# Patient Record
Sex: FEMALE | Race: WHITE | NOT HISPANIC OR LATINO | Employment: OTHER | URBAN - METROPOLITAN AREA
[De-identification: names, ages, dates, MRNs, and addresses within clinical notes are randomized per-mention and may not be internally consistent; named-entity substitution may affect disease eponyms.]

---

## 2017-01-03 ENCOUNTER — TELEPHONE (OUTPATIENT)
Dept: FAMILY MEDICINE | Facility: CLINIC | Age: 67
End: 2017-01-03

## 2017-01-04 NOTE — TELEPHONE ENCOUNTER
Spoke with patient's brother (George Tapia) informed him of lab results. Mr Vazquez verbalized understanding.

## 2017-01-04 NOTE — TELEPHONE ENCOUNTER
----- Message from Yanna Abbasi MD sent at 1/1/2017  9:35 PM CST -----  Notify patient: Blood sugar is well controlled and hep c screening is negative

## 2017-01-23 ENCOUNTER — TELEPHONE (OUTPATIENT)
Dept: NEUROLOGY | Facility: CLINIC | Age: 67
End: 2017-01-23

## 2017-02-24 RX ORDER — LOSARTAN POTASSIUM 50 MG/1
50 TABLET ORAL DAILY
Qty: 90 TABLET | Refills: 1 | Status: SHIPPED | OUTPATIENT
Start: 2017-02-24 | End: 2018-06-28 | Stop reason: SDUPTHER

## 2017-03-06 ENCOUNTER — TELEPHONE (OUTPATIENT)
Dept: FAMILY MEDICINE | Facility: CLINIC | Age: 67
End: 2017-03-06

## 2017-03-06 NOTE — TELEPHONE ENCOUNTER
----- Message from Arturo Green sent at 3/6/2017  9:37 AM CST -----  Contact: Brother/George Vazquez called in regarding the attached patient (sister-Maggy).  Patient needs refill on her Rx for her Lasartin Potassium 50 mgs      [facility]/pharmacy #[id number] - [place] - [address]  [address] [address]  Phone: [phone] Fax: [phone]    George's call back number is [phone]

## 2017-03-07 ENCOUNTER — OFFICE VISIT (OUTPATIENT)
Dept: NEUROLOGY | Facility: CLINIC | Age: 67
End: 2017-03-07
Payer: MEDICARE

## 2017-03-07 VITALS
HEIGHT: 61 IN | RESPIRATION RATE: 17 BRPM | DIASTOLIC BLOOD PRESSURE: 63 MMHG | WEIGHT: 201.25 LBS | HEART RATE: 59 BPM | BODY MASS INDEX: 38 KG/M2 | SYSTOLIC BLOOD PRESSURE: 126 MMHG

## 2017-03-07 DIAGNOSIS — Z86.73 HISTORY OF ISCHEMIC LEFT MCA STROKE: Primary | ICD-10-CM

## 2017-03-07 DIAGNOSIS — F44.5 PSEUDOSEIZURES: ICD-10-CM

## 2017-03-07 DIAGNOSIS — S06.5XAA SUBDURAL HEMATOMA: ICD-10-CM

## 2017-03-07 DIAGNOSIS — R29.6 FREQUENT FALLS: ICD-10-CM

## 2017-03-07 DIAGNOSIS — G40.909 SEIZURE DISORDER: ICD-10-CM

## 2017-03-07 PROCEDURE — 3074F SYST BP LT 130 MM HG: CPT | Mod: S$GLB,,, | Performed by: PSYCHIATRY & NEUROLOGY

## 2017-03-07 PROCEDURE — 1157F ADVNC CARE PLAN IN RCRD: CPT | Mod: S$GLB,,, | Performed by: PSYCHIATRY & NEUROLOGY

## 2017-03-07 PROCEDURE — 1126F AMNT PAIN NOTED NONE PRSNT: CPT | Mod: S$GLB,,, | Performed by: PSYCHIATRY & NEUROLOGY

## 2017-03-07 PROCEDURE — 1159F MED LIST DOCD IN RCRD: CPT | Mod: S$GLB,,, | Performed by: PSYCHIATRY & NEUROLOGY

## 2017-03-07 PROCEDURE — 99999 PR PBB SHADOW E&M-EST. PATIENT-LVL III: CPT | Mod: PBBFAC,,, | Performed by: PSYCHIATRY & NEUROLOGY

## 2017-03-07 PROCEDURE — 3078F DIAST BP <80 MM HG: CPT | Mod: S$GLB,,, | Performed by: PSYCHIATRY & NEUROLOGY

## 2017-03-07 PROCEDURE — 99499 UNLISTED E&M SERVICE: CPT | Mod: S$GLB,,, | Performed by: PSYCHIATRY & NEUROLOGY

## 2017-03-07 PROCEDURE — 1160F RVW MEDS BY RX/DR IN RCRD: CPT | Mod: S$GLB,,, | Performed by: PSYCHIATRY & NEUROLOGY

## 2017-03-07 PROCEDURE — 99214 OFFICE O/P EST MOD 30 MIN: CPT | Mod: S$GLB,,, | Performed by: PSYCHIATRY & NEUROLOGY

## 2017-03-07 NOTE — MR AVS SNAPSHOT
[facility]  [address] [facility] [address] [place] [address]  Phone: [phone]  Fax: [phone]                  Maggy Tapia   3/7/2017 1:00 PM   Office Visit    Description:  Female : 1950   Provider:  Harley Roberts DO   Department:  [facility] - Neurology           Reason for Visit     Stroke     Seizures           Diagnoses this Visit        Comments    History of ischemic left MCA stroke    -  Primary     Subdural hematoma         Pseudoseizures     comorbid history of Bipolar Disorder and Schizophrenia    Seizure disorder         Frequent falls                To Do List           Future Appointments        Provider Department Dept Phone    3/13/2017 5:30 PM LAB, [facility] [phone]    3/14/2017 10:00 AM [facility] EEG [facility] [phone]    3/23/2017 9:40 AM Yanna Abbasi MD [facility] [phone]    2017 1:40 PM Harley Roberts DO [facility] [phone]      Goals (5 Years of Data)     None      Follow-Up and Disposition     Return in about 6 weeks (around 2017).      [facility]     [facility] Nurse Care [facility] -  Assistance  Registered nurses in the [facility] provide clinical advisement, health education, appointment booking, and other advisory services.  Call for this free service at [phone].             Medications                Verify that the below list of medications is an accurate representation of the medications you are currently taking.  If none reported, the list may be blank. If incorrect, please contact your healthcare provider. Carry this list with you in case of emergency.           Current Medications     clonazePAM (KLONOPIN) 0.5 MG tablet Take 0.5 mg by mouth nightly.    divalproex (DEPAKOTE) 500 MG Tb24 Take 2 tablets (1,000 mg total) by mouth every evening.    fluticasone (FLONASE) 50 mcg/actuation nasal spray 2 sprays by Each  "Nare route once daily.    gemfibrozil (LOPID) 600 MG tablet Take 1 tablet (600 mg total) by mouth 2 (two) times daily.    levothyroxine (SYNTHROID) 50 MCG tablet Take 1 tablet (50 mcg total) by mouth once daily.    losartan (COZAAR) 50 MG tablet Take 1 tablet (50 mg total) by mouth once daily.    simvastatin (ZOCOR) 40 MG tablet Take 1 tablet (40 mg total) by mouth once daily.    topiramate (TOPAMAX) 100 MG tablet Take 100 mg by mouth 2 (two) times daily.    TRINTELLIX 10 mg Tab Take 1 tablet by mouth once daily.           Clinical Reference Information           Your Vitals Were     BP Pulse Resp Height Weight BMI    126/63 (BP Location: Left arm, Patient Position: Sitting, BP Method: Automatic) 59 17 5' 1" (1.549 m) 91.3 kg (201 lb 4.5 oz) 38.03 kg/m2      Blood Pressure          Most Recent Value    BP  126/63      Allergies as of 3/7/2017     Penicillins    Sulfa (Sulfonamide Antibiotics)      Immunizations Administered on Date of Encounter - 3/7/2017     None      Orders Placed During Today's Visit     Future Labs/Procedures Expected by Expires    Valproic Acid  3/7/2017 5/6/2018    Ambulatory EEG  As directed 3/7/2018      Instructions    We will make arrangements for the 48 hour EEG to be done again;  Once we know when it will be scheduled I would like you to CUT BACK on the Topamax from one pill twice a day to ONE HALF a pill twice a day 2 DAYS before the test is scheduled.     On the day you get the EEG hooked up, STOP taking the Topamax completely.  We can resume it afterwards if necessary, or can recommend a different alternative based on the results.        Language Assistance Services     ATTENTION: Language assistance services are available, free of charge. Please call [phone].      ATENCIÓN: Si aniyahla dwayne, tiene a see disposición servicios gratuitos de asistencia lingüística. Llame al [phone].     [facility] Ý: N?u b?n nói Ti?ng Vi?t, có các d?ch v? h? tr? ngôn ng? mi?n phí dành cho b?n. G?i " s? [phone].         [facility] complies with applicable Federal civil rights laws and does not discriminate on the basis of race, color, national origin, age, disability, or sex.

## 2017-03-07 NOTE — LETTER
March 8, 2017      Verito Ortiz, NP  [address] [place] [address] [address] [address]           [facility]  [address] [facility] [place] [address]  Phone: [phone]  Fax: [phone]          Patient: Maggy Tapia   MR Number: [id number]   YOB: 1950   Date of Visit: 3/7/2017       Dear Verito Ortiz:    Thank you for referring Maggy Tapia to me for evaluation. Attached you will find relevant portions of my assessment and plan of care.    If you have questions, please do not hesitate to call me. I look forward to following Maggy Tapia along with you.    Sincerely,    Harley Roberts, DO    Enclosure  CC:  No Recipients    If you would like to receive this communication electronically, please contact [email or web address] or [phone] to request more information on [employer] access.    For providers and/or their staff who would like to refer a patient to [facility], please contact us through our one-stop-shop provider referral line, [facility], at [phone].    If you feel you have received this communication in error or would no longer like to receive these types of communications, please e-mail [email or web address]

## 2017-03-07 NOTE — PROGRESS NOTES
"Subjective:         Patient ID: Maggy Tapia is a 67 y.o.  female who presents for follow up of subdural hematoma    History of Present Illness    Ms. 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     She had a left rigoberto hole evacuation August 3, 2016.  During her hospitalization, developed ataxia and decline in mental status with reaccumulation of fluid within the left subdural space; underwent left frontoparietal craniotomy with stripping of subdural membranes and evacuation of chronic subdural hematoma fluid on August 8, 2016.    She was discharged to inpatient rehabilitation on the 19th of August, discharged from rehabilitation September 8, 2016.    She returned to the neurology clinic for follow-up with Dr. Caraballo October 25, 2016.  With her history of schizophrenia and was suspicious for pseudoseizure activity resulting in behavioral arrests, and recommended stopping Topamax, continuing with Depakote 500 mg twice a day, and ambulatory  EEG and CTA head and neck.    2 days later, he presented to emergency room with questionable seizure activity "stared off into space".  According to family medicine notes, the patient continued to have multiple spells of unresponsiveness, as well as shaking spells therefore family started her back on Topamax 100 mg twice a day on October 30.  Does not appear she has been back to see neurology since then.  Had an appointment January 24, 2017 with Dr. Caraballo which was canceled.    She last saw Dr. Luong in neurosurgery in January 2017; had imaging of the cervical spine which per outside records was significant for cervical stenosis.    Here today with her brother.  Apparently was some confusion regarding " "the ambulatory EEG, something about order being lost.      She was not aware of having any recent spells.  Brother says she was on the commode; he heard her call out for her mother, found her bent over seated, when he lifted her up eyes were rolled back in her head, unresponsive for 2 minutes. Seemed to be confused at times, says "I'm OK" after events and c/o headache.  Similar presentation to past events. 2 this past week; last prior was 3-4 weeks.  Maybe 1-2 a month.      Intermittently feels dizzy, like head spinning. Daily, does not last long.     Review of patient's allergies indicates:   Allergen Reactions    Penicillins Anaphylaxis    Sulfa (sulfonamide antibiotics) Anaphylaxis     Current Outpatient Prescriptions   Medication Sig Dispense Refill    clonazePAM (KLONOPIN) 0.5 MG tablet Take 0.5 mg by mouth nightly.  3    divalproex (DEPAKOTE) 500 MG Tb24 Take 2 tablets (1,000 mg total) by mouth every evening. 180 tablet 3    fluticasone (FLONASE) 50 mcg/actuation nasal spray 2 sprays by Each Nare route once daily. 3 Bottle prn    gemfibrozil (LOPID) 600 MG tablet Take 1 tablet (600 mg total) by mouth 2 (two) times daily. 180 tablet 3    levothyroxine (SYNTHROID) 50 MCG tablet Take 1 tablet (50 mcg total) by mouth once daily. 90 tablet 3    losartan (COZAAR) 50 MG tablet Take 1 tablet (50 mg total) by mouth once daily. 90 tablet 1    simvastatin (ZOCOR) 40 MG tablet Take 1 tablet (40 mg total) by mouth once daily. 90 tablet 3    topiramate (TOPAMAX) 100 MG tablet Take 100 mg by mouth 2 (two) times daily.      TRINTELLIX 10 mg Tab Take 1 tablet by mouth once daily.  3     No current facility-administered medications for this visit.          Review of Systems  Review of Systems   Constitutional: Positive for fatigue. Negative for chills and fever.   HENT: Negative for congestion.    Eyes: Negative for visual disturbance.   Respiratory: Negative for cough, shortness of breath and wheezing.  "   Cardiovascular: Negative for chest pain and palpitations.   Gastrointestinal: Negative for abdominal pain, constipation, diarrhea, nausea and vomiting.   Endocrine: Negative for cold intolerance and heat intolerance.   Genitourinary: Negative for difficulty urinating, dysuria and hematuria.        Urinary incontinence     Musculoskeletal: Positive for arthralgias. Negative for myalgias.   Skin: Negative for rash and wound.   Allergic/Immunologic: Negative for immunocompromised state.   Neurological: Positive for weakness and numbness. Negative for dizziness, tremors, seizures and headaches.   Hematological: Does not bruise/bleed easily.   Psychiatric/Behavioral: Positive for decreased concentration, dysphoric mood (depression) and sleep disturbance. The patient is not nervous/anxious.        Objective:        Vitals:    03/07/17 1303   BP: 126/63   Pulse: (!) 59   Resp: 17     Body mass index is 38.03 kg/(m^2).  Neurologic Exam     Mental Status   Oriented to person, place, and time.   Level of consciousness: alert  Able to name object. Able to repeat. Normal comprehension.        Psychomotor slowing     Cranial Nerves     CN II   Visual fields full to confrontation.     CN III, IV, VI   Pupils are equal, round, and reactive to light.  Extraocular motions are normal.   CN III: no CN III palsy  CN VI: no CN VI palsy  Nystagmus: none     CN V   Facial sensation intact.     CN VII   Facial expression full, symmetric.     CN VIII   Hearing: intact       On confrontation visual fields, she does not report seeing fingers are being able to count digits in all 4 quadrants.  She is however able to track, and on finger to nose testing able to see and reach the target in all 4 quadrants.  Positive blink to visual threat in all 4 quadrants.     Gait, Coordination, and Reflexes     Gait  Gait: (wheelchair)    Coordination   Finger to nose coordination: normal    Tremor   Resting tremor: absent  Action tremor:  "absent      Physical Exam   Constitutional: She is oriented to person, place, and time.   Eyes: EOM are normal. Pupils are equal, round, and reactive to light.   Neurological: She is oriented to person, place, and time. She has a normal Finger-Nose-Finger Test.   Psychiatric: Her affect is blunt. She is slowed.         Data Review:    CTA head and neck 11/5/16:  Impression       1. Postoperative change of left frontoparietal craniotomy with stable thin residual mixed density extra-axial collection overlying the left cerebral convexity without significant midline shift or hydrocephalus.    2. Unremarkable CT exam without evidence of high-grade stenosis or aneurysm.    3. Paranasal sinus disease as discussed above.      Electronically signed by: KENZIE MAHONEY  Date: 11/05/16  Time: 05:18     Ambulatory EEG still marked as "future" in Epic       Results for JIM RODRIGES (MRN [id number]) as of 3/7/2017 13:00   Ref. Range 12/27/2016 16:16   Valproic Acid Lvl Latest Ref Range: 50.0 - 100.0 ug/mL 58.4   Results for JIM RODRIGES (MRN [id number]) as of 3/7/2017 13:00   Ref. Range 11/28/2016 10:15   WBC Latest Ref Range: 3.90 - 12.70 K/uL 10.64   RBC Latest Ref Range: 4.00 - 5.40 M/uL 4.93   Hemoglobin Latest Ref Range: 12.0 - 16.0 g/dL 15.6   Hematocrit Latest Ref Range: 37.0 - 48.5 % 48.1   MCV Latest Ref Range: 82 - 98 fL 98   MCH Latest Ref Range: 27.0 - 31.0 pg 31.6 (H)   MCHC Latest Ref Range: 32.0 - 36.0 % 32.4   RDW Latest Ref Range: 11.5 - 14.5 % 14.5   Platelets Latest Ref Range: 150 - 350 K/uL 143 (L)   MPV Latest Ref Range: 9.2 - 12.9 fL 12.2   Gran% Latest Ref Range: 38.0 - 73.0 % 63.5   Gran # Latest Ref Range: 1.8 - 7.7 K/uL 6.8   Lymph% Latest Ref Range: 18.0 - 48.0 % 26.4   Lymph # Latest Ref Range: 1.0 - 4.8 K/uL 2.8   Mono% Latest Ref Range: 4.0 - 15.0 % 8.6   Mono # Latest Ref Range: 0.3 - 1.0 K/uL 0.9   Eosinophil% Latest Ref Range: 0.0 - 8.0 % 0.9   Eos # Latest Ref Range: 0.0 - 0.5 K/uL 0.1 "   Basophil% Latest Ref Range: 0.0 - 1.9 % 0.4   Baso # Latest Ref Range: 0.00 - 0.20 K/uL 0.04   Sodium Latest Ref Range: 136 - 145 mmol/L 143   Potassium Latest Ref Range: 3.5 - 5.1 mmol/L 4.3   Chloride Latest Ref Range: 95 - 110 mmol/L 109   CO2 Latest Ref Range: 23 - 29 mmol/L 21 (L)   Anion Gap Latest Ref Range: 8 - 16 mmol/L 13   BUN, Bld Latest Ref Range: 8 - 23 mg/dL 27 (H)   Creatinine Latest Ref Range: 0.5 - 1.4 mg/dL 1.3   eGFR if non African American Latest Ref Range: >60 mL/min/1.73 m^2 42.3 (A)   eGFR if African American Latest Ref Range: >60 mL/min/1.73 m^2 48.7 (A)   Glucose Latest Ref Range: 70 - 110 mg/dL 118 (H)   Calcium Latest Ref Range: 8.7 - 10.5 mg/dL 10.1   Alkaline Phosphatase Latest Ref Range: 55 - 135 U/L 93   Total Protein Latest Ref Range: 6.0 - 8.4 g/dL 7.6   Albumin Latest Ref Range: 3.5 - 5.2 g/dL 3.7   Total Bilirubin Latest Ref Range: 0.1 - 1.0 mg/dL 0.6   AST Latest Ref Range: 10 - 40 U/L 16   ALT Latest Ref Range: 10 - 44 U/L 8 (L)   Triglycerides Latest Ref Range: 30 - 150 mg/dL 173 (H)   Cholesterol Latest Ref Range: 120 - 199 mg/dL 145   HDL Latest Ref Range: 40 - 75 mg/dL 49   LDL Cholesterol Latest Ref Range: 63.0 - 159.0 mg/dL 61.4 (L)   Total Cholesterol/HDL Ratio Latest Ref Range: 2.0 - 5.0  3.0   Hemoglobin A1C Latest Ref Range: 4.5 - 6.2 % 6.0   Estimated Avg Glucose Latest Ref Range: 68 - 131 mg/dL 126   TSH Latest Ref Range: 0.400 - 4.000 uIU/mL 9.123 (H)   Free T4 Latest Ref Range: 0.71 - 1.51 ng/dL 0.97     CT BRAIN 12/6/16:  Impression       No significant interval change compared to the prior exam of 11/4/2016 and in particular the small linear extra axial collection left frontoparietal at the craniotomy site is not significantly changed with no new acute findings      Electronically signed by: STEFAN BERKOWITZ MD  Date: 12/06/16  Time: 08:29          Assessment:        1. History of ischemic left MCA stroke    2. Subdural hematoma    3. Pseudoseizures    4. Seizure  disorder    5. Frequent falls (possibly related to polypharmacy)           Plan:         Problem List Items Addressed This Visit        Neuro    History of ischemic left MCA stroke - Primary    Seizure disorder    Pseudoseizures (Chronic)    Subdural hematoma    Overview     S/p burrhole 8/3 followed by craniotomy 8/8            Other    Frequent falls (possibly related to polypharmacy)        I would encourage she follow through with ambulatory EEG study, and would again recommend taper off Topamax prior to the study with falls and seizure precautions in place.  Alternative would include elective admission to epilepsy monitoring unit    We will make arrangements for the 48 hour EEG to be done again;  Once we know when it will be scheduled I would like you to CUT BACK on the Topamax from one pill twice a day to ONE HALF a pill twice a day 2 DAYS before the test is scheduled.     On the day you get the EEG hooked up, STOP taking the Topamax completely.  We can resume it afterwards if necessary, or can recommend a different alternative based on the results.     Return in about 6 weeks (around 4/18/2017).       Thank you very much for the opportunity to assist in this patient's care.  If you have any questions or concerns, please do not hesitate to contact me at any time.     Sincerely,  Harley Roberts, DO

## 2017-03-07 NOTE — PATIENT INSTRUCTIONS
We will make arrangements for the 48 hour EEG to be done again;  Once we know when it will be scheduled I would like you to CUT BACK on the Topamax from one pill twice a day to ONE HALF a pill twice a day 2 DAYS before the test is scheduled.     On the day you get the EEG hooked up, STOP taking the Topamax completely.  We can resume it afterwards if necessary, or can recommend a different alternative based on the results.

## 2017-03-13 ENCOUNTER — LAB VISIT (OUTPATIENT)
Dept: LAB | Facility: HOSPITAL | Age: 67
End: 2017-03-13
Attending: PSYCHIATRY & NEUROLOGY
Payer: MEDICARE

## 2017-03-13 DIAGNOSIS — R29.6 FREQUENT FALLS: ICD-10-CM

## 2017-03-13 LAB — VALPROATE SERPL-MCNC: 64.8 UG/ML

## 2017-03-13 PROCEDURE — 36415 COLL VENOUS BLD VENIPUNCTURE: CPT

## 2017-03-13 PROCEDURE — 80164 ASSAY DIPROPYLACETIC ACD TOT: CPT

## 2017-03-14 ENCOUNTER — HOSPITAL ENCOUNTER (OUTPATIENT)
Dept: NEUROLOGY | Facility: HOSPITAL | Age: 67
Discharge: HOME | End: 2017-03-14
Attending: PSYCHIATRY & NEUROLOGY
Payer: MEDICARE

## 2017-03-14 DIAGNOSIS — S06.5XAA SDH (SUBDURAL HEMATOMA): ICD-10-CM

## 2017-03-14 DIAGNOSIS — I61.0 NONTRAUMATIC SUBCORTICAL HEMORRHAGE OF LEFT CEREBRAL HEMISPHERE: ICD-10-CM

## 2017-03-14 DIAGNOSIS — F44.5 PSEUDOSEIZURES: ICD-10-CM

## 2017-03-14 DIAGNOSIS — G40.909 SEIZURE DISORDER: ICD-10-CM

## 2017-03-14 PROCEDURE — 95951 PR EEG MONITORING/VIDEORECORD: CPT | Mod: 26,,, | Performed by: PSYCHIATRY & NEUROLOGY

## 2017-03-14 PROCEDURE — 95819 EEG AWAKE AND ASLEEP: CPT

## 2017-03-15 PROCEDURE — 95951 PR EEG MONITORING/VIDEORECORD: CPT | Mod: 26,,, | Performed by: PSYCHIATRY & NEUROLOGY

## 2017-03-17 NOTE — PROCEDURES
DATE OF STUDY:  03/14/2017.    REQUESTING PHYSICIAN:  Dr. Harley Roberts.    EEG#:  [id number].    RECORDING TIMES:  Start on 03/14/2017 at 10:39.  End on 03/16/2017 at 3:23.  A total of 40 hours and 26 minutes of EEG monitoring was obtained.    ELECTROENCEPHALOGRAM REPORT    METHODOLOGY:  Electroencephalographic (EEG) recording is recorded with   electrodes placed according to the International 10-20 placement system.  Thirty   two (32) channels of digital signal (sampling rate of 512/sec), including T1   and T2, were simultaneously recorded from the scalp and may include EKG, EMG,   and/or eye monitors.  Recording band pass was 0.1 to 512 Hz.  Digital video   recording of the patient is simultaneously recorded with the EEG.  The patient   is instructed to report clinical symptoms which may occur during the recording   session.  EEG and video recording are stored and archived in digital format.    Activation procedures, which include photic stimulation, hyperventilation and   instructing patients to perform simple tasks, are done in selected patients.    The EEG is displayed on a monitor screen and can be reviewed using different   montages.  Computer assisted-analysis is employed to detect spike and   electrographic seizure activity.  The entire record is submitted for computer   analysis.  The entire recording is visually reviewed, and the times identified   by computer analysis as being spikes or seizures are reviewed again.    Compressed spectral analysis (CSA) is also performed on the activity recorded   from each individual channel.  This is displayed as a power display of   frequencies from 0 to 30 Hz over time.  The CSA is reviewed looking for   asymmetries in power between homologous areas of the scalp, then compared with   the original EEG recording.    [employer] software was also utilized in the review of this study.  This software   suite analyzes the EEG recording in multiple domains.  Coherence and  rhythmicity   are computed to identify EEG sections which may contain organized seizures.    Each channel undergoes analysis to detect the presence of spike and sharp waves   which have special and morphological characteristics of epileptic activity.  The   routine EEG recording is converted from special into frequency domain.  This is   then displayed comparing homologous areas to identify areas of significant   asymmetry.  Algorithm to identify non-cortically generated artifact is used to   separate artifact from the EEG.    EEG FINDINGS:  In the waking state, the background was fairly low amplitude and   was a mixture of irregular beta frequencies seen diffusely.  Other times in   which a posterior dominant rhythm of an irregular 9 Hz in frequency was noted in   the occipital region.  In the waking state, the beta activity was higher   amplitude in the left central parietal posterior temporal region.  There also   appeared to be some intermixed theta activity in this region.  With sleep,   background consisted predominantly of a generalized mixture of theta, beta and   some alpha frequencies seen diffusely.  There was more prominent asymmetry with   higher amplitude theta frequencies noted in left side in the same distribution   is seen in the waking state.  The theta frequency had a MU-shaped configuration,   which is somewhat sharp appearance, but there is a normal pattern.  There is no   evidence for spike or sharp wave activity.  No rhythmic buildups to suggest   subclinical or non-convulsive seizures.  The voltage asymmetry was noted another   frequency particularly the sleep spindles being higher in the same area.    IMPRESSION:  Abnormal EEG with higher amplitude activity noted in the central   parietal, posterior temporal region, congestive of underlying tissue or a skull   defect.  No epileptiform activity was seen.      RR/SN  dd: 03/17/2017 09:40:35 (CDT)  td: 03/17/2017 10:05:23 (CDT)  Doc ID    #[id number]  Job ID #[id number]    CC:

## 2017-03-22 ENCOUNTER — DOCUMENTATION ONLY (OUTPATIENT)
Dept: FAMILY MEDICINE | Facility: CLINIC | Age: 67
End: 2017-03-22

## 2017-03-22 NOTE — PROGRESS NOTES
Pre-Visit Chart Review  For Appointment Scheduled on 3-23-17    Health Maintenance Due   Topic Date Due    TETANUS VACCINE  01/01/1966    Colonoscopy  01/01/1998    Zoster Vaccine  01/01/2008

## 2017-03-23 ENCOUNTER — TELEPHONE (OUTPATIENT)
Dept: NEUROLOGY | Facility: CLINIC | Age: 67
End: 2017-03-23

## 2017-03-23 NOTE — TELEPHONE ENCOUNTER
Spoke with patient, reported the results of recent EEG and informed her she is to continue her Topamax and Depakote as ordered. Patient verbalized understanding.

## 2017-03-23 NOTE — TELEPHONE ENCOUNTER
----- Message from Harley Roberts DO sent at 3/21/2017 11:08 AM CDT -----  Her ambulatory EEG showed changes consistent with her prior surgery (craniotomy for subdural evacuation) and some slowing but did not show epileptic activity.  At this point I would recommend she continue off the Topamax as she was instructed prior to the EEG, continue on Depakote which she is taking for psychological treatment.

## 2017-03-27 ENCOUNTER — DOCUMENTATION ONLY (OUTPATIENT)
Dept: FAMILY MEDICINE | Facility: CLINIC | Age: 67
End: 2017-03-27

## 2017-03-27 NOTE — PROGRESS NOTES
Pre-Visit Chart Review  For Appointment Scheduled on 3-28-17    Health Maintenance Due   Topic Date Due    TETANUS VACCINE  01/01/1966    Colonoscopy  01/01/1998    Zoster Vaccine  01/01/2008

## 2017-03-28 ENCOUNTER — LAB VISIT (OUTPATIENT)
Dept: LAB | Facility: HOSPITAL | Age: 67
End: 2017-03-28
Attending: FAMILY MEDICINE
Payer: MEDICARE

## 2017-03-28 ENCOUNTER — OFFICE VISIT (OUTPATIENT)
Dept: FAMILY MEDICINE | Facility: CLINIC | Age: 67
End: 2017-03-28
Payer: MEDICARE

## 2017-03-28 VITALS
HEIGHT: 61 IN | SYSTOLIC BLOOD PRESSURE: 100 MMHG | TEMPERATURE: 98 F | BODY MASS INDEX: 34.83 KG/M2 | HEART RATE: 70 BPM | DIASTOLIC BLOOD PRESSURE: 60 MMHG | WEIGHT: 184.5 LBS

## 2017-03-28 DIAGNOSIS — I10 ESSENTIAL HYPERTENSION: ICD-10-CM

## 2017-03-28 DIAGNOSIS — E66.9 OBESITY (BMI 30-39.9): ICD-10-CM

## 2017-03-28 DIAGNOSIS — Z79.4 TYPE 2 DIABETES MELLITUS WITHOUT COMPLICATION, WITH LONG-TERM CURRENT USE OF INSULIN: ICD-10-CM

## 2017-03-28 DIAGNOSIS — G40.909 SEIZURE DISORDER: ICD-10-CM

## 2017-03-28 DIAGNOSIS — I61.0 NONTRAUMATIC SUBCORTICAL HEMORRHAGE OF LEFT CEREBRAL HEMISPHERE: ICD-10-CM

## 2017-03-28 DIAGNOSIS — R29.6 FREQUENT FALLS: ICD-10-CM

## 2017-03-28 DIAGNOSIS — F44.5 PSEUDOSEIZURES: Chronic | ICD-10-CM

## 2017-03-28 DIAGNOSIS — E03.9 ACQUIRED HYPOTHYROIDISM: ICD-10-CM

## 2017-03-28 DIAGNOSIS — E11.9 TYPE 2 DIABETES MELLITUS WITHOUT COMPLICATION, WITH LONG-TERM CURRENT USE OF INSULIN: ICD-10-CM

## 2017-03-28 DIAGNOSIS — E03.9 ACQUIRED HYPOTHYROIDISM: Primary | ICD-10-CM

## 2017-03-28 DIAGNOSIS — E78.5 HYPERLIPIDEMIA, UNSPECIFIED HYPERLIPIDEMIA TYPE: ICD-10-CM

## 2017-03-28 DIAGNOSIS — Z86.73 HISTORY OF ISCHEMIC LEFT MCA STROKE: ICD-10-CM

## 2017-03-28 LAB
ALBUMIN SERPL BCP-MCNC: 3.1 G/DL
ALP SERPL-CCNC: 63 U/L
ALT SERPL W/O P-5'-P-CCNC: 5 U/L
ANION GAP SERPL CALC-SCNC: 8 MMOL/L
AST SERPL-CCNC: 12 U/L
BASOPHILS # BLD AUTO: 0.02 K/UL
BASOPHILS NFR BLD: 0.3 %
BILIRUB SERPL-MCNC: 0.4 MG/DL
BUN SERPL-MCNC: 28 MG/DL
CALCIUM SERPL-MCNC: 9.5 MG/DL
CHLORIDE SERPL-SCNC: 108 MMOL/L
CO2 SERPL-SCNC: 26 MMOL/L
CREAT SERPL-MCNC: 1.3 MG/DL
DIFFERENTIAL METHOD: ABNORMAL
EOSINOPHIL # BLD AUTO: 0.1 K/UL
EOSINOPHIL NFR BLD: 1.2 %
ERYTHROCYTE [DISTWIDTH] IN BLOOD BY AUTOMATED COUNT: 13.6 %
EST. GFR  (AFRICAN AMERICAN): 48.4 ML/MIN/1.73 M^2
EST. GFR  (NON AFRICAN AMERICAN): 42 ML/MIN/1.73 M^2
GLUCOSE SERPL-MCNC: 155 MG/DL
HCT VFR BLD AUTO: 44.4 %
HGB BLD-MCNC: 14.5 G/DL
LYMPHOCYTES # BLD AUTO: 2.7 K/UL
LYMPHOCYTES NFR BLD: 39.6 %
MCH RBC QN AUTO: 32.4 PG
MCHC RBC AUTO-ENTMCNC: 32.7 %
MCV RBC AUTO: 99 FL
MONOCYTES # BLD AUTO: 0.4 K/UL
MONOCYTES NFR BLD: 6.4 %
NEUTROPHILS # BLD AUTO: 3.6 K/UL
NEUTROPHILS NFR BLD: 52.2 %
PLATELET # BLD AUTO: 116 K/UL
PMV BLD AUTO: 11.6 FL
POTASSIUM SERPL-SCNC: 4.3 MMOL/L
PROT SERPL-MCNC: 6.4 G/DL
RBC # BLD AUTO: 4.47 M/UL
SODIUM SERPL-SCNC: 142 MMOL/L
T3FREE SERPL-MCNC: 1.5 PG/ML
T4 FREE SERPL-MCNC: 0.97 NG/DL
TSH SERPL DL<=0.005 MIU/L-ACNC: 2.9 UIU/ML
WBC # BLD AUTO: 6.9 K/UL

## 2017-03-28 PROCEDURE — 3044F HG A1C LEVEL LT 7.0%: CPT | Mod: S$GLB,,, | Performed by: FAMILY MEDICINE

## 2017-03-28 PROCEDURE — 84481 FREE ASSAY (FT-3): CPT

## 2017-03-28 PROCEDURE — 80053 COMPREHEN METABOLIC PANEL: CPT

## 2017-03-28 PROCEDURE — 84439 ASSAY OF FREE THYROXINE: CPT

## 2017-03-28 PROCEDURE — 1126F AMNT PAIN NOTED NONE PRSNT: CPT | Mod: S$GLB,,, | Performed by: FAMILY MEDICINE

## 2017-03-28 PROCEDURE — 84443 ASSAY THYROID STIM HORMONE: CPT

## 2017-03-28 PROCEDURE — 1159F MED LIST DOCD IN RCRD: CPT | Mod: S$GLB,,, | Performed by: FAMILY MEDICINE

## 2017-03-28 PROCEDURE — 85025 COMPLETE CBC W/AUTO DIFF WBC: CPT

## 2017-03-28 PROCEDURE — 99999 PR PBB SHADOW E&M-EST. PATIENT-LVL III: CPT | Mod: PBBFAC,,, | Performed by: FAMILY MEDICINE

## 2017-03-28 PROCEDURE — 99499 UNLISTED E&M SERVICE: CPT | Mod: S$GLB,,, | Performed by: FAMILY MEDICINE

## 2017-03-28 PROCEDURE — 2022F DILAT RTA XM EVC RTNOPTHY: CPT | Mod: S$GLB,,, | Performed by: FAMILY MEDICINE

## 2017-03-28 PROCEDURE — 1160F RVW MEDS BY RX/DR IN RCRD: CPT | Mod: S$GLB,,, | Performed by: FAMILY MEDICINE

## 2017-03-28 PROCEDURE — 99214 OFFICE O/P EST MOD 30 MIN: CPT | Mod: S$GLB,,, | Performed by: FAMILY MEDICINE

## 2017-03-28 PROCEDURE — 3078F DIAST BP <80 MM HG: CPT | Mod: S$GLB,,, | Performed by: FAMILY MEDICINE

## 2017-03-28 PROCEDURE — 83036 HEMOGLOBIN GLYCOSYLATED A1C: CPT

## 2017-03-28 PROCEDURE — 1157F ADVNC CARE PLAN IN RCRD: CPT | Mod: S$GLB,,, | Performed by: FAMILY MEDICINE

## 2017-03-28 PROCEDURE — 3074F SYST BP LT 130 MM HG: CPT | Mod: S$GLB,,, | Performed by: FAMILY MEDICINE

## 2017-03-28 PROCEDURE — 4010F ACE/ARB THERAPY RXD/TAKEN: CPT | Mod: S$GLB,,, | Performed by: FAMILY MEDICINE

## 2017-03-28 PROCEDURE — 36415 COLL VENOUS BLD VENIPUNCTURE: CPT | Mod: PO

## 2017-03-28 NOTE — MR AVS SNAPSHOT
[facility]  [address]  [place] [address]  Phone: [phone]  Fax: [phone]                  Maggy Tapia   3/28/2017 3:20 PM   Office Visit    Description:  Female : 1950   Provider:  Yanna Abbasi MD   Department:  [facility]           Reason for Visit     Follow-up           Diagnoses this Visit        Comments    Acquired hypothyroidism    -  Primary     Type 2 diabetes mellitus without complication, with long-term current use of insulin         Obesity (BMI 30-39.9)         Essential hypertension         Seizure disorder         History of ischemic left MCA stroke         Nontraumatic subcortical hemorrhage of left cerebral hemisphere         Pseudoseizures         Frequent falls         Hyperlipidemia, unspecified hyperlipidemia type                To Do List           Future Appointments        Provider Department Dept Phone    3/28/2017 4:30 PM [facility] [phone]    2017 1:40 PM Harley Roberts DO [facility] Neurology [phone]    2017 4:00 PM Yanna Abbasi MD [facility] [phone]      Goals (5 Years of Data)     None      Follow-Up and Disposition     Return in about 3 months (around 2017).      [facility] On Call     [facility] On Call Nurse Care Line - 24/7 Assistance  Registered nurses in the [facility] On Call Center provide clinical advisement, health education, appointment booking, and other advisory services.  Call for this free service at [phone].             Medications           STOP taking these medications     fluticasone (FLONASE) 50 mcg/actuation nasal spray 2 sprays by Each Nare route once daily.           Verify that the below list of medications is an accurate representation of the medications you are currently taking.  If none reported, the list may be blank. If incorrect, please contact your healthcare provider. Carry this list with you in case of  "emergency.           Current Medications     clonazePAM (KLONOPIN) 0.5 MG tablet Take 0.5 mg by mouth nightly.    divalproex (DEPAKOTE) 500 MG Tb24 Take 2 tablets (1,000 mg total) by mouth every evening.    gemfibrozil (LOPID) 600 MG tablet Take 1 tablet (600 mg total) by mouth 2 (two) times daily.    levothyroxine (SYNTHROID) 50 MCG tablet Take 1 tablet (50 mcg total) by mouth once daily.    losartan (COZAAR) 50 MG tablet Take 1 tablet (50 mg total) by mouth once daily.    simvastatin (ZOCOR) 40 MG tablet Take 1 tablet (40 mg total) by mouth once daily.    TRINTELLIX 10 mg Tab Take 1 tablet by mouth once daily.           Clinical Reference Information           Your Vitals Were     BP Pulse Temp Height Weight BMI    100/60 (BP Location: Right arm, Patient Position: Sitting, BP Method: Automatic) 70 97.9 °F (36.6 °C) (Oral) 5' 1" (1.549 m) 83.7 kg (184 lb 8.4 oz) 34.87 kg/m2      Blood Pressure          Most Recent Value    BP  100/60      Allergies as of 3/28/2017     Penicillins    Sulfa (Sulfonamide Antibiotics)      Immunizations Administered on Date of Encounter - 3/28/2017     None      Orders Placed During Today's Visit      Normal Orders This Visit    Ambulatory referral to Home Health     Future Labs/Procedures Expected by Expires    CBC auto differential  3/28/2017 5/27/2018    Comprehensive metabolic panel  3/28/2017 5/27/2018    Hemoglobin A1c  3/28/2017 5/27/2018    T3, free  3/28/2017 5/27/2018    T4, free  3/28/2017 5/27/2018    TSH  3/28/2017 5/27/2018      Instructions      Walking for Fitness  Fitness walking has something for everyone, even people who are already fit. Walking is one of the safest ways to condition your body aerobically. It can boost energy, help you lose weight, and reduce stress.    Physical benefits  · Walking strengthens your heart and lungs, and tones your muscles.  · When walking, your feet land with less impact than in other sports. This reduces chances of muscle, bone, and " joint injury.  · Regular walking improves your cholesterol levels and lowers your risk of heart disease. And it helps you control your blood sugar if you have diabetes.  · Walking is a weight-bearing activity, which helps maintain bone density. This can help prevent osteoporosis.  Personal rewards  · Taking walks can help you relax and manage stress. And fitness walking may make you feel better about yourself.  · Walking can help you sleep better at night and make you less likely to be depressed.  · Regular walking may help maintain your memory as you get older.  · Walking is a great way to spend extra time with friends and family members. Be sure to invite your dog along!  Q&A about fitness walking  Q: Will walking keep me fit?  A: Yes. Regular walking at the right pace gives you all the benefits of other aerobic activities, such as jogging and swimming.  Q: Will walking help me lose weight and keep it off?  A: Yes. Per mile, walking can burn as many calories as jogging. Your health care provider can help work walking into your weight-loss plan.  Q: Is walking safe for my health?  A: Yes. Walking is safe if you have high blood pressure, diabetes, heart disease, or other conditions. Talk to your health care provider before you start.  Date Last Reviewed: 5/9/2015  © 8642-0563 [employer]. [address] [place] [address], [place] [address]. All rights reserved. This information is not intended as a substitute for professional medical care. Always follow your healthcare professional's instructions.        Weight Management: Getting Started  Healthy bodies come in all shapes and sizes. Not all bodies are made to be thin. For some people, a healthy weight is higher than the average weight listed on weight charts. Your healthcare provider can help you decide on a healthy weight for you.    Reasons to lose weight  Losing weight can help with some health problems, such as high blood pressure, heart disease, diabetes,  sleep apnea, and arthritis. You may also feel more energy.  Set your long-term goal  Your goal doesn't even have to be a specific weight. You may decide on a fitness goal (such as being able to walk 10 miles a week), or a health goal (such as lowering your blood pressure). Choose a goal that is measurable and reasonable, so you know when you've reached it. A goal of reaching a BMI of less than 25 is not always reasonable (or possible).   Make an action plan  Habits dont change overnight. Setting your goals too high can leave you feeling discouraged if you cant reach them. Be realistic. Choose one or two small changes you can make now. Set an action plan for how you are going to make these changes. When you can stick to this plan, keep making a few more small changes. Taking small steps will help you stay on the path to success.  Track your progress  Write down your goals. Then, keep a daily record of your progress. Write down what you eat and how active you are. This record lets you look back on how much youve done. It may also help when youre feeling frustrated. Reward yourself for success. Even if you dont reach every goal, give yourself credit for what you do get done.  Get support  Encouragement from others can help make losing weight easier. Ask your family members and friends for support. They may even want to join you. Also look to your healthcare provider, registered dietitian, and  for help. Your local hospital can give you more information about nutrition, exercise, and weight loss.  Date Last Reviewed: 1/31/2016 © 2000-2016 [employer]. [address] [place] [address], [place] [address]. All rights reserved. This information is not intended as a substitute for professional medical care. Always follow your healthcare professional's instructions.             Language Assistance Services     ATTENTION: Language assistance services are available, free of charge. Please call  [phone].      ATENCIÓN: Si habla español, tiene a see disposición servicios gratuitos de asistencia lingüística. Llame al [phone].     CHÚ Ý: N?u b?n nói Ti?ng Vi?t, có các d?ch v? h? tr? ngôn ng? mi?n phí dành cho b?n. G?i s? [phone].         [facility] complies with applicable Federal civil rights laws and does not discriminate on the basis of race, color, national origin, age, disability, or sex.

## 2017-03-28 NOTE — PATIENT INSTRUCTIONS

## 2017-03-29 LAB
ESTIMATED AVG GLUCOSE: 117 MG/DL
HBA1C MFR BLD HPLC: 5.7 %

## 2017-03-29 NOTE — PROGRESS NOTES
Subjective:       Patient ID: Maggy Tapia is a 67 y.o. female.    Chief Complaint: Follow-up    Patient Active Problem List   Diagnosis    Syncope and collapse    Frequent falls (possibly related to polypharmacy)    Type 2 diabetes mellitus    History of left breast cancer    History of ischemic left MCA stroke    Seizure disorder    Essential hypertension    Hyperlipidemia    Thrombocytopenia    Depression    JUNIOR (acute kidney injury)    Hypothyroidism    Subdural hematoma, chronic, small, bilateral    SDH (subdural hematoma)    Valproic acid toxicity    Pseudoseizures    Obesity (BMI 30-39.9)    Osteopenia    Nontraumatic subcortical hemorrhage of left cerebral hemisphere   Patient has had 48 hour amb eeg monitoring showing no seizure activity although she was witnessed by her brother who recorded several blanking spells while on the monitor.  This is reassuring that these are pseudoseizures,  There is damage from the prior craniotomy and some generalized slowing.  I reviewed results with patient and her brother and discussed recommendations of Dr. Roberts.  Patient is still taking topomax 50mg.  I advised to continue wean off to 1/2 po qam x 2 weeks then stop.  Cont psych care with Dr. Whyte and deptaishate    HPI  Review of Systems   Constitutional: Negative for fatigue and unexpected weight change.   Respiratory: Negative for chest tightness and shortness of breath.    Cardiovascular: Negative for chest pain, palpitations and leg swelling.   Gastrointestinal: Negative for abdominal pain.   Musculoskeletal: Negative for arthralgias.   Neurological: Negative for dizziness, syncope, light-headedness and headaches.       Objective:      Physical Exam   Constitutional: She is oriented to person, place, and time. She appears well-developed and well-nourished.   Cardiovascular: Normal rate, regular rhythm and normal heart sounds.    Pulmonary/Chest: Effort normal and breath sounds normal.  "  Musculoskeletal: She exhibits no edema.   Walks slowly and somewhat unsteadily with walker   Neurological: She is alert and oriented to person, place, and time.   Witnessed blanking spell here in clinic- 2 spells at check out desk each lasting < 1 min.  Patient was seated with head tilted back, eyes open and staring up.  Normal breathing.  Mouth open.  No jerking or movement.  Patient blinked her eyes, brought her neck back to neutral position and stated immediately "I'm OK"   Skin: Skin is warm and dry.   Psychiatric: She has a normal mood and affect.   Nursing note and vitals reviewed.      Assessment:       1. Acquired hypothyroidism    2. Type 2 diabetes mellitus without complication, with long-term current use of insulin    3. Obesity (BMI 30-39.9)    4. Essential hypertension    5. Seizure disorder    6. History of ischemic left MCA stroke    7. Nontraumatic subcortical hemorrhage of left cerebral hemisphere    8. Pseudoseizures    9. Frequent falls (possibly related to polypharmacy)    10. Hyperlipidemia, unspecified hyperlipidemia type        Plan:       1. Acquired hypothyroidism  Stable condition.  Continue current medications.  Will adjust based on lab findings or if condition changes.    - TSH; Future  - T4, free; Future  - T3, free; Future  - CBC auto differential; Future    2. Type 2 diabetes mellitus without complication, with long-term current use of insulin  Stable condition.  Continue current medications.  Will adjust based on lab findings or if condition changes.    - Comprehensive metabolic panel; Future  - Hemoglobin A1c; Future    3. Obesity (BMI 30-39.9)  Counseled patient on his ideal body weight, health consequences of being obese and current recommendations including weekly exercise and a heart healthy diet.  Current BMI is:Estimated body mass index is 34.87 kg/(m^2) as calculated from the following:    Height as of this encounter: 5' 1" (1.549 m).    Weight as of this encounter: 83.7 kg " (184 lb 8.4 oz)..  Patient is aware that ideal BMI < 25 or Weight in (lb) to have BMI = 25: 132.      - Ambulatory referral to Home Health    4. Essential hypertension  Controlled on current medications.  Continue current medications.    - Ambulatory referral to Home Health    5. Seizure disorder  Wean off topomax.  Cont care under neuro.  Will order for help with ADLs  - Ambulatory referral to Home Health    6. History of ischemic left MCA stroke  Refer for assistance with ADLs and med mgmt  - Ambulatory referral to Home Health    7. Nontraumatic subcortical hemorrhage of left cerebral hemisphere  Generalized slowing on EEG indicating cognitive impairment residual  - Ambulatory referral to Home Health    8. Pseudoseizures  Cont neuro and psych care  - Ambulatory referral to Home Health    9. Frequent falls (possibly related to polypharmacy)  Fall precautions. Cont assistive devices and refer for HH PT  - Ambulatory referral to Home Health    10. Hyperlipidemia, unspecified hyperlipidemia type  Stable condition.  Continue current medications.  Will adjust based on lab findings or if condition changes.    [facility] goal documentation:  Patient readiness: acceptance and barriers:readiness  During the course of the visit the patient was educated and counseled about the following: Diabetes:  Discussed general issues about diabetes pathophysiology and management.  Hypertension:   Dietary sodium restriction.  Regular aerobic exercise.  Obesity:   General weight loss/lifestyle modification strategies discussed (elicit support from others; identify saboteurs; non-food rewards, etc).  Goals: Diabetes: Maintain Hemoglobin A1C below 7, Hypertension: Reduce Blood Pressure and Obesity: Reduce calorie intake and BMI  Goal/Outcomes met:Hypertension and type 2 DM  The following self management tools provided:none  Patient Instructions (the written plan) was given to the patient/family: Yes  Time spent with patient: 20 minutes    Patient  with be reevaluated in 3 months or sooner prn    Greater than 50% of this visit was spent counseling as described in above documentation:Yes

## 2017-04-18 ENCOUNTER — OFFICE VISIT (OUTPATIENT)
Dept: NEUROLOGY | Facility: CLINIC | Age: 67
End: 2017-04-18
Payer: MEDICARE

## 2017-04-18 VITALS
HEART RATE: 67 BPM | SYSTOLIC BLOOD PRESSURE: 113 MMHG | BODY MASS INDEX: 35.16 KG/M2 | DIASTOLIC BLOOD PRESSURE: 65 MMHG | WEIGHT: 186.06 LBS

## 2017-04-18 DIAGNOSIS — Z86.73 HISTORY OF ISCHEMIC LEFT MCA STROKE: ICD-10-CM

## 2017-04-18 DIAGNOSIS — S06.5XAA SDH (SUBDURAL HEMATOMA): ICD-10-CM

## 2017-04-18 DIAGNOSIS — I61.0 NONTRAUMATIC SUBCORTICAL HEMORRHAGE OF LEFT CEREBRAL HEMISPHERE: Primary | ICD-10-CM

## 2017-04-18 DIAGNOSIS — G47.33 OSA (OBSTRUCTIVE SLEEP APNEA): ICD-10-CM

## 2017-04-18 DIAGNOSIS — R29.6 FREQUENT FALLS: ICD-10-CM

## 2017-04-18 DIAGNOSIS — F44.5 PSEUDOSEIZURES: ICD-10-CM

## 2017-04-18 PROCEDURE — 1160F RVW MEDS BY RX/DR IN RCRD: CPT | Mod: S$GLB,,, | Performed by: PSYCHIATRY & NEUROLOGY

## 2017-04-18 PROCEDURE — 99499 UNLISTED E&M SERVICE: CPT | Mod: S$GLB,,, | Performed by: PSYCHIATRY & NEUROLOGY

## 2017-04-18 PROCEDURE — 99999 PR PBB SHADOW E&M-EST. PATIENT-LVL III: CPT | Mod: PBBFAC,,, | Performed by: PSYCHIATRY & NEUROLOGY

## 2017-04-18 PROCEDURE — 99214 OFFICE O/P EST MOD 30 MIN: CPT | Mod: S$GLB,,, | Performed by: PSYCHIATRY & NEUROLOGY

## 2017-04-18 PROCEDURE — 3078F DIAST BP <80 MM HG: CPT | Mod: S$GLB,,, | Performed by: PSYCHIATRY & NEUROLOGY

## 2017-04-18 PROCEDURE — 1157F ADVNC CARE PLAN IN RCRD: CPT | Mod: S$GLB,,, | Performed by: PSYCHIATRY & NEUROLOGY

## 2017-04-18 PROCEDURE — 1126F AMNT PAIN NOTED NONE PRSNT: CPT | Mod: S$GLB,,, | Performed by: PSYCHIATRY & NEUROLOGY

## 2017-04-18 PROCEDURE — 3074F SYST BP LT 130 MM HG: CPT | Mod: S$GLB,,, | Performed by: PSYCHIATRY & NEUROLOGY

## 2017-04-18 PROCEDURE — 1159F MED LIST DOCD IN RCRD: CPT | Mod: S$GLB,,, | Performed by: PSYCHIATRY & NEUROLOGY

## 2017-04-18 NOTE — PROGRESS NOTES
"Subjective:         Patient ID: Maggy Tapia is a 67 y.o.  female who presents for follow up of subdural hematoma status post craniotomy, ataxia, pseudoseizures    History of Present Illness    At our last visit in March 2017, recommend that she cut back on Topamax prior to the study.  Her EEG showed some changes consistent with her craniotomy, no epileptiform activity.  Recommended she continue off Topamax indefinitely.  She had several episodes during her 48 hour EEG, no changes were noted.     Has had a handful of episodes since then, will stare off, then becomes unresponsive, eyes open, does not recall episodes. May be several days or a week between episodes, then may have one or multiple in the same day.  Feels tired atferwards or may say "I'm all right". May have a headache following.      No recent falls.     She does sleep excessively, between 12-15 hours a day.  Gets up to use toilet 3-4 times a night, sleeping often during the day.  Hard to awaken in the morning, hard to awaken during the day, has to be shaken.  +snores, feels tired. Has JUAN, but has not work her CPAP in close to 2 years.  Doesn't like it, refuses because it feels too tight.  Discussed the significance of this, I think it will help her tremendously.     Review of patient's allergies indicates:   Allergen Reactions    Penicillins Anaphylaxis    Sulfa (sulfonamide antibiotics) Anaphylaxis     Current Outpatient Prescriptions   Medication Sig Dispense Refill    clonazePAM (KLONOPIN) 0.5 MG tablet Take 0.5 mg by mouth nightly.  3    divalproex (DEPAKOTE) 500 MG Tb24 Take 2 tablets (1,000 mg total) by mouth every evening. 180 tablet 3    gemfibrozil (LOPID) 600 MG tablet Take 1 tablet (600 mg total) by mouth 2 (two) times daily. 180 tablet 3    levothyroxine (SYNTHROID) 50 MCG tablet Take 1 tablet (50 mcg total) by mouth once daily. 90 tablet 3    losartan (COZAAR) 50 MG tablet Take 1 tablet (50 mg total) by mouth once daily. 90 " tablet 1    simvastatin (ZOCOR) 40 MG tablet Take 1 tablet (40 mg total) by mouth once daily. 90 tablet 3    TRINTELLIX 10 mg Tab Take 1 tablet by mouth once daily.  3     No current facility-administered medications for this visit.          Review of Systems  Review of Systems    Objective:        Vitals:    04/18/17 1326   BP: 113/65   Pulse: 67     Body mass index is 35.16 kg/(m^2).  Neurologic Exam     Mental Status   Oriented to person, place, and time.   Level of consciousness: alert  Able to name object. Able to repeat. Normal comprehension.        Psychomotor slowing  Delayed responses     Cranial Nerves     CN II   Visual fields full to confrontation.     CN III, IV, VI   Pupils are equal, round, and reactive to light.  Extraocular motions are normal.   CN III: no CN III palsy  CN VI: no CN VI palsy  Nystagmus: none     CN V   Facial sensation intact.     CN VII   Facial expression full, symmetric.     CN VIII   Hearing: intact    CN IX, X   Palate: symmetric       On confrontation visual fields, she does not report seeing fingers or being able to count digits to the left.  She is however able to track, and on finger to nose testing able to see and reach the target in all 4 quadrants.  Positive blink to visual threat in all 4 quadrants. Normal pursuit and saccades     Motor Exam   Right arm pronator drift: absent  Left arm pronator drift: absent    Gait, Coordination, and Reflexes     Gait  Gait: (ambulates with walker)    Coordination   Finger to nose coordination: normal    Tremor   Resting tremor: absent  Action tremor: absent      Physical Exam   Constitutional: She is oriented to person, place, and time.   Eyes: EOM are normal. Pupils are equal, round, and reactive to light.   Neurological: She is oriented to person, place, and time. She has a normal Finger-Nose-Finger Test.         Data Review:  Results for JIM RODRIGES (MRN [id number]) as of 4/18/2017 13:33   Ref. Range 3/28/2017 16:31   WBC  Latest Ref Range: 3.90 - 12.70 K/uL 6.90   RBC Latest Ref Range: 4.00 - 5.40 M/uL 4.47   Hemoglobin Latest Ref Range: 12.0 - 16.0 g/dL 14.5   Hematocrit Latest Ref Range: 37.0 - 48.5 % 44.4   MCV Latest Ref Range: 82 - 98 fL 99 (H)   MCH Latest Ref Range: 27.0 - 31.0 pg 32.4 (H)   MCHC Latest Ref Range: 32.0 - 36.0 % 32.7   RDW Latest Ref Range: 11.5 - 14.5 % 13.6   Platelets Latest Ref Range: 150 - 350 K/uL 116 (L)   MPV Latest Ref Range: 9.2 - 12.9 fL 11.6   Gran% Latest Ref Range: 38.0 - 73.0 % 52.2   Gran # Latest Ref Range: 1.8 - 7.7 K/uL 3.6   Lymph% Latest Ref Range: 18.0 - 48.0 % 39.6   Lymph # Latest Ref Range: 1.0 - 4.8 K/uL 2.7   Mono% Latest Ref Range: 4.0 - 15.0 % 6.4   Mono # Latest Ref Range: 0.3 - 1.0 K/uL 0.4   Eosinophil% Latest Ref Range: 0.0 - 8.0 % 1.2   Eos # Latest Ref Range: 0.0 - 0.5 K/uL 0.1   Basophil% Latest Ref Range: 0.0 - 1.9 % 0.3   Baso # Latest Ref Range: 0.00 - 0.20 K/uL 0.02   Sodium Latest Ref Range: 136 - 145 mmol/L 142   Potassium Latest Ref Range: 3.5 - 5.1 mmol/L 4.3   Chloride Latest Ref Range: 95 - 110 mmol/L 108   CO2 Latest Ref Range: 23 - 29 mmol/L 26   Anion Gap Latest Ref Range: 8 - 16 mmol/L 8   BUN, Bld Latest Ref Range: 8 - 23 mg/dL 28 (H)   Creatinine Latest Ref Range: 0.5 - 1.4 mg/dL 1.3   eGFR if non African American Latest Ref Range: >60 mL/min/1.73 m^2 42.0 (A)   eGFR if African American Latest Ref Range: >60 mL/min/1.73 m^2 48.4 (A)   Glucose Latest Ref Range: 70 - 110 mg/dL 155 (H)   Calcium Latest Ref Range: 8.7 - 10.5 mg/dL 9.5   Alkaline Phosphatase Latest Ref Range: 55 - 135 U/L 63   Total Protein Latest Ref Range: 6.0 - 8.4 g/dL 6.4   Albumin Latest Ref Range: 3.5 - 5.2 g/dL 3.1 (L)   Total Bilirubin Latest Ref Range: 0.1 - 1.0 mg/dL 0.4   AST Latest Ref Range: 10 - 40 U/L 12   ALT Latest Ref Range: 10 - 44 U/L 5 (L)   Hemoglobin A1C Latest Ref Range: 4.5 - 6.2 % 5.7   Estimated Avg Glucose Latest Ref Range: 68 - 131 mg/dL 117   TSH Latest Ref Range:  0.400 - 4.000 uIU/mL 2.896   T3, Free Latest Ref Range: 2.3 - 4.2 pg/mL 1.5 (L)   Free T4 Latest Ref Range: 0.71 - 1.51 ng/dL 0.97   Results for JIM RODRIGES (MRN [id number]) as of 4/18/2017 13:33   Ref. Range 12/27/2016 16:16 3/13/2017 17:51   Valproic Acid Lvl Latest Ref Range: 50.0 - 100.0 ug/mL 58.4 64.8     Assessment:        1. Nontraumatic subcortical hemorrhage of left cerebral hemisphere    2. SDH (subdural hematoma)    3. Pseudoseizures    4. Frequent falls (possibly related to polypharmacy)    5. History of ischemic left MCA stroke    6. JUAN (obstructive sleep apnea)           Plan:         Problem List Items Addressed This Visit        Neuro    History of ischemic left MCA stroke    Pseudoseizures (Chronic)    SDH (subdural hematoma)    Overview     S/p burrhole 8/3 followed by craniotomy 8/8         Nontraumatic subcortical hemorrhage of left cerebral hemisphere - Primary    JUAN (obstructive sleep apnea)    Current Assessment & Plan     Strongly encouraged to wear CPAP, counseled on importance of good quality sleep and adequate brain oxygenation and ventilation.      She last had PSG approximately 4 years ago, prior to her stroke and SDH.  Will refer to sleep medicine for repeat PSG and adjustment of equipment and pressures.          Relevant Orders    Ambulatory consult to Sleep Disorders    Polysomnogram (CPAP will be added if patient meets diagnostic criteria.)       Other    Frequent falls (possibly related to polypharmacy)          Return in about 3 months (around 7/18/2017).       Thank you very much for the opportunity to assist in this patient's care.  If you have any questions or concerns, please do not hesitate to contact me at any time.     Sincerely,  Harley Roberts, DO

## 2017-04-18 NOTE — MR AVS SNAPSHOT
[facility]  [address]  [facility] [address]  Phone: [phone]  Fax: [phone]                  Maggy Tapia   2017 1:40 PM   Office Visit    Description:  Female : 1950   Provider:  Harley Roberts DO   Department:  [facility]           Reason for Visit     Stroke           Diagnoses this Visit        Comments    Nontraumatic subcortical hemorrhage of left cerebral hemisphere    -  Primary     SDH (subdural hematoma)         Pseudoseizures         Frequent falls         History of ischemic left MCA stroke         JUAN (obstructive sleep apnea)                To Do List           Future Appointments        Provider Department Dept Phone    2017 4:00 PM Ynana Abbasi MD [facility] [phone]    2017 1:40 PM Harley Roberts DO [facility] [phone]      Goals (5 Years of Data)     None      Follow-Up and Disposition     Return in about 3 months (around 2017).    Follow-up and Disposition History      [facility]     [facility] Nurse Care Line -  Assistance  Unless otherwise directed by your provider, please contact [facility], our nurse care line that is available for  assistance.     Registered nurses in the [facility] provide: appointment scheduling, clinical advisement, health education, and other advisory services.  Call: [phone] (toll free)               Medications                Verify that the below list of medications is an accurate representation of the medications you are currently taking.  If none reported, the list may be blank. If incorrect, please contact your healthcare provider. Carry this list with you in case of emergency.           Current Medications     clonazePAM (KLONOPIN) 0.5 MG tablet Take 0.5 mg by mouth nightly.    divalproex (DEPAKOTE) 500 MG Tb24 Take 2 tablets (1,000 mg total) by mouth every evening.    gemfibrozil (LOPID) 600 MG tablet  Take 1 tablet (600 mg total) by mouth 2 (two) times daily.    levothyroxine (SYNTHROID) 50 MCG tablet Take 1 tablet (50 mcg total) by mouth once daily.    losartan (COZAAR) 50 MG tablet Take 1 tablet (50 mg total) by mouth once daily.    simvastatin (ZOCOR) 40 MG tablet Take 1 tablet (40 mg total) by mouth once daily.    TRINTELLIX 10 mg Tab Take 1 tablet by mouth once daily.           Clinical Reference Information           Your Vitals Were     BP Pulse Weight BMI       113/65 (BP Location: Right arm, Patient Position: Sitting, BP Method: Automatic) 67 84.4 kg (186 lb 1.1 oz) 35.16 kg/m2       Blood Pressure          Most Recent Value    BP  113/65      Allergies as of 4/18/2017     Penicillins    Sulfa (Sulfonamide Antibiotics)      Immunizations Administered on Date of Encounter - 4/18/2017     None      Orders Placed During Today's Visit      Normal Orders This Visit    Ambulatory consult to Sleep Disorders     Future Labs/Procedures Expected by Expires    Polysomnogram (CPAP will be added if patient meets diagnostic criteria.)  As directed 4/18/2018      [facility] Sign-Up     Activating your [facility] account is as easy as 1-2-3!     1) Visit [email or web address], select Sign Up Now, enter this activation code and your date of birth, then select Next.  Activation code not generated  Current Patient Portal Status: Account disabled      2) Create a username and password to use when you visit [facility] in the future and select a security question in case you lose your password and select Next.    3) Enter your e-mail address and click Sign Up!    Additional Information  If you have questions, please e-mail [email or web address] or call [phone] to talk to our [facility] staff. Remember, [facility] is NOT to be used for urgent needs. For medical emergencies, dial 911.         Language Assistance Services     ATTENTION: Language assistance services are available, free of charge. Please call [phone].       ATENCIÓN: Si habla español, tiene a see disposición servicios gratuitos de asistencia lingüística. Len al [phone].     CHÚ Ý: N?u b?n nói Ti?ng Vi?t, có các d?ch v? h? tr? ngôn ng? mi?n phí dành cho b?n. G?i s? [phone].         [facility] complies with applicable Federal civil rights laws and does not discriminate on the basis of race, color, national origin, age, disability, or sex.

## 2017-04-18 NOTE — ASSESSMENT & PLAN NOTE
Strongly encouraged to wear CPAP, counseled on importance of good quality sleep and adequate brain oxygenation and ventilation.      She last had PSG approximately 4 years ago, prior to her stroke and SDH.  Will refer to sleep medicine for repeat PSG and adjustment of equipment and pressures.

## 2017-04-19 ENCOUNTER — TELEPHONE (OUTPATIENT)
Dept: NEUROLOGY | Facility: CLINIC | Age: 67
End: 2017-04-19

## 2017-04-19 NOTE — TELEPHONE ENCOUNTER
Patient referred for sleep study and appt with Dr. Natalie Cochran. Please contact the patient to schedule.

## 2017-06-02 ENCOUNTER — TELEPHONE (OUTPATIENT)
Dept: FAMILY MEDICINE | Facility: CLINIC | Age: 67
End: 2017-06-02

## 2017-06-02 DIAGNOSIS — E11.9 TYPE 2 DIABETES MELLITUS WITHOUT COMPLICATION, WITH LONG-TERM CURRENT USE OF INSULIN: ICD-10-CM

## 2017-06-02 DIAGNOSIS — E66.9 OBESITY (BMI 30-39.9): ICD-10-CM

## 2017-06-02 DIAGNOSIS — S06.5XAA SDH (SUBDURAL HEMATOMA): ICD-10-CM

## 2017-06-02 DIAGNOSIS — Z79.4 TYPE 2 DIABETES MELLITUS WITHOUT COMPLICATION, WITH LONG-TERM CURRENT USE OF INSULIN: ICD-10-CM

## 2017-06-02 DIAGNOSIS — F32.A DEPRESSION, UNSPECIFIED DEPRESSION TYPE: ICD-10-CM

## 2017-06-02 DIAGNOSIS — Z86.73 HISTORY OF ISCHEMIC LEFT MCA STROKE: ICD-10-CM

## 2017-06-02 DIAGNOSIS — F44.5 PSEUDOSEIZURES: Primary | Chronic | ICD-10-CM

## 2017-06-02 DIAGNOSIS — I10 ESSENTIAL HYPERTENSION: ICD-10-CM

## 2017-06-02 DIAGNOSIS — R29.6 FREQUENT FALLS: ICD-10-CM

## 2017-06-02 NOTE — TELEPHONE ENCOUNTER
----- Message from Ynana Abbasi MD sent at 6/2/2017 10:01 AM CDT -----  Dr. Yanna Abbasi,     Thank you for ordering   REF34 - AMB REFERRAL TO HOME HEALTH for patient Maggy Tapia, MRN [id number]. Unfortunately, the patient's insurance, [facility], has denied the service due to Does Not Meet Internal Guidelines, N/A. This is an automated In Basket notification that does not require a reply. For a more detailed explanation, or for questions regarding this insurance denial, call the [facility] department at [phone] and reference referral ID [id number].The Pre-Service department hours are M-F 8 a.m. to 5 p.m.       Thank you,     [facility]      Do we need to change HH referral?

## 2017-06-12 ENCOUNTER — TELEPHONE (OUTPATIENT)
Dept: FAMILY MEDICINE | Facility: CLINIC | Age: 67
End: 2017-06-12

## 2017-06-12 NOTE — TELEPHONE ENCOUNTER
Spoke with Carmen Coombs with [facility] about orders for patient's home health. The problem is that orders have been coming over with visit amounts and they need each discipline to say visits as needed.

## 2017-06-12 NOTE — TELEPHONE ENCOUNTER
----- Message from Tita [place], MA sent at 6/8/2017  1:20 PM CDT -----  Contact: [facility] at [phone]      ----- Message -----  From: Catherine Bacon  Sent: 6/7/2017  11:06 AM  To: [facility] Staff    Carmen with [employer] [facility] at [phone], Calling to speak with the nurse for clarification on home health orders. Please advise. Thanks.

## 2017-06-29 ENCOUNTER — DOCUMENTATION ONLY (OUTPATIENT)
Dept: FAMILY MEDICINE | Facility: CLINIC | Age: 67
End: 2017-06-29

## 2017-06-29 NOTE — PATIENT INSTRUCTIONS
Controlling High Blood Pressure  High blood pressure (hypertension) is often called the silent killer. This is because many people who have it dont know it. High blood pressure is defined as 140/90 mm Hg or higher. Know your blood pressure and remember to check it regularly. Doing so can save your life. Here are some things you can do to help control your blood pressure.    Choose heart-healthy foods  · Select low-salt, low-fat foods. Limit sodium intake to 2,400 mg per day or the amount suggested by your healthcare provider.  · Limit canned, dried, cured, packaged, and fast foods. These can contain a lot of salt.  · Eat 8 to 10 servings of fruits and vegetables every day.  · Choose lean meats, fish, or chicken.  · Eat whole-grain pasta, brown rice, and beans.  · Eat 2 to 3 servings of low-fat or fat-free dairy products.  · Ask your doctor about the DASH eating plan. This plan helps reduce blood pressure.  · When you go to a restaurant, ask that your meal be prepared with no added salt.  Maintain a healthy weight  · Ask your healthcare provider how many calories to eat a day. Then stick to that number.  · Ask your healthcare provider what weight range is healthiest for you. If you are overweight, a weight loss of only 3% to 5% of your body weight can help lower blood pressure. Generally, a good weight loss goal is to lose 10% of your body weight in a year.  · Limit snacks and sweets.  · Get regular exercise.  Get up and get active  · Choose activities you enjoy. Find ones you can do with friends or family. This includes bicycling, dancing, walking, and jogging.  · Park farther away from building entrances.  · Use stairs instead of the elevator.  · When you can, walk or bike instead of driving.  · Ladora leaves, garden, or do household repairs.  · Be active at a moderate to vigorous level of physical activity for at least 40 minutes for a minimum of 3 to 4 days a week.   Manage stress  · Make time to relax and enjoy  life. Find time to laugh.  · Communicate your concerns with your loved ones and your healthcare provider.  · Visit with family and friends, and keep up with hobbies.  Limit alcohol and quit smoking  · Men should have no more than 2 drinks per day.  · Women should have no more than 1 drink per day.  · Talk with your healthcare provider about quitting smoking. Smoking significantly increases your risk for heart disease and stroke. Ask your healthcare provider about community smoking cessation programs and other options.  Medicines  If lifestyle changes arent enough, your healthcare provider may prescribe high blood pressure medicine. Take all medicines as prescribed. If you have any questions about your medicines, ask your healthcare provider before stopping or changing them.   Date Last Reviewed: 4/27/2016 © 2000-2016 [employer]. [address] [place] [address], [place] [address]. All rights reserved. This information is not intended as a substitute for professional medical care. Always follow your healthcare professional's instructions.        Diabetes (General Information)  Diabetes is a long-term health problem. It means your body does not make enough insulin. Or it may mean that your body cannot use the insulin it makes. Insulin is a hormone in your body. It lets blood sugar (glucose) reach the cells in your body. All of your cells need glucose for fuel.  When you have diabetes, the glucose in your blood builds up because it cannot get into the cells. This buildup is called high blood sugar (hyperglycemia).  Your blood sugar level depends on several things. It depends on what kind of food you eat and how much of it you eat. It also depends on how much exercise you get, and how much insulin you have in your body. Eating too much of the wrong kinds of food or not taking diabetes medicine on time can cause high blood sugar. Infections can cause high blood sugar even if you are taking medicines  correctly.  These things can also cause low blood sugar:  · Missing meals  · Not eating enough food  · Taking too much diabetes medicine  Diabetes can cause serious problems over time if you do not get treated. These problems include heart disease, stroke, kidney failure, and blindness. They also include nerve pain or loss of feeling in your legs and feet, and gangrene of the feet. By keeping your blood sugar under control you can prevent or delay these problems.  Normal blood sugar levels are 80 to 100 before a meal and less than 180 in the 1 to 2 hours after a meal.  Home care  Follow these guidelines when caring for yourself at home:  · Follow the diet your healthcare provider gives you. Take insulin or other diabetes medicine exactly as told to.  · Watch your blood sugar as you are told to. Keep a log of your results. This will help your provider change your medicines to keep your blood sugar under control.  · Try to reach your ideal weight. You may be able to cut back on or not have to take diabetes medicine if you eat the right foods and get exercise.  · Do not smoke. Smoking worsens the effects of diabetes on your circulation. You are much more likely to have a heart attack if you have diabetes and you smoke.  · Take good care of your feet. If you have lost feeling in your feet, you may not see an injury or infection. Check your feet and between your toes at least once a week.  · Wear a medical alert bracelet or necklace, or carry a card in your wallet that says you have diabetes. This will help healthcare providers give you the right care if you get very ill and cannot tell them that you have diabetes.  Sick day plan  If you get a cold, the flu, or a bacterial or viral infection, take these steps:  · Look at your diabetes sick plan and call your healthcare provider as you were told to. You may need to call your provider right away if:  ¨ Your blood sugar is above 240 while taking your diabetes  medicine  ¨ Your urine ketone levels are above normal or high  ¨ You have been vomiting more than 6 hours  ¨ You have trouble breathing or your breath ha s a fruity smell  ¨ You have a high fever  ¨ You have a fever for several days and you are not getting better  ¨ You get light-headed and are sleepier than usual  · Keep taking your diabetes pills (oral medicine) even if you have been vomiting and are feeling sick. Call your provider right away because you may need insulin to lower your blood sugar until you recover from your illness.  · Keep taking your insulin even if you have been vomiting and are feeling sick. Call your provider right away to ask if you need to change your insulin dose. This will depend on your blood sugar results.  · Check your blood sugar every 2 to 4 hours, or at least 4 times a day.  · Check your ketones often. If you are vomiting and having diarrhea, watch them more often.  · Do not skip meals. Try to eat small meals on a regular schedule. Do this even if you do not feel like eating.  · Drink water or other liquids that do not have caffeine or calories. This will keep you from getting dehydrated. If you are nauseated or vomiting, takes small sips every 5 minutes. To prevent dehydration try to drink a cup (8 ounces) of fluids every hour while you are awake.  General care  Always bring a source of fast-acting sugar with you in case you have symptoms of low blood sugar (below 70). At the first sign of low blood sugar, eat or drink 15 to 20 grams of fast-acting sugar to raise your blood sugar. Examples are:  · 3 to 4 glucose tablets. You can buy these at most drugstores.  · 4 ounces (1/2 cup) of regular (not diet) soft drinks  · 4 ounces (1/2 cup) of any fruit juice  · 8 ounces (1 cup) of milk  · 5 to 6 pieces of hard candy  · 1 tablespoon of honey  Check your blood sugar 15 minutes after treating yourself. If it is still below 70, take 15 to 20 more grams of fast-acting sugar. Test again in  15 minutes. If it returns to normal (70 or above), eat a snack or meal to keep your blood sugar in a safe range. If it stays low, call your doctor or go to an emergency room.  Follow-up care  Follow-up with your healthcare provider, or as advised. For more information about diabetes, visit the American Diabetes Association website at www.diabetes.org or call 602-292-4923.  When to seek medical advice  Call your healthcare provider right away if you have any of these symptoms of high blood sugar:  · Frequent urination  · Dizziness  · Drowsiness  · Thirst  · Headache  · Nausea or vomiting  · Abdominal pain  · Eyesight changes  · Fast breathing  · Confusion or loss of consciousness  Also call your provider right away if you have any of these signs of low blood sugar:  · Fatigue  · Headache  · Shakes  · Excess sweating  · Hunger  · Feeling anxious or restless  · Eyesight changes  · Drowsiness  · Weakness  · Confusion or loss of consciousness  Call [phone]  Call for emergency help right away if any of these occur:  · Chest pain or shortness of breath  · Dizziness or fainting  · Weakness of an arm or leg or one side of the face  · Trouble speaking or seeing   Date Last Reviewed: 6/1/2016  © 1501-0402 [employer]. [address] [place] [address], [place] [address]. All rights reserved. This information is not intended as a substitute for professional medical care. Always follow your healthcare professional's instructions.        Weight Management: Getting Started  Healthy bodies come in all shapes and sizes. Not all bodies are made to be thin. For some people, a healthy weight is higher than the average weight listed on weight charts. Your healthcare provider can help you decide on a healthy weight for you.    Reasons to lose weight  Losing weight can help with some health problems, such as high blood pressure, heart disease, diabetes, sleep apnea, and arthritis. You may also feel more energy.  Set your long-term  goal  Your goal doesn't even have to be a specific weight. You may decide on a fitness goal (such as being able to walk 10 miles a week), or a health goal (such as lowering your blood pressure). Choose a goal that is measurable and reasonable, so you know when you've reached it. A goal of reaching a BMI of less than 25 is not always reasonable (or possible).   Make an action plan  Habits dont change overnight. Setting your goals too high can leave you feeling discouraged if you cant reach them. Be realistic. Choose one or two small changes you can make now. Set an action plan for how you are going to make these changes. When you can stick to this plan, keep making a few more small changes. Taking small steps will help you stay on the path to success.  Track your progress  Write down your goals. Then, keep a daily record of your progress. Write down what you eat and how active you are. This record lets you look back on how much youve done. It may also help when youre feeling frustrated. Reward yourself for success. Even if you dont reach every goal, give yourself credit for what you do get done.  Get support  Encouragement from others can help make losing weight easier. Ask your family members and friends for support. They may even want to join you. Also look to your healthcare provider, registered dietitian, and  for help. Your local hospital can give you more information about nutrition, exercise, and weight loss.  Date Last Reviewed: 1/31/2016  © 5130-4183 [employer]. [address] [place] [address], [place] [address]. All rights reserved. This information is not intended as a substitute for professional medical care. Always follow your healthcare professional's instructions.        Walking for Fitness  Fitness walking has something for everyone, even people who are already fit. Walking is one of the safest ways to condition your body aerobically. It can boost energy, help you lose  weight, and reduce stress.    Physical benefits  · Walking strengthens your heart and lungs, and tones your muscles.  · When walking, your feet land with less impact than in other sports. This reduces chances of muscle, bone, and joint injury.  · Regular walking improves your cholesterol levels and lowers your risk of heart disease. And it helps you control your blood sugar if you have diabetes.  · Walking is a weight-bearing activity, which helps maintain bone density. This can help prevent osteoporosis.  Personal rewards  · Taking walks can help you relax and manage stress. And fitness walking may make you feel better about yourself.  · Walking can help you sleep better at night and make you less likely to be depressed.  · Regular walking may help maintain your memory as you get older.  · Walking is a great way to spend extra time with friends and family members. Be sure to invite your dog along!  Q&A about fitness walking  Q: Will walking keep me fit?  A: Yes. Regular walking at the right pace gives you all the benefits of other aerobic activities, such as jogging and swimming.  Q: Will walking help me lose weight and keep it off?  A: Yes. Per mile, walking can burn as many calories as jogging. Your health care provider can help work walking into your weight-loss plan.  Q: Is walking safe for my health?  A: Yes. Walking is safe if you have high blood pressure, diabetes, heart disease, or other conditions. Talk to your health care provider before you start.  Date Last Reviewed: 5/9/2015  © 8470-0042 [employer]. [address] [place] [address], [place] [address]. All rights reserved. This information is not intended as a substitute for professional medical care. Always follow your healthcare professional's instructions.

## 2017-07-12 ENCOUNTER — OFFICE VISIT (OUTPATIENT)
Dept: NEUROLOGY | Facility: CLINIC | Age: 67
End: 2017-07-12
Payer: MEDICARE

## 2017-07-12 VITALS
HEIGHT: 61 IN | RESPIRATION RATE: 20 BRPM | DIASTOLIC BLOOD PRESSURE: 75 MMHG | WEIGHT: 183.63 LBS | HEART RATE: 64 BPM | BODY MASS INDEX: 34.67 KG/M2 | SYSTOLIC BLOOD PRESSURE: 149 MMHG

## 2017-07-12 DIAGNOSIS — R29.6 FREQUENT FALLS: ICD-10-CM

## 2017-07-12 DIAGNOSIS — G40.909 SEIZURE DISORDER: ICD-10-CM

## 2017-07-12 DIAGNOSIS — I61.0 NONTRAUMATIC SUBCORTICAL HEMORRHAGE OF LEFT CEREBRAL HEMISPHERE: Primary | ICD-10-CM

## 2017-07-12 DIAGNOSIS — G47.33 OSA (OBSTRUCTIVE SLEEP APNEA): ICD-10-CM

## 2017-07-12 PROCEDURE — 1126F AMNT PAIN NOTED NONE PRSNT: CPT | Mod: S$GLB,,, | Performed by: PSYCHIATRY & NEUROLOGY

## 2017-07-12 PROCEDURE — 99213 OFFICE O/P EST LOW 20 MIN: CPT | Mod: S$GLB,,, | Performed by: PSYCHIATRY & NEUROLOGY

## 2017-07-12 PROCEDURE — 1159F MED LIST DOCD IN RCRD: CPT | Mod: S$GLB,,, | Performed by: PSYCHIATRY & NEUROLOGY

## 2017-07-12 PROCEDURE — 99999 PR PBB SHADOW E&M-EST. PATIENT-LVL III: CPT | Mod: PBBFAC,,, | Performed by: PSYCHIATRY & NEUROLOGY

## 2017-07-12 PROCEDURE — 99499 UNLISTED E&M SERVICE: CPT | Mod: S$GLB,,, | Performed by: PSYCHIATRY & NEUROLOGY

## 2017-07-12 PROCEDURE — 1157F ADVNC CARE PLAN IN RCRD: CPT | Mod: S$GLB,,, | Performed by: PSYCHIATRY & NEUROLOGY

## 2017-07-12 NOTE — PATIENT INSTRUCTIONS
I would like to see if her Depakote dose can be lowered.  The next time you see Dr. Whyte ask if he thinks the dose can be lowered somewhat; while it is not all of the problem, I do think the Depakote is contributing to your slowness and falls.     Try to get back in to see the sleep medicine doctor and get set up for the sleep study

## 2017-07-12 NOTE — PROGRESS NOTES
Subjective:         Patient ID: Maggy Tapia is a 67 y.o.  female who presents for follow up of subdural hematoma status post craniotomy, ataxia, pseudoseizures    Interval history since last visit:    States has not been able to get sleep study; she and  state they tried to call but could not get through.  Has had two more spells of seizure like activity, have occurred when on the toilet.  Had a fall out of bed a few weeks ago; had to call 911 to get her up.     Walks with shuffle.  Not sleeping as long as before.      Review of patient's allergies indicates:   Allergen Reactions    Penicillins Anaphylaxis    Sulfa (sulfonamide antibiotics) Anaphylaxis     Current Outpatient Prescriptions   Medication Sig Dispense Refill    clonazePAM (KLONOPIN) 0.5 MG tablet Take 0.5 mg by mouth nightly.  3    divalproex (DEPAKOTE) 500 MG Tb24 Take 2 tablets (1,000 mg total) by mouth every evening. 180 tablet 3    gemfibrozil (LOPID) 600 MG tablet Take 1 tablet (600 mg total) by mouth 2 (two) times daily. 180 tablet 3    levothyroxine (SYNTHROID) 50 MCG tablet Take 1 tablet (50 mcg total) by mouth once daily. 90 tablet 3    losartan (COZAAR) 50 MG tablet Take 1 tablet (50 mg total) by mouth once daily. 90 tablet 1    simvastatin (ZOCOR) 40 MG tablet Take 1 tablet (40 mg total) by mouth once daily. 90 tablet 3    TRINTELLIX 10 mg Tab Take 1 tablet by mouth once daily.  3     No current facility-administered medications for this visit.          Review of Systems  Review of Systems    Objective:        Vitals:    07/12/17 1323   BP: (!) 149/75   Pulse: 64   Resp:      Body mass index is 34.7 kg/m².  Neurologic Exam     Cranial Nerves     CN II   Visual fields full to confrontation.     CN III, IV, VI   Pupils are equal, round, and reactive to light.  Extraocular motions are normal.   Right pupil: Consensual response: intact.   Left pupil: Consensual response: intact.   CN III: no CN III palsy  CN VI: no CN VI  palsy  Nystagmus: none   Diplopia: none  Ophthalmoparesis: none    CN V   Facial sensation intact.     CN VII   Facial expression full, symmetric.     Gait, Coordination, and Reflexes     Gait  Gait: shuffling    Coordination   Romberg: positive    Tremor   Resting tremor: absent  Action tremor: absentShuffling gait, unable to tandem walk       Physical Exam   Eyes: EOM are normal. Pupils are equal, round, and reactive to light.   Neurological: She has an abnormal Romberg Test.         Data Review:    Assessment:        1. Nontraumatic subcortical hemorrhage of left cerebral hemisphere    2. Frequent falls (possibly related to polypharmacy)    3. Seizure disorder    4. JUAN (obstructive sleep apnea)            Plan:         Problem List Items Addressed This Visit        Neuro    Seizure disorder    Nontraumatic subcortical hemorrhage of left cerebral hemisphere - Primary    JUAN (obstructive sleep apnea)       Other    Frequent falls (possibly related to polypharmacy)      Other Visit Diagnoses    None.       I would like to see if her Depakote dose can be lowered.  The next time you see Dr. Whyte ask if he thinks the dose can be lowered somewhat; while it is not all of the problem, I do think the Depakote is contributing to your slowness and falls.     Try to get back in to see the sleep medicine doctor and get set up for the sleep study    Return in about 6 months (around 1/12/2018).       Thank you very much for the opportunity to assist in this patient's care.  If you have any questions or concerns, please do not hesitate to contact me at any time.     Sincerely,  Harley Roberts, DO

## 2017-08-03 ENCOUNTER — HOSPITAL ENCOUNTER (OUTPATIENT)
Facility: HOSPITAL | Age: 67
LOS: 1 days | Discharge: HOME HEALTH | End: 2017-08-04
Attending: EMERGENCY MEDICINE | Admitting: FAMILY MEDICINE
Payer: MEDICARE

## 2017-08-03 DIAGNOSIS — N17.9 AKI (ACUTE KIDNEY INJURY): Primary | ICD-10-CM

## 2017-08-03 DIAGNOSIS — N39.0 URINARY TRACT INFECTION WITHOUT HEMATURIA, SITE UNSPECIFIED: ICD-10-CM

## 2017-08-03 DIAGNOSIS — R53.1 WEAKNESS: ICD-10-CM

## 2017-08-03 LAB
ALBUMIN SERPL BCP-MCNC: 3.6 G/DL
ALP SERPL-CCNC: 85 U/L
ALT SERPL W/O P-5'-P-CCNC: 16 U/L
ANION GAP SERPL CALC-SCNC: 15 MMOL/L
AST SERPL-CCNC: 20 U/L
BACTERIA #/AREA URNS HPF: ABNORMAL /HPF
BASOPHILS # BLD AUTO: ABNORMAL K/UL
BASOPHILS NFR BLD: 0 %
BILIRUB SERPL-MCNC: 0.5 MG/DL
BILIRUB UR QL STRIP: NEGATIVE
BNP SERPL-MCNC: 63 PG/ML
BUN SERPL-MCNC: 32 MG/DL
CALCIUM SERPL-MCNC: 9.7 MG/DL
CHLORIDE SERPL-SCNC: 104 MMOL/L
CLARITY UR: ABNORMAL
CO2 SERPL-SCNC: 21 MMOL/L
COLOR UR: YELLOW
CREAT SERPL-MCNC: 1.7 MG/DL
DIFFERENTIAL METHOD: ABNORMAL
EOSINOPHIL # BLD AUTO: ABNORMAL K/UL
EOSINOPHIL NFR BLD: 0 %
ERYTHROCYTE [DISTWIDTH] IN BLOOD BY AUTOMATED COUNT: 13.6 %
EST. GFR  (AFRICAN AMERICAN): 35 ML/MIN/1.73 M^2
EST. GFR  (NON AFRICAN AMERICAN): 30 ML/MIN/1.73 M^2
GLUCOSE SERPL-MCNC: 158 MG/DL
GLUCOSE UR QL STRIP: ABNORMAL
HCT VFR BLD AUTO: 43.3 %
HGB BLD-MCNC: 14.8 G/DL
HGB UR QL STRIP: ABNORMAL
KETONES UR QL STRIP: ABNORMAL
LEUKOCYTE ESTERASE UR QL STRIP: ABNORMAL
LYMPHOCYTES # BLD AUTO: ABNORMAL K/UL
LYMPHOCYTES NFR BLD: 7 %
MAGNESIUM SERPL-MCNC: 1.9 MG/DL
MCH RBC QN AUTO: 31 PG
MCHC RBC AUTO-ENTMCNC: 34 G/DL
MCV RBC AUTO: 91 FL
MICROSCOPIC COMMENT: ABNORMAL
MONOCYTES # BLD AUTO: ABNORMAL K/UL
MONOCYTES NFR BLD: 3 %
NEUTROPHILS NFR BLD: 86 %
NEUTS BAND NFR BLD MANUAL: 4 %
NITRITE UR QL STRIP: NEGATIVE
PH UR STRIP: 6 [PH] (ref 5–8)
PLATELET # BLD AUTO: 183 K/UL
PLATELET BLD QL SMEAR: ABNORMAL
PMV BLD AUTO: 8.8 FL
POTASSIUM SERPL-SCNC: 3.2 MMOL/L
PROT SERPL-MCNC: 7.3 G/DL
PROT UR QL STRIP: ABNORMAL
RBC # BLD AUTO: 4.77 M/UL
RBC #/AREA URNS HPF: >100 /HPF (ref 0–4)
SODIUM SERPL-SCNC: 140 MMOL/L
SP GR UR STRIP: 1.02 (ref 1–1.03)
SQUAMOUS #/AREA URNS HPF: 17 /HPF
TROPONIN I SERPL DL<=0.01 NG/ML-MCNC: 0.01 NG/ML
URN SPEC COLLECT METH UR: ABNORMAL
UROBILINOGEN UR STRIP-ACNC: NEGATIVE EU/DL
WBC # BLD AUTO: 18.6 K/UL
WBC #/AREA URNS HPF: 92 /HPF (ref 0–5)

## 2017-08-03 PROCEDURE — 87186 SC STD MICRODIL/AGAR DIL: CPT

## 2017-08-03 PROCEDURE — 85007 BL SMEAR W/DIFF WBC COUNT: CPT

## 2017-08-03 PROCEDURE — 93010 ELECTROCARDIOGRAM REPORT: CPT | Mod: ,,, | Performed by: INTERNAL MEDICINE

## 2017-08-03 PROCEDURE — 93005 ELECTROCARDIOGRAM TRACING: CPT

## 2017-08-03 PROCEDURE — 87077 CULTURE AEROBIC IDENTIFY: CPT

## 2017-08-03 PROCEDURE — G0378 HOSPITAL OBSERVATION PER HR: HCPCS

## 2017-08-03 PROCEDURE — 12000002 HC ACUTE/MED SURGE SEMI-PRIVATE ROOM

## 2017-08-03 PROCEDURE — 80053 COMPREHEN METABOLIC PANEL: CPT

## 2017-08-03 PROCEDURE — 25000003 PHARM REV CODE 250: Performed by: EMERGENCY MEDICINE

## 2017-08-03 PROCEDURE — 84484 ASSAY OF TROPONIN QUANT: CPT

## 2017-08-03 PROCEDURE — 85027 COMPLETE CBC AUTOMATED: CPT

## 2017-08-03 PROCEDURE — 36415 COLL VENOUS BLD VENIPUNCTURE: CPT

## 2017-08-03 PROCEDURE — 81000 URINALYSIS NONAUTO W/SCOPE: CPT

## 2017-08-03 PROCEDURE — 87088 URINE BACTERIA CULTURE: CPT

## 2017-08-03 PROCEDURE — 83735 ASSAY OF MAGNESIUM: CPT

## 2017-08-03 PROCEDURE — 96361 HYDRATE IV INFUSION ADD-ON: CPT

## 2017-08-03 PROCEDURE — 87086 URINE CULTURE/COLONY COUNT: CPT

## 2017-08-03 PROCEDURE — 83880 ASSAY OF NATRIURETIC PEPTIDE: CPT

## 2017-08-03 PROCEDURE — 63600175 PHARM REV CODE 636 W HCPCS: Performed by: EMERGENCY MEDICINE

## 2017-08-03 PROCEDURE — 96365 THER/PROPH/DIAG IV INF INIT: CPT

## 2017-08-03 PROCEDURE — 99285 EMERGENCY DEPT VISIT HI MDM: CPT | Mod: 25

## 2017-08-03 RX ORDER — CIPROFLOXACIN 2 MG/ML
400 INJECTION, SOLUTION INTRAVENOUS
Status: COMPLETED | OUTPATIENT
Start: 2017-08-03 | End: 2017-08-04

## 2017-08-03 RX ADMIN — SODIUM CHLORIDE 1000 ML: 0.9 INJECTION, SOLUTION INTRAVENOUS at 10:08

## 2017-08-03 RX ADMIN — CIPROFLOXACIN 400 MG: 2 INJECTION, SOLUTION INTRAVENOUS at 11:08

## 2017-08-04 VITALS
TEMPERATURE: 98 F | RESPIRATION RATE: 18 BRPM | BODY MASS INDEX: 34.93 KG/M2 | DIASTOLIC BLOOD PRESSURE: 70 MMHG | HEIGHT: 61 IN | OXYGEN SATURATION: 96 % | SYSTOLIC BLOOD PRESSURE: 141 MMHG | HEART RATE: 83 BPM | WEIGHT: 185 LBS

## 2017-08-04 PROBLEM — R55 NEAR SYNCOPE: Status: ACTIVE | Noted: 2017-08-04

## 2017-08-04 PROBLEM — E87.6 HYPOKALEMIA: Status: ACTIVE | Noted: 2017-08-04

## 2017-08-04 LAB
ANION GAP SERPL CALC-SCNC: 13 MMOL/L
BASOPHILS # BLD AUTO: 0.1 K/UL
BASOPHILS NFR BLD: 1 %
BUN SERPL-MCNC: 24 MG/DL
CALCIUM SERPL-MCNC: 9.3 MG/DL
CHLORIDE SERPL-SCNC: 105 MMOL/L
CO2 SERPL-SCNC: 23 MMOL/L
CREAT SERPL-MCNC: 1 MG/DL
DIFFERENTIAL METHOD: ABNORMAL
EOSINOPHIL # BLD AUTO: 0 K/UL
EOSINOPHIL NFR BLD: 0.1 %
ERYTHROCYTE [DISTWIDTH] IN BLOOD BY AUTOMATED COUNT: 13.5 %
EST. GFR  (AFRICAN AMERICAN): >60 ML/MIN/1.73 M^2
EST. GFR  (NON AFRICAN AMERICAN): 58 ML/MIN/1.73 M^2
GLUCOSE SERPL-MCNC: 149 MG/DL
HCT VFR BLD AUTO: 41.2 %
HGB BLD-MCNC: 14.1 G/DL
LYMPHOCYTES # BLD AUTO: 1.4 K/UL
LYMPHOCYTES NFR BLD: 12 %
MAGNESIUM SERPL-MCNC: 1.8 MG/DL
MCH RBC QN AUTO: 31.2 PG
MCHC RBC AUTO-ENTMCNC: 34.3 G/DL
MCV RBC AUTO: 91 FL
MONOCYTES # BLD AUTO: 0.5 K/UL
MONOCYTES NFR BLD: 4.7 %
NEUTROPHILS # BLD AUTO: 9.5 K/UL
NEUTROPHILS NFR BLD: 82.2 %
PHOSPHATE SERPL-MCNC: 3.4 MG/DL
PLATELET # BLD AUTO: 151 K/UL
PMV BLD AUTO: 8.8 FL
POCT GLUCOSE: 147 MG/DL (ref 70–110)
POCT GLUCOSE: 161 MG/DL (ref 70–110)
POTASSIUM SERPL-SCNC: 2.9 MMOL/L
RBC # BLD AUTO: 4.53 M/UL
SODIUM SERPL-SCNC: 141 MMOL/L
TSH SERPL DL<=0.005 MIU/L-ACNC: 2.21 UIU/ML
WBC # BLD AUTO: 11.6 K/UL

## 2017-08-04 PROCEDURE — G0378 HOSPITAL OBSERVATION PER HR: HCPCS

## 2017-08-04 PROCEDURE — 84443 ASSAY THYROID STIM HORMONE: CPT

## 2017-08-04 PROCEDURE — 97161 PT EVAL LOW COMPLEX 20 MIN: CPT

## 2017-08-04 PROCEDURE — 94761 N-INVAS EAR/PLS OXIMETRY MLT: CPT

## 2017-08-04 PROCEDURE — 80048 BASIC METABOLIC PNL TOTAL CA: CPT

## 2017-08-04 PROCEDURE — 63600175 PHARM REV CODE 636 W HCPCS: Performed by: NURSE PRACTITIONER

## 2017-08-04 PROCEDURE — 84100 ASSAY OF PHOSPHORUS: CPT

## 2017-08-04 PROCEDURE — 25000003 PHARM REV CODE 250: Performed by: FAMILY MEDICINE

## 2017-08-04 PROCEDURE — 83735 ASSAY OF MAGNESIUM: CPT

## 2017-08-04 PROCEDURE — 25000003 PHARM REV CODE 250: Performed by: NURSE PRACTITIONER

## 2017-08-04 PROCEDURE — 85025 COMPLETE CBC W/AUTO DIFF WBC: CPT

## 2017-08-04 RX ORDER — LOSARTAN POTASSIUM 25 MG/1
50 TABLET ORAL DAILY
Status: DISCONTINUED | OUTPATIENT
Start: 2017-08-04 | End: 2017-08-04

## 2017-08-04 RX ORDER — CIPROFLOXACIN 2 MG/ML
400 INJECTION, SOLUTION INTRAVENOUS
Status: DISCONTINUED | OUTPATIENT
Start: 2017-08-04 | End: 2017-08-04 | Stop reason: HOSPADM

## 2017-08-04 RX ORDER — BISACODYL 10 MG
10 SUPPOSITORY, RECTAL RECTAL DAILY PRN
Status: DISCONTINUED | OUTPATIENT
Start: 2017-08-04 | End: 2017-08-04 | Stop reason: HOSPADM

## 2017-08-04 RX ORDER — POTASSIUM CHLORIDE 20 MEQ/15ML
40 SOLUTION ORAL
Status: DISCONTINUED | OUTPATIENT
Start: 2017-08-04 | End: 2017-08-04 | Stop reason: HOSPADM

## 2017-08-04 RX ORDER — ONDANSETRON 2 MG/ML
4 INJECTION INTRAMUSCULAR; INTRAVENOUS EVERY 6 HOURS PRN
Status: DISCONTINUED | OUTPATIENT
Start: 2017-08-04 | End: 2017-08-04 | Stop reason: HOSPADM

## 2017-08-04 RX ORDER — ACETAMINOPHEN 325 MG/1
650 TABLET ORAL EVERY 6 HOURS PRN
Status: DISCONTINUED | OUTPATIENT
Start: 2017-08-04 | End: 2017-08-04 | Stop reason: HOSPADM

## 2017-08-04 RX ORDER — DIVALPROEX SODIUM 250 MG/1
1000 TABLET, FILM COATED, EXTENDED RELEASE ORAL NIGHTLY
Status: DISCONTINUED | OUTPATIENT
Start: 2017-08-04 | End: 2017-08-04 | Stop reason: HOSPADM

## 2017-08-04 RX ORDER — MUPIROCIN 20 MG/G
OINTMENT TOPICAL DAILY
Status: DISCONTINUED | OUTPATIENT
Start: 2017-08-04 | End: 2017-08-04 | Stop reason: HOSPADM

## 2017-08-04 RX ORDER — POTASSIUM CHLORIDE 20 MEQ/15ML
60 SOLUTION ORAL
Status: DISCONTINUED | OUTPATIENT
Start: 2017-08-04 | End: 2017-08-04 | Stop reason: HOSPADM

## 2017-08-04 RX ORDER — POTASSIUM CHLORIDE 20 MEQ/1
40 TABLET, EXTENDED RELEASE ORAL ONCE
Status: COMPLETED | OUTPATIENT
Start: 2017-08-04 | End: 2017-08-04

## 2017-08-04 RX ORDER — IBUPROFEN 200 MG
16 TABLET ORAL
Status: DISCONTINUED | OUTPATIENT
Start: 2017-08-04 | End: 2017-08-04 | Stop reason: HOSPADM

## 2017-08-04 RX ORDER — IBUPROFEN 200 MG
24 TABLET ORAL
Status: DISCONTINUED | OUTPATIENT
Start: 2017-08-04 | End: 2017-08-04 | Stop reason: HOSPADM

## 2017-08-04 RX ORDER — POTASSIUM CHLORIDE 20 MEQ/1
20 TABLET, EXTENDED RELEASE ORAL DAILY
Qty: 30 TABLET | Refills: 0 | Status: SHIPPED | OUTPATIENT
Start: 2017-08-04 | End: 2017-09-03

## 2017-08-04 RX ORDER — LEVOTHYROXINE SODIUM 50 UG/1
50 TABLET ORAL
Status: DISCONTINUED | OUTPATIENT
Start: 2017-08-04 | End: 2017-08-04 | Stop reason: HOSPADM

## 2017-08-04 RX ORDER — RAMELTEON 8 MG/1
8 TABLET ORAL NIGHTLY PRN
Status: DISCONTINUED | OUTPATIENT
Start: 2017-08-04 | End: 2017-08-04 | Stop reason: HOSPADM

## 2017-08-04 RX ORDER — GLUCAGON 1 MG
1 KIT INJECTION
Status: DISCONTINUED | OUTPATIENT
Start: 2017-08-04 | End: 2017-08-04 | Stop reason: HOSPADM

## 2017-08-04 RX ORDER — HYDROCODONE BITARTRATE AND ACETAMINOPHEN 5; 325 MG/1; MG/1
1 TABLET ORAL EVERY 4 HOURS PRN
Status: DISCONTINUED | OUTPATIENT
Start: 2017-08-04 | End: 2017-08-04 | Stop reason: HOSPADM

## 2017-08-04 RX ORDER — LOSARTAN POTASSIUM 25 MG/1
100 TABLET ORAL ONCE
Status: COMPLETED | OUTPATIENT
Start: 2017-08-04 | End: 2017-08-04

## 2017-08-04 RX ORDER — ENOXAPARIN SODIUM 100 MG/ML
30 INJECTION SUBCUTANEOUS EVERY 24 HOURS
Status: DISCONTINUED | OUTPATIENT
Start: 2017-08-04 | End: 2017-08-04

## 2017-08-04 RX ORDER — GEMFIBROZIL 600 MG/1
600 TABLET, FILM COATED ORAL 2 TIMES DAILY
Status: DISCONTINUED | OUTPATIENT
Start: 2017-08-04 | End: 2017-08-04 | Stop reason: HOSPADM

## 2017-08-04 RX ORDER — INSULIN ASPART 100 [IU]/ML
0-5 INJECTION, SOLUTION INTRAVENOUS; SUBCUTANEOUS
Status: DISCONTINUED | OUTPATIENT
Start: 2017-08-04 | End: 2017-08-04 | Stop reason: HOSPADM

## 2017-08-04 RX ORDER — CLONAZEPAM 0.5 MG/1
0.5 TABLET ORAL NIGHTLY
Status: DISCONTINUED | OUTPATIENT
Start: 2017-08-04 | End: 2017-08-04 | Stop reason: HOSPADM

## 2017-08-04 RX ORDER — CIPROFLOXACIN 500 MG/1
500 TABLET ORAL EVERY 12 HOURS
Status: DISCONTINUED | OUTPATIENT
Start: 2017-08-05 | End: 2017-08-04 | Stop reason: HOSPADM

## 2017-08-04 RX ORDER — SIMVASTATIN 40 MG/1
40 TABLET, FILM COATED ORAL DAILY
Status: DISCONTINUED | OUTPATIENT
Start: 2017-08-04 | End: 2017-08-04 | Stop reason: HOSPADM

## 2017-08-04 RX ADMIN — POTASSIUM CHLORIDE 40 MEQ: 1500 TABLET, EXTENDED RELEASE ORAL at 03:08

## 2017-08-04 RX ADMIN — LOSARTAN POTASSIUM 100 MG: 25 TABLET, FILM COATED ORAL at 03:08

## 2017-08-04 RX ADMIN — GEMFIBROZIL 600 MG: 600 TABLET ORAL at 08:08

## 2017-08-04 RX ADMIN — MUPIROCIN: 20 OINTMENT TOPICAL at 08:08

## 2017-08-04 RX ADMIN — POTASSIUM CHLORIDE 60 MEQ: 20 SOLUTION ORAL at 09:08

## 2017-08-04 RX ADMIN — CIPROFLOXACIN 400 MG: 2 INJECTION, SOLUTION INTRAVENOUS at 11:08

## 2017-08-04 RX ADMIN — SIMVASTATIN 40 MG: 40 TABLET, FILM COATED ORAL at 08:08

## 2017-08-04 RX ADMIN — LEVOTHYROXINE SODIUM 50 MCG: 50 TABLET ORAL at 05:08

## 2017-08-04 RX ADMIN — POTASSIUM CHLORIDE 40 MEQ: 1500 TABLET, EXTENDED RELEASE ORAL at 02:08

## 2017-08-04 RX ADMIN — POTASSIUM CHLORIDE 60 MEQ: 20 SOLUTION ORAL at 11:08

## 2017-08-04 NOTE — HPI
Ms. Tapia is a 68yo F with a PMH of DM, HTN, Hypothyroid, Stroke, and Pseudoseizures. She presents to the hospital with complaints of near syncope. She had 2 episodes where she became generally weak and fell to the ground. First episode occurred while she was washing her face.  She became weak and could not support her weight and fell down. She did not hit her head. The second one was while she was getting into her car. She denies any  loss of consciousness,  head trauma, or seizure with either episode. She denies any chest pain but states she did have some shortness of breath. She did have one episode of diarrhea tonight.  Her brother stated that she has not been eating or drinking very well the last 2 days because she has been in the hospital with her mother. While in the ED, she was found to have a UTI and given IV Cipro. She currently denies pain or discomforts. She is a poor historian on her medications.

## 2017-08-04 NOTE — SUBJECTIVE & OBJECTIVE
Past Medical History:   Diagnosis Date    Anxiety     Arthritis     Asthma     Cancer     Left Breast    Cataract     Depression     Diabetes mellitus, type 2     GERD (gastroesophageal reflux disease)     Hyperlipidemia     Hypertension     Osteoporosis     Seizures     Pseudo-seizures    Stroke     Thyroid disease        Past Surgical History:   Procedure Laterality Date    APPENDECTOMY      BREAST SURGERY       SECTION      CHOLECYSTECTOMY      DILATION AND CURETTAGE OF UTERUS      EYE SURGERY         Review of patient's allergies indicates:   Allergen Reactions    Penicillins Anaphylaxis    Sulfa (sulfonamide antibiotics) Anaphylaxis       No current facility-administered medications on file prior to encounter.      Current Outpatient Prescriptions on File Prior to Encounter   Medication Sig    clonazePAM (KLONOPIN) 0.5 MG tablet Take 0.5 mg by mouth nightly.    divalproex (DEPAKOTE) 500 MG Tb24 Take 2 tablets (1,000 mg total) by mouth every evening.    gemfibrozil (LOPID) 600 MG tablet Take 1 tablet (600 mg total) by mouth 2 (two) times daily.    levothyroxine (SYNTHROID) 50 MCG tablet Take 1 tablet (50 mcg total) by mouth once daily.    losartan (COZAAR) 50 MG tablet Take 1 tablet (50 mg total) by mouth once daily.    simvastatin (ZOCOR) 40 MG tablet Take 1 tablet (40 mg total) by mouth once daily.    TRINTELLIX 10 mg Tab Take 1 tablet by mouth once daily.     Family History     Problem Relation (Age of Onset)    Diabetes Mother    Hypertension Mother        Social History Main Topics    Smoking status: Never Smoker    Smokeless tobacco: Never Used    Alcohol use Yes      Comment: socially    Drug use: No    Sexual activity: Not on file     Review of Systems   Constitutional: Positive for appetite change. Negative for chills, diaphoresis and fever.   HENT: Negative for congestion.    Eyes: Negative for visual disturbance.   Respiratory: Positive for shortness of  breath.    Cardiovascular: Negative for chest pain and palpitations.   Gastrointestinal: Positive for diarrhea. Negative for abdominal pain, nausea and vomiting.   Genitourinary: Negative for dysuria.   Musculoskeletal: Negative for arthralgias.   Skin: Negative for wound.   Neurological: Positive for weakness. Negative for dizziness and seizures.        Generalized weakness   Hematological: Does not bruise/bleed easily.   Psychiatric/Behavioral: Negative for confusion and hallucinations.     Objective:     Vital Signs (Most Recent):  Temp: 98.1 °F (36.7 °C) (08/04/17 0054)  Pulse: 106 (08/04/17 0108)  Resp: 18 (08/04/17 0054)  BP: (!) 172/99 (08/04/17 0108)  SpO2: 95 % (08/04/17 0108) Vital Signs (24h Range):  Temp:  [98.1 °F (36.7 °C)] 98.1 °F (36.7 °C)  Pulse:  [] 106  Resp:  [18] 18  SpO2:  [95 %-97 %] 95 %  BP: (143-178)/(75-99) 172/99     Weight: 83.9 kg (185 lb)  Body mass index is 34.96 kg/m².    Physical Exam   Constitutional: She is oriented to person, place, and time. No distress.   HENT:   Head: Normocephalic.   Eyes: Pupils are equal, round, and reactive to light.   Neck: Normal range of motion. Neck supple. No JVD present. No tracheal deviation present.   Cardiovascular: Normal rate, regular rhythm, normal heart sounds and intact distal pulses.    No murmur heard.  Pulmonary/Chest: Effort normal and breath sounds normal. No respiratory distress.   Abdominal: Soft. Bowel sounds are normal. She exhibits no distension. There is no tenderness.   Musculoskeletal: Normal range of motion. She exhibits no edema.   Generalized weakness   Neurological: She is alert and oriented to person, place, and time. No cranial nerve deficit.   Skin: Skin is warm and dry. Capillary refill takes less than 2 seconds.   Nail missing to left great toe   Psychiatric: Her behavior is normal. Judgment and thought content normal. Her mood appears anxious.        Significant Labs:   CBC:   Recent Labs  Lab 08/03/17  2221   WBC  18.60*   HGB 14.8   HCT 43.3        CMP:   Recent Labs  Lab 08/03/17  2221      K 3.2*      CO2 21*   *   BUN 32*   CREATININE 1.7*   CALCIUM 9.7   PROT 7.3   ALBUMIN 3.6   BILITOT 0.5   ALKPHOS 85   AST 20   ALT 16   ANIONGAP 15   EGFRNONAA 30*     Cardiac Markers:   Recent Labs  Lab 08/03/17  2221   BNP 63     Troponin:   Recent Labs  Lab 08/03/17  2221   TROPONINI 0.009     Urine Studies:   Recent Labs  Lab 08/03/17 2230   COLORU Yellow   APPEARANCEUA Cloudy*   PHUR 6.0   SPECGRAV 1.020   PROTEINUA Trace*   GLUCUA 1+*   KETONESU Trace*   BILIRUBINUA Negative   OCCULTUA 2+*   NITRITE Negative   UROBILINOGEN Negative   LEUKOCYTESUR 1+*   RBCUA >100*   WBCUA 92*   BACTERIA Many*   SQUAMEPITHEL 17     Microbiology Results (last 7 days)     Procedure Component Value Units Date/Time    Urine culture [[id number]] Collected:  08/03/17 2230    Order Status:  No result Specimen:  Urine from Clean Catch Updated:  08/04/17 0128    Urine culture **CANNOT BE ORDERED STAT** [[id number]]     Order Status:  Completed Specimen:  Urine             Significant Imaging: CXR: Reviewed film, looks ok.

## 2017-08-04 NOTE — PROGRESS NOTES
Pt. D/C'd to home via wheelchair with brother at side. D/C instructions given to patient. IV removed.

## 2017-08-04 NOTE — PT/OT/SLP EVAL
Physical Therapy  Evaluation    Maggy Tapia   MRN: [id number]   Admitting Diagnosis: Near syncope    PT Received On: 17  PT Start Time: 1050     PT Stop Time: 1110    PT Total Time (min): 20 min       Billable Minutes:  Evaluation 20    Diagnosis: Near syncope    -Multiple recent falls    Past Medical History:   Diagnosis Date    Anxiety     Arthritis     Asthma     Cancer 2000    Left Breast    Cataract     Depression     Diabetes mellitus, type 2     GERD (gastroesophageal reflux disease)     Hyperlipidemia     Hypertension     Osteoporosis     Seizures     Pseudo-seizures    Stroke     Thyroid disease       Past Surgical History:   Procedure Laterality Date    APPENDECTOMY      BREAST SURGERY       SECTION      CHOLECYSTECTOMY      DILATION AND CURETTAGE OF UTERUS      EYE SURGERY         Referring physician: [place]  Date referred to PT: 2017    General Precautions: Standard, fall       Patient History:  Living Arrangements: house  Home Layout: Able to live on 1st floor  Equipment Currently Used at Home: walker, rolling    Previous Level of Function:  Ambulation Skills: needs device (per patient:  has used walker x 2 years for household ambulation)  Transfer Skills: needs device    Subjective:  Communicated with RN (Galilea) prior to session.     Chief Complaint: recent falls due to leg weakness, dizziness    Pain/Comfort  Pain Rating 1: 5/10  Location - Side 1: Bilateral  Location 1: hip  Pain Addressed 1: Reposition, Distraction, Cessation of Activity  Pain Rating Post-Intervention 1: 5/10      Objective:   Patient found with: SCD (on legs but not connected)     Lower Extremity Range of Motion:  Right Lower Extremity: ankle DF to neutral only  Left Lower Extremity: ankle DF to neutral only    Lower Extremity Strength:  Right Lower Extremity: ~ 3+/5  Left Lower Extremity: ~ 2+/5       Functional Mobility:  Bed Mobility:  Rolling/Turning Right: Moderate assistance, With  side rail  Supine to Sit: Moderate Assistance  Sit to Supine: Moderate Assistance    Transfers: N/T due to pt became dizzy while seated at eob x 1 minute      Gait: N/T      Balance:   Static Sit: POOR+: Needs MINIMAL assist to maintain  Dynamic Sit: POOR: N/A      AM-PAC 6 CLICK MOBILITY  How much help from another person does this patient currently need?   1 = Unable, Total/Dependent Assistance  2 = A lot, Maximum/Moderate Assistance  3 = A little, Minimum/Contact Guard/Supervision  4 = None, Modified [place]/Independent    Turning over in bed (including adjusting bedclothes, sheets and blankets)?: 2  Sitting down on and standing up from a chair with arms (e.g., wheelchair, bedside commode, etc.): 1  Moving from lying on back to sitting on the side of the bed?: 1  Moving to and from a bed to a chair (including a wheelchair)?: 1  Need to walk in hospital room?: 1  Climbing 3-5 steps with a railing?: 1  Total Score: 7     AM-PAC Raw Score CMS G-Code Modifier Level of Impairment Assistance   6 % Total / Unable   7 - 9 CM 80 - 100% Maximal Assist   10 - 14 CL 60 - 80% Moderate Assist   15 - 19 CK 40 - 60% Moderate Assist   20 - 22 CJ 20 - 40% Minimal Assist   23 CI 1-20% SBA / CGA   24 CH 0% Independent/ Mod I     Patient left supine with call button in reach.    Assessment:   Maggy Tapia is a 67 y.o. female with a medical diagnosis of Near syncope and presents with very limited functional mobility, LE weakness L>R.  Eval limited due to dizziness..    Rehab identified problem list/impairments: Rehab identified problem list/impairments: weakness, impaired functional mobilty, impaired balance, decreased lower extremity function, pain, decreased ROM    Rehab potential is TBD.    Activity tolerance: Fair    Discharge recommendations: Discharge Facility/Level Of Care Needs: other (see comments) (TBD)     Equipment recommendations: Equipment Needed After Discharge: other (see comments) (TBD)     GOALS:     Physical Therapy Goals        Problem: Physical Therapy Goal    Goal Priority Disciplines Outcome Goal Variances Interventions   Physical Therapy Goal     PT/OT, PT      Description:  Goals to be met by: 2017     Patient will increase functional independence with mobility by performin). Supine to sit with Stand-by Assistance  2). Sit to supine with Stand-by Assistance  3). Sit to stand transfer with Stand-by Assistance  4). Bed to chair transfer with Stand-by Assistance using Rolling Walker  5). Stand for > 3 minutes with Contact Guard Assistance using Rolling Walker                      PLAN:    Patient to be seen 6 x/week to address the above listed problems via gait training, therapeutic activities, therapeutic exercises  Plan of Care expires: 17  Plan of Care reviewed with: patient          Zach KYLE Pérez, PT  2017

## 2017-08-04 NOTE — HPI
Patient seen & examined with Dr. Edison Lebron  Patient  is a 67 y.o. female presenting with generalized weakness and falling yesterday x 2 episodes.  First episode while she was washing her face.  She became weak and could not support her weight.  She did not hit her head.  The second episode was while she was getting into her car and she also could not support her weight.  No loss of consciousness or head trauma.  She denies any chest pain but states she does have some shortness of breath.  No fevers or chills.  No dysuria or frequency.

## 2017-08-04 NOTE — PLAN OF CARE
MARIA VICTORIA sent home health referral to N via [facility] care. Awaiting acceptance. MARIA VICTORIA Kelsey       Per Rosina whittington/ ELENI, patient will be admitted to [facility] health MARIA VICTORIA Kelsey

## 2017-08-04 NOTE — PLAN OF CARE
08/04/17 1636   Final Note   Assessment Type Final Discharge Note   Discharge Disposition Home-Health  ([facility] )   Discharge planning education complete? Yes

## 2017-08-04 NOTE — PLAN OF CARE
Problem: Patient Care Overview  Goal: Plan of Care Review  Outcome: Ongoing (interventions implemented as appropriate)  Pt VSS and afebrile, complete bath given on arrival to floor, SCDs  In placed ,no DARION hose placed on but at bedside because pt had exteremly large toenails and missing toenails. Ambulates to RR with x2 assist, repositioned every 2hr with x2 assist, rested well the rest of shift after complete bath. Denies pain. Pt in low locked bed, call light in reach, safety precautions maintained.

## 2017-08-04 NOTE — HOSPITAL COURSE
Patient observed overnight. IV antibiotics given for sepsis secondary to UTI. PT evaluated patient. She felt significantly better. Patient examined at bedside on day of discharge. Exam findings within normal limits. She was deemed safe for discharge.

## 2017-08-04 NOTE — ASSESSMENT & PLAN NOTE
Due to dehydration  IVF Hydration  Monitor labs  Renal dose medications  Avoid NSAIDS/nephrotoxic agents  Hold chronic ARB

## 2017-08-04 NOTE — ASSESSMENT & PLAN NOTE
Possibly due to dehydration???  Orthostatics (-)  Not hypoglycemic  Check TSH  PT consult  Monitor on telemetry  Will consult neurology for evaluation--She sees Dr. Roberts for frequent falls. She is also s/p a subcortical hemorrhage with craniotomy

## 2017-08-04 NOTE — ASSESSMENT & PLAN NOTE
Chronic/Controlled.   Hold ARB due to JUNIOR. Not on any other chronic BP medications.  Monitor VS

## 2017-08-04 NOTE — H&P
[facility] Hospital Medicine  History & Physical    Patient Name: Maggy Tapia  MRN: [id number]  Admission Date: 8/3/2017  Attending Physician: Michelle Calabrese MD   Primary Care Provider: Yanna Abbasi MD         Patient information was obtained from patient and ER records.     Subjective:     Principal Problem:Near syncope    Chief Complaint:   Chief Complaint   Patient presents with    Fatigue     generalized weakness and became dizzy and fell this evening; decreased oral intake        HPI: Ms. Tapia is a 66yo F with a PMH of DM, HTN, Hypothyroid, Stroke, and Pseudoseizures. She presents to the hospital with complaints of near syncope. She had 2 episodes where she became generally weak and fell to the ground. First episode occurred while she was washing her face.  She became weak and could not support her weight and fell down. She did not hit her head. The second one was while she was getting into her car. She denies any  loss of consciousness,  head trauma, or seizure with either episode. She denies any chest pain but states she did have some shortness of breath. She did have one episode of diarrhea tonight.  Her  brother stated that she has not been eating or drinking very well the last 2 days because she has been in the hospital with her mother. While in the ED, she was found to have a UTI and given IV Cipro. She currently denies pain or discomforts. She is a poor historian on her medications.    Past Medical History:   Diagnosis Date    Anxiety     Arthritis     Asthma     Cancer     Left Breast    Cataract     Depression     Diabetes mellitus, type 2     GERD (gastroesophageal reflux disease)     Hyperlipidemia     Hypertension     Osteoporosis     Seizures     Pseudo-seizures    Stroke     Thyroid disease        Past Surgical History:   Procedure Laterality Date    APPENDECTOMY      BREAST SURGERY       SECTION      CHOLECYSTECTOMY      DILATION AND  CURETTAGE OF UTERUS      EYE SURGERY         Review of patient's allergies indicates:   Allergen Reactions    Penicillins Anaphylaxis    Sulfa (sulfonamide antibiotics) Anaphylaxis       No current facility-administered medications on file prior to encounter.      Current Outpatient Prescriptions on File Prior to Encounter   Medication Sig    clonazePAM (KLONOPIN) 0.5 MG tablet Take 0.5 mg by mouth nightly.    divalproex (DEPAKOTE) 500 MG Tb24 Take 2 tablets (1,000 mg total) by mouth every evening.    gemfibrozil (LOPID) 600 MG tablet Take 1 tablet (600 mg total) by mouth 2 (two) times daily.    levothyroxine (SYNTHROID) 50 MCG tablet Take 1 tablet (50 mcg total) by mouth once daily.    losartan (COZAAR) 50 MG tablet Take 1 tablet (50 mg total) by mouth once daily.    simvastatin (ZOCOR) 40 MG tablet Take 1 tablet (40 mg total) by mouth once daily.    TRINTELLIX 10 mg Tab Take 1 tablet by mouth once daily.     Family History     Problem Relation (Age of Onset)    Diabetes Mother    Hypertension Mother        Social History Main Topics    Smoking status: Never Smoker    Smokeless tobacco: Never Used    Alcohol use Yes      Comment: socially    Drug use: No    Sexual activity: Not on file     Review of Systems   Constitutional: Positive for appetite change. Negative for chills, diaphoresis and fever.   HENT: Negative for congestion.    Eyes: Negative for visual disturbance.   Respiratory: Positive for shortness of breath.    Cardiovascular: Negative for chest pain and palpitations.   Gastrointestinal: Positive for diarrhea. Negative for abdominal pain, nausea and vomiting.   Genitourinary: Negative for dysuria.   Musculoskeletal: Negative for arthralgias.   Skin: Negative for wound.   Neurological: Positive for weakness. Negative for dizziness and seizures.        Generalized weakness   Hematological: Does not bruise/bleed easily.   Psychiatric/Behavioral: Negative for confusion and hallucinations.      Objective:     Vital Signs (Most Recent):  Temp: 98.1 °F (36.7 °C) (08/04/17 0054)  Pulse: 106 (08/04/17 0108)  Resp: 18 (08/04/17 0054)  BP: (!) 172/99 (08/04/17 0108)  SpO2: 95 % (08/04/17 0108) Vital Signs (24h Range):  Temp:  [98.1 °F (36.7 °C)] 98.1 °F (36.7 °C)  Pulse:  [] 106  Resp:  [18] 18  SpO2:  [95 %-97 %] 95 %  BP: (143-178)/(75-99) 172/99     Weight: 83.9 kg (185 lb)  Body mass index is 34.96 kg/m².    Physical Exam   Constitutional: She is oriented to person, place, and time. No distress.   HENT:   Head: Normocephalic.   Eyes: Pupils are equal, round, and reactive to light.   Neck: Normal range of motion. Neck supple. No JVD present. No tracheal deviation present.   Cardiovascular: Normal rate, regular rhythm, normal heart sounds and intact distal pulses.    No murmur heard.  Pulmonary/Chest: Effort normal and breath sounds normal. No respiratory distress.   Abdominal: Soft. Bowel sounds are normal. She exhibits no distension. There is no tenderness.   Musculoskeletal: Normal range of motion. She exhibits no edema.   Generalized weakness   Neurological: She is alert and oriented to person, place, and time. No cranial nerve deficit.   Skin: Skin is warm and dry. Capillary refill takes less than 2 seconds.   Nail missing to left great toe   Psychiatric: Her behavior is normal. Judgment and thought content normal. Her mood appears anxious.        Significant Labs:   CBC:   Recent Labs  Lab 08/03/17 2221   WBC 18.60*   HGB 14.8   HCT 43.3        CMP:   Recent Labs  Lab 08/03/17 2221      K 3.2*      CO2 21*   *   BUN 32*   CREATININE 1.7*   CALCIUM 9.7   PROT 7.3   ALBUMIN 3.6   BILITOT 0.5   ALKPHOS 85   AST 20   ALT 16   ANIONGAP 15   EGFRNONAA 30*     Cardiac Markers:   Recent Labs  Lab 08/03/17  2221   BNP 63     Troponin:   Recent Labs  Lab 08/03/17  2221   TROPONINI 0.009     Urine Studies:   Recent Labs  Lab 08/03/17  2230   COLORU Yellow   APPEARANCEUA Cloudy*    PHUR 6.0   SPECGRAV 1.020   PROTEINUA Trace*   GLUCUA 1+*   KETONESU Trace*   BILIRUBINUA Negative   OCCULTUA 2+*   NITRITE Negative   UROBILINOGEN Negative   LEUKOCYTESUR 1+*   RBCUA >100*   WBCUA 92*   BACTERIA Many*   SQUAMEPITHEL 17     Microbiology Results (last 7 days)     Procedure Component Value Units Date/Time    Urine culture [[id number]] Collected:  08/03/17 2230    Order Status:  No result Specimen:  Urine from Clean Catch Updated:  08/04/17 0128    Urine culture **CANNOT BE ORDERED STAT** [[id number]]     Order Status:  Completed Specimen:  Urine             Significant Imaging: CXR: Reviewed film, looks ok.    Assessment/Plan:     * Near syncope    Possibly due to dehydration???  Orthostatics (-)  Not hypoglycemic  Check TSH  PT consult  Monitor on telemetry  Will consult neurology for evaluation--She sees Dr. Roberts for frequent falls. She is also s/p a subcortical hemorrhage with craniotomy            JUNIOR (acute kidney injury)    Due to dehydration  IVF Hydration  Monitor labs  Renal dose medications  Avoid NSAIDS/nephrotoxic agents  Hold chronic ARB          Urinary tract infection without hematuria    IV cipro initiated in ED  Urine culture pending          Hypokalemia    Replace K  Monitor labs and replace K prn          Essential hypertension    Chronic/Controlled.   Hold ARB due to JUNIOR. Not on any other chronic BP medications.  Monitor VS          Type 2 diabetes mellitus    Controlled. Not on any chronic medications  Monitor blood sugars QAC&HS  SSI prn          Pseudoseizures    Continue chronic Depakote           Hypothyroidism    Check TSH  Continue home levothyroxine          Depression    Continue chronic medications          History of ischemic left MCA stroke    Continue chronic statin  Not on chronic ASA therapy  VTE prophylaxis with Lovenox            VTE Risk Mitigation         Ordered     Place sequential compression device  Until discontinued      08/04/17 0221     High Risk of  VTE  Once      08/04/17 0041             Sirena Olguin NP  Department of Hospital Medicine   [facility]

## 2017-08-04 NOTE — PLAN OF CARE
Cm completed the assessment with patient.  Cm also called brotherAbena Vazquez to confirm and gather more information, because pt did not remember everything.  However, she majority of the questions correctly.  Pt came from home, she lives with her mother and brother.  Her brother apparently is caring for pt and their mother.  George informed me that pt sleeps on a sofa-recliner next to her mother's bed. He also informed me that one of the pt's psyc meds alters her gait. Pt sees Dr. Mora (Neurologist) and Dr. Whyte (Psych).  George could not tell me which drug it is. Pt is a diabetic, she denies dialysis and coumadin.  Pt monitors her blood sugar with the glucometer. No hh.  PCP is Dr. Abbasi.  Disposition:  Pt will discharge to home with family.  Pt can possibly benefit with HH.  Cm will follow-up with pt and family.       08/04/17 1005   Discharge Assessment   Assessment Type Discharge Planning Assessment   Confirmed/corrected address and phone number on facesheet? Yes   Assessment information obtained from? Patient  (tona Tapia -[phone])   Prior to hospitilization cognitive status: Alert/Oriented   Prior to hospitalization functional status: Assistive Equipment;Independent   Current cognitive status: Alert/Oriented   Current Functional Status: Independent;Assistive Equipment;Needs Assistance   Arrived From home or self-care;admitted as an inpatient   Lives With parent(s);sibling(s)   Able to Return to Prior Arrangements yes   Is patient able to care for self after discharge? Yes   How many people do you have in your home that can help with your care after discharge? 1   Who are your caregiver(s) and their phone number(s)? brother Abena Kraft [phone]   Patient's perception of discharge disposition home or selfcare   Readmission Within The Last 30 Days no previous admission in last 30 days   Patient currently being followed by outpatient case management? Yes   If yes, name of outpatient  case management following: insurance company assigned oupatient case management   Patient currently receives home health services? No   Does the patient currently use HME? Yes   Patient currently receives private duty nursing? N/A   Patient currently receives any other outside agency services? No   Equipment Currently Used at Home bath bench;bedside commode;glucometer;grab bar;walker, rolling;cane, straight;CPAP   Do you have any problems affording any of your prescribed medications? No   Is the patient taking medications as prescribed? yes  ([facility])   Do you have any financial concerns preventing you from receiving the healthcare you need? No  (Insurance verified as PHN and Medicaid)   Does the patient have transportation to healthcare appointments? Yes   Transportation Available family or friend will provide   On Dialysis? No   Does the patient receive services at the [facility]? No   Are there any open cases? No   Discharge Plan A Home with family;Home Health   Discharge Plan B Home with family   Patient/Family In Agreement With Plan yes

## 2017-08-04 NOTE — ED PROVIDER NOTES
Chief complaint:  Fatigue (generalized weakness and became dizzy and fell this evening; decreased oral intake)      HPI:  Maggy Tapia is a 67 y.o. female presenting with acute onset of having had 2 episodes of generalized weakness and falling today.  First episode while she was washing her face.  She became weak and could not support her weight.  She did not hit her head.  The second one was while she was getting into her car and she also could not support her weight.  No loss of consciousness.  No head trauma.  She denies any chest pain but states she does have some shortness of breath.  No fevers or chills.  No dysuria or frequency.  She did have one episode of diarrhea tonight.  Her  states that she has not been eating or drinking very well the last 2 days because she has been in the hospital with her mother-in-law.    ROS: As per HPI and below:  Constitutional:  No fevers, no chills, generalized weakness  Eyes: no visual changes  Cardiac: no chest pain  Respiratory: Mild shortness of breath  Abdominal: no abdominal pain, no nausea, no vomiting, diarrhea  Genitourinary: No dysuria, no frequency  Skin: no rash  Heme: no bleeding  Musculoskeletal: no joint pain  Neuro: no focal numbness, no focal weakness  Pyschological: no depression      Review of patient's allergies indicates:   Allergen Reactions    Penicillins Anaphylaxis    Sulfa (sulfonamide antibiotics) Anaphylaxis       No current facility-administered medications on file prior to encounter.      Current Outpatient Prescriptions on File Prior to Encounter   Medication Sig Dispense Refill    clonazePAM (KLONOPIN) 0.5 MG tablet Take 0.5 mg by mouth nightly.  3    divalproex (DEPAKOTE) 500 MG Tb24 Take 2 tablets (1,000 mg total) by mouth every evening. 180 tablet 3    gemfibrozil (LOPID) 600 MG tablet Take 1 tablet (600 mg total) by mouth 2 (two) times daily. 180 tablet 3    levothyroxine (SYNTHROID) 50 MCG tablet Take 1 tablet (50 mcg  total) by mouth once daily. 90 tablet 3    losartan (COZAAR) 50 MG tablet Take 1 tablet (50 mg total) by mouth once daily. 90 tablet 1    simvastatin (ZOCOR) 40 MG tablet Take 1 tablet (40 mg total) by mouth once daily. 90 tablet 3    TRINTELLIX 10 mg Tab Take 1 tablet by mouth once daily.  3       PMH:  As per HPI and below:  Past Medical History:   Diagnosis Date    Anxiety     Arthritis     Asthma     Cancer 2000    Left Breast    Cataract     Depression     Diabetes mellitus, type 2     GERD (gastroesophageal reflux disease)     Hyperlipidemia     Hypertension     Osteoporosis     Seizures     Pseudo-seizures    Stroke     Thyroid disease      Past Surgical History:   Procedure Laterality Date    APPENDECTOMY      BREAST SURGERY       SECTION      CHOLECYSTECTOMY      DILATION AND CURETTAGE OF UTERUS      EYE SURGERY         Social History     Social History    Marital status: Single     Spouse name: N/A    Number of children: N/A    Years of education: N/A     Social History Main Topics    Smoking status: Never Smoker    Smokeless tobacco: Never Used    Alcohol use Yes      Comment: socially    Drug use: No    Sexual activity: Not Asked     Other Topics Concern    None     Social History Narrative    None       Family History   Problem Relation Age of Onset    Diabetes Mother     Hypertension Mother        Physical Exam:    Vitals:    17 2253   BP: (!) 177/88   Pulse: 98   Resp:    Temp:      Constitutional: Well-nourished, well-developed, in no acute distress, not cachectic  Eyes: PERRLA, EOMI, normal conjunctiva, normal sclera  ENT:  dry mucous membranes  Respiratory: Clear to auscultation bilaterally, no wheezes, no crackles, no rhonchi  Cardiovascular: Regular rate and rhythm, no murmurs, no rubs, no gallops  Abdominal: Soft, nontender, nondistended, no guarding, no rebound  Musculoskeletal: Normal range of motion, no obvious deformity, normal capillary  refill, head atraumatic, neck supple, no meningismus  Skin: no rash, no ecchymosis, no errythema, no discharge  Neurologic: Cranial nerves II through XII intact, no motor deficits, no sensory deficits, no cerebellar deficits  Psychological: Alert, oriented x3, normal affect, normal mood    Orders Placed This Encounter   Procedures    Urine culture **CANNOT BE ORDERED STAT**    X-Ray Chest 1 View    Complete Blood Count (CBC)    Comprehensive Metabolic Panel (CMP)    Magnesium    Troponin I    Urinalysis    Urinalysis Microscopic    Brain natriuretic peptide    Brain natriuretic peptide    EKG 12-lead    Insert Saline lock IV       Medications   ciprofloxacin (CIPRO)400mg/200ml D5W IVPB 400 mg (not administered)   sodium chloride 0.9% bolus 1,000 mL (0 mLs Intravenous Stopped 8/3/17 5422)         Labs Reviewed   CBC W/ AUTO DIFFERENTIAL - Abnormal; Notable for the following:        Result Value    WBC 18.60 (*)     MPV 8.8 (*)     All other components within normal limits   COMPREHENSIVE METABOLIC PANEL - Abnormal; Notable for the following:     Potassium 3.2 (*)     CO2 21 (*)     Glucose 158 (*)     BUN, Bld 32 (*)     Creatinine 1.7 (*)     eGFR if  35 (*)     eGFR if non  30 (*)     All other components within normal limits   URINALYSIS - Abnormal; Notable for the following:     Appearance, UA Cloudy (*)     Protein, UA Trace (*)     Glucose, UA 1+ (*)     Ketones, UA Trace (*)     Occult Blood UA 2+ (*)     Leukocytes, UA 1+ (*)     All other components within normal limits   URINALYSIS MICROSCOPIC - Abnormal; Notable for the following:     RBC, UA >100 (*)     WBC, UA 92 (*)     Bacteria, UA Many (*)     All other components within normal limits   CULTURE, URINE   MAGNESIUM   TROPONIN I   B-TYPE NATRIURETIC PEPTIDE   B-TYPE NATRIURETIC PEPTIDE                   ASSESSMENT  1. Urinary tract infection without hematuria, site unspecified    2. Weakness           Disposition:  Admit      New Prescriptions    No medications on file     Modified Medications    No medications on file     Discontinued Medications    No medications on file     MDM  Number of Diagnoses or Management Options  Urinary tract infection without hematuria, site unspecified:   Weakness:   Diagnosis management comments: Differential diagnosis includes acute renal failure, dehydration, electrolyte abnormality, UTI, pneumonia    Patient presents with generalized weakness of unclear etiology.  Likely secondary to dehydration since she has not been eating very well the last 2 days.  We'll check cardiac and infectious etiology and hydrate aggressively.  We'll reevaluate after laboratories have returned and off for observation but she still feels weak.    Patient still feels very weak after a liter of fluids.  She was found to have a urinary tract infection although the sample is slightly contaminated.  She also has a white count of 18.  She is also slightly dehydrated with a slight elevation in her creatinine.  I will admit for IV hydration and IV antibiotics and further monitoring for her generalized weakness.       Amount and/or Complexity of Data Reviewed  Clinical lab tests: ordered and reviewed  Tests in the radiology section of CPT®: ordered  Tests in the medicine section of CPT®: ordered  Decide to obtain previous medical records or to obtain history from someone other than the patient: yes  Discuss the patient with other providers: yes (Internal medicine)  Independent visualization of images, tracings, or specimens: yes (CXR: mild interstitial prominence    EKG: nsr at 97, lad, nl intervals, no st elevation or depression)           Gaetano Dawson III, MD  08/03/17 9258

## 2017-08-05 NOTE — DISCHARGE SUMMARY
[facility] Hospital Medicine  Discharge Summary      Patient Name: Maggy Tapia  MRN: [id number]  Admission Date: 8/3/2017  Hospital Length of Stay: 1 days  Discharge Date and Time: 8/4/2017  6:19 PM  Attending Physician: No att. providers found   Discharging Provider: Michelle Calabrese MD  Primary Care Provider: Yanna Abbasi MD      HPI:   Ms. Tapia is a 68yo F with a PMH of DM, HTN, Hypothyroid, Stroke, and Pseudoseizures. She presents to the hospital with complaints of near syncope. She had 2 episodes where she became generally weak and fell to the ground. First episode occurred while she was washing her face.  She became weak and could not support her weight and fell down. She did not hit her head. The second one was while she was getting into her car. She denies any  loss of consciousness,  head trauma, or seizure with either episode. She denies any chest pain but states she did have some shortness of breath. She did have one episode of diarrhea tonight.  Her  brother stated that she has not been eating or drinking very well the last 2 days because she has been in the hospital with her mother. While in the ED, she was found to have a UTI and given IV Cipro. She currently denies pain or discomforts. She is a poor historian on her medications.        Indwelling Lines/Drains at time of discharge:   Lines/Drains/Airways          No matching active lines, drains, or airways        Hospital Course:   Patient observed overnight. IV antibiotics given for sepsis secondary to UTI. PT evaluated patient. She felt significantly better. Patient examined at bedside on day of discharge. Exam findings within normal limits. She was deemed safe for discharge.       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           Near syncope    2/2 to dehydration. Follow up with neuro if no improvement. Home PT/ OT.             Urinary tract infection without hematuria    PO cipro.   Urine culture pending- will make sure to f/u sensitivities.          Pseudoseizures    Continue chronic Depakote           Hypothyroidism    Continue home levothyroxine          Depression    Continue chronic medications          Essential hypertension    Chronic/Controlled.           History of ischemic left MCA stroke    Continue chronic statin          Type 2 diabetes mellitus    Controlled.         * JUNIOR (acute kidney injury)-resolved as of 8/4/2017    Due to dehydration  IVF Hydration  Monitor labs  Renal dose medications  Avoid NSAIDS/nephrotoxic agents  Hold chronic ARB          Discharged Condition: stable    Disposition: Home-Health Care [facility]    Follow Up:  Follow-up Information     Yanna Abbasi MD In 1 week.    Specialty:  Family Medicine  Contact information:  [address] [facility] [address]  [phone]             Harley Roberts DO On 8/16/2017.    Specialty:  Neurology  Why:  Hospital f/u: Smiley sent a message to the nurse to put pt on waiting list for an earlier appoiintment.  Contact information:  [address]  [facility] [address]  [phone]             [facility]    Specialty:  Home Health Services  Why:  Home Health  Contact information:  [place] [address]  [phone]               Patient Instructions:     Referral to Home health   Referral Priority: Routine Referral Type: Home Health   Referral Reason: Specialty Services Required    Requested Specialty: Home Health Services    Number of Visits Requested: 1      Diet Cardiac     Activity as tolerated       Medications:  Reconciled Home Medications:   Discharge Medication List as of 8/4/2017  5:24 PM      START taking these medications    Details   potassium chloride SA (K-DUR,KLOR-CON) 20 MEQ tablet Take 1 tablet (20 mEq total) by mouth once daily., Starting Fri 8/4/2017, Until Sun 9/3/2017, Normal         CONTINUE these medications which have NOT CHANGED    Details   clonazePAM (KLONOPIN) 0.5 MG tablet Take 0.5 mg by mouth nightly., Starting 11/9/2016, Until Discontinued, Historical Med      divalproex (DEPAKOTE) 500 MG Tb24 Take 2 tablets (1,000 mg total) by mouth every evening., Starting 11/28/2016, Until Discontinued, Historical Med      gemfibrozil (LOPID) 600 MG tablet Take 1 tablet (600 mg total) by mouth 2 (two) times daily., Starting 10/6/2016, Until Discontinued, Normal      levothyroxine (SYNTHROID) 50 MCG tablet Take 1 tablet (50 mcg total) by mouth once daily., Starting 10/6/2016, Until Discontinued, Normal      losartan (COZAAR) 50 MG tablet Take 1 tablet (50 mg total) by mouth once daily., Starting 2/24/2017, Until Discontinued, Normal      simvastatin (ZOCOR) 40 MG tablet Take 1 tablet (40 mg total) by mouth once daily., Starting 12/27/2016, Until Discontinued, Normal      TRINTELLIX 10 mg Tab Take 1 tablet by mouth once daily., Starting 11/9/2016, Until Discontinued, Historical Med           Time spent on the discharge of patient: 39 minutes    HOS POC IP DISCHARGE SUMMARY    Michelle Calabrese  MD  Department of Hospital Medicine  [facility]

## 2017-08-07 LAB — BACTERIA UR CULT: NORMAL

## 2017-08-08 ENCOUNTER — TELEPHONE (OUTPATIENT)
Dept: FAMILY MEDICINE | Facility: CLINIC | Age: 67
End: 2017-08-08

## 2017-08-08 ENCOUNTER — TELEPHONE (OUTPATIENT)
Dept: MEDSURG UNIT | Facility: HOSPITAL | Age: 67
End: 2017-08-08

## 2017-08-08 DIAGNOSIS — Z59.19 UNSATISFACTORY LIVING CONDITIONS: Primary | ICD-10-CM

## 2017-08-08 SDOH — SOCIAL DETERMINANTS OF HEALTH (SDOH): OTHER INADEQUATE HOUSING: Z59.19

## 2017-08-08 NOTE — TELEPHONE ENCOUNTER
----- Message from Gaby Hoskins sent at 8/8/2017  9:06 AM CDT -----  Contact: rep with [facility] # [phone]  Requesting to speak with nurse, regarding Seizure Episode while visiting pt in home  Call back on # [phone]  thanks

## 2017-08-08 NOTE — TELEPHONE ENCOUNTER
Patient has been diagnosed with pseudoseizures.  True seizure activity such as grand mal or petit mal types is characterized by the patient being unaware of the event and unable to communicate during the episode.  There are no purposeful muscle movements under voluntary control by the patient.  Following grand mal (convulsive type) activity there is usually a period of sleepiness and confusion for a few minutes.  So I suspect that the HH nurse witnessed a pseudoseizure.  No intervention is needed unless she is hurt during episode or new unusual symptoms develop    I agree with plan to involve adult protection.  I will also refer patient to case management

## 2017-08-08 NOTE — TELEPHONE ENCOUNTER
Spoke to Mattie, therapist with [facility].     1)She reports that the patient had a seizure episode while she was present. Patient blacked out and waived her hands around. Patient insisted that this was normal for her and refused to go to the ER. When seizure activity was over she was alert and oriented as though nothing had happened.   2) [facility] will not be returning to this patients home due to safety issues for both patient and staff. States that the condition of the home is deplorable. There is urine and fecal matter on the floor throughout the house, including the kitchen area. [facility]  and Adult Protective Services have been contacted regarding this matter.   Please advise of any further instructions.

## 2017-08-11 NOTE — NURSING
"8/10/17 1300:  Contacted by MARNI Cervantes, regarding this patient being in the room with a family member, (whom is not currently a patient here, however, with a recent admission).  Ms. Tapia resides with 2 family members, whom are both currently admitted in this facility, and whom both provide care for her.   Everardo stated concern for Ms. Tapia, as she  is staying in the room with her family member, does not have any of her home medications, does not have belongings for self care, and her recent admission to hospital.      Went to family member's room to talk with Ms. Tapia, and she stated she is not able to stay for lengthy periods at home alone, secondary to her inability to give herself her medications, able to prepare only simple meals, and "light"  care for herself.    Noted that Ms. Tapia was set up with [facility] upon discharge from hospital.  Contacted [facility]. [facility] stated they did not admit patient secondary to patient "not meeting criteria for home health".  Questions asked regarding reasoning for non admission, as ordered by physician, and concerns that patient can not administer her own medications, patient is not safe as she is on an anti seizure medication, as well as B P med, and thyroid meds.  [facility] stated she was evaluated, and they rec "permanent placement", for patient, deemed her home living environment was deplorable, and their home health nurses were "not going into THAT house".  Questioned on actions taken by home health.  [facility] stated that they called adult protective services, and [employer] (patient's insurance) to tell them they were not accepting the patient.  Questioned if home health called the PCP, and they stated they did not notify patient's PCP of non admission for services.        Called [employer], spoke with Rosemary, notified her of the concerns. Rosemary returned call, set patient up with , and referral made to [facility] for [facility].    Teaching for  regarding importance of taking medications.  Ms. Tapia is making arrangements to go home,  her medications, and bring them to her family member here, for instructions on how and when to administer.  Ms. Tapia verbalized importance of medication compliance, and risk for readmission.  RONALD Maria RN DCM.

## 2017-08-16 ENCOUNTER — LAB VISIT (OUTPATIENT)
Dept: LAB | Facility: HOSPITAL | Age: 67
End: 2017-08-16
Attending: PSYCHIATRY & NEUROLOGY
Payer: MEDICARE

## 2017-08-16 ENCOUNTER — OFFICE VISIT (OUTPATIENT)
Dept: NEUROLOGY | Facility: CLINIC | Age: 67
End: 2017-08-16
Payer: MEDICARE

## 2017-08-16 ENCOUNTER — TELEPHONE (OUTPATIENT)
Dept: NEUROLOGY | Facility: CLINIC | Age: 67
End: 2017-08-16

## 2017-08-16 VITALS
DIASTOLIC BLOOD PRESSURE: 87 MMHG | BODY MASS INDEX: 32.45 KG/M2 | WEIGHT: 171.75 LBS | HEART RATE: 88 BPM | SYSTOLIC BLOOD PRESSURE: 182 MMHG

## 2017-08-16 DIAGNOSIS — F44.5 PSEUDOSEIZURES: ICD-10-CM

## 2017-08-16 DIAGNOSIS — R29.6 FREQUENT FALLS: ICD-10-CM

## 2017-08-16 DIAGNOSIS — I61.0 NONTRAUMATIC SUBCORTICAL HEMORRHAGE OF LEFT CEREBRAL HEMISPHERE: ICD-10-CM

## 2017-08-16 DIAGNOSIS — S06.5XAA SUBDURAL HEMATOMA: ICD-10-CM

## 2017-08-16 DIAGNOSIS — Z86.73 HISTORY OF ISCHEMIC LEFT MCA STROKE: Primary | ICD-10-CM

## 2017-08-16 DIAGNOSIS — Z51.81 THERAPEUTIC DRUG MONITORING: ICD-10-CM

## 2017-08-16 LAB — VALPROATE SERPL-MCNC: 111.7 UG/ML

## 2017-08-16 PROCEDURE — 36415 COLL VENOUS BLD VENIPUNCTURE: CPT | Mod: PO

## 2017-08-16 PROCEDURE — 3008F BODY MASS INDEX DOCD: CPT | Mod: S$GLB,,, | Performed by: PSYCHIATRY & NEUROLOGY

## 2017-08-16 PROCEDURE — 1159F MED LIST DOCD IN RCRD: CPT | Mod: S$GLB,,, | Performed by: PSYCHIATRY & NEUROLOGY

## 2017-08-16 PROCEDURE — 80164 ASSAY DIPROPYLACETIC ACD TOT: CPT

## 2017-08-16 PROCEDURE — 3079F DIAST BP 80-89 MM HG: CPT | Mod: S$GLB,,, | Performed by: PSYCHIATRY & NEUROLOGY

## 2017-08-16 PROCEDURE — 99213 OFFICE O/P EST LOW 20 MIN: CPT | Mod: S$GLB,,, | Performed by: PSYCHIATRY & NEUROLOGY

## 2017-08-16 PROCEDURE — 1157F ADVNC CARE PLAN IN RCRD: CPT | Mod: S$GLB,,, | Performed by: PSYCHIATRY & NEUROLOGY

## 2017-08-16 PROCEDURE — 3077F SYST BP >= 140 MM HG: CPT | Mod: S$GLB,,, | Performed by: PSYCHIATRY & NEUROLOGY

## 2017-08-16 PROCEDURE — 99999 PR PBB SHADOW E&M-EST. PATIENT-LVL III: CPT | Mod: PBBFAC,,, | Performed by: PSYCHIATRY & NEUROLOGY

## 2017-08-16 PROCEDURE — 1125F AMNT PAIN NOTED PAIN PRSNT: CPT | Mod: S$GLB,,, | Performed by: PSYCHIATRY & NEUROLOGY

## 2017-08-16 PROCEDURE — 99499 UNLISTED E&M SERVICE: CPT | Mod: S$GLB,,, | Performed by: PSYCHIATRY & NEUROLOGY

## 2017-08-16 RX ORDER — VORTIOXETINE 20 MG/1
20 TABLET, FILM COATED ORAL DAILY
COMMUNITY
Start: 2017-08-08 | End: 2021-05-13

## 2017-08-16 NOTE — PROGRESS NOTES
Subjective:         Patient ID: Maggy Tapia is a 67 y.o.  female who presents for follow up of subdural hematoma status post craniotomy, ataxia, pseudoseizures    Interval history since last visit:    I last saw her at the end of July.  At that time, was concerned her Depakote could be contributing to generalized slowing and risk of falls.  She was brought to the hospital a couple weeks later, on August 4 after feeling generally weak and falling to the ground.  No loss of consciousness or seizure activity.  She had not been eating or drinking well and had had 1 episode of diarrhea.  To have urinary tract infection.  She felt significantly better the following day after being given Cipro IV.    Also found to have some acute kidney injury felt to be secondary to dehydration.    She was set up for an earlier appointment to follow-up with me; in July we were planning on a follow-up visit in 6 months.    On August 10, there is a note in her record by care management stating concern about the fact that Mrs. Tapia was staying at the hospital with family members who themselves were admitted following her discharge.  Concerns were raised about her living condition and inability to care for herself.    According to this note, [facility] health had evaluated her and recommended permanent placement, deemed her home living environment deplorable and had called Adult Protective Services.  Please see the note by Brinda Nava on August 11, 2017 for details.    She is here with her brother.  They talked about the fact that due to medical conditions unable to clean the house, dogs have been soiling all over the house and no body to help clean up.  They do report having enough food in the house.     She is still taking VPA 1000 mg at bedtime, clonazepam 0.5 mg at bedtime.     She has not fallen again since hospital discharge.    Review of patient's allergies indicates:   Allergen Reactions    Penicillins Anaphylaxis     Sulfa (sulfonamide antibiotics) Anaphylaxis     Current Outpatient Prescriptions   Medication Sig Dispense Refill    clonazePAM (KLONOPIN) 0.5 MG tablet Take 0.5 mg by mouth nightly.  3    divalproex (DEPAKOTE) 500 MG Tb24 Take 2 tablets (1,000 mg total) by mouth every evening. 180 tablet 3    gemfibrozil (LOPID) 600 MG tablet Take 1 tablet (600 mg total) by mouth 2 (two) times daily. 180 tablet 3    levothyroxine (SYNTHROID) 50 MCG tablet Take 1 tablet (50 mcg total) by mouth once daily. 90 tablet 3    losartan (COZAAR) 50 MG tablet Take 1 tablet (50 mg total) by mouth once daily. 90 tablet 1    potassium chloride SA (K-DUR,KLOR-CON) 20 MEQ tablet Take 1 tablet (20 mEq total) by mouth once daily. 30 tablet 0    simvastatin (ZOCOR) 40 MG tablet Take 1 tablet (40 mg total) by mouth once daily. 90 tablet 3    TRINTELLIX 20 mg Tab        No current facility-administered medications for this visit.          Review of Systems  Review of Systems    Objective:        Vitals:    08/16/17 1111   BP: (!) 182/87   Pulse: 88     Body mass index is 32.45 kg/m².  Neurologic Exam     Mental Status   Significant psychomotor slowing, delays     Cranial Nerves     CN III, IV, VI   Nystagmus: none     Gait, Coordination, and Reflexes     Gait  Gait: shuffling (slow, unsteady gait)    Coordination   Romberg: positive  Finger-nose-finger test: very slow, but no ataxia.    Tremor   Resting tremor: absent      Physical Exam   Neurological: She has an abnormal Romberg Test. Finger-nose-finger test: very slow, but no ataxia.         Data Review:  Results for JIM RODRIGES (MRN [id number]) as of 8/16/2017 11:11   Ref. Range 8/3/2017 22:21 8/3/2017 22:30 8/4/2017 05:13 8/4/2017 11:45 8/4/2017 16:51   WBC Latest Ref Range: 3.90 - 12.70 K/uL 18.60 (H)  11.60     RBC Latest Ref Range: 4.00 - 5.40 M/uL 4.77  4.53     Hemoglobin Latest Ref Range: 12.0 - 16.0 g/dL 14.8  14.1     Hematocrit Latest Ref Range: 37.0 - 48.5 % 43.3  41.2     MCV  Latest Ref Range: 82 - 98 fL 91  91     MCH Latest Ref Range: 27.0 - 31.0 pg 31.0  31.2 (H)     MCHC Latest Ref Range: 32.0 - 36.0 g/dL 34.0  34.3     RDW Latest Ref Range: 11.5 - 14.5 % 13.6  13.5     Platelets Latest Ref Range: 150 - 350 K/uL 183  151     MPV Latest Ref Range: 9.2 - 12.9 fL 8.8 (L)  8.8 (L)     Gran% Latest Ref Range: 38.0 - 73.0 % 86.0 (H)  82.2 (H)     Gran # Latest Ref Range: 1.8 - 7.7 K/uL   9.5 (H)     Lymph% Latest Ref Range: 18.0 - 48.0 % 7.0 (L)  12.0 (L)     Lymph # Latest Ref Range: 1.0 - 4.8 K/uL CANCELED  1.4     Mono% Latest Ref Range: 4.0 - 15.0 % 3.0 (L)  4.7     Mono # Latest Ref Range: 0.3 - 1.0 K/uL CANCELED  0.5     Eosinophil% Latest Ref Range: 0.0 - 8.0 % 0.0  0.1     Eos # Latest Ref Range: 0.0 - 0.5 K/uL CANCELED  0.0     Basophil% Latest Ref Range: 0.0 - 1.9 % 0.0  1.0     Baso # Latest Ref Range: 0.00 - 0.20 K/uL CANCELED  0.10     BANDS Latest Units: % 4.0       Platelet Estimate Unknown Appears normal       Sodium Latest Ref Range: 136 - 145 mmol/L 140  141     Potassium Latest Ref Range: 3.5 - 5.1 mmol/L 3.2 (L)  2.9 (L)     Chloride Latest Ref Range: 95 - 110 mmol/L 104  105     CO2 Latest Ref Range: 23 - 29 mmol/L 21 (L)  23     Anion Gap Latest Ref Range: 8 - 16 mmol/L 15  13     BUN, Bld Latest Ref Range: 8 - 23 mg/dL 32 (H)  24 (H)     Creatinine Latest Ref Range: 0.5 - 1.4 mg/dL 1.7 (H)  1.0     eGFR if non African American Latest Ref Range: >60 mL/min/1.73 m^2 30 (A)  58 (A)     eGFR if African American Latest Ref Range: >60 mL/min/1.73 m^2 35 (A)  >60     Glucose Latest Ref Range: 70 - 110 mg/dL 158 (H)  149 (H)     Calcium Latest Ref Range: 8.7 - 10.5 mg/dL 9.7  9.3     Phosphorus Latest Ref Range: 2.7 - 4.5 mg/dL   3.4     Magnesium Latest Ref Range: 1.6 - 2.6 mg/dL 1.9  1.8     Alkaline Phosphatase Latest Ref Range: 55 - 135 U/L 85       Total Protein Latest Ref Range: 6.0 - 8.4 g/dL 7.3       Albumin Latest Ref Range: 3.5 - 5.2 g/dL 3.6       Total Bilirubin  Latest Ref Range: 0.1 - 1.0 mg/dL 0.5       AST Latest Ref Range: 10 - 40 U/L 20       ALT Latest Ref Range: 10 - 44 U/L 16       Troponin I Latest Ref Range: 0.000 - 0.026 ng/mL 0.009       BNP Latest Ref Range: 0 - 99 pg/mL 63       TSH Latest Ref Range: 0.400 - 4.000 uIU/mL   2.214     Specimen UA Unknown  Urine, Clean Catch      Color, UA Latest Ref Range: Yellow, Straw, Ilana   Yellow      pH, UA Latest Ref Range: 5.0 - 8.0   6.0      Specific Gravity, UA Latest Ref Range: 1.005 - 1.030   1.020      Appearance, UA Latest Ref Range: Clear   Cloudy (A)      Protein, UA Latest Ref Range: Negative   Trace (A)      Glucose, UA Latest Ref Range: Negative   1+ (A)      Ketones, UA Latest Ref Range: Negative   Trace (A)      Occult Blood UA Latest Ref Range: Negative   2+ (A)      Nitrite, UA Latest Ref Range: Negative   Negative      Urobilinogen, UA Latest Ref Range: <2.0 EU/dL  Negative      Bilirubin (UA) Latest Ref Range: Negative   Negative      Leukocytes, UA Latest Ref Range: Negative   1+ (A)      RBC, UA Latest Ref Range: 0 - 4 /hpf  >100 (H)      WBC, UA Latest Ref Range: 0 - 5 /hpf  92 (H)      Bacteria, UA Latest Ref Range: None-Occ /hpf  Many (A)      Squam Epithel, UA Latest Units: /hpf  17      Microscopic Comment Unknown  SEE COMMENT      CULTURE, URINE Unknown  Rpt      Urine Culture, Routine Unknown  ESCHERICHIA COLI...      POCT Glucose Latest Ref Range: 70 - 110 mg/dL    161 (H) 147 (H)     Assessment:        1. History of ischemic left MCA stroke    2. Nontraumatic subcortical hemorrhage of left cerebral hemisphere    3. Subdural hematoma    4. Pseudoseizures    5. Frequent falls (possibly related to polypharmacy)    6. Therapeutic drug monitoring            Plan:   Go ahead and cut back the Depakote to just one at night.  Make sure to let Dr. Whyte know we did this because I am concerned it is contributing to your falls.       Problem List Items Addressed This Visit        Neuro    History of  ischemic left MCA stroke - Primary    Nontraumatic subcortical hemorrhage of left cerebral hemisphere       Psychiatric    Pseudoseizures (Chronic)       Other    Frequent falls (possibly related to polypharmacy)      Other Visit Diagnoses     Subdural hematoma        Therapeutic drug monitoring        Relevant Orders    Valproic Acid (Completed)          Return in about 6 months (around 2/16/2018).       Thank you very much for the opportunity to assist in this patient's care.  If you have any questions or concerns, please do not hesitate to contact me at any time.     Sincerely,  Harley Roberts, DO

## 2017-08-16 NOTE — TELEPHONE ENCOUNTER
----- Message from Sherice Robles sent at 8/16/2017  1:15 PM CDT -----  Brother is calling to have patient's lab results faxed to her psychiatrist at fax [phone]. Please call if there are any questions at [phone].

## 2017-08-16 NOTE — PATIENT INSTRUCTIONS
Go ahead and cut back the Depakote to just one at night.  Make sure to let Dr. Whyte know we did this because I am concerned it is contributing to your falls.

## 2017-08-17 ENCOUNTER — TELEPHONE (OUTPATIENT)
Dept: NEUROLOGY | Facility: CLINIC | Age: 67
End: 2017-08-17

## 2017-08-17 ENCOUNTER — OUTPATIENT CASE MANAGEMENT (OUTPATIENT)
Dept: ADMINISTRATIVE | Facility: OTHER | Age: 67
End: 2017-08-17

## 2017-08-17 NOTE — TELEPHONE ENCOUNTER
Called to inform pt of medication change, left message then called the pt's brother listed as emergency contact, left message with him as well.

## 2017-08-17 NOTE — TELEPHONE ENCOUNTER
----- Message from Harley Roberts DO sent at 8/17/2017  8:16 AM CDT -----  Depakote level was above toxic range > 12 hours after dose taken -  it would have been much higher shortly after dose.  Stop taking Depakote for 2 days, then resume just one pill at night (500 mg).

## 2017-08-17 NOTE — PROGRESS NOTES
Please note the following patient's information has been forwarded to [facility] for case mgmt or  by Outpatient Case Management.    Please see the media section of patient's chart for additional details.    Please contact Ext. [phone] with any questions.    Thank you,    Raven Velazquez, SSC

## 2017-08-17 NOTE — TELEPHONE ENCOUNTER
----- Message from Toya Byrd sent at 8/17/2017 10:03 AM CDT -----  Contact: pt  Pt returning your phone call made on yesterday...[phone] (home)

## 2017-08-17 NOTE — TELEPHONE ENCOUNTER
Tried to call the patient. No answer. Unable to leave message due to busy tone. All phone numbers on profile are the same. Will try to call again at a later time.

## 2017-08-18 NOTE — TELEPHONE ENCOUNTER
Patient notified of results and has a hard time of remembering.  Instructed her to have her brother look on line and to call on Monday.

## 2017-08-21 ENCOUNTER — DOCUMENTATION ONLY (OUTPATIENT)
Dept: FAMILY MEDICINE | Facility: CLINIC | Age: 67
End: 2017-08-21

## 2017-08-21 NOTE — PROGRESS NOTES
Pre-Visit Chart Review  For Appointment Scheduled on 8-22-17    Health Maintenance Due   Topic Date Due    TETANUS VACCINE  01/01/1966    Colonoscopy  01/01/1998    Zoster Vaccine  01/01/2008    Eye Exam  06/30/2017    Influenza Vaccine  08/01/2017

## 2017-08-22 ENCOUNTER — OFFICE VISIT (OUTPATIENT)
Dept: FAMILY MEDICINE | Facility: CLINIC | Age: 67
End: 2017-08-22
Payer: MEDICARE

## 2017-08-22 VITALS
DIASTOLIC BLOOD PRESSURE: 87 MMHG | WEIGHT: 166.44 LBS | SYSTOLIC BLOOD PRESSURE: 128 MMHG | TEMPERATURE: 98 F | BODY MASS INDEX: 31.43 KG/M2 | HEIGHT: 61 IN | HEART RATE: 85 BPM

## 2017-08-22 DIAGNOSIS — E11.9 DIABETIC EYE EXAM: ICD-10-CM

## 2017-08-22 DIAGNOSIS — G40.909 SEIZURE DISORDER: ICD-10-CM

## 2017-08-22 DIAGNOSIS — D69.6 THROMBOCYTOPENIA: ICD-10-CM

## 2017-08-22 DIAGNOSIS — Z01.00 DIABETIC EYE EXAM: ICD-10-CM

## 2017-08-22 DIAGNOSIS — E11.9 TYPE 2 DIABETES MELLITUS WITHOUT COMPLICATION, WITH LONG-TERM CURRENT USE OF INSULIN: ICD-10-CM

## 2017-08-22 DIAGNOSIS — N39.0 URINARY TRACT INFECTION WITHOUT HEMATURIA, SITE UNSPECIFIED: Primary | ICD-10-CM

## 2017-08-22 DIAGNOSIS — E78.5 HYPERLIPIDEMIA, UNSPECIFIED HYPERLIPIDEMIA TYPE: ICD-10-CM

## 2017-08-22 DIAGNOSIS — Z79.4 TYPE 2 DIABETES MELLITUS WITHOUT COMPLICATION, WITH LONG-TERM CURRENT USE OF INSULIN: ICD-10-CM

## 2017-08-22 DIAGNOSIS — I10 ESSENTIAL HYPERTENSION: ICD-10-CM

## 2017-08-22 DIAGNOSIS — E87.6 HYPOKALEMIA: ICD-10-CM

## 2017-08-22 PROCEDURE — 1157F ADVNC CARE PLAN IN RCRD: CPT | Mod: S$GLB,,, | Performed by: FAMILY MEDICINE

## 2017-08-22 PROCEDURE — 1126F AMNT PAIN NOTED NONE PRSNT: CPT | Mod: S$GLB,,, | Performed by: FAMILY MEDICINE

## 2017-08-22 PROCEDURE — 4010F ACE/ARB THERAPY RXD/TAKEN: CPT | Mod: S$GLB,,, | Performed by: FAMILY MEDICINE

## 2017-08-22 PROCEDURE — 99999 PR PBB SHADOW E&M-EST. PATIENT-LVL IV: CPT | Mod: PBBFAC,,, | Performed by: FAMILY MEDICINE

## 2017-08-22 PROCEDURE — 3008F BODY MASS INDEX DOCD: CPT | Mod: S$GLB,,, | Performed by: FAMILY MEDICINE

## 2017-08-22 PROCEDURE — 99499 UNLISTED E&M SERVICE: CPT | Mod: S$GLB,,, | Performed by: FAMILY MEDICINE

## 2017-08-22 PROCEDURE — 3074F SYST BP LT 130 MM HG: CPT | Mod: S$GLB,,, | Performed by: FAMILY MEDICINE

## 2017-08-22 PROCEDURE — 3079F DIAST BP 80-89 MM HG: CPT | Mod: S$GLB,,, | Performed by: FAMILY MEDICINE

## 2017-08-22 PROCEDURE — 3044F HG A1C LEVEL LT 7.0%: CPT | Mod: S$GLB,,, | Performed by: FAMILY MEDICINE

## 2017-08-22 PROCEDURE — 99214 OFFICE O/P EST MOD 30 MIN: CPT | Mod: S$GLB,,, | Performed by: FAMILY MEDICINE

## 2017-08-22 PROCEDURE — 1159F MED LIST DOCD IN RCRD: CPT | Mod: S$GLB,,, | Performed by: FAMILY MEDICINE

## 2017-08-22 RX ORDER — CALCIUM CARBONATE 200(500)MG
1 TABLET,CHEWABLE ORAL DAILY
COMMUNITY
End: 2018-06-07

## 2017-08-22 NOTE — PROGRESS NOTES
Subjective:       Patient ID: Maggy Tapia is a 67 y.o. female.    Chief Complaint: Fall    Patient Active Problem List   Diagnosis    Syncope and collapse    Frequent falls (possibly related to polypharmacy)    Type 2 diabetes mellitus    History of left breast cancer    History of ischemic left MCA stroke    Seizure disorder    Essential hypertension    Hyperlipidemia    Thrombocytopenia    Depression    Hypothyroidism    Subdural hematoma, chronic, small, bilateral    SDH (subdural hematoma)    Valproic acid toxicity    Pseudoseizures    Obesity (BMI 30-39.9)    Osteopenia    Nontraumatic subcortical hemorrhage of left cerebral hemisphere    JUAN (obstructive sleep apnea)    Urinary tract infection without hematuria    Near syncope    Hypokalemia   C/o 2 falls at home.  First was just became weak in knees while washing up in the bathroom.  Had to call 911.  Sent to ED and had uti. Was treated for uti with abx,  Second time she was walking at home and nearly fell from general weakness. No loss of consciousness or injuries from either incident.  Has been staying with her elderly mother who has been in the hospital.  Now being transferred to hospice.  Brother going to continue to care for her and Ivana at home.  He has been in the hospital himself for DVTs and PEs now on blood thinner and urinary obstruction.  Was gone for days and no one was home with their dogs.  So the dogs messed all over the house.  He had not had time to clean it up or the energy to do so when home health came by for Ivana.  Called [facility].  Now the HH agency has come back today after patient states home has been cleaned.  He wants to continue [facility] for Ivana.    HPI  Review of Systems   Constitutional: Negative for fatigue and unexpected weight change.   Respiratory: Negative for chest tightness and shortness of breath.    Cardiovascular: Negative for chest pain, palpitations and leg swelling.    Gastrointestinal: Negative for abdominal pain.   Musculoskeletal: Negative for arthralgias.   Neurological: Negative for dizziness, syncope, light-headedness and headaches.       Objective:      Physical Exam   Constitutional: She is oriented to person, place, and time. She appears well-developed and well-nourished.   Cardiovascular: Normal rate, regular rhythm and normal heart sounds.    Pulmonary/Chest: Effort normal and breath sounds normal.   Musculoskeletal: She exhibits no edema.   Neurological: She is alert and oriented to person, place, and time.   Skin: Skin is warm and dry.   Psychiatric: She has a normal mood and affect.   Nursing note and vitals reviewed.      Assessment:       1. Urinary tract infection without hematuria, site unspecified    2. Seizure disorder    3. Essential hypertension    4. Hyperlipidemia, unspecified hyperlipidemia type    5. Hypokalemia    6. Thrombocytopenia    7. Type 2 diabetes mellitus without complication, with long-term current use of insulin    8. Diabetic eye exam        Plan:       1. Urinary tract infection without hematuria, site unspecified  HH will go out to reestablish and get sample  -  urinalysis, dipstick or tablet reag    2. Seizure disorder  Pseudoseizures.  Cont depakote and will draw trough level  - SUBSEQUENT HOME HEALTH ORDERS    3. Essential hypertension  Controlled on current medications.  Continue current medications.      4. Hyperlipidemia, unspecified hyperlipidemia type  Stable condition.  Continue current medications.  Will adjust based on lab findings or if condition changes.    - SUBSEQUENT HOME HEALTH ORDERS    5. Hypokalemia  Stable condition.  Continue current medications.  Will adjust based on lab findings or if condition changes.    - SUBSEQUENT HOME HEALTH ORDERS    6. Thrombocytopenia  Stable condition.  Continue current medications.  Will adjust based on lab findings or if condition changes.,ed  - SUBSEQUENT HOME HEALTH ORDERS    7. Type  2 diabetes mellitus without complication, with long-term current use of insulin  Stable condition.  Continue current medications.  Will adjust based on lab findings or if condition changes.      8. Diabetic eye exam  Refer for eval and treatment   - Ambulatory referral to Optometry    [facility] goal documentation:  Patient readiness:  and barriers:readinessacceptance  During the course of the visit the patient was educated and counseled about the following: Diabetes:  Discussed general issues about diabetes pathophysiology and management.  Hypertension:   Dietary sodium restriction.  Regular aerobic exercise.  Goals: Diabetes: Maintain Hemoglobin A1C below 7, Hypertension: Reduce Blood Pressure and Obesity: Reduce calorie intake and BMI  Goal/Outcomes met:Hypertension and type 2 DM  The following self management tools provided:none  Patient Instructions (the written plan) was given to the patient/family: Yes  Time spent with patient: 20 minutes    Patient with be reevaluated in 3 months or sooner prn    Greater than 50% of this visit was spent counseling as described in above documentation:Yes

## 2017-08-22 NOTE — PATIENT INSTRUCTIONS
Controlling High Blood Pressure  High blood pressure (hypertension) is often called the silent killer. This is because many people who have it dont know it. High blood pressure is defined as 140/90 mm Hg or higher. Know your blood pressure and remember to check it regularly. Doing so can save your life. Here are some things you can do to help control your blood pressure.    Choose heart-healthy foods  · Select low-salt, low-fat foods. Limit sodium intake to 2,400 mg per day or the amount suggested by your healthcare provider.  · Limit canned, dried, cured, packaged, and fast foods. These can contain a lot of salt.  · Eat 8 to 10 servings of fruits and vegetables every day.  · Choose lean meats, fish, or chicken.  · Eat whole-grain pasta, brown rice, and beans.  · Eat 2 to 3 servings of low-fat or fat-free dairy products.  · Ask your doctor about the DASH eating plan. This plan helps reduce blood pressure.  · When you go to a restaurant, ask that your meal be prepared with no added salt.  Maintain a healthy weight  · Ask your healthcare provider how many calories to eat a day. Then stick to that number.  · Ask your healthcare provider what weight range is healthiest for you. If you are overweight, a weight loss of only 3% to 5% of your body weight can help lower blood pressure. Generally, a good weight loss goal is to lose 10% of your body weight in a year.  · Limit snacks and sweets.  · Get regular exercise.  Get up and get active  · Choose activities you enjoy. Find ones you can do with friends or family. This includes bicycling, dancing, walking, and jogging.  · Park farther away from building entrances.  · Use stairs instead of the elevator.  · When you can, walk or bike instead of driving.  · [place] leaves, garden, or do household repairs.  · Be active at a moderate to vigorous level of physical activity for at least 40 minutes for a minimum of 3 to 4 days a week.   Manage stress  · Make time to relax and enjoy  life. Find time to laugh.  · Communicate your concerns with your loved ones and your healthcare provider.  · Visit with family and friends, and keep up with hobbies.  Limit alcohol and quit smoking  · Men should have no more than 2 drinks per day.  · Women should have no more than 1 drink per day.  · Talk with your healthcare provider about quitting smoking. Smoking significantly increases your risk for heart disease and stroke. Ask your healthcare provider about community smoking cessation programs and other options.  Medicines  If lifestyle changes arent enough, your healthcare provider may prescribe high blood pressure medicine. Take all medicines as prescribed. If you have any questions about your medicines, ask your healthcare provider before stopping or changing them.   Date Last Reviewed: 4/27/2016 © 2000-2016 [employer]. [address] [place] [address], [place] [address]. All rights reserved. This information is not intended as a substitute for professional medical care. Always follow your healthcare professional's instructions.        Walking for Fitness  Fitness walking has something for everyone, even people who are already fit. Walking is one of the safest ways to condition your body aerobically. It can boost energy, help you lose weight, and reduce stress.    Physical benefits  · Walking strengthens your heart and lungs, and tones your muscles.  · When walking, your feet land with less impact than in other sports. This reduces chances of muscle, bone, and joint injury.  · Regular walking improves your cholesterol levels and lowers your risk of heart disease. And it helps you control your blood sugar if you have diabetes.  · Walking is a weight-bearing activity, which helps maintain bone density. This can help prevent osteoporosis.  Personal rewards  · Taking walks can help you relax and manage stress. And fitness walking may make you feel better about yourself.  · Walking can help you sleep  better at night and make you less likely to be depressed.  · Regular walking may help maintain your memory as you get older.  · Walking is a great way to spend extra time with friends and family members. Be sure to invite your dog along!  Q&A about fitness walking  Q: Will walking keep me fit?  A: Yes. Regular walking at the right pace gives you all the benefits of other aerobic activities, such as jogging and swimming.  Q: Will walking help me lose weight and keep it off?  A: Yes. Per mile, walking can burn as many calories as jogging. Your health care provider can help work walking into your weight-loss plan.  Q: Is walking safe for my health?  A: Yes. Walking is safe if you have high blood pressure, diabetes, heart disease, or other conditions. Talk to your health care provider before you start.  Date Last Reviewed: 5/9/2015  © 6440-3183 [employer]. [address] [place] [address]. All rights reserved. This information is not intended as a substitute for professional medical care. Always follow your healthcare professional's instructions.        Weight Management: Getting Started  Healthy bodies come in all shapes and sizes. Not all bodies are made to be thin. For some people, a healthy weight is higher than the average weight listed on weight charts. Your healthcare provider can help you decide on a healthy weight for you.    Reasons to lose weight  Losing weight can help with some health problems, such as high blood pressure, heart disease, diabetes, sleep apnea, and arthritis. You may also feel more energy.  Set your long-term goal  Your goal doesn't even have to be a specific weight. You may decide on a fitness goal (such as being able to walk 10 miles a week), or a health goal (such as lowering your blood pressure). Choose a goal that is measurable and reasonable, so you know when you've reached it. A goal of reaching a BMI of less than 25 is not always reasonable (or  possible).   Make an action plan  Habits dont change overnight. Setting your goals too high can leave you feeling discouraged if you cant reach them. Be realistic. Choose one or two small changes you can make now. Set an action plan for how you are going to make these changes. When you can stick to this plan, keep making a few more small changes. Taking small steps will help you stay on the path to success.  Track your progress  Write down your goals. Then, keep a daily record of your progress. Write down what you eat and how active you are. This record lets you look back on how much youve done. It may also help when youre feeling frustrated. Reward yourself for success. Even if you dont reach every goal, give yourself credit for what you do get done.  Get support  Encouragement from others can help make losing weight easier. Ask your family members and friends for support. They may even want to join you. Also look to your healthcare provider, registered dietitian, and  for help. Your local hospital can give you more information about nutrition, exercise, and weight loss.  Date Last Reviewed: 1/31/2016  © 5839-9720 [employer]. [address] [place] [address], [place] [address]. All rights reserved. This information is not intended as a substitute for professional medical care. Always follow your healthcare professional's instructions.

## 2017-08-22 NOTE — Clinical Note
Verbal order-[facility]-draw fasting am labs (before morning depakote)-a1c, lipid, cmp, cbc, tsh, free t4, t3, depakote, u/a

## 2017-08-23 ENCOUNTER — TELEPHONE (OUTPATIENT)
Dept: FAMILY MEDICINE | Facility: CLINIC | Age: 67
End: 2017-08-23

## 2017-08-23 NOTE — TELEPHONE ENCOUNTER
----- Message from Amanda Loving sent at 8/23/2017  1:30 PM CDT -----  Kofi [phone] / [facility] ... Went out to see her / had 3 seizures .. She is scheduled for labs tomorrow .. Any other orders ?

## 2017-08-24 DIAGNOSIS — Z12.11 COLON CANCER SCREENING: ICD-10-CM

## 2017-08-25 ENCOUNTER — TELEPHONE (OUTPATIENT)
Dept: FAMILY MEDICINE | Facility: CLINIC | Age: 67
End: 2017-08-25

## 2017-08-25 NOTE — TELEPHONE ENCOUNTER
Spoke with Kofi the home health aide, the seizures have been determined to be pseudoseizures (psychological); if no new symptoms ,no new orders.

## 2017-08-25 NOTE — TELEPHONE ENCOUNTER
Reassure Kofi that seizures have been determined to be pseudoseizures (psychological).  As long as there are no new sx then no intervention needed.  No other orders

## 2017-08-28 ENCOUNTER — TELEPHONE (OUTPATIENT)
Dept: FAMILY MEDICINE | Facility: CLINIC | Age: 67
End: 2017-08-28

## 2017-08-28 NOTE — TELEPHONE ENCOUNTER
Spoke with patient about lab results and if any questions or concerns to give us a call back; patient denies fever, denies abdominal cramping .

## 2017-08-28 NOTE — TELEPHONE ENCOUNTER
Notify patient: I reviewed the labs from 8/24/17. Your blood sugar control is excellent and at the goal HbA1c, 3 month blood sugar average, we desire.  Keep up the good work!    Your labs are all essentially in the normal range including: blood sugar, kidney function, liver function, blood cell counts, thyroid level and cholesterol levels.  The depakote level is in the therapeutic range. The urine culture grew bacteria normally found in the vaginal area so it looks like contamination. Monitor for worsening symptoms and return to clinic or go to the ER if these occur: fever > 100.4, severe abdominal pain, intractable vomiting, bleeding from the rectum or black tarry stools, dehydration, lethargy or other worsening symptoms.

## 2017-09-07 ENCOUNTER — OFFICE VISIT (OUTPATIENT)
Dept: OPTOMETRY | Facility: CLINIC | Age: 67
End: 2017-09-07
Payer: MEDICARE

## 2017-09-07 DIAGNOSIS — H52.7 REFRACTIVE ERROR: ICD-10-CM

## 2017-09-07 DIAGNOSIS — Z96.1 PSEUDOPHAKIA: ICD-10-CM

## 2017-09-07 DIAGNOSIS — E11.9 DIABETES MELLITUS WITHOUT OPHTHALMIC MANIFESTATIONS: Primary | ICD-10-CM

## 2017-09-07 PROCEDURE — 92004 COMPRE OPH EXAM NEW PT 1/>: CPT | Mod: S$GLB,,, | Performed by: OPTOMETRIST

## 2017-09-07 PROCEDURE — 99999 PR PBB SHADOW E&M-EST. PATIENT-LVL I: CPT | Mod: PBBFAC,,, | Performed by: OPTOMETRIST

## 2017-09-07 PROCEDURE — 99499 UNLISTED E&M SERVICE: CPT | Mod: S$GLB,,, | Performed by: OPTOMETRIST

## 2017-09-07 NOTE — LETTER
September 7, 2017      Yanna Abbasi MD  [address]  [place] [address]           [facility] [place] [facility] - [facility]  [address] [place] [address] Drive [address]  [place] [address]  Phone: [phone]          Patient: Maggy Tapia   MR Number: [id number]   YOB: 1950   Date of Visit: 9/7/2017       Dear Dr. Yanna Abbasi:    Thank you for referring Maggy Tapia to me for evaluation. Attached you will find relevant portions of my assessment and plan of care.    If you have questions, please do not hesitate to call me. I look forward to following Maggy Tapia along with you.    Sincerely,    Damion Dugan, OD    Enclosure  CC:  No Recipients    If you would like to receive this communication electronically, please contact [email or web address] or [phone] to request more information on [email or web address] Link access.    For providers and/or their staff who would like to refer a patient to [facility], please contact us through our one-stop-shop provider referral line, Sindy Mott, at [phone].    If you feel you have received this communication in error or would no longer like to receive these types of communications, please e-mail [email or web address]

## 2017-09-07 NOTE — PROGRESS NOTES
HPI     Presenting Complaint: Pt here today for yearly diabetic eye exam.Pt   states blood sugar levels have been controlled.      Hemoglobin A1C       Date                     Value               Ref Range             Status                03/28/2017               5.7                 4.5 - 6.2 %           Final                 11/28/2016               6.0                 4.5 - 6.2 %           Final                  08/02/2016               6.0 (H)             0.0 - 5.6 %           Final              Pt states vision has been stable with current glasses.     Ophthalmic medication / drops:None  (+) Cataract sx both eyes    (-) headaches  (-) diplopia   (-) flashes / (-) floaters      Last edited by Damion Dugan, OD on 9/7/2017  8:34 AM. (History)            Assessment /Plan     For exam results, see Encounter Report.    Diabetes mellitus without ophthalmic manifestations    Pseudophakia    Refractive error      Borderline DM, controlled with diet & exercise, no ocular retinopathy OU. Discussed possible ocular affects of uncontrolled blood sugar with patient. Recommended continued strong blood sugar control and continued care with PCP. Monitor yearly.     S/p cataract extraction with good results. Pt doing well with current specs, denies refraction. Return prn for spec Rx.      RTC in 1 year for comprehensive eye exam, or sooner prn.

## 2017-10-04 ENCOUNTER — TELEPHONE (OUTPATIENT)
Dept: NEUROLOGY | Facility: CLINIC | Age: 67
End: 2017-10-04

## 2017-10-04 NOTE — TELEPHONE ENCOUNTER
----- Message from Samantha Velez sent at 10/4/2017  9:26 AM CDT -----  Contact: patient  Patient calling in regards to rescheduling her appt time on 10/10/17. She wants to come around 2 pm on 10/10. No avail appt. Please advise.  Call back   Thanks!

## 2017-10-04 NOTE — TELEPHONE ENCOUNTER
Left message regarding her message; no available time open on the 10th other than her scheduled appointment.

## 2017-10-04 NOTE — TELEPHONE ENCOUNTER
----- Message from Emilia Casas sent at 10/4/2017 11:31 AM CDT -----  Contact: self  Patient called regarding rescheduling appt for afternoon appt. Please contact [phone] (bsof)

## 2017-10-05 DIAGNOSIS — E11.9 TYPE 2 DIABETES MELLITUS WITHOUT COMPLICATION: ICD-10-CM

## 2017-10-26 DIAGNOSIS — E03.9 ACQUIRED HYPOTHYROIDISM: ICD-10-CM

## 2017-10-26 RX ORDER — LEVOTHYROXINE SODIUM 50 UG/1
50 TABLET ORAL DAILY
Qty: 90 TABLET | Refills: 1 | Status: SHIPPED | OUTPATIENT
Start: 2017-10-26 | End: 2018-04-29 | Stop reason: SDUPTHER

## 2017-11-09 DIAGNOSIS — E78.5 HYPERLIPIDEMIA ASSOCIATED WITH TYPE 2 DIABETES MELLITUS: ICD-10-CM

## 2017-11-09 DIAGNOSIS — E11.69 HYPERLIPIDEMIA ASSOCIATED WITH TYPE 2 DIABETES MELLITUS: ICD-10-CM

## 2017-11-09 RX ORDER — GEMFIBROZIL 600 MG/1
TABLET, FILM COATED ORAL
Qty: 180 TABLET | Refills: 1 | Status: SHIPPED | OUTPATIENT
Start: 2017-11-09 | End: 2018-05-21 | Stop reason: SDUPTHER

## 2017-11-21 ENCOUNTER — TELEPHONE (OUTPATIENT)
Dept: FAMILY MEDICINE | Facility: CLINIC | Age: 67
End: 2017-11-21

## 2017-11-21 NOTE — TELEPHONE ENCOUNTER
Spoke to patient, has been vomiting for 2 days. Patient has no other complaints. Advised Urgent Care.

## 2017-11-21 NOTE — TELEPHONE ENCOUNTER
----- Message from Lorena Del Angel sent at 11/21/2017  8:17 AM CST -----  Patient stated she has been vomiting for last two days/cannot keep anything down/needs to be seen today/no appointment showing available/please call back at [phone] to schedule or advise.

## 2017-12-05 DIAGNOSIS — E11.9 TYPE 2 DIABETES MELLITUS WITHOUT COMPLICATION, WITHOUT LONG-TERM CURRENT USE OF INSULIN: Primary | ICD-10-CM

## 2017-12-07 ENCOUNTER — LAB VISIT (OUTPATIENT)
Dept: LAB | Facility: HOSPITAL | Age: 67
End: 2017-12-07
Attending: FAMILY MEDICINE
Payer: MEDICARE

## 2017-12-07 DIAGNOSIS — E11.9 TYPE 2 DIABETES MELLITUS WITHOUT COMPLICATION: ICD-10-CM

## 2017-12-07 DIAGNOSIS — E11.9 TYPE 2 DIABETES MELLITUS WITHOUT COMPLICATION, WITHOUT LONG-TERM CURRENT USE OF INSULIN: ICD-10-CM

## 2017-12-07 LAB
CHOLEST SERPL-MCNC: 161 MG/DL
CHOLEST/HDLC SERPL: 3.4 {RATIO}
ESTIMATED AVG GLUCOSE: 105 MG/DL
HBA1C MFR BLD HPLC: 5.3 %
HDLC SERPL-MCNC: 47 MG/DL
HDLC SERPL: 29.2 %
LDLC SERPL CALC-MCNC: 102.8 MG/DL
NONHDLC SERPL-MCNC: 114 MG/DL
TRIGL SERPL-MCNC: 56 MG/DL

## 2017-12-07 PROCEDURE — 36415 COLL VENOUS BLD VENIPUNCTURE: CPT

## 2017-12-07 PROCEDURE — 83036 HEMOGLOBIN GLYCOSYLATED A1C: CPT

## 2017-12-07 PROCEDURE — 80061 LIPID PANEL: CPT

## 2017-12-11 ENCOUNTER — TELEPHONE (OUTPATIENT)
Dept: FAMILY MEDICINE | Facility: CLINIC | Age: 67
End: 2017-12-11

## 2017-12-11 NOTE — TELEPHONE ENCOUNTER
----- Message from Yanna Abbasi MD sent at 12/8/2017  3:34 PM CST -----  Notify patient:  Your blood sugar control is excellent and at the goal HbA1c, 3 month blood sugar average, we desire.  Keep up the good work!  You cholesterol is at goal

## 2017-12-11 NOTE — TELEPHONE ENCOUNTER
Notified patient of her lab results. Advised she keep up the good work on her HgbA1c and Cholesterol.

## 2018-01-19 DIAGNOSIS — E11.69 HYPERLIPIDEMIA ASSOCIATED WITH TYPE 2 DIABETES MELLITUS: ICD-10-CM

## 2018-01-19 DIAGNOSIS — E78.5 HYPERLIPIDEMIA ASSOCIATED WITH TYPE 2 DIABETES MELLITUS: ICD-10-CM

## 2018-01-19 RX ORDER — SIMVASTATIN 40 MG/1
40 TABLET, FILM COATED ORAL DAILY
Qty: 90 TABLET | Refills: 3 | Status: SHIPPED | OUTPATIENT
Start: 2018-01-19 | End: 2018-06-28 | Stop reason: SDUPTHER

## 2018-04-29 DIAGNOSIS — E03.9 ACQUIRED HYPOTHYROIDISM: ICD-10-CM

## 2018-04-30 RX ORDER — LEVOTHYROXINE SODIUM 50 UG/1
TABLET ORAL
Qty: 90 TABLET | Refills: 0 | Status: SHIPPED | OUTPATIENT
Start: 2018-04-30 | End: 2018-06-28 | Stop reason: SDUPTHER

## 2018-05-21 DIAGNOSIS — E11.69 HYPERLIPIDEMIA ASSOCIATED WITH TYPE 2 DIABETES MELLITUS: ICD-10-CM

## 2018-05-21 DIAGNOSIS — E78.5 HYPERLIPIDEMIA ASSOCIATED WITH TYPE 2 DIABETES MELLITUS: ICD-10-CM

## 2018-05-21 RX ORDER — GEMFIBROZIL 600 MG/1
TABLET, FILM COATED ORAL
Qty: 180 TABLET | Refills: 0 | Status: SHIPPED | OUTPATIENT
Start: 2018-05-21 | End: 2019-04-30 | Stop reason: SDUPTHER

## 2018-06-07 ENCOUNTER — TELEPHONE (OUTPATIENT)
Dept: FAMILY MEDICINE | Facility: CLINIC | Age: 68
End: 2018-06-07

## 2018-06-07 ENCOUNTER — HOSPITAL ENCOUNTER (EMERGENCY)
Facility: HOSPITAL | Age: 68
Discharge: HOME | End: 2018-06-07
Attending: EMERGENCY MEDICINE
Payer: MEDICARE

## 2018-06-07 VITALS
TEMPERATURE: 98 F | OXYGEN SATURATION: 98 % | WEIGHT: 166 LBS | DIASTOLIC BLOOD PRESSURE: 58 MMHG | SYSTOLIC BLOOD PRESSURE: 124 MMHG | BODY MASS INDEX: 31.34 KG/M2 | HEART RATE: 63 BPM | RESPIRATION RATE: 20 BRPM | HEIGHT: 61 IN

## 2018-06-07 DIAGNOSIS — H53.2 DIPLOPIA: ICD-10-CM

## 2018-06-07 DIAGNOSIS — I63.9 STROKE: ICD-10-CM

## 2018-06-07 LAB
ALBUMIN SERPL BCP-MCNC: 3.3 G/DL
ALP SERPL-CCNC: 70 U/L
ALT SERPL W/O P-5'-P-CCNC: 10 U/L
ANION GAP SERPL CALC-SCNC: 9 MMOL/L
AST SERPL-CCNC: 11 U/L
BASOPHILS # BLD AUTO: 0 K/UL
BASOPHILS NFR BLD: 0.5 %
BILIRUB SERPL-MCNC: 0.4 MG/DL
BILIRUB UR QL STRIP: NEGATIVE
BUN SERPL-MCNC: 30 MG/DL
CALCIUM SERPL-MCNC: 9.5 MG/DL
CHLORIDE SERPL-SCNC: 109 MMOL/L
CHOLEST SERPL-MCNC: 174 MG/DL
CHOLEST/HDLC SERPL: 4.4 {RATIO}
CLARITY UR: CLEAR
CO2 SERPL-SCNC: 23 MMOL/L
COLOR UR: YELLOW
CREAT SERPL-MCNC: 0.8 MG/DL
DIFFERENTIAL METHOD: NORMAL
EOSINOPHIL # BLD AUTO: 0.4 K/UL
EOSINOPHIL NFR BLD: 5.1 %
ERYTHROCYTE [DISTWIDTH] IN BLOOD BY AUTOMATED COUNT: 13.5 %
EST. GFR  (AFRICAN AMERICAN): >60 ML/MIN/1.73 M^2
EST. GFR  (NON AFRICAN AMERICAN): >60 ML/MIN/1.73 M^2
GLUCOSE SERPL-MCNC: 99 MG/DL
GLUCOSE UR QL STRIP: NEGATIVE
HCT VFR BLD AUTO: 42.4 %
HDLC SERPL-MCNC: 40 MG/DL
HDLC SERPL: 23 %
HGB BLD-MCNC: 14.3 G/DL
HGB UR QL STRIP: ABNORMAL
INR PPP: 1
KETONES UR QL STRIP: NEGATIVE
LDLC SERPL CALC-MCNC: 104.2 MG/DL
LEUKOCYTE ESTERASE UR QL STRIP: NEGATIVE
LYMPHOCYTES # BLD AUTO: 2.1 K/UL
LYMPHOCYTES NFR BLD: 26 %
MCH RBC QN AUTO: 30.5 PG
MCHC RBC AUTO-ENTMCNC: 33.7 G/DL
MCV RBC AUTO: 91 FL
MONOCYTES # BLD AUTO: 0.3 K/UL
MONOCYTES NFR BLD: 4.2 %
NEUTROPHILS # BLD AUTO: 5.1 K/UL
NEUTROPHILS NFR BLD: 64.2 %
NITRITE UR QL STRIP: NEGATIVE
NONHDLC SERPL-MCNC: 134 MG/DL
PH UR STRIP: 5 [PH] (ref 5–8)
PLATELET # BLD AUTO: 152 K/UL
PMV BLD AUTO: 9.2 FL
POCT GLUCOSE: 89 MG/DL (ref 70–110)
POTASSIUM SERPL-SCNC: 4 MMOL/L
PROT SERPL-MCNC: 7 G/DL
PROT UR QL STRIP: NEGATIVE
PROTHROMBIN TIME: 10 SEC
RBC # BLD AUTO: 4.68 M/UL
SODIUM SERPL-SCNC: 141 MMOL/L
SP GR UR STRIP: 1.02 (ref 1–1.03)
T4 FREE SERPL-MCNC: 0.9 NG/DL
TRIGL SERPL-MCNC: 149 MG/DL
TSH SERPL DL<=0.005 MIU/L-ACNC: 4.52 UIU/ML
URN SPEC COLLECT METH UR: ABNORMAL
UROBILINOGEN UR STRIP-ACNC: NEGATIVE EU/DL
WBC # BLD AUTO: 7.9 K/UL

## 2018-06-07 PROCEDURE — 93005 ELECTROCARDIOGRAM TRACING: CPT

## 2018-06-07 PROCEDURE — 80061 LIPID PANEL: CPT

## 2018-06-07 PROCEDURE — 93010 ELECTROCARDIOGRAM REPORT: CPT | Mod: ,,, | Performed by: INTERNAL MEDICINE

## 2018-06-07 PROCEDURE — 99285 EMERGENCY DEPT VISIT HI MDM: CPT | Mod: 25

## 2018-06-07 PROCEDURE — 36415 COLL VENOUS BLD VENIPUNCTURE: CPT

## 2018-06-07 PROCEDURE — 85025 COMPLETE CBC W/AUTO DIFF WBC: CPT

## 2018-06-07 PROCEDURE — 25000003 PHARM REV CODE 250: Performed by: EMERGENCY MEDICINE

## 2018-06-07 PROCEDURE — 80053 COMPREHEN METABOLIC PANEL: CPT

## 2018-06-07 PROCEDURE — P9612 CATHETERIZE FOR URINE SPEC: HCPCS

## 2018-06-07 PROCEDURE — 82962 GLUCOSE BLOOD TEST: CPT

## 2018-06-07 PROCEDURE — 84443 ASSAY THYROID STIM HORMONE: CPT

## 2018-06-07 PROCEDURE — 81003 URINALYSIS AUTO W/O SCOPE: CPT

## 2018-06-07 PROCEDURE — 85610 PROTHROMBIN TIME: CPT

## 2018-06-07 PROCEDURE — G0426 INPT/ED TELECONSULT50: HCPCS | Mod: GT,,, | Performed by: PSYCHIATRY & NEUROLOGY

## 2018-06-07 PROCEDURE — 84439 ASSAY OF FREE THYROXINE: CPT

## 2018-06-07 RX ORDER — BUTALBITAL, ACETAMINOPHEN AND CAFFEINE 50; 325; 40 MG/1; MG/1; MG/1
1 TABLET ORAL
Status: COMPLETED | OUTPATIENT
Start: 2018-06-07 | End: 2018-06-07

## 2018-06-07 RX ORDER — POTASSIUM CHLORIDE 20 MEQ/1
20 TABLET, EXTENDED RELEASE ORAL DAILY
COMMUNITY

## 2018-06-07 RX ADMIN — BUTALBITAL, ACETAMINOPHEN, AND CAFFEINE 1 TABLET: 50; 325; 40 TABLET ORAL at 05:06

## 2018-06-07 NOTE — HPI
Onset of HA 6/4/2018. Today developed blurred vision with diplopia and nausea. C/o lightheadedness but denies vertigo or ataxia.

## 2018-06-07 NOTE — ASSESSMENT & PLAN NOTE
Exam is pretty normal without dysconjugate gaze or CN palsy. No nystagmus, dysmetria or aataxia noted on exam, Patient had pseudoseizure during the exam, I believe this is functional.  MRI brain for further confirmation.

## 2018-06-07 NOTE — SUBJECTIVE & OBJECTIVE
"  Woke up with symptoms?: no  Last known normal:    6/4/2018    Recent bleeding noted: no  Does the patient take any Blood Thinners? no  Medications: No relevant medications      Past Medical History: hypertension, hyperlipidemia and pseudoseizures, schizophrenia    Past Surgical History: no major surgeries within the last 2 weeks    Family History: no relevant history    Social History: no smoking, no drinking, no drugs    Allergies: Penicillins  Sulfa (Sulfonamide Antibiotics)     Review of Systems   Eyes: Positive for visual disturbance.   Neurological: Positive for light-headedness, numbness and headaches.   All other systems reviewed and are negative.    Objective:   Vitals: Blood pressure (!) 148/73, pulse 74, temperature 98.1 °F (36.7 °C), temperature source Oral, resp. rate 20, height 5' 1" (1.549 m), weight 75.3 kg (166 lb), SpO2 (!) 94 %.     CT READ: Yes  No hemmorhage. No mass effect. No early infarct signs.     Physical Exam   Constitutional: She is oriented to person, place, and time. She appears well-developed and well-nourished.   HENT:   Head: Normocephalic and atraumatic.   Eyes: EOM are normal. Pupils are equal, round, and reactive to light.   Cardiovascular: Normal rate and regular rhythm.    Pulmonary/Chest: Effort normal.   Neurological: She is alert and oriented to person, place, and time. A sensory deficit is present.         "

## 2018-06-07 NOTE — ED NOTES
Pt ambulated to room 6 for evaluation of worsening headache x 3 days and double/blurry vision today. Pt is awake and alert oriented to person and time. Resp even and unlabored. Pt denies chest or abd pain. Pt reports nausea. See MD assessment.

## 2018-06-07 NOTE — ED PROVIDER NOTES
"Encounter Date: 2018    SCRIBE #1 NOTE: I, Audra Back, am scribing for, and in the presence of,  .       History     Chief Complaint   Patient presents with    Dizziness     x 3 days     Headache     " seeing double "       2018 2:32 PM     Chief complaint: headache      Maggy Tapia is a 68 y.o. female with who presents to the ED with headache onset 3 days. Patient states she has had a posterior headache with associated blurry vision. She complains that even when laying flat the room is spinning. Her brother states she seems to be slurring her words in speaking.  Patient complains of vomiting yesterday and denies chest pain, diarrhea, difficulty swallowing, or fever.       The history is provided by the patient. No  was used.     Review of patient's allergies indicates:   Allergen Reactions    Penicillins Anaphylaxis    Sulfa (sulfonamide antibiotics) Anaphylaxis     Past Medical History:   Diagnosis Date    Anxiety     Arthritis     Asthma     Cancer 2000    Left Breast    Cataract     Depression     Diabetes mellitus, type 2     GERD (gastroesophageal reflux disease)     Hyperlipidemia     Hypertension     Osteoporosis     Seizures     Pseudo-seizures    Stroke     Thyroid disease      Past Surgical History:   Procedure Laterality Date    APPENDECTOMY      BREAST SURGERY       SECTION      CHOLECYSTECTOMY      DILATION AND CURETTAGE OF UTERUS      EYE SURGERY       Family History   Problem Relation Age of Onset    Diabetes Mother     Hypertension Mother     Glaucoma Neg Hx      Social History   Substance Use Topics    Smoking status: Never Smoker    Smokeless tobacco: Never Used    Alcohol use Yes      Comment: socially     Review of Systems   Constitutional: Negative for fever.   HENT: Negative for sore throat.    Eyes: Positive for visual disturbance.   Respiratory: Negative for shortness of breath.    Cardiovascular: " Negative for chest pain.   Gastrointestinal: Positive for vomiting. Negative for abdominal pain, diarrhea and nausea.   Genitourinary: Negative for dysuria.   Musculoskeletal: Negative for back pain.   Skin: Negative for rash.   Neurological: Positive for dizziness, speech difficulty and headaches. Negative for facial asymmetry, weakness and numbness.   Hematological: Does not bruise/bleed easily.   All other systems reviewed and are negative.      Physical Exam     Initial Vitals [06/07/18 1425]   BP Pulse Resp Temp SpO2   136/62 83 20 98.1 °F (36.7 °C) 95 %      MAP       86.67         Physical Exam    Nursing note and vitals reviewed.  Constitutional: She appears well-developed and well-nourished. She is not diaphoretic.  Non-toxic appearance. She does not have a sickly appearance. She does not appear ill. No distress.   HENT:   Head: Normocephalic and atraumatic.   Eyes: EOM are normal. Pupils are equal, round, and reactive to light. Right pupil is round and reactive. Left pupil is round and reactive. Pupils are equal.   No cranial nerve palsies   Neck: Normal range of motion. Neck supple.   Cardiovascular: Normal rate, regular rhythm and normal heart sounds. Exam reveals no gallop and no friction rub.    No murmur heard.  Pulmonary/Chest: Breath sounds normal. No respiratory distress. She has no wheezes. She has no rhonchi. She has no rales.   Abdominal: Soft. She exhibits no distension. There is no tenderness. There is no rebound.   Musculoskeletal: Normal range of motion.   Neurological: She is alert and oriented to person, place, and time. She has normal strength.   Right hand dysmetria no left hand dysmetria   Skin: Skin is warm and dry.   Psychiatric: She has a normal mood and affect. Her behavior is normal. Judgment and thought content normal.         ED Course   Procedures  Labs Reviewed - No data to display       No orders to display        Medical Decision Making:   History:   Old Medical Records: I  decided to obtain old medical records.  Clinical Tests:   Lab Tests: Ordered and Reviewed  Radiological Study: Ordered and Reviewed  Medical Tests: Ordered and Reviewed            Scribe Attestation:   Scribe #1: I performed the above scribed service and the documentation accurately describes the services I performed. I attest to the accuracy of the note.    I, Dr. Rojas, personally performed the services described in this documentation. All medical record entries made by the scribe were at my direction and in my presence.  I have reviewed the chart and agree that the record reflects my personal performance and is accurate and complete.6:20 PM 06/07/2018            ED Course as of Jun 07 1819   Thu Jun 07, 2018   1429 BP: 136/62 [EF]   1429 Temp: 98.1 °F (36.7 °C) [EF]   1429 Temp src: Oral [EF]   1429 Pulse: 83 [EF]   1429 Resp: 20 [EF]   1429 SpO2: 95 % [EF]   1516 CT Head Without Contrast [EF]   1516 X-Ray Chest AP Portable [EF]   1626 Tele stroke consult obtained, doubt CVA.  Dr. Shea recommends an MRI if this is unremarkable the patient can be discharged.  [EF]   1732 MRI Brain Without Contrast [EF]   1737 MRI reveals no stroke, per conversation with Neurology earlier plan was to discharge home pending MRI and since this is normal she will be discharged with Neurology follow-up.  [EF]      ED Course User Index  [EF] Lucius Rojas MD     Clinical Impression:   The encounter diagnosis was Stroke.              68-year-old female with a history of nonepileptic seizures presents with an occipital headache and double vision also complaining of nausea and vomiting.  Symptoms concerning for cerebellar stroke.  She did have some right hand dysmetria.  Telemetry stroke consult was obtained, they felt that the likelihood for CVA was very low but did recommend an MRI if we wanted to pursue further workup.  This was done and did not reveal a CVA.  Patient can be discharged home.  Follow up as an outpatient with  Neurology.  No indication at this time of any life-threatening illness.               Lucius Rojas MD  06/07/18 [id number]

## 2018-06-07 NOTE — ED NOTES
Pt assisted to car per ED tech for discharge home with brother. Pt remains awake, alert and oriented. NAD noted.

## 2018-06-07 NOTE — TELEPHONE ENCOUNTER
Patient's mother called reporting that patient is having a severe headache and fuzzy vision. Patient's mother did not have any other information r/t patient's sx.  Spoke to patient. She reports that she has had a headache since Monday located in the back of her head. Today the headache has become severe and she is having double vision. Advised patient to go now to the   ER. Patient verbalized understanding. Has a family member with her presently who has agreed to drive her to the ER.

## 2018-06-07 NOTE — CONSULTS
[facility]  Vascular Neurology  Comprehensive Stroke Center  Tele-Consultation Note      Inpatient consult to Telemedicine-Stroke  Consult performed by: STACIE KENDALL  Consult ordered by: SAEID JAY          Consulting Provider: Spoke Physician:: SAEID JAY  Current Providers  No providers found    Patient Location: [facility]  Spoke hospital nurse at bedside with patient assisting consultant.     Patient information was obtained from patient.       Assessment/Plan:     STROKE DOCUMENTATION     Acute Stroke Times:   Acute Stroke Times   Last Known Normal Date: 06/04/18  Symptom Onset Date: 06/04/18  Stroke Team Called Date: 06/07/18  Stroke Team Called Time: 1510  Stroke Team Arrival Date: 06/07/18  Stroke Team Arrival Time: 1512  CT Interpretation Time: 1535  Decision to Treat Time for Alteplase: 1525    NIH Scale:  1a. Level Of Consciousness: 0-->Alert: keenly responsive  1b. LOC Questions: 0-->Answers both questions correctly  1c. LOC Commands: 0-->Performs both tasks correctly  2. Best Gaze: 0-->Normal  3. Visual: 0-->No visual loss  4. Facial Palsy: 0-->Normal symmetrical movements  5a. Motor Arm, Left: 0-->No drift: limb holds 90 (or 45) degrees for full 10 secs  5b. Motor Arm, Right: 1-->Drift: limb holds 90 (or 45) degrees, but drifts down before full 10 secs: does not hit bed or other support  6a. Motor Leg, Left: 0-->No drift: leg holds 30 degree position for full 5 secs  6b. Motor Leg, Right: 0-->No drift: leg holds 30 degree position for full 5 secs  7. Limb Ataxia: 0-->Absent  8. Sensory: 2-->Severe to total sensory loss: patient is not aware of being touched in the face, arm, and leg  9. Best Language: 0-->No aphasia: normal  10. Dysarthria: 0-->Normal  11. Extinction and Inattention (formerly Neglect): 0-->No abnormality  Total (NIH Stroke Scale): 3     Modified [place] Score: 1  Td Coma Scale:15   ABCD2 Score:   "  [id number] Score:   HAS -BLED Score:   ICH Score:   Hunt & Brown Classification:       Diagnoses:   Diplopia    Exam is pretty normal without dysconjugate gaze or CN palsy. No nystagmus, dysmetria or aataxia noted on exam, Patient had pseudoseizure during the exam, I believe this is functional.  MRI brain for further confirmation.            Blood pressure (!) 148/73, pulse 74, temperature 98.1 °F (36.7 °C), temperature source Oral, resp. rate 20, height 5' 1" (1.549 m), weight 75.3 kg (166 lb), SpO2 (!) 94 %.  Alteplase Eligible?: No  Alteplase Recommendation: Alteplase not recommended due to Outside of treatment window   Possible Interventional Revascularization Candidate? No; No large vessel occlusion    Disposition Recommendation: pending further studies  discharge from ED  do not transfer      Subjective:     History of Present Illness:  Onset of HA 6/4/2018. Today developed blurred vision with diplopia and nausea. C/o lightheadedness but denies vertigo or ataxia.      Woke up with symptoms?: no  Last known normal:    6/4/2018    Recent bleeding noted: no  Does the patient take any Blood Thinners? no  Medications: No relevant medications      Past Medical History: hypertension, hyperlipidemia and pseudoseizures, schizophrenia    Past Surgical History: no major surgeries within the last 2 weeks    Family History: no relevant history    Social History: no smoking, no drinking, no drugs    Allergies: Penicillins  Sulfa (Sulfonamide Antibiotics)     Review of Systems   Eyes: Positive for visual disturbance.   Neurological: Positive for light-headedness, numbness and headaches.   All other systems reviewed and are negative.    Objective:   Vitals: Blood pressure (!) 148/73, pulse 74, temperature 98.1 °F (36.7 °C), temperature source Oral, resp. rate 20, height 5' 1" (1.549 m), weight 75.3 kg (166 lb), SpO2 (!) 94 %.     CT READ: Yes  No hemmorhage. No mass effect. No early infarct signs.     Physical Exam "   Constitutional: She is oriented to person, place, and time. She appears well-developed and well-nourished.   HENT:   Head: Normocephalic and atraumatic.   Eyes: EOM are normal. Pupils are equal, round, and reactive to light.   Cardiovascular: Normal rate and regular rhythm.    Pulmonary/Chest: Effort normal.   Neurological: She is alert and oriented to person, place, and time. A sensory deficit is present.             Recommended the emergency room physician to have a brief discussion with the patient and/or family if available regarding the risks and benefits of treatment, and to briefly document the occurrence of that discussion in his clinical encounter note.     The attending portion of this evaluation, treatment, and documentation was performed per Lyndsey Shea MD via audiovisual.    Billing code:  (non-intervention mild to moderate stroke, TIA, some mimics)    · This patient has a critical neurological condition/illness, with some potential for high morbidity and mortality.  · There is a moderate probability for acute neurological change leading to clinical and possibly life-threatening deterioration requiring highest level of physician preparedness for urgent intervention.  · Care was coordinated with other physicians involved in the patient's care.  · Radiologic studies and laboratory data were reviewed and interpreted, and plan of care was re-assessed based on the results.  · Diagnosis, treatment options and prognosis may have been discussed with the patient and/or family members or caregiver.      Consult End Time: 3:48 PM     Lyndsey Shea MD  [facility] St[facility]  Vascular Neurology   [facility]

## 2018-06-08 ENCOUNTER — TELEPHONE (OUTPATIENT)
Dept: FAMILY MEDICINE | Facility: CLINIC | Age: 68
End: 2018-06-08

## 2018-06-08 DIAGNOSIS — R27.8 DYSMETRIA: ICD-10-CM

## 2018-06-08 DIAGNOSIS — I63.9 CEREBROVASCULAR ACCIDENT (CVA), UNSPECIFIED MECHANISM: ICD-10-CM

## 2018-06-08 DIAGNOSIS — H53.9 VISUAL DISTURBANCE: Primary | ICD-10-CM

## 2018-06-08 NOTE — TELEPHONE ENCOUNTER
Went to the ED yesterday with symptoms of CVA. Work up done at ED denies CVA. Patient needs a referral to Neurology for work up. Please advise.

## 2018-06-08 NOTE — TELEPHONE ENCOUNTER
----- Message from Cheli Coon sent at 6/8/2018 10:41 AM CDT -----  Contact: Mom,Maggy Winter want to know if you can put a referral in the computer for neurology, any questions please call back at [phone] (home)

## 2018-06-09 NOTE — TELEPHONE ENCOUNTER
Called pt regarding below message per Dr. Abbasi. Informed George pts brother that Dr. Abbasi has placed a referral to Dr. Roberts. George would like to see someone in [place] rather than travel to [place]. Discussed with Dr. Abbasi who recommendations Dr. Diego.  Informed George I have had difficulty trying to scheduled with Dr. Diego and advised I have sent a message to his staff in regards to calling George for an appt. George verbalized understanding with no further questions.

## 2018-06-11 NOTE — TELEPHONE ENCOUNTER
Called pt and spoke with her brother, Jan, and scheduled pt an appt here in [place] with Dr. Roberts since there is no CVA neuro doctor in [place].  Jan verbalizes understanding.

## 2018-06-18 ENCOUNTER — TELEPHONE (OUTPATIENT)
Dept: FAMILY MEDICINE | Facility: CLINIC | Age: 68
End: 2018-06-18

## 2018-06-18 NOTE — TELEPHONE ENCOUNTER
----- Message from Cheli Coon sent at 6/18/2018  9:01 AM CDT -----  Contact: self   Patient want to speak with a nurse regarding black bowel movement and not able to use restroom please call back at [phone] (home)

## 2018-06-26 DIAGNOSIS — E11.9 TYPE 2 DIABETES MELLITUS WITHOUT COMPLICATION, WITHOUT LONG-TERM CURRENT USE OF INSULIN: Primary | ICD-10-CM

## 2018-06-26 NOTE — TELEPHONE ENCOUNTER
Spoke to patient who states that she was not having black stools. Not having constipation. Will keep her appointment with Dr. Abbasi on 6-28-18.

## 2018-06-27 ENCOUNTER — OFFICE VISIT (OUTPATIENT)
Dept: NEUROLOGY | Facility: CLINIC | Age: 68
End: 2018-06-27
Payer: MEDICARE

## 2018-06-27 ENCOUNTER — DOCUMENTATION ONLY (OUTPATIENT)
Dept: FAMILY MEDICINE | Facility: CLINIC | Age: 68
End: 2018-06-27

## 2018-06-27 VITALS
DIASTOLIC BLOOD PRESSURE: 70 MMHG | HEART RATE: 70 BPM | WEIGHT: 195.5 LBS | HEIGHT: 61 IN | BODY MASS INDEX: 36.91 KG/M2 | RESPIRATION RATE: 18 BRPM | SYSTOLIC BLOOD PRESSURE: 166 MMHG

## 2018-06-27 DIAGNOSIS — H53.47 HEMIANOPSIA: ICD-10-CM

## 2018-06-27 DIAGNOSIS — R27.0 ATAXIA: ICD-10-CM

## 2018-06-27 DIAGNOSIS — I61.0 NONTRAUMATIC SUBCORTICAL HEMORRHAGE OF LEFT CEREBRAL HEMISPHERE: ICD-10-CM

## 2018-06-27 DIAGNOSIS — S16.1XXA STRAIN OF NECK MUSCLE, INITIAL ENCOUNTER: ICD-10-CM

## 2018-06-27 DIAGNOSIS — Z86.73 HISTORY OF ISCHEMIC LEFT MCA STROKE: Primary | ICD-10-CM

## 2018-06-27 PROCEDURE — 3078F DIAST BP <80 MM HG: CPT | Mod: CPTII,S$GLB,, | Performed by: PSYCHIATRY & NEUROLOGY

## 2018-06-27 PROCEDURE — 99214 OFFICE O/P EST MOD 30 MIN: CPT | Mod: S$GLB,,, | Performed by: PSYCHIATRY & NEUROLOGY

## 2018-06-27 PROCEDURE — 99499 UNLISTED E&M SERVICE: CPT | Mod: S$GLB,,, | Performed by: PSYCHIATRY & NEUROLOGY

## 2018-06-27 PROCEDURE — 3077F SYST BP >= 140 MM HG: CPT | Mod: CPTII,S$GLB,, | Performed by: PSYCHIATRY & NEUROLOGY

## 2018-06-27 PROCEDURE — 99999 PR PBB SHADOW E&M-EST. PATIENT-LVL IV: CPT | Mod: PBBFAC,,, | Performed by: PSYCHIATRY & NEUROLOGY

## 2018-06-27 NOTE — PROGRESS NOTES
Pre-Visit Chart Review  For Appointment Scheduled on 6-28-18    Health Maintenance Due   Topic Date Due    TETANUS VACCINE  01/01/1966    Colonoscopy  01/01/1998    Zoster Vaccine  01/01/2008    Pneumococcal (65+) (2 of 2 - PCV13) 11/28/2017    Foot Exam  12/28/2017    Hemoglobin A1c  06/07/2018

## 2018-06-27 NOTE — PATIENT INSTRUCTIONS
Try using aleve - one or two pills every 12 hours for the headache, and use either ice or a heating pad to help with the muscles in the back of the neck

## 2018-06-27 NOTE — PROGRESS NOTES
Subjective:          Patient ID: Maggy Tapia is a 68 y.o.  female who presents for follow up of subdural hematoma status post craniotomy, ataxia, pseudoseizures    Initial / Last History of Present Illness 8/16/17:    I last saw her at the end of July.  At that time, was concerned her Depakote could be contributing to generalized slowing and risk of falls.  She was brought to the hospital a couple weeks later, on August 4 after feeling generally weak and falling to the ground.  No loss of consciousness or seizure activity.  She had not been eating or drinking well and had had 1 episode of diarrhea.  To have urinary tract infection.  She felt significantly better the following day after being given Cipro IV.     Also found to have some acute kidney injury felt to be secondary to dehydration.     She was set up for an earlier appointment to follow-up with me; in July we were planning on a follow-up visit in 6 months.     On August 10, there is a note in her record by care management stating concern about the fact that Mrs. Tapia was staying at the hospital with family members who themselves were admitted following her discharge.  Concerns were raised about her living condition and inability to care for herself.     According to this note, [facility] home health had evaluated her and recommended permanent placement, deemed her home living environment deplorable and had called Adult Protective Services.  Please see the note by Brinda Nava on August 11, 2017 for details.     She is here with her brother.  They talked about the fact that due to medical conditions unable to clean the house, dogs have been soiling all over the house and no body to help clean up.  They do report having enough food in the house.      She is still taking VPA 1000 mg at bedtime, clonazepam 0.5 mg at bedtime.      She has not fallen again since hospital discharge.     Interval history 7/9/2018:    Here today with her brother. Since our  last visit she presented to [facility] on June 7, 2018 with 3 days of dizziness, headache and double vision. ER notes indicate when she was laying flat the room was spinning.  He appeared to be slurring her words.  Had some episodes of vomiting the day prior.  Stroke alert was activated and telemedicine consult was obtained however clinical picture did not seem to point to any kind of an acute vascular event.    She reports posterior headache, has been present for several weeks, maybe 6 weeks.  Has had more lethargy, can't awaken her. She does have increased light sensitivity, and has intermittent diplopia - horizontal. Increased nervousness and anxiety, calling for her dog who passed away 20 years ago.       Review of patient's allergies indicates:   Allergen Reactions    Penicillins Anaphylaxis    Sulfa (sulfonamide antibiotics) Anaphylaxis     Current Outpatient Prescriptions   Medication Sig Dispense Refill    CALCIUM CARBONATE/VITAMIN D3 (CALCIUM 500 + D ORAL) Take 1 tablet by mouth once daily.       clonazePAM (KLONOPIN) 0.5 MG tablet Take 0.5 mg by mouth nightly.  3    divalproex (DEPAKOTE) 500 MG Tb24 Take 500 mg by mouth every evening.  180 tablet 3    gemfibrozil (LOPID) 600 MG tablet TAKE 1 TABLET BY MOUTH 2 TIMES DAILY. 180 tablet 0    potassium chloride SA (K-DUR,KLOR-CON) 20 MEQ tablet Take 20 mEq by mouth once daily.      TRINTELLIX 20 mg Tab Take 20 mg by mouth once daily.       levothyroxine (SYNTHROID) 50 MCG tablet Take 1 tablet (50 mcg total) by mouth once daily. 90 tablet 3    losartan (COZAAR) 50 MG tablet Take 1 tablet (50 mg total) by mouth once daily. 90 tablet 3    simvastatin (ZOCOR) 40 MG tablet Take 1 tablet (40 mg total) by mouth once daily. 90 tablet 3     No current facility-administered medications for this visit.          Review of Systems  Review of Systems    Objective:        Vitals:    06/27/18 0954   BP: (!) 166/70   Pulse: 70   Resp: 18     Body mass index is  36.94 kg/m².  Neurologic Exam     Mental Status   Significant psychomotor slowing, delays     Cranial Nerves     CN III, IV, VI   Nystagmus: none     Gait, Coordination, and Reflexes     Gait  Gait: shuffling (slow, unsteady gait)    Coordination   Romberg: positive  Finger-nose-finger test: very slow, but no ataxia.    Tremor   Resting tremor: absentNo significant tremor       Physical Exam   Neurological: She has an abnormal Romberg Test. Finger-nose-finger test: very slow, but no ataxia.         Data Review:    Assessment:        1. History of ischemic left MCA stroke    2. Nontraumatic subcortical hemorrhage of left cerebral hemisphere    3. Strain of neck muscle, initial encounter    4. Hemianopsia    5. Ataxia           Plan:         Problem List Items Addressed This Visit        Neuro    History of ischemic left MCA stroke - Primary    Relevant Orders    MRI Brain Without Contrast    VALPROIC ACID    Nontraumatic subcortical hemorrhage of left cerebral hemisphere       Orthopedic    Cervical strain      Other Visit Diagnoses     Hemianopsia        Relevant Orders    MRI Brain Without Contrast    Ataxia            Try using aleve - one or two pills every 12 hours for the headache, and use either ice or a heating pad to help with the muscles in the back of the neck    Follow-up in about 6 months (around 12/27/2018).       Thank you very much for the opportunity to assist in this patient's care.  If you have any questions or concerns, please do not hesitate to contact me at any time.     Sincerely,  Harley Roberts, DO

## 2018-06-28 ENCOUNTER — OFFICE VISIT (OUTPATIENT)
Dept: FAMILY MEDICINE | Facility: CLINIC | Age: 68
End: 2018-06-28
Payer: MEDICARE

## 2018-06-28 ENCOUNTER — DOCUMENTATION ONLY (OUTPATIENT)
Dept: FAMILY MEDICINE | Facility: CLINIC | Age: 68
End: 2018-06-28

## 2018-06-28 ENCOUNTER — LAB VISIT (OUTPATIENT)
Dept: LAB | Facility: HOSPITAL | Age: 68
End: 2018-06-28
Attending: FAMILY MEDICINE
Payer: MEDICARE

## 2018-06-28 VITALS
WEIGHT: 195.75 LBS | TEMPERATURE: 98 F | BODY MASS INDEX: 36.96 KG/M2 | HEIGHT: 61 IN | DIASTOLIC BLOOD PRESSURE: 70 MMHG | SYSTOLIC BLOOD PRESSURE: 148 MMHG | HEART RATE: 75 BPM

## 2018-06-28 DIAGNOSIS — Z79.4 TYPE 2 DIABETES MELLITUS WITHOUT COMPLICATION, WITH LONG-TERM CURRENT USE OF INSULIN: ICD-10-CM

## 2018-06-28 DIAGNOSIS — E78.5 HYPERLIPIDEMIA, UNSPECIFIED HYPERLIPIDEMIA TYPE: ICD-10-CM

## 2018-06-28 DIAGNOSIS — D69.6 THROMBOCYTOPENIA: ICD-10-CM

## 2018-06-28 DIAGNOSIS — E03.9 ACQUIRED HYPOTHYROIDISM: ICD-10-CM

## 2018-06-28 DIAGNOSIS — E11.9 TYPE 2 DIABETES MELLITUS WITHOUT COMPLICATION, WITH LONG-TERM CURRENT USE OF INSULIN: ICD-10-CM

## 2018-06-28 DIAGNOSIS — Z86.73 HISTORY OF ISCHEMIC LEFT MCA STROKE: ICD-10-CM

## 2018-06-28 DIAGNOSIS — I10 ESSENTIAL HYPERTENSION: ICD-10-CM

## 2018-06-28 DIAGNOSIS — E87.6 HYPOKALEMIA: ICD-10-CM

## 2018-06-28 DIAGNOSIS — E11.69 HYPERLIPIDEMIA ASSOCIATED WITH TYPE 2 DIABETES MELLITUS: ICD-10-CM

## 2018-06-28 DIAGNOSIS — E78.5 HYPERLIPIDEMIA ASSOCIATED WITH TYPE 2 DIABETES MELLITUS: ICD-10-CM

## 2018-06-28 DIAGNOSIS — H53.2 DIPLOPIA: ICD-10-CM

## 2018-06-28 DIAGNOSIS — E11.9 ENCOUNTER FOR DIABETIC FOOT EXAM: ICD-10-CM

## 2018-06-28 DIAGNOSIS — E66.9 OBESITY (BMI 30-39.9): ICD-10-CM

## 2018-06-28 DIAGNOSIS — G40.909 SEIZURE DISORDER: ICD-10-CM

## 2018-06-28 DIAGNOSIS — L60.2 OVERGROWN TOENAILS: ICD-10-CM

## 2018-06-28 DIAGNOSIS — G40.909 SEIZURE DISORDER: Primary | ICD-10-CM

## 2018-06-28 LAB
ALBUMIN SERPL BCP-MCNC: 3.3 G/DL
ALP SERPL-CCNC: 83 U/L
ALT SERPL W/O P-5'-P-CCNC: 11 U/L
ANION GAP SERPL CALC-SCNC: 9 MMOL/L
AST SERPL-CCNC: 14 U/L
BASOPHILS # BLD AUTO: 0.05 K/UL
BASOPHILS NFR BLD: 0.5 %
BILIRUB SERPL-MCNC: 0.3 MG/DL
BUN SERPL-MCNC: 18 MG/DL
CALCIUM SERPL-MCNC: 9.7 MG/DL
CHLORIDE SERPL-SCNC: 104 MMOL/L
CHOLEST SERPL-MCNC: 178 MG/DL
CHOLEST/HDLC SERPL: 4.1 {RATIO}
CO2 SERPL-SCNC: 27 MMOL/L
CREAT SERPL-MCNC: 1.1 MG/DL
DIFFERENTIAL METHOD: NORMAL
EOSINOPHIL # BLD AUTO: 0.3 K/UL
EOSINOPHIL NFR BLD: 2.9 %
ERYTHROCYTE [DISTWIDTH] IN BLOOD BY AUTOMATED COUNT: 13.3 %
EST. GFR  (AFRICAN AMERICAN): 58.8 ML/MIN/1.73 M^2
EST. GFR  (NON AFRICAN AMERICAN): 51 ML/MIN/1.73 M^2
ESTIMATED AVG GLUCOSE: 137 MG/DL
GLUCOSE SERPL-MCNC: 163 MG/DL
HBA1C MFR BLD HPLC: 6.4 %
HCT VFR BLD AUTO: 44.8 %
HDLC SERPL-MCNC: 43 MG/DL
HDLC SERPL: 24.2 %
HGB BLD-MCNC: 14.5 G/DL
IMM GRANULOCYTES # BLD AUTO: 0.04 K/UL
IMM GRANULOCYTES NFR BLD AUTO: 0.4 %
LDLC SERPL CALC-MCNC: 104.2 MG/DL
LYMPHOCYTES # BLD AUTO: 2.8 K/UL
LYMPHOCYTES NFR BLD: 26.1 %
MCH RBC QN AUTO: 30.7 PG
MCHC RBC AUTO-ENTMCNC: 32.4 G/DL
MCV RBC AUTO: 95 FL
MONOCYTES # BLD AUTO: 0.7 K/UL
MONOCYTES NFR BLD: 6.1 %
NEUTROPHILS # BLD AUTO: 6.9 K/UL
NEUTROPHILS NFR BLD: 64 %
NONHDLC SERPL-MCNC: 135 MG/DL
NRBC BLD-RTO: 0 /100 WBC
PLATELET # BLD AUTO: 183 K/UL
PMV BLD AUTO: 11.2 FL
POTASSIUM SERPL-SCNC: 4.5 MMOL/L
PROT SERPL-MCNC: 7.2 G/DL
RBC # BLD AUTO: 4.73 M/UL
SODIUM SERPL-SCNC: 140 MMOL/L
T4 FREE SERPL-MCNC: 0.99 NG/DL
TRIGL SERPL-MCNC: 154 MG/DL
TSH SERPL DL<=0.005 MIU/L-ACNC: 4.15 UIU/ML
VALPROATE SERPL-MCNC: <12.5 UG/ML
WBC # BLD AUTO: 10.7 K/UL

## 2018-06-28 PROCEDURE — 99999 PR PBB SHADOW E&M-EST. PATIENT-LVL III: CPT | Mod: PBBFAC,,, | Performed by: FAMILY MEDICINE

## 2018-06-28 PROCEDURE — 84439 ASSAY OF FREE THYROXINE: CPT

## 2018-06-28 PROCEDURE — 83036 HEMOGLOBIN GLYCOSYLATED A1C: CPT

## 2018-06-28 PROCEDURE — 36415 COLL VENOUS BLD VENIPUNCTURE: CPT | Mod: PO

## 2018-06-28 PROCEDURE — 84443 ASSAY THYROID STIM HORMONE: CPT

## 2018-06-28 PROCEDURE — 99499 UNLISTED E&M SERVICE: CPT | Mod: S$GLB,,, | Performed by: FAMILY MEDICINE

## 2018-06-28 PROCEDURE — 80053 COMPREHEN METABOLIC PANEL: CPT

## 2018-06-28 PROCEDURE — 3077F SYST BP >= 140 MM HG: CPT | Mod: CPTII,S$GLB,, | Performed by: FAMILY MEDICINE

## 2018-06-28 PROCEDURE — 80164 ASSAY DIPROPYLACETIC ACD TOT: CPT

## 2018-06-28 PROCEDURE — 3078F DIAST BP <80 MM HG: CPT | Mod: CPTII,S$GLB,, | Performed by: FAMILY MEDICINE

## 2018-06-28 PROCEDURE — 99214 OFFICE O/P EST MOD 30 MIN: CPT | Mod: S$GLB,,, | Performed by: FAMILY MEDICINE

## 2018-06-28 PROCEDURE — 85025 COMPLETE CBC W/AUTO DIFF WBC: CPT

## 2018-06-28 PROCEDURE — 3044F HG A1C LEVEL LT 7.0%: CPT | Mod: CPTII,S$GLB,, | Performed by: FAMILY MEDICINE

## 2018-06-28 PROCEDURE — 80061 LIPID PANEL: CPT

## 2018-06-28 RX ORDER — LOSARTAN POTASSIUM 50 MG/1
50 TABLET ORAL DAILY
Qty: 90 TABLET | Refills: 3 | Status: SHIPPED | OUTPATIENT
Start: 2018-06-28 | End: 2019-06-22 | Stop reason: SDUPTHER

## 2018-06-28 RX ORDER — LEVOTHYROXINE SODIUM 50 UG/1
50 TABLET ORAL DAILY
Qty: 90 TABLET | Refills: 3 | Status: SHIPPED | OUTPATIENT
Start: 2018-06-28 | End: 2018-09-27

## 2018-06-28 NOTE — PROGRESS NOTES
Subjective:       Patient ID: Maggy Tapia is a 68 y.o. female.    Chief Complaint: Annual Exam    HPI  Review of Systems   Neurological: Positive for headaches. Negative for facial asymmetry.       Patient Active Problem List   Diagnosis    Syncope and collapse    Frequent falls (possibly related to polypharmacy)    Type 2 diabetes mellitus    History of left breast cancer    History of ischemic left MCA stroke    Seizure disorder    Essential hypertension    Hyperlipidemia    Thrombocytopenia    Depression    Hypothyroidism    Subdural hematoma, chronic, small, bilateral    SDH (subdural hematoma)    Valproic acid toxicity    Pseudoseizures    Obesity (BMI 30-39.9)    Osteopenia    Nontraumatic subcortical hemorrhage of left cerebral hemisphere    JUAN (obstructive sleep apnea)    Urinary tract infection without hematuria    Near syncope    Hypokalemia    Diplopia    Cervical strain     Patient is here for a chronic conditions follow up.    Admission on 06/07/2018, Discharged on 06/07/2018   Component Date Value Ref Range Status    WBC 06/07/2018 7.90  3.90 - 12.70 K/uL Final    RBC 06/07/2018 4.68  4.00 - 5.40 M/uL Final    Hemoglobin 06/07/2018 14.3  12.0 - 16.0 g/dL Final    Hematocrit 06/07/2018 42.4  37.0 - 48.5 % Final    MCV 06/07/2018 91  82 - 98 fL Final    MCH 06/07/2018 30.5  27.0 - 31.0 pg Final    MCHC 06/07/2018 33.7  32.0 - 36.0 g/dL Final    RDW 06/07/2018 13.5  11.5 - 14.5 % Final    Platelets 06/07/2018 152  150 - 350 K/uL Final    MPV 06/07/2018 9.2  9.2 - 12.9 fL Final    Gran # (ANC) 06/07/2018 5.1  1.8 - 7.7 K/uL Final    Lymph # 06/07/2018 2.1  1.0 - 4.8 K/uL Final    Mono # 06/07/2018 0.3  0.3 - 1.0 K/uL Final    Eos # 06/07/2018 0.4  0.0 - 0.5 K/uL Final    Baso # 06/07/2018 0.00  0.00 - 0.20 K/uL Final    Gran% 06/07/2018 64.2  38.0 - 73.0 % Final    Lymph% 06/07/2018 26.0  18.0 - 48.0 % Final    Mono% 06/07/2018 4.2  4.0 - 15.0 % Final     Eosinophil% 06/07/2018 5.1  0.0 - 8.0 % Final    Basophil% 06/07/2018 0.5  0.0 - 1.9 % Final    Differential Method 06/07/2018 Automated   Final    Sodium 06/07/2018 141  136 - 145 mmol/L Final    Potassium 06/07/2018 4.0  3.5 - 5.1 mmol/L Final    Chloride 06/07/2018 109  95 - 110 mmol/L Final    CO2 06/07/2018 23  23 - 29 mmol/L Final    Glucose 06/07/2018 99  70 - 110 mg/dL Final    BUN, Bld 06/07/2018 30* 8 - 23 mg/dL Final    Creatinine 06/07/2018 0.8  0.5 - 1.4 mg/dL Final    Calcium 06/07/2018 9.5  8.7 - 10.5 mg/dL Final    Total Protein 06/07/2018 7.0  6.0 - 8.4 g/dL Final    Albumin 06/07/2018 3.3* 3.5 - 5.2 g/dL Final    Total Bilirubin 06/07/2018 0.4  0.1 - 1.0 mg/dL Final    Alkaline Phosphatase 06/07/2018 70  55 - 135 U/L Final    AST 06/07/2018 11  10 - 40 U/L Final    ALT 06/07/2018 10  10 - 44 U/L Final    Anion Gap 06/07/2018 9  8 - 16 mmol/L Final    eGFR if African American 06/07/2018 >60  >60 mL/min/1.73 m^2 Final    eGFR if non African American 06/07/2018 >60  >60 mL/min/1.73 m^2 Final    Prothrombin Time 06/07/2018 10.0  9.0 - 12.5 sec Final    INR 06/07/2018 1.0  0.8 - 1.2 Final    TSH 06/07/2018 4.517* 0.400 - 4.000 uIU/mL Final    Cholesterol 06/07/2018 174  120 - 199 mg/dL Final    Triglycerides 06/07/2018 149  30 - 150 mg/dL Final    HDL 06/07/2018 40  40 - 75 mg/dL Final    LDL Cholesterol 06/07/2018 104.2  63.0 - 159.0 mg/dL Final    HDL/Chol Ratio 06/07/2018 23.0  20.0 - 50.0 % Final    Total Cholesterol/HDL Ratio 06/07/2018 4.4  2.0 - 5.0 Final    Non-HDL Cholesterol 06/07/2018 134  mg/dL Final    POCT Glucose 06/07/2018 89  70 - 110 mg/dL Final    Specimen UA 06/07/2018 Urine, Catheterized   Final    Color, UA 06/07/2018 Yellow  Yellow, Straw, Ilana Final    Appearance, UA 06/07/2018 Clear  Clear Final    pH, UA 06/07/2018 5.0  5.0 - 8.0 Final    Specific Gravity, UA 06/07/2018 1.020  1.005 - 1.030 Final    Protein, UA 06/07/2018 Negative  Negative  Final    Glucose, UA 06/07/2018 Negative  Negative Final    Ketones, UA 06/07/2018 Negative  Negative Final    Bilirubin (UA) 06/07/2018 Negative  Negative Final    Occult Blood UA 06/07/2018 Trace* Negative Final    Nitrite, UA 06/07/2018 Negative  Negative Final    Urobilinogen, UA 06/07/2018 Negative  <2.0 EU/dL Final    Leukocytes, UA 06/07/2018 Negative  Negative Final    Free T4 06/07/2018 0.90  0.71 - 1.51 ng/dL Final       Had ED visit for double vision and HA posterior 6/7/18 possible stroke. Also had associated with room spinning, slurred speech.  CT brain showed old changes from previous CVA, trauma and 6 mm stable calcified meningioma temporal bone. Mri brain donw 6/7/18- no acute findings. CVA likelihood low. Neuro f/u Dr. Roberts. Sx come and go still but improved.      Brother here today who is her primary caregiver and in charge of her meds.  Ran out of K and losartan 2 days ago.  coultn't find bottle of depakote 2 days ago but think she resumed it yesterday.      Patient reports she thought she had a black stool but was just dark brown  Objective:      Physical Exam   Constitutional: She is oriented to person, place, and time. She appears well-developed and well-nourished.   Cardiovascular: Normal rate, regular rhythm and normal heart sounds.    Pulmonary/Chest: Effort normal and breath sounds normal.   Musculoskeletal: She exhibits no edema.   Neurological: She is alert and oriented to person, place, and time.   Delayed speech but clear and alert today.  No abnormal head or eye movements.  Walks with wide based gait but no assistance and with some speed   Skin: Skin is warm and dry.   Feet- khloe long curled toenails, dirty feet but no evidence of skin breakdown.     Psychiatric: She has a normal mood and affect.   Nursing note and vitals reviewed.      Assessment:       1. Seizure disorder    2. Acquired hypothyroidism    3. History of ischemic left MCA stroke    4. Diplopia    5. Essential  hypertension    6. Hyperlipidemia, unspecified hyperlipidemia type    7. Hypokalemia    8. Thrombocytopenia    9. Type 2 diabetes mellitus without complication, with long-term current use of insulin    10. Obesity (BMI 30-39.9)    11. Encounter for diabetic foot exam    12. Overgrown toenails        Plan:       1. Acquired hypothyroidism  Stable condition.  Continue current medications.  Will adjust based on lab findings or if condition changes.    - levothyroxine (SYNTHROID) 50 MCG tablet; Take 1 tablet (50 mcg total) by mouth once daily.  Dispense: 90 tablet; Refill: 3  - TSH; Future  - T4, free; Future    2. Seizure disorder  Cont neuro monitoring and current meds consistently. Screen and treat as indicated:    - VALPROIC ACID; Future    3. History of ischemic left MCA stroke  Chronic residual deficits    4. Diplopia  Intermittent , non CVA related. Possible due to inner ear disorder    5. Essential hypertension  Out of meds so uncontrolled.  continue  - losartan (COZAAR) 50 MG tablet; Take 1 tablet (50 mg total) by mouth once daily.  Dispense: 90 tablet; Refill: 3    6. Hyperlipidemia, unspecified hyperlipidemia type  Stable condition.  Continue current medications.  Will adjust based on lab findings or if condition changes.    - Comprehensive metabolic panel; Future  - Lipid panel; Future    7. Hypokalemia  Screen and treat as indicated:      8. Thrombocytopenia  Screen and treat as indicated:    - CBC auto differential; Future    9. Type 2 diabetes mellitus without complication, with long-term current use of insulin  Stable condition.  Continue current medications.  Will adjust based on lab findings or if condition changes.    - Hemoglobin A1c; Future    10. Obesity (BMI 30-39.9)  Counseled patient on his ideal body weight, health consequences of being obese and current recommendations including weekly exercise and a heart healthy diet.  Current BMI is:Estimated body mass index is 36.99 kg/m² as calculated  "from the following:    Height as of this encounter: 5' 1" (1.549 m).    Weight as of this encounter: 88.8 kg (195 lb 12.3 oz)..  Patient is aware that ideal BMI < 25 or Weight in (lb) to have BMI = 25: 132.        11. Encounter for diabetic foot exam  Refer for toenail care and exam  - Ambulatory referral to Podiatry    12. Overgrown toenails  Refer  - Ambulatory referral to Podiatry    FIT test today       [facility] goal documentation:  Patient readiness: acceptance and barriers:readiness  During the course of the visit the patient was educated and counseled about the following: Diabetes:  Discussed general issues about diabetes pathophysiology and management.  Hypertension:   Dietary sodium restriction.  Regular aerobic exercise.  Obesity:   General weight loss/lifestyle modification strategies discussed (elicit support from others; identify saboteurs; non-food rewards, etc).  Goals: Diabetes: Maintain Hemoglobin A1C below 7, Hypertension: Reduce Blood Pressure and Obesity: Reduce calorie intake and BMI  Goal/Outcomes met:type 2 DM  The following self management tools provided:none  Patient Instructions (the written plan) was given to the patient/family: Yes  Time spent with patient: 20 minutes    Patient with be reevaluated in 3 months with LEEANN nair and  6 months or sooner prn    Greater than 50% of this visit was spent counseling as described in above documentation:Yes  "

## 2018-06-29 ENCOUNTER — OFFICE VISIT (OUTPATIENT)
Dept: PODIATRY | Facility: CLINIC | Age: 68
End: 2018-06-29
Payer: MEDICARE

## 2018-06-29 VITALS
HEART RATE: 70 BPM | BODY MASS INDEX: 37.04 KG/M2 | WEIGHT: 196.19 LBS | SYSTOLIC BLOOD PRESSURE: 140 MMHG | DIASTOLIC BLOOD PRESSURE: 70 MMHG | HEIGHT: 61 IN

## 2018-06-29 DIAGNOSIS — L60.2 OVERGROWN TOENAILS: ICD-10-CM

## 2018-06-29 DIAGNOSIS — E11.42 DIABETIC POLYNEUROPATHY ASSOCIATED WITH TYPE 2 DIABETES MELLITUS: Primary | ICD-10-CM

## 2018-06-29 PROCEDURE — 99499 UNLISTED E&M SERVICE: CPT | Mod: S$GLB,,, | Performed by: PODIATRIST

## 2018-06-29 PROCEDURE — 99999 PR PBB SHADOW E&M-EST. PATIENT-LVL III: CPT | Mod: PBBFAC,,, | Performed by: PODIATRIST

## 2018-06-29 PROCEDURE — 17999 UNLISTD PX SKN MUC MEMB SUBQ: CPT | Mod: CSM,,, | Performed by: PODIATRIST

## 2018-06-29 PROCEDURE — 99499 UNLISTED E&M SERVICE: CPT | Mod: CSM,,, | Performed by: PODIATRIST

## 2018-06-29 RX ORDER — SIMVASTATIN 40 MG/1
40 TABLET, FILM COATED ORAL DAILY
Qty: 90 TABLET | Refills: 3 | Status: SHIPPED | OUTPATIENT
Start: 2018-06-29 | End: 2019-06-22 | Stop reason: SDUPTHER

## 2018-06-29 NOTE — PROGRESS NOTES
Subjective:      Patient ID: Mgagy Tapia is a 68 y.o. female.    Chief Complaint: No chief complaint on file.    Maggy is a 68 y.o. female who presents to the clinic for evaluation and treatment of high risk feet. Maggy has a past medical history of Anxiety; Arthritis; Asthma; Cancer (2000); Cataract; Depression; Diabetes mellitus, type 2; GERD (gastroesophageal reflux disease); Hyperlipidemia; Hypertension; Osteoporosis; Seizures; Stroke; and Thyroid disease. The patient's chief complaint is long, thick toenails.  Gradual onset, worsening over past several weeks, aggravated by increased weight bearing, shoe gear, pressure.  Periodic debridement helps symptoms.  This patient has documented high risk feet requiring routine maintenance secondary to diabetes mellitis and those secondary complications of diabetes, as mentioned..    PCP: Yanna Abbasi MD    Date Last Seen by PCP:   No chief complaint on file.        Current shoe gear:  Affected Foot: Casual shoes     Unaffected Foot: Casual shoes    Hemoglobin A1C   Date Value Ref Range Status   06/28/2018 6.4 (H) 4.0 - 5.6 % Final     Comment:     ADA Screening Guidelines:  5.7-6.4%  Consistent with prediabetes  >or=6.5%  Consistent with diabetes  High levels of fetal hemoglobin interfere with the HbA1C  assay. Heterozygous hemoglobin variants (HbS, HgC, etc)do  not significantly interfere with this assay.   However, presence of multiple variants may affect accuracy.     12/07/2017 5.3 4.0 - 5.6 % Final     Comment:     According to ADA guidelines, hemoglobin A1c <7.0% represents  optimal control in non-pregnant diabetic patients. Different  metrics may apply to specific patient populations.   Standards of Medical Care in Diabetes-2016.  For the purpose of screening for the presence of diabetes:  <5.7%     Consistent with the absence of diabetes  5.7-6.4%  Consistent with increasing risk for diabetes   (prediabetes)  >or=6.5%  Consistent with diabetes  Currently,  no consensus exists for use of hemoglobin A1c  for diagnosis of diabetes for children.  This Hemoglobin A1c assay has significant interference with fetal   hemoglobin   (HbF). The results are invalid for patients with abnormal amounts of   HbF,   including those with known Hereditary Persistence   of Fetal Hemoglobin. Heterozygous hemoglobin variants (HbAS, HbAC,   HbAD, HbAE, HbA2) do not significantly interfere with this assay;   however, presence of multiple variants in a sample may impact the %   interference.     03/28/2017 5.7 4.5 - 6.2 % Final     Comment:     According to ADA guidelines, hemoglobin A1C <7.0% represents  optimal control in non-pregnant diabetic patients.  Different  metrics may apply to specific populations.   Standards of Medical Care in Diabetes - 2016.  For the purpose of screening for the presence of diabetes:  <5.7%     Consistent with the absence of diabetes  5.7-6.4%  Consistent with increasing risk for diabetes   (prediabetes)  >or=6.5%  Consistent with diabetes  Currently no consensus exists for use of hemoglobin A1C  for diagnosis of diabetes for children.         Review of Systems   Constitution: Negative for chills, diaphoresis, fever, malaise/fatigue and night sweats.   Cardiovascular: Negative for claudication, cyanosis, leg swelling and syncope.   Skin: Positive for nail changes. Negative for color change, dry skin, rash, suspicious lesions and unusual hair distribution.   Musculoskeletal: Negative for falls, joint pain, joint swelling, muscle cramps, muscle weakness and stiffness.   Gastrointestinal: Negative for constipation, diarrhea, nausea and vomiting.   Neurological: Positive for sensory change. Negative for brief paralysis, disturbances in coordination, focal weakness, numbness, paresthesias and tremors.           Objective:      Physical Exam   Constitutional: She is oriented to person, place, and time. She appears well-developed and well-nourished. She is cooperative.    Oriented to time, place, and person.   Cardiovascular:   Pulses:       Dorsalis pedis pulses are 1+ on the right side, and 1+ on the left side.        Posterior tibial pulses are 1+ on the right side, and 1+ on the left side.   Capillary fill time 3-5 seconds.  All toes warm to touch.      Negative lower extremity edema bilateral.    Negative elevational pallor and dependent rubor bilateral.     Musculoskeletal:   Normal angle, base, station of gait. Decreased stride length, early heel off, moderately propulsive toe off bilateral.    All ten toes without clubbing, cyanosis, or signs of ischemia.      Patient has hammertoes of digits   3,4,5 bilateral                partially reducible without symptom today.      No pain to palpation bilateral lower extremities.      Range of motion, stability, muscle strength, and muscle tone are age and health appropriate normal bilateral feet and legs.       Lymphadenopathy:   Negative lymphadenopathy bilateral popliteal fossa and tarsal tunnel.     Neurological: She is alert and oriented to person, place, and time. She has normal strength. She is not disoriented. She displays no atrophy and no tremor. A sensory deficit is present. She exhibits normal muscle tone.   Reflex Scores:       Patellar reflexes are 2+ on the right side and 2+ on the left side.       Achilles reflexes are 2+ on the right side and 2+ on the left side.  Decreased/absent vibratory sensation bilateral feet to 128Hz tuning fork.  Diminished/loss of protective sensation all toes bilateral to 10 gram monofilament.     Skin: Skin is warm, dry and intact. No abrasion, no bruising, no burn, no ecchymosis, no laceration, no lesion, no petechiae and no rash noted. She is not diaphoretic. No cyanosis or erythema. No pallor. Nails show no clubbing.   Skin thin, atrophic, with decreased density and distribution of pedal hair bilateral, but without hyperpigmentation, perla discoloration,  ulcers, masses, nodules or  cords palpated bilateral feet and legs.      Toenails 1st, 2nd, 3rd, 4th, 5th  bilateral are  dystrophic, discolored tanish brown with tan, gray crumbly subungual debris.  Tender to distal nail plate pressure, without periungual skin abnormality of each.               Assessment:       Encounter Diagnoses   Name Primary?    Diabetic polyneuropathy associated with type 2 diabetes mellitus Yes    Overgrown toenails          Plan:       Diagnoses and all orders for this visit:    Diabetic polyneuropathy associated with type 2 diabetes mellitus    Overgrown toenails      I counseled the patient on her conditions, their implications and medical management.        - Shoe inspection. Diabetic Foot Education. Patient reminded of the importance of good nutrition and blood sugar control to help prevent podiatric complications of diabetes. Patient instructed on proper foot hygeine. We discussed wearing proper shoe gear, daily foot inspections, never walking without protective shoe gear, never putting sharp instruments to feet, routine podiatric visits at least annually.      Non covered foot care:    - With patient's permission, nails were aggressively reduced and debrided x 10 to their soft tissue attachment mechanically, removing all offending nail and debris. Patient relates relief following the procedure. She will continue to monitor the areas daily, inspect her feet, wear protective shoe gear when ambulatory, moisturizer to maintain skin integrity and follow in this office  p.r.n.    Continue Rx DM shoes, heat molded inserts, penlac.              Follow-up if symptoms worsen or fail to improve.

## 2018-06-29 NOTE — LETTER
June 29, 2018      Yanna Abbasi MD  [address] [place] [address]           [facility]  [address]  [place] [address]  Phone: [phone]          Patient: Maggy Tapia   MR Number: [id number]   YOB: 1950   Date of Visit: 6/29/2018       Dear Dr. Yanna Abbasi:    Thank you for referring Maggy Tapia to me for evaluation. Attached you will find relevant portions of my assessment and plan of care.    If you have questions, please do not hesitate to call me. I look forward to following Maggy Tapia along with you.    Sincerely,    Aguila Ferguson, DPKELIN    Enclosure  CC:  No Recipients    If you would like to receive this communication electronically, please contact [email or web address] or [phone] to request more information on [employer] access.    For providers and/or their staff who would like to refer a patient to [facility], please contact us through our one-stop-shop provider referral line, [facility] , at [phone].    If you feel you have received this communication in error or would no longer like to receive these types of communications, please e-mail [email or web address]

## 2018-06-29 NOTE — PROGRESS NOTES
Patient, Maggy Tapia (MRN #[id number]), presented with a recorded BMI of 36.99 kg/m^2 and a documented comorbidity(s):  - Diabetes Mellitus Type 2  - Hypertension  - Hyperlipidemia  to which the severe obesity is a contributing factor. This is consistent with the definition of severe obesity (BMI 35.0-35.9) with comorbidity (ICD-10 E66.01, Z68.35). The patient's severe obesity was monitored, evaluated, addressed and/or treated. This addendum to the medical record is made on 06/29/2018.

## 2018-07-02 ENCOUNTER — LAB VISIT (OUTPATIENT)
Dept: LAB | Facility: HOSPITAL | Age: 68
End: 2018-07-02
Attending: FAMILY MEDICINE
Payer: MEDICARE

## 2018-07-02 DIAGNOSIS — Z12.11 COLON CANCER SCREENING: ICD-10-CM

## 2018-07-02 PROCEDURE — 82274 ASSAY TEST FOR BLOOD FECAL: CPT

## 2018-07-03 LAB — HEMOCCULT STL QL IA: NEGATIVE

## 2018-07-11 ENCOUNTER — HOSPITAL ENCOUNTER (OUTPATIENT)
Dept: RADIOLOGY | Facility: HOSPITAL | Age: 68
Discharge: HOME | End: 2018-07-11
Attending: PSYCHIATRY & NEUROLOGY
Payer: MEDICARE

## 2018-07-11 DIAGNOSIS — Z86.73 HISTORY OF ISCHEMIC LEFT MCA STROKE: ICD-10-CM

## 2018-07-11 DIAGNOSIS — H53.47 HEMIANOPSIA: ICD-10-CM

## 2018-07-11 PROCEDURE — 70551 MRI BRAIN STEM W/O DYE: CPT | Mod: 26,,, | Performed by: RADIOLOGY

## 2018-07-11 PROCEDURE — 70551 MRI BRAIN STEM W/O DYE: CPT | Mod: TC

## 2018-09-10 ENCOUNTER — OFFICE VISIT (OUTPATIENT)
Dept: OPHTHALMOLOGY | Facility: CLINIC | Age: 68
End: 2018-09-10
Payer: MEDICARE

## 2018-09-10 DIAGNOSIS — E11.9 DIABETES MELLITUS TYPE 2 WITHOUT RETINOPATHY: ICD-10-CM

## 2018-09-10 DIAGNOSIS — Z96.1 PSEUDOPHAKIA, BOTH EYES: ICD-10-CM

## 2018-09-10 DIAGNOSIS — H53.2 DIPLOPIA: ICD-10-CM

## 2018-09-10 DIAGNOSIS — H26.492 POSTERIOR CAPSULAR OPACIFICATION, LEFT: ICD-10-CM

## 2018-09-10 DIAGNOSIS — H53.8 BLURRED VISION: Primary | ICD-10-CM

## 2018-09-10 DIAGNOSIS — H52.7 REFRACTIVE ERROR: ICD-10-CM

## 2018-09-10 PROCEDURE — 92014 COMPRE OPH EXAM EST PT 1/>: CPT | Mod: S$PBB,,, | Performed by: OPHTHALMOLOGY

## 2018-09-10 PROCEDURE — 99213 OFFICE O/P EST LOW 20 MIN: CPT | Mod: PBBFAC,PO | Performed by: OPHTHALMOLOGY

## 2018-09-10 PROCEDURE — 92015 DETERMINE REFRACTIVE STATE: CPT | Mod: ,,, | Performed by: OPHTHALMOLOGY

## 2018-09-10 PROCEDURE — 99999 PR PBB SHADOW E&M-EST. PATIENT-LVL III: CPT | Mod: PBBFAC,,, | Performed by: OPHTHALMOLOGY

## 2018-09-10 NOTE — PROGRESS NOTES
HPI     Presenting Complaint: Pt here today because she has been seeing solid   black sports in both eyes. Pt states spots are always in line of vision   over the last 3 months. Pt states vision is sometimes blurry near and far   both yes and gets double vision in both eyes. Pt states blurry vision and   double vision has been getting worse over the last month.   Diplopia questioner:  How long have you had the double vision? 3 months  Is it unilateral or bilateral? Unilateral, pt stets when she closed eyes   double vision goes away when she closes both eyes for a few minutes and   then opens them  Is the double vision intermittent or continuous? intermittent  Are the two images side by side or one above the other? Side by side    Pt states blood sugar levels have been controled.   Lab Results       Component                Value               Date                       HGBA1C                   6.4 (H)             06/28/2018                 HGBA1C                   5.3                 12/07/2017                 HGBA1C                   5.7                 03/28/2017                Ophthalmic medication / drops: None    (-) headaches  (-) diplopia   (-) flashes / (-) floaters      Last edited by Charles Granda on 9/10/2018  1:53 PM. (History)        ROS     Positive for: Neurological (neuropathy; seizures), Endocrine (DM   diagnosed around 2000; hypothyroid), Cardiovascular (HTN - conntrolled   with meds per pt), Eyes (pseudophakia OU - glasses with no bifocal OS but   no MFIOL noted?)    Negative for: Genitourinary (denies nephropathy)    Last edited by Arabella Galindo MD on 9/10/2018  2:46 PM.   (History)        Assessment /Plan     For exam results, see Encounter Report.    Blurred vision    Diplopia    Diabetes mellitus type 2 without retinopathy    Pseudophakia, both eyes    Posterior capsular opacification, left    Refractive error            Glasses are old, with coating coming off, no bifocal in OS  - states she was told she did not need? I recommend updating spectacles. If symptoms persist, schedule YAG capsulotomy OS.     Maintain glucose control    MRx given

## 2018-09-24 ENCOUNTER — DOCUMENTATION ONLY (OUTPATIENT)
Dept: FAMILY MEDICINE | Facility: CLINIC | Age: 68
End: 2018-09-24

## 2018-09-24 NOTE — PROGRESS NOTES
Pre-Visit Chart Review  For Appointment Scheduled on 9/27/18    Health Maintenance Due   Topic Date Due    TETANUS VACCINE  01/01/1966    Zoster Vaccine  01/01/2008    Pneumococcal (65+) (2 of 2 - PCV13) 11/28/2017    Foot Exam  12/28/2017    Influenza Vaccine  08/01/2018

## 2018-09-27 ENCOUNTER — TELEPHONE (OUTPATIENT)
Dept: NEUROLOGY | Facility: CLINIC | Age: 68
End: 2018-09-27

## 2018-09-27 ENCOUNTER — OFFICE VISIT (OUTPATIENT)
Dept: FAMILY MEDICINE | Facility: CLINIC | Age: 68
End: 2018-09-27
Payer: MEDICARE

## 2018-09-27 VITALS
HEART RATE: 74 BPM | SYSTOLIC BLOOD PRESSURE: 131 MMHG | WEIGHT: 201.5 LBS | BODY MASS INDEX: 38.04 KG/M2 | DIASTOLIC BLOOD PRESSURE: 76 MMHG | TEMPERATURE: 98 F | HEIGHT: 61 IN

## 2018-09-27 DIAGNOSIS — Z79.4 TYPE 2 DIABETES MELLITUS WITHOUT COMPLICATION, WITH LONG-TERM CURRENT USE OF INSULIN: ICD-10-CM

## 2018-09-27 DIAGNOSIS — E11.9 TYPE 2 DIABETES MELLITUS WITHOUT COMPLICATION, WITH LONG-TERM CURRENT USE OF INSULIN: ICD-10-CM

## 2018-09-27 DIAGNOSIS — Z86.73 HISTORY OF ISCHEMIC LEFT MCA STROKE: ICD-10-CM

## 2018-09-27 DIAGNOSIS — E03.9 HYPOTHYROIDISM, UNSPECIFIED TYPE: Primary | ICD-10-CM

## 2018-09-27 DIAGNOSIS — Z23 FLU VACCINE NEED: ICD-10-CM

## 2018-09-27 DIAGNOSIS — Z23 NEED FOR VACCINATION FOR STREP PNEUMONIAE: ICD-10-CM

## 2018-09-27 DIAGNOSIS — G40.909 SEIZURE DISORDER: ICD-10-CM

## 2018-09-27 DIAGNOSIS — I10 ESSENTIAL HYPERTENSION: ICD-10-CM

## 2018-09-27 DIAGNOSIS — E66.01 SEVERE OBESITY (BMI 35.0-35.9 WITH COMORBIDITY): ICD-10-CM

## 2018-09-27 PROBLEM — N39.0 URINARY TRACT INFECTION WITHOUT HEMATURIA: Chronic | Status: RESOLVED | Noted: 2017-08-03 | Resolved: 2018-09-27

## 2018-09-27 PROBLEM — E87.6 HYPOKALEMIA: Status: RESOLVED | Noted: 2017-08-04 | Resolved: 2018-09-27

## 2018-09-27 PROBLEM — N39.0 URINARY TRACT INFECTION WITHOUT HEMATURIA: Chronic | Status: ACTIVE | Noted: 2017-08-03

## 2018-09-27 PROCEDURE — 99999 PR PBB SHADOW E&M-EST. PATIENT-LVL IV: CPT | Mod: PBBFAC,,, | Performed by: PHYSICIAN ASSISTANT

## 2018-09-27 PROCEDURE — 3078F DIAST BP <80 MM HG: CPT | Mod: CPTII,,, | Performed by: PHYSICIAN ASSISTANT

## 2018-09-27 PROCEDURE — 99214 OFFICE O/P EST MOD 30 MIN: CPT | Mod: S$PBB,,, | Performed by: PHYSICIAN ASSISTANT

## 2018-09-27 PROCEDURE — 3044F HG A1C LEVEL LT 7.0%: CPT | Mod: CPTII,,, | Performed by: PHYSICIAN ASSISTANT

## 2018-09-27 PROCEDURE — G0009 ADMIN PNEUMOCOCCAL VACCINE: HCPCS | Mod: PBBFAC,PO

## 2018-09-27 PROCEDURE — 3075F SYST BP GE 130 - 139MM HG: CPT | Mod: CPTII,,, | Performed by: PHYSICIAN ASSISTANT

## 2018-09-27 PROCEDURE — 90662 IIV NO PRSV INCREASED AG IM: CPT | Mod: PBBFAC,PO

## 2018-09-27 PROCEDURE — 99214 OFFICE O/P EST MOD 30 MIN: CPT | Mod: PBBFAC,PO,25 | Performed by: PHYSICIAN ASSISTANT

## 2018-09-27 PROCEDURE — 1101F PT FALLS ASSESS-DOCD LE1/YR: CPT | Mod: CPTII,,, | Performed by: PHYSICIAN ASSISTANT

## 2018-09-27 PROCEDURE — 99499 UNLISTED E&M SERVICE: CPT | Mod: S$GLB,,, | Performed by: PHYSICIAN ASSISTANT

## 2018-09-27 RX ORDER — LEVOTHYROXINE SODIUM 75 UG/1
75 TABLET ORAL DAILY
Qty: 30 TABLET | Refills: 3 | Status: SHIPPED | OUTPATIENT
Start: 2018-09-27 | End: 2019-10-02

## 2018-09-27 NOTE — PATIENT INSTRUCTIONS
Diabetes (General Information)  Diabetes is a long-term health problem. It means your body does not make enough insulin. Or it may mean that your body cannot use the insulin it makes. Insulin is a hormone in your body. It lets blood sugar (glucose) reach the cells in your body. All of your cells need glucose for fuel.  When you have diabetes, the glucose in your blood builds up because it cannot get into the cells. This buildup is called high blood sugar (hyperglycemia).  Your blood sugar level depends on several things. It depends on what kind of food you eat and how much of it you eat. It also depends on how much exercise you get, and how much insulin you have in your body. Eating too much of the wrong kinds of food or not taking diabetes medicine on time can cause high blood sugar. Infections can cause high blood sugar even if you are taking medicines correctly.  These things can also cause low blood sugar:  · Missing meals  · Not eating enough food  · Taking too much diabetes medicine  Diabetes can cause serious problems over time if you do not get treated. These problems include heart disease, stroke, kidney failure, and blindness. They also include nerve pain or loss of feeling in your legs and feet, and gangrene of the feet. By keeping your blood sugar under control you can prevent or delay these problems.  Normal blood sugar levels are 80 to 100 before a meal and less than 180 in the 1 to 2 hours after a meal.  Home care  Follow these guidelines when caring for yourself at home:  · Follow the diet your healthcare provider gives you. Take insulin or other diabetes medicine exactly as told to.  · Watch your blood sugar as you are told to. Keep a log of your results. This will help your provider change your medicines to keep your blood sugar under control.  · Try to reach your ideal weight. You may be able to cut back on or not have to take diabetes medicine if you eat the right foods and get exercise.  · Do  not smoke. Smoking worsens the effects of diabetes on your circulation. You are much more likely to have a heart attack if you have diabetes and you smoke.  · Take good care of your feet. If you have lost feeling in your feet, you may not see an injury or infection. Check your feet and between your toes at least once a week.  · Wear a medical alert bracelet or necklace, or carry a card in your wallet that says you have diabetes. This will help healthcare providers give you the right care if you get very ill and cannot tell them that you have diabetes.  Sick day plan  If you get a cold, the flu, or a bacterial or viral infection, take these steps:  · Look at your diabetes sick plan and call your healthcare provider as you were told to. You may need to call your provider right away if:  ¨ Your blood sugar is above 240 while taking your diabetes medicine  ¨ Your urine ketone levels are above normal or high  ¨ You have been vomiting more than 6 hours  ¨ You have trouble breathing or your breath ha s a fruity smell  ¨ You have a high fever  ¨ You have a fever for several days and you are not getting better  ¨ You get light-headed and are sleepier than usual  · Keep taking your diabetes pills (oral medicine) even if you have been vomiting and are feeling sick. Call your provider right away because you may need insulin to lower your blood sugar until you recover from your illness.  · Keep taking your insulin even if you have been vomiting and are feeling sick. Call your provider right away to ask if you need to change your insulin dose. This will depend on your blood sugar results.  · Check your blood sugar every 2 to 4 hours, or at least 4 times a day.  · Check your ketones often. If you are vomiting and having diarrhea, watch them more often.  · Do not skip meals. Try to eat small meals on a regular schedule. Do this even if you do not feel like eating.  · Drink water or other liquids that do not have caffeine or  calories. This will keep you from getting dehydrated. If you are nauseated or vomiting, takes small sips every 5 minutes. To prevent dehydration try to drink a cup (8 ounces) of fluids every hour while you are awake.  General care  Always bring a source of fast-acting sugar with you in case you have symptoms of low blood sugar (below 70). At the first sign of low blood sugar, eat or drink 15 to 20 grams of fast-acting sugar to raise your blood sugar. Examples are:  · 3 to 4 glucose tablets. You can buy these at most [employer].  · 4 ounces (1/2 cup) of regular (not diet) soft drinks  · 4 ounces (1/2 cup) of any fruit juice  · 8 ounces (1 cup) of milk  · 5 to 6 pieces of hard candy  · 1 tablespoon of honey  Check your blood sugar 15 minutes after treating yourself. If it is still below 70, take 15 to 20 more grams of fast-acting sugar. Test again in 15 minutes. If it returns to normal (70 or above), eat a snack or meal to keep your blood sugar in a safe range. If it stays low, call your doctor or go to an emergency room.  Follow-up care  Follow-up with your healthcare provider, or as advised. For more information about diabetes, visit the American Diabetes Association website at www.diabetes.org or call 601-545-5333.  When to seek medical advice  Call your healthcare provider right away if you have any of these symptoms of high blood sugar:  · Frequent urination  · Dizziness  · Drowsiness  · Thirst  · Headache  · Nausea or vomiting  · Abdominal pain  · Eyesight changes  · Fast breathing  · Confusion or loss of consciousness  Also call your provider right away if you have any of these signs of low blood sugar:  · Fatigue  · Headache  · Shakes  · Excess sweating  · Hunger  · Feeling anxious or restless  · Eyesight changes  · Drowsiness  · Weakness  · Confusion or loss of consciousness  Call 911  Call for emergency help right away if any of these occur:  · Chest pain or shortness of breath  · Dizziness or  fainting  · Weakness of an arm or leg or one side of the face  · Trouble speaking or seeing   Date Last Reviewed: 6/1/2016 © 2000-2017 [employer]. [address] [place] [address], [place] [address]. All rights reserved. This information is not intended as a substitute for professional medical care. Always follow your healthcare professional's instructions.        Established High Blood Pressure    High blood pressure (hypertension) is a chronic disease. Often, healthcare providers dont know what causes it. But it can be caused by certain health conditions and medicines.  If you have high blood pressure, you may not have any symptoms. If you do have symptoms, they may include headache, dizziness, changes in your vision, chest pain, and shortness of breath. But even without symptoms, high blood pressure thats not treated raises your risk for heart attack and stroke. High blood pressure is a serious health risk and shouldnt be ignored.  A blood pressure reading is made up of two numbers: a higher number over a lower number. The top number is the systolic pressure. The bottom number is the diastolic pressure. A normal blood pressure is a systolic pressure of  less than 120 over a diastolic pressure of less than 80. You will see your blood pressure readings written together. For example, a person with a systolic pressure of 188 and a diastolic pressure of 78 will have 118/78 written in the medical record.  High blood pressure is when either the top number is 140 or higher, or the bottom number is 90 or higher. This must be the result when taking your blood pressure a number of times. The blood pressures between normal and high are called prehypertension.  Home care  If you have high blood pressure, you should do what is listed below to lower your blood pressure. If you are taking medicines for high blood pressure, these methods may reduce or end your need for medicines in the future.  · Begin a weight-loss  program if you are overweight.  · Cut back on how much salt you get in your diet. Heres how to do this:  ¨ Dont eat foods that have a lot of salt. These include olives, pickles, smoked meats, and salted potato chips.  ¨ Dont add salt to your food at the table.  ¨ Use only small amounts of salt when cooking.  · Start an exercise program. Talk with your healthcare provider about the type of exercise program that would be best for you. It doesn't have to be hard. Even brisk walking for 20 minutes 3 times a week is a good form of exercise.  · Dont take medicines that stimulate the heart. This includes many over-the-counter cold and sinus decongestant pills and sprays, as well as diet pills. Check the warnings about hypertension on the label. Before buying any over-the-counter medicines or supplements, always ask the pharmacist about the product's potential interaction with your high blood pressure and your high blood pressure medicines.  · Stimulants such as amphetamine or cocaine could be deadly for someone with high blood pressure. Never take these.  · Limit how much caffeine you get in your diet. Switch to caffeine-free products.  · Stop smoking. If you are a long-time smoker, this can be hard. Talk to your healthcare provider about medicines and nicotine replacement options to help you. Also, enroll in a stop-smoking program to make it more likely that you will quit for good.  · Learn how to handle stress. This is an important part of any program to lower blood pressure. Learn about relaxation methods like meditation, yoga, or biofeedback.  · If your provider prescribed medicines, take them exactly as directed. Missing doses may cause your blood pressure get out of control.  · If you miss a dose or doses, check with your healthcare provider or pharmacist about what to do.  · Consider buying an automatic blood pressure machine. Ask your provider for a recommendation. You can get one of these at most  pharmacies.     The American Heart Association recommends the following guidelines for home blood pressure monitoring:  · Don't smoke or drink coffee for 30 minutes before taking your blood pressure.  · Go to the bathroom before the test.  · Relax for 5 minutes before taking the measurement.  · Sit with your back supported (don't sit on a couch or soft chair); keep your feet on the floor uncrossed. Place your arm on a solid flat surface (like a table) with the upper part of the arm at heart level. Place the middle of the cuff directly above the eye of the elbow. Check the monitor's instruction manual for an illustration.  · Take multiple readings. When you measure, take 2 to 3 readings one minute apart and record all of the results.  · Take your blood pressure at the same time every day, or as your healthcare provider recommends.  · Record the date, time, and blood pressure reading.  · Take the record with you to your next medical appointment. If your blood pressure monitor has a built-in memory, simply take the monitor with you to your next appointment.  · Call your provider if you have several high readings. Don't be frightened by a single high blood pressure reading, but if you get several high readings, check in with your healthcare provider.  · Note: When blood pressure reaches a systolic (top number) of 180 or higher OR diastolic (bottom number) of 110 or higher, seek emergency medical treatment.  Follow-up care  You will need to see your healthcare provider regularly. This is to check your blood pressure and to make changes to your medicines. Make a follow-up appointment as directed. Bring the record of your home blood pressure readings to the appointment.  When to seek medical advice  Call your healthcare provider right away if any of these occur:  · Blood pressure reaches a systolic (upper number) of 180 or higher OR a diastolic (bottom number) of 110 or higher  · Chest pain or shortness of breath  · Severe  headache  · Throbbing or rushing sound in the ears  · Nosebleed  · Sudden severe pain in your belly (abdomen)  · Extreme drowsiness, confusion, or fainting  · Dizziness or spinning sensation (vertigo)  · Weakness of an arm or leg or one side of the face  · You have problems speaking or seeing   Date Last Reviewed: 12/1/2016  © 2215-3312 [employer]. [address] [place] [address], [place] [address]. All rights reserved. This information is not intended as a substitute for professional medical care. Always follow your healthcare professional's instructions.      y

## 2018-09-27 NOTE — PROGRESS NOTES
Subjective:       Patient ID: Maggy Tapia is a 68 y.o. female.    Chief Complaint: Follow-up (3 months)    Mrs. Tapia is a 68 year old female who presents to clinic for 3 month f/u on chronic conditions. She is a new patient to me. She is not accompanied by anyone at today's visit. She reports increasing anxiety with her mother being on hospice. We reviewed her last labs from June with Dr. Abbasi and recent labs from Dr. Whyte that showed TSH was persistently elevated 6.0. She has been compliant with synthroid. Blood pressure is well controlled. She is scheduled for Psychiatry f/u on Monday for her Bipolar Disorder. She is due for flu and Prevnar vaccines. She has no other concerns today.      Review of Systems   Constitutional: Negative for activity change, appetite change, chills, fatigue and fever.   Eyes: Negative for visual disturbance.   Respiratory: Negative for cough and shortness of breath.    Cardiovascular: Negative for chest pain, palpitations and leg swelling.   Gastrointestinal: Negative for abdominal pain, constipation, diarrhea, nausea and vomiting.   Musculoskeletal: Negative for arthralgias.   Neurological: Negative for dizziness, weakness, light-headedness and headaches.   Psychiatric/Behavioral: Negative for dysphoric mood. The patient is nervous/anxious.        Objective:      Vitals:    09/27/18 0855   BP: 131/76   Pulse: 74   Temp: 97.9 °F (36.6 °C)     Physical Exam   Constitutional: She is oriented to person, place, and time. She appears well-developed.   Obese body habitus.    HENT:   Head: Normocephalic and atraumatic.   Eyes: Conjunctivae and EOM are normal. Pupils are equal, round, and reactive to light.   Cardiovascular: Normal rate, regular rhythm, normal heart sounds and intact distal pulses.   Pulmonary/Chest: Effort normal and breath sounds normal.   Neurological: She is alert and oriented to person, place, and time.   Skin: Skin is warm and dry.   Psychiatric: She has a  normal mood and affect.       Assessment:       1. Hypothyroidism, unspecified type    2. Essential hypertension    3. Type 2 diabetes mellitus without complication, with long-term current use of insulin    4. Seizure disorder    5. History of ischemic left MCA stroke    6. Severe obesity (BMI 35.0-35.9 with comorbidity)    7. Flu vaccine need    8. Need for vaccination for Strep pneumoniae        Plan:       Hypothyroidism, unspecified type  -     Increase levothyroxine (SYNTHROID) 75 MCG tablet; Take 1 tablet (75 mcg total) by mouth once daily.  Dispense: 30 tablet; Refill: 3  Repeat labs in 12 weeks  -     TSH; Future; Expected date: 09/27/2018  -     T4, free; Future; Expected date: 09/27/2018    Essential hypertension         - Controlled on current medications    Type 2 diabetes mellitus without complication, with long-term current use of insulin  -     Hemoglobin A1c; Future; Expected date: 09/27/2018, last Hgba1c 6.4%, continue to monitor  - Foot exam 6/29/18 with Podiatry            Seizure disorder  -    Continue to follow with Neurology. Ambulatory referral to Neurology    History of ischemic left MCA stroke  -     Ambulatory referral to Neurology    Severe obesity (BMI 35.0-35.9 with comorbidity)          - See below    Flu vaccine need  -     Influenza - High Dose (65+) (PF) (IM)    Need for vaccination for Strep pneumoniae  -     Pneumococcal Conjugate Vaccine (13 Valent) (IM)    Patient readiness: acceptance and barriers:none    During the course of the visit the patient was educated and counseled about the following:     Diabetes:  Labs: hemoglobin A1C.  Hypertension:   Medication: no change.  Obesity:   Informal exercise measures discussed, e.g. taking stairs instead of elevator.    Goals: Diabetes: Maintain Hemoglobin A1C below 7, Hypertension: Reduce Blood Pressure and Obesity: Reduce calorie intake and BMI    Did patient meet goals/outcomes: No    The following self management tools provided:  declined    Patient Instructions (the written plan) was given to the patient/family.     Time spent with patient: 15 minutes    Follow up in 3-4 months with Dr. Abbasi

## 2018-09-27 NOTE — TELEPHONE ENCOUNTER
----- Message from Jennie Munoz sent at 9/27/2018  9:29 AM CDT -----  Pt is due for an appt in December for Seizure disorder  History of ischemic left MCA stroke  Please call her @ [phone]  Thanks

## 2018-12-24 ENCOUNTER — HOSPITAL ENCOUNTER (EMERGENCY)
Facility: HOSPITAL | Age: 68
Discharge: HOME | End: 2018-12-24
Attending: EMERGENCY MEDICINE
Payer: MEDICARE

## 2018-12-24 VITALS
TEMPERATURE: 98 F | HEART RATE: 65 BPM | OXYGEN SATURATION: 97 % | RESPIRATION RATE: 20 BRPM | WEIGHT: 225 LBS | BODY MASS INDEX: 42.51 KG/M2 | SYSTOLIC BLOOD PRESSURE: 162 MMHG | DIASTOLIC BLOOD PRESSURE: 73 MMHG

## 2018-12-24 DIAGNOSIS — B37.9 YEAST INFECTION: Primary | ICD-10-CM

## 2018-12-24 DIAGNOSIS — H81.10 BENIGN PAROXYSMAL POSITIONAL VERTIGO, UNSPECIFIED LATERALITY: ICD-10-CM

## 2018-12-24 DIAGNOSIS — R42 DIZZINESS: ICD-10-CM

## 2018-12-24 LAB
ALBUMIN SERPL BCP-MCNC: 3.2 G/DL
ALP SERPL-CCNC: 88 U/L
ALT SERPL W/O P-5'-P-CCNC: 10 U/L
ANION GAP SERPL CALC-SCNC: 9 MMOL/L
AST SERPL-CCNC: 9 U/L
BACTERIA #/AREA URNS HPF: ABNORMAL /HPF
BASOPHILS # BLD AUTO: 0.1 K/UL
BASOPHILS NFR BLD: 1.3 %
BILIRUB SERPL-MCNC: 0.4 MG/DL
BILIRUB UR QL STRIP: NEGATIVE
BUN SERPL-MCNC: 22 MG/DL
CALCIUM SERPL-MCNC: 9.7 MG/DL
CHLORIDE SERPL-SCNC: 103 MMOL/L
CLARITY UR: CLEAR
CO2 SERPL-SCNC: 30 MMOL/L
COLOR UR: YELLOW
CREAT SERPL-MCNC: 1.1 MG/DL
DIFFERENTIAL METHOD: ABNORMAL
EOSINOPHIL # BLD AUTO: 0.2 K/UL
EOSINOPHIL NFR BLD: 2.1 %
ERYTHROCYTE [DISTWIDTH] IN BLOOD BY AUTOMATED COUNT: 14.1 %
EST. GFR  (AFRICAN AMERICAN): 59 ML/MIN/1.73 M^2
EST. GFR  (NON AFRICAN AMERICAN): 51 ML/MIN/1.73 M^2
GLUCOSE SERPL-MCNC: 240 MG/DL
GLUCOSE UR QL STRIP: ABNORMAL
HCT VFR BLD AUTO: 43.8 %
HGB BLD-MCNC: 14.4 G/DL
HGB UR QL STRIP: NEGATIVE
KETONES UR QL STRIP: NEGATIVE
LEUKOCYTE ESTERASE UR QL STRIP: ABNORMAL
LYMPHOCYTES # BLD AUTO: 2 K/UL
LYMPHOCYTES NFR BLD: 20.2 %
MCH RBC QN AUTO: 30.2 PG
MCHC RBC AUTO-ENTMCNC: 32.9 G/DL
MCV RBC AUTO: 92 FL
MICROSCOPIC COMMENT: ABNORMAL
MONOCYTES # BLD AUTO: 0.3 K/UL
MONOCYTES NFR BLD: 3.2 %
NEUTROPHILS # BLD AUTO: 7.2 K/UL
NEUTROPHILS NFR BLD: 73.2 %
NITRITE UR QL STRIP: NEGATIVE
PH UR STRIP: 6 [PH] (ref 5–8)
PLATELET # BLD AUTO: 172 K/UL
PMV BLD AUTO: 8.8 FL
POTASSIUM SERPL-SCNC: 4.5 MMOL/L
PROT SERPL-MCNC: 7.2 G/DL
PROT UR QL STRIP: NEGATIVE
RBC # BLD AUTO: 4.77 M/UL
RBC #/AREA URNS HPF: 3 /HPF (ref 0–4)
SODIUM SERPL-SCNC: 142 MMOL/L
SP GR UR STRIP: 1.02 (ref 1–1.03)
SQUAMOUS #/AREA URNS HPF: 3 /HPF
URN SPEC COLLECT METH UR: ABNORMAL
UROBILINOGEN UR STRIP-ACNC: NEGATIVE EU/DL
WBC # BLD AUTO: 9.8 K/UL
WBC #/AREA URNS HPF: 6 /HPF (ref 0–5)
YEAST URNS QL MICRO: ABNORMAL

## 2018-12-24 PROCEDURE — 87086 URINE CULTURE/COLONY COUNT: CPT

## 2018-12-24 PROCEDURE — 96360 HYDRATION IV INFUSION INIT: CPT

## 2018-12-24 PROCEDURE — 36415 COLL VENOUS BLD VENIPUNCTURE: CPT

## 2018-12-24 PROCEDURE — 25000003 PHARM REV CODE 250: Performed by: NURSE PRACTITIONER

## 2018-12-24 PROCEDURE — 81000 URINALYSIS NONAUTO W/SCOPE: CPT

## 2018-12-24 PROCEDURE — 99284 EMERGENCY DEPT VISIT MOD MDM: CPT | Mod: 25

## 2018-12-24 PROCEDURE — 25000003 PHARM REV CODE 250: Performed by: EMERGENCY MEDICINE

## 2018-12-24 PROCEDURE — 85025 COMPLETE CBC W/AUTO DIFF WBC: CPT

## 2018-12-24 PROCEDURE — 80053 COMPREHEN METABOLIC PANEL: CPT

## 2018-12-24 RX ORDER — SODIUM CHLORIDE 9 MG/ML
1000 INJECTION, SOLUTION INTRAVENOUS
Status: COMPLETED | OUTPATIENT
Start: 2018-12-24 | End: 2018-12-24

## 2018-12-24 RX ORDER — FLUCONAZOLE 200 MG/1
200 TABLET ORAL DAILY
Qty: 7 TABLET | Refills: 0 | Status: SHIPPED | OUTPATIENT
Start: 2018-12-24 | End: 2018-12-31

## 2018-12-24 RX ADMIN — SODIUM CHLORIDE 500 ML: 0.9 INJECTION, SOLUTION INTRAVENOUS at 12:12

## 2018-12-24 RX ADMIN — SODIUM CHLORIDE 1000 ML: 0.9 INJECTION, SOLUTION INTRAVENOUS at 11:12

## 2018-12-24 NOTE — ED PROVIDER NOTES
Encounter Date: 2018    SCRIBE #1 NOTE: I, Lui Christiano, am scribing for, and in the presence of, Grace Mulligan NP.       History     Chief Complaint   Patient presents with    Fall     fell landing on knees PTA, when assisted up co dizziness and fatigue, denies head trauma or LOC, co knee pain     2018 10:43 AM    The pt is a 68 y.o. female arriving via EMS with a past medical history of cancer, DM, HTN, HLD, seizures, CVA, UTI and thyroid disease presenting to the ED with a sudden onset of an acute fall occurring 1 hr ago. The pt stated when she was attempting to answer the phone she rolled out of bed and fell on her knee. Associated symptoms of dizziness, bilateral knee pain, and fatigue. The pt denied head injury, syncope, fever, and visual disturbances. She noted current dizziness is similar to previous episodes of UTI. The pt described the dizziness as a feeling of the room spinning. She has no history of cigarette smoking. The pt has a past surgical history of breast surgery and eye surgery.       The history is provided by the patient.     Review of patient's allergies indicates:   Allergen Reactions    Penicillins Anaphylaxis    Sulfa (sulfonamide antibiotics) Anaphylaxis     Past Medical History:   Diagnosis Date    Anxiety     Arthritis     Asthma     Cancer 2000    Left Breast    Cataract     Depression     Diabetes mellitus, type 2     GERD (gastroesophageal reflux disease)     Hyperlipidemia     Hypertension     Osteoporosis     Seizures     Pseudo-seizures    Stroke     Thyroid disease     Urinary tract infection without hematuria 8/3/2017     Past Surgical History:   Procedure Laterality Date    APPENDECTOMY      BREAST SURGERY       SECTION      CHOLECYSTECTOMY      CRANIOTOMY Left 2016    [facility]    DILATION AND CURETTAGE OF UTERUS      [id number]-ZCSO HOLES Left 8/3/2016    [facility]ing [facility] OR    [id number]-YHWQAILG Left 8/8/2016    [facility] OR    EYE SURGERY       Family History   Problem Relation Age of Onset    Diabetes Mother     Hypertension Mother     Glaucoma Neg Hx      Social History     Tobacco Use    Smoking status: Never Smoker    Smokeless tobacco: Never Used   Substance Use Topics    Alcohol use: Yes     Comment: socially    Drug use: No     Review of Systems   Constitutional: Negative for chills, fatigue and fever.        + Fall   HENT: Negative for congestion.    Eyes: Positive for visual disturbance (unchanged chronic double vision).   Respiratory: Negative for cough and shortness of breath.    Cardiovascular: Negative for chest pain and palpitations.   Gastrointestinal: Negative for abdominal pain, diarrhea and nausea.   Genitourinary: Negative for dysuria, flank pain, frequency, hematuria and urgency.   Musculoskeletal: Negative for gait problem, myalgias and neck pain.        + bilateral knee pain    Skin: Negative for rash and wound.   Neurological: Positive for dizziness. Negative for light-headedness and headaches.       Physical Exam     Initial Vitals   BP Pulse Resp Temp SpO2   12/24/18 1022 12/24/18 1022 12/24/18 1022 12/24/18 1021 12/24/18 1022   (!) 172/80 70 20 98.3 °F (36.8 °C) 96 %      MAP       --                Physical Exam    Nursing note and vitals reviewed.  Constitutional: Vital signs are normal. She appears well-developed and well-nourished.   HENT:   Head: Normocephalic and atraumatic.   Eyes: Pupils are equal, round, and reactive to light.   Neck: Neck supple.   Cardiovascular: Normal rate, regular rhythm, normal heart sounds and intact distal pulses. Exam reveals no gallop and no friction rub.    No murmur heard.  Pulmonary/Chest: Breath sounds normal. She has no wheezes. She has no rhonchi. She has no rales.   Abdominal: Soft. Normal appearance and bowel sounds are normal. There is no tenderness.    Musculoskeletal:        Right knee: She exhibits normal range of motion and no swelling. No tenderness found.        Left knee: She exhibits normal range of motion and no swelling. No tenderness found.        Right ankle: Normal.        Left ankle: Normal.   Neurological: She is alert and oriented to person, place, and time. She has normal strength. No cranial nerve deficit or sensory deficit. Coordination and gait normal. GCS score is 15. GCS eye subscore is 4. GCS verbal subscore is 5. GCS motor subscore is 6.   5/5 strength and sensation bilaterally in upper and lower extremities; normal GABRIELE; no nystagmus.    Skin: Skin is warm, dry and intact.   Psychiatric: She has a normal mood and affect. Her speech is normal and behavior is normal.         ED Course   Procedures  Labs Reviewed   URINALYSIS, REFLEX TO URINE CULTURE - Abnormal; Notable for the following components:       Result Value    Glucose, UA 4+ (*)     Leukocytes, UA Trace (*)     All other components within normal limits    Narrative:     Preferred Collection Type->Urine, Catheterized   CBC W/ AUTO DIFFERENTIAL - Abnormal; Notable for the following components:    MPV 8.8 (*)     Gran% 73.2 (*)     Mono% 3.2 (*)     All other components within normal limits   COMPREHENSIVE METABOLIC PANEL - Abnormal; Notable for the following components:    CO2 30 (*)     Glucose 240 (*)     Albumin 3.2 (*)     AST 9 (*)     eGFR if  59 (*)     eGFR if non  51 (*)     All other components within normal limits   URINALYSIS MICROSCOPIC - Abnormal; Notable for the following components:    WBC, UA 6 (*)     Bacteria, UA Few (*)     All other components within normal limits    Narrative:     Preferred Collection Type->Urine, Catheterized   CULTURE, URINE          Imaging Results    None          Medical Decision Making:   History:   Old Medical Records: I decided to obtain old medical records.  Differential Diagnosis:    BPPV  CVA  ICH    Clinical Tests:   Lab Tests: Ordered and Reviewed  Radiological Study: Reviewed and Ordered  Medical Tests: Ordered and Reviewed       APC / Resident Notes:   Patient is a 68 y.o. female who presents to the ED 12/24/2018 who underwent emergent evaluation for fall that occurred today.  Patient states when she would stand up from the fall she had some dizziness which she still has.  Patient states she has had similar symptoms in the past that have been associated with urinary tract infection.  Patient denies hitting her head or loss of consciousness.  She denies any neck pain. Her head is atraumatic and normocephalic.  She has no focal neurological deficits on exam.  Her neck is supple with no midline C,T, or L-spine tenderness. Patient has 5/5 strength and normal sensation bi upper and lower extremities; normal GABRIELE. PERRLA with normal EOM; No nystagmus. Patient's vertigo appears to be positional.  Patient also examined by Dr Ortiz. I do not think acute intracranial process such as CVA. Dr. Ortiz at bedside agrees; likely benign and positional; U/A not consistent with UTI; Will send for culture; pt not having dysuria but she does report some itching and discomfort; she has a history of candidal infection; will treat with antifungals.  Based on my clinical evaluation, I do not appreciate any immediate, emergent, or life threatening condition or etiology that warrants additional workup today and feel that the patient can be discharged with close follow up care. Case discussed with Dr. Ortiz who is agreeable to plan of care. Follow up and return precautions discussed; patient verbalized understanding and is agreeable to plan of care. Patient discharged home in stable condition.               Scribe Attestation:   Scribe #1: I performed the above scribed service and the documentation accurately describes the services I performed. I attest to the accuracy of the note.    Attending Attestation:            Physician Attestation for Scribe:  Physician Attestation Statement for Scribe #1: I, Grace Mulligan, reviewed documentation, as scribed by in my presence, and it is both accurate and complete.     Comments: I, JANET Reina, personally performed the services described in this documentation. All medical record entries made by the scribe were at my direction and in my presence.  I have reviewed the chart and agree that the record reflects my personal performance and is accurate and complete. JANET Reina.  5:46 PM 12/24/2018                Clinical Impression:   The primary encounter diagnosis was Yeast infection. Diagnoses of Dizziness and Benign paroxysmal positional vertigo, unspecified laterality were also pertinent to this visit.      Disposition:   Disposition: Discharged  Condition: Stable                        Grace Mulligan NP  12/24/18 8742

## 2018-12-24 NOTE — ED NOTES
Pt states she was in bed and reaching for the phone when she fell. C/O pain to bilateral knees. C/O dizziness since fall. Denies striking head. Knees are red. +ROM. Pt has cat urine all over clothes. AAO x3.

## 2018-12-26 LAB
BACTERIA UR CULT: NORMAL
BACTERIA UR CULT: NORMAL

## 2018-12-27 ENCOUNTER — TELEPHONE (OUTPATIENT)
Dept: ADMINISTRATIVE | Facility: HOSPITAL | Age: 68
End: 2018-12-27

## 2018-12-27 ENCOUNTER — PATIENT OUTREACH (OUTPATIENT)
Dept: ADMINISTRATIVE | Facility: HOSPITAL | Age: 68
End: 2018-12-27

## 2018-12-27 NOTE — LETTER
December 27, 2018    Maggy Tapia  [address] [place] [address]             [facility]  [address]  [place] [address]  Phone: [phone] Dear [facility] is committed to your overall health and would like to ensure that you are up to date on your recommended test and/or procedures.   Yanna Abbasi MD  has found that your chart shows you may be due for the following:    BONE DENSITY SCREENING (DEXA SCAN)  MAMMOGRAM  HEMOGLOBIN A1C    If you have had any of the above done at another facility, please let us know so that we may obtain copies from that facility.  If you have a copy of these records, please provide a copy for us to scan into your chart.  You are welcome to request that the report be faxed to us at  ([phone]).     Otherwise, please schedule these appointments at your earliest convenience by calling [phone] or going to [email or web address].    If you have a upcoming scheduled appointment, please disregard this letter.        Cary VEGA Panel Care Coordinator  [facility]  [address] [place] [address] [place] [address]  Phone [phone]  Fax [phone]

## 2018-12-27 NOTE — LETTER
December 27, 2018    DR LORI DELONG}             [facility]  [address]  [facility] [address]  Phone: [phone] December 27, 2018     Patient: Maggy Tapia    YOB: 1950   Date of Visit: 12/27/2018       We are seeing Maggy Tapia in the clinic today at [facility].  Yanna Abbasi MD is their PCP.  She has an outstanding lab/procedure at this time when reviewing their chart.  To help with our Health Maintenance records will you please supply the following:      []  Mammogram                                                []  Colonoscopy   []  Pap Smear                                                    []  Outside Lab Results   []  Dexa scans                                                   [x]  Eye Exam 2018   []  Foot Exam                                                     [] Other___________   []  Outside Immunizations                                               Please Fax to [facility] at [phone]         Sincerely      Cary VEGA,Panel Care Coordinator  [facility]  [address] [place] [address]  Phone [phone]  Fax [phone]

## 2019-01-04 ENCOUNTER — LAB VISIT (OUTPATIENT)
Dept: LAB | Facility: HOSPITAL | Age: 69
End: 2019-01-04
Attending: PHYSICIAN ASSISTANT
Payer: MEDICARE

## 2019-01-04 DIAGNOSIS — E11.9 TYPE 2 DIABETES MELLITUS WITHOUT COMPLICATION, WITH LONG-TERM CURRENT USE OF INSULIN: ICD-10-CM

## 2019-01-04 DIAGNOSIS — E03.9 HYPOTHYROIDISM, UNSPECIFIED TYPE: ICD-10-CM

## 2019-01-04 DIAGNOSIS — Z79.4 TYPE 2 DIABETES MELLITUS WITHOUT COMPLICATION, WITH LONG-TERM CURRENT USE OF INSULIN: ICD-10-CM

## 2019-01-04 LAB
ESTIMATED AVG GLUCOSE: 229 MG/DL
HBA1C MFR BLD HPLC: 9.6 %
T4 FREE SERPL-MCNC: 0.98 NG/DL
TSH SERPL DL<=0.005 MIU/L-ACNC: 6.87 UIU/ML

## 2019-01-04 PROCEDURE — 84443 ASSAY THYROID STIM HORMONE: CPT

## 2019-01-04 PROCEDURE — 83036 HEMOGLOBIN GLYCOSYLATED A1C: CPT

## 2019-01-04 PROCEDURE — 84439 ASSAY OF FREE THYROXINE: CPT

## 2019-01-04 PROCEDURE — 36415 COLL VENOUS BLD VENIPUNCTURE: CPT | Mod: PO

## 2019-01-07 ENCOUNTER — TELEPHONE (OUTPATIENT)
Dept: NEUROLOGY | Facility: CLINIC | Age: 69
End: 2019-01-07

## 2019-01-07 ENCOUNTER — OFFICE VISIT (OUTPATIENT)
Dept: NEUROLOGY | Facility: CLINIC | Age: 69
End: 2019-01-07
Payer: MEDICARE

## 2019-01-07 VITALS
HEART RATE: 97 BPM | BODY MASS INDEX: 36.8 KG/M2 | HEIGHT: 61 IN | RESPIRATION RATE: 20 BRPM | WEIGHT: 194.88 LBS | SYSTOLIC BLOOD PRESSURE: 145 MMHG | DIASTOLIC BLOOD PRESSURE: 75 MMHG

## 2019-01-07 DIAGNOSIS — R29.6 FREQUENT FALLS: ICD-10-CM

## 2019-01-07 DIAGNOSIS — I62.03 SUBDURAL HEMATOMA, CHRONIC: Primary | ICD-10-CM

## 2019-01-07 DIAGNOSIS — G24.01 TARDIVE DYSKINESIA: ICD-10-CM

## 2019-01-07 DIAGNOSIS — F44.5 PSEUDOSEIZURES: ICD-10-CM

## 2019-01-07 DIAGNOSIS — G47.33 OSA (OBSTRUCTIVE SLEEP APNEA): ICD-10-CM

## 2019-01-07 DIAGNOSIS — Z86.73 HISTORY OF ISCHEMIC LEFT MCA STROKE: ICD-10-CM

## 2019-01-07 DIAGNOSIS — H53.462 LEFT HOMONYMOUS HEMIANOPSIA: ICD-10-CM

## 2019-01-07 PROCEDURE — 1101F PT FALLS ASSESS-DOCD LE1/YR: CPT | Mod: CPTII,S$GLB,, | Performed by: PSYCHIATRY & NEUROLOGY

## 2019-01-07 PROCEDURE — 99999 PR PBB SHADOW E&M-EST. PATIENT-LVL III: ICD-10-PCS | Mod: PBBFAC,,, | Performed by: PSYCHIATRY & NEUROLOGY

## 2019-01-07 PROCEDURE — 3077F SYST BP >= 140 MM HG: CPT | Mod: CPTII,S$GLB,, | Performed by: PSYCHIATRY & NEUROLOGY

## 2019-01-07 PROCEDURE — 3078F PR MOST RECENT DIASTOLIC BLOOD PRESSURE < 80 MM HG: ICD-10-PCS | Mod: CPTII,S$GLB,, | Performed by: PSYCHIATRY & NEUROLOGY

## 2019-01-07 PROCEDURE — 3078F DIAST BP <80 MM HG: CPT | Mod: CPTII,S$GLB,, | Performed by: PSYCHIATRY & NEUROLOGY

## 2019-01-07 PROCEDURE — 3077F PR MOST RECENT SYSTOLIC BLOOD PRESSURE >= 140 MM HG: ICD-10-PCS | Mod: CPTII,S$GLB,, | Performed by: PSYCHIATRY & NEUROLOGY

## 2019-01-07 PROCEDURE — 99999 PR PBB SHADOW E&M-EST. PATIENT-LVL III: CPT | Mod: PBBFAC,,, | Performed by: PSYCHIATRY & NEUROLOGY

## 2019-01-07 PROCEDURE — 99214 OFFICE O/P EST MOD 30 MIN: CPT | Mod: S$GLB,,, | Performed by: PSYCHIATRY & NEUROLOGY

## 2019-01-07 PROCEDURE — 1101F PR PT FALLS ASSESS DOC 0-1 FALLS W/OUT INJ PAST YR: ICD-10-PCS | Mod: CPTII,S$GLB,, | Performed by: PSYCHIATRY & NEUROLOGY

## 2019-01-07 PROCEDURE — 99214 PR OFFICE/OUTPT VISIT, EST, LEVL IV, 30-39 MIN: ICD-10-PCS | Mod: S$GLB,,, | Performed by: PSYCHIATRY & NEUROLOGY

## 2019-01-07 NOTE — PROGRESS NOTES
"Subjective:          Patient ID: Maggy Tapia is a 69 y.o.  female who presents for follow up of subdural hematoma status post craniotomy, ataxia, pseudoseizures    Initial HPI 8/2/16:    Patient is a right handed 66 y.o. female presents with falls and mental status change.  Hx from brother, pt with limited insight.       Hx stroke, mild residual R sided deficits 2000.  Episodes of "pseudoseizures", consist of shaking lasting 5 minutes or loss of awareness; last "seizure" one week ago.  Sitting in chair, unresponsive with eyes open, staring up, falls forward. Unresponsive several minutes, then vocalizing "Dk, dk, dk" before speaking normally, several minutes.  Pt with no recollection of event.  Per brother taking Topamax 100 mg daily, Depakote 1000 mg BID and clonazepam 0.5 mg, prescribed by psychiatrist Dr. Whyte.  Also sees neurologist on [place], [facility] Dr. Mac.      Several falls prior to admission at [facility] beginning of July 2016.  Suspected syncope, MRi shows old stroke L parietal and small chronic SDH and hygromas.  Went to inpatient rehab 2 weeks, was walking well, think,ing clearly, speech normal speed.  Over past 2 weeks, less awake, slower speech and movement, worse balance. Fell prior to arrival in ER.  Presented to [facility] early this AM, transferred for neurosurgical evaluation after discovery of large left SDH. Plan for rigoberto hole evacuation tomorrow AM.     Last History of Present Illness 8/16/17:     I last saw her at the end of July.  At that time, was concerned her Depakote could be contributing to generalized slowing and risk of falls.  She was brought to the hospital a couple weeks later, on August 4 after feeling generally weak and falling to the ground.  No loss of consciousness or seizure activity.  She had not been eating or drinking well and had had 1 episode of diarrhea.  To have urinary tract infection.  She felt significantly better the following day after being given Cipro " IV.     Also found to have some acute kidney injury felt to be secondary to dehydration.     She was set up for an earlier appointment to follow-up with me; in July we were planning on a follow-up visit in 6 months.     On August 10, there is a note in her record by care management stating concern about the fact that Mrs. Tapia was staying at the hospital with family members who themselves were admitted following her discharge.  Concerns were raised about her living condition and inability to care for herself.     According to this note, [facility] had evaluated her and recommended permanent placement, deemed her home living environment deplorable and had called Adult Protective Services.  Please see the note by Brinda Nava on August 11, 2017 for details.     She is here with her brother.  They talked about the fact that due to medical conditions unable to clean the house, dogs have been soiling all over the house and no body to help clean up.  They do report having enough food in the house.      She is still taking VPA 1000 mg at bedtime, clonazepam 0.5 mg at bedtime.      She has not fallen again since hospital discharge.     Interval history 7/9/2018:     Here today with her brother. Since our last visit she presented to [facility] on June 7, 2018 with 3 days of dizziness, headache and double vision. ER notes indicate when she was laying flat the room was spinning.  He appeared to be slurring her words.  Had some episodes of vomiting the day prior.  Stroke alert was activated and telemedicine consult was obtained however clinical picture did not seem to point to any kind of an acute vascular event.     She reports posterior headache, has been present for several weeks, maybe 6 weeks.  Has had more lethargy, can't awaken her. She does have increased light sensitivity, and has intermittent diplopia - horizontal. Increased nervousness and anxiety, calling for her dog who passed away 20 years ago.      Interval history 1/6/2019:    Fell out of bed last week, rolling over to get the phone. No falls from up on her feet. Depressed, will be seeing her psychiatrist tomorrow.      Had an episode of staring; slumping, shaking; variable each episode.  May last 5 minutes, up to 10-15 minutes.  Headache following. Last was 2 days ago, frequency is actually decreased recently.     Sleep pattern is irregular.     Increased shuffling with gait    She is not using her CPAP - had used it on the dog      Review of patient's allergies indicates:   Allergen Reactions    Penicillins Anaphylaxis    Sulfa (sulfonamide antibiotics) Anaphylaxis     Current Outpatient Medications   Medication Sig Dispense Refill    CALCIUM CARBONATE/VITAMIN D3 (CALCIUM 500 + D ORAL) Take 1 tablet by mouth once daily.       clonazePAM (KLONOPIN) 0.5 MG tablet Take 0.5 mg by mouth nightly.  3    divalproex (DEPAKOTE) 500 MG Tb24 Take 500 mg by mouth every evening.  180 tablet 3    gemfibrozil (LOPID) 600 MG tablet TAKE 1 TABLET BY MOUTH 2 TIMES DAILY. 180 tablet 0    levothyroxine (SYNTHROID) 75 MCG tablet Take 1 tablet (75 mcg total) by mouth once daily. 30 tablet 3    losartan (COZAAR) 50 MG tablet Take 1 tablet (50 mg total) by mouth once daily. 90 tablet 3    potassium chloride SA (K-DUR,KLOR-CON) 20 MEQ tablet Take 20 mEq by mouth once daily.      simvastatin (ZOCOR) 40 MG tablet Take 1 tablet (40 mg total) by mouth once daily. 90 tablet 3    TRINTELLIX 20 mg Tab Take 20 mg by mouth once daily.       aspirin (ECOTRIN) 81 MG EC tablet Take 81 mg by mouth once daily.      metFORMIN (GLUCOPHAGE-XR) 500 MG 24 hr tablet Take 2 tablets (1,000 mg total) by mouth 2 (two) times daily with meals. 360 tablet 3    thiamine (VITAMIN B-1) 100 MG tablet Take 100 mg by mouth once daily.       No current facility-administered medications for this visit.          Review of Systems  Review of Systems    Objective:        Vitals:    01/07/19 0915   BP:  (!) 145/75   Pulse: 97   Resp: 20     Body mass index is 36.83 kg/m².  Neurologic Exam     Mental Status   Level of consciousness: alert    Cranial Nerves     CN II   Left visual field deficit: left hemianopsia.    CN III, IV, VI   Pupils are equal, round, and reactive to light.  Extraocular motions are normal.     CN V   Left facial sensation deficit: complete (diminished pin)    CN VII   Facial expression full, symmetric.   Tardive dyskinesia     Motor Exam   Muscle bulk: normal  Right arm pronator drift: absent  Left arm pronator drift: absent    Strength   Strength 5/5 throughout.     Sensory Exam   Light touch normal.   Pinprick normal.     Gait, Coordination, and Reflexes     Gait  Gait: shuffling (stooped posture)    Reflexes   Right brachioradialis: 2+  Left brachioradialis: 2+  Right biceps: 3+  Left biceps: 3+  Right triceps: 3+  Left triceps: 3+  Right patellar: 3+  Left patellar: 3+  Right achilles: 2+  Left achilles: 2+      Physical Exam   Eyes: EOM are normal. Pupils are equal, round, and reactive to light.   Neurological: She has normal strength.   Reflex Scores:       Tricep reflexes are 3+ on the right side and 3+ on the left side.       Bicep reflexes are 3+ on the right side and 3+ on the left side.       Brachioradialis reflexes are 2+ on the right side and 2+ on the left side.       Patellar reflexes are 3+ on the right side and 3+ on the left side.       Achilles reflexes are 2+ on the right side and 2+ on the left side.        Data Review:  Results for JIM RODRGIES (MRN [id number]) as of 1/6/2019 22:36   Ref. Range 12/24/2018 11:14 12/24/2018 11:26 12/24/2018 12:33 12/24/2018 12:33 1/4/2019 08:51   WBC Latest Ref Range: 3.90 - 12.70 K/uL 9.80       RBC Latest Ref Range: 4.00 - 5.40 M/uL 4.77       Hemoglobin Latest Ref Range: 12.0 - 16.0 g/dL 14.4       Hematocrit Latest Ref Range: 37.0 - 48.5 % 43.8       MCV Latest Ref Range: 82 - 98 fL 92       MCH Latest Ref Range: 27.0 - 31.0 pg 30.2        MCHC Latest Ref Range: 32.0 - 36.0 g/dL 32.9       RDW Latest Ref Range: 11.5 - 14.5 % 14.1       Platelets Latest Ref Range: 150 - 350 K/uL 172       MPV Latest Ref Range: 9.2 - 12.9 fL 8.8 (L)       Gran% Latest Ref Range: 38.0 - 73.0 % 73.2 (H)       Gran # (ANC) Latest Ref Range: 1.8 - 7.7 K/uL 7.2       Lymph% Latest Ref Range: 18.0 - 48.0 % 20.2       Lymph # Latest Ref Range: 1.0 - 4.8 K/uL 2.0       Mono% Latest Ref Range: 4.0 - 15.0 % 3.2 (L)       Mono # Latest Ref Range: 0.3 - 1.0 K/uL 0.3       Eosinophil% Latest Ref Range: 0.0 - 8.0 % 2.1       Eos # Latest Ref Range: 0.0 - 0.5 K/uL 0.2       Basophil% Latest Ref Range: 0.0 - 1.9 % 1.3       Baso # Latest Ref Range: 0.00 - 0.20 K/uL 0.10       Differential Method Unknown Automated       Sodium Latest Ref Range: 136 - 145 mmol/L 142       Potassium Latest Ref Range: 3.5 - 5.1 mmol/L 4.5       Chloride Latest Ref Range: 95 - 110 mmol/L 103       CO2 Latest Ref Range: 23 - 29 mmol/L 30 (H)       Anion Gap Latest Ref Range: 8 - 16 mmol/L 9       BUN, Bld Latest Ref Range: 8 - 23 mg/dL 22       Creatinine Latest Ref Range: 0.5 - 1.4 mg/dL 1.1       eGFR if non African American Latest Ref Range: >60 mL/min/1.73 m^2 51 (A)       eGFR if African American Latest Ref Range: >60 mL/min/1.73 m^2 59 (A)       Glucose Latest Ref Range: 70 - 110 mg/dL 240 (H)       Calcium Latest Ref Range: 8.7 - 10.5 mg/dL 9.7       Alkaline Phosphatase Latest Ref Range: 55 - 135 U/L 88       Total Protein Latest Ref Range: 6.0 - 8.4 g/dL 7.2       Albumin Latest Ref Range: 3.5 - 5.2 g/dL 3.2 (L)       Total Bilirubin Latest Ref Range: 0.1 - 1.0 mg/dL 0.4       AST Latest Ref Range: 10 - 40 U/L 9 (L)       ALT Latest Ref Range: 10 - 44 U/L 10       Hemoglobin A1C Latest Ref Range: 4.0 - 5.6 %     9.6 (H)   Estimated Avg Glucose Latest Ref Range: 68 - 131 mg/dL     229 (H)   TSH Latest Ref Range: 0.400 - 4.000 uIU/mL     6.873 (H)   Free T4 Latest Ref Range: 0.71 - 1.51 ng/dL      0.98     Results for JIM RODRIGES (MRN [id number]) as of 1/6/2019 22:36   Ref. Range 7/11/2018 07:30   Valproic Acid Lvl Latest Ref Range: 50.0 - 100.0 ug/mL 30.8 (L)     Assessment:        1. Subdural hematoma, chronic, small, bilateral    2. History of ischemic left MCA stroke    3. Frequent falls (possibly related to polypharmacy)    4. Pseudoseizures    5. Left homonymous hemianopsia    6. JUAN (obstructive sleep apnea)    7. Tardive dyskinesia           Plan:         Problem List Items Addressed This Visit        Neuro    Subdural hematoma, chronic, small, bilateral - Primary    Relevant Orders    EEG >1 hr    History of ischemic left MCA stroke    Relevant Orders    EEG >1 hr    Tardive dyskinesia       Psychiatric    Pseudoseizures (Chronic)    Relevant Orders    EEG >1 hr       Ophtho    Left homonymous hemianopsia    Current Assessment & Plan     She has had variable visual complaints and psychogenic issues in the past, however concern she may also have had a recent stroke in the right occipital region    Resume aspirin, reviewed stroke risk factor modification, needs to make sure they get CPAP cleaned or replaced and use appropriately          Relevant Orders    MRI Brain Without Contrast       Other    Frequent falls (possibly related to polypharmacy)    JUAN (obstructive sleep apnea)        On our exam today, it seems like there may be a new issue with your vision.  I would like to get you in for an MRI to make sure there has not been a new stroke, and we should get another EEG to make sure these spells are not epileptic in nature.  I would like you to start taking an aspirin 81 mg daily    Follow-up in about 3 months (around 4/7/2019).       Thank you very much for the opportunity to assist in this patient's care.  If you have any questions or concerns, please do not hesitate to contact me at any time.     Sincerely,  Harley Roberts, DO

## 2019-01-07 NOTE — TELEPHONE ENCOUNTER
----- Message from Jane Aleman sent at 1/7/2019  3:25 PM CST -----  Contact: [facility]  Type: Needs Medical Advice    Who Called:  [employer]  Best Call Back Number: [phone]  Additional Information: requesting orders & clinical notes for CPAP supplies--it can be faxed it [phone]--please advise--thank you

## 2019-01-07 NOTE — LETTER
January 10, 2019      Brinda Kay PA-C  [address] [facility] [address] [place] [address]           [facility]  [address] [facility] [address] [place] [address]  Phone: [phone]  Fax: [phone]          Patient: Maggy Tapia   MR Number: [id number]   YOB: 1950   Date of Visit: 1/7/2019       Dear Brinda Kay:    Thank you for referring Maggy Tapia to me for evaluation. Attached you will find relevant portions of my assessment and plan of care.    If you have questions, please do not hesitate to call me. I look forward to following Maggy Tapia along with you.    Sincerely,    Harley Roberts, DO    Enclosure  CC:  No Recipients    If you would like to receive this communication electronically, please contact [email or web address] or [phone] to request more information on [employer] Link access.    For providers and/or their staff who would like to refer a patient to [facility], please contact us through our one-stop-shop provider referral line, [facility], at [phone].    If you feel you have received this communication in error or would no longer like to receive these types of communications, please e-mail [email or web address]

## 2019-01-07 NOTE — ASSESSMENT & PLAN NOTE
She has had variable visual complaints and psychogenic issues in the past, however concern she may also have had a recent stroke in the right occipital region    Resume aspirin, reviewed stroke risk factor modification, needs to make sure they get CPAP cleaned or replaced and use appropriately

## 2019-01-07 NOTE — PATIENT INSTRUCTIONS
On our exam today, it seems like there may be a new issue with your vision.  I would like to get you in for an MRI to make sure there has not been a new stroke, and we should get another EEG to make sure these spells are not epileptic in nature.  I would like you to start taking an aspirin 81 mg daily

## 2019-01-09 ENCOUNTER — TELEPHONE (OUTPATIENT)
Dept: NEUROLOGY | Facility: CLINIC | Age: 69
End: 2019-01-09

## 2019-01-09 ENCOUNTER — OFFICE VISIT (OUTPATIENT)
Dept: FAMILY MEDICINE | Facility: CLINIC | Age: 69
End: 2019-01-09
Payer: MEDICARE

## 2019-01-09 VITALS
OXYGEN SATURATION: 96 % | BODY MASS INDEX: 37.55 KG/M2 | DIASTOLIC BLOOD PRESSURE: 74 MMHG | WEIGHT: 198.88 LBS | SYSTOLIC BLOOD PRESSURE: 125 MMHG | HEART RATE: 87 BPM | HEIGHT: 61 IN | RESPIRATION RATE: 14 BRPM | TEMPERATURE: 98 F

## 2019-01-09 DIAGNOSIS — I10 ESSENTIAL HYPERTENSION: ICD-10-CM

## 2019-01-09 DIAGNOSIS — E78.5 HYPERLIPIDEMIA, UNSPECIFIED HYPERLIPIDEMIA TYPE: ICD-10-CM

## 2019-01-09 DIAGNOSIS — E11.65 UNCONTROLLED TYPE 2 DIABETES MELLITUS WITH HYPERGLYCEMIA: Primary | ICD-10-CM

## 2019-01-09 DIAGNOSIS — N95.9 MENOPAUSAL AND PERIMENOPAUSAL DISORDER: ICD-10-CM

## 2019-01-09 DIAGNOSIS — E03.9 ACQUIRED HYPOTHYROIDISM: Chronic | ICD-10-CM

## 2019-01-09 PROCEDURE — 99214 OFFICE O/P EST MOD 30 MIN: CPT | Mod: S$GLB,,, | Performed by: FAMILY MEDICINE

## 2019-01-09 PROCEDURE — 99499 RISK ADDL DX/OHS AUDIT: ICD-10-PCS | Mod: S$GLB,,, | Performed by: FAMILY MEDICINE

## 2019-01-09 PROCEDURE — 3074F PR MOST RECENT SYSTOLIC BLOOD PRESSURE < 130 MM HG: ICD-10-PCS | Mod: CPTII,S$GLB,, | Performed by: FAMILY MEDICINE

## 2019-01-09 PROCEDURE — 3074F SYST BP LT 130 MM HG: CPT | Mod: CPTII,S$GLB,, | Performed by: FAMILY MEDICINE

## 2019-01-09 PROCEDURE — 99499 UNLISTED E&M SERVICE: CPT | Mod: S$GLB,,, | Performed by: FAMILY MEDICINE

## 2019-01-09 PROCEDURE — 99214 PR OFFICE/OUTPT VISIT, EST, LEVL IV, 30-39 MIN: ICD-10-PCS | Mod: S$GLB,,, | Performed by: FAMILY MEDICINE

## 2019-01-09 PROCEDURE — 99999 PR PBB SHADOW E&M-EST. PATIENT-LVL V: ICD-10-PCS | Mod: PBBFAC,,, | Performed by: FAMILY MEDICINE

## 2019-01-09 PROCEDURE — 3046F PR MOST RECENT HEMOGLOBIN A1C LEVEL > 9.0%: ICD-10-PCS | Mod: CPTII,S$GLB,, | Performed by: FAMILY MEDICINE

## 2019-01-09 PROCEDURE — 3078F DIAST BP <80 MM HG: CPT | Mod: CPTII,S$GLB,, | Performed by: FAMILY MEDICINE

## 2019-01-09 PROCEDURE — 99999 PR PBB SHADOW E&M-EST. PATIENT-LVL V: CPT | Mod: PBBFAC,,, | Performed by: FAMILY MEDICINE

## 2019-01-09 PROCEDURE — 3046F HEMOGLOBIN A1C LEVEL >9.0%: CPT | Mod: CPTII,S$GLB,, | Performed by: FAMILY MEDICINE

## 2019-01-09 PROCEDURE — 1101F PR PT FALLS ASSESS DOC 0-1 FALLS W/OUT INJ PAST YR: ICD-10-PCS | Mod: CPTII,S$GLB,, | Performed by: FAMILY MEDICINE

## 2019-01-09 PROCEDURE — 3078F PR MOST RECENT DIASTOLIC BLOOD PRESSURE < 80 MM HG: ICD-10-PCS | Mod: CPTII,S$GLB,, | Performed by: FAMILY MEDICINE

## 2019-01-09 PROCEDURE — 1101F PT FALLS ASSESS-DOCD LE1/YR: CPT | Mod: CPTII,S$GLB,, | Performed by: FAMILY MEDICINE

## 2019-01-09 RX ORDER — LANOLIN ALCOHOL/MO/W.PET/CERES
100 CREAM (GRAM) TOPICAL DAILY
COMMUNITY
End: 2022-02-22

## 2019-01-09 RX ORDER — ASPIRIN 81 MG/1
81 TABLET ORAL DAILY
COMMUNITY

## 2019-01-09 RX ORDER — METFORMIN HYDROCHLORIDE 500 MG/1
1000 TABLET, EXTENDED RELEASE ORAL 2 TIMES DAILY WITH MEALS
Qty: 360 TABLET | Refills: 3 | Status: SHIPPED | OUTPATIENT
Start: 2019-01-09 | End: 2019-10-02 | Stop reason: SDUPTHER

## 2019-01-09 NOTE — TELEPHONE ENCOUNTER
Spoke with the pt's brother, informed him the pt would need to speak to her PCP to request a new prescription for CPAP supplies. Reports she has an appt today with PCP and will address at that visit.

## 2019-01-09 NOTE — PROGRESS NOTES
Subjective:       Patient ID: Maggy Tapia is a 69 y.o. female.    Chief Complaint: Follow-up (3mth f/u diabetes / hypertension)    HPI  Review of Systems   Constitutional: Negative for fatigue and unexpected weight change.   Respiratory: Negative for chest tightness and shortness of breath.    Cardiovascular: Negative for chest pain, palpitations and leg swelling.   Gastrointestinal: Negative for abdominal pain.   Musculoskeletal: Negative for arthralgias.   Neurological: Negative for dizziness, syncope, light-headedness and headaches.       Patient Active Problem List   Diagnosis    Syncope and collapse    Frequent falls (possibly related to polypharmacy)    Type 2 diabetes mellitus    History of left breast cancer    History of ischemic left MCA stroke    Seizure disorder    Essential hypertension    Hyperlipidemia    Thrombocytopenia    Depression    Hypothyroidism    Subdural hematoma, chronic, small, bilateral    SDH (subdural hematoma)    Valproic acid toxicity    Pseudoseizures    Severe obesity (BMI 35.0-35.9 with comorbidity)    Osteopenia    Nontraumatic subcortical hemorrhage of left cerebral hemisphere    JUAN (obstructive sleep apnea)    Near syncope    Diplopia    Cervical strain    Left homonymous hemianopsia    Tardive dyskinesia     Patient is here for a chronic conditions follow up.    Patient here with her caregiver brother who she lives with.  She has h/o frrequent falls, dizziness, instablity.  She has h/o pseudoseizures, psychiatric illness (under care of psych Dr. Whyte), h/o CVA 2000 with residual right sided defects, subdural hematoma after fall with head trauma s/p craniotomy 7/16.Needs assistance with all ADLs at home and walks with walker.  Now under care of Neuro Dr. Roberts. Was concerned about a new complaint of vision problems 1/19. Mri brain ordered to r/o new cva and eeg to r/o sz activity.  81mg asa addded     Had episode where APS was called to  evaluate home due to complaints of neglect, unhealthy living conditions by  agency 8/17.  He had been in the hospital himself for DVTs and PEs now on blood thinner and urinary obstruction.  Was gone for days and no one was home with their dogs.  So the dogs messed all over the house.  He had not had time to clean it up or the energy to do so when home health came by for Ivana.     Reviewed labs 1/19-a1c now 9.6 up from 6.4 6 months ago. tsh shows persistent mild elevation. Has CKD stage 3.  On no meds for DM type 2.      Not taking synthroid - not in planner bc has to be taken apart from other meds. Brother loads planner  Objective:      Physical Exam   Constitutional: She is oriented to person, place, and time. She appears well-developed and well-nourished.   Cardiovascular: Normal rate, regular rhythm and normal heart sounds.   Pulmonary/Chest: Effort normal and breath sounds normal.   Musculoskeletal: She exhibits no edema.   Neurological: She is alert and oriented to person, place, and time.   Skin: Skin is warm and dry.   Psychiatric: She has a normal mood and affect.   Nursing note and vitals reviewed.      Assessment:       1. Uncontrolled type 2 diabetes mellitus with hyperglycemia    2. Acquired hypothyroidism    3. Essential hypertension    4. Hyperlipidemia, unspecified hyperlipidemia type    5. Menopausal and perimenopausal disorder        Plan:         1. Uncontrolled type 2 diabetes mellitus with hyperglycemia  Increase  - metFORMIN (GLUCOPHAGE-XR) 500 MG 24 hr tablet; Take 2 tablets (1,000 mg total) by mouth 2 (two) times daily with meals.  Dispense: 360 tablet; Refill: 3  - Ambulatory Referral to Diabetes Education  - Ambulatory referral to Endocrinology  - CBC auto differential; Future  - Comprehensive metabolic panel; Future  - Hemoglobin A1c; Future    2. Acquired hypothyroidism  Stable condition.  Continue current medications.  Will adjust based on lab findings or if condition changes.    -  TSH; Future  - T4, free; Future    3. Essential hypertension  Controlled on current medications.  Continue current medications.      4. Hyperlipidemia, unspecified hyperlipidemia type  Stable condition.  Continue current medications.  Will adjust based on lab findings or if condition changes.    - Lipid panel; Future    5. Menopausal and perimenopausal disorder  Screen and treat as indicated:    - DXA Bone Density Spine And Hip; Future        Time spent with patient: 20 minutes    Patient with be reevaluated in 3 and 6 months or sooner prn    Greater than 50% of this visit was spent counseling as described in above documentation:Yes

## 2019-01-09 NOTE — TELEPHONE ENCOUNTER
----- Message from Kylah Carpenter RT sent at 1/8/2019  2:44 PM CST -----  Contact: George,Sister   George,Sister , checking status on the pt C-pap machine supplies, thanks.

## 2019-01-10 ENCOUNTER — HOSPITAL ENCOUNTER (OUTPATIENT)
Dept: NEUROLOGY | Facility: HOSPITAL | Age: 69
Discharge: HOME | End: 2019-01-10
Attending: PSYCHIATRY & NEUROLOGY
Payer: MEDICARE

## 2019-01-10 DIAGNOSIS — Z86.73 HISTORY OF ISCHEMIC LEFT MCA STROKE: ICD-10-CM

## 2019-01-10 DIAGNOSIS — I62.03 SUBDURAL HEMATOMA, CHRONIC: ICD-10-CM

## 2019-01-10 DIAGNOSIS — F44.5 PSEUDOSEIZURES: ICD-10-CM

## 2019-01-10 PROCEDURE — 95813 PR EEG, EXTENDED, 61-119 MINS: ICD-10-PCS | Mod: 26,,, | Performed by: PSYCHIATRY & NEUROLOGY

## 2019-01-10 PROCEDURE — 95813 EEG EXTND MNTR 61-119 MIN: CPT | Mod: 26,,, | Performed by: PSYCHIATRY & NEUROLOGY

## 2019-01-10 PROCEDURE — 95812 EEG 41-60 MINUTES: CPT

## 2019-01-11 ENCOUNTER — TELEPHONE (OUTPATIENT)
Dept: FAMILY MEDICINE | Facility: CLINIC | Age: 69
End: 2019-01-11

## 2019-01-11 DIAGNOSIS — G47.33 OSA ON CPAP: Primary | ICD-10-CM

## 2019-01-11 DIAGNOSIS — G40.909 SEIZURE DISORDER: ICD-10-CM

## 2019-01-11 NOTE — TELEPHONE ENCOUNTER
Spoke with patients brother George. George states Dr. Abbasi wanted medication changes from Dr Roberts. States Klonopin has been changed to bid, and Depakote has been changed to 1000mg at bed time. States patient needs a new Rx for CPAP supplies. States last sleep study was more than 5 years ago, advised a new sleep study will more than likely be needed before a new Rx could be written. States that would be fine. States it is probably a good idea that patient has another sleep study done since she has had neurological issues since her last one. Patients brother does not remember the name of the DME company the CPAP machine was received from. Advised to call back with CPAP DME company info if they find any paperwork or remember. George verbalized understanding.

## 2019-01-11 NOTE — TELEPHONE ENCOUNTER
----- Message from Ilana Rodriguez LPN sent at 1/10/2019  4:58 PM CST -----  Contact: Madeleine Vazquez       ----- Message -----  From: Machelle Roach  Sent: 1/10/2019  11:52 AM  To: [facility] PAYTON Staff     George called, he need to speak with a nurse regarding a change in patient medications and also getting a rx for a cpap machine. Please call back at [phone] ([phone])

## 2019-01-14 ENCOUNTER — HOSPITAL ENCOUNTER (OUTPATIENT)
Dept: RADIOLOGY | Facility: HOSPITAL | Age: 69
Discharge: HOME | End: 2019-01-14
Attending: PSYCHIATRY & NEUROLOGY
Payer: MEDICARE

## 2019-01-14 DIAGNOSIS — H53.462 LEFT HOMONYMOUS HEMIANOPSIA: ICD-10-CM

## 2019-01-14 PROCEDURE — 70551 MRI BRAIN WITHOUT CONTRAST: ICD-10-PCS | Mod: 26,,, | Performed by: RADIOLOGY

## 2019-01-14 PROCEDURE — 70551 MRI BRAIN STEM W/O DYE: CPT | Mod: TC

## 2019-01-14 PROCEDURE — 70551 MRI BRAIN STEM W/O DYE: CPT | Mod: 26,,, | Performed by: RADIOLOGY

## 2019-01-17 RX ORDER — CLONAZEPAM 0.5 MG/1
0.5 TABLET ORAL 2 TIMES DAILY
Refills: 3 | COMMUNITY
Start: 2019-01-17 | End: 2021-05-13

## 2019-01-17 RX ORDER — DIVALPROEX SODIUM 500 MG/1
500 TABLET, FILM COATED, EXTENDED RELEASE ORAL NIGHTLY
Qty: 180 TABLET | Refills: 3 | COMMUNITY
Start: 2019-01-17 | End: 2021-04-13 | Stop reason: ALTCHOICE

## 2019-01-17 NOTE — TELEPHONE ENCOUNTER
Called pt regarding below message. Gave pt the phone number to call and schedule her Sleep Study [phone].  Pt verbalized understanding with no further questions.

## 2019-01-21 NOTE — PROGRESS NOTES
Patient, Maggy Tapia (MRN #[id number]), presented with a recorded BMI of 37.57 kg/m^2 and a documented comorbidity(s):  - Diabetes Mellitus Type 2  - Hypertension  - Hyperlipidemia  to which the severe obesity is a contributing factor. This is consistent with the definition of severe obesity (BMI 35.0-39.9) with comorbidity (ICD-10 E66.01, Z68.35). The patient's severe obesity was monitored, evaluated, addressed and/or treated. This addendum to the medical record is made on 01/21/2019.

## 2019-01-22 ENCOUNTER — CLINICAL SUPPORT (OUTPATIENT)
Dept: DIABETES | Facility: CLINIC | Age: 69
End: 2019-01-22
Payer: MEDICARE

## 2019-01-22 VITALS — WEIGHT: 198.44 LBS | BODY MASS INDEX: 37.47 KG/M2 | HEIGHT: 61 IN

## 2019-01-22 DIAGNOSIS — E11.65 TYPE 2 DIABETES MELLITUS WITH HYPERGLYCEMIA, WITHOUT LONG-TERM CURRENT USE OF INSULIN: Primary | ICD-10-CM

## 2019-01-22 DIAGNOSIS — E11.65 UNCONTROLLED TYPE 2 DIABETES MELLITUS WITH HYPERGLYCEMIA: ICD-10-CM

## 2019-01-22 PROCEDURE — G0108 DIAB MANAGE TRN  PER INDIV: HCPCS | Mod: S$GLB,,, | Performed by: DIETITIAN, REGISTERED

## 2019-01-22 PROCEDURE — 99999 PR PBB SHADOW E&M-EST. PATIENT-LVL II: CPT | Mod: PBBFAC,,,

## 2019-01-22 PROCEDURE — G0108 PR DIAB MANAGE TRN  PER INDIV: ICD-10-PCS | Mod: S$GLB,,, | Performed by: DIETITIAN, REGISTERED

## 2019-01-22 PROCEDURE — 99999 PR PBB SHADOW E&M-EST. PATIENT-LVL II: ICD-10-PCS | Mod: PBBFAC,,,

## 2019-01-22 NOTE — PROGRESS NOTES
Diabetes Education  Author: Maggie Daily RD, CDE  Date: 1/22/2019    Diabetes Care Management Summary  Diabetes Education Record Assessment/Progress: Initial  Current Diabetes Risk Level: Moderate     Last A1c:   Lab Results   Component Value Date    HGBA1C 9.6 (H) 01/04/2019     Last visit with Diabetes Educator: : 01/22/2019      Diabetes Type  Diabetes Type : Type II    Diabetes History  Diabetes Diagnosis: >10 years  Current Treatment: Oral Medication, Diet  Reviewed Problem List with Patient: Yes    Health Maintenance was reviewed today with patient. Discussed with patient importance of routine eye exams, foot exams/foot care, blood work (i.e.: A1c, microalbumin, and lipid), dental visits, yearly flu vaccine, and pneumonia vaccine as indicated by PCP. Patient verbalized understanding.     Health Maintenance Topics with due status: Not Due       Topic Last Completion Date    Lipid Panel 06/28/2018    Foot Exam 06/29/2018    Fecal Occult Blood Test (FOBT)/FitKit 07/02/2018    Eye Exam 09/10/2018    Hemoglobin A1c 01/04/2019    High Dose Statin 01/20/2019     Health Maintenance Due   Topic Date Due    TETANUS VACCINE  01/01/1966    Zoster Vaccine  01/01/2008    DEXA SCAN  11/30/2018       Nutrition  Meal Planning: skipping meals, water, diet drinks, eats out often, snacks between meal  What type of sweetener do you use?: none  What type of beverages do you drink?: juice, water, diet soda/tea  Meal Plan 24 Hour Recall - Breakfast: (Cheese sandwich with regular cranberry juice )  Meal Plan 24 Hour Recall - Lunch: (Frozen meal with diet pepsi)  Meal Plan 24 Hour Recall - Dinner: (Last night had lasagna with diet pepsi)  Meal Plan 24 Hour Recall - Snack: (gummy bears, pie)    Monitoring   Monitoring: Other  Self Monitoring : (Not monitoring at preset, states cannot find meter)  Blood Glucose Logs: No  Do you use a personal continuous glucose monitor?: No  In the last month, how often have you had a low blood  sugar reaction?: never  Can you tell when your blood sugar is too high?: no    Exercise   Exercise Type: none    Current Diabetes Treatment   Current Treatment: Oral Medication, Diet    Social History  Preferred Learning Method: Face to Face  Primary Support: Self, Family  Educational Level: High School  Smoking Status: Never a Smoker  Alcohol Use: Never    Barriers to Change  Barriers to Change: None  Learning Challenges : None    Readiness to Learn   Readiness to Learn : Acceptance    Cultural Influences  Cultural Influences: None    Diabetes Education Assessment/Progress  Diabetes Disease Process (diabetes disease process and treatment options): Discussion  Nutrition (Incorporating nutritional management into one's lifestyle): Discussion, Instructed, Demonstrates Understanding/Competency (verbalizes/demonstrates), Comprehends Key Points, Written Materials Provided  Physical Activity (incorporating physical activity into one's lifestyle): Discussion  Medications (states correct name, dose, onset, peak, duration, side effects & timing of meds): Discussion, Instructed(Instructed patient and her brother on importance of medications)  Monitoring (monitoring blood glucose/other parameters & using results): Discussion, Written Materials Provided, Instructed, Demonstrates Understanding/Competency (verbalizes/demonstrates), Comprehends Key Points(Provided log sheet for patient to test twice per day and return log to visit with Susie BRADSHAW )  Acute Complications (preventing, detecting, and treating acute complications): Discussion  Chronic Complications (preventing, detecting, and treating chronic complications): Discussion  Clinical (diabetes, other pertinent medical history, and relevant comorbidities reviewed during visit): Discussion  Cognitive (knowledge of self-management skills, functional health literacy): Discussion  Psychosocial (emotional response to diabetes): Discussion  Diabetes Distress and Support Systems:  Discussion  Behavioral (readiness for change, lifestyle practices, self-care behaviors): Discussion    Goals  Patient has selected/evaluated goals during today's session: Yes, selected  Healthy Eating: Set  Start Date: 01/22/19  Target Date: 04/22/19  Physical Activity: In Progress  Monitoring: Set  Start Date: 01/22/19  Target Date: 04/22/19  Medications: In Progress  Problem Solving: In Progress  Healthy Coping: In Progress  Reducing Risks: In Progress    Diabetes Care Plan/Intervention  Education Plan/Intervention: Individual Follow-Up DSMT    Diabetes Meal Plan  Restrictions: Restricted Carbohydrate, Low Fat, Low Sodium  Calories: 1200  Carbohydrate Per Meal: 20-30g  Fat: (Reduce intake of saturated fats)  Protein: (Lean protein with meals and snacks)    Today's Self-Management Care Plan was developed with the patient's input and is based on barriers identified during today's assessment.    The long and short-term goals in the care plan were written with the patient/caregiver's input. The patient has agreed to work toward these goals to improve her overall diabetes control.      The patient received a copy of today's self-management plan and verbalized understanding of the care plan, goals, and all of today's instructions.      The patient was encouraged to communicate with her physician and care team regarding her condition(s) and treatment.  I provided the patient with my contact information today and encouraged her to contact me via phone or patient portal as needed.     Education Units of Time   Time Spent: 60 min

## 2019-01-23 RX ORDER — LANCETS
EACH MISCELLANEOUS
Qty: 200 EACH | Refills: 4 | Status: SHIPPED | OUTPATIENT
Start: 2019-01-23 | End: 2019-05-24 | Stop reason: CLARIF

## 2019-01-23 RX ORDER — INSULIN PUMP SYRINGE, 3 ML
EACH MISCELLANEOUS
Qty: 1 EACH | Refills: 1 | Status: SHIPPED | OUTPATIENT
Start: 2019-01-23 | End: 2019-05-24 | Stop reason: CLARIF

## 2019-02-08 ENCOUNTER — OFFICE VISIT (OUTPATIENT)
Dept: ENDOCRINOLOGY | Facility: CLINIC | Age: 69
End: 2019-02-08
Payer: MEDICARE

## 2019-02-08 VITALS
BODY MASS INDEX: 38.19 KG/M2 | SYSTOLIC BLOOD PRESSURE: 114 MMHG | HEART RATE: 77 BPM | DIASTOLIC BLOOD PRESSURE: 68 MMHG | HEIGHT: 61 IN | TEMPERATURE: 98 F | RESPIRATION RATE: 16 BRPM | WEIGHT: 202.25 LBS

## 2019-02-08 DIAGNOSIS — Z79.4 TYPE 2 DIABETES MELLITUS WITHOUT COMPLICATION, WITH LONG-TERM CURRENT USE OF INSULIN: Primary | ICD-10-CM

## 2019-02-08 DIAGNOSIS — G47.33 OSA (OBSTRUCTIVE SLEEP APNEA): ICD-10-CM

## 2019-02-08 DIAGNOSIS — E11.9 TYPE 2 DIABETES MELLITUS WITHOUT COMPLICATION, WITH LONG-TERM CURRENT USE OF INSULIN: Primary | ICD-10-CM

## 2019-02-08 DIAGNOSIS — M85.80 OSTEOPENIA, UNSPECIFIED LOCATION: ICD-10-CM

## 2019-02-08 DIAGNOSIS — E03.9 ACQUIRED HYPOTHYROIDISM: ICD-10-CM

## 2019-02-08 DIAGNOSIS — N32.81 OVERACTIVE BLADDER: ICD-10-CM

## 2019-02-08 PROCEDURE — 3074F PR MOST RECENT SYSTOLIC BLOOD PRESSURE < 130 MM HG: ICD-10-PCS | Mod: CPTII,S$GLB,, | Performed by: PHYSICIAN ASSISTANT

## 2019-02-08 PROCEDURE — 3078F DIAST BP <80 MM HG: CPT | Mod: CPTII,S$GLB,, | Performed by: PHYSICIAN ASSISTANT

## 2019-02-08 PROCEDURE — 1101F PT FALLS ASSESS-DOCD LE1/YR: CPT | Mod: CPTII,S$GLB,, | Performed by: PHYSICIAN ASSISTANT

## 2019-02-08 PROCEDURE — 1101F PR PT FALLS ASSESS DOC 0-1 FALLS W/OUT INJ PAST YR: ICD-10-PCS | Mod: CPTII,S$GLB,, | Performed by: PHYSICIAN ASSISTANT

## 2019-02-08 PROCEDURE — 99999 PR PBB SHADOW E&M-EST. PATIENT-LVL IV: ICD-10-PCS | Mod: PBBFAC,,, | Performed by: PHYSICIAN ASSISTANT

## 2019-02-08 PROCEDURE — 3078F PR MOST RECENT DIASTOLIC BLOOD PRESSURE < 80 MM HG: ICD-10-PCS | Mod: CPTII,S$GLB,, | Performed by: PHYSICIAN ASSISTANT

## 2019-02-08 PROCEDURE — 99999 PR PBB SHADOW E&M-EST. PATIENT-LVL IV: CPT | Mod: PBBFAC,,, | Performed by: PHYSICIAN ASSISTANT

## 2019-02-08 PROCEDURE — 99204 OFFICE O/P NEW MOD 45 MIN: CPT | Mod: S$GLB,,, | Performed by: PHYSICIAN ASSISTANT

## 2019-02-08 PROCEDURE — 3074F SYST BP LT 130 MM HG: CPT | Mod: CPTII,S$GLB,, | Performed by: PHYSICIAN ASSISTANT

## 2019-02-08 PROCEDURE — 3046F HEMOGLOBIN A1C LEVEL >9.0%: CPT | Mod: CPTII,S$GLB,, | Performed by: PHYSICIAN ASSISTANT

## 2019-02-08 PROCEDURE — 99204 PR OFFICE/OUTPT VISIT, NEW, LEVL IV, 45-59 MIN: ICD-10-PCS | Mod: S$GLB,,, | Performed by: PHYSICIAN ASSISTANT

## 2019-02-08 PROCEDURE — 3046F PR MOST RECENT HEMOGLOBIN A1C LEVEL > 9.0%: ICD-10-PCS | Mod: CPTII,S$GLB,, | Performed by: PHYSICIAN ASSISTANT

## 2019-02-08 NOTE — LETTER
February 9, 2019      Yanna Abbasi MD  [address]  [place] [address]           [place] [facility]  [address]  [place] [address]  Phone: [phone]          Patient: Maggy Tapia   MR Number: [id number]   YOB: 1950   Date of Visit: 2/8/2019       Dear Dr. Yanna Abbasi:    Thank you for referring Maggy Tapia to me for evaluation. Attached you will find relevant portions of my assessment and plan of care.    If you have questions, please do not hesitate to call me. I look forward to following Maggy Tapia along with you.    Sincerely,    MIRACLE Richards PA-C    Enclosure  CC:  No Recipients    If you would like to receive this communication electronically, please contact [email or web address] or [phone] to request more information on [email or web address] Link access.    For providers and/or their staff who would like to refer a patient to [facility], please contact us through our one-stop-shop provider referral line, [facility] , at [phone].    If you feel you have received this communication in error or would no longer like to receive these types of communications, please e-mail [email or web address]

## 2019-02-08 NOTE — PROGRESS NOTES
"CC: DM/Hypothyroidism    HPI: Maggy Tapia was diagnosed with Type 2 DM about 6 years ago. No hospitalizations for DM. Her mother had DM and thyroid disease.  Her mom recently  and her diet changed over the last 2 months. She has been diet controlled until one month ago.    New to endocrine.    CURRENT DIABETIC MEDS: metformin 1000 mg BID (just started one month ago)    BG readings are checked 2x daily.  Fastin  Bedtime: 175    Hypoglycemia: No  Type of Glucose Meter: True metrix    Physical Activity: none    Dietary Habits:  BF-cherrios  LH-cheese and hot mustard sandwich  DN-whatever her brother cooks  Doesn't snack. Recently stopped eating gummy bears. Drinks boost    Last Eye Exam:   Last Podiatry Exam:    Last DM Education Attended:     No ETOH/tobacco use.    Hypothyroidism  Takes at 3 pm  75 mcg  Dx in   No palpitations, hair loss or changes in nails, heat/cold intolerance, wt changes.  Some tremors, fatigue    DEXA  Osteopenia in the left hip. Taking ca +vd.    REVIEW OF SYSTEMS  General: no weakness or fatigue, wt gain.   Eyes: no intermittent blurry vision or visual disturbances.   Cardiac: no chest pain or palpitations.   Respiratory: no cough or dyspnea.   GI: no abdominal pain or nausea.   Skin: no rashes or itching.   Musc: + back pain  Neuro: no numbness or tingling.   Endocrine: no polyuria, polydipsia, polyphagia.   Remainder ROS negative    Vital Signs  /68 (BP Location: Right arm, Patient Position: Sitting, BP Method: Medium (Automatic))   Pulse 77   Temp 97.5 °F (36.4 °C) (Oral)   Resp 16   Ht 5' 1" (1.549 m)   Wt 91.7 kg (202 lb 4.4 oz)   BMI 38.22 kg/m²     Personally reviewed labs below:  Hemoglobin A1C   Date Value Ref Range Status   2019 9.6 (H) 4.0 - 5.6 % Final     Comment:     ADA Screening Guidelines:  5.7-6.4%  Consistent with prediabetes  >or=6.5%  Consistent with diabetes  High levels of fetal hemoglobin interfere with the " HbA1C  assay. Heterozygous hemoglobin variants (HbS, HgC, etc)do  not significantly interfere with this assay.   However, presence of multiple variants may affect accuracy.     06/28/2018 6.4 (H) 4.0 - 5.6 % Final     Comment:     ADA Screening Guidelines:  5.7-6.4%  Consistent with prediabetes  >or=6.5%  Consistent with diabetes  High levels of fetal hemoglobin interfere with the HbA1C  assay. Heterozygous hemoglobin variants (HbS, HgC, etc)do  not significantly interfere with this assay.   However, presence of multiple variants may affect accuracy.     12/07/2017 5.3 4.0 - 5.6 % Final     Comment:     According to ADA guidelines, hemoglobin A1c <7.0% represents  optimal control in non-pregnant diabetic patients. Different  metrics may apply to specific patient populations.   Standards of Medical Care in Diabetes-2016.  For the purpose of screening for the presence of diabetes:  <5.7%     Consistent with the absence of diabetes  5.7-6.4%  Consistent with increasing risk for diabetes   (prediabetes)  >or=6.5%  Consistent with diabetes  Currently, no consensus exists for use of hemoglobin A1c  for diagnosis of diabetes for children.  This Hemoglobin A1c assay has significant interference with fetal   hemoglobin   (HbF). The results are invalid for patients with abnormal amounts of   HbF,   including those with known Hereditary Persistence   of Fetal Hemoglobin. Heterozygous hemoglobin variants (HbAS, HbAC,   HbAD, HbAE, HbA2) do not significantly interfere with this assay;   however, presence of multiple variants in a sample may impact the %   interference.         Chemistry        Component Value Date/Time     12/24/2018 1114    K 4.5 12/24/2018 1114     12/24/2018 1114    CO2 30 (H) 12/24/2018 1114    BUN 22 12/24/2018 1114    CREATININE 1.1 12/24/2018 1114     (H) 12/24/2018 1114        Component Value Date/Time    CALCIUM 9.7 12/24/2018 1114    ALKPHOS 88 12/24/2018 1114    AST 9 (L) 12/24/2018  1114    ALT 10 12/24/2018 1114    BILITOT 0.4 12/24/2018 1114    ESTGFRAFRICA 59 (A) 12/24/2018 1114    EGFRNONAA 51 (A) 12/24/2018 1114        Lab Results   Component Value Date    CHOL 178 06/28/2018    CHOL 174 06/07/2018    CHOL 161 12/07/2017     Lab Results   Component Value Date    HDL 43 06/28/2018    HDL 40 06/07/2018    HDL 47 12/07/2017     Lab Results   Component Value Date    LDLCALC 104.2 06/28/2018    LDLCALC 104.2 06/07/2018    LDLCALC 102.8 12/07/2017     Lab Results   Component Value Date    TRIG 154 (H) 06/28/2018    TRIG 149 06/07/2018    TRIG 56 12/07/2017     Lab Results   Component Value Date    CHOLHDL 24.2 06/28/2018    CHOLHDL 23.0 06/07/2018    CHOLHDL 29.2 12/07/2017     Lab Results   Component Value Date    TSH 6.873 (H) 01/04/2019       No results found for: MICALBCREAT    No results found for: DDLXMAGV58PV    PHYSICAL EXAMINATION  Constitutional: elderly female, appears well, no distress  Neck: Supple, trachea midline.   Respiratory: even and unlabored, CTA without wheezes.  Cardiovascular: RRR; no carotid bruits or murmurs.   Lymph: DP pulses  2+ bilaterally; no edema.   Skin: warm and dry; no acanthosis nigracans observed.  Neuro: patient alert and cooperative; CN 2-12 grossly intact  Feet: appropriate footwear.    Assessment/Plan    1. Type 2 diabetes mellitus without complication, with long-term current use of insulin  SITagliptin (JANUVIA) 100 MG Tab   2. Acquired hypothyroidism     3. Osteopenia, unspecified location     4. Overactive bladder  Ambulatory Referral to Urology   5. JUAN (obstructive sleep apnea)       T2DM- A1c is elevated. Continue metformin. Start Januvia 100 mg daily. BG checks 1x daily. Hypotglycemia treatment discussed. Advised to decrease intake of boost.  Hypothyroidism-TSH is elevated. Advised to change time of medication to 3-5 am to prevent absorption issues. Recheck TFTs in 6 weeks.  Osteopenia-repeat DEXA. Start weight bearing and balance exercises.    OAB-referral to urology  FYT-bfeuhh-fiyfviqw cpap    F/u in 1 mth

## 2019-02-18 ENCOUNTER — APPOINTMENT (OUTPATIENT)
Dept: LAB | Facility: HOSPITAL | Age: 69
End: 2019-02-18
Attending: NURSE PRACTITIONER
Payer: MEDICARE

## 2019-02-18 ENCOUNTER — OFFICE VISIT (OUTPATIENT)
Dept: UROLOGY | Facility: CLINIC | Age: 69
End: 2019-02-18
Payer: MEDICARE

## 2019-02-18 VITALS
TEMPERATURE: 97 F | WEIGHT: 198.19 LBS | BODY MASS INDEX: 37.42 KG/M2 | DIASTOLIC BLOOD PRESSURE: 61 MMHG | SYSTOLIC BLOOD PRESSURE: 90 MMHG | HEIGHT: 61 IN | HEART RATE: 83 BPM | RESPIRATION RATE: 18 BRPM

## 2019-02-18 DIAGNOSIS — N32.81 OVERACTIVE BLADDER: Primary | ICD-10-CM

## 2019-02-18 DIAGNOSIS — R10.9 FLANK PAIN: ICD-10-CM

## 2019-02-18 DIAGNOSIS — N39.45 CONTINUOUS LEAKAGE OF URINE: ICD-10-CM

## 2019-02-18 DIAGNOSIS — M54.89 MIDLINE BACK PAIN, UNSPECIFIED BACK LOCATION, UNSPECIFIED CHRONICITY: ICD-10-CM

## 2019-02-18 LAB
BACTERIA #/AREA URNS HPF: ABNORMAL /HPF
BILIRUB SERPL-MCNC: ABNORMAL MG/DL
BILIRUB UR QL STRIP: NEGATIVE
BLOOD URINE, POC: ABNORMAL
CLARITY UR: CLEAR
COLOR UR: YELLOW
COLOR, POC UA: YELLOW
GLUCOSE UR QL STRIP: ABNORMAL
GLUCOSE UR QL STRIP: NEGATIVE
HGB UR QL STRIP: ABNORMAL
KETONES UR QL STRIP: ABNORMAL
KETONES UR QL STRIP: ABNORMAL
LEUKOCYTE ESTERASE UR QL STRIP: ABNORMAL
LEUKOCYTE ESTERASE URINE, POC: ABNORMAL
MICROSCOPIC COMMENT: ABNORMAL
NITRITE UR QL STRIP: NEGATIVE
NITRITE, POC UA: ABNORMAL
PH UR STRIP: 6 [PH] (ref 5–8)
PH, POC UA: 5.5
PROT UR QL STRIP: NEGATIVE
PROTEIN, POC: ABNORMAL
RBC #/AREA URNS HPF: 2 /HPF (ref 0–4)
SP GR UR STRIP: 1.01 (ref 1–1.03)
SPECIFIC GRAVITY, POC UA: 1.02
SQUAMOUS #/AREA URNS HPF: 3 /HPF
URN SPEC COLLECT METH UR: ABNORMAL
UROBILINOGEN UR STRIP-ACNC: NEGATIVE EU/DL
UROBILINOGEN, POC UA: 0.2
WBC #/AREA URNS HPF: 8 /HPF (ref 0–5)

## 2019-02-18 PROCEDURE — 1101F PR PT FALLS ASSESS DOC 0-1 FALLS W/OUT INJ PAST YR: ICD-10-PCS | Mod: CPTII,S$GLB,, | Performed by: NURSE PRACTITIONER

## 2019-02-18 PROCEDURE — 99999 PR PBB SHADOW E&M-EST. PATIENT-LVL V: CPT | Mod: PBBFAC,,, | Performed by: NURSE PRACTITIONER

## 2019-02-18 PROCEDURE — 99999 PR PBB SHADOW E&M-EST. PATIENT-LVL V: ICD-10-PCS | Mod: PBBFAC,,, | Performed by: NURSE PRACTITIONER

## 2019-02-18 PROCEDURE — 81000 URINALYSIS NONAUTO W/SCOPE: CPT

## 2019-02-18 PROCEDURE — 81002 POCT URINE DIPSTICK WITHOUT MICROSCOPE: ICD-10-PCS | Mod: S$GLB,,, | Performed by: NURSE PRACTITIONER

## 2019-02-18 PROCEDURE — 3074F PR MOST RECENT SYSTOLIC BLOOD PRESSURE < 130 MM HG: ICD-10-PCS | Mod: CPTII,S$GLB,, | Performed by: NURSE PRACTITIONER

## 2019-02-18 PROCEDURE — 99204 PR OFFICE/OUTPT VISIT, NEW, LEVL IV, 45-59 MIN: ICD-10-PCS | Mod: 25,S$GLB,, | Performed by: NURSE PRACTITIONER

## 2019-02-18 PROCEDURE — 99204 OFFICE O/P NEW MOD 45 MIN: CPT | Mod: 25,S$GLB,, | Performed by: NURSE PRACTITIONER

## 2019-02-18 PROCEDURE — 3078F DIAST BP <80 MM HG: CPT | Mod: CPTII,S$GLB,, | Performed by: NURSE PRACTITIONER

## 2019-02-18 PROCEDURE — 1101F PT FALLS ASSESS-DOCD LE1/YR: CPT | Mod: CPTII,S$GLB,, | Performed by: NURSE PRACTITIONER

## 2019-02-18 PROCEDURE — 81002 URINALYSIS NONAUTO W/O SCOPE: CPT | Mod: S$GLB,,, | Performed by: NURSE PRACTITIONER

## 2019-02-18 PROCEDURE — 3074F SYST BP LT 130 MM HG: CPT | Mod: CPTII,S$GLB,, | Performed by: NURSE PRACTITIONER

## 2019-02-18 PROCEDURE — 3078F PR MOST RECENT DIASTOLIC BLOOD PRESSURE < 80 MM HG: ICD-10-PCS | Mod: CPTII,S$GLB,, | Performed by: NURSE PRACTITIONER

## 2019-02-18 PROCEDURE — 87086 URINE CULTURE/COLONY COUNT: CPT

## 2019-02-18 RX ORDER — OXYBUTYNIN CHLORIDE 10 MG/1
10 TABLET, EXTENDED RELEASE ORAL DAILY
Qty: 30 TABLET | Refills: 12 | Status: SHIPPED | OUTPATIENT
Start: 2019-02-18 | End: 2019-10-03 | Stop reason: SDUPTHER

## 2019-02-18 NOTE — LETTER
February 18, 2019      MIRACLE Richards PA-C  [address]  [place] [address]           [facility]  [address] [place] [address] [address]  [place] [address]  Phone: [phone]  Fax: [phone]          Patient: Maggy Tapia   MR Number: [id number]   YOB: 1950   Date of Visit: 2/18/2019       Dear MIRACLE Richards:    Thank you for referring Maggy Tapia to me for evaluation. Attached you will find relevant portions of my assessment and plan of care.    If you have questions, please do not hesitate to call me. I look forward to following Maggy Tapia along with you.    Sincerely,    Galilea Godinez, [facility]    Enclosure  CC:  No Recipients    If you would like to receive this communication electronically, please contact [email or web address] or [phone] to request more information on [employer] Link access.    For providers and/or their staff who would like to refer a patient to [facility], please contact us through our one-stop-shop provider referral line, [facility], at [phone].    If you feel you have received this communication in error or would no longer like to receive these types of communications, please e-mail [email or web address]

## 2019-02-18 NOTE — PROGRESS NOTES
"[facility] Note  Staff: DAVEY ValdesP-C    PCP: Dr. Yanna Abbasi    Chief Complaint: Urinary incontinence    Subjective:        HPI: Maggy Tapia is a 69 y.o. female NEW PATIENT presents with   C/o urinary incontinence x one year.  She is here today with her significant other.    Questions asked pt during office visit today:  DTF: Yes, NTF: 4x night  Urgency:Yes, incontinence with urgency? Yes;   Incontinence with laughing or straining: Yes - sometimes;   If yes, how many pads/day? Changes out 5 pads per day.  G0, P 0, vaginal or   Gross Hematuria:  No  History of UTI: Yes     Pt's Medical Hx includes:  Pt has had "2" strokes, last being in 2017.  Pt gets around with rollator.  Pt has diabetes mellitus, currently being managed by endocrinology.  Last Hgb A1C done 2019 was 9.6!  Glucose was 229!    REVIEW OF SYSTEMS:  A comprehensive 10 system review was performed and is negative except as noted above in HPI    PMHx:  Past Medical History:   Diagnosis Date    Anxiety     Arthritis     Asthma     Cancer 2000    Left Breast    Cataract     Depression     Diabetes mellitus, type 2     GERD (gastroesophageal reflux disease)     Hyperlipidemia     Hypertension     Overactive bladder     Seizures     Pseudo-seizures    Stroke     Thyroid disease     Urinary tract infection without hematuria 8/3/2017     Kidney stones: Yes - last episode of stones was many years ago.  She has always passed stones on her own.    PSHx:  Past Surgical History:   Procedure Laterality Date    APPENDECTOMY      BREAST SURGERY       SECTION      CHOLECYSTECTOMY      CRANIOTOMY Left 2016    [facility]    DILATION AND CURETTAGE OF UTERUS      BZOFJQPQAI-LSUIFSEK-CJRR HOLES Left 8/3/2016    [facility]    QOMVZKSXDP-[id number]-CBCZELDL Left 2016    [facility]    EYE " SURGERY       Stents/Valves/Foreign Bodies: No  Cardiac Evaluation: No    Screening Studies  Colonoscopy: Yes, normal findings    Fam Hx:   malignancies: No     Allergies:  Penicillins and Sulfa (sulfonamide antibiotics)    Medications: reviewed   Anticoagulation: Yes - Ecotrin 81 mg one tablet daily    Objective:     Vitals:    02/18/19 1308   BP: 90/61   Pulse: 83   Resp: 18   Temp: 97.4 °F (36.3 °C)     Physical Exam   Vitals reviewed.  Constitutional: She is oriented to person, place, and time. She appears well-developed and well-nourished.   HENT:   Head: Normocephalic and atraumatic.   Eyes: Conjunctivae and EOM are normal. Pupils are equal, round, and reactive to light.   Neck: Normal range of motion. Neck supple.   Cardiovascular: Normal rate, regular rhythm, normal heart sounds and intact distal pulses.    Pulmonary/Chest: Effort normal and breath sounds normal.   Abdominal: Soft. Bowel sounds are normal.   Musculoskeletal: Normal range of motion.   Neurological: She is alert and oriented to person, place, and time. She has normal reflexes.   Skin: Skin is warm and dry.     Psychiatric: She has a normal mood and affect. Her behavior is normal. Judgment and thought content normal.     LABS REVIEW:  UA today:   Color:Clear, Yellow  Spec. Grav.  1.020  PH  5.5  Large amt of leukocytes  Trace of Ketones  Trace of Blood    Cr:   Lab Results   Component Value Date    CREATININE 1.1 12/24/2018     Assessment:       1. Overactive bladder    2. Flank pain    3. Midline back pain, unspecified back location, unspecified chronicity    4. Continuous leakage of urine          Plan:   Urinary incontinence:    1.  UA, UA Micro, Urine culture to be performed.  2.  CT Renal Stone Study to be done.  3.  Oxybutynin XL 10 mg one tablet daily started today for her LUTS.  BUT also pt and family were advised that with uncontrollable diabetes can definitely have an affect on her lower urinary tract symptoms as well as her hx of  stroke episodes.  Pt and family verbalized understanding.  Next labs are due in April.    F/u We will contact the pt after we review her urine and CT scan results to schedule her f/up visit.    [facility]: Pt Declined    PREETI Powell

## 2019-02-18 NOTE — PATIENT INSTRUCTIONS
Overactive Bladder Syndrome (OAB)     Normally, urine stays in the bladder until a person decides to release it. With OAB, the bladder muscles contract involuntarily, causing a sudden urge to urinate and even urine leakage.   When the bladder muscle contract or squeeze involuntarily, it is called overactive bladder syndrome. This causes an intense urge to urinate, known as urgency. Urgency can occur many times during the day and night. If urine leaks with the urgency, it is called urge incontinence.  A disease that affects the bladder nerves, such as multiple sclerosis, can cause overactive bladder syndrome. Other conditions, such as urinary tract infection (UTI) or prostate problems in men, can also lead to OAB. But the exact cause is often not known.  How is overactive bladder syndrome diagnosed?  Your healthcare provider will examine you and ask about your symptoms and health history. You may also have one or more of the following:  · Urine test to take samples of urine and have them checked for problems.  · Urinary diary to record how much fluid you take in and urinate out in a 3 day period.  · Bladder ultrasound to study the bladder as it empties. Ultrasound uses sound waves to create detailed images of the inside of the body.  · Cystoscopy to allow the healthcare provider to look for problems in the urinary tract. The test uses a thin, flexible scope called a cystoscope with a light and camera on the end. The scope is inserted into the urethra (the tube that carries urine out of the body).  · Urodynamic studies, a battery of tests designed to measure and record many aspects of urinary bladder function, including pressures, volume, and urine flow.  How is overactive bladder syndrome treated?  Treatment depends on the cause and severity of your OAB. Treatments may include the following:  · Changing urination habits may be suggested. For instance, your healthcare provider may suggest that you urinate as soon as  you feel the urge. You may also need to limit how much fluid you have during the day.  · Exercising your pelvic muscles can help strengthen muscles used during urination. These exercises are called Kegels. They involve jaime as if you were stopping your urine stream and tightening your rectum as if trying not to pass gas. Your healthcare provider can help you learn how to do Kegels.  · Biofeedback to help you learn to control the movement of your bladder muscles. Sensors are placed on your abdomen. They turn signals given off by your muscles into lines on a computer screen.  · Medicine may be given to relax the bladder muscle. Medicine can also help ease bladder contractions, which reduces the urge to urinate.  · Neuromodulation may be done if medicine and behavioral changes dont work. Electrical pulses are sent to the sacral nerves (nerves that affect the pelvic area). These pulses help relieve OAB and urge incontinence.  · Surgery to make the bladder larger may be done in severe cases.  With treatment, OAB can be managed. A condition, such as UTI, that has caused you to have OAB will be treated. Treatment may involve taking medicine for months or years. You may also need to make changes in your daily routine. This may include going to the bathroom more often than you think you need to. Or, you may need to cut back on caffeine and alcohol because these can make symptoms worse. Your healthcare provider can tell you more.     When to call your healthcare provider  Call the healthcare provider right away if you have any of the following:  · Fever of 100.4°F (38.0 °C) or higher   · No improvement with treatment  · Trouble urinating because of pain  · Back or abdominal pain   Date Last Reviewed: 1/1/2017 © 2000-2017 [employer] [address] [place] [address], [place] [address]. All rights reserved. This information is not intended as a substitute for professional medical care. Always follow your  healthcare professional's instructions.        Treating Incontinence in Women: Nonsurgical Methods    The best treatment for you will depend on the type of incontinence you have. Your symptoms, age, and any underlying problems that are found also affect your treatment. While some types of incontinence may eventually require surgery, nonsurgical treatments may be effective in many cases. Nonsurgical treatments include lifestyle changes, muscle-strengthening exercises, and medicines.  Nonsurgical Treatments  Treatment for stress urinary incontinence includes:  · Bladder training  · Lifestyle changes such as weight loss and increased activity if incontinence is due to being overweight  · Medicines, if bladder training has not helped  · Pelvic floor muscle exercises  Lifestyle changes  · Losing weight. Excess weight puts extra pressure on the pelvic floor muscles. Exercising and eating right can help you lose weight. This helps other treatments work better.  · Making certain diet changes. Some foods may make you need to urinate more, so it may be good to avoid them. These include caffeinated drinks and alcohol. Ask your healthcare provider whether these or other diet changes might be helpful.  · Quitting smoking. Smoking can lead to a chronic cough that strains pelvic floor muscles. Smoking may also damage the bladder and urethra.  Pelvic floor musle exercises  There are exercises you can do to help strengthen your pelvic floor muscles. The pelvic floor muscles act as a sling to help hold the bladder and urethra in place. These muscles also help keep the urethra closed. Weak pelvic floor muscles may allow urine to leak. To strengthen the pelvic floor muscles, do the exercises daily. In a few months, the muscles will be stronger and tighter. This can help prevent urine leakage.  Date Last Reviewed: 1/1/2017  © 2673-5701 [employer]. [address] [place] [address], [place] [address]. All rights reserved. This  information is not intended as a substitute for professional medical care. Always follow your healthcare professional's instructions.        Treating Incontinence in Women with Medicine    Urinary incontinence is the leaking of urine from the bladder. In some cases, medicine can reduce or stop the leaking. It is mainly given for urge incontinence. Your healthcare provider will talk with you about your options. Make sure to ask what side effects to expect.  Below are some types of medicines that may help with urge incontinence.  Types of medicine  · Anticholinergics. These may increase how much urine the bladder can hold. They may also help relax bladder muscles.  · Estrogen. This may help improve muscle tone in the urethra and bladder.  · Antibiotics. These are used to treat urinary tract infections.  · Botulinum toxin. Injection of botulinum toxin into the bladder muscle is an option when other medicines are not effective.  Tips for taking medicine  · Take your medicine on time and as your healthcare provider tell you to.  · Tell your healthcare provider if you have any side effects, your dosage may be adjusted if necessary.  · Be patient. It may take time to find the right dose for you.  · Keep a list of the medicines you take. Show it to your healthcare provider and pharmacist before you buy over-the-counter medicines.   Date Last Reviewed: 1/1/2017  © 9012-3528 [employer]. [address] [place] [address], [place] [address]. All rights reserved. This information is not intended as a substitute for professional medical care. Always follow your healthcare professional's instructions.

## 2019-02-19 ENCOUNTER — HOSPITAL ENCOUNTER (OUTPATIENT)
Dept: RADIOLOGY | Facility: HOSPITAL | Age: 69
Discharge: HOME | End: 2019-02-19
Attending: NURSE PRACTITIONER
Payer: MEDICARE

## 2019-02-19 ENCOUNTER — TELEPHONE (OUTPATIENT)
Dept: UROLOGY | Facility: CLINIC | Age: 69
End: 2019-02-19

## 2019-02-19 DIAGNOSIS — R10.9 FLANK PAIN: ICD-10-CM

## 2019-02-19 PROCEDURE — 74176 CT ABD & PELVIS W/O CONTRAST: CPT | Mod: 26,,, | Performed by: RADIOLOGY

## 2019-02-19 PROCEDURE — 74176 CT RENAL STONE STUDY ABD PELVIS WO: ICD-10-PCS | Mod: 26,,, | Performed by: RADIOLOGY

## 2019-02-19 PROCEDURE — 74176 CT ABD & PELVIS W/O CONTRAST: CPT | Mod: TC

## 2019-02-19 NOTE — TELEPHONE ENCOUNTER
Called patient to give imaging results and inform her that she would be contacted with urine results when they are in. Patient voiced understanding.

## 2019-02-20 LAB
BACTERIA UR CULT: NORMAL
BACTERIA UR CULT: NORMAL

## 2019-02-26 NOTE — PROCEDURES
DATE OF PROCEDURE:  01/10/2019.    DURATION:  62 minutes.    ELECTROENCEPHALOGRAM REPORT    METHODOLOGY:  Electroencephalographic (EEG) recording is recorded with   electrodes placed according to the International 10-20 placement system.  Thirty   two (32) channels of digital signal (sampling rate of 512/sec), including T1   and T2, were simultaneously recorded from the scalp and may include EKG, EMG,   and/or eye monitors.  Recording band pass was 0.1 to 512 Hz.  Digital video   recording of the patient is simultaneously recorded with the EEG.  The patient   is instructed to report clinical symptoms which may occur during the recording   session.  EEG and video recording are stored and archived in digital format.    Activation procedures, which include photic stimulation, hyperventilation and   instructing patients to perform simple tasks, are done in selected patients.  The EEG is displayed on a monitor screen and can be reviewed using different   montages.  Computer assisted-analysis is employed to detect spike and   electrographic seizure activity.   The entire record is submitted for computer   analysis.  The entire recording is visually reviewed, and the times identified   by computer analysis as being spikes or seizures are reviewed again.  Compressed spectral analysis (CSA) is also performed on the activity recorded   from each individual channel.  This is displayed as a power display of   frequencies from 0 to 30 Hz over time.   The CSA is reviewed looking for   asymmetries in power between homologous areas of the scalp, then compared with   the original EEG recording.  [employer] software was also utilized in the review of this study.  This software   suite analyzes the EEG recording in multiple domains.  Coherence and rhythmicity   are computed to identify EEG sections which may contain organized seizures.    Each channel undergoes analysis to detect the presence of spike and sharp waves   which have special  and morphological characteristics of epileptic activity.  The   routine EEG recording is converted from special into frequency domain.  This is   then displayed comparing homologous areas to identify areas of significant   asymmetry.  Algorithm to identify non-cortically generated artifact is used to   separate artifact from the EEG.    EEG FINDINGS:  This study is asymmetrical at baseline and reveals higher   amplitudes with sharper morphology seen in the left hemisphere compared to the   right, particularly posteriorly, suggesting a breach artifact.  This is   persistent throughout the entire study.    Otherwise, the study consists of a mix of alpha and beta activity, although the   patient appears to be drowsy much of the study with some low amplitude delta   dominating at times with superimposed beta also seen.  K complexes and sleep   spindles are also seen.  These are asymmetrical with higher amplitudes in the   left hemisphere, most likely attributable to the breach artifact.  Higher   amplitude sharp semi-rhythmic activity is noted in the left posterior quadrant   at times, not felt to be epileptiform in nature and exaggerated by the breach.    The patient was most alert at the very beginning of this study with   predominantly alpha and beta activity seen during photic stimulation when was   drowsy throughout the remainder of the study.  No clear epileptiform discharges   or seizures were seen at any point.    INTERPRETATION:  Abnormal EEG due to left hemispheric and breach artifact with   some left hemispheric slowing as well, but no epileptiform activity was   captured.      NBB/IN  dd: 02/26/2019 08:13:03 (CST)  td: 02/26/2019 11:34:44 (CST)  Doc ID   #[id number]  Job ID #[id number]    CC:

## 2019-03-07 ENCOUNTER — OFFICE VISIT (OUTPATIENT)
Dept: ENDOCRINOLOGY | Facility: CLINIC | Age: 69
End: 2019-03-07
Payer: MEDICARE

## 2019-03-07 VITALS
WEIGHT: 197.31 LBS | DIASTOLIC BLOOD PRESSURE: 89 MMHG | RESPIRATION RATE: 18 BRPM | HEIGHT: 61 IN | HEART RATE: 107 BPM | SYSTOLIC BLOOD PRESSURE: 134 MMHG | TEMPERATURE: 98 F | BODY MASS INDEX: 37.25 KG/M2

## 2019-03-07 DIAGNOSIS — M85.80 OSTEOPENIA, UNSPECIFIED LOCATION: ICD-10-CM

## 2019-03-07 DIAGNOSIS — E11.9 TYPE 2 DIABETES MELLITUS WITHOUT COMPLICATION, WITH LONG-TERM CURRENT USE OF INSULIN: Primary | ICD-10-CM

## 2019-03-07 DIAGNOSIS — G47.33 OSA (OBSTRUCTIVE SLEEP APNEA): ICD-10-CM

## 2019-03-07 DIAGNOSIS — Z79.4 TYPE 2 DIABETES MELLITUS WITHOUT COMPLICATION, WITH LONG-TERM CURRENT USE OF INSULIN: Primary | ICD-10-CM

## 2019-03-07 DIAGNOSIS — E03.9 ACQUIRED HYPOTHYROIDISM: ICD-10-CM

## 2019-03-07 DIAGNOSIS — E55.9 HYPOVITAMINOSIS D: ICD-10-CM

## 2019-03-07 PROCEDURE — 3079F PR MOST RECENT DIASTOLIC BLOOD PRESSURE 80-89 MM HG: ICD-10-PCS | Mod: CPTII,S$GLB,, | Performed by: PHYSICIAN ASSISTANT

## 2019-03-07 PROCEDURE — 1101F PR PT FALLS ASSESS DOC 0-1 FALLS W/OUT INJ PAST YR: ICD-10-PCS | Mod: CPTII,S$GLB,, | Performed by: PHYSICIAN ASSISTANT

## 2019-03-07 PROCEDURE — 99499 RISK ADDL DX/OHS AUDIT: ICD-10-PCS | Mod: S$GLB,,, | Performed by: PHYSICIAN ASSISTANT

## 2019-03-07 PROCEDURE — 3046F HEMOGLOBIN A1C LEVEL >9.0%: CPT | Mod: CPTII,S$GLB,, | Performed by: PHYSICIAN ASSISTANT

## 2019-03-07 PROCEDURE — 3046F PR MOST RECENT HEMOGLOBIN A1C LEVEL > 9.0%: ICD-10-PCS | Mod: CPTII,S$GLB,, | Performed by: PHYSICIAN ASSISTANT

## 2019-03-07 PROCEDURE — 3075F PR MOST RECENT SYSTOLIC BLOOD PRESS GE 130-139MM HG: ICD-10-PCS | Mod: CPTII,S$GLB,, | Performed by: PHYSICIAN ASSISTANT

## 2019-03-07 PROCEDURE — 3079F DIAST BP 80-89 MM HG: CPT | Mod: CPTII,S$GLB,, | Performed by: PHYSICIAN ASSISTANT

## 2019-03-07 PROCEDURE — 99213 OFFICE O/P EST LOW 20 MIN: CPT | Mod: S$GLB,,, | Performed by: PHYSICIAN ASSISTANT

## 2019-03-07 PROCEDURE — 3075F SYST BP GE 130 - 139MM HG: CPT | Mod: CPTII,S$GLB,, | Performed by: PHYSICIAN ASSISTANT

## 2019-03-07 PROCEDURE — 99999 PR PBB SHADOW E&M-EST. PATIENT-LVL III: ICD-10-PCS | Mod: PBBFAC,,, | Performed by: PHYSICIAN ASSISTANT

## 2019-03-07 PROCEDURE — 99213 PR OFFICE/OUTPT VISIT, EST, LEVL III, 20-29 MIN: ICD-10-PCS | Mod: S$GLB,,, | Performed by: PHYSICIAN ASSISTANT

## 2019-03-07 PROCEDURE — 1101F PT FALLS ASSESS-DOCD LE1/YR: CPT | Mod: CPTII,S$GLB,, | Performed by: PHYSICIAN ASSISTANT

## 2019-03-07 PROCEDURE — 99999 PR PBB SHADOW E&M-EST. PATIENT-LVL III: CPT | Mod: PBBFAC,,, | Performed by: PHYSICIAN ASSISTANT

## 2019-03-07 PROCEDURE — 99499 UNLISTED E&M SERVICE: CPT | Mod: S$GLB,,, | Performed by: PHYSICIAN ASSISTANT

## 2019-03-07 NOTE — PROGRESS NOTES
"CC: DM/Hypothyroidism    HPI: Maggy Tapia was diagnosed with Type 2 DM about 6 years ago. No hospitalizations for DM. Her mother had DM and thyroid disease.  Her mom recently  and her diet changed over the last 2 months. She has been diet controlled until one month ago.     At last visit, she had been taking metformin 2 g daily for one month. At her visit with me she started Januvia 100 mg daily.  She is taking januvia at 3 am and levothyroxine at lunch. BP is elevated today, she did not take her medication and has a toothache. Arrives with her brother.      CURRENT DIABETIC MEDS: metformin 1000 mg BID (just started one month ago)    BG readings are checked 2x daily.          Hypoglycemia: Yes, ~60 before breakfast and supper. She will eat a meal to relive the symptoms.  Type of Glucose Meter: True metrix    Physical Activity: none    Last Eye Exam:   Last Podiatry Exam:    Last DM Education Attended:     No ETOH/tobacco use.    Hypothyroidism  Takes at 3 pm  75 mcg  Dx in   No palpitations, hair loss or changes in nails, heat/cold intolerance, wt changes.  Some tremors, fatigue    DEXA  Osteopenia in the left hip. Taking ca +vd. She will have a DEXA soon on .     REVIEW OF SYSTEMS  General: no weakness or fatigue, wt gain.   Eyes: no intermittent blurry vision or visual disturbances.   Cardiac: no chest pain or palpitations.   Respiratory: no cough or dyspnea.   GI: no abdominal pain or nausea.   Skin: no rashes or itching.   Musc: + back pain  Neuro: no numbness or tingling.   Endocrine: no polyuria, polydipsia, polyphagia.   Remainder ROS negative    Vital Signs  /89 (BP Location: Left arm, Patient Position: Sitting, BP Method: Large (Automatic))   Pulse 107   Temp 97.8 °F (36.6 °C) (Oral)   Resp 18   Ht 5' 1" (1.549 m)   Wt 89.5 kg (197 lb 5 oz)   BMI 37.28 kg/m²     Personally reviewed labs below:  Hemoglobin A1C   Date Value Ref Range Status   2019 9.6 (H) 4.0 " - 5.6 % Final     Comment:     ADA Screening Guidelines:  5.7-6.4%  Consistent with prediabetes  >or=6.5%  Consistent with diabetes  High levels of fetal hemoglobin interfere with the HbA1C  assay. Heterozygous hemoglobin variants (HbS, HgC, etc)do  not significantly interfere with this assay.   However, presence of multiple variants may affect accuracy.     06/28/2018 6.4 (H) 4.0 - 5.6 % Final     Comment:     ADA Screening Guidelines:  5.7-6.4%  Consistent with prediabetes  >or=6.5%  Consistent with diabetes  High levels of fetal hemoglobin interfere with the HbA1C  assay. Heterozygous hemoglobin variants (HbS, HgC, etc)do  not significantly interfere with this assay.   However, presence of multiple variants may affect accuracy.     12/07/2017 5.3 4.0 - 5.6 % Final     Comment:     According to ADA guidelines, hemoglobin A1c <7.0% represents  optimal control in non-pregnant diabetic patients. Different  metrics may apply to specific patient populations.   Standards of Medical Care in Diabetes-2016.  For the purpose of screening for the presence of diabetes:  <5.7%     Consistent with the absence of diabetes  5.7-6.4%  Consistent with increasing risk for diabetes   (prediabetes)  >or=6.5%  Consistent with diabetes  Currently, no consensus exists for use of hemoglobin A1c  for diagnosis of diabetes for children.  This Hemoglobin A1c assay has significant interference with fetal   hemoglobin   (HbF). The results are invalid for patients with abnormal amounts of   HbF,   including those with known Hereditary Persistence   of Fetal Hemoglobin. Heterozygous hemoglobin variants (HbAS, HbAC,   HbAD, HbAE, HbA2) do not significantly interfere with this assay;   however, presence of multiple variants in a sample may impact the %   interference.         Chemistry        Component Value Date/Time     12/24/2018 1114    K 4.5 12/24/2018 1114     12/24/2018 1114    CO2 30 (H) 12/24/2018 1114    BUN 22 12/24/2018  1114    CREATININE 1.1 12/24/2018 1114     (H) 12/24/2018 1114        Component Value Date/Time    CALCIUM 9.7 12/24/2018 1114    ALKPHOS 88 12/24/2018 1114    AST 9 (L) 12/24/2018 1114    ALT 10 12/24/2018 1114    BILITOT 0.4 12/24/2018 1114    ESTGFRAFRICA 59 (A) 12/24/2018 1114    EGFRNONAA 51 (A) 12/24/2018 1114        Lab Results   Component Value Date    CHOL 178 06/28/2018    CHOL 174 06/07/2018    CHOL 161 12/07/2017     Lab Results   Component Value Date    HDL 43 06/28/2018    HDL 40 06/07/2018    HDL 47 12/07/2017     Lab Results   Component Value Date    LDLCALC 104.2 06/28/2018    LDLCALC 104.2 06/07/2018    LDLCALC 102.8 12/07/2017     Lab Results   Component Value Date    TRIG 154 (H) 06/28/2018    TRIG 149 06/07/2018    TRIG 56 12/07/2017     Lab Results   Component Value Date    CHOLHDL 24.2 06/28/2018    CHOLHDL 23.0 06/07/2018    CHOLHDL 29.2 12/07/2017     Lab Results   Component Value Date    TSH 6.873 (H) 01/04/2019     No results found for: MICALBCREAT    No results found for: [id number]    Previous exam 2/18  PHYSICAL EXAMINATION  Constitutional: elderly female, appears well, no distress  Neck: Supple, trachea midline.   Respiratory: even and unlabored, CTA without wheezes.  Cardiovascular: RRR; no carotid bruits or murmurs.   Lymph: DP pulses  2+ bilaterally; no edema.   Skin: warm and dry; no acanthosis nigracans observed.  Neuro: patient alert and cooperative; CN 2-12 grossly intact  Feet: appropriate footwear.    Assessment/Plan    1. Type 2 diabetes mellitus without complication, with long-term current use of insulin  SITagliptin (JANUVIA) 50 MG Tab   2. Acquired hypothyroidism     3. Osteopenia, unspecified location     4. JUAN (obstructive sleep apnea)       T2DM- A1c is elevated. Continue metformin. Decrease Januvia to 50 mg daily. Take Januvia in the morning. BG checks 1x daily.   Hypothyroidism-TSH is elevated. Advised to change time of medication to 3-5 am to prevent  absorption issues. She is having her TFTs rechecked on 4/1.   Osteopenia-repeat DEXA. Start weight bearing and balance exercises.   [id number] cpap    F/u in 3 mths

## 2019-03-12 ENCOUNTER — OFFICE VISIT (OUTPATIENT)
Dept: PODIATRY | Facility: CLINIC | Age: 69
End: 2019-03-12
Payer: MEDICARE

## 2019-03-12 VITALS — HEIGHT: 61 IN | BODY MASS INDEX: 37.25 KG/M2 | WEIGHT: 197.31 LBS

## 2019-03-12 DIAGNOSIS — M20.42 HAMMER TOES OF BOTH FEET: ICD-10-CM

## 2019-03-12 DIAGNOSIS — E11.42 DIABETIC POLYNEUROPATHY ASSOCIATED WITH TYPE 2 DIABETES MELLITUS: Primary | ICD-10-CM

## 2019-03-12 DIAGNOSIS — M20.41 HAMMER TOES OF BOTH FEET: ICD-10-CM

## 2019-03-12 DIAGNOSIS — B35.1 ONYCHOMYCOSIS DUE TO DERMATOPHYTE: ICD-10-CM

## 2019-03-12 DIAGNOSIS — M24.573 EQUINUS CONTRACTURE OF ANKLE: ICD-10-CM

## 2019-03-12 PROCEDURE — 3046F HEMOGLOBIN A1C LEVEL >9.0%: CPT | Mod: CPTII,S$GLB,, | Performed by: PODIATRIST

## 2019-03-12 PROCEDURE — 99213 OFFICE O/P EST LOW 20 MIN: CPT | Mod: 25,S$GLB,, | Performed by: PODIATRIST

## 2019-03-12 PROCEDURE — 99499 UNLISTED E&M SERVICE: CPT | Mod: S$GLB,,, | Performed by: PODIATRIST

## 2019-03-12 PROCEDURE — 99213 PR OFFICE/OUTPT VISIT, EST, LEVL III, 20-29 MIN: ICD-10-PCS | Mod: 25,S$GLB,, | Performed by: PODIATRIST

## 2019-03-12 PROCEDURE — 1101F PT FALLS ASSESS-DOCD LE1/YR: CPT | Mod: CPTII,S$GLB,, | Performed by: PODIATRIST

## 2019-03-12 PROCEDURE — 3046F PR MOST RECENT HEMOGLOBIN A1C LEVEL > 9.0%: ICD-10-PCS | Mod: CPTII,S$GLB,, | Performed by: PODIATRIST

## 2019-03-12 PROCEDURE — 99999 PR PBB SHADOW E&M-EST. PATIENT-LVL III: ICD-10-PCS | Mod: PBBFAC,,, | Performed by: PODIATRIST

## 2019-03-12 PROCEDURE — 99999 PR PBB SHADOW E&M-EST. PATIENT-LVL III: CPT | Mod: PBBFAC,,, | Performed by: PODIATRIST

## 2019-03-12 PROCEDURE — 1101F PR PT FALLS ASSESS DOC 0-1 FALLS W/OUT INJ PAST YR: ICD-10-PCS | Mod: CPTII,S$GLB,, | Performed by: PODIATRIST

## 2019-03-12 PROCEDURE — 99499 RISK ADDL DX/OHS AUDIT: ICD-10-PCS | Mod: S$GLB,,, | Performed by: PODIATRIST

## 2019-03-12 RX ORDER — CICLOPIROX 80 MG/ML
SOLUTION TOPICAL NIGHTLY
Qty: 6.6 ML | Refills: 11 | Status: SHIPPED | OUTPATIENT
Start: 2019-03-12 | End: 2019-05-24 | Stop reason: CLARIF

## 2019-03-12 NOTE — PROGRESS NOTES
Subjective:      Patient ID: Maggy Tapia is a 69 y.o. female.    Chief Complaint: Diabetic Foot Exam (routinw) and Diabetes Mellitus ([place]- 3/7/19)    Maggy is a 69 y.o. female who presents to the clinic for evaluation and treatment of high risk feet. Maggy has a past medical history of Anxiety, Arthritis, Asthma, Cancer (2000), Cataract, Depression, Diabetes mellitus, type 2, GERD (gastroesophageal reflux disease), Hyperlipidemia, Hypertension, Overactive bladder, Seizures, Stroke, Thyroid disease, and Urinary tract infection without hematuria (8/3/2017). The patient's chief complaint is long, thick toenails. Gradual onset, worsening over past several weeks, aggravated by increased weight bearing, shoe gear, pressure.  periodic debridement helps symptoms. Denies trauma, surgery..    PCP: Yanna Abbasi MD    Date Last Seen by PCP:   Chief Complaint   Patient presents with    Diabetic Foot Exam     routinw    Diabetes Mellitus     [place]- 3/7/19        Current shoe gear:  Affected Foot: Rx diabetic extra depth shoes and custom accommodative insoles     Unaffected Foot: Rx diabetic extra depth shoes and custom accommodative insoles    Hemoglobin A1C   Date Value Ref Range Status   01/04/2019 9.6 (H) 4.0 - 5.6 % Final     Comment:     ADA Screening Guidelines:  5.7-6.4%  Consistent with prediabetes  >or=6.5%  Consistent with diabetes  High levels of fetal hemoglobin interfere with the HbA1C  assay. Heterozygous hemoglobin variants (HbS, HgC, etc)do  not significantly interfere with this assay.   However, presence of multiple variants may affect accuracy.     06/28/2018 6.4 (H) 4.0 - 5.6 % Final     Comment:     ADA Screening Guidelines:  5.7-6.4%  Consistent with prediabetes  >or=6.5%  Consistent with diabetes  High levels of fetal hemoglobin interfere with the HbA1C  assay. Heterozygous hemoglobin variants (HbS, HgC, etc)do  not significantly interfere with this assay.   However, presence of multiple  variants may affect accuracy.     12/07/2017 5.3 4.0 - 5.6 % Final     Comment:     According to ADA guidelines, hemoglobin A1c <7.0% represents  optimal control in non-pregnant diabetic patients. Different  metrics may apply to specific patient populations.   Standards of Medical Care in Diabetes-2016.  For the purpose of screening for the presence of diabetes:  <5.7%     Consistent with the absence of diabetes  5.7-6.4%  Consistent with increasing risk for diabetes   (prediabetes)  >or=6.5%  Consistent with diabetes  Currently, no consensus exists for use of hemoglobin A1c  for diagnosis of diabetes for children.  This Hemoglobin A1c assay has significant interference with fetal   hemoglobin   (HbF). The results are invalid for patients with abnormal amounts of   HbF,   including those with known Hereditary Persistence   of Fetal Hemoglobin. Heterozygous hemoglobin variants (HbAS, HbAC,   HbAD, HbAE, HbA2) do not significantly interfere with this assay;   however, presence of multiple variants in a sample may impact the %   interference.         Review of Systems   Constitution: Negative for chills, diaphoresis, fever, malaise/fatigue and night sweats.   Cardiovascular: Negative for claudication, cyanosis, leg swelling and syncope.   Skin: Positive for nail changes. Negative for color change, dry skin, rash, suspicious lesions and unusual hair distribution.   Musculoskeletal: Negative for falls, joint pain, joint swelling, muscle cramps, muscle weakness and stiffness.   Gastrointestinal: Negative for constipation, diarrhea, nausea and vomiting.   Neurological: Positive for numbness, paresthesias and sensory change. Negative for brief paralysis, disturbances in coordination, focal weakness and tremors.           Objective:      Physical Exam   Constitutional: She is oriented to person, place, and time. She appears well-developed and well-nourished. She is cooperative. No distress.   Cardiovascular:   Pulses:        Popliteal pulses are 2+ on the right side, and 2+ on the left side.        Dorsalis pedis pulses are 1+ on the right side, and 1+ on the left side.        Posterior tibial pulses are 1+ on the right side, and 1+ on the left side.   Capillary refill 3 seconds all toes/distal feet, all toes/both feet warm to touch.      Negative lymphadenopathy bilateral popliteal fossa and tarsal tunnel.      Negavie lower extremity edema bilateral.     Musculoskeletal:        Right ankle: She exhibits normal range of motion, no swelling, no ecchymosis, no deformity, no laceration and normal pulse. Achilles tendon normal. Achilles tendon exhibits no pain, no defect and normal Segovia's test results.   Ankle dorsiflexion decreased at <10 degrees bilateral with moderate increase with knee flexion bilateral.    Patient has hammertoes of digits  3-5 bilateral                 partially reducible without symptom today.    Otherwise, Normal angle, base, station of gait.  Ambulates minimally with frequent falls and need for gait assist all times.     All ten toes without clubbing, cyanosis, or signs of ischemia.  No pain to palpation bilateral lower extremities.  Range of motion, stability, muscle strength, and muscle tone normal bilateral feet and legs.    Lymphadenopathy: No inguinal adenopathy noted on the right or left side.   Negative lymphadenopathy bilateral popliteal fossa and tarsal tunnel.    Negative lymphangitic streaking bilateral feet/ankles/legs.   Neurological: She is alert and oriented to person, place, and time. She has normal strength. She displays no atrophy and no tremor. A sensory deficit is present. She exhibits normal muscle tone. Gait normal.   Reflex Scores:       Patellar reflexes are 2+ on the right side and 2+ on the left side.       Achilles reflexes are 2+ on the right side and 2+ on the left side.  Diminished/loss of protective sensation all toes bilateral to 10 gram monofilament.    Paresthesias, and burning  bilateral feet with no clearly identified trigger or source.     Skin: Skin is warm, dry and intact. Capillary refill takes 2 to 3 seconds. No abrasion, no bruising, no burn, no ecchymosis, no laceration, no lesion and no rash noted. She is not diaphoretic. No cyanosis or erythema. No pallor. Nails show no clubbing.     Skin thin, shiny, atrophic, with decreased density and distribution of pedal hair bilateral, but without hyperpigmentation, perla discoloration,  ulcers, masses, nodules or cords palpated bilateral feet and legs.    Toenails 1st, 2nd, 3rd, 4th, 5th  bilateral are hypertrophic thickened 2-3 mm, dystrophic, discolored tanish brown with tan, gray crumbly subungual debris.  Tender to distal nail plate pressure, without periungual skin abnormality of each.     Psychiatric: She has a normal mood and affect.             Assessment:       Encounter Diagnoses   Name Primary?    Diabetic polyneuropathy associated with type 2 diabetes mellitus Yes    Onychomycosis due to dermatophyte     Hammer toes of both feet     Equinus contracture of ankle          Plan:       Maggy was seen today for diabetic foot exam and diabetes mellitus.    Diagnoses and all orders for this visit:    Diabetic polyneuropathy associated with type 2 diabetes mellitus  -     DIABETIC SHOES FOR HOME USE    Onychomycosis due to dermatophyte  -     DIABETIC SHOES FOR HOME USE    Hammer toes of both feet  -     DIABETIC SHOES FOR HOME USE    Equinus contracture of ankle  -     DIABETIC SHOES FOR HOME USE    Other orders  -     ciclopirox (PENLAC) 8 % Soln; Apply topically nightly.      I counseled the patient on her conditions, their implications and medical management.        - Shoe inspection. Diabetic Foot Education. Patient reminded of the importance of good nutrition and blood sugar control to help prevent podiatric complications of diabetes. Patient instructed on proper foot hygeine. We discussed wearing proper shoe gear, daily foot  inspections, never walking without protective shoe gear, never putting sharp instruments to feet, routine podiatric visits at least annually.    Discussed conservative treatment with shoes of adequate dimensions, material, and style to alleviate symptoms and delay or prevent surgical intervention.    Rx DM shoes, inserts, penlac.      - With patient's permission, nails were aggressively reduced and debrided x 10 to their soft tissue attachment mechanically and with electric , removing all offending nail and debris. Patient relates relief following the procedure. She will continue to monitor the areas daily, inspect her feet, wear protective shoe gear when ambulatory, moisturizer to maintain skin integrity and follow in this office p.r.n.          Follow-up in about 1 year (around 3/12/2020).

## 2019-03-13 ENCOUNTER — TELEPHONE (OUTPATIENT)
Dept: PODIATRY | Facility: CLINIC | Age: 69
End: 2019-03-13

## 2019-03-13 NOTE — TELEPHONE ENCOUNTER
----- Message from Cheli Coon sent at 3/13/2019  1:55 PM CDT -----  Contact: self   Patient lost shoe rx want to know if doctor can write her another one will  today, any questions please call back at [phone] (home)

## 2019-03-25 ENCOUNTER — PATIENT OUTREACH (OUTPATIENT)
Dept: ADMINISTRATIVE | Facility: HOSPITAL | Age: 69
End: 2019-03-25

## 2019-04-01 ENCOUNTER — HOSPITAL ENCOUNTER (OUTPATIENT)
Dept: RADIOLOGY | Facility: CLINIC | Age: 69
Discharge: HOME | End: 2019-04-01
Attending: FAMILY MEDICINE
Payer: MEDICARE

## 2019-04-01 DIAGNOSIS — N95.9 MENOPAUSAL AND PERIMENOPAUSAL DISORDER: ICD-10-CM

## 2019-04-01 PROCEDURE — 77080 DXA BONE DENSITY AXIAL: CPT | Mod: TC,PO

## 2019-04-01 PROCEDURE — 77080 DXA BONE DENSITY AXIAL: CPT | Mod: 26,,, | Performed by: RADIOLOGY

## 2019-04-01 PROCEDURE — 77080 DEXA BONE DENSITY SPINE HIP: ICD-10-PCS | Mod: 26,,, | Performed by: RADIOLOGY

## 2019-04-03 ENCOUNTER — NURSE TRIAGE (OUTPATIENT)
Dept: ADMINISTRATIVE | Facility: CLINIC | Age: 69
End: 2019-04-03

## 2019-04-03 ENCOUNTER — OFFICE VISIT (OUTPATIENT)
Dept: NEUROLOGY | Facility: CLINIC | Age: 69
End: 2019-04-03
Payer: MEDICARE

## 2019-04-03 ENCOUNTER — HOSPITAL ENCOUNTER (EMERGENCY)
Facility: HOSPITAL | Age: 69
Discharge: HOME | End: 2019-04-03
Attending: EMERGENCY MEDICINE
Payer: MEDICARE

## 2019-04-03 VITALS
HEIGHT: 61 IN | TEMPERATURE: 98 F | DIASTOLIC BLOOD PRESSURE: 65 MMHG | HEART RATE: 70 BPM | OXYGEN SATURATION: 96 % | RESPIRATION RATE: 24 BRPM | WEIGHT: 193 LBS | BODY MASS INDEX: 36.44 KG/M2 | SYSTOLIC BLOOD PRESSURE: 137 MMHG

## 2019-04-03 VITALS
BODY MASS INDEX: 36.51 KG/M2 | DIASTOLIC BLOOD PRESSURE: 65 MMHG | WEIGHT: 193.38 LBS | SYSTOLIC BLOOD PRESSURE: 137 MMHG | HEART RATE: 78 BPM | HEIGHT: 61 IN | RESPIRATION RATE: 20 BRPM

## 2019-04-03 DIAGNOSIS — G24.01 TARDIVE DYSKINESIA: ICD-10-CM

## 2019-04-03 DIAGNOSIS — R06.09 DYSPNEA ON EXERTION: Primary | ICD-10-CM

## 2019-04-03 DIAGNOSIS — I62.03 SUBDURAL HEMATOMA, CHRONIC: Primary | ICD-10-CM

## 2019-04-03 DIAGNOSIS — G47.33 OSA (OBSTRUCTIVE SLEEP APNEA): ICD-10-CM

## 2019-04-03 DIAGNOSIS — Z86.73 HISTORY OF ISCHEMIC LEFT MCA STROKE: ICD-10-CM

## 2019-04-03 DIAGNOSIS — R29.6 FREQUENT FALLS: ICD-10-CM

## 2019-04-03 DIAGNOSIS — R06.09 DYSPNEA ON EXERTION: ICD-10-CM

## 2019-04-03 LAB
ALBUMIN SERPL BCP-MCNC: 3.1 G/DL (ref 3.5–5.2)
ALP SERPL-CCNC: 48 U/L (ref 55–135)
ALT SERPL W/O P-5'-P-CCNC: 12 U/L (ref 10–44)
ANION GAP SERPL CALC-SCNC: 7 MMOL/L (ref 8–16)
AST SERPL-CCNC: 16 U/L (ref 10–40)
BASOPHILS # BLD AUTO: 0 K/UL (ref 0–0.2)
BASOPHILS NFR BLD: 0.5 % (ref 0–1.9)
BILIRUB SERPL-MCNC: 0.4 MG/DL (ref 0.1–1)
BUN SERPL-MCNC: 16 MG/DL (ref 8–23)
CALCIUM SERPL-MCNC: 9.7 MG/DL (ref 8.7–10.5)
CHLORIDE SERPL-SCNC: 106 MMOL/L (ref 95–110)
CO2 SERPL-SCNC: 29 MMOL/L (ref 23–29)
CREAT SERPL-MCNC: 1.1 MG/DL (ref 0.5–1.4)
DIFFERENTIAL METHOD: ABNORMAL
EOSINOPHIL # BLD AUTO: 0.2 K/UL (ref 0–0.5)
EOSINOPHIL NFR BLD: 2.2 % (ref 0–8)
ERYTHROCYTE [DISTWIDTH] IN BLOOD BY AUTOMATED COUNT: 15.4 % (ref 11.5–14.5)
EST. GFR  (AFRICAN AMERICAN): 58 ML/MIN/1.73 M^2
EST. GFR  (NON AFRICAN AMERICAN): 51 ML/MIN/1.73 M^2
GLUCOSE SERPL-MCNC: 129 MG/DL (ref 70–110)
HCT VFR BLD AUTO: 38.4 % (ref 37–48.5)
HGB BLD-MCNC: 12.6 G/DL (ref 12–16)
LYMPHOCYTES # BLD AUTO: 1.5 K/UL (ref 1–4.8)
LYMPHOCYTES NFR BLD: 21.6 % (ref 18–48)
MCH RBC QN AUTO: 31.7 PG (ref 27–31)
MCHC RBC AUTO-ENTMCNC: 32.8 G/DL (ref 32–36)
MCV RBC AUTO: 97 FL (ref 82–98)
MONOCYTES # BLD AUTO: 0.3 K/UL (ref 0.3–1)
MONOCYTES NFR BLD: 3.9 % (ref 4–15)
NEUTROPHILS # BLD AUTO: 5.1 K/UL (ref 1.8–7.7)
NEUTROPHILS NFR BLD: 71.8 % (ref 38–73)
PLATELET # BLD AUTO: 151 K/UL (ref 150–350)
PMV BLD AUTO: 8.4 FL (ref 9.2–12.9)
POTASSIUM SERPL-SCNC: 4.7 MMOL/L (ref 3.5–5.1)
PROT SERPL-MCNC: 6.3 G/DL (ref 6–8.4)
RBC # BLD AUTO: 3.97 M/UL (ref 4–5.4)
SODIUM SERPL-SCNC: 142 MMOL/L (ref 136–145)
WBC # BLD AUTO: 7 K/UL (ref 3.9–12.7)

## 2019-04-03 PROCEDURE — 3075F SYST BP GE 130 - 139MM HG: CPT | Mod: CPTII,S$GLB,, | Performed by: PSYCHIATRY & NEUROLOGY

## 2019-04-03 PROCEDURE — 3078F DIAST BP <80 MM HG: CPT | Mod: CPTII,S$GLB,, | Performed by: PSYCHIATRY & NEUROLOGY

## 2019-04-03 PROCEDURE — 80053 COMPREHEN METABOLIC PANEL: CPT

## 2019-04-03 PROCEDURE — 99999 PR PBB SHADOW E&M-EST. PATIENT-LVL III: ICD-10-PCS | Mod: PBBFAC,,, | Performed by: PSYCHIATRY & NEUROLOGY

## 2019-04-03 PROCEDURE — 1100F PR PT FALLS ASSESS DOC 2+ FALLS/FALL W/INJURY/YR: ICD-10-PCS | Mod: CPTII,S$GLB,, | Performed by: PSYCHIATRY & NEUROLOGY

## 2019-04-03 PROCEDURE — 99499 RISK ADDL DX/OHS AUDIT: ICD-10-PCS | Mod: S$GLB,,, | Performed by: PSYCHIATRY & NEUROLOGY

## 2019-04-03 PROCEDURE — 99499 UNLISTED E&M SERVICE: CPT | Mod: S$GLB,,, | Performed by: PSYCHIATRY & NEUROLOGY

## 2019-04-03 PROCEDURE — 99213 PR OFFICE/OUTPT VISIT, EST, LEVL III, 20-29 MIN: ICD-10-PCS | Mod: S$GLB,,, | Performed by: PSYCHIATRY & NEUROLOGY

## 2019-04-03 PROCEDURE — 99213 OFFICE O/P EST LOW 20 MIN: CPT | Mod: S$GLB,,, | Performed by: PSYCHIATRY & NEUROLOGY

## 2019-04-03 PROCEDURE — 93005 ELECTROCARDIOGRAM TRACING: CPT

## 2019-04-03 PROCEDURE — 99285 EMERGENCY DEPT VISIT HI MDM: CPT | Mod: 25

## 2019-04-03 PROCEDURE — 3078F PR MOST RECENT DIASTOLIC BLOOD PRESSURE < 80 MM HG: ICD-10-PCS | Mod: CPTII,S$GLB,, | Performed by: PSYCHIATRY & NEUROLOGY

## 2019-04-03 PROCEDURE — 1100F PTFALLS ASSESS-DOCD GE2>/YR: CPT | Mod: CPTII,S$GLB,, | Performed by: PSYCHIATRY & NEUROLOGY

## 2019-04-03 PROCEDURE — 99999 PR PBB SHADOW E&M-EST. PATIENT-LVL III: CPT | Mod: PBBFAC,,, | Performed by: PSYCHIATRY & NEUROLOGY

## 2019-04-03 PROCEDURE — 36415 COLL VENOUS BLD VENIPUNCTURE: CPT

## 2019-04-03 PROCEDURE — 85025 COMPLETE CBC W/AUTO DIFF WBC: CPT

## 2019-04-03 PROCEDURE — 3288F PR FALLS RISK ASSESSMENT DOCUMENTED: ICD-10-PCS | Mod: CPTII,S$GLB,, | Performed by: PSYCHIATRY & NEUROLOGY

## 2019-04-03 PROCEDURE — 3288F FALL RISK ASSESSMENT DOCD: CPT | Mod: CPTII,S$GLB,, | Performed by: PSYCHIATRY & NEUROLOGY

## 2019-04-03 PROCEDURE — 3075F PR MOST RECENT SYSTOLIC BLOOD PRESS GE 130-139MM HG: ICD-10-PCS | Mod: CPTII,S$GLB,, | Performed by: PSYCHIATRY & NEUROLOGY

## 2019-04-03 NOTE — ED PROVIDER NOTES
"Encounter Date: 4/3/2019    SCRIBE #1 NOTE: I, Miriam Nassar, am scribing for, and in the presence of, Dr. Joselito Love.       History     Chief Complaint   Patient presents with    Shortness of Breath     x 1 month       Time seen by provider: 4:29 PM on 2019    Maggy Tapia is a 69 y.o. female with a PMHx of DM type II, HTN, HLD, stroke, thyroid disease, asthma, and breast cancer who presents to the ED with a gradual onset of worsening shortness of breath that began a "while" ago, but worsening over the past 2-3 months. Pt becomes short of breath while walking around. She has to sleep propped up. Pt endorses dark stools, which have been present for one week. The patient denies fever, difficulty urinating, leg pain, or any other symptoms at this time. Pt's PSHx includes breast surgery. No pertinent SHx noted. Drug allergies include Penicillins and Sulfa.    The history is provided by the patient and the spouse.     Review of patient's allergies indicates:   Allergen Reactions    Penicillins Anaphylaxis    Sulfa (sulfonamide antibiotics) Anaphylaxis     Past Medical History:   Diagnosis Date    Anxiety     Arthritis     Asthma     Cancer 2000    Left Breast    Cataract     Depression     Diabetes mellitus, type 2     GERD (gastroesophageal reflux disease)     Hyperlipidemia     Hypertension     Overactive bladder     Seizures     Pseudo-seizures    Stroke     Thyroid disease     Urinary tract infection without hematuria 8/3/2017     Past Surgical History:   Procedure Laterality Date    APPENDECTOMY      BREAST SURGERY       SECTION      CHOLECYSTECTOMY      CRANIOTOMY Left 2016    [facility]    DILATION AND CURETTAGE OF UTERUS      [id number]-[id number] HOLES Left 8/3/2016    [facility]    [id number]-[id number]-BIDKVHIG Left 2016    [facility]    EYE " SURGERY       Family History   Problem Relation Age of Onset    Diabetes Mother     Hypertension Mother     Glaucoma Neg Hx      Social History     Tobacco Use    Smoking status: Never Smoker    Smokeless tobacco: Never Used   Substance Use Topics    Alcohol use: Yes     Comment: seldom    Drug use: No     Review of Systems   Constitutional: Negative for fever.   HENT: Negative for sore throat.    Respiratory: Positive for shortness of breath.    Cardiovascular: Negative for chest pain.   Gastrointestinal: Negative for nausea.        Positive dark stools.   Genitourinary: Negative for difficulty urinating and dysuria.   Musculoskeletal: Negative for back pain and myalgias.        Negative for leg pain.   Skin: Negative for rash.   Neurological: Negative for weakness.   Hematological: Does not bruise/bleed easily.       Physical Exam     Initial Vitals [04/03/19 1544]   BP Pulse Resp Temp SpO2   (!) 149/65 72 (!) 24 97.6 °F (36.4 °C) 96 %      MAP       --         Physical Exam    Nursing note and vitals reviewed.  Constitutional: She appears well-developed and well-nourished. She is not diaphoretic. She is Obese . No distress.   HENT:   Head: Normocephalic and atraumatic.   Mouth/Throat: Oropharynx is clear and moist.   Eyes: Conjunctivae are normal.   Neck: Neck supple. No JVD present.   Cardiovascular: Normal rate, regular rhythm, normal heart sounds and intact distal pulses. Exam reveals no gallop and no friction rub.    No murmur heard.  Pulmonary/Chest: Breath sounds normal. She has no wheezes. She has no rhonchi. She has no rales.   Abdominal: Soft. She exhibits no distension. There is no tenderness.   Musculoskeletal: Normal range of motion.        Right foot: There is swelling.        Left foot: There is swelling.   Trace pitting pedal edema. No leg asymmetry.   Neurological: She is alert and oriented to person, place, and time.   Skin: No rash noted. No erythema.   Psychiatric: Her speech is normal.          ED Course   Procedures  Labs Reviewed   CBC W/ AUTO DIFFERENTIAL - Abnormal; Notable for the following components:       Result Value    RBC 3.97 (*)     MCH 31.7 (*)     RDW 15.4 (*)     MPV 8.4 (*)     Mono% 3.9 (*)     All other components within normal limits   COMPREHENSIVE METABOLIC PANEL - Abnormal; Notable for the following components:    Glucose 129 (*)     Albumin 3.1 (*)     Alkaline Phosphatase 48 (*)     Anion Gap 7 (*)     eGFR if  58 (*)     eGFR if non  51 (*)     All other components within normal limits          Imaging Results          X-Ray Chest PA And Lateral (In process)                  Medical Decision Making:   History:   Old Medical Records: I decided to obtain old medical records.  Clinical Tests:   Lab Tests: Ordered and Reviewed  Radiological Study: Ordered and Reviewed  Medical Tests: Ordered and Reviewed            Scribe Attestation:   Scribe #1: I performed the above scribed service and the documentation accurately describes the services I performed. I attest to the accuracy of the note.    I, Dr. Joselito Love, personally performed the services described in this documentation. All medical record entries made by the scribe were at my direction and in my presence.  I have reviewed the chart and agree that the record reflects my personal performance and is accurate and complete. Joselito Loev MD.  6:03 PM 04/03/2019    Maggy Tapia is a 69 y.o. female presenting with several months of exertional dyspnea primarily.  No other specific complaints. No dyspnea at rest at present.  Unclear etiology with low suspicion for emergent, life-threatening cause such as ACS or PE.  I do not think further cardiac biomarker is indicated.  There is no sign of volume overload or heart failure at this point.  There is severe anemia or renal failure.  I did recommend follow-up with PCP initially with further specialist referral for further workup as  indicated.  Incidental findings of LFT abnormalities and hypoalbuminemia discussed for further workup and monitoring as well. Return precautions reviewed.          ED Course as of Apr 03 1745 Wed Apr 03, 2019   1707 EKG:  NSR, rate of 71, normal intervals, L axis.  Old T-wave inversion in III compared to last prior. There are no acute ST or T wave changes suggestive of acute ischemia or infarction.  Low voltage without electrical alternans.    [MR]   1735 CXR:  Poor inspiration, NAD. (my read)    [MR]      ED Course User Index  [MR] Joselito Love MD     Clinical Impression:       ICD-10-CM ICD-9-CM   1. Dyspnea on exertion R06.09 786.09         Disposition:   Disposition: Discharged  Condition: Stable                        Joselito Love MD  04/03/19 2055

## 2019-04-03 NOTE — DISCHARGE INSTRUCTIONS
Minor LFT (liver function test) abnormalities should also be discussed with PCP for workup and monitoring.

## 2019-04-03 NOTE — TELEPHONE ENCOUNTER
Reason for Disposition   SEVERE difficulty breathing (e.g., struggling for each breath, speaks in single words, pulse > 120)    Protocols used: BREATHING DIFFICULTY-A-OH    Patient call severely SOB, having trouble speaking,  patient advised to call 911. A Family member on the line states that patient will be taken to the nearest ED.

## 2019-04-03 NOTE — PATIENT INSTRUCTIONS
Make sure to call Dr. Abbasi right away to look at your breathing and how easily you get short of breath

## 2019-04-03 NOTE — ED NOTES
Patient presents to the ED with complaints of SOB exacerbation x 1 month. Denies cough, congestion, rhinorrhea.

## 2019-04-03 NOTE — PROGRESS NOTES
"Subjective:          Patient ID: Maggy Taipa is a 69 y.o.  female who presents for follow up of subdural hematoma status post craniotomy, ataxia, pseudoseizures     Initial HPI 8/2/16:     Patient is a right handed 66 y.o. female presents with falls and mental status change.  Hx from brother, pt with limited insight.       Hx stroke, mild residual R sided deficits 2000.  Episodes of "pseudoseizures", consist of shaking lasting 5 minutes or loss of awareness; last "seizure" one week ago.  Sitting in chair, unresponsive with eyes open, staring up, falls forward. Unresponsive several minutes, then vocalizing "Dk, dk, dk" before speaking normally, several minutes.  Pt with no recollection of event.  Per brother taking Topamax 100 mg daily, Depakote 1000 mg BID and clonazepam 0.5 mg, prescribed by psychiatrist Dr. Whyte.  Also sees neurologist on [place] Dr. Mac.      Several falls prior to admission at [facility] beginning of July 2016.  Suspected syncope, MRi shows old stroke L parietal and small chronic SDH and hygromas.  Went to inpatient rehab 2 weeks, was walking well, think,ing clearly, speech normal speed.  Over past 2 weeks, less awake, slower speech and movement, worse balance. Fell prior to arrival in ER.  Presented to [facility] early this AM, transferred for neurosurgical evaluation after discovery of large left SDH. Plan for rigoberto hole evacuation tomorrow AM.      Last History of Present Illness 8/16/17:     I last saw her at the end of July.  At that time, was concerned her Depakote could be contributing to generalized slowing and risk of falls.  She was brought to the hospital a couple weeks later, on August 4 after feeling generally weak and falling to the ground.  No loss of consciousness or seizure activity.  She had not been eating or drinking well and had had 1 episode of diarrhea.  To have urinary tract infection.  She felt significantly better the following day after being given Cipro " IV.     Also found to have some acute kidney injury felt to be secondary to dehydration.     She was set up for an earlier appointment to follow-up with me; in July we were planning on a follow-up visit in 6 months.     On August 10, there is a note in her record by care management stating concern about the fact that Mrs. Tapia was staying at the hospital with family members who themselves were admitted following her discharge.  Concerns were raised about her living condition and inability to care for herself.     According to this note, [facility] had evaluated her and recommended permanent placement, deemed her home living environment deplorable and had called Adult Protective Services.  Please see the note by Brinda Nava on August 11, 2017 for details.     She is here with her brother.  They talked about the fact that due to medical conditions unable to clean the house, dogs have been soiling all over the house and no body to help clean up.  They do report having enough food in the house.      She is still taking VPA 1000 mg at bedtime, clonazepam 0.5 mg at bedtime.      She has not fallen again since hospital discharge.     Interval history 7/9/2018:     Here today with her brother. Since our last visit she presented to [facility] on [facility] 7, 2018 with 3 days of dizziness, headache and double vision. ER notes indicate when she was laying flat the room was spinning.  He appeared to be slurring her words.  Had some episodes of vomiting the day prior.  Stroke alert was activated and telemedicine consult was obtained however clinical picture did not seem to point to any kind of an acute vascular event.     She reports posterior headache, has been present for several weeks, maybe 6 weeks.  Has had more lethargy, can't awaken her. She does have increased light sensitivity, and has intermittent diplopia - horizontal. Increased nervousness and anxiety, calling for her dog who passed away 20 years ago.       Interval history 1/6/2019:     Fell out of bed last week, rolling over to get the phone. No falls from up on her feet. Depressed, will be seeing her psychiatrist tomorrow.       Had an episode of staring; slumping, shaking; variable each episode.  May last 5 minutes, up to 10-15 minutes.  Headache following. Last was 2 days ago, frequency is actually decreased recently.      Sleep pattern is irregular.      Increased shuffling with gait     She is not using her CPAP - had used it on the dog    Interval history 4/3/2019:    After last visit, obtained MRI of the brain which was stable, no new changes noted. No new changes on EEG; left hemispheric slowing and breech artifact as expected.    No seizures since our last visit.  She has had one fall, is now afraid of falling.  3 weeks ago, no injury.     Review of patient's allergies indicates:   Allergen Reactions    Penicillins Anaphylaxis    Sulfa (sulfonamide antibiotics) Anaphylaxis     Current Outpatient Medications   Medication Sig Dispense Refill    aspirin (ECOTRIN) 81 MG EC tablet Take 81 mg by mouth once daily.      blood sugar diagnostic Strp To check BG 2 times daily, to use with insurance preferred meter 200 strip 4    blood-glucose meter kit To check BG 2 times daily, to use with insurance preferred meter 1 each 1    CALCIUM CARBONATE/VITAMIN D3 (CALCIUM 500 + D ORAL) Take 1 tablet by mouth once daily.       ciclopirox (PENLAC) 8 % Soln Apply topically nightly. 6.6 mL 11    clonazePAM (KLONOPIN) 0.5 MG tablet Take 1 tablet (0.5 mg total) by mouth 2 (two) times daily.  3    divalproex ER (DEPAKOTE) 500 MG Tb24 Take 2 tablets (1,000 mg total) by mouth every evening. 180 tablet 3    gemfibrozil (LOPID) 600 MG tablet TAKE 1 TABLET BY MOUTH 2 TIMES DAILY. 180 tablet 0    lancets Misc To check BG 2 times daily, to use with insurance preferred meter 200 each 4    levothyroxine (SYNTHROID) 75 MCG tablet Take 1 tablet (75 mcg total) by mouth once  daily. 30 tablet 3    losartan (COZAAR) 50 MG tablet Take 1 tablet (50 mg total) by mouth once daily. 90 tablet 3    metFORMIN (GLUCOPHAGE-XR) 500 MG 24 hr tablet Take 2 tablets (1,000 mg total) by mouth 2 (two) times daily with meals. 360 tablet 3    oxybutynin (DITROPAN-XL) 10 MG 24 hr tablet Take 1 tablet (10 mg total) by mouth once daily. 30 tablet 12    potassium chloride SA (K-DUR,KLOR-CON) 20 MEQ tablet Take 20 mEq by mouth once daily.      simvastatin (ZOCOR) 40 MG tablet Take 1 tablet (40 mg total) by mouth once daily. 90 tablet 3    SITagliptin (JANUVIA) 50 MG Tab Take 1 tablet (50 mg total) by mouth once daily. 90 tablet 3    thiamine (VITAMIN B-1) 100 MG tablet Take 100 mg by mouth once daily.      TRINTELLIX 20 mg Tab Take 20 mg by mouth once daily.        No current facility-administered medications for this visit.          Review of Systems  Review of Systems    Objective:        Vitals:    04/03/19 1043   BP: 137/65   Pulse: 78   Resp: 20     Body mass index is 36.54 kg/m².  Neurologic Exam     Mental Status   Drift of UE with eyes closed  Winded easily with walking short distance  Diminished pin R LE, mild dec AD 4/5 R    TD movements mouth  Some conversational dyspnea     Gait, Coordination, and Reflexes Slow, stooped shuffling steps       Physical Exam   Pulmonary/Chest:   Diminished sounds at bases         Data Review:    Assessment:        1. Subdural hematoma, chronic, small, bilateral    2. History of ischemic left MCA stroke    3. Frequent falls (possibly related to polypharmacy)    4. JUAN (obstructive sleep apnea)    5. Tardive dyskinesia    6. Dyspnea on exertion           Plan:         Problem List Items Addressed This Visit        Neuro    Subdural hematoma, chronic, small, bilateral - Primary    Relevant Orders    Ambulatory Referral to Physical/Occupational Therapy    History of ischemic left MCA stroke    Relevant Orders    Ambulatory Referral to Physical/Occupational Therapy     Tardive dyskinesia       Other    Frequent falls (possibly related to polypharmacy)    Relevant Orders    Ambulatory Referral to Physical/Occupational Therapy    JUAN (obstructive sleep apnea)      Other Visit Diagnoses     Dyspnea on exertion              Follow up in about 6 months (around 10/3/2019).       Thank you very much for the opportunity to assist in this patient's care.  If you have any questions or concerns, please do not hesitate to contact me at any time.     Sincerely,  Harley Roberts, DO

## 2019-04-03 NOTE — LETTER
April 10, 2019      Brinda Kay PA-C  [address] [facility] [address] [place] [address]           [facility]  [address] [facility] [address] [place] [address]  Phone: [phone]  Fax: [phone]          Patient: Maggy Tapia   MR Number: [id number]   YOB: 1950   Date of Visit: 4/3/2019       Dear Brinda Kay:    Thank you for referring Maggy Tapia to me for evaluation. Attached you will find relevant portions of my assessment and plan of care.    If you have questions, please do not hesitate to call me. I look forward to following Maggy Tapia along with you.    Sincerely,    Harley Roberts, DO    Enclosure  CC:  No Recipients    If you would like to receive this communication electronically, please contact [email or web address] or [phone] to request more information on [employer] Link access.    For providers and/or their staff who would like to refer a patient to [facility], please contact us through our one-stop-shop provider referral line, [facility], at [phone].    If you feel you have received this communication in error or would no longer like to receive these types of communications, please e-mail [email or web address]

## 2019-04-10 ENCOUNTER — TELEPHONE (OUTPATIENT)
Dept: PODIATRY | Facility: CLINIC | Age: 69
End: 2019-04-10

## 2019-04-10 ENCOUNTER — OFFICE VISIT (OUTPATIENT)
Dept: FAMILY MEDICINE | Facility: CLINIC | Age: 69
End: 2019-04-10
Payer: MEDICARE

## 2019-04-10 VITALS
HEIGHT: 61 IN | DIASTOLIC BLOOD PRESSURE: 66 MMHG | TEMPERATURE: 98 F | OXYGEN SATURATION: 95 % | SYSTOLIC BLOOD PRESSURE: 138 MMHG | BODY MASS INDEX: 36.5 KG/M2 | WEIGHT: 193.31 LBS | HEART RATE: 74 BPM

## 2019-04-10 DIAGNOSIS — E11.59 HYPERTENSION ASSOCIATED WITH DIABETES: ICD-10-CM

## 2019-04-10 DIAGNOSIS — I15.2 HYPERTENSION ASSOCIATED WITH DIABETES: ICD-10-CM

## 2019-04-10 DIAGNOSIS — G40.909 SEIZURE DISORDER: ICD-10-CM

## 2019-04-10 DIAGNOSIS — E11.9 DIABETES MELLITUS WITHOUT COMPLICATION: Primary | ICD-10-CM

## 2019-04-10 DIAGNOSIS — E03.9 ACQUIRED HYPOTHYROIDISM: ICD-10-CM

## 2019-04-10 DIAGNOSIS — G47.33 OSA (OBSTRUCTIVE SLEEP APNEA): ICD-10-CM

## 2019-04-10 DIAGNOSIS — R06.02 SOB (SHORTNESS OF BREATH) ON EXERTION: ICD-10-CM

## 2019-04-10 DIAGNOSIS — R53.81 DEBILITY: ICD-10-CM

## 2019-04-10 DIAGNOSIS — E11.69 HYPERLIPIDEMIA ASSOCIATED WITH TYPE 2 DIABETES MELLITUS: ICD-10-CM

## 2019-04-10 DIAGNOSIS — R29.6 FREQUENT FALLS: ICD-10-CM

## 2019-04-10 DIAGNOSIS — E66.01 SEVERE OBESITY (BMI 35.0-35.9 WITH COMORBIDITY): ICD-10-CM

## 2019-04-10 DIAGNOSIS — F32.A DEPRESSION, UNSPECIFIED DEPRESSION TYPE: ICD-10-CM

## 2019-04-10 DIAGNOSIS — E78.5 HYPERLIPIDEMIA ASSOCIATED WITH TYPE 2 DIABETES MELLITUS: ICD-10-CM

## 2019-04-10 DIAGNOSIS — N18.30 CKD (CHRONIC KIDNEY DISEASE) STAGE 3, GFR 30-59 ML/MIN: ICD-10-CM

## 2019-04-10 PROCEDURE — 3078F DIAST BP <80 MM HG: CPT | Mod: CPTII,S$GLB,, | Performed by: NURSE PRACTITIONER

## 2019-04-10 PROCEDURE — 99214 OFFICE O/P EST MOD 30 MIN: CPT | Mod: S$GLB,,, | Performed by: NURSE PRACTITIONER

## 2019-04-10 PROCEDURE — 3044F HG A1C LEVEL LT 7.0%: CPT | Mod: CPTII,S$GLB,, | Performed by: NURSE PRACTITIONER

## 2019-04-10 PROCEDURE — 3075F SYST BP GE 130 - 139MM HG: CPT | Mod: CPTII,S$GLB,, | Performed by: NURSE PRACTITIONER

## 2019-04-10 PROCEDURE — 3075F PR MOST RECENT SYSTOLIC BLOOD PRESS GE 130-139MM HG: ICD-10-PCS | Mod: CPTII,S$GLB,, | Performed by: NURSE PRACTITIONER

## 2019-04-10 PROCEDURE — 3288F PR FALLS RISK ASSESSMENT DOCUMENTED: ICD-10-PCS | Mod: CPTII,S$GLB,, | Performed by: NURSE PRACTITIONER

## 2019-04-10 PROCEDURE — 99499 UNLISTED E&M SERVICE: CPT | Mod: S$GLB,,, | Performed by: NURSE PRACTITIONER

## 2019-04-10 PROCEDURE — 99999 PR PBB SHADOW E&M-EST. PATIENT-LVL IV: CPT | Mod: PBBFAC,,, | Performed by: NURSE PRACTITIONER

## 2019-04-10 PROCEDURE — 3078F PR MOST RECENT DIASTOLIC BLOOD PRESSURE < 80 MM HG: ICD-10-PCS | Mod: CPTII,S$GLB,, | Performed by: NURSE PRACTITIONER

## 2019-04-10 PROCEDURE — 3288F FALL RISK ASSESSMENT DOCD: CPT | Mod: CPTII,S$GLB,, | Performed by: NURSE PRACTITIONER

## 2019-04-10 PROCEDURE — 99999 PR PBB SHADOW E&M-EST. PATIENT-LVL IV: ICD-10-PCS | Mod: PBBFAC,,, | Performed by: NURSE PRACTITIONER

## 2019-04-10 PROCEDURE — 99214 PR OFFICE/OUTPT VISIT, EST, LEVL IV, 30-39 MIN: ICD-10-PCS | Mod: S$GLB,,, | Performed by: NURSE PRACTITIONER

## 2019-04-10 PROCEDURE — 3044F PR MOST RECENT HEMOGLOBIN A1C LEVEL <7.0%: ICD-10-PCS | Mod: CPTII,S$GLB,, | Performed by: NURSE PRACTITIONER

## 2019-04-10 PROCEDURE — 1100F PTFALLS ASSESS-DOCD GE2>/YR: CPT | Mod: CPTII,S$GLB,, | Performed by: NURSE PRACTITIONER

## 2019-04-10 PROCEDURE — 99499 RISK ADDL DX/OHS AUDIT: ICD-10-PCS | Mod: S$GLB,,, | Performed by: NURSE PRACTITIONER

## 2019-04-10 PROCEDURE — 1100F PR PT FALLS ASSESS DOC 2+ FALLS/FALL W/INJURY/YR: ICD-10-PCS | Mod: CPTII,S$GLB,, | Performed by: NURSE PRACTITIONER

## 2019-04-10 NOTE — PATIENT INSTRUCTIONS
take synthroid first thing in the morning with water. Do not take with food or other medications. It should be taken everyday.  Wear CPAP every night.

## 2019-04-10 NOTE — TELEPHONE ENCOUNTER
----- Message from Yanick Castillo sent at 4/10/2019  2:00 PM CDT -----  Type:  Patient Call Back    Who Called:  pt  Does the patient know what this is regarding?: needs prescription for diabetic shoes   Best Call Back Number:  [phone]  Additional Information:  Please call pt and leave a detailed message if there is no answer.

## 2019-04-30 DIAGNOSIS — E11.69 HYPERLIPIDEMIA ASSOCIATED WITH TYPE 2 DIABETES MELLITUS: ICD-10-CM

## 2019-04-30 DIAGNOSIS — E78.5 HYPERLIPIDEMIA ASSOCIATED WITH TYPE 2 DIABETES MELLITUS: ICD-10-CM

## 2019-05-01 RX ORDER — GEMFIBROZIL 600 MG/1
TABLET, FILM COATED ORAL
Qty: 180 TABLET | Refills: 3 | Status: ON HOLD | OUTPATIENT
Start: 2019-05-01 | End: 2020-02-13 | Stop reason: HOSPADM

## 2019-05-03 ENCOUNTER — CLINICAL SUPPORT (OUTPATIENT)
Dept: REHABILITATION | Facility: HOSPITAL | Age: 69
End: 2019-05-03
Attending: NURSE PRACTITIONER
Payer: MEDICARE

## 2019-05-03 DIAGNOSIS — R26.81 GAIT INSTABILITY: ICD-10-CM

## 2019-05-03 PROCEDURE — G8978 MOBILITY CURRENT STATUS: HCPCS | Mod: CM,PN

## 2019-05-03 PROCEDURE — G8979 MOBILITY GOAL STATUS: HCPCS | Mod: CK,PN

## 2019-05-03 PROCEDURE — 97161 PT EVAL LOW COMPLEX 20 MIN: CPT | Mod: PN

## 2019-05-03 NOTE — PLAN OF CARE
TIME RECORD    2019    Start Time:  1407  Stop Time:  1450    PROCEDURES:    TIMED  Procedure Time Min.    Start:  Stop:     Start:  Stop:     Start:  Stop:     Start:  Stop:          UNTIMED  Procedure Time Min.   Evaluation Start:  Stop:     Start:  Stop:      Total Timed Minutes:  0  Total Timed Units:  0  Total Untimed Units:  1  Charges Billed/# of units:  1    OUTPATIENT PHYSICAL THERAPY   PATIENT EVALUATION  Onset Date: chronic  Problem List Items Addressed This Visit     Gait instability          Medical Diagnosis:   R53.81 (ICD-10-CM) - Debility   R29.6 (ICD-10-CM) - Frequent falls     Treatment Diagnosis: gait instability    Past Medical History:   Diagnosis Date    Anxiety     Arthritis     Asthma     Cancer 2000    Left Breast    Cataract     Depression     Diabetes mellitus, type 2     GERD (gastroesophageal reflux disease)     Hyperlipidemia     Hypertension     Overactive bladder     Seizures     Pseudo-seizures    Stroke     Thyroid disease     Urinary tract infection without hematuria 8/3/2017       Past Surgical History:   Procedure Laterality Date    APPENDECTOMY      BREAST SURGERY       SECTION      CHOLECYSTECTOMY      CRANIOTOMY Left 2016    [facility]    DILATION AND CURETTAGE OF UTERUS      [id number] HOLES Left 8/3/2016    [facility]    [id number] Left 2016    [facility]    EYE SURGERY         has a current medication list which includes the following prescription(s): aspirin, blood sugar diagnostic, blood-glucose meter, calcium carbonate/vitamin d3, ciclopirox, clonazepam, divalproex er, gemfibrozil, lancets, levothyroxine, losartan, metformin, oxybutynin, potassium chloride sa, simvastatin, sitagliptin, thiamine, and trintellix.    Precautions: Falls, DM, HTN, h/o CA, seizures, asthma  Prior Therapy: yes, HH and  "acute PT  History of Present Illness: Pt presents to PT with gradual worsening of mobility in the past few years. H/o frequent falls, the most recent being in Feb this year. Unable to explain why she has falls other than she loses balance.   Prior Level of Function: Assistive Device  Social History: lives with brother in [facility] [place] with 1 [place], enters through the back door which has no step. Enjoys playing [employer] at the local [employer]. Also enjoys crocheting but reports pain in her fingers prevents her from participating in this activity lately.       Current level of function: Assistive Device--rollator. Only able to ambulate with no AD from her bedroom to the bathroom (next room over) but holds onto walls/furniture. Also reports she uses SC, BSC.   Functional Deficits Leading to Referral/Nature of Injury: weakness, impaired endurance, impaired functional mobility, gait instability, impaired balance and decreased lower extremity function  Patient Therapy Goals: "To be able to walk better and be able to use my hands better."      Subjective     Maggy Tapia states she has trouble breathing and has to use CPAP at night.  States functional difficulties with walking, getting into/out of shower, and getting up from the sofa (occasionally needs assist from brother).      Objective     Posture/Appearance: Fwd head position, rounded shoulders, fwd trunk flexion in standing with rollator  Sensation: Denies deficits  DTRs:    Range of Motion/Strength:    Lower Extremity Range of Motion:  Right Lower Extremity: WFL  Left Lower Extremity: WFL    Lower Extremity Strength:  Right Lower Extremity: hip flex 4/5, knee ext 4+/5, flex 4/5, ankle DF 3/5  Left Lower Extremity: hip flex 4/5, knee ext 4+/5, flex 4+/5, ankle DF 4-/5      Flexibility: ROM as per above  Gait: 80 ft x 2 using rollator, SBA. Slow yasmin with fwd trunk flexed/fwd head position, decreased step length and clearance bilaterally.  Transfers: Sit<>stand " SBA  Special Tests:   TUG 29 sec using rollator  Functional Outcome Measure: Lower Extremity Functional Scale (LEFS): 10/80  G code tool used: LEFS  Current modifier: CM  Goal modifier: CK  Treatment: Educated on role/goal PT, POC. Instructed in HEP including seated HR/TR, LAQ, hip marches. Written/pictorial handout issued to pt. Pt verbalizing understanding and agreement.     Assessment       Initial Assessment (Pertinent finding, problem list and factors affecting outcome): Patient presents to PT with c/o debility and frequent falls. Notable impairments include weakness, impaired endurance, impaired functional mobility, gait instability, impaired balance and decreased lower extremity function, and impaired functional mobility. Patient would benefit from skilled PT to address notable impairments and improve functional mobility to PLOF.      Rehab Potiential: good      Short Term Goals (3 Weeks): 1) Pt will initiate HEP 2) Patient will improve strength 1/2 muscle grade   Long Term Goals (6 Weeks): 1) Pt will be I with HEP 2) Pt will return to community ambulation with strength 4+/5 3) Pt will improve LEFS to <60% impairment 4) Pt will improve TUG to < 24 sec    Plan     Certification Period: 05/03/2019 to 06/30/2019  Recommended Treatment Plan: 2 times per week for 6 weeks: Gait Training, Manual Therapy, Moist Heat/ Ice, Patient Education, Therapeutic Activites and Therapeutic Exercise      Thank you for referral.    Therapist: Lisy Weir, PT    I CERTIFY THE NEED FOR THESE SERVICES FURNISHED UNDER THIS PLAN OF TREATMENT AND WHILE UNDER MY CARE    Physician's comments: ________________________________________________________________________________________________________________________________________________      Physician's Name: ___________________________________

## 2019-05-06 ENCOUNTER — TELEPHONE (OUTPATIENT)
Dept: FAMILY MEDICINE | Facility: CLINIC | Age: 69
End: 2019-05-06

## 2019-05-06 DIAGNOSIS — R29.898 DECREASED GRIP STRENGTH: Primary | ICD-10-CM

## 2019-05-06 NOTE — TELEPHONE ENCOUNTER
----- Message from Noris Dunlap sent at 5/6/2019  8:10 AM CDT -----  Contact: Maggy  Type: Needs Medical Advice    Who Called:  patient  Symptoms (please be specific):  Can not get a  or hold  on objects  How long has patient had these symptoms:  Two weeks  Pharmacy name and phone #:  na  Best Call Back Number: [phone]  Additional Information:  States physical therapist suggested patient see a therapist for her hands due to lack of . Thanks!

## 2019-05-07 ENCOUNTER — CLINICAL SUPPORT (OUTPATIENT)
Dept: REHABILITATION | Facility: HOSPITAL | Age: 69
End: 2019-05-07
Attending: NURSE PRACTITIONER
Payer: MEDICARE

## 2019-05-07 DIAGNOSIS — R26.81 GAIT INSTABILITY: ICD-10-CM

## 2019-05-07 PROCEDURE — 97110 THERAPEUTIC EXERCISES: CPT | Mod: PN

## 2019-05-07 NOTE — PROGRESS NOTES
"Name: Maggy Tapia  [facility] Number: [id number]  Date of Treatment: 05/07/2019   Diagnosis:   Encounter Diagnosis   Name Primary?    Gait instability        Time in: 1300  Time Out: 1358  Total Treatment Time: 58  Group Time: no      Subjective:    Maggy reports to soon to see any change. Concerned about getting shoes due to wait time for diabetic shoes to get ordered and arrive.  Patient reports their pain to be 0/10 on a 0-10 scale with 0 being no pain and 10 being the worst pain imaginable.    Objective    Patient received individual therapy to increase strength, endurance and core stabilization for 58 minutes including the following exercises:    Nu-step L2 UE/LE x 10 min  Gastroc stretch over 1/2 roll 3/30"  HR/TR 3/30"  Mini squats in // bars 3x10  Seated EX:  Marching 3x10  LAQ 3x10 B  Ball sq. 3x10  Clamshells 3x10   In arm lucinda partial sit to stand as mobility exercise 2 x 10  Shuttle 50# x 30      Written Home Exercises Provided: continue with current  Pt demo fair understanding of the education provided. Maggy demonstrated good return demonstration of activities.     Assessment:       Pt will continue to benefit from skilled PT intervention. Medical Necessity is demonstrated by:  Fall Risk, Unable to participate fully in daily activities, Requires skilled supervision to complete and progress HEP and Weakness.    Patient is making fair progress towards established goals.    New/Revised goals: no      Plan:  Continue with established Plan of Care towards PT goals.   "

## 2019-05-09 NOTE — PROGRESS NOTES
Spoke with patient and her  regarding her results (dexa) , they expressed understanding and result letter sent in the mail.

## 2019-05-10 ENCOUNTER — CLINICAL SUPPORT (OUTPATIENT)
Dept: REHABILITATION | Facility: HOSPITAL | Age: 69
End: 2019-05-10
Attending: NURSE PRACTITIONER
Payer: MEDICARE

## 2019-05-10 DIAGNOSIS — R26.81 GAIT INSTABILITY: ICD-10-CM

## 2019-05-10 PROCEDURE — 97110 THERAPEUTIC EXERCISES: CPT | Mod: PN

## 2019-05-10 NOTE — PROGRESS NOTES
Name: Maggy Tapia  Date:   05/10/2019  Primary Diagnosis: .  Problem List Items Addressed This Visit     Gait instability          Time in: 1306  Time Out: 1349  Total Treatment Time: 43  Group Time: 0      Subjective:    Patient reports no soreness after her last treatment session. Still having trouble ordering new diabetic shoes, was told by [employer] it would be a long wait. Also having trouble ordering [employer] shoes.  Patient reports their pain to be 0/10 on a 0-10 scale with 0 being no pain and 10 being the worst pain imaginable.    Objective    Patient received individual therapy to address strength, endurance, ROM and flexibility with 0 other patients with activities as follows:     Patient received therapeutic exercises to develop strength, endurance, ROM and flexibility for 43 minutes including:     Nustep L2 x 10 min UE's/LE's    Seated therex: x 30 reps   LAQ    Hip marches   Hip abduction clamshells BTB   Hip adduction ball squeezes     Standing therex:   HR/TR x 30   minisquats x 30 c/ verbal cues for technique   Hip ABD x 20 L/R   Gastroc stretch using 1/2 foam roll 3/30 sec L/R     Shuttle leg press 50# 3 x 15 reps B    Gait: 100 ft x 2 c/ rollator, cues for upright posture and AD management        Assessment:     Improved posture with gait after cueing. Good tolerance for therex.    Pt will continue to benefit from skilled PT intervention. Medical Necessity is demonstrated by:  Fall Risk, Unable to participate fully in daily activities, Requires skilled supervision to complete and progress HEP and Weakness.    Patient is making good progress towards established goals.    Plan:  Continue with established Plan of Care towards PT goals.

## 2019-05-13 ENCOUNTER — CLINICAL SUPPORT (OUTPATIENT)
Dept: REHABILITATION | Facility: HOSPITAL | Age: 69
End: 2019-05-13
Attending: NURSE PRACTITIONER
Payer: MEDICARE

## 2019-05-13 DIAGNOSIS — R26.81 GAIT INSTABILITY: ICD-10-CM

## 2019-05-13 PROCEDURE — 97110 THERAPEUTIC EXERCISES: CPT | Mod: PN

## 2019-05-13 NOTE — PROGRESS NOTES
Name: Maggy Tapia  Clinic Number: [id number]  Date of Treatment: 05/13/2019   Diagnosis:   Encounter Diagnosis   Name Primary?    Gait instability        Time in: 1312  Time Out: 1406  Total Treatment Time: 54        Subjective:    Maggy reports no pain. Reports she was sitting at EOB last night putting her shoes on and she slid off her bed, patient reports she crawled into the living room where they called the fire .  Patient reports their pain to be 0/10 on a 0-10 scale with 0 being no pain and 10 being the worst pain imaginable.    Objective  Maggy received therapeutic exercises to develop strength, endurance, ROM and flexibility for 54 minutes including:   Nustep L3 x 10 min UE's/LE's   Seated therex: x 30 reps              LAQ               Hip marches              Hip abduction clamshells BTB              Hip adduction ball squeezes   Standing therex:              HR/TR x 30              minisquats x 30 c/ verbal cues for technique              Hip ABD x 30 L/R  Shuttle leg press 50# 3 x 15 reps B     Gait: 100 ft x 2 c/ rollator, cues for upright posture and AD management  Sit to stand from arms on chair x 30    Written Home Exercises Provided: Cont with HEP  Pt demo good understanding of the education provided. Maggy demonstrated good return demonstration of activities.     Assessment:   Good tolerance of therex, no increase in pain or s/s, VC's to stand tall and closer to RW.  Advised patient to sit in chair vc. Bed to place shoes on her feet so she wouldn't slide off bed.    Pt will continue to benefit from skilled PT intervention. Medical Necessity is demonstrated by:  Fall Risk, Requires skilled supervision to complete and progress HEP and Weakness.    Patient is making good progress towards established goals.    Plan:  Continue with established Plan of Care towards PT goals.

## 2019-05-15 ENCOUNTER — CLINICAL SUPPORT (OUTPATIENT)
Dept: REHABILITATION | Facility: HOSPITAL | Age: 69
End: 2019-05-15
Attending: NURSE PRACTITIONER
Payer: MEDICARE

## 2019-05-15 DIAGNOSIS — R26.81 GAIT INSTABILITY: ICD-10-CM

## 2019-05-15 PROCEDURE — 97110 THERAPEUTIC EXERCISES: CPT | Mod: PN

## 2019-05-15 NOTE — PROGRESS NOTES
Name: Maggy Tapia  [facility] Number: [id number]  Date of Treatment: 05/15/2019   Diagnosis:   Encounter Diagnosis   Name Primary?    Gait instability        Time in: 1400  Time Out: 1450  Total Treatment Time: 50        Subjective:    Maggy reports no pain today.  Patient reports their pain to be 0/10 on a 0-10 scale with 0 being no pain and 10 being the worst pain imaginable.    Objective    Maggy received therapeutic exercises to develop strength, endurance, ROM, flexibility, posture and core stabilization for 50 minutes including:       Nustep L3 x 10 min UE's/LE's    Seated therex: x 30 reps  LAQ 2# B LE x 30 reps  Standing therex:  GSS 3 x 30 sec  HR/TR x 30  Minisquats x 30 c/ verbal cues for technique  Hip ABD x 30 L/R  Supine there ex:  Bridging x 30 reps  Ball squeezes x 30 reps  Hip abd GTB x 30 reps  SLR 2# B LE 3 x 10 reps    Shuttle leg press 50# 3 x 15 reps B      Pt demo good understanding of the education provided. Maggy demonstrated good return demonstration of activities.     Assessment:       Pt will continue to benefit from skilled PT intervention. Medical Necessity is demonstrated by:  Fall Risk, Unable to participate fully in daily activities, Requires skilled supervision to complete and progress HEP and Weakness.    Patient is making good progress towards established goals.          Plan:  Continue with established Plan of Care towards PT goals.

## 2019-05-21 ENCOUNTER — CLINICAL SUPPORT (OUTPATIENT)
Dept: REHABILITATION | Facility: HOSPITAL | Age: 69
End: 2019-05-21
Attending: NURSE PRACTITIONER
Payer: MEDICARE

## 2019-05-21 DIAGNOSIS — R26.81 GAIT INSTABILITY: ICD-10-CM

## 2019-05-21 PROCEDURE — 97110 THERAPEUTIC EXERCISES: CPT | Mod: PN

## 2019-05-21 NOTE — PROGRESS NOTES
Name: Maggy Tapia  Clinic Number: [id number]  Date of Treatment: 05/21/2019   Diagnosis:   Encounter Diagnosis   Name Primary?    Gait instability        Time in: 1159  Time Out: 1300  Total Treatment Time: 61      Subjective:    Maggy reports no pain and feels like she is improving in strength.  Patient reports their pain to be 0/10 on a 0-10 scale with 0 being no pain and 10 being the worst pain imaginable.    Objective  Maggy received therapeutic exercises to develop strength, endurance, ROM, flexibility, posture and core stabilization for 61 minutes including:    Nustep L5  x 10 min UE's/LE's  Seated therex: x 30 reps   LAQ 2# B LE x 30 reps   Marching 2# B LE x 30 reps   Hip abd/add 2# B LE x 30 reps   Ball squeezes x 30 reps   Hip abd GTB x 30 reps  Standing therex:   GSS 3 x 30 sec   HR/TR x 30   Minisquats x 30 c/ verbal cues for technique   Hip ABD x 30 L/R   Sit to stand from 4w RW 2x10     Written Home Exercises Provided: Cont with HEP  Pt demo good understanding of the education provided. Maggy demonstrated good return demonstration of activities.     Assessment:   Good tolerance of therex, frequent rests that patient is very good at understanding when she is fatigued and needs to rest    Pt will continue to benefit from skilled PT intervention. Medical Necessity is demonstrated by:  Fall Risk, Continued inability to participate in vocational pursuits, Unable to participate fully in daily activities, Requires skilled supervision to complete and progress HEP and Weakness.    Patient is making good progress towards established goals.    Plan:  Continue with established Plan of Care towards PT goals.

## 2019-05-24 ENCOUNTER — CLINICAL SUPPORT (OUTPATIENT)
Dept: REHABILITATION | Facility: HOSPITAL | Age: 69
End: 2019-05-24
Attending: NURSE PRACTITIONER
Payer: MEDICARE

## 2019-05-24 DIAGNOSIS — R26.81 GAIT INSTABILITY: ICD-10-CM

## 2019-05-24 PROCEDURE — 97110 THERAPEUTIC EXERCISES: CPT | Mod: PN

## 2019-05-24 NOTE — PROGRESS NOTES
Name: Maggy Tapia  Clinic Number: [id number]  Date of Treatment: 05/24/2019   Diagnosis:   Encounter Diagnosis   Name Primary?    Gait instability        Time in: 1302  Time Out: 1340   Total Treatment Time: 38  Group Time: no      Subjective:    Maggy reports she is dizzy and has had low blood sugar 50 - 60 over the past few days but it was 90 this AM. Her brother reports that she has passed out 2-3 times this week and was given orange juice to come around.  Patient reports their pain to be 0/10 on a 0-10 scale with 0 being no pain and 10 being the worst pain imaginable.    Objective    Maggy received therapeutic exercises to develop strength, endurance, flexibility and core stabilization for 38 minutes including:     Nu-Step L- 5 x 10 min  Gait training with RW for safety - pt walks too far away from walker and outside to right of RW. She can correct with VC's.  Seated exercises:   Ball squeeze x 20   Marching x 20 with 2#   LAQ x 20 with 2#         Written Home Exercises Provided: continue with current after seeing MD for clearance  Pt demo good understanding of the education provided. Maggy demonstrated fair return demonstration of activities.     Assessment:   Treatment had to be stopped due to pt became unresponsive and collapsed onto PTA. She was assisted from sitting to supine on mat by two PTA's. BP was taken and in normal range 34/70, HR 84. Patient stayed confused for several minutes. Family member was contacted in waiting room and pt's MD was called, Dr. Landry who advised to send pt to ER. Ambulance was contacted for transport.    Pt will continue to benefit from skilled PT intervention. Medical Necessity is demonstrated by:  Fall Risk, Pain limits function of effected part for some activities, Unable to participate fully in daily activities, Requires skilled supervision to complete and progress HEP and Weakness.    Patient is making fair progress towards established goals.    New/Revised goals:  no      Plan:  Continue with established Plan of Care towards PT goals.

## 2019-05-28 ENCOUNTER — TELEPHONE (OUTPATIENT)
Dept: NEUROLOGY | Facility: CLINIC | Age: 69
End: 2019-05-28

## 2019-05-28 ENCOUNTER — TELEPHONE (OUTPATIENT)
Dept: REHABILITATION | Facility: HOSPITAL | Age: 69
End: 2019-05-28

## 2019-05-28 NOTE — TELEPHONE ENCOUNTER
----- Message from Ochoa Tapia sent at 5/28/2019  9:44 AM CDT -----  Contact: Patient  Type: Needs Medical Advice    Who Called:  Patient   Best Call Back Number: [phone]  Additional Information: Patient is requesting new orders for physical therapy. Please advise patient

## 2019-05-29 ENCOUNTER — CLINICAL SUPPORT (OUTPATIENT)
Dept: REHABILITATION | Facility: HOSPITAL | Age: 69
End: 2019-05-29
Attending: NURSE PRACTITIONER
Payer: MEDICARE

## 2019-05-29 DIAGNOSIS — R26.81 GAIT INSTABILITY: Primary | ICD-10-CM

## 2019-05-29 PROCEDURE — 97110 THERAPEUTIC EXERCISES: CPT | Mod: PN

## 2019-05-29 NOTE — PROGRESS NOTES
TIME RECORD    Date:  05/29/2019    Start Time:  1500  Stop Time:  1546    PROCEDURES:    TIMED  Procedure Time Min.   TE Start:1500  Stop:1546 46    Start:  Stop:     Start:  Stop:     Start:  Stop:          UNTIMED  Procedure Time Min.    Start:  Stop:     Start:  Stop:      Total Timed Minutes:  46  Total Timed Units:  3  Total Untimed Units:    Charges Billed/# of units:  3      Progress/Current Status    Subjective:     Patient ID: Maggy Tapia is a 69 y.o. female.  Diagnosis:   1. Gait instability       Pain: 0 /10      Objective:     TE for ROM,strength,endurance,gait training.    Assessment:     Performed well.    Patient Education/Response:     Gait pattern ed.    Plans and Goals:     Cont per POC.

## 2019-05-29 NOTE — PROGRESS NOTES
05/29/19 1500   Ankle Exercises   Double Leg Calf Raises Reps/Sets/Weight 30   Arom Plantarflexion/Dorsiflexion Reps/Sets 30   Hip Exercises   Clamshells Reps/Sets/Resistance 30 GTB,BALL SQUEEZE   Knee Exercises   Frontal Squats Reps/Sets/Weight 30   Tg/Shuttle/Reformer Squats Reps/Sets/Weight/Level 37# 5'   Additional Exercises   Additional Exercise NUSTEP 10' L5   Additional Exercise GAIT TRAINING

## 2019-05-31 ENCOUNTER — CLINICAL SUPPORT (OUTPATIENT)
Dept: REHABILITATION | Facility: HOSPITAL | Age: 69
End: 2019-05-31
Attending: NURSE PRACTITIONER
Payer: MEDICARE

## 2019-05-31 DIAGNOSIS — R26.81 GAIT INSTABILITY: ICD-10-CM

## 2019-05-31 PROCEDURE — 97110 THERAPEUTIC EXERCISES: CPT | Mod: PN

## 2019-05-31 NOTE — PROGRESS NOTES
"Name: Maggy Tapia  [facility] Number: [id number]  Date of Treatment: 05/31/2019   Diagnosis:   Encounter Diagnosis   Name Primary?    Gait instability        Time in: 1202  Time Out: 1301  Total Treatment Time: 59      Subjective:    Maggy reports "My blood sugar was 135 this morning, I feel good".  Patient reports their pain to be 0/10 on a 0-10 scale with 0 being no pain and 10 being the worst pain imaginable.    Objective  Maggy received therapeutic exercises to develop strength, endurance, ROM, flexibility, posture and core stabilization for 59 minutes including:     Nustep L5  x 10 min UE's/LE's   Seated therex: x 30 reps  LAQ 2# B LE x 30 reps  Marching 2# B LE x 30 reps  Hip abd/add 2# B LE x 30 reps  Ball squeezes x 30 reps  Hip abd GTB x 30 reps  Glute sets x 30 reps   Standing therex:  GSS 3 x 30 sec  HR/TR x 30  Hip ABD x 30 L/R    Written Home Exercises Provided: Cont with HEP  Pt demo fair understanding of the education provided. Maggy demonstrated fair return demonstration of activities.     Assessment:   Patient required max cues for posture with sitting, standing, and gait training.  Flexed trunk and leaning on 4ww.    Pt will continue to benefit from skilled PT intervention. Medical Necessity is demonstrated by:  Fall Risk, Continued inability to participate in vocational pursuits, Unable to participate fully in daily activities, Requires skilled supervision to complete and progress HEP and Weakness.    Patient is making fair progress towards established goals.    Plan:  Continue with established Plan of Care towards PT goals.   "
0.75

## 2019-06-03 ENCOUNTER — TELEPHONE (OUTPATIENT)
Dept: PODIATRY | Facility: CLINIC | Age: 69
End: 2019-06-03

## 2019-06-03 ENCOUNTER — CLINICAL SUPPORT (OUTPATIENT)
Dept: REHABILITATION | Facility: HOSPITAL | Age: 69
End: 2019-06-03
Attending: NURSE PRACTITIONER
Payer: MEDICARE

## 2019-06-03 DIAGNOSIS — R26.81 GAIT INSTABILITY: ICD-10-CM

## 2019-06-03 PROCEDURE — 97110 THERAPEUTIC EXERCISES: CPT | Mod: PN

## 2019-06-03 NOTE — PROGRESS NOTES
Name: Maggy Tapia  Date:   2019  Primary Diagnosis: .  Problem List Items Addressed This Visit     Gait instability          Time in: 1108  Time Out: 1200  Total Treatment Time: 40 (NO CHARGE 12 min)  Group Time: 0      Subjective:    Patient reports she had low blood sugar this morning (25) and then checked again an hour later after eating and it was 135. However, she threw up her food.  Patient reports their pain to be 0/10 on a 0-10 scale with 0 being no pain and 10 being the worst pain imaginable.    Objective    Patient received individual therapy to address strength, endurance, ROM and flexibility with 0 other patients with activities as follows:     Patient received therapeutic exercises to develop strength, endurance, ROM and flexibility for 40 minutes including:     Nustep L5 x 10 min UE's/LE's    Seated therex: x 30   Hip adduction ball squeezes   Hip abduction clamshells GTB    LAQ 2#   Hip marches 2#   Hip ABD 2#   Glute sets    Standing therex: x 30   HR/TR   Hip ABD    Gait trainin ft c/ rollator at SBA with cues for increased step length and appropriate distance to AD    Assessment:     Pt checked her blood sugar upon start of session and reported 95, which is normal for her. Tolerated therex without complaints or dizziness.    Pt will continue to benefit from skilled PT intervention. Medical Necessity is demonstrated by:  Fall Risk, Unable to participate fully in daily activities and Weakness.    Patient is making good progress towards established goals.    Plan:  Continue with established Plan of Care towards PT goals.

## 2019-06-03 NOTE — TELEPHONE ENCOUNTER
----- Message from Nellie Jimenez sent at 5/31/2019  3:20 PM CDT -----  Contact: arabella  Type: Needs Medical Advice    Who Called:  Arabella with [facility]  Symptoms (please be specific):    How long has patient had these symptoms:    Pharmacy name and phone #:    Best Call Back Number: [phone] option 2  Additional Information: requesting medical necessity and clinical be faxed to [phone] for patient diabetic shoes/inserts

## 2019-06-05 ENCOUNTER — CLINICAL SUPPORT (OUTPATIENT)
Dept: REHABILITATION | Facility: HOSPITAL | Age: 69
End: 2019-06-05
Attending: NURSE PRACTITIONER
Payer: MEDICARE

## 2019-06-05 DIAGNOSIS — R26.81 GAIT INSTABILITY: ICD-10-CM

## 2019-06-05 PROCEDURE — 97110 THERAPEUTIC EXERCISES: CPT | Mod: PN

## 2019-06-05 NOTE — PROGRESS NOTES
Name: Maggy Tapia  [facility] Number: [id number]  Date of Treatment: 2019   Diagnosis:   Encounter Diagnosis   Name Primary?    Gait instability        Time in: 1400  Time Out: 1500  Total Treatment Time: 60        Subjective:    Maggy reports no complaints, reports compliance with her HEP.  Patient reports their pain to be 0/10 on a 0-10 scale with 0 being no pain and 10 being the worst pain imaginable.    Objective  Maggy received therapeutic exercises to develop strength, endurance, ROM, flexibility, posture and core stabilization for 60 minutes including:  Nustep L5 x 10 min UE's/LE's to increase UE/LE strength and endurance   Seated therex: x 30              Hip adduction ball squeezes              Hip abduction clamshells GTB               LAQ 2#              Hip marches 2#              Hip ABD 2#              Glute sets  Standing therex: x 30              HR/TR              Hip ABD     Gait trainin ft c/ RW adjusted to patients correct height with SBA and max cues for increased step length and appropriate distance to AD, and correct posture.    Written Home Exercises Provided: Cont with HEP  Pt demo good understanding of the education provided. Maggy demonstrated good return demonstration of activities.     Assessment:   Patient gait trained with increased upright posture with RW adjusted to her height.  Short frequent rests. Patient consistently arriving to therapy with signs of bladder incontinence. Hasn't requested restroom break when in therapy sessions with me.    Pt will continue to benefit from skilled PT intervention. Medical Necessity is demonstrated by:  Fall Risk, Continued inability to participate in vocational pursuits, Unable to participate fully in daily activities, Requires skilled supervision to complete and progress HEP and Weakness.    Patient is making fair progress towards established goals.    Plan:  Continue with established Plan of Care towards PT goals.

## 2019-06-10 ENCOUNTER — CLINICAL SUPPORT (OUTPATIENT)
Dept: REHABILITATION | Facility: HOSPITAL | Age: 69
End: 2019-06-10
Attending: NURSE PRACTITIONER
Payer: MEDICARE

## 2019-06-10 DIAGNOSIS — R26.81 GAIT INSTABILITY: ICD-10-CM

## 2019-06-10 PROCEDURE — 97110 THERAPEUTIC EXERCISES: CPT | Mod: PN
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
## 2019-06-13 ENCOUNTER — CLINICAL SUPPORT (OUTPATIENT)
Dept: REHABILITATION | Facility: HOSPITAL | Age: 69
End: 2019-06-13
Attending: NURSE PRACTITIONER
Payer: MEDICARE

## 2019-06-13 DIAGNOSIS — R26.81 GAIT INSTABILITY: ICD-10-CM

## 2019-06-13 PROCEDURE — 97110 THERAPEUTIC EXERCISES: CPT | Mod: PN

## 2019-06-13 PROCEDURE — G8979 MOBILITY GOAL STATUS: HCPCS | Mod: CH,PN

## 2019-06-13 PROCEDURE — G8978 MOBILITY CURRENT STATUS: HCPCS | Mod: CI,PN
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
## 2019-06-22 DIAGNOSIS — I10 ESSENTIAL HYPERTENSION: ICD-10-CM

## 2019-06-22 DIAGNOSIS — E11.69 HYPERLIPIDEMIA ASSOCIATED WITH TYPE 2 DIABETES MELLITUS: ICD-10-CM

## 2019-06-22 DIAGNOSIS — E78.5 HYPERLIPIDEMIA ASSOCIATED WITH TYPE 2 DIABETES MELLITUS: ICD-10-CM

## 2019-06-23 RX ORDER — LOSARTAN POTASSIUM 50 MG/1
TABLET ORAL
Qty: 90 TABLET | Refills: 3 | Status: SHIPPED | OUTPATIENT
Start: 2019-06-23 | End: 2021-10-14 | Stop reason: SDUPTHER

## 2019-06-23 RX ORDER — SIMVASTATIN 40 MG/1
TABLET, FILM COATED ORAL
Qty: 90 TABLET | Refills: 3 | Status: SHIPPED | OUTPATIENT
Start: 2019-06-23 | End: 2022-03-09 | Stop reason: SDUPTHER

## 2019-06-24 ENCOUNTER — TELEPHONE (OUTPATIENT)
Dept: REHABILITATION | Facility: HOSPITAL | Age: 69
End: 2019-06-24

## 2019-07-01 ENCOUNTER — CLINICAL SUPPORT (OUTPATIENT)
Dept: REHABILITATION | Facility: HOSPITAL | Age: 69
End: 2019-07-01
Attending: NURSE PRACTITIONER
Payer: MEDICARE

## 2019-07-01 DIAGNOSIS — R26.81 GAIT INSTABILITY: ICD-10-CM

## 2019-07-01 PROCEDURE — 97110 THERAPEUTIC EXERCISES: CPT | Mod: PN

## 2019-07-02 NOTE — PROGRESS NOTES
Name: Maggy Tapia  Date:   07/01/2019  Primary Diagnosis: .  Problem List Items Addressed This Visit     Gait instability          Time in: 1608  Time Out: 1659  Total Treatment Time: 51   Group Time: 0      Subjective:    Patient reports improvement of symptoms.  Patient reports their pain to be 0/10 on a 0-10 scale with 0 being no pain and 10 being the worst pain imaginable.    Objective    Patient received individual therapy to address strength, endurance and ROM with 0 other patients with activities as follows:     Patient received therapeutic exercises to develop strength, endurance, ROM and flexibility for 51 minutes including:      Nustep L5 x 10 min UE's/LE's     Seated therex; x 30              HR/TR               Hip flex 2# L/R              LAQ 2# L/R              Hip adduction ball squeezes              Hip abduction clamshells GTB     Standing therex: x 30               HR/TR               Hip ABD              Minisquats      Assessment:     Pt will continue to benefit from skilled PT intervention. Medical Necessity is demonstrated by:  Fall Risk, Unable to participate fully in daily activities, Requires skilled supervision to complete and progress HEP and Weakness.    Patient is making good progress towards established goals.    Plan:  Continue with established Plan of Care towards PT goals.

## 2019-07-08 ENCOUNTER — CLINICAL SUPPORT (OUTPATIENT)
Dept: REHABILITATION | Facility: HOSPITAL | Age: 69
End: 2019-07-08
Attending: NURSE PRACTITIONER
Payer: MEDICARE

## 2019-07-08 ENCOUNTER — PATIENT OUTREACH (OUTPATIENT)
Dept: ADMINISTRATIVE | Facility: HOSPITAL | Age: 69
End: 2019-07-08

## 2019-07-08 DIAGNOSIS — R26.81 GAIT INSTABILITY: ICD-10-CM

## 2019-07-08 PROCEDURE — 97110 THERAPEUTIC EXERCISES: CPT | Mod: PN

## 2019-07-08 NOTE — PROGRESS NOTES
Name: Maggy Tapia  [facility] Number: [id number]  Date of Treatment: 07/08/2019   Diagnosis:   Encounter Diagnosis   Name Primary?    Gait instability        Time in: 1605  Time Out: 1700  Total Treatment Time: 55        Subjective:    Maggy reports she is having no pain or problems today.  Patient reports their pain to be 0/10 on a 0-10 scale with 0 being no pain and 10 being the worst pain imaginable.    Objective    Maggy received therapeutic exercises to develop strength, endurance, ROM, flexibility, posture and core stabilization for 55 minutes including:       LAQ 3# b LE 3 x 10 reps  Hip flex 3# B LE x 30 reps  Ball squeezes x 30 reps  Hip abd GTB x 30 reps  Ham curls GTB x 30 reps  nustep x 10 min L-5  QS x 30 reps  SLR x 30 reps B LE  bridging x 30 reps  HR/TR x 30 reps  Mini squats x 0 reps  Hip flex x 30 reps  abd x 30 reps  Ham curls x 30 reps  Shuttle 31# B LE x 30 reps        Pt demo good understanding of the education provided. Maggy demonstrated good return demonstration of activities.     Assessment:   Pt requiring no rest breaks during exercises program.  Endurance improving.    Pt will continue to benefit from skilled PT intervention. Medical Necessity is demonstrated by:  Fall Risk, Requires skilled supervision to complete and progress HEP and Weakness.    Patient is making good progress towards established goals.      Plan:  Continue with established Plan of Care towards PT goals.

## 2019-07-08 NOTE — LETTER
July 8, 2019    [place] [address]             [facility]  [address]  [facility] [address]  Phone: [phone] Maggy Tapia  [address] [place] [address]        Dear, Maggy Tapia      [facility] is committed to your overall health.  To help you get the most out of each of your visits, we will review your information to make sure you are up to date on all of your recommended tests and/or procedures.      Dr. Yanna Abbasi MD  has found that your chart shows you may be due for     COLON CANCER SCREENING   HEMOGLOBIN A1C    If you have had any of the above done at another facility, please bring the records or information with you so that your record at [facility] will be complete.  If you would like to schedule any of these, please contact the clinic at [phone].    If you are currently taking medication, please bring it with you to your appointment for review.    Also, if you have any type of Advanced Directives, please bring them with you to your office visit so we may scan them into your chart.    Thank You,    Your [facility] Team,  MD Cary Mejia,  Panel Care Coordinator  [facility] [address]  [place] [address]  Phone [phone]  Fax [phone]

## 2019-07-10 ENCOUNTER — LAB VISIT (OUTPATIENT)
Dept: LAB | Facility: HOSPITAL | Age: 69
End: 2019-07-10
Attending: PHYSICIAN ASSISTANT
Payer: MEDICARE

## 2019-07-10 DIAGNOSIS — E11.9 TYPE 2 DIABETES MELLITUS WITHOUT COMPLICATION, WITH LONG-TERM CURRENT USE OF INSULIN: ICD-10-CM

## 2019-07-10 DIAGNOSIS — Z79.4 TYPE 2 DIABETES MELLITUS WITHOUT COMPLICATION, WITH LONG-TERM CURRENT USE OF INSULIN: ICD-10-CM

## 2019-07-10 DIAGNOSIS — M85.80 OSTEOPENIA, UNSPECIFIED LOCATION: ICD-10-CM

## 2019-07-10 DIAGNOSIS — E03.9 ACQUIRED HYPOTHYROIDISM: ICD-10-CM

## 2019-07-10 DIAGNOSIS — E55.9 HYPOVITAMINOSIS D: ICD-10-CM

## 2019-07-10 LAB
25(OH)D3+25(OH)D2 SERPL-MCNC: 48 NG/ML (ref 30–96)
ALBUMIN SERPL BCP-MCNC: 3.3 G/DL (ref 3.5–5.2)
ANION GAP SERPL CALC-SCNC: 12 MMOL/L (ref 8–16)
BUN SERPL-MCNC: 16 MG/DL (ref 8–23)
CA-I BLDV-SCNC: 1.27 MMOL/L (ref 1.06–1.42)
CALCIUM SERPL-MCNC: 10.2 MG/DL (ref 8.7–10.5)
CHLORIDE SERPL-SCNC: 104 MMOL/L (ref 95–110)
CO2 SERPL-SCNC: 26 MMOL/L (ref 23–29)
CREAT SERPL-MCNC: 1.1 MG/DL (ref 0.5–1.4)
EST. GFR  (AFRICAN AMERICAN): 58.4 ML/MIN/1.73 M^2
EST. GFR  (NON AFRICAN AMERICAN): 50.6 ML/MIN/1.73 M^2
ESTIMATED AVG GLUCOSE: 100 MG/DL (ref 68–131)
GLUCOSE SERPL-MCNC: 61 MG/DL (ref 70–110)
HBA1C MFR BLD HPLC: 5.1 % (ref 4–5.6)
PHOSPHATE SERPL-MCNC: 3.1 MG/DL (ref 2.7–4.5)
POTASSIUM SERPL-SCNC: 4.7 MMOL/L (ref 3.5–5.1)
PTH-INTACT SERPL-MCNC: 54 PG/ML (ref 9–77)
SODIUM SERPL-SCNC: 142 MMOL/L (ref 136–145)
T3 SERPL-MCNC: 54 NG/DL (ref 60–180)
T4 FREE SERPL-MCNC: 0.99 NG/DL (ref 0.71–1.51)
TSH SERPL DL<=0.005 MIU/L-ACNC: 1.59 UIU/ML (ref 0.4–4)

## 2019-07-10 PROCEDURE — 82306 VITAMIN D 25 HYDROXY: CPT

## 2019-07-10 PROCEDURE — 84480 ASSAY TRIIODOTHYRONINE (T3): CPT

## 2019-07-10 PROCEDURE — 82330 ASSAY OF CALCIUM: CPT

## 2019-07-10 PROCEDURE — 82985 ASSAY OF GLYCATED PROTEIN: CPT

## 2019-07-10 PROCEDURE — 84443 ASSAY THYROID STIM HORMONE: CPT

## 2019-07-10 PROCEDURE — 83970 ASSAY OF PARATHORMONE: CPT

## 2019-07-10 PROCEDURE — 83036 HEMOGLOBIN GLYCOSYLATED A1C: CPT | Mod: 59

## 2019-07-10 PROCEDURE — 84378 SUGARS SINGLE QUANT: CPT

## 2019-07-10 PROCEDURE — 80069 RENAL FUNCTION PANEL: CPT

## 2019-07-10 PROCEDURE — 84439 ASSAY OF FREE THYROXINE: CPT

## 2019-07-11 ENCOUNTER — OFFICE VISIT (OUTPATIENT)
Dept: ENDOCRINOLOGY | Facility: CLINIC | Age: 69
End: 2019-07-11
Payer: MEDICARE

## 2019-07-11 ENCOUNTER — TELEPHONE (OUTPATIENT)
Dept: REHABILITATION | Facility: HOSPITAL | Age: 69
End: 2019-07-11

## 2019-07-11 VITALS
BODY MASS INDEX: 34.48 KG/M2 | DIASTOLIC BLOOD PRESSURE: 70 MMHG | SYSTOLIC BLOOD PRESSURE: 120 MMHG | TEMPERATURE: 98 F | HEIGHT: 61 IN | HEART RATE: 80 BPM | WEIGHT: 182.63 LBS

## 2019-07-11 DIAGNOSIS — G47.33 OSA (OBSTRUCTIVE SLEEP APNEA): ICD-10-CM

## 2019-07-11 DIAGNOSIS — E03.9 ACQUIRED HYPOTHYROIDISM: ICD-10-CM

## 2019-07-11 DIAGNOSIS — M85.80 OSTEOPENIA, UNSPECIFIED LOCATION: ICD-10-CM

## 2019-07-11 DIAGNOSIS — Z79.4 TYPE 2 DIABETES MELLITUS WITHOUT COMPLICATION, WITH LONG-TERM CURRENT USE OF INSULIN: Primary | ICD-10-CM

## 2019-07-11 DIAGNOSIS — E11.9 TYPE 2 DIABETES MELLITUS WITHOUT COMPLICATION, WITH LONG-TERM CURRENT USE OF INSULIN: Primary | ICD-10-CM

## 2019-07-11 PROCEDURE — 3078F PR MOST RECENT DIASTOLIC BLOOD PRESSURE < 80 MM HG: ICD-10-PCS | Mod: CPTII,S$GLB,, | Performed by: PHYSICIAN ASSISTANT

## 2019-07-11 PROCEDURE — 99999 PR PBB SHADOW E&M-EST. PATIENT-LVL III: CPT | Mod: PBBFAC,,, | Performed by: PHYSICIAN ASSISTANT

## 2019-07-11 PROCEDURE — 3074F PR MOST RECENT SYSTOLIC BLOOD PRESSURE < 130 MM HG: ICD-10-PCS | Mod: CPTII,S$GLB,, | Performed by: PHYSICIAN ASSISTANT

## 2019-07-11 PROCEDURE — 99214 OFFICE O/P EST MOD 30 MIN: CPT | Mod: S$GLB,,, | Performed by: PHYSICIAN ASSISTANT

## 2019-07-11 PROCEDURE — 3074F SYST BP LT 130 MM HG: CPT | Mod: CPTII,S$GLB,, | Performed by: PHYSICIAN ASSISTANT

## 2019-07-11 PROCEDURE — 3044F HG A1C LEVEL LT 7.0%: CPT | Mod: CPTII,S$GLB,, | Performed by: PHYSICIAN ASSISTANT

## 2019-07-11 PROCEDURE — 1101F PT FALLS ASSESS-DOCD LE1/YR: CPT | Mod: CPTII,S$GLB,, | Performed by: PHYSICIAN ASSISTANT

## 2019-07-11 PROCEDURE — 99214 PR OFFICE/OUTPT VISIT, EST, LEVL IV, 30-39 MIN: ICD-10-PCS | Mod: S$GLB,,, | Performed by: PHYSICIAN ASSISTANT

## 2019-07-11 PROCEDURE — 3044F PR MOST RECENT HEMOGLOBIN A1C LEVEL <7.0%: ICD-10-PCS | Mod: CPTII,S$GLB,, | Performed by: PHYSICIAN ASSISTANT

## 2019-07-11 PROCEDURE — 1101F PR PT FALLS ASSESS DOC 0-1 FALLS W/OUT INJ PAST YR: ICD-10-PCS | Mod: CPTII,S$GLB,, | Performed by: PHYSICIAN ASSISTANT

## 2019-07-11 PROCEDURE — 3078F DIAST BP <80 MM HG: CPT | Mod: CPTII,S$GLB,, | Performed by: PHYSICIAN ASSISTANT

## 2019-07-11 PROCEDURE — 99999 PR PBB SHADOW E&M-EST. PATIENT-LVL III: ICD-10-PCS | Mod: PBBFAC,,, | Performed by: PHYSICIAN ASSISTANT

## 2019-07-11 NOTE — PROGRESS NOTES
"CC: DM/Hypothyroidism    HPI: Maggy Tapia was diagnosed with Type 2 DM about 6 years ago. No hospitalizations for DM. Her mother had DM and thyroid disease.  Her mom recently  and her diet changed over the last 2 months. She has been diet controlled until one month ago.      Arrives with her brother.      CURRENT DIABETIC MEDS: metformin 1000 mg BID, Januvia 50 mg daily    BG readings are checked 2x daily.  Fastin  DN:125    Hypoglycemia: Yes, ~60 before breakfast and supper. She will drink cranberry juice.   Type of Glucose Meter: True metrix    Physical Activity: She does PT exercises 2x week.     Last Eye Exam:   Last Podiatry Exam: 3/19    Last DM Education Attended:     No ETOH/tobacco use.    Hypothyroidism  Taking 75 mcgn daily.  Dx in   No palpitations, hair loss or changes in nails, heat/cold intolerance, wt changes.  She has tremors.    DEXA  Osteopenia in the left hip. Taking ca +vd. She had a fall last month without injury.    REVIEW OF SYSTEMS  General: no weakness or fatigue, wt gain.   Eyes: no intermittent blurry vision or visual disturbances.   Cardiac: no chest pain or palpitations.   Respiratory: no cough or dyspnea.   GI: no abdominal pain or nausea.   Skin: no rashes or itching.   Musc: + back pain  Neuro: no numbness or tingling.   Endocrine: no polyuria, polydipsia, polyphagia.   Remainder ROS negative    Vital Signs  /70 (BP Location: Left arm, Patient Position: Sitting, BP Method: Medium (Manual))   Pulse 80   Temp 97.7 °F (36.5 °C) (Oral)   Ht 5' 1" (1.549 m)   Wt 82.9 kg (182 lb 10.4 oz)   BMI 34.51 kg/m²     Personally reviewed labs below:  Hemoglobin A1C   Date Value Ref Range Status   07/10/2019 5.1 4.0 - 5.6 % Final     Comment:     ADA Screening Guidelines:  5.7-6.4%  Consistent with prediabetes  >or=6.5%  Consistent with diabetes  High levels of fetal hemoglobin interfere with the HbA1C  assay. Heterozygous hemoglobin variants (HbS, HgC, " etc)do  not significantly interfere with this assay.   However, presence of multiple variants may affect accuracy.     04/01/2019 6.0 (H) 4.0 - 5.6 % Final     Comment:     ADA Screening Guidelines:  5.7-6.4%  Consistent with prediabetes  >or=6.5%  Consistent with diabetes  High levels of fetal hemoglobin interfere with the HbA1C  assay. Heterozygous hemoglobin variants (HbS, HgC, etc)do  not significantly interfere with this assay.   However, presence of multiple variants may affect accuracy.     01/04/2019 9.6 (H) 4.0 - 5.6 % Final     Comment:     ADA Screening Guidelines:  5.7-6.4%  Consistent with prediabetes  >or=6.5%  Consistent with diabetes  High levels of fetal hemoglobin interfere with the HbA1C  assay. Heterozygous hemoglobin variants (HbS, HgC, etc)do  not significantly interfere with this assay.   However, presence of multiple variants may affect accuracy.         Chemistry        Component Value Date/Time     07/10/2019 1010     07/10/2019 1010    K 4.7 07/10/2019 1010    K 4.6 07/10/2019 1010     07/10/2019 1010     07/10/2019 1010    CO2 26 07/10/2019 1010    CO2 26 07/10/2019 1010    BUN 16 07/10/2019 1010    BUN 16 07/10/2019 1010    CREATININE 1.1 07/10/2019 1010    CREATININE 1.1 07/10/2019 1010    GLU 61 (L) 07/10/2019 1010    GLU 62 (L) 07/10/2019 1010        Component Value Date/Time    CALCIUM 10.2 07/10/2019 1010    CALCIUM 10.3 07/10/2019 1010    ALKPHOS 51 (L) 07/10/2019 1010    AST 14 07/10/2019 1010    ALT 6 (L) 07/10/2019 1010    BILITOT 0.5 07/10/2019 1010    ESTGFRAFRICA 58.4 (A) 07/10/2019 1010    ESTGFRAFRICA 58.4 (A) 07/10/2019 1010    EGFRNONAA 50.6 (A) 07/10/2019 1010    EGFRNONAA 50.6 (A) 07/10/2019 1010        Lab Results   Component Value Date    CHOL 149 07/10/2019    CHOL 127 04/01/2019    CHOL 178 06/28/2018     Lab Results   Component Value Date    HDL 50 07/10/2019    HDL 45 04/01/2019    HDL 43 06/28/2018     Lab Results   Component Value Date     LDLCALC 77.2 07/10/2019    LDLCALC 58.2 (L) 04/01/2019    LDLCALC 104.2 06/28/2018     Lab Results   Component Value Date    TRIG 109 07/10/2019    TRIG 119 04/01/2019    TRIG 154 (H) 06/28/2018     Lab Results   Component Value Date    CHOLHDL 33.6 07/10/2019    CHOLHDL 35.4 04/01/2019    CHOLHDL 24.2 06/28/2018     Lab Results   Component Value Date    TSH 1.586 07/10/2019     No results found for: MICALBCREAT    Vit D, 25-Hydroxy   Date Value Ref Range Status   07/10/2019 48 30 - 96 ng/mL Final     Comment:     Vitamin D deficiency.........<10 ng/mL                              Vitamin D insufficiency......10-29 ng/mL       Vitamin D sufficiency........> or equal to 30 ng/mL  Vitamin D toxicity............>100 ng/mL         PHYSICAL EXAMINATION  Constitutional: elderly female, appears well, no distress  Neck: Supple, trachea midline.   Respiratory: even and unlabored, CTA without wheezes.  Cardiovascular: RRR; no carotid bruits or murmurs.   Lymph: DP pulses  2+ bilaterally; no edema.   Skin: warm and dry; no acanthosis nigracans observed.  Neuro: patient alert and cooperative; CN 2-12 grossly intact  Feet: appropriate footwear.    Assessment/Plan    1. Type 2 diabetes mellitus without complication, with long-term current use of insulin  Hemoglobin A1c    GlycoMark (TM)    Fructosamine    Renal function panel    T4, free    T3    TSH   2. Acquired hypothyroidism     3. Osteopenia, unspecified location     4. JUAN (obstructive sleep apnea)       T2DM- A1c is below goal. Continue metformin. Stop Januvia in the morning. BG checks 1x daily.   Hypothyroidism-TFTs wnl. Continue LT4 dose.  Osteopenia-repeat DEXA 4/21. Continue exercises in PT.    [id number] cpap    F/u in 3 mths

## 2019-07-12 ENCOUNTER — CLINICAL SUPPORT (OUTPATIENT)
Dept: REHABILITATION | Facility: HOSPITAL | Age: 69
End: 2019-07-12
Attending: NURSE PRACTITIONER
Payer: MEDICARE

## 2019-07-12 ENCOUNTER — TELEPHONE (OUTPATIENT)
Dept: REHABILITATION | Facility: HOSPITAL | Age: 69
End: 2019-07-12

## 2019-07-12 DIAGNOSIS — R26.81 GAIT INSTABILITY: ICD-10-CM

## 2019-07-12 PROCEDURE — 97110 THERAPEUTIC EXERCISES: CPT | Mod: PN

## 2019-07-12 NOTE — PROGRESS NOTES
"Name: Maggy Mondragon [facility] Number: [id number]  Date of Treatment: 07/12/2019   Diagnosis:   Encounter Diagnosis   Name Primary?    Gait instability        Time in: 1625  Time Out: 1730  Total Treatment Time: 65      Subjective:    Maggy reports improvement of symptoms and "I feel like I am getting stronger."  Reports compliance with HEP.  Patient reports their pain to be n/a/10 on a 0-10 scale with 0 being no pain and 10 being the worst pain imaginable.    Objective  Maggy received therapeutic exercises to develop strength, endurance, ROM, flexibility and posture for 65 minutes including:    Nustep x 10 min L-5 with UE/LE to increase LE mobility, strength, and endurance     Sitting TE:   LAQ 3# R/L LE 3 x 10 reps   Hip flex 3# R/L LE x 30 reps   Ball squeezes x 30 reps   Hip abd GTB x 30 reps   Ham curls GTB x 30 reps    Supine TE:   QS/GS x 30 reps   SLR x 30 reps R/L LE   Bridging x 30 reps    Standing in // bars:   HR x 30 reps   Mini squats x 30 reps   Hip flex x 30 reps   Hip Abd x 30 reps   Ham curls x 30 reps     Shuttle 31# B LE 2  x 30 reps    Written Home Exercises Provided: Cont with HEP  Pt demo good understanding of the education provided. Maggy demonstrated good return demonstration of activities.     Assessment:   Cues for posture with therex and gait, tolerated well.  No rests requested during therex session.    Pt will continue to benefit from skilled PT intervention. Medical Necessity is demonstrated by:  Fall Risk, Continued inability to participate in vocational pursuits, Unable to participate fully in daily activities, Requires skilled supervision to complete and progress HEP and Weakness.    Patient is making good progress towards established goals.    Plan:  Continue with established Plan of Care towards PT goals.   "

## 2019-07-13 LAB — FRUCTOSAMINE SERPL-SCNC: 149 UMOL /L

## 2019-07-14 LAB — GLYCOMARK (TM): 17.8 UG/ML

## 2019-07-16 ENCOUNTER — CLINICAL SUPPORT (OUTPATIENT)
Dept: REHABILITATION | Facility: HOSPITAL | Age: 69
End: 2019-07-16
Attending: NURSE PRACTITIONER
Payer: MEDICARE

## 2019-07-16 DIAGNOSIS — R26.81 GAIT INSTABILITY: ICD-10-CM

## 2019-07-16 PROCEDURE — 97110 THERAPEUTIC EXERCISES: CPT | Mod: PN

## 2019-07-16 NOTE — PROGRESS NOTES
"Name: Maggy Mondragon [facility] Number: [id number]  Date of Treatment: 07/16/2019   Diagnosis:   Encounter Diagnosis   Name Primary?    Gait instability        Time in: 1700  Time Out: 1750  Total Treatment Time: 50          Subjective:    Maggy reports "I got my new shoes.  They feel good so far".  Patient reports their pain to be 0/10 on a 0-10 scale with 0 being no pain and 10 being the worst pain imaginable.    Objective      Maggy received therapeutic exercises to develop strength, endurance, ROM, flexibility, posture and core stabilization for 50 minutes including:       nustep x 10 min L-5  HR/TR x 30 reps  Mini squats x 30 reps  Standing abd x 30 reps  standign ham curls x 30 reps  standign hip flex x 30 reps    tandem gait 10' x 2 fwd/back   Side steeping 10' x 2  Ball squeezes x 30 reps  Hip abd GTB x 30 reps  LAQ 3# B LE x 30 reps  Hip flex 3# B LE x 30 reps  Shuttle 31# B LE x 30 reps      Pt demo good understanding of the education provided. Maggy demonstrated good return demonstration of activities.     Assessment:       Pt will continue to benefit from skilled PT intervention. Medical Necessity is demonstrated by:  Requires skilled supervision to complete and progress HEP and Weakness.    Patient is making good progress towards established goals.            Plan:  Continue with established Plan of Care towards PT goals.   "

## 2019-07-19 ENCOUNTER — CLINICAL SUPPORT (OUTPATIENT)
Dept: REHABILITATION | Facility: HOSPITAL | Age: 69
End: 2019-07-19
Attending: NURSE PRACTITIONER
Payer: MEDICARE

## 2019-07-19 DIAGNOSIS — R26.81 GAIT INSTABILITY: ICD-10-CM

## 2019-07-19 PROCEDURE — 97110 THERAPEUTIC EXERCISES: CPT | Mod: PN

## 2019-07-19 NOTE — PROGRESS NOTES
Name: Maggy Tapia  Date:   07/19/2019  Primary Diagnosis: .  Problem List Items Addressed This Visit     Gait instability          Time in: 1310  Time Out: 1400  Total Treatment Time: 50  Group Time: 0      Subjective:    Patient reports improvement of symptoms.  Patient reports their pain to be 0/10 on a 0-10 scale with 0 being no pain and 10 being the worst pain imaginable.    Objective    Patient received individual therapy to address strength, endurance, ROM and flexibility with 0 other patients with activities as follows:     Patient received therapeutic exercises to develop strength, endurance, ROM and flexibility for 50 minutes including:     Nustep L5 x 10 min UE's/LE's    Seated therex: x 30   Hip adduction ball squeezes   Hip abduction GTB    LAQ 3#   Hip marches 3#   HR/TR    Standing therex: x 30   Hip ABD L/R   Hip marches L/R   HR/TR B   Minisquats    Hamstring curls      Standing balance:   Side stepping 5 ft x 6   Tandem gait 5 ft x 6    Gait: c/ RW at S/SBA, cues for upright posture    Assessment:     Pt needing cues for posture frequently during standing therex and gait, however improving endurance for therex, requiring no rest breaks this date.    Pt will continue to benefit from skilled PT intervention. Medical Necessity is demonstrated by:  Fall Risk, Unable to participate fully in daily activities and Weakness.    Patient is making good progress towards established goals.    Plan:  Continue with established Plan of Care towards PT goals.

## 2019-07-22 ENCOUNTER — DOCUMENTATION ONLY (OUTPATIENT)
Dept: FAMILY MEDICINE | Facility: CLINIC | Age: 69
End: 2019-07-22

## 2019-07-22 ENCOUNTER — CLINICAL SUPPORT (OUTPATIENT)
Dept: REHABILITATION | Facility: HOSPITAL | Age: 69
End: 2019-07-22
Attending: NURSE PRACTITIONER
Payer: MEDICARE

## 2019-07-22 ENCOUNTER — OFFICE VISIT (OUTPATIENT)
Dept: FAMILY MEDICINE | Facility: CLINIC | Age: 69
End: 2019-07-22
Payer: MEDICARE

## 2019-07-22 VITALS
OXYGEN SATURATION: 97 % | TEMPERATURE: 98 F | DIASTOLIC BLOOD PRESSURE: 58 MMHG | HEIGHT: 61 IN | WEIGHT: 188.5 LBS | RESPIRATION RATE: 12 BRPM | HEART RATE: 87 BPM | SYSTOLIC BLOOD PRESSURE: 100 MMHG | BODY MASS INDEX: 35.59 KG/M2

## 2019-07-22 DIAGNOSIS — Z79.4 TYPE 2 DIABETES MELLITUS WITHOUT COMPLICATION, WITH LONG-TERM CURRENT USE OF INSULIN: ICD-10-CM

## 2019-07-22 DIAGNOSIS — D69.6 THROMBOCYTOPENIA: ICD-10-CM

## 2019-07-22 DIAGNOSIS — R26.81 GAIT INSTABILITY: ICD-10-CM

## 2019-07-22 DIAGNOSIS — R29.6 FREQUENT FALLS: ICD-10-CM

## 2019-07-22 DIAGNOSIS — I10 ESSENTIAL HYPERTENSION: Primary | ICD-10-CM

## 2019-07-22 DIAGNOSIS — Z12.11 COLON CANCER SCREENING: ICD-10-CM

## 2019-07-22 DIAGNOSIS — E78.5 HYPERLIPIDEMIA, UNSPECIFIED HYPERLIPIDEMIA TYPE: ICD-10-CM

## 2019-07-22 DIAGNOSIS — E03.9 ACQUIRED HYPOTHYROIDISM: Chronic | ICD-10-CM

## 2019-07-22 DIAGNOSIS — Z85.3 HISTORY OF BREAST CANCER: ICD-10-CM

## 2019-07-22 DIAGNOSIS — E66.01 SEVERE OBESITY (BMI 35.0-35.9 WITH COMORBIDITY): ICD-10-CM

## 2019-07-22 DIAGNOSIS — E11.9 TYPE 2 DIABETES MELLITUS WITHOUT COMPLICATION, WITH LONG-TERM CURRENT USE OF INSULIN: ICD-10-CM

## 2019-07-22 DIAGNOSIS — G40.909 SEIZURE DISORDER: ICD-10-CM

## 2019-07-22 DIAGNOSIS — R92.8 FOLLOW-UP EXAMINATION OF ABNORMAL MAMMOGRAM: ICD-10-CM

## 2019-07-22 PROCEDURE — 99999 PR PBB SHADOW E&M-EST. PATIENT-LVL IV: CPT | Mod: PBBFAC,,, | Performed by: FAMILY MEDICINE

## 2019-07-22 PROCEDURE — 3078F PR MOST RECENT DIASTOLIC BLOOD PRESSURE < 80 MM HG: ICD-10-PCS | Mod: CPTII,S$GLB,, | Performed by: FAMILY MEDICINE

## 2019-07-22 PROCEDURE — 99499 RISK ADDL DX/OHS AUDIT: ICD-10-PCS | Mod: S$GLB,,, | Performed by: FAMILY MEDICINE

## 2019-07-22 PROCEDURE — 3078F DIAST BP <80 MM HG: CPT | Mod: CPTII,S$GLB,, | Performed by: FAMILY MEDICINE

## 2019-07-22 PROCEDURE — 99214 PR OFFICE/OUTPT VISIT, EST, LEVL IV, 30-39 MIN: ICD-10-PCS | Mod: S$GLB,,, | Performed by: FAMILY MEDICINE

## 2019-07-22 PROCEDURE — G8979 MOBILITY GOAL STATUS: HCPCS | Mod: CH,PN

## 2019-07-22 PROCEDURE — 99999 PR PBB SHADOW E&M-EST. PATIENT-LVL IV: ICD-10-PCS | Mod: PBBFAC,,, | Performed by: FAMILY MEDICINE

## 2019-07-22 PROCEDURE — 1101F PT FALLS ASSESS-DOCD LE1/YR: CPT | Mod: CPTII,S$GLB,, | Performed by: FAMILY MEDICINE

## 2019-07-22 PROCEDURE — 99214 OFFICE O/P EST MOD 30 MIN: CPT | Mod: S$GLB,,, | Performed by: FAMILY MEDICINE

## 2019-07-22 PROCEDURE — G8980 MOBILITY D/C STATUS: HCPCS | Mod: CJ,PN

## 2019-07-22 PROCEDURE — 99499 UNLISTED E&M SERVICE: CPT | Mod: S$GLB,,, | Performed by: FAMILY MEDICINE

## 2019-07-22 PROCEDURE — 3074F SYST BP LT 130 MM HG: CPT | Mod: CPTII,S$GLB,, | Performed by: FAMILY MEDICINE

## 2019-07-22 PROCEDURE — 3044F PR MOST RECENT HEMOGLOBIN A1C LEVEL <7.0%: ICD-10-PCS | Mod: CPTII,S$GLB,, | Performed by: FAMILY MEDICINE

## 2019-07-22 PROCEDURE — 1101F PR PT FALLS ASSESS DOC 0-1 FALLS W/OUT INJ PAST YR: ICD-10-PCS | Mod: CPTII,S$GLB,, | Performed by: FAMILY MEDICINE

## 2019-07-22 PROCEDURE — 3044F HG A1C LEVEL LT 7.0%: CPT | Mod: CPTII,S$GLB,, | Performed by: FAMILY MEDICINE

## 2019-07-22 PROCEDURE — 3074F PR MOST RECENT SYSTOLIC BLOOD PRESSURE < 130 MM HG: ICD-10-PCS | Mod: CPTII,S$GLB,, | Performed by: FAMILY MEDICINE

## 2019-07-22 PROCEDURE — 97110 THERAPEUTIC EXERCISES: CPT | Mod: PN

## 2019-07-22 NOTE — PLAN OF CARE
REHAB SERVICES OUTPATIENT DISCHARGE SUMMARY  Physical Therapy    Maggy aTpia  Date:  2019  Date of Evaluation:  2019  Physician:  Dr. Abbasi  Total # Of Visits:  19  Cancelled:  See EMR  No Shows:  See EMR  Problem List Items Addressed This Visit     Gait instability          S: Pt reports improvement in mobility as well as continuing to perform home exercise program as prescribed.     O: Reassessment performed for POC update purposes:    Lower Extremity Range of Motion:  Right Lower Extremity: WFL  Left Lower Extremity: WFL    Lower Extremity Strength:  Right Lower Extremity: hip flex 4+/5, knee ext 5/5, knee flex 4+/5, ankle DF 4-/5  Left Lower Extremity: hip flex 4+/5, knee ext 5/5, knee flex 4+/5, ankle DF 4/5    Gait: 80 ft x 2 c/ RW at S/SBA; cues for upright posture and step length    TU sec c/ RW    Lower Extremity Functional Scale (LEFS): 57/80  G code tool used: LEFS  Discharge modifier: CJ  Goal modifier:     Patient participating in therapeutic exercise as follows to address strengthening/endurance/balance for 38 minutes:    Nustep L5 x 10 min UE's/LE's    Seated therex: x 30              Hip adduction ball squeezes              Hip abduction GTB               LAQ 3#              Hip marches 3#              HR/TR    Hamstring curls GTB     Standing therex: x 30              Hip ABD L/R              Hip marches L/R              HR/TR B              Minisquats               Hamstring curls      A: Patient with significant improvements in strength and endurance compared to last POC update. Did not require rest breaks during exercises today. Improved gait pattern with new shoes. I recommend d/c to HEP at this time, since patient is compliant and much improved with her mobility. Reviewed discharge plans and HEP with pt and her brother, who verbalized understanding and agreement. All questions answered.    P: D/C to HEP    The patient is to be discharged from our Therapy service for  the following reason(s):  Patient has completed the physician's prescription and Patient has reached the maximum rehab potential for the present time    Degree of Goal Achievement:  Patient has partially met goals    Patient Education:  HEP    Discharge Plan:  Home Program:

## 2019-07-22 NOTE — PROGRESS NOTES
Subjective:       Patient ID: Maggy Tapia is a 69 y.o. female.    Chief Complaint: Follow-up (3mth f/u diabetes)    HPI  Review of Systems   Constitutional: Negative for fatigue and unexpected weight change.   Respiratory: Negative for chest tightness and shortness of breath.    Cardiovascular: Negative for chest pain, palpitations and leg swelling.   Gastrointestinal: Negative for abdominal pain.   Musculoskeletal: Negative for arthralgias.   Neurological: Negative for dizziness, syncope, light-headedness and headaches.       Patient Active Problem List   Diagnosis    Syncope and collapse    Frequent falls (possibly related to polypharmacy)    Type 2 diabetes mellitus    History of left breast cancer    History of ischemic left MCA stroke    Seizure disorder    Essential hypertension    Hyperlipidemia    Thrombocytopenia    Depression    Hypothyroidism    Subdural hematoma, chronic, small, bilateral    SDH (subdural hematoma)    Valproic acid toxicity    Pseudoseizures    Severe obesity (BMI 35.0-35.9 with comorbidity)    Osteopenia    Nontraumatic subcortical hemorrhage of left cerebral hemisphere    JUAN (obstructive sleep apnea)    Near syncope    Diplopia    Cervical strain    Left homonymous hemianopsia    Tardive dyskinesia    Gait instability     Patient is here for a chronic conditions follow up.    [facility] twice weekly for gait training and using walker . Here with brother.  No recent falls    Reviewed labs 7/19    Endocrine Dr. Luz - thyroid, dm type 2 -on arb, statin, asa 81mg    H/o left breast cancer. Last imaging 12/16    Objective:      Physical Exam   Constitutional: She is oriented to person, place, and time. She appears well-developed and well-nourished.   Cardiovascular: Normal rate, regular rhythm and normal heart sounds.   Pulmonary/Chest: Effort normal and breath sounds normal.   Musculoskeletal: She exhibits no edema.   Neurological: She is alert and  "oriented to person, place, and time.   Skin: Skin is warm and dry.   Psychiatric: She has a normal mood and affect.   Nursing note and vitals reviewed.      Assessment:       1. Essential hypertension    2. Hyperlipidemia, unspecified hyperlipidemia type    3. Thrombocytopenia    4. History of left breast cancer    5. Acquired hypothyroidism    6. Severe obesity (BMI 35.0-35.9 with comorbidity)    7. Type 2 diabetes mellitus without complication, with long-term current use of insulin    8. Seizure disorder    9. Frequent falls (possibly related to polypharmacy)    10. Follow-up examination of abnormal mammogram    11. Colon cancer screening        Plan:       T  1. Essential hypertension  Controlled on current medications.  Continue current medications.      2. Hyperlipidemia, unspecified hyperlipidemia type  Controlled on current medications.  Continue current medications.      3. Thrombocytopenia  Normal range now.  Cont to monitor    4. History of left breast cancer  Repeat surveillance  - US Breast Left Complete; Future  - Mammo Digital Diagnostic Bilat w/ Alcon; Future    5. Acquired hypothyroidism  Stable condition.  Continue current medications.  Will adjust based on lab findings or if condition changes.      6. Severe obesity (BMI 35.0-35.9 with comorbidity)  Counseled patient on his ideal body weight, health consequences of being obese and current recommendations including weekly exercise and a heart healthy diet.  Current BMI is:Estimated body mass index is 35.62 kg/m² as calculated from the following:    Height as of this encounter: 5' 1" (1.549 m).    Weight as of this encounter: 85.5 kg (188 lb 7.9 oz)..  Patient is aware that ideal BMI < 25 or Weight in (lb) to have BMI = 25: 132.        7. Type 2 diabetes mellitus without complication, with long-term current use of insulin  Controlled on current medications.  Continue current medications.      8. Seizure disorder  Controlled on current medications.  " Continue current medications.      9. Frequent falls (possibly related to polypharmacy)  Improving with pt. Cont fall precautions    10. Follow-up examination of abnormal mammogram  Follow up  - US Breast Left Complete; Future  - Mammo Digital Diagnostic Bilat w/ Alcon; Future    11. Colon cancer screening  Patient declines a colonoscopy after discussing benefits and risks. Patient is willing to do FOBT x3  or FIT test at home understanding it is 4 times less effective at detecting cancers/masses/polyps than a screening colonoscopy    - Fecal Immunochemical Test (iFOBT); Future    Time spent with patient: 20 minutes    Patient with be reevaluated in 6 months or sooner prn    Greater than 50% of this visit was spent counseling as described in above documentation:Yes

## 2019-07-25 ENCOUNTER — HOSPITAL ENCOUNTER (OUTPATIENT)
Dept: RADIOLOGY | Facility: HOSPITAL | Age: 69
Discharge: HOME | End: 2019-07-25
Attending: FAMILY MEDICINE
Payer: MEDICARE

## 2019-07-25 DIAGNOSIS — Z85.3 HISTORY OF BREAST CANCER: ICD-10-CM

## 2019-07-25 DIAGNOSIS — R92.8 FOLLOW-UP EXAMINATION OF ABNORMAL MAMMOGRAM: ICD-10-CM

## 2019-07-25 PROCEDURE — 77066 MAMMO DIGITAL DIAGNOSTIC BILAT WITH TOMOSYNTHESIS_CAD: ICD-10-PCS | Mod: 26,,, | Performed by: RADIOLOGY

## 2019-07-25 PROCEDURE — 77066 DX MAMMO INCL CAD BI: CPT | Mod: TC

## 2019-07-25 PROCEDURE — 77066 DX MAMMO INCL CAD BI: CPT | Mod: 26,,, | Performed by: RADIOLOGY

## 2019-07-25 PROCEDURE — 77062 BREAST TOMOSYNTHESIS BI: CPT | Mod: 26,,, | Performed by: RADIOLOGY

## 2019-07-25 PROCEDURE — 77062 MAMMO DIGITAL DIAGNOSTIC BILAT WITH TOMOSYNTHESIS_CAD: ICD-10-PCS | Mod: 26,,, | Performed by: RADIOLOGY

## 2019-09-03 ENCOUNTER — LAB VISIT (OUTPATIENT)
Dept: LAB | Facility: HOSPITAL | Age: 69
End: 2019-09-03
Attending: PSYCHIATRY & NEUROLOGY
Payer: MEDICARE

## 2019-09-03 ENCOUNTER — OFFICE VISIT (OUTPATIENT)
Dept: NEUROLOGY | Facility: CLINIC | Age: 69
End: 2019-09-03
Payer: MEDICARE

## 2019-09-03 VITALS
BODY MASS INDEX: 35.05 KG/M2 | SYSTOLIC BLOOD PRESSURE: 139 MMHG | DIASTOLIC BLOOD PRESSURE: 66 MMHG | HEART RATE: 84 BPM | WEIGHT: 185.63 LBS | HEIGHT: 61 IN | RESPIRATION RATE: 20 BRPM

## 2019-09-03 DIAGNOSIS — Z86.73 HISTORY OF ISCHEMIC LEFT MCA STROKE: ICD-10-CM

## 2019-09-03 DIAGNOSIS — G47.33 OSA (OBSTRUCTIVE SLEEP APNEA): ICD-10-CM

## 2019-09-03 DIAGNOSIS — R29.6 FREQUENT FALLS: ICD-10-CM

## 2019-09-03 DIAGNOSIS — E16.2 HYPOGLYCEMIA: ICD-10-CM

## 2019-09-03 DIAGNOSIS — G24.01 TARDIVE DYSKINESIA: ICD-10-CM

## 2019-09-03 DIAGNOSIS — I62.03 SUBDURAL HEMATOMA, CHRONIC: Primary | ICD-10-CM

## 2019-09-03 DIAGNOSIS — F44.5 PSEUDOSEIZURES: ICD-10-CM

## 2019-09-03 LAB — VALPROATE SERPL-MCNC: <12.5 UG/ML (ref 50–100)

## 2019-09-03 PROCEDURE — 99499 RISK ADDL DX/OHS AUDIT: ICD-10-PCS | Mod: S$GLB,,, | Performed by: PSYCHIATRY & NEUROLOGY

## 2019-09-03 PROCEDURE — 99213 PR OFFICE/OUTPT VISIT, EST, LEVL III, 20-29 MIN: ICD-10-PCS | Mod: S$GLB,,, | Performed by: PSYCHIATRY & NEUROLOGY

## 2019-09-03 PROCEDURE — 36415 COLL VENOUS BLD VENIPUNCTURE: CPT | Mod: PO

## 2019-09-03 PROCEDURE — 80164 ASSAY DIPROPYLACETIC ACD TOT: CPT

## 2019-09-03 PROCEDURE — 1101F PR PT FALLS ASSESS DOC 0-1 FALLS W/OUT INJ PAST YR: ICD-10-PCS | Mod: CPTII,S$GLB,, | Performed by: PSYCHIATRY & NEUROLOGY

## 2019-09-03 PROCEDURE — 99499 UNLISTED E&M SERVICE: CPT | Mod: S$GLB,,, | Performed by: PSYCHIATRY & NEUROLOGY

## 2019-09-03 PROCEDURE — 3078F PR MOST RECENT DIASTOLIC BLOOD PRESSURE < 80 MM HG: ICD-10-PCS | Mod: CPTII,S$GLB,, | Performed by: PSYCHIATRY & NEUROLOGY

## 2019-09-03 PROCEDURE — 3075F SYST BP GE 130 - 139MM HG: CPT | Mod: CPTII,S$GLB,, | Performed by: PSYCHIATRY & NEUROLOGY

## 2019-09-03 PROCEDURE — 3075F PR MOST RECENT SYSTOLIC BLOOD PRESS GE 130-139MM HG: ICD-10-PCS | Mod: CPTII,S$GLB,, | Performed by: PSYCHIATRY & NEUROLOGY

## 2019-09-03 PROCEDURE — 1101F PT FALLS ASSESS-DOCD LE1/YR: CPT | Mod: CPTII,S$GLB,, | Performed by: PSYCHIATRY & NEUROLOGY

## 2019-09-03 PROCEDURE — 99213 OFFICE O/P EST LOW 20 MIN: CPT | Mod: S$GLB,,, | Performed by: PSYCHIATRY & NEUROLOGY

## 2019-09-03 PROCEDURE — 99999 PR PBB SHADOW E&M-EST. PATIENT-LVL IV: ICD-10-PCS | Mod: PBBFAC,,, | Performed by: PSYCHIATRY & NEUROLOGY

## 2019-09-03 PROCEDURE — 99999 PR PBB SHADOW E&M-EST. PATIENT-LVL IV: CPT | Mod: PBBFAC,,, | Performed by: PSYCHIATRY & NEUROLOGY

## 2019-09-03 PROCEDURE — 3078F DIAST BP <80 MM HG: CPT | Mod: CPTII,S$GLB,, | Performed by: PSYCHIATRY & NEUROLOGY

## 2019-09-03 NOTE — PROGRESS NOTES
"Subjective:          Patient ID: Maggy Tapia is a 69 y.o.  female who presents for follow up of subdural hematoma status post craniotomy, ataxia, pseudoseizures     Initial HPI 8/2/16:     Patient is a right handed 66 y.o. female presents with falls and mental status change.  Hx from brother, pt with limited insight.       Hx stroke, mild residual R sided deficits 2000.  Episodes of "pseudoseizures", consist of shaking lasting 5 minutes or loss of awareness; last "seizure" one week ago.  Sitting in chair, unresponsive with eyes open, staring up, falls forward. Unresponsive several minutes, then vocalizing "Dk, dk, dk" before speaking normally, several minutes.  Pt with no recollection of event.  Per brother taking Topamax 100 mg daily, Depakote 1000 mg BID and clonazepam 0.5 mg, prescribed by psychiatrist Dr. Whyte.  Also sees neurologist on [place], [facility] Dr. Mac.      Several falls prior to admission at [facility] beginning of July 2016.  Suspected syncope, MRi shows old stroke L parietal and small chronic SDH and hygromas.  Went to inpatient rehab 2 weeks, was walking well, think,ing clearly, speech normal speed.  Over past 2 weeks, less awake, slower speech and movement, worse balance. Fell prior to arrival in ER.  Presented to [facility] early this AM, transferred for neurosurgical evaluation after discovery of large left SDH. Plan for rigoberto hole evacuation tomorrow AM.      Last History of Present Illness 8/16/17:     I last saw her at the end of July.  At that time, was concerned her Depakote could be contributing to generalized slowing and risk of falls.  She was brought to the hospital a couple weeks later, on August 4 after feeling generally weak and falling to the ground.  No loss of consciousness or seizure activity.  She had not been eating or drinking well and had had 1 episode of diarrhea.  To have urinary tract infection.  She felt significantly better the following day after being given Cipro " IV.     Also found to have some acute kidney injury felt to be secondary to dehydration.     She was set up for an earlier appointment to follow-up with me; in July we were planning on a follow-up visit in 6 months.     On August 10, there is a note in her record by care management stating concern about the fact that Mrs. Tapia was staying at the hospital with family members who themselves were admitted following her discharge.  Concerns were raised about her living condition and inability to care for herself.     According to this note, [facility] had evaluated her and recommended permanent placement, deemed her home living environment deplorable and had called Adult Protective Services.  Please see the note by Brinda Nava on August 11, 2017 for details.     She is here with her brother.  They talked about the fact that due to medical conditions unable to clean the house, dogs have been soiling all over the house and no body to help clean up.  They do report having enough food in the house.      She is still taking VPA 1000 mg at bedtime, clonazepam 0.5 mg at bedtime.      She has not fallen again since hospital discharge.     Interval history 7/9/2018:     Here today with her brother. Since our last visit she presented to [facility] on June 7, 2018 with 3 days of dizziness, headache and double vision. ER notes indicate when she was laying flat the room was spinning.  He appeared to be slurring her words.  Had some episodes of vomiting the day prior.  Stroke alert was activated and telemedicine consult was obtained however clinical picture did not seem to point to any kind of an acute vascular event.     She reports posterior headache, has been present for several weeks, maybe 6 weeks.  Has had more lethargy, can't awaken her. She does have increased light sensitivity, and has intermittent diplopia - horizontal. Increased nervousness and anxiety, calling for her dog who passed away 20 years  ago.      Interval history 1/6/2019:     Fell out of bed last week, rolling over to get the phone. No falls from up on her feet. Depressed, will be seeing her psychiatrist tomorrow.       Had an episode of staring; slumping, shaking; variable each episode.  May last 5 minutes, up to 10-15 minutes.  Headache following. Last was 2 days ago, frequency is actually decreased recently.      Sleep pattern is irregular.      Increased shuffling with gait     She is not using her CPAP - had used it on the dog     Interval history 4/3/2019:     After last visit, obtained MRI of the brain which was stable, no new changes noted. No new changes on EEG; left hemispheric slowing and breech artifact as expected.     No seizures since our last visit.  She has had one fall, is now afraid of falling.  3 weeks ago, no injury.      Interval history 9/3/2019:    Had two more falls; was alone, fell in the dark, pulled phone and called 911.  Now has better lighting.  She has had 3 more seizure like spells since last visit - 15-20 seconds, sitting up, eyes roll back and loss of consciousness; no shaking or convulsions, does not recall events, headache following.  Checks blood SUGAR - 20's to 40's at times.  Her DM doctor is aware; last was 3 weeks ago. Says her glucose gets below 60 3-4 times a week.  Last visit sopped Januvia.       Review of patient's allergies indicates:   Allergen Reactions    Penicillins Anaphylaxis    Sulfa (sulfonamide antibiotics) Anaphylaxis     Current Outpatient Medications   Medication Sig Dispense Refill    aspirin (ECOTRIN) 81 MG EC tablet Take 81 mg by mouth once daily.      CALCIUM CARBONATE/VITAMIN D3 (CALCIUM 500 + D ORAL) Take 1 tablet by mouth once daily.       clonazePAM (KLONOPIN) 0.5 MG tablet Take 1 tablet (0.5 mg total) by mouth 2 (two) times daily.  3    divalproex ER (DEPAKOTE) 500 MG Tb24 Take 500 mg by mouth every evening.  180 tablet 3    gemfibrozil (LOPID) 600 MG tablet TAKE 1 TABLET  BY MOUTH 2 TIMES DAILY. 180 tablet 3    levothyroxine (SYNTHROID) 75 MCG tablet Take 1 tablet (75 mcg total) by mouth once daily. 30 tablet 3    losartan (COZAAR) 50 MG tablet TAKE 1 TABLET BY MOUTH EVERY DAY 90 tablet 3    metFORMIN (GLUCOPHAGE-XR) 500 MG 24 hr tablet Take 2 tablets (1,000 mg total) by mouth 2 (two) times daily with meals. (Patient taking differently: Take 1,000 mg by mouth once daily. ) 360 tablet 3    oxybutynin (DITROPAN-XL) 10 MG 24 hr tablet Take 1 tablet (10 mg total) by mouth once daily. 30 tablet 12    potassium chloride SA (K-DUR,KLOR-CON) 20 MEQ tablet Take 20 mEq by mouth once daily.      prazosin (MINIPRESS) 2 MG Cap Take 4 mg by mouth every evening. (2) 2mg capsules HS      simvastatin (ZOCOR) 40 MG tablet TAKE 1 TABLET BY MOUTH EVERY DAY 90 tablet 3    thiamine (VITAMIN B-1) 100 MG tablet Take 100 mg by mouth once daily.      TRINTELLIX 20 mg Tab Take 20 mg by mouth once daily.        No current facility-administered medications for this visit.          Review of Systems  Review of Systems    Objective:        Vitals:    09/03/19 0953   BP: 139/66   Pulse: 84   Resp:      Body mass index is 35.07 kg/m².  Neurologic Exam     Mental Status   Level of consciousness: alert  Awake, conversant  Wed, Oct, 2006.  Did not recognize year when told  President [employer]  In [place], in my office       Cranial Nerves   translational jaw movement, no darting tongue movement     Motor Exam No clear weakness; drift of the right arm but seems effort dependent.  Misses nose with R hand eyes closed; can find it again      Gait, Coordination, and Reflexes Wide based, short steps, shuffling gait.  No retropulsion on pull test       Physical Exam      Data Review:    Results for SALMA RODRIGES (MRN [id number]) as of 9/3/2019 09:31   Ref. Range 5/24/2019 15:07   Valproic Acid Lvl Latest Ref Range: 50.0 - 100.0 ug/mL 68.8     Assessment:        1. Subdural hematoma, chronic, small, bilateral    2.  History of ischemic left MCA stroke    3. Frequent falls (possibly related to polypharmacy)    4. JUAN (obstructive sleep apnea)    5. Pseudoseizures    6. Hypoglycemia    7. Tardive dyskinesia           Plan:         Problem List Items Addressed This Visit        Neuro    Subdural hematoma, chronic, small, bilateral - Primary    History of ischemic left MCA stroke    Tardive dyskinesia       Psychiatric    Pseudoseizures (Chronic)    Relevant Orders    Valproic Acid (Completed)       Endocrine    Hypoglycemia    Current Assessment & Plan     Contacted her endocrinologist, advised they do the same, may need further medication adjustment            Other    Frequent falls (possibly related to polypharmacy)    JUAN (obstructive sleep apnea)        To help reduce the risk of falls or confusion in the future, we should consider cutting back or stopping the clonazepam and/or the ditropan    Follow up in about 6 months (around 3/3/2020).       Thank you very much for the opportunity to assist in this patient's care.  If you have any questions or concerns, please do not hesitate to contact me at any time.     Sincerely,  Harley Roberts, DO

## 2019-09-03 NOTE — PATIENT INSTRUCTIONS
To help reduce the risk of falls or confusion in the future, we should consider cutting back or stopping the clonazepam and/or the ditropan

## 2019-09-04 ENCOUNTER — NURSE TRIAGE (OUTPATIENT)
Dept: ADMINISTRATIVE | Facility: CLINIC | Age: 69
End: 2019-09-04

## 2019-09-04 ENCOUNTER — TELEPHONE (OUTPATIENT)
Dept: ENDOCRINOLOGY | Facility: CLINIC | Age: 69
End: 2019-09-04

## 2019-09-04 NOTE — TELEPHONE ENCOUNTER
----- Message from Becca Balbuena sent at 9/4/2019  8:04 AM CDT -----  Contact: patient  Type: Needs Medical Advice    Who Called:  patient  Symptoms (please be specific):  Blood sugar  How long has patient had these symptoms:  1 day  Pharmacy name and phone #:  na  Best Call Back Number: [phone] (home)   Additional Information: Patient called in and reported her blood sugar was 69 last night and this morning the reading was 125. Runs low in the morning and goes up later in the day. Patient states she is scared. Sent patient to the oncall nurse. Upon connecting to the oncall nurse.  Please call to advise. Thanks!     LM

## 2019-09-04 NOTE — TELEPHONE ENCOUNTER
CBG at 4 am was 60 mg/dl. Has not checked it since that time. Drank a protein drink and a diet coke. Pt instructed to check CBG at this time. States has been running low for 4 times a week.  at this time. Pt states she is not on medication for her diabetes at this time.     Reason for Disposition   [1] Morning (before breakfast) blood glucose < 80 mg/dl (4.5 mmol/L) AND [2] more than once in past week    Additional Information   Negative: Unconscious or difficult to awaken   Negative: Seizure occurs   Negative: Acting confused (e.g., disoriented, slurred speech)   Negative: Very weak (e.g., can't stand)   Negative: Sounds like a life-threatening emergency to the triager   Negative: [1] Vomiting AND [2] signs of dehydration (e.g., no urine > 12 hours, very dry mouth, dark urine, etc.)   Negative: [1] Low blood sugar symptoms persist > 30 minutes AND [2] using low blood sugar Care Advice   Negative: [1] Low blood glucose (persists > 30 minutes AND [2] using low blood sugar Care Advice   Negative: Patient sounds very sick or weak to the triager   Negative: [1] Low blood sugar symptoms with no other adult present AND [2] hasn't tried Care Advice   Negative: [1] Low blood glucose (< 70 mg/dl  or 3.9 mmol/l) with no other adult present AND [2] hasn't tried Care Advice   Negative: Diabetes drug error or overdose (e.g., insulin error or extra dose)   Negative: Caller has URGENT medication or insulin pump question and triager unable to answer question   Negative: [1] Blood glucose < 70  mg/dl (3.9 mmol/l) or symptomatic, now improved with Care Advice AND [2] cause unknown    Protocols used: DIABETES - LOW BLOOD SUGAR-A-

## 2019-09-04 NOTE — TELEPHONE ENCOUNTER
Pt states sob began about 2 weeks ago, states its a little worse today, has already seen her pcp about it, tests were run and negative.  Blood glucose this am at 0400 was 69, at 0700 139.  States she feels fine.  States her blood glucose has been low in the am x 4 days this week.  She's been taken off of insulin.      Reason for Disposition   MODERATE difficulty breathing (e.g., speaks in phrases, SOB even at rest, pulse 100-120) of new onset or worse than normal    Additional Information   Negative: Breathing stopped and hasn't returned   Negative: Choking on something   Negative: SEVERE difficulty breathing (e.g., struggling for each breath, speaks in single words, pulse > 120)   Negative: Bluish lips, tongue, or face now   Negative: Difficult to awaken or acting confused (e.g., disoriented, slurred speech)   Negative: Passed out (i.e., fainted, collapsed and was not responding)   Negative: Wheezing started suddenly after medicine, an allergic food, or bee sting   Negative: Stridor   Negative: Slow, shallow and weak breathing   Negative: Sounds like a life-threatening emergency to the triager   Negative: Chest pain   Negative: Wheezing (high pitched whistling sound) and previous asthma attacks or use of asthma medicines   Negative: Difficulty breathing and only present when coughing   Negative: Difficulty breathing and only from stuffy or runny nose    Protocols used: ST BREATHING DIFFICULTY-A-OH

## 2019-09-04 NOTE — TELEPHONE ENCOUNTER
Decrease Metformin to 1000 mg in the morning and 500 mg at night. Continue Januvia. Pt will call back in a few days to report blood sugars. Her  states she saw her physician yesterday for sob.

## 2019-09-09 ENCOUNTER — OFFICE VISIT (OUTPATIENT)
Dept: FAMILY MEDICINE | Facility: CLINIC | Age: 69
End: 2019-09-09
Payer: MEDICARE

## 2019-09-09 VITALS
OXYGEN SATURATION: 97 % | BODY MASS INDEX: 35.54 KG/M2 | SYSTOLIC BLOOD PRESSURE: 120 MMHG | HEART RATE: 75 BPM | WEIGHT: 188.25 LBS | DIASTOLIC BLOOD PRESSURE: 70 MMHG | TEMPERATURE: 98 F | HEIGHT: 61 IN | RESPIRATION RATE: 14 BRPM

## 2019-09-09 DIAGNOSIS — I10 ESSENTIAL HYPERTENSION: ICD-10-CM

## 2019-09-09 DIAGNOSIS — E16.2 HYPOGLYCEMIA: ICD-10-CM

## 2019-09-09 DIAGNOSIS — G24.01 TARDIVE DYSKINESIA: ICD-10-CM

## 2019-09-09 DIAGNOSIS — F44.5 PSEUDOSEIZURES: Chronic | ICD-10-CM

## 2019-09-09 DIAGNOSIS — E11.00 TYPE 2 DIABETES MELLITUS WITH HYPEROSMOLARITY WITHOUT COMA, WITHOUT LONG-TERM CURRENT USE OF INSULIN: Primary | ICD-10-CM

## 2019-09-09 DIAGNOSIS — Z86.73 HISTORY OF ISCHEMIC LEFT MCA STROKE: ICD-10-CM

## 2019-09-09 PROCEDURE — 3078F PR MOST RECENT DIASTOLIC BLOOD PRESSURE < 80 MM HG: ICD-10-PCS | Mod: CPTII,S$GLB,, | Performed by: PHYSICIAN ASSISTANT

## 2019-09-09 PROCEDURE — 3288F PR FALLS RISK ASSESSMENT DOCUMENTED: ICD-10-PCS | Mod: CPTII,S$GLB,, | Performed by: PHYSICIAN ASSISTANT

## 2019-09-09 PROCEDURE — 1100F PR PT FALLS ASSESS DOC 2+ FALLS/FALL W/INJURY/YR: ICD-10-PCS | Mod: CPTII,S$GLB,, | Performed by: PHYSICIAN ASSISTANT

## 2019-09-09 PROCEDURE — 3288F FALL RISK ASSESSMENT DOCD: CPT | Mod: CPTII,S$GLB,, | Performed by: PHYSICIAN ASSISTANT

## 2019-09-09 PROCEDURE — 99999 PR PBB SHADOW E&M-EST. PATIENT-LVL V: CPT | Mod: PBBFAC,,, | Performed by: PHYSICIAN ASSISTANT

## 2019-09-09 PROCEDURE — 1100F PTFALLS ASSESS-DOCD GE2>/YR: CPT | Mod: CPTII,S$GLB,, | Performed by: PHYSICIAN ASSISTANT

## 2019-09-09 PROCEDURE — 3074F SYST BP LT 130 MM HG: CPT | Mod: CPTII,S$GLB,, | Performed by: PHYSICIAN ASSISTANT

## 2019-09-09 PROCEDURE — 3044F HG A1C LEVEL LT 7.0%: CPT | Mod: CPTII,S$GLB,, | Performed by: PHYSICIAN ASSISTANT

## 2019-09-09 PROCEDURE — 3044F PR MOST RECENT HEMOGLOBIN A1C LEVEL <7.0%: ICD-10-PCS | Mod: CPTII,S$GLB,, | Performed by: PHYSICIAN ASSISTANT

## 2019-09-09 PROCEDURE — 99499 UNLISTED E&M SERVICE: CPT | Mod: S$GLB,,, | Performed by: PHYSICIAN ASSISTANT

## 2019-09-09 PROCEDURE — 3074F PR MOST RECENT SYSTOLIC BLOOD PRESSURE < 130 MM HG: ICD-10-PCS | Mod: CPTII,S$GLB,, | Performed by: PHYSICIAN ASSISTANT

## 2019-09-09 PROCEDURE — 99214 OFFICE O/P EST MOD 30 MIN: CPT | Mod: S$GLB,,, | Performed by: PHYSICIAN ASSISTANT

## 2019-09-09 PROCEDURE — 99999 PR PBB SHADOW E&M-EST. PATIENT-LVL V: ICD-10-PCS | Mod: PBBFAC,,, | Performed by: PHYSICIAN ASSISTANT

## 2019-09-09 PROCEDURE — 99499 RISK ADDL DX/OHS AUDIT: ICD-10-PCS | Mod: S$GLB,,, | Performed by: PHYSICIAN ASSISTANT

## 2019-09-09 PROCEDURE — 99214 PR OFFICE/OUTPT VISIT, EST, LEVL IV, 30-39 MIN: ICD-10-PCS | Mod: S$GLB,,, | Performed by: PHYSICIAN ASSISTANT

## 2019-09-09 PROCEDURE — 3078F DIAST BP <80 MM HG: CPT | Mod: CPTII,S$GLB,, | Performed by: PHYSICIAN ASSISTANT

## 2019-09-09 NOTE — PROGRESS NOTES
Subjective:       Patient ID: Maggy Tapia is a 69 y.o. female.    Chief Complaint: Memory Loss and Shortness of Breath    HPI   Pt in for several complaints  Pt has memory loss issues in recent mos sx come and go  SOB at times  Fatigue  Sx worse over past 4 days since reduction in Glucophage and change in diet  AM glucose this AM was 140   Review of Systems   Constitutional: Positive for activity change. Negative for appetite change, chills, diaphoresis, fatigue, fever and unexpected weight change.   HENT: Negative.    Eyes: Negative.    Respiratory: Positive for shortness of breath.    Cardiovascular: Negative.  Negative for chest pain and leg swelling.   Gastrointestinal: Negative.    Endocrine: Negative.    Genitourinary: Negative.    Musculoskeletal: Negative.    Skin: Negative.  Negative for rash.   Psychiatric/Behavioral: Positive for confusion and decreased concentration.       Objective:      Physical Exam   Constitutional: She appears well-developed and well-nourished. No distress.   HENT:   Head: Normocephalic and atraumatic.   Mouth/Throat: Oropharynx is clear and moist.   Eyes: Conjunctivae are normal. No scleral icterus.   Neck: Normal range of motion. Neck supple. No tracheal deviation present. No thyromegaly present.   Cardiovascular: Normal rate, regular rhythm, normal heart sounds and intact distal pulses. Exam reveals no gallop and no friction rub.   No murmur heard.  Pulmonary/Chest: Effort normal and breath sounds normal. No stridor. No respiratory distress. She has no wheezes. She has no rales.   Musculoskeletal: She exhibits no edema.   Lymphadenopathy:     She has no cervical adenopathy.   Skin: Skin is warm and dry. No rash noted.   Vitals reviewed.      Assessment:       1. Type 2 diabetes mellitus with hyperosmolarity without coma, without long-term current use of insulin    2. Hypoglycemia    3. History of ischemic left MCA stroke    4. Tardive dyskinesia    5. Pseudoseizures    6.  Essential hypertension        Plan:       Maggy was seen today for memory loss and shortness of breath.    Diagnoses and all orders for this visit:    Type 2 diabetes mellitus with hyperosmolarity without coma, without long-term current use of insulin    Hypoglycemia    History of ischemic left MCA stroke    Tardive dyskinesia    Pseudoseizures    Essential hypertension    discussed diet  Adjustment to new diet and med adjustment  Recheck 1 wk if sx continue

## 2019-09-16 ENCOUNTER — TELEPHONE (OUTPATIENT)
Dept: NEUROLOGY | Facility: CLINIC | Age: 69
End: 2019-09-16

## 2019-09-16 NOTE — TELEPHONE ENCOUNTER
----- Message from Aguila Villanueva sent at 9/16/2019  8:27 AM CDT -----  Type:  Patient Returning Call    Who Called: George Willie (Brother)  Who Left Message for Patient:  NA  Does the patient know what this is regarding?:  Patient's medication  Best Call Back Number:  [phone]  Additional Information:

## 2019-09-25 ENCOUNTER — LAB VISIT (OUTPATIENT)
Dept: LAB | Facility: HOSPITAL | Age: 69
End: 2019-09-25
Attending: PHYSICIAN ASSISTANT
Payer: MEDICARE

## 2019-09-25 DIAGNOSIS — Z79.4 TYPE 2 DIABETES MELLITUS WITHOUT COMPLICATION, WITH LONG-TERM CURRENT USE OF INSULIN: ICD-10-CM

## 2019-09-25 DIAGNOSIS — E11.9 TYPE 2 DIABETES MELLITUS WITHOUT COMPLICATION, WITH LONG-TERM CURRENT USE OF INSULIN: ICD-10-CM

## 2019-09-25 LAB
ALBUMIN SERPL BCP-MCNC: 3.4 G/DL (ref 3.5–5.2)
ANION GAP SERPL CALC-SCNC: 10 MMOL/L (ref 8–16)
BUN SERPL-MCNC: 34 MG/DL (ref 8–23)
CALCIUM SERPL-MCNC: 10.2 MG/DL (ref 8.7–10.5)
CHLORIDE SERPL-SCNC: 106 MMOL/L (ref 95–110)
CO2 SERPL-SCNC: 26 MMOL/L (ref 23–29)
CREAT SERPL-MCNC: 1.1 MG/DL (ref 0.5–1.4)
EST. GFR  (AFRICAN AMERICAN): 58.4 ML/MIN/1.73 M^2
EST. GFR  (NON AFRICAN AMERICAN): 50.6 ML/MIN/1.73 M^2
ESTIMATED AVG GLUCOSE: 120 MG/DL (ref 68–131)
GLUCOSE SERPL-MCNC: 146 MG/DL (ref 70–110)
HBA1C MFR BLD HPLC: 5.8 % (ref 4–5.6)
PHOSPHATE SERPL-MCNC: 3.6 MG/DL (ref 2.7–4.5)
POTASSIUM SERPL-SCNC: 4.9 MMOL/L (ref 3.5–5.1)
SODIUM SERPL-SCNC: 142 MMOL/L (ref 136–145)
T3 SERPL-MCNC: 72 NG/DL (ref 60–180)
T4 FREE SERPL-MCNC: 0.87 NG/DL (ref 0.71–1.51)
TSH SERPL DL<=0.005 MIU/L-ACNC: 7.57 UIU/ML (ref 0.4–4)

## 2019-09-25 PROCEDURE — 82985 ASSAY OF GLYCATED PROTEIN: CPT

## 2019-09-25 PROCEDURE — 83036 HEMOGLOBIN GLYCOSYLATED A1C: CPT | Mod: 59

## 2019-09-25 PROCEDURE — 84443 ASSAY THYROID STIM HORMONE: CPT

## 2019-09-25 PROCEDURE — 84378 SUGARS SINGLE QUANT: CPT

## 2019-09-25 PROCEDURE — 84480 ASSAY TRIIODOTHYRONINE (T3): CPT

## 2019-09-25 PROCEDURE — 80069 RENAL FUNCTION PANEL: CPT

## 2019-09-25 PROCEDURE — 84439 ASSAY OF FREE THYROXINE: CPT

## 2019-09-28 LAB — FRUCTOSAMINE SERPL-SCNC: 218 UMOL /L

## 2019-09-30 LAB — GLYCOMARK (TM): 20.2 UG/ML

## 2019-10-02 ENCOUNTER — OFFICE VISIT (OUTPATIENT)
Dept: ENDOCRINOLOGY | Facility: CLINIC | Age: 69
End: 2019-10-02
Payer: MEDICARE

## 2019-10-02 VITALS
TEMPERATURE: 98 F | WEIGHT: 188.19 LBS | HEIGHT: 61 IN | SYSTOLIC BLOOD PRESSURE: 132 MMHG | BODY MASS INDEX: 35.53 KG/M2 | HEART RATE: 73 BPM | DIASTOLIC BLOOD PRESSURE: 86 MMHG

## 2019-10-02 DIAGNOSIS — Z79.4 TYPE 2 DIABETES MELLITUS WITHOUT COMPLICATION, WITH LONG-TERM CURRENT USE OF INSULIN: Primary | ICD-10-CM

## 2019-10-02 DIAGNOSIS — E11.9 TYPE 2 DIABETES MELLITUS WITHOUT COMPLICATION, WITH LONG-TERM CURRENT USE OF INSULIN: Primary | ICD-10-CM

## 2019-10-02 DIAGNOSIS — M85.80 OSTEOPENIA, UNSPECIFIED LOCATION: ICD-10-CM

## 2019-10-02 DIAGNOSIS — E03.9 ACQUIRED HYPOTHYROIDISM: ICD-10-CM

## 2019-10-02 DIAGNOSIS — G47.33 OSA (OBSTRUCTIVE SLEEP APNEA): ICD-10-CM

## 2019-10-02 PROCEDURE — 3044F PR MOST RECENT HEMOGLOBIN A1C LEVEL <7.0%: ICD-10-PCS | Mod: CPTII,S$GLB,, | Performed by: PHYSICIAN ASSISTANT

## 2019-10-02 PROCEDURE — 99999 PR PBB SHADOW E&M-EST. PATIENT-LVL IV: ICD-10-PCS | Mod: PBBFAC,,, | Performed by: PHYSICIAN ASSISTANT

## 2019-10-02 PROCEDURE — 3079F PR MOST RECENT DIASTOLIC BLOOD PRESSURE 80-89 MM HG: ICD-10-PCS | Mod: CPTII,S$GLB,, | Performed by: PHYSICIAN ASSISTANT

## 2019-10-02 PROCEDURE — 3044F HG A1C LEVEL LT 7.0%: CPT | Mod: CPTII,S$GLB,, | Performed by: PHYSICIAN ASSISTANT

## 2019-10-02 PROCEDURE — 3075F SYST BP GE 130 - 139MM HG: CPT | Mod: CPTII,S$GLB,, | Performed by: PHYSICIAN ASSISTANT

## 2019-10-02 PROCEDURE — 99999 PR PBB SHADOW E&M-EST. PATIENT-LVL IV: CPT | Mod: PBBFAC,,, | Performed by: PHYSICIAN ASSISTANT

## 2019-10-02 PROCEDURE — 99214 PR OFFICE/OUTPT VISIT, EST, LEVL IV, 30-39 MIN: ICD-10-PCS | Mod: S$GLB,,, | Performed by: PHYSICIAN ASSISTANT

## 2019-10-02 PROCEDURE — 99214 OFFICE O/P EST MOD 30 MIN: CPT | Mod: S$GLB,,, | Performed by: PHYSICIAN ASSISTANT

## 2019-10-02 PROCEDURE — 3079F DIAST BP 80-89 MM HG: CPT | Mod: CPTII,S$GLB,, | Performed by: PHYSICIAN ASSISTANT

## 2019-10-02 PROCEDURE — 1101F PR PT FALLS ASSESS DOC 0-1 FALLS W/OUT INJ PAST YR: ICD-10-PCS | Mod: CPTII,S$GLB,, | Performed by: PHYSICIAN ASSISTANT

## 2019-10-02 PROCEDURE — 3075F PR MOST RECENT SYSTOLIC BLOOD PRESS GE 130-139MM HG: ICD-10-PCS | Mod: CPTII,S$GLB,, | Performed by: PHYSICIAN ASSISTANT

## 2019-10-02 PROCEDURE — 1101F PT FALLS ASSESS-DOCD LE1/YR: CPT | Mod: CPTII,S$GLB,, | Performed by: PHYSICIAN ASSISTANT

## 2019-10-02 RX ORDER — METFORMIN HYDROCHLORIDE 500 MG/1
1000 TABLET, EXTENDED RELEASE ORAL DAILY
Qty: 180 TABLET | Refills: 3
Start: 2019-10-02 | End: 2020-06-14

## 2019-10-02 RX ORDER — LEVOTHYROXINE SODIUM 75 UG/1
75 TABLET ORAL DAILY
Qty: 90 TABLET | Refills: 3 | Status: SHIPPED | OUTPATIENT
Start: 2019-10-02 | End: 2020-02-04

## 2019-10-02 RX ORDER — LEVOTHYROXINE SODIUM 88 UG/1
88 TABLET ORAL DAILY
Qty: 90 TABLET | Refills: 3 | Status: SHIPPED | OUTPATIENT
Start: 2019-10-02 | End: 2019-10-02

## 2019-10-02 NOTE — PROGRESS NOTES
"CC: DM/Hypothyroidism    HPI: Maggy Tapia was diagnosed with Type 2 DM about 6 years ago. No hospitalizations for DM. Her mother had DM and thyroid disease. She has been diet controlled until one month ago.      Arrives with her brother.      CURRENT DIABETIC MEDS: metformin 1000 mg BID    BG readings are checked 2x daily.  Fastin-120s    Hypoglycemia: Yes, ~40 before breakfast. She will drink cranberry juice.   Type of Glucose Meter: True metrix    Physical Activity: She does exercises every day at home that she learned in PT.    Last Eye Exam: -vision 2020  Last Podiatry Exam: 3/19    Last DM Education Attended:     No ETOH/tobacco use.    Hypothyroidism  Taking 50 mcg daily.  Dx in   No palpitations, hair loss or changes in nails, heat/cold intolerance, wt changes.  She has tremors.    DEXA  Osteopenia in the left hip. Taking ca +vd. She had a fall last month without injury.    JUAN  Wears CPAP    REVIEW OF SYSTEMS  General: no weakness or fatigue, wt gain.   Eyes: no intermittent blurry vision or visual disturbances.   Cardiac: no chest pain or palpitations.   Respiratory: no cough or dyspnea.   GI: no abdominal pain or nausea.   Skin: no rashes or itching.   Musc: + back pain, neck pain  Neuro: no numbness or tingling.   Endocrine: + polyuria, no polydipsia, polyphagia.   Remainder ROS negative    Vital Signs  /86 (BP Location: Left arm, Patient Position: Sitting, BP Method: Large (Manual))   Pulse 73   Temp 97.9 °F (36.6 °C) (Oral)   Ht 5' 1" (1.549 m)   Wt 85.3 kg (188 lb 2.6 oz)   BMI 35.55 kg/m²     Personally reviewed labs below:  Hemoglobin A1C   Date Value Ref Range Status   2019 5.8 (H) 4.0 - 5.6 % Final     Comment:     ADA Screening Guidelines:  5.7-6.4%  Consistent with prediabetes  >or=6.5%  Consistent with diabetes  High levels of fetal hemoglobin interfere with the HbA1C  assay. Heterozygous hemoglobin variants (HbS, HgC, etc)do  not significantly " interfere with this assay.   However, presence of multiple variants may affect accuracy.     07/10/2019 5.1 4.0 - 5.6 % Final     Comment:     ADA Screening Guidelines:  5.7-6.4%  Consistent with prediabetes  >or=6.5%  Consistent with diabetes  High levels of fetal hemoglobin interfere with the HbA1C  assay. Heterozygous hemoglobin variants (HbS, HgC, etc)do  not significantly interfere with this assay.   However, presence of multiple variants may affect accuracy.     04/01/2019 6.0 (H) 4.0 - 5.6 % Final     Comment:     ADA Screening Guidelines:  5.7-6.4%  Consistent with prediabetes  >or=6.5%  Consistent with diabetes  High levels of fetal hemoglobin interfere with the HbA1C  assay. Heterozygous hemoglobin variants (HbS, HgC, etc)do  not significantly interfere with this assay.   However, presence of multiple variants may affect accuracy.         Chemistry        Component Value Date/Time     09/25/2019 1008    K 4.9 09/25/2019 1008     09/25/2019 1008    CO2 26 09/25/2019 1008    BUN 34 (H) 09/25/2019 1008    CREATININE 1.1 09/25/2019 1008     (H) 09/25/2019 1008        Component Value Date/Time    CALCIUM 10.2 09/25/2019 1008    ALKPHOS 51 (L) 07/10/2019 1010    AST 14 07/10/2019 1010    ALT 6 (L) 07/10/2019 1010    BILITOT 0.5 07/10/2019 1010    ESTGFRAFRICA 58.4 (A) 09/25/2019 1008    EGFRNONAA 50.6 (A) 09/25/2019 1008        Lab Results   Component Value Date    CHOL 149 07/10/2019    CHOL 127 04/01/2019    CHOL 178 06/28/2018     Lab Results   Component Value Date    HDL 50 07/10/2019    HDL 45 04/01/2019    HDL 43 06/28/2018     Lab Results   Component Value Date    LDLCALC 77.2 07/10/2019    LDLCALC 58.2 (L) 04/01/2019    LDLCALC 104.2 06/28/2018     Lab Results   Component Value Date    TRIG 109 07/10/2019    TRIG 119 04/01/2019    TRIG 154 (H) 06/28/2018     Lab Results   Component Value Date    CHOLHDL 33.6 07/10/2019    CHOLHDL 35.4 04/01/2019    CHOLHDL 24.2 06/28/2018     Lab  Results   Component Value Date    TSH 7.574 (H) 09/25/2019     No results found for: MICALBCREAT    Vit D, 25-Hydroxy   Date Value Ref Range Status   07/10/2019 48 30 - 96 ng/mL Final     Comment:     Vitamin D deficiency.........<10 ng/mL                              Vitamin D insufficiency......10-29 ng/mL       Vitamin D sufficiency........> or equal to 30 ng/mL  Vitamin D toxicity............>100 ng/mL       PHYSICAL EXAMINATION  Constitutional: elderly female, appears well, no distress  Neck: Supple, trachea midline.   Respiratory: even and unlabored, CTA without wheezes.  Cardiovascular: RRR; no carotid bruits or murmurs.   Lymph: DP pulses  2+ bilaterally; no edema.   Skin: warm and dry; no acanthosis nigracans observed.  Musc: ambulates with a walker  Neuro: patient alert and cooperative; CN 2-12 grossly intact  Feet: appropriate footwear.    Assessment/Plan    1. Type 2 diabetes mellitus without complication, with long-term current use of insulin  Renal function panel    Hemoglobin A1c    T4, free    T3    TSH    TSH    T3    T4, free    metFORMIN (GLUCOPHAGE-XR) 500 MG 24 hr tablet   2. Acquired hypothyroidism     3. Osteopenia, unspecified location     4. JUAN (obstructive sleep apnea)       T2DM- A1c is below goal but uncontrolled due to hypoglycemia. Decrease Metformin to 500 mg with breakfast and 500 mg with dinner. BG checks 1x daily.   Hypothyroidism-TFTs elevated. Increase LT4 dose to 75 mcg daily. Repeat TFTs in 6 wks.  Osteopenia-repeat DEXA 4/21. Continue exercises in PT.    [id number] cpap    F/u in 3 mths

## 2019-10-02 NOTE — PATIENT INSTRUCTIONS
Decrease Metformin to 500 mg with breakfast and 500 mg with dinner. Stop bedtime snacking. Increase levothyroxine to 75 mcg daily.

## 2019-10-03 RX ORDER — OXYBUTYNIN CHLORIDE 10 MG/1
10 TABLET, EXTENDED RELEASE ORAL DAILY
Qty: 30 TABLET | Refills: 12 | Status: SHIPPED | OUTPATIENT
Start: 2019-10-03 | End: 2020-05-28

## 2019-10-03 NOTE — TELEPHONE ENCOUNTER
----- Message from Becca Balbuena sent at 10/3/2019  8:04 AM CDT -----  Contact: patient  Type:  RX Refill Request    Who Called:  patient  Refill or New Rx:  Refill  RX Name and Strength:  oxybutynin (DITROPAN-XL) 10 MG 24 hr tablet  How is the patient currently taking it? (ex. 1XDay):  1 x day  Is this a 30 day or 90 day RX:  30  Preferred Pharmacy with phone number:    [facility]/pharmacy #[id number] - [place] - [address]  [address] [address]  Phone: [phone] Fax: [phone]  Local or Mail Order:  local  Ordering Provider: behzad Mon Call Back Number:  [phone]  Additional Information:  ayaka

## 2019-10-04 ENCOUNTER — LAB VISIT (OUTPATIENT)
Dept: LAB | Facility: HOSPITAL | Age: 69
End: 2019-10-04
Attending: PSYCHIATRY & NEUROLOGY
Payer: MEDICARE

## 2019-10-04 DIAGNOSIS — Z79.899 NEED FOR PROPHYLACTIC CHEMOTHERAPY: ICD-10-CM

## 2019-10-04 DIAGNOSIS — E55.9 AVITAMINOSIS D: ICD-10-CM

## 2019-10-04 DIAGNOSIS — E78.49 FAMILIAL COMBINED HYPERLIPIDEMIA: Primary | ICD-10-CM

## 2019-10-04 LAB
ALBUMIN SERPL BCP-MCNC: 3.2 G/DL (ref 3.5–5.2)
ALP SERPL-CCNC: 81 U/L (ref 55–135)
ALT SERPL W/O P-5'-P-CCNC: 10 U/L (ref 10–44)
ANION GAP SERPL CALC-SCNC: 10 MMOL/L (ref 8–16)
AST SERPL-CCNC: 12 U/L (ref 10–40)
BASOPHILS # BLD AUTO: 0.03 K/UL (ref 0–0.2)
BASOPHILS NFR BLD: 0.4 % (ref 0–1.9)
BILIRUB SERPL-MCNC: 0.3 MG/DL (ref 0.1–1)
BUN SERPL-MCNC: 30 MG/DL (ref 8–23)
CALCIUM SERPL-MCNC: 9.5 MG/DL (ref 8.7–10.5)
CHLORIDE SERPL-SCNC: 106 MMOL/L (ref 95–110)
CO2 SERPL-SCNC: 27 MMOL/L (ref 23–29)
CREAT SERPL-MCNC: 1 MG/DL (ref 0.5–1.4)
DIFFERENTIAL METHOD: ABNORMAL
EOSINOPHIL # BLD AUTO: 0.3 K/UL (ref 0–0.5)
EOSINOPHIL NFR BLD: 3.6 % (ref 0–8)
ERYTHROCYTE [DISTWIDTH] IN BLOOD BY AUTOMATED COUNT: 13.2 % (ref 11.5–14.5)
EST. GFR  (AFRICAN AMERICAN): >60 ML/MIN/1.73 M^2
EST. GFR  (NON AFRICAN AMERICAN): 57 ML/MIN/1.73 M^2
GLUCOSE SERPL-MCNC: 135 MG/DL (ref 70–110)
HCT VFR BLD AUTO: 43.8 % (ref 37–48.5)
HGB BLD-MCNC: 13.7 G/DL (ref 12–16)
IMM GRANULOCYTES # BLD AUTO: 0.04 K/UL (ref 0–0.04)
LYMPHOCYTES # BLD AUTO: 2.3 K/UL (ref 1–4.8)
LYMPHOCYTES NFR BLD: 28.4 % (ref 18–48)
MCH RBC QN AUTO: 30.6 PG (ref 27–31)
MCHC RBC AUTO-ENTMCNC: 31.3 G/DL (ref 32–36)
MCV RBC AUTO: 98 FL (ref 82–98)
MONOCYTES # BLD AUTO: 0.5 K/UL (ref 0.3–1)
MONOCYTES NFR BLD: 5.9 % (ref 4–15)
NEUTROPHILS # BLD AUTO: 5 K/UL (ref 1.8–7.7)
NEUTROPHILS NFR BLD: 61.2 % (ref 38–73)
NRBC BLD-RTO: 0 /100 WBC
PLATELET # BLD AUTO: 141 K/UL (ref 150–350)
PMV BLD AUTO: 10.8 FL (ref 9.2–12.9)
POTASSIUM SERPL-SCNC: 4.9 MMOL/L (ref 3.5–5.1)
PROT SERPL-MCNC: 6.7 G/DL (ref 6–8.4)
RBC # BLD AUTO: 4.48 M/UL (ref 4–5.4)
SODIUM SERPL-SCNC: 143 MMOL/L (ref 136–145)
VALPROATE SERPL-MCNC: 46.3 UG/ML (ref 50–100)
WBC # BLD AUTO: 8.14 K/UL (ref 3.9–12.7)

## 2019-10-04 PROCEDURE — 85025 COMPLETE CBC W/AUTO DIFF WBC: CPT

## 2019-10-04 PROCEDURE — 80053 COMPREHEN METABOLIC PANEL: CPT

## 2019-10-04 PROCEDURE — 36415 COLL VENOUS BLD VENIPUNCTURE: CPT

## 2019-10-04 PROCEDURE — 80164 ASSAY DIPROPYLACETIC ACD TOT: CPT

## 2019-12-12 ENCOUNTER — OFFICE VISIT (OUTPATIENT)
Dept: OPTOMETRY | Facility: CLINIC | Age: 69
End: 2019-12-12
Payer: MEDICARE

## 2019-12-12 DIAGNOSIS — H53.462 LEFT HOMONYMOUS HEMIANOPSIA: ICD-10-CM

## 2019-12-12 DIAGNOSIS — H52.7 REFRACTIVE ERROR: ICD-10-CM

## 2019-12-12 DIAGNOSIS — Z96.1 PSEUDOPHAKIA OF BOTH EYES: ICD-10-CM

## 2019-12-12 DIAGNOSIS — E11.9 TYPE 2 DIABETES MELLITUS WITHOUT RETINOPATHY: Primary | ICD-10-CM

## 2019-12-12 PROCEDURE — 99499 RISK ADDL DX/OHS AUDIT: ICD-10-PCS | Mod: S$GLB,,, | Performed by: OPTOMETRIST

## 2019-12-12 PROCEDURE — 92014 PR EYE EXAM, EST PATIENT,COMPREHESV: ICD-10-PCS | Mod: S$GLB,,, | Performed by: OPTOMETRIST

## 2019-12-12 PROCEDURE — 99999 PR PBB SHADOW E&M-EST. PATIENT-LVL II: ICD-10-PCS | Mod: PBBFAC,,, | Performed by: OPTOMETRIST

## 2019-12-12 PROCEDURE — 99999 PR PBB SHADOW E&M-EST. PATIENT-LVL II: CPT | Mod: PBBFAC,,, | Performed by: OPTOMETRIST

## 2019-12-12 PROCEDURE — 99499 UNLISTED E&M SERVICE: CPT | Mod: S$GLB,,, | Performed by: OPTOMETRIST

## 2019-12-12 PROCEDURE — 92014 COMPRE OPH EXAM EST PT 1/>: CPT | Mod: S$GLB,,, | Performed by: OPTOMETRIST

## 2019-12-12 NOTE — PROGRESS NOTES
HPI     Patient is here for routine eye exam dls 09/10/2018  Patient states vision is decreasing  Patient states she is still seeing black spots in OU, patient is also   seeing FOL in OS last seen a fol 2 weeks ago  Patient states diabetes and HTN are stable on meds  Patient denies using any gtts    Hemoglobin A1C       Date                     Value               Ref Range             Status                09/25/2019               5.8 (H)             4.0 - 5.6 %           Final              Comment:    ADA Screening Guidelines:  5.7-6.4%  Consistent with   prediabetes  >or=6.5%  Consistent with diabetes  High levels of fetal   hemoglobin interfere with the HbA1C  assay. Heterozygous hemoglobin   variants (HbS, HgC, etc)do  not significantly interfere with this assay.     However, presence of multiple variants may affect accuracy.         07/10/2019               5.1                 4.0 - 5.6 %           Final              Comment:    ADA Screening Guidelines:  5.7-6.4%  Consistent with   prediabetes  >or=6.5%  Consistent with diabetes  High levels of fetal   hemoglobin interfere with the HbA1C  assay. Heterozygous hemoglobin   variants (HbS, HgC, etc)do  not significantly interfere with this assay.     However, presence of multiple variants may affect accuracy.         04/01/2019               6.0 (H)             4.0 - 5.6 %           Final              Comment:    ADA Screening Guidelines:  5.7-6.4%  Consistent with   prediabetes  >or=6.5%  Consistent with diabetes  High levels of fetal   hemoglobin interfere with the HbA1C  assay. Heterozygous hemoglobin   variants (HbS, HgC, etc)do  not significantly interfere with this assay.     However, presence of multiple variants may affect accuracy.    ----------    Last edited by Ila Joel MA on 12/12/2019  8:40 AM. (History)            Assessment /Plan     For exam results, see Encounter Report.    Type 2 diabetes mellitus without retinopathy    Left  homonymous hemianopsia  -     Trevizo Visual Field - Intermediate - OU - Both Eyes; Future    Pseudophakia of both eyes    Refractive error      1. No diabetic retinopathy, no csme. Return in 1 year for dilated eye exam.  2. Prev history of stroke. Schedule [facility](24-2)teresa fast. Call with results.   3. Monitor condition. Patient to report any changes. RTC 1 year recheck.  4. Unable to improve vision with refraction.          Addend 12/20/19 Vf constrict OU, borderline fixation. RTC yearly.

## 2019-12-20 ENCOUNTER — TELEPHONE (OUTPATIENT)
Dept: OPTOMETRY | Facility: CLINIC | Age: 69
End: 2019-12-20

## 2019-12-20 ENCOUNTER — CLINICAL SUPPORT (OUTPATIENT)
Dept: OPHTHALMOLOGY | Facility: CLINIC | Age: 69
End: 2019-12-20
Payer: MEDICARE

## 2019-12-20 DIAGNOSIS — H53.462 LEFT HOMONYMOUS HEMIANOPSIA: ICD-10-CM

## 2019-12-20 PROCEDURE — 92082 INTERMEDIATE VISUAL FIELD XM: CPT | Mod: S$GLB,,, | Performed by: OPTOMETRIST

## 2019-12-20 PROCEDURE — 92082 HUMPHREY VISUAL FIELD-OU-INTERMEDIATE-BOTH EYES: ICD-10-PCS | Mod: S$GLB,,, | Performed by: OPTOMETRIST

## 2019-12-20 NOTE — TELEPHONE ENCOUNTER
----- Message from Grayson Leos, OD sent at 12/20/2019  1:14 PM CST -----  Call pt with VF results. Pt has some blind spots from prev stroke, test done as baseline. Will use for comparsion next year.

## 2020-01-03 ENCOUNTER — TELEPHONE (OUTPATIENT)
Dept: NEUROLOGY | Facility: CLINIC | Age: 70
End: 2020-01-03

## 2020-01-03 NOTE — TELEPHONE ENCOUNTER
Spoke with patient's . Explained that appt was scheduled incorrectly as a follow up instead of a consult with Dr. Loving. Rescheduled appt.

## 2020-01-06 ENCOUNTER — LAB VISIT (OUTPATIENT)
Dept: LAB | Facility: HOSPITAL | Age: 70
End: 2020-01-06
Attending: PHYSICIAN ASSISTANT
Payer: MEDICARE

## 2020-01-06 DIAGNOSIS — E11.9 TYPE 2 DIABETES MELLITUS WITHOUT COMPLICATION, WITH LONG-TERM CURRENT USE OF INSULIN: ICD-10-CM

## 2020-01-06 DIAGNOSIS — Z79.4 TYPE 2 DIABETES MELLITUS WITHOUT COMPLICATION, WITH LONG-TERM CURRENT USE OF INSULIN: ICD-10-CM

## 2020-01-06 LAB
ESTIMATED AVG GLUCOSE: 128 MG/DL (ref 68–131)
HBA1C MFR BLD HPLC: 6.1 % (ref 4–5.6)

## 2020-01-06 PROCEDURE — 80069 RENAL FUNCTION PANEL: CPT

## 2020-01-06 PROCEDURE — 83036 HEMOGLOBIN GLYCOSYLATED A1C: CPT

## 2020-01-06 PROCEDURE — 84439 ASSAY OF FREE THYROXINE: CPT

## 2020-01-06 PROCEDURE — 36415 COLL VENOUS BLD VENIPUNCTURE: CPT | Mod: PO

## 2020-01-06 PROCEDURE — 84480 ASSAY TRIIODOTHYRONINE (T3): CPT

## 2020-01-06 PROCEDURE — 84443 ASSAY THYROID STIM HORMONE: CPT

## 2020-01-07 LAB
ALBUMIN SERPL BCP-MCNC: 3.2 G/DL (ref 3.5–5.2)
ANION GAP SERPL CALC-SCNC: 10 MMOL/L (ref 8–16)
BUN SERPL-MCNC: 20 MG/DL (ref 8–23)
CALCIUM SERPL-MCNC: 9.9 MG/DL (ref 8.7–10.5)
CHLORIDE SERPL-SCNC: 105 MMOL/L (ref 95–110)
CO2 SERPL-SCNC: 24 MMOL/L (ref 23–29)
CREAT SERPL-MCNC: 1.1 MG/DL (ref 0.5–1.4)
EST. GFR  (AFRICAN AMERICAN): 58 ML/MIN/1.73 M^2
EST. GFR  (NON AFRICAN AMERICAN): 50.3 ML/MIN/1.73 M^2
GLUCOSE SERPL-MCNC: 143 MG/DL (ref 70–110)
PHOSPHATE SERPL-MCNC: 3.5 MG/DL (ref 2.7–4.5)
POTASSIUM SERPL-SCNC: 5.2 MMOL/L (ref 3.5–5.1)
SODIUM SERPL-SCNC: 139 MMOL/L (ref 136–145)
T3 SERPL-MCNC: 67 NG/DL (ref 60–180)
T4 FREE SERPL-MCNC: 0.89 NG/DL (ref 0.71–1.51)
TSH SERPL DL<=0.005 MIU/L-ACNC: 7.59 UIU/ML (ref 0.4–4)

## 2020-01-13 ENCOUNTER — OFFICE VISIT (OUTPATIENT)
Dept: ENDOCRINOLOGY | Facility: CLINIC | Age: 70
End: 2020-01-13
Payer: MEDICARE

## 2020-01-13 VITALS
BODY MASS INDEX: 35.7 KG/M2 | SYSTOLIC BLOOD PRESSURE: 130 MMHG | HEIGHT: 61 IN | HEART RATE: 80 BPM | DIASTOLIC BLOOD PRESSURE: 80 MMHG | WEIGHT: 189.06 LBS | TEMPERATURE: 98 F

## 2020-01-13 DIAGNOSIS — Z79.4 TYPE 2 DIABETES MELLITUS WITHOUT COMPLICATION, WITH LONG-TERM CURRENT USE OF INSULIN: Primary | ICD-10-CM

## 2020-01-13 DIAGNOSIS — E03.9 ACQUIRED HYPOTHYROIDISM: ICD-10-CM

## 2020-01-13 DIAGNOSIS — M85.80 OSTEOPENIA, UNSPECIFIED LOCATION: ICD-10-CM

## 2020-01-13 DIAGNOSIS — G47.33 OSA (OBSTRUCTIVE SLEEP APNEA): ICD-10-CM

## 2020-01-13 DIAGNOSIS — E11.9 TYPE 2 DIABETES MELLITUS WITHOUT COMPLICATION, WITH LONG-TERM CURRENT USE OF INSULIN: Primary | ICD-10-CM

## 2020-01-13 PROCEDURE — 99499 RISK ADDL DX/OHS AUDIT: ICD-10-PCS | Mod: S$GLB,,, | Performed by: PHYSICIAN ASSISTANT

## 2020-01-13 PROCEDURE — 99214 PR OFFICE/OUTPT VISIT, EST, LEVL IV, 30-39 MIN: ICD-10-PCS | Mod: S$GLB,,, | Performed by: PHYSICIAN ASSISTANT

## 2020-01-13 PROCEDURE — 3044F HG A1C LEVEL LT 7.0%: CPT | Mod: CPTII,S$GLB,, | Performed by: PHYSICIAN ASSISTANT

## 2020-01-13 PROCEDURE — 1126F AMNT PAIN NOTED NONE PRSNT: CPT | Mod: S$GLB,,, | Performed by: PHYSICIAN ASSISTANT

## 2020-01-13 PROCEDURE — 99499 UNLISTED E&M SERVICE: CPT | Mod: S$GLB,,, | Performed by: PHYSICIAN ASSISTANT

## 2020-01-13 PROCEDURE — 1126F PR PAIN SEVERITY QUANTIFIED, NO PAIN PRESENT: ICD-10-PCS | Mod: S$GLB,,, | Performed by: PHYSICIAN ASSISTANT

## 2020-01-13 PROCEDURE — 3044F PR MOST RECENT HEMOGLOBIN A1C LEVEL <7.0%: ICD-10-PCS | Mod: CPTII,S$GLB,, | Performed by: PHYSICIAN ASSISTANT

## 2020-01-13 PROCEDURE — 3079F DIAST BP 80-89 MM HG: CPT | Mod: CPTII,S$GLB,, | Performed by: PHYSICIAN ASSISTANT

## 2020-01-13 PROCEDURE — 99999 PR PBB SHADOW E&M-EST. PATIENT-LVL III: CPT | Mod: PBBFAC,,, | Performed by: PHYSICIAN ASSISTANT

## 2020-01-13 PROCEDURE — 1159F MED LIST DOCD IN RCRD: CPT | Mod: S$GLB,,, | Performed by: PHYSICIAN ASSISTANT

## 2020-01-13 PROCEDURE — 3079F PR MOST RECENT DIASTOLIC BLOOD PRESSURE 80-89 MM HG: ICD-10-PCS | Mod: CPTII,S$GLB,, | Performed by: PHYSICIAN ASSISTANT

## 2020-01-13 PROCEDURE — 1159F PR MEDICATION LIST DOCUMENTED IN MEDICAL RECORD: ICD-10-PCS | Mod: S$GLB,,, | Performed by: PHYSICIAN ASSISTANT

## 2020-01-13 PROCEDURE — 1101F PR PT FALLS ASSESS DOC 0-1 FALLS W/OUT INJ PAST YR: ICD-10-PCS | Mod: CPTII,S$GLB,, | Performed by: PHYSICIAN ASSISTANT

## 2020-01-13 PROCEDURE — 99999 PR PBB SHADOW E&M-EST. PATIENT-LVL III: ICD-10-PCS | Mod: PBBFAC,,, | Performed by: PHYSICIAN ASSISTANT

## 2020-01-13 PROCEDURE — 3075F PR MOST RECENT SYSTOLIC BLOOD PRESS GE 130-139MM HG: ICD-10-PCS | Mod: CPTII,S$GLB,, | Performed by: PHYSICIAN ASSISTANT

## 2020-01-13 PROCEDURE — 3075F SYST BP GE 130 - 139MM HG: CPT | Mod: CPTII,S$GLB,, | Performed by: PHYSICIAN ASSISTANT

## 2020-01-13 PROCEDURE — 1101F PT FALLS ASSESS-DOCD LE1/YR: CPT | Mod: CPTII,S$GLB,, | Performed by: PHYSICIAN ASSISTANT

## 2020-01-13 PROCEDURE — 99214 OFFICE O/P EST MOD 30 MIN: CPT | Mod: S$GLB,,, | Performed by: PHYSICIAN ASSISTANT

## 2020-01-13 RX ORDER — TRIFLUOPERAZINE HYDROCHLORIDE 10 MG/1
TABLET, FILM COATED ORAL
COMMUNITY
Start: 2019-12-18 | End: 2021-05-13 | Stop reason: SDUPTHER

## 2020-01-13 NOTE — PROGRESS NOTES
"CC: DM/Hypothyroidism    HPI: Maggy Tapia was diagnosed with Type 2 DM about 6 years ago. No hospitalizations for DM. Her mother had DM and thyroid disease. She has been diet controlled until one month ago.      Arrives with her brother. Her dose of levothyroxine was not increased last visi.     CURRENT DIABETIC MEDS: metformin 1000 mg in the morning and 500 mg with dinner    BG readings are checked 2x daily.      Hypoglycemia: She will sometimes have hypoglycemia at night.   Type of Glucose Meter: True metrix    Physical Activity: She does exercises every day at home that she learned in PT.    Diet:  BF-rasian brain  LH-sandwich  DN-noodles and meat    Last Eye Exam: 9/19-vision 20/20  Last Podiatry Exam: 3/19    Last DM Education Attended: 1/19    No ETOH/tobacco use.    Hypothyroidism  Taking 50 mcg daily.  Dx in 2016  No palpitations, hair loss or changes in nails, heat/cold intolerance, wt changes.  She has tremors.    DEXA 11/16 Osteopenia in the left hip. Taking ca +vd. She had a fall last month without injury.    JUAN  Wears CPAP    REVIEW OF SYSTEMS  General: no weakness or fatigue, wt loss.   Eyes: no intermittent blurry vision or visual disturbances.   Cardiac: no chest pain or palpitations.   Respiratory: no cough or dyspnea.   GI: no abdominal pain or nausea.   Skin: no rashes or itching.   Musc: + back pain, neck pain  Neuro: no numbness or tingling.   Endocrine: + polyuria, no polydipsia, polyphagia.   Remainder ROS negative    Vital Signs  /80 (BP Location: Left arm, Patient Position: Sitting, BP Method: Medium (Manual))   Pulse 80   Temp 97.7 °F (36.5 °C) (Oral)   Ht 5' 1" (1.549 m)   Wt 85.7 kg (189 lb 0.7 oz)   BMI 35.72 kg/m²     Personally reviewed labs below:  Hemoglobin A1C   Date Value Ref Range Status   01/06/2020 6.1 (H) 4.0 - 5.6 % Final     Comment:     ADA Screening Guidelines:  5.7-6.4%  Consistent with prediabetes  >or=6.5%  Consistent with diabetes  High levels of fetal " hemoglobin interfere with the HbA1C  assay. Heterozygous hemoglobin variants (HbS, HgC, etc)do  not significantly interfere with this assay.   However, presence of multiple variants may affect accuracy.     09/25/2019 5.8 (H) 4.0 - 5.6 % Final     Comment:     ADA Screening Guidelines:  5.7-6.4%  Consistent with prediabetes  >or=6.5%  Consistent with diabetes  High levels of fetal hemoglobin interfere with the HbA1C  assay. Heterozygous hemoglobin variants (HbS, HgC, etc)do  not significantly interfere with this assay.   However, presence of multiple variants may affect accuracy.     07/10/2019 5.1 4.0 - 5.6 % Final     Comment:     ADA Screening Guidelines:  5.7-6.4%  Consistent with prediabetes  >or=6.5%  Consistent with diabetes  High levels of fetal hemoglobin interfere with the HbA1C  assay. Heterozygous hemoglobin variants (HbS, HgC, etc)do  not significantly interfere with this assay.   However, presence of multiple variants may affect accuracy.         Chemistry        Component Value Date/Time     01/06/2020 1045    K 5.2 (H) 01/06/2020 1045     01/06/2020 1045    CO2 24 01/06/2020 1045    BUN 20 01/06/2020 1045    CREATININE 1.1 01/06/2020 1045     (H) 01/06/2020 1045        Component Value Date/Time    CALCIUM 9.9 01/06/2020 1045    ALKPHOS 81 10/04/2019 1137    AST 12 10/04/2019 1137    ALT 10 10/04/2019 1137    BILITOT 0.3 10/04/2019 1137    ESTGFRAFRICA 58.0 (A) 01/06/2020 1045    EGFRNONAA 50.3 (A) 01/06/2020 1045        Lab Results   Component Value Date    CHOL 149 07/10/2019    CHOL 127 04/01/2019    CHOL 178 06/28/2018     Lab Results   Component Value Date    HDL 50 07/10/2019    HDL 45 04/01/2019    HDL 43 06/28/2018     Lab Results   Component Value Date    LDLCALC 77.2 07/10/2019    LDLCALC 58.2 (L) 04/01/2019    LDLCALC 104.2 06/28/2018     Lab Results   Component Value Date    TRIG 109 07/10/2019    TRIG 119 04/01/2019    TRIG 154 (H) 06/28/2018     Lab Results   Component  Value Date    CHOLHDL 33.6 07/10/2019    CHOLHDL 35.4 04/01/2019    CHOLHDL 24.2 06/28/2018     Lab Results   Component Value Date    TSH 7.593 (H) 01/06/2020     No results found for: MICALBCREAT    Vit D, 25-Hydroxy   Date Value Ref Range Status   07/10/2019 48 30 - 96 ng/mL Final     Comment:     Vitamin D deficiency.........<10 ng/mL                              Vitamin D insufficiency......10-29 ng/mL       Vitamin D sufficiency........> or equal to 30 ng/mL  Vitamin D toxicity............>100 ng/mL       PHYSICAL EXAMINATION  Constitutional: elderly female, appears well, no distress  Neck: Supple, trachea midline.   Respiratory: even and unlabored, CTA without wheezes.  Cardiovascular: RRR; no carotid bruits or murmurs.   Lymph: DP pulses  2+ bilaterally; 1+ edema bl.   Skin: warm and dry; no acanthosis nigracans observed.  Abdomen: soft, non-distended. Obese abdomen.  Musc: ambulates with a walker  Neuro: patient alert and cooperative; CN 2-12 grossly intact  Feet: appropriate footwear.    Assessment/Plan    1. Type 2 diabetes mellitus without complication, with long-term current use of insulin  TSH    T4, free    T3    Hemoglobin A1c    Microalbumin/creatinine urine ratio    Renal function panel    T4, free    TSH   2. Acquired hypothyroidism     3. Osteopenia, unspecified location     4. JUAN (obstructive sleep apnea)       T2DM- A1c is below goal but uncontrolled due to hypoglycemia. Decrease Metformin to 1000 mg with breakfast. BG checks 1x daily.   Hypothyroidism-TFTs elevated. Increase LT4 dose to 75 mcg daily. Repeat TFTs in 6 wks.  Osteopenia-repeat DEXA 4/21. Continue exercises in PT.    LKM-iwyihm-cepxjvhi cpap    F/u in 3 mths

## 2020-01-28 ENCOUNTER — PATIENT OUTREACH (OUTPATIENT)
Dept: ADMINISTRATIVE | Facility: OTHER | Age: 70
End: 2020-01-28

## 2020-01-30 ENCOUNTER — TELEPHONE (OUTPATIENT)
Dept: NEUROLOGY | Facility: CLINIC | Age: 70
End: 2020-01-30

## 2020-01-30 ENCOUNTER — OFFICE VISIT (OUTPATIENT)
Dept: NEUROLOGY | Facility: CLINIC | Age: 70
End: 2020-01-30
Payer: MEDICARE

## 2020-01-30 ENCOUNTER — IMMUNIZATION (OUTPATIENT)
Dept: PHARMACY | Facility: CLINIC | Age: 70
End: 2020-01-30
Payer: MEDICARE

## 2020-01-30 VITALS
BODY MASS INDEX: 36.41 KG/M2 | RESPIRATION RATE: 20 BRPM | WEIGHT: 192.69 LBS | HEART RATE: 113 BPM | SYSTOLIC BLOOD PRESSURE: 114 MMHG | DIASTOLIC BLOOD PRESSURE: 75 MMHG

## 2020-01-30 DIAGNOSIS — Z86.79 HISTORY OF SUBDURAL HEMATOMA: ICD-10-CM

## 2020-01-30 DIAGNOSIS — F32.A DEPRESSION, UNSPECIFIED DEPRESSION TYPE: ICD-10-CM

## 2020-01-30 DIAGNOSIS — F44.5 PSEUDOSEIZURES: Primary | ICD-10-CM

## 2020-01-30 DIAGNOSIS — Z86.73 HISTORY OF ISCHEMIC LEFT MCA STROKE: ICD-10-CM

## 2020-01-30 PROCEDURE — 99215 PR OFFICE/OUTPT VISIT, EST, LEVL V, 40-54 MIN: ICD-10-PCS | Mod: S$GLB,,, | Performed by: PSYCHIATRY & NEUROLOGY

## 2020-01-30 PROCEDURE — 99215 OFFICE O/P EST HI 40 MIN: CPT | Mod: S$GLB,,, | Performed by: PSYCHIATRY & NEUROLOGY

## 2020-01-30 PROCEDURE — 99999 PR PBB SHADOW E&M-EST. PATIENT-LVL III: CPT | Mod: PBBFAC,,, | Performed by: PSYCHIATRY & NEUROLOGY

## 2020-01-30 PROCEDURE — 1126F AMNT PAIN NOTED NONE PRSNT: CPT | Mod: S$GLB,,, | Performed by: PSYCHIATRY & NEUROLOGY

## 2020-01-30 PROCEDURE — 1101F PT FALLS ASSESS-DOCD LE1/YR: CPT | Mod: CPTII,S$GLB,, | Performed by: PSYCHIATRY & NEUROLOGY

## 2020-01-30 PROCEDURE — 3078F PR MOST RECENT DIASTOLIC BLOOD PRESSURE < 80 MM HG: ICD-10-PCS | Mod: CPTII,S$GLB,, | Performed by: PSYCHIATRY & NEUROLOGY

## 2020-01-30 PROCEDURE — 3074F SYST BP LT 130 MM HG: CPT | Mod: CPTII,S$GLB,, | Performed by: PSYCHIATRY & NEUROLOGY

## 2020-01-30 PROCEDURE — 99999 PR PBB SHADOW E&M-EST. PATIENT-LVL III: ICD-10-PCS | Mod: PBBFAC,,, | Performed by: PSYCHIATRY & NEUROLOGY

## 2020-01-30 PROCEDURE — 1159F PR MEDICATION LIST DOCUMENTED IN MEDICAL RECORD: ICD-10-PCS | Mod: S$GLB,,, | Performed by: PSYCHIATRY & NEUROLOGY

## 2020-01-30 PROCEDURE — 1101F PR PT FALLS ASSESS DOC 0-1 FALLS W/OUT INJ PAST YR: ICD-10-PCS | Mod: CPTII,S$GLB,, | Performed by: PSYCHIATRY & NEUROLOGY

## 2020-01-30 PROCEDURE — 1126F PR PAIN SEVERITY QUANTIFIED, NO PAIN PRESENT: ICD-10-PCS | Mod: S$GLB,,, | Performed by: PSYCHIATRY & NEUROLOGY

## 2020-01-30 PROCEDURE — 99499 RISK ADDL DX/OHS AUDIT: ICD-10-PCS | Mod: S$GLB,,, | Performed by: PSYCHIATRY & NEUROLOGY

## 2020-01-30 PROCEDURE — 99499 UNLISTED E&M SERVICE: CPT | Mod: S$GLB,,, | Performed by: PSYCHIATRY & NEUROLOGY

## 2020-01-30 PROCEDURE — 3074F PR MOST RECENT SYSTOLIC BLOOD PRESSURE < 130 MM HG: ICD-10-PCS | Mod: CPTII,S$GLB,, | Performed by: PSYCHIATRY & NEUROLOGY

## 2020-01-30 PROCEDURE — 3078F DIAST BP <80 MM HG: CPT | Mod: CPTII,S$GLB,, | Performed by: PSYCHIATRY & NEUROLOGY

## 2020-01-30 PROCEDURE — 1159F MED LIST DOCD IN RCRD: CPT | Mod: S$GLB,,, | Performed by: PSYCHIATRY & NEUROLOGY

## 2020-01-30 NOTE — LETTER
January 30, 2020      Harley Roberts DO  [address] [facility] [address] [place] [address]           [facility]  [address] [place] [address]  Phone: [phone]  Fax: [phone]          Patient: Maggy Tapia   MR Number: [id number]   YOB: 1950   Date of Visit: 1/30/2020       Dear Dr. Harley Roberts:    Thank you for referring Maggy Tapia to me for evaluation. Attached you will find relevant portions of my assessment and plan of care.    If you have questions, please do not hesitate to call me. I look forward to following Maggy Tapia along with you.    Sincerely,    Yanna Loving MD    Enclosure  CC:  No Recipients    If you would like to receive this communication electronically, please contact [email or web address] or [phone] to request more information on [facility] access.    For providers and/or their staff who would like to refer a patient to [facility], please contact us through our one-stop-shop provider referral line, [facility], at [phone].    If you feel you have received this communication in error or would no longer like to receive these types of communications, please e-mail [email or web address]

## 2020-01-30 NOTE — PROGRESS NOTES
"    Date: 1/30/2020    Patient ID: Maggy Tapia is a 70 y.o. female.    Referring Provider:  Harley Roberts DO    Chief Complaint: Seizures      History of Present Illness:  Ms. Tapia is a 70 y.o. female who presents referred by Harley Roberts DO today for evaluation of h/o subdural hematoma, ataxia, and nonepileptic events. The patient was accompanied by her brother who also contributed to the following history.     These seizures started at age 6 and have continued her whole life. At first she will sit there and stare, she will then lean forward and lose muscle tone. Sometimes she has arm and head shaking with this.  If he sits her back up, she will stare at the ceiling with eyes wide open. They last a few seconds to 5 minutes. When she comes back to, she is confused and sometimes talking in a baby voice per her brother. She has no feeling before the seizure. Some loses awareness. She has bad headache after the seizures. Sometimes the events cluster back to back.     She was told a long time ago that these were "pseudoseizures". At the end of high school, she didn't have any and then they returned after her stroke. She has a history of a stroke in 2000 with mild residual right sided weakness. She had a left subdural hematoma s/p evacuation in 2016.     She has previously tried topiramate. She is currently on depakote  mg daily. She is also on klonopin 0.5 mg BID.     Review of Systems:   14 systems reviewed - All other systems were reviewed and negative except as mentioned in the HPI    Allergies:  Review of patient's allergies indicates:   Allergen Reactions    Penicillins Anaphylaxis    Sulfa (sulfonamide antibiotics) Anaphylaxis       Current Medications:  Current Outpatient Medications   Medication Sig Dispense Refill    aspirin (ECOTRIN) 81 MG EC tablet Take 81 mg by mouth once daily.      CALCIUM CARBONATE/VITAMIN D3 (CALCIUM 500 + D ORAL) Take 1 tablet by mouth once daily. 10 mg " daily      clonazePAM (KLONOPIN) 0.5 MG tablet Take 1 tablet (0.5 mg total) by mouth 2 (two) times daily.  3    divalproex ER (DEPAKOTE) 500 MG Tb24 Take 500 mg by mouth every evening.  180 tablet 3    gemfibrozil (LOPID) 600 MG tablet TAKE 1 TABLET BY MOUTH 2 TIMES DAILY. 180 tablet 3    levothyroxine (SYNTHROID) 75 MCG tablet Take 1 tablet (75 mcg total) by mouth once daily. 90 tablet 3    losartan (COZAAR) 50 MG tablet TAKE 1 TABLET BY MOUTH EVERY DAY 90 tablet 3    metFORMIN (GLUCOPHAGE-XR) 500 MG 24 hr tablet Take 2 tablets (1,000 mg total) by mouth once daily. 180 tablet 3    potassium chloride SA (K-DUR,KLOR-CON) 20 MEQ tablet Take 20 mEq by mouth once daily.      prazosin (MINIPRESS) 2 MG Cap Take 4 mg by mouth every evening. (2) 2mg capsules HS      simvastatin (ZOCOR) 40 MG tablet TAKE 1 TABLET BY MOUTH EVERY DAY 90 tablet 3    thiamine (VITAMIN B-1) 100 MG tablet Take 100 mg by mouth once daily.      trifluoperazine (STELAZINE) 10 MG tablet       TRINTELLIX 20 mg Tab Take 20 mg by mouth once daily.       diphth,pertus,acell,,tetanus (BOOSTRIX TDAP) 2.5-8-5 Lf-mcg-Lf/0.5mL Syrg injection Inject 0.5 mLs into the muscle once. For one dose. for 1 dose 0.5 mL 0    flu vacc [id number],65yr up,PF (FLUZONE HIGH-DOSE 2019-20, PF,) 180 mcg/0.5 mL Syrg Inject 0.5 mLs into the muscle once. for 1 dose 0.5 mL 0    oxybutynin (DITROPAN-XL) 10 MG 24 hr tablet Take 1 tablet (10 mg total) by mouth once daily. 30 tablet 12    varicella-zoster [id number], PF, (SHINGRIX, PF,) 50 mcg/0.5 mL injection Inject 0.5 mLs into the muscle once. For one dose. for 1 dose 0.5 mL 1     No current facility-administered medications for this visit.        Past Medical History:  Past Medical History:   Diagnosis Date    Anxiety     Arthritis     Asthma     Cancer 2000    Left Breast    Cataract     Depression     Diabetes mellitus, type 2     GERD (gastroesophageal reflux disease)     Hyperlipidemia     Hypertension   "   Overactive bladder     Seizures     Pseudo-seizures    Stroke     Thyroid disease     Urinary tract infection without hematuria 8/3/2017       Past Surgical History:  Past Surgical History:   Procedure Laterality Date    APPENDECTOMY      BREAST BIOPSY      BREAST LUMPECTOMY Left     2016    BREAST SURGERY      CATARACT EXTRACTION       SECTION      CHOLECYSTECTOMY      DILATION AND CURETTAGE OF UTERUS      EYE SURGERY         Family History:  family history includes Breast cancer in her mother; Diabetes in her mother; Hypertension in her mother.    Social History:   reports that she has never smoked. She has never used smokeless tobacco. She reports that she drinks alcohol. She reports that she does not use drugs.    Physical Exam:  Vitals:    20 0806   BP: 114/75   Pulse: (!) 113   Resp: 20   Weight: 87.4 kg (192 lb 10.9 oz)   PainSc: 0-No pain     Body mass index is 36.41 kg/m².  General: Well developed, well nourished.  No acute distress.  Musculoskeletal: No obvious joint deformities, moves all extremities well.  Peripheral vascular: No edema noted    Neurological Exam:  Mental status: Awake and alert  Speech language: No dysarthria or aphasia on conversation  Cranial nerves: Face symmetric  Motor: Moves all extremities well  Coordination: No ataxia. No tremor.     Patient had two events while I was in the room where she suddenly had head drop and truncal flexion. She had side to side "no-no" head shaking and flapping arm movements. Her eyes were open. Her movements were suppressible with me moving her arm. These lasted about 30 seconds and she did not have a post-ictal phase.     Data:  I have personally reviewed the referring provider's notes, labs, & imaging made available to me today.       Labs:  CBC:   Lab Results   Component Value Date    WBC 8.14 10/04/2019    HGB 13.7 10/04/2019    HCT 43.8 10/04/2019     (L) 10/04/2019    MCV 98 10/04/2019    RDW 13.2 10/04/2019 "     BMP:   Lab Results   Component Value Date     01/06/2020    K 5.2 (H) 01/06/2020     01/06/2020    CO2 24 01/06/2020    BUN 20 01/06/2020    CREATININE 1.1 01/06/2020     (H) 01/06/2020    CALCIUM 9.9 01/06/2020    MG 1.8 08/04/2017    PHOS 3.5 01/06/2020     LFTS;   Lab Results   Component Value Date    PROT 6.7 10/04/2019    ALBUMIN 3.2 (L) 01/06/2020    BILITOT 0.3 10/04/2019    AST 12 10/04/2019    ALKPHOS 81 10/04/2019    ALT 10 10/04/2019     COAGS:   Lab Results   Component Value Date    INR 1.0 06/07/2018     FLP:   Lab Results   Component Value Date    CHOL 149 07/10/2019    HDL 50 07/10/2019    LDLCALC 77.2 07/10/2019    TRIG 109 07/10/2019    CHOLHDL 33.6 07/10/2019         Imaging:  I have personally reviewed the imaging, MRI brain shows left parietal encephalomalacia.     Assessment and Plan:  Ms. Tapia is a 70 y.o. female referred to me by Harley Roberts DO for evaluation of spells concerning for non-epileptic events. I discussed that this is indeed the most likely diagnosis. However, she has not even had these events captured on EEG monitoring. We will obtain ambulatory EEG and then if none are captured, admit to EMU. I would like her to see neuropsych as well to discuss this diagnosis in more detail. She is on depakote for mood control. She has history of ischemic stroke and subdural hematoma. She is on asa 81 mg.    Pseudoseizures  -     Cancel: Ambulatory EEG; Future; Expected date: 01/31/2020  -     Ambulatory consult to Neuropsychology  -     EMU Monitoring; Future  -     Ambulatory EEG; Future; Expected date: 01/31/2020    Depression, unspecified depression type    History of ischemic left MCA stroke    History of subdural hematoma

## 2020-02-03 ENCOUNTER — LAB VISIT (OUTPATIENT)
Dept: LAB | Facility: HOSPITAL | Age: 70
End: 2020-02-03
Attending: PHYSICIAN ASSISTANT
Payer: MEDICARE

## 2020-02-03 DIAGNOSIS — Z79.4 TYPE 2 DIABETES MELLITUS WITHOUT COMPLICATION, WITH LONG-TERM CURRENT USE OF INSULIN: ICD-10-CM

## 2020-02-03 DIAGNOSIS — E11.9 TYPE 2 DIABETES MELLITUS WITHOUT COMPLICATION, WITH LONG-TERM CURRENT USE OF INSULIN: ICD-10-CM

## 2020-02-03 PROCEDURE — 36415 COLL VENOUS BLD VENIPUNCTURE: CPT | Mod: PO

## 2020-02-03 PROCEDURE — 84439 ASSAY OF FREE THYROXINE: CPT

## 2020-02-03 PROCEDURE — 84443 ASSAY THYROID STIM HORMONE: CPT

## 2020-02-04 DIAGNOSIS — E03.9 ACQUIRED HYPOTHYROIDISM: Primary | Chronic | ICD-10-CM

## 2020-02-04 LAB
T4 FREE SERPL-MCNC: 1.03 NG/DL (ref 0.71–1.51)
TSH SERPL DL<=0.005 MIU/L-ACNC: 4.54 UIU/ML (ref 0.4–4)

## 2020-02-04 RX ORDER — LEVOTHYROXINE SODIUM 88 UG/1
88 TABLET ORAL
Qty: 30 TABLET | Refills: 11 | Status: SHIPPED | OUTPATIENT
Start: 2020-02-04 | End: 2020-05-11 | Stop reason: SDUPTHER

## 2020-02-07 ENCOUNTER — TELEPHONE (OUTPATIENT)
Dept: FAMILY MEDICINE | Facility: CLINIC | Age: 70
End: 2020-02-07

## 2020-02-07 NOTE — TELEPHONE ENCOUNTER
Called and spoke to patient regarding appointment on 2/18/2020 with Dr. Abbasi, patient states that she will call back to rescheduled.

## 2020-02-10 ENCOUNTER — TELEPHONE (OUTPATIENT)
Dept: NEUROLOGY | Facility: CLINIC | Age: 70
End: 2020-02-10

## 2020-02-11 ENCOUNTER — TELEPHONE (OUTPATIENT)
Dept: NEUROLOGY | Facility: CLINIC | Age: 70
End: 2020-02-11

## 2020-02-11 ENCOUNTER — HOSPITAL ENCOUNTER (INPATIENT)
Facility: HOSPITAL | Age: 70
LOS: 2 days | Discharge: HOME | DRG: 880 | End: 2020-02-13
Attending: PSYCHIATRY & NEUROLOGY | Admitting: PSYCHIATRY & NEUROLOGY
Payer: MEDICARE

## 2020-02-11 DIAGNOSIS — R56.9 SEIZURES: Primary | ICD-10-CM

## 2020-02-11 DIAGNOSIS — F33.41 RECURRENT MAJOR DEPRESSIVE DISORDER, IN PARTIAL REMISSION: ICD-10-CM

## 2020-02-11 DIAGNOSIS — G40.219 COMPLEX PARTIAL EPILEPSY WITH GENERALIZATION AND WITH INTRACTABLE EPILEPSY: ICD-10-CM

## 2020-02-11 LAB
ALBUMIN SERPL BCP-MCNC: 3.5 G/DL (ref 3.5–5.2)
ALP SERPL-CCNC: 94 U/L (ref 55–135)
ALT SERPL W/O P-5'-P-CCNC: 9 U/L (ref 10–44)
ANION GAP SERPL CALC-SCNC: 10 MMOL/L (ref 8–16)
AST SERPL-CCNC: 14 U/L (ref 10–40)
BASOPHILS # BLD AUTO: 0.05 K/UL (ref 0–0.2)
BASOPHILS NFR BLD: 0.5 % (ref 0–1.9)
BILIRUB SERPL-MCNC: 0.3 MG/DL (ref 0.1–1)
BUN SERPL-MCNC: 22 MG/DL (ref 8–23)
CALCIUM SERPL-MCNC: 9.8 MG/DL (ref 8.7–10.5)
CHLORIDE SERPL-SCNC: 106 MMOL/L (ref 95–110)
CO2 SERPL-SCNC: 26 MMOL/L (ref 23–29)
CREAT SERPL-MCNC: 1 MG/DL (ref 0.5–1.4)
DIFFERENTIAL METHOD: ABNORMAL
EOSINOPHIL # BLD AUTO: 0.2 K/UL (ref 0–0.5)
EOSINOPHIL NFR BLD: 2.5 % (ref 0–8)
ERYTHROCYTE [DISTWIDTH] IN BLOOD BY AUTOMATED COUNT: 13.7 % (ref 11.5–14.5)
EST. GFR  (AFRICAN AMERICAN): >60 ML/MIN/1.73 M^2
EST. GFR  (NON AFRICAN AMERICAN): 56.4 ML/MIN/1.73 M^2
GLUCOSE SERPL-MCNC: 89 MG/DL (ref 70–110)
HCT VFR BLD AUTO: 44.2 % (ref 37–48.5)
HGB BLD-MCNC: 13.7 G/DL (ref 12–16)
IMM GRANULOCYTES # BLD AUTO: 0.05 K/UL (ref 0–0.04)
IMM GRANULOCYTES NFR BLD AUTO: 0.5 % (ref 0–0.5)
LYMPHOCYTES # BLD AUTO: 2.8 K/UL (ref 1–4.8)
LYMPHOCYTES NFR BLD: 29.9 % (ref 18–48)
MCH RBC QN AUTO: 30.4 PG (ref 27–31)
MCHC RBC AUTO-ENTMCNC: 31 G/DL (ref 32–36)
MCV RBC AUTO: 98 FL (ref 82–98)
MONOCYTES # BLD AUTO: 0.6 K/UL (ref 0.3–1)
MONOCYTES NFR BLD: 6.8 % (ref 4–15)
NEUTROPHILS # BLD AUTO: 5.5 K/UL (ref 1.8–7.7)
NEUTROPHILS NFR BLD: 59.8 % (ref 38–73)
NRBC BLD-RTO: 0 /100 WBC
PLATELET # BLD AUTO: 183 K/UL (ref 150–350)
PMV BLD AUTO: 11 FL (ref 9.2–12.9)
POCT GLUCOSE: 113 MG/DL (ref 70–110)
POCT GLUCOSE: 125 MG/DL (ref 70–110)
POTASSIUM SERPL-SCNC: 4.3 MMOL/L (ref 3.5–5.1)
PROT SERPL-MCNC: 7.4 G/DL (ref 6–8.4)
RBC # BLD AUTO: 4.5 M/UL (ref 4–5.4)
SODIUM SERPL-SCNC: 142 MMOL/L (ref 136–145)
WBC # BLD AUTO: 9.26 K/UL (ref 3.9–12.7)

## 2020-02-11 PROCEDURE — 99223 1ST HOSP IP/OBS HIGH 75: CPT | Mod: ,,, | Performed by: PSYCHIATRY & NEUROLOGY

## 2020-02-11 PROCEDURE — 36415 COLL VENOUS BLD VENIPUNCTURE: CPT

## 2020-02-11 PROCEDURE — 80053 COMPREHEN METABOLIC PANEL: CPT

## 2020-02-11 PROCEDURE — 95700 EEG CONT REC W/VID EEG TECH: CPT

## 2020-02-11 PROCEDURE — 95720 EEG PHY/QHP EA INCR W/VEEG: CPT | Mod: ,,, | Performed by: PSYCHIATRY & NEUROLOGY

## 2020-02-11 PROCEDURE — 99223 PR INITIAL HOSPITAL CARE,LEVL III: ICD-10-PCS | Mod: ,,, | Performed by: PSYCHIATRY & NEUROLOGY

## 2020-02-11 PROCEDURE — 11000001 HC ACUTE MED/SURG PRIVATE ROOM

## 2020-02-11 PROCEDURE — 94761 N-INVAS EAR/PLS OXIMETRY MLT: CPT

## 2020-02-11 PROCEDURE — 95720 PR EEG, W/VIDEO, CONT RECORD, I&R, >12<26 HRS: ICD-10-PCS | Mod: ,,, | Performed by: PSYCHIATRY & NEUROLOGY

## 2020-02-11 PROCEDURE — 95714 VEEG EA 12-26 HR UNMNTR: CPT

## 2020-02-11 PROCEDURE — 25000003 PHARM REV CODE 250: Performed by: STUDENT IN AN ORGANIZED HEALTH CARE EDUCATION/TRAINING PROGRAM

## 2020-02-11 PROCEDURE — 85025 COMPLETE CBC W/AUTO DIFF WBC: CPT

## 2020-02-11 RX ORDER — CALCIUM CARBONATE/VITAMIN D3 250-3.125
1 TABLET ORAL DAILY
Status: DISCONTINUED | OUTPATIENT
Start: 2020-02-12 | End: 2020-02-13 | Stop reason: HOSPADM

## 2020-02-11 RX ORDER — SIMVASTATIN 10 MG/1
40 TABLET, FILM COATED ORAL NIGHTLY
Status: DISCONTINUED | OUTPATIENT
Start: 2020-02-11 | End: 2020-02-13 | Stop reason: HOSPADM

## 2020-02-11 RX ORDER — IBUPROFEN 200 MG
16 TABLET ORAL
Status: DISCONTINUED | OUTPATIENT
Start: 2020-02-11 | End: 2020-02-13 | Stop reason: HOSPADM

## 2020-02-11 RX ORDER — ONDANSETRON 4 MG/1
8 TABLET, FILM COATED ORAL EVERY 6 HOURS PRN
Status: DISCONTINUED | OUTPATIENT
Start: 2020-02-11 | End: 2020-02-13 | Stop reason: HOSPADM

## 2020-02-11 RX ORDER — THIAMINE HCL 100 MG
100 TABLET ORAL DAILY
Status: DISCONTINUED | OUTPATIENT
Start: 2020-02-11 | End: 2020-02-13 | Stop reason: HOSPADM

## 2020-02-11 RX ORDER — INSULIN ASPART 100 [IU]/ML
0-5 INJECTION, SOLUTION INTRAVENOUS; SUBCUTANEOUS
Status: DISCONTINUED | OUTPATIENT
Start: 2020-02-11 | End: 2020-02-13 | Stop reason: HOSPADM

## 2020-02-11 RX ORDER — LEVOTHYROXINE SODIUM 88 UG/1
88 TABLET ORAL
Status: DISCONTINUED | OUTPATIENT
Start: 2020-02-12 | End: 2020-02-13 | Stop reason: HOSPADM

## 2020-02-11 RX ORDER — GEMFIBROZIL 600 MG/1
600 TABLET, FILM COATED ORAL
Status: DISCONTINUED | OUTPATIENT
Start: 2020-02-11 | End: 2020-02-11

## 2020-02-11 RX ORDER — POTASSIUM CHLORIDE 20 MEQ/1
20 TABLET, EXTENDED RELEASE ORAL DAILY
Status: DISCONTINUED | OUTPATIENT
Start: 2020-02-11 | End: 2020-02-13 | Stop reason: HOSPADM

## 2020-02-11 RX ORDER — OXYBUTYNIN CHLORIDE 10 MG/1
10 TABLET, EXTENDED RELEASE ORAL DAILY
Status: DISCONTINUED | OUTPATIENT
Start: 2020-02-11 | End: 2020-02-13 | Stop reason: HOSPADM

## 2020-02-11 RX ORDER — SODIUM CHLORIDE 0.9 % (FLUSH) 0.9 %
10 SYRINGE (ML) INJECTION
Status: DISCONTINUED | OUTPATIENT
Start: 2020-02-11 | End: 2020-02-13 | Stop reason: HOSPADM

## 2020-02-11 RX ORDER — IBUPROFEN 200 MG
24 TABLET ORAL
Status: DISCONTINUED | OUTPATIENT
Start: 2020-02-11 | End: 2020-02-13 | Stop reason: HOSPADM

## 2020-02-11 RX ORDER — GLUCAGON 1 MG
1 KIT INJECTION
Status: DISCONTINUED | OUTPATIENT
Start: 2020-02-11 | End: 2020-02-13 | Stop reason: HOSPADM

## 2020-02-11 RX ORDER — DOCUSATE SODIUM 100 MG/1
100 CAPSULE, LIQUID FILLED ORAL 2 TIMES DAILY
Status: DISCONTINUED | OUTPATIENT
Start: 2020-02-11 | End: 2020-02-11

## 2020-02-11 RX ORDER — ACETAMINOPHEN 325 MG/1
650 TABLET ORAL EVERY 6 HOURS PRN
Status: DISCONTINUED | OUTPATIENT
Start: 2020-02-11 | End: 2020-02-13 | Stop reason: HOSPADM

## 2020-02-11 RX ORDER — DOCUSATE SODIUM 100 MG/1
100 CAPSULE, LIQUID FILLED ORAL 2 TIMES DAILY PRN
Status: DISCONTINUED | OUTPATIENT
Start: 2020-02-11 | End: 2020-02-13 | Stop reason: HOSPADM

## 2020-02-11 RX ORDER — LOSARTAN POTASSIUM 50 MG/1
50 TABLET ORAL DAILY
Status: DISCONTINUED | OUTPATIENT
Start: 2020-02-11 | End: 2020-02-13 | Stop reason: HOSPADM

## 2020-02-11 RX ORDER — TRIFLUOPERAZINE HYDROCHLORIDE 5 MG/1
10 TABLET, FILM COATED ORAL 2 TIMES DAILY
Status: DISCONTINUED | OUTPATIENT
Start: 2020-02-11 | End: 2020-02-13 | Stop reason: HOSPADM

## 2020-02-11 RX ORDER — CLONAZEPAM 0.5 MG/1
0.5 TABLET ORAL 2 TIMES DAILY
Status: DISCONTINUED | OUTPATIENT
Start: 2020-02-11 | End: 2020-02-13 | Stop reason: HOSPADM

## 2020-02-11 RX ORDER — ASPIRIN 81 MG/1
81 TABLET ORAL DAILY
Status: DISCONTINUED | OUTPATIENT
Start: 2020-02-11 | End: 2020-02-13 | Stop reason: HOSPADM

## 2020-02-11 RX ADMIN — LOSARTAN POTASSIUM 50 MG: 50 TABLET ORAL at 05:02

## 2020-02-11 RX ADMIN — TRIFLUOPERAZINE HYDROCHLORIDE 10 MG: 5 TABLET, FILM COATED ORAL at 11:02

## 2020-02-11 RX ADMIN — OXYBUTYNIN 10 MG: 10 TABLET, FILM COATED, EXTENDED RELEASE ORAL at 05:02

## 2020-02-11 RX ADMIN — Medication 100 MG: at 05:02

## 2020-02-11 RX ADMIN — SIMVASTATIN 40 MG: 10 TABLET, FILM COATED ORAL at 10:02

## 2020-02-11 RX ADMIN — ASPIRIN 81 MG: 81 TABLET, COATED ORAL at 05:02

## 2020-02-11 RX ADMIN — DOCUSATE SODIUM 100 MG: 100 CAPSULE, LIQUID FILLED ORAL at 12:02

## 2020-02-11 RX ADMIN — CLONAZEPAM 0.5 MG: 0.5 TABLET ORAL at 10:02

## 2020-02-11 RX ADMIN — POTASSIUM CHLORIDE 20 MEQ: 1500 TABLET, EXTENDED RELEASE ORAL at 05:02

## 2020-02-11 NOTE — NURSING
Pt has a episode after strobe light test. It lasted less than a minute. Witnessed by RN and technician.  Pt exhibit dropped her head forward and couldn't remember the date and year, however she was alert to self, place, birthday. ALEJANDRO. YUN

## 2020-02-11 NOTE — ASSESSMENT & PLAN NOTE
-Continue vEEG monitoring  -Continue seizure precautions  -Will continue valproate 500 mg qHS for now  -Takes clonazepam 0.5 mg bid at home, will hold  -IV lorazepam 2 mg prn generalized tonic-clonic seizure > 5 minutes or multiple seizures without return to cognitive baseline within 30 minutes  -Further plan pending EEG results

## 2020-02-11 NOTE — ASSESSMENT & PLAN NOTE
-Neuro PharmD alerted team to gemfibrozil/simvastatin risk of rhabdomyolysis  -Will discontinue gemfibrozil given higher benefit for stroke prevention/cardiovascular event prevention conferred by simvastatin.  Last lipid panel with TGs wnl, LDL still slightly above goal of 77.2.  Will message PCP Dr. Abbasi--appears that simvastatin was the more recent medication, may have been miscommunication and pt still taking both rather than switching.
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
## 2020-02-11 NOTE — ASSESSMENT & PLAN NOTE
-Continue calcium-vitamin D supplementation  -May do well off of valproate which is a/w small risk of osteopenia--decision pending EEG, will plan for referral to Psychiatry outpatient to manage medications

## 2020-02-11 NOTE — ASSESSMENT & PLAN NOTE
-Continue vortioxetine 20 mg qd  -Pt on trifluoperazine, rx typically for schizophrenia, being prescribed by Endocrine [place] and no recent psychiatry follow up.  Will plan for psychiatry referral on discharge.

## 2020-02-11 NOTE — ASSESSMENT & PLAN NOTE
-Previous diagnosis, pt reports she was evaluated by Neurology at [facility] ([place], [place])  -Seen by Neuropsychology  -Ambulatory Referral to Psychiatry on discharge due to medications currently being prescribed via Endocrine [place], would like to ensure patient's medications are monitored and adjusted as needed by psychiatry

## 2020-02-12 LAB
POCT GLUCOSE: 116 MG/DL (ref 70–110)
POCT GLUCOSE: 117 MG/DL (ref 70–110)
POCT GLUCOSE: 118 MG/DL (ref 70–110)
POCT GLUCOSE: 141 MG/DL (ref 70–110)
POCT GLUCOSE: 98 MG/DL (ref 70–110)

## 2020-02-12 PROCEDURE — 25000003 PHARM REV CODE 250: Performed by: STUDENT IN AN ORGANIZED HEALTH CARE EDUCATION/TRAINING PROGRAM

## 2020-02-12 PROCEDURE — 99233 SBSQ HOSP IP/OBS HIGH 50: CPT | Mod: ,,, | Performed by: PSYCHIATRY & NEUROLOGY

## 2020-02-12 PROCEDURE — 95714 VEEG EA 12-26 HR UNMNTR: CPT

## 2020-02-12 PROCEDURE — 95720 EEG PHY/QHP EA INCR W/VEEG: CPT | Mod: ,,, | Performed by: PSYCHIATRY & NEUROLOGY

## 2020-02-12 PROCEDURE — 95720 PR EEG, W/VIDEO, CONT RECORD, I&R, >12<26 HRS: ICD-10-PCS | Mod: ,,, | Performed by: PSYCHIATRY & NEUROLOGY

## 2020-02-12 PROCEDURE — 11000001 HC ACUTE MED/SURG PRIVATE ROOM

## 2020-02-12 PROCEDURE — 99233 PR SUBSEQUENT HOSPITAL CARE,LEVL III: ICD-10-PCS | Mod: ,,, | Performed by: PSYCHIATRY & NEUROLOGY

## 2020-02-12 RX ADMIN — CLONAZEPAM 0.5 MG: 0.5 TABLET ORAL at 09:02

## 2020-02-12 RX ADMIN — ASPIRIN 81 MG: 81 TABLET, COATED ORAL at 09:02

## 2020-02-12 RX ADMIN — TRIFLUOPERAZINE HYDROCHLORIDE 10 MG: 5 TABLET, FILM COATED ORAL at 08:02

## 2020-02-12 RX ADMIN — LOSARTAN POTASSIUM 50 MG: 50 TABLET ORAL at 09:02

## 2020-02-12 RX ADMIN — OXYBUTYNIN 10 MG: 10 TABLET, FILM COATED, EXTENDED RELEASE ORAL at 09:02

## 2020-02-12 RX ADMIN — CLONAZEPAM 0.5 MG: 0.5 TABLET ORAL at 08:02

## 2020-02-12 RX ADMIN — Medication 100 MG: at 09:02

## 2020-02-12 RX ADMIN — CALCIUM CARBONATE-CHOLECALCIFEROL TAB 250 MG-125 UNIT 1 TABLET: 250-125 TAB at 06:02

## 2020-02-12 RX ADMIN — LEVOTHYROXINE SODIUM 88 MCG: 0.09 TABLET ORAL at 05:02

## 2020-02-12 RX ADMIN — SIMVASTATIN 40 MG: 10 TABLET, FILM COATED ORAL at 08:02

## 2020-02-12 RX ADMIN — POTASSIUM CHLORIDE 20 MEQ: 1500 TABLET, EXTENDED RELEASE ORAL at 09:02

## 2020-02-12 NOTE — PLAN OF CARE
02/12/20 1136   Discharge Assessment   Assessment Type Discharge Planning Assessment   Confirmed/corrected address and phone number on facesheet? Yes   Assessment information obtained from? Patient   Expected Length of Stay (days) 5   Communicated expected length of stay with patient/caregiver yes   Prior to hospitilization cognitive status: Alert/Oriented   Prior to hospitalization functional status: Independent   Current cognitive status: Alert/Oriented   Current Functional Status: Independent   Lives With sibling(s)   Able to Return to Prior Arrangements yes   Is patient able to care for self after discharge? Yes   Who are your caregiver(s) and their phone number(s)? George Tapia (brother) [phone]   Patient's perception of discharge disposition home or selfcare   Readmission Within the Last 30 Days no previous admission in last 30 days   Patient currently being followed by outpatient case management? No   Patient currently receives any other outside agency services? No   Equipment Currently Used at Home walker, rolling;wheelchair;glucometer;CPAP;other (see comments)  (cpap cleaning device)   Part D Coverage peoples   Do you have any problems affording any of your prescribed medications? No   Is the patient taking medications as prescribed? yes   Does the patient have transportation home? Yes   Transportation Anticipated family or friend will provide  (brother)   Dialysis Name and Scheduled days na   Does the patient receive services at the Coumadin Clinic? No   Discharge Plan A Home   Discharge Plan B Home   DME Needed Upon Discharge  other (see comments)  (tbd)   Patient/Family in Agreement with Plan yes     CM met with patient in room for Discharge Planning Assessment.  Per patient,  patient lives with brother in a [facility] with 0 steps to enter.   Per patient, patient was independent with ADLS (except driving and cooking) and used rolling walker for ambulation.  Per patient, the patient will have assistance  from brother upon discharge.   Discharge Planning Booklet given to patient/family and discussed.  All questions addressed.       PCP:  Yanna Abbasi MD      Pharmacy:    [facility] [place] - [address]  [address] [place] [address]  Phone: [phone] Fax: [phone]    [id number] [place] - [address]  [address] [place] [address]  Phone: [phone] Fax: [phone]        Emergency Contacts:  Extended Emergency Contact Information  Primary Emergency Contact: George Tapia  Address: [address] [place] [address] [address] [place] [address] [facility]  Home Phone: [phone]  Relation: Brother  Preferred language: English   needed? No      Insurance:    Payor: [employer] / Plan: [employer] [facility] HEALTH / Product Type: Medicare Advantage /       Sharon Casas RN  Case Management  Ext: [phone]  02/12/2020  11:48 AM

## 2020-02-12 NOTE — PLAN OF CARE
Problem: Adult Inpatient Plan of Care  Goal: Plan of Care Review  Outcome: Ongoing, Progressing     Problem: Fall Injury Risk  Goal: Absence of Fall and Fall-Related Injury  Outcome: Ongoing, Progressing     Patient is AAO x4. POC reviewed with patient. Patient verbalized understanding. Patient's breathing is unlabored with equal chest expansion. EEG monitoring in place. Patient denies any numbness and tingling. Patient ambulates to the restroom with assistance. Bed in lowest position,bed alarm on, side rails up and padded x4, no complaints or signs of distress. WCTM.
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
## 2020-02-12 NOTE — HOSPITAL COURSE
2/11/20-2/12/20:  No evidence of epilepsy on EEG.  Had an event during photic stimulation which was nonepileptic in nature.  02/11/20-02/12/20:  Multiple episodes starting 19:52:22 where patient has head and hand shaking then stares off.  Each episode lasts a few seconds.  One episode ~ 10:27 with head/hand shaking and followed by leaning forward.  No epileptic correlate with these.  Some P3 spikes identified which seem consistent with asymmetric V wave rather than epileptiform discharge.  02/13/20:  Seen by Neuropsychology, okay for discharge today.

## 2020-02-12 NOTE — PLAN OF CARE
Problem: Adult Inpatient Plan of Care  Goal: Plan of Care Review  Outcome: Ongoing, Not Progressing   POC reviewed with pt and family. Pt verbalized understanding. VSS. PT had a seizure event on admission and another that lasted under a minute. Fall/Safety and Seizure measures in placed. All questions and concerns addressed. WCTM.

## 2020-02-12 NOTE — NURSING
Pt had a 1 minute episode, consists of jerking her hands and dropping her head. She was responsive to voice. VSS. Pt alert to self. WCTM

## 2020-02-12 NOTE — PLAN OF CARE
02/12/20 1135   Post-Acute Status   Post-Acute Authorization Other   Other Status No Post-Acute Service Needs   Discharge Delays (!) Procedure Scheduling (IR, OR, Labs, Echo, Cath, Echo, EEG)  (continuous EEG monitoring)   Discharge Plan   Discharge Plan A Home   Discharge Plan B Home     Sharon Casas RN  Case Management  Ext: [phone]  02/12/2020  11:36 AM

## 2020-02-12 NOTE — PROCEDURES
VIDEO ELECTROENCEPHALOGRAM  REPORT    DATE OF SERVICE: 2/11/2020-2/13/2020  EEG NUMBER: EMU -1, 2, 3  REQUESTED BY:  Yanna Loving MD  LOCATION OF SERVICE:  [facility]   Electroencephalographic (EEG) recording is with electrodes placed according to the International 10-20 placement system.  Thirty two (32) channels of digital signal (sampling rate of 512/sec) including T1 and T2 was simultaneously recorded from the scalp and may include  EKG, EMG, and/or eye monitors.  Recording band pass was 0.1 to 512 hz.  Digital video recording of the patient is simultaneously recorded with the EEG.  The patient is instructed report clinical symptoms which may occur during the recording session.  EEG and video recording is stored and archived in digital format.  Activation procedures which include photic stimulation, hyperventilation and instructing patients to perform simple task are done in selected patients.   The EEG is displayed on a monitor screen and can be reviewed using different montages.  Computer assisted analysis is employed to detect spike and electrographic seizure activity.   The entire record is submitted for computer analysis.  The entire recording is visually reviewed and the times identified by computer analysis as being spikes or seizures are reviewed again.  Compresses spectral analysis (CSA) is also performed on the activity recorded from each individual channel.  This is displayed as a power display of frequencies from 0 to 30 Hz over time.   The CSA is reviewed looking for asymmetries in power between homologous areas of the scalp and then compared with the original EEG recording.     [employer] software was also utilized in the review of this study.  This software suite analyzes the EEG recording in multiple domains.  Coherence and rhythmicity is computed to identify EEG sections which may contain organized seizures.  Each channel undergoes analysis to detect presence of spike  and sharp waves which have special and morphological characteristic of epileptic activity.  The routine EEG recording is converted from spacial into frequency domain.  This is then displayed comparing homologous areas to identify areas of significant asymmetry.  Algorithm to identify non-cortically generated artifact is used to separate eye movement, EMG and other artifact from the EEG.      ELECTROENCEPHALOGRAM:    Day 1  Provider - Richie Diego Jr. MD  RECORDING TIMES  Start on 2/11/20 at 14:33:53   Stop on 2/12/20 at 07:00:18  Total EEG recording time - 16 hrs     EEG FINDINGS  Waking State:  The patient's background consists of a posteriorly dominant 10 Hz alpha rhythm, which is well formed, well modulated, and abolishes with eye opening.  Anteriorly, the patient's background consists of predominantly beta range frequencies.  There is no focal slowing.  There are no focal, lateralized, or epileptiform transients.  Note is made of sharply contoured waves best appreciated independently at P3 and P4; however, the morphology of the waveform suggests that these are not pathologic in nature and are suspected to represent asymmetric V waves.  No electrographic seizures are seen.      Sleep state:  Normal sleep architecture is seen with features of stage N2 sleep appreciated.     Activation procedures:   Hyperventilation was not performed.  Photic stimulation did not induce pathologic changes in the EEG.    Seizure/Events(s) recorded -   Event #1  Push button activations on 2/11/20 at 14:51:46 and 14:52:26  This episode occurred during photic stimulation.  The patient was not appropriately responsive to external stimuli.  She also had a single polyphasic generalized jerk which lasted for approximately 2 seconds.  There was no EEG change associated with this event.    Cardiac Monitor:   Sinus rhythm     IMPRESSION  This is a normal LTM-EEG study demonstrating the awake, drowsy, and asleep states.  The event captured  was nonepileptic in nature and had features consistent with episodes with a psychogenic etiology.  This recording provides no evidence of epilepsy.    Day 2  Provider - Richie Diego Jr. MD  RECORDING TIMES  Start on 2/12/20 at 07:01:49   Stop on 2/13/20 at 06:59:58  Total EEG recording time - 24 hrs     EEG FINDINGS  Waking State:  The patient's background consists of a posteriorly dominant 10 Hz alpha rhythm, which is reasonably well formed, reasonably well modulated, and abolishes with eye opening.  Anteriorly, the patient's background consists of predominantly beta range frequencies.  There is no focal slowing.  There are no focal, lateralized, or epileptiform transients.  No electrographic seizures are seen.      Sleep state:  Normal sleep architecture is seen with features of stage N2 sleep appreciated.    Activation procedures:   Hyperventilation and photic stimulation were not performed.     Seizure/Events(s) recorded -   Event #2  Push button activation on 2/12/20 at 09:50:23  The patient was not appropriately responsive and exhibited motor activity including asymmetric shaking of the bilateral upper extremities that was variable in amplitude, frequency, and axis.  The clinical event started and stopped repeatedly.  There were no EEG changes associated with the event.    Event #3  Push button activations on 2/12/20 at 10:27:12 and 10:27:25  This event was substantially similar to Event #2 both in terms of semiology and electrographic findings    Cardiac Monitor:   Sinus rhythm     IMPRESSION  This is a normal LTM-EEG study demonstrating the awake, drowsy, and asleep states.  This recording provides no evidence of epilepsy.  The two clinical events captured were consistent with psychogenic non-epileptic events.    Day 3  Provider - Richie Diego Jr. MD  RECORDING TIMES  Start on 2/13/20 at 07:01:33   Stop on 2/13/20 at 13:19:36  Total EEG recording time - 6 hrs     EEG FINDINGS  Waking State:  The  patient's background consists of a posteriorly dominant 10 Hz alpha rhythm, which is reasonably well formed, reasonably well modulated, and abolishes with eye opening.  Anteriorly, the patient's background consists of predominantly beta range frequencies.  There is no focal slowing.  There are no focal, lateralized, or epileptiform transients.  No electrographic seizures are seen.      Sleep state:  Normal sleep architecture was not appreciated    Activation procedures:   Hyperventilation and photic stimulation were not performed.     Seizure/Events(s) recorded -   None    Cardiac Monitor:   Sinus rhythm     IMPRESSION  This is a normal LTM-EEG study demonstrating the awake, drowsy, and asleep states.  This recording provides no evidence of epilepsy.      COMPREHENSIVE SUMMARY  This is a normal LTM-EEG study demonstrating the awake, drowsy, and asleep states.  This recording provides no evidence of epilepsy.  A total of 3 clinical events were captured.  These were endorsed as being typical of the patient's events of interest.  They were non-epileptic in nature, and the associated semiology of each event was consistent with events of a psychogenic etiology.

## 2020-02-13 ENCOUNTER — TELEPHONE (OUTPATIENT)
Dept: FAMILY MEDICINE | Facility: CLINIC | Age: 70
End: 2020-02-13

## 2020-02-13 VITALS
HEIGHT: 61 IN | BODY MASS INDEX: 36.41 KG/M2 | RESPIRATION RATE: 18 BRPM | DIASTOLIC BLOOD PRESSURE: 57 MMHG | SYSTOLIC BLOOD PRESSURE: 123 MMHG | HEART RATE: 76 BPM | OXYGEN SATURATION: 94 % | TEMPERATURE: 99 F

## 2020-02-13 PROBLEM — G40.219 COMPLEX PARTIAL EPILEPSY WITH GENERALIZATION AND WITH INTRACTABLE EPILEPSY: Status: RESOLVED | Noted: 2020-02-11 | Resolved: 2020-02-13

## 2020-02-13 LAB
POCT GLUCOSE: 120 MG/DL (ref 70–110)
POCT GLUCOSE: 122 MG/DL (ref 70–110)

## 2020-02-13 PROCEDURE — 25000003 PHARM REV CODE 250: Performed by: STUDENT IN AN ORGANIZED HEALTH CARE EDUCATION/TRAINING PROGRAM

## 2020-02-13 PROCEDURE — 90791 PR PSYCHIATRIC DIAGNOSTIC EVALUATION: ICD-10-PCS | Mod: ,,, | Performed by: PSYCHIATRY & NEUROLOGY

## 2020-02-13 PROCEDURE — 90791 PSYCH DIAGNOSTIC EVALUATION: CPT | Mod: ,,, | Performed by: PSYCHIATRY & NEUROLOGY

## 2020-02-13 PROCEDURE — 95718 PR EEG, W/VIDEO, CONT RECORD, I&R, 2-12 HRS: ICD-10-PCS | Mod: ,,, | Performed by: PSYCHIATRY & NEUROLOGY

## 2020-02-13 PROCEDURE — 95718 EEG PHYS/QHP 2-12 HR W/VEEG: CPT | Mod: ,,, | Performed by: PSYCHIATRY & NEUROLOGY

## 2020-02-13 PROCEDURE — 99239 PR HOSPITAL DISCHARGE DAY,>30 MIN: ICD-10-PCS | Mod: ,,, | Performed by: PSYCHIATRY & NEUROLOGY

## 2020-02-13 PROCEDURE — 99239 HOSP IP/OBS DSCHRG MGMT >30: CPT | Mod: ,,, | Performed by: PSYCHIATRY & NEUROLOGY

## 2020-02-13 PROCEDURE — 94761 N-INVAS EAR/PLS OXIMETRY MLT: CPT

## 2020-02-13 RX ADMIN — TRIFLUOPERAZINE HYDROCHLORIDE 10 MG: 5 TABLET, FILM COATED ORAL at 09:02

## 2020-02-13 RX ADMIN — CLONAZEPAM 0.5 MG: 0.5 TABLET ORAL at 09:02

## 2020-02-13 RX ADMIN — LOSARTAN POTASSIUM 50 MG: 50 TABLET ORAL at 09:02

## 2020-02-13 RX ADMIN — OXYBUTYNIN 10 MG: 10 TABLET, FILM COATED, EXTENDED RELEASE ORAL at 09:02

## 2020-02-13 RX ADMIN — LEVOTHYROXINE SODIUM 88 MCG: 0.09 TABLET ORAL at 04:02

## 2020-02-13 RX ADMIN — POTASSIUM CHLORIDE 20 MEQ: 1500 TABLET, EXTENDED RELEASE ORAL at 09:02

## 2020-02-13 RX ADMIN — Medication 100 MG: at 09:02

## 2020-02-13 RX ADMIN — ASPIRIN 81 MG: 81 TABLET, COATED ORAL at 09:02

## 2020-02-13 NOTE — PROGRESS NOTES
[facility]  Epilepsy Monitoring Unit  Daily Progress Note    Date: 2020  Patient Name: Maggy Tapia   MRN: [id number]   PCP: Yanna Abbasi    Subjective:     Interval History:  Talked to patient who endorses few typical events yesterday from what she had been told, but denies recollection of anything during the events.  She understands that Dr. Rivas with Neuropsychology will be coming to see her and discuss events and states that her brother will be by this afternoon to pick her up.  No other questions or concerns currently, eager to get home and see her dog.    HOSPITAL COURSE:  20-20:  No evidence of epilepsy on EEG.  Had an event during photic stimulation which was nonepileptic in nature.  20-20:  Multiple episodes starting 19:52:22 where patient has head and hand shaking then stares off.  Each episode lasts a few seconds.  One episode ~ 10:27 with head/hand shaking and followed by leaning forward.  No epileptic correlate with these.  Some P3 spikes identified which seem consistent with asymmetric V wave rather than epileptiform discharge.  20:  Neuropsychology consult, plan for discharge.    PAST MEDICAL HISTORY:  Past Medical History:   Diagnosis Date    Anxiety     Arthritis     Asthma     Cancer 2000    Left Breast    Cataract     Depression     Diabetes mellitus, type 2     GERD (gastroesophageal reflux disease)     Hyperlipidemia     Hypertension     Overactive bladder     Seizures     Pseudo-seizures    Stroke     Thyroid disease     Urinary tract infection without hematuria 8/3/2017     PAST SURGICAL HISTORY:  Past Surgical History:   Procedure Laterality Date    APPENDECTOMY      BREAST BIOPSY      BREAST LUMPECTOMY Left     2016    BREAST SURGERY      CATARACT EXTRACTION       SECTION      CHOLECYSTECTOMY      DILATION AND CURETTAGE OF UTERUS      EYE SURGERY       CURRENT MEDS:  Current Facility-Administered Medications    Medication Dose Route Frequency Provider Last Rate Last Dose    acetaminophen tablet 650 mg  650 mg Oral Q6H PRN Brinda Powell MD        aspirin EC tablet 81 mg  81 mg Oral Daily Bridna Powell MD   81 mg at 02/13/20 0927    calcium carbonate-vitamin D3 250-125 mg per tablet 1 tablet  1 tablet Oral Daily Brinda Powell MD   1 tablet at 02/12/20 1819    clonazePAM tablet 0.5 mg  0.5 mg Oral BID Brinda Powell MD   0.5 mg at 02/13/20 0927    dextrose 10% (D10W) Bolus  12.5 g Intravenous PRN Brinda Powell MD        dextrose 10% (D10W) Bolus  25 g Intravenous PRN Brinda Powell MD        docusate sodium capsule 100 mg  100 mg Oral BID PRN Brinda Powell MD        glucagon (human recombinant) injection 1 mg  1 mg Intramuscular PRN Brinda Powell MD        glucose chewable tablet 16 g  16 g Oral PRN Brinda Powell MD        glucose chewable tablet 24 g  24 g Oral PRN Brinda Powell MD        insulin aspart U-100 pen 0-5 Units  0-5 Units Subcutaneous QID (AC + HS) PRN Brinda Powell MD        levothyroxine tablet 88 mcg  88 mcg Oral Before breakfast Brinda Powell MD   88 mcg at 02/13/20 0454    losartan tablet 50 mg  50 mg Oral Daily Brinda Powell MD   50 mg at 02/13/20 0927    ondansetron tablet 8 mg  8 mg Oral Q6H PRN Brinda Powell MD        oxybutynin 24 hr tablet 10 mg  10 mg Oral Daily Brinda Powell MD   10 mg at 02/13/20 0927    potassium chloride SA CR tablet 20 mEq  20 mEq Oral Daily Brinda Powell MD   20 mEq at 02/13/20 0927    simvastatin tablet 40 mg  40 mg Oral QHS Brinda Powell MD   40 mg at 02/12/20 2052    sodium chloride 0.9% flush 10 mL  10 mL Intravenous PRN Brinda Powell MD        thiamine tablet 100 mg  100 mg Oral Daily Brinda Powell MD   100 mg at  02/13/20 0927    trifluoperazine tablet 10 mg  10 mg Oral BID Brinda Powell MD   10 mg at 02/13/20 0927    vortioxetine Tab 20 mg  20 mg Oral Daily Brinda Powell MD         ALLERGIES:  Review of patient's allergies indicates:   Allergen Reactions    Penicillins Anaphylaxis    Sulfa (sulfonamide antibiotics) Anaphylaxis     FAMILY HISTORY:  Family History   Problem Relation Age of Onset    Diabetes Mother     Hypertension Mother     Breast cancer Mother     Glaucoma Neg Hx      SOCIAL HISTORY:  Social History     Tobacco Use    Smoking status: Never Smoker    Smokeless tobacco: Never Used   Substance Use Topics    Alcohol use: Yes     Comment: seldom    Drug use: No     Review of Systems:  12 system review of systems is negative except as noted in HPI.      Objective:     Vitals:    02/13/20 0300 02/13/20 0410 02/13/20 0655 02/13/20 0722   BP:  137/67  (!) 143/70   BP Location:    Left arm   Patient Position:    Lying   Pulse: 68 69 69 61   Resp:  18  (!) 21   Temp:  97.6 °F (36.4 °C)  97.5 °F (36.4 °C)   TempSrc:    Oral   SpO2:  95%  98%   Height:         General: NAD, well nourished   Eyes: No tearing, no discharge, no erythema   ENT: Moist mucous membranes w/o exudate or erythema  Neck: Supple, Normal ROM without nuchal rigidity  Cardiovascular: Warm/well-perfused, no LE edema  Lungs: Normal wob, normal chest wall excursions  Skin: No rash, lesions, or breakdown on exposed skin  Psychiatry: Mood and affect are appropriate   Abdomen: Soft, non-distended  Extremities: No cyanosis, clubbing, or edema.    Neurological   MENTAL STATUS: Alert and oriented to person, place, and time. Attention and concentration within normal limits. Speech without dysarthria, able to name and repeat without difficulty.   CRANIAL NERVES: Visual fields intact. PERRL. EOMI. Facial movement intact, symmetric. Hearing grossly intact. Full shoulder shrug bilaterally. Tongue protrudes midline, palate  raises symmetrically.  SENSORY: Sensation is intact to light touch throughout.   MOTOR: Normal bulk and tone. No pronator drift. Moves all extremities against gravity.  CEREBELLAR/COORDINATION/GAIT: Gait deferred 2/2 seizure precautions.  No resting tremor or myoclonus.      Labs: Reviewed. Noted below.   20 CBC relatively unremarkable  20 CMP relatively unremarkable  BG range over current admission  mg/dL    Imagin19 MRI Brain w/o contrast reviewed, notable for chronic microvascular ischemic change as well as an area of encephalomalacia in the posterior frontal region, MCA territory.    Diagnostic EEG Results:  20-20 Date: EEG Background: Continuous, symmetric with no apparent epileptiform discharges.       Activation Procedures: photic stimulation    Seizures/Events: 20 14:51:26, 14:51:46 during PS  Vitals:  Stable  Clinical Symptoms:  Jerking movement, unresponsiveness  Subjective Symptoms:  No recollection    20-20 Date: EEG Background:      Activation Procedures: None    Seizures/Events: Multiple typical events over 09:52-10:27.  Start Time: 09:52:22--on and off through 10:27  Vitals:  Stable  Clinical Symptoms:  Head/hand-shaking followed by staring off then 1 episode with leaning forward.  Subjective Symptoms:  Pt denies recollection of event     Assessment:      This is Maggy Tapia, 70 y.o. female presenting for characterization of events concerning for PNEE, per Dr. Loving.    Plan:      Problem List as of 2020 Reviewed: 2020  8:43 AM by Yanna Loving MD       1 - High    * Psychogenic Non-Epileptic Events    Current Assessment & Plan 2020 Hospital Encounter Edited 2020  4:22 PM by Richie Diego Jr., MD     -Continue vEEG monitoring  -Continue seizure precautions  -Continue valproate 500 mg qHS, will refer to Psychiatry  -Takes clonazepam 0.5 mg bid at home, continued on this admission  -IV lorazepam 2 mg prn generalized  tonic-clonic seizure > 5 minutes or multiple seizures without return to cognitive baseline within 30 minutes  -Seen by Neuropsychology for PNEE            2     Pseudoseizures    Current Assessment & Plan 1/30/2020 Hospital Encounter Written 2/11/2020  3:12 PM by Brinda Powell MD     -Previous diagnosis, pt reports she was evaluated by Neurology at [facility] ([place], [place])         History of ischemic left MCA stroke    Current Assessment & Plan 1/30/2020 Hospital Encounter Written 2/11/2020  3:14 PM by Brinda Powell MD     -Risk factor for seizure, will continue vEEG monitoring           Depression    Current Assessment & Plan 1/30/2020 Hospital Encounter Written 2/11/2020  3:15 PM by Brinda Powell MD     -Continue vortioxetine 20 mg qd  -Pt on trifluoperazine, rx typically for schizophrenia, being prescribed by Endocrine [place] and no recent psychiatry follow up.  Will plan for psychiatry referral on discharge.            3     Hypothyroidism    Current Assessment & Plan 1/30/2020 Hospital Encounter Written 2/11/2020  3:12 PM by Brinda Powell MD     -Continue levothyroxine 88 mcg qdwm         Type 2 diabetes mellitus without complication, with long-term current use of insulin    Current Assessment & Plan 1/30/2020 Hospital Encounter Written 2/11/2020  3:16 PM by Brinda Powell MD     -Stroke risk factor, Hgb A1c 6.1% on 1/6/20  -Diabetic diet, LDSSI, q6h POC BG checks         Essential hypertension    Current Assessment & Plan 1/30/2020 Hospital Encounter Written 2/11/2020  3:16 PM by Brinda Powell MD     -Continue losartan 50 mg qd         Hyperlipidemia    Current Assessment & Plan 1/30/2020 Hospital Encounter Written 2/11/2020  3:18 PM by Brinda Powell MD     -Neuro PharmD alerted team to gemfibrozil/simvastatin risk of rhabdomyolysis  -Will discontinue gemfibrozil given higher benefit for stroke prevention/cardiovascular event  prevention conferred by simvastatin.  Last lipid panel with TGs wnl, LDL still slightly above goal of 77.2.  Will message PCP Dr. Abbasi--appears that simvastatin was the more recent medication, may have been miscommunication and pt still taking both rather than switching.            4     JUAN (obstructive sleep apnea)    Current Assessment & Plan 1/30/2020 Hospital Encounter Written 2/11/2020  3:18 PM by Brinda Powell MD     -No current CPAP use, will plan for outpatient referral to Sleep Medicine Clinic            5     Osteopenia    Current Assessment & Plan 1/30/2020 Hospital Encounter Written 2/11/2020  3:20 PM by Brinda Powell MD     -Continue calcium-vitamin D supplementation  -May do well off of valproate which is a/w small risk of osteopenia--decision pending EEG, will plan for referral to Psychiatry outpatient to manage medications            Unprioritized    Syncope and collapse    Frequent falls (possibly related to polypharmacy)    History of left breast cancer    Seizures    Thrombocytopenia    Valproic acid toxicity    Severe obesity (BMI 35.0-35.9 with comorbidity)    Near syncope    Diplopia    Cervical strain    Left homonymous hemianopsia    Tardive dyskinesia    Gait instability    Hypoglycemia    History of subdural hematoma

## 2020-02-13 NOTE — DISCHARGE INSTRUCTIONS
You were admitted to the Epilepsy Monitoring Unit to clarify the events you were having at home.  We have captured several of these events and they were determined to be non-epileptic.  This is good news because we have evidence now that your brain is functioning appropriately.  You do not need to be on any anti-epileptic medications.    The treatment for these non-epileptic events is typically talk therapy or cognitive behavioral therapy.  Patients tend to get better slowly over time and most have complete resolution of the episodes.    Due to some of the medications you are taking, it would be good to get into Psychiatry clinic as well to help with managing those medications and getting you feeling your best.  The number to schedule with the clinic is [phone], but call the  at [phone] if you have any issues with scheduling.
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
## 2020-02-13 NOTE — TELEPHONE ENCOUNTER
Attempt x 1 to contact patient in regards to scheduling appointment due to hospital f/u for seizure. Unable to leave message. No voicemail setup.

## 2020-02-13 NOTE — PLAN OF CARE
02/13/20 1232   Final Note   Assessment Type Final Discharge Note   Anticipated Discharge Disposition Home   Right Care Referral Info   Post Acute Recommendation No Care   Post-Acute Status   Post-Acute Authorization Other   Other Status No Post-Acute Service Needs   Discharge Delays None known at this time     Patient medically ready for discharge to home with brother.    Future Appointments   Date Time Provider Department Center   3/5/2020  9:00 AM LEYDA CHEN [facility] [place]   5/4/2020  9:45 AM SPECIMEN, LEYDA [facility] [place]   5/4/2020 10:00 AM LABLEYDA [facility] [place]   5/11/2020  4:00 PM MIRACLE Richards PA-C [facility] ENDOCR[facility] Leyda Casas RN  Case Management  Ext: [phone]  02/13/2020  12:33 PM
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
## 2020-02-13 NOTE — TELEPHONE ENCOUNTER
----- Message from Kendrick Koroma sent at 2/13/2020 12:31 PM CST -----  Contact: [phone]/ self   Patient requesting to speak with you regarding getting an appointment scheduled sooner than the next available which isn't until 03/17/2020 for a hospital follow up. Please call.

## 2020-02-13 NOTE — PLAN OF CARE
Problem: Adult Inpatient Plan of Care  Goal: Plan of Care Review  2/12/2020 1833 by Godwin Scott RN  Outcome: Ongoing, Not Progressing    Problem: Diabetes Comorbidity  Goal: Blood Glucose Level Within Desired Range  2/12/2020 1833 by oGdwin Scott RN  Outcome: Ongoing, Not Progressing   POC reviewed with pt. Pt verbalized understanding. VSS. PT had a seizure event that lasted less than a minute. Pt was alert to self. VSS. PT had a partial bath and sat up in bedside chair most of the shift.  Fall/Safety and Seizure measures in placed. All questions and concerns addressed. WCTM.

## 2020-02-17 ENCOUNTER — OFFICE VISIT (OUTPATIENT)
Dept: FAMILY MEDICINE | Facility: CLINIC | Age: 70
End: 2020-02-17
Payer: MEDICARE

## 2020-02-17 ENCOUNTER — TELEPHONE (OUTPATIENT)
Dept: FAMILY MEDICINE | Facility: CLINIC | Age: 70
End: 2020-02-17

## 2020-02-17 VITALS
DIASTOLIC BLOOD PRESSURE: 82 MMHG | WEIGHT: 192.69 LBS | HEIGHT: 61 IN | BODY MASS INDEX: 36.38 KG/M2 | SYSTOLIC BLOOD PRESSURE: 136 MMHG | TEMPERATURE: 98 F | HEART RATE: 94 BPM

## 2020-02-17 DIAGNOSIS — F32.A DEPRESSION, UNSPECIFIED DEPRESSION TYPE: ICD-10-CM

## 2020-02-17 DIAGNOSIS — N39.0 URINARY TRACT INFECTION WITHOUT HEMATURIA, SITE UNSPECIFIED: ICD-10-CM

## 2020-02-17 DIAGNOSIS — R56.9 SEIZURES: Primary | ICD-10-CM

## 2020-02-17 PROCEDURE — 1101F PR PT FALLS ASSESS DOC 0-1 FALLS W/OUT INJ PAST YR: ICD-10-PCS | Mod: CPTII,S$GLB,, | Performed by: NURSE PRACTITIONER

## 2020-02-17 PROCEDURE — 99999 PR PBB SHADOW E&M-EST. PATIENT-LVL IV: ICD-10-PCS | Mod: PBBFAC,,, | Performed by: NURSE PRACTITIONER

## 2020-02-17 PROCEDURE — 99214 OFFICE O/P EST MOD 30 MIN: CPT | Mod: S$GLB,,, | Performed by: NURSE PRACTITIONER

## 2020-02-17 PROCEDURE — 99499 UNLISTED E&M SERVICE: CPT | Mod: S$GLB,,, | Performed by: NURSE PRACTITIONER

## 2020-02-17 PROCEDURE — 1126F AMNT PAIN NOTED NONE PRSNT: CPT | Mod: S$GLB,,, | Performed by: NURSE PRACTITIONER

## 2020-02-17 PROCEDURE — 1126F PR PAIN SEVERITY QUANTIFIED, NO PAIN PRESENT: ICD-10-PCS | Mod: S$GLB,,, | Performed by: NURSE PRACTITIONER

## 2020-02-17 PROCEDURE — 99999 PR PBB SHADOW E&M-EST. PATIENT-LVL IV: CPT | Mod: PBBFAC,,, | Performed by: NURSE PRACTITIONER

## 2020-02-17 PROCEDURE — 1101F PT FALLS ASSESS-DOCD LE1/YR: CPT | Mod: CPTII,S$GLB,, | Performed by: NURSE PRACTITIONER

## 2020-02-17 PROCEDURE — 99499 RISK ADDL DX/OHS AUDIT: ICD-10-PCS | Mod: S$GLB,,, | Performed by: NURSE PRACTITIONER

## 2020-02-17 PROCEDURE — 99214 PR OFFICE/OUTPT VISIT, EST, LEVL IV, 30-39 MIN: ICD-10-PCS | Mod: S$GLB,,, | Performed by: NURSE PRACTITIONER

## 2020-02-17 PROCEDURE — 3079F PR MOST RECENT DIASTOLIC BLOOD PRESSURE 80-89 MM HG: ICD-10-PCS | Mod: CPTII,S$GLB,, | Performed by: NURSE PRACTITIONER

## 2020-02-17 PROCEDURE — 3075F SYST BP GE 130 - 139MM HG: CPT | Mod: CPTII,S$GLB,, | Performed by: NURSE PRACTITIONER

## 2020-02-17 PROCEDURE — 3075F PR MOST RECENT SYSTOLIC BLOOD PRESS GE 130-139MM HG: ICD-10-PCS | Mod: CPTII,S$GLB,, | Performed by: NURSE PRACTITIONER

## 2020-02-17 PROCEDURE — 1159F MED LIST DOCD IN RCRD: CPT | Mod: S$GLB,,, | Performed by: NURSE PRACTITIONER

## 2020-02-17 PROCEDURE — 1159F PR MEDICATION LIST DOCUMENTED IN MEDICAL RECORD: ICD-10-PCS | Mod: S$GLB,,, | Performed by: NURSE PRACTITIONER

## 2020-02-17 PROCEDURE — 3079F DIAST BP 80-89 MM HG: CPT | Mod: CPTII,S$GLB,, | Performed by: NURSE PRACTITIONER

## 2020-02-17 NOTE — TELEPHONE ENCOUNTER
----- Message from Nga Jones LPN sent at 2/13/2020  2:59 PM CST -----      ----- Message -----  From: Brinda Powell MD  Sent: 2/13/2020   2:52 PM CST  To: Elroy PAYTON Staff    Hi Dr. 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  During her admission, the pharmacist here noted that she had been on gemfibrozil and simvastatin and recommended stopping one medication due to risk of rhabdomyolysis.  She had both medications with her so maybe one had been discontinued and she didn't stop it, but wanted to give you a heads up.  We stopped the gemfibrozil just based off of her last lipid panel.    Thanks!  Brinda

## 2020-02-17 NOTE — PROGRESS NOTES
This dictation has been generated using Modal Fluency Dictation some phonetic errors may occur. Please contact author for clarification if needed.     Problem List Items Addressed This Visit     Seizures - Primary    Relevant Orders    Ambulatory referral/consult to Neuropsychology    Depression    Relevant Orders    Ambulatory referral/consult to Neuropsychology      Other Visit Diagnoses     Urinary tract infection without hematuria, site unspecified        Relevant Orders    Urinalysis          Orders Placed This Encounter    Urinalysis    Ambulatory referral/consult to Neuropsychology     Hospital follow-up  Seizures and depression hospital requested neuropsych eval.  Contact information given.  Abnormal urinalysis in the hospital check urinalysis.  Has labs scheduled for thyroid check and March.  Recent TSH noted.  Follow-up with PCP    Follow up in about 23 days (around 3/11/2020) for apt pcp. review thyroid results. cholesterol meds.    ________________________________________________________________  ________________________________________________________________      Chief Complaint   Patient presents with    Hospital Follow Up     History of present illness  This 70 y.o. presents today for complaint of following up from hospital.  Patient has anxiety depression seizures and history of stroke.  Also thyroid disease.  Recent admit for absent like seizures.  EEG unremarkable.    Prior Studies:  EEG : 01/10/19 Routine EEG read as abnormal with L hemispheric/breach artifact with L hemispheric slowing, no epileptiform activity  vEEG/ EMU evaluation: Pending  MRI of brain: Reviewed 01/14/19 MRI Brain w/o contrast personally--notable for encephalomalacia in   AED levels:  10/04/19 Valproate 46.3  CT/CTA Scan:  06/07/18 CT head w/o contrast notable for posterior L parietal lobe encephalomalacia, similar to MRI previously reviewed.    PET Scan: None  Neuropsychological Evaluation: None  DEXA Scan: 04/01/19 DEXA  scan with osteopenia (11% risk of a major osteoporotic fracture, 1.3% risk of hip fracture in next 10 years)  Other studies: None     * No surgery found *      Indwelling Lines/Drains at time of discharge:       Lines/Drains/Airways      None                   Hospital Course:   20-20:  No evidence of epilepsy on EEG.  Had an event during photic stimulation which was nonepileptic in nature.  20-20:  Multiple episodes starting 19:52:22 where patient has head and hand shaking then stares off.  Each episode lasts a few seconds.  One episode ~ 10:27 with head/hand shaking and followed by leaning forward.  No epileptic correlate with these.  Some P3 spikes identified which seem consistent with asymmetric V wave rather than epileptiform discharge.  20:  Seen by Neuropsychologylyssa for discharge today.    Complete review of systems deferred due to short-term memory loss.  Past medical social surgical history reviewed with family.      Past Medical History:   Diagnosis Date    Anxiety     Arthritis     Asthma     Cancer     Left Breast    Cataract     Depression     Diabetes mellitus, type 2     GERD (gastroesophageal reflux disease)     Hyperlipidemia     Hypertension     Overactive bladder     Seizures     Pseudo-seizures    Stroke     Thyroid disease     Urinary tract infection without hematuria 8/3/2017       Past Surgical History:   Procedure Laterality Date    APPENDECTOMY      BREAST BIOPSY      BREAST LUMPECTOMY Left     2016    BREAST SURGERY      CATARACT EXTRACTION       SECTION      CHOLECYSTECTOMY      DILATION AND CURETTAGE OF UTERUS      EYE SURGERY         Family History   Problem Relation Age of Onset    Diabetes Mother     Hypertension Mother     Breast cancer Mother     Glaucoma Neg Hx        Social History     Socioeconomic History    Marital status: Single     Spouse name: Not on file    Number of children: Not on file    Years of  education: Not on file    Highest education level: Not on file   Occupational History    Not on file   Social Needs    Financial resource strain: Not on file    Food insecurity:     Worry: Not on file     Inability: Not on file    Transportation needs:     Medical: Not on file     Non-medical: Not on file   Tobacco Use    Smoking status: Never Smoker    Smokeless tobacco: Never Used   Substance and Sexual Activity    Alcohol use: Yes     Comment: seldom    Drug use: No    Sexual activity: Not on file   Lifestyle    Physical activity:     Days per week: Not on file     Minutes per session: Not on file    Stress: Not on file   Relationships    Social connections:     Talks on phone: Not on file     Gets together: Not on file     Attends Restorationist service: Not on file     Active member of club or organization: Not on file     Attends meetings of clubs or organizations: Not on file     Relationship status: Not on file   Other Topics Concern    Not on file   Social History Narrative    Not on file       Current Outpatient Medications   Medication Sig Dispense Refill    aspirin (ECOTRIN) 81 MG EC tablet Take 81 mg by mouth once daily.      CALCIUM CARBONATE/VITAMIN D3 (CALCIUM 500 + D ORAL) Take 1 tablet by mouth once daily. 10 mg daily      clonazePAM (KLONOPIN) 0.5 MG tablet Take 1 tablet (0.5 mg total) by mouth 2 (two) times daily.  3    divalproex ER (DEPAKOTE) 500 MG Tb24 Take 500 mg by mouth every evening.  180 tablet 3    levothyroxine (SYNTHROID) 88 MCG tablet Take 1 tablet (88 mcg total) by mouth before breakfast. 30 tablet 11    losartan (COZAAR) 50 MG tablet TAKE 1 TABLET BY MOUTH EVERY DAY 90 tablet 3    metFORMIN (GLUCOPHAGE-XR) 500 MG 24 hr tablet Take 2 tablets (1,000 mg total) by mouth once daily. 180 tablet 3    potassium chloride SA (K-DUR,KLOR-CON) 20 MEQ tablet Take 20 mEq by mouth once daily.      prazosin (MINIPRESS) 2 MG Cap Take 4 mg by mouth every evening. (2) 2mg capsules  HS      simvastatin (ZOCOR) 40 MG tablet TAKE 1 TABLET BY MOUTH EVERY DAY 90 tablet 3    thiamine (VITAMIN B-1) 100 MG tablet Take 100 mg by mouth once daily.      trifluoperazine (STELAZINE) 10 MG tablet       TRINTELLIX 20 mg Tab Take 20 mg by mouth once daily.       oxybutynin (DITROPAN-XL) 10 MG 24 hr tablet Take 1 tablet (10 mg total) by mouth once daily. 30 tablet 12     No current facility-administered medications for this visit.        Review of patient's allergies indicates:   Allergen Reactions    Penicillins Anaphylaxis    Sulfa (sulfonamide antibiotics) Anaphylaxis       Physical examination  Vitals Reviewed  Gen. Well-dressed well-nourished   Skin warm dry and intact.  No rashes noted.  Neck is supple without adenopathy  Chest.  Respirations are even unlabored.  Lungs are clear to auscultation.  Cardiac regular rate and rhythm.  No chest wall adenopathy noted.  Neuro. Awake alert oriented x4.  Normal judgment and cognition noted.  Extremities no clubbing cyanosis or edema noted.     Call or return to clinic prn if these symptoms worsen or fail to improve as anticipated.

## 2020-02-18 ENCOUNTER — TELEPHONE (OUTPATIENT)
Dept: PSYCHIATRY | Facility: CLINIC | Age: 70
End: 2020-02-18

## 2020-02-18 NOTE — TELEPHONE ENCOUNTER
Patient  called and needs to get patient scheduled with a provider for medication management.  I have scheduled patient with Mary Ann for 04/06/2020.  Please call when available to schedule with dr Pardo.  Referral in the chart

## 2020-03-05 ENCOUNTER — LAB VISIT (OUTPATIENT)
Dept: LAB | Facility: HOSPITAL | Age: 70
End: 2020-03-05
Attending: PHYSICIAN ASSISTANT
Payer: MEDICARE

## 2020-03-05 DIAGNOSIS — E03.9 ACQUIRED HYPOTHYROIDISM: Chronic | ICD-10-CM

## 2020-03-05 LAB
T4 FREE SERPL-MCNC: 0.83 NG/DL (ref 0.71–1.51)
TSH SERPL DL<=0.005 MIU/L-ACNC: 5.67 UIU/ML (ref 0.4–4)

## 2020-03-05 PROCEDURE — 84439 ASSAY OF FREE THYROXINE: CPT

## 2020-03-05 PROCEDURE — 84443 ASSAY THYROID STIM HORMONE: CPT

## 2020-03-05 PROCEDURE — 36415 COLL VENOUS BLD VENIPUNCTURE: CPT | Mod: PO

## 2020-05-04 ENCOUNTER — LAB VISIT (OUTPATIENT)
Dept: LAB | Facility: HOSPITAL | Age: 70
End: 2020-05-04
Attending: PHYSICIAN ASSISTANT
Payer: MEDICARE

## 2020-05-04 DIAGNOSIS — E11.9 TYPE 2 DIABETES MELLITUS WITHOUT COMPLICATION, WITH LONG-TERM CURRENT USE OF INSULIN: ICD-10-CM

## 2020-05-04 DIAGNOSIS — Z79.4 TYPE 2 DIABETES MELLITUS WITHOUT COMPLICATION, WITH LONG-TERM CURRENT USE OF INSULIN: ICD-10-CM

## 2020-05-04 LAB
ALBUMIN SERPL BCP-MCNC: 3.3 G/DL (ref 3.5–5.2)
ANION GAP SERPL CALC-SCNC: 10 MMOL/L (ref 8–16)
BUN SERPL-MCNC: 20 MG/DL (ref 8–23)
CALCIUM SERPL-MCNC: 9.8 MG/DL (ref 8.7–10.5)
CHLORIDE SERPL-SCNC: 104 MMOL/L (ref 95–110)
CO2 SERPL-SCNC: 26 MMOL/L (ref 23–29)
CREAT SERPL-MCNC: 1.1 MG/DL (ref 0.5–1.4)
EST. GFR  (AFRICAN AMERICAN): 58 ML/MIN/1.73 M^2
EST. GFR  (NON AFRICAN AMERICAN): 50.3 ML/MIN/1.73 M^2
ESTIMATED AVG GLUCOSE: 137 MG/DL (ref 68–131)
GLUCOSE SERPL-MCNC: 176 MG/DL (ref 70–110)
HBA1C MFR BLD HPLC: 6.4 % (ref 4–5.6)
PHOSPHATE SERPL-MCNC: 3.2 MG/DL (ref 2.7–4.5)
POTASSIUM SERPL-SCNC: 4.7 MMOL/L (ref 3.5–5.1)
SODIUM SERPL-SCNC: 140 MMOL/L (ref 136–145)
T3 SERPL-MCNC: 68 NG/DL (ref 60–180)
T4 FREE SERPL-MCNC: 1.28 NG/DL (ref 0.71–1.51)
TSH SERPL DL<=0.005 MIU/L-ACNC: 0.12 UIU/ML (ref 0.4–4)

## 2020-05-04 PROCEDURE — 80069 RENAL FUNCTION PANEL: CPT

## 2020-05-04 PROCEDURE — 84443 ASSAY THYROID STIM HORMONE: CPT

## 2020-05-04 PROCEDURE — 36415 COLL VENOUS BLD VENIPUNCTURE: CPT | Mod: PO

## 2020-05-04 PROCEDURE — 84480 ASSAY TRIIODOTHYRONINE (T3): CPT

## 2020-05-04 PROCEDURE — 84439 ASSAY OF FREE THYROXINE: CPT

## 2020-05-04 PROCEDURE — 83036 HEMOGLOBIN GLYCOSYLATED A1C: CPT

## 2020-05-05 ENCOUNTER — TELEPHONE (OUTPATIENT)
Dept: NEUROLOGY | Facility: CLINIC | Age: 70
End: 2020-05-05

## 2020-05-06 ENCOUNTER — PATIENT OUTREACH (OUTPATIENT)
Dept: ADMINISTRATIVE | Facility: OTHER | Age: 70
End: 2020-05-06

## 2020-05-11 ENCOUNTER — OFFICE VISIT (OUTPATIENT)
Dept: ENDOCRINOLOGY | Facility: CLINIC | Age: 70
End: 2020-05-11
Payer: MEDICARE

## 2020-05-11 DIAGNOSIS — E55.9 HYPOVITAMINOSIS D: ICD-10-CM

## 2020-05-11 DIAGNOSIS — Z79.4 TYPE 2 DIABETES MELLITUS WITHOUT COMPLICATION, WITH LONG-TERM CURRENT USE OF INSULIN: Primary | ICD-10-CM

## 2020-05-11 DIAGNOSIS — M85.80 OSTEOPENIA, UNSPECIFIED LOCATION: ICD-10-CM

## 2020-05-11 DIAGNOSIS — E11.9 TYPE 2 DIABETES MELLITUS WITHOUT COMPLICATION, WITH LONG-TERM CURRENT USE OF INSULIN: Primary | ICD-10-CM

## 2020-05-11 DIAGNOSIS — E03.9 ACQUIRED HYPOTHYROIDISM: ICD-10-CM

## 2020-05-11 PROCEDURE — 1159F MED LIST DOCD IN RCRD: CPT | Mod: 95,,, | Performed by: PHYSICIAN ASSISTANT

## 2020-05-11 PROCEDURE — 1101F PT FALLS ASSESS-DOCD LE1/YR: CPT | Mod: CPTII,95,, | Performed by: PHYSICIAN ASSISTANT

## 2020-05-11 PROCEDURE — 3044F HG A1C LEVEL LT 7.0%: CPT | Mod: CPTII,95,, | Performed by: PHYSICIAN ASSISTANT

## 2020-05-11 PROCEDURE — 99442 PR PHYSICIAN TELEPHONE EVALUATION 11-20 MIN: CPT | Mod: 95,,, | Performed by: PHYSICIAN ASSISTANT

## 2020-05-11 PROCEDURE — 99442 PR PHYSICIAN TELEPHONE EVALUATION 11-20 MIN: ICD-10-PCS | Mod: 95,,, | Performed by: PHYSICIAN ASSISTANT

## 2020-05-11 PROCEDURE — 3044F PR MOST RECENT HEMOGLOBIN A1C LEVEL <7.0%: ICD-10-PCS | Mod: CPTII,95,, | Performed by: PHYSICIAN ASSISTANT

## 2020-05-11 PROCEDURE — 1101F PR PT FALLS ASSESS DOC 0-1 FALLS W/OUT INJ PAST YR: ICD-10-PCS | Mod: CPTII,95,, | Performed by: PHYSICIAN ASSISTANT

## 2020-05-11 PROCEDURE — 1159F PR MEDICATION LIST DOCUMENTED IN MEDICAL RECORD: ICD-10-PCS | Mod: 95,,, | Performed by: PHYSICIAN ASSISTANT

## 2020-05-11 RX ORDER — LEVOTHYROXINE SODIUM 88 UG/1
TABLET ORAL
Qty: 30 TABLET | Refills: 11 | Status: SHIPPED | OUTPATIENT
Start: 2020-05-11 | End: 2021-06-15

## 2020-05-11 NOTE — PROGRESS NOTES
CC: DM/Hypothyroidism    Audio Only Telehealth Visit     The patient location is:Home-with brother  The chief complaint leading to consultation is: DM  Visit type: Virtual visit with audio only (telephone)  Total time spent with patient: 20 min     The reason for the audio only service rather than synchronous audio and video virtual visit was related to technical difficulties or patient preference/necessity.     Each patient to whom I provide medical services by telemedicine is:  (1) informed of the relationship between the physician and patient and the respective role of any other health care provider with respect to management of the patient; and (2) notified that they may decline to receive medical services by telemedicine and may withdraw from such care at any time. Patient verbally consented to receive this service via voice-only telephone call.     This service was not originating from a related E/M service provided within the previous 7 days nor will  to an E/M service or procedure within the next 24 hours or my soonest available appointment.  Prevailing standard of care was able to be met in this audio-only visit.       HPI: Maggy Tapia was diagnosed with Type 2 DM about 6 years ago. No hospitalizations for DM. Her mother had DM and thyroid disease. .     CURRENT DIABETIC MEDS: metformin 500 mg in the morning and 500 mg with dinner    BG readings are checked 2x daily.  Fastin-200    Hypoglycemia: She had hypoglycemia twice since last visit. This is rare.  Type of Glucose Meter: True metrix    Physical Activity: She does exercises every day at home that she learned in PT.    Diet:  BF-rasian brain  LH-sandwich  DN-noodles and meat    Last Eye Exam: -vision 20/20  Last Podiatry Exam: 3/19    Last DM Education Attended:     No ETOH/tobacco use.    Hypothyroidism  Taking 88 mcg daily.  Dx in 2016  + diarrhea, wt gain    No palpitations, hair loss or changes in nails, heat/cold  intolerance.  She has tremors.    DEXA 11/16 Osteopenia in the left hip. Taking ca +vd. She had a fall last month without injury.    JUAN  Wears CPAP    REVIEW OF SYSTEMS  General: no weakness or fatigue, wt loss.   Eyes: no intermittent blurry vision or visual disturbances.   Cardiac: no chest pain or palpitations.   Respiratory: no cough or dyspnea.   GI: no abdominal pain or nausea.   Skin: no rashes or itching.   Musc: + back pain, neck pain  Neuro: no numbness or tingling.   Endocrine: + polyuria, no polydipsia, polyphagia.   Remainder ROS negative    Personally reviewed labs below:  Hemoglobin A1C   Date Value Ref Range Status   05/04/2020 6.4 (H) 4.0 - 5.6 % Final     Comment:     ADA Screening Guidelines:  5.7-6.4%  Consistent with prediabetes  >or=6.5%  Consistent with diabetes  High levels of fetal hemoglobin interfere with the HbA1C  assay. Heterozygous hemoglobin variants (HbS, HgC, etc)do  not significantly interfere with this assay.   However, presence of multiple variants may affect accuracy.     01/06/2020 6.1 (H) 4.0 - 5.6 % Final     Comment:     ADA Screening Guidelines:  5.7-6.4%  Consistent with prediabetes  >or=6.5%  Consistent with diabetes  High levels of fetal hemoglobin interfere with the HbA1C  assay. Heterozygous hemoglobin variants (HbS, HgC, etc)do  not significantly interfere with this assay.   However, presence of multiple variants may affect accuracy.     09/25/2019 5.8 (H) 4.0 - 5.6 % Final     Comment:     ADA Screening Guidelines:  5.7-6.4%  Consistent with prediabetes  >or=6.5%  Consistent with diabetes  High levels of fetal hemoglobin interfere with the HbA1C  assay. Heterozygous hemoglobin variants (HbS, HgC, etc)do  not significantly interfere with this assay.   However, presence of multiple variants may affect accuracy.         Chemistry        Component Value Date/Time     05/04/2020 0920    K 4.7 05/04/2020 0920     05/04/2020 0920    CO2 26 05/04/2020 0920     BUN 20 05/04/2020 0920    CREATININE 1.1 05/04/2020 0920     (H) 05/04/2020 0920        Component Value Date/Time    CALCIUM 9.8 05/04/2020 0920    ALKPHOS 94 02/11/2020 1141    AST 14 02/11/2020 1141    ALT 9 (L) 02/11/2020 1141    BILITOT 0.3 02/11/2020 1141    ESTGFRAFRICA 58.0 (A) 05/04/2020 0920    EGFRNONAA 50.3 (A) 05/04/2020 0920        Lab Results   Component Value Date    CHOL 149 07/10/2019    CHOL 127 04/01/2019    CHOL 178 06/28/2018     Lab Results   Component Value Date    HDL 50 07/10/2019    HDL 45 04/01/2019    HDL 43 06/28/2018     Lab Results   Component Value Date    LDLCALC 77.2 07/10/2019    LDLCALC 58.2 (L) 04/01/2019    LDLCALC 104.2 06/28/2018     Lab Results   Component Value Date    TRIG 109 07/10/2019    TRIG 119 04/01/2019    TRIG 154 (H) 06/28/2018     Lab Results   Component Value Date    CHOLHDL 33.6 07/10/2019    CHOLHDL 35.4 04/01/2019    CHOLHDL 24.2 06/28/2018     Lab Results   Component Value Date    TSH 0.118 (L) 05/04/2020     Lab Results   Component Value Date    MICALBCREAT 6.7 05/04/2020       Vit D, 25-Hydroxy   Date Value Ref Range Status   07/10/2019 48 30 - 96 ng/mL Final     Comment:     Vitamin D deficiency.........<10 ng/mL                              Vitamin D insufficiency......10-29 ng/mL       Vitamin D sufficiency........> or equal to 30 ng/mL  Vitamin D toxicity............>100 ng/mL       Previous exam 1/20  PHYSICAL EXAMINATION  Constitutional: elderly female, appears well, no distress  Neck: Supple, trachea midline.   Respiratory: even and unlabored, CTA without wheezes.  Cardiovascular: RRR; no carotid bruits or murmurs.   Lymph: DP pulses  2+ bilaterally; 1+ edema bl.   Skin: warm and dry; no acanthosis nigracans observed.  Abdomen: soft, non-distended. Obese abdomen.  Musc: ambulates with a walker  Neuro: patient alert and cooperative; CN 2-12 grossly intact  Feet: appropriate footwear.    Assessment/Plan    1. Type 2 diabetes mellitus without  complication, with long-term current use of insulin     2. Acquired hypothyroidism     3. Osteopenia, unspecified location       T2DM- A1c is below goal. Continue current regimen. BG checks 1x daily.   Hypothyroidism-TFTs suppressed. Decrease dose to 88 mcg for six days of the week. Repeat TFTs in 6 wks.  Osteopenia-repeat DEXA 4/21. Continue exercises in PT.    DTS-iocsul-[id number] cpap    F/u in 3 mths

## 2020-05-14 ENCOUNTER — PATIENT OUTREACH (OUTPATIENT)
Dept: ADMINISTRATIVE | Facility: HOSPITAL | Age: 70
End: 2020-05-14

## 2020-05-14 NOTE — LETTER
"May 14, 2020    Maggy Tapia  [address] [place] [address]             [facility]  [address]  [facility] [address]  Phone: [phone] [facility] is committed to your overall health.  To help you get the most out of each of your visits, we will review your information to make sure you are up to date on all of your recommended tests and/or procedures.      Dr. Yanna Abbasi MD has found that your chart shows you may be due for a:    COLORECTAL CANCER SCREENING    Our records show you are due for colon cancer screening.  If you have already had your screening, or you have made an appointment for your screening, congratulations!  You're on the road to good health. If you haven't signed up for a colorectal screening please accept this invitation to be screened.      According to the American Cancer Society, colon cancer is the third most common cancer for people in the [place].  A simple screening test "Fit Kit" - done in your own home - can help find colon cancer at an early stage when it can be treated, even before any signs or symptoms develop. THIS IS A YEARLY TEST.    Testing for blood in your stool (feces or bowel movement) is the first step. If you have an upcoming visit with your Primary Care Physician you can  a Fit Kit during your visit or you can  a Fit Kit at your Primary Care Clinic prior to your visit.    The Fit Kit includes:    · Instructions on how to perform the test  · (1) Sheet of tissue paper  · (1) Small Absorption pad  · (1) Bottle to hold the sample and a small probe to help you take the sample  · (1) Small plastic bio-hazard bag  · (1) Postage-paid return envelope    Please do your test (the instructions will tell you how) and then return your sample in the postage-paid return envelope within 24 hours of collection.     If your test results are negative, you won't need testing again for another year.  If results show you need more testing, we " "will call you with next steps.    Regular colorectal cancer screening is one of the most powerful weapons for preventing colon cancer.  The website https://www.cancer.org/cancer/colon-rectal-cancer.html can answer many of your questions about this cancer and its prevention.  Just search for "colorectal cancer".         If you have had any of the above done at another facility, please bring the records or information with you so that your record at [facility] will be complete.  If you would like to schedule any of these, please contact the clinic at [phone].    If you are currently taking medication, please bring it with you to your appointment for review.    Also, if you have any type of Advanced Directives, please bring them with you to your office visit so we may scan them into your chart.    Thank You,    Your [facility] Team,  MD Galilea Mejia LPN Clinical Care Coordinator  [facility] [address] [place] [address]  Phone [phone]  Fax [phone]       "

## 2020-05-14 NOTE — PROGRESS NOTES
Chart review completed 05/14/2020.  Care Everywhere updates requested and reviewed.  Immunizations reconciled. Media reviewed.     DIS, Lab jorge a, and quest reviewed    LETTER MAILED

## 2020-05-15 ENCOUNTER — TELEPHONE (OUTPATIENT)
Dept: NEUROLOGY | Facility: CLINIC | Age: 70
End: 2020-05-15

## 2020-05-15 ENCOUNTER — OFFICE VISIT (OUTPATIENT)
Dept: NEUROLOGY | Facility: CLINIC | Age: 70
End: 2020-05-15
Payer: COMMERCIAL

## 2020-05-15 DIAGNOSIS — F32.A DEPRESSION, UNSPECIFIED DEPRESSION TYPE: ICD-10-CM

## 2020-05-15 DIAGNOSIS — R56.9 SEIZURES: ICD-10-CM

## 2020-05-15 PROCEDURE — 99499 NO LOS: ICD-10-PCS | Mod: 95,,, | Performed by: PSYCHIATRY & NEUROLOGY

## 2020-05-15 PROCEDURE — 99499 UNLISTED E&M SERVICE: CPT | Mod: 95,,, | Performed by: PSYCHIATRY & NEUROLOGY

## 2020-05-18 ENCOUNTER — PATIENT OUTREACH (OUTPATIENT)
Dept: ADMINISTRATIVE | Facility: HOSPITAL | Age: 70
End: 2020-05-18

## 2020-05-28 ENCOUNTER — OFFICE VISIT (OUTPATIENT)
Dept: FAMILY MEDICINE | Facility: CLINIC | Age: 70
End: 2020-05-28
Payer: MEDICARE

## 2020-05-28 ENCOUNTER — TELEPHONE (OUTPATIENT)
Dept: PODIATRY | Facility: CLINIC | Age: 70
End: 2020-05-28

## 2020-05-28 VITALS
HEIGHT: 61 IN | HEART RATE: 90 BPM | DIASTOLIC BLOOD PRESSURE: 88 MMHG | TEMPERATURE: 97 F | RESPIRATION RATE: 16 BRPM | OXYGEN SATURATION: 95 % | BODY MASS INDEX: 37.55 KG/M2 | WEIGHT: 198.88 LBS | SYSTOLIC BLOOD PRESSURE: 137 MMHG

## 2020-05-28 DIAGNOSIS — Z12.11 COLON CANCER SCREENING: ICD-10-CM

## 2020-05-28 DIAGNOSIS — Z12.11 ENCOUNTER FOR FIT (FECAL IMMUNOCHEMICAL TEST) SCREENING: ICD-10-CM

## 2020-05-28 DIAGNOSIS — I10 ESSENTIAL HYPERTENSION: ICD-10-CM

## 2020-05-28 DIAGNOSIS — Z12.31 ENCOUNTER FOR SCREENING MAMMOGRAM FOR MALIGNANT NEOPLASM OF BREAST: ICD-10-CM

## 2020-05-28 DIAGNOSIS — Z79.4 TYPE 2 DIABETES MELLITUS WITHOUT COMPLICATION, WITH LONG-TERM CURRENT USE OF INSULIN: ICD-10-CM

## 2020-05-28 DIAGNOSIS — E11.9 TYPE 2 DIABETES MELLITUS WITHOUT COMPLICATION, WITH LONG-TERM CURRENT USE OF INSULIN: ICD-10-CM

## 2020-05-28 DIAGNOSIS — E78.5 HYPERLIPIDEMIA, UNSPECIFIED HYPERLIPIDEMIA TYPE: ICD-10-CM

## 2020-05-28 DIAGNOSIS — E03.9 ACQUIRED HYPOTHYROIDISM: Primary | Chronic | ICD-10-CM

## 2020-05-28 DIAGNOSIS — F44.5 PSEUDOSEIZURES: Chronic | ICD-10-CM

## 2020-05-28 DIAGNOSIS — E66.01 SEVERE OBESITY (BMI 35.0-35.9 WITH COMORBIDITY): ICD-10-CM

## 2020-05-28 DIAGNOSIS — R29.6 FREQUENT FALLS: ICD-10-CM

## 2020-05-28 DIAGNOSIS — Z86.73 HISTORY OF ISCHEMIC LEFT MCA STROKE: ICD-10-CM

## 2020-05-28 DIAGNOSIS — Z85.3 HISTORY OF BREAST CANCER: ICD-10-CM

## 2020-05-28 DIAGNOSIS — E11.9 ENCOUNTER FOR DIABETIC FOOT EXAM: ICD-10-CM

## 2020-05-28 DIAGNOSIS — F32.A DEPRESSION, UNSPECIFIED DEPRESSION TYPE: ICD-10-CM

## 2020-05-28 PROCEDURE — 99999 PR PBB SHADOW E&M-EST. PATIENT-LVL V: ICD-10-PCS | Mod: PBBFAC,,, | Performed by: FAMILY MEDICINE

## 2020-05-28 PROCEDURE — 1101F PT FALLS ASSESS-DOCD LE1/YR: CPT | Mod: CPTII,S$GLB,, | Performed by: FAMILY MEDICINE

## 2020-05-28 PROCEDURE — 1126F PR PAIN SEVERITY QUANTIFIED, NO PAIN PRESENT: ICD-10-PCS | Mod: S$GLB,,, | Performed by: FAMILY MEDICINE

## 2020-05-28 PROCEDURE — 99999 PR PBB SHADOW E&M-EST. PATIENT-LVL V: CPT | Mod: PBBFAC,,, | Performed by: FAMILY MEDICINE

## 2020-05-28 PROCEDURE — 1159F MED LIST DOCD IN RCRD: CPT | Mod: S$GLB,,, | Performed by: FAMILY MEDICINE

## 2020-05-28 PROCEDURE — 1126F AMNT PAIN NOTED NONE PRSNT: CPT | Mod: S$GLB,,, | Performed by: FAMILY MEDICINE

## 2020-05-28 PROCEDURE — 99214 OFFICE O/P EST MOD 30 MIN: CPT | Mod: S$GLB,,, | Performed by: FAMILY MEDICINE

## 2020-05-28 PROCEDURE — 3079F DIAST BP 80-89 MM HG: CPT | Mod: CPTII,S$GLB,, | Performed by: FAMILY MEDICINE

## 2020-05-28 PROCEDURE — 3044F HG A1C LEVEL LT 7.0%: CPT | Mod: CPTII,S$GLB,, | Performed by: FAMILY MEDICINE

## 2020-05-28 PROCEDURE — 3079F PR MOST RECENT DIASTOLIC BLOOD PRESSURE 80-89 MM HG: ICD-10-PCS | Mod: CPTII,S$GLB,, | Performed by: FAMILY MEDICINE

## 2020-05-28 PROCEDURE — 1101F PR PT FALLS ASSESS DOC 0-1 FALLS W/OUT INJ PAST YR: ICD-10-PCS | Mod: CPTII,S$GLB,, | Performed by: FAMILY MEDICINE

## 2020-05-28 PROCEDURE — 3075F PR MOST RECENT SYSTOLIC BLOOD PRESS GE 130-139MM HG: ICD-10-PCS | Mod: CPTII,S$GLB,, | Performed by: FAMILY MEDICINE

## 2020-05-28 PROCEDURE — 3075F SYST BP GE 130 - 139MM HG: CPT | Mod: CPTII,S$GLB,, | Performed by: FAMILY MEDICINE

## 2020-05-28 PROCEDURE — 99214 PR OFFICE/OUTPT VISIT, EST, LEVL IV, 30-39 MIN: ICD-10-PCS | Mod: S$GLB,,, | Performed by: FAMILY MEDICINE

## 2020-05-28 PROCEDURE — 1159F PR MEDICATION LIST DOCUMENTED IN MEDICAL RECORD: ICD-10-PCS | Mod: S$GLB,,, | Performed by: FAMILY MEDICINE

## 2020-05-28 PROCEDURE — 3044F PR MOST RECENT HEMOGLOBIN A1C LEVEL <7.0%: ICD-10-PCS | Mod: CPTII,S$GLB,, | Performed by: FAMILY MEDICINE

## 2020-05-28 RX ORDER — OXYBUTYNIN CHLORIDE 10 MG/1
10 TABLET, EXTENDED RELEASE ORAL DAILY
COMMUNITY
End: 2020-11-27 | Stop reason: SDUPTHER
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
## 2020-05-28 NOTE — TELEPHONE ENCOUNTER
----- Message from Germania Jones LPN sent at 5/28/2020  3:28 PM CDT -----  Please contact patient to schedule. Referral placed by Dr. Abbasi.

## 2020-05-29 ENCOUNTER — HOSPITAL ENCOUNTER (OUTPATIENT)
Dept: RADIOLOGY | Facility: CLINIC | Age: 70
Discharge: HOME | End: 2020-05-29
Attending: FAMILY MEDICINE
Payer: MEDICARE

## 2020-05-29 DIAGNOSIS — Z12.31 ENCOUNTER FOR SCREENING MAMMOGRAM FOR MALIGNANT NEOPLASM OF BREAST: ICD-10-CM

## 2020-05-29 PROCEDURE — 77067 MAMMO DIGITAL SCREENING BILAT WITH TOMOSYNTHESIS_CAD: ICD-10-PCS | Mod: 26,,, | Performed by: RADIOLOGY

## 2020-05-29 PROCEDURE — 77067 SCR MAMMO BI INCL CAD: CPT | Mod: TC,PO

## 2020-05-29 PROCEDURE — 77063 MAMMO DIGITAL SCREENING BILAT WITH TOMOSYNTHESIS_CAD: ICD-10-PCS | Mod: 26,,, | Performed by: RADIOLOGY

## 2020-05-29 PROCEDURE — 77063 BREAST TOMOSYNTHESIS BI: CPT | Mod: 26,,, | Performed by: RADIOLOGY

## 2020-05-29 PROCEDURE — 77067 SCR MAMMO BI INCL CAD: CPT | Mod: 26,,, | Performed by: RADIOLOGY

## 2020-06-11 ENCOUNTER — TELEPHONE (OUTPATIENT)
Dept: GASTROENTEROLOGY | Facility: CLINIC | Age: 70
End: 2020-06-11

## 2020-06-11 NOTE — TELEPHONE ENCOUNTER
----- Message from Cheli Coon sent at 6/11/2020 10:59 AM CDT -----  Contact: self   Patient want to speak with a nurse regarding scheduling colonoscopy please call back at [phone] (home)     Case number [id number]

## 2020-06-12 ENCOUNTER — TELEPHONE (OUTPATIENT)
Dept: GASTROENTEROLOGY | Facility: CLINIC | Age: 70
End: 2020-06-12

## 2020-06-17 ENCOUNTER — PATIENT OUTREACH (OUTPATIENT)
Dept: ADMINISTRATIVE | Facility: OTHER | Age: 70
End: 2020-06-17

## 2020-06-18 ENCOUNTER — OFFICE VISIT (OUTPATIENT)
Dept: GASTROENTEROLOGY | Facility: CLINIC | Age: 70
End: 2020-06-18
Payer: MEDICARE

## 2020-06-18 VITALS — HEIGHT: 61 IN | WEIGHT: 196.19 LBS | BODY MASS INDEX: 37.04 KG/M2

## 2020-06-18 DIAGNOSIS — G47.33 OSA (OBSTRUCTIVE SLEEP APNEA): Primary | ICD-10-CM

## 2020-06-18 DIAGNOSIS — K62.5 RECTAL BLEEDING: ICD-10-CM

## 2020-06-18 DIAGNOSIS — Z86.010 HISTORY OF COLON POLYPS: ICD-10-CM

## 2020-06-18 DIAGNOSIS — Z80.0 FAMILY HISTORY OF COLON CANCER: ICD-10-CM

## 2020-06-18 DIAGNOSIS — E66.01 SEVERE OBESITY (BMI 35.0-35.9 WITH COMORBIDITY): ICD-10-CM

## 2020-06-18 DIAGNOSIS — Z79.4 TYPE 2 DIABETES MELLITUS WITHOUT COMPLICATION, WITH LONG-TERM CURRENT USE OF INSULIN: ICD-10-CM

## 2020-06-18 DIAGNOSIS — E11.9 TYPE 2 DIABETES MELLITUS WITHOUT COMPLICATION, WITH LONG-TERM CURRENT USE OF INSULIN: ICD-10-CM

## 2020-06-18 DIAGNOSIS — K59.00 CONSTIPATION, UNSPECIFIED CONSTIPATION TYPE: ICD-10-CM

## 2020-06-18 PROCEDURE — 1159F MED LIST DOCD IN RCRD: CPT | Mod: S$GLB,,, | Performed by: INTERNAL MEDICINE

## 2020-06-18 PROCEDURE — 99999 PR PBB SHADOW E&M-EST. PATIENT-LVL II: ICD-10-PCS | Mod: PBBFAC,,, | Performed by: INTERNAL MEDICINE

## 2020-06-18 PROCEDURE — 3044F HG A1C LEVEL LT 7.0%: CPT | Mod: CPTII,S$GLB,, | Performed by: INTERNAL MEDICINE

## 2020-06-18 PROCEDURE — 1126F PR PAIN SEVERITY QUANTIFIED, NO PAIN PRESENT: ICD-10-PCS | Mod: S$GLB,,, | Performed by: INTERNAL MEDICINE

## 2020-06-18 PROCEDURE — 99499 UNLISTED E&M SERVICE: CPT | Mod: S$GLB,,, | Performed by: INTERNAL MEDICINE

## 2020-06-18 PROCEDURE — 1126F AMNT PAIN NOTED NONE PRSNT: CPT | Mod: S$GLB,,, | Performed by: INTERNAL MEDICINE

## 2020-06-18 PROCEDURE — 99204 OFFICE O/P NEW MOD 45 MIN: CPT | Mod: S$GLB,,, | Performed by: INTERNAL MEDICINE

## 2020-06-18 PROCEDURE — 99999 PR PBB SHADOW E&M-EST. PATIENT-LVL II: CPT | Mod: PBBFAC,,, | Performed by: INTERNAL MEDICINE

## 2020-06-18 PROCEDURE — 3044F PR MOST RECENT HEMOGLOBIN A1C LEVEL <7.0%: ICD-10-PCS | Mod: CPTII,S$GLB,, | Performed by: INTERNAL MEDICINE

## 2020-06-18 PROCEDURE — 99499 RISK ADDL DX/OHS AUDIT: ICD-10-PCS | Mod: S$GLB,,, | Performed by: INTERNAL MEDICINE

## 2020-06-18 PROCEDURE — 1159F PR MEDICATION LIST DOCUMENTED IN MEDICAL RECORD: ICD-10-PCS | Mod: S$GLB,,, | Performed by: INTERNAL MEDICINE

## 2020-06-18 PROCEDURE — 1101F PT FALLS ASSESS-DOCD LE1/YR: CPT | Mod: CPTII,S$GLB,, | Performed by: INTERNAL MEDICINE

## 2020-06-18 PROCEDURE — 99204 PR OFFICE/OUTPT VISIT, NEW, LEVL IV, 45-59 MIN: ICD-10-PCS | Mod: S$GLB,,, | Performed by: INTERNAL MEDICINE

## 2020-06-18 PROCEDURE — 1101F PR PT FALLS ASSESS DOC 0-1 FALLS W/OUT INJ PAST YR: ICD-10-PCS | Mod: CPTII,S$GLB,, | Performed by: INTERNAL MEDICINE

## 2020-06-18 NOTE — LETTER
June 18, 2020      Yanna Abbasi MD  [address]  [place] [address]           [facility]  [address], [address]  [place] [address]  Phone: [phone]          Patient: Maggy Tapia   MR Number: [id number]   YOB: 1950   Date of Visit: 6/18/2020       Dear Dr. Yanna Abbasi:    Thank you for referring Maggy Tapia to me for evaluation. Attached you will find relevant portions of my assessment and plan of care.    If you have questions, please do not hesitate to call me. I look forward to following Maggy Tapia along with you.    Sincerely,    Mj Fernandez MD    Enclosure  CC:  No Recipients    If you would like to receive this communication electronically, please contact [email or web address] or [phone] to request more information on [employer] Link access.    For providers and/or their staff who would like to refer a patient to [facility], please contact us through our one-stop-shop provider referral line, [facility], at [phone].    If you feel you have received this communication in error or would no longer like to receive these types of communications, please e-mail [email or web address]

## 2020-06-18 NOTE — H&P (VIEW-ONLY)
"Subjective:       Patient ID: Maggy Tapia is a 70 y.o. female.    This patient is new to me.  Referring provider:  Dr. Abbasi for rectal bleeding.      Chief Complaint: Rectal Bleeding    Patient seen for rectal bleeding, onset recent, small amount, bright red in color, with associated signs/symptoms including constipation with straining as well as intermittent loose stools on intervening days which suggests baseline constipation with overflow diarrhea, and alleviating/exacerbating factors including none.  Her brother states that their father had colon cancer.  Patient states that her last colonoscopy was greater than 10 years ago and she had polyps in the past.  She denies weight loss or UGI symptoms.  She is not on a high fiber diet.  Per Dr. Abbasi she is also being followed for DM2.      Review of Systems   Constitutional: Negative for chills, fatigue and fever.   HENT: Negative for sore throat and trouble swallowing.    Respiratory: Negative for cough, shortness of breath and wheezing.    Cardiovascular: Negative for chest pain and palpitations.   Gastrointestinal: Positive for blood in stool. Negative for abdominal pain, nausea and vomiting.   Genitourinary: Negative for dysuria and hematuria.   Musculoskeletal: Negative for arthralgias and myalgias.   Integumentary:  Negative for color change and rash.   Neurological: Negative for dizziness and headaches.   Hematological: Negative for adenopathy.   Psychiatric/Behavioral: Negative for confusion. The patient is not nervous/anxious.    All other systems reviewed and are negative.        Objective:       Vitals:    06/18/20 1419   Weight: 89 kg (196 lb 3.4 oz)   Height: 5' 1" (1.549 m)       Physical Exam  Vitals signs reviewed.   Constitutional:       Appearance: She is well-developed. She is obese.      Comments: Ambulates with walker     HENT:      Head: Normocephalic and atraumatic.   Eyes:      General: No scleral icterus.     Pupils: Pupils are " equal, round, and reactive to light.   Neck:      Musculoskeletal: Normal range of motion.   Cardiovascular:      Rate and Rhythm: Normal rate and regular rhythm.      Heart sounds: No murmur.   Pulmonary:      Effort: Pulmonary effort is normal.      Breath sounds: Normal breath sounds. No wheezing.   Abdominal:      General: Bowel sounds are normal. There is no distension.      Palpations: Abdomen is soft.      Tenderness: There is no abdominal tenderness.   Musculoskeletal:         General: No tenderness.   Lymphadenopathy:      Cervical: No cervical adenopathy.   Skin:     General: Skin is warm and dry.      Findings: No rash.   Neurological:      Mental Status: She is alert.           Lab Results   Component Value Date    WBC 9.26 02/11/2020    HGB 13.7 02/11/2020    HCT 44.2 02/11/2020    MCV 98 02/11/2020     02/11/2020         CMP  Sodium   Date Value Ref Range Status   05/04/2020 140 136 - 145 mmol/L Final     Potassium   Date Value Ref Range Status   05/04/2020 4.7 3.5 - 5.1 mmol/L Final     Chloride   Date Value Ref Range Status   05/04/2020 104 95 - 110 mmol/L Final     CO2   Date Value Ref Range Status   05/04/2020 26 23 - 29 mmol/L Final     Glucose   Date Value Ref Range Status   05/04/2020 176 (H) 70 - 110 mg/dL Final     BUN, Bld   Date Value Ref Range Status   05/04/2020 20 8 - 23 mg/dL Final     Creatinine   Date Value Ref Range Status   05/04/2020 1.1 0.5 - 1.4 mg/dL Final     Calcium   Date Value Ref Range Status   05/04/2020 9.8 8.7 - 10.5 mg/dL Final     Total Protein   Date Value Ref Range Status   02/11/2020 7.4 6.0 - 8.4 g/dL Final     Albumin   Date Value Ref Range Status   05/04/2020 3.3 (L) 3.5 - 5.2 g/dL Final     Total Bilirubin   Date Value Ref Range Status   02/11/2020 0.3 0.1 - 1.0 mg/dL Final     Comment:     For infants and newborns, interpretation of results should be based  on gestational age, weight and in agreement with clinical  observations.  Premature Infant  recommended reference ranges:  Up to 24 hours.............<8.0 mg/dL  Up to 48 hours............<12.0 mg/dL  3-5 days..................<15.0 mg/dL  6-29 days.................<15.0 mg/dL       Alkaline Phosphatase   Date Value Ref Range Status   02/11/2020 94 55 - 135 U/L Final     AST   Date Value Ref Range Status   02/11/2020 14 10 - 40 U/L Final     ALT   Date Value Ref Range Status   02/11/2020 9 (L) 10 - 44 U/L Final     Anion Gap   Date Value Ref Range Status   05/04/2020 10 8 - 16 mmol/L Final     eGFR if    Date Value Ref Range Status   05/04/2020 58.0 (A) >60 mL/min/1.73 m^2 Final     eGFR if non    Date Value Ref Range Status   05/04/2020 50.3 (A) >60 mL/min/1.73 m^2 Final     Comment:     Calculation used to obtain the estimated glomerular filtration  rate (eGFR) is the CKD-EPI equation.          CT scan from 02/2019 was independently visualized and reviewed by me and showed no acute process.    Further history was obtained from the patient's brother who was present throughout the interview and states that she has had chronic problems with straining to have a BM.  History is otherwise as above in the HPI.     Old records from Dr. Abbasi reviewed and are as summarized above in the HPI.    Assessment:       1. JUAN (obstructive sleep apnea)    2. Type 2 diabetes mellitus without complication, with long-term current use of insulin    3. Severe obesity (BMI 35.0-35.9 with comorbidity)    4. Rectal bleeding    5. Family history of colon cancer    6. Constipation, unspecified constipation type    7. History of colon polyps        Plan:       1.  Recommend daily exercise, adequate water intake, and high fiber diet.  Recommend daily miralax (17g PO once or twice daily) with intermittently dosed dulcolax (every 2-3 days)  to facilitate bowel movements.  If no relief with this, consider adding emollient laxative (castor oil or mineral oil) +/- enema.  2.  Schedule colonoscopy to  further evaluate.  3.  Further recommendations to follow after above.  4.  Communication will be sent to the referring provider, Dr. Abbasi regarding my assessment and plan on this patient via EPIC.

## 2020-06-21 ENCOUNTER — LAB VISIT (OUTPATIENT)
Dept: PRIMARY CARE CLINIC | Facility: CLINIC | Age: 70
End: 2020-06-21
Payer: MEDICARE

## 2020-06-21 PROCEDURE — U0003 INFECTIOUS AGENT DETECTION BY NUCLEIC ACID (DNA OR RNA); SEVERE ACUTE RESPIRATORY SYNDROME CORONAVIRUS 2 (SARS-COV-2) (CORONAVIRUS DISEASE [COVID-19]), AMPLIFIED PROBE TECHNIQUE, MAKING USE OF HIGH THROUGHPUT TECHNOLOGIES AS DESCRIBED BY CMS-2020-01-R: HCPCS

## 2020-06-23 LAB — SARS-COV-2 RNA RESP QL NAA+PROBE: NOT DETECTED

## 2020-06-24 ENCOUNTER — ANESTHESIA (OUTPATIENT)
Dept: ENDOSCOPY | Facility: HOSPITAL | Age: 70
End: 2020-06-24
Payer: MEDICARE

## 2020-06-24 ENCOUNTER — ANESTHESIA EVENT (OUTPATIENT)
Dept: ENDOSCOPY | Facility: HOSPITAL | Age: 70
End: 2020-06-24
Payer: MEDICARE

## 2020-06-24 ENCOUNTER — HOSPITAL ENCOUNTER (OUTPATIENT)
Facility: HOSPITAL | Age: 70
Discharge: HOME | End: 2020-06-24
Attending: INTERNAL MEDICINE | Admitting: INTERNAL MEDICINE
Payer: MEDICARE

## 2020-06-24 DIAGNOSIS — K62.5 RECTAL BLEEDING: ICD-10-CM

## 2020-06-24 DIAGNOSIS — K63.5 POLYP OF COLON, UNSPECIFIED PART OF COLON, UNSPECIFIED TYPE: Primary | ICD-10-CM

## 2020-06-24 DIAGNOSIS — K64.8 INTERNAL HEMORRHOIDS: ICD-10-CM

## 2020-06-24 PROCEDURE — 45385 COLONOSCOPY W/LESION REMOVAL: CPT | Mod: ,,, | Performed by: INTERNAL MEDICINE

## 2020-06-24 PROCEDURE — 45381 PR COLONOSCPY,FLEX,W/DIR SUBMUC INJECT: ICD-10-PCS | Mod: 51,,, | Performed by: INTERNAL MEDICINE

## 2020-06-24 PROCEDURE — D9220A PRA ANESTHESIA: ICD-10-PCS | Mod: CRNA,,, | Performed by: NURSE ANESTHETIST, CERTIFIED REGISTERED

## 2020-06-24 PROCEDURE — 25000003 PHARM REV CODE 250: Performed by: INTERNAL MEDICINE

## 2020-06-24 PROCEDURE — 45385 PR COLONOSCOPY,REMV LESN,SNARE: ICD-10-PCS | Mod: ,,, | Performed by: INTERNAL MEDICINE

## 2020-06-24 PROCEDURE — 37000008 HC ANESTHESIA 1ST 15 MINUTES: Performed by: INTERNAL MEDICINE

## 2020-06-24 PROCEDURE — 88305 TISSUE EXAM BY PATHOLOGIST: CPT | Mod: 26,,, | Performed by: PATHOLOGY

## 2020-06-24 PROCEDURE — D9220A PRA ANESTHESIA: ICD-10-PCS | Mod: ANES,,, | Performed by: ANESTHESIOLOGY

## 2020-06-24 PROCEDURE — 37000009 HC ANESTHESIA EA ADD 15 MINS: Performed by: INTERNAL MEDICINE

## 2020-06-24 PROCEDURE — 45385 COLONOSCOPY W/LESION REMOVAL: CPT | Performed by: INTERNAL MEDICINE

## 2020-06-24 PROCEDURE — 88305 TISSUE EXAM BY PATHOLOGIST: ICD-10-PCS | Mod: 26,,, | Performed by: PATHOLOGY

## 2020-06-24 PROCEDURE — 88305 TISSUE EXAM BY PATHOLOGIST: CPT | Mod: 59 | Performed by: PATHOLOGY

## 2020-06-24 PROCEDURE — D9220A PRA ANESTHESIA: Mod: CRNA,,, | Performed by: NURSE ANESTHETIST, CERTIFIED REGISTERED

## 2020-06-24 PROCEDURE — D9220A PRA ANESTHESIA: Mod: ANES,,, | Performed by: ANESTHESIOLOGY

## 2020-06-24 PROCEDURE — 45381 COLONOSCOPY SUBMUCOUS NJX: CPT | Mod: 51,,, | Performed by: INTERNAL MEDICINE

## 2020-06-24 PROCEDURE — 27200997: Performed by: INTERNAL MEDICINE

## 2020-06-24 PROCEDURE — 63600175 PHARM REV CODE 636 W HCPCS: Performed by: NURSE ANESTHETIST, CERTIFIED REGISTERED

## 2020-06-24 PROCEDURE — 25000003 PHARM REV CODE 250: Performed by: NURSE ANESTHETIST, CERTIFIED REGISTERED

## 2020-06-24 PROCEDURE — 45381 COLONOSCOPY SUBMUCOUS NJX: CPT | Performed by: INTERNAL MEDICINE

## 2020-06-24 PROCEDURE — 27201028 HC NEEDLE, SCLERO: Performed by: INTERNAL MEDICINE

## 2020-06-24 PROCEDURE — 27201089 HC SNARE, DISP (ANY): Performed by: INTERNAL MEDICINE

## 2020-06-24 RX ORDER — PROPOFOL 10 MG/ML
VIAL (ML) INTRAVENOUS
Status: DISCONTINUED | OUTPATIENT
Start: 2020-06-24 | End: 2020-06-24

## 2020-06-24 RX ORDER — SODIUM CHLORIDE 9 MG/ML
INJECTION, SOLUTION INTRAVENOUS CONTINUOUS
Status: DISCONTINUED | OUTPATIENT
Start: 2020-06-24 | End: 2020-06-25 | Stop reason: HOSPADM

## 2020-06-24 RX ORDER — ACETAMINOPHEN 500 MG
500 TABLET ORAL ONCE
Status: COMPLETED | OUTPATIENT
Start: 2020-06-24 | End: 2020-06-24

## 2020-06-24 RX ORDER — LIDOCAINE HCL/PF 100 MG/5ML
SYRINGE (ML) INTRAVENOUS
Status: DISCONTINUED | OUTPATIENT
Start: 2020-06-24 | End: 2020-06-24

## 2020-06-24 RX ADMIN — PROPOFOL 20 MG: 10 INJECTION, EMULSION INTRAVENOUS at 09:06

## 2020-06-24 RX ADMIN — PROPOFOL 20 MG: 10 INJECTION, EMULSION INTRAVENOUS at 10:06

## 2020-06-24 RX ADMIN — LIDOCAINE HYDROCHLORIDE 50 MG: 20 INJECTION INTRAVENOUS at 09:06

## 2020-06-24 RX ADMIN — PROPOFOL 80 MG: 10 INJECTION, EMULSION INTRAVENOUS at 09:06

## 2020-06-24 RX ADMIN — ACETAMINOPHEN 500 MG: 500 TABLET ORAL at 10:06

## 2020-06-24 RX ADMIN — SODIUM CHLORIDE: 0.9 INJECTION, SOLUTION INTRAVENOUS at 09:06

## 2020-06-24 NOTE — [employer] PATIENT INSTRUCTIONS
Discharge Summary/Instructions after an Endoscopic Procedure  Patient Name: Maggy Tapia  Patient MRN: [id number]  Patient YOB: 1950 Wednesday, June 24, 2020  Mj Hernandez MD  RESTRICTIONS:  During your procedure today, you received medications for sedation.  These   medications may affect your judgment, balance and coordination.  Therefore,   for 24 hours, you have the following restrictions:   - DO NOT drive a car, operate machinery, make legal/financial decisions,   sign important papers or drink alcohol.    ACTIVITY:  Today: no heavy lifting, straining or running due to procedural   sedation/anesthesia.  The following day: return to full activity including work.  DIET:  Eat and drink normally unless instructed otherwise.     TREATMENT FOR COMMON SIDE EFFECTS:  - Mild abdominal pain, nausea, belching, bloating or excessive gas:  rest,   eat lightly and use a heating pad.  - Sore Throat: treat with throat lozenges and/or gargle with warm salt   water.  - Because air was used during the procedure, expelling large amounts of air   from your rectum or belching is normal.  - If a bowel prep was taken, you may not have a bowel movement for 1-3 days.    This is normal.  SYMPTOMS TO WATCH FOR AND REPORT TO YOUR PHYSICIAN:  1. Abdominal pain or bloating, other than gas cramps.  2. Chest pain.  3. Back pain.  4. Signs of infection such as: chills or fever occurring within 24 hours   after the procedure.  5. Rectal bleeding, which would show as bright red, maroon, or black stools.   (A tablespoon of blood from the rectum is not serious, especially if   hemorrhoids are present.)  6. Vomiting.  7. Weakness or dizziness.  GO DIRECTLY TO THE NEAREST EMERGENCY ROOM IF YOU HAVE ANY OF THE FOLLOWING:      Difficulty breathing              Chills and/or fever over 101 F   Persistent vomiting and/or vomiting blood   Severe abdominal pain   Severe chest pain   Black, tarry stools   Bleeding- more than one  tablespoon   Any other symptom or condition that you feel may need urgent attention  Your doctor recommends these additional instructions:  If any biopsies were taken, your doctors clinic will contact you in 1 to 2   weeks with any results.  - Patient has a contact number available for emergencies.  The signs and   symptoms of potential delayed complications were discussed with the   patient.  Return to normal activities tomorrow.  Written discharge   instructions were provided to the patient.   - High fiber diet.   - Continue present medications.   - No aspirin, ibuprofen, naproxen, or other non-steroidal anti-inflammatory   drugs for 2 weeks after polyp removal.   - Await pathology results.   - Repeat colonoscopy in 3 years for surveillance.   - Discharge patient to home (ambulatory).   - Return to my office PRN.  For questions, problems or results please call your physician - Mj Hernandez MD at Work:  [phone].  [facility], EMERGENCY ROOM PHONE NUMBER: [phone]  IF A COMPLICATION OR EMERGENCY SITUATION ARISES AND YOU ARE UNABLE TO REACH   YOUR PHYSICIAN - GO DIRECTLY TO THE EMERGENCY ROOM.  Mj Hernandez MD  6/24/2020 10:08:44 AM  This report has been verified and signed electronically.  [employer]

## 2020-06-24 NOTE — PLAN OF CARE
Pt awake, alert and oriented. Brother present for discharge instructions. No asa/nsaids for 7 to 10 days per dr rodgers. Nurse assisted patient with dressing. Pt to wheelchair. Brother to carry walker downstairs for patient.

## 2020-06-24 NOTE — DISCHARGE INSTRUCTIONS

## 2020-06-24 NOTE — PLAN OF CARE
VSS in NAD, headache pain addressed. Pt oriented to perioperative routine, verbalized understanding. Pt resting comfortably awaiting procedure

## 2020-06-24 NOTE — ANESTHESIA POSTPROCEDURE EVALUATION
Anesthesia Post Evaluation    Patient: Maggy Tapia    Procedure(s) Performed: Procedure(s) (LRB):  COLONOSCOPY (N/A)    Final Anesthesia Type: general    Patient location during evaluation: PACU  Patient participation: Yes- Able to Participate  Level of consciousness: awake and alert and oriented  Post-procedure vital signs: reviewed and stable  Pain management: adequate  Airway patency: patent    PONV status at discharge: No PONV  Anesthetic complications: no      Cardiovascular status: blood pressure returned to baseline  Respiratory status: unassisted, spontaneous ventilation and room air  Hydration status: euvolemic  Follow-up not needed.          Vitals Value Taken Time   /66 06/24/20 1008   Temp 36.5 °C (97.7 °F) 06/24/20 1038   Pulse 99 06/24/20 1008   Resp 17 06/24/20 1008   SpO2 97 % 06/24/20 1008         No case tracking events are documented in the log.      Pain/Geraldo Score: Pain Rating Prior to Med Admin: 7 (6/24/2020 10:38 AM)

## 2020-06-24 NOTE — ANESTHESIA PREPROCEDURE EVALUATION
06/24/2020  Maggy Tapia is a 70 y.o., female.    Anesthesia Evaluation    I have reviewed the Patient Summary Reports.    I have reviewed the Nursing Notes. I have reviewed the NPO Status.      Review of Systems  Anesthesia Hx:  No problems with previous Anesthesia Denies Hx of Anesthetic complications    Social:  Non-Smoker    Cardiovascular:   Hypertension Denies MI.  Denies CAD.    Denies CABG/stent.   Denies Angina.    Pulmonary:   Denies COPD. Asthma asymptomatic  Denies Recent URI. Sleep Apnea    Renal/:   Denies Chronic Renal Disease.     Hepatic/GI:   GERD Denies Liver Disease.    Neurological:   Denies TIA. CVA Neuromuscular Disease, Seizures, well controlled    Endocrine:   Diabetes, poorly controlled, type 2 Hypothyroidism    Psych:   Psychiatric History          Physical Exam  General:  Well nourished    Airway/Jaw/Neck:  Airway Findings: Mouth Opening: Normal Tongue: Normal  General Airway Assessment: Adult, Good  Mallampati: II  Improves to II with phonation.  TM Distance: 4-6 cm      Dental:  Dental Findings: In tact   Chest/Lungs:  Chest/Lungs Findings: Clear to auscultation, Normal Respiratory Rate     Heart/Vascular:  Heart Findings: Rate: Normal  Rhythm: Regular Rhythm  Sounds: Normal  Heart murmur: negative       Mental Status:  Mental Status Findings:  Cooperative, Alert and Oriented         Anesthesia Plan  Type of Anesthesia, risks & benefits discussed:  Anesthesia Type:  general  Patient's Preference:   Intra-op Monitoring Plan:   Intra-op Monitoring Plan Comments:   Post Op Pain Control Plan:   Post Op Pain Control Plan Comments:   Induction:   IV  Beta Blocker:  Patient is on a Beta-Blocker and has received one dose within the past 24 hours (No further documentation required).       Informed Consent: Patient understands risks and agrees with Anesthesia plan.  Questions  answered. Anesthesia consent signed with patient.  ASA Score: 4     Day of Surgery Review of History & Physical: I have interviewed and examined the patient. I have reviewed the patient's H&P dated:  There are no significant changes.  H&P update referred to the surgeon.  H&P completed by Anesthesiologist.       Ready For Surgery From Anesthesia Perspective.

## 2020-06-25 VITALS
DIASTOLIC BLOOD PRESSURE: 78 MMHG | OXYGEN SATURATION: 99 % | BODY MASS INDEX: 37 KG/M2 | HEIGHT: 61 IN | HEART RATE: 82 BPM | WEIGHT: 196 LBS | TEMPERATURE: 98 F | RESPIRATION RATE: 16 BRPM | SYSTOLIC BLOOD PRESSURE: 174 MMHG

## 2020-06-26 ENCOUNTER — LAB VISIT (OUTPATIENT)
Dept: LAB | Facility: HOSPITAL | Age: 70
End: 2020-06-26
Attending: PHYSICIAN ASSISTANT
Payer: MEDICARE

## 2020-06-26 DIAGNOSIS — E11.9 TYPE 2 DIABETES MELLITUS WITHOUT COMPLICATION, WITH LONG-TERM CURRENT USE OF INSULIN: ICD-10-CM

## 2020-06-26 DIAGNOSIS — Z79.4 TYPE 2 DIABETES MELLITUS WITHOUT COMPLICATION, WITH LONG-TERM CURRENT USE OF INSULIN: ICD-10-CM

## 2020-06-26 LAB
FINAL PATHOLOGIC DIAGNOSIS: NORMAL
GROSS: NORMAL
T4 FREE SERPL-MCNC: 1.11 NG/DL (ref 0.71–1.51)
TSH SERPL DL<=0.005 MIU/L-ACNC: 0.68 UIU/ML (ref 0.4–4)

## 2020-06-26 PROCEDURE — 84443 ASSAY THYROID STIM HORMONE: CPT

## 2020-06-26 PROCEDURE — 36415 COLL VENOUS BLD VENIPUNCTURE: CPT | Mod: PO

## 2020-06-26 PROCEDURE — 84439 ASSAY OF FREE THYROXINE: CPT

## 2020-06-30 ENCOUNTER — TELEPHONE (OUTPATIENT)
Dept: GASTROENTEROLOGY | Facility: CLINIC | Age: 70
End: 2020-06-30

## 2020-06-30 NOTE — TELEPHONE ENCOUNTER
Patient reached. Patient verbalized understanding of results. Notified of repeat colon screening in 3 years.    Gomez Vyas LPN    ----- Message from Mj Fernandez MD sent at 6/29/2020  3:55 PM CDT -----  Please advise patient that polyp pathology was reviewed and is benign and is the adenomatous/sessile serrated type which is precancerous/risk factor for cancer.  Repeat colonoscopy recommended in 3 years.  Place reminder in system for repeat colonoscopy.

## 2020-07-07 ENCOUNTER — NURSE TRIAGE (OUTPATIENT)
Dept: ADMINISTRATIVE | Facility: CLINIC | Age: 70
End: 2020-07-07

## 2020-08-06 ENCOUNTER — OFFICE VISIT (OUTPATIENT)
Dept: PODIATRY | Facility: CLINIC | Age: 70
End: 2020-08-06
Payer: MEDICARE

## 2020-08-06 VITALS — WEIGHT: 196 LBS | BODY MASS INDEX: 37 KG/M2 | HEIGHT: 61 IN

## 2020-08-06 DIAGNOSIS — M20.41 HAMMER TOES OF BOTH FEET: ICD-10-CM

## 2020-08-06 DIAGNOSIS — E11.42 DIABETIC POLYNEUROPATHY ASSOCIATED WITH TYPE 2 DIABETES MELLITUS: Primary | ICD-10-CM

## 2020-08-06 DIAGNOSIS — M20.42 HAMMER TOES OF BOTH FEET: ICD-10-CM

## 2020-08-06 DIAGNOSIS — B35.1 ONYCHOMYCOSIS DUE TO DERMATOPHYTE: ICD-10-CM

## 2020-08-06 PROCEDURE — 3008F BODY MASS INDEX DOCD: CPT | Mod: CPTII,S$GLB,, | Performed by: PODIATRIST

## 2020-08-06 PROCEDURE — 1159F MED LIST DOCD IN RCRD: CPT | Mod: S$GLB,,, | Performed by: PODIATRIST

## 2020-08-06 PROCEDURE — 3044F HG A1C LEVEL LT 7.0%: CPT | Mod: CPTII,S$GLB,, | Performed by: PODIATRIST

## 2020-08-06 PROCEDURE — 1159F PR MEDICATION LIST DOCUMENTED IN MEDICAL RECORD: ICD-10-PCS | Mod: S$GLB,,, | Performed by: PODIATRIST

## 2020-08-06 PROCEDURE — 3044F PR MOST RECENT HEMOGLOBIN A1C LEVEL <7.0%: ICD-10-PCS | Mod: CPTII,S$GLB,, | Performed by: PODIATRIST

## 2020-08-06 PROCEDURE — 3008F PR BODY MASS INDEX (BMI) DOCUMENTED: ICD-10-PCS | Mod: CPTII,S$GLB,, | Performed by: PODIATRIST

## 2020-08-06 PROCEDURE — 11721 DEBRIDE NAIL 6 OR MORE: CPT | Mod: Q9,S$GLB,, | Performed by: PODIATRIST

## 2020-08-06 PROCEDURE — 99213 OFFICE O/P EST LOW 20 MIN: CPT | Mod: 25,S$GLB,, | Performed by: PODIATRIST

## 2020-08-06 PROCEDURE — 99213 PR OFFICE/OUTPT VISIT, EST, LEVL III, 20-29 MIN: ICD-10-PCS | Mod: 25,S$GLB,, | Performed by: PODIATRIST

## 2020-08-06 PROCEDURE — 1101F PT FALLS ASSESS-DOCD LE1/YR: CPT | Mod: CPTII,S$GLB,, | Performed by: PODIATRIST

## 2020-08-06 PROCEDURE — 99999 PR PBB SHADOW E&M-EST. PATIENT-LVL II: ICD-10-PCS | Mod: PBBFAC,,, | Performed by: PODIATRIST

## 2020-08-06 PROCEDURE — 1101F PR PT FALLS ASSESS DOC 0-1 FALLS W/OUT INJ PAST YR: ICD-10-PCS | Mod: CPTII,S$GLB,, | Performed by: PODIATRIST

## 2020-08-06 PROCEDURE — 1126F AMNT PAIN NOTED NONE PRSNT: CPT | Mod: S$GLB,,, | Performed by: PODIATRIST

## 2020-08-06 PROCEDURE — 11721 PR DEBRIDEMENT OF NAILS, 6 OR MORE: ICD-10-PCS | Mod: Q9,S$GLB,, | Performed by: PODIATRIST

## 2020-08-06 PROCEDURE — 99999 PR PBB SHADOW E&M-EST. PATIENT-LVL II: CPT | Mod: PBBFAC,,, | Performed by: PODIATRIST

## 2020-08-06 PROCEDURE — 1126F PR PAIN SEVERITY QUANTIFIED, NO PAIN PRESENT: ICD-10-PCS | Mod: S$GLB,,, | Performed by: PODIATRIST

## 2020-08-06 NOTE — PROGRESS NOTES
Subjective:      Patient ID: Maggy Tapia is a 70 y.o. female.    Chief Complaint: Diabetes Mellitus ([place]) and Diabetic Foot Exam    Maggy is a 70 y.o. female who presents to the clinic for evaluation and treatment of diabetic feet. Maggy has a past medical history of Anxiety, Arthritis, Asthma, Cancer (2000), Cataract, Depression, Diabetes mellitus, type 2, GERD (gastroesophageal reflux disease), Hyperlipidemia, Hypertension, Overactive bladder, Seizures, Stroke, Thyroid disease, and Urinary tract infection without hematuria (8/3/2017). Patient relates no major problem with feet. Only complaints today consist of toenails that are in need of trimming.  Denies being painful with wearing shoe gear.  Has not attempted to self treat.  Also, relates having occasional neuropathy pain.  However, states symptoms occur sporadically and resolve just as quickly.  Lastly notes keeping her blood glucose well controlled.  Denies any additional pedal complaints.    PCP: Yanna Abbasi MD    Date Last Seen by PCP: 5/20    Hemoglobin A1C   Date Value Ref Range Status   05/04/2020 6.4 (H) 4.0 - 5.6 % Final     Comment:     ADA Screening Guidelines:  5.7-6.4%  Consistent with prediabetes  >or=6.5%  Consistent with diabetes  High levels of fetal hemoglobin interfere with the HbA1C  assay. Heterozygous hemoglobin variants (HbS, HgC, etc)do  not significantly interfere with this assay.   However, presence of multiple variants may affect accuracy.     01/06/2020 6.1 (H) 4.0 - 5.6 % Final     Comment:     ADA Screening Guidelines:  5.7-6.4%  Consistent with prediabetes  >or=6.5%  Consistent with diabetes  High levels of fetal hemoglobin interfere with the HbA1C  assay. Heterozygous hemoglobin variants (HbS, HgC, etc)do  not significantly interfere with this assay.   However, presence of multiple variants may affect accuracy.     09/25/2019 5.8 (H) 4.0 - 5.6 % Final     Comment:     ADA Screening Guidelines:  5.7-6.4%  Consistent  with prediabetes  >or=6.5%  Consistent with diabetes  High levels of fetal hemoglobin interfere with the HbA1C  assay. Heterozygous hemoglobin variants (HbS, HgC, etc)do  not significantly interfere with this assay.   However, presence of multiple variants may affect accuracy.             Past Medical History:   Diagnosis Date    Anxiety     Arthritis     Asthma     Cancer 2000    Left Breast    Cataract     Depression     Diabetes mellitus, type 2     GERD (gastroesophageal reflux disease)     Hyperlipidemia     Hypertension     Overactive bladder     Seizures     Pseudo-seizures    Stroke     Thyroid disease     Urinary tract infection without hematuria 8/3/2017       Past Surgical History:   Procedure Laterality Date    APPENDECTOMY      BREAST BIOPSY Left     BREAST LUMPECTOMY Left     2016    BREAST SURGERY      CATARACT EXTRACTION       SECTION      CHOLECYSTECTOMY      COLONOSCOPY N/A 2020    Procedure: COLONOSCOPY;  Surgeon: Mj Fernandez MD;  Location: [facility];  Service: Endoscopy;  Laterality: N/A;    DILATION AND CURETTAGE OF UTERUS      EYE SURGERY         Family History   Problem Relation Age of Onset    Diabetes Mother     Hypertension Mother     Breast cancer Mother     Glaucoma Neg Hx        Social History     Socioeconomic History    Marital status: Single     Spouse name: Not on file    Number of children: Not on file    Years of education: Not on file    Highest education level: Not on file   Occupational History    Not on file   Social Needs    Financial resource strain: Not on file    Food insecurity     Worry: Not on file     Inability: Not on file    Transportation needs     Medical: Not on file     Non-medical: Not on file   Tobacco Use    Smoking status: Never Smoker    Smokeless tobacco: Never Used   Substance and Sexual Activity    Alcohol use: Yes     Comment: seldom    Drug use: No    Sexual activity: Not on file   Lifestyle     Physical activity     Days per week: Not on file     Minutes per session: Not on file    Stress: Not on file   Relationships    Social connections     Talks on phone: Not on file     Gets together: Not on file     Attends Judaism service: Not on file     Active member of club or organization: Not on file     Attends meetings of clubs or organizations: Not on file     Relationship status: Not on file   Other Topics Concern    Not on file   Social History Narrative    Not on file       Current Outpatient Medications   Medication Sig Dispense Refill    aspirin (ECOTRIN) 81 MG EC tablet Take 81 mg by mouth once daily.      CALCIUM CARBONATE/VITAMIN D3 (CALCIUM 500 + D ORAL) Take 1 tablet by mouth once daily. 10 mg daily      clonazePAM (KLONOPIN) 0.5 MG tablet Take 1 tablet (0.5 mg total) by mouth 2 (two) times daily.  3    divalproex ER (DEPAKOTE) 500 MG Tb24 Take 500 mg by mouth every evening.  180 tablet 3    levothyroxine (SYNTHROID) 88 MCG tablet Take one tablet for 6 days of the week. 30 tablet 11    losartan (COZAAR) 50 MG tablet TAKE 1 TABLET BY MOUTH EVERY DAY 90 tablet 3    metFORMIN (GLUCOPHAGE-XR) 500 MG XR 24hr tablet TAKE 2 TABLETS BY MOUTH TWICE A DAY WITH MEALS 360 tablet 3    oxybutynin (DITROPAN-XL) 10 MG 24 hr tablet Take 10 mg by mouth once daily.      potassium chloride SA (K-DUR,KLOR-CON) 20 MEQ tablet Take 20 mEq by mouth once daily.      prazosin (MINIPRESS) 2 MG Cap Take 4 mg by mouth every evening. (2) 2mg capsules HS      simvastatin (ZOCOR) 40 MG tablet TAKE 1 TABLET BY MOUTH EVERY DAY 90 tablet 3    thiamine (VITAMIN B-1) 100 MG tablet Take 100 mg by mouth once daily.      trifluoperazine (STELAZINE) 10 MG tablet       TRINTELLIX 20 mg Tab Take 20 mg by mouth once daily.        No current facility-administered medications for this visit.        Review of patient's allergies indicates:   Allergen Reactions    Penicillins Anaphylaxis    Sulfa (sulfonamide antibiotics)  Anaphylaxis         Review of Systems   Constitution: Negative for chills and fever.   Cardiovascular: Positive for leg swelling. Negative for claudication.   Skin: Positive for color change and nail changes.   Musculoskeletal: Positive for arthritis and joint swelling. Negative for muscle cramps and muscle weakness.   Gastrointestinal: Negative for nausea and vomiting.   Neurological: Positive for paresthesias. Negative for numbness.   Psychiatric/Behavioral: Negative for altered mental status.           Objective:      Physical Exam  Constitutional:       Appearance: Normal appearance.   Cardiovascular:      Pulses:           Dorsalis pedis pulses are 2+ on the right side and 2+ on the left side.        Posterior tibial pulses are 2+ on the right side and 2+ on the left side.      Comments: CFT is < 3 seconds bilateral.  Pedal hair growth is decreased bilateral.  Varicosities noted bilateral.  Moderate non pitting edema noted to bilateral lower extremity.  Toes are cool to touch bilateral.    Musculoskeletal:         General: No tenderness or deformity.      Right lower leg: Edema present.      Left lower leg: Edema present.      Comments: Muscle strength 5/5 in all muscle groups bilateral.  No tenderness nor crepitation with ROM of foot/ankle joints bilateral.  No tenderness with palpation of bilateral foot and ankle.  Bilateral pes planus foot type.  Bilateral adductovarus rotation of the 4th and 5th toes.     Skin:     General: Skin is warm and dry.      Capillary Refill: Capillary refill takes 2 to 3 seconds.      Findings: No abrasion, abscess, acne, bruising, burn, ecchymosis, erythema, signs of injury, laceration, lesion, petechiae, rash or wound.      Comments: Pedal skin has appears thin bilateral.  Toenails x 10 appear thickened by 2 mm, elongated by 6 mm, and discolored with subungual debris.   Examination of the skin reveals no evidence of significant maceration, rashes, open lesions, suspicious  appearing nevi or other concerning lesions.    Neurological:      Mental Status: She is alert.      Sensory: Sensory deficit present.      Motor: No atrophy.      Comments: Protective sensation per [place] monofilament is absent bilateral.  Vibratory sensation is decreased bilateral.  Light touch is intact bilateral.               Assessment:       Encounter Diagnoses   Name Primary?    Diabetic polyneuropathy associated with type 2 diabetes mellitus Yes    Hammer toes of both feet     Onychomycosis due to dermatophyte          Plan:       Maggy was seen today for diabetes mellitus and diabetic foot exam.    Diagnoses and all orders for this visit:    Diabetic polyneuropathy associated with type 2 diabetes mellitus  -     DIABETIC SHOES FOR HOME USE    Hammer toes of both feet  -     DIABETIC SHOES FOR HOME USE    Onychomycosis due to dermatophyte      I counseled the patient on her conditions, their implications and medical management.    Rx written for diabetic shoes to accommodate for digital deformities.    Shoe inspection. Diabetic Foot Education. Patient reminded of the importance of good nutrition and blood sugar control to help prevent podiatric complications of diabetes. Patient instructed on proper foot hygeine. We discussed wearing proper shoe gear, daily foot inspections, never walking without protective shoe gear, never putting sharp instruments to feet    With patient's permission, nails were aggressively reduced and debrided x 10 to their soft tissue attachment mechanically and with electric , removing all offending nail and debris. Patient relates relief following the procedure. She will continue to monitor the areas daily, inspect her feet, wear protective shoe gear when ambulatory, moisturizer to maintain skin integrity and follow in this office in approximately 4 months, sooner p.r.n.    Follow up in about 4 months (around 12/6/2020).    Stan Maldonado DPM

## 2020-08-10 ENCOUNTER — OFFICE VISIT (OUTPATIENT)
Dept: ENDOCRINOLOGY | Facility: CLINIC | Age: 70
End: 2020-08-10
Payer: MEDICARE

## 2020-08-10 ENCOUNTER — LAB VISIT (OUTPATIENT)
Dept: LAB | Facility: HOSPITAL | Age: 70
End: 2020-08-10
Attending: PHYSICIAN ASSISTANT
Payer: MEDICARE

## 2020-08-10 VITALS
SYSTOLIC BLOOD PRESSURE: 136 MMHG | BODY MASS INDEX: 37.14 KG/M2 | DIASTOLIC BLOOD PRESSURE: 80 MMHG | HEIGHT: 61 IN | HEART RATE: 108 BPM | WEIGHT: 196.75 LBS | TEMPERATURE: 97 F

## 2020-08-10 DIAGNOSIS — E11.9 TYPE 2 DIABETES MELLITUS WITHOUT COMPLICATION, WITH LONG-TERM CURRENT USE OF INSULIN: Primary | ICD-10-CM

## 2020-08-10 DIAGNOSIS — M85.80 OSTEOPENIA, UNSPECIFIED LOCATION: ICD-10-CM

## 2020-08-10 DIAGNOSIS — L98.9 SKIN LESION: ICD-10-CM

## 2020-08-10 DIAGNOSIS — Z79.4 TYPE 2 DIABETES MELLITUS WITHOUT COMPLICATION, WITH LONG-TERM CURRENT USE OF INSULIN: Primary | ICD-10-CM

## 2020-08-10 DIAGNOSIS — F32.A DEPRESSION, UNSPECIFIED DEPRESSION TYPE: ICD-10-CM

## 2020-08-10 DIAGNOSIS — E55.9 HYPOVITAMINOSIS D: ICD-10-CM

## 2020-08-10 DIAGNOSIS — G47.33 OSA (OBSTRUCTIVE SLEEP APNEA): ICD-10-CM

## 2020-08-10 DIAGNOSIS — E11.9 TYPE 2 DIABETES MELLITUS WITHOUT COMPLICATION, WITH LONG-TERM CURRENT USE OF INSULIN: ICD-10-CM

## 2020-08-10 DIAGNOSIS — E03.9 ACQUIRED HYPOTHYROIDISM: ICD-10-CM

## 2020-08-10 DIAGNOSIS — Z79.4 TYPE 2 DIABETES MELLITUS WITHOUT COMPLICATION, WITH LONG-TERM CURRENT USE OF INSULIN: ICD-10-CM

## 2020-08-10 PROCEDURE — 3075F SYST BP GE 130 - 139MM HG: CPT | Mod: CPTII,S$GLB,, | Performed by: PHYSICIAN ASSISTANT

## 2020-08-10 PROCEDURE — 82306 VITAMIN D 25 HYDROXY: CPT

## 2020-08-10 PROCEDURE — 99214 PR OFFICE/OUTPT VISIT, EST, LEVL IV, 30-39 MIN: ICD-10-PCS | Mod: S$GLB,,, | Performed by: PHYSICIAN ASSISTANT

## 2020-08-10 PROCEDURE — 1159F PR MEDICATION LIST DOCUMENTED IN MEDICAL RECORD: ICD-10-PCS | Mod: S$GLB,,, | Performed by: PHYSICIAN ASSISTANT

## 2020-08-10 PROCEDURE — 99999 PR PBB SHADOW E&M-EST. PATIENT-LVL V: CPT | Mod: PBBFAC,,, | Performed by: PHYSICIAN ASSISTANT

## 2020-08-10 PROCEDURE — 1101F PT FALLS ASSESS-DOCD LE1/YR: CPT | Mod: CPTII,S$GLB,, | Performed by: PHYSICIAN ASSISTANT

## 2020-08-10 PROCEDURE — 3079F PR MOST RECENT DIASTOLIC BLOOD PRESSURE 80-89 MM HG: ICD-10-PCS | Mod: CPTII,S$GLB,, | Performed by: PHYSICIAN ASSISTANT

## 2020-08-10 PROCEDURE — 1125F AMNT PAIN NOTED PAIN PRSNT: CPT | Mod: S$GLB,,, | Performed by: PHYSICIAN ASSISTANT

## 2020-08-10 PROCEDURE — 3044F HG A1C LEVEL LT 7.0%: CPT | Mod: CPTII,S$GLB,, | Performed by: PHYSICIAN ASSISTANT

## 2020-08-10 PROCEDURE — 36415 COLL VENOUS BLD VENIPUNCTURE: CPT | Mod: PO

## 2020-08-10 PROCEDURE — 3044F PR MOST RECENT HEMOGLOBIN A1C LEVEL <7.0%: ICD-10-PCS | Mod: CPTII,S$GLB,, | Performed by: PHYSICIAN ASSISTANT

## 2020-08-10 PROCEDURE — 1125F PR PAIN SEVERITY QUANTIFIED, PAIN PRESENT: ICD-10-PCS | Mod: S$GLB,,, | Performed by: PHYSICIAN ASSISTANT

## 2020-08-10 PROCEDURE — 99214 OFFICE O/P EST MOD 30 MIN: CPT | Mod: S$GLB,,, | Performed by: PHYSICIAN ASSISTANT

## 2020-08-10 PROCEDURE — 3079F DIAST BP 80-89 MM HG: CPT | Mod: CPTII,S$GLB,, | Performed by: PHYSICIAN ASSISTANT

## 2020-08-10 PROCEDURE — 99999 PR PBB SHADOW E&M-EST. PATIENT-LVL V: ICD-10-PCS | Mod: PBBFAC,,, | Performed by: PHYSICIAN ASSISTANT

## 2020-08-10 PROCEDURE — 80061 LIPID PANEL: CPT

## 2020-08-10 PROCEDURE — 3008F PR BODY MASS INDEX (BMI) DOCUMENTED: ICD-10-PCS | Mod: CPTII,S$GLB,, | Performed by: PHYSICIAN ASSISTANT

## 2020-08-10 PROCEDURE — 3008F BODY MASS INDEX DOCD: CPT | Mod: CPTII,S$GLB,, | Performed by: PHYSICIAN ASSISTANT

## 2020-08-10 PROCEDURE — 83036 HEMOGLOBIN GLYCOSYLATED A1C: CPT

## 2020-08-10 PROCEDURE — 3075F PR MOST RECENT SYSTOLIC BLOOD PRESS GE 130-139MM HG: ICD-10-PCS | Mod: CPTII,S$GLB,, | Performed by: PHYSICIAN ASSISTANT

## 2020-08-10 PROCEDURE — 80053 COMPREHEN METABOLIC PANEL: CPT

## 2020-08-10 PROCEDURE — 1101F PR PT FALLS ASSESS DOC 0-1 FALLS W/OUT INJ PAST YR: ICD-10-PCS | Mod: CPTII,S$GLB,, | Performed by: PHYSICIAN ASSISTANT

## 2020-08-10 PROCEDURE — 1159F MED LIST DOCD IN RCRD: CPT | Mod: S$GLB,,, | Performed by: PHYSICIAN ASSISTANT

## 2020-08-10 RX ORDER — INSULIN PUMP SYRINGE, 3 ML
EACH MISCELLANEOUS
Qty: 1 EACH | Refills: 0 | Status: SHIPPED | OUTPATIENT
Start: 2020-08-10

## 2020-08-10 NOTE — PROGRESS NOTES
CC: DM/Hypothyroidism    HPI: Maggy Tapia was diagnosed with Type 2 DM about 6 years ago. No hospitalizations for DM. Her mother had DM and thyroid disease. Pt states her dog  last week. She is very upset about this and is trying to see someone in psychiatry.    CURRENT DIABETIC MEDS: metformin 500 mg in the morning    BG readings are checked 2x daily.      Hypoglycemia: Rare  Type of Glucose Meter: True metrix    Physical Activity: She does exercises every day at home that she learned in PT.    Diet:  BF-rasian brain  LH-sandwich  DN-noodles and meat    Last Eye Exam: -vision   Last Podiatry Exam:     Last DM Education Attended:     No ETOH/tobacco use.    Hypothyroidism  Taking 88 mcg for six days of the week.  Dx in 2016  + diarrhea,     No palpitations, hair loss or changes in nails, heat/cold intolerance.  She has tremors.    DEXA  Osteopenia in the left hip. Taking ca +vd. No recent falls, fractures or steriod injections.    JUAN  Wears CPAP    REVIEW OF SYSTEMS  General: no weakness or fatigue, wt loss.   Eyes: no intermittent blurry vision or visual disturbances.   Cardiac: no chest pain or palpitations.   Respiratory: no cough or dyspnea.   GI: no abdominal pain or nausea.   Skin: no rashes or itching.   Musc: + back pain, neck pain  Neuro: no numbness or tingling.   Endocrine: + polyuria, no polydipsia, polyphagia.   Remainder ROS negative    Personally reviewed labs below:  Hemoglobin A1C   Date Value Ref Range Status   2020 6.4 (H) 4.0 - 5.6 % Final     Comment:     ADA Screening Guidelines:  5.7-6.4%  Consistent with prediabetes  >or=6.5%  Consistent with diabetes  High levels of fetal hemoglobin interfere with the HbA1C  assay. Heterozygous hemoglobin variants (HbS, HgC, etc)do  not significantly interfere with this assay.   However, presence of multiple variants may affect accuracy.     2020 6.1 (H) 4.0 - 5.6 % Final     Comment:     ADA Screening  Guidelines:  5.7-6.4%  Consistent with prediabetes  >or=6.5%  Consistent with diabetes  High levels of fetal hemoglobin interfere with the HbA1C  assay. Heterozygous hemoglobin variants (HbS, HgC, etc)do  not significantly interfere with this assay.   However, presence of multiple variants may affect accuracy.     09/25/2019 5.8 (H) 4.0 - 5.6 % Final     Comment:     ADA Screening Guidelines:  5.7-6.4%  Consistent with prediabetes  >or=6.5%  Consistent with diabetes  High levels of fetal hemoglobin interfere with the HbA1C  assay. Heterozygous hemoglobin variants (HbS, HgC, etc)do  not significantly interfere with this assay.   However, presence of multiple variants may affect accuracy.         Chemistry        Component Value Date/Time     05/04/2020 0920    K 4.7 05/04/2020 0920     05/04/2020 0920    CO2 26 05/04/2020 0920    BUN 20 05/04/2020 0920    CREATININE 1.1 05/04/2020 0920     (H) 05/04/2020 0920        Component Value Date/Time    CALCIUM 9.8 05/04/2020 0920    ALKPHOS 94 02/11/2020 1141    AST 14 02/11/2020 1141    ALT 9 (L) 02/11/2020 1141    BILITOT 0.3 02/11/2020 1141    ESTGFRAFRICA 58.0 (A) 05/04/2020 0920    EGFRNONAA 50.3 (A) 05/04/2020 0920        Lab Results   Component Value Date    CHOL 149 07/10/2019    CHOL 127 04/01/2019    CHOL 178 06/28/2018     Lab Results   Component Value Date    HDL 50 07/10/2019    HDL 45 04/01/2019    HDL 43 06/28/2018     Lab Results   Component Value Date    LDLCALC 77.2 07/10/2019    LDLCALC 58.2 (L) 04/01/2019    LDLCALC 104.2 06/28/2018     Lab Results   Component Value Date    TRIG 109 07/10/2019    TRIG 119 04/01/2019    TRIG 154 (H) 06/28/2018     Lab Results   Component Value Date    CHOLHDL 33.6 07/10/2019    CHOLHDL 35.4 04/01/2019    CHOLHDL 24.2 06/28/2018     Lab Results   Component Value Date    TSH 0.681 06/26/2020     Lab Results   Component Value Date    MICALBCREAT 6.7 05/04/2020       Vit D, 25-Hydroxy   Date Value Ref Range  Status   07/10/2019 48 30 - 96 ng/mL Final     Comment:     Vitamin D deficiency.........<10 ng/mL                              Vitamin D insufficiency......10-29 ng/mL       Vitamin D sufficiency........> or equal to 30 ng/mL  Vitamin D toxicity............>100 ng/mL       PHYSICAL EXAMINATION  Constitutional: elderly female, appears well, no distress  Neck: Supple, trachea midline.   Respiratory: even and unlabored, CTA without wheezes.  Cardiovascular: RRR; no carotid bruits or murmurs.   Lymph: DP pulses  2+ bilaterally; 1+ edema bl.   Skin: +mole on face, warm and dry; no acanthosis nigracans observed.  Abdomen: soft, non-distended. Obese abdomen.  Musc: ambulates with a walker  Neuro: patient alert and cooperative; CN 2-12 grossly intact  Feet: appropriate footwear.    Assessment/Plan    1. Type 2 diabetes mellitus without complication, with long-term current use of insulin  Renal function panel    Hemoglobin A1C    blood-glucose meter kit   2. Acquired hypothyroidism     3. Osteopenia, unspecified location     4. JUAN (obstructive sleep apnea)     5. Skin lesion  Ambulatory referral/consult to Dermatology   6. Depression, unspecified depression type  Ambulatory referral/consult to Psychiatry     T2DM- A1c today. Readings are elevated. Increase Metformin to 1000 mg bid. BG checks 2x daily.   Hypothyroidism-TFTs suppressed. Continue LT4 dose.  Osteopenia-repeat DEXA 4/21. Continue exercises in PT.    UND-[id number]-klecmqys cpap  Skin lesion-referral to dermatology  Depression-referral to psychiatry    F/u in 3 mths

## 2020-08-11 LAB
25(OH)D3+25(OH)D2 SERPL-MCNC: 20 NG/ML (ref 30–96)
ALBUMIN SERPL BCP-MCNC: 3.4 G/DL (ref 3.5–5.2)
ALP SERPL-CCNC: 105 U/L (ref 55–135)
ALT SERPL W/O P-5'-P-CCNC: 12 U/L (ref 10–44)
ANION GAP SERPL CALC-SCNC: 14 MMOL/L (ref 8–16)
AST SERPL-CCNC: 12 U/L (ref 10–40)
BILIRUB SERPL-MCNC: 0.3 MG/DL (ref 0.1–1)
BUN SERPL-MCNC: 26 MG/DL (ref 8–23)
CALCIUM SERPL-MCNC: 9.4 MG/DL (ref 8.7–10.5)
CHLORIDE SERPL-SCNC: 102 MMOL/L (ref 95–110)
CHOLEST SERPL-MCNC: 197 MG/DL (ref 120–199)
CHOLEST/HDLC SERPL: 5.3 {RATIO} (ref 2–5)
CO2 SERPL-SCNC: 25 MMOL/L (ref 23–29)
CREAT SERPL-MCNC: 1 MG/DL (ref 0.5–1.4)
EST. GFR  (AFRICAN AMERICAN): >60 ML/MIN/1.73 M^2
EST. GFR  (NON AFRICAN AMERICAN): 56.4 ML/MIN/1.73 M^2
ESTIMATED AVG GLUCOSE: 189 MG/DL (ref 68–131)
GLUCOSE SERPL-MCNC: 292 MG/DL (ref 70–110)
HBA1C MFR BLD HPLC: 8.2 % (ref 4–5.6)
HDLC SERPL-MCNC: 37 MG/DL (ref 40–75)
HDLC SERPL: 18.8 % (ref 20–50)
LDLC SERPL CALC-MCNC: ABNORMAL MG/DL (ref 63–159)
NONHDLC SERPL-MCNC: 160 MG/DL
POTASSIUM SERPL-SCNC: 4 MMOL/L (ref 3.5–5.1)
PROT SERPL-MCNC: 7.4 G/DL (ref 6–8.4)
SODIUM SERPL-SCNC: 141 MMOL/L (ref 136–145)
TRIGL SERPL-MCNC: 455 MG/DL (ref 30–150)

## 2020-09-02 ENCOUNTER — OFFICE VISIT (OUTPATIENT)
Dept: DERMATOLOGY | Facility: CLINIC | Age: 70
End: 2020-09-02
Payer: MEDICARE

## 2020-09-02 ENCOUNTER — PATIENT OUTREACH (OUTPATIENT)
Dept: ADMINISTRATIVE | Facility: OTHER | Age: 70
End: 2020-09-02

## 2020-09-02 VITALS — WEIGHT: 196.88 LBS | BODY MASS INDEX: 37.2 KG/M2

## 2020-09-02 DIAGNOSIS — D48.5 NEOPLASM OF UNCERTAIN BEHAVIOR OF SKIN: Primary | ICD-10-CM

## 2020-09-02 PROCEDURE — 11102 PR TANGENTIAL BIOPSY, SKIN, SINGLE LESION: ICD-10-PCS | Mod: S$GLB,,, | Performed by: DERMATOLOGY

## 2020-09-02 PROCEDURE — 99499 UNLISTED E&M SERVICE: CPT | Mod: S$GLB,,, | Performed by: DERMATOLOGY

## 2020-09-02 PROCEDURE — 99999 PR PBB SHADOW E&M-EST. PATIENT-LVL III: CPT | Mod: PBBFAC,,, | Performed by: DERMATOLOGY

## 2020-09-02 PROCEDURE — 88305 TISSUE EXAM BY PATHOLOGIST: CPT | Performed by: PATHOLOGY

## 2020-09-02 PROCEDURE — 88305 TISSUE EXAM BY PATHOLOGIST: CPT | Mod: 26,,, | Performed by: PATHOLOGY

## 2020-09-02 PROCEDURE — 99999 PR PBB SHADOW E&M-EST. PATIENT-LVL III: ICD-10-PCS | Mod: PBBFAC,,, | Performed by: DERMATOLOGY

## 2020-09-02 PROCEDURE — 88305 TISSUE EXAM BY PATHOLOGIST: ICD-10-PCS | Mod: 26,,, | Performed by: PATHOLOGY

## 2020-09-02 PROCEDURE — 99499 NO LOS: ICD-10-PCS | Mod: S$GLB,,, | Performed by: DERMATOLOGY

## 2020-09-02 PROCEDURE — 11102 TANGNTL BX SKIN SINGLE LES: CPT | Mod: S$GLB,,, | Performed by: DERMATOLOGY

## 2020-09-02 NOTE — PROGRESS NOTES
Chart was reviewed for overdue [facility]active [facility] Encounters (PAT[facility])  topics  Updates were requested from care everywhere  Health Maintenance has been updated  LINKS immunization registry triggered  Immunizations were reconciled

## 2020-09-02 NOTE — PROGRESS NOTES
Subjective:       Patient ID:  Maggy Tapia is a 70 y.o. female who presents for   Chief Complaint   Patient presents with    Mass     spot on left nose     New patient to dermatology.     Patient presents today for growth to left upper nose- started 4 weeks.    Family Hx of skin cancers- Mother-BCC, father-melanoma, and brother-BCC.      Review of Systems   Constitutional: Negative for fever, chills, weight loss and fatigue.   HENT: Negative for congestion and sore throat.    Respiratory: Negative for cough and shortness of breath.    Gastrointestinal: Negative for nausea, vomiting, diarrhea and constipation.   Skin: Positive for activity-related sunscreen use. Negative for daily sunscreen use.   Hematologic/Lymphatic: Bruises/bleeds easily.        Objective:    Physical Exam   Constitutional: She appears well-developed and well-nourished.   Neurological: She is alert and oriented to person, place, and time.   Psychiatric: She has a normal mood and affect.          Diagram Legend     Erythematous scaling macule/papule c/w actinic keratosis       Vascular papule c/w angioma      Pigmented verrucoid papule/plaque c/w seborrheic keratosis      Yellow umbilicated papule c/w sebaceous hyperplasia      Irregularly shaped tan macule c/w lentigo     1-2 mm smooth white papules consistent with Milia      Movable subcutaneous cyst with punctum c/w epidermal inclusion cyst      Subcutaneous movable cyst c/w pilar cyst      Firm pink to brown papule c/w dermatofibroma      Pedunculated fleshy papule(s) c/w skin tag(s)      Evenly pigmented macule c/w junctional nevus     Mildly variegated pigmented, slightly irregular-bordered macule c/w mildly atypical nevus      Flesh colored to evenly pigmented papule c/w intradermal nevus       Pink pearly papule/plaque c/w basal cell carcinoma      Erythematous hyperkeratotic cursted plaque c/w SCC      Surgical scar with no sign of skin cancer recurrence      Open and closed  comedones      Inflammatory papules and pustules      Verrucoid papule consistent consistent with wart     Erythematous eczematous patches and plaques     Dystrophic onycholytic nail with subungual debris c/w onychomycosis     Umbilicated papule    Erythematous-base heme-crusted tan verrucoid plaque consistent with inflamed seborrheic keratosis     Erythematous Silvery Scaling Plaque c/w Psoriasis     See annotation          Assessment / Plan:      Pathology Orders:     Normal Orders This Visit    Specimen to Pathology, Dermatology     Questions:    Procedure Type: Dermatology and skin neoplasms    Number of Specimens: 1    ------------------------: -------------------------    Spec 1 Procedure: Biopsy    Spec 1 Clinical Impression: filiform wart r/o scc    Spec 1 Source: left medial cheek    Clinical Information: verrucous filiform papule        Neoplasm of uncertain behavior of skin  -     Specimen to Pathology, Dermatology    Shave biopsy procedure note:    Shave biopsy performed after verbal consent including risk of infection, scar, recurrence, need for additional treatment of site. Area prepped with alcohol, anesthetized with approximately 1.0cc of 1% lidocaine with epinephrine. Lesional tissue shaved with razor blade. Hemostasis achieved with application of aluminum chloride followed by hyfrecation. No complications. Dressing applied. Wound care explained.             Follow up if symptoms worsen or fail to improve.

## 2020-09-04 LAB
FINAL PATHOLOGIC DIAGNOSIS: NORMAL
GROSS: NORMAL
MICROSCOPIC EXAM: NORMAL

## 2020-09-10 ENCOUNTER — TELEPHONE (OUTPATIENT)
Dept: PODIATRY | Facility: CLINIC | Age: 70
End: 2020-09-10

## 2020-09-10 NOTE — TELEPHONE ENCOUNTER
----- Message from Payton Flood MA sent at 9/10/2020 10:10 AM CDT -----  Type: Needs Medical Advice  Who Called:  Maggy  Elieser Call Back Number: [phone]  Additional Information: patient is requesting that the order for her shoes [facility]   PH:

## 2020-09-15 ENCOUNTER — TELEPHONE (OUTPATIENT)
Dept: PODIATRY | Facility: CLINIC | Age: 70
End: 2020-09-15

## 2020-09-15 NOTE — TELEPHONE ENCOUNTER
----- Message from Renee Winn sent at 9/15/2020 12:11 PM CDT -----  Type: Needs Medical Advice  Who Called:  pt  Symptoms (please be specific):    How long has patient had these symptoms:    Pharmacy name and phone #:    Best Call Back Number:   Additional Information: asking for a callback in regards of where do she get her diabetic shoes. Please contact pt

## 2020-10-28 ENCOUNTER — OFFICE VISIT (OUTPATIENT)
Dept: PSYCHIATRY | Facility: CLINIC | Age: 70
End: 2020-10-28
Payer: COMMERCIAL

## 2020-10-28 VITALS
WEIGHT: 199.63 LBS | HEIGHT: 61 IN | SYSTOLIC BLOOD PRESSURE: 143 MMHG | HEART RATE: 85 BPM | TEMPERATURE: 98 F | DIASTOLIC BLOOD PRESSURE: 90 MMHG | BODY MASS INDEX: 37.69 KG/M2

## 2020-10-28 DIAGNOSIS — F41.9 ANXIETY DISORDER, UNSPECIFIED TYPE: Primary | ICD-10-CM

## 2020-10-28 DIAGNOSIS — F39 MOOD DISORDER: ICD-10-CM

## 2020-10-28 PROCEDURE — 99999 PR PBB SHADOW E&M-EST. PATIENT-LVL V: ICD-10-PCS | Mod: PBBFAC,,, | Performed by: PHYSICIAN ASSISTANT

## 2020-10-28 PROCEDURE — 99499 UNLISTED E&M SERVICE: CPT | Mod: S$GLB,,, | Performed by: PHYSICIAN ASSISTANT

## 2020-10-28 PROCEDURE — 90792 PR PSYCHIATRIC DIAGNOSTIC EVALUATION W/MEDICAL SERVICES: ICD-10-PCS | Mod: S$GLB,,, | Performed by: PHYSICIAN ASSISTANT

## 2020-10-28 PROCEDURE — 90792 PSYCH DIAG EVAL W/MED SRVCS: CPT | Mod: S$GLB,,, | Performed by: PHYSICIAN ASSISTANT

## 2020-10-28 PROCEDURE — 99999 PR PBB SHADOW E&M-EST. PATIENT-LVL V: CPT | Mod: PBBFAC,,, | Performed by: PHYSICIAN ASSISTANT

## 2020-10-28 PROCEDURE — 99499 RISK ADDL DX/OHS AUDIT: ICD-10-PCS | Mod: S$GLB,,, | Performed by: PHYSICIAN ASSISTANT

## 2020-10-28 RX ORDER — PRAZOSIN HYDROCHLORIDE 5 MG/1
CAPSULE ORAL
COMMUNITY
Start: 2020-08-30 | End: 2021-05-13 | Stop reason: SDUPTHER
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
## 2020-11-03 ENCOUNTER — LAB VISIT (OUTPATIENT)
Dept: LAB | Facility: HOSPITAL | Age: 70
End: 2020-11-03
Attending: PHYSICIAN ASSISTANT
Payer: MEDICARE

## 2020-11-03 DIAGNOSIS — Z79.4 TYPE 2 DIABETES MELLITUS WITHOUT COMPLICATION, WITH LONG-TERM CURRENT USE OF INSULIN: ICD-10-CM

## 2020-11-03 DIAGNOSIS — E11.9 TYPE 2 DIABETES MELLITUS WITHOUT COMPLICATION, WITH LONG-TERM CURRENT USE OF INSULIN: ICD-10-CM

## 2020-11-03 LAB
ALBUMIN SERPL BCP-MCNC: 3.1 G/DL (ref 3.5–5.2)
ANION GAP SERPL CALC-SCNC: 10 MMOL/L (ref 8–16)
BUN SERPL-MCNC: 19 MG/DL (ref 8–23)
CALCIUM SERPL-MCNC: 9.3 MG/DL (ref 8.7–10.5)
CHLORIDE SERPL-SCNC: 104 MMOL/L (ref 95–110)
CO2 SERPL-SCNC: 26 MMOL/L (ref 23–29)
CREAT SERPL-MCNC: 0.9 MG/DL (ref 0.5–1.4)
EST. GFR  (AFRICAN AMERICAN): >60 ML/MIN/1.73 M^2
EST. GFR  (NON AFRICAN AMERICAN): >60 ML/MIN/1.73 M^2
ESTIMATED AVG GLUCOSE: 220 MG/DL (ref 68–131)
GLUCOSE SERPL-MCNC: 214 MG/DL (ref 70–110)
HBA1C MFR BLD HPLC: 9.3 % (ref 4–5.6)
PHOSPHATE SERPL-MCNC: 3.2 MG/DL (ref 2.7–4.5)
POTASSIUM SERPL-SCNC: 4.1 MMOL/L (ref 3.5–5.1)
SODIUM SERPL-SCNC: 140 MMOL/L (ref 136–145)

## 2020-11-03 PROCEDURE — 84443 ASSAY THYROID STIM HORMONE: CPT

## 2020-11-03 PROCEDURE — 84439 ASSAY OF FREE THYROXINE: CPT

## 2020-11-03 PROCEDURE — 36415 COLL VENOUS BLD VENIPUNCTURE: CPT | Mod: PO

## 2020-11-03 PROCEDURE — 83036 HEMOGLOBIN GLYCOSYLATED A1C: CPT

## 2020-11-03 PROCEDURE — 80069 RENAL FUNCTION PANEL: CPT

## 2020-11-04 LAB
T4 FREE SERPL-MCNC: 1.18 NG/DL (ref 0.71–1.51)
TSH SERPL DL<=0.005 MIU/L-ACNC: 1.9 UIU/ML (ref 0.4–4)

## 2020-11-09 ENCOUNTER — PATIENT OUTREACH (OUTPATIENT)
Dept: ADMINISTRATIVE | Facility: OTHER | Age: 70
End: 2020-11-09

## 2020-11-09 NOTE — PROGRESS NOTES
Chart was reviewed for overdue [facility]active [facility] Encounters (PAT[facility])  topics  Updates were requested from care everywhere  Health Maintenance has been updated  LINKS immunization registry triggered

## 2020-11-10 ENCOUNTER — OFFICE VISIT (OUTPATIENT)
Dept: ENDOCRINOLOGY | Facility: CLINIC | Age: 70
End: 2020-11-10
Payer: MEDICARE

## 2020-11-10 VITALS
SYSTOLIC BLOOD PRESSURE: 130 MMHG | DIASTOLIC BLOOD PRESSURE: 86 MMHG | BODY MASS INDEX: 37.04 KG/M2 | WEIGHT: 196.19 LBS | TEMPERATURE: 97 F | HEIGHT: 61 IN | HEART RATE: 106 BPM

## 2020-11-10 DIAGNOSIS — G47.33 OSA (OBSTRUCTIVE SLEEP APNEA): ICD-10-CM

## 2020-11-10 DIAGNOSIS — E55.9 HYPOVITAMINOSIS D: ICD-10-CM

## 2020-11-10 DIAGNOSIS — R06.02 SHORTNESS OF BREATH: ICD-10-CM

## 2020-11-10 DIAGNOSIS — Z79.4 TYPE 2 DIABETES MELLITUS WITHOUT COMPLICATION, WITH LONG-TERM CURRENT USE OF INSULIN: Primary | ICD-10-CM

## 2020-11-10 DIAGNOSIS — F32.A DEPRESSION, UNSPECIFIED DEPRESSION TYPE: ICD-10-CM

## 2020-11-10 DIAGNOSIS — E11.9 TYPE 2 DIABETES MELLITUS WITHOUT COMPLICATION, WITH LONG-TERM CURRENT USE OF INSULIN: Primary | ICD-10-CM

## 2020-11-10 DIAGNOSIS — M85.80 OSTEOPENIA, UNSPECIFIED LOCATION: ICD-10-CM

## 2020-11-10 DIAGNOSIS — E03.9 ACQUIRED HYPOTHYROIDISM: ICD-10-CM

## 2020-11-10 DIAGNOSIS — L98.9 SKIN LESION: ICD-10-CM

## 2020-11-10 PROCEDURE — 3075F SYST BP GE 130 - 139MM HG: CPT | Mod: CPTII,S$GLB,, | Performed by: PHYSICIAN ASSISTANT

## 2020-11-10 PROCEDURE — 3079F PR MOST RECENT DIASTOLIC BLOOD PRESSURE 80-89 MM HG: ICD-10-PCS | Mod: CPTII,S$GLB,, | Performed by: PHYSICIAN ASSISTANT

## 2020-11-10 PROCEDURE — 1101F PT FALLS ASSESS-DOCD LE1/YR: CPT | Mod: CPTII,S$GLB,, | Performed by: PHYSICIAN ASSISTANT

## 2020-11-10 PROCEDURE — 1125F AMNT PAIN NOTED PAIN PRSNT: CPT | Mod: S$GLB,,, | Performed by: PHYSICIAN ASSISTANT

## 2020-11-10 PROCEDURE — 3075F PR MOST RECENT SYSTOLIC BLOOD PRESS GE 130-139MM HG: ICD-10-PCS | Mod: CPTII,S$GLB,, | Performed by: PHYSICIAN ASSISTANT

## 2020-11-10 PROCEDURE — 3046F PR MOST RECENT HEMOGLOBIN A1C LEVEL > 9.0%: ICD-10-PCS | Mod: CPTII,S$GLB,, | Performed by: PHYSICIAN ASSISTANT

## 2020-11-10 PROCEDURE — 3008F PR BODY MASS INDEX (BMI) DOCUMENTED: ICD-10-PCS | Mod: CPTII,S$GLB,, | Performed by: PHYSICIAN ASSISTANT

## 2020-11-10 PROCEDURE — 1159F PR MEDICATION LIST DOCUMENTED IN MEDICAL RECORD: ICD-10-PCS | Mod: S$GLB,,, | Performed by: PHYSICIAN ASSISTANT

## 2020-11-10 PROCEDURE — 3046F HEMOGLOBIN A1C LEVEL >9.0%: CPT | Mod: CPTII,S$GLB,, | Performed by: PHYSICIAN ASSISTANT

## 2020-11-10 PROCEDURE — 3008F BODY MASS INDEX DOCD: CPT | Mod: CPTII,S$GLB,, | Performed by: PHYSICIAN ASSISTANT

## 2020-11-10 PROCEDURE — 1101F PR PT FALLS ASSESS DOC 0-1 FALLS W/OUT INJ PAST YR: ICD-10-PCS | Mod: CPTII,S$GLB,, | Performed by: PHYSICIAN ASSISTANT

## 2020-11-10 PROCEDURE — 99214 OFFICE O/P EST MOD 30 MIN: CPT | Mod: S$GLB,,, | Performed by: PHYSICIAN ASSISTANT

## 2020-11-10 PROCEDURE — 3079F DIAST BP 80-89 MM HG: CPT | Mod: CPTII,S$GLB,, | Performed by: PHYSICIAN ASSISTANT

## 2020-11-10 PROCEDURE — 1159F MED LIST DOCD IN RCRD: CPT | Mod: S$GLB,,, | Performed by: PHYSICIAN ASSISTANT

## 2020-11-10 PROCEDURE — 99214 PR OFFICE/OUTPT VISIT, EST, LEVL IV, 30-39 MIN: ICD-10-PCS | Mod: S$GLB,,, | Performed by: PHYSICIAN ASSISTANT

## 2020-11-10 PROCEDURE — 99999 PR PBB SHADOW E&M-EST. PATIENT-LVL V: ICD-10-PCS | Mod: PBBFAC,,, | Performed by: PHYSICIAN ASSISTANT

## 2020-11-10 PROCEDURE — 99999 PR PBB SHADOW E&M-EST. PATIENT-LVL V: CPT | Mod: PBBFAC,,, | Performed by: PHYSICIAN ASSISTANT

## 2020-11-10 PROCEDURE — 1125F PR PAIN SEVERITY QUANTIFIED, PAIN PRESENT: ICD-10-PCS | Mod: S$GLB,,, | Performed by: PHYSICIAN ASSISTANT

## 2020-11-10 RX ORDER — DULAGLUTIDE 0.75 MG/.5ML
0.75 INJECTION, SOLUTION SUBCUTANEOUS
Qty: 4 PEN | Refills: 0 | Status: SHIPPED | OUTPATIENT
Start: 2020-11-10 | End: 2020-11-25

## 2020-11-10 NOTE — PATIENT INSTRUCTIONS
Start trulicity 0.75 mg weekly for one month. Then increase the dose to Trulicity 1.5 mg weekly indefinitely. Discussed MOA, side effects, including n/v/pancreatitis/medullary thyroid cancer. Instructed patient to notify me of any n/v/abdominal pain. Continue Metformin.

## 2020-11-10 NOTE — PROGRESS NOTES
CC: DM/Hypothyroidism    HPI: Maggy Tapia was diagnosed with Type 2 DM about 6 years ago. No hospitalizations for DM. Her mother had DM and thyroid disease. Pt states her dog  last week. She is very upset about this and is trying to see someone in psychiatry.    CURRENT DIABETIC MEDS: metformin 1000 mg in the bid    BG readings are checked 2x daily.          Hypoglycemia: Rare  Type of Glucose Meter: True metrix    Physical Activity: None. She was doing exercises every day at home that she learned in PT.    Diet:  BT-cjfzhnjv-be syrup  LH-cheese sandwich  DN-pork roast, fish,   No snacks. Drinks diet pepsi.    Last Eye Exam: -vision 20/20--  Last Podiatry Exam: --Going     Last DM Education Attended:     No ETOH/tobacco use.    Hypothyroidism  Taking 88 mcg for six days of the week.  Dx in 2016  + diarrhea,     No palpitations, hair loss or changes in nails, heat/cold intolerance.  She has tremors.    DEXA  Osteopenia in the left hip. Taking ca +vd. No recent falls, fractures or steriod injections.    JUAN  Wears CPAP    REVIEW OF SYSTEMS  General: no weakness or fatigue, wt loss.   Eyes: no intermittent blurry vision or visual disturbances.   Cardiac: no chest pain or palpitations.   Respiratory: + sob, no cough    GI: no abdominal pain or nausea.   Skin: no rashes or itching.   Musc: + back pain, neck pain  Neuro: no numbness or tingling.   Endocrine: + polyuria, no polydipsia, polyphagia.   Remainder ROS negative    Personally reviewed labs below:  Hemoglobin A1C   Date Value Ref Range Status   2020 9.3 (H) 4.0 - 5.6 % Final     Comment:     ADA Screening Guidelines:  5.7-6.4%  Consistent with prediabetes  >or=6.5%  Consistent with diabetes  High levels of fetal hemoglobin interfere with the HbA1C  assay. Heterozygous hemoglobin variants (HbS, HgC, etc)do  not significantly interfere with this assay.   However, presence of multiple variants may affect accuracy.     08/10/2020  8.2 (H) 4.0 - 5.6 % Final     Comment:     ADA Screening Guidelines:  5.7-6.4%  Consistent with prediabetes  >or=6.5%  Consistent with diabetes  High levels of fetal hemoglobin interfere with the HbA1C  assay. Heterozygous hemoglobin variants (HbS, HgC, etc)do  not significantly interfere with this assay.   However, presence of multiple variants may affect accuracy.     05/04/2020 6.4 (H) 4.0 - 5.6 % Final     Comment:     ADA Screening Guidelines:  5.7-6.4%  Consistent with prediabetes  >or=6.5%  Consistent with diabetes  High levels of fetal hemoglobin interfere with the HbA1C  assay. Heterozygous hemoglobin variants (HbS, HgC, etc)do  not significantly interfere with this assay.   However, presence of multiple variants may affect accuracy.         Chemistry        Component Value Date/Time     11/03/2020 1256    K 4.1 11/03/2020 1256     11/03/2020 1256    CO2 26 11/03/2020 1256    BUN 19 11/03/2020 1256    CREATININE 0.9 11/03/2020 1256     (H) 11/03/2020 1256        Component Value Date/Time    CALCIUM 9.3 11/03/2020 1256    ALKPHOS 105 08/10/2020 1532    AST 12 08/10/2020 1532    ALT 12 08/10/2020 1532    BILITOT 0.3 08/10/2020 1532    ESTGFRAFRICA >60.0 11/03/2020 1256    EGFRNONAA >60.0 11/03/2020 1256        Lab Results   Component Value Date    CHOL 197 08/10/2020    CHOL 149 07/10/2019    CHOL 127 04/01/2019     Lab Results   Component Value Date    HDL 37 (L) 08/10/2020    HDL 50 07/10/2019    HDL 45 04/01/2019     Lab Results   Component Value Date    LDLCALC Invalid, Trig>400.0 08/10/2020    LDLCALC 77.2 07/10/2019    LDLCALC 58.2 (L) 04/01/2019     Lab Results   Component Value Date    TRIG 455 (H) 08/10/2020    TRIG 109 07/10/2019    TRIG 119 04/01/2019     Lab Results   Component Value Date    CHOLHDL 18.8 (L) 08/10/2020    CHOLHDL 33.6 07/10/2019    CHOLHDL 35.4 04/01/2019     Lab Results   Component Value Date    TSH 1.897 11/03/2020     Lab Results   Component Value Date     MICALBCREAT 6.7 05/04/2020       Vit D, 25-Hydroxy   Date Value Ref Range Status   08/10/2020 20 (L) 30 - 96 ng/mL Final     Comment:     Vitamin D deficiency.........<10 ng/mL                              Vitamin D insufficiency......10-29 ng/mL       Vitamin D sufficiency........> or equal to 30 ng/mL  Vitamin D toxicity............>100 ng/mL       PHYSICAL EXAMINATION  Constitutional: elderly female, appears well, no distress  Neck: Supple, trachea midline.   Respiratory: even, CTA without wheezes or rales.  Cardiovascular: RRR; no carotid bruits or murmurs.   Lymph: DP pulses  2+ bilaterally; 1+ edema bl.   Skin: +mole on face, warm and dry; no acanthosis nigracans observed.  Abdomen: soft, non-distended. Obese abdomen.  Musc: ambulates with a walker  Neuro: patient alert and cooperative; CN 2-12 grossly intact  Feet: appropriate footwear.    Assessment/Plan    1. Type 2 diabetes mellitus without complication, with long-term current use of insulin  Renal Function Panel    T4, Free    TSH   2. Acquired hypothyroidism     3. Osteopenia, unspecified location     4. JUAN (obstructive sleep apnea)     5. Skin lesion     6. Depression, unspecified depression type     7. Hypovitaminosis D  Vitamin D   8. Shortness of breath  Ambulatory referral/consult to Cardiology     T2DM- A1c is elevated. Start trulicity 0.75 mg weekly for one month. Then increase the dose to Trulicity 1.5 mg weekly indefinitely. Discussed MOA, side effects, including n/v/pancreatitis/medullary thyroid cancer. Instructed patient to notify me of any n/v/abdominal pain. Discussed proper dosing and administration.    . Readings are elevated.  BG checks 2x daily.   Hypothyroidism-TFTs are stable. Continue LT4 dose.  Osteopenia-repeat DEXA 4/21. Continue exercises in PT.    GMW-ishvoy-cmrzmooj cpap  SOB-referral to cardiology      F/u in 3 mths w/ labs prior

## 2020-11-25 RX ORDER — DULAGLUTIDE 0.75 MG/.5ML
0.75 INJECTION, SOLUTION SUBCUTANEOUS
Qty: 4 PEN | Refills: 0 | Status: CANCELLED | OUTPATIENT
Start: 2020-11-25

## 2020-11-27 ENCOUNTER — OFFICE VISIT (OUTPATIENT)
Dept: FAMILY MEDICINE | Facility: CLINIC | Age: 70
End: 2020-11-27
Payer: MEDICARE

## 2020-11-27 VITALS
BODY MASS INDEX: 36.42 KG/M2 | TEMPERATURE: 98 F | HEART RATE: 102 BPM | OXYGEN SATURATION: 95 % | DIASTOLIC BLOOD PRESSURE: 82 MMHG | HEIGHT: 61 IN | WEIGHT: 192.88 LBS | SYSTOLIC BLOOD PRESSURE: 140 MMHG

## 2020-11-27 DIAGNOSIS — Z79.4 TYPE 2 DIABETES MELLITUS WITHOUT COMPLICATION, WITH LONG-TERM CURRENT USE OF INSULIN: Primary | ICD-10-CM

## 2020-11-27 DIAGNOSIS — I10 ESSENTIAL HYPERTENSION: ICD-10-CM

## 2020-11-27 DIAGNOSIS — E78.5 HYPERLIPIDEMIA, UNSPECIFIED HYPERLIPIDEMIA TYPE: ICD-10-CM

## 2020-11-27 DIAGNOSIS — E55.9 VITAMIN D DEFICIENCY: ICD-10-CM

## 2020-11-27 DIAGNOSIS — E03.9 ACQUIRED HYPOTHYROIDISM: Chronic | ICD-10-CM

## 2020-11-27 DIAGNOSIS — E11.9 TYPE 2 DIABETES MELLITUS WITHOUT COMPLICATION, WITH LONG-TERM CURRENT USE OF INSULIN: Primary | ICD-10-CM

## 2020-11-27 DIAGNOSIS — R06.09 DOE (DYSPNEA ON EXERTION): ICD-10-CM

## 2020-11-27 DIAGNOSIS — Z23 FLU VACCINE NEED: ICD-10-CM

## 2020-11-27 DIAGNOSIS — N32.81 OAB (OVERACTIVE BLADDER): ICD-10-CM

## 2020-11-27 DIAGNOSIS — F44.5 PSEUDOSEIZURES: Chronic | ICD-10-CM

## 2020-11-27 DIAGNOSIS — E66.01 SEVERE OBESITY (BMI 35.0-35.9 WITH COMORBIDITY): ICD-10-CM

## 2020-11-27 PROCEDURE — 1101F PR PT FALLS ASSESS DOC 0-1 FALLS W/OUT INJ PAST YR: ICD-10-PCS | Mod: CPTII,S$GLB,, | Performed by: FAMILY MEDICINE

## 2020-11-27 PROCEDURE — 99999 PR PBB SHADOW E&M-EST. PATIENT-LVL IV: CPT | Mod: PBBFAC,,, | Performed by: FAMILY MEDICINE

## 2020-11-27 PROCEDURE — G0008 FLU VACCINE - HIGH DOSE (65+) PRESERVATIVE FREE IM: ICD-10-PCS | Mod: S$GLB,,, | Performed by: FAMILY MEDICINE

## 2020-11-27 PROCEDURE — 99214 PR OFFICE/OUTPT VISIT, EST, LEVL IV, 30-39 MIN: ICD-10-PCS | Mod: 25,S$GLB,, | Performed by: FAMILY MEDICINE

## 2020-11-27 PROCEDURE — 90662 IIV NO PRSV INCREASED AG IM: CPT | Mod: S$GLB,,, | Performed by: FAMILY MEDICINE

## 2020-11-27 PROCEDURE — 90662 FLU VACCINE - HIGH DOSE (65+) PRESERVATIVE FREE IM: ICD-10-PCS | Mod: S$GLB,,, | Performed by: FAMILY MEDICINE

## 2020-11-27 PROCEDURE — 3079F PR MOST RECENT DIASTOLIC BLOOD PRESSURE 80-89 MM HG: ICD-10-PCS | Mod: CPTII,S$GLB,, | Performed by: FAMILY MEDICINE

## 2020-11-27 PROCEDURE — 99214 OFFICE O/P EST MOD 30 MIN: CPT | Mod: 25,S$GLB,, | Performed by: FAMILY MEDICINE

## 2020-11-27 PROCEDURE — 3072F LOW RISK FOR RETINOPATHY: CPT | Mod: S$GLB,,, | Performed by: FAMILY MEDICINE

## 2020-11-27 PROCEDURE — G0008 ADMIN INFLUENZA VIRUS VAC: HCPCS | Mod: S$GLB,,, | Performed by: FAMILY MEDICINE

## 2020-11-27 PROCEDURE — 1101F PT FALLS ASSESS-DOCD LE1/YR: CPT | Mod: CPTII,S$GLB,, | Performed by: FAMILY MEDICINE

## 2020-11-27 PROCEDURE — 3079F DIAST BP 80-89 MM HG: CPT | Mod: CPTII,S$GLB,, | Performed by: FAMILY MEDICINE

## 2020-11-27 PROCEDURE — 3046F HEMOGLOBIN A1C LEVEL >9.0%: CPT | Mod: CPTII,S$GLB,, | Performed by: FAMILY MEDICINE

## 2020-11-27 PROCEDURE — 1157F ADVNC CARE PLAN IN RCRD: CPT | Mod: S$GLB,,, | Performed by: FAMILY MEDICINE

## 2020-11-27 PROCEDURE — 1159F PR MEDICATION LIST DOCUMENTED IN MEDICAL RECORD: ICD-10-PCS | Mod: S$GLB,,, | Performed by: FAMILY MEDICINE

## 2020-11-27 PROCEDURE — 3077F PR MOST RECENT SYSTOLIC BLOOD PRESSURE >= 140 MM HG: ICD-10-PCS | Mod: CPTII,S$GLB,, | Performed by: FAMILY MEDICINE

## 2020-11-27 PROCEDURE — 3288F FALL RISK ASSESSMENT DOCD: CPT | Mod: CPTII,S$GLB,, | Performed by: FAMILY MEDICINE

## 2020-11-27 PROCEDURE — 1126F PR PAIN SEVERITY QUANTIFIED, NO PAIN PRESENT: ICD-10-PCS | Mod: S$GLB,,, | Performed by: FAMILY MEDICINE

## 2020-11-27 PROCEDURE — 3072F PR LOW RISK FOR RETINOPATHY: ICD-10-PCS | Mod: S$GLB,,, | Performed by: FAMILY MEDICINE

## 2020-11-27 PROCEDURE — 3046F PR MOST RECENT HEMOGLOBIN A1C LEVEL > 9.0%: ICD-10-PCS | Mod: CPTII,S$GLB,, | Performed by: FAMILY MEDICINE

## 2020-11-27 PROCEDURE — 1159F MED LIST DOCD IN RCRD: CPT | Mod: S$GLB,,, | Performed by: FAMILY MEDICINE

## 2020-11-27 PROCEDURE — 3008F BODY MASS INDEX DOCD: CPT | Mod: CPTII,S$GLB,, | Performed by: FAMILY MEDICINE

## 2020-11-27 PROCEDURE — 1126F AMNT PAIN NOTED NONE PRSNT: CPT | Mod: S$GLB,,, | Performed by: FAMILY MEDICINE

## 2020-11-27 PROCEDURE — 3077F SYST BP >= 140 MM HG: CPT | Mod: CPTII,S$GLB,, | Performed by: FAMILY MEDICINE

## 2020-11-27 PROCEDURE — 1157F PR ADVANCE CARE PLAN OR EQUIV PRESENT IN MEDICAL RECORD: ICD-10-PCS | Mod: S$GLB,,, | Performed by: FAMILY MEDICINE

## 2020-11-27 PROCEDURE — 3288F PR FALLS RISK ASSESSMENT DOCUMENTED: ICD-10-PCS | Mod: CPTII,S$GLB,, | Performed by: FAMILY MEDICINE

## 2020-11-27 PROCEDURE — 3008F PR BODY MASS INDEX (BMI) DOCUMENTED: ICD-10-PCS | Mod: CPTII,S$GLB,, | Performed by: FAMILY MEDICINE

## 2020-11-27 PROCEDURE — 99999 PR PBB SHADOW E&M-EST. PATIENT-LVL IV: ICD-10-PCS | Mod: PBBFAC,,, | Performed by: FAMILY MEDICINE

## 2020-11-27 RX ORDER — OXYBUTYNIN CHLORIDE 15 MG/1
15 TABLET, EXTENDED RELEASE ORAL DAILY
Qty: 90 TABLET | Refills: 3 | Status: SHIPPED | OUTPATIENT
Start: 2020-11-27 | End: 2021-07-23 | Stop reason: ALTCHOICE

## 2020-11-27 RX ORDER — ERGOCALCIFEROL 1.25 MG/1
50000 CAPSULE ORAL
Qty: 12 CAPSULE | Refills: 1 | Status: ON HOLD | OUTPATIENT
Start: 2020-11-27 | End: 2022-02-24 | Stop reason: HOSPADM
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
## 2020-12-05 ENCOUNTER — PATIENT OUTREACH (OUTPATIENT)
Dept: ADMINISTRATIVE | Facility: HOSPITAL | Age: 70
End: 2020-12-05

## 2020-12-06 NOTE — PROGRESS NOTES
Immunizations reviewed. [facility] reviewed. Care Everywhere reviewed.  [facility], [facility], and [facility] Provider Portal reviewed.   Chart review completed.               JV

## 2020-12-16 ENCOUNTER — TELEPHONE (OUTPATIENT)
Dept: CARDIOLOGY | Facility: HOSPITAL | Age: 70
End: 2020-12-16

## 2020-12-17 ENCOUNTER — HOSPITAL ENCOUNTER (OUTPATIENT)
Dept: PULMONOLOGY | Facility: HOSPITAL | Age: 70
Discharge: HOME | End: 2020-12-17
Attending: FAMILY MEDICINE
Payer: MEDICARE

## 2020-12-17 ENCOUNTER — CLINICAL SUPPORT (OUTPATIENT)
Dept: CARDIOLOGY | Facility: HOSPITAL | Age: 70
End: 2020-12-17
Attending: FAMILY MEDICINE
Payer: MEDICARE

## 2020-12-17 VITALS — WEIGHT: 192 LBS | BODY MASS INDEX: 36.25 KG/M2 | HEIGHT: 61 IN

## 2020-12-17 DIAGNOSIS — R06.09 DOE (DYSPNEA ON EXERTION): ICD-10-CM

## 2020-12-17 PROCEDURE — 93306 ECHO (CUPID ONLY): ICD-10-PCS | Mod: 26,,, | Performed by: INTERNAL MEDICINE

## 2020-12-17 PROCEDURE — 94727 GAS DIL/WSHOT DETER LNG VOL: CPT

## 2020-12-17 PROCEDURE — 93306 TTE W/DOPPLER COMPLETE: CPT | Mod: 26,,, | Performed by: INTERNAL MEDICINE

## 2020-12-17 PROCEDURE — 94729 DIFFUSING CAPACITY: CPT

## 2020-12-17 PROCEDURE — 94010 BREATHING CAPACITY TEST: CPT | Mod: XB

## 2020-12-17 PROCEDURE — 94060 EVALUATION OF WHEEZING: CPT

## 2020-12-17 PROCEDURE — 93306 TTE W/DOPPLER COMPLETE: CPT

## 2020-12-23 ENCOUNTER — TELEPHONE (OUTPATIENT)
Dept: FAMILY MEDICINE | Facility: CLINIC | Age: 70
End: 2020-12-23

## 2020-12-23 NOTE — TELEPHONE ENCOUNTER
Echocardiogram and PFT performed on 12-. Patient is requesting results. Please advise. Thank you.             ----- Message from Aguila Villanueva sent at 12/23/2020  8:12 AM CST -----  Type: Needs Medical Advice  Who Called:  Patient    Best Call Back Number: [phone]  Additional Information: Patient states that she would like a callback regarding her test results.

## 2020-12-24 ENCOUNTER — TELEPHONE (OUTPATIENT)
Dept: FAMILY MEDICINE | Facility: CLINIC | Age: 70
End: 2020-12-24

## 2020-12-24 LAB
AORTIC ROOT ANNULUS: 2.98 CM
AORTIC VALVE CUSP SEPERATION: 1.89 CM
AV INDEX (PROSTH): 0.82
AV MEAN GRADIENT: 4 MMHG
AV PEAK GRADIENT: 6 MMHG
AV VALVE AREA: 2.64 CM2
AV VELOCITY RATIO: 78.79
BSA FOR ECHO PROCEDURE: 1.94 M2
CV ECHO LV RWT: 0.68 CM
DOP CALC AO PEAK VEL: 1.21 M/S
DOP CALC AO VTI: 21.59 CM
DOP CALC LVOT AREA: 3.2 CM2
DOP CALC LVOT DIAMETER: 2.02 CM
DOP CALC LVOT PEAK VEL: 95.33 M/S
DOP CALC LVOT STROKE VOLUME: 57.02 CM3
DOP CALCLVOT PEAK VEL VTI: 17.8 CM
E WAVE DECELERATION TIME: 246.87 MSEC
E/A RATIO: 0.48
E/E' RATIO: 9.64 M/S
ECHO LV POSTERIOR WALL: 1.08 CM (ref 0.6–1.1)
FRACTIONAL SHORTENING: 23 % (ref 28–44)
INTERVENTRICULAR SEPTUM: 1.27 CM (ref 0.6–1.1)
LEFT ATRIUM SIZE: 3.55 CM
LEFT INTERNAL DIMENSION IN SYSTOLE: 2.44 CM (ref 2.1–4)
LEFT VENTRICLE MASS INDEX: 62 G/M2
LEFT VENTRICULAR INTERNAL DIMENSION IN DIASTOLE: 3.18 CM (ref 3.5–6)
LEFT VENTRICULAR MASS: 114.55 G
LV LATERAL E/E' RATIO: 8.83 M/S
LV SEPTAL E/E' RATIO: 10.6 M/S
MV PEAK A VEL: 1.1 M/S
MV PEAK E VEL: 0.53 M/S
PV PEAK VELOCITY: 85.56 CM/S
RA PRESSURE: 3 MMHG
TDI LATERAL: 0.06 M/S
TDI SEPTAL: 0.05 M/S
TDI: 0.06 M/S

## 2020-12-24 NOTE — TELEPHONE ENCOUNTER
Returned call to pt in regards to Echo results. Informed pt we still do not have those results, they are in process. Informed her once they are finalized by the doctor we will call her. Pt verbalized understanding.

## 2020-12-24 NOTE — TELEPHONE ENCOUNTER
----- Message from Cheli Coon sent at 12/24/2020  8:45 AM CST -----  Regarding: returning call  Contact: patient  Type:  Patient Returning Call    Who Called:  Patient   Who Left Message for Patient:  Notsure  Does the patient know what this is regarding?:  No  Best Call Back Number:  [phone] (home)

## 2020-12-30 ENCOUNTER — OFFICE VISIT (OUTPATIENT)
Dept: OPTOMETRY | Facility: CLINIC | Age: 70
End: 2020-12-30
Payer: MEDICARE

## 2020-12-30 DIAGNOSIS — H53.462 LEFT HOMONYMOUS HEMIANOPSIA: ICD-10-CM

## 2020-12-30 DIAGNOSIS — E11.9 TYPE 2 DIABETES MELLITUS WITHOUT RETINOPATHY: Primary | ICD-10-CM

## 2020-12-30 DIAGNOSIS — Z96.1 PSEUDOPHAKIA OF BOTH EYES: ICD-10-CM

## 2020-12-30 LAB
LEFT EYE DM RETINOPATHY: NEGATIVE
RIGHT EYE DM RETINOPATHY: NEGATIVE

## 2020-12-30 PROCEDURE — 1126F PR PAIN SEVERITY QUANTIFIED, NO PAIN PRESENT: ICD-10-PCS | Mod: S$GLB,,, | Performed by: OPTOMETRIST

## 2020-12-30 PROCEDURE — 1126F AMNT PAIN NOTED NONE PRSNT: CPT | Mod: S$GLB,,, | Performed by: OPTOMETRIST

## 2020-12-30 PROCEDURE — 92014 COMPRE OPH EXAM EST PT 1/>: CPT | Mod: S$GLB,,, | Performed by: OPTOMETRIST

## 2020-12-30 PROCEDURE — 99999 PR PBB SHADOW E&M-EST. PATIENT-LVL III: CPT | Mod: PBBFAC,,, | Performed by: OPTOMETRIST

## 2020-12-30 PROCEDURE — 2023F PR DILATED RETINAL EXAM W/O EVID OF RETINOPATHY: ICD-10-PCS | Mod: S$GLB,,, | Performed by: OPTOMETRIST

## 2020-12-30 PROCEDURE — 99999 PR PBB SHADOW E&M-EST. PATIENT-LVL III: ICD-10-PCS | Mod: PBBFAC,,, | Performed by: OPTOMETRIST

## 2020-12-30 PROCEDURE — 2023F DILAT RTA XM W/O RTNOPTHY: CPT | Mod: S$GLB,,, | Performed by: OPTOMETRIST

## 2020-12-30 PROCEDURE — 1157F ADVNC CARE PLAN IN RCRD: CPT | Mod: S$GLB,,, | Performed by: OPTOMETRIST

## 2020-12-30 PROCEDURE — 3288F FALL RISK ASSESSMENT DOCD: CPT | Mod: CPTII,S$GLB,, | Performed by: OPTOMETRIST

## 2020-12-30 PROCEDURE — 1101F PT FALLS ASSESS-DOCD LE1/YR: CPT | Mod: CPTII,S$GLB,, | Performed by: OPTOMETRIST

## 2020-12-30 PROCEDURE — 3288F PR FALLS RISK ASSESSMENT DOCUMENTED: ICD-10-PCS | Mod: CPTII,S$GLB,, | Performed by: OPTOMETRIST

## 2020-12-30 PROCEDURE — 92014 PR EYE EXAM, EST PATIENT,COMPREHESV: ICD-10-PCS | Mod: S$GLB,,, | Performed by: OPTOMETRIST

## 2020-12-30 PROCEDURE — 1157F PR ADVANCE CARE PLAN OR EQUIV PRESENT IN MEDICAL RECORD: ICD-10-PCS | Mod: S$GLB,,, | Performed by: OPTOMETRIST

## 2020-12-30 PROCEDURE — 1101F PR PT FALLS ASSESS DOC 0-1 FALLS W/OUT INJ PAST YR: ICD-10-PCS | Mod: CPTII,S$GLB,, | Performed by: OPTOMETRIST

## 2020-12-30 NOTE — PROGRESS NOTES
HPI     Diabetic Eye Exam      Additional comments: DM exam, last A1C was 9.3 on 11*/3/2020//              Comments     DM exam, last A1C was Hemoglobin A1C       Date                     Value               Ref Range             Status                11/03/2020               9.3 (H)             4.0 - 5.6 %           Final                     08/10/2020               8.2 (H)             4.0 - 5.6 %           Final                     05/04/2020               6.4 (H)             4.0 - 5.6 %           Final                --Diabetic eye exam, happy with glasses--------    No blurred VA  No pain  No flashes   Pos for floaters          Last edited by Grayson Leos, OD on 12/30/2020  9:33 AM. (History)              Assessment /Plan     For exam results, see Encounter Report.    Type 2 diabetes mellitus without retinopathy    Left homonymous hemianopsia    Pseudophakia of both eyes      1. No diabetic retinopathy, no csme. Return in 1 year for dilated eye exam.  2. No findings with CVF today.  3. Monitor condition. Patient to report any changes. RTC 1 year recheck.

## 2021-01-07 ENCOUNTER — TELEPHONE (OUTPATIENT)
Dept: FAMILY MEDICINE | Facility: CLINIC | Age: 71
End: 2021-01-07

## 2021-02-03 ENCOUNTER — LAB VISIT (OUTPATIENT)
Dept: LAB | Facility: HOSPITAL | Age: 71
End: 2021-02-03
Attending: PHYSICIAN ASSISTANT
Payer: MEDICARE

## 2021-02-03 DIAGNOSIS — E55.9 HYPOVITAMINOSIS D: ICD-10-CM

## 2021-02-03 DIAGNOSIS — Z79.4 TYPE 2 DIABETES MELLITUS WITHOUT COMPLICATION, WITH LONG-TERM CURRENT USE OF INSULIN: ICD-10-CM

## 2021-02-03 DIAGNOSIS — E11.9 TYPE 2 DIABETES MELLITUS WITHOUT COMPLICATION, WITH LONG-TERM CURRENT USE OF INSULIN: ICD-10-CM

## 2021-02-03 PROCEDURE — 36415 COLL VENOUS BLD VENIPUNCTURE: CPT | Mod: PO

## 2021-02-03 PROCEDURE — 84439 ASSAY OF FREE THYROXINE: CPT

## 2021-02-03 PROCEDURE — 82306 VITAMIN D 25 HYDROXY: CPT

## 2021-02-03 PROCEDURE — 80069 RENAL FUNCTION PANEL: CPT

## 2021-02-03 PROCEDURE — 84443 ASSAY THYROID STIM HORMONE: CPT

## 2021-02-04 LAB
25(OH)D3+25(OH)D2 SERPL-MCNC: 26 NG/ML (ref 30–96)
ALBUMIN SERPL BCP-MCNC: 3.5 G/DL (ref 3.5–5.2)
ANION GAP SERPL CALC-SCNC: 15 MMOL/L (ref 8–16)
BUN SERPL-MCNC: 20 MG/DL (ref 8–23)
CALCIUM SERPL-MCNC: 9.7 MG/DL (ref 8.7–10.5)
CHLORIDE SERPL-SCNC: 107 MMOL/L (ref 95–110)
CO2 SERPL-SCNC: 20 MMOL/L (ref 23–29)
CREAT SERPL-MCNC: 1 MG/DL (ref 0.5–1.4)
EST. GFR  (AFRICAN AMERICAN): >60 ML/MIN/1.73 M^2
EST. GFR  (NON AFRICAN AMERICAN): 56 ML/MIN/1.73 M^2
GLUCOSE SERPL-MCNC: 124 MG/DL (ref 70–110)
PHOSPHATE SERPL-MCNC: 3.2 MG/DL (ref 2.7–4.5)
POTASSIUM SERPL-SCNC: 4.4 MMOL/L (ref 3.5–5.1)
SODIUM SERPL-SCNC: 142 MMOL/L (ref 136–145)
T4 FREE SERPL-MCNC: 1.31 NG/DL (ref 0.71–1.51)
TSH SERPL DL<=0.005 MIU/L-ACNC: 1.2 UIU/ML (ref 0.4–4)

## 2021-02-10 ENCOUNTER — PATIENT OUTREACH (OUTPATIENT)
Dept: ADMINISTRATIVE | Facility: OTHER | Age: 71
End: 2021-02-10

## 2021-02-10 ENCOUNTER — LAB VISIT (OUTPATIENT)
Dept: LAB | Facility: HOSPITAL | Age: 71
End: 2021-02-10
Attending: PHYSICIAN ASSISTANT
Payer: MEDICARE

## 2021-02-10 ENCOUNTER — OFFICE VISIT (OUTPATIENT)
Dept: ENDOCRINOLOGY | Facility: CLINIC | Age: 71
End: 2021-02-10
Payer: MEDICARE

## 2021-02-10 VITALS
HEART RATE: 96 BPM | HEIGHT: 61 IN | TEMPERATURE: 97 F | DIASTOLIC BLOOD PRESSURE: 86 MMHG | WEIGHT: 185.44 LBS | OXYGEN SATURATION: 95 % | SYSTOLIC BLOOD PRESSURE: 120 MMHG | BODY MASS INDEX: 35.01 KG/M2

## 2021-02-10 DIAGNOSIS — E11.9 TYPE 2 DIABETES MELLITUS WITHOUT COMPLICATION, WITH LONG-TERM CURRENT USE OF INSULIN: Primary | ICD-10-CM

## 2021-02-10 DIAGNOSIS — Z79.4 TYPE 2 DIABETES MELLITUS WITHOUT COMPLICATION, WITH LONG-TERM CURRENT USE OF INSULIN: Primary | ICD-10-CM

## 2021-02-10 DIAGNOSIS — E03.9 ACQUIRED HYPOTHYROIDISM: ICD-10-CM

## 2021-02-10 DIAGNOSIS — Z79.4 TYPE 2 DIABETES MELLITUS WITHOUT COMPLICATION, WITH LONG-TERM CURRENT USE OF INSULIN: ICD-10-CM

## 2021-02-10 DIAGNOSIS — G47.33 OSA (OBSTRUCTIVE SLEEP APNEA): ICD-10-CM

## 2021-02-10 DIAGNOSIS — E11.9 TYPE 2 DIABETES MELLITUS WITHOUT COMPLICATION, WITH LONG-TERM CURRENT USE OF INSULIN: ICD-10-CM

## 2021-02-10 DIAGNOSIS — R06.02 SHORTNESS OF BREATH: ICD-10-CM

## 2021-02-10 DIAGNOSIS — Z78.0 POSTMENOPAUSAL: ICD-10-CM

## 2021-02-10 DIAGNOSIS — M85.80 OSTEOPENIA, UNSPECIFIED LOCATION: ICD-10-CM

## 2021-02-10 LAB
ESTIMATED AVG GLUCOSE: 143 MG/DL (ref 68–131)
HBA1C MFR BLD: 6.6 % (ref 4–5.6)

## 2021-02-10 PROCEDURE — 3074F SYST BP LT 130 MM HG: CPT | Mod: CPTII,S$GLB,, | Performed by: PHYSICIAN ASSISTANT

## 2021-02-10 PROCEDURE — 99999 PR PBB SHADOW E&M-EST. PATIENT-LVL V: ICD-10-PCS | Mod: PBBFAC,,, | Performed by: PHYSICIAN ASSISTANT

## 2021-02-10 PROCEDURE — 3044F PR MOST RECENT HEMOGLOBIN A1C LEVEL <7.0%: ICD-10-PCS | Mod: CPTII,S$GLB,, | Performed by: PHYSICIAN ASSISTANT

## 2021-02-10 PROCEDURE — 1159F PR MEDICATION LIST DOCUMENTED IN MEDICAL RECORD: ICD-10-PCS | Mod: S$GLB,,, | Performed by: PHYSICIAN ASSISTANT

## 2021-02-10 PROCEDURE — 1101F PT FALLS ASSESS-DOCD LE1/YR: CPT | Mod: CPTII,S$GLB,, | Performed by: PHYSICIAN ASSISTANT

## 2021-02-10 PROCEDURE — 3072F PR LOW RISK FOR RETINOPATHY: ICD-10-PCS | Mod: S$GLB,,, | Performed by: PHYSICIAN ASSISTANT

## 2021-02-10 PROCEDURE — 1159F MED LIST DOCD IN RCRD: CPT | Mod: S$GLB,,, | Performed by: PHYSICIAN ASSISTANT

## 2021-02-10 PROCEDURE — 1126F PR PAIN SEVERITY QUANTIFIED, NO PAIN PRESENT: ICD-10-PCS | Mod: S$GLB,,, | Performed by: PHYSICIAN ASSISTANT

## 2021-02-10 PROCEDURE — 1101F PR PT FALLS ASSESS DOC 0-1 FALLS W/OUT INJ PAST YR: ICD-10-PCS | Mod: CPTII,S$GLB,, | Performed by: PHYSICIAN ASSISTANT

## 2021-02-10 PROCEDURE — 3044F HG A1C LEVEL LT 7.0%: CPT | Mod: CPTII,S$GLB,, | Performed by: PHYSICIAN ASSISTANT

## 2021-02-10 PROCEDURE — 99214 PR OFFICE/OUTPT VISIT, EST, LEVL IV, 30-39 MIN: ICD-10-PCS | Mod: S$GLB,,, | Performed by: PHYSICIAN ASSISTANT

## 2021-02-10 PROCEDURE — 1157F PR ADVANCE CARE PLAN OR EQUIV PRESENT IN MEDICAL RECORD: ICD-10-PCS | Mod: S$GLB,,, | Performed by: PHYSICIAN ASSISTANT

## 2021-02-10 PROCEDURE — 36415 COLL VENOUS BLD VENIPUNCTURE: CPT | Mod: PO

## 2021-02-10 PROCEDURE — 3079F PR MOST RECENT DIASTOLIC BLOOD PRESSURE 80-89 MM HG: ICD-10-PCS | Mod: CPTII,S$GLB,, | Performed by: PHYSICIAN ASSISTANT

## 2021-02-10 PROCEDURE — 3008F BODY MASS INDEX DOCD: CPT | Mod: CPTII,S$GLB,, | Performed by: PHYSICIAN ASSISTANT

## 2021-02-10 PROCEDURE — 99499 UNLISTED E&M SERVICE: CPT | Mod: S$GLB,,, | Performed by: PHYSICIAN ASSISTANT

## 2021-02-10 PROCEDURE — 3008F PR BODY MASS INDEX (BMI) DOCUMENTED: ICD-10-PCS | Mod: CPTII,S$GLB,, | Performed by: PHYSICIAN ASSISTANT

## 2021-02-10 PROCEDURE — 3288F PR FALLS RISK ASSESSMENT DOCUMENTED: ICD-10-PCS | Mod: CPTII,S$GLB,, | Performed by: PHYSICIAN ASSISTANT

## 2021-02-10 PROCEDURE — 99214 OFFICE O/P EST MOD 30 MIN: CPT | Mod: S$GLB,,, | Performed by: PHYSICIAN ASSISTANT

## 2021-02-10 PROCEDURE — 99999 PR PBB SHADOW E&M-EST. PATIENT-LVL V: CPT | Mod: PBBFAC,,, | Performed by: PHYSICIAN ASSISTANT

## 2021-02-10 PROCEDURE — 1157F ADVNC CARE PLAN IN RCRD: CPT | Mod: S$GLB,,, | Performed by: PHYSICIAN ASSISTANT

## 2021-02-10 PROCEDURE — 99499 RISK ADDL DX/OHS AUDIT: ICD-10-PCS | Mod: S$GLB,,, | Performed by: PHYSICIAN ASSISTANT

## 2021-02-10 PROCEDURE — 1126F AMNT PAIN NOTED NONE PRSNT: CPT | Mod: S$GLB,,, | Performed by: PHYSICIAN ASSISTANT

## 2021-02-10 PROCEDURE — 3074F PR MOST RECENT SYSTOLIC BLOOD PRESSURE < 130 MM HG: ICD-10-PCS | Mod: CPTII,S$GLB,, | Performed by: PHYSICIAN ASSISTANT

## 2021-02-10 PROCEDURE — 3072F LOW RISK FOR RETINOPATHY: CPT | Mod: S$GLB,,, | Performed by: PHYSICIAN ASSISTANT

## 2021-02-10 PROCEDURE — 3288F FALL RISK ASSESSMENT DOCD: CPT | Mod: CPTII,S$GLB,, | Performed by: PHYSICIAN ASSISTANT

## 2021-02-10 PROCEDURE — 3079F DIAST BP 80-89 MM HG: CPT | Mod: CPTII,S$GLB,, | Performed by: PHYSICIAN ASSISTANT

## 2021-02-10 PROCEDURE — 83036 HEMOGLOBIN GLYCOSYLATED A1C: CPT

## 2021-02-10 RX ORDER — EMPAGLIFLOZIN 25 MG/1
25 TABLET, FILM COATED ORAL EVERY MORNING
Qty: 90 TABLET | Refills: 3 | Status: SHIPPED | OUTPATIENT
Start: 2021-02-10 | End: 2021-06-10

## 2021-02-15 ENCOUNTER — OFFICE VISIT (OUTPATIENT)
Dept: CARDIOLOGY | Facility: CLINIC | Age: 71
End: 2021-02-15
Payer: MEDICARE

## 2021-02-15 VITALS
HEART RATE: 87 BPM | HEIGHT: 61 IN | DIASTOLIC BLOOD PRESSURE: 70 MMHG | WEIGHT: 185 LBS | BODY MASS INDEX: 34.93 KG/M2 | SYSTOLIC BLOOD PRESSURE: 132 MMHG | OXYGEN SATURATION: 98 % | RESPIRATION RATE: 18 BRPM

## 2021-02-15 DIAGNOSIS — R06.02 SHORTNESS OF BREATH: ICD-10-CM

## 2021-02-15 DIAGNOSIS — E78.2 MIXED HYPERLIPIDEMIA: ICD-10-CM

## 2021-02-15 DIAGNOSIS — I10 ESSENTIAL HYPERTENSION: ICD-10-CM

## 2021-02-15 DIAGNOSIS — Z86.73 HISTORY OF ISCHEMIC LEFT MCA STROKE: ICD-10-CM

## 2021-02-15 DIAGNOSIS — D69.6 THROMBOCYTOPENIA: ICD-10-CM

## 2021-02-15 DIAGNOSIS — E66.01 SEVERE OBESITY (BMI 35.0-35.9 WITH COMORBIDITY): ICD-10-CM

## 2021-02-15 PROCEDURE — 3075F SYST BP GE 130 - 139MM HG: CPT | Mod: CPTII,S$GLB,, | Performed by: INTERNAL MEDICINE

## 2021-02-15 PROCEDURE — 3078F PR MOST RECENT DIASTOLIC BLOOD PRESSURE < 80 MM HG: ICD-10-PCS | Mod: CPTII,S$GLB,, | Performed by: INTERNAL MEDICINE

## 2021-02-15 PROCEDURE — 3288F FALL RISK ASSESSMENT DOCD: CPT | Mod: CPTII,S$GLB,, | Performed by: INTERNAL MEDICINE

## 2021-02-15 PROCEDURE — 1159F MED LIST DOCD IN RCRD: CPT | Mod: S$GLB,,, | Performed by: INTERNAL MEDICINE

## 2021-02-15 PROCEDURE — 3075F PR MOST RECENT SYSTOLIC BLOOD PRESS GE 130-139MM HG: ICD-10-PCS | Mod: CPTII,S$GLB,, | Performed by: INTERNAL MEDICINE

## 2021-02-15 PROCEDURE — 99499 RISK ADDL DX/OHS AUDIT: ICD-10-PCS | Mod: S$GLB,,, | Performed by: INTERNAL MEDICINE

## 2021-02-15 PROCEDURE — 93000 ELECTROCARDIOGRAM COMPLETE: CPT | Mod: S$GLB,,, | Performed by: INTERNAL MEDICINE

## 2021-02-15 PROCEDURE — 3008F BODY MASS INDEX DOCD: CPT | Mod: CPTII,S$GLB,, | Performed by: INTERNAL MEDICINE

## 2021-02-15 PROCEDURE — 3072F LOW RISK FOR RETINOPATHY: CPT | Mod: S$GLB,,, | Performed by: INTERNAL MEDICINE

## 2021-02-15 PROCEDURE — 1101F PT FALLS ASSESS-DOCD LE1/YR: CPT | Mod: CPTII,S$GLB,, | Performed by: INTERNAL MEDICINE

## 2021-02-15 PROCEDURE — 1126F AMNT PAIN NOTED NONE PRSNT: CPT | Mod: S$GLB,,, | Performed by: INTERNAL MEDICINE

## 2021-02-15 PROCEDURE — 99204 PR OFFICE/OUTPT VISIT, NEW, LEVL IV, 45-59 MIN: ICD-10-PCS | Mod: S$GLB,,, | Performed by: INTERNAL MEDICINE

## 2021-02-15 PROCEDURE — 1159F PR MEDICATION LIST DOCUMENTED IN MEDICAL RECORD: ICD-10-PCS | Mod: S$GLB,,, | Performed by: INTERNAL MEDICINE

## 2021-02-15 PROCEDURE — 3288F PR FALLS RISK ASSESSMENT DOCUMENTED: ICD-10-PCS | Mod: CPTII,S$GLB,, | Performed by: INTERNAL MEDICINE

## 2021-02-15 PROCEDURE — 99499 UNLISTED E&M SERVICE: CPT | Mod: S$GLB,,, | Performed by: INTERNAL MEDICINE

## 2021-02-15 PROCEDURE — 1101F PR PT FALLS ASSESS DOC 0-1 FALLS W/OUT INJ PAST YR: ICD-10-PCS | Mod: CPTII,S$GLB,, | Performed by: INTERNAL MEDICINE

## 2021-02-15 PROCEDURE — 1157F ADVNC CARE PLAN IN RCRD: CPT | Mod: S$GLB,,, | Performed by: INTERNAL MEDICINE

## 2021-02-15 PROCEDURE — 99204 OFFICE O/P NEW MOD 45 MIN: CPT | Mod: S$GLB,,, | Performed by: INTERNAL MEDICINE

## 2021-02-15 PROCEDURE — 1126F PR PAIN SEVERITY QUANTIFIED, NO PAIN PRESENT: ICD-10-PCS | Mod: S$GLB,,, | Performed by: INTERNAL MEDICINE

## 2021-02-15 PROCEDURE — 93000 EKG 12-LEAD: ICD-10-PCS | Mod: S$GLB,,, | Performed by: INTERNAL MEDICINE

## 2021-02-15 PROCEDURE — 3008F PR BODY MASS INDEX (BMI) DOCUMENTED: ICD-10-PCS | Mod: CPTII,S$GLB,, | Performed by: INTERNAL MEDICINE

## 2021-02-15 PROCEDURE — 3072F PR LOW RISK FOR RETINOPATHY: ICD-10-PCS | Mod: S$GLB,,, | Performed by: INTERNAL MEDICINE

## 2021-02-15 PROCEDURE — 3078F DIAST BP <80 MM HG: CPT | Mod: CPTII,S$GLB,, | Performed by: INTERNAL MEDICINE

## 2021-02-15 PROCEDURE — 1157F PR ADVANCE CARE PLAN OR EQUIV PRESENT IN MEDICAL RECORD: ICD-10-PCS | Mod: S$GLB,,, | Performed by: INTERNAL MEDICINE

## 2021-02-23 ENCOUNTER — TELEPHONE (OUTPATIENT)
Dept: PSYCHIATRY | Facility: CLINIC | Age: 71
End: 2021-02-23

## 2021-02-23 ENCOUNTER — OFFICE VISIT (OUTPATIENT)
Dept: FAMILY MEDICINE | Facility: CLINIC | Age: 71
End: 2021-02-23
Payer: MEDICARE

## 2021-02-23 ENCOUNTER — HOSPITAL ENCOUNTER (OUTPATIENT)
Dept: RADIOLOGY | Facility: HOSPITAL | Age: 71
Discharge: HOME | End: 2021-02-23
Attending: INTERNAL MEDICINE
Payer: MEDICARE

## 2021-02-23 VITALS
BODY MASS INDEX: 34.34 KG/M2 | DIASTOLIC BLOOD PRESSURE: 60 MMHG | RESPIRATION RATE: 18 BRPM | HEIGHT: 61 IN | HEART RATE: 100 BPM | OXYGEN SATURATION: 95 % | TEMPERATURE: 98 F | SYSTOLIC BLOOD PRESSURE: 120 MMHG | WEIGHT: 181.88 LBS

## 2021-02-23 DIAGNOSIS — E78.2 MIXED HYPERLIPIDEMIA: ICD-10-CM

## 2021-02-23 DIAGNOSIS — E66.01 SEVERE OBESITY (BMI 35.0-35.9 WITH COMORBIDITY): ICD-10-CM

## 2021-02-23 DIAGNOSIS — I10 ESSENTIAL HYPERTENSION: ICD-10-CM

## 2021-02-23 DIAGNOSIS — R06.02 SHORTNESS OF BREATH: ICD-10-CM

## 2021-02-23 DIAGNOSIS — D69.6 THROMBOCYTOPENIA: ICD-10-CM

## 2021-02-23 DIAGNOSIS — F32.A DEPRESSION, UNSPECIFIED DEPRESSION TYPE: ICD-10-CM

## 2021-02-23 DIAGNOSIS — E03.9 ACQUIRED HYPOTHYROIDISM: Chronic | ICD-10-CM

## 2021-02-23 DIAGNOSIS — Z79.4 TYPE 2 DIABETES MELLITUS WITHOUT COMPLICATION, WITH LONG-TERM CURRENT USE OF INSULIN: ICD-10-CM

## 2021-02-23 DIAGNOSIS — E11.9 TYPE 2 DIABETES MELLITUS WITHOUT COMPLICATION, WITH LONG-TERM CURRENT USE OF INSULIN: ICD-10-CM

## 2021-02-23 DIAGNOSIS — L72.3 SEBACEOUS CYST: Primary | ICD-10-CM

## 2021-02-23 DIAGNOSIS — Z86.73 HISTORY OF ISCHEMIC LEFT MCA STROKE: ICD-10-CM

## 2021-02-23 PROCEDURE — 1157F PR ADVANCE CARE PLAN OR EQUIV PRESENT IN MEDICAL RECORD: ICD-10-PCS | Mod: S$GLB,,, | Performed by: FAMILY MEDICINE

## 2021-02-23 PROCEDURE — 71046 X-RAY EXAM CHEST 2 VIEWS: CPT | Mod: TC

## 2021-02-23 PROCEDURE — 1126F PR PAIN SEVERITY QUANTIFIED, NO PAIN PRESENT: ICD-10-PCS | Mod: S$GLB,,, | Performed by: FAMILY MEDICINE

## 2021-02-23 PROCEDURE — 3044F HG A1C LEVEL LT 7.0%: CPT | Mod: CPTII,S$GLB,, | Performed by: FAMILY MEDICINE

## 2021-02-23 PROCEDURE — 3074F SYST BP LT 130 MM HG: CPT | Mod: CPTII,S$GLB,, | Performed by: FAMILY MEDICINE

## 2021-02-23 PROCEDURE — 99999 PR PBB SHADOW E&M-EST. PATIENT-LVL V: ICD-10-PCS | Mod: PBBFAC,,, | Performed by: FAMILY MEDICINE

## 2021-02-23 PROCEDURE — 3078F PR MOST RECENT DIASTOLIC BLOOD PRESSURE < 80 MM HG: ICD-10-PCS | Mod: CPTII,S$GLB,, | Performed by: FAMILY MEDICINE

## 2021-02-23 PROCEDURE — 3008F BODY MASS INDEX DOCD: CPT | Mod: CPTII,S$GLB,, | Performed by: FAMILY MEDICINE

## 2021-02-23 PROCEDURE — 1159F MED LIST DOCD IN RCRD: CPT | Mod: S$GLB,,, | Performed by: FAMILY MEDICINE

## 2021-02-23 PROCEDURE — 3044F PR MOST RECENT HEMOGLOBIN A1C LEVEL <7.0%: ICD-10-PCS | Mod: CPTII,S$GLB,, | Performed by: FAMILY MEDICINE

## 2021-02-23 PROCEDURE — 99214 OFFICE O/P EST MOD 30 MIN: CPT | Mod: S$GLB,,, | Performed by: FAMILY MEDICINE

## 2021-02-23 PROCEDURE — 3078F DIAST BP <80 MM HG: CPT | Mod: CPTII,S$GLB,, | Performed by: FAMILY MEDICINE

## 2021-02-23 PROCEDURE — 99499 RISK ADDL DX/OHS AUDIT: ICD-10-PCS | Mod: S$GLB,,, | Performed by: FAMILY MEDICINE

## 2021-02-23 PROCEDURE — 1159F PR MEDICATION LIST DOCUMENTED IN MEDICAL RECORD: ICD-10-PCS | Mod: S$GLB,,, | Performed by: FAMILY MEDICINE

## 2021-02-23 PROCEDURE — 3074F PR MOST RECENT SYSTOLIC BLOOD PRESSURE < 130 MM HG: ICD-10-PCS | Mod: CPTII,S$GLB,, | Performed by: FAMILY MEDICINE

## 2021-02-23 PROCEDURE — 1126F AMNT PAIN NOTED NONE PRSNT: CPT | Mod: S$GLB,,, | Performed by: FAMILY MEDICINE

## 2021-02-23 PROCEDURE — 3072F LOW RISK FOR RETINOPATHY: CPT | Mod: S$GLB,,, | Performed by: FAMILY MEDICINE

## 2021-02-23 PROCEDURE — 1101F PR PT FALLS ASSESS DOC 0-1 FALLS W/OUT INJ PAST YR: ICD-10-PCS | Mod: CPTII,S$GLB,, | Performed by: FAMILY MEDICINE

## 2021-02-23 PROCEDURE — 3288F FALL RISK ASSESSMENT DOCD: CPT | Mod: CPTII,S$GLB,, | Performed by: FAMILY MEDICINE

## 2021-02-23 PROCEDURE — 3072F PR LOW RISK FOR RETINOPATHY: ICD-10-PCS | Mod: S$GLB,,, | Performed by: FAMILY MEDICINE

## 2021-02-23 PROCEDURE — 1101F PT FALLS ASSESS-DOCD LE1/YR: CPT | Mod: CPTII,S$GLB,, | Performed by: FAMILY MEDICINE

## 2021-02-23 PROCEDURE — 99214 PR OFFICE/OUTPT VISIT, EST, LEVL IV, 30-39 MIN: ICD-10-PCS | Mod: S$GLB,,, | Performed by: FAMILY MEDICINE

## 2021-02-23 PROCEDURE — 3008F PR BODY MASS INDEX (BMI) DOCUMENTED: ICD-10-PCS | Mod: CPTII,S$GLB,, | Performed by: FAMILY MEDICINE

## 2021-02-23 PROCEDURE — 99999 PR PBB SHADOW E&M-EST. PATIENT-LVL V: CPT | Mod: PBBFAC,,, | Performed by: FAMILY MEDICINE

## 2021-02-23 PROCEDURE — 99499 UNLISTED E&M SERVICE: CPT | Mod: S$GLB,,, | Performed by: FAMILY MEDICINE

## 2021-02-23 PROCEDURE — 1157F ADVNC CARE PLAN IN RCRD: CPT | Mod: S$GLB,,, | Performed by: FAMILY MEDICINE

## 2021-02-23 PROCEDURE — 3288F PR FALLS RISK ASSESSMENT DOCUMENTED: ICD-10-PCS | Mod: CPTII,S$GLB,, | Performed by: FAMILY MEDICINE

## 2021-02-24 ENCOUNTER — IMMUNIZATION (OUTPATIENT)
Dept: PRIMARY CARE CLINIC | Facility: CLINIC | Age: 71
End: 2021-02-24
Payer: MEDICARE

## 2021-02-24 DIAGNOSIS — Z23 NEED FOR VACCINATION: Primary | ICD-10-CM

## 2021-02-24 PROCEDURE — 91300 COVID-19, MRNA, LNP-S, PF, 30 MCG/0.3 ML DOSE VACCINE: ICD-10-PCS | Mod: S$GLB,,, | Performed by: FAMILY MEDICINE

## 2021-02-24 PROCEDURE — 91300 COVID-19, MRNA, LNP-S, PF, 30 MCG/0.3 ML DOSE VACCINE: CPT | Mod: S$GLB,,, | Performed by: FAMILY MEDICINE

## 2021-02-24 PROCEDURE — 0001A COVID-19, MRNA, LNP-S, PF, 30 MCG/0.3 ML DOSE VACCINE: CPT | Mod: CV19,S$GLB,, | Performed by: FAMILY MEDICINE

## 2021-02-24 PROCEDURE — 0001A COVID-19, MRNA, LNP-S, PF, 30 MCG/0.3 ML DOSE VACCINE: ICD-10-PCS | Mod: CV19,S$GLB,, | Performed by: FAMILY MEDICINE

## 2021-03-01 ENCOUNTER — OFFICE VISIT (OUTPATIENT)
Dept: PSYCHIATRY | Facility: CLINIC | Age: 71
End: 2021-03-01
Payer: COMMERCIAL

## 2021-03-01 ENCOUNTER — TELEPHONE (OUTPATIENT)
Dept: FAMILY MEDICINE | Facility: CLINIC | Age: 71
End: 2021-03-01

## 2021-03-01 VITALS
SYSTOLIC BLOOD PRESSURE: 140 MMHG | HEIGHT: 61 IN | TEMPERATURE: 98 F | DIASTOLIC BLOOD PRESSURE: 79 MMHG | BODY MASS INDEX: 34.34 KG/M2 | HEART RATE: 72 BPM | WEIGHT: 181.88 LBS

## 2021-03-01 DIAGNOSIS — F43.10 PTSD (POST-TRAUMATIC STRESS DISORDER): ICD-10-CM

## 2021-03-01 DIAGNOSIS — F39 MOOD DISORDER: Primary | ICD-10-CM

## 2021-03-01 PROCEDURE — 3072F PR LOW RISK FOR RETINOPATHY: ICD-10-PCS | Mod: S$GLB,,, | Performed by: PHYSICIAN ASSISTANT

## 2021-03-01 PROCEDURE — 99499 UNLISTED E&M SERVICE: CPT | Mod: S$GLB,,, | Performed by: PHYSICIAN ASSISTANT

## 2021-03-01 PROCEDURE — 3008F BODY MASS INDEX DOCD: CPT | Mod: CPTII,S$GLB,, | Performed by: PHYSICIAN ASSISTANT

## 2021-03-01 PROCEDURE — 3288F FALL RISK ASSESSMENT DOCD: CPT | Mod: CPTII,S$GLB,, | Performed by: PHYSICIAN ASSISTANT

## 2021-03-01 PROCEDURE — 99214 PR OFFICE/OUTPT VISIT, EST, LEVL IV, 30-39 MIN: ICD-10-PCS | Mod: S$GLB,,, | Performed by: PHYSICIAN ASSISTANT

## 2021-03-01 PROCEDURE — 99499 RISK ADDL DX/OHS AUDIT: ICD-10-PCS | Mod: S$GLB,,, | Performed by: PHYSICIAN ASSISTANT

## 2021-03-01 PROCEDURE — 1101F PR PT FALLS ASSESS DOC 0-1 FALLS W/OUT INJ PAST YR: ICD-10-PCS | Mod: CPTII,S$GLB,, | Performed by: PHYSICIAN ASSISTANT

## 2021-03-01 PROCEDURE — 3008F PR BODY MASS INDEX (BMI) DOCUMENTED: ICD-10-PCS | Mod: CPTII,S$GLB,, | Performed by: PHYSICIAN ASSISTANT

## 2021-03-01 PROCEDURE — 1101F PT FALLS ASSESS-DOCD LE1/YR: CPT | Mod: CPTII,S$GLB,, | Performed by: PHYSICIAN ASSISTANT

## 2021-03-01 PROCEDURE — 3077F PR MOST RECENT SYSTOLIC BLOOD PRESSURE >= 140 MM HG: ICD-10-PCS | Mod: CPTII,S$GLB,, | Performed by: PHYSICIAN ASSISTANT

## 2021-03-01 PROCEDURE — 3077F SYST BP >= 140 MM HG: CPT | Mod: CPTII,S$GLB,, | Performed by: PHYSICIAN ASSISTANT

## 2021-03-01 PROCEDURE — 3288F PR FALLS RISK ASSESSMENT DOCUMENTED: ICD-10-PCS | Mod: CPTII,S$GLB,, | Performed by: PHYSICIAN ASSISTANT

## 2021-03-01 PROCEDURE — 99999 PR PBB SHADOW E&M-EST. PATIENT-LVL V: ICD-10-PCS | Mod: PBBFAC,,, | Performed by: PHYSICIAN ASSISTANT

## 2021-03-01 PROCEDURE — 1126F PR PAIN SEVERITY QUANTIFIED, NO PAIN PRESENT: ICD-10-PCS | Mod: S$GLB,,, | Performed by: PHYSICIAN ASSISTANT

## 2021-03-01 PROCEDURE — 3078F PR MOST RECENT DIASTOLIC BLOOD PRESSURE < 80 MM HG: ICD-10-PCS | Mod: CPTII,S$GLB,, | Performed by: PHYSICIAN ASSISTANT

## 2021-03-01 PROCEDURE — 1157F ADVNC CARE PLAN IN RCRD: CPT | Mod: S$GLB,,, | Performed by: PHYSICIAN ASSISTANT

## 2021-03-01 PROCEDURE — 3078F DIAST BP <80 MM HG: CPT | Mod: CPTII,S$GLB,, | Performed by: PHYSICIAN ASSISTANT

## 2021-03-01 PROCEDURE — 1159F PR MEDICATION LIST DOCUMENTED IN MEDICAL RECORD: ICD-10-PCS | Mod: S$GLB,,, | Performed by: PHYSICIAN ASSISTANT

## 2021-03-01 PROCEDURE — 1157F PR ADVANCE CARE PLAN OR EQUIV PRESENT IN MEDICAL RECORD: ICD-10-PCS | Mod: S$GLB,,, | Performed by: PHYSICIAN ASSISTANT

## 2021-03-01 PROCEDURE — 1126F AMNT PAIN NOTED NONE PRSNT: CPT | Mod: S$GLB,,, | Performed by: PHYSICIAN ASSISTANT

## 2021-03-01 PROCEDURE — 99214 OFFICE O/P EST MOD 30 MIN: CPT | Mod: S$GLB,,, | Performed by: PHYSICIAN ASSISTANT

## 2021-03-01 PROCEDURE — 99999 PR PBB SHADOW E&M-EST. PATIENT-LVL V: CPT | Mod: PBBFAC,,, | Performed by: PHYSICIAN ASSISTANT

## 2021-03-01 PROCEDURE — 3072F LOW RISK FOR RETINOPATHY: CPT | Mod: S$GLB,,, | Performed by: PHYSICIAN ASSISTANT

## 2021-03-01 PROCEDURE — 1159F MED LIST DOCD IN RCRD: CPT | Mod: S$GLB,,, | Performed by: PHYSICIAN ASSISTANT

## 2021-03-01 RX ORDER — PRAZOSIN HYDROCHLORIDE 1 MG/1
1 CAPSULE ORAL NIGHTLY
Qty: 60 CAPSULE | Refills: 1 | Status: SHIPPED | OUTPATIENT
Start: 2021-03-01 | End: 2021-05-13 | Stop reason: SDUPTHER

## 2021-03-05 ENCOUNTER — LAB VISIT (OUTPATIENT)
Dept: LAB | Facility: HOSPITAL | Age: 71
End: 2021-03-05
Attending: PHYSICIAN ASSISTANT
Payer: MEDICARE

## 2021-03-05 DIAGNOSIS — F39 MOOD DISORDER: ICD-10-CM

## 2021-03-05 LAB
ALBUMIN SERPL BCP-MCNC: 3 G/DL (ref 3.5–5.2)
ALP SERPL-CCNC: 72 U/L (ref 55–135)
ALT SERPL W/O P-5'-P-CCNC: 9 U/L (ref 10–44)
ANION GAP SERPL CALC-SCNC: 8 MMOL/L (ref 8–16)
AST SERPL-CCNC: 10 U/L (ref 10–40)
BASOPHILS # BLD AUTO: 0.03 K/UL (ref 0–0.2)
BASOPHILS NFR BLD: 0.3 % (ref 0–1.9)
BILIRUB SERPL-MCNC: 0.3 MG/DL (ref 0.1–1)
BUN SERPL-MCNC: 12 MG/DL (ref 8–23)
CALCIUM SERPL-MCNC: 9.2 MG/DL (ref 8.7–10.5)
CHLORIDE SERPL-SCNC: 103 MMOL/L (ref 95–110)
CO2 SERPL-SCNC: 31 MMOL/L (ref 23–29)
CREAT SERPL-MCNC: 1 MG/DL (ref 0.5–1.4)
DIFFERENTIAL METHOD: NORMAL
EOSINOPHIL # BLD AUTO: 0.2 K/UL (ref 0–0.5)
EOSINOPHIL NFR BLD: 2.3 % (ref 0–8)
ERYTHROCYTE [DISTWIDTH] IN BLOOD BY AUTOMATED COUNT: 13.7 % (ref 11.5–14.5)
EST. GFR  (AFRICAN AMERICAN): >60 ML/MIN/1.73 M^2
EST. GFR  (NON AFRICAN AMERICAN): 56 ML/MIN/1.73 M^2
GLUCOSE SERPL-MCNC: 138 MG/DL (ref 70–110)
HCT VFR BLD AUTO: 46.6 % (ref 37–48.5)
HGB BLD-MCNC: 14.9 G/DL (ref 12–16)
IMM GRANULOCYTES # BLD AUTO: 0.03 K/UL (ref 0–0.04)
IMM GRANULOCYTES NFR BLD AUTO: 0.3 % (ref 0–0.5)
LYMPHOCYTES # BLD AUTO: 2.1 K/UL (ref 1–4.8)
LYMPHOCYTES NFR BLD: 21.9 % (ref 18–48)
MCH RBC QN AUTO: 29.6 PG (ref 27–31)
MCHC RBC AUTO-ENTMCNC: 32 G/DL (ref 32–36)
MCV RBC AUTO: 93 FL (ref 82–98)
MONOCYTES # BLD AUTO: 0.5 K/UL (ref 0.3–1)
MONOCYTES NFR BLD: 5.3 % (ref 4–15)
NEUTROPHILS # BLD AUTO: 6.6 K/UL (ref 1.8–7.7)
NEUTROPHILS NFR BLD: 69.9 % (ref 38–73)
NRBC BLD-RTO: 0 /100 WBC
PLATELET # BLD AUTO: 188 K/UL (ref 150–350)
PMV BLD AUTO: 11 FL (ref 9.2–12.9)
POTASSIUM SERPL-SCNC: 4 MMOL/L (ref 3.5–5.1)
PROT SERPL-MCNC: 6.7 G/DL (ref 6–8.4)
RBC # BLD AUTO: 5.04 M/UL (ref 4–5.4)
SODIUM SERPL-SCNC: 142 MMOL/L (ref 136–145)
VALPROATE SERPL-MCNC: 61.7 UG/ML (ref 50–100)
WBC # BLD AUTO: 9.42 K/UL (ref 3.9–12.7)

## 2021-03-05 PROCEDURE — 85025 COMPLETE CBC W/AUTO DIFF WBC: CPT | Performed by: PHYSICIAN ASSISTANT

## 2021-03-05 PROCEDURE — 80164 ASSAY DIPROPYLACETIC ACD TOT: CPT | Performed by: PHYSICIAN ASSISTANT

## 2021-03-05 PROCEDURE — 36415 COLL VENOUS BLD VENIPUNCTURE: CPT | Mod: PO | Performed by: PHYSICIAN ASSISTANT

## 2021-03-05 PROCEDURE — 80053 COMPREHEN METABOLIC PANEL: CPT | Performed by: PHYSICIAN ASSISTANT

## 2021-03-08 ENCOUNTER — TELEPHONE (OUTPATIENT)
Dept: NEUROLOGY | Facility: CLINIC | Age: 71
End: 2021-03-08

## 2021-03-10 ENCOUNTER — HOSPITAL ENCOUNTER (OUTPATIENT)
Dept: CARDIOLOGY | Facility: CLINIC | Age: 71
Discharge: HOME | End: 2021-03-10
Attending: INTERNAL MEDICINE
Payer: MEDICARE

## 2021-03-10 ENCOUNTER — HOSPITAL ENCOUNTER (OUTPATIENT)
Dept: RADIOLOGY | Facility: CLINIC | Age: 71
Discharge: HOME | End: 2021-03-10
Attending: INTERNAL MEDICINE
Payer: MEDICARE

## 2021-03-10 VITALS — HEIGHT: 61 IN | WEIGHT: 181 LBS | BODY MASS INDEX: 34.17 KG/M2

## 2021-03-10 DIAGNOSIS — Z86.73 HISTORY OF ISCHEMIC LEFT MCA STROKE: ICD-10-CM

## 2021-03-10 DIAGNOSIS — R06.02 SHORTNESS OF BREATH: ICD-10-CM

## 2021-03-10 DIAGNOSIS — E66.01 SEVERE OBESITY (BMI 35.0-35.9 WITH COMORBIDITY): ICD-10-CM

## 2021-03-10 DIAGNOSIS — E78.2 MIXED HYPERLIPIDEMIA: ICD-10-CM

## 2021-03-10 PROCEDURE — 78452 HT MUSCLE IMAGE SPECT MULT: CPT | Mod: S$GLB,,, | Performed by: INTERNAL MEDICINE

## 2021-03-10 PROCEDURE — A9502 TC99M TETROFOSMIN: HCPCS | Mod: S$GLB,,, | Performed by: INTERNAL MEDICINE

## 2021-03-10 PROCEDURE — 78452 STRESS TEST WITH MYOCARDIAL PERFUSION (CUPID ONLY): ICD-10-PCS | Mod: S$GLB,,, | Performed by: INTERNAL MEDICINE

## 2021-03-10 PROCEDURE — A9502 STRESS TEST WITH MYOCARDIAL PERFUSION (CUPID ONLY): ICD-10-PCS | Mod: S$GLB,,, | Performed by: INTERNAL MEDICINE

## 2021-03-10 PROCEDURE — 93015 STRESS TEST WITH MYOCARDIAL PERFUSION (CUPID ONLY): ICD-10-PCS | Mod: S$GLB,,, | Performed by: INTERNAL MEDICINE

## 2021-03-10 PROCEDURE — 93306 TTE W/DOPPLER COMPLETE: CPT | Mod: S$GLB,,, | Performed by: INTERNAL MEDICINE

## 2021-03-10 PROCEDURE — 93306 ECHO (CUPID ONLY): ICD-10-PCS | Mod: S$GLB,,, | Performed by: INTERNAL MEDICINE

## 2021-03-10 PROCEDURE — 93015 CV STRESS TEST SUPVJ I&R: CPT | Mod: S$GLB,,, | Performed by: INTERNAL MEDICINE

## 2021-03-10 RX ORDER — REGADENOSON 0.08 MG/ML
0.4 INJECTION, SOLUTION INTRAVENOUS ONCE
Status: COMPLETED | OUTPATIENT
Start: 2021-03-10 | End: 2021-03-10

## 2021-03-10 RX ADMIN — REGADENOSON 0.4 MG: 0.08 INJECTION, SOLUTION INTRAVENOUS at 08:03

## 2021-03-14 LAB
CV PHARM DOSE: 0.4 MG
CV STRESS BASE HR: 81 BPM
DIASTOLIC BLOOD PRESSURE: 86 MMHG
EJECTION FRACTION- HIGH: 65 %
END DIASTOLIC INDEX-HIGH: 158 ML/M2
END DIASTOLIC INDEX-LOW: 94 ML/M2
END SYSTOLIC INDEX-HIGH: 71 ML/M2
END SYSTOLIC INDEX-LOW: 33 ML/M2
NUC STRESS DIASTOLIC VOLUME INDEX: 30
NUC STRESS EJECTION FRACTION: 88 %
NUC STRESS SYSTOLIC VOLUME INDEX: 4
OHS CV CPX 1 MINUTE RECOVERY HEART RATE: 108 BPM
OHS CV CPX 85 PERCENT MAX PREDICTED HEART RATE MALE: 120
OHS CV CPX MAX PREDICTED HEART RATE: 142
OHS CV CPX PATIENT IS FEMALE: 1
OHS CV CPX PATIENT IS MALE: 0
OHS CV CPX PEAK DIASTOLIC BLOOD PRESSURE: 84 MMHG
OHS CV CPX PEAK HEAR RATE: 112 BPM
OHS CV CPX PEAK RATE PRESSURE PRODUCT: NORMAL
OHS CV CPX PEAK SYSTOLIC BLOOD PRESSURE: 150 MMHG
OHS CV CPX PERCENT MAX PREDICTED HEART RATE ACHIEVED: 79
OHS CV CPX RATE PRESSURE PRODUCT PRESENTING: NORMAL
RETIRED EF AND QEF - SEE NOTES: 53 %
SYSTOLIC BLOOD PRESSURE: 150 MMHG

## 2021-03-17 ENCOUNTER — IMMUNIZATION (OUTPATIENT)
Dept: PRIMARY CARE CLINIC | Facility: CLINIC | Age: 71
End: 2021-03-17
Payer: MEDICARE

## 2021-03-17 DIAGNOSIS — Z23 NEED FOR VACCINATION: Primary | ICD-10-CM

## 2021-03-17 PROCEDURE — 91300 COVID-19, MRNA, LNP-S, PF, 30 MCG/0.3 ML DOSE VACCINE: CPT | Mod: S$GLB,,, | Performed by: FAMILY MEDICINE

## 2021-03-17 PROCEDURE — 0002A COVID-19, MRNA, LNP-S, PF, 30 MCG/0.3 ML DOSE VACCINE: ICD-10-PCS | Mod: CV19,S$GLB,, | Performed by: FAMILY MEDICINE

## 2021-03-17 PROCEDURE — 0002A COVID-19, MRNA, LNP-S, PF, 30 MCG/0.3 ML DOSE VACCINE: CPT | Mod: CV19,S$GLB,, | Performed by: FAMILY MEDICINE

## 2021-03-17 PROCEDURE — 91300 COVID-19, MRNA, LNP-S, PF, 30 MCG/0.3 ML DOSE VACCINE: ICD-10-PCS | Mod: S$GLB,,, | Performed by: FAMILY MEDICINE

## 2021-03-21 ENCOUNTER — HOSPITAL ENCOUNTER (OUTPATIENT)
Facility: HOSPITAL | Age: 71
Discharge: HOME | End: 2021-03-23
Attending: EMERGENCY MEDICINE | Admitting: HOSPITALIST
Payer: MEDICARE

## 2021-03-21 DIAGNOSIS — R07.9 CHEST PAIN: ICD-10-CM

## 2021-03-21 DIAGNOSIS — N30.01 ACUTE CYSTITIS WITH HEMATURIA: ICD-10-CM

## 2021-03-21 DIAGNOSIS — R26.81 GAIT INSTABILITY: ICD-10-CM

## 2021-03-21 DIAGNOSIS — R11.10 VOMITING: ICD-10-CM

## 2021-03-21 DIAGNOSIS — N30.00 ACUTE CYSTITIS WITHOUT HEMATURIA: Primary | ICD-10-CM

## 2021-03-21 LAB
ALBUMIN SERPL BCP-MCNC: 3.1 G/DL (ref 3.5–5.2)
ALP SERPL-CCNC: 79 U/L (ref 55–135)
ALT SERPL W/O P-5'-P-CCNC: 15 U/L (ref 10–44)
ANION GAP SERPL CALC-SCNC: 14 MMOL/L (ref 8–16)
AST SERPL-CCNC: 18 U/L (ref 10–40)
BACTERIA #/AREA URNS HPF: ABNORMAL /HPF
BASOPHILS # BLD AUTO: 0.04 K/UL (ref 0–0.2)
BASOPHILS NFR BLD: 0.3 % (ref 0–1.9)
BILIRUB SERPL-MCNC: 0.7 MG/DL (ref 0.1–1)
BILIRUB UR QL STRIP: NEGATIVE
BUN SERPL-MCNC: 13 MG/DL (ref 8–23)
CALCIUM SERPL-MCNC: 9.1 MG/DL (ref 8.7–10.5)
CHLORIDE SERPL-SCNC: 98 MMOL/L (ref 95–110)
CLARITY UR: ABNORMAL
CO2 SERPL-SCNC: 24 MMOL/L (ref 23–29)
COLOR UR: YELLOW
CREAT SERPL-MCNC: 1.1 MG/DL (ref 0.5–1.4)
DIFFERENTIAL METHOD: ABNORMAL
EOSINOPHIL # BLD AUTO: 0 K/UL (ref 0–0.5)
EOSINOPHIL NFR BLD: 0.1 % (ref 0–8)
ERYTHROCYTE [DISTWIDTH] IN BLOOD BY AUTOMATED COUNT: 13.2 % (ref 11.5–14.5)
EST. GFR  (AFRICAN AMERICAN): 58 ML/MIN/1.73 M^2
EST. GFR  (NON AFRICAN AMERICAN): 50 ML/MIN/1.73 M^2
GLUCOSE SERPL-MCNC: 207 MG/DL (ref 70–110)
GLUCOSE UR QL STRIP: ABNORMAL
HCT VFR BLD AUTO: 46.1 % (ref 37–48.5)
HGB BLD-MCNC: 15.2 G/DL (ref 12–16)
HGB UR QL STRIP: ABNORMAL
HYALINE CASTS #/AREA URNS LPF: 0 /LPF
IMM GRANULOCYTES # BLD AUTO: 0.12 K/UL (ref 0–0.04)
IMM GRANULOCYTES NFR BLD AUTO: 0.9 % (ref 0–0.5)
KETONES UR QL STRIP: NEGATIVE
LACTATE SERPL-SCNC: 1.4 MMOL/L (ref 0.5–2.2)
LEUKOCYTE ESTERASE UR QL STRIP: ABNORMAL
LIPASE SERPL-CCNC: 13 U/L (ref 4–60)
LYMPHOCYTES # BLD AUTO: 1.1 K/UL (ref 1–4.8)
LYMPHOCYTES NFR BLD: 8 % (ref 18–48)
MCH RBC QN AUTO: 29.9 PG (ref 27–31)
MCHC RBC AUTO-ENTMCNC: 33 G/DL (ref 32–36)
MCV RBC AUTO: 91 FL (ref 82–98)
MICROSCOPIC COMMENT: ABNORMAL
MONOCYTES # BLD AUTO: 0.9 K/UL (ref 0.3–1)
MONOCYTES NFR BLD: 6.4 % (ref 4–15)
NEUTROPHILS # BLD AUTO: 11.3 K/UL (ref 1.8–7.7)
NEUTROPHILS NFR BLD: 84.3 % (ref 38–73)
NITRITE UR QL STRIP: POSITIVE
NRBC BLD-RTO: 0 /100 WBC
PH UR STRIP: 6 [PH] (ref 5–8)
PLATELET # BLD AUTO: 145 K/UL (ref 150–350)
PMV BLD AUTO: 9.8 FL (ref 9.2–12.9)
POTASSIUM SERPL-SCNC: 4.1 MMOL/L (ref 3.5–5.1)
PROT SERPL-MCNC: 7.6 G/DL (ref 6–8.4)
PROT UR QL STRIP: ABNORMAL
RBC # BLD AUTO: 5.08 M/UL (ref 4–5.4)
RBC #/AREA URNS HPF: 17 /HPF (ref 0–4)
SARS-COV-2 RDRP RESP QL NAA+PROBE: NEGATIVE
SODIUM SERPL-SCNC: 136 MMOL/L (ref 136–145)
SP GR UR STRIP: 1.02 (ref 1–1.03)
SQUAMOUS #/AREA URNS HPF: 2 /HPF
TROPONIN I SERPL DL<=0.01 NG/ML-MCNC: <0.006 NG/ML (ref 0–0.03)
URN SPEC COLLECT METH UR: ABNORMAL
UROBILINOGEN UR STRIP-ACNC: NEGATIVE EU/DL
WBC # BLD AUTO: 13.42 K/UL (ref 3.9–12.7)
WBC #/AREA URNS HPF: >100 /HPF (ref 0–5)

## 2021-03-21 PROCEDURE — 83880 ASSAY OF NATRIURETIC PEPTIDE: CPT | Performed by: NURSE PRACTITIONER

## 2021-03-21 PROCEDURE — 87186 SC STD MICRODIL/AGAR DIL: CPT | Performed by: EMERGENCY MEDICINE

## 2021-03-21 PROCEDURE — 36415 COLL VENOUS BLD VENIPUNCTURE: CPT | Performed by: EMERGENCY MEDICINE

## 2021-03-21 PROCEDURE — 83690 ASSAY OF LIPASE: CPT | Performed by: EMERGENCY MEDICINE

## 2021-03-21 PROCEDURE — 96375 TX/PRO/DX INJ NEW DRUG ADDON: CPT

## 2021-03-21 PROCEDURE — 93010 ELECTROCARDIOGRAM REPORT: CPT | Mod: ,,, | Performed by: SPECIALIST

## 2021-03-21 PROCEDURE — 83605 ASSAY OF LACTIC ACID: CPT | Performed by: EMERGENCY MEDICINE

## 2021-03-21 PROCEDURE — 96365 THER/PROPH/DIAG IV INF INIT: CPT

## 2021-03-21 PROCEDURE — 25000003 PHARM REV CODE 250: Performed by: EMERGENCY MEDICINE

## 2021-03-21 PROCEDURE — 87088 URINE BACTERIA CULTURE: CPT | Performed by: EMERGENCY MEDICINE

## 2021-03-21 PROCEDURE — U0002 COVID-19 LAB TEST NON-CDC: HCPCS | Performed by: EMERGENCY MEDICINE

## 2021-03-21 PROCEDURE — 84484 ASSAY OF TROPONIN QUANT: CPT | Performed by: EMERGENCY MEDICINE

## 2021-03-21 PROCEDURE — 80053 COMPREHEN METABOLIC PANEL: CPT | Performed by: EMERGENCY MEDICINE

## 2021-03-21 PROCEDURE — 81000 URINALYSIS NONAUTO W/SCOPE: CPT | Performed by: EMERGENCY MEDICINE

## 2021-03-21 PROCEDURE — 96361 HYDRATE IV INFUSION ADD-ON: CPT

## 2021-03-21 PROCEDURE — P9612 CATHETERIZE FOR URINE SPEC: HCPCS

## 2021-03-21 PROCEDURE — G0378 HOSPITAL OBSERVATION PER HR: HCPCS

## 2021-03-21 PROCEDURE — 87077 CULTURE AEROBIC IDENTIFY: CPT | Performed by: EMERGENCY MEDICINE

## 2021-03-21 PROCEDURE — 87086 URINE CULTURE/COLONY COUNT: CPT | Performed by: EMERGENCY MEDICINE

## 2021-03-21 PROCEDURE — 93005 ELECTROCARDIOGRAM TRACING: CPT

## 2021-03-21 PROCEDURE — 85025 COMPLETE CBC W/AUTO DIFF WBC: CPT | Performed by: EMERGENCY MEDICINE

## 2021-03-21 PROCEDURE — 99285 EMERGENCY DEPT VISIT HI MDM: CPT | Mod: 25

## 2021-03-21 PROCEDURE — 93010 EKG 12-LEAD: ICD-10-PCS | Mod: ,,, | Performed by: SPECIALIST

## 2021-03-21 PROCEDURE — 63600175 PHARM REV CODE 636 W HCPCS: Performed by: EMERGENCY MEDICINE

## 2021-03-21 RX ORDER — ONDANSETRON 2 MG/ML
4 INJECTION INTRAMUSCULAR; INTRAVENOUS
Status: COMPLETED | OUTPATIENT
Start: 2021-03-21 | End: 2021-03-21

## 2021-03-21 RX ORDER — CIPROFLOXACIN 2 MG/ML
400 INJECTION, SOLUTION INTRAVENOUS
Status: COMPLETED | OUTPATIENT
Start: 2021-03-21 | End: 2021-03-22

## 2021-03-21 RX ADMIN — CIPROFLOXACIN 400 MG: 2 INJECTION, SOLUTION INTRAVENOUS at 11:03

## 2021-03-21 RX ADMIN — SODIUM CHLORIDE 1000 ML: 0.9 INJECTION, SOLUTION INTRAVENOUS at 09:03

## 2021-03-21 RX ADMIN — ONDANSETRON 4 MG: 2 INJECTION INTRAMUSCULAR; INTRAVENOUS at 09:03

## 2021-03-22 PROBLEM — N30.01 ACUTE CYSTITIS WITH HEMATURIA: Status: ACTIVE | Noted: 2021-03-22

## 2021-03-22 PROBLEM — A41.9 SEPSIS: Status: ACTIVE | Noted: 2021-03-22

## 2021-03-22 PROBLEM — A41.9 SEPSIS: Status: RESOLVED | Noted: 2021-03-22 | Resolved: 2021-03-22

## 2021-03-22 LAB
ALBUMIN SERPL BCP-MCNC: 2.9 G/DL (ref 3.5–5.2)
ALP SERPL-CCNC: 72 U/L (ref 55–135)
ALT SERPL W/O P-5'-P-CCNC: 15 U/L (ref 10–44)
ANION GAP SERPL CALC-SCNC: 13 MMOL/L (ref 8–16)
AST SERPL-CCNC: 21 U/L (ref 10–40)
BASOPHILS # BLD AUTO: 0.07 K/UL (ref 0–0.2)
BASOPHILS NFR BLD: 0.5 % (ref 0–1.9)
BILIRUB SERPL-MCNC: 0.6 MG/DL (ref 0.1–1)
BNP SERPL-MCNC: 62 PG/ML (ref 0–99)
BUN SERPL-MCNC: 12 MG/DL (ref 8–23)
CALCIUM SERPL-MCNC: 9.1 MG/DL (ref 8.7–10.5)
CHLORIDE SERPL-SCNC: 101 MMOL/L (ref 95–110)
CO2 SERPL-SCNC: 24 MMOL/L (ref 23–29)
CREAT SERPL-MCNC: 0.9 MG/DL (ref 0.5–1.4)
DIFFERENTIAL METHOD: ABNORMAL
EOSINOPHIL # BLD AUTO: 0 K/UL (ref 0–0.5)
EOSINOPHIL NFR BLD: 0.2 % (ref 0–8)
ERYTHROCYTE [DISTWIDTH] IN BLOOD BY AUTOMATED COUNT: 13.3 % (ref 11.5–14.5)
EST. GFR  (AFRICAN AMERICAN): >60 ML/MIN/1.73 M^2
EST. GFR  (NON AFRICAN AMERICAN): >60 ML/MIN/1.73 M^2
GLUCOSE SERPL-MCNC: 151 MG/DL (ref 70–110)
HCT VFR BLD AUTO: 45.1 % (ref 37–48.5)
HGB BLD-MCNC: 14.6 G/DL (ref 12–16)
IMM GRANULOCYTES # BLD AUTO: 0.2 K/UL (ref 0–0.04)
IMM GRANULOCYTES NFR BLD AUTO: 1.5 % (ref 0–0.5)
LYMPHOCYTES # BLD AUTO: 1.2 K/UL (ref 1–4.8)
LYMPHOCYTES NFR BLD: 8.6 % (ref 18–48)
MAGNESIUM SERPL-MCNC: 1.7 MG/DL (ref 1.6–2.6)
MCH RBC QN AUTO: 29.4 PG (ref 27–31)
MCHC RBC AUTO-ENTMCNC: 32.4 G/DL (ref 32–36)
MCV RBC AUTO: 91 FL (ref 82–98)
MONOCYTES # BLD AUTO: 0.8 K/UL (ref 0.3–1)
MONOCYTES NFR BLD: 5.9 % (ref 4–15)
NEUTROPHILS # BLD AUTO: 11.5 K/UL (ref 1.8–7.7)
NEUTROPHILS NFR BLD: 83.3 % (ref 38–73)
NRBC BLD-RTO: 0 /100 WBC
PLATELET # BLD AUTO: 187 K/UL (ref 150–350)
PMV BLD AUTO: 11.4 FL (ref 9.2–12.9)
POCT GLUCOSE: 174 MG/DL (ref 70–110)
POCT GLUCOSE: 194 MG/DL (ref 70–110)
POCT GLUCOSE: 282 MG/DL (ref 70–110)
POCT GLUCOSE: 296 MG/DL (ref 70–110)
POTASSIUM SERPL-SCNC: 4.5 MMOL/L (ref 3.5–5.1)
PROT SERPL-MCNC: 7.4 G/DL (ref 6–8.4)
RBC # BLD AUTO: 4.96 M/UL (ref 4–5.4)
SODIUM SERPL-SCNC: 138 MMOL/L (ref 136–145)
WBC # BLD AUTO: 13.79 K/UL (ref 3.9–12.7)

## 2021-03-22 PROCEDURE — 80053 COMPREHEN METABOLIC PANEL: CPT | Performed by: NURSE PRACTITIONER

## 2021-03-22 PROCEDURE — 36415 COLL VENOUS BLD VENIPUNCTURE: CPT | Performed by: NURSE PRACTITIONER

## 2021-03-22 PROCEDURE — 25000003 PHARM REV CODE 250: Performed by: NURSE PRACTITIONER

## 2021-03-22 PROCEDURE — 63600175 PHARM REV CODE 636 W HCPCS: Performed by: NURSE PRACTITIONER

## 2021-03-22 PROCEDURE — 96376 TX/PRO/DX INJ SAME DRUG ADON: CPT

## 2021-03-22 PROCEDURE — 85025 COMPLETE CBC W/AUTO DIFF WBC: CPT | Performed by: NURSE PRACTITIONER

## 2021-03-22 PROCEDURE — 96372 THER/PROPH/DIAG INJ SC/IM: CPT

## 2021-03-22 PROCEDURE — 83735 ASSAY OF MAGNESIUM: CPT | Performed by: NURSE PRACTITIONER

## 2021-03-22 PROCEDURE — G0378 HOSPITAL OBSERVATION PER HR: HCPCS

## 2021-03-22 PROCEDURE — 27000221 HC OXYGEN, UP TO 24 HOURS

## 2021-03-22 PROCEDURE — 94761 N-INVAS EAR/PLS OXIMETRY MLT: CPT

## 2021-03-22 PROCEDURE — 99900035 HC TECH TIME PER 15 MIN (STAT)

## 2021-03-22 RX ORDER — ONDANSETRON 2 MG/ML
4 INJECTION INTRAMUSCULAR; INTRAVENOUS EVERY 6 HOURS PRN
Status: DISCONTINUED | OUTPATIENT
Start: 2021-03-22 | End: 2021-03-23 | Stop reason: HOSPADM

## 2021-03-22 RX ORDER — PRAZOSIN HYDROCHLORIDE 1 MG/1
5 CAPSULE ORAL DAILY
Status: DISCONTINUED | OUTPATIENT
Start: 2021-03-22 | End: 2021-03-23 | Stop reason: HOSPADM

## 2021-03-22 RX ORDER — SIMVASTATIN 10 MG/1
40 TABLET, FILM COATED ORAL DAILY
Status: DISCONTINUED | OUTPATIENT
Start: 2021-03-22 | End: 2021-03-23 | Stop reason: HOSPADM

## 2021-03-22 RX ORDER — CIPROFLOXACIN 2 MG/ML
400 INJECTION, SOLUTION INTRAVENOUS
Status: DISCONTINUED | OUTPATIENT
Start: 2021-03-22 | End: 2021-03-23 | Stop reason: HOSPADM

## 2021-03-22 RX ORDER — FERROUS SULFATE, DRIED 160(50) MG
1 TABLET, EXTENDED RELEASE ORAL DAILY
Status: DISCONTINUED | OUTPATIENT
Start: 2021-03-22 | End: 2021-03-23 | Stop reason: HOSPADM

## 2021-03-22 RX ORDER — IBUPROFEN 200 MG
24 TABLET ORAL
Status: DISCONTINUED | OUTPATIENT
Start: 2021-03-22 | End: 2021-03-23 | Stop reason: HOSPADM

## 2021-03-22 RX ORDER — IBUPROFEN 200 MG
16 TABLET ORAL
Status: DISCONTINUED | OUTPATIENT
Start: 2021-03-22 | End: 2021-03-23 | Stop reason: HOSPADM

## 2021-03-22 RX ORDER — THIAMINE HCL 100 MG
100 TABLET ORAL DAILY
Status: DISCONTINUED | OUTPATIENT
Start: 2021-03-22 | End: 2021-03-23 | Stop reason: HOSPADM

## 2021-03-22 RX ORDER — GLUCAGON 1 MG
1 KIT INJECTION
Status: DISCONTINUED | OUTPATIENT
Start: 2021-03-22 | End: 2021-03-23 | Stop reason: HOSPADM

## 2021-03-22 RX ORDER — CLONAZEPAM 0.5 MG/1
0.5 TABLET ORAL 2 TIMES DAILY
Status: DISCONTINUED | OUTPATIENT
Start: 2021-03-22 | End: 2021-03-23 | Stop reason: HOSPADM

## 2021-03-22 RX ORDER — ASPIRIN 81 MG/1
81 TABLET ORAL DAILY
Status: DISCONTINUED | OUTPATIENT
Start: 2021-03-22 | End: 2021-03-23 | Stop reason: HOSPADM

## 2021-03-22 RX ORDER — TRIFLUOPERAZINE HYDROCHLORIDE 5 MG/1
10 TABLET, FILM COATED ORAL DAILY
Status: DISCONTINUED | OUTPATIENT
Start: 2021-03-22 | End: 2021-03-23 | Stop reason: HOSPADM

## 2021-03-22 RX ORDER — LOSARTAN POTASSIUM 25 MG/1
50 TABLET ORAL DAILY
Status: DISCONTINUED | OUTPATIENT
Start: 2021-03-22 | End: 2021-03-23 | Stop reason: HOSPADM

## 2021-03-22 RX ORDER — OXYBUTYNIN CHLORIDE 5 MG/1
15 TABLET, EXTENDED RELEASE ORAL DAILY
Status: DISCONTINUED | OUTPATIENT
Start: 2021-03-22 | End: 2021-03-23 | Stop reason: HOSPADM

## 2021-03-22 RX ORDER — INSULIN ASPART 100 [IU]/ML
1-10 INJECTION, SOLUTION INTRAVENOUS; SUBCUTANEOUS
Status: DISCONTINUED | OUTPATIENT
Start: 2021-03-22 | End: 2021-03-23 | Stop reason: HOSPADM

## 2021-03-22 RX ORDER — DIVALPROEX SODIUM 250 MG/1
500 TABLET, FILM COATED, EXTENDED RELEASE ORAL NIGHTLY
Status: DISCONTINUED | OUTPATIENT
Start: 2021-03-22 | End: 2021-03-23 | Stop reason: HOSPADM

## 2021-03-22 RX ORDER — LANOLIN ALCOHOL/MO/W.PET/CERES
800 CREAM (GRAM) TOPICAL
Status: DISCONTINUED | OUTPATIENT
Start: 2021-03-22 | End: 2021-03-23 | Stop reason: HOSPADM

## 2021-03-22 RX ORDER — ERGOCALCIFEROL 1.25 MG/1
50000 CAPSULE ORAL
Status: DISCONTINUED | OUTPATIENT
Start: 2021-03-22 | End: 2021-03-23 | Stop reason: HOSPADM

## 2021-03-22 RX ORDER — SODIUM CHLORIDE 0.9 % (FLUSH) 0.9 %
10 SYRINGE (ML) INJECTION
Status: DISCONTINUED | OUTPATIENT
Start: 2021-03-22 | End: 2021-03-23 | Stop reason: HOSPADM

## 2021-03-22 RX ORDER — LEVOTHYROXINE SODIUM 88 UG/1
88 TABLET ORAL
Status: DISCONTINUED | OUTPATIENT
Start: 2021-03-22 | End: 2021-03-23 | Stop reason: HOSPADM

## 2021-03-22 RX ORDER — SODIUM CHLORIDE 9 MG/ML
INJECTION, SOLUTION INTRAVENOUS CONTINUOUS
Status: ACTIVE | OUTPATIENT
Start: 2021-03-22 | End: 2021-03-22

## 2021-03-22 RX ORDER — ACETAMINOPHEN 325 MG/1
650 TABLET ORAL EVERY 4 HOURS PRN
Status: DISCONTINUED | OUTPATIENT
Start: 2021-03-22 | End: 2021-03-23 | Stop reason: HOSPADM

## 2021-03-22 RX ADMIN — INSULIN ASPART 2 UNITS: 100 INJECTION, SOLUTION INTRAVENOUS; SUBCUTANEOUS at 06:03

## 2021-03-22 RX ADMIN — Medication 1 TABLET: at 08:03

## 2021-03-22 RX ADMIN — INSULIN ASPART 6 UNITS: 100 INJECTION, SOLUTION INTRAVENOUS; SUBCUTANEOUS at 11:03

## 2021-03-22 RX ADMIN — SIMVASTATIN 40 MG: 10 TABLET, FILM COATED ORAL at 08:03

## 2021-03-22 RX ADMIN — INSULIN ASPART 1 UNITS: 100 INJECTION, SOLUTION INTRAVENOUS; SUBCUTANEOUS at 08:03

## 2021-03-22 RX ADMIN — OXYBUTYNIN CHLORIDE 15 MG: 5 TABLET, EXTENDED RELEASE ORAL at 08:03

## 2021-03-22 RX ADMIN — ERGOCALCIFEROL 50000 UNITS: 1.25 CAPSULE ORAL at 08:03

## 2021-03-22 RX ADMIN — CIPROFLOXACIN 400 MG: 2 INJECTION, SOLUTION INTRAVENOUS at 10:03

## 2021-03-22 RX ADMIN — CLONAZEPAM 0.5 MG: 0.5 TABLET ORAL at 08:03

## 2021-03-22 RX ADMIN — DIVALPROEX SODIUM 500 MG: 250 TABLET, EXTENDED RELEASE ORAL at 08:03

## 2021-03-22 RX ADMIN — ASPIRIN 81 MG: 81 TABLET, COATED ORAL at 08:03

## 2021-03-22 RX ADMIN — LEVOTHYROXINE SODIUM 88 MCG: 0.09 TABLET ORAL at 05:03

## 2021-03-22 RX ADMIN — LOSARTAN POTASSIUM 50 MG: 25 TABLET, FILM COATED ORAL at 08:03

## 2021-03-22 RX ADMIN — DIVALPROEX SODIUM 500 MG: 250 TABLET, EXTENDED RELEASE ORAL at 01:03

## 2021-03-22 RX ADMIN — PRAZOSIN HYDROCHLORIDE 5 MG: 1 CAPSULE ORAL at 08:03

## 2021-03-22 RX ADMIN — ACETAMINOPHEN 650 MG: 325 TABLET ORAL at 05:03

## 2021-03-22 RX ADMIN — THIAMINE HCL TAB 100 MG 100 MG: 100 TAB at 08:03

## 2021-03-22 RX ADMIN — SODIUM CHLORIDE: 0.9 INJECTION, SOLUTION INTRAVENOUS at 05:03

## 2021-03-22 RX ADMIN — CIPROFLOXACIN 400 MG: 2 INJECTION, SOLUTION INTRAVENOUS at 11:03

## 2021-03-22 RX ADMIN — INSULIN ASPART 6 UNITS: 100 INJECTION, SOLUTION INTRAVENOUS; SUBCUTANEOUS at 04:03

## 2021-03-22 RX ADMIN — TRIFLUOPERAZINE HYDROCHLORIDE 10 MG: 5 TABLET, FILM COATED ORAL at 08:03

## 2021-03-23 VITALS
TEMPERATURE: 99 F | OXYGEN SATURATION: 94 % | RESPIRATION RATE: 18 BRPM | BODY MASS INDEX: 35.36 KG/M2 | DIASTOLIC BLOOD PRESSURE: 50 MMHG | HEART RATE: 99 BPM | SYSTOLIC BLOOD PRESSURE: 98 MMHG | WEIGHT: 187.31 LBS | HEIGHT: 61 IN

## 2021-03-23 LAB
ALBUMIN SERPL BCP-MCNC: 2.2 G/DL (ref 3.5–5.2)
ALP SERPL-CCNC: 76 U/L (ref 55–135)
ALT SERPL W/O P-5'-P-CCNC: 23 U/L (ref 10–44)
ANION GAP SERPL CALC-SCNC: 12 MMOL/L (ref 8–16)
AST SERPL-CCNC: 29 U/L (ref 10–40)
BASOPHILS # BLD AUTO: 0.03 K/UL (ref 0–0.2)
BASOPHILS NFR BLD: 0.3 % (ref 0–1.9)
BILIRUB SERPL-MCNC: 0.2 MG/DL (ref 0.1–1)
BUN SERPL-MCNC: 15 MG/DL (ref 8–23)
CALCIUM SERPL-MCNC: 8.6 MG/DL (ref 8.7–10.5)
CHLORIDE SERPL-SCNC: 102 MMOL/L (ref 95–110)
CO2 SERPL-SCNC: 25 MMOL/L (ref 23–29)
CREAT SERPL-MCNC: 0.8 MG/DL (ref 0.5–1.4)
DIFFERENTIAL METHOD: ABNORMAL
EOSINOPHIL # BLD AUTO: 0.2 K/UL (ref 0–0.5)
EOSINOPHIL NFR BLD: 2.2 % (ref 0–8)
ERYTHROCYTE [DISTWIDTH] IN BLOOD BY AUTOMATED COUNT: 13.2 % (ref 11.5–14.5)
EST. GFR  (AFRICAN AMERICAN): >60 ML/MIN/1.73 M^2
EST. GFR  (NON AFRICAN AMERICAN): >60 ML/MIN/1.73 M^2
GLUCOSE SERPL-MCNC: 154 MG/DL (ref 70–110)
HCT VFR BLD AUTO: 37.6 % (ref 37–48.5)
HGB BLD-MCNC: 12.3 G/DL (ref 12–16)
IMM GRANULOCYTES # BLD AUTO: 0.04 K/UL (ref 0–0.04)
IMM GRANULOCYTES NFR BLD AUTO: 0.4 % (ref 0–0.5)
LYMPHOCYTES # BLD AUTO: 1 K/UL (ref 1–4.8)
LYMPHOCYTES NFR BLD: 10.6 % (ref 18–48)
MAGNESIUM SERPL-MCNC: 1.7 MG/DL (ref 1.6–2.6)
MCH RBC QN AUTO: 29.8 PG (ref 27–31)
MCHC RBC AUTO-ENTMCNC: 32.7 G/DL (ref 32–36)
MCV RBC AUTO: 91 FL (ref 82–98)
MONOCYTES # BLD AUTO: 0.6 K/UL (ref 0.3–1)
MONOCYTES NFR BLD: 6.1 % (ref 4–15)
NEUTROPHILS # BLD AUTO: 7.5 K/UL (ref 1.8–7.7)
NEUTROPHILS NFR BLD: 80.4 % (ref 38–73)
NRBC BLD-RTO: 0 /100 WBC
PLATELET # BLD AUTO: 125 K/UL (ref 150–350)
PMV BLD AUTO: 10.6 FL (ref 9.2–12.9)
POCT GLUCOSE: 150 MG/DL (ref 70–110)
POCT GLUCOSE: 188 MG/DL (ref 70–110)
POTASSIUM SERPL-SCNC: 4 MMOL/L (ref 3.5–5.1)
PROT SERPL-MCNC: 5.8 G/DL (ref 6–8.4)
RBC # BLD AUTO: 4.13 M/UL (ref 4–5.4)
SODIUM SERPL-SCNC: 139 MMOL/L (ref 136–145)
WBC # BLD AUTO: 9.38 K/UL (ref 3.9–12.7)

## 2021-03-23 PROCEDURE — 27000221 HC OXYGEN, UP TO 24 HOURS

## 2021-03-23 PROCEDURE — 85025 COMPLETE CBC W/AUTO DIFF WBC: CPT | Performed by: NURSE PRACTITIONER

## 2021-03-23 PROCEDURE — 83735 ASSAY OF MAGNESIUM: CPT | Performed by: NURSE PRACTITIONER

## 2021-03-23 PROCEDURE — 94761 N-INVAS EAR/PLS OXIMETRY MLT: CPT

## 2021-03-23 PROCEDURE — 25000003 PHARM REV CODE 250: Performed by: NURSE PRACTITIONER

## 2021-03-23 PROCEDURE — 36415 COLL VENOUS BLD VENIPUNCTURE: CPT | Performed by: NURSE PRACTITIONER

## 2021-03-23 PROCEDURE — 63600175 PHARM REV CODE 636 W HCPCS: Performed by: NURSE PRACTITIONER

## 2021-03-23 PROCEDURE — 80053 COMPREHEN METABOLIC PANEL: CPT | Performed by: NURSE PRACTITIONER

## 2021-03-23 PROCEDURE — 96376 TX/PRO/DX INJ SAME DRUG ADON: CPT

## 2021-03-23 PROCEDURE — 96372 THER/PROPH/DIAG INJ SC/IM: CPT

## 2021-03-23 PROCEDURE — G0378 HOSPITAL OBSERVATION PER HR: HCPCS

## 2021-03-23 RX ORDER — CIPROFLOXACIN 500 MG/1
500 TABLET ORAL EVERY 12 HOURS
Qty: 14 TABLET | Refills: 0 | Status: SHIPPED | OUTPATIENT
Start: 2021-03-23 | End: 2021-03-30

## 2021-03-23 RX ADMIN — THIAMINE HCL TAB 100 MG 100 MG: 100 TAB at 08:03

## 2021-03-23 RX ADMIN — INSULIN ASPART 2 UNITS: 100 INJECTION, SOLUTION INTRAVENOUS; SUBCUTANEOUS at 11:03

## 2021-03-23 RX ADMIN — CIPROFLOXACIN 400 MG: 2 INJECTION, SOLUTION INTRAVENOUS at 11:03

## 2021-03-23 RX ADMIN — LOSARTAN POTASSIUM 50 MG: 25 TABLET, FILM COATED ORAL at 08:03

## 2021-03-23 RX ADMIN — LEVOTHYROXINE SODIUM 88 MCG: 0.09 TABLET ORAL at 05:03

## 2021-03-23 RX ADMIN — ASPIRIN 81 MG: 81 TABLET, COATED ORAL at 08:03

## 2021-03-23 RX ADMIN — CLONAZEPAM 0.5 MG: 0.5 TABLET ORAL at 08:03

## 2021-03-23 RX ADMIN — TRIFLUOPERAZINE HYDROCHLORIDE 10 MG: 5 TABLET, FILM COATED ORAL at 08:03

## 2021-03-23 RX ADMIN — SIMVASTATIN 40 MG: 10 TABLET, FILM COATED ORAL at 08:03

## 2021-03-23 RX ADMIN — PRAZOSIN HYDROCHLORIDE 5 MG: 1 CAPSULE ORAL at 08:03

## 2021-03-23 RX ADMIN — Medication 1 TABLET: at 08:03

## 2021-03-23 RX ADMIN — OXYBUTYNIN CHLORIDE 15 MG: 5 TABLET, EXTENDED RELEASE ORAL at 08:03

## 2021-03-24 ENCOUNTER — TELEPHONE (OUTPATIENT)
Dept: MEDSURG UNIT | Facility: HOSPITAL | Age: 71
End: 2021-03-24

## 2021-03-25 LAB — BACTERIA UR CULT: ABNORMAL

## 2021-03-26 ENCOUNTER — HOSPITAL ENCOUNTER (OUTPATIENT)
Dept: RADIOLOGY | Facility: HOSPITAL | Age: 71
Discharge: HOME | End: 2021-03-26
Attending: NURSE PRACTITIONER
Payer: MEDICARE

## 2021-03-26 ENCOUNTER — OFFICE VISIT (OUTPATIENT)
Dept: FAMILY MEDICINE | Facility: CLINIC | Age: 71
End: 2021-03-26
Payer: MEDICARE

## 2021-03-26 VITALS
SYSTOLIC BLOOD PRESSURE: 134 MMHG | HEIGHT: 61 IN | OXYGEN SATURATION: 94 % | BODY MASS INDEX: 35.5 KG/M2 | HEART RATE: 85 BPM | TEMPERATURE: 98 F | WEIGHT: 188.06 LBS | DIASTOLIC BLOOD PRESSURE: 70 MMHG

## 2021-03-26 DIAGNOSIS — S32.009A TRAUMATIC FRACTURE OF LUMBAR VERTEBRA: ICD-10-CM

## 2021-03-26 DIAGNOSIS — N39.0 URINARY TRACT INFECTION WITHOUT HEMATURIA, SITE UNSPECIFIED: Primary | ICD-10-CM
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
## 2021-03-26 DIAGNOSIS — M54.50 ACUTE LOW BACK PAIN WITHOUT SCIATICA, UNSPECIFIED BACK PAIN LATERALITY: ICD-10-CM

## 2021-03-26 DIAGNOSIS — R19.7 DIARRHEA, UNSPECIFIED TYPE: ICD-10-CM

## 2021-03-26 PROCEDURE — 1159F PR MEDICATION LIST DOCUMENTED IN MEDICAL RECORD: ICD-10-PCS | Mod: S$GLB,,, | Performed by: NURSE PRACTITIONER

## 2021-03-26 PROCEDURE — 1159F MED LIST DOCD IN RCRD: CPT | Mod: S$GLB,,, | Performed by: NURSE PRACTITIONER

## 2021-03-26 PROCEDURE — 1157F ADVNC CARE PLAN IN RCRD: CPT | Mod: S$GLB,,, | Performed by: NURSE PRACTITIONER

## 2021-03-26 PROCEDURE — 3072F LOW RISK FOR RETINOPATHY: CPT | Mod: S$GLB,,, | Performed by: NURSE PRACTITIONER

## 2021-03-26 PROCEDURE — 99999 PR PBB SHADOW E&M-EST. PATIENT-LVL V: ICD-10-PCS | Mod: PBBFAC,,, | Performed by: NURSE PRACTITIONER

## 2021-03-26 PROCEDURE — 1126F PR PAIN SEVERITY QUANTIFIED, NO PAIN PRESENT: ICD-10-PCS | Mod: S$GLB,,, | Performed by: NURSE PRACTITIONER

## 2021-03-26 PROCEDURE — 72110 X-RAY EXAM L-2 SPINE 4/>VWS: CPT | Mod: TC

## 2021-03-26 PROCEDURE — 3008F PR BODY MASS INDEX (BMI) DOCUMENTED: ICD-10-PCS | Mod: CPTII,S$GLB,, | Performed by: NURSE PRACTITIONER

## 2021-03-26 PROCEDURE — 99215 OFFICE O/P EST HI 40 MIN: CPT | Mod: S$GLB,,, | Performed by: NURSE PRACTITIONER

## 2021-03-26 PROCEDURE — 3072F PR LOW RISK FOR RETINOPATHY: ICD-10-PCS | Mod: S$GLB,,, | Performed by: NURSE PRACTITIONER

## 2021-03-26 PROCEDURE — 1101F PT FALLS ASSESS-DOCD LE1/YR: CPT | Mod: CPTII,S$GLB,, | Performed by: NURSE PRACTITIONER

## 2021-03-26 PROCEDURE — 99215 PR OFFICE/OUTPT VISIT, EST, LEVL V, 40-54 MIN: ICD-10-PCS | Mod: S$GLB,,, | Performed by: NURSE PRACTITIONER

## 2021-03-26 PROCEDURE — 3075F SYST BP GE 130 - 139MM HG: CPT | Mod: CPTII,S$GLB,, | Performed by: NURSE PRACTITIONER

## 2021-03-26 PROCEDURE — 1157F PR ADVANCE CARE PLAN OR EQUIV PRESENT IN MEDICAL RECORD: ICD-10-PCS | Mod: S$GLB,,, | Performed by: NURSE PRACTITIONER

## 2021-03-26 PROCEDURE — 3288F PR FALLS RISK ASSESSMENT DOCUMENTED: ICD-10-PCS | Mod: CPTII,S$GLB,, | Performed by: NURSE PRACTITIONER

## 2021-03-26 PROCEDURE — 3008F BODY MASS INDEX DOCD: CPT | Mod: CPTII,S$GLB,, | Performed by: NURSE PRACTITIONER

## 2021-03-26 PROCEDURE — 1101F PR PT FALLS ASSESS DOC 0-1 FALLS W/OUT INJ PAST YR: ICD-10-PCS | Mod: CPTII,S$GLB,, | Performed by: NURSE PRACTITIONER

## 2021-03-26 PROCEDURE — 3288F FALL RISK ASSESSMENT DOCD: CPT | Mod: CPTII,S$GLB,, | Performed by: NURSE PRACTITIONER

## 2021-03-26 PROCEDURE — 99999 PR PBB SHADOW E&M-EST. PATIENT-LVL V: CPT | Mod: PBBFAC,,, | Performed by: NURSE PRACTITIONER

## 2021-03-26 PROCEDURE — 1126F AMNT PAIN NOTED NONE PRSNT: CPT | Mod: S$GLB,,, | Performed by: NURSE PRACTITIONER

## 2021-03-26 PROCEDURE — 3078F DIAST BP <80 MM HG: CPT | Mod: CPTII,S$GLB,, | Performed by: NURSE PRACTITIONER

## 2021-03-26 PROCEDURE — 3075F PR MOST RECENT SYSTOLIC BLOOD PRESS GE 130-139MM HG: ICD-10-PCS | Mod: CPTII,S$GLB,, | Performed by: NURSE PRACTITIONER

## 2021-03-26 PROCEDURE — 3078F PR MOST RECENT DIASTOLIC BLOOD PRESSURE < 80 MM HG: ICD-10-PCS | Mod: CPTII,S$GLB,, | Performed by: NURSE PRACTITIONER

## 2021-03-27 ENCOUNTER — LAB VISIT (OUTPATIENT)
Dept: LAB | Facility: HOSPITAL | Age: 71
End: 2021-03-27
Attending: NURSE PRACTITIONER
Payer: MEDICARE

## 2021-03-27 DIAGNOSIS — R19.7 DIARRHEA, UNSPECIFIED TYPE: ICD-10-CM

## 2021-03-27 LAB — WBC #/AREA STL HPF: NORMAL /[HPF]

## 2021-03-27 PROCEDURE — 89055 LEUKOCYTE ASSESSMENT FECAL: CPT | Performed by: NURSE PRACTITIONER

## 2021-03-27 PROCEDURE — 87324 CLOSTRIDIUM AG IA: CPT | Performed by: NURSE PRACTITIONER

## 2021-03-27 PROCEDURE — 87449 NOS EACH ORGANISM AG IA: CPT | Performed by: NURSE PRACTITIONER

## 2021-03-28 LAB
C DIFF GDH STL QL: NEGATIVE
C DIFF TOX A+B STL QL IA: NEGATIVE

## 2021-04-01 ENCOUNTER — OFFICE VISIT (OUTPATIENT)
Dept: DERMATOLOGY | Facility: CLINIC | Age: 71
End: 2021-04-01
Payer: MEDICARE

## 2021-04-01 VITALS — HEIGHT: 61 IN | WEIGHT: 188 LBS | BODY MASS INDEX: 35.5 KG/M2

## 2021-04-01 DIAGNOSIS — L72.3 SEBACEOUS CYST: ICD-10-CM

## 2021-04-01 PROCEDURE — 99499 UNLISTED E&M SERVICE: CPT | Mod: S$GLB,,, | Performed by: DERMATOLOGY

## 2021-04-01 PROCEDURE — 11403 EXC TR-EXT B9+MARG 2.1-3CM: CPT | Mod: S$GLB,,, | Performed by: DERMATOLOGY

## 2021-04-01 PROCEDURE — 88304 TISSUE EXAM BY PATHOLOGIST: CPT | Performed by: PATHOLOGY

## 2021-04-01 PROCEDURE — 1157F PR ADVANCE CARE PLAN OR EQUIV PRESENT IN MEDICAL RECORD: ICD-10-PCS | Mod: S$GLB,,, | Performed by: DERMATOLOGY

## 2021-04-01 PROCEDURE — 1126F PR PAIN SEVERITY QUANTIFIED, NO PAIN PRESENT: ICD-10-PCS | Mod: S$GLB,,, | Performed by: DERMATOLOGY

## 2021-04-01 PROCEDURE — 3072F LOW RISK FOR RETINOPATHY: CPT | Mod: S$GLB,,, | Performed by: DERMATOLOGY

## 2021-04-01 PROCEDURE — 3072F PR LOW RISK FOR RETINOPATHY: ICD-10-PCS | Mod: S$GLB,,, | Performed by: DERMATOLOGY

## 2021-04-01 PROCEDURE — 3008F BODY MASS INDEX DOCD: CPT | Mod: CPTII,S$GLB,, | Performed by: DERMATOLOGY

## 2021-04-01 PROCEDURE — 88304 PR  SURG PATH,LEVEL III: ICD-10-PCS | Mod: 26,,, | Performed by: PATHOLOGY

## 2021-04-01 PROCEDURE — 1157F ADVNC CARE PLAN IN RCRD: CPT | Mod: S$GLB,,, | Performed by: DERMATOLOGY

## 2021-04-01 PROCEDURE — 88304 TISSUE EXAM BY PATHOLOGIST: CPT | Mod: 26,,, | Performed by: PATHOLOGY

## 2021-04-01 PROCEDURE — 3008F PR BODY MASS INDEX (BMI) DOCUMENTED: ICD-10-PCS | Mod: CPTII,S$GLB,, | Performed by: DERMATOLOGY

## 2021-04-01 PROCEDURE — 12031 INTMD RPR S/A/T/EXT 2.5 CM/<: CPT | Mod: 51,S$GLB,, | Performed by: DERMATOLOGY

## 2021-04-01 PROCEDURE — 1126F AMNT PAIN NOTED NONE PRSNT: CPT | Mod: S$GLB,,, | Performed by: DERMATOLOGY

## 2021-04-01 PROCEDURE — 12031 PR LAYR CLOS WND TRUNK,ARM,LEG <2.5 CM: ICD-10-PCS | Mod: 51,S$GLB,, | Performed by: DERMATOLOGY

## 2021-04-01 PROCEDURE — 99999 PR PBB SHADOW E&M-EST. PATIENT-LVL IV: ICD-10-PCS | Mod: PBBFAC,,, | Performed by: DERMATOLOGY

## 2021-04-01 PROCEDURE — 11403 PR EXC SKIN BENIG 2.1-3 CM TRUNK,ARM,LEG: ICD-10-PCS | Mod: S$GLB,,, | Performed by: DERMATOLOGY

## 2021-04-01 PROCEDURE — 99999 PR PBB SHADOW E&M-EST. PATIENT-LVL IV: CPT | Mod: PBBFAC,,, | Performed by: DERMATOLOGY

## 2021-04-01 PROCEDURE — 99499 NO LOS: ICD-10-PCS | Mod: S$GLB,,, | Performed by: DERMATOLOGY

## 2021-04-05 ENCOUNTER — OFFICE VISIT (OUTPATIENT)
Dept: PSYCHIATRY | Facility: CLINIC | Age: 71
End: 2021-04-05
Payer: COMMERCIAL

## 2021-04-05 DIAGNOSIS — F41.9 ANXIETY DISORDER, UNSPECIFIED TYPE: ICD-10-CM

## 2021-04-05 DIAGNOSIS — F34.1 PERSISTENT DEPRESSIVE DISORDER: ICD-10-CM

## 2021-04-05 DIAGNOSIS — F39 MOOD DISORDER: ICD-10-CM

## 2021-04-05 DIAGNOSIS — F43.10 PTSD (POST-TRAUMATIC STRESS DISORDER): Primary | ICD-10-CM

## 2021-04-05 PROCEDURE — 1157F PR ADVANCE CARE PLAN OR EQUIV PRESENT IN MEDICAL RECORD: ICD-10-PCS | Mod: S$GLB,,, | Performed by: PSYCHOLOGIST

## 2021-04-05 PROCEDURE — 3072F PR LOW RISK FOR RETINOPATHY: ICD-10-PCS | Mod: S$GLB,,, | Performed by: PSYCHOLOGIST

## 2021-04-05 PROCEDURE — 99999 PR PBB SHADOW E&M-EST. PATIENT-LVL I: CPT | Mod: PBBFAC,,, | Performed by: PSYCHOLOGIST

## 2021-04-05 PROCEDURE — 1157F ADVNC CARE PLAN IN RCRD: CPT | Mod: S$GLB,,, | Performed by: PSYCHOLOGIST

## 2021-04-05 PROCEDURE — 90791 PR PSYCHIATRIC DIAGNOSTIC EVALUATION: ICD-10-PCS | Mod: S$GLB,,, | Performed by: PSYCHOLOGIST

## 2021-04-05 PROCEDURE — 3072F LOW RISK FOR RETINOPATHY: CPT | Mod: S$GLB,,, | Performed by: PSYCHOLOGIST

## 2021-04-05 PROCEDURE — 99999 PR PBB SHADOW E&M-EST. PATIENT-LVL I: ICD-10-PCS | Mod: PBBFAC,,, | Performed by: PSYCHOLOGIST

## 2021-04-05 PROCEDURE — 90791 PSYCH DIAGNOSTIC EVALUATION: CPT | Mod: S$GLB,,, | Performed by: PSYCHOLOGIST

## 2021-04-06 ENCOUNTER — OFFICE VISIT (OUTPATIENT)
Dept: FAMILY MEDICINE | Facility: CLINIC | Age: 71
End: 2021-04-06
Payer: MEDICARE

## 2021-04-06 VITALS
DIASTOLIC BLOOD PRESSURE: 82 MMHG | BODY MASS INDEX: 34.13 KG/M2 | OXYGEN SATURATION: 95 % | HEIGHT: 61 IN | WEIGHT: 180.75 LBS | SYSTOLIC BLOOD PRESSURE: 132 MMHG | HEART RATE: 93 BPM | TEMPERATURE: 98 F

## 2021-04-06 DIAGNOSIS — M54.50 ACUTE LOW BACK PAIN WITHOUT SCIATICA, UNSPECIFIED BACK PAIN LATERALITY: Primary | ICD-10-CM

## 2021-04-06 DIAGNOSIS — E66.9 OBESITY (BMI 30.0-34.9): ICD-10-CM
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
## 2021-04-06 DIAGNOSIS — G47.33 OSA (OBSTRUCTIVE SLEEP APNEA): ICD-10-CM

## 2021-04-06 PROCEDURE — 1157F PR ADVANCE CARE PLAN OR EQUIV PRESENT IN MEDICAL RECORD: ICD-10-PCS | Mod: S$GLB,,, | Performed by: NURSE PRACTITIONER

## 2021-04-06 PROCEDURE — 1101F PT FALLS ASSESS-DOCD LE1/YR: CPT | Mod: CPTII,S$GLB,, | Performed by: NURSE PRACTITIONER

## 2021-04-06 PROCEDURE — 3072F LOW RISK FOR RETINOPATHY: CPT | Mod: S$GLB,,, | Performed by: NURSE PRACTITIONER

## 2021-04-06 PROCEDURE — 3079F PR MOST RECENT DIASTOLIC BLOOD PRESSURE 80-89 MM HG: ICD-10-PCS | Mod: CPTII,S$GLB,, | Performed by: NURSE PRACTITIONER

## 2021-04-06 PROCEDURE — 3075F PR MOST RECENT SYSTOLIC BLOOD PRESS GE 130-139MM HG: ICD-10-PCS | Mod: CPTII,S$GLB,, | Performed by: NURSE PRACTITIONER

## 2021-04-06 PROCEDURE — 99999 PR PBB SHADOW E&M-EST. PATIENT-LVL V: ICD-10-PCS | Mod: PBBFAC,,, | Performed by: NURSE PRACTITIONER

## 2021-04-06 PROCEDURE — 1159F MED LIST DOCD IN RCRD: CPT | Mod: S$GLB,,, | Performed by: NURSE PRACTITIONER

## 2021-04-06 PROCEDURE — 3075F SYST BP GE 130 - 139MM HG: CPT | Mod: CPTII,S$GLB,, | Performed by: NURSE PRACTITIONER

## 2021-04-06 PROCEDURE — 3079F DIAST BP 80-89 MM HG: CPT | Mod: CPTII,S$GLB,, | Performed by: NURSE PRACTITIONER

## 2021-04-06 PROCEDURE — 3008F BODY MASS INDEX DOCD: CPT | Mod: CPTII,S$GLB,, | Performed by: NURSE PRACTITIONER

## 2021-04-06 PROCEDURE — 99214 PR OFFICE/OUTPT VISIT, EST, LEVL IV, 30-39 MIN: ICD-10-PCS | Mod: S$GLB,,, | Performed by: NURSE PRACTITIONER

## 2021-04-06 PROCEDURE — 3008F PR BODY MASS INDEX (BMI) DOCUMENTED: ICD-10-PCS | Mod: CPTII,S$GLB,, | Performed by: NURSE PRACTITIONER

## 2021-04-06 PROCEDURE — 3288F PR FALLS RISK ASSESSMENT DOCUMENTED: ICD-10-PCS | Mod: CPTII,S$GLB,, | Performed by: NURSE PRACTITIONER

## 2021-04-06 PROCEDURE — 1126F AMNT PAIN NOTED NONE PRSNT: CPT | Mod: S$GLB,,, | Performed by: NURSE PRACTITIONER

## 2021-04-06 PROCEDURE — 99999 PR PBB SHADOW E&M-EST. PATIENT-LVL V: CPT | Mod: PBBFAC,,, | Performed by: NURSE PRACTITIONER

## 2021-04-06 PROCEDURE — 1101F PR PT FALLS ASSESS DOC 0-1 FALLS W/OUT INJ PAST YR: ICD-10-PCS | Mod: CPTII,S$GLB,, | Performed by: NURSE PRACTITIONER

## 2021-04-06 PROCEDURE — 99214 OFFICE O/P EST MOD 30 MIN: CPT | Mod: S$GLB,,, | Performed by: NURSE PRACTITIONER

## 2021-04-06 PROCEDURE — 1126F PR PAIN SEVERITY QUANTIFIED, NO PAIN PRESENT: ICD-10-PCS | Mod: S$GLB,,, | Performed by: NURSE PRACTITIONER

## 2021-04-06 PROCEDURE — 1159F PR MEDICATION LIST DOCUMENTED IN MEDICAL RECORD: ICD-10-PCS | Mod: S$GLB,,, | Performed by: NURSE PRACTITIONER

## 2021-04-06 PROCEDURE — 1157F ADVNC CARE PLAN IN RCRD: CPT | Mod: S$GLB,,, | Performed by: NURSE PRACTITIONER

## 2021-04-06 PROCEDURE — 3072F PR LOW RISK FOR RETINOPATHY: ICD-10-PCS | Mod: S$GLB,,, | Performed by: NURSE PRACTITIONER

## 2021-04-06 PROCEDURE — 3288F FALL RISK ASSESSMENT DOCD: CPT | Mod: CPTII,S$GLB,, | Performed by: NURSE PRACTITIONER

## 2021-04-07 LAB
FINAL PATHOLOGIC DIAGNOSIS: NORMAL
GROSS: NORMAL
MICROSCOPIC EXAM: NORMAL

## 2021-04-08 ENCOUNTER — OFFICE VISIT (OUTPATIENT)
Dept: NEUROLOGY | Facility: CLINIC | Age: 71
End: 2021-04-08
Payer: MEDICARE

## 2021-04-08 VITALS
WEIGHT: 179.56 LBS | HEART RATE: 92 BPM | BODY MASS INDEX: 33.9 KG/M2 | RESPIRATION RATE: 18 BRPM | HEIGHT: 61 IN | SYSTOLIC BLOOD PRESSURE: 131 MMHG | DIASTOLIC BLOOD PRESSURE: 87 MMHG

## 2021-04-08 DIAGNOSIS — Z79.4 TYPE 2 DIABETES MELLITUS WITHOUT COMPLICATION, WITH LONG-TERM CURRENT USE OF INSULIN: ICD-10-CM

## 2021-04-08 DIAGNOSIS — E78.2 MIXED HYPERLIPIDEMIA: ICD-10-CM

## 2021-04-08 DIAGNOSIS — I10 ESSENTIAL HYPERTENSION: ICD-10-CM

## 2021-04-08 DIAGNOSIS — Z86.79 HISTORY OF SUBDURAL HEMATOMA: ICD-10-CM

## 2021-04-08 DIAGNOSIS — F44.5 PSYCHOGENIC NONEPILEPTIC SEIZURE: Primary | ICD-10-CM

## 2021-04-08 DIAGNOSIS — Z86.73 HISTORY OF ISCHEMIC LEFT MCA STROKE: ICD-10-CM

## 2021-04-08 DIAGNOSIS — E11.9 TYPE 2 DIABETES MELLITUS WITHOUT COMPLICATION, WITH LONG-TERM CURRENT USE OF INSULIN: ICD-10-CM

## 2021-04-08 PROCEDURE — 3008F PR BODY MASS INDEX (BMI) DOCUMENTED: ICD-10-PCS | Mod: CPTII,S$GLB,, | Performed by: PSYCHIATRY & NEUROLOGY

## 2021-04-08 PROCEDURE — 3288F FALL RISK ASSESSMENT DOCD: CPT | Mod: CPTII,S$GLB,, | Performed by: PSYCHIATRY & NEUROLOGY

## 2021-04-08 PROCEDURE — 3288F PR FALLS RISK ASSESSMENT DOCUMENTED: ICD-10-PCS | Mod: CPTII,S$GLB,, | Performed by: PSYCHIATRY & NEUROLOGY

## 2021-04-08 PROCEDURE — 99999 PR PBB SHADOW E&M-EST. PATIENT-LVL IV: CPT | Mod: PBBFAC,,, | Performed by: PSYCHIATRY & NEUROLOGY

## 2021-04-08 PROCEDURE — 1159F PR MEDICATION LIST DOCUMENTED IN MEDICAL RECORD: ICD-10-PCS | Mod: S$GLB,,, | Performed by: PSYCHIATRY & NEUROLOGY

## 2021-04-08 PROCEDURE — 3044F HG A1C LEVEL LT 7.0%: CPT | Mod: CPTII,S$GLB,, | Performed by: PSYCHIATRY & NEUROLOGY

## 2021-04-08 PROCEDURE — 1126F PR PAIN SEVERITY QUANTIFIED, NO PAIN PRESENT: ICD-10-PCS | Mod: S$GLB,,, | Performed by: PSYCHIATRY & NEUROLOGY

## 2021-04-08 PROCEDURE — 1157F PR ADVANCE CARE PLAN OR EQUIV PRESENT IN MEDICAL RECORD: ICD-10-PCS | Mod: S$GLB,,, | Performed by: PSYCHIATRY & NEUROLOGY

## 2021-04-08 PROCEDURE — 99214 OFFICE O/P EST MOD 30 MIN: CPT | Mod: S$GLB,,, | Performed by: PSYCHIATRY & NEUROLOGY

## 2021-04-08 PROCEDURE — 1100F PR PT FALLS ASSESS DOC 2+ FALLS/FALL W/INJURY/YR: ICD-10-PCS | Mod: CPTII,S$GLB,, | Performed by: PSYCHIATRY & NEUROLOGY

## 2021-04-08 PROCEDURE — 3072F PR LOW RISK FOR RETINOPATHY: ICD-10-PCS | Mod: S$GLB,,, | Performed by: PSYCHIATRY & NEUROLOGY

## 2021-04-08 PROCEDURE — 99499 UNLISTED E&M SERVICE: CPT | Mod: S$GLB,,, | Performed by: PSYCHIATRY & NEUROLOGY

## 2021-04-08 PROCEDURE — 1157F ADVNC CARE PLAN IN RCRD: CPT | Mod: S$GLB,,, | Performed by: PSYCHIATRY & NEUROLOGY

## 2021-04-08 PROCEDURE — 1100F PTFALLS ASSESS-DOCD GE2>/YR: CPT | Mod: CPTII,S$GLB,, | Performed by: PSYCHIATRY & NEUROLOGY

## 2021-04-08 PROCEDURE — 99214 PR OFFICE/OUTPT VISIT, EST, LEVL IV, 30-39 MIN: ICD-10-PCS | Mod: S$GLB,,, | Performed by: PSYCHIATRY & NEUROLOGY

## 2021-04-08 PROCEDURE — 3044F PR MOST RECENT HEMOGLOBIN A1C LEVEL <7.0%: ICD-10-PCS | Mod: CPTII,S$GLB,, | Performed by: PSYCHIATRY & NEUROLOGY

## 2021-04-08 PROCEDURE — 99499 RISK ADDL DX/OHS AUDIT: ICD-10-PCS | Mod: S$GLB,,, | Performed by: PSYCHIATRY & NEUROLOGY

## 2021-04-08 PROCEDURE — 1159F MED LIST DOCD IN RCRD: CPT | Mod: S$GLB,,, | Performed by: PSYCHIATRY & NEUROLOGY

## 2021-04-08 PROCEDURE — 1126F AMNT PAIN NOTED NONE PRSNT: CPT | Mod: S$GLB,,, | Performed by: PSYCHIATRY & NEUROLOGY

## 2021-04-08 PROCEDURE — 3008F BODY MASS INDEX DOCD: CPT | Mod: CPTII,S$GLB,, | Performed by: PSYCHIATRY & NEUROLOGY

## 2021-04-08 PROCEDURE — 3072F LOW RISK FOR RETINOPATHY: CPT | Mod: S$GLB,,, | Performed by: PSYCHIATRY & NEUROLOGY

## 2021-04-08 PROCEDURE — 99999 PR PBB SHADOW E&M-EST. PATIENT-LVL IV: ICD-10-PCS | Mod: PBBFAC,,, | Performed by: PSYCHIATRY & NEUROLOGY

## 2021-04-13 ENCOUNTER — OFFICE VISIT (OUTPATIENT)
Dept: PSYCHIATRY | Facility: CLINIC | Age: 71
End: 2021-04-13
Payer: COMMERCIAL

## 2021-04-13 ENCOUNTER — TELEPHONE (OUTPATIENT)
Dept: PSYCHIATRY | Facility: CLINIC | Age: 71
End: 2021-04-13

## 2021-04-13 VITALS
WEIGHT: 180.69 LBS | SYSTOLIC BLOOD PRESSURE: 162 MMHG | DIASTOLIC BLOOD PRESSURE: 87 MMHG | BODY MASS INDEX: 34.11 KG/M2 | HEART RATE: 81 BPM | HEIGHT: 61 IN

## 2021-04-13 DIAGNOSIS — F39 MOOD DISORDER: Primary | ICD-10-CM

## 2021-04-13 DIAGNOSIS — F43.10 PTSD (POST-TRAUMATIC STRESS DISORDER): ICD-10-CM

## 2021-04-13 DIAGNOSIS — F41.9 ANXIETY DISORDER, UNSPECIFIED TYPE: ICD-10-CM

## 2021-04-13 PROCEDURE — 3072F PR LOW RISK FOR RETINOPATHY: ICD-10-PCS | Mod: S$GLB,,, | Performed by: PHYSICIAN ASSISTANT

## 2021-04-13 PROCEDURE — 99999 PR PBB SHADOW E&M-EST. PATIENT-LVL IV: ICD-10-PCS | Mod: PBBFAC,,, | Performed by: PHYSICIAN ASSISTANT

## 2021-04-13 PROCEDURE — 1101F PT FALLS ASSESS-DOCD LE1/YR: CPT | Mod: CPTII,S$GLB,, | Performed by: PHYSICIAN ASSISTANT

## 2021-04-13 PROCEDURE — 99214 PR OFFICE/OUTPT VISIT, EST, LEVL IV, 30-39 MIN: ICD-10-PCS | Mod: S$GLB,,, | Performed by: PHYSICIAN ASSISTANT

## 2021-04-13 PROCEDURE — 3008F PR BODY MASS INDEX (BMI) DOCUMENTED: ICD-10-PCS | Mod: CPTII,S$GLB,, | Performed by: PHYSICIAN ASSISTANT

## 2021-04-13 PROCEDURE — 1159F PR MEDICATION LIST DOCUMENTED IN MEDICAL RECORD: ICD-10-PCS | Mod: S$GLB,,, | Performed by: PHYSICIAN ASSISTANT

## 2021-04-13 PROCEDURE — 99214 OFFICE O/P EST MOD 30 MIN: CPT | Mod: S$GLB,,, | Performed by: PHYSICIAN ASSISTANT

## 2021-04-13 PROCEDURE — 1126F PR PAIN SEVERITY QUANTIFIED, NO PAIN PRESENT: ICD-10-PCS | Mod: S$GLB,,, | Performed by: PHYSICIAN ASSISTANT

## 2021-04-13 PROCEDURE — 1157F ADVNC CARE PLAN IN RCRD: CPT | Mod: S$GLB,,, | Performed by: PHYSICIAN ASSISTANT

## 2021-04-13 PROCEDURE — 90833 PSYTX W PT W E/M 30 MIN: CPT | Mod: S$GLB,,, | Performed by: PHYSICIAN ASSISTANT

## 2021-04-13 PROCEDURE — 3288F FALL RISK ASSESSMENT DOCD: CPT | Mod: CPTII,S$GLB,, | Performed by: PHYSICIAN ASSISTANT

## 2021-04-13 PROCEDURE — 90833 PR PSYCHOTHERAPY W/PATIENT W/E&M, 30 MIN (ADD ON): ICD-10-PCS | Mod: S$GLB,,, | Performed by: PHYSICIAN ASSISTANT

## 2021-04-13 PROCEDURE — 1101F PR PT FALLS ASSESS DOC 0-1 FALLS W/OUT INJ PAST YR: ICD-10-PCS | Mod: CPTII,S$GLB,, | Performed by: PHYSICIAN ASSISTANT

## 2021-04-13 PROCEDURE — 1157F PR ADVANCE CARE PLAN OR EQUIV PRESENT IN MEDICAL RECORD: ICD-10-PCS | Mod: S$GLB,,, | Performed by: PHYSICIAN ASSISTANT

## 2021-04-13 PROCEDURE — 1159F MED LIST DOCD IN RCRD: CPT | Mod: S$GLB,,, | Performed by: PHYSICIAN ASSISTANT

## 2021-04-13 PROCEDURE — 3008F BODY MASS INDEX DOCD: CPT | Mod: CPTII,S$GLB,, | Performed by: PHYSICIAN ASSISTANT

## 2021-04-13 PROCEDURE — 3072F LOW RISK FOR RETINOPATHY: CPT | Mod: S$GLB,,, | Performed by: PHYSICIAN ASSISTANT

## 2021-04-13 PROCEDURE — 99999 PR PBB SHADOW E&M-EST. PATIENT-LVL IV: CPT | Mod: PBBFAC,,, | Performed by: PHYSICIAN ASSISTANT

## 2021-04-13 PROCEDURE — 3288F PR FALLS RISK ASSESSMENT DOCUMENTED: ICD-10-PCS | Mod: CPTII,S$GLB,, | Performed by: PHYSICIAN ASSISTANT

## 2021-04-13 PROCEDURE — 1126F AMNT PAIN NOTED NONE PRSNT: CPT | Mod: S$GLB,,, | Performed by: PHYSICIAN ASSISTANT

## 2021-04-13 RX ORDER — DIVALPROEX SODIUM 250 MG/1
250 TABLET, FILM COATED, EXTENDED RELEASE ORAL DAILY
Qty: 30 TABLET | Refills: 0 | Status: SHIPPED | OUTPATIENT
Start: 2021-04-13 | End: 2021-05-13

## 2021-04-15 ENCOUNTER — CLINICAL SUPPORT (OUTPATIENT)
Dept: DERMATOLOGY | Facility: CLINIC | Age: 71
End: 2021-04-15
Payer: MEDICARE

## 2021-04-15 DIAGNOSIS — Z48.02 VISIT FOR SUTURE REMOVAL: Primary | ICD-10-CM

## 2021-04-15 PROCEDURE — 99024 PR POST-OP FOLLOW-UP VISIT: ICD-10-PCS | Mod: S$GLB,,, | Performed by: DERMATOLOGY

## 2021-04-15 PROCEDURE — 99024 POSTOP FOLLOW-UP VISIT: CPT | Mod: S$GLB,,, | Performed by: DERMATOLOGY

## 2021-04-19 LAB
AORTIC ROOT ANNULUS: 2.9 CM
AORTIC VALVE CUSP SEPERATION: 1.8 CM
AV INDEX (PROSTH): 0.85
AV MEAN GRADIENT: 4 MMHG
AV PEAK GRADIENT: 7 MMHG
AV REGURGITATION PRESSURE HALF TIME: 486 MS
AV VALVE AREA: 2.68 CM2
AV VELOCITY RATIO: 0.8
BSA FOR ECHO PROCEDURE: 1.88 M2
CV ECHO LV RWT: 0.64 CM
DOP CALC AO PEAK VEL: 1.34 M/S
DOP CALC AO VTI: 27.4 CM
DOP CALC LVOT AREA: 3.1 CM2
DOP CALC LVOT DIAMETER: 2 CM
DOP CALC LVOT PEAK VEL: 1.07 M/S
DOP CALC LVOT STROKE VOLUME: 73.48 CM3
DOP CALCLVOT PEAK VEL VTI: 23.4 CM
E WAVE DECELERATION TIME: 211 MS
E/A RATIO: 0.54
E/E' RATIO: 8.93 M/S
ECHO LV POSTERIOR WALL: 1.02 CM (ref 0.6–1.1)
FRACTIONAL SHORTENING: 33 % (ref 28–44)
INTERVENTRICULAR SEPTUM: 1.33 CM (ref 0.6–1.1)
IVRT: 79 MS
LA MAJOR: 3.7 CM
LEFT ATRIUM SIZE: 3.8 CM
LEFT INTERNAL DIMENSION IN SYSTOLE: 2.15 CM (ref 2.1–4)
LEFT VENTRICLE MASS INDEX: 64 G/M2
LEFT VENTRICULAR INTERNAL DIMENSION IN DIASTOLE: 3.21 CM (ref 3.5–6)
LEFT VENTRICULAR MASS: 116.08 G
LV LATERAL E/E' RATIO: 8.38 M/S
LV SEPTAL E/E' RATIO: 9.57 M/S
MV PEAK A VEL: 1.25 M/S
MV PEAK E VEL: 0.67 M/S
PISA TR MAX VEL: 2.16 M/S
RA PRESSURE: 3 MMHG
RIGHT VENTRICULAR END-DIASTOLIC DIMENSION: 2.06 CM
TDI LATERAL: 0.08 M/S
TDI SEPTAL: 0.07 M/S
TDI: 0.08 M/S
TR MAX PG: 19 MMHG
TV REST PULMONARY ARTERY PRESSURE: 22 MMHG

## 2021-04-20 ENCOUNTER — OFFICE VISIT (OUTPATIENT)
Dept: PSYCHIATRY | Facility: CLINIC | Age: 71
End: 2021-04-20
Payer: COMMERCIAL

## 2021-04-20 DIAGNOSIS — F43.10 PTSD (POST-TRAUMATIC STRESS DISORDER): Primary | ICD-10-CM

## 2021-04-20 DIAGNOSIS — F41.9 ANXIETY DISORDER, UNSPECIFIED TYPE: ICD-10-CM

## 2021-04-20 PROCEDURE — 1157F PR ADVANCE CARE PLAN OR EQUIV PRESENT IN MEDICAL RECORD: ICD-10-PCS | Mod: S$GLB,,, | Performed by: PSYCHOLOGIST

## 2021-04-20 PROCEDURE — 3072F PR LOW RISK FOR RETINOPATHY: ICD-10-PCS | Mod: S$GLB,,, | Performed by: PSYCHOLOGIST

## 2021-04-20 PROCEDURE — 90834 PR PSYCHOTHERAPY W/PATIENT, 45 MIN: ICD-10-PCS | Mod: S$GLB,,, | Performed by: PSYCHOLOGIST

## 2021-04-20 PROCEDURE — 90834 PSYTX W PT 45 MINUTES: CPT | Mod: S$GLB,,, | Performed by: PSYCHOLOGIST

## 2021-04-20 PROCEDURE — 3072F LOW RISK FOR RETINOPATHY: CPT | Mod: S$GLB,,, | Performed by: PSYCHOLOGIST

## 2021-04-20 PROCEDURE — 1157F ADVNC CARE PLAN IN RCRD: CPT | Mod: S$GLB,,, | Performed by: PSYCHOLOGIST

## 2021-05-02 RX ORDER — CLONAZEPAM 0.5 MG/1
0.5 TABLET ORAL 2 TIMES DAILY
OUTPATIENT
Start: 2021-05-02

## 2021-05-12 ENCOUNTER — OFFICE VISIT (OUTPATIENT)
Dept: PSYCHIATRY | Facility: CLINIC | Age: 71
End: 2021-05-12
Payer: COMMERCIAL

## 2021-05-12 DIAGNOSIS — F34.1 PERSISTENT DEPRESSIVE DISORDER: ICD-10-CM

## 2021-05-12 DIAGNOSIS — F41.9 ANXIETY DISORDER, UNSPECIFIED TYPE: ICD-10-CM

## 2021-05-12 DIAGNOSIS — F43.10 PTSD (POST-TRAUMATIC STRESS DISORDER): Primary | ICD-10-CM

## 2021-05-12 PROCEDURE — 1157F PR ADVANCE CARE PLAN OR EQUIV PRESENT IN MEDICAL RECORD: ICD-10-PCS | Mod: S$GLB,,, | Performed by: PSYCHOLOGIST

## 2021-05-12 PROCEDURE — 3072F PR LOW RISK FOR RETINOPATHY: ICD-10-PCS | Mod: S$GLB,,, | Performed by: PSYCHOLOGIST

## 2021-05-12 PROCEDURE — 90834 PR PSYCHOTHERAPY W/PATIENT, 45 MIN: ICD-10-PCS | Mod: S$GLB,,, | Performed by: PSYCHOLOGIST

## 2021-05-12 PROCEDURE — 1157F ADVNC CARE PLAN IN RCRD: CPT | Mod: S$GLB,,, | Performed by: PSYCHOLOGIST

## 2021-05-12 PROCEDURE — 3072F LOW RISK FOR RETINOPATHY: CPT | Mod: S$GLB,,, | Performed by: PSYCHOLOGIST

## 2021-05-12 PROCEDURE — 90834 PSYTX W PT 45 MINUTES: CPT | Mod: S$GLB,,, | Performed by: PSYCHOLOGIST

## 2021-05-13 ENCOUNTER — TELEPHONE (OUTPATIENT)
Dept: PSYCHIATRY | Facility: CLINIC | Age: 71
End: 2021-05-13

## 2021-05-13 ENCOUNTER — OFFICE VISIT (OUTPATIENT)
Dept: PSYCHIATRY | Facility: CLINIC | Age: 71
End: 2021-05-13
Payer: COMMERCIAL

## 2021-05-13 ENCOUNTER — TELEPHONE (OUTPATIENT)
Dept: FAMILY MEDICINE | Facility: CLINIC | Age: 71
End: 2021-05-13

## 2021-05-13 VITALS
HEART RATE: 87 BPM | SYSTOLIC BLOOD PRESSURE: 144 MMHG | BODY MASS INDEX: 34.03 KG/M2 | DIASTOLIC BLOOD PRESSURE: 68 MMHG | WEIGHT: 180.25 LBS | HEIGHT: 61 IN

## 2021-05-13 DIAGNOSIS — F34.1 PERSISTENT DEPRESSIVE DISORDER: Primary | ICD-10-CM

## 2021-05-13 DIAGNOSIS — F43.10 PTSD (POST-TRAUMATIC STRESS DISORDER): ICD-10-CM

## 2021-05-13 DIAGNOSIS — F41.9 ANXIETY DISORDER, UNSPECIFIED TYPE: ICD-10-CM

## 2021-05-13 PROCEDURE — 1101F PR PT FALLS ASSESS DOC 0-1 FALLS W/OUT INJ PAST YR: ICD-10-PCS | Mod: CPTII,S$GLB,, | Performed by: PHYSICIAN ASSISTANT

## 2021-05-13 PROCEDURE — 3288F FALL RISK ASSESSMENT DOCD: CPT | Mod: CPTII,S$GLB,, | Performed by: PHYSICIAN ASSISTANT

## 2021-05-13 PROCEDURE — 1126F PR PAIN SEVERITY QUANTIFIED, NO PAIN PRESENT: ICD-10-PCS | Mod: S$GLB,,, | Performed by: PHYSICIAN ASSISTANT

## 2021-05-13 PROCEDURE — 1159F MED LIST DOCD IN RCRD: CPT | Mod: S$GLB,,, | Performed by: PHYSICIAN ASSISTANT

## 2021-05-13 PROCEDURE — 1157F ADVNC CARE PLAN IN RCRD: CPT | Mod: S$GLB,,, | Performed by: PHYSICIAN ASSISTANT

## 2021-05-13 PROCEDURE — 99499 UNLISTED E&M SERVICE: CPT | Mod: S$GLB,,, | Performed by: PHYSICIAN ASSISTANT

## 2021-05-13 PROCEDURE — 90833 PR PSYCHOTHERAPY W/PATIENT W/E&M, 30 MIN (ADD ON): ICD-10-PCS | Mod: S$GLB,,, | Performed by: PHYSICIAN ASSISTANT

## 2021-05-13 PROCEDURE — 3008F PR BODY MASS INDEX (BMI) DOCUMENTED: ICD-10-PCS | Mod: CPTII,S$GLB,, | Performed by: PHYSICIAN ASSISTANT

## 2021-05-13 PROCEDURE — 3008F BODY MASS INDEX DOCD: CPT | Mod: CPTII,S$GLB,, | Performed by: PHYSICIAN ASSISTANT

## 2021-05-13 PROCEDURE — 3072F PR LOW RISK FOR RETINOPATHY: ICD-10-PCS | Mod: S$GLB,,, | Performed by: PHYSICIAN ASSISTANT

## 2021-05-13 PROCEDURE — 99999 PR PBB SHADOW E&M-EST. PATIENT-LVL IV: CPT | Mod: PBBFAC,,, | Performed by: PHYSICIAN ASSISTANT

## 2021-05-13 PROCEDURE — 99499 RISK ADDL DX/OHS AUDIT: ICD-10-PCS | Mod: S$GLB,,, | Performed by: PHYSICIAN ASSISTANT

## 2021-05-13 PROCEDURE — 1157F PR ADVANCE CARE PLAN OR EQUIV PRESENT IN MEDICAL RECORD: ICD-10-PCS | Mod: S$GLB,,, | Performed by: PHYSICIAN ASSISTANT

## 2021-05-13 PROCEDURE — 3072F LOW RISK FOR RETINOPATHY: CPT | Mod: S$GLB,,, | Performed by: PHYSICIAN ASSISTANT

## 2021-05-13 PROCEDURE — 1159F PR MEDICATION LIST DOCUMENTED IN MEDICAL RECORD: ICD-10-PCS | Mod: S$GLB,,, | Performed by: PHYSICIAN ASSISTANT

## 2021-05-13 PROCEDURE — 99214 PR OFFICE/OUTPT VISIT, EST, LEVL IV, 30-39 MIN: ICD-10-PCS | Mod: S$GLB,,, | Performed by: PHYSICIAN ASSISTANT

## 2021-05-13 PROCEDURE — 1101F PT FALLS ASSESS-DOCD LE1/YR: CPT | Mod: CPTII,S$GLB,, | Performed by: PHYSICIAN ASSISTANT

## 2021-05-13 PROCEDURE — 90833 PSYTX W PT W E/M 30 MIN: CPT | Mod: S$GLB,,, | Performed by: PHYSICIAN ASSISTANT

## 2021-05-13 PROCEDURE — 1126F AMNT PAIN NOTED NONE PRSNT: CPT | Mod: S$GLB,,, | Performed by: PHYSICIAN ASSISTANT

## 2021-05-13 PROCEDURE — 99999 PR PBB SHADOW E&M-EST. PATIENT-LVL IV: ICD-10-PCS | Mod: PBBFAC,,, | Performed by: PHYSICIAN ASSISTANT

## 2021-05-13 PROCEDURE — 99214 OFFICE O/P EST MOD 30 MIN: CPT | Mod: S$GLB,,, | Performed by: PHYSICIAN ASSISTANT

## 2021-05-13 PROCEDURE — 3288F PR FALLS RISK ASSESSMENT DOCUMENTED: ICD-10-PCS | Mod: CPTII,S$GLB,, | Performed by: PHYSICIAN ASSISTANT

## 2021-05-13 RX ORDER — PRAZOSIN HYDROCHLORIDE 5 MG/1
5 CAPSULE ORAL NIGHTLY
Qty: 30 CAPSULE | Refills: 1 | Status: SHIPPED | OUTPATIENT
Start: 2021-05-13 | End: 2021-05-25

## 2021-05-13 RX ORDER — TRAZODONE HYDROCHLORIDE 50 MG/1
TABLET ORAL
Qty: 30 TABLET | Refills: 1 | Status: SHIPPED | OUTPATIENT
Start: 2021-05-13 | End: 2021-05-25

## 2021-05-13 RX ORDER — PRAZOSIN HYDROCHLORIDE 1 MG/1
1 CAPSULE ORAL NIGHTLY
Qty: 30 CAPSULE | Refills: 1 | Status: SHIPPED | OUTPATIENT
Start: 2021-05-13 | End: 2021-07-12

## 2021-05-13 RX ORDER — TRIFLUOPERAZINE HYDROCHLORIDE 5 MG/1
5 TABLET, FILM COATED ORAL DAILY
Qty: 30 TABLET | Refills: 1 | Status: SHIPPED | OUTPATIENT
Start: 2021-05-13 | End: 2021-07-26

## 2021-05-25 ENCOUNTER — OFFICE VISIT (OUTPATIENT)
Dept: FAMILY MEDICINE | Facility: CLINIC | Age: 71
End: 2021-05-25
Payer: MEDICARE

## 2021-05-25 VITALS
BODY MASS INDEX: 33.09 KG/M2 | DIASTOLIC BLOOD PRESSURE: 80 MMHG | TEMPERATURE: 98 F | RESPIRATION RATE: 16 BRPM | HEIGHT: 61 IN | SYSTOLIC BLOOD PRESSURE: 138 MMHG | WEIGHT: 175.25 LBS | OXYGEN SATURATION: 95 % | HEART RATE: 79 BPM

## 2021-05-25 DIAGNOSIS — Z79.4 TYPE 2 DIABETES MELLITUS WITHOUT COMPLICATION, WITH LONG-TERM CURRENT USE OF INSULIN: ICD-10-CM

## 2021-05-25 DIAGNOSIS — R06.00 DYSPNEA, UNSPECIFIED TYPE: ICD-10-CM

## 2021-05-25 DIAGNOSIS — E11.9 TYPE 2 DIABETES MELLITUS WITHOUT COMPLICATION, WITH LONG-TERM CURRENT USE OF INSULIN: ICD-10-CM

## 2021-05-25 DIAGNOSIS — E66.01 SEVERE OBESITY (BMI 35.0-35.9 WITH COMORBIDITY): ICD-10-CM

## 2021-05-25 DIAGNOSIS — E78.2 MIXED HYPERLIPIDEMIA: ICD-10-CM

## 2021-05-25 DIAGNOSIS — I10 ESSENTIAL HYPERTENSION: Primary | ICD-10-CM

## 2021-05-25 DIAGNOSIS — E03.9 ACQUIRED HYPOTHYROIDISM: Chronic | ICD-10-CM

## 2021-05-25 DIAGNOSIS — R56.9 SEIZURES: ICD-10-CM

## 2021-05-25 PROCEDURE — 1126F AMNT PAIN NOTED NONE PRSNT: CPT | Mod: S$GLB,,, | Performed by: FAMILY MEDICINE

## 2021-05-25 PROCEDURE — 1126F PR PAIN SEVERITY QUANTIFIED, NO PAIN PRESENT: ICD-10-PCS | Mod: S$GLB,,, | Performed by: FAMILY MEDICINE

## 2021-05-25 PROCEDURE — 99214 PR OFFICE/OUTPT VISIT, EST, LEVL IV, 30-39 MIN: ICD-10-PCS | Mod: S$GLB,,, | Performed by: FAMILY MEDICINE

## 2021-05-25 PROCEDURE — 99499 RISK ADDL DX/OHS AUDIT: ICD-10-PCS | Mod: S$GLB,,, | Performed by: FAMILY MEDICINE

## 2021-05-25 PROCEDURE — 3008F BODY MASS INDEX DOCD: CPT | Mod: CPTII,S$GLB,, | Performed by: FAMILY MEDICINE

## 2021-05-25 PROCEDURE — 1159F MED LIST DOCD IN RCRD: CPT | Mod: S$GLB,,, | Performed by: FAMILY MEDICINE

## 2021-05-25 PROCEDURE — 3072F PR LOW RISK FOR RETINOPATHY: ICD-10-PCS | Mod: S$GLB,,, | Performed by: FAMILY MEDICINE

## 2021-05-25 PROCEDURE — 3008F PR BODY MASS INDEX (BMI) DOCUMENTED: ICD-10-PCS | Mod: CPTII,S$GLB,, | Performed by: FAMILY MEDICINE

## 2021-05-25 PROCEDURE — 1159F PR MEDICATION LIST DOCUMENTED IN MEDICAL RECORD: ICD-10-PCS | Mod: S$GLB,,, | Performed by: FAMILY MEDICINE

## 2021-05-25 PROCEDURE — 3072F LOW RISK FOR RETINOPATHY: CPT | Mod: S$GLB,,, | Performed by: FAMILY MEDICINE

## 2021-05-25 PROCEDURE — 1157F ADVNC CARE PLAN IN RCRD: CPT | Mod: S$GLB,,, | Performed by: FAMILY MEDICINE

## 2021-05-25 PROCEDURE — 99999 PR PBB SHADOW E&M-EST. PATIENT-LVL IV: CPT | Mod: PBBFAC,,, | Performed by: FAMILY MEDICINE

## 2021-05-25 PROCEDURE — 99499 UNLISTED E&M SERVICE: CPT | Mod: S$GLB,,, | Performed by: FAMILY MEDICINE

## 2021-05-25 PROCEDURE — 99214 OFFICE O/P EST MOD 30 MIN: CPT | Mod: S$GLB,,, | Performed by: FAMILY MEDICINE

## 2021-05-25 PROCEDURE — 99999 PR PBB SHADOW E&M-EST. PATIENT-LVL IV: ICD-10-PCS | Mod: PBBFAC,,, | Performed by: FAMILY MEDICINE

## 2021-05-25 PROCEDURE — 1157F PR ADVANCE CARE PLAN OR EQUIV PRESENT IN MEDICAL RECORD: ICD-10-PCS | Mod: S$GLB,,, | Performed by: FAMILY MEDICINE

## 2021-05-25 RX ORDER — FLUTICASONE PROPIONATE 110 UG/1
1 AEROSOL, METERED RESPIRATORY (INHALATION) 2 TIMES DAILY
Qty: 12 G | Refills: 11 | Status: SHIPPED | OUTPATIENT
Start: 2021-05-25 | End: 2021-06-02

## 2021-05-25 RX ORDER — ALBUTEROL SULFATE 90 UG/1
2 AEROSOL, METERED RESPIRATORY (INHALATION) EVERY 6 HOURS PRN
Qty: 18 G | Status: SHIPPED | OUTPATIENT
Start: 2021-05-25 | End: 2021-06-02

## 2021-05-27 ENCOUNTER — TELEPHONE (OUTPATIENT)
Dept: FAMILY MEDICINE | Facility: CLINIC | Age: 71
End: 2021-05-27

## 2021-05-27 DIAGNOSIS — J45.40 MODERATE PERSISTENT ASTHMA, UNSPECIFIED WHETHER COMPLICATED: Primary | ICD-10-CM

## 2021-06-02 ENCOUNTER — OFFICE VISIT (OUTPATIENT)
Dept: PSYCHIATRY | Facility: CLINIC | Age: 71
End: 2021-06-02
Payer: COMMERCIAL

## 2021-06-02 DIAGNOSIS — F43.10 PTSD (POST-TRAUMATIC STRESS DISORDER): Primary | ICD-10-CM

## 2021-06-02 DIAGNOSIS — F41.9 ANXIETY DISORDER, UNSPECIFIED TYPE: ICD-10-CM

## 2021-06-02 DIAGNOSIS — F34.1 PERSISTENT DEPRESSIVE DISORDER: ICD-10-CM

## 2021-06-02 PROCEDURE — 3072F PR LOW RISK FOR RETINOPATHY: ICD-10-PCS | Mod: S$GLB,,, | Performed by: PSYCHOLOGIST

## 2021-06-02 PROCEDURE — 3072F LOW RISK FOR RETINOPATHY: CPT | Mod: S$GLB,,, | Performed by: PSYCHOLOGIST

## 2021-06-02 PROCEDURE — 1157F PR ADVANCE CARE PLAN OR EQUIV PRESENT IN MEDICAL RECORD: ICD-10-PCS | Mod: S$GLB,,, | Performed by: PSYCHOLOGIST

## 2021-06-02 PROCEDURE — 90834 PR PSYCHOTHERAPY W/PATIENT, 45 MIN: ICD-10-PCS | Mod: S$GLB,,, | Performed by: PSYCHOLOGIST

## 2021-06-02 PROCEDURE — 1157F ADVNC CARE PLAN IN RCRD: CPT | Mod: S$GLB,,, | Performed by: PSYCHOLOGIST

## 2021-06-02 PROCEDURE — 90834 PSYTX W PT 45 MINUTES: CPT | Mod: S$GLB,,, | Performed by: PSYCHOLOGIST

## 2021-06-02 RX ORDER — ALBUTEROL SULFATE 1.25 MG/3ML
1.25 SOLUTION RESPIRATORY (INHALATION) EVERY 6 HOURS PRN
Qty: 360 BOX | Refills: 3 | Status: SHIPPED | OUTPATIENT
Start: 2021-06-02 | End: 2022-07-06

## 2021-06-02 RX ORDER — BUDESONIDE 0.5 MG/2ML
0.5 INHALANT ORAL DAILY
Qty: 180 ML | Refills: 3 | Status: SHIPPED | OUTPATIENT
Start: 2021-06-02 | End: 2021-07-07

## 2021-06-03 ENCOUNTER — LAB VISIT (OUTPATIENT)
Dept: LAB | Facility: HOSPITAL | Age: 71
End: 2021-06-03
Attending: PHYSICIAN ASSISTANT
Payer: MEDICARE

## 2021-06-03 DIAGNOSIS — E11.9 TYPE 2 DIABETES MELLITUS WITHOUT COMPLICATION, WITH LONG-TERM CURRENT USE OF INSULIN: ICD-10-CM

## 2021-06-03 DIAGNOSIS — Z79.4 TYPE 2 DIABETES MELLITUS WITHOUT COMPLICATION, WITH LONG-TERM CURRENT USE OF INSULIN: ICD-10-CM

## 2021-06-03 PROCEDURE — 80069 RENAL FUNCTION PANEL: CPT | Performed by: PHYSICIAN ASSISTANT

## 2021-06-03 PROCEDURE — 36415 COLL VENOUS BLD VENIPUNCTURE: CPT | Mod: PO | Performed by: PHYSICIAN ASSISTANT

## 2021-06-03 PROCEDURE — 83036 HEMOGLOBIN GLYCOSYLATED A1C: CPT | Performed by: PHYSICIAN ASSISTANT

## 2021-06-04 ENCOUNTER — HOSPITAL ENCOUNTER (OUTPATIENT)
Dept: RADIOLOGY | Facility: HOSPITAL | Age: 71
Discharge: HOME | End: 2021-06-04
Attending: FAMILY MEDICINE
Payer: MEDICARE

## 2021-06-04 DIAGNOSIS — R06.00 DYSPNEA, UNSPECIFIED TYPE: ICD-10-CM

## 2021-06-04 LAB
ALBUMIN SERPL BCP-MCNC: 3.1 G/DL (ref 3.5–5.2)
ANION GAP SERPL CALC-SCNC: 16 MMOL/L (ref 8–16)
BUN SERPL-MCNC: 12 MG/DL (ref 8–23)
CALCIUM SERPL-MCNC: 9.3 MG/DL (ref 8.7–10.5)
CHLORIDE SERPL-SCNC: 108 MMOL/L (ref 95–110)
CO2 SERPL-SCNC: 23 MMOL/L (ref 23–29)
CREAT SERPL-MCNC: 0.9 MG/DL (ref 0.5–1.4)
EST. GFR  (AFRICAN AMERICAN): >60 ML/MIN/1.73 M^2
EST. GFR  (NON AFRICAN AMERICAN): >60 ML/MIN/1.73 M^2
ESTIMATED AVG GLUCOSE: 117 MG/DL (ref 68–131)
GLUCOSE SERPL-MCNC: 133 MG/DL (ref 70–110)
HBA1C MFR BLD: 5.7 % (ref 4–5.6)
PHOSPHATE SERPL-MCNC: 3.4 MG/DL (ref 2.7–4.5)
POTASSIUM SERPL-SCNC: 3.7 MMOL/L (ref 3.5–5.1)
SODIUM SERPL-SCNC: 147 MMOL/L (ref 136–145)

## 2021-06-04 PROCEDURE — 25500020 PHARM REV CODE 255

## 2021-06-04 PROCEDURE — 71260 CT CHEST WITH CONTRAST: ICD-10-PCS | Mod: 26,,, | Performed by: RADIOLOGY

## 2021-06-04 PROCEDURE — 71260 CT THORAX DX C+: CPT | Mod: 26,,, | Performed by: RADIOLOGY

## 2021-06-04 PROCEDURE — 71260 CT THORAX DX C+: CPT | Mod: TC

## 2021-06-04 RX ADMIN — IOHEXOL 75 ML: 350 INJECTION, SOLUTION INTRAVENOUS at 10:06

## 2021-06-10 ENCOUNTER — OFFICE VISIT (OUTPATIENT)
Dept: ENDOCRINOLOGY | Facility: CLINIC | Age: 71
End: 2021-06-10
Payer: MEDICARE

## 2021-06-10 VITALS
TEMPERATURE: 98 F | HEART RATE: 81 BPM | WEIGHT: 175.69 LBS | HEIGHT: 61 IN | DIASTOLIC BLOOD PRESSURE: 78 MMHG | OXYGEN SATURATION: 97 % | SYSTOLIC BLOOD PRESSURE: 130 MMHG | BODY MASS INDEX: 33.17 KG/M2

## 2021-06-10 DIAGNOSIS — E03.9 ACQUIRED HYPOTHYROIDISM: ICD-10-CM

## 2021-06-10 DIAGNOSIS — G47.33 OSA (OBSTRUCTIVE SLEEP APNEA): ICD-10-CM

## 2021-06-10 DIAGNOSIS — M85.80 OSTEOPENIA, UNSPECIFIED LOCATION: ICD-10-CM

## 2021-06-10 DIAGNOSIS — Z79.4 TYPE 2 DIABETES MELLITUS WITHOUT COMPLICATION, WITH LONG-TERM CURRENT USE OF INSULIN: Primary | ICD-10-CM

## 2021-06-10 DIAGNOSIS — E11.9 TYPE 2 DIABETES MELLITUS WITHOUT COMPLICATION, WITH LONG-TERM CURRENT USE OF INSULIN: Primary | ICD-10-CM

## 2021-06-10 DIAGNOSIS — R06.02 SHORTNESS OF BREATH: ICD-10-CM

## 2021-06-10 PROCEDURE — 3072F PR LOW RISK FOR RETINOPATHY: ICD-10-PCS | Mod: S$GLB,,, | Performed by: PHYSICIAN ASSISTANT

## 2021-06-10 PROCEDURE — 3072F LOW RISK FOR RETINOPATHY: CPT | Mod: S$GLB,,, | Performed by: PHYSICIAN ASSISTANT

## 2021-06-10 PROCEDURE — 99215 OFFICE O/P EST HI 40 MIN: CPT | Mod: PBBFAC,PO | Performed by: PHYSICIAN ASSISTANT

## 2021-06-10 PROCEDURE — 99999 PR PBB SHADOW E&M-EST. PATIENT-LVL V: CPT | Mod: PBBFAC,,, | Performed by: PHYSICIAN ASSISTANT

## 2021-06-10 PROCEDURE — 99214 PR OFFICE/OUTPT VISIT, EST, LEVL IV, 30-39 MIN: ICD-10-PCS | Mod: S$GLB,,, | Performed by: PHYSICIAN ASSISTANT

## 2021-06-10 PROCEDURE — 99999 PR PBB SHADOW E&M-EST. PATIENT-LVL V: ICD-10-PCS | Mod: PBBFAC,,, | Performed by: PHYSICIAN ASSISTANT

## 2021-06-10 PROCEDURE — 1157F PR ADVANCE CARE PLAN OR EQUIV PRESENT IN MEDICAL RECORD: ICD-10-PCS | Mod: S$GLB,,, | Performed by: PHYSICIAN ASSISTANT

## 2021-06-10 PROCEDURE — 99214 OFFICE O/P EST MOD 30 MIN: CPT | Mod: S$GLB,,, | Performed by: PHYSICIAN ASSISTANT

## 2021-06-10 PROCEDURE — 1157F ADVNC CARE PLAN IN RCRD: CPT | Mod: S$GLB,,, | Performed by: PHYSICIAN ASSISTANT

## 2021-06-10 RX ORDER — EMPAGLIFLOZIN 10 MG/1
10 TABLET, FILM COATED ORAL DAILY
Qty: 90 TABLET | Refills: 2 | Status: SHIPPED | OUTPATIENT
Start: 2021-06-10 | End: 2022-08-22 | Stop reason: SDUPTHER

## 2021-06-14 ENCOUNTER — OFFICE VISIT (OUTPATIENT)
Dept: PSYCHIATRY | Facility: CLINIC | Age: 71
End: 2021-06-14
Payer: COMMERCIAL

## 2021-06-14 DIAGNOSIS — F43.10 PTSD (POST-TRAUMATIC STRESS DISORDER): Primary | ICD-10-CM

## 2021-06-14 DIAGNOSIS — F39 MOOD DISORDER: ICD-10-CM

## 2021-06-14 DIAGNOSIS — F44.5 PSEUDOSEIZURES: ICD-10-CM

## 2021-06-14 DIAGNOSIS — F41.9 ANXIETY DISORDER, UNSPECIFIED TYPE: ICD-10-CM

## 2021-06-14 PROCEDURE — 1157F PR ADVANCE CARE PLAN OR EQUIV PRESENT IN MEDICAL RECORD: ICD-10-PCS | Mod: S$GLB,,, | Performed by: PHYSICIAN ASSISTANT

## 2021-06-14 PROCEDURE — 1157F ADVNC CARE PLAN IN RCRD: CPT | Mod: S$GLB,,, | Performed by: PHYSICIAN ASSISTANT

## 2021-06-14 PROCEDURE — 1159F MED LIST DOCD IN RCRD: CPT | Mod: S$GLB,,, | Performed by: PHYSICIAN ASSISTANT

## 2021-06-14 PROCEDURE — 90833 PSYTX W PT W E/M 30 MIN: CPT | Mod: S$GLB,,, | Performed by: PHYSICIAN ASSISTANT

## 2021-06-14 PROCEDURE — 90833 PR PSYCHOTHERAPY W/PATIENT W/E&M, 30 MIN (ADD ON): ICD-10-PCS | Mod: S$GLB,,, | Performed by: PHYSICIAN ASSISTANT

## 2021-06-14 PROCEDURE — 99999 PR PBB SHADOW E&M-EST. PATIENT-LVL III: ICD-10-PCS | Mod: PBBFAC,,, | Performed by: PHYSICIAN ASSISTANT

## 2021-06-14 PROCEDURE — 99999 PR PBB SHADOW E&M-EST. PATIENT-LVL III: CPT | Mod: PBBFAC,,, | Performed by: PHYSICIAN ASSISTANT

## 2021-06-14 PROCEDURE — 1159F PR MEDICATION LIST DOCUMENTED IN MEDICAL RECORD: ICD-10-PCS | Mod: S$GLB,,, | Performed by: PHYSICIAN ASSISTANT

## 2021-06-14 PROCEDURE — 3072F PR LOW RISK FOR RETINOPATHY: ICD-10-PCS | Mod: S$GLB,,, | Performed by: PHYSICIAN ASSISTANT

## 2021-06-14 PROCEDURE — 99214 PR OFFICE/OUTPT VISIT, EST, LEVL IV, 30-39 MIN: ICD-10-PCS | Mod: S$GLB,,, | Performed by: PHYSICIAN ASSISTANT

## 2021-06-14 PROCEDURE — 99214 OFFICE O/P EST MOD 30 MIN: CPT | Mod: S$GLB,,, | Performed by: PHYSICIAN ASSISTANT

## 2021-06-14 PROCEDURE — 3072F LOW RISK FOR RETINOPATHY: CPT | Mod: S$GLB,,, | Performed by: PHYSICIAN ASSISTANT

## 2021-06-14 RX ORDER — TRAZODONE HYDROCHLORIDE 50 MG/1
50 TABLET ORAL NIGHTLY
Start: 2021-06-14 | End: 2021-07-26

## 2021-06-14 RX ORDER — PRAZOSIN HYDROCHLORIDE 5 MG/1
5 CAPSULE ORAL NIGHTLY
Start: 2021-06-14 | End: 2021-09-30 | Stop reason: SDUPTHER

## 2021-06-15 VITALS
SYSTOLIC BLOOD PRESSURE: 131 MMHG | DIASTOLIC BLOOD PRESSURE: 73 MMHG | HEIGHT: 61 IN | BODY MASS INDEX: 31.8 KG/M2 | HEART RATE: 76 BPM | WEIGHT: 168.44 LBS

## 2021-06-15 RX ORDER — METFORMIN HYDROCHLORIDE 500 MG/1
1000 TABLET, EXTENDED RELEASE ORAL 2 TIMES DAILY WITH MEALS
Qty: 360 TABLET | Refills: 3 | Status: SHIPPED | OUTPATIENT
Start: 2021-06-15 | End: 2022-06-27

## 2021-06-28 ENCOUNTER — OFFICE VISIT (OUTPATIENT)
Dept: PSYCHIATRY | Facility: CLINIC | Age: 71
End: 2021-06-28
Payer: COMMERCIAL

## 2021-06-28 DIAGNOSIS — F43.10 PTSD (POST-TRAUMATIC STRESS DISORDER): Primary | ICD-10-CM

## 2021-06-28 PROCEDURE — 3072F PR LOW RISK FOR RETINOPATHY: ICD-10-PCS | Mod: S$GLB,,, | Performed by: PSYCHOLOGIST

## 2021-06-28 PROCEDURE — 90832 PR PSYCHOTHERAPY W/PATIENT, 30 MIN: ICD-10-PCS | Mod: S$GLB,,, | Performed by: PSYCHOLOGIST

## 2021-06-28 PROCEDURE — 1157F ADVNC CARE PLAN IN RCRD: CPT | Mod: S$GLB,,, | Performed by: PSYCHOLOGIST

## 2021-06-28 PROCEDURE — 90832 PSYTX W PT 30 MINUTES: CPT | Mod: S$GLB,,, | Performed by: PSYCHOLOGIST

## 2021-06-28 PROCEDURE — 3072F LOW RISK FOR RETINOPATHY: CPT | Mod: S$GLB,,, | Performed by: PSYCHOLOGIST

## 2021-06-28 PROCEDURE — 1157F PR ADVANCE CARE PLAN OR EQUIV PRESENT IN MEDICAL RECORD: ICD-10-PCS | Mod: S$GLB,,, | Performed by: PSYCHOLOGIST

## 2021-07-06 ENCOUNTER — PATIENT OUTREACH (OUTPATIENT)
Dept: ADMINISTRATIVE | Facility: OTHER | Age: 71
End: 2021-07-06

## 2021-07-07 ENCOUNTER — OFFICE VISIT (OUTPATIENT)
Dept: PULMONOLOGY | Facility: CLINIC | Age: 71
End: 2021-07-07
Payer: MEDICARE

## 2021-07-07 VITALS
BODY MASS INDEX: 33.17 KG/M2 | WEIGHT: 175.69 LBS | OXYGEN SATURATION: 97 % | DIASTOLIC BLOOD PRESSURE: 83 MMHG | SYSTOLIC BLOOD PRESSURE: 131 MMHG | HEIGHT: 61 IN | HEART RATE: 78 BPM

## 2021-07-07 DIAGNOSIS — R06.00 DYSPNEA, UNSPECIFIED TYPE: ICD-10-CM

## 2021-07-07 DIAGNOSIS — R94.2 RESTRICTIVE VENTILATORY DEFECT: Primary | ICD-10-CM

## 2021-07-07 DIAGNOSIS — G47.33 OSA (OBSTRUCTIVE SLEEP APNEA): ICD-10-CM

## 2021-07-07 DIAGNOSIS — M41.9 SCOLIOSIS, UNSPECIFIED SCOLIOSIS TYPE, UNSPECIFIED SPINAL REGION: ICD-10-CM

## 2021-07-07 DIAGNOSIS — R91.8 LUNG NODULES: ICD-10-CM

## 2021-07-07 PROCEDURE — 1100F PTFALLS ASSESS-DOCD GE2>/YR: CPT | Mod: CPTII,S$GLB,, | Performed by: INTERNAL MEDICINE

## 2021-07-07 PROCEDURE — 99999 PR PBB SHADOW E&M-EST. PATIENT-LVL V: ICD-10-PCS | Mod: PBBFAC,,, | Performed by: INTERNAL MEDICINE

## 2021-07-07 PROCEDURE — 3288F PR FALLS RISK ASSESSMENT DOCUMENTED: ICD-10-PCS | Mod: CPTII,S$GLB,, | Performed by: INTERNAL MEDICINE

## 2021-07-07 PROCEDURE — 99204 PR OFFICE/OUTPT VISIT, NEW, LEVL IV, 45-59 MIN: ICD-10-PCS | Mod: S$GLB,,, | Performed by: INTERNAL MEDICINE

## 2021-07-07 PROCEDURE — 1159F MED LIST DOCD IN RCRD: CPT | Mod: S$GLB,,, | Performed by: INTERNAL MEDICINE

## 2021-07-07 PROCEDURE — 3008F PR BODY MASS INDEX (BMI) DOCUMENTED: ICD-10-PCS | Mod: CPTII,S$GLB,, | Performed by: INTERNAL MEDICINE

## 2021-07-07 PROCEDURE — 3008F BODY MASS INDEX DOCD: CPT | Mod: CPTII,S$GLB,, | Performed by: INTERNAL MEDICINE

## 2021-07-07 PROCEDURE — 99499 RISK ADDL DX/OHS AUDIT: ICD-10-PCS | Mod: S$GLB,,, | Performed by: INTERNAL MEDICINE

## 2021-07-07 PROCEDURE — 1126F AMNT PAIN NOTED NONE PRSNT: CPT | Mod: S$GLB,,, | Performed by: INTERNAL MEDICINE

## 2021-07-07 PROCEDURE — 99499 UNLISTED E&M SERVICE: CPT | Mod: S$GLB,,, | Performed by: INTERNAL MEDICINE

## 2021-07-07 PROCEDURE — 1126F PR PAIN SEVERITY QUANTIFIED, NO PAIN PRESENT: ICD-10-PCS | Mod: S$GLB,,, | Performed by: INTERNAL MEDICINE

## 2021-07-07 PROCEDURE — 1159F PR MEDICATION LIST DOCUMENTED IN MEDICAL RECORD: ICD-10-PCS | Mod: S$GLB,,, | Performed by: INTERNAL MEDICINE

## 2021-07-07 PROCEDURE — 1157F PR ADVANCE CARE PLAN OR EQUIV PRESENT IN MEDICAL RECORD: ICD-10-PCS | Mod: S$GLB,,, | Performed by: INTERNAL MEDICINE

## 2021-07-07 PROCEDURE — 3072F LOW RISK FOR RETINOPATHY: CPT | Mod: S$GLB,,, | Performed by: INTERNAL MEDICINE

## 2021-07-07 PROCEDURE — 99999 PR PBB SHADOW E&M-EST. PATIENT-LVL V: CPT | Mod: PBBFAC,,, | Performed by: INTERNAL MEDICINE

## 2021-07-07 PROCEDURE — 99204 OFFICE O/P NEW MOD 45 MIN: CPT | Mod: S$GLB,,, | Performed by: INTERNAL MEDICINE

## 2021-07-07 PROCEDURE — 3288F FALL RISK ASSESSMENT DOCD: CPT | Mod: CPTII,S$GLB,, | Performed by: INTERNAL MEDICINE

## 2021-07-07 PROCEDURE — 3072F PR LOW RISK FOR RETINOPATHY: ICD-10-PCS | Mod: S$GLB,,, | Performed by: INTERNAL MEDICINE

## 2021-07-07 PROCEDURE — 1157F ADVNC CARE PLAN IN RCRD: CPT | Mod: S$GLB,,, | Performed by: INTERNAL MEDICINE

## 2021-07-07 PROCEDURE — 1100F PR PT FALLS ASSESS DOC 2+ FALLS/FALL W/INJURY/YR: ICD-10-PCS | Mod: CPTII,S$GLB,, | Performed by: INTERNAL MEDICINE

## 2021-07-07 RX ORDER — FLUTICASONE FUROATE AND VILANTEROL TRIFENATATE 100; 25 UG/1; UG/1
1 POWDER RESPIRATORY (INHALATION) DAILY
Qty: 60 EACH | Refills: 11 | Status: SHIPPED | OUTPATIENT
Start: 2021-07-07 | End: 2022-06-29 | Stop reason: SDUPTHER

## 2021-07-16 ENCOUNTER — TELEPHONE (OUTPATIENT)
Dept: FAMILY MEDICINE | Facility: CLINIC | Age: 71
End: 2021-07-16

## 2021-07-16 DIAGNOSIS — R32 URINARY INCONTINENCE, UNSPECIFIED TYPE: Primary | ICD-10-CM

## 2021-07-19 ENCOUNTER — TELEPHONE (OUTPATIENT)
Dept: FAMILY MEDICINE | Facility: CLINIC | Age: 71
End: 2021-07-19

## 2021-07-21 ENCOUNTER — TELEPHONE (OUTPATIENT)
Dept: ENDOCRINOLOGY | Facility: CLINIC | Age: 71
End: 2021-07-21

## 2021-07-21 ENCOUNTER — OFFICE VISIT (OUTPATIENT)
Dept: PODIATRY | Facility: CLINIC | Age: 71
End: 2021-07-21
Payer: MEDICARE

## 2021-07-21 VITALS
DIASTOLIC BLOOD PRESSURE: 87 MMHG | HEART RATE: 89 BPM | WEIGHT: 173 LBS | BODY MASS INDEX: 32.66 KG/M2 | SYSTOLIC BLOOD PRESSURE: 115 MMHG | HEIGHT: 61 IN

## 2021-07-21 DIAGNOSIS — M20.41 HAMMER TOES OF BOTH FEET: ICD-10-CM

## 2021-07-21 DIAGNOSIS — R26.2 DIFFICULTY WALKING: ICD-10-CM

## 2021-07-21 DIAGNOSIS — L60.1 ONYCHOLYSIS OF TOENAIL: ICD-10-CM

## 2021-07-21 DIAGNOSIS — B35.1 ONYCHOMYCOSIS DUE TO DERMATOPHYTE: ICD-10-CM

## 2021-07-21 DIAGNOSIS — E11.42 DIABETIC POLYNEUROPATHY ASSOCIATED WITH TYPE 2 DIABETES MELLITUS: Primary | ICD-10-CM

## 2021-07-21 DIAGNOSIS — M20.42 HAMMER TOES OF BOTH FEET: ICD-10-CM

## 2021-07-21 DIAGNOSIS — L60.2 ONYCHOGRYPOSIS: ICD-10-CM

## 2021-07-21 DIAGNOSIS — I87.2 VENOUS INSUFFICIENCY OF BOTH LOWER EXTREMITIES: ICD-10-CM

## 2021-07-21 DIAGNOSIS — E11.9 ENCOUNTER FOR DIABETIC FOOT EXAM: ICD-10-CM

## 2021-07-21 PROCEDURE — 3074F PR MOST RECENT SYSTOLIC BLOOD PRESSURE < 130 MM HG: ICD-10-PCS | Mod: CPTII,S$GLB,, | Performed by: PODIATRIST

## 2021-07-21 PROCEDURE — 3074F SYST BP LT 130 MM HG: CPT | Mod: CPTII,S$GLB,, | Performed by: PODIATRIST

## 2021-07-21 PROCEDURE — 1157F ADVNC CARE PLAN IN RCRD: CPT | Mod: CPTII,S$GLB,, | Performed by: PODIATRIST

## 2021-07-21 PROCEDURE — 3288F FALL RISK ASSESSMENT DOCD: CPT | Mod: CPTII,S$GLB,, | Performed by: PODIATRIST

## 2021-07-21 PROCEDURE — 3072F PR LOW RISK FOR RETINOPATHY: ICD-10-PCS | Mod: CPTII,S$GLB,, | Performed by: PODIATRIST

## 2021-07-21 PROCEDURE — 11721 ROUTINE FOOT CARE: ICD-10-PCS | Mod: Q9,S$GLB,, | Performed by: PODIATRIST

## 2021-07-21 PROCEDURE — 1100F PR PT FALLS ASSESS DOC 2+ FALLS/FALL W/INJURY/YR: ICD-10-PCS | Mod: CPTII,S$GLB,, | Performed by: PODIATRIST

## 2021-07-21 PROCEDURE — 3044F PR MOST RECENT HEMOGLOBIN A1C LEVEL <7.0%: ICD-10-PCS | Mod: CPTII,S$GLB,, | Performed by: PODIATRIST

## 2021-07-21 PROCEDURE — 1126F AMNT PAIN NOTED NONE PRSNT: CPT | Mod: CPTII,S$GLB,, | Performed by: PODIATRIST

## 2021-07-21 PROCEDURE — 1126F PR PAIN SEVERITY QUANTIFIED, NO PAIN PRESENT: ICD-10-PCS | Mod: CPTII,S$GLB,, | Performed by: PODIATRIST

## 2021-07-21 PROCEDURE — 1100F PTFALLS ASSESS-DOCD GE2>/YR: CPT | Mod: CPTII,S$GLB,, | Performed by: PODIATRIST

## 2021-07-21 PROCEDURE — 3288F PR FALLS RISK ASSESSMENT DOCUMENTED: ICD-10-PCS | Mod: CPTII,S$GLB,, | Performed by: PODIATRIST

## 2021-07-21 PROCEDURE — 1159F PR MEDICATION LIST DOCUMENTED IN MEDICAL RECORD: ICD-10-PCS | Mod: CPTII,S$GLB,, | Performed by: PODIATRIST

## 2021-07-21 PROCEDURE — 99204 OFFICE O/P NEW MOD 45 MIN: CPT | Mod: 25,S$GLB,, | Performed by: PODIATRIST

## 2021-07-21 PROCEDURE — 3008F PR BODY MASS INDEX (BMI) DOCUMENTED: ICD-10-PCS | Mod: CPTII,S$GLB,, | Performed by: PODIATRIST

## 2021-07-21 PROCEDURE — 11721 DEBRIDE NAIL 6 OR MORE: CPT | Mod: Q9,S$GLB,, | Performed by: PODIATRIST

## 2021-07-21 PROCEDURE — 3079F PR MOST RECENT DIASTOLIC BLOOD PRESSURE 80-89 MM HG: ICD-10-PCS | Mod: CPTII,S$GLB,, | Performed by: PODIATRIST

## 2021-07-21 PROCEDURE — 3079F DIAST BP 80-89 MM HG: CPT | Mod: CPTII,S$GLB,, | Performed by: PODIATRIST

## 2021-07-21 PROCEDURE — 3044F HG A1C LEVEL LT 7.0%: CPT | Mod: CPTII,S$GLB,, | Performed by: PODIATRIST

## 2021-07-21 PROCEDURE — 1157F PR ADVANCE CARE PLAN OR EQUIV PRESENT IN MEDICAL RECORD: ICD-10-PCS | Mod: CPTII,S$GLB,, | Performed by: PODIATRIST

## 2021-07-21 PROCEDURE — 3072F LOW RISK FOR RETINOPATHY: CPT | Mod: CPTII,S$GLB,, | Performed by: PODIATRIST

## 2021-07-21 PROCEDURE — 1159F MED LIST DOCD IN RCRD: CPT | Mod: CPTII,S$GLB,, | Performed by: PODIATRIST

## 2021-07-21 PROCEDURE — 99204 PR OFFICE/OUTPT VISIT, NEW, LEVL IV, 45-59 MIN: ICD-10-PCS | Mod: 25,S$GLB,, | Performed by: PODIATRIST

## 2021-07-21 PROCEDURE — 3008F BODY MASS INDEX DOCD: CPT | Mod: CPTII,S$GLB,, | Performed by: PODIATRIST

## 2021-07-23 ENCOUNTER — OFFICE VISIT (OUTPATIENT)
Dept: UROLOGY | Facility: CLINIC | Age: 71
End: 2021-07-23
Payer: MEDICARE

## 2021-07-23 ENCOUNTER — HOSPITAL ENCOUNTER (OUTPATIENT)
Dept: PULMONOLOGY | Facility: HOSPITAL | Age: 71
Discharge: HOME | End: 2021-07-23
Attending: INTERNAL MEDICINE
Payer: MEDICARE

## 2021-07-23 VITALS
DIASTOLIC BLOOD PRESSURE: 93 MMHG | SYSTOLIC BLOOD PRESSURE: 141 MMHG | BODY MASS INDEX: 32.89 KG/M2 | HEIGHT: 61 IN | HEART RATE: 60 BPM | WEIGHT: 174.19 LBS

## 2021-07-23 DIAGNOSIS — N39.0 URINARY TRACT INFECTION WITH HEMATURIA, SITE UNSPECIFIED: Primary | ICD-10-CM

## 2021-07-23 DIAGNOSIS — R94.2 RESTRICTIVE VENTILATORY DEFECT: ICD-10-CM

## 2021-07-23 DIAGNOSIS — R31.9 URINARY TRACT INFECTION WITH HEMATURIA, SITE UNSPECIFIED: Primary | ICD-10-CM

## 2021-07-23 DIAGNOSIS — R32 URINARY INCONTINENCE, UNSPECIFIED TYPE: ICD-10-CM

## 2021-07-23 DIAGNOSIS — R06.00 DYSPNEA, UNSPECIFIED TYPE: ICD-10-CM

## 2021-07-23 LAB
BACTERIA #/AREA URNS HPF: ABNORMAL /HPF
MICROSCOPIC COMMENT: ABNORMAL
WBC #/AREA URNS HPF: 12 /HPF (ref 0–5)
WBC CLUMPS URNS QL MICRO: ABNORMAL

## 2021-07-23 PROCEDURE — 1159F PR MEDICATION LIST DOCUMENTED IN MEDICAL RECORD: ICD-10-PCS | Mod: CPTII,S$GLB,, | Performed by: NURSE PRACTITIONER

## 2021-07-23 PROCEDURE — 3044F HG A1C LEVEL LT 7.0%: CPT | Mod: CPTII,S$GLB,, | Performed by: NURSE PRACTITIONER

## 2021-07-23 PROCEDURE — 81000 URINALYSIS NONAUTO W/SCOPE: CPT | Performed by: NURSE PRACTITIONER

## 2021-07-23 PROCEDURE — 3008F BODY MASS INDEX DOCD: CPT | Mod: CPTII,S$GLB,, | Performed by: NURSE PRACTITIONER

## 2021-07-23 PROCEDURE — 3072F PR LOW RISK FOR RETINOPATHY: ICD-10-PCS | Mod: CPTII,S$GLB,, | Performed by: NURSE PRACTITIONER

## 2021-07-23 PROCEDURE — 1159F MED LIST DOCD IN RCRD: CPT | Mod: CPTII,S$GLB,, | Performed by: NURSE PRACTITIONER

## 2021-07-23 PROCEDURE — 99214 PR OFFICE/OUTPT VISIT, EST, LEVL IV, 30-39 MIN: ICD-10-PCS | Mod: S$GLB,,, | Performed by: NURSE PRACTITIONER

## 2021-07-23 PROCEDURE — 3044F PR MOST RECENT HEMOGLOBIN A1C LEVEL <7.0%: ICD-10-PCS | Mod: CPTII,S$GLB,, | Performed by: NURSE PRACTITIONER

## 2021-07-23 PROCEDURE — 99999 PR PBB SHADOW E&M-EST. PATIENT-LVL V: CPT | Mod: PBBFAC,,, | Performed by: NURSE PRACTITIONER

## 2021-07-23 PROCEDURE — 87086 URINE CULTURE/COLONY COUNT: CPT | Performed by: NURSE PRACTITIONER

## 2021-07-23 PROCEDURE — 3077F SYST BP >= 140 MM HG: CPT | Mod: CPTII,S$GLB,, | Performed by: NURSE PRACTITIONER

## 2021-07-23 PROCEDURE — 3077F PR MOST RECENT SYSTOLIC BLOOD PRESSURE >= 140 MM HG: ICD-10-PCS | Mod: CPTII,S$GLB,, | Performed by: NURSE PRACTITIONER

## 2021-07-23 PROCEDURE — 99999 PR PBB SHADOW E&M-EST. PATIENT-LVL V: ICD-10-PCS | Mod: PBBFAC,,, | Performed by: NURSE PRACTITIONER

## 2021-07-23 PROCEDURE — 1157F ADVNC CARE PLAN IN RCRD: CPT | Mod: CPTII,S$GLB,, | Performed by: NURSE PRACTITIONER

## 2021-07-23 PROCEDURE — 99214 OFFICE O/P EST MOD 30 MIN: CPT | Mod: S$GLB,,, | Performed by: NURSE PRACTITIONER

## 2021-07-23 PROCEDURE — 94010 BREATHING CAPACITY TEST: CPT

## 2021-07-23 PROCEDURE — 3080F DIAST BP >= 90 MM HG: CPT | Mod: CPTII,S$GLB,, | Performed by: NURSE PRACTITIONER

## 2021-07-23 PROCEDURE — 94060 PR EVAL OF BRONCHOSPASM: ICD-10-PCS | Mod: 26,,, | Performed by: INTERNAL MEDICINE

## 2021-07-23 PROCEDURE — 94060 EVALUATION OF WHEEZING: CPT | Mod: 26,,, | Performed by: INTERNAL MEDICINE

## 2021-07-23 PROCEDURE — 3008F PR BODY MASS INDEX (BMI) DOCUMENTED: ICD-10-PCS | Mod: CPTII,S$GLB,, | Performed by: NURSE PRACTITIONER

## 2021-07-23 PROCEDURE — 3072F LOW RISK FOR RETINOPATHY: CPT | Mod: CPTII,S$GLB,, | Performed by: NURSE PRACTITIONER

## 2021-07-23 PROCEDURE — 3080F PR MOST RECENT DIASTOLIC BLOOD PRESSURE >= 90 MM HG: ICD-10-PCS | Mod: CPTII,S$GLB,, | Performed by: NURSE PRACTITIONER

## 2021-07-23 PROCEDURE — 1157F PR ADVANCE CARE PLAN OR EQUIV PRESENT IN MEDICAL RECORD: ICD-10-PCS | Mod: CPTII,S$GLB,, | Performed by: NURSE PRACTITIONER

## 2021-07-23 RX ORDER — SOLIFENACIN SUCCINATE 5 MG/1
5 TABLET, FILM COATED ORAL DAILY
Qty: 30 TABLET | Refills: 12 | Status: SHIPPED | OUTPATIENT
Start: 2021-07-23 | End: 2021-09-08

## 2021-07-25 LAB
BACTERIA UR CULT: NORMAL
BACTERIA UR CULT: NORMAL

## 2021-07-26 ENCOUNTER — CLINICAL SUPPORT (OUTPATIENT)
Dept: UROLOGY | Facility: CLINIC | Age: 71
End: 2021-07-26
Payer: MEDICARE

## 2021-07-26 ENCOUNTER — TELEPHONE (OUTPATIENT)
Dept: PODIATRY | Facility: CLINIC | Age: 71
End: 2021-07-26

## 2021-07-26 ENCOUNTER — OFFICE VISIT (OUTPATIENT)
Dept: PSYCHIATRY | Facility: CLINIC | Age: 71
End: 2021-07-26
Payer: COMMERCIAL

## 2021-07-26 ENCOUNTER — TELEPHONE (OUTPATIENT)
Dept: PSYCHIATRY | Facility: CLINIC | Age: 71
End: 2021-07-26

## 2021-07-26 VITALS
BODY MASS INDEX: 32.78 KG/M2 | DIASTOLIC BLOOD PRESSURE: 78 MMHG | HEART RATE: 73 BPM | WEIGHT: 173.63 LBS | SYSTOLIC BLOOD PRESSURE: 131 MMHG | HEIGHT: 61 IN

## 2021-07-26 DIAGNOSIS — F34.1 PERSISTENT DEPRESSIVE DISORDER: ICD-10-CM

## 2021-07-26 DIAGNOSIS — N39.0 URINARY TRACT INFECTION WITH HEMATURIA, SITE UNSPECIFIED: Primary | ICD-10-CM

## 2021-07-26 DIAGNOSIS — F41.9 ANXIETY DISORDER, UNSPECIFIED TYPE: ICD-10-CM

## 2021-07-26 DIAGNOSIS — F43.10 PTSD (POST-TRAUMATIC STRESS DISORDER): Primary | ICD-10-CM

## 2021-07-26 DIAGNOSIS — R31.9 URINARY TRACT INFECTION WITH HEMATURIA, SITE UNSPECIFIED: Primary | ICD-10-CM

## 2021-07-26 DIAGNOSIS — F43.10 PTSD (POST-TRAUMATIC STRESS DISORDER): ICD-10-CM

## 2021-07-26 PROCEDURE — 90834 PR PSYCHOTHERAPY W/PATIENT, 45 MIN: ICD-10-PCS | Mod: S$GLB,,, | Performed by: PSYCHOLOGIST

## 2021-07-26 PROCEDURE — 99214 PR OFFICE/OUTPT VISIT, EST, LEVL IV, 30-39 MIN: ICD-10-PCS | Mod: S$GLB,,, | Performed by: PHYSICIAN ASSISTANT

## 2021-07-26 PROCEDURE — 1157F PR ADVANCE CARE PLAN OR EQUIV PRESENT IN MEDICAL RECORD: ICD-10-PCS | Mod: CPTII,S$GLB,, | Performed by: PSYCHOLOGIST

## 2021-07-26 PROCEDURE — 3072F PR LOW RISK FOR RETINOPATHY: ICD-10-PCS | Mod: CPTII,S$GLB,, | Performed by: PHYSICIAN ASSISTANT

## 2021-07-26 PROCEDURE — 99499 UNLISTED E&M SERVICE: CPT | Mod: S$GLB,,, | Performed by: NURSE PRACTITIONER

## 2021-07-26 PROCEDURE — 99999 PR PBB SHADOW E&M-EST. PATIENT-LVL IV: ICD-10-PCS | Mod: PBBFAC,,, | Performed by: PHYSICIAN ASSISTANT

## 2021-07-26 PROCEDURE — 99499 RISK ADDL DX/OHS AUDIT: ICD-10-PCS | Mod: S$GLB,,, | Performed by: PHYSICIAN ASSISTANT

## 2021-07-26 PROCEDURE — 1157F ADVNC CARE PLAN IN RCRD: CPT | Mod: CPTII,S$GLB,, | Performed by: PSYCHOLOGIST

## 2021-07-26 PROCEDURE — 87077 CULTURE AEROBIC IDENTIFY: CPT | Performed by: NURSE PRACTITIONER

## 2021-07-26 PROCEDURE — 87088 URINE BACTERIA CULTURE: CPT | Performed by: NURSE PRACTITIONER

## 2021-07-26 PROCEDURE — 87186 SC STD MICRODIL/AGAR DIL: CPT | Performed by: NURSE PRACTITIONER

## 2021-07-26 PROCEDURE — 99999 PR PBB SHADOW E&M-EST. PATIENT-LVL IV: CPT | Mod: PBBFAC,,, | Performed by: PHYSICIAN ASSISTANT

## 2021-07-26 PROCEDURE — 99214 OFFICE O/P EST MOD 30 MIN: CPT | Mod: S$GLB,,, | Performed by: PHYSICIAN ASSISTANT

## 2021-07-26 PROCEDURE — 90834 PSYTX W PT 45 MINUTES: CPT | Mod: S$GLB,,, | Performed by: PSYCHOLOGIST

## 2021-07-26 PROCEDURE — 3072F LOW RISK FOR RETINOPATHY: CPT | Mod: CPTII,S$GLB,, | Performed by: PHYSICIAN ASSISTANT

## 2021-07-26 PROCEDURE — 99499 UNLISTED E&M SERVICE: CPT | Mod: S$GLB,,, | Performed by: PHYSICIAN ASSISTANT

## 2021-07-26 PROCEDURE — 99499 NO LOS: ICD-10-PCS | Mod: S$GLB,,, | Performed by: NURSE PRACTITIONER

## 2021-07-26 PROCEDURE — 1157F ADVNC CARE PLAN IN RCRD: CPT | Mod: CPTII,S$GLB,, | Performed by: PHYSICIAN ASSISTANT

## 2021-07-26 PROCEDURE — 1157F PR ADVANCE CARE PLAN OR EQUIV PRESENT IN MEDICAL RECORD: ICD-10-PCS | Mod: CPTII,S$GLB,, | Performed by: PHYSICIAN ASSISTANT

## 2021-07-26 PROCEDURE — 3072F PR LOW RISK FOR RETINOPATHY: ICD-10-PCS | Mod: CPTII,S$GLB,, | Performed by: PSYCHOLOGIST

## 2021-07-26 PROCEDURE — 87086 URINE CULTURE/COLONY COUNT: CPT | Performed by: NURSE PRACTITIONER

## 2021-07-26 PROCEDURE — 3072F LOW RISK FOR RETINOPATHY: CPT | Mod: CPTII,S$GLB,, | Performed by: PSYCHOLOGIST

## 2021-07-26 RX ORDER — PRAZOSIN HYDROCHLORIDE 1 MG/1
1 CAPSULE ORAL NIGHTLY
Qty: 90 CAPSULE | Refills: 1 | Status: SHIPPED | OUTPATIENT
Start: 2021-07-26 | End: 2021-09-24

## 2021-07-26 RX ORDER — PRAZOSIN HYDROCHLORIDE 1 MG/1
1 CAPSULE ORAL NIGHTLY
Qty: 30 CAPSULE | Refills: 1 | Status: SHIPPED | OUTPATIENT
Start: 2021-07-26 | End: 2021-07-26 | Stop reason: SDUPTHER

## 2021-07-26 RX ORDER — SERTRALINE HYDROCHLORIDE 25 MG/1
25 TABLET, FILM COATED ORAL DAILY
Qty: 30 TABLET | Refills: 1 | Status: SHIPPED | OUTPATIENT
Start: 2021-07-26 | End: 2021-09-30 | Stop reason: SDUPTHER

## 2021-07-27 ENCOUNTER — TELEPHONE (OUTPATIENT)
Dept: ENDOCRINOLOGY | Facility: CLINIC | Age: 71
End: 2021-07-27

## 2021-07-28 ENCOUNTER — TELEPHONE (OUTPATIENT)
Dept: FAMILY MEDICINE | Facility: CLINIC | Age: 71
End: 2021-07-28

## 2021-07-28 ENCOUNTER — TELEPHONE (OUTPATIENT)
Dept: PODIATRY | Facility: CLINIC | Age: 71
End: 2021-07-28

## 2021-07-29 LAB — BACTERIA UR CULT: ABNORMAL

## 2021-07-29 RX ORDER — FLUCONAZOLE 150 MG/1
150 TABLET ORAL DAILY
Qty: 3 TABLET | Refills: 0 | Status: SHIPPED | OUTPATIENT
Start: 2021-07-29 | End: 2021-08-01

## 2021-07-29 RX ORDER — CIPROFLOXACIN 500 MG/1
500 TABLET ORAL 2 TIMES DAILY
Qty: 20 TABLET | Refills: 0 | Status: SHIPPED | OUTPATIENT
Start: 2021-07-29 | End: 2021-08-08

## 2021-07-30 ENCOUNTER — HOSPITAL ENCOUNTER (OUTPATIENT)
Dept: RADIOLOGY | Facility: HOSPITAL | Age: 71
Discharge: HOME | End: 2021-07-30
Attending: PHYSICIAN ASSISTANT
Payer: MEDICARE

## 2021-07-30 DIAGNOSIS — M85.80 OSTEOPENIA, UNSPECIFIED LOCATION: ICD-10-CM

## 2021-07-30 DIAGNOSIS — Z78.0 POSTMENOPAUSAL: ICD-10-CM

## 2021-07-30 PROCEDURE — 77080 DXA BONE DENSITY AXIAL: CPT | Mod: TC,PO

## 2021-08-02 ENCOUNTER — HOSPITAL ENCOUNTER (OUTPATIENT)
Dept: RADIOLOGY | Facility: HOSPITAL | Age: 71
Discharge: HOME | End: 2021-08-02
Attending: INTERNAL MEDICINE
Payer: MEDICARE

## 2021-08-02 ENCOUNTER — TELEPHONE (OUTPATIENT)
Dept: PULMONOLOGY | Facility: CLINIC | Age: 71
End: 2021-08-02

## 2021-08-02 DIAGNOSIS — R06.00 DYSPNEA, UNSPECIFIED TYPE: ICD-10-CM

## 2021-08-02 DIAGNOSIS — R94.2 RESTRICTIVE VENTILATORY DEFECT: ICD-10-CM

## 2021-08-02 PROCEDURE — 76000 FLUOROSCOPY <1 HR PHYS/QHP: CPT | Mod: TC

## 2021-08-02 PROCEDURE — 76000 FLUOROSCOPY <1 HR PHYS/QHP: CPT | Mod: 26,,, | Performed by: RADIOLOGY

## 2021-08-02 PROCEDURE — 76000 FL FLUORO OF DIAPHRAGM: ICD-10-PCS | Mod: 26,,, | Performed by: RADIOLOGY

## 2021-08-25 ENCOUNTER — OFFICE VISIT (OUTPATIENT)
Dept: FAMILY MEDICINE | Facility: CLINIC | Age: 71
End: 2021-08-25
Payer: MEDICARE

## 2021-08-25 VITALS
DIASTOLIC BLOOD PRESSURE: 60 MMHG | SYSTOLIC BLOOD PRESSURE: 112 MMHG | OXYGEN SATURATION: 95 % | HEIGHT: 61 IN | BODY MASS INDEX: 33.96 KG/M2 | TEMPERATURE: 99 F | WEIGHT: 179.88 LBS | HEART RATE: 86 BPM | RESPIRATION RATE: 20 BRPM

## 2021-08-25 DIAGNOSIS — R29.6 FREQUENT FALLS: ICD-10-CM

## 2021-08-25 DIAGNOSIS — E03.9 ACQUIRED HYPOTHYROIDISM: Chronic | ICD-10-CM

## 2021-08-25 DIAGNOSIS — J98.4 CHRONIC RESTRICTIVE LUNG DISEASE: ICD-10-CM

## 2021-08-25 DIAGNOSIS — E78.2 MIXED HYPERLIPIDEMIA: ICD-10-CM

## 2021-08-25 DIAGNOSIS — E11.9 TYPE 2 DIABETES MELLITUS WITHOUT COMPLICATION, WITH LONG-TERM CURRENT USE OF INSULIN: ICD-10-CM

## 2021-08-25 DIAGNOSIS — E16.2 HYPOGLYCEMIA: ICD-10-CM

## 2021-08-25 DIAGNOSIS — Z79.4 TYPE 2 DIABETES MELLITUS WITHOUT COMPLICATION, WITH LONG-TERM CURRENT USE OF INSULIN: ICD-10-CM

## 2021-08-25 DIAGNOSIS — E66.01 SEVERE OBESITY (BMI 35.0-35.9 WITH COMORBIDITY): ICD-10-CM

## 2021-08-25 DIAGNOSIS — I10 ESSENTIAL HYPERTENSION: Primary | ICD-10-CM

## 2021-08-25 PROCEDURE — 3008F BODY MASS INDEX DOCD: CPT | Mod: CPTII,S$GLB,, | Performed by: FAMILY MEDICINE

## 2021-08-25 PROCEDURE — 3288F FALL RISK ASSESSMENT DOCD: CPT | Mod: CPTII,S$GLB,, | Performed by: FAMILY MEDICINE

## 2021-08-25 PROCEDURE — 3044F PR MOST RECENT HEMOGLOBIN A1C LEVEL <7.0%: ICD-10-PCS | Mod: CPTII,S$GLB,, | Performed by: FAMILY MEDICINE

## 2021-08-25 PROCEDURE — 3074F SYST BP LT 130 MM HG: CPT | Mod: CPTII,S$GLB,, | Performed by: FAMILY MEDICINE

## 2021-08-25 PROCEDURE — 1157F PR ADVANCE CARE PLAN OR EQUIV PRESENT IN MEDICAL RECORD: ICD-10-PCS | Mod: CPTII,S$GLB,, | Performed by: FAMILY MEDICINE

## 2021-08-25 PROCEDURE — 1157F ADVNC CARE PLAN IN RCRD: CPT | Mod: CPTII,S$GLB,, | Performed by: FAMILY MEDICINE

## 2021-08-25 PROCEDURE — 3078F DIAST BP <80 MM HG: CPT | Mod: CPTII,S$GLB,, | Performed by: FAMILY MEDICINE

## 2021-08-25 PROCEDURE — 1160F PR REVIEW ALL MEDS BY PRESCRIBER/CLIN PHARMACIST DOCUMENTED: ICD-10-PCS | Mod: CPTII,S$GLB,, | Performed by: FAMILY MEDICINE

## 2021-08-25 PROCEDURE — 99499 UNLISTED E&M SERVICE: CPT | Mod: S$GLB,,, | Performed by: FAMILY MEDICINE

## 2021-08-25 PROCEDURE — 3044F HG A1C LEVEL LT 7.0%: CPT | Mod: CPTII,S$GLB,, | Performed by: FAMILY MEDICINE

## 2021-08-25 PROCEDURE — 3072F LOW RISK FOR RETINOPATHY: CPT | Mod: CPTII,S$GLB,, | Performed by: FAMILY MEDICINE

## 2021-08-25 PROCEDURE — 1159F PR MEDICATION LIST DOCUMENTED IN MEDICAL RECORD: ICD-10-PCS | Mod: CPTII,S$GLB,, | Performed by: FAMILY MEDICINE

## 2021-08-25 PROCEDURE — 99214 OFFICE O/P EST MOD 30 MIN: CPT | Mod: S$GLB,,, | Performed by: FAMILY MEDICINE

## 2021-08-25 PROCEDURE — 99999 PR PBB SHADOW E&M-EST. PATIENT-LVL III: CPT | Mod: PBBFAC,,, | Performed by: FAMILY MEDICINE

## 2021-08-25 PROCEDURE — 1126F PR PAIN SEVERITY QUANTIFIED, NO PAIN PRESENT: ICD-10-PCS | Mod: CPTII,S$GLB,, | Performed by: FAMILY MEDICINE

## 2021-08-25 PROCEDURE — 3008F PR BODY MASS INDEX (BMI) DOCUMENTED: ICD-10-PCS | Mod: CPTII,S$GLB,, | Performed by: FAMILY MEDICINE

## 2021-08-25 PROCEDURE — 1159F MED LIST DOCD IN RCRD: CPT | Mod: CPTII,S$GLB,, | Performed by: FAMILY MEDICINE

## 2021-08-25 PROCEDURE — 1101F PT FALLS ASSESS-DOCD LE1/YR: CPT | Mod: CPTII,S$GLB,, | Performed by: FAMILY MEDICINE

## 2021-08-25 PROCEDURE — 1101F PR PT FALLS ASSESS DOC 0-1 FALLS W/OUT INJ PAST YR: ICD-10-PCS | Mod: CPTII,S$GLB,, | Performed by: FAMILY MEDICINE

## 2021-08-25 PROCEDURE — 3288F PR FALLS RISK ASSESSMENT DOCUMENTED: ICD-10-PCS | Mod: CPTII,S$GLB,, | Performed by: FAMILY MEDICINE

## 2021-08-25 PROCEDURE — 99214 PR OFFICE/OUTPT VISIT, EST, LEVL IV, 30-39 MIN: ICD-10-PCS | Mod: S$GLB,,, | Performed by: FAMILY MEDICINE

## 2021-08-25 PROCEDURE — 3072F PR LOW RISK FOR RETINOPATHY: ICD-10-PCS | Mod: CPTII,S$GLB,, | Performed by: FAMILY MEDICINE

## 2021-08-25 PROCEDURE — 99499 RISK ADDL DX/OHS AUDIT: ICD-10-PCS | Mod: S$GLB,,, | Performed by: FAMILY MEDICINE

## 2021-08-25 PROCEDURE — 3078F PR MOST RECENT DIASTOLIC BLOOD PRESSURE < 80 MM HG: ICD-10-PCS | Mod: CPTII,S$GLB,, | Performed by: FAMILY MEDICINE

## 2021-08-25 PROCEDURE — 3074F PR MOST RECENT SYSTOLIC BLOOD PRESSURE < 130 MM HG: ICD-10-PCS | Mod: CPTII,S$GLB,, | Performed by: FAMILY MEDICINE

## 2021-08-25 PROCEDURE — 1126F AMNT PAIN NOTED NONE PRSNT: CPT | Mod: CPTII,S$GLB,, | Performed by: FAMILY MEDICINE

## 2021-08-25 PROCEDURE — 99999 PR PBB SHADOW E&M-EST. PATIENT-LVL III: ICD-10-PCS | Mod: PBBFAC,,, | Performed by: FAMILY MEDICINE

## 2021-08-25 PROCEDURE — 1160F RVW MEDS BY RX/DR IN RCRD: CPT | Mod: CPTII,S$GLB,, | Performed by: FAMILY MEDICINE

## 2021-08-27 ENCOUNTER — TELEPHONE (OUTPATIENT)
Dept: PSYCHIATRY | Facility: CLINIC | Age: 71
End: 2021-08-27

## 2021-08-27 ENCOUNTER — TELEPHONE (OUTPATIENT)
Dept: UROLOGY | Facility: CLINIC | Age: 71
End: 2021-08-27

## 2021-09-07 ENCOUNTER — PATIENT OUTREACH (OUTPATIENT)
Dept: ADMINISTRATIVE | Facility: OTHER | Age: 71
End: 2021-09-07

## 2021-09-07 ENCOUNTER — TELEPHONE (OUTPATIENT)
Dept: PSYCHIATRY | Facility: CLINIC | Age: 71
End: 2021-09-07

## 2021-09-08 ENCOUNTER — TELEPHONE (OUTPATIENT)
Dept: UROLOGY | Facility: CLINIC | Age: 71
End: 2021-09-08

## 2021-09-08 ENCOUNTER — OFFICE VISIT (OUTPATIENT)
Dept: UROLOGY | Facility: CLINIC | Age: 71
End: 2021-09-08
Payer: MEDICARE

## 2021-09-08 VITALS
SYSTOLIC BLOOD PRESSURE: 147 MMHG | HEIGHT: 61 IN | BODY MASS INDEX: 33.96 KG/M2 | WEIGHT: 179.88 LBS | HEART RATE: 79 BPM | DIASTOLIC BLOOD PRESSURE: 84 MMHG

## 2021-09-08 DIAGNOSIS — R32 URINARY INCONTINENCE, UNSPECIFIED TYPE: Primary | ICD-10-CM

## 2021-09-08 LAB — POC RESIDUAL URINE VOLUME: 48 ML (ref 0–100)

## 2021-09-08 PROCEDURE — 3008F BODY MASS INDEX DOCD: CPT | Mod: CPTII,S$GLB,, | Performed by: NURSE PRACTITIONER

## 2021-09-08 PROCEDURE — 1157F ADVNC CARE PLAN IN RCRD: CPT | Mod: CPTII,S$GLB,, | Performed by: NURSE PRACTITIONER

## 2021-09-08 PROCEDURE — 1157F PR ADVANCE CARE PLAN OR EQUIV PRESENT IN MEDICAL RECORD: ICD-10-PCS | Mod: CPTII,S$GLB,, | Performed by: NURSE PRACTITIONER

## 2021-09-08 PROCEDURE — 51798 PR MEAS,POST-VOID RES,US,NON-IMAGING: ICD-10-PCS | Mod: S$GLB,,, | Performed by: NURSE PRACTITIONER

## 2021-09-08 PROCEDURE — 1160F RVW MEDS BY RX/DR IN RCRD: CPT | Mod: CPTII,S$GLB,, | Performed by: NURSE PRACTITIONER

## 2021-09-08 PROCEDURE — 3072F LOW RISK FOR RETINOPATHY: CPT | Mod: CPTII,S$GLB,, | Performed by: NURSE PRACTITIONER

## 2021-09-08 PROCEDURE — 3066F NEPHROPATHY DOC TX: CPT | Mod: CPTII,S$GLB,, | Performed by: NURSE PRACTITIONER

## 2021-09-08 PROCEDURE — 1159F PR MEDICATION LIST DOCUMENTED IN MEDICAL RECORD: ICD-10-PCS | Mod: CPTII,S$GLB,, | Performed by: NURSE PRACTITIONER

## 2021-09-08 PROCEDURE — 3077F PR MOST RECENT SYSTOLIC BLOOD PRESSURE >= 140 MM HG: ICD-10-PCS | Mod: CPTII,S$GLB,, | Performed by: NURSE PRACTITIONER

## 2021-09-08 PROCEDURE — 3044F HG A1C LEVEL LT 7.0%: CPT | Mod: CPTII,S$GLB,, | Performed by: NURSE PRACTITIONER

## 2021-09-08 PROCEDURE — 99214 OFFICE O/P EST MOD 30 MIN: CPT | Mod: S$GLB,,, | Performed by: NURSE PRACTITIONER

## 2021-09-08 PROCEDURE — 3066F PR DOCUMENTATION OF TREATMENT FOR NEPHROPATHY: ICD-10-PCS | Mod: CPTII,S$GLB,, | Performed by: NURSE PRACTITIONER

## 2021-09-08 PROCEDURE — 3061F PR NEG MICROALBUMINURIA RESULT DOCUMENTED/REVIEW: ICD-10-PCS | Mod: CPTII,S$GLB,, | Performed by: NURSE PRACTITIONER

## 2021-09-08 PROCEDURE — 1159F MED LIST DOCD IN RCRD: CPT | Mod: CPTII,S$GLB,, | Performed by: NURSE PRACTITIONER

## 2021-09-08 PROCEDURE — 3061F NEG MICROALBUMINURIA REV: CPT | Mod: CPTII,S$GLB,, | Performed by: NURSE PRACTITIONER

## 2021-09-08 PROCEDURE — 3044F PR MOST RECENT HEMOGLOBIN A1C LEVEL <7.0%: ICD-10-PCS | Mod: CPTII,S$GLB,, | Performed by: NURSE PRACTITIONER

## 2021-09-08 PROCEDURE — 1160F PR REVIEW ALL MEDS BY PRESCRIBER/CLIN PHARMACIST DOCUMENTED: ICD-10-PCS | Mod: CPTII,S$GLB,, | Performed by: NURSE PRACTITIONER

## 2021-09-08 PROCEDURE — 99999 PR PBB SHADOW E&M-EST. PATIENT-LVL IV: ICD-10-PCS | Mod: PBBFAC,,, | Performed by: NURSE PRACTITIONER

## 2021-09-08 PROCEDURE — 3072F PR LOW RISK FOR RETINOPATHY: ICD-10-PCS | Mod: CPTII,S$GLB,, | Performed by: NURSE PRACTITIONER

## 2021-09-08 PROCEDURE — 99999 PR PBB SHADOW E&M-EST. PATIENT-LVL IV: CPT | Mod: PBBFAC,,, | Performed by: NURSE PRACTITIONER

## 2021-09-08 PROCEDURE — 3079F PR MOST RECENT DIASTOLIC BLOOD PRESSURE 80-89 MM HG: ICD-10-PCS | Mod: CPTII,S$GLB,, | Performed by: NURSE PRACTITIONER

## 2021-09-08 PROCEDURE — 3008F PR BODY MASS INDEX (BMI) DOCUMENTED: ICD-10-PCS | Mod: CPTII,S$GLB,, | Performed by: NURSE PRACTITIONER

## 2021-09-08 PROCEDURE — 3077F SYST BP >= 140 MM HG: CPT | Mod: CPTII,S$GLB,, | Performed by: NURSE PRACTITIONER

## 2021-09-08 PROCEDURE — 99214 PR OFFICE/OUTPT VISIT, EST, LEVL IV, 30-39 MIN: ICD-10-PCS | Mod: S$GLB,,, | Performed by: NURSE PRACTITIONER

## 2021-09-08 PROCEDURE — 51798 US URINE CAPACITY MEASURE: CPT | Mod: S$GLB,,, | Performed by: NURSE PRACTITIONER

## 2021-09-08 PROCEDURE — 3079F DIAST BP 80-89 MM HG: CPT | Mod: CPTII,S$GLB,, | Performed by: NURSE PRACTITIONER

## 2021-09-08 RX ORDER — NYSTATIN 100000 [USP'U]/G
POWDER TOPICAL 2 TIMES DAILY
Qty: 1 BOTTLE | Refills: 1 | Status: SHIPPED | OUTPATIENT
Start: 2021-09-08 | End: 2022-02-24

## 2021-09-08 RX ORDER — SOLIFENACIN SUCCINATE 10 MG/1
10 TABLET, FILM COATED ORAL DAILY
Qty: 30 TABLET | Refills: 11 | Status: SHIPPED | OUTPATIENT
Start: 2021-09-08 | End: 2022-02-24

## 2021-09-08 RX ORDER — FLUCONAZOLE 150 MG/1
150 TABLET ORAL DAILY
Qty: 3 TABLET | Refills: 0 | Status: SHIPPED | OUTPATIENT
Start: 2021-09-08 | End: 2021-09-11

## 2021-09-12 ENCOUNTER — PATIENT OUTREACH (OUTPATIENT)
Dept: ADMINISTRATIVE | Facility: HOSPITAL | Age: 71
End: 2021-09-12

## 2021-09-14 ENCOUNTER — OFFICE VISIT (OUTPATIENT)
Dept: PSYCHIATRY | Facility: CLINIC | Age: 71
End: 2021-09-14
Payer: COMMERCIAL

## 2021-09-14 DIAGNOSIS — F43.10 PTSD (POST-TRAUMATIC STRESS DISORDER): Primary | ICD-10-CM

## 2021-09-14 PROCEDURE — 3072F LOW RISK FOR RETINOPATHY: CPT | Mod: CPTII,S$GLB,, | Performed by: PSYCHOLOGIST

## 2021-09-14 PROCEDURE — 3066F PR DOCUMENTATION OF TREATMENT FOR NEPHROPATHY: ICD-10-PCS | Mod: CPTII,S$GLB,, | Performed by: PSYCHOLOGIST

## 2021-09-14 PROCEDURE — 3061F NEG MICROALBUMINURIA REV: CPT | Mod: CPTII,S$GLB,, | Performed by: PSYCHOLOGIST

## 2021-09-14 PROCEDURE — 1157F PR ADVANCE CARE PLAN OR EQUIV PRESENT IN MEDICAL RECORD: ICD-10-PCS | Mod: CPTII,S$GLB,, | Performed by: PSYCHOLOGIST

## 2021-09-14 PROCEDURE — 3044F HG A1C LEVEL LT 7.0%: CPT | Mod: CPTII,S$GLB,, | Performed by: PSYCHOLOGIST

## 2021-09-14 PROCEDURE — 3044F PR MOST RECENT HEMOGLOBIN A1C LEVEL <7.0%: ICD-10-PCS | Mod: CPTII,S$GLB,, | Performed by: PSYCHOLOGIST

## 2021-09-14 PROCEDURE — 3072F PR LOW RISK FOR RETINOPATHY: ICD-10-PCS | Mod: CPTII,S$GLB,, | Performed by: PSYCHOLOGIST

## 2021-09-14 PROCEDURE — 3066F NEPHROPATHY DOC TX: CPT | Mod: CPTII,S$GLB,, | Performed by: PSYCHOLOGIST

## 2021-09-14 PROCEDURE — 3061F PR NEG MICROALBUMINURIA RESULT DOCUMENTED/REVIEW: ICD-10-PCS | Mod: CPTII,S$GLB,, | Performed by: PSYCHOLOGIST

## 2021-09-14 PROCEDURE — 90834 PSYTX W PT 45 MINUTES: CPT | Mod: S$GLB,,, | Performed by: PSYCHOLOGIST

## 2021-09-14 PROCEDURE — 1157F ADVNC CARE PLAN IN RCRD: CPT | Mod: CPTII,S$GLB,, | Performed by: PSYCHOLOGIST

## 2021-09-14 PROCEDURE — 90834 PR PSYCHOTHERAPY W/PATIENT, 45 MIN: ICD-10-PCS | Mod: S$GLB,,, | Performed by: PSYCHOLOGIST

## 2021-09-22 ENCOUNTER — TELEPHONE (OUTPATIENT)
Dept: UROLOGY | Facility: CLINIC | Age: 71
End: 2021-09-22

## 2021-09-28 ENCOUNTER — OFFICE VISIT (OUTPATIENT)
Dept: PODIATRY | Facility: CLINIC | Age: 71
End: 2021-09-28
Payer: MEDICARE

## 2021-09-28 ENCOUNTER — HOSPITAL ENCOUNTER (OUTPATIENT)
Dept: RADIOLOGY | Facility: CLINIC | Age: 71
Discharge: HOME | End: 2021-09-28
Attending: PODIATRIST
Payer: MEDICARE

## 2021-09-28 VITALS
BODY MASS INDEX: 33.79 KG/M2 | HEART RATE: 80 BPM | WEIGHT: 179 LBS | HEIGHT: 61 IN | DIASTOLIC BLOOD PRESSURE: 84 MMHG | RESPIRATION RATE: 16 BRPM | SYSTOLIC BLOOD PRESSURE: 139 MMHG

## 2021-09-28 DIAGNOSIS — M54.16 LUMBAR RADICULOPATHY: ICD-10-CM

## 2021-09-28 DIAGNOSIS — M79.672 BILATERAL FOOT PAIN: ICD-10-CM

## 2021-09-28 DIAGNOSIS — M20.41 HAMMER TOES OF BOTH FEET: ICD-10-CM

## 2021-09-28 DIAGNOSIS — E11.42 DIABETIC POLYNEUROPATHY ASSOCIATED WITH TYPE 2 DIABETES MELLITUS: Primary | ICD-10-CM

## 2021-09-28 DIAGNOSIS — M20.42 HAMMER TOES OF BOTH FEET: ICD-10-CM

## 2021-09-28 DIAGNOSIS — I87.2 VENOUS INSUFFICIENCY OF BOTH LOWER EXTREMITIES: ICD-10-CM

## 2021-09-28 DIAGNOSIS — M79.671 BILATERAL FOOT PAIN: ICD-10-CM

## 2021-09-28 PROCEDURE — 3075F PR MOST RECENT SYSTOLIC BLOOD PRESS GE 130-139MM HG: ICD-10-PCS | Mod: CPTII,S$GLB,, | Performed by: PODIATRIST

## 2021-09-28 PROCEDURE — 1160F RVW MEDS BY RX/DR IN RCRD: CPT | Mod: CPTII,S$GLB,, | Performed by: PODIATRIST

## 2021-09-28 PROCEDURE — 1160F PR REVIEW ALL MEDS BY PRESCRIBER/CLIN PHARMACIST DOCUMENTED: ICD-10-PCS | Mod: CPTII,S$GLB,, | Performed by: PODIATRIST

## 2021-09-28 PROCEDURE — 1125F AMNT PAIN NOTED PAIN PRSNT: CPT | Mod: CPTII,S$GLB,, | Performed by: PODIATRIST

## 2021-09-28 PROCEDURE — 3008F BODY MASS INDEX DOCD: CPT | Mod: CPTII,S$GLB,, | Performed by: PODIATRIST

## 2021-09-28 PROCEDURE — 1157F PR ADVANCE CARE PLAN OR EQUIV PRESENT IN MEDICAL RECORD: ICD-10-PCS | Mod: CPTII,S$GLB,, | Performed by: PODIATRIST

## 2021-09-28 PROCEDURE — 3288F PR FALLS RISK ASSESSMENT DOCUMENTED: ICD-10-PCS | Mod: CPTII,S$GLB,, | Performed by: PODIATRIST

## 2021-09-28 PROCEDURE — 99214 PR OFFICE/OUTPT VISIT, EST, LEVL IV, 30-39 MIN: ICD-10-PCS | Mod: S$GLB,,, | Performed by: PODIATRIST

## 2021-09-28 PROCEDURE — 73630 XR FOOT COMPLETE 3 VIEW BILATERAL: ICD-10-PCS | Mod: 50,S$GLB,, | Performed by: RADIOLOGY

## 2021-09-28 PROCEDURE — 3066F PR DOCUMENTATION OF TREATMENT FOR NEPHROPATHY: ICD-10-PCS | Mod: CPTII,S$GLB,, | Performed by: PODIATRIST

## 2021-09-28 PROCEDURE — 3072F PR LOW RISK FOR RETINOPATHY: ICD-10-PCS | Mod: CPTII,S$GLB,, | Performed by: PODIATRIST

## 2021-09-28 PROCEDURE — 1101F PR PT FALLS ASSESS DOC 0-1 FALLS W/OUT INJ PAST YR: ICD-10-PCS | Mod: CPTII,S$GLB,, | Performed by: PODIATRIST

## 2021-09-28 PROCEDURE — 1159F MED LIST DOCD IN RCRD: CPT | Mod: CPTII,S$GLB,, | Performed by: PODIATRIST

## 2021-09-28 PROCEDURE — 3075F SYST BP GE 130 - 139MM HG: CPT | Mod: CPTII,S$GLB,, | Performed by: PODIATRIST

## 2021-09-28 PROCEDURE — 3008F PR BODY MASS INDEX (BMI) DOCUMENTED: ICD-10-PCS | Mod: CPTII,S$GLB,, | Performed by: PODIATRIST

## 2021-09-28 PROCEDURE — 73630 X-RAY EXAM OF FOOT: CPT | Mod: 50,S$GLB,, | Performed by: RADIOLOGY

## 2021-09-28 PROCEDURE — 3066F NEPHROPATHY DOC TX: CPT | Mod: CPTII,S$GLB,, | Performed by: PODIATRIST

## 2021-09-28 PROCEDURE — 3044F HG A1C LEVEL LT 7.0%: CPT | Mod: CPTII,S$GLB,, | Performed by: PODIATRIST

## 2021-09-28 PROCEDURE — 3079F PR MOST RECENT DIASTOLIC BLOOD PRESSURE 80-89 MM HG: ICD-10-PCS | Mod: CPTII,S$GLB,, | Performed by: PODIATRIST

## 2021-09-28 PROCEDURE — 1157F ADVNC CARE PLAN IN RCRD: CPT | Mod: CPTII,S$GLB,, | Performed by: PODIATRIST

## 2021-09-28 PROCEDURE — 1125F PR PAIN SEVERITY QUANTIFIED, PAIN PRESENT: ICD-10-PCS | Mod: CPTII,S$GLB,, | Performed by: PODIATRIST

## 2021-09-28 PROCEDURE — 1159F PR MEDICATION LIST DOCUMENTED IN MEDICAL RECORD: ICD-10-PCS | Mod: CPTII,S$GLB,, | Performed by: PODIATRIST

## 2021-09-28 PROCEDURE — 3288F FALL RISK ASSESSMENT DOCD: CPT | Mod: CPTII,S$GLB,, | Performed by: PODIATRIST

## 2021-09-28 PROCEDURE — 3044F PR MOST RECENT HEMOGLOBIN A1C LEVEL <7.0%: ICD-10-PCS | Mod: CPTII,S$GLB,, | Performed by: PODIATRIST

## 2021-09-28 PROCEDURE — 3072F LOW RISK FOR RETINOPATHY: CPT | Mod: CPTII,S$GLB,, | Performed by: PODIATRIST

## 2021-09-28 PROCEDURE — 99214 OFFICE O/P EST MOD 30 MIN: CPT | Mod: S$GLB,,, | Performed by: PODIATRIST

## 2021-09-28 PROCEDURE — 3061F PR NEG MICROALBUMINURIA RESULT DOCUMENTED/REVIEW: ICD-10-PCS | Mod: CPTII,S$GLB,, | Performed by: PODIATRIST

## 2021-09-28 PROCEDURE — 3061F NEG MICROALBUMINURIA REV: CPT | Mod: CPTII,S$GLB,, | Performed by: PODIATRIST

## 2021-09-28 PROCEDURE — 1101F PT FALLS ASSESS-DOCD LE1/YR: CPT | Mod: CPTII,S$GLB,, | Performed by: PODIATRIST

## 2021-09-28 PROCEDURE — 3079F DIAST BP 80-89 MM HG: CPT | Mod: CPTII,S$GLB,, | Performed by: PODIATRIST

## 2021-09-29 ENCOUNTER — TELEPHONE (OUTPATIENT)
Dept: FAMILY MEDICINE | Facility: CLINIC | Age: 71
End: 2021-09-29

## 2021-09-29 DIAGNOSIS — E11.49 OTHER DIABETIC NEUROLOGICAL COMPLICATION ASSOCIATED WITH TYPE 2 DIABETES MELLITUS: Primary | ICD-10-CM

## 2021-09-29 RX ORDER — GABAPENTIN 100 MG/1
100 CAPSULE ORAL 3 TIMES DAILY
Qty: 90 CAPSULE | Refills: 5 | Status: ON HOLD | OUTPATIENT
Start: 2021-09-29 | End: 2022-02-24 | Stop reason: HOSPADM

## 2021-09-30 ENCOUNTER — OFFICE VISIT (OUTPATIENT)
Dept: PSYCHIATRY | Facility: CLINIC | Age: 71
End: 2021-09-30
Payer: MEDICARE

## 2021-09-30 VITALS
HEIGHT: 61 IN | SYSTOLIC BLOOD PRESSURE: 143 MMHG | HEART RATE: 78 BPM | WEIGHT: 177 LBS | BODY MASS INDEX: 33.42 KG/M2 | DIASTOLIC BLOOD PRESSURE: 74 MMHG

## 2021-09-30 DIAGNOSIS — F34.1 PERSISTENT DEPRESSIVE DISORDER: Primary | ICD-10-CM

## 2021-09-30 DIAGNOSIS — F43.10 PTSD (POST-TRAUMATIC STRESS DISORDER): ICD-10-CM

## 2021-09-30 DIAGNOSIS — F41.9 ANXIETY DISORDER, UNSPECIFIED TYPE: ICD-10-CM

## 2021-09-30 PROCEDURE — 99214 OFFICE O/P EST MOD 30 MIN: CPT | Mod: PBBFAC,PN | Performed by: PHYSICIAN ASSISTANT

## 2021-09-30 PROCEDURE — 99999 PR PBB SHADOW E&M-EST. PATIENT-LVL IV: ICD-10-PCS | Mod: PBBFAC,,, | Performed by: PHYSICIAN ASSISTANT

## 2021-09-30 PROCEDURE — 99214 PR OFFICE/OUTPT VISIT, EST, LEVL IV, 30-39 MIN: ICD-10-PCS | Mod: S$PBB,,, | Performed by: PHYSICIAN ASSISTANT

## 2021-09-30 PROCEDURE — 99499 UNLISTED E&M SERVICE: CPT | Mod: S$GLB,,, | Performed by: PHYSICIAN ASSISTANT

## 2021-09-30 PROCEDURE — 99999 PR PBB SHADOW E&M-EST. PATIENT-LVL IV: CPT | Mod: PBBFAC,,, | Performed by: PHYSICIAN ASSISTANT

## 2021-09-30 PROCEDURE — 99499 RISK ADDL DX/OHS AUDIT: ICD-10-PCS | Mod: S$GLB,,, | Performed by: PHYSICIAN ASSISTANT

## 2021-09-30 PROCEDURE — 99214 OFFICE O/P EST MOD 30 MIN: CPT | Mod: S$PBB,,, | Performed by: PHYSICIAN ASSISTANT

## 2021-09-30 RX ORDER — SERTRALINE HYDROCHLORIDE 25 MG/1
25 TABLET, FILM COATED ORAL DAILY
Qty: 30 TABLET | Refills: 2 | Status: SHIPPED | OUTPATIENT
Start: 2021-09-30 | End: 2022-02-14 | Stop reason: SDUPTHER

## 2021-09-30 RX ORDER — PRAZOSIN HYDROCHLORIDE 5 MG/1
5 CAPSULE ORAL NIGHTLY
Qty: 30 CAPSULE | Refills: 2 | Status: SHIPPED | OUTPATIENT
Start: 2021-09-30 | End: 2022-02-14 | Stop reason: SDUPTHER

## 2021-10-04 ENCOUNTER — OFFICE VISIT (OUTPATIENT)
Dept: ORTHOPEDICS | Facility: CLINIC | Age: 71
End: 2021-10-04
Payer: MEDICARE

## 2021-10-04 ENCOUNTER — LAB VISIT (OUTPATIENT)
Dept: LAB | Facility: HOSPITAL | Age: 71
End: 2021-10-04
Attending: PHYSICIAN ASSISTANT
Payer: MEDICARE

## 2021-10-04 VITALS — BODY MASS INDEX: 33.42 KG/M2 | WEIGHT: 177 LBS | HEIGHT: 61 IN

## 2021-10-04 DIAGNOSIS — Z79.4 TYPE 2 DIABETES MELLITUS WITHOUT COMPLICATION, WITH LONG-TERM CURRENT USE OF INSULIN: ICD-10-CM

## 2021-10-04 DIAGNOSIS — E11.42 DIABETIC PERIPHERAL NEUROPATHY ASSOCIATED WITH TYPE 2 DIABETES MELLITUS: Primary | ICD-10-CM

## 2021-10-04 DIAGNOSIS — M51.36 DEGENERATIVE LUMBAR DISC: ICD-10-CM

## 2021-10-04 DIAGNOSIS — E11.9 TYPE 2 DIABETES MELLITUS WITHOUT COMPLICATION, WITH LONG-TERM CURRENT USE OF INSULIN: ICD-10-CM

## 2021-10-04 LAB
ALBUMIN SERPL BCP-MCNC: 3.4 G/DL (ref 3.5–5.2)
ALP SERPL-CCNC: 74 U/L (ref 55–135)
ALT SERPL W/O P-5'-P-CCNC: 11 U/L (ref 10–44)
ANION GAP SERPL CALC-SCNC: 11 MMOL/L (ref 8–16)
AST SERPL-CCNC: 13 U/L (ref 10–40)
BILIRUB SERPL-MCNC: 0.3 MG/DL (ref 0.1–1)
BUN SERPL-MCNC: 21 MG/DL (ref 8–23)
CALCIUM SERPL-MCNC: 9.3 MG/DL (ref 8.7–10.5)
CHLORIDE SERPL-SCNC: 106 MMOL/L (ref 95–110)
CHOLEST SERPL-MCNC: 140 MG/DL (ref 120–199)
CHOLEST/HDLC SERPL: 3.6 {RATIO} (ref 2–5)
CO2 SERPL-SCNC: 23 MMOL/L (ref 23–29)
CREAT SERPL-MCNC: 1 MG/DL (ref 0.5–1.4)
EST. GFR  (AFRICAN AMERICAN): >60 ML/MIN/1.73 M^2
EST. GFR  (NON AFRICAN AMERICAN): 56 ML/MIN/1.73 M^2
ESTIMATED AVG GLUCOSE: 131 MG/DL (ref 68–131)
GLUCOSE SERPL-MCNC: 117 MG/DL (ref 70–110)
HBA1C MFR BLD: 6.2 % (ref 4–5.6)
HDLC SERPL-MCNC: 39 MG/DL (ref 40–75)
HDLC SERPL: 27.9 % (ref 20–50)
LDLC SERPL CALC-MCNC: 62.4 MG/DL (ref 63–159)
NONHDLC SERPL-MCNC: 101 MG/DL
POTASSIUM SERPL-SCNC: 4.5 MMOL/L (ref 3.5–5.1)
PROT SERPL-MCNC: 6.9 G/DL (ref 6–8.4)
SODIUM SERPL-SCNC: 140 MMOL/L (ref 136–145)
TRIGL SERPL-MCNC: 193 MG/DL (ref 30–150)

## 2021-10-04 PROCEDURE — 3066F PR DOCUMENTATION OF TREATMENT FOR NEPHROPATHY: ICD-10-PCS | Mod: S$GLB,,, | Performed by: ORTHOPAEDIC SURGERY

## 2021-10-04 PROCEDURE — 1157F PR ADVANCE CARE PLAN OR EQUIV PRESENT IN MEDICAL RECORD: ICD-10-PCS | Mod: S$GLB,,, | Performed by: ORTHOPAEDIC SURGERY

## 2021-10-04 PROCEDURE — 3066F NEPHROPATHY DOC TX: CPT | Mod: S$GLB,,, | Performed by: ORTHOPAEDIC SURGERY

## 2021-10-04 PROCEDURE — 3288F PR FALLS RISK ASSESSMENT DOCUMENTED: ICD-10-PCS | Mod: S$GLB,,, | Performed by: ORTHOPAEDIC SURGERY

## 2021-10-04 PROCEDURE — 3044F HG A1C LEVEL LT 7.0%: CPT | Mod: S$GLB,,, | Performed by: ORTHOPAEDIC SURGERY

## 2021-10-04 PROCEDURE — 1125F PR PAIN SEVERITY QUANTIFIED, PAIN PRESENT: ICD-10-PCS | Mod: S$GLB,,, | Performed by: ORTHOPAEDIC SURGERY

## 2021-10-04 PROCEDURE — 1159F MED LIST DOCD IN RCRD: CPT | Mod: S$GLB,,, | Performed by: ORTHOPAEDIC SURGERY

## 2021-10-04 PROCEDURE — 84443 ASSAY THYROID STIM HORMONE: CPT | Performed by: PHYSICIAN ASSISTANT

## 2021-10-04 PROCEDURE — 1125F AMNT PAIN NOTED PAIN PRSNT: CPT | Mod: S$GLB,,, | Performed by: ORTHOPAEDIC SURGERY

## 2021-10-04 PROCEDURE — 3072F PR LOW RISK FOR RETINOPATHY: ICD-10-PCS | Mod: S$GLB,,, | Performed by: ORTHOPAEDIC SURGERY

## 2021-10-04 PROCEDURE — 3008F PR BODY MASS INDEX (BMI) DOCUMENTED: ICD-10-PCS | Mod: S$GLB,,, | Performed by: ORTHOPAEDIC SURGERY

## 2021-10-04 PROCEDURE — 1157F ADVNC CARE PLAN IN RCRD: CPT | Mod: S$GLB,,, | Performed by: ORTHOPAEDIC SURGERY

## 2021-10-04 PROCEDURE — 3288F FALL RISK ASSESSMENT DOCD: CPT | Mod: S$GLB,,, | Performed by: ORTHOPAEDIC SURGERY

## 2021-10-04 PROCEDURE — 1159F PR MEDICATION LIST DOCUMENTED IN MEDICAL RECORD: ICD-10-PCS | Mod: S$GLB,,, | Performed by: ORTHOPAEDIC SURGERY

## 2021-10-04 PROCEDURE — 99203 PR OFFICE/OUTPT VISIT, NEW, LEVL III, 30-44 MIN: ICD-10-PCS | Mod: S$GLB,,, | Performed by: ORTHOPAEDIC SURGERY

## 2021-10-04 PROCEDURE — 84439 ASSAY OF FREE THYROXINE: CPT | Performed by: PHYSICIAN ASSISTANT

## 2021-10-04 PROCEDURE — 80053 COMPREHEN METABOLIC PANEL: CPT | Performed by: PHYSICIAN ASSISTANT

## 2021-10-04 PROCEDURE — 1160F RVW MEDS BY RX/DR IN RCRD: CPT | Mod: S$GLB,,, | Performed by: ORTHOPAEDIC SURGERY

## 2021-10-04 PROCEDURE — 3072F LOW RISK FOR RETINOPATHY: CPT | Mod: S$GLB,,, | Performed by: ORTHOPAEDIC SURGERY

## 2021-10-04 PROCEDURE — 3008F BODY MASS INDEX DOCD: CPT | Mod: S$GLB,,, | Performed by: ORTHOPAEDIC SURGERY

## 2021-10-04 PROCEDURE — 36415 COLL VENOUS BLD VENIPUNCTURE: CPT | Mod: PO | Performed by: PHYSICIAN ASSISTANT

## 2021-10-04 PROCEDURE — 80061 LIPID PANEL: CPT | Performed by: PHYSICIAN ASSISTANT

## 2021-10-04 PROCEDURE — 3061F PR NEG MICROALBUMINURIA RESULT DOCUMENTED/REVIEW: ICD-10-PCS | Mod: S$GLB,,, | Performed by: ORTHOPAEDIC SURGERY

## 2021-10-04 PROCEDURE — 1100F PR PT FALLS ASSESS DOC 2+ FALLS/FALL W/INJURY/YR: ICD-10-PCS | Mod: S$GLB,,, | Performed by: ORTHOPAEDIC SURGERY

## 2021-10-04 PROCEDURE — 1100F PTFALLS ASSESS-DOCD GE2>/YR: CPT | Mod: S$GLB,,, | Performed by: ORTHOPAEDIC SURGERY

## 2021-10-04 PROCEDURE — 1160F PR REVIEW ALL MEDS BY PRESCRIBER/CLIN PHARMACIST DOCUMENTED: ICD-10-PCS | Mod: S$GLB,,, | Performed by: ORTHOPAEDIC SURGERY

## 2021-10-04 PROCEDURE — 83036 HEMOGLOBIN GLYCOSYLATED A1C: CPT | Performed by: PHYSICIAN ASSISTANT

## 2021-10-04 PROCEDURE — 3044F PR MOST RECENT HEMOGLOBIN A1C LEVEL <7.0%: ICD-10-PCS | Mod: S$GLB,,, | Performed by: ORTHOPAEDIC SURGERY

## 2021-10-04 PROCEDURE — 99203 OFFICE O/P NEW LOW 30 MIN: CPT | Mod: S$GLB,,, | Performed by: ORTHOPAEDIC SURGERY

## 2021-10-04 PROCEDURE — 3061F NEG MICROALBUMINURIA REV: CPT | Mod: S$GLB,,, | Performed by: ORTHOPAEDIC SURGERY

## 2021-10-04 RX ORDER — GABAPENTIN 300 MG/1
CAPSULE ORAL
Qty: 30 CAPSULE | Refills: 2 | Status: SHIPPED | OUTPATIENT
Start: 2021-10-04 | End: 2022-06-29 | Stop reason: SDUPTHER

## 2021-10-05 LAB
T4 FREE SERPL-MCNC: 0.79 NG/DL (ref 0.71–1.51)
TSH SERPL DL<=0.005 MIU/L-ACNC: 4.63 UIU/ML (ref 0.4–4)

## 2021-10-07 ENCOUNTER — OFFICE VISIT (OUTPATIENT)
Dept: PULMONOLOGY | Facility: CLINIC | Age: 71
End: 2021-10-07
Payer: MEDICARE

## 2021-10-07 ENCOUNTER — TELEPHONE (OUTPATIENT)
Dept: PULMONOLOGY | Facility: CLINIC | Age: 71
End: 2021-10-07

## 2021-10-07 VITALS
HEART RATE: 69 BPM | BODY MASS INDEX: 34.2 KG/M2 | SYSTOLIC BLOOD PRESSURE: 119 MMHG | WEIGHT: 181.13 LBS | OXYGEN SATURATION: 97 % | HEIGHT: 61 IN | DIASTOLIC BLOOD PRESSURE: 79 MMHG

## 2021-10-07 DIAGNOSIS — J96.10 CHRONIC NEUROMUSCULAR RESPIRATORY FAILURE: ICD-10-CM

## 2021-10-07 DIAGNOSIS — J98.6 PARALYSIS OF DIAPHRAGM: ICD-10-CM

## 2021-10-07 DIAGNOSIS — J98.4 CHRONIC RESTRICTIVE LUNG DISEASE: Primary | ICD-10-CM

## 2021-10-07 DIAGNOSIS — G47.33 OSA (OBSTRUCTIVE SLEEP APNEA): ICD-10-CM

## 2021-10-07 PROCEDURE — 99999 PR PBB SHADOW E&M-EST. PATIENT-LVL V: ICD-10-PCS | Mod: PBBFAC,,, | Performed by: INTERNAL MEDICINE

## 2021-10-07 PROCEDURE — 99214 PR OFFICE/OUTPT VISIT, EST, LEVL IV, 30-39 MIN: ICD-10-PCS | Mod: S$GLB,,, | Performed by: INTERNAL MEDICINE

## 2021-10-07 PROCEDURE — 99214 OFFICE O/P EST MOD 30 MIN: CPT | Mod: S$GLB,,, | Performed by: INTERNAL MEDICINE

## 2021-10-07 PROCEDURE — 99499 RISK ADDL DX/OHS AUDIT: ICD-10-PCS | Mod: S$GLB,,, | Performed by: INTERNAL MEDICINE

## 2021-10-07 PROCEDURE — 99999 PR PBB SHADOW E&M-EST. PATIENT-LVL V: CPT | Mod: PBBFAC,,, | Performed by: INTERNAL MEDICINE

## 2021-10-07 PROCEDURE — 99499 UNLISTED E&M SERVICE: CPT | Mod: S$GLB,,, | Performed by: INTERNAL MEDICINE

## 2021-10-08 ENCOUNTER — PATIENT OUTREACH (OUTPATIENT)
Dept: ADMINISTRATIVE | Facility: OTHER | Age: 71
End: 2021-10-08

## 2021-10-11 ENCOUNTER — OFFICE VISIT (OUTPATIENT)
Dept: ENDOCRINOLOGY | Facility: CLINIC | Age: 71
End: 2021-10-11
Payer: MEDICARE

## 2021-10-11 VITALS
WEIGHT: 177.94 LBS | HEIGHT: 61 IN | OXYGEN SATURATION: 97 % | HEART RATE: 78 BPM | TEMPERATURE: 98 F | BODY MASS INDEX: 33.59 KG/M2 | SYSTOLIC BLOOD PRESSURE: 100 MMHG | DIASTOLIC BLOOD PRESSURE: 70 MMHG

## 2021-10-11 DIAGNOSIS — G47.33 OSA (OBSTRUCTIVE SLEEP APNEA): ICD-10-CM

## 2021-10-11 DIAGNOSIS — M85.80 OSTEOPENIA, UNSPECIFIED LOCATION: ICD-10-CM

## 2021-10-11 DIAGNOSIS — Z79.4 TYPE 2 DIABETES MELLITUS WITHOUT COMPLICATION, WITH LONG-TERM CURRENT USE OF INSULIN: Primary | ICD-10-CM

## 2021-10-11 DIAGNOSIS — E66.9 OBESITY (BMI 30-39.9): ICD-10-CM

## 2021-10-11 DIAGNOSIS — E03.9 ACQUIRED HYPOTHYROIDISM: ICD-10-CM

## 2021-10-11 DIAGNOSIS — E11.9 TYPE 2 DIABETES MELLITUS WITHOUT COMPLICATION, WITH LONG-TERM CURRENT USE OF INSULIN: Primary | ICD-10-CM

## 2021-10-11 PROCEDURE — 3008F BODY MASS INDEX DOCD: CPT | Mod: CPTII,S$GLB,, | Performed by: PHYSICIAN ASSISTANT

## 2021-10-11 PROCEDURE — 3078F DIAST BP <80 MM HG: CPT | Mod: CPTII,S$GLB,, | Performed by: PHYSICIAN ASSISTANT

## 2021-10-11 PROCEDURE — 1160F RVW MEDS BY RX/DR IN RCRD: CPT | Mod: CPTII,S$GLB,, | Performed by: PHYSICIAN ASSISTANT

## 2021-10-11 PROCEDURE — 3061F PR NEG MICROALBUMINURIA RESULT DOCUMENTED/REVIEW: ICD-10-PCS | Mod: CPTII,S$GLB,, | Performed by: PHYSICIAN ASSISTANT

## 2021-10-11 PROCEDURE — 1101F PT FALLS ASSESS-DOCD LE1/YR: CPT | Mod: CPTII,S$GLB,, | Performed by: PHYSICIAN ASSISTANT

## 2021-10-11 PROCEDURE — 1159F MED LIST DOCD IN RCRD: CPT | Mod: CPTII,S$GLB,, | Performed by: PHYSICIAN ASSISTANT

## 2021-10-11 PROCEDURE — 1125F PR PAIN SEVERITY QUANTIFIED, PAIN PRESENT: ICD-10-PCS | Mod: CPTII,S$GLB,, | Performed by: PHYSICIAN ASSISTANT

## 2021-10-11 PROCEDURE — 3074F SYST BP LT 130 MM HG: CPT | Mod: CPTII,S$GLB,, | Performed by: PHYSICIAN ASSISTANT

## 2021-10-11 PROCEDURE — 3288F PR FALLS RISK ASSESSMENT DOCUMENTED: ICD-10-PCS | Mod: CPTII,S$GLB,, | Performed by: PHYSICIAN ASSISTANT

## 2021-10-11 PROCEDURE — 3066F NEPHROPATHY DOC TX: CPT | Mod: CPTII,S$GLB,, | Performed by: PHYSICIAN ASSISTANT

## 2021-10-11 PROCEDURE — 1157F PR ADVANCE CARE PLAN OR EQUIV PRESENT IN MEDICAL RECORD: ICD-10-PCS | Mod: CPTII,S$GLB,, | Performed by: PHYSICIAN ASSISTANT

## 2021-10-11 PROCEDURE — 1160F PR REVIEW ALL MEDS BY PRESCRIBER/CLIN PHARMACIST DOCUMENTED: ICD-10-PCS | Mod: CPTII,S$GLB,, | Performed by: PHYSICIAN ASSISTANT

## 2021-10-11 PROCEDURE — 3074F PR MOST RECENT SYSTOLIC BLOOD PRESSURE < 130 MM HG: ICD-10-PCS | Mod: CPTII,S$GLB,, | Performed by: PHYSICIAN ASSISTANT

## 2021-10-11 PROCEDURE — 3044F PR MOST RECENT HEMOGLOBIN A1C LEVEL <7.0%: ICD-10-PCS | Mod: CPTII,S$GLB,, | Performed by: PHYSICIAN ASSISTANT

## 2021-10-11 PROCEDURE — 3288F FALL RISK ASSESSMENT DOCD: CPT | Mod: CPTII,S$GLB,, | Performed by: PHYSICIAN ASSISTANT

## 2021-10-11 PROCEDURE — 3078F PR MOST RECENT DIASTOLIC BLOOD PRESSURE < 80 MM HG: ICD-10-PCS | Mod: CPTII,S$GLB,, | Performed by: PHYSICIAN ASSISTANT

## 2021-10-11 PROCEDURE — 99999 PR PBB SHADOW E&M-EST. PATIENT-LVL V: ICD-10-PCS | Mod: PBBFAC,,, | Performed by: PHYSICIAN ASSISTANT

## 2021-10-11 PROCEDURE — 3072F PR LOW RISK FOR RETINOPATHY: ICD-10-PCS | Mod: CPTII,S$GLB,, | Performed by: PHYSICIAN ASSISTANT

## 2021-10-11 PROCEDURE — 3072F LOW RISK FOR RETINOPATHY: CPT | Mod: CPTII,S$GLB,, | Performed by: PHYSICIAN ASSISTANT

## 2021-10-11 PROCEDURE — 3008F PR BODY MASS INDEX (BMI) DOCUMENTED: ICD-10-PCS | Mod: CPTII,S$GLB,, | Performed by: PHYSICIAN ASSISTANT

## 2021-10-11 PROCEDURE — 3061F NEG MICROALBUMINURIA REV: CPT | Mod: CPTII,S$GLB,, | Performed by: PHYSICIAN ASSISTANT

## 2021-10-11 PROCEDURE — 3044F HG A1C LEVEL LT 7.0%: CPT | Mod: CPTII,S$GLB,, | Performed by: PHYSICIAN ASSISTANT

## 2021-10-11 PROCEDURE — 99214 OFFICE O/P EST MOD 30 MIN: CPT | Mod: S$GLB,,, | Performed by: PHYSICIAN ASSISTANT

## 2021-10-11 PROCEDURE — 99214 PR OFFICE/OUTPT VISIT, EST, LEVL IV, 30-39 MIN: ICD-10-PCS | Mod: S$GLB,,, | Performed by: PHYSICIAN ASSISTANT

## 2021-10-11 PROCEDURE — 1159F PR MEDICATION LIST DOCUMENTED IN MEDICAL RECORD: ICD-10-PCS | Mod: CPTII,S$GLB,, | Performed by: PHYSICIAN ASSISTANT

## 2021-10-11 PROCEDURE — 1101F PR PT FALLS ASSESS DOC 0-1 FALLS W/OUT INJ PAST YR: ICD-10-PCS | Mod: CPTII,S$GLB,, | Performed by: PHYSICIAN ASSISTANT

## 2021-10-11 PROCEDURE — 1157F ADVNC CARE PLAN IN RCRD: CPT | Mod: CPTII,S$GLB,, | Performed by: PHYSICIAN ASSISTANT

## 2021-10-11 PROCEDURE — 99999 PR PBB SHADOW E&M-EST. PATIENT-LVL V: CPT | Mod: PBBFAC,,, | Performed by: PHYSICIAN ASSISTANT

## 2021-10-11 PROCEDURE — 3066F PR DOCUMENTATION OF TREATMENT FOR NEPHROPATHY: ICD-10-PCS | Mod: CPTII,S$GLB,, | Performed by: PHYSICIAN ASSISTANT

## 2021-10-11 PROCEDURE — 1125F AMNT PAIN NOTED PAIN PRSNT: CPT | Mod: CPTII,S$GLB,, | Performed by: PHYSICIAN ASSISTANT

## 2021-10-13 ENCOUNTER — TELEPHONE (OUTPATIENT)
Dept: PULMONOLOGY | Facility: CLINIC | Age: 71
End: 2021-10-13

## 2021-10-14 DIAGNOSIS — I10 ESSENTIAL HYPERTENSION: ICD-10-CM

## 2021-10-14 RX ORDER — LOSARTAN POTASSIUM 50 MG/1
50 TABLET ORAL DAILY
Qty: 90 TABLET | Refills: 3 | Status: ON HOLD | OUTPATIENT
Start: 2021-10-14 | End: 2022-02-24 | Stop reason: SDUPTHER

## 2021-10-19 ENCOUNTER — CLINICAL SUPPORT (OUTPATIENT)
Dept: CARDIAC REHAB | Facility: HOSPITAL | Age: 71
End: 2021-10-19
Attending: INTERNAL MEDICINE
Payer: MEDICARE

## 2021-10-19 DIAGNOSIS — J98.6 DISORDERS OF DIAPHRAGM: ICD-10-CM

## 2021-10-19 PROCEDURE — G0239 OTH RESP PROC, GROUP: HCPCS

## 2021-10-26 ENCOUNTER — CLINICAL SUPPORT (OUTPATIENT)
Dept: CARDIAC REHAB | Facility: HOSPITAL | Age: 71
End: 2021-10-26
Attending: INTERNAL MEDICINE
Payer: MEDICARE

## 2021-10-26 DIAGNOSIS — J98.4 DISEASE OF LUNG: ICD-10-CM

## 2021-10-26 PROCEDURE — G0424 PULMONARY REHAB W EXER: HCPCS

## 2021-10-27 ENCOUNTER — HOSPITAL ENCOUNTER (EMERGENCY)
Facility: HOSPITAL | Age: 71
Discharge: HOME | End: 2021-10-27
Attending: EMERGENCY MEDICINE
Payer: MEDICARE

## 2021-10-27 VITALS
WEIGHT: 179 LBS | TEMPERATURE: 98 F | RESPIRATION RATE: 16 BRPM | HEART RATE: 71 BPM | DIASTOLIC BLOOD PRESSURE: 58 MMHG | HEIGHT: 61 IN | SYSTOLIC BLOOD PRESSURE: 128 MMHG | OXYGEN SATURATION: 96 % | BODY MASS INDEX: 33.79 KG/M2

## 2021-10-27 DIAGNOSIS — M79.606 LEG PAIN: ICD-10-CM

## 2021-10-27 DIAGNOSIS — M54.12 CERVICAL RADICULOPATHY: Primary | ICD-10-CM

## 2021-10-27 DIAGNOSIS — R06.02 SHORTNESS OF BREATH: ICD-10-CM

## 2021-10-27 LAB
ALBUMIN SERPL BCP-MCNC: 3.6 G/DL (ref 3.5–5.2)
ALP SERPL-CCNC: 72 U/L (ref 55–135)
ALT SERPL W/O P-5'-P-CCNC: 16 U/L (ref 10–44)
ANION GAP SERPL CALC-SCNC: 11 MMOL/L (ref 8–16)
APTT PPP: 26.2 SEC (ref 23.3–35.1)
AST SERPL-CCNC: 18 U/L (ref 10–40)
BACTERIA #/AREA URNS HPF: ABNORMAL /HPF
BASOPHILS # BLD AUTO: 0.04 K/UL (ref 0–0.2)
BASOPHILS NFR BLD: 0.4 % (ref 0–1.9)
BILIRUB SERPL-MCNC: 0.9 MG/DL (ref 0.1–1)
BILIRUB UR QL STRIP: NEGATIVE
BNP SERPL-MCNC: 80 PG/ML (ref 0–99)
BUN SERPL-MCNC: 35 MG/DL (ref 8–23)
CALCIUM SERPL-MCNC: 9.4 MG/DL (ref 8.7–10.5)
CHLORIDE SERPL-SCNC: 100 MMOL/L (ref 95–110)
CK MB SERPL-MCNC: 7.1 NG/ML (ref 0.1–6.5)
CK SERPL-CCNC: 121 U/L (ref 20–180)
CLARITY UR: ABNORMAL
CO2 SERPL-SCNC: 28 MMOL/L (ref 23–29)
COLOR UR: YELLOW
CREAT SERPL-MCNC: 1.2 MG/DL (ref 0.5–1.4)
D DIMER PPP IA.FEU-MCNC: 0.75 UG/ML FEU
DIFFERENTIAL METHOD: ABNORMAL
EOSINOPHIL # BLD AUTO: 0.1 K/UL (ref 0–0.5)
EOSINOPHIL NFR BLD: 1 % (ref 0–8)
ERYTHROCYTE [DISTWIDTH] IN BLOOD BY AUTOMATED COUNT: 14.2 % (ref 11.5–14.5)
EST. GFR  (AFRICAN AMERICAN): 51.8 ML/MIN/1.73 M^2
EST. GFR  (NON AFRICAN AMERICAN): 44.9 ML/MIN/1.73 M^2
GLUCOSE SERPL-MCNC: 132 MG/DL (ref 70–110)
GLUCOSE UR QL STRIP: ABNORMAL
HCT VFR BLD AUTO: 47.1 % (ref 37–48.5)
HGB BLD-MCNC: 15.3 G/DL (ref 12–16)
HGB UR QL STRIP: NEGATIVE
HYALINE CASTS #/AREA URNS LPF: 13 /LPF
IMM GRANULOCYTES # BLD AUTO: 0.05 K/UL (ref 0–0.04)
IMM GRANULOCYTES NFR BLD AUTO: 0.5 % (ref 0–0.5)
INR PPP: 1
KETONES UR QL STRIP: ABNORMAL
LEUKOCYTE ESTERASE UR QL STRIP: ABNORMAL
LYMPHOCYTES # BLD AUTO: 2.1 K/UL (ref 1–4.8)
LYMPHOCYTES NFR BLD: 20.3 % (ref 18–48)
MAGNESIUM SERPL-MCNC: 2.1 MG/DL (ref 1.6–2.6)
MCH RBC QN AUTO: 29.9 PG (ref 27–31)
MCHC RBC AUTO-ENTMCNC: 32.5 G/DL (ref 32–36)
MCV RBC AUTO: 92 FL (ref 82–98)
MICROSCOPIC COMMENT: ABNORMAL
MONOCYTES # BLD AUTO: 0.5 K/UL (ref 0.3–1)
MONOCYTES NFR BLD: 5.3 % (ref 4–15)
NEUTROPHILS # BLD AUTO: 7.4 K/UL (ref 1.8–7.7)
NEUTROPHILS NFR BLD: 72.5 % (ref 38–73)
NITRITE UR QL STRIP: NEGATIVE
NRBC BLD-RTO: 0 /100 WBC
PH UR STRIP: 6 [PH] (ref 5–8)
PLATELET # BLD AUTO: 141 K/UL (ref 150–450)
PMV BLD AUTO: 10.8 FL (ref 9.2–12.9)
POTASSIUM SERPL-SCNC: 4.4 MMOL/L (ref 3.5–5.1)
PROT SERPL-MCNC: 7.3 G/DL (ref 6–8.4)
PROT UR QL STRIP: ABNORMAL
PROTHROMBIN TIME: 12.5 SEC (ref 11.4–13.7)
RBC # BLD AUTO: 5.11 M/UL (ref 4–5.4)
RBC #/AREA URNS HPF: 3 /HPF (ref 0–4)
SARS-COV-2 RDRP RESP QL NAA+PROBE: NEGATIVE
SODIUM SERPL-SCNC: 139 MMOL/L (ref 136–145)
SP GR UR STRIP: 1.03 (ref 1–1.03)
SQUAMOUS #/AREA URNS HPF: 7 /HPF
TROPONIN I SERPL DL<=0.01 NG/ML-MCNC: <0.03 NG/ML
URN SPEC COLLECT METH UR: ABNORMAL
UROBILINOGEN UR STRIP-ACNC: NEGATIVE EU/DL
WBC # BLD AUTO: 10.16 K/UL (ref 3.9–12.7)
WBC #/AREA URNS HPF: 97 /HPF (ref 0–5)
YEAST URNS QL MICRO: ABNORMAL

## 2021-10-27 PROCEDURE — 83880 ASSAY OF NATRIURETIC PEPTIDE: CPT | Performed by: EMERGENCY MEDICINE

## 2021-10-27 PROCEDURE — 93010 ELECTROCARDIOGRAM REPORT: CPT | Mod: ,,, | Performed by: INTERNAL MEDICINE

## 2021-10-27 PROCEDURE — 82550 ASSAY OF CK (CPK): CPT | Performed by: EMERGENCY MEDICINE

## 2021-10-27 PROCEDURE — 83735 ASSAY OF MAGNESIUM: CPT | Performed by: EMERGENCY MEDICINE

## 2021-10-27 PROCEDURE — 93010 EKG 12-LEAD: ICD-10-PCS | Mod: ,,, | Performed by: INTERNAL MEDICINE

## 2021-10-27 PROCEDURE — 81001 URINALYSIS AUTO W/SCOPE: CPT | Performed by: EMERGENCY MEDICINE

## 2021-10-27 PROCEDURE — 93005 ELECTROCARDIOGRAM TRACING: CPT | Performed by: INTERNAL MEDICINE

## 2021-10-27 PROCEDURE — 84484 ASSAY OF TROPONIN QUANT: CPT | Performed by: EMERGENCY MEDICINE

## 2021-10-27 PROCEDURE — U0002 COVID-19 LAB TEST NON-CDC: HCPCS | Performed by: EMERGENCY MEDICINE

## 2021-10-27 PROCEDURE — 82553 CREATINE MB FRACTION: CPT | Performed by: EMERGENCY MEDICINE

## 2021-10-27 PROCEDURE — 85730 THROMBOPLASTIN TIME PARTIAL: CPT | Performed by: EMERGENCY MEDICINE

## 2021-10-27 PROCEDURE — 80053 COMPREHEN METABOLIC PANEL: CPT | Performed by: EMERGENCY MEDICINE

## 2021-10-27 PROCEDURE — 87086 URINE CULTURE/COLONY COUNT: CPT | Performed by: EMERGENCY MEDICINE

## 2021-10-27 PROCEDURE — 81003 URINALYSIS AUTO W/O SCOPE: CPT | Performed by: EMERGENCY MEDICINE

## 2021-10-27 PROCEDURE — 85025 COMPLETE CBC W/AUTO DIFF WBC: CPT | Performed by: EMERGENCY MEDICINE

## 2021-10-27 PROCEDURE — 85610 PROTHROMBIN TIME: CPT | Performed by: EMERGENCY MEDICINE

## 2021-10-27 PROCEDURE — 25000003 PHARM REV CODE 250: Performed by: EMERGENCY MEDICINE

## 2021-10-27 PROCEDURE — 25500020 PHARM REV CODE 255: Performed by: EMERGENCY MEDICINE

## 2021-10-27 PROCEDURE — 85379 FIBRIN DEGRADATION QUANT: CPT | Performed by: EMERGENCY MEDICINE

## 2021-10-27 PROCEDURE — 99285 EMERGENCY DEPT VISIT HI MDM: CPT | Mod: 25

## 2021-10-27 RX ORDER — METHOCARBAMOL 500 MG/1
500 TABLET, FILM COATED ORAL 3 TIMES DAILY PRN
Qty: 15 TABLET | Refills: 0 | Status: SHIPPED | OUTPATIENT
Start: 2021-10-27 | End: 2021-11-01

## 2021-10-27 RX ORDER — METHOCARBAMOL 500 MG/1
500 TABLET, FILM COATED ORAL
Status: COMPLETED | OUTPATIENT
Start: 2021-10-27 | End: 2021-10-27

## 2021-10-27 RX ADMIN — METHOCARBAMOL 500 MG: 500 TABLET ORAL at 06:10

## 2021-10-27 RX ADMIN — IOHEXOL 70 ML: 350 INJECTION, SOLUTION INTRAVENOUS at 04:10

## 2021-10-28 ENCOUNTER — CLINICAL SUPPORT (OUTPATIENT)
Dept: CARDIAC REHAB | Facility: HOSPITAL | Age: 71
End: 2021-10-28
Attending: INTERNAL MEDICINE
Payer: MEDICARE

## 2021-10-28 DIAGNOSIS — J98.4 DISEASE OF LUNG: ICD-10-CM

## 2021-10-28 LAB
BACTERIA UR CULT: NORMAL
BACTERIA UR CULT: NORMAL

## 2021-10-28 PROCEDURE — G0424 PULMONARY REHAB W EXER: HCPCS

## 2021-10-28 NOTE — PROGRESS NOTES
Contacted pharmacy to ensure they received the approval for the medication. Per pharmacy representative, the medication has been shipped to the patient on 10/26 and patient should receive it within 2-5 business days. Spoke to patient to let him know that it was shipped on 10/26 and he should receive it soon. Patient verbalized understanding and was appreciative of the call.    Pre-Visit Chart Review  For Appointment Scheduled on 07/05/2017    Health Maintenance Due   Topic Date Due    TETANUS VACCINE  01/01/1966    Colonoscopy  01/01/1998    Zoster Vaccine  01/01/2008    Eye Exam  06/30/2017

## 2021-10-29 ENCOUNTER — TELEPHONE (OUTPATIENT)
Dept: PSYCHIATRY | Facility: CLINIC | Age: 71
End: 2021-10-29
Payer: MEDICARE

## 2021-11-01 ENCOUNTER — HOSPITAL ENCOUNTER (EMERGENCY)
Facility: HOSPITAL | Age: 71
Discharge: HOME | End: 2021-11-02
Attending: EMERGENCY MEDICINE
Payer: MEDICARE

## 2021-11-01 VITALS
DIASTOLIC BLOOD PRESSURE: 70 MMHG | OXYGEN SATURATION: 100 % | HEART RATE: 70 BPM | BODY MASS INDEX: 33.42 KG/M2 | TEMPERATURE: 98 F | SYSTOLIC BLOOD PRESSURE: 136 MMHG | WEIGHT: 177 LBS | RESPIRATION RATE: 18 BRPM | HEIGHT: 61 IN

## 2021-11-01 DIAGNOSIS — S92.501A CLOSED FRACTURE OF PHALANX OF RIGHT SECOND TOE, INITIAL ENCOUNTER: ICD-10-CM

## 2021-11-01 DIAGNOSIS — R52 PAIN: Primary | ICD-10-CM
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
## 2021-11-01 PROCEDURE — 25000003 PHARM REV CODE 250: Performed by: EMERGENCY MEDICINE

## 2021-11-01 PROCEDURE — 99283 EMERGENCY DEPT VISIT LOW MDM: CPT

## 2021-11-01 RX ORDER — OXYCODONE HYDROCHLORIDE 10 MG/1
10 TABLET ORAL
Status: COMPLETED | OUTPATIENT
Start: 2021-11-01 | End: 2021-11-01

## 2021-11-01 RX ORDER — HYDROCODONE BITARTRATE AND ACETAMINOPHEN 5; 325 MG/1; MG/1
1 TABLET ORAL EVERY 6 HOURS PRN
Qty: 12 TABLET | Refills: 0 | Status: SHIPPED | OUTPATIENT
Start: 2021-11-01 | End: 2021-11-11

## 2021-11-01 RX ADMIN — OXYCODONE HYDROCHLORIDE 10 MG: 10 TABLET ORAL at 10:11

## 2021-11-10 ENCOUNTER — OFFICE VISIT (OUTPATIENT)
Dept: PODIATRY | Facility: CLINIC | Age: 71
End: 2021-11-10
Payer: MEDICARE

## 2021-11-10 VITALS — RESPIRATION RATE: 20 BRPM | BODY MASS INDEX: 33.42 KG/M2 | HEIGHT: 61 IN | WEIGHT: 177 LBS

## 2021-11-10 DIAGNOSIS — E11.42 DIABETIC POLYNEUROPATHY ASSOCIATED WITH TYPE 2 DIABETES MELLITUS: Primary | ICD-10-CM

## 2021-11-10 DIAGNOSIS — S92.514A NONDISPLACED FRACTURE OF PROXIMAL PHALANX OF RIGHT LESSER TOE(S), INITIAL ENCOUNTER FOR CLOSED FRACTURE: ICD-10-CM

## 2021-11-10 DIAGNOSIS — M79.671 BILATERAL FOOT PAIN: ICD-10-CM

## 2021-11-10 DIAGNOSIS — R26.2 DIFFICULTY WALKING: ICD-10-CM

## 2021-11-10 DIAGNOSIS — S92.415A CLOSED NONDISPLACED FRACTURE OF PROXIMAL PHALANX OF LEFT GREAT TOE, INITIAL ENCOUNTER: ICD-10-CM

## 2021-11-10 DIAGNOSIS — L60.2 ONYCHOGRYPOSIS: ICD-10-CM

## 2021-11-10 DIAGNOSIS — M20.41 HAMMER TOES OF BOTH FEET: ICD-10-CM

## 2021-11-10 DIAGNOSIS — I87.2 VENOUS INSUFFICIENCY OF BOTH LOWER EXTREMITIES: ICD-10-CM

## 2021-11-10 DIAGNOSIS — M79.672 BILATERAL FOOT PAIN: ICD-10-CM

## 2021-11-10 DIAGNOSIS — L60.1 ONYCHOLYSIS OF TOENAIL: ICD-10-CM

## 2021-11-10 DIAGNOSIS — M20.42 HAMMER TOES OF BOTH FEET: ICD-10-CM

## 2021-11-10 DIAGNOSIS — M54.16 LUMBAR RADICULOPATHY: ICD-10-CM

## 2021-11-10 PROCEDURE — 3044F PR MOST RECENT HEMOGLOBIN A1C LEVEL <7.0%: ICD-10-PCS | Mod: CPTII,S$GLB,, | Performed by: PODIATRIST

## 2021-11-10 PROCEDURE — 3061F NEG MICROALBUMINURIA REV: CPT | Mod: CPTII,S$GLB,, | Performed by: PODIATRIST

## 2021-11-10 PROCEDURE — 3066F NEPHROPATHY DOC TX: CPT | Mod: CPTII,S$GLB,, | Performed by: PODIATRIST

## 2021-11-10 PROCEDURE — 11721 ROUTINE FOOT CARE: ICD-10-PCS | Mod: Q9,S$GLB,, | Performed by: PODIATRIST

## 2021-11-10 PROCEDURE — 3061F PR NEG MICROALBUMINURIA RESULT DOCUMENTED/REVIEW: ICD-10-PCS | Mod: CPTII,S$GLB,, | Performed by: PODIATRIST

## 2021-11-10 PROCEDURE — 3072F LOW RISK FOR RETINOPATHY: CPT | Mod: CPTII,S$GLB,, | Performed by: PODIATRIST

## 2021-11-10 PROCEDURE — 99214 OFFICE O/P EST MOD 30 MIN: CPT | Mod: 25,S$GLB,, | Performed by: PODIATRIST

## 2021-11-10 PROCEDURE — 1157F PR ADVANCE CARE PLAN OR EQUIV PRESENT IN MEDICAL RECORD: ICD-10-PCS | Mod: CPTII,S$GLB,, | Performed by: PODIATRIST

## 2021-11-10 PROCEDURE — 1101F PR PT FALLS ASSESS DOC 0-1 FALLS W/OUT INJ PAST YR: ICD-10-PCS | Mod: CPTII,S$GLB,, | Performed by: PODIATRIST

## 2021-11-10 PROCEDURE — 3008F PR BODY MASS INDEX (BMI) DOCUMENTED: ICD-10-PCS | Mod: CPTII,S$GLB,, | Performed by: PODIATRIST

## 2021-11-10 PROCEDURE — 3066F PR DOCUMENTATION OF TREATMENT FOR NEPHROPATHY: ICD-10-PCS | Mod: CPTII,S$GLB,, | Performed by: PODIATRIST

## 2021-11-10 PROCEDURE — 3288F PR FALLS RISK ASSESSMENT DOCUMENTED: ICD-10-PCS | Mod: CPTII,S$GLB,, | Performed by: PODIATRIST

## 2021-11-10 PROCEDURE — 4010F PR ACE/ARB THEARPY RXD/TAKEN: ICD-10-PCS | Mod: CPTII,S$GLB,, | Performed by: PODIATRIST

## 2021-11-10 PROCEDURE — 1125F PR PAIN SEVERITY QUANTIFIED, PAIN PRESENT: ICD-10-PCS | Mod: CPTII,S$GLB,, | Performed by: PODIATRIST

## 2021-11-10 PROCEDURE — 1159F MED LIST DOCD IN RCRD: CPT | Mod: CPTII,S$GLB,, | Performed by: PODIATRIST

## 2021-11-10 PROCEDURE — 4010F ACE/ARB THERAPY RXD/TAKEN: CPT | Mod: CPTII,S$GLB,, | Performed by: PODIATRIST

## 2021-11-10 PROCEDURE — 3288F FALL RISK ASSESSMENT DOCD: CPT | Mod: CPTII,S$GLB,, | Performed by: PODIATRIST

## 2021-11-10 PROCEDURE — 3008F BODY MASS INDEX DOCD: CPT | Mod: CPTII,S$GLB,, | Performed by: PODIATRIST

## 2021-11-10 PROCEDURE — 1125F AMNT PAIN NOTED PAIN PRSNT: CPT | Mod: CPTII,S$GLB,, | Performed by: PODIATRIST

## 2021-11-10 PROCEDURE — 1101F PT FALLS ASSESS-DOCD LE1/YR: CPT | Mod: CPTII,S$GLB,, | Performed by: PODIATRIST

## 2021-11-10 PROCEDURE — 1159F PR MEDICATION LIST DOCUMENTED IN MEDICAL RECORD: ICD-10-PCS | Mod: CPTII,S$GLB,, | Performed by: PODIATRIST

## 2021-11-10 PROCEDURE — 99214 PR OFFICE/OUTPT VISIT, EST, LEVL IV, 30-39 MIN: ICD-10-PCS | Mod: 25,S$GLB,, | Performed by: PODIATRIST

## 2021-11-10 PROCEDURE — 11721 DEBRIDE NAIL 6 OR MORE: CPT | Mod: Q9,S$GLB,, | Performed by: PODIATRIST

## 2021-11-10 PROCEDURE — 3044F HG A1C LEVEL LT 7.0%: CPT | Mod: CPTII,S$GLB,, | Performed by: PODIATRIST

## 2021-11-10 PROCEDURE — 1157F ADVNC CARE PLAN IN RCRD: CPT | Mod: CPTII,S$GLB,, | Performed by: PODIATRIST

## 2021-11-10 PROCEDURE — 3072F PR LOW RISK FOR RETINOPATHY: ICD-10-PCS | Mod: CPTII,S$GLB,, | Performed by: PODIATRIST

## 2021-11-10 PROCEDURE — 1160F RVW MEDS BY RX/DR IN RCRD: CPT | Mod: CPTII,S$GLB,, | Performed by: PODIATRIST

## 2021-11-10 PROCEDURE — 1160F PR REVIEW ALL MEDS BY PRESCRIBER/CLIN PHARMACIST DOCUMENTED: ICD-10-PCS | Mod: CPTII,S$GLB,, | Performed by: PODIATRIST

## 2021-11-11 RX ORDER — HYDROCODONE BITARTRATE AND ACETAMINOPHEN 5; 325 MG/1; MG/1
1 TABLET ORAL EVERY 6 HOURS PRN
Qty: 28 TABLET | Refills: 0 | Status: ON HOLD | OUTPATIENT
Start: 2021-11-11 | End: 2022-02-24 | Stop reason: HOSPADM

## 2021-11-15 ENCOUNTER — OFFICE VISIT (OUTPATIENT)
Dept: PSYCHIATRY | Facility: CLINIC | Age: 71
End: 2021-11-15
Payer: MEDICARE

## 2021-11-15 DIAGNOSIS — F43.10 PTSD (POST-TRAUMATIC STRESS DISORDER): Primary | ICD-10-CM

## 2021-11-15 PROCEDURE — 1157F ADVNC CARE PLAN IN RCRD: CPT | Mod: CPTII,S$GLB,, | Performed by: PSYCHOLOGIST

## 2021-11-15 PROCEDURE — 3044F HG A1C LEVEL LT 7.0%: CPT | Mod: CPTII,S$GLB,, | Performed by: PSYCHOLOGIST

## 2021-11-15 PROCEDURE — 90832 PSYTX W PT 30 MINUTES: CPT | Mod: S$GLB,,, | Performed by: PSYCHOLOGIST

## 2021-11-15 PROCEDURE — 90832 PR PSYCHOTHERAPY W/PATIENT, 30 MIN: ICD-10-PCS | Mod: S$GLB,,, | Performed by: PSYCHOLOGIST

## 2021-11-15 PROCEDURE — 3061F PR NEG MICROALBUMINURIA RESULT DOCUMENTED/REVIEW: ICD-10-PCS | Mod: CPTII,S$GLB,, | Performed by: PSYCHOLOGIST

## 2021-11-15 PROCEDURE — 3066F NEPHROPATHY DOC TX: CPT | Mod: CPTII,S$GLB,, | Performed by: PSYCHOLOGIST

## 2021-11-15 PROCEDURE — 3072F PR LOW RISK FOR RETINOPATHY: ICD-10-PCS | Mod: CPTII,S$GLB,, | Performed by: PSYCHOLOGIST

## 2021-11-15 PROCEDURE — 1157F PR ADVANCE CARE PLAN OR EQUIV PRESENT IN MEDICAL RECORD: ICD-10-PCS | Mod: CPTII,S$GLB,, | Performed by: PSYCHOLOGIST

## 2021-11-15 PROCEDURE — 3044F PR MOST RECENT HEMOGLOBIN A1C LEVEL <7.0%: ICD-10-PCS | Mod: CPTII,S$GLB,, | Performed by: PSYCHOLOGIST

## 2021-11-15 PROCEDURE — 3061F NEG MICROALBUMINURIA REV: CPT | Mod: CPTII,S$GLB,, | Performed by: PSYCHOLOGIST

## 2021-11-15 PROCEDURE — 3072F LOW RISK FOR RETINOPATHY: CPT | Mod: CPTII,S$GLB,, | Performed by: PSYCHOLOGIST

## 2021-11-15 PROCEDURE — 4010F ACE/ARB THERAPY RXD/TAKEN: CPT | Mod: CPTII,S$GLB,, | Performed by: PSYCHOLOGIST

## 2021-11-15 PROCEDURE — 4010F PR ACE/ARB THEARPY RXD/TAKEN: ICD-10-PCS | Mod: CPTII,S$GLB,, | Performed by: PSYCHOLOGIST

## 2021-11-15 PROCEDURE — 3066F PR DOCUMENTATION OF TREATMENT FOR NEPHROPATHY: ICD-10-PCS | Mod: CPTII,S$GLB,, | Performed by: PSYCHOLOGIST

## 2021-11-16 ENCOUNTER — LAB VISIT (OUTPATIENT)
Dept: LAB | Facility: HOSPITAL | Age: 71
End: 2021-11-16
Attending: FAMILY MEDICINE
Payer: MEDICARE

## 2021-11-16 DIAGNOSIS — Z79.4 TYPE 2 DIABETES MELLITUS WITHOUT COMPLICATION, WITH LONG-TERM CURRENT USE OF INSULIN: ICD-10-CM

## 2021-11-16 DIAGNOSIS — E11.9 TYPE 2 DIABETES MELLITUS WITHOUT COMPLICATION, WITH LONG-TERM CURRENT USE OF INSULIN: ICD-10-CM

## 2021-11-16 LAB
T4 FREE SERPL-MCNC: 1.01 NG/DL (ref 0.71–1.51)
TSH SERPL DL<=0.005 MIU/L-ACNC: 3.91 UIU/ML (ref 0.4–4)

## 2021-11-16 PROCEDURE — 84439 ASSAY OF FREE THYROXINE: CPT | Performed by: PHYSICIAN ASSISTANT

## 2021-11-16 PROCEDURE — 36415 COLL VENOUS BLD VENIPUNCTURE: CPT | Mod: PO | Performed by: PHYSICIAN ASSISTANT

## 2021-11-16 PROCEDURE — 84443 ASSAY THYROID STIM HORMONE: CPT | Performed by: PHYSICIAN ASSISTANT

## 2021-11-19 ENCOUNTER — PES CALL (OUTPATIENT)
Dept: ADMINISTRATIVE | Facility: CLINIC | Age: 71
End: 2021-11-19
Payer: MEDICARE

## 2021-11-22 DIAGNOSIS — E11.9 TYPE 2 DIABETES MELLITUS WITHOUT COMPLICATION, WITH LONG-TERM CURRENT USE OF INSULIN: Primary | ICD-10-CM

## 2021-11-22 DIAGNOSIS — Z79.4 TYPE 2 DIABETES MELLITUS WITHOUT COMPLICATION, WITH LONG-TERM CURRENT USE OF INSULIN: Primary | ICD-10-CM

## 2021-11-22 DIAGNOSIS — E03.9 ACQUIRED HYPOTHYROIDISM: ICD-10-CM

## 2021-11-22 RX ORDER — LEVOTHYROXINE SODIUM 100 UG/1
100 TABLET ORAL
Qty: 30 TABLET | Refills: 11 | Status: SHIPPED | OUTPATIENT
Start: 2021-11-22 | End: 2022-08-22 | Stop reason: SDUPTHER

## 2021-12-08 ENCOUNTER — OFFICE VISIT (OUTPATIENT)
Dept: PODIATRY | Facility: CLINIC | Age: 71
End: 2021-12-08
Payer: MEDICARE

## 2021-12-08 ENCOUNTER — HOSPITAL ENCOUNTER (OUTPATIENT)
Dept: RADIOLOGY | Facility: CLINIC | Age: 71
Discharge: HOME | End: 2021-12-08
Attending: PODIATRIST
Payer: MEDICARE

## 2021-12-08 DIAGNOSIS — R26.2 DIFFICULTY WALKING: ICD-10-CM

## 2021-12-08 DIAGNOSIS — E11.42 DIABETIC POLYNEUROPATHY ASSOCIATED WITH TYPE 2 DIABETES MELLITUS: Primary | ICD-10-CM

## 2021-12-08 DIAGNOSIS — M20.42 HAMMER TOES OF BOTH FEET: ICD-10-CM

## 2021-12-08 DIAGNOSIS — M20.41 HAMMER TOES OF BOTH FEET: ICD-10-CM

## 2021-12-08 DIAGNOSIS — I87.2 VENOUS INSUFFICIENCY OF BOTH LOWER EXTREMITIES: ICD-10-CM

## 2021-12-08 DIAGNOSIS — S92.514D CLOSED NONDISPLACED FRACTURE OF PROXIMAL PHALANX OF LESSER TOE OF RIGHT FOOT WITH ROUTINE HEALING, SUBSEQUENT ENCOUNTER: ICD-10-CM

## 2021-12-08 DIAGNOSIS — S92.415D CLOSED NONDISPLACED FRACTURE OF PROXIMAL PHALANX OF LEFT GREAT TOE WITH ROUTINE HEALING, SUBSEQUENT ENCOUNTER: ICD-10-CM

## 2021-12-08 PROCEDURE — 3072F PR LOW RISK FOR RETINOPATHY: ICD-10-PCS | Mod: CPTII,S$GLB,, | Performed by: PODIATRIST

## 2021-12-08 PROCEDURE — 4010F PR ACE/ARB THEARPY RXD/TAKEN: ICD-10-PCS | Mod: CPTII,S$GLB,, | Performed by: PODIATRIST

## 2021-12-08 PROCEDURE — 3061F NEG MICROALBUMINURIA REV: CPT | Mod: CPTII,S$GLB,, | Performed by: PODIATRIST

## 2021-12-08 PROCEDURE — 3061F PR NEG MICROALBUMINURIA RESULT DOCUMENTED/REVIEW: ICD-10-PCS | Mod: CPTII,S$GLB,, | Performed by: PODIATRIST

## 2021-12-08 PROCEDURE — 73630 X-RAY EXAM OF FOOT: CPT | Mod: 50,S$GLB,, | Performed by: RADIOLOGY

## 2021-12-08 PROCEDURE — 4010F ACE/ARB THERAPY RXD/TAKEN: CPT | Mod: CPTII,S$GLB,, | Performed by: PODIATRIST

## 2021-12-08 PROCEDURE — 1157F ADVNC CARE PLAN IN RCRD: CPT | Mod: CPTII,S$GLB,, | Performed by: PODIATRIST

## 2021-12-08 PROCEDURE — 99214 PR OFFICE/OUTPT VISIT, EST, LEVL IV, 30-39 MIN: ICD-10-PCS | Mod: S$GLB,,, | Performed by: PODIATRIST

## 2021-12-08 PROCEDURE — 3066F PR DOCUMENTATION OF TREATMENT FOR NEPHROPATHY: ICD-10-PCS | Mod: CPTII,S$GLB,, | Performed by: PODIATRIST

## 2021-12-08 PROCEDURE — 99214 OFFICE O/P EST MOD 30 MIN: CPT | Mod: S$GLB,,, | Performed by: PODIATRIST

## 2021-12-08 PROCEDURE — 1157F PR ADVANCE CARE PLAN OR EQUIV PRESENT IN MEDICAL RECORD: ICD-10-PCS | Mod: CPTII,S$GLB,, | Performed by: PODIATRIST

## 2021-12-08 PROCEDURE — 3066F NEPHROPATHY DOC TX: CPT | Mod: CPTII,S$GLB,, | Performed by: PODIATRIST

## 2021-12-08 PROCEDURE — 3072F LOW RISK FOR RETINOPATHY: CPT | Mod: CPTII,S$GLB,, | Performed by: PODIATRIST

## 2021-12-08 PROCEDURE — 73630 XR FOOT COMPLETE 3 VIEW BILATERAL: ICD-10-PCS | Mod: 50,S$GLB,, | Performed by: RADIOLOGY

## 2021-12-20 ENCOUNTER — PES CALL (OUTPATIENT)
Dept: ADMINISTRATIVE | Facility: CLINIC | Age: 71
End: 2021-12-20
Payer: MEDICARE

## 2022-01-06 ENCOUNTER — OFFICE VISIT (OUTPATIENT)
Dept: PULMONOLOGY | Facility: CLINIC | Age: 72
End: 2022-01-06
Payer: MEDICARE

## 2022-01-06 VITALS
HEART RATE: 77 BPM | HEIGHT: 61 IN | BODY MASS INDEX: 34.5 KG/M2 | OXYGEN SATURATION: 95 % | RESPIRATION RATE: 18 BRPM | WEIGHT: 182.75 LBS | SYSTOLIC BLOOD PRESSURE: 125 MMHG | DIASTOLIC BLOOD PRESSURE: 80 MMHG

## 2022-01-06 DIAGNOSIS — G47.33 OSA (OBSTRUCTIVE SLEEP APNEA): ICD-10-CM

## 2022-01-06 DIAGNOSIS — J98.6 PARALYSIS OF DIAPHRAGM: ICD-10-CM

## 2022-01-06 DIAGNOSIS — J98.4 CHRONIC RESTRICTIVE LUNG DISEASE: Primary | ICD-10-CM

## 2022-01-06 DIAGNOSIS — J96.10 CHRONIC NEUROMUSCULAR RESPIRATORY FAILURE: ICD-10-CM

## 2022-01-06 PROCEDURE — 3008F PR BODY MASS INDEX (BMI) DOCUMENTED: ICD-10-PCS | Mod: CPTII,S$GLB,, | Performed by: INTERNAL MEDICINE

## 2022-01-06 PROCEDURE — 1101F PT FALLS ASSESS-DOCD LE1/YR: CPT | Mod: CPTII,S$GLB,, | Performed by: INTERNAL MEDICINE

## 2022-01-06 PROCEDURE — 3288F FALL RISK ASSESSMENT DOCD: CPT | Mod: CPTII,S$GLB,, | Performed by: INTERNAL MEDICINE

## 2022-01-06 PROCEDURE — 3008F BODY MASS INDEX DOCD: CPT | Mod: CPTII,S$GLB,, | Performed by: INTERNAL MEDICINE

## 2022-01-06 PROCEDURE — 1126F PR PAIN SEVERITY QUANTIFIED, NO PAIN PRESENT: ICD-10-PCS | Mod: CPTII,S$GLB,, | Performed by: INTERNAL MEDICINE

## 2022-01-06 PROCEDURE — 99214 PR OFFICE/OUTPT VISIT, EST, LEVL IV, 30-39 MIN: ICD-10-PCS | Mod: S$GLB,,, | Performed by: INTERNAL MEDICINE

## 2022-01-06 PROCEDURE — 1101F PR PT FALLS ASSESS DOC 0-1 FALLS W/OUT INJ PAST YR: ICD-10-PCS | Mod: CPTII,S$GLB,, | Performed by: INTERNAL MEDICINE

## 2022-01-06 PROCEDURE — 1157F PR ADVANCE CARE PLAN OR EQUIV PRESENT IN MEDICAL RECORD: ICD-10-PCS | Mod: CPTII,S$GLB,, | Performed by: INTERNAL MEDICINE

## 2022-01-06 PROCEDURE — 99999 PR PBB SHADOW E&M-EST. PATIENT-LVL III: CPT | Mod: PBBFAC,,, | Performed by: INTERNAL MEDICINE

## 2022-01-06 PROCEDURE — 99214 OFFICE O/P EST MOD 30 MIN: CPT | Mod: S$GLB,,, | Performed by: INTERNAL MEDICINE

## 2022-01-06 PROCEDURE — 3074F SYST BP LT 130 MM HG: CPT | Mod: CPTII,S$GLB,, | Performed by: INTERNAL MEDICINE

## 2022-01-06 PROCEDURE — 3288F PR FALLS RISK ASSESSMENT DOCUMENTED: ICD-10-PCS | Mod: CPTII,S$GLB,, | Performed by: INTERNAL MEDICINE

## 2022-01-06 PROCEDURE — 3074F PR MOST RECENT SYSTOLIC BLOOD PRESSURE < 130 MM HG: ICD-10-PCS | Mod: CPTII,S$GLB,, | Performed by: INTERNAL MEDICINE

## 2022-01-06 PROCEDURE — 3079F PR MOST RECENT DIASTOLIC BLOOD PRESSURE 80-89 MM HG: ICD-10-PCS | Mod: CPTII,S$GLB,, | Performed by: INTERNAL MEDICINE

## 2022-01-06 PROCEDURE — 99999 PR PBB SHADOW E&M-EST. PATIENT-LVL III: ICD-10-PCS | Mod: PBBFAC,,, | Performed by: INTERNAL MEDICINE

## 2022-01-06 PROCEDURE — 3079F DIAST BP 80-89 MM HG: CPT | Mod: CPTII,S$GLB,, | Performed by: INTERNAL MEDICINE

## 2022-01-06 PROCEDURE — 1126F AMNT PAIN NOTED NONE PRSNT: CPT | Mod: CPTII,S$GLB,, | Performed by: INTERNAL MEDICINE

## 2022-01-06 PROCEDURE — 1157F ADVNC CARE PLAN IN RCRD: CPT | Mod: CPTII,S$GLB,, | Performed by: INTERNAL MEDICINE
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
## 2022-01-06 NOTE — PATIENT INSTRUCTIONS
Continue ventilator use, would use more in the bedroom at night and as needed during the day  Goal is to stay mobile as much as you can, avoid decline in function over time  Continue inhalers and nebulizer treatments  Will try again to get pulmonary rehab approved with insurance

## 2022-01-13 ENCOUNTER — TELEPHONE (OUTPATIENT)
Dept: PULMONOLOGY | Facility: CLINIC | Age: 72
End: 2022-01-13
Payer: MEDICARE

## 2022-01-13 NOTE — TELEPHONE ENCOUNTER
Pulmo rehab denied due to not in benefits. Told patient I would send message to dr read to see next step.    ----- Message from Esha Wade sent at 1/13/2022 10:13 AM CST -----  Type: Needs Medical Advice  Who Called:  pt  Best Call Back Number: [phone] (home)     Additional Information:   pt is wanting to see if the submission for her rehab has been approved. Please advise

## 2022-01-14 ENCOUNTER — TELEPHONE (OUTPATIENT)
Dept: PSYCHIATRY | Facility: CLINIC | Age: 72
End: 2022-01-14
Payer: MEDICARE

## 2022-01-14 NOTE — TELEPHONE ENCOUNTER
Spoke to George. He states they do need to reschedule but he is unable to at this time. He requests a call back on Monday.

## 2022-01-18 ENCOUNTER — OFFICE VISIT (OUTPATIENT)
Dept: HOME HEALTH SERVICES | Facility: CLINIC | Age: 72
End: 2022-01-18
Payer: MEDICARE

## 2022-01-18 VITALS
WEIGHT: 182 LBS | HEIGHT: 61 IN | SYSTOLIC BLOOD PRESSURE: 119 MMHG | TEMPERATURE: 98 F | BODY MASS INDEX: 34.36 KG/M2 | HEART RATE: 84 BPM | OXYGEN SATURATION: 96 % | DIASTOLIC BLOOD PRESSURE: 84 MMHG

## 2022-01-18 DIAGNOSIS — J98.4 CHRONIC RESTRICTIVE LUNG DISEASE: ICD-10-CM

## 2022-01-18 DIAGNOSIS — J98.6 PARALYSIS OF DIAPHRAGM: ICD-10-CM

## 2022-01-18 DIAGNOSIS — N18.30 STAGE 3 CHRONIC KIDNEY DISEASE, UNSPECIFIED WHETHER STAGE 3A OR 3B CKD: ICD-10-CM

## 2022-01-18 DIAGNOSIS — Z79.4 TYPE 2 DIABETES MELLITUS WITHOUT COMPLICATION, WITH LONG-TERM CURRENT USE OF INSULIN: ICD-10-CM

## 2022-01-18 DIAGNOSIS — I10 ESSENTIAL HYPERTENSION: ICD-10-CM

## 2022-01-18 DIAGNOSIS — F33.1 MAJOR DEPRESSIVE DISORDER, RECURRENT EPISODE, MODERATE: ICD-10-CM

## 2022-01-18 DIAGNOSIS — R32 URINARY INCONTINENCE, UNSPECIFIED TYPE: ICD-10-CM

## 2022-01-18 DIAGNOSIS — D69.6 THROMBOCYTOPENIA: ICD-10-CM

## 2022-01-18 DIAGNOSIS — E78.2 MIXED HYPERLIPIDEMIA: ICD-10-CM

## 2022-01-18 DIAGNOSIS — Z00.00 ENCOUNTER FOR PREVENTIVE HEALTH EXAMINATION: Primary | ICD-10-CM

## 2022-01-18 DIAGNOSIS — E66.01 SEVERE OBESITY (BMI 35.0-35.9 WITH COMORBIDITY): ICD-10-CM

## 2022-01-18 DIAGNOSIS — Z99.89 DEPENDENCE ON OTHER ENABLING MACHINES AND DEVICES: ICD-10-CM

## 2022-01-18 DIAGNOSIS — E11.42 DIABETIC POLYNEUROPATHY ASSOCIATED WITH TYPE 2 DIABETES MELLITUS: ICD-10-CM

## 2022-01-18 DIAGNOSIS — E03.9 ACQUIRED HYPOTHYROIDISM: Chronic | ICD-10-CM

## 2022-01-18 DIAGNOSIS — G47.33 OSA (OBSTRUCTIVE SLEEP APNEA): ICD-10-CM

## 2022-01-18 DIAGNOSIS — E11.9 TYPE 2 DIABETES MELLITUS WITHOUT COMPLICATION, WITH LONG-TERM CURRENT USE OF INSULIN: ICD-10-CM

## 2022-01-18 DIAGNOSIS — H91.93 BILATERAL HEARING LOSS, UNSPECIFIED HEARING LOSS TYPE: ICD-10-CM

## 2022-01-18 DIAGNOSIS — R56.9 SEIZURES: ICD-10-CM

## 2022-01-18 DIAGNOSIS — Z74.09 OTHER REDUCED MOBILITY: ICD-10-CM

## 2022-01-18 DIAGNOSIS — R26.9 ABNORMALITY OF GAIT AND MOBILITY: ICD-10-CM

## 2022-01-18 DIAGNOSIS — M85.80 OSTEOPENIA, UNSPECIFIED LOCATION: ICD-10-CM

## 2022-01-18 DIAGNOSIS — J96.10 CHRONIC NEUROMUSCULAR RESPIRATORY FAILURE: ICD-10-CM

## 2022-01-18 PROCEDURE — 1157F PR ADVANCE CARE PLAN OR EQUIV PRESENT IN MEDICAL RECORD: ICD-10-PCS | Mod: CPTII,S$GLB,, | Performed by: NURSE PRACTITIONER

## 2022-01-18 PROCEDURE — 3008F BODY MASS INDEX DOCD: CPT | Mod: CPTII,S$GLB,, | Performed by: NURSE PRACTITIONER

## 2022-01-18 PROCEDURE — 1160F PR REVIEW ALL MEDS BY PRESCRIBER/CLIN PHARMACIST DOCUMENTED: ICD-10-PCS | Mod: CPTII,S$GLB,, | Performed by: NURSE PRACTITIONER

## 2022-01-18 PROCEDURE — 3079F PR MOST RECENT DIASTOLIC BLOOD PRESSURE 80-89 MM HG: ICD-10-PCS | Mod: CPTII,S$GLB,, | Performed by: NURSE PRACTITIONER

## 2022-01-18 PROCEDURE — 99499 RISK ADDL DX/OHS AUDIT: ICD-10-PCS | Mod: S$GLB,,, | Performed by: NURSE PRACTITIONER

## 2022-01-18 PROCEDURE — 99499 UNLISTED E&M SERVICE: CPT | Mod: S$GLB,,, | Performed by: NURSE PRACTITIONER

## 2022-01-18 PROCEDURE — 3288F PR FALLS RISK ASSESSMENT DOCUMENTED: ICD-10-PCS | Mod: CPTII,S$GLB,, | Performed by: NURSE PRACTITIONER

## 2022-01-18 PROCEDURE — 3079F DIAST BP 80-89 MM HG: CPT | Mod: CPTII,S$GLB,, | Performed by: NURSE PRACTITIONER

## 2022-01-18 PROCEDURE — G0439 PPPS, SUBSEQ VISIT: HCPCS | Mod: S$GLB,,, | Performed by: NURSE PRACTITIONER

## 2022-01-18 PROCEDURE — 1170F FXNL STATUS ASSESSED: CPT | Mod: CPTII,S$GLB,, | Performed by: NURSE PRACTITIONER

## 2022-01-18 PROCEDURE — 3074F SYST BP LT 130 MM HG: CPT | Mod: CPTII,S$GLB,, | Performed by: NURSE PRACTITIONER

## 2022-01-18 PROCEDURE — 1159F PR MEDICATION LIST DOCUMENTED IN MEDICAL RECORD: ICD-10-PCS | Mod: CPTII,S$GLB,, | Performed by: NURSE PRACTITIONER

## 2022-01-18 PROCEDURE — 1101F PR PT FALLS ASSESS DOC 0-1 FALLS W/OUT INJ PAST YR: ICD-10-PCS | Mod: CPTII,S$GLB,, | Performed by: NURSE PRACTITIONER

## 2022-01-18 PROCEDURE — 3288F FALL RISK ASSESSMENT DOCD: CPT | Mod: CPTII,S$GLB,, | Performed by: NURSE PRACTITIONER

## 2022-01-18 PROCEDURE — 1170F PR FUNCTIONAL STATUS ASSESSED: ICD-10-PCS | Mod: CPTII,S$GLB,, | Performed by: NURSE PRACTITIONER

## 2022-01-18 PROCEDURE — 3074F PR MOST RECENT SYSTOLIC BLOOD PRESSURE < 130 MM HG: ICD-10-PCS | Mod: CPTII,S$GLB,, | Performed by: NURSE PRACTITIONER

## 2022-01-18 PROCEDURE — 1159F MED LIST DOCD IN RCRD: CPT | Mod: CPTII,S$GLB,, | Performed by: NURSE PRACTITIONER

## 2022-01-18 PROCEDURE — 1101F PT FALLS ASSESS-DOCD LE1/YR: CPT | Mod: CPTII,S$GLB,, | Performed by: NURSE PRACTITIONER

## 2022-01-18 PROCEDURE — 1157F ADVNC CARE PLAN IN RCRD: CPT | Mod: CPTII,S$GLB,, | Performed by: NURSE PRACTITIONER

## 2022-01-18 PROCEDURE — 3008F PR BODY MASS INDEX (BMI) DOCUMENTED: ICD-10-PCS | Mod: CPTII,S$GLB,, | Performed by: NURSE PRACTITIONER

## 2022-01-18 PROCEDURE — 1160F RVW MEDS BY RX/DR IN RCRD: CPT | Mod: CPTII,S$GLB,, | Performed by: NURSE PRACTITIONER

## 2022-01-18 PROCEDURE — G0439 PR MEDICARE ANNUAL WELLNESS SUBSEQUENT VISIT: ICD-10-PCS | Mod: S$GLB,,, | Performed by: NURSE PRACTITIONER

## 2022-01-18 NOTE — PATIENT INSTRUCTIONS
Counseling and Referral of Other Preventative  (Italic type indicates deductible and co-insurance are waived)    Patient Name: Maggy Tapia  Today's Date: 1/18/2022    Health Maintenance       Date Due Completion Date    Shingles Vaccine (1 of 2) Never done ---    Influenza Vaccine (1) 09/01/2021 11/27/2020    COVID-19 Vaccine (3 - Booster for Pfizer series) 09/17/2021 3/17/2021    Eye Exam 12/30/2021 12/30/2020    Override on 6/30/2016: Done (Dr Santa Mosher  report sent to scanning  12/7/16  kb)    Hemoglobin A1c 04/04/2022 10/4/2021    Diabetes Urine Screening 06/03/2022 6/3/2021    Foot Exam 07/22/2022 7/22/2021    Override on 6/29/2018: Done (Dr. Ferguson)    Lipid Panel 10/04/2022 10/4/2021    High Dose Statin 01/18/2023 1/18/2022    Colorectal Cancer Screening 06/24/2023 6/24/2020    DEXA SCAN 07/30/2023 7/30/2021    TETANUS VACCINE 01/30/2030 1/30/2020        No orders of the defined types were placed in this encounter.    The following information is provided to all patients.  This information is to help you find resources for any of the problems found today that may be affecting your health:                Living healthy guide: www.[facility].[place].gov      Understanding Diabetes: www.diabetes.org      Eating healthy: www.cdc.gov/healthyweight      CDC home safety checklist: www.cdc.gov/steadi/patient.html      Agency on Aging: www.goea.[place].[facility]      Alcoholics anonymous (AA): www.aa.org      Physical Activity: www.vincent.nih.gov/kh2hwtu      Tobacco use: www.quitwithusla.org

## 2022-01-18 NOTE — PROGRESS NOTES
"  Maggy Tapia presented for a  Medicare AWV and comprehensive Health Risk Assessment today. The following components were reviewed and updated:    · Medical history  · Family History  · Social history  · Allergies and Current Medications  · Health Risk Assessment  · Health Maintenance  · Care Team         ** See Completed Assessments for Annual Wellness Visit within the encounter summary.**         The following assessments were completed:  · Living Situation  · CAGE  · Depression Screening  · Timed Get Up and Go  · Whisper Test  · Cognitive Function Screening  ·   · Nutrition Screening  · ADL Screening  · PAQ Screening        Vitals:    01/18/22 0857   BP: 119/84   Pulse: 84   Temp: 98 °F (36.7 °C)   SpO2: 96%   Weight: 82.6 kg (182 lb)   Height: 5' 1" (1.549 m)     Body mass index is 34.39 kg/m².  Physical Exam  Vitals reviewed.   HENT:      Head: Normocephalic.      Right Ear: Decreased hearing noted.      Left Ear: Decreased hearing noted.   Eyes:      Pupils: Pupils are equal, round, and reactive to light.   Cardiovascular:      Rate and Rhythm: Normal rate and regular rhythm.      Heart sounds: Normal heart sounds.   Pulmonary:      Effort: Pulmonary effort is normal.      Breath sounds: Normal breath sounds.   Abdominal:      General: Bowel sounds are normal.      Palpations: Abdomen is soft.   Musculoskeletal:         General: Normal range of motion.      Cervical back: Normal range of motion.      Right lower leg: No edema.      Left lower leg: No edema.   Skin:     General: Skin is warm and dry.   Neurological:      Mental Status: She is alert and oriented to person, place, and time.      Motor: Weakness present.      Gait: Gait abnormal (walker).   Psychiatric:         Behavior: Behavior normal.               Diagnoses and health risks identified today and associated recommendations/orders:    1. Encounter for preventive health examination  - Above assessments completed. Preventive measures and health " maintenance reviewed with patient and brother Apollo.  -Encouraged diabetic retinal exam, brother to make appt  -Encouraged shingles, flu and covid booster.    2. Chronic restrictive lung disease  3. Chronic neuromuscular respiratory failure  4. Paralysis of diaphragm  Chronic, followed by Pulmonology  -on nebs, inhaler and NIV (at night)    5. Type 2 diabetes mellitus without complication, with long-term current use of insulin  Stable, A1C 6.2, followed by Endo    6. Diabetic polyneuropathy associated with type 2 diabetes mellitus  Stable, on gabapentin, followed by Podiatry    7. Stage 3 chronic kidney disease, unspecified whether stage 3a or 3b CKD  Stable, followed by PCP and Endo  -Encouraged hydration and avoidance of NSAIDs    8. Seizures  Stable, followed by Neurology  -brother reports no seizures in awhile, not currently on any medications. Depakote d/c on 5/13/21    9. Thrombocytopenia  Stable, followed by PCP  -noted on labwork, no acute issues    10. Major depressive disorder, recurrent episode, moderate  Stable, on zoloft, followed by Psychiatry    11. Essential hypertension  Stable, followed by PCP    12. Mixed hyperlipidemia  Stable, followed by PCP  -on statin    13. Acquired hypothyroidism  Stable, followed by PCP and Endo  -on levothyroxine    14. Osteopenia, unspecified location  Stable, followed by PCP  -on Kyler/D supplement    15. Urinary incontinence, unspecified type  Stable, on vesicare, followed by Urology    16. JUAN (obstructive sleep apnea)  Chronic, followed by Pulmonology  -on NIV at night    17. Bilateral hearing loss, unspecified hearing loss type  -Encouraged audiology referral, declines at present due to transportation issues    18. Dependence on other enabling machines and devices  19. Abnormality of gait and mobility  20. Other reduced mobility  -no recent falls, uses walker at all times, discussed safety and fall precautions  -reports insurance denied PT request by PCP    21.  Severe obesity (BMI 35.0-35.9 with comorbidity)  -Discussed weight reduction strategies, including following ADA dietary guidelines, reducing portion sizes, caloric intake and snacking.  -Discussed importance of engaging in physical activity at least 5x/week for a minimum of 30 min/day.  -patient unable to do much activity due to SOB          Provided Maggy with a 5-10 year written screening schedule and personal prevention plan. Recommendations were developed using the USPSTF age appropriate recommendations. Education, counseling, and referrals were provided as needed. After Visit Summary printed and given to patient which includes a list of additional screenings\tests needed.    Follow up in about 1 year (around 1/18/2023) for your next annual wellness visit.    Lupe Gates NP    I offered to discuss advanced care planning, including how to pick a person who would make decisions for you if you were unable to make them for yourself, called a health care power of , and what kind of decisions you might make such as use of life sustaining treatments such as ventilators and tube feeding when faced with a life limiting illness recorded on a living will that they will need to know. (How you want to be cared for as you near the end of your natural life)     X  Patient has advanced directives on file, which we reviewed, and they do not wish to make changes.

## 2022-01-28 ENCOUNTER — TELEPHONE (OUTPATIENT)
Dept: FAMILY MEDICINE | Facility: CLINIC | Age: 72
End: 2022-01-28
Payer: MEDICARE

## 2022-02-14 ENCOUNTER — OFFICE VISIT (OUTPATIENT)
Dept: PSYCHIATRY | Facility: CLINIC | Age: 72
End: 2022-02-14
Payer: COMMERCIAL

## 2022-02-14 VITALS
WEIGHT: 180.75 LBS | SYSTOLIC BLOOD PRESSURE: 134 MMHG | DIASTOLIC BLOOD PRESSURE: 73 MMHG | HEIGHT: 61 IN | HEART RATE: 91 BPM | BODY MASS INDEX: 34.13 KG/M2

## 2022-02-14 DIAGNOSIS — F43.10 PTSD (POST-TRAUMATIC STRESS DISORDER): ICD-10-CM

## 2022-02-14 DIAGNOSIS — F41.9 ANXIETY DISORDER, UNSPECIFIED TYPE: ICD-10-CM

## 2022-02-14 DIAGNOSIS — F34.1 PERSISTENT DEPRESSIVE DISORDER: Primary | ICD-10-CM

## 2022-02-14 DIAGNOSIS — F44.5 PSEUDOSEIZURES: ICD-10-CM

## 2022-02-14 PROCEDURE — 1159F PR MEDICATION LIST DOCUMENTED IN MEDICAL RECORD: ICD-10-PCS | Mod: CPTII,S$GLB,, | Performed by: PHYSICIAN ASSISTANT

## 2022-02-14 PROCEDURE — 3075F PR MOST RECENT SYSTOLIC BLOOD PRESS GE 130-139MM HG: ICD-10-PCS | Mod: CPTII,S$GLB,, | Performed by: PHYSICIAN ASSISTANT

## 2022-02-14 PROCEDURE — 3078F PR MOST RECENT DIASTOLIC BLOOD PRESSURE < 80 MM HG: ICD-10-PCS | Mod: CPTII,S$GLB,, | Performed by: PHYSICIAN ASSISTANT

## 2022-02-14 PROCEDURE — 90833 PSYTX W PT W E/M 30 MIN: CPT | Mod: S$GLB,,, | Performed by: PHYSICIAN ASSISTANT

## 2022-02-14 PROCEDURE — 1160F RVW MEDS BY RX/DR IN RCRD: CPT | Mod: CPTII,S$GLB,, | Performed by: PHYSICIAN ASSISTANT

## 2022-02-14 PROCEDURE — 99214 OFFICE O/P EST MOD 30 MIN: CPT | Mod: S$GLB,,, | Performed by: PHYSICIAN ASSISTANT

## 2022-02-14 PROCEDURE — 90833 PR PSYCHOTHERAPY W/PATIENT W/E&M, 30 MIN (ADD ON): ICD-10-PCS | Mod: S$GLB,,, | Performed by: PHYSICIAN ASSISTANT

## 2022-02-14 PROCEDURE — 3078F DIAST BP <80 MM HG: CPT | Mod: CPTII,S$GLB,, | Performed by: PHYSICIAN ASSISTANT

## 2022-02-14 PROCEDURE — 1101F PT FALLS ASSESS-DOCD LE1/YR: CPT | Mod: CPTII,S$GLB,, | Performed by: PHYSICIAN ASSISTANT

## 2022-02-14 PROCEDURE — 99999 PR PBB SHADOW E&M-EST. PATIENT-LVL IV: ICD-10-PCS | Mod: PBBFAC,,, | Performed by: PHYSICIAN ASSISTANT

## 2022-02-14 PROCEDURE — 3075F SYST BP GE 130 - 139MM HG: CPT | Mod: CPTII,S$GLB,, | Performed by: PHYSICIAN ASSISTANT

## 2022-02-14 PROCEDURE — 1101F PR PT FALLS ASSESS DOC 0-1 FALLS W/OUT INJ PAST YR: ICD-10-PCS | Mod: CPTII,S$GLB,, | Performed by: PHYSICIAN ASSISTANT

## 2022-02-14 PROCEDURE — 3008F PR BODY MASS INDEX (BMI) DOCUMENTED: ICD-10-PCS | Mod: CPTII,S$GLB,, | Performed by: PHYSICIAN ASSISTANT

## 2022-02-14 PROCEDURE — 1159F MED LIST DOCD IN RCRD: CPT | Mod: CPTII,S$GLB,, | Performed by: PHYSICIAN ASSISTANT

## 2022-02-14 PROCEDURE — 1157F ADVNC CARE PLAN IN RCRD: CPT | Mod: CPTII,S$GLB,, | Performed by: PHYSICIAN ASSISTANT

## 2022-02-14 PROCEDURE — 1126F AMNT PAIN NOTED NONE PRSNT: CPT | Mod: CPTII,S$GLB,, | Performed by: PHYSICIAN ASSISTANT

## 2022-02-14 PROCEDURE — 99999 PR PBB SHADOW E&M-EST. PATIENT-LVL IV: CPT | Mod: PBBFAC,,, | Performed by: PHYSICIAN ASSISTANT

## 2022-02-14 PROCEDURE — 3288F FALL RISK ASSESSMENT DOCD: CPT | Mod: CPTII,S$GLB,, | Performed by: PHYSICIAN ASSISTANT

## 2022-02-14 PROCEDURE — 1160F PR REVIEW ALL MEDS BY PRESCRIBER/CLIN PHARMACIST DOCUMENTED: ICD-10-PCS | Mod: CPTII,S$GLB,, | Performed by: PHYSICIAN ASSISTANT

## 2022-02-14 PROCEDURE — 3288F PR FALLS RISK ASSESSMENT DOCUMENTED: ICD-10-PCS | Mod: CPTII,S$GLB,, | Performed by: PHYSICIAN ASSISTANT

## 2022-02-14 PROCEDURE — 3008F BODY MASS INDEX DOCD: CPT | Mod: CPTII,S$GLB,, | Performed by: PHYSICIAN ASSISTANT

## 2022-02-14 PROCEDURE — 1126F PR PAIN SEVERITY QUANTIFIED, NO PAIN PRESENT: ICD-10-PCS | Mod: CPTII,S$GLB,, | Performed by: PHYSICIAN ASSISTANT

## 2022-02-14 PROCEDURE — 99214 PR OFFICE/OUTPT VISIT, EST, LEVL IV, 30-39 MIN: ICD-10-PCS | Mod: S$GLB,,, | Performed by: PHYSICIAN ASSISTANT

## 2022-02-14 PROCEDURE — 1157F PR ADVANCE CARE PLAN OR EQUIV PRESENT IN MEDICAL RECORD: ICD-10-PCS | Mod: CPTII,S$GLB,, | Performed by: PHYSICIAN ASSISTANT

## 2022-02-14 RX ORDER — SERTRALINE HYDROCHLORIDE 25 MG/1
25 TABLET, FILM COATED ORAL DAILY
Qty: 30 TABLET | Refills: 2 | Status: ON HOLD | OUTPATIENT
Start: 2022-02-14 | End: 2022-02-24 | Stop reason: HOSPADM

## 2022-02-14 RX ORDER — PRAZOSIN HYDROCHLORIDE 5 MG/1
5 CAPSULE ORAL NIGHTLY
Qty: 30 CAPSULE | Refills: 2 | Status: SHIPPED | OUTPATIENT
Start: 2022-02-14 | End: 2022-05-24 | Stop reason: SDUPTHER
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
## 2022-02-15 ENCOUNTER — LAB VISIT (OUTPATIENT)
Dept: LAB | Facility: HOSPITAL | Age: 72
End: 2022-02-15
Attending: FAMILY MEDICINE
Payer: MEDICARE

## 2022-02-15 DIAGNOSIS — E11.9 TYPE 2 DIABETES MELLITUS WITHOUT COMPLICATION, WITH LONG-TERM CURRENT USE OF INSULIN: ICD-10-CM

## 2022-02-15 DIAGNOSIS — Z79.4 TYPE 2 DIABETES MELLITUS WITHOUT COMPLICATION, WITH LONG-TERM CURRENT USE OF INSULIN: ICD-10-CM

## 2022-02-15 LAB
ALBUMIN SERPL BCP-MCNC: 3.2 G/DL (ref 3.5–5.2)
ANION GAP SERPL CALC-SCNC: 12 MMOL/L (ref 8–16)
BUN SERPL-MCNC: 20 MG/DL (ref 8–23)
CALCIUM SERPL-MCNC: 9.8 MG/DL (ref 8.7–10.5)
CHLORIDE SERPL-SCNC: 105 MMOL/L (ref 95–110)
CO2 SERPL-SCNC: 27 MMOL/L (ref 23–29)
CREAT SERPL-MCNC: 1.2 MG/DL (ref 0.5–1.4)
EST. GFR  (AFRICAN AMERICAN): 51.4 ML/MIN/1.73 M^2
EST. GFR  (NON AFRICAN AMERICAN): 44.6 ML/MIN/1.73 M^2
ESTIMATED AVG GLUCOSE: 126 MG/DL (ref 68–131)
GLUCOSE SERPL-MCNC: 148 MG/DL (ref 70–110)
HBA1C MFR BLD: 6 % (ref 4–5.6)
PHOSPHATE SERPL-MCNC: 3.2 MG/DL (ref 2.7–4.5)
POTASSIUM SERPL-SCNC: 4.4 MMOL/L (ref 3.5–5.1)
SODIUM SERPL-SCNC: 144 MMOL/L (ref 136–145)
T4 FREE SERPL-MCNC: 1.05 NG/DL (ref 0.71–1.51)
TSH SERPL DL<=0.005 MIU/L-ACNC: 2.07 UIU/ML (ref 0.4–4)

## 2022-02-15 PROCEDURE — 84439 ASSAY OF FREE THYROXINE: CPT | Performed by: PHYSICIAN ASSISTANT

## 2022-02-15 PROCEDURE — 83036 HEMOGLOBIN GLYCOSYLATED A1C: CPT | Performed by: PHYSICIAN ASSISTANT

## 2022-02-15 PROCEDURE — 36415 COLL VENOUS BLD VENIPUNCTURE: CPT | Mod: PO | Performed by: PHYSICIAN ASSISTANT

## 2022-02-15 PROCEDURE — 84443 ASSAY THYROID STIM HORMONE: CPT | Performed by: PHYSICIAN ASSISTANT

## 2022-02-15 PROCEDURE — 80069 RENAL FUNCTION PANEL: CPT | Performed by: PHYSICIAN ASSISTANT

## 2022-02-22 ENCOUNTER — HOSPITAL ENCOUNTER (OUTPATIENT)
Facility: HOSPITAL | Age: 72
Discharge: HOME | End: 2022-02-24
Attending: EMERGENCY MEDICINE | Admitting: HOSPITALIST
Payer: COMMERCIAL

## 2022-02-22 ENCOUNTER — OFFICE VISIT (OUTPATIENT)
Dept: ENDOCRINOLOGY | Facility: CLINIC | Age: 72
End: 2022-02-22
Payer: MEDICARE

## 2022-02-22 VITALS
WEIGHT: 185.44 LBS | OXYGEN SATURATION: 97 % | HEART RATE: 88 BPM | TEMPERATURE: 98 F | BODY MASS INDEX: 35.01 KG/M2 | SYSTOLIC BLOOD PRESSURE: 128 MMHG | HEIGHT: 61 IN | DIASTOLIC BLOOD PRESSURE: 84 MMHG

## 2022-02-22 DIAGNOSIS — M85.80 OSTEOPENIA, UNSPECIFIED LOCATION: ICD-10-CM

## 2022-02-22 DIAGNOSIS — G47.33 OSA (OBSTRUCTIVE SLEEP APNEA): ICD-10-CM

## 2022-02-22 DIAGNOSIS — R41.82 ALTERED MENTAL STATUS, UNSPECIFIED ALTERED MENTAL STATUS TYPE: ICD-10-CM

## 2022-02-22 DIAGNOSIS — E03.9 ACQUIRED HYPOTHYROIDISM: ICD-10-CM

## 2022-02-22 DIAGNOSIS — E11.9 TYPE 2 DIABETES MELLITUS WITHOUT COMPLICATION, WITH LONG-TERM CURRENT USE OF INSULIN: Primary | ICD-10-CM

## 2022-02-22 DIAGNOSIS — E66.9 OBESITY (BMI 30-39.9): ICD-10-CM

## 2022-02-22 DIAGNOSIS — I10 ESSENTIAL HYPERTENSION: ICD-10-CM

## 2022-02-22 DIAGNOSIS — R06.02 SHORTNESS OF BREATH: ICD-10-CM

## 2022-02-22 DIAGNOSIS — I63.9 STROKE: ICD-10-CM

## 2022-02-22 DIAGNOSIS — Z78.0 POSTMENOPAUSAL: ICD-10-CM

## 2022-02-22 DIAGNOSIS — Z79.4 TYPE 2 DIABETES MELLITUS WITHOUT COMPLICATION, WITH LONG-TERM CURRENT USE OF INSULIN: Primary | ICD-10-CM

## 2022-02-22 DIAGNOSIS — R41.0 CONFUSION: Primary | ICD-10-CM

## 2022-02-22 LAB
ALBUMIN SERPL BCP-MCNC: 3.4 G/DL (ref 3.5–5.2)
ALP SERPL-CCNC: 79 U/L (ref 55–135)
ALT SERPL W/O P-5'-P-CCNC: 15 U/L (ref 10–44)
ANION GAP SERPL CALC-SCNC: 13 MMOL/L (ref 8–16)
AST SERPL-CCNC: 12 U/L (ref 10–40)
BACTERIA #/AREA URNS HPF: NORMAL /HPF
BASOPHILS # BLD AUTO: 0.05 K/UL (ref 0–0.2)
BASOPHILS NFR BLD: 0.5 % (ref 0–1.9)
BILIRUB SERPL-MCNC: 0.3 MG/DL (ref 0.1–1)
BILIRUB UR QL STRIP: NEGATIVE
BUN SERPL-MCNC: 21 MG/DL (ref 8–23)
CALCIUM SERPL-MCNC: 10.2 MG/DL (ref 8.7–10.5)
CHLORIDE SERPL-SCNC: 107 MMOL/L (ref 95–110)
CLARITY UR: CLEAR
CO2 SERPL-SCNC: 26 MMOL/L (ref 23–29)
COLOR UR: YELLOW
CREAT SERPL-MCNC: 1 MG/DL (ref 0.5–1.4)
DIFFERENTIAL METHOD: ABNORMAL
EOSINOPHIL # BLD AUTO: 0.1 K/UL (ref 0–0.5)
EOSINOPHIL NFR BLD: 1.2 % (ref 0–8)
ERYTHROCYTE [DISTWIDTH] IN BLOOD BY AUTOMATED COUNT: 14 % (ref 11.5–14.5)
EST. GFR  (AFRICAN AMERICAN): >60 ML/MIN/1.73 M^2
EST. GFR  (NON AFRICAN AMERICAN): 56 ML/MIN/1.73 M^2
GLUCOSE SERPL-MCNC: 153 MG/DL (ref 70–110)
GLUCOSE SERPL-MCNC: 94 MG/DL (ref 70–110)
GLUCOSE UR QL STRIP: ABNORMAL
HCT VFR BLD AUTO: 46.2 % (ref 37–48.5)
HGB BLD-MCNC: 14.8 G/DL (ref 12–16)
HGB UR QL STRIP: NEGATIVE
IMM GRANULOCYTES # BLD AUTO: 0.06 K/UL (ref 0–0.04)
IMM GRANULOCYTES NFR BLD AUTO: 0.6 % (ref 0–0.5)
INR PPP: 0.9 (ref 0.8–1.2)
KETONES UR QL STRIP: NEGATIVE
LEUKOCYTE ESTERASE UR QL STRIP: NEGATIVE
LYMPHOCYTES # BLD AUTO: 1.7 K/UL (ref 1–4.8)
LYMPHOCYTES NFR BLD: 16.6 % (ref 18–48)
MCH RBC QN AUTO: 31 PG (ref 27–31)
MCHC RBC AUTO-ENTMCNC: 32 G/DL (ref 32–36)
MCV RBC AUTO: 97 FL (ref 82–98)
MICROSCOPIC COMMENT: NORMAL
MONOCYTES # BLD AUTO: 0.5 K/UL (ref 0.3–1)
MONOCYTES NFR BLD: 5.2 % (ref 4–15)
NEUTROPHILS # BLD AUTO: 7.8 K/UL (ref 1.8–7.7)
NEUTROPHILS NFR BLD: 75.9 % (ref 38–73)
NITRITE UR QL STRIP: NEGATIVE
NRBC BLD-RTO: 0 /100 WBC
PH UR STRIP: 6 [PH] (ref 5–8)
PLATELET # BLD AUTO: 170 K/UL (ref 150–450)
PMV BLD AUTO: 10.2 FL (ref 9.2–12.9)
POCT GLUCOSE: 92 MG/DL (ref 70–110)
POTASSIUM SERPL-SCNC: 4.4 MMOL/L (ref 3.5–5.1)
PROT SERPL-MCNC: 7.2 G/DL (ref 6–8.4)
PROT UR QL STRIP: NEGATIVE
PROTHROMBIN TIME: 9.8 SEC (ref 9–12.5)
RBC # BLD AUTO: 4.77 M/UL (ref 4–5.4)
SARS-COV-2 RDRP RESP QL NAA+PROBE: NEGATIVE
SODIUM SERPL-SCNC: 146 MMOL/L (ref 136–145)
SP GR UR STRIP: 1.01 (ref 1–1.03)
TSH SERPL DL<=0.005 MIU/L-ACNC: 1.68 UIU/ML (ref 0.4–4)
URN SPEC COLLECT METH UR: ABNORMAL
UROBILINOGEN UR STRIP-ACNC: NEGATIVE EU/DL
WBC # BLD AUTO: 10.26 K/UL (ref 3.9–12.7)
YEAST URNS QL MICRO: NORMAL

## 2022-02-22 PROCEDURE — 1157F ADVNC CARE PLAN IN RCRD: CPT | Mod: CPTII,S$GLB,, | Performed by: PHYSICIAN ASSISTANT

## 2022-02-22 PROCEDURE — 3079F DIAST BP 80-89 MM HG: CPT | Mod: CPTII,S$GLB,, | Performed by: PHYSICIAN ASSISTANT

## 2022-02-22 PROCEDURE — 3074F SYST BP LT 130 MM HG: CPT | Mod: CPTII,S$GLB,, | Performed by: PHYSICIAN ASSISTANT

## 2022-02-22 PROCEDURE — 3044F PR MOST RECENT HEMOGLOBIN A1C LEVEL <7.0%: ICD-10-PCS | Mod: CPTII,S$GLB,, | Performed by: PHYSICIAN ASSISTANT

## 2022-02-22 PROCEDURE — 81000 URINALYSIS NONAUTO W/SCOPE: CPT | Performed by: EMERGENCY MEDICINE

## 2022-02-22 PROCEDURE — 36000 PLACE NEEDLE IN VEIN: CPT

## 2022-02-22 PROCEDURE — 93010 ELECTROCARDIOGRAM REPORT: CPT | Mod: ,,, | Performed by: GENERAL PRACTICE

## 2022-02-22 PROCEDURE — 99215 PR OFFICE/OUTPT VISIT, EST, LEVL V, 40-54 MIN: ICD-10-PCS | Mod: S$GLB,,, | Performed by: PHYSICIAN ASSISTANT

## 2022-02-22 PROCEDURE — 1126F PR PAIN SEVERITY QUANTIFIED, NO PAIN PRESENT: ICD-10-PCS | Mod: CPTII,S$GLB,, | Performed by: PHYSICIAN ASSISTANT

## 2022-02-22 PROCEDURE — 85025 COMPLETE CBC W/AUTO DIFF WBC: CPT | Performed by: EMERGENCY MEDICINE

## 2022-02-22 PROCEDURE — 1157F PR ADVANCE CARE PLAN OR EQUIV PRESENT IN MEDICAL RECORD: ICD-10-PCS | Mod: CPTII,S$GLB,, | Performed by: PHYSICIAN ASSISTANT

## 2022-02-22 PROCEDURE — 99999 PR PBB SHADOW E&M-EST. PATIENT-LVL IV: CPT | Mod: PBBFAC,,, | Performed by: PHYSICIAN ASSISTANT

## 2022-02-22 PROCEDURE — 1159F PR MEDICATION LIST DOCUMENTED IN MEDICAL RECORD: ICD-10-PCS | Mod: CPTII,S$GLB,, | Performed by: PHYSICIAN ASSISTANT

## 2022-02-22 PROCEDURE — 36415 COLL VENOUS BLD VENIPUNCTURE: CPT | Performed by: EMERGENCY MEDICINE

## 2022-02-22 PROCEDURE — 1101F PR PT FALLS ASSESS DOC 0-1 FALLS W/OUT INJ PAST YR: ICD-10-PCS | Mod: CPTII,S$GLB,, | Performed by: PHYSICIAN ASSISTANT

## 2022-02-22 PROCEDURE — 84443 ASSAY THYROID STIM HORMONE: CPT | Performed by: EMERGENCY MEDICINE

## 2022-02-22 PROCEDURE — 93010 EKG 12-LEAD: ICD-10-PCS | Mod: ,,, | Performed by: GENERAL PRACTICE

## 2022-02-22 PROCEDURE — 1159F MED LIST DOCD IN RCRD: CPT | Mod: CPTII,S$GLB,, | Performed by: PHYSICIAN ASSISTANT

## 2022-02-22 PROCEDURE — 99285 EMERGENCY DEPT VISIT HI MDM: CPT | Mod: 25

## 2022-02-22 PROCEDURE — 82962 POCT GLUCOSE, HAND-HELD DEVICE: ICD-10-PCS | Mod: S$GLB,,, | Performed by: PHYSICIAN ASSISTANT

## 2022-02-22 PROCEDURE — 93005 ELECTROCARDIOGRAM TRACING: CPT

## 2022-02-22 PROCEDURE — 3008F PR BODY MASS INDEX (BMI) DOCUMENTED: ICD-10-PCS | Mod: CPTII,S$GLB,, | Performed by: PHYSICIAN ASSISTANT

## 2022-02-22 PROCEDURE — 3288F FALL RISK ASSESSMENT DOCD: CPT | Mod: CPTII,S$GLB,, | Performed by: PHYSICIAN ASSISTANT

## 2022-02-22 PROCEDURE — 1160F PR REVIEW ALL MEDS BY PRESCRIBER/CLIN PHARMACIST DOCUMENTED: ICD-10-PCS | Mod: CPTII,S$GLB,, | Performed by: PHYSICIAN ASSISTANT

## 2022-02-22 PROCEDURE — 1126F AMNT PAIN NOTED NONE PRSNT: CPT | Mod: CPTII,S$GLB,, | Performed by: PHYSICIAN ASSISTANT

## 2022-02-22 PROCEDURE — 3288F PR FALLS RISK ASSESSMENT DOCUMENTED: ICD-10-PCS | Mod: CPTII,S$GLB,, | Performed by: PHYSICIAN ASSISTANT

## 2022-02-22 PROCEDURE — U0002 COVID-19 LAB TEST NON-CDC: HCPCS | Performed by: EMERGENCY MEDICINE

## 2022-02-22 PROCEDURE — 80053 COMPREHEN METABOLIC PANEL: CPT | Performed by: EMERGENCY MEDICINE

## 2022-02-22 PROCEDURE — 3079F PR MOST RECENT DIASTOLIC BLOOD PRESSURE 80-89 MM HG: ICD-10-PCS | Mod: CPTII,S$GLB,, | Performed by: PHYSICIAN ASSISTANT

## 2022-02-22 PROCEDURE — 1160F RVW MEDS BY RX/DR IN RCRD: CPT | Mod: CPTII,S$GLB,, | Performed by: PHYSICIAN ASSISTANT

## 2022-02-22 PROCEDURE — G0378 HOSPITAL OBSERVATION PER HR: HCPCS

## 2022-02-22 PROCEDURE — 63600175 PHARM REV CODE 636 W HCPCS: Performed by: HOSPITALIST

## 2022-02-22 PROCEDURE — 96372 THER/PROPH/DIAG INJ SC/IM: CPT | Performed by: HOSPITALIST

## 2022-02-22 PROCEDURE — 82962 GLUCOSE BLOOD TEST: CPT

## 2022-02-22 PROCEDURE — 85610 PROTHROMBIN TIME: CPT | Performed by: EMERGENCY MEDICINE

## 2022-02-22 PROCEDURE — 3008F BODY MASS INDEX DOCD: CPT | Mod: CPTII,S$GLB,, | Performed by: PHYSICIAN ASSISTANT

## 2022-02-22 PROCEDURE — 3074F PR MOST RECENT SYSTOLIC BLOOD PRESSURE < 130 MM HG: ICD-10-PCS | Mod: CPTII,S$GLB,, | Performed by: PHYSICIAN ASSISTANT

## 2022-02-22 PROCEDURE — 99999 PR PBB SHADOW E&M-EST. PATIENT-LVL IV: ICD-10-PCS | Mod: PBBFAC,,, | Performed by: PHYSICIAN ASSISTANT

## 2022-02-22 PROCEDURE — 82962 GLUCOSE BLOOD TEST: CPT | Mod: S$GLB,,, | Performed by: PHYSICIAN ASSISTANT

## 2022-02-22 PROCEDURE — 3044F HG A1C LEVEL LT 7.0%: CPT | Mod: CPTII,S$GLB,, | Performed by: PHYSICIAN ASSISTANT

## 2022-02-22 PROCEDURE — 1101F PT FALLS ASSESS-DOCD LE1/YR: CPT | Mod: CPTII,S$GLB,, | Performed by: PHYSICIAN ASSISTANT

## 2022-02-22 PROCEDURE — 99215 OFFICE O/P EST HI 40 MIN: CPT | Mod: S$GLB,,, | Performed by: PHYSICIAN ASSISTANT

## 2022-02-22 PROCEDURE — 25000003 PHARM REV CODE 250: Performed by: HOSPITALIST

## 2022-02-22 RX ORDER — FLUTICASONE FUROATE AND VILANTEROL 100; 25 UG/1; UG/1
1 POWDER RESPIRATORY (INHALATION) DAILY
Status: DISCONTINUED | OUTPATIENT
Start: 2022-02-23 | End: 2022-02-24 | Stop reason: HOSPADM

## 2022-02-22 RX ORDER — SERTRALINE HYDROCHLORIDE 25 MG/1
25 TABLET, FILM COATED ORAL DAILY
Status: DISCONTINUED | OUTPATIENT
Start: 2022-02-23 | End: 2022-02-23

## 2022-02-22 RX ORDER — INSULIN ASPART 100 [IU]/ML
1-10 INJECTION, SOLUTION INTRAVENOUS; SUBCUTANEOUS
Status: DISCONTINUED | OUTPATIENT
Start: 2022-02-22 | End: 2022-02-24 | Stop reason: HOSPADM

## 2022-02-22 RX ORDER — SODIUM CHLORIDE 0.9 % (FLUSH) 0.9 %
10 SYRINGE (ML) INJECTION
Status: DISCONTINUED | OUTPATIENT
Start: 2022-02-22 | End: 2022-02-24 | Stop reason: HOSPADM

## 2022-02-22 RX ORDER — LABETALOL HYDROCHLORIDE 5 MG/ML
10 INJECTION, SOLUTION INTRAVENOUS
Status: DISCONTINUED | OUTPATIENT
Start: 2022-02-22 | End: 2022-02-24 | Stop reason: HOSPADM

## 2022-02-22 RX ORDER — ATORVASTATIN CALCIUM 40 MG/1
40 TABLET, FILM COATED ORAL DAILY
Status: DISCONTINUED | OUTPATIENT
Start: 2022-02-23 | End: 2022-02-24 | Stop reason: HOSPADM

## 2022-02-22 RX ORDER — GLUCAGON 1 MG
1 KIT INJECTION
Status: DISCONTINUED | OUTPATIENT
Start: 2022-02-22 | End: 2022-02-24 | Stop reason: HOSPADM

## 2022-02-22 RX ORDER — LEVOTHYROXINE SODIUM 100 UG/1
100 TABLET ORAL
Status: DISCONTINUED | OUTPATIENT
Start: 2022-02-23 | End: 2022-02-24 | Stop reason: HOSPADM

## 2022-02-22 RX ORDER — IBUPROFEN 200 MG
24 TABLET ORAL
Status: DISCONTINUED | OUTPATIENT
Start: 2022-02-22 | End: 2022-02-24 | Stop reason: HOSPADM

## 2022-02-22 RX ORDER — ASPIRIN 81 MG/1
81 TABLET ORAL DAILY
Status: DISCONTINUED | OUTPATIENT
Start: 2022-02-23 | End: 2022-02-24 | Stop reason: HOSPADM

## 2022-02-22 RX ORDER — ONDANSETRON 4 MG/1
8 TABLET, ORALLY DISINTEGRATING ORAL EVERY 8 HOURS PRN
Status: DISCONTINUED | OUTPATIENT
Start: 2022-02-22 | End: 2022-02-24 | Stop reason: HOSPADM

## 2022-02-22 RX ORDER — ENOXAPARIN SODIUM 100 MG/ML
40 INJECTION SUBCUTANEOUS EVERY 24 HOURS
Status: DISCONTINUED | OUTPATIENT
Start: 2022-02-22 | End: 2022-02-24 | Stop reason: HOSPADM

## 2022-02-22 RX ORDER — GABAPENTIN 300 MG/1
300 CAPSULE ORAL 3 TIMES DAILY
Status: DISCONTINUED | OUTPATIENT
Start: 2022-02-22 | End: 2022-02-23

## 2022-02-22 RX ORDER — IBUPROFEN 200 MG
16 TABLET ORAL
Status: DISCONTINUED | OUTPATIENT
Start: 2022-02-22 | End: 2022-02-24 | Stop reason: HOSPADM

## 2022-02-22 RX ADMIN — GABAPENTIN 300 MG: 300 CAPSULE ORAL at 09:02

## 2022-02-22 RX ADMIN — ENOXAPARIN SODIUM 40 MG: 100 INJECTION SUBCUTANEOUS at 09:02

## 2022-02-22 NOTE — PHARMACY MED REC
"Admission Medication History     The home medication history was taken by Chrsitie Starr CPhT.    Medication history obtained from Brother     You may go to "Admission" then "Reconcile Home Medications" tabs to review and/or act upon these items.      The home medication list has been updated by the Pharmacy department.    Please read ALL comments highlighted in yellow.    Please address this information as you see fit.     Feel free to contact us if you have any questions or require assistance.      The medications listed below were removed from the home medication list.  Please reorder if appropriate:  Patient reports no longer taking the following medication(s):   Ergocalciferol 50,000units   Gabapentin 100mg   Vesicare 10mg   Thiamine 100mg    Christie Starr CPhT.  [phone]            .          "

## 2022-02-22 NOTE — ED PROVIDER NOTES
Encounter Date: 2022    SCRIBE #1 NOTE: I, Natty Llamas, am scribing for, and in the presence of, Shad Campbell MD.       History     Chief Complaint   Patient presents with    Altered Mental Status     Confusion since this AM; pt is oriented x 2 (place & person, not time); lives with brother who noticed confusion this AM; afebrile     Time seen by provider: 3:39 PM on 2022    Maggy Tapia is a 72 y.o. female with a PMHx of 2 prior strokes, HTN, DM2, and prior UTIs who presents to the ED with an onset of altered mentation since today. Brother reports the patient was normal last night when he went to bed around 10 pm. He states she did not get up when he went to wake her this morning but he sis not think anything of it. He states when he got back she was at the table eating cereal with her fingers and did not recognize him. He reports she was also asking about relatives who are , thinks the year is , and believed she still lives in [place]. Patient has some residual right sided weakness from prior strokes. Brother reports her prior stroke presented with similar symptoms. Brother denies recent falls for the patient. The patient denies HA, fever, chills, pain, difficulty urinating, burning with urination, nausea, vomiting, fever or any other symptoms at this time. No pertinent PSHx.    The history is provided by the patient and a relative.     Review of patient's allergies indicates:   Allergen Reactions    Penicillins Anaphylaxis    Sulfa (sulfonamide antibiotics) Anaphylaxis    Trintellix [vortioxetine] Nausea And Vomiting and Other (See Comments)     Patient has seizures and vomits     Past Medical History:   Diagnosis Date    Anxiety     Arthritis     Asthma     Cancer     Left Breast    Depression     Diabetes mellitus, type 2     GERD (gastroesophageal reflux disease)     Hyperlipidemia     Hypertension     Overactive bladder     Seizures     Pseudo-seizures     Sleep apnea     Stroke     Thyroid disease     Urinary tract infection without hematuria 8/3/2017     Past Surgical History:   Procedure Laterality Date    APPENDECTOMY      BREAST BIOPSY Left     BREAST LUMPECTOMY Left     2016    BREAST SURGERY      CATARACT EXTRACTION Bilateral     OU done//     SECTION      CHOLECYSTECTOMY      COLONOSCOPY N/A 2020    Procedure: COLONOSCOPY;  Surgeon: Mj Fernandez MD;  [facility];  Service: Endoscopy;  Laterality: N/A;    CYST REMOVAL Left 2021    DILATION AND CURETTAGE OF UTERUS      EYE SURGERY       Family History   Problem Relation Age of Onset    Diabetes Mother     Hypertension Mother     Breast cancer Mother     Cataracts Mother     Heart failure Father     Cataracts Brother     Glaucoma Neg Hx     Retinal detachment Neg Hx     Macular degeneration Neg Hx      Social History     Tobacco Use    Smoking status: Never Smoker    Smokeless tobacco: Never Used   Substance Use Topics    Alcohol use: Yes     Comment: seldom    Drug use: No     Review of Systems   Unable to perform ROS: Mental status change       Physical Exam     Initial Vitals [22 1450]   BP Pulse Resp Temp SpO2   110/70 80 20 97.6 °F (36.4 °C) 97 %      MAP       --         Physical Exam    Nursing note and vitals reviewed.  Constitutional: She appears well-developed and well-nourished. She is not diaphoretic. No distress.   HENT:   Head: Normocephalic and atraumatic.   Eyes: EOM are normal. Pupils are equal, round, and reactive to light.   Neck: Neck supple.   Normal range of motion.  Cardiovascular: Normal rate, regular rhythm, normal heart sounds and intact distal pulses. Exam reveals no gallop and no friction rub.    No murmur heard.  Pulmonary/Chest: Breath sounds normal. No respiratory distress. She has no wheezes. She has no rhonchi. She has no rales.   Abdominal: Abdomen is soft. Bowel sounds are normal. There is no abdominal  tenderness. There is no rebound and no guarding.   Musculoskeletal:         General: Normal range of motion.      Cervical back: Normal range of motion and neck supple.     Neurological: She is alert.   Mildly confused. Right pronator drift.   Skin: Skin is warm and dry.   Psychiatric: She has a normal mood and affect. Her behavior is normal. Judgment and thought content normal.         ED Course   Procedures  Labs Reviewed   CBC W/ AUTO DIFFERENTIAL - Abnormal; Notable for the following components:       Result Value    Immature Granulocytes 0.6 (*)     Gran # (ANC) 7.8 (*)     Immature Grans (Abs) 0.06 (*)     Gran % 75.9 (*)     Lymph % 16.6 (*)     All other components within normal limits   COMPREHENSIVE METABOLIC PANEL - Abnormal; Notable for the following components:    Sodium 146 (*)     Albumin 3.4 (*)     eGFR if non  56 (*)     All other components within normal limits   URINALYSIS, REFLEX TO URINE CULTURE - Abnormal; Notable for the following components:    Glucose, UA 4+ (*)     All other components within normal limits    Narrative:     Specimen Source->Urine   PROTIME-INR   TSH   URINALYSIS MICROSCOPIC    Narrative:     Specimen Source->Urine   POCT GLUCOSE, HAND-HELD DEVICE   POCT GLUCOSE     EKG Readings: (Independently Interpreted)   Normal sinus rhythm at rate of 76 bpm with single T-wave inversion in lead 3 only. Normal axis and normal intervals. No acute ST segment changes.          Imaging Results          CT Head Without Contrast (Final result)  Result time 02/22/22 16:11:55    Final result by Joselito Zimmer MD (02/22/22 16:11:55)                 Impression:      1. No intracranial hemorrhage.  No evidence for recent ischemia, noting however that MRI could add sensitivity in an acute setting.  2. Mild cerebral involutional change and white matter disease.      Electronically signed by: Joselito Zimmer  Date:    02/22/2022  Time:    16:11             Narrative:     EXAMINATION:  CT HEAD WITHOUT CONTRAST    CLINICAL HISTORY:  Neuro deficit, acute, stroke suspected;    TECHNIQUE:  Low dose axial images were obtained through the head.  Coronal and sagittal reformations were also performed. Contrast was not administered.    COMPARISON:  10/27/2021    FINDINGS:  There are involutional changes with normal size of the ventricular system. Periventricular white matter hypoattenuation is nonspecific but suggestive of chronic microvascular ischemic change. Encephalomalacia/gliosis high left parietal lobe with overlying craniotomy defect.  No mass, hemorrhage or acute major vascular distribution infarct. No mass effect or midline shift. Surgical changes of the globes. There is a fluid level in the right sphenoid sinus.  There is a 1 cm mucous retention cyst versus polyp posterior left ethmoid air cells.   No acute osseous abnormality.                                 Medications - No data to display  Medical Decision Making:   History:   Old Medical Records: I decided to obtain old medical records.  Initial Assessment:   72-year-old female presented for confusion.  Differential Diagnosis:   Initial differential diagnosis included but not limited to CVA, TIA, and UTI.  Independently Interpreted Test(s):   I have ordered and independently interpreted EKG Reading(s) - see prior notes  Clinical Tests:   Lab Tests: Ordered and Reviewed  Radiological Study: Ordered and Reviewed  Medical Tests: Ordered and Reviewed  ED Management:  The patient was emergently evaluated in the emergency department, her evaluation was significant for an elderly female with confusion.  The patient's CT scan of her head showed no acute abnormalities per my independent interpretation.  The patient's labs showed no acute processes.  The patient was evaluated by vascular Neurology, Dr. Shea who recommended admission and further workup of her encephalopathy, including Neurology evaluation and stroke workup.  The case  was discussed with the hospitalist on call, Dr. Rodrigez.  She has accepted the patient for admission.          Scribe Attestation:   Scribe #1: I performed the above scribed service and the documentation accurately describes the services I performed. I attest to the accuracy of the note.    Attending Attestation:             Attending ED Notes:   Last known normal: 22:00 2/21/22  Provider contact time: 15:39  Head CT Read by MD: 15:59 No acute hemorrhage.  Neurology consult ordered by ED MD: 15:50    VAN positive? No    Weakness: No    Visual changes: No  Aphasia: No  Neglect: No      TPa administered: No out of window            I, Dr. Shad Campbell, personally performed the services described in this documentation. All medical record entries made by the scribe were at my direction and in my presence.  I have reviewed the chart and agree that the record reflects my personal performance and is accurate and complete. Shad Campbell MD.  5:53 PM 02/22/2022      Clinical Impression:   Final diagnoses:  [I63.9] Stroke  [R41.0] Confusion (Primary)          ED Disposition Condition    Observation               Shad Campbell MD  02/22/22 8030

## 2022-02-22 NOTE — ED TRIAGE NOTES
Pt presents to ED today after her brother was unable to walk her up.  Pt was normal when she went to bed last night, but her brother was unable to wake her this morning.  When she was awake, her brother found her eating cereal with her fingers.  Pt also did not recognize her brother this morning.  At this time, pt states that she is confused.   But is following commands appropriately, and answering questions appropriately.  Pt is in no acute distress at this time.

## 2022-02-22 NOTE — PROGRESS NOTES
CC: DM/Hypothyroidism    HPI: Maggy Tapia was diagnosed with Type 2 DM about 6 years ago. No hospitalizations for DM. Her mother had DM and thyroid disease. Arrives with her brother. Pt stopped Trulicity due to diarrhea since last visit. Reports confusion today starting this morning. Pt also had two seizures. Arrives with her brother. Pt reports her two dead relatives had haircuts today.    CURRENT DIABETIC MEDS: metformin 1000 mg in the bid,  Jardiance 10 mg daily    Glucose in clniic was 153. BG readings are checked 2x daily. No meter or logs to clinic.  Fastins    Hypoglycemia: She sometimes has hypoglycemia at night.  Type of Glucose Meter: True metrix    Physical Activity: None.     Diet: Her diet has not changed.  3 meals daily.    Last Eye Exam: -vision --  Last Podiatry Exam:     Last DM Education Attended:     No ETOH/tobacco use.    Hypothyroidism  Taking 88 mcg daily.  Dx in 2016  + mental fogginess  No constipation, sweating, changes in nails or hair.     No palpitations,  changes in nails, heat/cold intolerance.     DEXA  Osteopenia in the left hip. Taking ca +vd. No recent  Fractures, falls or steriod injections. No exercise.     JUAN  Wears CPAP    Wt Readings from Last 10 Encounters:   22 84.8 kg (187 lb)   22 84.1 kg (185 lb 6.5 oz)   22 82.6 kg (182 lb)   22 82.9 kg (182 lb 12.2 oz)   11/10/21 80.3 kg (177 lb)   21 80.3 kg (177 lb)   10/27/21 81.2 kg (179 lb)   10/11/21 80.7 kg (177 lb 14.6 oz)   10/07/21 82.2 kg (181 lb 1.7 oz)   10/04/21 80.3 kg (177 lb)       REVIEW OF SYSTEMS  General: no weakness or fatigue, wt gain (10 lbs since ).   Eyes: no intermittent blurry vision or visual disturbances.   Cardiac: no chest pain or palpitations.   Respiratory: + sob, no cough    GI: no abdominal pain or nausea.   Skin: no rashes or itching.   Musc: + back pain, neck pain  Neuro: no numbness or tingling.   Endocrine: + polyuria, no  polydipsia, polyphagia.   Remainder ROS negative    Personally reviewed labs below:  Hemoglobin A1C   Date Value Ref Range Status   02/15/2022 6.0 (H) 4.0 - 5.6 % Final     Comment:     ADA Screening Guidelines:  5.7-6.4%  Consistent with prediabetes  >or=6.5%  Consistent with diabetes    High levels of fetal hemoglobin interfere with the HbA1C  assay. Heterozygous hemoglobin variants (HbS, HgC, etc)do  not significantly interfere with this assay.   However, presence of multiple variants may affect accuracy.     10/04/2021 6.2 (H) 4.0 - 5.6 % Final     Comment:     ADA Screening Guidelines:  5.7-6.4%  Consistent with prediabetes  >or=6.5%  Consistent with diabetes    High levels of fetal hemoglobin interfere with the HbA1C  assay. Heterozygous hemoglobin variants (HbS, HgC, etc)do  not significantly interfere with this assay.   However, presence of multiple variants may affect accuracy.     06/03/2021 5.7 (H) 4.0 - 5.6 % Final     Comment:     ADA Screening Guidelines:  5.7-6.4%  Consistent with prediabetes  >or=6.5%  Consistent with diabetes    High levels of fetal hemoglobin interfere with the HbA1C  assay. Heterozygous hemoglobin variants (HbS, HgC, etc)do  not significantly interfere with this assay.   However, presence of multiple variants may affect accuracy.         Chemistry        Component Value Date/Time     02/15/2022 1129    K 4.4 02/15/2022 1129     02/15/2022 1129    CO2 27 02/15/2022 1129    BUN 20 02/15/2022 1129    CREATININE 1.2 02/15/2022 1129     (H) 02/15/2022 1129        Component Value Date/Time    CALCIUM 9.8 02/15/2022 1129    ALKPHOS 72 10/27/2021 1446    AST 18 10/27/2021 1446    ALT 16 10/27/2021 1446    BILITOT 0.9 10/27/2021 1446    ESTGFRAFRICA 51.4 (A) 02/15/2022 1129    EGFRNONAA 44.6 (A) 02/15/2022 1129        Lab Results   Component Value Date    CHOL 140 10/04/2021    CHOL 197 08/10/2020    CHOL 149 07/10/2019     Lab Results   Component Value Date    HDL 39  (L) 10/04/2021    HDL 37 (L) 08/10/2020    HDL 50 07/10/2019     Lab Results   Component Value Date    LDLCALC 62.4 (L) 10/04/2021    LDLCALC Invalid, Trig>400.0 08/10/2020    LDLCALC 77.2 07/10/2019     Lab Results   Component Value Date    TRIG 193 (H) 10/04/2021    TRIG 455 (H) 08/10/2020    TRIG 109 07/10/2019     Lab Results   Component Value Date    CHOLHDL 27.9 10/04/2021    CHOLHDL 18.8 (L) 08/10/2020    CHOLHDL 33.6 07/10/2019     Lab Results   Component Value Date    TSH 2.070 02/15/2022     Lab Results   Component Value Date    MICALBCREAT 8.3 06/03/2021       Vit D, 25-Hydroxy   Date Value Ref Range Status   02/03/2021 26 (L) 30 - 96 ng/mL Final     Comment:     Vitamin D deficiency.........<10 ng/mL                              Vitamin D insufficiency......10-29 ng/mL       Vitamin D sufficiency........> or equal to 30 ng/mL  Vitamin D toxicity............>100 ng/mL       PHYSICAL EXAMINATION  Constitutional: elderly female, appears well, no distress  Neck: Supple, trachea midline.   Respiratory: even, CTA without wheezes or rales.  Cardiovascular: RRR; no carotid bruits or murmurs.   Lymph: DP pulses  2+ bilaterally; 1+ edema bl.   Skin: +mole on face, legs are very sensitive to touch, warm and dry; no acanthosis nigracans observed. No signs of infection.  Musc: ambulates with a walker, + FROM all ext  Neuro: + confusion, patient alert and cooperative;     Assessment/Plan    1. Type 2 diabetes mellitus without complication, with long-term current use of insulin  Hemoglobin A1C    Renal Function Panel    TSH    POCT Glucose, Hand-Held Device   2. Acquired hypothyroidism     3. Osteopenia, unspecified location     4. JUAN (obstructive sleep apnea)     5. Obesity (BMI 30-39.9)     6. Shortness of breath     7. Postmenopausal     8. Altered mental status, unspecified altered mental status type       T2DM- A1c is too low for age. Continue Metformin and Jardiance. BG checks 2x daily. Send log in two  weeks.  Neuropathy-referral to neurology. Pt is very sensitive to touch bl.    Hypothyroidism-TFTs are stable. Continue LT4 dose.   Osteopenia-repeat DEXA scan 7/23. Start exercise 30 min daily.    YFJ-vyqxlo-ipbvcaen cpap.  SOB-referral to cardiology.   Obesity-Body mass index is 35.03 kg/m². Increase exercise to 30 min daily.   AMS-sent to ER. Vitals stable.     F/u in 6 mths w/ labs prior

## 2022-02-23 PROBLEM — F20.9 SCHIZOPHRENIA: Status: ACTIVE | Noted: 2022-02-23

## 2022-02-23 PROBLEM — R41.0 CONFUSION AND DISORIENTATION: Status: ACTIVE | Noted: 2022-02-23

## 2022-02-23 LAB
ALBUMIN SERPL BCP-MCNC: 3.2 G/DL (ref 3.5–5.2)
ALP SERPL-CCNC: 73 U/L (ref 55–135)
ALT SERPL W/O P-5'-P-CCNC: 13 U/L (ref 10–44)
ANION GAP SERPL CALC-SCNC: 10 MMOL/L (ref 8–16)
AORTIC ROOT ANNULUS: 3 CM
AORTIC VALVE CUSP SEPERATION: 1.9 CM
APTT BLDCRRT: 28 SEC (ref 21–32)
AST SERPL-CCNC: 13 U/L (ref 10–40)
AV INDEX (PROSTH): 0.77
AV MEAN GRADIENT: 4 MMHG
AV PEAK GRADIENT: 8 MMHG
AV VALVE AREA: 2.31 CM2
AV VELOCITY RATIO: 0.65
BASOPHILS # BLD AUTO: 0.05 K/UL (ref 0–0.2)
BASOPHILS NFR BLD: 0.5 % (ref 0–1.9)
BILIRUB SERPL-MCNC: 0.4 MG/DL (ref 0.1–1)
BSA FOR ECHO PROCEDURE: 1.92 M2
BUN SERPL-MCNC: 20 MG/DL (ref 8–23)
CALCIUM SERPL-MCNC: 9.5 MG/DL (ref 8.7–10.5)
CHLORIDE SERPL-SCNC: 107 MMOL/L (ref 95–110)
CHOLEST SERPL-MCNC: 169 MG/DL (ref 120–199)
CHOLEST/HDLC SERPL: 4.8 {RATIO} (ref 2–5)
CK MB SERPL-MCNC: 4.7 NG/ML (ref 0.1–6.5)
CK MB SERPL-RTO: 4 % (ref 0–5)
CK SERPL-CCNC: 117 U/L (ref 20–180)
CO2 SERPL-SCNC: 28 MMOL/L (ref 23–29)
CREAT SERPL-MCNC: 1 MG/DL (ref 0.5–1.4)
CV ECHO LV RWT: 0.34 CM
DIFFERENTIAL METHOD: ABNORMAL
DOP CALC AO PEAK VEL: 1.44 M/S
DOP CALC AO VTI: 29.98 CM
DOP CALC LVOT AREA: 3 CM2
DOP CALC LVOT DIAMETER: 1.96 CM
DOP CALC LVOT PEAK VEL: 0.93 M/S
DOP CALC LVOT STROKE VOLUME: 69.3 CM3
DOP CALC MV VTI: 26.91 CM
DOP CALCLVOT PEAK VEL VTI: 22.98 CM
E WAVE DECELERATION TIME: 273.83 MSEC
E/A RATIO: 0.79
E/E' RATIO: 14.53 M/S
ECHO LV POSTERIOR WALL: 0.73 CM (ref 0.6–1.1)
EJECTION FRACTION: 74 %
EOSINOPHIL # BLD AUTO: 0.3 K/UL (ref 0–0.5)
EOSINOPHIL NFR BLD: 2.6 % (ref 0–8)
ERYTHROCYTE [DISTWIDTH] IN BLOOD BY AUTOMATED COUNT: 13.9 % (ref 11.5–14.5)
EST. GFR  (AFRICAN AMERICAN): >60 ML/MIN/1.73 M^2
EST. GFR  (NON AFRICAN AMERICAN): 56 ML/MIN/1.73 M^2
FOLATE SERPL-MCNC: 14.9 NG/ML (ref 4–24)
FRACTIONAL SHORTENING: 42 % (ref 28–44)
GLUCOSE SERPL-MCNC: 103 MG/DL (ref 70–110)
HCT VFR BLD AUTO: 46.2 % (ref 37–48.5)
HDLC SERPL-MCNC: 35 MG/DL (ref 40–75)
HDLC SERPL: 20.7 % (ref 20–50)
HGB BLD-MCNC: 14.7 G/DL (ref 12–16)
IMM GRANULOCYTES # BLD AUTO: 0.05 K/UL (ref 0–0.04)
IMM GRANULOCYTES NFR BLD AUTO: 0.5 % (ref 0–0.5)
INR PPP: 0.9 (ref 0.8–1.2)
INTERVENTRICULAR SEPTUM: 0.92 CM (ref 0.6–1.1)
LA MAJOR: 4.69 CM
LA MINOR: 5 CM
LA WIDTH: 2.94 CM
LDLC SERPL CALC-MCNC: 90.6 MG/DL (ref 63–159)
LEFT ATRIUM SIZE: 3.8 CM
LEFT ATRIUM VOLUME INDEX: 25 ML/M2
LEFT ATRIUM VOLUME: 45.96 CM3
LEFT INTERNAL DIMENSION IN SYSTOLE: 2.48 CM (ref 2.1–4)
LEFT VENTRICLE DIASTOLIC VOLUME INDEX: 45.11 ML/M2
LEFT VENTRICLE DIASTOLIC VOLUME: 83 ML
LEFT VENTRICLE MASS INDEX: 60 G/M2
LEFT VENTRICLE SYSTOLIC VOLUME INDEX: 11.9 ML/M2
LEFT VENTRICLE SYSTOLIC VOLUME: 21.81 ML
LEFT VENTRICULAR INTERNAL DIMENSION IN DIASTOLE: 4.3 CM (ref 3.5–6)
LEFT VENTRICULAR MASS: 109.71 G
LV LATERAL E/E' RATIO: 12.11 M/S
LV SEPTAL E/E' RATIO: 18.17 M/S
LYMPHOCYTES # BLD AUTO: 1.9 K/UL (ref 1–4.8)
LYMPHOCYTES NFR BLD: 20.3 % (ref 18–48)
MAGNESIUM SERPL-MCNC: 1.9 MG/DL (ref 1.6–2.6)
MCH RBC QN AUTO: 30.7 PG (ref 27–31)
MCHC RBC AUTO-ENTMCNC: 31.8 G/DL (ref 32–36)
MCV RBC AUTO: 97 FL (ref 82–98)
MONOCYTES # BLD AUTO: 0.6 K/UL (ref 0.3–1)
MONOCYTES NFR BLD: 6.8 % (ref 4–15)
MV MEAN GRADIENT: 1 MMHG
MV PEAK A VEL: 1.38 M/S
MV PEAK E VEL: 1.09 M/S
MV PEAK GRADIENT: 7 MMHG
MV STENOSIS PRESSURE HALF TIME: 72.05 MS
MV VALVE AREA BY CONTINUITY EQUATION: 2.58 CM2
MV VALVE AREA P 1/2 METHOD: 3.05 CM2
NEUTROPHILS # BLD AUTO: 6.6 K/UL (ref 1.8–7.7)
NEUTROPHILS NFR BLD: 69.3 % (ref 38–73)
NONHDLC SERPL-MCNC: 134 MG/DL
NRBC BLD-RTO: 0 /100 WBC
PHOSPHATE SERPL-MCNC: 3.9 MG/DL (ref 2.7–4.5)
PLATELET # BLD AUTO: 177 K/UL (ref 150–450)
PMV BLD AUTO: 10.1 FL (ref 9.2–12.9)
POCT GLUCOSE: 103 MG/DL (ref 70–110)
POCT GLUCOSE: 104 MG/DL (ref 70–110)
POCT GLUCOSE: 147 MG/DL (ref 70–110)
POCT GLUCOSE: 82 MG/DL (ref 70–110)
POTASSIUM SERPL-SCNC: 4.9 MMOL/L (ref 3.5–5.1)
PROT SERPL-MCNC: 6.6 G/DL (ref 6–8.4)
PROTHROMBIN TIME: 10 SEC (ref 9–12.5)
PV PEAK VELOCITY: 0.73 CM/S
RA MAJOR: 4.04 CM
RA PRESSURE: 3 MMHG
RA WIDTH: 3.05 CM
RBC # BLD AUTO: 4.79 M/UL (ref 4–5.4)
RIGHT VENTRICULAR END-DIASTOLIC DIMENSION: 2.82 CM
RV TISSUE DOPPLER FREE WALL SYSTOLIC VELOCITY 1 (APICAL 4 CHAMBER VIEW): 12.39 CM/S
SODIUM SERPL-SCNC: 145 MMOL/L (ref 136–145)
TDI LATERAL: 0.09 M/S
TDI SEPTAL: 0.06 M/S
TDI: 0.08 M/S
TRICUSPID ANNULAR PLANE SYSTOLIC EXCURSION: 1.59 CM
TRIGL SERPL-MCNC: 217 MG/DL (ref 30–150)
TROPONIN I SERPL DL<=0.01 NG/ML-MCNC: <0.006 NG/ML (ref 0–0.03)
VIT B12 SERPL-MCNC: 226 PG/ML (ref 210–950)
WBC # BLD AUTO: 9.47 K/UL (ref 3.9–12.7)

## 2022-02-23 PROCEDURE — 84207 ASSAY OF VITAMIN B-6: CPT | Performed by: INTERNAL MEDICINE

## 2022-02-23 PROCEDURE — 94640 AIRWAY INHALATION TREATMENT: CPT

## 2022-02-23 PROCEDURE — G0378 HOSPITAL OBSERVATION PER HR: HCPCS

## 2022-02-23 PROCEDURE — 82607 VITAMIN B-12: CPT | Performed by: INTERNAL MEDICINE

## 2022-02-23 PROCEDURE — 36415 COLL VENOUS BLD VENIPUNCTURE: CPT | Performed by: INTERNAL MEDICINE

## 2022-02-23 PROCEDURE — 84425 ASSAY OF VITAMIN B-1: CPT | Performed by: INTERNAL MEDICINE

## 2022-02-23 PROCEDURE — 84484 ASSAY OF TROPONIN QUANT: CPT | Performed by: HOSPITALIST

## 2022-02-23 PROCEDURE — 97165 OT EVAL LOW COMPLEX 30 MIN: CPT

## 2022-02-23 PROCEDURE — 30000890 MAYO MISCELLANEOUS TEST (REFLEX): Performed by: INTERNAL MEDICINE

## 2022-02-23 PROCEDURE — 85610 PROTHROMBIN TIME: CPT | Performed by: HOSPITALIST

## 2022-02-23 PROCEDURE — G0426 INPT/ED TELECONSULT50: HCPCS | Mod: 95,,, | Performed by: PSYCHIATRY & NEUROLOGY

## 2022-02-23 PROCEDURE — 85730 THROMBOPLASTIN TIME PARTIAL: CPT | Performed by: HOSPITALIST

## 2022-02-23 PROCEDURE — 63600175 PHARM REV CODE 636 W HCPCS: Performed by: HOSPITALIST

## 2022-02-23 PROCEDURE — 92610 EVALUATE SWALLOWING FUNCTION: CPT

## 2022-02-23 PROCEDURE — G0427 PR INPT TELEHEALTH CON 70/>M: ICD-10-PCS | Mod: 95,,, | Performed by: PSYCHIATRY & NEUROLOGY

## 2022-02-23 PROCEDURE — 83735 ASSAY OF MAGNESIUM: CPT | Performed by: HOSPITALIST

## 2022-02-23 PROCEDURE — 97535 SELF CARE MNGMENT TRAINING: CPT

## 2022-02-23 PROCEDURE — 25000242 PHARM REV CODE 250 ALT 637 W/ HCPCS: Performed by: HOSPITALIST

## 2022-02-23 PROCEDURE — 97161 PT EVAL LOW COMPLEX 20 MIN: CPT

## 2022-02-23 PROCEDURE — G0426 PR INPT TELEHEALTH CONSULT 50M: ICD-10-PCS | Mod: 95,,, | Performed by: PSYCHIATRY & NEUROLOGY

## 2022-02-23 PROCEDURE — 80061 LIPID PANEL: CPT | Performed by: HOSPITALIST

## 2022-02-23 PROCEDURE — 85025 COMPLETE CBC W/AUTO DIFF WBC: CPT | Performed by: HOSPITALIST

## 2022-02-23 PROCEDURE — 84100 ASSAY OF PHOSPHORUS: CPT | Performed by: HOSPITALIST

## 2022-02-23 PROCEDURE — 94761 N-INVAS EAR/PLS OXIMETRY MLT: CPT

## 2022-02-23 PROCEDURE — 82553 CREATINE MB FRACTION: CPT | Performed by: HOSPITALIST

## 2022-02-23 PROCEDURE — 96372 THER/PROPH/DIAG INJ SC/IM: CPT | Mod: 59 | Performed by: HOSPITALIST

## 2022-02-23 PROCEDURE — 25000003 PHARM REV CODE 250: Performed by: HOSPITALIST

## 2022-02-23 PROCEDURE — 25000003 PHARM REV CODE 250: Performed by: INTERNAL MEDICINE

## 2022-02-23 PROCEDURE — G0427 INPT/ED TELECONSULT70: HCPCS | Mod: 95,,, | Performed by: PSYCHIATRY & NEUROLOGY

## 2022-02-23 PROCEDURE — 82746 ASSAY OF FOLIC ACID SERUM: CPT | Performed by: INTERNAL MEDICINE

## 2022-02-23 PROCEDURE — 80053 COMPREHEN METABOLIC PANEL: CPT | Performed by: HOSPITALIST

## 2022-02-23 PROCEDURE — 36415 COLL VENOUS BLD VENIPUNCTURE: CPT | Performed by: HOSPITALIST

## 2022-02-23 PROCEDURE — 95816 PR EEG,W/AWAKE & DROWSY RECORD: ICD-10-PCS | Mod: 26,,, | Performed by: PSYCHIATRY & NEUROLOGY

## 2022-02-23 PROCEDURE — 97116 GAIT TRAINING THERAPY: CPT

## 2022-02-23 PROCEDURE — 95816 EEG AWAKE AND DROWSY: CPT | Mod: 26,,, | Performed by: PSYCHIATRY & NEUROLOGY

## 2022-02-23 RX ORDER — PRAZOSIN HYDROCHLORIDE 1 MG/1
2 CAPSULE ORAL NIGHTLY
Status: DISCONTINUED | OUTPATIENT
Start: 2022-02-23 | End: 2022-02-24 | Stop reason: HOSPADM

## 2022-02-23 RX ORDER — LANOLIN ALCOHOL/MO/W.PET/CERES
1000 CREAM (GRAM) TOPICAL DAILY
Status: DISCONTINUED | OUTPATIENT
Start: 2022-02-23 | End: 2022-02-24 | Stop reason: HOSPADM

## 2022-02-23 RX ORDER — SERTRALINE HYDROCHLORIDE 50 MG/1
50 TABLET, FILM COATED ORAL DAILY
Status: DISCONTINUED | OUTPATIENT
Start: 2022-02-24 | End: 2022-02-24 | Stop reason: HOSPADM

## 2022-02-23 RX ORDER — CLONAZEPAM 0.5 MG/1
0.5 TABLET ORAL 2 TIMES DAILY
Status: DISCONTINUED | OUTPATIENT
Start: 2022-02-23 | End: 2022-02-24 | Stop reason: HOSPADM

## 2022-02-23 RX ORDER — TRAZODONE HYDROCHLORIDE 50 MG/1
50 TABLET ORAL NIGHTLY PRN
Status: DISCONTINUED | OUTPATIENT
Start: 2022-02-23 | End: 2022-02-24 | Stop reason: HOSPADM

## 2022-02-23 RX ADMIN — LEVOTHYROXINE SODIUM 100 MCG: 100 TABLET ORAL at 05:02

## 2022-02-23 RX ADMIN — ENOXAPARIN SODIUM 40 MG: 100 INJECTION SUBCUTANEOUS at 05:02

## 2022-02-23 RX ADMIN — FLUTICASONE FUROATE AND VILANTEROL TRIFENATATE 1 PUFF: 100; 25 POWDER RESPIRATORY (INHALATION) at 08:02

## 2022-02-23 RX ADMIN — PRAZOSIN HYDROCHLORIDE 2 MG: 1 CAPSULE ORAL at 08:02

## 2022-02-23 RX ADMIN — CLONAZEPAM 0.5 MG: 0.5 TABLET ORAL at 08:02

## 2022-02-23 RX ADMIN — CLONAZEPAM 0.5 MG: 0.5 TABLET ORAL at 01:02

## 2022-02-23 NOTE — PT/OT/SLP EVAL
Speech Language Pathology Evaluation  Bedside Swallow    Patient Name:  Maggy Tapia   MRN:  [id number]  Admitting Diagnosis: Stroke    Recommendations:                 General Recommendations:  Cognitive-linguistic evaluation per MD orders  Diet recommendations:  Regular, Thin   Aspiration Precautions: Standard aspiration precautions   General Precautions: Standard, vision impaired, fall  Communication strategies:  none    History:     Past Medical History:   Diagnosis Date    Anxiety     Arthritis     Asthma     Cancer 2000    Left Breast    Depression     Diabetes mellitus, type 2     GERD (gastroesophageal reflux disease)     Hyperlipidemia     Hypertension     Overactive bladder     Seizures     Pseudo-seizures    Sleep apnea     Stroke     Thyroid disease     Urinary tract infection without hematuria 8/3/2017       Past Surgical History:   Procedure Laterality Date    APPENDECTOMY      BREAST BIOPSY Left     BREAST LUMPECTOMY Left     2016    BREAST SURGERY      CATARACT EXTRACTION Bilateral     OU done//     SECTION      CHOLECYSTECTOMY      COLONOSCOPY N/A 2020    Procedure: COLONOSCOPY;  Surgeon: Mj Fernandez MD;  Location: [facility];  Service: Endoscopy;  Laterality: N/A;    CYST REMOVAL Left 2021    DILATION AND CURETTAGE OF UTERUS      EYE SURGERY         Social History: Patient lives with brother.    Prior Intubation HX:  None this admit    Modified Barium Swallow: None in Epic    Imaging:  MRI BRAIN WITHOUT CONTRAST   Final Result      1. There is no acute abnormality.  Specifically, there is no acute hemorrhage.  There is no acute infarction.  There is left posterior parietal encephalomalacia and gliosis from remote insult.         Electronically signed by: Brennan Montes MD   Date:    2022   Time:    10:51      US Carotid Bilateral   Final Result      No evidence of a hemodynamically significant carotid bifurcation stenosis.      Flow in  "vertebral arteries appears antegrade bilaterally.         Electronically signed by: Ashley Lugo MD   Date:    02/23/2022   Time:    08:31      CT Head Without Contrast   Final Result      1. No intracranial hemorrhage.  No evidence for recent ischemia, noting however that MRI could add sensitivity in an acute setting.   2. Mild cerebral involutional change and white matter disease.         Electronically signed by: Joselito Zimemr   Date:    02/22/2022   Time:    16:11      MRA Brain    (Results Pending)        Prior diet: Regular/thin.    Occupation/hobbies/homemaking: Pt reports PLOF as independent with ADLs, ambulatory with walker. Does not drive.    Subjective     "I'm not confused anymore."    Respiratory Status: Room air    Objective:   Pt seen for clinical swallow evaluation. She is AAOx4, pleasant and cooperative. Answers simple questions appropriately. H/O cognitive linguistic and receptive/expressive language impairment following CVA 2016.    Oral Musculature Evaluation  · Oral Musculature: WFL  · Dentition: present and adequate  · Secretion Management: adequate  · Mucosal Quality: good  · Mandibular Strength and Mobility: WFL  · Oral Labial Strength and Mobility: WFL  · Lingual Strength and Mobility: WFL  · Velar Elevation: WFL  · Volitional Cough: adequate  · Volitional Swallow: able to palpate laryngeal rise  · Voice Prior to PO Intake: clear  · Oral Musculature Comments: involuntary movments of jaw and tongue note; lip smacking    Bedside Swallow Eval:   Consistencies Assessed:  · Thin liquids --via cup and straw  · Puree --applesauce  · Mixed consistencies --diced peaches  · Solids --jose cracker     Oral Phase:   · WFL    Pharyngeal Phase:   · no overt clinical signs/symptoms of aspiration    Compensatory Strategies  · None    Treatment: Pt/family educated re: results/recs of evaluation, SLP role and POC. Receptive to information provided.     Assessment:   Clinical swallowing evaluation " completed. All po trials tolerated with no overt s/s swallow dysfunction. REC Regular textures with thin liquids. Pt with h/o cognitive linguistic impairment following CVA 2016. Will follow up for completion of cognitive communication evaluation per MD orders. Pt reports mental status has returned to baseline.     Plan:     · Patient to be seen:      · Plan of Care expires:     · Plan of Care reviewed with:  patient   · SLP Follow-Up:  Yes       Discharge recommendations:  home   Barriers to Discharge:  None    Time Tracking:     SLP Treatment Date:   02/23/22  Speech Start Time:  1115  Speech Stop Time:  1128     Speech Total Time (min):  13 min    Billable Minutes: Eval Swallow and Oral Function 13 and Total Time 13    02/23/2022

## 2022-02-23 NOTE — ASSESSMENT & PLAN NOTE
History of pseudoseizures per brother, determined not to be true seizures  Two episodes today.  Not on antiepileptic  Check EEG

## 2022-02-23 NOTE — PLAN OF CARE
[facility]  Initial Discharge Assessment       Primary Care Provider: Yanna Abbasi MD    Admission Diagnosis: Confusion [R41.0]  Stroke [I63.9]    Admission Date: 2/22/2022  Expected Discharge Date:    Assessment completed with pt and Brother - George Tapia- [phone] at bedside. He confirmed their home address and that they live together. He denies HH and stated that the pt has a home walker . He verified Dr. Abbasi as pts PCP and insurance as PHN and Medicaid. He signed the MOON form. Pt uses [facility] on [address]. Pts brother can provide transport home if discharged home today but is unsure of how she will get home on other days. CM following for discharge needs.     Discharge Barriers Identified: None    Payor: [employer]ARE / Plan: [employer] / Product Type: Medicare Advantage /     Extended Emergency Contact Information  Primary Emergency Contact: George Tapia  Address: [address] [place] [address] [address] [place] [address] [facility]  Home Phone: [phone]  Relation: Brother  Preferred language: English   needed? No    Discharge Plan A: Home with family  Discharge Plan B: Home      [facility] [facility], [place] - [address] [place]  [address] [facility] [address]  Phone: [phone] Fax: [phone]    [id number] [address]  [address] [facility] [address] [facility]  [address] [facility] [address]  Phone: [phone] Fax: [phone]      Initial Assessment (most recent)     Adult Discharge Assessment - 02/23/22 1316        Discharge Assessment    Assessment Type Discharge Planning Assessment     Confirmed/corrected address, phone number and insurance Yes     Confirmed Demographics Correct on Facesheet     Source of Information patient;family     Does patient/caregiver understand observation status Yes     Communicated LI with patient/caregiver Yes      Reason For Admission Confusion     Lives With sibling(s)     Facility Arrived From: home     Do you expect to return to your current living situation? Yes     Do you have help at home or someone to help you manage your care at home? Yes     Who are your caregiver(s) and their phone number(s)? Brother Abena Villanueva [phone]     Prior to hospitilization cognitive status: Alert/Oriented     Current cognitive status: Alert/Oriented     Walking or Climbing Stairs Difficulty ambulation difficulty, requires equipment     Mobility Management walker     Dressing/Bathing Difficulty none     Home Layout Able to live on 1st floor     Equipment Currently Used at Home walker, standard     Readmission within 30 days? No     Patient currently being followed by outpatient case management? No     Do you currently have service(s) that help you manage your care at home? No     Do you take prescription medications? Yes     Do you have prescription coverage? Yes     Do you have any problems affording any of your prescribed medications? No     Is the patient taking medications as prescribed? yes     Who is going to help you get home at discharge? Brother Abena Villanueva [phone]     How do you get to doctors appointments? family or friend will provide     Are you on dialysis? No     Do you take coumadin? No     Discharge Plan A Home with family     Discharge Plan B Home     DME Needed Upon Discharge  none     Discharge Plan discussed with: Sibling;Patient     Name(s) and Number(s) Brother Abena Villanueva [phone]     Discharge Barriers Identified None        Relationship/Environment    Name(s) of Who Lives With Patient Brother Abena Villanueva [phone]

## 2022-02-23 NOTE — CARE UPDATE
02/23/22 0829   Patient Assessment/Suction   Level of Consciousness (AVPU) alert   Respiratory Effort Normal;Unlabored   Expansion/Accessory Muscles/Retractions expansion symmetric;no retractions;no use of accessory muscles   All Lung Fields Breath Sounds diminished;clear   Rhythm/Pattern, Respiratory unlabored   PRE-TX-O2   O2 Device (Oxygen Therapy) room air   SpO2 97 %   Pulse Oximetry Type Intermittent   $ Pulse Oximetry - Multiple Charge Pulse Oximetry - Multiple   Pulse 66   Resp 18   Inhaler   $ Inhaler Charges MDI (Metered Dose Inahler) Treatment   Daily Review of Necessity (Inhaler) completed   Respiratory Treatment Status (Inhaler) given   Treatment Route (Inhaler) breath hold   Patient Position (Inhaler) HOB elevated   Post Treatment Assessment (Inhaler) breath sounds unchanged   Signs of Intolerance (Inhaler) none   Breath Sounds Post-Respiratory Treatment   Throughout All Fields Post-Treatment All Fields   Throughout All Fields Post-Treatment no change   Post-treatment Heart Rate (beats/min) 66   Post-treatment Resp Rate (breaths/min) 18

## 2022-02-23 NOTE — CONSULTS
[facility]  Neurology  Consult Note        PATIENT NAME: Maggy Tapia  MRN: [id number]  CSN: [id number]      TODAY'S DATE: 02/23/2022  ADMIT DATE: 2/22/2022                            CONSULTING PROVIDER: Brenton Isaacs MD  CONSULT REQUESTED BY: Fei Ventura MD      Reason for consult: Seizure and Encephalopathy        History obtained from chart review and the patient.    HPI per EMR: Maggy Tapia is a 72 y.o. female with a history of strokes, type 2 diabetes, hypertension, hyperlipidemia, pseudoseizures who was seen today with altered mental status.  History is provided by patient and supplemented by her brother as patient remains confused.  Patient lives with brother.  She was her usual self yesterday evening when she went to sleep.  Patient reports feeling more fatigued this morning.  She is unable to recall the events of the day.  Her brother reports he came home today and saw her eating dry cereal 1 piece at the time at the table which is unusual for her.  He reports she also had 2 episodes of pseudoseizures (his words, states they are not real seizures and she has seen a neurologist for than before, states she is not on antiepileptics at this time) where she slumped over and was unresponsive for few seconds before becoming alert again.  She denies any complaints at this time.  Brother states she had a headache earlier today.  Workup in the emergency department has been mostly unremarkable including basic blood work and CT head.  Urinalysis negative for infection.  Patient will be monitored by the hospital medicine service and will out stroke with MRI.  Will consult with Neurology    Neurology consult:  Patient was seen examined by me this morning.  She is alert and oriented x3.  She states that yesterday she was confused, and apparently she was hallucinating seeing and thinking about people who passed away in the past.  She was not able to recognize her brother as well who lives  with her.  She was at endocrinologist office and apparently was recommended to go to the ER.  She did not have any vision problems, nausea vomiting, dizziness or headache, any weakness in extremities.    Patient states that she also has history of pseudoseizures and she is to follow up with Neurology in the past however her neurologist retired.  She is not any antiseizure medications.  As per chart review, patient had 2 episodes of pseudoseizures which were reported by her brother where she slumped over and was unresponsive for few seconds before she became alert again.  Patient does not remember these episodes.    PREVIOUS MEDICAL HISTORY:  Past Medical History:   Diagnosis Date    Anxiety     Arthritis     Asthma     Cancer     Left Breast    Depression     Diabetes mellitus, type 2     GERD (gastroesophageal reflux disease)     Hyperlipidemia     Hypertension     Overactive bladder     Seizures     Pseudo-seizures    Sleep apnea     Stroke     Thyroid disease     Urinary tract infection without hematuria 8/3/2017     PREVIOUS SURGICAL HISTORY:  Past Surgical History:   Procedure Laterality Date    APPENDECTOMY      BREAST BIOPSY Left     BREAST LUMPECTOMY Left     2016    BREAST SURGERY      CATARACT EXTRACTION Bilateral     OU done//     SECTION      CHOLECYSTECTOMY      COLONOSCOPY N/A 2020    Procedure: COLONOSCOPY;  Surgeon: Mj Fernandez MD;  Location: [facility];  Service: Endoscopy;  Laterality: N/A;    CYST REMOVAL Left 2021    DILATION AND CURETTAGE OF UTERUS      EYE SURGERY       FAMILY MEDICAL HISTORY:  Family History   Problem Relation Age of Onset    Diabetes Mother     Hypertension Mother     Breast cancer Mother     Cataracts Mother     Heart failure Father     Cataracts Brother     Glaucoma Neg Hx     Retinal detachment Neg Hx     Macular degeneration Neg Hx      SOCIAL HISTORY:  Social History     Tobacco Use    Smoking status: Never  Smoker    Smokeless tobacco: Never Used   Substance Use Topics    Alcohol use: Yes     Comment: seldom    Drug use: No     ALLERGIES:  Review of patient's allergies indicates:   Allergen Reactions    Penicillins Anaphylaxis    Sulfa (sulfonamide antibiotics) Anaphylaxis    Trintellix [vortioxetine] Nausea And Vomiting and Other (See Comments)     Patient has seizures and vomits     HOME MEDICATIONS:  Prior to Admission medications    Medication Sig Start Date End Date Taking? Authorizing Provider   albuterol (ACCUNEB) 1.25 mg/3 mL Nebu Take 3 mLs (1.25 mg total) by nebulization every 6 (six) hours as needed (sob). Rescue 6/2/21 6/2/22 Yes Yanna Abbasi MD   aspirin (ECOTRIN) 81 MG EC tablet Take 81 mg by mouth once daily.   Yes Historical Provider   blood-glucose meter kit Use as instructed. Insurance preferred. 8/10/20  Yes MIRACLE Richards PA-C   CALCIUM CARBONATE/VITAMIN D3 (CALCIUM 500 + D ORAL) Take 1 tablet by mouth once daily. 10 mg daily   Yes Historical Provider   empagliflozin (JARDIANCE) 10 mg tablet Take 1 tablet (10 mg total) by mouth once daily. 6/10/21  Yes MIRACLE Richards PA-C   fluticasone furoate-vilanteroL (BREO ELLIPTA) 100-25 mcg/dose diskus inhaler Inhale 1 puff into the lungs once daily. Controller 7/7/21  Yes Aracelis Urbano MD   gabapentin (NEURONTIN) 300 MG capsule Take 1 tablet every night x 7 days. Increase to twice a day x 7 days. Increase to three times a day. 10/4/21  Yes Gm Joiner MD   levothyroxine (SYNTHROID) 100 MCG tablet Take 1 tablet (100 mcg total) by mouth before breakfast. 11/22/21 11/22/22 Yes MIRACLE Richards PA-C   losartan (COZAAR) 50 MG tablet Take 1 tablet (50 mg total) by mouth once daily. 10/14/21  Yes Yanna Abbasi MD   metFORMIN (GLUCOPHAGE-XR) 500 MG ER 24hr tablet Take 2 tablets (1,000 mg total) by mouth 2 (two) times daily with meals. 6/15/21  Yes MIRACLE Richards PA-C   potassium chloride SA (K-DUR,KLOR-CON) 20 MEQ tablet Take 20 mEq by mouth  "once daily.   Yes Historical Provider   prazosin (MINIPRESS) 5 MG capsule Take 1 capsule (5 mg total) by mouth every evening. 2/14/22 5/15/22 Yes Yadi Drake PA-C   sertraline (ZOLOFT) 25 MG tablet Take 1 tablet (25 mg total) by mouth once daily. 2/14/22 4/15/22 Yes Yadi Drake PA-C   simvastatin (ZOCOR) 40 MG tablet TAKE 1 TABLET BY MOUTH EVERY DAY 6/23/19  Yes Yanna Abbasi MD   ergocalciferol (ERGOCALCIFEROL) 50,000 unit Cap Take 1 capsule (50,000 Units total) by mouth every 7 days. 11/27/20   Yanna Abbasi MD   gabapentin (NEURONTIN) 100 MG capsule Take 1 capsule (100 mg total) by mouth 3 (three) times daily. 9/29/21 9/29/22  Yanna Abbasi MD   HYDROcodone-acetaminophen (NORCO) 5-325 mg per tablet Take 1 tablet by mouth every 6 (six) hours as needed for Pain. 11/11/21   Felix Bean DPM   nystatin (MYCOSTATIN) powder Apply topically 2 (two) times daily. for 14 days 9/8/21 9/22/21  HUMAIRA Huertas   solifenacin (VESICARE) 10 MG tablet Take 1 tablet (10 mg total) by mouth once daily. 9/8/21 9/8/22  HUMAIRA Huertas     CURRENT SCHEDULED MEDICATIONS:   aspirin  81 mg Oral Daily    atorvastatin  40 mg Oral Daily    enoxaparin  40 mg Subcutaneous Daily    fluticasone furoate-vilanteroL  1 puff Inhalation Daily    levothyroxine  100 mcg Oral Before breakfast    sertraline  25 mg Oral Daily     CURRENT INFUSIONS:   sodium chloride 0.9%       CURRENT PRN MEDICATIONS:  dextrose 50%, dextrose 50%, glucagon (human recombinant), glucose, glucose, insulin aspart U-100, labetaloL, ondansetron, sodium chloride 0.9%, sodium chloride 0.9%    REVIEW OF SYSTEMS:  Please refer to the HPI for all pertinent positive and negative findings. A comprehensive review of all other systems was negative.       PHYSICAL EXAM:  Patient Vitals for the past 24 hrs:   BP Temp Temp src Pulse Resp SpO2 Height Weight   02/23/22 0829 -- -- -- 66 18 97 % 5' 1" (1.549 m) 85.3 kg (188 lb)   02/23/22 0730 135/60 96.6 °F " "(35.9 °C) Oral 65 14 96 % -- --   02/23/22 0537 (!) 154/68 -- Oral 63 -- -- -- --   02/23/22 0400 (!) 106/53 97.4 °F (36.3 °C) Oral 67 16 95 % -- --   02/23/22 0004 (!) 148/65 98.3 °F (36.8 °C) Oral 68 18 97 % -- --   02/22/22 2132 -- -- -- -- -- -- 5' 1" (1.549 m) 85.5 kg (188 lb 7.9 oz)   02/22/22 2121 (!) 147/65 97 °F (36.1 °C) Oral (!) 59 16 99 % -- --   02/22/22 2003 (!) 155/73 -- -- 68 18 98 % -- --   02/22/22 1934 (!) 157/70 -- -- 68 18 98 % -- --   02/22/22 1832 (!) 164/80 -- -- 63 18 99 % -- --   02/22/22 1703 (!) 147/69 -- -- 71 18 99 % -- --   02/22/22 1641 -- -- -- 69 -- 99 % -- --   02/22/22 1632 134/63 -- -- 67 20 100 % -- --   02/22/22 1616 (!) 144/68 -- -- 68 20 100 % -- --   02/22/22 1450 110/70 97.6 °F (36.4 °C) Oral 80 20 97 % -- 84.8 kg (187 lb)       GENERAL APPEARANCE: Alert, well-developed, well-nourished female in no acute distress.  HEENT: Normocephalic and atraumatic. PERRL. Oropharynx unremarkable.  PULM: Normal respiratory effort. No accessory muscle use.  CV: RRR.  ABDOMEN: Soft, nontender.  EXTREMITIES: No obvious signs of vascular compromise. Pulses present. No cyanosis, clubbing or edema.  SKIN: Clear; no rashes, lesions or skin breaks in exposed areas.    NEURO:  MENTAL STATUS: She is alert and oriented x3.  Able to provide history and follow commands appropriately..  Affect euthymic.    CRANIAL NERVES:  CN I: Not tested.  CN II: Fundoscopic exam deferred.  CN III, IV, VI: Pupils equal, round and reactive to light.  Extraocular movements full and intact.  CN V: Facial sensation normal.  CN VII: Facial asymmetry absent.  CN VIII: Hearing grossly normal and equal bilaterally.  No skew deviation or pathologic nystagmus.  CN IX, X: Palate elevates symmetrically. Speech/articulation is clear without dysarthria.  CN XI: Shoulder shrug and chin rotation equal with good strength.  CN XII: Tongue protrusion midline.    MOTOR:  Bulk normal. Tone normal and symmetric throughout.  Abnormal " movements noted: Face and mouth which are likely dyskinetic movements.  Tremor: none present.  Strength 5/5 throughout.    REFLEXES:  DTRs 3+ throughout.  Plantar response equivocal bilaterally.  SENSATION:grossly intact throughout.  COORDINATION: normal finger-to-nose.  STATION: not tested.  GAIT: not tested.      Labs:  Recent Labs   Lab 02/22/22  1624 02/23/22  0611   * 145   K 4.4 4.9    107   CO2 26 28   BUN 21 20   CREATININE 1.0 1.0   GLU 94 103   CALCIUM 10.2 9.5   PHOS  --  3.9   MG  --  1.9     Recent Labs   Lab 02/22/22  1624 02/23/22  0611   WBC 10.26 9.47   HGB 14.8 14.7   HCT 46.2 46.2    177     Recent Labs   Lab 02/22/22  1624 02/23/22  0611   ALBUMIN 3.4* 3.2*   PROT 7.2 6.6   BILITOT 0.3 0.4   ALKPHOS 79 73   ALT 15 13   AST 12 13     Lab Results   Component Value Date    INR 0.9 02/23/2022     Lab Results   Component Value Date    TRIG 217 (H) 02/23/2022    HDL 35 (L) 02/23/2022    CHOLHDL 20.7 02/23/2022     Lab Results   Component Value Date    HGBA1C 6.0 (H) 02/15/2022     No results found for: PROTEINCSF, GLUCCSF, WBCCSF    Imaging:  I have reviewed and interpreted the pertinent imaging and lab results.      US Carotid Bilateral  Narrative: EXAMINATION:  US CAROTID BILATERAL    CLINICAL HISTORY:  Velocities evaluation;    TECHNIQUE:  Grayscale and color Doppler ultrasound examination of the carotid and vertebral artery systems bilaterally.  Stenosis estimates are per the NASCET measurement criteria.    COMPARISON:  07/01/2016    FINDINGS:  Right:    Internal Carotid Artery (ICA) peak systolic velocity 58 cm/sec    ICA/CCA peak systolic velocity ratio: 0.6    Plaque formation: Very mild    Vertebral artery: Antegrade flow and normal waveform.    Left:    Internal Carotid Artery (ICA)  peak systolic velocity 94 cm/sec    ICA/CCA peak systolic velocity ratio: 1.2    Plaque formation: Very mild    Vertebral artery: Antegrade flow and normal waveform.  Impression: No evidence of  a hemodynamically significant carotid bifurcation stenosis.    Flow in vertebral arteries appears antegrade bilaterally.    Electronically signed by: Ashley Lugo MD  Date:    02/23/2022  Time:    08:31         ASSESSMENT & PLAN:    Seizure like activity   Encephalopathy  Orofacial dyskinesias/Tardive dyskinesias      Workup:   CT head:  No acute intracranial abnormality  MRI brain:   There is no acute abnormality.  Specifically, there is no acute hemorrhage.  There is no acute infarction.  There is left posterior parietal encephalomalacia and gliosis from remote insult.  MR angio head:  Pending   carotid ultrasound:  Pending  EEG:  Pending      Plan:  · Etiology of seizure likely to be nonepileptic episode.  Etiology of encephalopathy is unknown at this time. Workup in progress  · Patient had hallucinations- this was the 1st time she had hallucinations. Recommend psychiatry evaluation.  · EEG and MRI brain ordered, will follow   · Etiology of orofacial dyskinesias unknown. Patient is not on Neuroleptics or any Dopamine blockade medications. Try to avoid dopamine blockade meds as this can worsen dyskinesias  · Will start on Klonopin 0.5 mg b.i.d. for orofacial dyskinesias while inpatient. On discharge, if continues to have dyskinesias patient should be started on tetrabenazine. Start with 50 mg daily can go up to 50 mg b.i.d. in a week.  · Checking vitamin levels(vitamin B1, B6 and B12), replete if low  · Workup and management for metabolic and infectious abnormalities per primary team  · Q 4 neuro checks  · Seizure precautions  · Physical and occupational therapy  · Avoid medications that lower seizure threshold  · Will continue to follow      Seizure education provided.      No driving for now, no swimming alone, no climbing high areas, no operation of heavy machinery or working with high risk electricity equipment.      Follow up Neurology in 2 weeks. Call [phone] to make an appointment.        Thank you  kindly for including us in the care of this patient. Please do not hesitate to contact us with any questions.        47 minutes of care time has been spent evaluating with the patient. Time includes chart review not limited to diagnostic imaging, labs, and vitals, patient assessment, discussion with family and nursing, current order evaluations, and new order entries.       Brenton Isaacs MD  Neurology/vascular Neurology  Date of Service: 02/23/2022  9:10 AM        Please note: This note was transcribed using voice recognition software. Because of this technology there are often uinintended grammatical, spelling, and other transcription errors. Please disregard these errors.

## 2022-02-23 NOTE — ASSESSMENT & PLAN NOTE
Patient's FSGs are controlled on current medication regimen.  Last A1c reviewed-   Lab Results   Component Value Date    HGBA1C 6.0 (H) 02/15/2022     Most recent fingerstick glucose reviewed-   Recent Labs   Lab 02/22/22  1617 02/23/22  0731   POCTGLUCOSE 92 103     Current correctional scale  Medium  Maintain anti-hyperglycemic dose as follows-   Antihyperglycemics (From admission, onward)            Start     Stop Route Frequency Ordered    02/22/22 2130  insulin aspart U-100 pen 1-10 Units         -- SubQ Before meals & nightly PRN 02/22/22 2130        Hold Oral hypoglycemics while patient is in the hospital.

## 2022-02-23 NOTE — PLAN OF CARE
Problem: Physical Therapy Goal  Goal: Physical Therapy Goal  Description: Goals to be met by: 3/2/22    Patient will increase functional independence with mobility by performin. Supine to sit with [place]  2. Sit to supine with [place]  3. Gait  x 250 feet with [place] using Rolling Walker.     Outcome: Ongoing, Progressing

## 2022-02-23 NOTE — SUBJECTIVE & OBJECTIVE
Interval History:  Patient confirms having episode of confusion during which she was hallucinating.  She could see and hear her parents in the house who are no longer alive.  Also follows with psychiatrist on regular basis for schizophrenia.  No recent changes in medications reported.  Patient's brother is at bedside.  Patient is afebrile and denies any other subjective complaint.    Review of Systems   Unable to perform ROS: Mental status change   Objective:     Vital Signs (Most Recent):  Temp: 96.6 °F (35.9 °C) (02/23/22 0730)  Pulse: 66 (02/23/22 0829)  Resp: 18 (02/23/22 0829)  BP: 135/60 (02/23/22 0730)  SpO2: 97 % (02/23/22 0829) Vital Signs (24h Range):  Temp:  [96.6 °F (35.9 °C)-98.3 °F (36.8 °C)] 96.6 °F (35.9 °C)  Pulse:  [59-88] 66  Resp:  [14-20] 18  SpO2:  [95 %-100 %] 97 %  BP: (106-164)/(53-84) 135/60     Weight: 85.5 kg (188 lb 7.9 oz)  Body mass index is 35.62 kg/m².  No intake or output data in the 24 hours ending 02/23/22 0851   Physical Exam  Constitutional:       General: She is not in acute distress.     Appearance: She is well-developed.   HENT:      Head: Normocephalic and atraumatic.   Eyes:      Pupils: Pupils are equal, round, and reactive to light.   Cardiovascular:      Rate and Rhythm: Normal rate and regular rhythm.      Heart sounds: No murmur heard.  Pulmonary:      Effort: Pulmonary effort is normal. No respiratory distress.      Breath sounds: Normal breath sounds. No wheezing or rales.   Abdominal:      General: Bowel sounds are normal. There is no distension.      Palpations: Abdomen is soft.      Tenderness: There is no abdominal tenderness.   Musculoskeletal:         General: Normal range of motion.   Skin:     General: Skin is warm and dry.      Findings: No rash.   Neurological:      Mental Status: She is alert.      Cranial Nerves: No cranial nerve deficit.      Comments: Lip smacking (brother states she does not normally do this)  Right side weaker than left which is her  baseline post prior stroke.  Right pronator drift  Oriented to self and location but not year or situation   Psychiatric:         Behavior: Behavior normal.       Significant Labs: All pertinent labs within the past 24 hours have been reviewed.  CBC:   Recent Labs   Lab 02/22/22  1624 02/23/22  0611   WBC 10.26 9.47   HGB 14.8 14.7   HCT 46.2 46.2    177     CMP:   Recent Labs   Lab 02/22/22  1624 02/23/22  0611   * 145   K 4.4 4.9    107   CO2 26 28   GLU 94 103   BUN 21 20   CREATININE 1.0 1.0   CALCIUM 10.2 9.5   PROT 7.2 6.6   ALBUMIN 3.4* 3.2*   BILITOT 0.3 0.4   ALKPHOS 79 73   AST 12 13   ALT 15 13   ANIONGAP 13 10   EGFRNONAA 56* 56*     Coagulation:   Recent Labs   Lab 02/23/22  0611   INR 0.9   APTT 28.0     Lipid Panel:   Recent Labs   Lab 02/23/22  0611   CHOL 169   HDL 35*   LDLCALC 90.6   TRIG 217*   CHOLHDL 20.7     TSH:   Recent Labs   Lab 02/22/22  1624   TSH 1.679       Significant Imaging:   US carotids:  No evidence of a hemodynamically significant carotid bifurcation stenosis. Flow in vertebral arteries appears antegrade bilaterally.    CT head without contrast:  1. No intracranial hemorrhage.  No evidence for recent ischemia, noting however that MRI could add sensitivity in an acute setting.  2. Mild cerebral involutional change and white matter disease.

## 2022-02-23 NOTE — ASSESSMENT & PLAN NOTE
History of pseudoseizures per brother, determined not to be true seizures  Two episodes today.  Not on antiepileptic  Follow EEG and MRI brain results.

## 2022-02-23 NOTE — PT/OT/SLP EVAL
Occupational Therapy   Evaluation and Discharge Note    Name: Maggy Tapia  MRN: [id number]  Admitting Diagnosis:  Stroke   Recent Surgery: * No surgery found *      Recommendations:     Discharge Recommendations: home  Discharge Equipment Recommendations:  none  Barriers to discharge:       Assessment:     Maggy Tapia is a 72 y.o. female with a medical diagnosis of Stroke. At this time, patient demonstrates ability to perform basic ADL's with supervision/SBA as a precaution. Pt reports she is at baseline with ADL's. Pt was agreeable to OT Eval, but did not feel that further OT was indicated.    Plan:     During this hospitalization, patient does not require further acute OT services.  Please re-consult if situation changes.    · Plan of Care Reviewed with: patient    Subjective     Chief Complaint: No complaints  Patient/Family Comments/goals: To go home    Occupational Profile:  Living Environment: Pt lives with brother in 1 story home with ramp. Pt has walk in shower with grab bars and shower chair; Pt has BSC and raised toilet.  Previous level of function: Modified Independent  Equipment Used at home:  raised toilet, bedside commode, shower chair, walker, rolling  Assistance upon Discharge: Brother    Pain/Comfort:  · Pain Rating 1: 0/10  · Pain Rating Post-Intervention 1: 0/10    Patients cultural, spiritual, Mosque conflicts given the current situation:      Objective:     Communicated with: Nurse Rivas prior to session.  Patient found HOB elevated with bed alarm, telemetry upon OT entry to room.    General Precautions: Standard, fall   Orthopedic Precautions:N/A   Braces: N/A  Respiratory Status: Room air     Occupational Performance:    Bed Mobility:    · Patient completed Supine to Sit with supervision  · Patient completed Sit to Supine with supervision    Functional Mobility/Transfers:  · Patient completed Sit <> Stand Transfer with supervision  with  rolling walker     Activities of Daily  Living:  · Feeding:  independence    · Grooming: independence and stand by assistance set up in sitting  · Bathing: stand by assistance    · Upper Body Dressing: stand by assistance set up in sitting  · Lower Body Dressing: stand by assistance as a precaution  · Toileting: supervision as a precaution    Cognitive/Visual Perceptual:  Pt alert and oriented    Physical Exam:  Upper Extremity Strength:    -       Right Upper Extremity: WFL  -       Left Upper Extremity: WFL    AMPAC 6 Click ADL:  AMPAC Total Score: 21    Treatment & Education:  OT provided education in role of OT. Pt verbalized understanding and was agreeable to OT Eval.  OT provided instruction in home safety with ADL's/IADL's including review of home set up and DME/AE. Pt verbalized understanding.  Education:    Patient left HOB elevated with all lines intact, call button in reach and bed alarm on    GOALS:   Multidisciplinary Problems     Occupational Therapy Goals     Not on file                History:     Past Medical History:   Diagnosis Date    Anxiety     Arthritis     Asthma     Cancer     Left Breast    Depression     Diabetes mellitus, type 2     GERD (gastroesophageal reflux disease)     Hyperlipidemia     Hypertension     Overactive bladder     Seizures     Pseudo-seizures    Sleep apnea     Stroke     Thyroid disease     Urinary tract infection without hematuria 8/3/2017       Past Surgical History:   Procedure Laterality Date    APPENDECTOMY      BREAST BIOPSY Left     BREAST LUMPECTOMY Left     2016    BREAST SURGERY      CATARACT EXTRACTION Bilateral     OU done//     SECTION      CHOLECYSTECTOMY      COLONOSCOPY N/A 2020    Procedure: COLONOSCOPY;  Surgeon: Mj Fernandez MD;  Location: [facility];  Service: Endoscopy;  Laterality: N/A;    CYST REMOVAL Left 2021    DILATION AND CURETTAGE OF UTERUS      EYE SURGERY         Time Tracking:     OT Date of Treatment: 22  OT Start  Time: 1054  OT Stop Time: 1119  OT Total Time (min): 25 min    Billable Minutes:Evaluation 8  Self Care/Home Management 17    2/23/2022

## 2022-02-23 NOTE — CONSULTS
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 Value Ref Range    POCT Glucose 147 (H) 70 - 110 mg/dL   Vitamin B12    Collection Time: 02/23/22  1:13 PM   Result Value Ref Range    Vitamin B-12 226 210 - 950 pg/mL   Folate    Collection Time: 02/23/22  1:13 PM   Result Value Ref Range    Folate 14.9 4.0 - 24.0 ng/mL      Lab Results   Component Value Date    VALPROATE 61.7 03/05/2021         EXAMINATION    VITALS   Vitals:    02/23/22 0537 02/23/22 0730 02/23/22 0829 02/23/22 1128   BP: (!) 154/68 135/60  (!) 143/63   BP Location: Right arm Right arm  Right arm   Patient Position: Lying Lying  Lying   Pulse: 63 65 66    Resp:  14 18 16   Temp:  96.6 °F (35.9 °C)  97.4 °F (36.3 °C)   TempSrc: Oral Oral  Oral   SpO2:  96% 97% 98%   Weight:   85.3 kg (188 lb)    Height:   5' 1" (1.549 m)         CONSTITUTIONAL  General Appearance: NAD, unremarkable, age appropriate, lying in bed, overweight    MUSCULOSKELETAL  Muscle Strength and Tone: Attempted but unable to assess due to medical acuity   Abnormal Involuntary Movements: possible mild orofacial TD like movements noted (chronic per outpatient notes)  Gait and Station: Attempted but unable to assess due to medical acuity     PSYCHIATRIC   Behavior/Cooperation:  friendly and cooperative, eye " "contact normal, calm  Speech:  normal pitch, normal volume, slowed, soft  Language: grossly intact, able to name, able to repeat with spontaneous speech  Mood: "OK"  Affect:  Constricted   Associations: intact; no JOSE RAFAEL  Thought Process: Linear and Future-Oriented   Thought Content: denies SI, HI, AH, VH, TH, delusions, or paranoia (no RIS observed)  Sensorium:  Awake  Alert and Oriented: to person, place, situation, month of year; disoriented to year   Memory: 3/3 immediate, 1/3 at 5 minutes    Recent: Intact; able to report recent events   Remote: Limited ; Named 2/4 past presidents   Attention/concentration: Fair.  Appropriate for age/education.  Able to spell w-o-r-l-d but NOT d-l-r-o-w.   Similarities: Limited ; Intact (difference between apple and orange?)  Abstract reasoning: Impaired / Limited   Insight: Limited / Intact  Judgment: Limited / Intact    CAM ICU Delirium Assessment - NEGATIVE      Is the patient aware of the biomedical complications associated with substance abuse and mental illness? yes        MEDICAL DECISION MAKING    ASSESSMENT      MDD, recurrent, moderate  JESSICA  PTSD  Unspecified Insomnia   (rule out intellectual / learning disability vs neurocognitive disorder; patient endorses hx of special education with below-average IQ as well as chronic post-subdural hematoma neurocognitive deficits)  (patient endorses having auditory and visual hallucinations of her  grandfather and two  friends yesterday, but she denies having and AVH today.  She has good insight into the fact that these AVH were not real.  She denies any ego-dystonic or command nature to these hallucinations.  What she describes does not appear to be traditionally psychotic in origin.  She did not exhibit any overt signs of psychosis / josé today on exam.)   (concern for pseudoseizures / conversion d/o given patient's trauma history and negative work-up so far, but this is a diagnosis of exclusion, so please defer " to Neurology recommendations to rule out other etiologies)     RECOMMENDATIONS       - Patient does not currently meet PEC/CEC criteria due to not being an imminent threat to self/others and not being gravely disabled 2/2 mental illness.      - Can increase Zoloft to 50 mg PO daily for MDD/JESSICA/PTSD, restart Prazosin at reduced dose of 3 mg PO QHS for PTSD-related nightmares / sleep (if BP can tolerate), and restart Trazodone 25 mg to 50 mg PO QHS PRN insomnia (discussed risks/benefits/alt vs no treatment with patient).    - No indication for neuroleptic medication at this time.  Would defer to outpatient psychiatric and neurology providers at f/u regarding severity of possible TD and the potential need for VMAT2 Inhibitor therapy (discussed risks/benefits/alt vs no treatment with patient).     - Psychotherapy was performed with patient as noted above depression, anxiety, insomnia, and trauma.    - Patient's most recent labs, imaging, and EKG were reviewed today; CT head:  No acute intracranial abnormality; MRI Brain:  There is no acute abnormality.  Specifically, there is no acute hemorrhage.  There is no acute infarction.  There is left posterior parietal encephalomalacia and gliosis from remote insult.  MRA Brain: 1. 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  There is no critical stenosis, occlusion, thrombosis or dissection.  There is no large aneurysm.  There is nonocclusive stenosis of the P2 segments of the posterior cerebral arteries bilaterally.    - Neurology Team is following pt; please see their recs; please replete Vitamin B12 due to being borderline low at 226; B1 and B6 results are pending; Folate WNL      - Please have CM/SW assist patient with outpatient mental health & neurology f/u for s/p discharge from this facility.      - Patient was instructed to call 911 and return to the nearest ED if she begins feeling suicidal, homicidal, or gravely  disabled (for s/p this hospitalization).     - Thank you for this consult         Total time spent with patient and/or managing/coordinating patient's care today (excluding the time spent on psychotherapy): 71 minutes   Time spent on psychotherapy today (as noted above): 17 minutes   Total time for encounter today including psychotherapy: 88 minutes      More than 50% of the time was spent counseling/coordinating care.     Consulting clinician was informed of the encounter and consult note.     Consultation ended: 2/23/2022 at 4:56 PM        STAFF:  Maurilio Casas MD  [facility]   2/23/2022

## 2022-02-23 NOTE — PROGRESS NOTES
[facility] Hospital Medicine  Progress Note    Patient Name: Maggy Tapia  MRN: [id number]  Patient Class: OP- Observation   Admission Date: 2/22/2022  Length of Stay: 0 days  Attending Physician: Fei Ventura MD  Primary Care Provider: Yanna Abbasi MD        Subjective:     Principal Problem:Stroke        HPI:  Patient is a 72-year-old female with a history of 2 strokes, type 2 diabetes, hypertension, hyperlipidemia, pseudoseizures who was seen today with altered mental status.  History is provided by patient and supplemented by her brother as patient remains confused.  Patient lives with brother.  She was her usual self yesterday evening when she went to sleep.  Patient reports feeling more fatigued this morning.  She is unable to recall the events of the day.  Her brother reports he came home today and saw her eating dry cereal 1 piece at the time at the table which is unusual for her.  He reports she also had 2 episodes of pseudoseizures (his words, states they are not real seizures and she has seen a neurologist for than before, states she is not on antiepileptics at this time) where she slumped over and was unresponsive for few seconds before becoming alert again.  She denies any complaints at this time.  Brother states she had a headache earlier today.  Workup in the emergency department has been mostly unremarkable including basic blood work and CT head.  Urinalysis negative for infection.  Patient will be monitored by the hospital medicine service and will out stroke with MRI.  Will consult with Neurology.      Overview/Hospital Course:  No notes on file    Interval History:  Patient confirms having episode of confusion during which she was hallucinating.  She could see and hear her parents in the house who are no longer alive.  Also follows with psychiatrist on regular basis for schizophrenia.  No recent changes in medications reported.  Patient's brother is at bedside.  Patient is  afebrile and denies any other subjective complaint.    Review of Systems   Unable to perform ROS: Mental status change   Objective:     Vital Signs (Most Recent):  Temp: 96.6 °F (35.9 °C) (02/23/22 0730)  Pulse: 66 (02/23/22 0829)  Resp: 18 (02/23/22 0829)  BP: 135/60 (02/23/22 0730)  SpO2: 97 % (02/23/22 0829) Vital Signs (24h Range):  Temp:  [96.6 °F (35.9 °C)-98.3 °F (36.8 °C)] 96.6 °F (35.9 °C)  Pulse:  [59-88] 66  Resp:  [14-20] 18  SpO2:  [95 %-100 %] 97 %  BP: (106-164)/(53-84) 135/60     Weight: 85.5 kg (188 lb 7.9 oz)  Body mass index is 35.62 kg/m².  No intake or output data in the 24 hours ending 02/23/22 0851   Physical Exam  Constitutional:       General: She is not in acute distress.     Appearance: She is well-developed.   HENT:      Head: Normocephalic and atraumatic.   Eyes:      Pupils: Pupils are equal, round, and reactive to light.   Cardiovascular:      Rate and Rhythm: Normal rate and regular rhythm.      Heart sounds: No murmur heard.  Pulmonary:      Effort: Pulmonary effort is normal. No respiratory distress.      Breath sounds: Normal breath sounds. No wheezing or rales.   Abdominal:      General: Bowel sounds are normal. There is no distension.      Palpations: Abdomen is soft.      Tenderness: There is no abdominal tenderness.   Musculoskeletal:         General: Normal range of motion.   Skin:     General: Skin is warm and dry.      Findings: No rash.   Neurological:      Mental Status: She is alert.      Cranial Nerves: No cranial nerve deficit.      Comments: Lip smacking (brother states she does not normally do this)  Right side weaker than left which is her baseline post prior stroke.  Right pronator drift  Oriented to self and location but not year or situation   Psychiatric:         Behavior: Behavior normal.       Significant Labs: All pertinent labs within the past 24 hours have been reviewed.  CBC:   Recent Labs   Lab 02/22/22  1624 02/23/22  0611   WBC 10.26 9.47   HGB 14.8 14.7    HCT 46.2 46.2    177     CMP:   Recent Labs   Lab 02/22/22  1624 02/23/22  0611   * 145   K 4.4 4.9    107   CO2 26 28   GLU 94 103   BUN 21 20   CREATININE 1.0 1.0   CALCIUM 10.2 9.5   PROT 7.2 6.6   ALBUMIN 3.4* 3.2*   BILITOT 0.3 0.4   ALKPHOS 79 73   AST 12 13   ALT 15 13   ANIONGAP 13 10   EGFRNONAA 56* 56*     Coagulation:   Recent Labs   Lab 02/23/22  0611   INR 0.9   APTT 28.0     Lipid Panel:   Recent Labs   Lab 02/23/22  0611   CHOL 169   HDL 35*   LDLCALC 90.6   TRIG 217*   CHOLHDL 20.7     TSH:   Recent Labs   Lab 02/22/22  1624   TSH 1.679       Significant Imaging:   US carotids:  No evidence of a hemodynamically significant carotid bifurcation stenosis. Flow in vertebral arteries appears antegrade bilaterally.    CT head without contrast:  1. No intracranial hemorrhage.  No evidence for recent ischemia, noting however that MRI could add sensitivity in an acute setting.  2. Mild cerebral involutional change and white matter disease.      Assessment/Plan:      * Stroke  Place in observation for stroke workup/rule out stroke to do for  CT head reviewed  MRI/MRA brain ordered  Carotid ultrasound results reviewed.   TTE with bubble ordered  PT/OT/SLP evaluations  Neurology evaluation withPermissive hypertension, labetalol for SBP greater than 220/110 until MRI brain rules out stroke then can restart home blood pressure medications.  Telemetry monitoring  ASA and statin  Neuro checks        Schizophrenia   Consul psychiatrist for evaluation of any decompensated schizophrenia state.      Confusion and disorientation  Rule out CVA and seizure; possible pseudo seizure. Follow neurology recommendations.       Hypothyroidism  Patient has chronic hypothyroidism. TFTs reviewed-   Lab Results   Component Value Date    TSH 1.679 02/22/2022   . Will continue chronic levothyroxine and adjust for and clinical changes.        Hyperlipidemia   Patient is chronically on statin.will continue for now.  Monitor clinically. 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  Not on antiepileptic  Follow EEG and MRI brain results.       History of ischemic left MCA stroke  Noted      Type 2 diabetes mellitus without complication, with long-term current use of insulin  Patient's FSGs are controlled on current medication regimen.  Last A1c reviewed-   Lab Results   Component Value Date    HGBA1C 6.0 (H) 02/15/2022     Most recent fingerstick glucose reviewed-   Recent Labs   Lab 02/22/22  1617 02/23/22  0731   POCTGLUCOSE 92 103     Current correctional scale  Medium  Maintain anti-hyperglycemic dose as follows-   Antihyperglycemics (From admission, onward)            Start     Stop Route Frequency Ordered    02/22/22 2130  insulin aspart U-100 pen 1-10 Units         -- SubQ Before meals & nightly PRN 02/22/22 2130        Hold Oral hypoglycemics while patient is in the hospital.       Discussed with patient's brother.  VTE Risk Mitigation (From admission, onward)         Ordered     enoxaparin injection 40 mg  Daily         02/22/22 2131     IP VTE HIGH RISK PATIENT  Once         02/22/22 2131     Place sequential compression device  Until discontinued         02/22/22 2131                Discharge Planning   LI: 2/24/2022     Code Status: Full Code   Is the patient medically ready for discharge?:     Reason for patient still in hospital (select all that apply): Patient trending condition  Discharge Plan A: Home with family        Fei Ventura MD  Department of Hospital Medicine   [facility]

## 2022-02-23 NOTE — PLAN OF CARE
CM attempted discharge planning assessment however pt is out of the room . CM will return to follow up.    02/23/22 1036   Discharge Assessment   Assessment Type Discharge Planning Assessment

## 2022-02-23 NOTE — SUBJECTIVE & OBJECTIVE
"  Woke up with symptoms?: yes    Recent bleeding noted: no  Does the patient take any Blood Thinners? no  Medications: No relevant medications      Past Medical History: diabetes and Heart Problems    Past Surgical History: no major surgeries within the last 2 weeks    Family History: no relevant history    Social History: no smoking, no drinking, no drugs    Allergies: Penicillins  Sulfa (Sulfonamide Antibiotics)  Trintellix [Vortioxetine]     Review of Systems   Unable to perform ROS: Mental status change     Objective:   Vitals: Blood pressure (!) 106/53, pulse 67, temperature 97.4 °F (36.3 °C), temperature source Oral, resp. rate 16, height 5' 1" (1.549 m), weight 85.5 kg (188 lb 7.9 oz), SpO2 95 %.     CT READ: Yes  No hemmorhage. No mass effect. No early infarct signs.     Physical Exam  Vitals reviewed.   Constitutional:       Appearance: Normal appearance. She is well-developed.   HENT:      Head: Normocephalic and atraumatic.      Nose: Nose normal.   Eyes:      Pupils: Pupils are equal, round, and reactive to light.   Cardiovascular:      Rate and Rhythm: Normal rate and regular rhythm.   Pulmonary:      Effort: Pulmonary effort is normal.   Neurological:      Mental Status: She is alert. She is confused.      Cranial Nerves: Dysarthria present. No cranial nerve deficit.      Sensory: No sensory deficit.      Motor: No weakness.      Coordination: Coordination normal. Finger-Nose-Finger Test and Heel to Shin Test normal. Rapid alternating movements normal.   Psychiatric:         Mood and Affect: Mood normal.           "

## 2022-02-23 NOTE — CONSULTS
"      [facility]  Vascular Neurology  Comprehensive Stroke Center  [facility]ascular Neurology Acute Consultation Note      Consults    Consulting Provider: DANIEL SALAS  Current Providers  No providers found    Patient Location:  [facility] EMERGENCY DEPARTMENT Emergency Department  Spoke hospital nurse at bedside with patient assisting consultant.     Patient information was obtained from patient and relative(s).         Assessment/Plan:       Diagnoses:   Confusion and disorientation  Confusion and disorientation  This valeriy represent a postictal event on the be related to underlying metabolic toxic etiologies.  Patient needs to be admitted for full workup.  There is no evidence of stroke on CT or neurological exam.        STROKE DOCUMENTATION     Acute Stroke Times:   Acute Stroke Times   Last Known Normal Date: 02/21/22  Last Known Normal Time: 2200  Symptom Onset Date: 02/21/22  Symptom Onset Time: 2200  Stroke Team Called Date: 02/22/22  Stroke Team Called Time: 1555  Stroke Team Arrival Date: 02/22/22  Stroke Team Arrival Time: 1600  CT Interpretation Time: 1600  Alteplase Recommended: No  Thrombectomy Recommended: No    NIH Scale:        Modified [place]    Td Coma Scale:    ABCD2 Score:    [id number] Score:   HAS -BLED Score:   ICH Score:   Hunt & Brown Classification:       Blood pressure (!) 106/53, pulse 67, temperature 97.4 °F (36.3 °C), temperature source Oral, resp. rate 16, height 5' 1" (1.549 m), weight 85.5 kg (188 lb 7.9 oz), SpO2 95 %.  Alteplase Eligible?: No  Alteplase Recommendation: Alteplase not recommended due to Outside of treatment window  and Suspected stroke mimic   Possible Interventional Revascularization Candidate? No; no significant neurologic deficit (NIHSS <6) , No; at this time symptoms not suggestive of large vessel occlusion and No; significant pre-stroke disability     Disposition Recommendation: admit to inpatient  do not " "transfer    Subjective:     History of Present Illness:  This is a 72-year-old female who was last known normal at 2200 on 02/21/2022.  She was seen in 11:00 a.m. this morning apparently she was found with altered mental status and confusion.        Woke up with symptoms?: yes    Recent bleeding noted: no  Does the patient take any Blood Thinners? no  Medications: No relevant medications      Past Medical History: diabetes and Heart Problems    Past Surgical History: no major surgeries within the last 2 weeks    Family History: no relevant history    Social History: no smoking, no drinking, no drugs    Allergies: Penicillins  Sulfa (Sulfonamide Antibiotics)  Trintellix [Vortioxetine]     Review of Systems   Unable to perform ROS: Mental status change     Objective:   Vitals: Blood pressure (!) 106/53, pulse 67, temperature 97.4 °F (36.3 °C), temperature source Oral, resp. rate 16, height 5' 1" (1.549 m), weight 85.5 kg (188 lb 7.9 oz), SpO2 95 %.     CT READ: Yes  No hemmorhage. No mass effect. No early infarct signs.     Physical Exam  Vitals reviewed.   Constitutional:       Appearance: Normal appearance. She is well-developed.   HENT:      Head: Normocephalic and atraumatic.      Nose: Nose normal.   Eyes:      Pupils: Pupils are equal, round, and reactive to light.   Cardiovascular:      Rate and Rhythm: Normal rate and regular rhythm.   Pulmonary:      Effort: Pulmonary effort is normal.   Neurological:      Mental Status: She is alert. She is confused.      Cranial Nerves: Dysarthria present. No cranial nerve deficit.      Sensory: No sensory deficit.      Motor: No weakness.      Coordination: Coordination normal. Finger-Nose-Finger Test and Heel to Shin Test normal. Rapid alternating movements normal.   Psychiatric:         Mood and Affect: Mood normal.               Recommended the emergency room physician to have a brief discussion with the patient and/or family if available regarding the  risks and " benefits of treatment, and to briefly document the occurrence of that discussion in his clinical encounter note.     The attending portion of this evaluation, treatment, and documentation was performed per Lyndsey Shea MD via audiovisual.    Billing code:  (non-intervention mild to moderate stroke, TIA, some mimics)    · This patient has a critical neurological condition/illness, with some potential for high morbidity and mortality.  · There is a moderate probability for acute neurological change leading to clinical and possibly life-threatening deterioration requiring highest level of physician preparedness for urgent intervention.  · Care was coordinated with other physicians involved in the patient's care.  · Radiologic studies and laboratory data were reviewed and interpreted, and plan of care was re-assessed based on the results.  · Diagnosis, treatment options and prognosis may have been discussed with the patient and/or family members or caregiver.      In your opinion, this was a: Tier 2 Van Negative    Consult End Time: 1611     Lyndsey Shea MD  Comprehensive Stroke Center  Vascular Neurology   [facility]

## 2022-02-23 NOTE — PT/OT/SLP EVAL
Physical Therapy Evaluation    Patient Name:  Maggy Tapia   MRN:  [id number]    Recommendations:     Discharge Recommendations:  home   Discharge Equipment Recommendations: none   Barriers to discharge: None    Assessment:     Maggy Tapai is a 72 y.o. female admitted with a medical diagnosis of Stroke.  She presents with the following impairments/functional limitations:  weakness, impaired endurance, impaired functional mobilty, gait instability, impaired balance, decreased lower extremity function, decreased ROM .  Patient agreeable to PT evaluation this morning.  Patient presented supine in bed and was able to transfer to sitting and standing with SBA.  Patient then ambulated x 250 feet with RW with SBA.    Rehab Prognosis: Good; patient would benefit from acute skilled PT services to address these deficits and reach maximum level of function.    Recent Surgery: * No surgery found *      Plan:     During this hospitalization, patient to be seen 5 x/week to address the identified rehab impairments via gait training, therapeutic activities, therapeutic exercises and progress toward the following goals:    · Plan of Care Expires:  03/23/22    Subjective     Chief Complaint: fatigue  Patient/Family Comments/goals: go home  Pain/Comfort:  ·      Patients cultural, spiritual, [employer] conflicts given the current situation:      Living Environment:  Currently lives with brother in [address] [place] [address].  Prior to admission, patients level of function was modified independent.  Equipment used at home: walker, rolling, wheelchair.  DME owned (not currently used): none.  Upon discharge, patient will have assistance from family.    Objective:     Communicated with nurse prior to session.  Patient found supine with bed alarm, telemetry  upon PT entry to room.    General Precautions: Standard, fall   Orthopedic Precautions:N/A   Braces:    Respiratory Status: Room air    Exams:  · RLE ROM: WFL  · RLE Strength: Deficits: 4-/5  overall  · LLE ROM: WFL  · LLE Strength: Deficits: 4-/5 overall    Functional Mobility:  · Bed Mobility:     · Supine to Sit: stand by assistance  · Sit to Supine: stand by assistance  · Transfers:     · Sit to Stand:  stand by assistance with rolling walker  · Gait: x 250 feet with RW with SBA    Therapeutic Activities and Exercises:   gait training x  250 feet with RW with SBA and verbal cues to improve posture    AM-PAC 6 CLICK MOBILITY  Total Score:19     Patient left supine with call button in reach, bed alarm on and nurse notified.    GOALS:   Multidisciplinary Problems     Physical Therapy Goals        Problem: Physical Therapy Goal    Goal Priority Disciplines Outcome Goal Variances Interventions   Physical Therapy Goal     PT, PT/OT Ongoing, Progressing     Description: Goals to be met by: 3/2/22    Patient will increase functional independence with mobility by performin. Supine to sit with Zamora  2. Sit to supine with Zamora  3. Gait  x 250 feet with Zamora using Rolling Walker.                      History:     Past Medical History:   Diagnosis Date    Anxiety     Arthritis     Asthma     Cancer     Left Breast    Depression     Diabetes mellitus, type 2     GERD (gastroesophageal reflux disease)     Hyperlipidemia     Hypertension     Overactive bladder     Seizures     Pseudo-seizures    Sleep apnea     Stroke     Thyroid disease     Urinary tract infection without hematuria 8/3/2017       Past Surgical History:   Procedure Laterality Date    APPENDECTOMY      BREAST BIOPSY Left     BREAST LUMPECTOMY Left     2016    BREAST SURGERY      CATARACT EXTRACTION Bilateral     OU done//     SECTION      CHOLECYSTECTOMY      COLONOSCOPY N/A 2020    Procedure: COLONOSCOPY;  Surgeon: Mj Fernandez MD;  Location[facility];  Service: Endoscopy;  Laterality: N/A;    CYST REMOVAL Left 2021    DILATION AND CURETTAGE OF UTERUS      EYE  SURGERY         Time Tracking:     PT Received On: 02/23/22  PT Start Time: 0919     PT Stop Time: 0944  PT Total Time (min): 25 min     Billable Minutes: Evaluation 15 and Gait Training 10      02/23/2022

## 2022-02-23 NOTE — ED NOTES
Pt awake, alert and oriented x4, VSS.  Pt answering questions appropriately, discussing things that are happening in her life with accuracy, per brother.  Pt passed swallow screen, and is in no acute distress at this time.

## 2022-02-23 NOTE — PLAN OF CARE
02/23/22 1315   GALDAMEZ Message   Medicare Outpatient and Observation Notification regarding financial responsibility Given to patient/caregiver;Explained to patient/caregiver;Signed/date by patient/caregiver   Date GALDAMEZ was signed 02/23/22   Time GALDAMEZ was signed 1314

## 2022-02-23 NOTE — ASSESSMENT & PLAN NOTE
Patient is chronically on statin.will continue for now. Monitor clinically. Last LDL was   Lab Results   Component Value Date    LDLCALC 62.4 (L) 10/04/2021

## 2022-02-23 NOTE — PLAN OF CARE
Problem: Adjustment to Illness (Stroke, Ischemic/Transient Ischemic Attack)  Goal: Optimal Coping  Outcome: Ongoing, Progressing     Problem: Bowel Elimination Impaired (Stroke, Ischemic/Transient Ischemic Attack)  Goal: Effective Bowel Elimination  Outcome: Ongoing, Progressing     Problem: Cerebral Tissue Perfusion (Stroke, Ischemic/Transient Ischemic Attack)  Goal: Optimal Cerebral Tissue Perfusion  Outcome: Ongoing, Progressing     Problem: Cognitive Impairment (Stroke, Ischemic/Transient Ischemic Attack)  Goal: Optimal Cognitive Function  Outcome: Ongoing, Progressing     Problem: Communication Impairment (Stroke, Ischemic/Transient Ischemic Attack)  Goal: Improved Communication Skills  Outcome: Ongoing, Progressing     Problem: Functional Ability Impaired (Stroke, Ischemic/Transient Ischemic Attack)  Goal: Optimal Functional Ability  Outcome: Ongoing, Progressing     Problem: Respiratory Compromise (Stroke, Ischemic/Transient Ischemic Attack)  Goal: Effective Oxygenation and Ventilation  Outcome: Ongoing, Progressing     Problem: Sensorimotor Impairment (Stroke, Ischemic/Transient Ischemic Attack)  Goal: Improved Sensorimotor Function  Outcome: Ongoing, Progressing     Problem: Swallowing Impairment (Stroke, Ischemic/Transient Ischemic Attack)  Goal: Optimal Eating and Swallowing without Aspiration  Outcome: Ongoing, Progressing     Problem: Urinary Elimination Impaired (Stroke, Ischemic/Transient Ischemic Attack)  Goal: Effective Urinary Elimination  Outcome: Ongoing, Progressing     Problem: Fall Injury Risk  Goal: Absence of Fall and Fall-Related Injury  Outcome: Ongoing, Progressing     Problem: Adult Inpatient Plan of Care  Goal: Plan of Care Review  Outcome: Ongoing, Progressing  Goal: Patient-Specific Goal (Individualized)  Outcome: Ongoing, Progressing  Goal: Absence of Hospital-Acquired Illness or Injury  Outcome: Ongoing, Progressing  Goal: Optimal Comfort and Wellbeing  Outcome: Ongoing,  Progressing  Goal: Readiness for Transition of Care  Outcome: Ongoing, Progressing     Problem: Diabetes Comorbidity  Goal: Blood Glucose Level Within Targeted Range  Outcome: Ongoing, Progressing     Problem: Infection  Goal: Absence of Infection Signs and Symptoms  Outcome: Ongoing, Progressing

## 2022-02-23 NOTE — ASSESSMENT & PLAN NOTE
Patient has chronic hypothyroidism. TFTs reviewed-   Lab Results   Component Value Date    TSH 1.679 02/22/2022   . Will continue chronic levothyroxine and adjust for and clinical changes.

## 2022-02-23 NOTE — ASSESSMENT & PLAN NOTE
Place in observation for stroke workup/rule out stroke to do for  CT head reviewed  MRI/MRA brain ordered  Carotid ultrasound ordered  TTE with bubble ordered  PT/OT/SLP evaluations  Neurology evaluation withPermissive hypertension, labetalol for SBP greater than 220/110 until MRI brain rules out stroke then can restart home blood pressure medications.  Telemetry monitoring  ASA and statin  Neuro checks

## 2022-02-23 NOTE — ASSESSMENT & PLAN NOTE
Patient's FSGs are controlled on current medication regimen.  Last A1c reviewed-   Lab Results   Component Value Date    HGBA1C 6.0 (H) 02/15/2022     Most recent fingerstick glucose reviewed-   Recent Labs   Lab 02/22/22  1617   POCTGLUCOSE 92     Current correctional scale  Medium  Maintain anti-hyperglycemic dose as follows-   Antihyperglycemics (From admission, onward)            None        Hold Oral hypoglycemics while patient is in the hospital.

## 2022-02-23 NOTE — HPI
Patient is a 72-year-old female with a history of 2 strokes, type 2 diabetes, hypertension, hyperlipidemia, pseudoseizures who was seen today with altered mental status.  History is provided by patient and supplemented by her brother as patient remains confused.  Patient lives with brother.  She was her usual self yesterday evening when she went to sleep.  Patient reports feeling more fatigued this morning.  She is unable to recall the events of the day.  Her brother reports he came home today and saw her eating dry cereal 1 piece at the time at the table which is unusual for her.  He reports she also had 2 episodes of pseudoseizures (his words, states they are not real seizures and she has seen a neurologist for than before, states she is not on antiepileptics at this time) where she slumped over and was unresponsive for few seconds before becoming alert again.  She denies any complaints at this time.  Brother states she had a headache earlier today.  Workup in the emergency department has been mostly unremarkable including basic blood work and CT head.  Urinalysis negative for infection.  Patient will be monitored by the hospital medicine service and will out stroke with MRI.  Will consult with Neurology.

## 2022-02-23 NOTE — ASSESSMENT & PLAN NOTE
Place in observation for stroke workup/rule out stroke to do for  CT head reviewed  MRI/MRA brain ordered  Carotid ultrasound results reviewed.   TTE with bubble ordered  PT/OT/SLP evaluations  Neurology evaluation withPermissive hypertension, labetalol for SBP greater than 220/110 until MRI brain rules out stroke then can restart home blood pressure medications.  Telemetry monitoring  ASA and statin  Neuro checks

## 2022-02-23 NOTE — ASSESSMENT & PLAN NOTE
Patient is chronically on statin.will continue for now. Monitor clinically. Last LDL was   Lab Results   Component Value Date    LDLCALC 90.6 02/23/2022

## 2022-02-23 NOTE — HPI
This is a 72-year-old female who was last known normal at 2200 on 02/21/2022.  She was seen in 11:00 a.m. this morning apparently she was found with altered mental status and confusion.

## 2022-02-23 NOTE — ASSESSMENT & PLAN NOTE
Confusion and disorientation  This valeriy represent a postictal event on the be related to underlying metabolic toxic etiologies.  Patient needs to be admitted for full workup.  There is no evidence of stroke on CT or neurological exam.

## 2022-02-24 VITALS
RESPIRATION RATE: 18 BRPM | HEART RATE: 74 BPM | WEIGHT: 188 LBS | BODY MASS INDEX: 35.5 KG/M2 | OXYGEN SATURATION: 93 % | DIASTOLIC BLOOD PRESSURE: 61 MMHG | TEMPERATURE: 96 F | SYSTOLIC BLOOD PRESSURE: 113 MMHG | HEIGHT: 61 IN

## 2022-02-24 LAB
ALBUMIN SERPL BCP-MCNC: 3 G/DL (ref 3.5–5.2)
ALP SERPL-CCNC: 65 U/L (ref 55–135)
ALT SERPL W/O P-5'-P-CCNC: 12 U/L (ref 10–44)
ANION GAP SERPL CALC-SCNC: 10 MMOL/L (ref 8–16)
AST SERPL-CCNC: 14 U/L (ref 10–40)
BASOPHILS # BLD AUTO: 0.04 K/UL (ref 0–0.2)
BASOPHILS NFR BLD: 0.5 % (ref 0–1.9)
BILIRUB SERPL-MCNC: 0.4 MG/DL (ref 0.1–1)
BUN SERPL-MCNC: 17 MG/DL (ref 8–23)
CALCIUM SERPL-MCNC: 9.3 MG/DL (ref 8.7–10.5)
CHLORIDE SERPL-SCNC: 107 MMOL/L (ref 95–110)
CO2 SERPL-SCNC: 25 MMOL/L (ref 23–29)
CREAT SERPL-MCNC: 1 MG/DL (ref 0.5–1.4)
DIFFERENTIAL METHOD: NORMAL
EOSINOPHIL # BLD AUTO: 0.2 K/UL (ref 0–0.5)
EOSINOPHIL NFR BLD: 2.7 % (ref 0–8)
ERYTHROCYTE [DISTWIDTH] IN BLOOD BY AUTOMATED COUNT: 13.8 % (ref 11.5–14.5)
EST. GFR  (AFRICAN AMERICAN): >60 ML/MIN/1.73 M^2
EST. GFR  (NON AFRICAN AMERICAN): 56 ML/MIN/1.73 M^2
GLUCOSE SERPL-MCNC: 115 MG/DL (ref 70–110)
HCT VFR BLD AUTO: 45.8 % (ref 37–48.5)
HGB BLD-MCNC: 14.7 G/DL (ref 12–16)
IMM GRANULOCYTES # BLD AUTO: 0.03 K/UL (ref 0–0.04)
IMM GRANULOCYTES NFR BLD AUTO: 0.4 % (ref 0–0.5)
LYMPHOCYTES # BLD AUTO: 1.7 K/UL (ref 1–4.8)
LYMPHOCYTES NFR BLD: 21.7 % (ref 18–48)
MCH RBC QN AUTO: 30.5 PG (ref 27–31)
MCHC RBC AUTO-ENTMCNC: 32.1 G/DL (ref 32–36)
MCV RBC AUTO: 95 FL (ref 82–98)
MONOCYTES # BLD AUTO: 0.5 K/UL (ref 0.3–1)
MONOCYTES NFR BLD: 6.1 % (ref 4–15)
NEUTROPHILS # BLD AUTO: 5.3 K/UL (ref 1.8–7.7)
NEUTROPHILS NFR BLD: 68.6 % (ref 38–73)
NRBC BLD-RTO: 0 /100 WBC
PLATELET # BLD AUTO: 168 K/UL (ref 150–450)
PMV BLD AUTO: 11.2 FL (ref 9.2–12.9)
POCT GLUCOSE: 106 MG/DL (ref 70–110)
POCT GLUCOSE: 137 MG/DL (ref 70–110)
POTASSIUM SERPL-SCNC: 4.5 MMOL/L (ref 3.5–5.1)
PROT SERPL-MCNC: 6.4 G/DL (ref 6–8.4)
RBC # BLD AUTO: 4.82 M/UL (ref 4–5.4)
SODIUM SERPL-SCNC: 142 MMOL/L (ref 136–145)
WBC # BLD AUTO: 7.68 K/UL (ref 3.9–12.7)

## 2022-02-24 PROCEDURE — 95819 EEG AWAKE AND ASLEEP: CPT

## 2022-02-24 PROCEDURE — 96372 THER/PROPH/DIAG INJ SC/IM: CPT | Performed by: INTERNAL MEDICINE

## 2022-02-24 PROCEDURE — 25000003 PHARM REV CODE 250: Performed by: HOSPITALIST

## 2022-02-24 PROCEDURE — 94640 AIRWAY INHALATION TREATMENT: CPT

## 2022-02-24 PROCEDURE — 94761 N-INVAS EAR/PLS OXIMETRY MLT: CPT

## 2022-02-24 PROCEDURE — 63600175 PHARM REV CODE 636 W HCPCS: Performed by: INTERNAL MEDICINE

## 2022-02-24 PROCEDURE — 80053 COMPREHEN METABOLIC PANEL: CPT | Performed by: HOSPITALIST

## 2022-02-24 PROCEDURE — G0378 HOSPITAL OBSERVATION PER HR: HCPCS

## 2022-02-24 PROCEDURE — 25000003 PHARM REV CODE 250: Performed by: INTERNAL MEDICINE

## 2022-02-24 PROCEDURE — 85025 COMPLETE CBC W/AUTO DIFF WBC: CPT | Performed by: HOSPITALIST

## 2022-02-24 PROCEDURE — 36415 COLL VENOUS BLD VENIPUNCTURE: CPT | Performed by: HOSPITALIST

## 2022-02-24 PROCEDURE — 97116 GAIT TRAINING THERAPY: CPT

## 2022-02-24 RX ORDER — TETRABENAZINE 25 MG/1
TABLET, COATED ORAL
Qty: 47 TABLET | Refills: 0 | Status: SHIPPED | OUTPATIENT
Start: 2022-02-24

## 2022-02-24 RX ORDER — TETRABENAZINE 25 MG/1
TABLET, COATED ORAL
Qty: 47 TABLET | Refills: 0 | Status: SHIPPED | OUTPATIENT
Start: 2022-02-24 | End: 2022-02-24 | Stop reason: SDUPTHER

## 2022-02-24 RX ORDER — SERTRALINE HYDROCHLORIDE 50 MG/1
50 TABLET, FILM COATED ORAL DAILY
Qty: 30 TABLET | Refills: 0 | Status: SHIPPED | OUTPATIENT
Start: 2022-02-24 | End: 2022-05-24 | Stop reason: SDUPTHER

## 2022-02-24 RX ORDER — LOSARTAN POTASSIUM 50 MG/1
25 TABLET ORAL DAILY
Qty: 90 TABLET | Refills: 3
Start: 2022-02-24 | End: 2022-09-27 | Stop reason: SDUPTHER

## 2022-02-24 RX ORDER — LANOLIN ALCOHOL/MO/W.PET/CERES
1000 CREAM (GRAM) TOPICAL DAILY
Qty: 30 TABLET | Refills: 0 | Status: SHIPPED | OUTPATIENT
Start: 2022-02-24

## 2022-02-24 RX ORDER — TRAZODONE HYDROCHLORIDE 50 MG/1
50 TABLET ORAL NIGHTLY PRN
Qty: 30 TABLET | Refills: 0 | Status: SHIPPED | OUTPATIENT
Start: 2022-02-24 | End: 2022-05-24 | Stop reason: SDUPTHER

## 2022-02-24 RX ORDER — CYANOCOBALAMIN 1000 UG/ML
1000 INJECTION, SOLUTION INTRAMUSCULAR; SUBCUTANEOUS
Status: DISCONTINUED | OUTPATIENT
Start: 2022-02-24 | End: 2022-02-24 | Stop reason: HOSPADM

## 2022-02-24 RX ADMIN — CYANOCOBALAMIN TAB 1000 MCG 1000 MCG: 1000 TAB at 09:02

## 2022-02-24 RX ADMIN — LEVOTHYROXINE SODIUM 100 MCG: 100 TABLET ORAL at 06:02

## 2022-02-24 RX ADMIN — FLUTICASONE FUROATE AND VILANTEROL TRIFENATATE 1 PUFF: 100; 25 POWDER RESPIRATORY (INHALATION) at 07:02

## 2022-02-24 RX ADMIN — ASPIRIN 81 MG: 81 TABLET, COATED ORAL at 09:02

## 2022-02-24 RX ADMIN — CLONAZEPAM 0.5 MG: 0.5 TABLET ORAL at 09:02

## 2022-02-24 RX ADMIN — SERTRALINE HYDROCHLORIDE 50 MG: 50 TABLET ORAL at 09:02

## 2022-02-24 RX ADMIN — ATORVASTATIN CALCIUM 40 MG: 40 TABLET, FILM COATED ORAL at 09:02

## 2022-02-24 RX ADMIN — CYANOCOBALAMIN 1000 MCG: 1000 INJECTION, SOLUTION INTRAMUSCULAR; SUBCUTANEOUS at 12:02

## 2022-02-24 NOTE — CONSULTS
Food & Nutrition                                                           Education     Diet Education: Cardiac Diet   Time Spent: 15 minutes  Learners: Patient        Nutrition Education provided with handouts: yes        Comments: Patient admitted with possible TIA. PMH DM2, multiple strokes, breast CA, GERD, HTN, HLD. PTA pt tells me she follows a diabetic low sodium diet ( reads labels for sodium, does not use much salt to cook, and avoids sugar/too many carbs). Diabetes controlled, A1C 6.0 and sometimes she goes too low ( 59), we discussed treatment for hypoglycemia. I also educated pt on heart healthy low sodium diets including what types of fat to include more of in her diet and tips for eating out. Pt agreeable to plan, verbalizes understanding.      Assessment: NFPE not done, visually appears obese/well-nourished. Good appetite PTA, brother often brings over meals for her ( like gumbo) but cooks without salt for her.  All questions and concerns answered. Dietitian's contact information provided.         Follow-Up: yes     Please Re-consult as needed           Thanks!

## 2022-02-24 NOTE — DISCHARGE INSTRUCTIONS
Please keep daily blood pressure log.    No driving for now, no swimming alone, no climbing high areas, no operation of heavy machinery or working with high risk electricity equipment.    Follow up with your Psychiatrist in 1-2 weeks.

## 2022-02-24 NOTE — PLAN OF CARE
Problem: Adjustment to Illness (Stroke, Ischemic/Transient Ischemic Attack)  Goal: Optimal Coping  Outcome: Met     Problem: Bowel Elimination Impaired (Stroke, Ischemic/Transient Ischemic Attack)  Goal: Effective Bowel Elimination  Outcome: Met     Problem: Cerebral Tissue Perfusion (Stroke, Ischemic/Transient Ischemic Attack)  Goal: Optimal Cerebral Tissue Perfusion  Outcome: Met     Problem: Cognitive Impairment (Stroke, Ischemic/Transient Ischemic Attack)  Goal: Optimal Cognitive Function  Outcome: Met     Problem: Communication Impairment (Stroke, Ischemic/Transient Ischemic Attack)  Goal: Improved Communication Skills  Outcome: Met     Problem: Functional Ability Impaired (Stroke, Ischemic/Transient Ischemic Attack)  Goal: Optimal Functional Ability  Outcome: Met     Problem: Respiratory Compromise (Stroke, Ischemic/Transient Ischemic Attack)  Goal: Effective Oxygenation and Ventilation  Outcome: Met     Problem: Sensorimotor Impairment (Stroke, Ischemic/Transient Ischemic Attack)  Goal: Improved Sensorimotor Function  Outcome: Met     Problem: Swallowing Impairment (Stroke, Ischemic/Transient Ischemic Attack)  Goal: Optimal Eating and Swallowing without Aspiration  Outcome: Met     Problem: Urinary Elimination Impaired (Stroke, Ischemic/Transient Ischemic Attack)  Goal: Effective Urinary Elimination  Outcome: Met     Problem: Fall Injury Risk  Goal: Absence of Fall and Fall-Related Injury  Outcome: Met     Problem: Adult Inpatient Plan of Care  Goal: Plan of Care Review  Outcome: Met  Goal: Patient-Specific Goal (Individualized)  Outcome: Met  Goal: Absence of Hospital-Acquired Illness or Injury  Outcome: Met  Goal: Optimal Comfort and Wellbeing  Outcome: Met  Goal: Readiness for Transition of Care  Outcome: Met     Problem: Diabetes Comorbidity  Goal: Blood Glucose Level Within Targeted Range  Outcome: Met     Problem: Infection  Goal: Absence of Infection Signs and Symptoms  Outcome: Met

## 2022-02-24 NOTE — Clinical Note
Pt wake alert and oriented. With no complaints all night.  Vitals within normal limits. Call bell within reach at all times and bed alarm on .

## 2022-02-24 NOTE — PT/OT/SLP PROGRESS
Physical Therapy Treatment    Patient Name:  Maggy Tapia   MRN:  [id number]    Recommendations:     Discharge Recommendations:  home   Discharge Equipment Recommendations: none   Barriers to discharge: None    Assessment:     Maggy Tapia is a 72 y.o. female admitted with a medical diagnosis of Stroke.  She presents with the following impairments/functional limitations:  weakness, impaired endurance, gait instability .  Patient agreeable to PT treatment this morning.  Patient presented supine in bed and was able to transfer to sitting and standing with independence.  Patient then ambulated x 250 feet with RW with SBA and verbal cues for improved posture.      Rehab Prognosis: Good; patient would benefit from acute skilled PT services to address these deficits and reach maximum level of function.    Recent Surgery: * No surgery found *      Plan:     During this hospitalization, patient to be seen 5 x/week to address the identified rehab impairments via gait training, therapeutic activities, therapeutic exercises and progress toward the following goals:    · Plan of Care Expires:  03/23/22    Subjective     Chief Complaint: none given  Patient/Family Comments/goals: go home  Pain/Comfort:  ·        Objective:     Communicated with nurse prior to session.  Patient found supine with bed alarm upon PT entry to room.     General Precautions: Standard, fall   Orthopedic Precautions:N/A   Braces:    Respiratory Status: Room air     Functional Mobility:  · Bed Mobility:     · Supine to Sit: independence  · Sit to Supine: independence  · Transfers:     · Sit to Stand:  modified independence with rolling walker  · Gait: x 250 feet with RW with SBA      AM-PAC 6 CLICK MOBILITY  Turning over in bed (including adjusting bedclothes, sheets and blankets)?: 4  Sitting down on and standing up from a chair with arms (e.g., wheelchair, bedside commode, etc.): 4  Moving from lying on back to sitting on the side of the bed?:  4  Moving to and from a bed to a chair (including a wheelchair)?: 4  Need to walk in hospital room?: 3  Climbing 3-5 steps with a railing?: 3  Basic Mobility Total Score: 22       Therapeutic Activities and Exercises:   gait training x 250 feet with RW with SBA and verbal cues for improved posture.    Patient left supine with call button in reach, bed alarm on and nurse notified..    GOALS:   Multidisciplinary Problems     Physical Therapy Goals        Problem: Physical Therapy Goal    Goal Priority Disciplines Outcome Goal Variances Interventions   Physical Therapy Goal     PT, PT/OT Ongoing, Progressing     Description: Goals to be met by: 3/2/22    Patient will increase functional independence with mobility by performin. Supine to sit with McK[place]  2. Sit to supine with [place]  3. Gait  x 250 feet with [place] using Rolling Walker.                      Time Tracking:     PT Received On: 22  PT Start Time: 902     PT Stop Time: 911  PT Total Time (min): 9 min     Billable Minutes: Gait Training 9    Treatment Type: Evaluation  PT/PTA: PT     PTA Visit Number: 0     2022

## 2022-02-24 NOTE — PLAN OF CARE
[facility] faxed [facility] at [phone], Yanna Saldaña for 1 week hospital follow up with Dr. Isaacs.  Office to contact patient with day and time of appointment.

## 2022-02-24 NOTE — NURSING
Orders received to discharge patient home. Discharge education given to include: medications, medication side effects, follow up appointments. IV removed. Prescriptions sent to patient pharmacy. Patient verbalized understanding of all d/c care and education provided to her.

## 2022-02-24 NOTE — PROCEDURES
DATE: 2/23/22    EEG NUMBER:  ON     REFERRING PHYSICIAN:  Dr. Ventura      This EEG was performed to assess for evidence of underlying epilepsy.     ELECTROENCEPHALOGRAM REPORT     METHODOLOGY:  Electroencephalographic (EEG) recording is with electrodes placed according to the International 10-20 placement system.  Thirty two (32) channels of digital signal are simultaneously recorded from the scalp and may include EKG, EMG, and/or eye monitors.   Recording band pass was 0.1 to 512 hz.  Digital video recording of the patient is simultaneously recorded with the EEG.  The staff report clinical symptoms and may press an event button when the patient has symptoms of clinical interest to the treating physicians.  EEG and video recording is stored and archived in digital format.  The entire recording is visually reviewed, and the times identified by computer analysis as being spikes or seizures are reviewed again.  Activation procedures which include photic stimulation, hyperventilation and instructing patients to perform simple task are done in selected patients.   Compresses spectral analysis (CSA) is also performed on the activity recorded from each individual channel.  This is displayed as a power display of frequencies from 0 to 30 Hz over time.   The CSA analysis is done and displayed continuously.  This is reviewed for asymmetries in power between homologous areas of the scalp and for presence of changes in power which can be seen when seizures occur.  Sections of suspected abnormalities on the CSA is then compared with the original EEG recording.                [employer] software was also utilized in the review of this study.  This software suite analyzes the EEG recording in multiple domains.  Coherence and rhythmicity is computed to identify EEG sections which may contain organized seizures.  Each channel undergoes analysis to detect presence of spike and sharp waves which have special and morphological  characteristic of epileptic activity.  The routine EEG recording is converted from spacial into frequency domain.  This is then displayed comparing homologous areas to identify areas of significant asymmetry.  Algorithm to identify non-cortically generated artifact is used to separate eye movement, EMG and other artifact from the EEG.     EEG FINDINGS:  The recording was obtained with a number of standard bipolar and referential montages during wakefulness and drowsiness.  In the alert state, the posterior background rhythm was a symmetric, well-modulated 9 to 10 Hz alpha rhythm, which reacted symmetrically to eye opening.  Intermittent photic stimulation failed to evoke symmetric posterior driving responses.  No abnormalities were activated by photic stimulation .  During drowsiness, the background rhythm waxed and waned and there were periods of slowing. There were no focal abnormalities.  There were no interictal epileptiform abnormalities and no clinical or electrographic seizures were recorded. Excessive beta activity was noted throughout the record which was diffuse.     The EKG channel revealed a sinus rhythm.     IMPRESSION:  This is a normal EEG during wakefulness and drowsiness.      CLINICAL CORRELATION:  The patient is a 72 -year-old female who is being evaluated for episodes of loss of confusion.  The patient is currently maintained on any clonazepam.  This is a normal EEG during wakefulness and drowsiness.  There is no evidence for neither cortical dysfunction nor an epileptic process on this recording.  No seizures were recorded during this study.

## 2022-02-24 NOTE — PROGRESS NOTES
[facility]  Progress Note  Date: 2022 9:50 AM            Patient Name: Maggy Tapia   MRN: [id number]   : 1950    AGE: 72 y.o.    LOS: 0 days Hospital Day: 3  Admit date: 2022  3:12 PM         HPI per EMR: Maggy Tapia is a 72 y.o. female with a history of strokes, type 2 diabetes, hypertension, hyperlipidemia, pseudoseizures who was seen today with altered mental status.  History is provided by patient and supplemented by her brother as patient remains confused.  Patient lives with brother.  She was her usual self yesterday evening when she went to sleep.  Patient reports feeling more fatigued this morning.  She is unable to recall the events of the day.  Her brother reports he came home today and saw her eating dry cereal 1 piece at the time at the table which is unusual for her.  He reports she also had 2 episodes of pseudoseizures (his words, states they are not real seizures and she has seen a neurologist for than before, states she is not on antiepileptics at this time) where she slumped over and was unresponsive for few seconds before becoming alert again.  She denies any complaints at this time.  Brother states she had a headache earlier today.  Workup in the emergency department has been mostly unremarkable including basic blood work and CT head.  Urinalysis negative for infection.  Patient will be monitored by the hospital medicine service and will out stroke with MRI.  Will consult with Neurology     Neurology consult:  Patient was seen examined by me this morning.  She is alert and oriented x3.  She states that yesterday she was confused, and apparently she was hallucinating seeing and thinking about people who passed away in the past.  She was not able to recognize her brother as well who lives with her.  She was at endocrinologist office and apparently was recommended to go to the ER.  She did not have any vision problems, nausea vomiting, dizziness or  headache, any weakness in extremities.     Patient states that she also has history of pseudoseizures and she is to follow up with Neurology in the past however her neurologist retired.  She is not any antiseizure medications.  As per chart review, patient had 2 episodes of pseudoseizures which were reported by her brother where she slumped over and was unresponsive for few seconds before she became alert again.  Patient does not remember these episodes.    02/24/2022: No acute events overnight. Patient was seen and examined by me this morning. Neuro exam was similar to yesterday.  She continued to have orofacial dyskinesias.  She denies any new complaints.  Psychiatry has seen the patient.       Vitals:  Patient Vitals for the past 24 hrs:   BP Temp Temp src Pulse Resp SpO2   02/24/22 0738 (!) 109/54 97.2 °F (36.2 °C) Oral 82 17 95 %   02/24/22 0721 -- -- -- 70 18 97 %   02/24/22 0337 119/70 97.6 °F (36.4 °C) -- 72 18 95 %   02/24/22 0300 114/66 97.5 °F (36.4 °C) -- 70 16 95 %   02/23/22 2314 (!) 101/55 97.7 °F (36.5 °C) -- 76 18 (!) 93 %   02/23/22 2000 130/69 97.3 °F (36.3 °C) Oral 70 16 96 %   02/23/22 1957 -- -- -- 71 16 98 %   02/23/22 1525 136/72 97.1 °F (36.2 °C) Oral -- 16 99 %   02/23/22 1128 (!) 143/63 97.4 °F (36.3 °C) Oral -- 16 98 %     PHYSICAL EXAM:     GENERAL APPEARANCE: Alert, well-developed, well-nourished female in no acute distress.  HEENT: Normocephalic and atraumatic. PERRL. Oropharynx unremarkable.  PULM: Normal respiratory effort. No accessory muscle use.  CV: RRR.  ABDOMEN: Soft, nontender.  EXTREMITIES: No obvious signs of vascular compromise. Pulses present. No cyanosis, clubbing or edema.  SKIN: Clear; no rashes, lesions or skin breaks in exposed areas.     NEURO:  MENTAL STATUS: She is alert and oriented x3.  Able to provide history and follow commands appropriately..  Affect euthymic.     CRANIAL NERVES:  CN I: Not tested.  CN II: Fundoscopic exam deferred.  CN III, IV, VI: Pupils  equal, round and reactive to light.  Extraocular movements full and intact.  CN V: Facial sensation normal.  CN VII: Facial asymmetry absent.  CN VIII: Hearing grossly normal and equal bilaterally.  No skew deviation or pathologic nystagmus.  CN IX, X: Palate elevates symmetrically. Speech/articulation is clear without dysarthria.  CN XI: Shoulder shrug and chin rotation equal with good strength.  CN XII: Tongue protrusion midline.     MOTOR:  Bulk normal. Tone normal and symmetric throughout.  Abnormal movements noted: Face and mouth which are likely dyskinetic movements.  Tremor: none present.  Strength 5/5 throughout.     REFLEXES:  DTRs 3+ throughout.  Plantar response equivocal bilaterally.  SENSATION:grossly intact throughout.  COORDINATION: normal finger-to-nose.  STATION: not tested.  GAIT: not tested.       CURRENT SCHEDULED MEDICATIONS:   aspirin  81 mg Oral Daily    atorvastatin  40 mg Oral Daily    clonazePAM  0.5 mg Oral BID    cyanocobalamin  1,000 mcg Oral Daily    enoxaparin  40 mg Subcutaneous Daily    fluticasone furoate-vilanteroL  1 puff Inhalation Daily    levothyroxine  100 mcg Oral Before breakfast    prazosin  2 mg Oral QHS    sertraline  50 mg Oral Daily     CURRENT INFUSIONS:   sodium chloride 0.9%       DATA:  Recent Labs   Lab 02/22/22  1624 02/23/22  0611 02/24/22  0603   * 145 142   K 4.4 4.9 4.5    107 107   CO2 26 28 25   BUN 21 20 17   CREATININE 1.0 1.0 1.0   GLU 94 103 115*   CALCIUM 10.2 9.5 9.3   PHOS  --  3.9  --    MG  --  1.9  --    AST 12 13 14   ALT 15 13 12     Recent Labs   Lab 02/22/22  1624 02/23/22  0611 02/24/22  0603   WBC 10.26 9.47 7.68   HGB 14.8 14.7 14.7   HCT 46.2 46.2 45.8    177 168     No results found for: PROTEINCSF, GLUCCSF, WBCCSF, RBCCSF  Hemoglobin A1C   Date Value Ref Range Status   02/15/2022 6.0 (H) 4.0 - 5.6 % Final     Comment:     ADA Screening Guidelines:  5.7-6.4%  Consistent with prediabetes  >or=6.5%  Consistent  with diabetes    High levels of fetal hemoglobin interfere with the HbA1C  assay. Heterozygous hemoglobin variants (HbS, HgC, etc)do  not significantly interfere with this assay.   However, presence of multiple variants may affect accuracy.     10/04/2021 6.2 (H) 4.0 - 5.6 % Final     Comment:     ADA Screening Guidelines:  5.7-6.4%  Consistent with prediabetes  >or=6.5%  Consistent with diabetes    High levels of fetal hemoglobin interfere with the HbA1C  assay. Heterozygous hemoglobin variants (HbS, HgC, etc)do  not significantly interfere with this assay.   However, presence of multiple variants may affect accuracy.     06/03/2021 5.7 (H) 4.0 - 5.6 % Final     Comment:     ADA Screening Guidelines:  5.7-6.4%  Consistent with prediabetes  >or=6.5%  Consistent with diabetes    High levels of fetal hemoglobin interfere with the HbA1C  assay. Heterozygous hemoglobin variants (HbS, HgC, etc)do  not significantly interfere with this assay.   However, presence of multiple variants may affect accuracy.              I have personally reviewed and interpreted the pertinent imaging and lab results.  Imaging Results          CT Head Without Contrast (Final result)  Result time 02/22/22 16:11:55    Final result by Joselito Zimmer MD (02/22/22 16:11:55)                 Impression:      1. No intracranial hemorrhage.  No evidence for recent ischemia, noting however that MRI could add sensitivity in an acute setting.  2. Mild cerebral involutional change and white matter disease.      Electronically signed by: Joselito Zimmer  Date:    02/22/2022  Time:    16:11             Narrative:    EXAMINATION:  CT HEAD WITHOUT CONTRAST    CLINICAL HISTORY:  Neuro deficit, acute, stroke suspected;    TECHNIQUE:  Low dose axial images were obtained through the head.  Coronal and sagittal reformations were also performed. Contrast was not administered.    COMPARISON:  10/27/2021    FINDINGS:  There are involutional changes with normal  size of the ventricular system. Periventricular white matter hypoattenuation is nonspecific but suggestive of chronic microvascular ischemic change. Encephalomalacia/gliosis high left parietal lobe with overlying craniotomy defect.  No mass, hemorrhage or acute major vascular distribution infarct. No mass effect or midline shift. Surgical changes of the globes. There is a fluid level in the right sphenoid sinus.  There is a 1 cm mucous retention cyst versus polyp posterior left ethmoid air cells.   No acute osseous abnormality.                                        ASSESSMENT AND PLAN:    Seizure like activity   Encephalopathy  Orofacial dyskinesias/Tardive dyskinesias        Workup:   CT head:  No acute intracranial abnormality  MRI brain:   There is no acute abnormality.  Specifically, there is no acute hemorrhage.  There is no acute infarction.  There is left posterior parietal encephalomalacia and gliosis from remote insult.  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  There is no critical stenosis, occlusion, thrombosis or dissection.   carotid ultrasound:  No evidence of a hemodynamically significant carotid bifurcation stenosis.Flow in vertebral arteries appears antegrade bilaterally.  EEG: normal EEG during wakefulness and drowsiness. no evidence for neither cortical dysfunction nor an epileptic process on this recording          Plan:  · Etiology of seizure likely to be nonepileptic episode.  Etiology of encephalopathy is unknown at this time. Workup in progress  · Psychiatry has seen the patient and recommended to increase Zoloft to 50 mg daily, restart prazosin and restart trazodone.  · Will hold off on starting AEDs.  · Etiology of orofacial dyskinesias unknown. Patient is not on Neuroleptics or any Dopamine blockade medications. Try to avoid dopamine blockade meds as this can worsen dyskinesias  · Will start on Klonopin 0.5 mg  b.i.d. for orofacial dyskinesias while inpatient. On discharge, if continues to have dyskinesias patient should be started on tetrabenazine. Start with 50 mg daily can go up to 50 mg b.i.d. in a week.  · Checking vitamin levels(vitamin B1, B6 and B12), replete if low  · Workup and management for metabolic and infectious abnormalities per primary team  · Q 4 neuro checks  · Seizure precautions  · Physical and occupational therapy  · Avoid medications that lower seizure threshold         35 minutes of care time has been spent evaluating with the patient. Time includes chart review not limited to diagnostic imaging, labs, and vitals, patient assessment, discussion with family and nursing, current order evaluations, and new order entries.      Brenton Isaacs MD  Neurology/vascular Neurology  Date of Service: 02/24/2022  9:50 AM    Please note: This note was transcribed using voice recognition software. Because of this technology there are often uinintended grammatical, spelling, and other transcription errors. Please disregard these errors.

## 2022-02-24 NOTE — PLAN OF CARE
Problem: Adjustment to Illness (Stroke, Ischemic/Transient Ischemic Attack)  Goal: Optimal Coping  Outcome: Ongoing, Progressing     Problem: Bowel Elimination Impaired (Stroke, Ischemic/Transient Ischemic Attack)  Goal: Effective Bowel Elimination  Outcome: Ongoing, Progressing

## 2022-02-24 NOTE — CARE UPDATE
02/24/22 0721   Patient Assessment/Suction   Level of Consciousness (AVPU) alert   Respiratory Effort Normal;Unlabored   Expansion/Accessory Muscles/Retractions expansion symmetric;no retractions;no use of accessory muscles   All Lung Fields Breath Sounds coarse;diminished   Rhythm/Pattern, Respiratory unlabored   PRE-TX-O2   O2 Device (Oxygen Therapy) room air   SpO2 97 %   Pulse Oximetry Type Intermittent   $ Pulse Oximetry - Multiple Charge Pulse Oximetry - Multiple   Pulse 70   Resp 18   Inhaler   $ Inhaler Charges MDI (Metered Dose Inahler) Treatment   Daily Review of Necessity (Inhaler) completed   Respiratory Treatment Status (Inhaler) given   Treatment Route (Inhaler) mouthpiece   Patient Position (Inhaler) HOB elevated   Post Treatment Assessment (Inhaler) breath sounds unchanged   Signs of Intolerance (Inhaler) none   Breath Sounds Post-Respiratory Treatment   Throughout All Fields Post-Treatment All Fields   Throughout All Fields Post-Treatment no change   Post-treatment Heart Rate (beats/min) 75   Post-treatment Resp Rate (breaths/min) 18

## 2022-02-24 NOTE — PLAN OF CARE
Patient is clear for discharge from case management standpoint.             02/24/22 1209   Final Note   Assessment Type Final Discharge Note   Anticipated Discharge Disposition Home   What phone number can be called within the next 1-3 days to see how you are doing after discharge? [phone]   Hospital Resources/Appts/Education Provided Appointments scheduled and added to AVS   Post-Acute Status   Discharge Delays None known at this time

## 2022-02-24 NOTE — DISCHARGE SUMMARY
[facility] Medicine  Discharge Summary      Patient Name: Maggy Tapia  MRN: [id number]  Patient Class: OP- Observation  Admission Date: 2/22/2022  Hospital Length of Stay: 0 days  Discharge Date and Time:  02/24/2022 8:34 AM  Attending Physician: Fei Ventura MD   Discharging Provider: Fei Ventura MD  Primary Care Provider: Yanna Abbasi MD      HPI:   Patient is a 72-year-old female with a history of 2 strokes, type 2 diabetes, hypertension, hyperlipidemia, pseudoseizures who was seen today with altered mental status.  History is provided by patient and supplemented by her brother as patient remains confused.  Patient lives with brother.  She was her usual self yesterday evening when she went to sleep.  Patient reports feeling more fatigued this morning.  She is unable to recall the events of the day.  Her brother reports he came home today and saw her eating dry cereal 1 piece at the time at the table which is unusual for her.  He reports she also had 2 episodes of pseudoseizures (his words, states they are not real seizures and she has seen a neurologist for than before, states she is not on antiepileptics at this time) where she slumped over and was unresponsive for few seconds before becoming alert again.  She denies any complaints at this time.  Brother states she had a headache earlier today.  Workup in the emergency department has been mostly unremarkable including basic blood work and CT head.  Urinalysis negative for infection.  Patient will be monitored by the hospital medicine service and will out stroke with MRI.  Will consult with Neurology.      * No surgery found *      Hospital Course:   Patient placed on tele-monitoring and routine neuro-checks. Neuro-imaging reviewed, results below. Patient evaluated by Neurology and tele-Psyciatry teams. Patient worked with ST/PT/OT. Symptoms improved.  Medications adjustments recommended by psychiatrist and neurologist were  performed and discussed with the patient including side effect profile.  Medication compliance discussed with the patient.  Patient will continue close follow-up with neurologist and her psychiatrist upon discharge.  Patient to continue vitamin B12 supplementation.  Fall precautions discussed with the patient.    Goals of Care Treatment Preferences:  Code Status: Full Code    Living Will: Yes              Consults:   Consults (From admission, onward)        Status Ordering Provider     Inpatient consult to Telemedicine - Psyc  Once        Provider:  Maurilio Casas MD    Completed NATALY SPEARS     Inpatient consult to Registered Dietitian/Nutritionist  Once        Provider:  (Not yet assigned)    Acknowledged ROBERT GIBSON     IP consult to case management/social work  Once        Provider:  (Not yet assigned)    Completed ROBERT GIBSON     Inpatient consult to Neurology  Once        Provider:  Brenton Isaacs MD    Completed ORBERT GIBSON     Consult to Telemedicine - Acute Stroke  Once        Provider:  Lyndsey Shea MD    Acknowledged DANIEL SALAS        US carotids:  No evidence of a hemodynamically significant carotid bifurcation stenosis. Flow in vertebral arteries appears antegrade bilaterally.     CT head without contrast:  1. No intracranial hemorrhage.  No evidence for recent ischemia, noting however that MRI could add sensitivity in an acute setting.  2. Mild cerebral involutional change and white matter disease.    EEG: This is a normal EEG during wakefulness and drowsiness.     MRI brain: There is no acute abnormality.  Specifically, there is no acute hemorrhage.  There is no acute infarction.  There is left posterior parietal encephalomalacia and gliosis from remote insult.    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  There is no critical stenosis, occlusion, thrombosis or dissection.  There is no large aneurysm.   There is nonocclusive stenosis of the P2 segments of the posterior cerebral arteries bilaterally.      Final Active Diagnoses:    Diagnosis Date Noted POA    PRINCIPAL PROBLEM:  Stroke [I63.9] 02/22/2022 Yes    Confusion and disorientation [R41.0] 02/23/2022 Unknown    Schizophrenia [F20.9] 02/23/2022 Yes    Essential hypertension [I10] 07/01/2016 Yes    History of ischemic left MCA stroke [Z86.73] 07/01/2016 Not Applicable    Hyperlipidemia [E78.5] 07/01/2016 Yes    Hypothyroidism [E03.9] 07/01/2016 Yes     Chronic    Seizures [R56.9] 07/01/2016 Yes    Type 2 diabetes mellitus without complication, with long-term current use of insulin [E11.9, Z79.4] 07/01/2016 Not Applicable      Problems Resolved During this Admission:       Discharged Condition: good    Disposition: Home or Self Care    Follow Up:   Follow-up Information     Yanna Abbasi MD Follow up in 1 week(s).    Specialty: Family Medicine  Contact information:  [address]  [place] [id number] [phone]             Brenton Isaacs MD Follow up in 1 week(s).    Specialty: Neurology  Contact information:  [address]   [place] [id number] [phone]                       Patient Instructions:      Diet Cardiac     Diet diabetic     Notify your health care provider if you experience any of the following:  severe persistent headache     Notify your health care provider if you experience any of the following:  persistent dizziness, light-headedness, or visual disturbances     Notify your health care provider if you experience any of the following:  increased confusion or weakness     Activity as tolerated   Order Comments: Fall precautions       Significant Diagnostic Studies: Labs:   CMP   Recent Labs   Lab 02/22/22  1624 02/23/22  0611 02/24/22  0603   * 145 142   K 4.4 4.9 4.5    107 107   CO2 26 28 25   GLU 94 103 115*   BUN 21 20 17   CREATININE 1.0 1.0 1.0   CALCIUM 10.2 9.5 9.3   PROT 7.2 6.6 6.4   ALBUMIN 3.4* 3.2* 3.0*    BILITOT 0.3 0.4 0.4   ALKPHOS 79 73 65   AST 12 13 14   ALT 15 13 12   ANIONGAP 13 10 10   ESTGFRAFRICA >60 >60 >60   EGFRNONAA 56* 56* 56*    and CBC   Recent Labs   Lab 02/22/22  1624 02/23/22  0611 02/24/22  0603   WBC 10.26 9.47 7.68   HGB 14.8 14.7 14.7   HCT 46.2 46.2 45.8    177 168       Pending Diagnostic Studies:     Procedure Component Value Units Date/Time    Vitamin B1 [[id number]] Collected: 02/23/22 1313    Order Status: Sent Lab Status: In process Updated: 02/23/22 1318    Specimen: Blood     Vitamin B6 [[id number]] Collected: 02/23/22 1313    Order Status: Sent Lab Status: In process Updated: 02/23/22 1318    Specimen: Blood          Medications:  Reconciled Home Medications:      Medication List      START taking these medications    cyanocobalamin 1000 MCG tablet  Commonly known as: VITAMIN B-12  Take 1 tablet (1,000 mcg total) by mouth once daily.     tetrabenazine 25 mg tablet  Commonly known as: XENAZINE  Take one tablet daily for 7 days and then  Take 1 tablet twice daily afterwards     traZODone 50 MG tablet  Commonly known as: DESYREL  Take 1 tablet (50 mg total) by mouth nightly as needed for Insomnia.        CHANGE how you take these medications    gabapentin 300 MG capsule  Commonly known as: NEURONTIN  Take 1 tablet every night x 7 days. Increase to twice a day x 7 days. Increase to three times a day.  What changed: Another medication with the same name was removed. Continue taking this medication, and follow the directions you see here.     losartan 50 MG tablet  Commonly known as: COZAAR  Take 0.5 tablets (25 mg total) by mouth once daily.  What changed: how much to take     sertraline 50 MG tablet  Commonly known as: ZOLOFT  Take 1 tablet (50 mg total) by mouth once daily.  What changed:   · medication strength  · how much to take        CONTINUE taking these medications    albuterol 1.25 mg/3 mL Nebu  Commonly known as: ACCUNEB  Take 3 mLs (1.25 mg total) by nebulization every  6 (six) hours as needed (sob). Rescue     aspirin 81 MG EC tablet  Commonly known as: ECOTRIN  Take 81 mg by mouth once daily.     blood-glucose meter kit  Use as instructed. Insurance preferred.     BREO ELLIPTA 100-25 mcg/dose diskus inhaler  Generic drug: fluticasone furoate-vilanteroL  Inhale 1 puff into the lungs once daily. Controller     CALCIUM 500 + D ORAL  Take 1 tablet by mouth once daily. 10 mg daily     JARDIANCE 10 mg tablet  Generic drug: empagliflozin  Take 1 tablet (10 mg total) by mouth once daily.     levothyroxine 100 MCG tablet  Commonly known as: SYNTHROID  Take 1 tablet (100 mcg total) by mouth before breakfast.     metFORMIN 500 MG ER 24hr tablet  Commonly known as: GLUCOPHAGE-XR  Take 2 tablets (1,000 mg total) by mouth 2 (two) times daily with meals.     potassium chloride SA 20 MEQ tablet  Commonly known as: K-DUR,KLOR-CON  Take 20 mEq by mouth once daily.     prazosin 5 MG capsule  Commonly known as: MINIPRESS  Take 1 capsule (5 mg total) by mouth every evening.     simvastatin 40 MG tablet  Commonly known as: ZOCOR  TAKE 1 TABLET BY MOUTH EVERY DAY        STOP taking these medications    ergocalciferol 50,000 unit Cap  Commonly known as: ERGOCALCIFEROL     HYDROcodone-acetaminophen 5-325 mg per tablet  Commonly known as: NORCO     nystatin powder  Commonly known as: MYCOSTATIN     solifenacin 10 MG tablet  Commonly known as: VESICARE            Indwelling Lines/Drains at time of discharge:   Lines/Drains/Airways     None                 Time spent on the discharge of patient: 28 minutes         Fei Ventura MD  Department of Hospital Medicine  [facility]

## 2022-03-07 ENCOUNTER — TELEPHONE (OUTPATIENT)
Dept: ENDOCRINOLOGY | Facility: CLINIC | Age: 72
End: 2022-03-07
Payer: MEDICARE

## 2022-03-07 NOTE — TELEPHONE ENCOUNTER
Spoke with patient, gave her Blood sugar log review per Ms. Richards, readings are good, patient verbalized understanding.

## 2022-03-08 LAB
MAYO MISCELLANEOUS RESULT (REF): ABNORMAL
MAYO MISCELLANEOUS RESULT (REF): NORMAL

## 2022-03-09 ENCOUNTER — OFFICE VISIT (OUTPATIENT)
Dept: FAMILY MEDICINE | Facility: CLINIC | Age: 72
End: 2022-03-09
Payer: MEDICARE

## 2022-03-09 VITALS
BODY MASS INDEX: 34.34 KG/M2 | OXYGEN SATURATION: 95 % | DIASTOLIC BLOOD PRESSURE: 70 MMHG | HEART RATE: 64 BPM | WEIGHT: 181.88 LBS | SYSTOLIC BLOOD PRESSURE: 128 MMHG | TEMPERATURE: 98 F | HEIGHT: 61 IN

## 2022-03-09 DIAGNOSIS — F33.1 MAJOR DEPRESSIVE DISORDER, RECURRENT EPISODE, MODERATE: ICD-10-CM

## 2022-03-09 DIAGNOSIS — Z23 FLU VACCINE NEED: ICD-10-CM

## 2022-03-09 DIAGNOSIS — E11.9 TYPE 2 DIABETES MELLITUS WITHOUT COMPLICATION, UNSPECIFIED WHETHER LONG TERM INSULIN USE: ICD-10-CM

## 2022-03-09 DIAGNOSIS — F44.5 PSYCHOGENIC NONEPILEPTIC SEIZURE: Primary | ICD-10-CM

## 2022-03-09 DIAGNOSIS — E03.9 ACQUIRED HYPOTHYROIDISM: ICD-10-CM

## 2022-03-09 DIAGNOSIS — E66.01 SEVERE OBESITY (BMI 35.0-35.9 WITH COMORBIDITY): ICD-10-CM

## 2022-03-09 DIAGNOSIS — Z79.4 TYPE 2 DIABETES MELLITUS WITHOUT COMPLICATION, WITH LONG-TERM CURRENT USE OF INSULIN: ICD-10-CM

## 2022-03-09 DIAGNOSIS — J45.40 MODERATE PERSISTENT ASTHMA, UNSPECIFIED WHETHER COMPLICATED: ICD-10-CM

## 2022-03-09 DIAGNOSIS — E78.5 HYPERLIPIDEMIA ASSOCIATED WITH TYPE 2 DIABETES MELLITUS: ICD-10-CM

## 2022-03-09 DIAGNOSIS — E11.69 HYPERLIPIDEMIA ASSOCIATED WITH TYPE 2 DIABETES MELLITUS: ICD-10-CM

## 2022-03-09 DIAGNOSIS — E11.9 TYPE 2 DIABETES MELLITUS WITHOUT COMPLICATION, WITH LONG-TERM CURRENT USE OF INSULIN: ICD-10-CM

## 2022-03-09 DIAGNOSIS — I15.2 HYPERTENSION ASSOCIATED WITH TYPE 2 DIABETES MELLITUS: ICD-10-CM

## 2022-03-09 DIAGNOSIS — E11.59 HYPERTENSION ASSOCIATED WITH TYPE 2 DIABETES MELLITUS: ICD-10-CM

## 2022-03-09 PROCEDURE — 4010F PR ACE/ARB THEARPY RXD/TAKEN: ICD-10-PCS | Mod: CPTII,S$GLB,, | Performed by: NURSE PRACTITIONER

## 2022-03-09 PROCEDURE — 1101F PR PT FALLS ASSESS DOC 0-1 FALLS W/OUT INJ PAST YR: ICD-10-PCS | Mod: CPTII,S$GLB,, | Performed by: NURSE PRACTITIONER

## 2022-03-09 PROCEDURE — 99214 OFFICE O/P EST MOD 30 MIN: CPT | Mod: 25,S$GLB,, | Performed by: NURSE PRACTITIONER

## 2022-03-09 PROCEDURE — 90472 IMMUNIZATION ADMIN EACH ADD: CPT | Mod: S$GLB,,, | Performed by: NURSE PRACTITIONER

## 2022-03-09 PROCEDURE — 90472 ZOSTER RECOMBINANT VACCINE: ICD-10-PCS | Mod: S$GLB,,, | Performed by: NURSE PRACTITIONER

## 2022-03-09 PROCEDURE — 1157F ADVNC CARE PLAN IN RCRD: CPT | Mod: CPTII,S$GLB,, | Performed by: NURSE PRACTITIONER

## 2022-03-09 PROCEDURE — 1126F AMNT PAIN NOTED NONE PRSNT: CPT | Mod: CPTII,S$GLB,, | Performed by: NURSE PRACTITIONER

## 2022-03-09 PROCEDURE — 3044F HG A1C LEVEL LT 7.0%: CPT | Mod: CPTII,S$GLB,, | Performed by: NURSE PRACTITIONER

## 2022-03-09 PROCEDURE — 1160F RVW MEDS BY RX/DR IN RCRD: CPT | Mod: CPTII,S$GLB,, | Performed by: NURSE PRACTITIONER

## 2022-03-09 PROCEDURE — 90694 VACC AIIV4 NO PRSRV 0.5ML IM: CPT | Mod: S$GLB,,, | Performed by: NURSE PRACTITIONER

## 2022-03-09 PROCEDURE — 99999 PR PBB SHADOW E&M-EST. PATIENT-LVL IV: ICD-10-PCS | Mod: PBBFAC,,, | Performed by: NURSE PRACTITIONER

## 2022-03-09 PROCEDURE — 3044F PR MOST RECENT HEMOGLOBIN A1C LEVEL <7.0%: ICD-10-PCS | Mod: CPTII,S$GLB,, | Performed by: NURSE PRACTITIONER

## 2022-03-09 PROCEDURE — 90750 ZOSTER RECOMBINANT VACCINE: ICD-10-PCS | Mod: S$GLB,,, | Performed by: NURSE PRACTITIONER

## 2022-03-09 PROCEDURE — 3288F PR FALLS RISK ASSESSMENT DOCUMENTED: ICD-10-PCS | Mod: CPTII,S$GLB,, | Performed by: NURSE PRACTITIONER

## 2022-03-09 PROCEDURE — 1159F MED LIST DOCD IN RCRD: CPT | Mod: CPTII,S$GLB,, | Performed by: NURSE PRACTITIONER

## 2022-03-09 PROCEDURE — 1126F PR PAIN SEVERITY QUANTIFIED, NO PAIN PRESENT: ICD-10-PCS | Mod: CPTII,S$GLB,, | Performed by: NURSE PRACTITIONER

## 2022-03-09 PROCEDURE — 90694 FLU VACCINE - QUADRIVALENT - ADJUVANTED: ICD-10-PCS | Mod: S$GLB,,, | Performed by: NURSE PRACTITIONER

## 2022-03-09 PROCEDURE — 99999 PR PBB SHADOW E&M-EST. PATIENT-LVL IV: CPT | Mod: PBBFAC,,, | Performed by: NURSE PRACTITIONER

## 2022-03-09 PROCEDURE — 3008F BODY MASS INDEX DOCD: CPT | Mod: CPTII,S$GLB,, | Performed by: NURSE PRACTITIONER

## 2022-03-09 PROCEDURE — 1159F PR MEDICATION LIST DOCUMENTED IN MEDICAL RECORD: ICD-10-PCS | Mod: CPTII,S$GLB,, | Performed by: NURSE PRACTITIONER

## 2022-03-09 PROCEDURE — 3288F FALL RISK ASSESSMENT DOCD: CPT | Mod: CPTII,S$GLB,, | Performed by: NURSE PRACTITIONER

## 2022-03-09 PROCEDURE — 1160F PR REVIEW ALL MEDS BY PRESCRIBER/CLIN PHARMACIST DOCUMENTED: ICD-10-PCS | Mod: CPTII,S$GLB,, | Performed by: NURSE PRACTITIONER

## 2022-03-09 PROCEDURE — 99214 PR OFFICE/OUTPT VISIT, EST, LEVL IV, 30-39 MIN: ICD-10-PCS | Mod: 25,S$GLB,, | Performed by: NURSE PRACTITIONER

## 2022-03-09 PROCEDURE — 4010F ACE/ARB THERAPY RXD/TAKEN: CPT | Mod: CPTII,S$GLB,, | Performed by: NURSE PRACTITIONER

## 2022-03-09 PROCEDURE — 1101F PT FALLS ASSESS-DOCD LE1/YR: CPT | Mod: CPTII,S$GLB,, | Performed by: NURSE PRACTITIONER

## 2022-03-09 PROCEDURE — G0008 ADMIN INFLUENZA VIRUS VAC: HCPCS | Mod: S$GLB,,, | Performed by: NURSE PRACTITIONER

## 2022-03-09 PROCEDURE — 90750 HZV VACC RECOMBINANT IM: CPT | Mod: S$GLB,,, | Performed by: NURSE PRACTITIONER

## 2022-03-09 PROCEDURE — 1157F PR ADVANCE CARE PLAN OR EQUIV PRESENT IN MEDICAL RECORD: ICD-10-PCS | Mod: CPTII,S$GLB,, | Performed by: NURSE PRACTITIONER

## 2022-03-09 PROCEDURE — 3008F PR BODY MASS INDEX (BMI) DOCUMENTED: ICD-10-PCS | Mod: CPTII,S$GLB,, | Performed by: NURSE PRACTITIONER

## 2022-03-09 PROCEDURE — G0008 FLU VACCINE - QUADRIVALENT - ADJUVANTED: ICD-10-PCS | Mod: S$GLB,,, | Performed by: NURSE PRACTITIONER

## 2022-03-09 RX ORDER — SIMVASTATIN 40 MG/1
40 TABLET, FILM COATED ORAL DAILY
Qty: 90 TABLET | Refills: 3 | Status: SHIPPED | OUTPATIENT
Start: 2022-03-09 | End: 2022-06-29 | Stop reason: SDUPTHER

## 2022-03-09 NOTE — PATIENT INSTRUCTIONS
If you are due for any health screening(s) below please notify me so we can arrange them to be ordered and scheduled to maintain your health.     Health Maintenance   Topic Date Due    Eye Exam  12/30/2021    Foot Exam  07/22/2022    Hemoglobin A1c  08/15/2022    High Dose Statin  02/22/2023    Lipid Panel  02/23/2023    DEXA Scan  07/30/2023    TETANUS VACCINE  01/30/2030    Hepatitis C Screening  Completed    Mammogram  Discontinued                  Diabetic Retinal Eye Exam    Diabetes is the #1 cause of blindness in the [place] - early detection before signs or symptoms develop can prevent debilitating blindness.    Once-a-year screening is recommended for all diabetic patients. This exam can prevent and treat diabetes complications in the eye before developing symptoms. This can be done with a special camera is used to take photographs of the back of your eye without having to dilate them, or you can see an eye doctor for a full dilated exam.    Although you are still overdue for this important screening, due to the COVID-19 pandemic, we recommend scheduling it in the near future.

## 2022-03-09 NOTE — PROGRESS NOTES
Subjective:       Patient ID: Maggy Tapia is a 72 y.o. female.    Chief Complaint: Hospital Follow Up    HPI    Admission Date: 2/22/2022  Hospital Length of Stay: 0 days  Discharge Date and Time:  02/24/2022       Patient is a 72-year-old female with a history of 2 strokes, type 2 diabetes, hypertension, hyperlipidemia, pseudoseizures who presents today for hospital follow up. Patient was seen on 2/22/22 with altered mental status. Workup in the emergency department has been mostly unremarkable including basic blood work and CT head.  Urinalysis negative for infection.     patient's brought reports she also had 2 episodes of pseudoseizures where she slumped over and was unresponsive for few seconds before becoming alert again.  Hospital Course:   Patient placed on tele-monitoring and routine neuro-checks. Neuro-imaging reviewed, results below. Patient evaluated by Neurology and tele-Psyciatry teams. Patient worked with ST/PT/OT. Symptoms improved.  Medications adjustments recommended by psychiatrist and neurologist       Patient states that she also has history of pseudoseizures and she is to follow up with Neurology in the past however her neurologist retired.  She is not any antiseizure medications        Workup:   CT head:  No acute intracranial abnormality  MRI brain:   There is no acute abnormality.  Specifically, there is no acute hemorrhage.  There is no acute infarction.  There is left posterior parietal encephalomalacia and gliosis from remote insult.  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  There is no critical stenosis, occlusion, thrombosis or dissection.   carotid ultrasound:  No evidence of a hemodynamically significant carotid bifurcation stenosis.Flow in vertebral arteries appears antegrade bilaterally.  EEG: normal EEG during wakefulness and drowsiness. no evidence for neither cortical dysfunction nor an epileptic  process on this recording          Past Medical History:   Diagnosis Date    Anxiety     Arthritis     Asthma     Cancer 2000    Left Breast    Depression     Diabetes mellitus, type 2     GERD (gastroesophageal reflux disease)     Hyperlipidemia     Hypertension     Overactive bladder     Seizures     Pseudo-seizures    Sleep apnea     Stroke     Stroke 03/2022    pt was in the hospital for a stroke, claims she was seeing people when the stroke happened    Thyroid disease     Urinary tract infection without hematuria 08/03/2017       Review of patient's allergies indicates:   Allergen Reactions    Penicillins Anaphylaxis    Sulfa (sulfonamide antibiotics) Anaphylaxis    Trintellix [vortioxetine] Nausea And Vomiting and Other (See Comments)     Patient has seizures and vomits         Current Outpatient Medications:     albuterol (ACCUNEB) 1.25 mg/3 mL Nebu, Take 3 mLs (1.25 mg total) by nebulization every 6 (six) hours as needed (sob). Rescue, Disp: 360 Box, Rfl: 3    aspirin (ECOTRIN) 81 MG EC tablet, Take 81 mg by mouth once daily., Disp: , Rfl:     blood-glucose meter kit, Use as instructed. Insurance preferred., Disp: 1 each, Rfl: 0    CALCIUM CARBONATE/VITAMIN D3 (CALCIUM 500 + D ORAL), Take 1 tablet by mouth once daily. 10 mg daily, Disp: , Rfl:     cyanocobalamin (VITAMIN B-12) 1000 MCG tablet, Take 1 tablet (1,000 mcg total) by mouth once daily., Disp: 30 tablet, Rfl: 0    empagliflozin (JARDIANCE) 10 mg tablet, Take 1 tablet (10 mg total) by mouth once daily., Disp: 90 tablet, Rfl: 2    fluticasone furoate-vilanteroL (BREO ELLIPTA) 100-25 mcg/dose diskus inhaler, Inhale 1 puff into the lungs once daily. Controller, Disp: 60 each, Rfl: 11    gabapentin (NEURONTIN) 300 MG capsule, Take 1 tablet every night x 7 days. Increase to twice a day x 7 days. Increase to three times a day., Disp: 30 capsule, Rfl: 2    levothyroxine (SYNTHROID) 100 MCG tablet, Take 1 tablet (100 mcg total) by  "mouth before breakfast., Disp: 30 tablet, Rfl: 11    losartan (COZAAR) 50 MG tablet, Take 0.5 tablets (25 mg total) by mouth once daily., Disp: 90 tablet, Rfl: 3    metFORMIN (GLUCOPHAGE-XR) 500 MG ER 24hr tablet, Take 2 tablets (1,000 mg total) by mouth 2 (two) times daily with meals., Disp: 360 tablet, Rfl: 3    potassium chloride SA (K-DUR,KLOR-CON) 20 MEQ tablet, Take 20 mEq by mouth once daily., Disp: , Rfl:     prazosin (MINIPRESS) 5 MG capsule, Take 1 capsule (5 mg total) by mouth every evening., Disp: 30 capsule, Rfl: 2    sertraline (ZOLOFT) 50 MG tablet, Take 1 tablet (50 mg total) by mouth once daily., Disp: 30 tablet, Rfl: 0    simvastatin (ZOCOR) 40 MG tablet, Take 1 tablet (40 mg total) by mouth once daily., Disp: 90 tablet, Rfl: 3    tetrabenazine (XENAZINE) 25 mg tablet, Take one tablet daily for 7 days and then Take 1 tablet twice daily afterwards, Disp: 47 tablet, Rfl: 0    traZODone (DESYREL) 50 MG tablet, Take 1 tablet (50 mg total) by mouth nightly as needed for Insomnia., Disp: 30 tablet, Rfl: 0    Review of Systems   Constitutional: Negative for unexpected weight change.   HENT: Negative for trouble swallowing.    Eyes: Negative for visual disturbance.   Respiratory: Negative for shortness of breath.    Cardiovascular: Negative for chest pain, palpitations and leg swelling.   Gastrointestinal: Negative for blood in stool.   Genitourinary: Negative for hematuria.   Skin: Negative for rash.   Allergic/Immunologic: Negative for immunocompromised state.   Neurological: Negative for headaches.   Hematological: Does not bruise/bleed easily.   Psychiatric/Behavioral: Negative for agitation. The patient is not nervous/anxious.        Objective:      /70 (BP Location: Right arm, Patient Position: Sitting, BP Method: Medium (Manual))   Pulse 64   Temp 98.2 °F (36.8 °C) (Oral)   Ht 5' 1" (1.549 m)   Wt 82.5 kg (181 lb 14.1 oz)   SpO2 95%   BMI 34.37 kg/m²   Physical " Exam  Constitutional:       Appearance: She is well-developed. She is obese.   Eyes:      Pupils: Pupils are equal, round, and reactive to light.   Cardiovascular:      Rate and Rhythm: Normal rate and regular rhythm.      Heart sounds: Normal heart sounds.   Pulmonary:      Effort: Pulmonary effort is normal.      Breath sounds: Normal breath sounds.   Abdominal:      General: Bowel sounds are normal.      Palpations: Abdomen is soft.   Musculoskeletal:         General: Normal range of motion.      Cervical back: Normal range of motion.   Skin:     General: Skin is warm and dry.   Neurological:      Mental Status: She is alert and oriented to person, place, and time.   Psychiatric:         Behavior: Behavior normal.         Thought Content: Thought content normal.         Judgment: Judgment normal.         Assessment:       1. Psychogenic nonepileptic seizure    2. Hypertension associated with type 2 diabetes mellitus    3. Hyperlipidemia associated with type 2 diabetes mellitus    4. Type 2 diabetes mellitus without complication, with long-term current use of insulin    5. Flu vaccine need    6. Acquired hypothyroidism    7. Moderate persistent asthma, unspecified whether complicated    8. Major depressive disorder, recurrent episode, moderate    9. Severe obesity (BMI 35.0-35.9 with comorbidity)        Plan:       Psychogenic nonepileptic seizure  No episodes since discharge. LAbs reviewed; Dr.Varum Garner-neurology on 3/11/22  Hypertension associated with type 2 diabetes mellitus  Stable, continue medication  Low sodium diet  BP Readings from Last 3 Encounters:   03/10/22 111/71   03/09/22 128/70   02/24/22 113/61     Hyperlipidemia associated with type 2 diabetes mellitus  -     simvastatin (ZOCOR) 40 MG tablet; Take 1 tablet (40 mg total) by mouth once daily.  Dispense: 90 tablet; Refill: 3  Stable, continue magement  Type 2 diabetes mellitus without complication, with long-term current use of insulin  Stable,  "continue management  follow the ADA  Hemoglobin A1C   Date Value Ref Range Status   02/15/2022 6.0 (H) 4.0 - 5.6 % Final     Comment:     ADA Screening Guidelines:  5.7-6.4%  Consistent with prediabetes  >or=6.5%  Consistent with diabetes    High levels of fetal hemoglobin interfere with the HbA1C  assay. Heterozygous hemoglobin variants (HbS, HgC, etc)do  not significantly interfere with this assay.   However, presence of multiple variants may affect accuracy.     10/04/2021 6.2 (H) 4.0 - 5.6 % Final     Comment:     ADA Screening Guidelines:  5.7-6.4%  Consistent with prediabetes  >or=6.5%  Consistent with diabetes    High levels of fetal hemoglobin interfere with the HbA1C  assay. Heterozygous hemoglobin variants (HbS, HgC, etc)do  not significantly interfere with this assay.   However, presence of multiple variants may affect accuracy.     06/03/2021 5.7 (H) 4.0 - 5.6 % Final     Comment:     ADA Screening Guidelines:  5.7-6.4%  Consistent with prediabetes  >or=6.5%  Consistent with diabetes    High levels of fetal hemoglobin interfere with the HbA1C  assay. Heterozygous hemoglobin variants (HbS, HgC, etc)do  not significantly interfere with this assay.   However, presence of multiple variants may affect accuracy.       Flu vaccine need  -     Influenza - Quadrivalent - High Dose (65+) (PF) (IM)    Acquired hypothyroidism  Stable, continue management  Moderate persistent asthma, unspecified whether complicated    Major depressive disorder, recurrent episode, moderate  Stable, continue psych follow up  Severe obesity (BMI 35.0-35.9 with comorbidity)  Counseled patient on his ideal body weight, health consequences of being obese and current recommendations including weekly exercise and a heart healthy diet.  Current BMI is:Estimated body mass index is 34.37 kg/m² as calculated from the following:    Height as of this encounter: 5' 1" (1.549 m).    Weight as of this encounter: 82.5 kg (181 lb 14.1 oz)..  Patient is " aware that ideal BMI < 25 or Weight in (lb) to have BMI = 25: 132.            Patient readiness: acceptance and barriers:none    During the course of the visit the patient was educated and counseled about the following:     Diabetes:  Discussed general issues about diabetes pathophysiology and management.  Addressed ADA diet.  Encouraged aerobic exercise.  Hypertension:   Dietary sodium restriction.  Regular aerobic exercise.  Obesity:   General weight loss/lifestyle modification strategies discussed (elicit support from others; identify saboteurs; non-food rewards, etc).  Regular aerobic exercise program discussed.    Goals: Diabetes: Maintain Hemoglobin A1C below 7, Hypertension: Reduce Blood Pressure and Obesity: Reduce calorie intake and BMI    Did patient meet goals/outcomes: Yes    The following self management tools provided: declined    Patient Instructions (the written plan) was given to the patient/family.     Time spent with patient: 15 minutes    Barriers to medications present (no )    Adverse reactions to current medications (no)    Over the counter medications reviewed (Yes)

## 2022-03-10 ENCOUNTER — OFFICE VISIT (OUTPATIENT)
Dept: UROLOGY | Facility: CLINIC | Age: 72
End: 2022-03-10
Payer: MEDICARE

## 2022-03-10 VITALS
BODY MASS INDEX: 34.17 KG/M2 | WEIGHT: 181 LBS | HEART RATE: 68 BPM | DIASTOLIC BLOOD PRESSURE: 71 MMHG | SYSTOLIC BLOOD PRESSURE: 111 MMHG | HEIGHT: 61 IN | RESPIRATION RATE: 18 BRPM

## 2022-03-10 DIAGNOSIS — R32 URINARY INCONTINENCE, UNSPECIFIED TYPE: Primary | ICD-10-CM

## 2022-03-10 LAB
BILIRUB SERPL-MCNC: CLEAR MG/DL
BLOOD URINE, POC: CLEAR
CLARITY, POC UA: CLEAR
COLOR, POC UA: YELLOW
GLUCOSE UR QL STRIP: ABNORMAL
KETONES UR QL STRIP: CLEAR
LEUKOCYTE ESTERASE URINE, POC: ABNORMAL
NITRITE, POC UA: ABNORMAL
PH, POC UA: 5
PROTEIN, POC: ABNORMAL
SPECIFIC GRAVITY, POC UA: 1.02
UROBILINOGEN, POC UA: 0.2

## 2022-03-10 PROCEDURE — 99999 PR PBB SHADOW E&M-EST. PATIENT-LVL IV: ICD-10-PCS | Mod: PBBFAC,,, | Performed by: NURSE PRACTITIONER

## 2022-03-10 PROCEDURE — 3044F PR MOST RECENT HEMOGLOBIN A1C LEVEL <7.0%: ICD-10-PCS | Mod: CPTII,S$GLB,, | Performed by: NURSE PRACTITIONER

## 2022-03-10 PROCEDURE — 3074F PR MOST RECENT SYSTOLIC BLOOD PRESSURE < 130 MM HG: ICD-10-PCS | Mod: CPTII,S$GLB,, | Performed by: NURSE PRACTITIONER

## 2022-03-10 PROCEDURE — 1160F RVW MEDS BY RX/DR IN RCRD: CPT | Mod: CPTII,S$GLB,, | Performed by: NURSE PRACTITIONER

## 2022-03-10 PROCEDURE — 1159F PR MEDICATION LIST DOCUMENTED IN MEDICAL RECORD: ICD-10-PCS | Mod: CPTII,S$GLB,, | Performed by: NURSE PRACTITIONER

## 2022-03-10 PROCEDURE — 3074F SYST BP LT 130 MM HG: CPT | Mod: CPTII,S$GLB,, | Performed by: NURSE PRACTITIONER

## 2022-03-10 PROCEDURE — 1159F MED LIST DOCD IN RCRD: CPT | Mod: CPTII,S$GLB,, | Performed by: NURSE PRACTITIONER

## 2022-03-10 PROCEDURE — 1157F PR ADVANCE CARE PLAN OR EQUIV PRESENT IN MEDICAL RECORD: ICD-10-PCS | Mod: CPTII,S$GLB,, | Performed by: NURSE PRACTITIONER

## 2022-03-10 PROCEDURE — 3008F PR BODY MASS INDEX (BMI) DOCUMENTED: ICD-10-PCS | Mod: CPTII,S$GLB,, | Performed by: NURSE PRACTITIONER

## 2022-03-10 PROCEDURE — 81002 URINALYSIS NONAUTO W/O SCOPE: CPT | Mod: S$GLB,,, | Performed by: NURSE PRACTITIONER

## 2022-03-10 PROCEDURE — 3078F PR MOST RECENT DIASTOLIC BLOOD PRESSURE < 80 MM HG: ICD-10-PCS | Mod: CPTII,S$GLB,, | Performed by: NURSE PRACTITIONER

## 2022-03-10 PROCEDURE — 3008F BODY MASS INDEX DOCD: CPT | Mod: CPTII,S$GLB,, | Performed by: NURSE PRACTITIONER

## 2022-03-10 PROCEDURE — 3078F DIAST BP <80 MM HG: CPT | Mod: CPTII,S$GLB,, | Performed by: NURSE PRACTITIONER

## 2022-03-10 PROCEDURE — 99213 PR OFFICE/OUTPT VISIT, EST, LEVL III, 20-29 MIN: ICD-10-PCS | Mod: S$GLB,,, | Performed by: NURSE PRACTITIONER

## 2022-03-10 PROCEDURE — 1125F PR PAIN SEVERITY QUANTIFIED, PAIN PRESENT: ICD-10-PCS | Mod: CPTII,S$GLB,, | Performed by: NURSE PRACTITIONER

## 2022-03-10 PROCEDURE — 3288F PR FALLS RISK ASSESSMENT DOCUMENTED: ICD-10-PCS | Mod: CPTII,S$GLB,, | Performed by: NURSE PRACTITIONER

## 2022-03-10 PROCEDURE — 1101F PT FALLS ASSESS-DOCD LE1/YR: CPT | Mod: CPTII,S$GLB,, | Performed by: NURSE PRACTITIONER

## 2022-03-10 PROCEDURE — 1101F PR PT FALLS ASSESS DOC 0-1 FALLS W/OUT INJ PAST YR: ICD-10-PCS | Mod: CPTII,S$GLB,, | Performed by: NURSE PRACTITIONER

## 2022-03-10 PROCEDURE — 4010F ACE/ARB THERAPY RXD/TAKEN: CPT | Mod: CPTII,S$GLB,, | Performed by: NURSE PRACTITIONER

## 2022-03-10 PROCEDURE — 1125F AMNT PAIN NOTED PAIN PRSNT: CPT | Mod: CPTII,S$GLB,, | Performed by: NURSE PRACTITIONER

## 2022-03-10 PROCEDURE — 4010F PR ACE/ARB THEARPY RXD/TAKEN: ICD-10-PCS | Mod: CPTII,S$GLB,, | Performed by: NURSE PRACTITIONER

## 2022-03-10 PROCEDURE — 3044F HG A1C LEVEL LT 7.0%: CPT | Mod: CPTII,S$GLB,, | Performed by: NURSE PRACTITIONER

## 2022-03-10 PROCEDURE — 99999 PR PBB SHADOW E&M-EST. PATIENT-LVL IV: CPT | Mod: PBBFAC,,, | Performed by: NURSE PRACTITIONER

## 2022-03-10 PROCEDURE — 99213 OFFICE O/P EST LOW 20 MIN: CPT | Mod: S$GLB,,, | Performed by: NURSE PRACTITIONER

## 2022-03-10 PROCEDURE — 81002 POCT URINE DIPSTICK WITHOUT MICROSCOPE: ICD-10-PCS | Mod: S$GLB,,, | Performed by: NURSE PRACTITIONER

## 2022-03-10 PROCEDURE — 3288F FALL RISK ASSESSMENT DOCD: CPT | Mod: CPTII,S$GLB,, | Performed by: NURSE PRACTITIONER

## 2022-03-10 PROCEDURE — 1157F ADVNC CARE PLAN IN RCRD: CPT | Mod: CPTII,S$GLB,, | Performed by: NURSE PRACTITIONER

## 2022-03-10 PROCEDURE — 1160F PR REVIEW ALL MEDS BY PRESCRIBER/CLIN PHARMACIST DOCUMENTED: ICD-10-PCS | Mod: CPTII,S$GLB,, | Performed by: NURSE PRACTITIONER
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
## 2022-03-29 ENCOUNTER — HOSPITAL ENCOUNTER (EMERGENCY)
Facility: HOSPITAL | Age: 72
Discharge: HOME | End: 2022-03-29
Attending: EMERGENCY MEDICINE
Payer: MEDICARE

## 2022-03-29 ENCOUNTER — PATIENT OUTREACH (OUTPATIENT)
Dept: EMERGENCY MEDICINE | Facility: HOSPITAL | Age: 72
End: 2022-03-29
Payer: MEDICARE

## 2022-03-29 VITALS
BODY MASS INDEX: 33.42 KG/M2 | SYSTOLIC BLOOD PRESSURE: 134 MMHG | WEIGHT: 177 LBS | HEART RATE: 66 BPM | RESPIRATION RATE: 18 BRPM | OXYGEN SATURATION: 95 % | HEIGHT: 61 IN | TEMPERATURE: 98 F | DIASTOLIC BLOOD PRESSURE: 78 MMHG

## 2022-03-29 DIAGNOSIS — S90.112A CONTUSION OF LEFT GREAT TOE WITHOUT DAMAGE TO NAIL, INITIAL ENCOUNTER: Primary | ICD-10-CM

## 2022-03-29 LAB
ALBUMIN SERPL BCP-MCNC: 3.8 G/DL (ref 3.5–5.2)
ALP SERPL-CCNC: 74 U/L (ref 55–135)
ALT SERPL W/O P-5'-P-CCNC: 13 U/L (ref 10–44)
ANION GAP SERPL CALC-SCNC: 8 MMOL/L (ref 8–16)
AST SERPL-CCNC: 19 U/L (ref 10–40)
BASOPHILS # BLD AUTO: 0.04 K/UL (ref 0–0.2)
BASOPHILS NFR BLD: 0.4 % (ref 0–1.9)
BILIRUB SERPL-MCNC: 0.5 MG/DL (ref 0.1–1)
BUN SERPL-MCNC: 32 MG/DL (ref 8–23)
CALCIUM SERPL-MCNC: 10.1 MG/DL (ref 8.7–10.5)
CHLORIDE SERPL-SCNC: 101 MMOL/L (ref 95–110)
CO2 SERPL-SCNC: 31 MMOL/L (ref 23–29)
CREAT SERPL-MCNC: 0.9 MG/DL (ref 0.5–1.4)
CRP SERPL-MCNC: 0.29 MG/DL
DIFFERENTIAL METHOD: ABNORMAL
EOSINOPHIL # BLD AUTO: 0.1 K/UL (ref 0–0.5)
EOSINOPHIL NFR BLD: 1.1 % (ref 0–8)
ERYTHROCYTE [DISTWIDTH] IN BLOOD BY AUTOMATED COUNT: 13.4 % (ref 11.5–14.5)
ERYTHROCYTE [SEDIMENTATION RATE] IN BLOOD BY WESTERGREN METHOD: 3 MM/HR (ref 0–20)
EST. GFR  (AFRICAN AMERICAN): >60 ML/MIN/1.73 M^2
EST. GFR  (NON AFRICAN AMERICAN): >60 ML/MIN/1.73 M^2
GLUCOSE SERPL-MCNC: 86 MG/DL (ref 70–110)
GLUCOSE SERPL-MCNC: 96 MG/DL (ref 70–110)
HCT VFR BLD AUTO: 48.3 % (ref 37–48.5)
HGB BLD-MCNC: 15.7 G/DL (ref 12–16)
IMM GRANULOCYTES # BLD AUTO: 0.04 K/UL (ref 0–0.04)
IMM GRANULOCYTES NFR BLD AUTO: 0.4 % (ref 0–0.5)
LACTATE SERPL-SCNC: 0.8 MMOL/L (ref 0.5–1.9)
LACTATE SERPL-SCNC: 2.2 MMOL/L (ref 0.5–1.9)
LYMPHOCYTES # BLD AUTO: 1.8 K/UL (ref 1–4.8)
LYMPHOCYTES NFR BLD: 19.3 % (ref 18–48)
MCH RBC QN AUTO: 29.9 PG (ref 27–31)
MCHC RBC AUTO-ENTMCNC: 32.5 G/DL (ref 32–36)
MCV RBC AUTO: 92 FL (ref 82–98)
MONOCYTES # BLD AUTO: 0.5 K/UL (ref 0.3–1)
MONOCYTES NFR BLD: 5.7 % (ref 4–15)
NEUTROPHILS # BLD AUTO: 6.7 K/UL (ref 1.8–7.7)
NEUTROPHILS NFR BLD: 73.1 % (ref 38–73)
NRBC BLD-RTO: 0 /100 WBC
PLATELET # BLD AUTO: 153 K/UL (ref 150–450)
PMV BLD AUTO: 10.6 FL (ref 9.2–12.9)
POTASSIUM SERPL-SCNC: 4.3 MMOL/L (ref 3.5–5.1)
PROT SERPL-MCNC: 7.4 G/DL (ref 6–8.4)
RBC # BLD AUTO: 5.25 M/UL (ref 4–5.4)
SODIUM SERPL-SCNC: 140 MMOL/L (ref 136–145)
WBC # BLD AUTO: 9.17 K/UL (ref 3.9–12.7)

## 2022-03-29 PROCEDURE — 96360 HYDRATION IV INFUSION INIT: CPT | Mod: GZ

## 2022-03-29 PROCEDURE — 99284 EMERGENCY DEPT VISIT MOD MDM: CPT | Mod: 25

## 2022-03-29 PROCEDURE — 25000003 PHARM REV CODE 250: Performed by: EMERGENCY MEDICINE

## 2022-03-29 PROCEDURE — 83605 ASSAY OF LACTIC ACID: CPT | Performed by: EMERGENCY MEDICINE

## 2022-03-29 PROCEDURE — 85025 COMPLETE CBC W/AUTO DIFF WBC: CPT | Performed by: EMERGENCY MEDICINE

## 2022-03-29 PROCEDURE — 80053 COMPREHEN METABOLIC PANEL: CPT | Performed by: EMERGENCY MEDICINE

## 2022-03-29 PROCEDURE — 87040 BLOOD CULTURE FOR BACTERIA: CPT | Mod: 59 | Performed by: EMERGENCY MEDICINE

## 2022-03-29 PROCEDURE — 85651 RBC SED RATE NONAUTOMATED: CPT | Performed by: EMERGENCY MEDICINE

## 2022-03-29 PROCEDURE — 86140 C-REACTIVE PROTEIN: CPT | Performed by: EMERGENCY MEDICINE

## 2022-03-29 PROCEDURE — 82962 GLUCOSE BLOOD TEST: CPT

## 2022-03-29 RX ADMIN — SODIUM CHLORIDE 1000 ML: 0.9 INJECTION, SOLUTION INTRAVENOUS at 04:03

## 2022-03-29 NOTE — ED PROVIDER NOTES
Encounter Date: 3/29/2022       History     Chief Complaint   Patient presents with    Toe Injury     Black toenail with red streaking noticed X3 days ago, pt  told her to come to ER.      73 yo F reports black discoloration to the mid great toenail of the left foot.  Patient has no known trauma.  Patient reports no pain.  Patient has no other complaints.  Injury was noted 3 days ago and has not progressed.          Review of patient's allergies indicates:   Allergen Reactions    Penicillins Anaphylaxis    Sulfa (sulfonamide antibiotics) Anaphylaxis    Trintellix [vortioxetine] Nausea And Vomiting and Other (See Comments)     Patient has seizures and vomits     Past Medical History:   Diagnosis Date    Anxiety     Arthritis     Asthma     Cancer     Left Breast    Depression     Diabetes mellitus, type 2     GERD (gastroesophageal reflux disease)     Hyperlipidemia     Hypertension     Overactive bladder     Seizures     Pseudo-seizures    Sleep apnea     Stroke     Stroke 2022    pt was in the hospital for a stroke, claims she was seeing people when the stroke happened    Thyroid disease     Urinary tract infection without hematuria 2017     Past Surgical History:   Procedure Laterality Date    APPENDECTOMY      BREAST BIOPSY Left     BREAST LUMPECTOMY Left     2016    BREAST SURGERY      CATARACT EXTRACTION Bilateral     OU done//     SECTION      CHOLECYSTECTOMY      COLONOSCOPY N/A 2020    Procedure: COLONOSCOPY;  Surgeon: Mj Fernandez MD;  [facility];  Service: Endoscopy;  Laterality: N/A;    CYST REMOVAL Left 2021    DILATION AND CURETTAGE OF UTERUS      EYE SURGERY       Family History   Problem Relation Age of Onset    Diabetes Mother     Hypertension Mother     Breast cancer Mother     Cataracts Mother     Heart failure Father     Cataracts Brother     Glaucoma Neg Hx     Retinal detachment Neg Hx     Macular  degeneration Neg Hx      Social History     Tobacco Use    Smoking status: Never Smoker    Smokeless tobacco: Never Used   Substance Use Topics    Alcohol use: Yes     Comment: seldom    Drug use: No     Review of Systems   Constitutional: Negative for fever.   HENT: Negative for congestion, rhinorrhea, sore throat and trouble swallowing.    Eyes: Negative for visual disturbance.   Respiratory: Negative for cough, chest tightness, shortness of breath and wheezing.    Cardiovascular: Negative for chest pain, palpitations and leg swelling.   Gastrointestinal: Negative for abdominal distention, abdominal pain, constipation, diarrhea, nausea and vomiting.   Genitourinary: Negative for difficulty urinating, dysuria, flank pain and frequency.   Musculoskeletal: Negative for arthralgias, back pain, joint swelling and neck pain.   Skin: Positive for wound. Negative for color change and rash.   Neurological: Negative for dizziness, syncope, speech difficulty, weakness, numbness and headaches.   All other systems reviewed and are negative.      Physical Exam     Initial Vitals [03/29/22 1122]   BP Pulse Resp Temp SpO2   (!) 146/84 96 19 97.8 °F (36.6 °C) 95 %      MAP       --         Physical Exam    Nursing note and vitals reviewed.  Constitutional: She appears well-developed and well-nourished. She is not diaphoretic. No distress.   HENT:   Head: Normocephalic and atraumatic.   Right Ear: External ear normal.   Left Ear: External ear normal.   Nose: Nose normal.   Mouth/Throat: Oropharynx is clear and moist. No oropharyngeal exudate.   Eyes: Conjunctivae and EOM are normal. Pupils are equal, round, and reactive to light. Right eye exhibits no discharge. Left eye exhibits no discharge. No scleral icterus.   Neck: Neck supple. No thyromegaly present. No tracheal deviation present. No JVD present.   Normal range of motion.  Cardiovascular: Normal rate, regular rhythm, normal heart sounds and intact distal pulses. Exam  reveals no gallop and no friction rub.    No murmur heard.  Pulmonary/Chest: Breath sounds normal. No stridor. No respiratory distress. She has no wheezes. She has no rhonchi. She has no rales. She exhibits no tenderness.   Abdominal: Abdomen is soft. Bowel sounds are normal. She exhibits no distension and no mass. There is no abdominal tenderness. There is no rebound and no guarding.   Musculoskeletal:         General: No tenderness or edema. Normal range of motion.      Cervical back: Normal range of motion and neck supple.      Comments: Mild line of discoloration to the mid toenail, normal, healthy-looking nail bed noted with good pink coloration, normal capillary refill to toe, no duskiness or pallor, no rubor, 2+ DP's, FROM.       Lymphadenopathy:     She has no cervical adenopathy.   Neurological: She is alert and oriented to person, place, and time. She has normal strength. She displays normal reflexes. No cranial nerve deficit or sensory deficit.   Skin: Skin is warm and dry. No rash and no abscess noted. No erythema. No pallor.         ED Course   Procedures  Labs Reviewed   CBC W/ AUTO DIFFERENTIAL - Abnormal; Notable for the following components:       Result Value    Gran % 73.1 (*)     All other components within normal limits   COMPREHENSIVE METABOLIC PANEL - Abnormal; Notable for the following components:    CO2 31 (*)     BUN 32 (*)     All other components within normal limits   LACTIC ACID, PLASMA - Abnormal; Notable for the following components:    Lactate (Lactic Acid) 2.2 (*)     All other components within normal limits    Narrative:     LACTIC ACID critical result(s) repeated. Called and verbal readback   obtained from [facility]  by FF1 03/29/2022 15:17   CULTURE, BLOOD   CULTURE, BLOOD   SEDIMENTATION RATE   C-REACTIVE PROTEIN   LACTIC ACID, PLASMA   POCT GLUCOSE          Imaging Results          X-Ray Toe 2 or More Views Left (Final result)  Result time 03/29/22 13:21:59    Final  result by Dirk Arnold MD (03/29/22 13:21:59)                 Narrative:    HISTORY: Left great toe trauma.    FINDINGS: 3 views of the left great toe show no acute fracture, dislocation or destructive osseous lesion. There is degenerative joint space narrowing, with the bones diffusely osteopenic. No radiopaque foreign bodies.    IMPRESSION: Negative for acute fracture or dislocation.    Electronically signed by:  Dirk Arnold MD  3/29/2022 1:21 PM CDT Workstation: [id number]                               Medications   sodium chloride 0.9% bolus 1,000 mL (0 mLs Intravenous Stopped 3/29/22 9046)     Medical Decision Making:   History:   Old Medical Records: I decided to obtain old medical records.  Initial Assessment:   Urgent evaluation of a 73 yo F presenting with an injury to her toe, there is mild nail discoloration but no drainage, no sign of ischemia on physical exam.  Case discussed with Dr Bean and he is in agreement with checking x-ray and labs and following patient in clinic.              Attending Attestation:             Attending ED Notes:   Patient is to follow up with Podiatry in the next 2-3 days patient is cautioned to return immediately to the ER for any worsening or any further concerns.    I had a detailed discussion with the patient and/or guardian regarding: The historical points, exam findings, and diagnostic results supporting the discharge diagnosis, lab results, pertinent radiology results, and the need for outpatient follow-up, for definitive care with a family practitioner and to return to the emergency department if symptoms worsen or persist or if there are any questions or concerns that arise at home. All questions have been answered in detail. Strict return to Emergency Department precautions have been provided.     A dictation software program was used for this note.  Please expect some simple typographical  errors in this note.     This patient was seen during the context of  the Covid 19 global pandemic where local, state, hospital guidelines, were followed to the best of ability given the circumstances of the pandemic.                   Clinical Impression:   Final diagnoses:  [S90.112A] Contusion of left great toe without damage to nail, initial encounter (Primary)          ED Disposition Condition    Discharge Stable        ED Prescriptions     None        Follow-up Information     Follow up With Specialties Details Why Contact Info Additional Information    Felix Bean DPM Podiatry, Surgery, Wound Care Schedule an appointment as soon as possible for a visit in 2 days  [address]  [address]  [place] [address]  [phone]       [facility] - Emergency Dept Emergency Medicine  If symptoms worsen [address] [place] [address]  [phone] 1st floor           Tino Warner MD  03/29/22 7349

## 2022-04-01 ENCOUNTER — OFFICE VISIT (OUTPATIENT)
Dept: PODIATRY | Facility: CLINIC | Age: 72
End: 2022-04-01
Payer: MEDICARE

## 2022-04-01 VITALS
HEIGHT: 61 IN | BODY MASS INDEX: 33.42 KG/M2 | RESPIRATION RATE: 18 BRPM | HEART RATE: 68 BPM | OXYGEN SATURATION: 96 % | WEIGHT: 177 LBS

## 2022-04-01 DIAGNOSIS — S90.222A SUBUNGUAL HEMATOMA OF TOENAIL OF LEFT FOOT, INITIAL ENCOUNTER: ICD-10-CM

## 2022-04-01 DIAGNOSIS — L60.3 ONYCHODYSTROPHY: ICD-10-CM

## 2022-04-01 DIAGNOSIS — E11.42 DIABETIC POLYNEUROPATHY ASSOCIATED WITH TYPE 2 DIABETES MELLITUS: Primary | ICD-10-CM

## 2022-04-01 PROCEDURE — 1157F PR ADVANCE CARE PLAN OR EQUIV PRESENT IN MEDICAL RECORD: ICD-10-PCS | Mod: CPTII,S$GLB,, | Performed by: PODIATRIST

## 2022-04-01 PROCEDURE — 3008F PR BODY MASS INDEX (BMI) DOCUMENTED: ICD-10-PCS | Mod: CPTII,S$GLB,, | Performed by: PODIATRIST

## 2022-04-01 PROCEDURE — 1159F MED LIST DOCD IN RCRD: CPT | Mod: CPTII,S$GLB,, | Performed by: PODIATRIST

## 2022-04-01 PROCEDURE — 3288F PR FALLS RISK ASSESSMENT DOCUMENTED: ICD-10-PCS | Mod: CPTII,S$GLB,, | Performed by: PODIATRIST

## 2022-04-01 PROCEDURE — 1160F PR REVIEW ALL MEDS BY PRESCRIBER/CLIN PHARMACIST DOCUMENTED: ICD-10-PCS | Mod: CPTII,S$GLB,, | Performed by: PODIATRIST

## 2022-04-01 PROCEDURE — 1101F PR PT FALLS ASSESS DOC 0-1 FALLS W/OUT INJ PAST YR: ICD-10-PCS | Mod: CPTII,S$GLB,, | Performed by: PODIATRIST

## 2022-04-01 PROCEDURE — 3044F PR MOST RECENT HEMOGLOBIN A1C LEVEL <7.0%: ICD-10-PCS | Mod: CPTII,S$GLB,, | Performed by: PODIATRIST

## 2022-04-01 PROCEDURE — 1159F PR MEDICATION LIST DOCUMENTED IN MEDICAL RECORD: ICD-10-PCS | Mod: CPTII,S$GLB,, | Performed by: PODIATRIST

## 2022-04-01 PROCEDURE — 3288F FALL RISK ASSESSMENT DOCD: CPT | Mod: CPTII,S$GLB,, | Performed by: PODIATRIST

## 2022-04-01 PROCEDURE — 1126F AMNT PAIN NOTED NONE PRSNT: CPT | Mod: CPTII,S$GLB,, | Performed by: PODIATRIST

## 2022-04-01 PROCEDURE — 3044F HG A1C LEVEL LT 7.0%: CPT | Mod: CPTII,S$GLB,, | Performed by: PODIATRIST

## 2022-04-01 PROCEDURE — 1126F PR PAIN SEVERITY QUANTIFIED, NO PAIN PRESENT: ICD-10-PCS | Mod: CPTII,S$GLB,, | Performed by: PODIATRIST

## 2022-04-01 PROCEDURE — 99213 PR OFFICE/OUTPT VISIT, EST, LEVL III, 20-29 MIN: ICD-10-PCS | Mod: S$GLB,,, | Performed by: PODIATRIST

## 2022-04-01 PROCEDURE — 1157F ADVNC CARE PLAN IN RCRD: CPT | Mod: CPTII,S$GLB,, | Performed by: PODIATRIST

## 2022-04-01 PROCEDURE — 1160F RVW MEDS BY RX/DR IN RCRD: CPT | Mod: CPTII,S$GLB,, | Performed by: PODIATRIST

## 2022-04-01 PROCEDURE — 4010F ACE/ARB THERAPY RXD/TAKEN: CPT | Mod: CPTII,S$GLB,, | Performed by: PODIATRIST

## 2022-04-01 PROCEDURE — 4010F PR ACE/ARB THEARPY RXD/TAKEN: ICD-10-PCS | Mod: CPTII,S$GLB,, | Performed by: PODIATRIST

## 2022-04-01 PROCEDURE — 99213 OFFICE O/P EST LOW 20 MIN: CPT | Mod: S$GLB,,, | Performed by: PODIATRIST

## 2022-04-01 PROCEDURE — 1101F PT FALLS ASSESS-DOCD LE1/YR: CPT | Mod: CPTII,S$GLB,, | Performed by: PODIATRIST

## 2022-04-01 PROCEDURE — 3008F BODY MASS INDEX DOCD: CPT | Mod: CPTII,S$GLB,, | Performed by: PODIATRIST

## 2022-04-01 NOTE — PROGRESS NOTES
"  [address] [facility] [address]  [place] [address]  Phone: [phone]   [phone]    Patient's PCP:Yanna Abbasi MD  Referring Provider: No ref. provider found    Subjective:      Chief Complaint:: Nail Problem (Left great toe black discoloration with red streaking)    HPI  Maggy Tapia is a 72 y.o. female who presents today with a complaint of left great toe swelling and black discoloration of nail with red streaking lasting for five days. Onset of symptoms patient notice discoloration upon waking. Doesn't recall fall or injury.  Current symptoms include black discoloration of under nail with red streaking, mild swelling of left great toe.  Aggravating factors are none. Symptoms have remaioned. Treatment to date have included evaluation and x-ray in ER.    Systemic Doctor: MIRACLE Richards PA-C  Date Last Seen: 2022  Blood Sugar: 199  Hemoglobin A1c: 6.0 (2/15/22    Vitals:    22 0852   Pulse: 68   Resp: 18   SpO2: 96%   Weight: 80.3 kg (177 lb)   Height: 5' 1" (1.549 m)   PainSc: 0-No pain      Shoe Size:     Past Surgical History:   Procedure Laterality Date    APPENDECTOMY      BREAST BIOPSY Left     BREAST LUMPECTOMY Left     2016    BREAST SURGERY      CATARACT EXTRACTION Bilateral     OU done//     SECTION      CHOLECYSTECTOMY      COLONOSCOPY N/A 2020    Procedure: COLONOSCOPY;  Surgeon: Mj Fernandez MD;  Location: [facility];  Service: Endoscopy;  Laterality: N/A;    CYST REMOVAL Left 2021    DILATION AND CURETTAGE OF UTERUS      EYE SURGERY       Past Medical History:   Diagnosis Date    Anxiety     Arthritis     Asthma     Cancer     Left Breast    Depression     Diabetes mellitus, type 2     GERD (gastroesophageal reflux disease)     Hyperlipidemia     Hypertension     Overactive bladder     Seizures     Pseudo-seizures    Sleep apnea     Stroke     Stroke 2022    pt was in the hospital for a stroke, claims she was seeing " people when the stroke happened    Thyroid disease     Urinary tract infection without hematuria 08/03/2017     Family History   Problem Relation Age of Onset    Diabetes Mother     Hypertension Mother     Breast cancer Mother     Cataracts Mother     Heart failure Father     Cataracts Brother     Glaucoma Neg Hx     Retinal detachment Neg Hx     Macular degeneration Neg Hx         Social History:   Marital Status: Single  Alcohol History:  reports current alcohol use.  Tobacco History:  reports that she has never smoked. She has never used smokeless tobacco.  Drug History:  reports no history of drug use.    Review of patient's allergies indicates:   Allergen Reactions    Penicillins Anaphylaxis    Sulfa (sulfonamide antibiotics) Anaphylaxis    Trintellix [vortioxetine] Nausea And Vomiting and Other (See Comments)     Patient has seizures and vomits       Current Outpatient Medications   Medication Sig Dispense Refill    albuterol (ACCUNEB) 1.25 mg/3 mL Nebu Take 3 mLs (1.25 mg total) by nebulization every 6 (six) hours as needed (sob). Rescue 360 Box 3    aspirin (ECOTRIN) 81 MG EC tablet Take 81 mg by mouth once daily.      blood-glucose meter kit Use as instructed. Insurance preferred. 1 each 0    CALCIUM CARBONATE/VITAMIN D3 (CALCIUM 500 + D ORAL) Take 1 tablet by mouth once daily. 10 mg daily      cyanocobalamin (VITAMIN B-12) 1000 MCG tablet Take 1 tablet (1,000 mcg total) by mouth once daily. 30 tablet 0    empagliflozin (JARDIANCE) 10 mg tablet Take 1 tablet (10 mg total) by mouth once daily. 90 tablet 2    fluticasone furoate-vilanteroL (BREO ELLIPTA) 100-25 mcg/dose diskus inhaler Inhale 1 puff into the lungs once daily. Controller 60 each 11    gabapentin (NEURONTIN) 300 MG capsule Take 1 tablet every night x 7 days. Increase to twice a day x 7 days. Increase to three times a day. 30 capsule 2    levothyroxine (SYNTHROID) 100 MCG tablet Take 1 tablet (100 mcg total) by mouth before  breakfast. 30 tablet 11    losartan (COZAAR) 50 MG tablet Take 0.5 tablets (25 mg total) by mouth once daily. 90 tablet 3    metFORMIN (GLUCOPHAGE-XR) 500 MG ER 24hr tablet Take 2 tablets (1,000 mg total) by mouth 2 (two) times daily with meals. 360 tablet 3    potassium chloride SA (K-DUR,KLOR-CON) 20 MEQ tablet Take 20 mEq by mouth once daily.      prazosin (MINIPRESS) 5 MG capsule Take 1 capsule (5 mg total) by mouth every evening. 30 capsule 2    sertraline (ZOLOFT) 50 MG tablet Take 1 tablet (50 mg total) by mouth once daily. 30 tablet 0    simvastatin (ZOCOR) 40 MG tablet Take 1 tablet (40 mg total) by mouth once daily. 90 tablet 3    tetrabenazine (XENAZINE) 25 mg tablet Take one tablet daily for 7 days and then  Take 1 tablet twice daily afterwards 47 tablet 0    traZODone (DESYREL) 50 MG tablet Take 1 tablet (50 mg total) by mouth nightly as needed for Insomnia. 30 tablet 0     No current facility-administered medications for this visit.       Review of Systems   Constitutional: Negative for chills, fatigue, fever and unexpected weight change.   HENT: Negative for hearing loss and trouble swallowing.    Eyes: Negative for photophobia and visual disturbance.   Respiratory: Negative for cough, shortness of breath and wheezing.    Cardiovascular: Negative for chest pain, palpitations and leg swelling.   Gastrointestinal: Negative for abdominal pain and nausea.   Genitourinary: Negative for dysuria and frequency.   Musculoskeletal: Positive for arthralgias and gait problem. Negative for back pain, joint swelling and myalgias.   Skin: Positive for color change. Negative for rash and wound.   Neurological: Positive for weakness and numbness. Negative for tremors, seizures and headaches.   Hematological: Does not bruise/bleed easily.         Objective:        Physical Exam:   Foot Exam    General  Orientation: alert and oriented to person, place, and time   Affect: appropriate   Assistance: walker use        Left Foot/Ankle      Inspection and Palpation  Ecchymosis: none  Tenderness: none   Swelling: (Mild lower extremity edema)  Arch: normal  Skin Exam: skin intact; no drainage, no ulcer and no erythema   Neurovascular  Dorsalis pedis: 2+  Posterior tibial: 2+  Capillary refill: 2+  Saphenous nerve sensation: diminished  Tibial nerve sensation: diminished  Superficial peroneal nerve sensation: diminished  Deep peroneal nerve sensation: diminished  Sural nerve sensation: diminished    Comments  Subungual hematoma is present to the great toenail.  No active bleeding is noted.  No edema.  No erythema.  No drainage.  No tenderness to palpation.  The great toenail is also thickened discolored and dystrophic.    Physical Exam  Cardiovascular:      Pulses:           Dorsalis pedis pulses are 2+ on the left side.        Posterior tibial pulses are 2+ on the left side.   Feet:      Left foot:      Skin integrity: No ulcer or erythema.         Imaging: X-Ray Toe 2 or More Views Left  HISTORY: Left great toe trauma.    FINDINGS: 3 views of the left great toe show no acute fracture, dislocation or destructive osseous lesion. There is degenerative joint space narrowing, with the bones diffusely osteopenic. No radiopaque foreign bodies.    IMPRESSION: Negative for acute fracture or dislocation.    Electronically signed by:  Dirk Arnold MD  3/29/2022 1:21 PM CDT Workstation: [id number]               Assessment:       1. Diabetic polyneuropathy associated with type 2 diabetes mellitus    2. Subungual hematoma of toenail of left foot, initial encounter    3. Onychodystrophy      Plan:   Diabetic polyneuropathy associated with type 2 diabetes mellitus    Subungual hematoma of toenail of left foot, initial encounter    Onychodystrophy      Follow up if symptoms worsen or fail to improve.    Procedures        I discussed the patient that the discoloration nail is due to dried blood underneath nail.  This could be related from her  previous toe fracture or from other nail trauma.  The nail is well attached and I do not believe it needs to be removed.  Instructed her to look for any signs of increasing swelling redness or drainage which would necessitate removal of the nail.  Otherwise, the nail should grow out over the next several months.    Counseling:     I provided patient education verbally regarding:   Patient diagnosis, treatment options, as well as alternatives, risks, and benefits.     I discussed treatment of subungal hematoma.  I discussed conservative treatment of holding nail in place with bandaid and allowing nail to fall off over time when there is no pain or signs of infection.  If the toe hurts or there is signs of infection then I recommend avulsion of the nail under local anesthesia.      This note was created using Dragon voice recognition software that occasionally misinterpreted phrases or words.

## 2022-04-03 LAB
BACTERIA BLD CULT: NORMAL
BACTERIA BLD CULT: NORMAL

## 2022-04-05 ENCOUNTER — IMMUNIZATION (OUTPATIENT)
Dept: PRIMARY CARE CLINIC | Facility: CLINIC | Age: 72
End: 2022-04-05
Payer: MEDICARE

## 2022-04-05 ENCOUNTER — TELEPHONE (OUTPATIENT)
Dept: OPHTHALMOLOGY | Facility: CLINIC | Age: 72
End: 2022-04-05
Payer: MEDICARE

## 2022-04-05 DIAGNOSIS — Z23 NEED FOR VACCINATION: Primary | ICD-10-CM

## 2022-04-05 PROCEDURE — 91305 COVID-19, MRNA, LNP-S, PF, 30 MCG/0.3 ML DOSE VACCINE (PFIZER): CPT | Mod: S$GLB,,, | Performed by: FAMILY MEDICINE

## 2022-04-05 PROCEDURE — 91305 COVID-19, MRNA, LNP-S, PF, 30 MCG/0.3 ML DOSE VACCINE (PFIZER): ICD-10-PCS | Mod: S$GLB,,, | Performed by: FAMILY MEDICINE

## 2022-04-05 PROCEDURE — 0054A COVID-19, MRNA, LNP-S, PF, 30 MCG/0.3 ML DOSE VACCINE (PFIZER): CPT | Mod: S$GLB,,, | Performed by: FAMILY MEDICINE

## 2022-04-05 PROCEDURE — 0054A COVID-19, MRNA, LNP-S, PF, 30 MCG/0.3 ML DOSE VACCINE (PFIZER): ICD-10-PCS | Mod: S$GLB,,, | Performed by: FAMILY MEDICINE

## 2022-04-05 NOTE — TELEPHONE ENCOUNTER
----- Message from Damion Dugan OD sent at 4/5/2022  7:10 AM CDT -----  Contact: pt    ----- Message -----  From: Verito Hurst  Sent: 4/4/2022   9:03 AM CDT  To: Damion Dugan OD    Type:  Sooner Appointment Request    Caller is requesting a sooner appointment.  Caller declined first available appointment listed below.  Caller will not accept being placed on the waitlist and is requesting a message be sent to doctor.    Name of Caller:  pt  When is the first available appointment?  05/26/2022  Symptoms:  seeing double numbers  Best Call Back Number:  [phone]    Additional Information:  pt wants a sooner appt

## 2022-04-13 ENCOUNTER — OFFICE VISIT (OUTPATIENT)
Dept: OPTOMETRY | Facility: CLINIC | Age: 72
End: 2022-04-13
Payer: MEDICARE

## 2022-04-13 DIAGNOSIS — H53.462 LEFT HOMONYMOUS HEMIANOPSIA: ICD-10-CM

## 2022-04-13 DIAGNOSIS — H53.2 DIPLOPIA: ICD-10-CM

## 2022-04-13 DIAGNOSIS — Z96.1 PSEUDOPHAKIA: ICD-10-CM

## 2022-04-13 DIAGNOSIS — H52.7 REFRACTIVE ERROR: ICD-10-CM

## 2022-04-13 DIAGNOSIS — E11.9 DIABETES MELLITUS TYPE 2 WITHOUT RETINOPATHY: Primary | ICD-10-CM

## 2022-04-13 PROCEDURE — 99499 UNLISTED E&M SERVICE: CPT | Mod: S$GLB,,, | Performed by: OPTOMETRIST

## 2022-04-13 PROCEDURE — 92015 DETERMINE REFRACTIVE STATE: CPT | Mod: S$GLB,,, | Performed by: OPTOMETRIST

## 2022-04-13 PROCEDURE — 1126F PR PAIN SEVERITY QUANTIFIED, NO PAIN PRESENT: ICD-10-PCS | Mod: CPTII,S$GLB,, | Performed by: OPTOMETRIST

## 2022-04-13 PROCEDURE — 1159F PR MEDICATION LIST DOCUMENTED IN MEDICAL RECORD: ICD-10-PCS | Mod: CPTII,S$GLB,, | Performed by: OPTOMETRIST

## 2022-04-13 PROCEDURE — 99499 RISK ADDL DX/OHS AUDIT: ICD-10-PCS | Mod: S$GLB,,, | Performed by: OPTOMETRIST

## 2022-04-13 PROCEDURE — 2023F PR DILATED RETINAL EXAM W/O EVID OF RETINOPATHY: ICD-10-PCS | Mod: CPTII,S$GLB,, | Performed by: OPTOMETRIST

## 2022-04-13 PROCEDURE — 1159F MED LIST DOCD IN RCRD: CPT | Mod: CPTII,S$GLB,, | Performed by: OPTOMETRIST

## 2022-04-13 PROCEDURE — 3044F PR MOST RECENT HEMOGLOBIN A1C LEVEL <7.0%: ICD-10-PCS | Mod: CPTII,S$GLB,, | Performed by: OPTOMETRIST

## 2022-04-13 PROCEDURE — 4010F ACE/ARB THERAPY RXD/TAKEN: CPT | Mod: CPTII,S$GLB,, | Performed by: OPTOMETRIST

## 2022-04-13 PROCEDURE — 99999 PR PBB SHADOW E&M-EST. PATIENT-LVL III: CPT | Mod: PBBFAC,,, | Performed by: OPTOMETRIST

## 2022-04-13 PROCEDURE — 1160F PR REVIEW ALL MEDS BY PRESCRIBER/CLIN PHARMACIST DOCUMENTED: ICD-10-PCS | Mod: CPTII,S$GLB,, | Performed by: OPTOMETRIST

## 2022-04-13 PROCEDURE — 92014 PR EYE EXAM, EST PATIENT,COMPREHESV: ICD-10-PCS | Mod: S$GLB,,, | Performed by: OPTOMETRIST

## 2022-04-13 PROCEDURE — 2023F DILAT RTA XM W/O RTNOPTHY: CPT | Mod: CPTII,S$GLB,, | Performed by: OPTOMETRIST

## 2022-04-13 PROCEDURE — 1126F AMNT PAIN NOTED NONE PRSNT: CPT | Mod: CPTII,S$GLB,, | Performed by: OPTOMETRIST

## 2022-04-13 PROCEDURE — 3288F PR FALLS RISK ASSESSMENT DOCUMENTED: ICD-10-PCS | Mod: CPTII,S$GLB,, | Performed by: OPTOMETRIST

## 2022-04-13 PROCEDURE — 1160F RVW MEDS BY RX/DR IN RCRD: CPT | Mod: CPTII,S$GLB,, | Performed by: OPTOMETRIST

## 2022-04-13 PROCEDURE — 1157F PR ADVANCE CARE PLAN OR EQUIV PRESENT IN MEDICAL RECORD: ICD-10-PCS | Mod: CPTII,S$GLB,, | Performed by: OPTOMETRIST

## 2022-04-13 PROCEDURE — 1101F PR PT FALLS ASSESS DOC 0-1 FALLS W/OUT INJ PAST YR: ICD-10-PCS | Mod: CPTII,S$GLB,, | Performed by: OPTOMETRIST

## 2022-04-13 PROCEDURE — 3288F FALL RISK ASSESSMENT DOCD: CPT | Mod: CPTII,S$GLB,, | Performed by: OPTOMETRIST

## 2022-04-13 PROCEDURE — 1157F ADVNC CARE PLAN IN RCRD: CPT | Mod: CPTII,S$GLB,, | Performed by: OPTOMETRIST

## 2022-04-13 PROCEDURE — 3044F HG A1C LEVEL LT 7.0%: CPT | Mod: CPTII,S$GLB,, | Performed by: OPTOMETRIST

## 2022-04-13 PROCEDURE — 1101F PT FALLS ASSESS-DOCD LE1/YR: CPT | Mod: CPTII,S$GLB,, | Performed by: OPTOMETRIST

## 2022-04-13 PROCEDURE — 4010F PR ACE/ARB THEARPY RXD/TAKEN: ICD-10-PCS | Mod: CPTII,S$GLB,, | Performed by: OPTOMETRIST

## 2022-04-13 PROCEDURE — 92015 PR REFRACTION: ICD-10-PCS | Mod: S$GLB,,, | Performed by: OPTOMETRIST

## 2022-04-13 PROCEDURE — 99999 PR PBB SHADOW E&M-EST. PATIENT-LVL III: ICD-10-PCS | Mod: PBBFAC,,, | Performed by: OPTOMETRIST

## 2022-04-13 PROCEDURE — 92014 COMPRE OPH EXAM EST PT 1/>: CPT | Mod: S$GLB,,, | Performed by: OPTOMETRIST

## 2022-04-13 NOTE — PROGRESS NOTES
HPI     Concerns About Ocular Health      Additional comments: LDE: 12/2020              Comments     73 YO female presents today for an eye exam since her stroke in 02/2022.   Patient states that she is having trouble with reading her prayers, she   finds that she has to use a magnifying glass. Also notes that she has   trouble seeing writing on the TV no matter how close she gets. Notes lots   of floaters that seem to be getting darker since the stroke as well.   Occasional headaches when crocheting.     Hemoglobin A1C       Date                     Value               Ref Range             Status                02/15/2022               6.0 (H)             4.0 - 5.6 %           Final                  10/04/2021               6.2 (H)             4.0 - 5.6 %           Final                  06/03/2021               5.7 (H)             4.0 - 5.6 %           Final                      Last edited by Liya Dunlap, PCT on 4/13/2022  2:31 PM. (History)            Assessment /Plan     For exam results, see Encounter Report.    Diabetes mellitus type 2 without retinopathy    Pseudophakia    Refractive error    Diplopia    Left homonymous hemianopsia      1. Diabetes mellitus type 2 without retinopathy  No retinopathy, no CSME OU. Discussed possible ocular affects of uncontrolled blood sugar with patient. Recommended continued strong blood sugar control and continued care with PCP. Monitor yearly.     2. Pseudophakia  S/p cataract extraction, no significant PCO    3. Refractive error  Dispensed updated spectacle Rx. Discussed various spectacle lens options. Discussed adaptation period to new specs.    Demonstrated new spec Rx vs current specs in phoropter with patient satisfaction    4. Diplopia  5. Left homonymous hemianopsia  Secondary to stroke, resolved with 6 PD TONI  Refer to low vision/neuro ophth prn      RTC in 1 year for comprehensive eye exam, or sooner prn.

## 2022-05-18 ENCOUNTER — OFFICE VISIT (OUTPATIENT)
Dept: PSYCHIATRY | Facility: CLINIC | Age: 72
End: 2022-05-18
Payer: COMMERCIAL

## 2022-05-18 VITALS
SYSTOLIC BLOOD PRESSURE: 130 MMHG | BODY MASS INDEX: 31.43 KG/M2 | HEART RATE: 70 BPM | HEIGHT: 61 IN | DIASTOLIC BLOOD PRESSURE: 82 MMHG | WEIGHT: 166.44 LBS

## 2022-05-18 DIAGNOSIS — F34.1 PERSISTENT DEPRESSIVE DISORDER: Primary | ICD-10-CM

## 2022-05-18 DIAGNOSIS — F43.10 PTSD (POST-TRAUMATIC STRESS DISORDER): ICD-10-CM

## 2022-05-18 DIAGNOSIS — F44.5 PSEUDOSEIZURES: ICD-10-CM

## 2022-05-18 DIAGNOSIS — F39 MOOD DISORDER: ICD-10-CM

## 2022-05-18 PROCEDURE — 1101F PT FALLS ASSESS-DOCD LE1/YR: CPT | Mod: CPTII,S$GLB,, | Performed by: PHYSICIAN ASSISTANT

## 2022-05-18 PROCEDURE — 99999 PR PBB SHADOW E&M-EST. PATIENT-LVL IV: ICD-10-PCS | Mod: PBBFAC,,, | Performed by: PHYSICIAN ASSISTANT

## 2022-05-18 PROCEDURE — 1159F PR MEDICATION LIST DOCUMENTED IN MEDICAL RECORD: ICD-10-PCS | Mod: CPTII,S$GLB,, | Performed by: PHYSICIAN ASSISTANT

## 2022-05-18 PROCEDURE — 1160F PR REVIEW ALL MEDS BY PRESCRIBER/CLIN PHARMACIST DOCUMENTED: ICD-10-PCS | Mod: CPTII,S$GLB,, | Performed by: PHYSICIAN ASSISTANT

## 2022-05-18 PROCEDURE — 99499 RISK ADDL DX/OHS AUDIT: ICD-10-PCS | Mod: S$GLB,,, | Performed by: PHYSICIAN ASSISTANT

## 2022-05-18 PROCEDURE — 99999 PR PBB SHADOW E&M-EST. PATIENT-LVL IV: CPT | Mod: PBBFAC,,, | Performed by: PHYSICIAN ASSISTANT

## 2022-05-18 PROCEDURE — 3075F SYST BP GE 130 - 139MM HG: CPT | Mod: CPTII,S$GLB,, | Performed by: PHYSICIAN ASSISTANT

## 2022-05-18 PROCEDURE — 1126F PR PAIN SEVERITY QUANTIFIED, NO PAIN PRESENT: ICD-10-PCS | Mod: CPTII,S$GLB,, | Performed by: PHYSICIAN ASSISTANT

## 2022-05-18 PROCEDURE — 99214 PR OFFICE/OUTPT VISIT, EST, LEVL IV, 30-39 MIN: ICD-10-PCS | Mod: S$GLB,,, | Performed by: PHYSICIAN ASSISTANT

## 2022-05-18 PROCEDURE — 4010F PR ACE/ARB THEARPY RXD/TAKEN: ICD-10-PCS | Mod: CPTII,S$GLB,, | Performed by: PHYSICIAN ASSISTANT

## 2022-05-18 PROCEDURE — 4010F ACE/ARB THERAPY RXD/TAKEN: CPT | Mod: CPTII,S$GLB,, | Performed by: PHYSICIAN ASSISTANT

## 2022-05-18 PROCEDURE — 3079F PR MOST RECENT DIASTOLIC BLOOD PRESSURE 80-89 MM HG: ICD-10-PCS | Mod: CPTII,S$GLB,, | Performed by: PHYSICIAN ASSISTANT

## 2022-05-18 PROCEDURE — 3288F PR FALLS RISK ASSESSMENT DOCUMENTED: ICD-10-PCS | Mod: CPTII,S$GLB,, | Performed by: PHYSICIAN ASSISTANT

## 2022-05-18 PROCEDURE — 3288F FALL RISK ASSESSMENT DOCD: CPT | Mod: CPTII,S$GLB,, | Performed by: PHYSICIAN ASSISTANT

## 2022-05-18 PROCEDURE — 3008F BODY MASS INDEX DOCD: CPT | Mod: CPTII,S$GLB,, | Performed by: PHYSICIAN ASSISTANT

## 2022-05-18 PROCEDURE — 1157F PR ADVANCE CARE PLAN OR EQUIV PRESENT IN MEDICAL RECORD: ICD-10-PCS | Mod: CPTII,S$GLB,, | Performed by: PHYSICIAN ASSISTANT

## 2022-05-18 PROCEDURE — 3075F PR MOST RECENT SYSTOLIC BLOOD PRESS GE 130-139MM HG: ICD-10-PCS | Mod: CPTII,S$GLB,, | Performed by: PHYSICIAN ASSISTANT

## 2022-05-18 PROCEDURE — 99499 UNLISTED E&M SERVICE: CPT | Mod: S$GLB,,, | Performed by: PHYSICIAN ASSISTANT

## 2022-05-18 PROCEDURE — 1101F PR PT FALLS ASSESS DOC 0-1 FALLS W/OUT INJ PAST YR: ICD-10-PCS | Mod: CPTII,S$GLB,, | Performed by: PHYSICIAN ASSISTANT

## 2022-05-18 PROCEDURE — 1126F AMNT PAIN NOTED NONE PRSNT: CPT | Mod: CPTII,S$GLB,, | Performed by: PHYSICIAN ASSISTANT

## 2022-05-18 PROCEDURE — 1160F RVW MEDS BY RX/DR IN RCRD: CPT | Mod: CPTII,S$GLB,, | Performed by: PHYSICIAN ASSISTANT

## 2022-05-18 PROCEDURE — 3044F HG A1C LEVEL LT 7.0%: CPT | Mod: CPTII,S$GLB,, | Performed by: PHYSICIAN ASSISTANT

## 2022-05-18 PROCEDURE — 1157F ADVNC CARE PLAN IN RCRD: CPT | Mod: CPTII,S$GLB,, | Performed by: PHYSICIAN ASSISTANT

## 2022-05-18 PROCEDURE — 3079F DIAST BP 80-89 MM HG: CPT | Mod: CPTII,S$GLB,, | Performed by: PHYSICIAN ASSISTANT

## 2022-05-18 PROCEDURE — 3008F PR BODY MASS INDEX (BMI) DOCUMENTED: ICD-10-PCS | Mod: CPTII,S$GLB,, | Performed by: PHYSICIAN ASSISTANT

## 2022-05-18 PROCEDURE — 90833 PSYTX W PT W E/M 30 MIN: CPT | Mod: S$GLB,,, | Performed by: PHYSICIAN ASSISTANT

## 2022-05-18 PROCEDURE — 90833 PR PSYCHOTHERAPY W/PATIENT W/E&M, 30 MIN (ADD ON): ICD-10-PCS | Mod: S$GLB,,, | Performed by: PHYSICIAN ASSISTANT

## 2022-05-18 PROCEDURE — 99214 OFFICE O/P EST MOD 30 MIN: CPT | Mod: S$GLB,,, | Performed by: PHYSICIAN ASSISTANT

## 2022-05-18 PROCEDURE — 3044F PR MOST RECENT HEMOGLOBIN A1C LEVEL <7.0%: ICD-10-PCS | Mod: CPTII,S$GLB,, | Performed by: PHYSICIAN ASSISTANT

## 2022-05-18 PROCEDURE — 1159F MED LIST DOCD IN RCRD: CPT | Mod: CPTII,S$GLB,, | Performed by: PHYSICIAN ASSISTANT
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
## 2022-05-19 ENCOUNTER — TELEPHONE (OUTPATIENT)
Dept: PSYCHIATRY | Facility: CLINIC | Age: 72
End: 2022-05-19
Payer: MEDICARE

## 2022-05-19 DIAGNOSIS — F39 MOOD DISORDER: Primary | ICD-10-CM

## 2022-05-19 DIAGNOSIS — F44.5 PSEUDOSEIZURES: ICD-10-CM

## 2022-05-19 DIAGNOSIS — F43.10 PTSD (POST-TRAUMATIC STRESS DISORDER): ICD-10-CM

## 2022-05-19 NOTE — TELEPHONE ENCOUNTER
----- Message from Yadi Drake PA-C sent at 5/19/2022 11:41 AM CDT -----  Can we please order the home health for [facility] for Ivana for psych nurse med help?

## 2022-05-24 ENCOUNTER — TELEPHONE (OUTPATIENT)
Dept: PSYCHIATRY | Facility: CLINIC | Age: 72
End: 2022-05-24
Payer: MEDICARE

## 2022-05-24 DIAGNOSIS — F43.10 PTSD (POST-TRAUMATIC STRESS DISORDER): ICD-10-CM

## 2022-05-24 RX ORDER — PRAZOSIN HYDROCHLORIDE 5 MG/1
5 CAPSULE ORAL NIGHTLY
Qty: 30 CAPSULE | Refills: 1 | Status: SHIPPED | OUTPATIENT
Start: 2022-05-24 | End: 2022-05-24 | Stop reason: SDUPTHER

## 2022-05-24 RX ORDER — TRAZODONE HYDROCHLORIDE 50 MG/1
50 TABLET ORAL NIGHTLY PRN
Qty: 30 TABLET | Refills: 1 | Status: SHIPPED | OUTPATIENT
Start: 2022-05-24 | End: 2022-07-06 | Stop reason: SDUPTHER

## 2022-05-24 RX ORDER — PRAZOSIN HYDROCHLORIDE 5 MG/1
5 CAPSULE ORAL NIGHTLY
Qty: 90 CAPSULE | Refills: 0 | Status: SHIPPED | OUTPATIENT
Start: 2022-05-24 | End: 2022-10-05 | Stop reason: SDUPTHER

## 2022-05-24 RX ORDER — SERTRALINE HYDROCHLORIDE 50 MG/1
50 TABLET, FILM COATED ORAL DAILY
Qty: 30 TABLET | Refills: 1 | Status: SHIPPED | OUTPATIENT
Start: 2022-05-24 | End: 2022-07-06 | Stop reason: SDUPTHER

## 2022-06-08 ENCOUNTER — OFFICE VISIT (OUTPATIENT)
Dept: PODIATRY | Facility: CLINIC | Age: 72
End: 2022-06-08
Payer: MEDICARE

## 2022-06-08 VITALS — BODY MASS INDEX: 31.34 KG/M2 | WEIGHT: 166 LBS | RESPIRATION RATE: 16 BRPM | HEIGHT: 61 IN

## 2022-06-08 DIAGNOSIS — R26.2 DIFFICULTY IN WALKING, NOT ELSEWHERE CLASSIFIED: ICD-10-CM

## 2022-06-08 DIAGNOSIS — E11.9 ENCOUNTER FOR DIABETIC FOOT EXAM: ICD-10-CM

## 2022-06-08 DIAGNOSIS — L85.1 ACQUIRED KERATODERMA: ICD-10-CM

## 2022-06-08 DIAGNOSIS — E11.42 DIABETIC POLYNEUROPATHY ASSOCIATED WITH TYPE 2 DIABETES MELLITUS: Primary | ICD-10-CM

## 2022-06-08 DIAGNOSIS — L60.2 ONYCHOGRYPOSIS: ICD-10-CM

## 2022-06-08 DIAGNOSIS — M20.41 HAMMER TOES OF BOTH FEET: ICD-10-CM

## 2022-06-08 DIAGNOSIS — M20.42 HAMMER TOES OF BOTH FEET: ICD-10-CM

## 2022-06-08 DIAGNOSIS — L60.3 ONYCHODYSTROPHY: ICD-10-CM

## 2022-06-08 DIAGNOSIS — I87.2 VENOUS INSUFFICIENCY OF BOTH LOWER EXTREMITIES: ICD-10-CM

## 2022-06-08 PROCEDURE — 1100F PR PT FALLS ASSESS DOC 2+ FALLS/FALL W/INJURY/YR: ICD-10-PCS | Mod: CPTII,S$GLB,, | Performed by: PODIATRIST

## 2022-06-08 PROCEDURE — 1125F AMNT PAIN NOTED PAIN PRSNT: CPT | Mod: CPTII,S$GLB,, | Performed by: PODIATRIST

## 2022-06-08 PROCEDURE — 3288F FALL RISK ASSESSMENT DOCD: CPT | Mod: CPTII,S$GLB,, | Performed by: PODIATRIST

## 2022-06-08 PROCEDURE — 3044F PR MOST RECENT HEMOGLOBIN A1C LEVEL <7.0%: ICD-10-PCS | Mod: CPTII,S$GLB,, | Performed by: PODIATRIST

## 2022-06-08 PROCEDURE — 1157F PR ADVANCE CARE PLAN OR EQUIV PRESENT IN MEDICAL RECORD: ICD-10-PCS | Mod: CPTII,S$GLB,, | Performed by: PODIATRIST

## 2022-06-08 PROCEDURE — 1160F PR REVIEW ALL MEDS BY PRESCRIBER/CLIN PHARMACIST DOCUMENTED: ICD-10-PCS | Mod: CPTII,S$GLB,, | Performed by: PODIATRIST

## 2022-06-08 PROCEDURE — 99214 PR OFFICE/OUTPT VISIT, EST, LEVL IV, 30-39 MIN: ICD-10-PCS | Mod: 25,S$GLB,, | Performed by: PODIATRIST

## 2022-06-08 PROCEDURE — 3008F PR BODY MASS INDEX (BMI) DOCUMENTED: ICD-10-PCS | Mod: CPTII,S$GLB,, | Performed by: PODIATRIST

## 2022-06-08 PROCEDURE — 99214 OFFICE O/P EST MOD 30 MIN: CPT | Mod: 25,S$GLB,, | Performed by: PODIATRIST

## 2022-06-08 PROCEDURE — 1100F PTFALLS ASSESS-DOCD GE2>/YR: CPT | Mod: CPTII,S$GLB,, | Performed by: PODIATRIST

## 2022-06-08 PROCEDURE — 11055 PARING/CUTG B9 HYPRKER LES 1: CPT | Mod: Q9,S$GLB,, | Performed by: PODIATRIST

## 2022-06-08 PROCEDURE — 3288F PR FALLS RISK ASSESSMENT DOCUMENTED: ICD-10-PCS | Mod: CPTII,S$GLB,, | Performed by: PODIATRIST

## 2022-06-08 PROCEDURE — 1159F MED LIST DOCD IN RCRD: CPT | Mod: CPTII,S$GLB,, | Performed by: PODIATRIST

## 2022-06-08 PROCEDURE — 4010F PR ACE/ARB THEARPY RXD/TAKEN: ICD-10-PCS | Mod: CPTII,S$GLB,, | Performed by: PODIATRIST

## 2022-06-08 PROCEDURE — 1160F RVW MEDS BY RX/DR IN RCRD: CPT | Mod: CPTII,S$GLB,, | Performed by: PODIATRIST

## 2022-06-08 PROCEDURE — 11055 ROUTINE FOOT CARE: ICD-10-PCS | Mod: Q9,S$GLB,, | Performed by: PODIATRIST

## 2022-06-08 PROCEDURE — 1159F PR MEDICATION LIST DOCUMENTED IN MEDICAL RECORD: ICD-10-PCS | Mod: CPTII,S$GLB,, | Performed by: PODIATRIST

## 2022-06-08 PROCEDURE — 3044F HG A1C LEVEL LT 7.0%: CPT | Mod: CPTII,S$GLB,, | Performed by: PODIATRIST

## 2022-06-08 PROCEDURE — 1125F PR PAIN SEVERITY QUANTIFIED, PAIN PRESENT: ICD-10-PCS | Mod: CPTII,S$GLB,, | Performed by: PODIATRIST

## 2022-06-08 PROCEDURE — 11721 ROUTINE FOOT CARE: ICD-10-PCS | Mod: 59,Q9,S$GLB, | Performed by: PODIATRIST

## 2022-06-08 PROCEDURE — 11721 DEBRIDE NAIL 6 OR MORE: CPT | Mod: 59,Q9,S$GLB, | Performed by: PODIATRIST

## 2022-06-08 PROCEDURE — 4010F ACE/ARB THERAPY RXD/TAKEN: CPT | Mod: CPTII,S$GLB,, | Performed by: PODIATRIST

## 2022-06-08 PROCEDURE — 1157F ADVNC CARE PLAN IN RCRD: CPT | Mod: CPTII,S$GLB,, | Performed by: PODIATRIST

## 2022-06-08 PROCEDURE — 3008F BODY MASS INDEX DOCD: CPT | Mod: CPTII,S$GLB,, | Performed by: PODIATRIST

## 2022-06-08 NOTE — PATIENT INSTRUCTIONS
Your Diabetes Foot Care Program    Every day you depend on your feet to keep you moving. But when you have diabetes, your feet need special care. Even a small foot problem can become very serious. So dont take your feet for granted. By working with your diabetes healthcare team, you can learn how to protect your feet and keep them healthy.  Evaluating your feet  An evaluation helps your healthcare provider check the condition of your feet. The evaluation includes a review of your diabetes history and overall health. It may also include a foot exam, X-rays, or other tests. These can help show problems beneath the skin that you cant see or feel.  Medical history  You will be asked about your overall health and any history of foot problems. Youll also discuss your diabetes history, such as whether your blood sugar level has changed over time. It also includes questions about sensations of pain, tingling, pins and needles, or numbness. Your healthcare provider will also want to know if you have high blood pressure and heart disease, or if you smoke. Be sure to mention any medicines (including over-the-counter), supplements, or herbal remedies you take.  Foot exam  A foot exam checks the condition of different parts of your foot. First, your skin and nails are examined for any signs of infection. Blood flow is checked by feeling for the pulses in each foot. You may also have tests to study the nerves in the foot. These include using a small filament (wire) to see how sensitive your feet are. In certain cases, you will be asked to walk a short distance to check for bone, joint, and muscle problems.  Diagnostic tests  If needed, your healthcare provider will suggest certain tests to learn more about your feet. These include:  Doppler tests to measure blood flow in the feet and lower leg.  X-rays, which can show bone or joint problems.  Other imaging tests, such as an MRI (magnetic resonance imaging), bone scan, and CT  (computed tomography) scan. These can help show bone infections.  Other tests, such as vascular tests, which study the blood flow in your feet and legs. You may also have nerve studies to learn how sensitive your feet are.  Creating a foot care program  Based on the evaluation, your healthcare provider will create a foot care program for you. Your program may be as simple as starting a daily self-care routine and changing the types of shoes your wear. It may also involve treating minor foot problems, such as a corn or blister. In some cases, surgery will be needed to treat an infection or mechanical problems, such as hammer toes.  Preventing problems  When you have diabetes, its easier to prevent problems than to treat them later on. So see your healthcare team for regular checkups and foot care. Your healthcare team can also help you learn more about caring for your feet at home. For example, you may be told to avoid walking barefoot. Or you may be told that special footwear is needed to protect your feet.  Have regular checkups  Foot problems can develop quickly. So be sure to follow your healthcare teams schedule for regular checkups. During office visits, take off your shoes and socks as soon as you get in the exam room. Ask your healthcare provider to examine your feet for problems. This will make it easier to find and treat small skin irritations before they get worse. Regular checkups can also help keep track of the blood flow and feeling in your feet. If you have neuropathy (lack of feeling in your feet), you will need to have checkups more often.  Learn about self-care  The more you know about diabetes and your feet, the easier it will be to prevent problems. Members of your healthcare team can teach you how to inspect your feet and teach you to look for warning signs. They can also give you other foot care tips. During office visits, be sure to ask any questions you have.  Date Last Reviewed: 7/1/2016  ©  2280-2841 [employer]. [address] [place] [address], [place] [address]. All rights reserved. This information is not intended as a substitute for professional medical care. Always follow your healthcare professional's instructions.

## 2022-06-08 NOTE — PROGRESS NOTES
"  [address] [facility] [address]  [place] [address]  Phone: [phone]   [phone]    Patient's PCP:Yanna Abbasi MD  Referring Provider: No ref. provider found    Subjective:      Chief Complaint:: Routine Foot Care    HPI  Maggy Tapia is a 72 y.o. female who presents today for routine nail trim/care.  C/o pain in both feet, especially bilateral 1st toenails due to the thickness.      Also presents to the clinic for a diabetic foot exam.   Pt has seen  MIRACLE Richards PA-C on 2022 who treats them for their diabetes.  Pt has been a diabetic for about 16 years.  Taking Jardiance and Metformin to treat diabetes.      Blood sugar: 181 yesterday AM, 110 at supper  Hemoglobin A1C:  6.0 (2/15/22)          Vitals:    22 1350   Resp: 16   Weight: 75.3 kg (166 lb)   Height: 5' 1" (1.549 m)   PainSc:   5      Shoe Size:  9    Past Surgical History:   Procedure Laterality Date    APPENDECTOMY      BREAST BIOPSY Left     BREAST LUMPECTOMY Left     2016    BREAST SURGERY      CATARACT EXTRACTION Bilateral     OU done//     SECTION      CHOLECYSTECTOMY      COLONOSCOPY N/A 2020    Procedure: COLONOSCOPY;  Surgeon: Mj Fernandez MD;  Location: [facility];  Service: Endoscopy;  Laterality: N/A;    CYST REMOVAL Left 2021    DILATION AND CURETTAGE OF UTERUS      EYE SURGERY       Past Medical History:   Diagnosis Date    Anxiety     Arthritis     Asthma     Cancer     Left Breast    Depression     Diabetes mellitus, type 2     GERD (gastroesophageal reflux disease)     Hyperlipidemia     Hypertension     Overactive bladder     Seizures     Pseudo-seizures    Sleep apnea     Stroke     Stroke 2022    pt was in the hospital for a stroke, claims she was seeing people when the stroke happened    Thyroid disease     Urinary tract infection without hematuria 2017     Family History   Problem Relation Age of Onset    Diabetes Mother     Hypertension " Mother     Breast cancer Mother     Cataracts Mother     Heart failure Father     Cataracts Brother     Glaucoma Neg Hx     Retinal detachment Neg Hx     Macular degeneration Neg Hx         Social History:   Marital Status: Single  Alcohol History:  reports current alcohol use.  Tobacco History:  reports that she has never smoked. She has never used smokeless tobacco.  Drug History:  reports no history of drug use.    Review of patient's allergies indicates:   Allergen Reactions    Penicillins Anaphylaxis    Sulfa (sulfonamide antibiotics) Anaphylaxis    Trintellix [vortioxetine] Nausea And Vomiting and Other (See Comments)     Patient has seizures and vomits       Current Outpatient Medications   Medication Sig Dispense Refill    aspirin (ECOTRIN) 81 MG EC tablet Take 81 mg by mouth once daily.      blood-glucose meter kit Use as instructed. Insurance preferred. 1 each 0    CALCIUM CARBONATE/VITAMIN D3 (CALCIUM 500 + D ORAL) Take 1 tablet by mouth once daily. 10 mg daily      cyanocobalamin (VITAMIN B-12) 1000 MCG tablet Take 1 tablet (1,000 mcg total) by mouth once daily. 30 tablet 0    empagliflozin (JARDIANCE) 10 mg tablet Take 1 tablet (10 mg total) by mouth once daily. 90 tablet 2    fluticasone furoate-vilanteroL (BREO ELLIPTA) 100-25 mcg/dose diskus inhaler Inhale 1 puff into the lungs once daily. Controller 60 each 11    gabapentin (NEURONTIN) 300 MG capsule Take 1 tablet every night x 7 days. Increase to twice a day x 7 days. Increase to three times a day. 30 capsule 2    levothyroxine (SYNTHROID) 100 MCG tablet Take 1 tablet (100 mcg total) by mouth before breakfast. 30 tablet 11    losartan (COZAAR) 50 MG tablet Take 0.5 tablets (25 mg total) by mouth once daily. 90 tablet 3    metFORMIN (GLUCOPHAGE-XR) 500 MG ER 24hr tablet Take 2 tablets (1,000 mg total) by mouth 2 (two) times daily with meals. 360 tablet 3    potassium chloride SA (K-DUR,KLOR-CON) 20 MEQ tablet Take 20 mEq by  mouth once daily.      prazosin (MINIPRESS) 5 MG capsule Take 1 capsule (5 mg total) by mouth every evening. 90 capsule 0    sertraline (ZOLOFT) 50 MG tablet Take 1 tablet (50 mg total) by mouth once daily. 30 tablet 1    simvastatin (ZOCOR) 40 MG tablet Take 1 tablet (40 mg total) by mouth once daily. 90 tablet 3    tetrabenazine (XENAZINE) 25 mg tablet Take one tablet daily for 7 days and then  Take 1 tablet twice daily afterwards 47 tablet 0    traZODone (DESYREL) 50 MG tablet Take 1 tablet (50 mg total) by mouth nightly as needed for Insomnia. 30 tablet 1    albuterol (ACCUNEB) 1.25 mg/3 mL Nebu Take 3 mLs (1.25 mg total) by nebulization every 6 (six) hours as needed (sob). Rescue 360 Box 3     No current facility-administered medications for this visit.       Review of Systems   Constitutional: Negative for chills, fatigue, fever and unexpected weight change.   HENT: Negative for hearing loss and trouble swallowing.    Eyes: Negative for photophobia and visual disturbance.   Respiratory: Negative for cough, shortness of breath and wheezing.    Cardiovascular: Positive for leg swelling. Negative for chest pain and palpitations.   Gastrointestinal: Negative for abdominal pain and nausea.   Genitourinary: Negative for dysuria and frequency.   Musculoskeletal: Positive for arthralgias, gait problem, joint swelling and myalgias. Negative for back pain.   Skin: Negative for rash and wound.   Neurological: Positive for seizures, weakness and numbness. Negative for tremors and headaches.   Hematological: Does not bruise/bleed easily.         Objective:        Physical Exam:   Foot Exam    General  General Appearance: appears stated age and healthy   Orientation: alert and oriented to person, place, and time   Affect: appropriate   Gait: unimpaired   Assistance: walker use       Right Foot/Ankle     Inspection and Palpation  Ecchymosis: none  Tenderness: (Great toe)  Swelling: (Mild edema lower extremity)  Arch:  normal  Hammertoes: second toe, third toe, fourth toe and fifth toe  Skin Exam: abnormal color; no ulcer and no erythema   Fungus Toenails: present    Neurovascular  Dorsalis pedis: 1+  Posterior tibial: 1+  Capillary Refill: 2+  Varicose veins: present  Saphenous nerve sensation: diminished  Tibial nerve sensation: diminished  Superficial peroneal nerve sensation: diminished  Deep peroneal nerve sensation: diminished  Sural nerve sensation: diminished    Edema  Type of edema: non-pitting    Muscle Strength  Ankle dorsiflexion: 4+  Ankle plantar flexion: 4+  Ankle inversion: 4+  Ankle eversion: 4+  Great toe extension: 4+  Great toe flexion: 4+    Range of Motion    Normal right ankle ROM    Tests  PT Tinel's sign: negative    Paresthesia: positive  Comments  Nails 1 through 5 are thickened, discolored, dystrophic, and elongated with subungual debris    Skin thin shiny and atrophic with diminished pedal hair growth       Left Foot/Ankle      Inspection and Palpation  Ecchymosis: none  Tenderness: (Great toe)  Swelling: (Mild lower extremity edema)  Arch: normal  Hammertoes: second toe, third toe, fourth toe and fifth toe  Skin Exam: callus (Third toe); no drainage, no ulcer and no erythema   Fungus Toenails: present    Neurovascular  Dorsalis pedis: 2+  Posterior tibial: 2+  Capillary refill: 2+  Varicose veins: present  Saphenous nerve sensation: diminished  Tibial nerve sensation: diminished  Superficial peroneal nerve sensation: diminished  Deep peroneal nerve sensation: diminished  Sural nerve sensation: diminished    Edema  Type of edema: non-pitting    Muscle Strength  Ankle dorsiflexion: 4+  Ankle plantar flexion: 4+  Ankle inversion: 4+  Ankle eversion: 4+  Great toe extension: 4+  Great toe flexion: 4+    Range of Motion    Normal left ankle ROM    Tests  PT Tinel's sign: negative  Paresthesia: positive  Comments  Nails 1 through 5 are thickened, discolored, dystrophic, and elongated with subungual  debris    Skin thin shiny and atrophic with diminished pedal hair growth       Physical Exam  Cardiovascular:      Pulses:           Dorsalis pedis pulses are 1+ on the right side and 2+ on the left side.        Posterior tibial pulses are 1+ on the right side and 2+ on the left side.   Feet:      Right foot:      Skin integrity: No ulcer or erythema.      Toenail Condition: Fungal disease present.     Left foot:      Skin integrity: Callus (Third toe) present. No ulcer or erythema.      Toenail Condition: Fungal disease present.              Right Ankle/Foot Exam     Range of Motion   The patient has normal right ankle ROM.    Comments:  Nails 1 through 5 are thickened, discolored, dystrophic, and elongated with subungual debris    Skin thin shiny and atrophic with diminished pedal hair growth       Left Ankle/Foot Exam     Range of Motion   The patient has normal left ankle ROM.     Comments:  Nails 1 through 5 are thickened, discolored, dystrophic, and elongated with subungual debris    Skin thin shiny and atrophic with diminished pedal hair growth         Muscle Strength   Right Lower Extremity   Ankle Dorsiflexion:  4+   Plantar flexion:  4+/5  Left Lower Extremity   Ankle Dorsiflexion:  4+   Plantar flexion:  4+/5     Reflexes     Left Side  Paresthesia: present    Right Side   Paresthesia: present    Vascular Exam     Right Pulses  Dorsalis Pedis:      1+  Posterior Tibial:      1+        Left Pulses  Dorsalis Pedis:      2+  Posterior Tibial:      2+             Imaging:  None           Assessment:       1. Diabetic polyneuropathy associated with type 2 diabetes mellitus    2. Venous insufficiency of both lower extremities    3. Hammer toes of both feet    4. Onychogryposis    5. Onychodystrophy    6. Acquired keratoderma    7. Encounter for diabetic foot exam    8. Difficulty in walking, not elsewhere classified      Plan:   Diabetic polyneuropathy associated with type 2 diabetes mellitus  -     [facility] DIABETES  "FOOT EXAM    Venous insufficiency of both lower extremities    Hammer toes of both feet    Onychogryposis    Onychodystrophy    Acquired keratoderma    Encounter for diabetic foot exam  -      DIABETES FOOT EXAM    Difficulty in walking, not elsewhere classified      Follow up if symptoms worsen or fail to improve.    Routine Foot Care    Date/Time: 6/8/2022 1:50 PM  Performed by: Felix Bean DPM  Authorized by: Felix Bean DPM     Time out: Immediately prior to procedure a "time out" was called to verify the correct patient, procedure, equipment, support staff and site/side marked as required.    Consent Done?:  Not Needed  Hyperkeratotic Skin Lesions?: Yes    Number of trimmed lesions:  1  Location(s):  Left 3rd Toe    Nail Care Type:  Debride  Location(s): All  (Left 1st Toe, Left 3rd Toe, Left 2nd Toe, Left 4th Toe, Left 5th Toe, Right 1st Toe, Right 2nd Toe, Right 3rd Toe, Right 4th Toe and Right 5th Toe)  Patient tolerance:  Patient tolerated the procedure well with no immediate complications     Instruments Used: Nail Nipper   Manually reduced with electric .                 Counseling:     I provided patient education verbally regarding:   Patient diagnosis, treatment options, as well as alternatives, risks, and benefits.     Counseled patient on the aspects of diabetes and how it pertains to the feet.  I explained the importance of proper diabetic foot care and how it is essential for the health of their feet.    I discussed the importance of knowing their HGA1c and that the level needs to be as close to 6 as possible.  I discussed the increase complications of high blood sugar including stroke, blindness, heart attack, kidney failure and loss of limb secondary to neuropathy and PVD.    Patient  was made aware of inspecting their feet.  Patient was told to be aware of any breaks in the skin or redness.  With neuropathy, these areas are not recognized early due to the numbness.  I discussed the " lab test and NCV EMG test and also the role of the neurologist in evaluating the patient.  I discussed Diabetes, lower back issues, metabolic disorders, systemic causes, chemotherapy, viatmain defiencey, heavy metal exposure, as some of the causes.  I also explained that as much as 40% of the time we can not find a cause.  I discussed different treatments available to control the symptoms but whcih may not cure the problem.      Shoe inspection. Patient instructed on proper foot hygeine. We discussed wearing proper shoe gear, daily foot inspections, never walking without protective shoe gear, never putting sharp instruments to feet, routine podiatric nail visits every 2-3 months.        This note was created using Dragon voice recognition software that occasionally misinterpreted phrases or words.

## 2022-06-29 DIAGNOSIS — M51.36 DEGENERATIVE LUMBAR DISC: ICD-10-CM

## 2022-06-29 DIAGNOSIS — E78.5 HYPERLIPIDEMIA ASSOCIATED WITH TYPE 2 DIABETES MELLITUS: ICD-10-CM

## 2022-06-29 DIAGNOSIS — E11.69 HYPERLIPIDEMIA ASSOCIATED WITH TYPE 2 DIABETES MELLITUS: ICD-10-CM

## 2022-06-29 DIAGNOSIS — E11.42 DIABETIC PERIPHERAL NEUROPATHY ASSOCIATED WITH TYPE 2 DIABETES MELLITUS: ICD-10-CM

## 2022-06-29 DIAGNOSIS — R06.00 DYSPNEA, UNSPECIFIED TYPE: ICD-10-CM

## 2022-06-29 NOTE — TELEPHONE ENCOUNTER
No new care gaps identified.  [facility] Embedded Care Gaps. Reference number: [id number]. 6/29/2022   10:13:18 AM ALESSIAT

## 2022-06-30 RX ORDER — SIMVASTATIN 40 MG/1
40 TABLET, FILM COATED ORAL DAILY
Qty: 90 TABLET | Refills: 3 | Status: SHIPPED | OUTPATIENT
Start: 2022-06-30 | End: 2023-09-13

## 2022-06-30 RX ORDER — GABAPENTIN 300 MG/1
300 CAPSULE ORAL NIGHTLY
Qty: 90 CAPSULE | Refills: 0 | Status: SHIPPED | OUTPATIENT
Start: 2022-06-30 | End: 2024-07-24

## 2022-06-30 RX ORDER — FLUTICASONE FUROATE AND VILANTEROL 100; 25 UG/1; UG/1
1 POWDER RESPIRATORY (INHALATION) DAILY
Qty: 60 EACH | Refills: 11 | Status: SHIPPED | OUTPATIENT
Start: 2022-06-30 | End: 2023-09-11 | Stop reason: SDUPTHER

## 2022-07-06 ENCOUNTER — OFFICE VISIT (OUTPATIENT)
Dept: PULMONOLOGY | Facility: CLINIC | Age: 72
End: 2022-07-06
Payer: MEDICARE

## 2022-07-06 ENCOUNTER — OFFICE VISIT (OUTPATIENT)
Dept: PSYCHIATRY | Facility: CLINIC | Age: 72
End: 2022-07-06
Payer: COMMERCIAL

## 2022-07-06 VITALS
RESPIRATION RATE: 18 BRPM | SYSTOLIC BLOOD PRESSURE: 125 MMHG | OXYGEN SATURATION: 99 % | BODY MASS INDEX: 30.78 KG/M2 | DIASTOLIC BLOOD PRESSURE: 75 MMHG | HEIGHT: 61 IN | WEIGHT: 163 LBS | HEART RATE: 87 BPM

## 2022-07-06 VITALS
BODY MASS INDEX: 31.37 KG/M2 | WEIGHT: 166.13 LBS | HEART RATE: 81 BPM | HEIGHT: 61 IN | SYSTOLIC BLOOD PRESSURE: 108 MMHG | DIASTOLIC BLOOD PRESSURE: 70 MMHG

## 2022-07-06 DIAGNOSIS — F34.1 PERSISTENT DEPRESSIVE DISORDER: ICD-10-CM

## 2022-07-06 DIAGNOSIS — R06.02 SHORTNESS OF BREATH: Primary | ICD-10-CM

## 2022-07-06 DIAGNOSIS — F39 MOOD DISORDER: Primary | ICD-10-CM

## 2022-07-06 DIAGNOSIS — F44.5 PSEUDOSEIZURES: ICD-10-CM

## 2022-07-06 DIAGNOSIS — F43.10 PTSD (POST-TRAUMATIC STRESS DISORDER): ICD-10-CM

## 2022-07-06 PROCEDURE — 3044F HG A1C LEVEL LT 7.0%: CPT | Mod: CPTII,S$GLB,, | Performed by: PHYSICIAN ASSISTANT

## 2022-07-06 PROCEDURE — 3044F HG A1C LEVEL LT 7.0%: CPT | Mod: CPTII,S$GLB,, | Performed by: INTERNAL MEDICINE

## 2022-07-06 PROCEDURE — 3288F FALL RISK ASSESSMENT DOCD: CPT | Mod: CPTII,S$GLB,, | Performed by: INTERNAL MEDICINE

## 2022-07-06 PROCEDURE — 1101F PT FALLS ASSESS-DOCD LE1/YR: CPT | Mod: CPTII,S$GLB,, | Performed by: PHYSICIAN ASSISTANT

## 2022-07-06 PROCEDURE — 1126F AMNT PAIN NOTED NONE PRSNT: CPT | Mod: CPTII,S$GLB,, | Performed by: PHYSICIAN ASSISTANT

## 2022-07-06 PROCEDURE — 99214 OFFICE O/P EST MOD 30 MIN: CPT | Mod: S$GLB,,, | Performed by: INTERNAL MEDICINE

## 2022-07-06 PROCEDURE — 1159F MED LIST DOCD IN RCRD: CPT | Mod: CPTII,S$GLB,, | Performed by: PHYSICIAN ASSISTANT

## 2022-07-06 PROCEDURE — 3288F PR FALLS RISK ASSESSMENT DOCUMENTED: ICD-10-PCS | Mod: CPTII,S$GLB,, | Performed by: INTERNAL MEDICINE

## 2022-07-06 PROCEDURE — 3078F DIAST BP <80 MM HG: CPT | Mod: CPTII,S$GLB,, | Performed by: PHYSICIAN ASSISTANT

## 2022-07-06 PROCEDURE — 3078F PR MOST RECENT DIASTOLIC BLOOD PRESSURE < 80 MM HG: ICD-10-PCS | Mod: CPTII,S$GLB,, | Performed by: INTERNAL MEDICINE

## 2022-07-06 PROCEDURE — 3008F PR BODY MASS INDEX (BMI) DOCUMENTED: ICD-10-PCS | Mod: CPTII,S$GLB,, | Performed by: PHYSICIAN ASSISTANT

## 2022-07-06 PROCEDURE — 1101F PT FALLS ASSESS-DOCD LE1/YR: CPT | Mod: CPTII,S$GLB,, | Performed by: INTERNAL MEDICINE

## 2022-07-06 PROCEDURE — 3044F PR MOST RECENT HEMOGLOBIN A1C LEVEL <7.0%: ICD-10-PCS | Mod: CPTII,S$GLB,, | Performed by: INTERNAL MEDICINE

## 2022-07-06 PROCEDURE — 1126F PR PAIN SEVERITY QUANTIFIED, NO PAIN PRESENT: ICD-10-PCS | Mod: CPTII,S$GLB,, | Performed by: PHYSICIAN ASSISTANT

## 2022-07-06 PROCEDURE — 3078F PR MOST RECENT DIASTOLIC BLOOD PRESSURE < 80 MM HG: ICD-10-PCS | Mod: CPTII,S$GLB,, | Performed by: PHYSICIAN ASSISTANT

## 2022-07-06 PROCEDURE — 99999 PR PBB SHADOW E&M-EST. PATIENT-LVL IV: CPT | Mod: PBBFAC,,, | Performed by: INTERNAL MEDICINE

## 2022-07-06 PROCEDURE — 1101F PR PT FALLS ASSESS DOC 0-1 FALLS W/OUT INJ PAST YR: ICD-10-PCS | Mod: CPTII,S$GLB,, | Performed by: PHYSICIAN ASSISTANT

## 2022-07-06 PROCEDURE — 1160F PR REVIEW ALL MEDS BY PRESCRIBER/CLIN PHARMACIST DOCUMENTED: ICD-10-PCS | Mod: CPTII,S$GLB,, | Performed by: PHYSICIAN ASSISTANT

## 2022-07-06 PROCEDURE — 3008F BODY MASS INDEX DOCD: CPT | Mod: CPTII,S$GLB,, | Performed by: PHYSICIAN ASSISTANT

## 2022-07-06 PROCEDURE — 1157F ADVNC CARE PLAN IN RCRD: CPT | Mod: CPTII,S$GLB,, | Performed by: PHYSICIAN ASSISTANT

## 2022-07-06 PROCEDURE — 99214 OFFICE O/P EST MOD 30 MIN: CPT | Mod: S$GLB,,, | Performed by: PHYSICIAN ASSISTANT

## 2022-07-06 PROCEDURE — 1126F AMNT PAIN NOTED NONE PRSNT: CPT | Mod: CPTII,S$GLB,, | Performed by: INTERNAL MEDICINE

## 2022-07-06 PROCEDURE — 1159F MED LIST DOCD IN RCRD: CPT | Mod: CPTII,S$GLB,, | Performed by: INTERNAL MEDICINE

## 2022-07-06 PROCEDURE — 3078F DIAST BP <80 MM HG: CPT | Mod: CPTII,S$GLB,, | Performed by: INTERNAL MEDICINE

## 2022-07-06 PROCEDURE — 99999 PR PBB SHADOW E&M-EST. PATIENT-LVL IV: ICD-10-PCS | Mod: PBBFAC,,, | Performed by: INTERNAL MEDICINE

## 2022-07-06 PROCEDURE — 90833 PR PSYCHOTHERAPY W/PATIENT W/E&M, 30 MIN (ADD ON): ICD-10-PCS | Mod: S$GLB,,, | Performed by: PHYSICIAN ASSISTANT

## 2022-07-06 PROCEDURE — 4010F PR ACE/ARB THEARPY RXD/TAKEN: ICD-10-PCS | Mod: CPTII,S$GLB,, | Performed by: PHYSICIAN ASSISTANT

## 2022-07-06 PROCEDURE — 4010F ACE/ARB THERAPY RXD/TAKEN: CPT | Mod: CPTII,S$GLB,, | Performed by: PHYSICIAN ASSISTANT

## 2022-07-06 PROCEDURE — 1157F PR ADVANCE CARE PLAN OR EQUIV PRESENT IN MEDICAL RECORD: ICD-10-PCS | Mod: CPTII,S$GLB,, | Performed by: INTERNAL MEDICINE

## 2022-07-06 PROCEDURE — 4010F ACE/ARB THERAPY RXD/TAKEN: CPT | Mod: CPTII,S$GLB,, | Performed by: INTERNAL MEDICINE

## 2022-07-06 PROCEDURE — 3288F FALL RISK ASSESSMENT DOCD: CPT | Mod: CPTII,S$GLB,, | Performed by: PHYSICIAN ASSISTANT

## 2022-07-06 PROCEDURE — 1157F ADVNC CARE PLAN IN RCRD: CPT | Mod: CPTII,S$GLB,, | Performed by: INTERNAL MEDICINE

## 2022-07-06 PROCEDURE — 3074F SYST BP LT 130 MM HG: CPT | Mod: CPTII,S$GLB,, | Performed by: INTERNAL MEDICINE

## 2022-07-06 PROCEDURE — 3008F PR BODY MASS INDEX (BMI) DOCUMENTED: ICD-10-PCS | Mod: CPTII,S$GLB,, | Performed by: INTERNAL MEDICINE

## 2022-07-06 PROCEDURE — 3074F PR MOST RECENT SYSTOLIC BLOOD PRESSURE < 130 MM HG: ICD-10-PCS | Mod: CPTII,S$GLB,, | Performed by: INTERNAL MEDICINE

## 2022-07-06 PROCEDURE — 1126F PR PAIN SEVERITY QUANTIFIED, NO PAIN PRESENT: ICD-10-PCS | Mod: CPTII,S$GLB,, | Performed by: INTERNAL MEDICINE

## 2022-07-06 PROCEDURE — 3074F SYST BP LT 130 MM HG: CPT | Mod: CPTII,S$GLB,, | Performed by: PHYSICIAN ASSISTANT

## 2022-07-06 PROCEDURE — 3008F BODY MASS INDEX DOCD: CPT | Mod: CPTII,S$GLB,, | Performed by: INTERNAL MEDICINE

## 2022-07-06 PROCEDURE — 99999 PR PBB SHADOW E&M-EST. PATIENT-LVL IV: ICD-10-PCS | Mod: PBBFAC,,, | Performed by: PHYSICIAN ASSISTANT

## 2022-07-06 PROCEDURE — 1101F PR PT FALLS ASSESS DOC 0-1 FALLS W/OUT INJ PAST YR: ICD-10-PCS | Mod: CPTII,S$GLB,, | Performed by: INTERNAL MEDICINE

## 2022-07-06 PROCEDURE — 1157F PR ADVANCE CARE PLAN OR EQUIV PRESENT IN MEDICAL RECORD: ICD-10-PCS | Mod: CPTII,S$GLB,, | Performed by: PHYSICIAN ASSISTANT

## 2022-07-06 PROCEDURE — 1160F RVW MEDS BY RX/DR IN RCRD: CPT | Mod: CPTII,S$GLB,, | Performed by: PHYSICIAN ASSISTANT

## 2022-07-06 PROCEDURE — 99214 PR OFFICE/OUTPT VISIT, EST, LEVL IV, 30-39 MIN: ICD-10-PCS | Mod: S$GLB,,, | Performed by: PHYSICIAN ASSISTANT

## 2022-07-06 PROCEDURE — 3288F PR FALLS RISK ASSESSMENT DOCUMENTED: ICD-10-PCS | Mod: CPTII,S$GLB,, | Performed by: PHYSICIAN ASSISTANT

## 2022-07-06 PROCEDURE — 4010F PR ACE/ARB THEARPY RXD/TAKEN: ICD-10-PCS | Mod: CPTII,S$GLB,, | Performed by: INTERNAL MEDICINE

## 2022-07-06 PROCEDURE — 99499 RISK ADDL DX/OHS AUDIT: ICD-10-PCS | Mod: S$GLB,,, | Performed by: PHYSICIAN ASSISTANT

## 2022-07-06 PROCEDURE — 1159F PR MEDICATION LIST DOCUMENTED IN MEDICAL RECORD: ICD-10-PCS | Mod: CPTII,S$GLB,, | Performed by: INTERNAL MEDICINE

## 2022-07-06 PROCEDURE — 3074F PR MOST RECENT SYSTOLIC BLOOD PRESSURE < 130 MM HG: ICD-10-PCS | Mod: CPTII,S$GLB,, | Performed by: PHYSICIAN ASSISTANT

## 2022-07-06 PROCEDURE — 99999 PR PBB SHADOW E&M-EST. PATIENT-LVL IV: CPT | Mod: PBBFAC,,, | Performed by: PHYSICIAN ASSISTANT

## 2022-07-06 PROCEDURE — 3044F PR MOST RECENT HEMOGLOBIN A1C LEVEL <7.0%: ICD-10-PCS | Mod: CPTII,S$GLB,, | Performed by: PHYSICIAN ASSISTANT

## 2022-07-06 PROCEDURE — 1159F PR MEDICATION LIST DOCUMENTED IN MEDICAL RECORD: ICD-10-PCS | Mod: CPTII,S$GLB,, | Performed by: PHYSICIAN ASSISTANT

## 2022-07-06 PROCEDURE — 99214 PR OFFICE/OUTPT VISIT, EST, LEVL IV, 30-39 MIN: ICD-10-PCS | Mod: S$GLB,,, | Performed by: INTERNAL MEDICINE

## 2022-07-06 PROCEDURE — 99499 UNLISTED E&M SERVICE: CPT | Mod: S$GLB,,, | Performed by: PHYSICIAN ASSISTANT

## 2022-07-06 PROCEDURE — 90833 PSYTX W PT W E/M 30 MIN: CPT | Mod: S$GLB,,, | Performed by: PHYSICIAN ASSISTANT

## 2022-07-06 RX ORDER — SERTRALINE HYDROCHLORIDE 50 MG/1
50 TABLET, FILM COATED ORAL DAILY
Qty: 30 TABLET | Refills: 2 | Status: SHIPPED | OUTPATIENT
Start: 2022-07-06 | End: 2022-10-05 | Stop reason: SDUPTHER

## 2022-07-06 RX ORDER — ALBUTEROL SULFATE 90 UG/1
1-2 AEROSOL, METERED RESPIRATORY (INHALATION) EVERY 4 HOURS PRN
Qty: 20.1 G | Refills: 11 | Status: SHIPPED | OUTPATIENT
Start: 2022-07-06 | End: 2023-08-31

## 2022-07-06 RX ORDER — TRAZODONE HYDROCHLORIDE 50 MG/1
50 TABLET ORAL NIGHTLY PRN
Qty: 30 TABLET | Refills: 2 | Status: SHIPPED | OUTPATIENT
Start: 2022-07-06 | End: 2022-10-05 | Stop reason: SDUPTHER
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
## 2022-07-06 NOTE — PATIENT INSTRUCTIONS
Diet and weight loss would benefit your breathing.  Continue ventilator machine at night and as needed during the day  Continue breo inhaler- one puff daily- rinse mouth after use  Albuterol inhaler sent to pharmacy for as needed use- carry in purse
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
## 2022-07-07 NOTE — TELEPHONE ENCOUNTER
Kofi from Home Health calling. States that patient experienced 3 seizures while he was with her. Only lasting about 4 seconds each and then patient stated she was fine. Will go out in the am to draw blood and get Ua. Do you have any other orders at this time?   Subjective   Marco Antonio Kendrick is a 71 y.o. male. Patient is here today for follow-up on his hypertension, hyperlipidemia, hypothyroidism, diabetes mellitus type 2 and history of cardiac pacemaker.  He has seen cardiology recently and is doing well.  He generally feels well and has no acute complaints.    Chief Complaint   Patient presents with   • Diabetes   • Hyperlipidemia     LAB FU          Vitals:    07/07/22 1543   BP: 133/82   Pulse: 80   Resp: 18   Temp: 97.4 °F (36.3 °C)   SpO2: 96%     Body mass index is 34.81 kg/m².  The following portions of the patient's history were reviewed and updated as appropriate: allergies, current medications, past family history, past medical history, past social history, past surgical history and problem list.    Past Medical History:   Diagnosis Date   • Arthritis    • Bipolar disorder (HCC)    • Bladder wall thickening     MILD NON-SPECIFIC URINARY BLADDER....FOLLOWED BY DR COBURN   • Bone marrow suppression     SIGNIFICANT MYELOSUPRESSION FROM FOLFOX-4 CHEMOTHERAPY   • Cancer of sigmoid colon (HCC)     STAGE III (T3N1M0)   • Depression    • Disease of thyroid gland     HYPOTHYROIDISM   • Diverticula of colon    • Enlarged prostate    • H/O allergy to eggs    • Headache    • Hyperlipidemia    • Hypertension    • Hyperthyroidism    • Obesity    • Pneumonia 11/2015   • PONV (postoperative nausea and vomiting)    • Sick sinus syndrome (HCC)     S/P LEFT CHEST PACEMAKER IMPLANTATION   • Splenomegaly       Allergies   Allergen Reactions   • Eggs Or Egg-Derived Products Nausea And Vomiting      Social History     Socioeconomic History   • Marital status:      Spouse name: Carmelita   • Number of children: 2   Tobacco Use   • Smoking status: Never Smoker   • Smokeless tobacco: Never Used   • Tobacco comment: caffeine use   Vaping Use   • Vaping Use: Never used   Substance and Sexual Activity   • Alcohol use: Yes     Comment: occasional   • Drug use: No   • Sexual activity:  Defer        Current Outpatient Medications:   •  amLODIPine (NORVASC) 5 MG tablet, TAKE 1 TABLET EVERY DAY, Disp: 14 tablet, Rfl: 0  •  aspirin 81 MG chewable tablet, Chew 81 mg daily., Disp: , Rfl:   •  benazepril (LOTENSIN) 10 MG tablet, Take 1 tablet by mouth Daily., Disp: 90 tablet, Rfl: 3  •  Cholecalciferol (VITAMIN D PO), Take  by mouth., Disp: , Rfl:   •  ibuprofen (ADVIL,MOTRIN) 600 MG tablet, Take 1 tablet by mouth Every 6 (Six) Hours As Needed for Moderate Pain ., Disp: 30 tablet, Rfl: 0  •  levothyroxine (SYNTHROID, LEVOTHROID) 175 MCG tablet, TAKE 1 TABLET EVERY DAY, Disp: 14 tablet, Rfl: 0  •  metFORMIN (GLUCOPHAGE) 500 MG tablet, TAKE 2 TABLETS TWICE DAILY WITH MEALS, Disp: 56 tablet, Rfl: 0  •  rosuvastatin (CRESTOR) 10 MG tablet, Take 1 tablet by mouth Daily., Disp: 90 tablet, Rfl: 3  •  sertraline (ZOLOFT) 100 MG tablet, TAKE 1 AND 1/2 TABLETS EVERY DAY, Disp: 135 tablet, Rfl: 2  •  methylPREDNISolone (MEDROL) 4 MG dose pack, Take as directed on package instructions., Disp: 21 each, Rfl: 0  •  pioglitazone (Actos) 30 MG tablet, Take 1 tablet by mouth Daily., Disp: 90 tablet, Rfl: 3     Objective     History of Present Illness     Review of Systems   All other systems reviewed and are negative.      Physical Exam  Vitals and nursing note reviewed.   Constitutional:       General: He is not in acute distress.     Appearance: Normal appearance. He is not ill-appearing.   HENT:      Head: Normocephalic and atraumatic.   Cardiovascular:      Rate and Rhythm: Normal rate and regular rhythm.      Heart sounds: Normal heart sounds.   Pulmonary:      Effort: Pulmonary effort is normal. No respiratory distress.      Breath sounds: Normal breath sounds. No wheezing or rales.   Musculoskeletal:         General: Normal range of motion.   Skin:     General: Skin is warm and dry.   Neurological:      General: No focal deficit present.      Mental Status: He is alert and oriented to person, place, and time.    Psychiatric:         Mood and Affect: Mood normal.         Behavior: Behavior normal.         ASSESSMENT CBC was completely normal.  CMP had a sugar of 159 but was otherwise normal and hemoglobin A1c improved to 6.9.  Urine microalbumin remains low.  TSH and free T4 were both normal on supplement.  Lipid panel has total cholesterol 158, HDL 45, LDL 88  #1-hypertension controlled on medication  #2-hyperlipidemia controlled on medication  #3-diabetes mellitus type 2 with acceptable hemoglobin A1c  #4-hypothyroidism, euthyroid on replacement  #5-history of cardiac pacemaker, asymptomatic       Problems Addressed this Visit        Cardiac and Vasculature    BP (high blood pressure)    History of permanent cardiac pacemaker placement    Hyperlipidemia       Endocrine and Metabolic    Disease of thyroid gland    Type 2 diabetes mellitus without complication (HCC) - Primary    Relevant Medications    pioglitazone (Actos) 30 MG tablet      Diagnoses       Codes Comments    Type 2 diabetes mellitus without complication, without long-term current use of insulin (HCC)    -  Primary ICD-10-CM: E11.9  ICD-9-CM: 250.00     Primary hypertension     ICD-10-CM: I10  ICD-9-CM: 401.9     History of permanent cardiac pacemaker placement     ICD-10-CM: Z95.0  ICD-9-CM: V45.01     Hyperlipidemia, unspecified hyperlipidemia type     ICD-10-CM: E78.5  ICD-9-CM: 272.4     Disease of thyroid gland     ICD-10-CM: E07.9  ICD-9-CM: 246.9           PLAN I recommended the patient get a third COVID-19 vaccination and a flu shot in the fall.  He will continue current medicines as now and I am going to add in pioglitazone 30 mg daily for his diabetes.  I plan on rechecking him in 4 months with laboratory studies    There are no Patient Instructions on file for this visit.  Return in about 4 months (around 11/7/2022) for with labs.

## 2022-07-14 ENCOUNTER — OFFICE VISIT (OUTPATIENT)
Dept: FAMILY MEDICINE | Facility: CLINIC | Age: 72
End: 2022-07-14
Payer: MEDICARE

## 2022-07-14 VITALS
DIASTOLIC BLOOD PRESSURE: 62 MMHG | HEIGHT: 61 IN | TEMPERATURE: 98 F | SYSTOLIC BLOOD PRESSURE: 100 MMHG | BODY MASS INDEX: 31.26 KG/M2 | HEART RATE: 79 BPM | WEIGHT: 165.56 LBS | OXYGEN SATURATION: 97 %

## 2022-07-14 DIAGNOSIS — E11.9 TYPE 2 DIABETES MELLITUS WITHOUT COMPLICATION, WITH LONG-TERM CURRENT USE OF INSULIN: ICD-10-CM

## 2022-07-14 DIAGNOSIS — E11.51 TYPE 2 DIABETES MELLITUS WITH DIABETIC PERIPHERAL ANGIOPATHY WITHOUT GANGRENE, WITHOUT LONG-TERM CURRENT USE OF INSULIN: ICD-10-CM

## 2022-07-14 DIAGNOSIS — Z79.4 TYPE 2 DIABETES MELLITUS WITHOUT COMPLICATION, WITH LONG-TERM CURRENT USE OF INSULIN: ICD-10-CM

## 2022-07-14 DIAGNOSIS — H93.13 TINNITUS OF BOTH EARS: ICD-10-CM

## 2022-07-14 DIAGNOSIS — E11.59 HYPERTENSION ASSOCIATED WITH TYPE 2 DIABETES MELLITUS: ICD-10-CM

## 2022-07-14 DIAGNOSIS — J44.9 CHRONIC OBSTRUCTIVE PULMONARY DISEASE, UNSPECIFIED COPD TYPE: ICD-10-CM

## 2022-07-14 DIAGNOSIS — E11.69 HYPERLIPIDEMIA ASSOCIATED WITH TYPE 2 DIABETES MELLITUS: ICD-10-CM

## 2022-07-14 DIAGNOSIS — I15.2 HYPERTENSION ASSOCIATED WITH TYPE 2 DIABETES MELLITUS: ICD-10-CM

## 2022-07-14 DIAGNOSIS — E66.9 OBESITY (BMI 30.0-34.9): ICD-10-CM

## 2022-07-14 DIAGNOSIS — L72.9 CYST OF BUTTOCKS: Primary | ICD-10-CM

## 2022-07-14 DIAGNOSIS — E78.5 HYPERLIPIDEMIA ASSOCIATED WITH TYPE 2 DIABETES MELLITUS: ICD-10-CM

## 2022-07-14 PROCEDURE — 99999 PR PBB SHADOW E&M-EST. PATIENT-LVL V: ICD-10-PCS | Mod: PBBFAC,,, | Performed by: NURSE PRACTITIONER

## 2022-07-14 PROCEDURE — 3008F BODY MASS INDEX DOCD: CPT | Mod: CPTII,S$GLB,, | Performed by: NURSE PRACTITIONER

## 2022-07-14 PROCEDURE — 1159F MED LIST DOCD IN RCRD: CPT | Mod: CPTII,S$GLB,, | Performed by: NURSE PRACTITIONER

## 2022-07-14 PROCEDURE — 99999 PR PBB SHADOW E&M-EST. PATIENT-LVL V: CPT | Mod: PBBFAC,,, | Performed by: NURSE PRACTITIONER

## 2022-07-14 PROCEDURE — 99499 RISK ADDL DX/OHS AUDIT: ICD-10-PCS | Mod: S$GLB,,, | Performed by: NURSE PRACTITIONER

## 2022-07-14 PROCEDURE — 99499 UNLISTED E&M SERVICE: CPT | Mod: S$GLB,,, | Performed by: NURSE PRACTITIONER

## 2022-07-14 PROCEDURE — 1160F PR REVIEW ALL MEDS BY PRESCRIBER/CLIN PHARMACIST DOCUMENTED: ICD-10-PCS | Mod: CPTII,S$GLB,, | Performed by: NURSE PRACTITIONER

## 2022-07-14 PROCEDURE — 4010F PR ACE/ARB THEARPY RXD/TAKEN: ICD-10-PCS | Mod: CPTII,S$GLB,, | Performed by: NURSE PRACTITIONER

## 2022-07-14 PROCEDURE — 99214 OFFICE O/P EST MOD 30 MIN: CPT | Mod: S$GLB,,, | Performed by: NURSE PRACTITIONER

## 2022-07-14 PROCEDURE — 3044F PR MOST RECENT HEMOGLOBIN A1C LEVEL <7.0%: ICD-10-PCS | Mod: CPTII,S$GLB,, | Performed by: NURSE PRACTITIONER

## 2022-07-14 PROCEDURE — 1159F PR MEDICATION LIST DOCUMENTED IN MEDICAL RECORD: ICD-10-PCS | Mod: CPTII,S$GLB,, | Performed by: NURSE PRACTITIONER

## 2022-07-14 PROCEDURE — 1160F RVW MEDS BY RX/DR IN RCRD: CPT | Mod: CPTII,S$GLB,, | Performed by: NURSE PRACTITIONER

## 2022-07-14 PROCEDURE — 3008F PR BODY MASS INDEX (BMI) DOCUMENTED: ICD-10-PCS | Mod: CPTII,S$GLB,, | Performed by: NURSE PRACTITIONER

## 2022-07-14 PROCEDURE — 1125F AMNT PAIN NOTED PAIN PRSNT: CPT | Mod: CPTII,S$GLB,, | Performed by: NURSE PRACTITIONER

## 2022-07-14 PROCEDURE — 3044F HG A1C LEVEL LT 7.0%: CPT | Mod: CPTII,S$GLB,, | Performed by: NURSE PRACTITIONER

## 2022-07-14 PROCEDURE — 3288F FALL RISK ASSESSMENT DOCD: CPT | Mod: CPTII,S$GLB,, | Performed by: NURSE PRACTITIONER

## 2022-07-14 PROCEDURE — 1157F ADVNC CARE PLAN IN RCRD: CPT | Mod: CPTII,S$GLB,, | Performed by: NURSE PRACTITIONER

## 2022-07-14 PROCEDURE — 3074F SYST BP LT 130 MM HG: CPT | Mod: CPTII,S$GLB,, | Performed by: NURSE PRACTITIONER

## 2022-07-14 PROCEDURE — 3288F PR FALLS RISK ASSESSMENT DOCUMENTED: ICD-10-PCS | Mod: CPTII,S$GLB,, | Performed by: NURSE PRACTITIONER

## 2022-07-14 PROCEDURE — 3078F PR MOST RECENT DIASTOLIC BLOOD PRESSURE < 80 MM HG: ICD-10-PCS | Mod: CPTII,S$GLB,, | Performed by: NURSE PRACTITIONER

## 2022-07-14 PROCEDURE — 1157F PR ADVANCE CARE PLAN OR EQUIV PRESENT IN MEDICAL RECORD: ICD-10-PCS | Mod: CPTII,S$GLB,, | Performed by: NURSE PRACTITIONER

## 2022-07-14 PROCEDURE — 4010F ACE/ARB THERAPY RXD/TAKEN: CPT | Mod: CPTII,S$GLB,, | Performed by: NURSE PRACTITIONER

## 2022-07-14 PROCEDURE — 1125F PR PAIN SEVERITY QUANTIFIED, PAIN PRESENT: ICD-10-PCS | Mod: CPTII,S$GLB,, | Performed by: NURSE PRACTITIONER

## 2022-07-14 PROCEDURE — 99214 PR OFFICE/OUTPT VISIT, EST, LEVL IV, 30-39 MIN: ICD-10-PCS | Mod: S$GLB,,, | Performed by: NURSE PRACTITIONER

## 2022-07-14 PROCEDURE — 1101F PR PT FALLS ASSESS DOC 0-1 FALLS W/OUT INJ PAST YR: ICD-10-PCS | Mod: CPTII,S$GLB,, | Performed by: NURSE PRACTITIONER

## 2022-07-14 PROCEDURE — 1101F PT FALLS ASSESS-DOCD LE1/YR: CPT | Mod: CPTII,S$GLB,, | Performed by: NURSE PRACTITIONER

## 2022-07-14 PROCEDURE — 3078F DIAST BP <80 MM HG: CPT | Mod: CPTII,S$GLB,, | Performed by: NURSE PRACTITIONER

## 2022-07-14 PROCEDURE — 3074F PR MOST RECENT SYSTOLIC BLOOD PRESSURE < 130 MM HG: ICD-10-PCS | Mod: CPTII,S$GLB,, | Performed by: NURSE PRACTITIONER

## 2022-07-14 NOTE — PROGRESS NOTES
Subjective:       Patient ID: Maggy Tapia is a 72 y.o. female.    Chief Complaint: Tinnitus and Rectal Pain    Cyst  This is a new problem. The current episode started 1 to 4 weeks ago. The problem occurs constantly. The problem has been unchanged. Pertinent negatives include no change in bowel habit, chest pain, fever, headaches, myalgias, rash, sore throat or swollen glands. Exacerbated by: sitting.   Ringing in Ears:   Chronicity:  Chronic  Onset:  More than 1 month ago  Progression since onset:  Unchanged  Pain scale:  0/10  Duration:  Very brief  Ringing in ear characteristics:  Trouble hearing TV, difficult on telephone, drainage and moderate   Associated symptoms: dizziness and tinnitus.  No ear pain, no fever, no headaches, no aural fullness and no fluctuance.   PMH includes: dizziness.        Past Medical History:   Diagnosis Date    Anxiety     Arthritis     Asthma     Cancer 2000    Left Breast    Depression     Diabetes mellitus, type 2     GERD (gastroesophageal reflux disease)     Hyperlipidemia     Hypertension     Overactive bladder     Seizures     Pseudo-seizures    Sleep apnea     Stroke     Stroke 03/2022    pt was in the hospital for a stroke, claims she was seeing people when the stroke happened    Thyroid disease     Urinary tract infection without hematuria 08/03/2017       Review of patient's allergies indicates:   Allergen Reactions    Penicillins Anaphylaxis    Sulfa (sulfonamide antibiotics) Anaphylaxis    Trintellix [vortioxetine] Nausea And Vomiting and Other (See Comments)     Patient has seizures and vomits         Current Outpatient Medications:     albuterol (PROVENTIL/VENTOLIN HFA) 90 mcg/actuation inhaler, Inhale 1-2 puffs into the lungs every 4 (four) hours as needed for Shortness of Breath (coughing). Rescue, Disp: 20.1 g, Rfl: 11    aspirin (ECOTRIN) 81 MG EC tablet, Take 81 mg by mouth once daily., Disp: , Rfl:     blood-glucose meter kit, Use as  instructed. Insurance preferred., Disp: 1 each, Rfl: 0    CALCIUM CARBONATE/VITAMIN D3 (CALCIUM 500 + D ORAL), Take 1 tablet by mouth once daily. 10 mg daily, Disp: , Rfl:     cyanocobalamin (VITAMIN B-12) 1000 MCG tablet, Take 1 tablet (1,000 mcg total) by mouth once daily., Disp: 30 tablet, Rfl: 0    empagliflozin (JARDIANCE) 10 mg tablet, Take 1 tablet (10 mg total) by mouth once daily., Disp: 90 tablet, Rfl: 2    fluticasone furoate-vilanteroL (BREO ELLIPTA) 100-25 mcg/dose diskus inhaler, Inhale 1 puff into the lungs once daily. Controller, Disp: 60 each, Rfl: 11    gabapentin (NEURONTIN) 300 MG capsule, Take 1 capsule (300 mg total) by mouth every evening. Take 1 tablet every night x 7 days. Increase to twice a day x 7 days. Increase to three times a day., Disp: 90 capsule, Rfl: 0    levothyroxine (SYNTHROID) 100 MCG tablet, Take 1 tablet (100 mcg total) by mouth before breakfast., Disp: 30 tablet, Rfl: 11    losartan (COZAAR) 50 MG tablet, Take 0.5 tablets (25 mg total) by mouth once daily., Disp: 90 tablet, Rfl: 3    metFORMIN (GLUCOPHAGE-XR) 500 MG ER 24hr tablet, TAKE 2 TABLETS(1000 MG) BY MOUTH TWICE DAILY WITH MEALS, Disp: 360 tablet, Rfl: 3    potassium chloride SA (K-DUR,KLOR-CON) 20 MEQ tablet, Take 20 mEq by mouth once daily., Disp: , Rfl:     prazosin (MINIPRESS) 5 MG capsule, Take 1 capsule (5 mg total) by mouth every evening., Disp: 90 capsule, Rfl: 0    sertraline (ZOLOFT) 50 MG tablet, Take 1 tablet (50 mg total) by mouth once daily., Disp: 30 tablet, Rfl: 2    simvastatin (ZOCOR) 40 MG tablet, Take 1 tablet (40 mg total) by mouth once daily., Disp: 90 tablet, Rfl: 3    tetrabenazine (XENAZINE) 25 mg tablet, Take one tablet daily for 7 days and then Take 1 tablet twice daily afterwards, Disp: 47 tablet, Rfl: 0    traZODone (DESYREL) 50 MG tablet, Take 1 tablet (50 mg total) by mouth nightly as needed for Insomnia., Disp: 30 tablet, Rfl: 2    Review of Systems   Constitutional:  "Negative for fever and unexpected weight change.   HENT: Positive for tinnitus. Negative for ear pain, sore throat and trouble swallowing.    Eyes: Negative for visual disturbance.   Respiratory: Negative for shortness of breath.    Cardiovascular: Negative for chest pain, palpitations and leg swelling.   Gastrointestinal: Negative for blood in stool and change in bowel habit.   Genitourinary: Negative for hematuria.   Musculoskeletal: Negative for myalgias.   Skin: Negative for rash.   Allergic/Immunologic: Negative for immunocompromised state.   Neurological: Positive for dizziness. Negative for headaches.   Hematological: Does not bruise/bleed easily.   Psychiatric/Behavioral: Negative for agitation. The patient is not nervous/anxious.        Objective:      /62 (BP Location: Left arm, Patient Position: Sitting, BP Method: Small (Manual))   Pulse 79   Temp 97.8 °F (36.6 °C) (Oral)   Ht 5' 1" (1.549 m)   Wt 75.1 kg (165 lb 9.1 oz)   SpO2 97%   BMI 31.28 kg/m²   Physical Exam  Constitutional:       Appearance: She is well-developed.   Eyes:      Pupils: Pupils are equal, round, and reactive to light.   Cardiovascular:      Rate and Rhythm: Normal rate and regular rhythm.      Heart sounds: Normal heart sounds.   Pulmonary:      Effort: Pulmonary effort is normal.      Breath sounds: Normal breath sounds.   Abdominal:      General: Bowel sounds are normal.      Palpations: Abdomen is soft.   Musculoskeletal:         General: Normal range of motion.      Cervical back: Normal range of motion.   Skin:     General: Skin is warm and dry.             Comments: 2 cm lump   Neurological:      Mental Status: She is alert and oriented to person, place, and time.   Psychiatric:         Behavior: Behavior normal.         Thought Content: Thought content normal.         Judgment: Judgment normal.         Assessment:       1. Cyst of buttocks    2. Tinnitus of both ears    3. Hypertension associated with type 2 " diabetes mellitus    4. Hyperlipidemia associated with type 2 diabetes mellitus    5. Type 2 diabetes mellitus without complication, with long-term current use of insulin    6. Type 2 diabetes mellitus with diabetic peripheral angiopathy without gangrene, without long-term current use of insulin    7. Chronic obstructive pulmonary disease, unspecified COPD type    8. Obesity (BMI 30.0-34.9)        Plan:       Cyst of buttocks  -     Ambulatory referral/consult to Dermatology; Future; Expected date: 07/21/2022    Tinnitus of both ears  Hold Asprin for 3 days-follow up on Monday via mychart if tinnitus has improve or resolved-If not will refer to ENT for a audiogram. Patient wears hearing aids  Hypertension associated with type 2 diabetes mellitus  -     COMPREHENSIVE METABOLIC PANEL; Future; Expected date: 07/14/2022  -     CBC W/ AUTO DIFFERENTIAL; Future; Expected date: 07/14/2022  Stable, continue medication  Low sodium diet  BP Readings from Last 3 Encounters:   07/14/22 100/62   07/06/22 125/75   03/29/22 134/78     Hyperlipidemia associated with type 2 diabetes mellitus  -     Lipid Panel; Future; Expected date: 07/14/2022  Stable, on Zocor  Type 2 diabetes mellitus without complication, with long-term current use of insulin  -     Hemoglobin A1C; Future; Expected date: 07/14/2022  Stable, continue endocrine follow up  Hemoglobin A1C   Date Value Ref Range Status   02/15/2022 6.0 (H) 4.0 - 5.6 % Final     Comment:     ADA Screening Guidelines:  5.7-6.4%  Consistent with prediabetes  >or=6.5%  Consistent with diabetes    High levels of fetal hemoglobin interfere with the HbA1C  assay. Heterozygous hemoglobin variants (HbS, HgC, etc)do  not significantly interfere with this assay.   However, presence of multiple variants may affect accuracy.     10/04/2021 6.2 (H) 4.0 - 5.6 % Final     Comment:     ADA Screening Guidelines:  5.7-6.4%  Consistent with prediabetes  >or=6.5%  Consistent with diabetes    High levels  "of fetal hemoglobin interfere with the HbA1C  assay. Heterozygous hemoglobin variants (HbS, HgC, etc)do  not significantly interfere with this assay.   However, presence of multiple variants may affect accuracy.     06/03/2021 5.7 (H) 4.0 - 5.6 % Final     Comment:     ADA Screening Guidelines:  5.7-6.4%  Consistent with prediabetes  >or=6.5%  Consistent with diabetes    High levels of fetal hemoglobin interfere with the HbA1C  assay. Heterozygous hemoglobin variants (HbS, HgC, etc)do  not significantly interfere with this assay.   However, presence of multiple variants may affect accuracy.       Type 2 diabetes mellitus with diabetic peripheral angiopathy without gangrene, without long-term current use of insulin  Stable, continue management  Chronic obstructive pulmonary disease, unspecified COPD type  Stable, continue management    Obesity (BMI 30.0-34.9)  Counseled patient on his ideal body weight, health consequences of being obese and current recommendations including weekly exercise and a heart healthy diet.  Current BMI is:Estimated body mass index is 31.28 kg/m² as calculated from the following:    Height as of this encounter: 5' 1" (1.549 m).    Weight as of this encounter: 75.1 kg (165 lb 9.1 oz)..  Patient is aware that ideal BMI < 25 or Weight in (lb) to have BMI = 25: 132.          Patient readiness: acceptance and barriers:none    During the course of the visit the patient was educated and counseled about the following:     Diabetes:  Discussed general issues about diabetes pathophysiology and management.  Suggested low cholesterol diet.  Encouraged aerobic exercise.  Hypertension:   Dietary sodium restriction.  Regular aerobic exercise.  Obesity:   General weight loss/lifestyle modification strategies discussed (elicit support from others; identify saboteurs; non-food rewards, etc).  Regular aerobic exercise program discussed.    Goals: Diabetes: Maintain Hemoglobin A1C below 7, Hypertension: Reduce " Blood Pressure and Obesity: Reduce calorie intake and BMI    Did patient meet goals/outcomes: Yes    The following self management tools provided: declined    Patient Instructions (the written plan) was given to the patient/family.     Time spent with patient: 15 minutes    Barriers to medications present (no )    Adverse reactions to current medications (no)    Over the counter medications reviewed (Yes)

## 2022-07-14 NOTE — PATIENT INSTRUCTIONS
Jeremy Winter,     If you are due for any health screening(s) below please notify me so we can arrange them to be ordered and scheduled to maintain your health. Most healthy patients complete it. Don't lose out on improving your health.     Health Maintenance   Topic Date Due    Hemoglobin A1c  08/15/2022    Lipid Panel  02/23/2023    Eye Exam  04/13/2023    Foot Exam  06/08/2023    High Dose Statin  07/14/2023    DEXA Scan  07/30/2023    TETANUS VACCINE  01/30/2030    Hepatitis C Screening  Completed    Mammogram  Discontinued

## 2022-07-15 ENCOUNTER — OFFICE VISIT (OUTPATIENT)
Dept: DERMATOLOGY | Facility: CLINIC | Age: 72
End: 2022-07-15
Payer: MEDICARE

## 2022-07-15 VITALS — BODY MASS INDEX: 31.15 KG/M2 | HEIGHT: 61 IN | WEIGHT: 165 LBS

## 2022-07-15 DIAGNOSIS — L72.9 CYST OF BUTTOCKS: ICD-10-CM

## 2022-07-15 DIAGNOSIS — L89.311 PRESSURE INJURY OF RIGHT BUTTOCK, STAGE 1: ICD-10-CM

## 2022-07-15 DIAGNOSIS — L30.4 INTERTRIGO: Primary | ICD-10-CM

## 2022-07-15 PROCEDURE — 1159F PR MEDICATION LIST DOCUMENTED IN MEDICAL RECORD: ICD-10-PCS | Mod: CPTII,S$GLB,, | Performed by: DERMATOLOGY

## 2022-07-15 PROCEDURE — 4010F PR ACE/ARB THEARPY RXD/TAKEN: ICD-10-PCS | Mod: CPTII,S$GLB,, | Performed by: DERMATOLOGY

## 2022-07-15 PROCEDURE — 99213 PR OFFICE/OUTPT VISIT, EST, LEVL III, 20-29 MIN: ICD-10-PCS | Mod: S$GLB,,, | Performed by: DERMATOLOGY

## 2022-07-15 PROCEDURE — 1157F PR ADVANCE CARE PLAN OR EQUIV PRESENT IN MEDICAL RECORD: ICD-10-PCS | Mod: CPTII,S$GLB,, | Performed by: DERMATOLOGY

## 2022-07-15 PROCEDURE — 1126F AMNT PAIN NOTED NONE PRSNT: CPT | Mod: CPTII,S$GLB,, | Performed by: DERMATOLOGY

## 2022-07-15 PROCEDURE — 99999 PR PBB SHADOW E&M-EST. PATIENT-LVL V: ICD-10-PCS | Mod: PBBFAC,,, | Performed by: DERMATOLOGY

## 2022-07-15 PROCEDURE — 1101F PR PT FALLS ASSESS DOC 0-1 FALLS W/OUT INJ PAST YR: ICD-10-PCS | Mod: CPTII,S$GLB,, | Performed by: DERMATOLOGY

## 2022-07-15 PROCEDURE — 1160F PR REVIEW ALL MEDS BY PRESCRIBER/CLIN PHARMACIST DOCUMENTED: ICD-10-PCS | Mod: CPTII,S$GLB,, | Performed by: DERMATOLOGY

## 2022-07-15 PROCEDURE — 1101F PT FALLS ASSESS-DOCD LE1/YR: CPT | Mod: CPTII,S$GLB,, | Performed by: DERMATOLOGY

## 2022-07-15 PROCEDURE — 3288F PR FALLS RISK ASSESSMENT DOCUMENTED: ICD-10-PCS | Mod: CPTII,S$GLB,, | Performed by: DERMATOLOGY

## 2022-07-15 PROCEDURE — 99999 PR PBB SHADOW E&M-EST. PATIENT-LVL V: CPT | Mod: PBBFAC,,, | Performed by: DERMATOLOGY

## 2022-07-15 PROCEDURE — 3008F BODY MASS INDEX DOCD: CPT | Mod: CPTII,S$GLB,, | Performed by: DERMATOLOGY

## 2022-07-15 PROCEDURE — 1126F PR PAIN SEVERITY QUANTIFIED, NO PAIN PRESENT: ICD-10-PCS | Mod: CPTII,S$GLB,, | Performed by: DERMATOLOGY

## 2022-07-15 PROCEDURE — 1159F MED LIST DOCD IN RCRD: CPT | Mod: CPTII,S$GLB,, | Performed by: DERMATOLOGY

## 2022-07-15 PROCEDURE — 3044F PR MOST RECENT HEMOGLOBIN A1C LEVEL <7.0%: ICD-10-PCS | Mod: CPTII,S$GLB,, | Performed by: DERMATOLOGY

## 2022-07-15 PROCEDURE — 3288F FALL RISK ASSESSMENT DOCD: CPT | Mod: CPTII,S$GLB,, | Performed by: DERMATOLOGY

## 2022-07-15 PROCEDURE — 3044F HG A1C LEVEL LT 7.0%: CPT | Mod: CPTII,S$GLB,, | Performed by: DERMATOLOGY

## 2022-07-15 PROCEDURE — 1160F RVW MEDS BY RX/DR IN RCRD: CPT | Mod: CPTII,S$GLB,, | Performed by: DERMATOLOGY

## 2022-07-15 PROCEDURE — 99213 OFFICE O/P EST LOW 20 MIN: CPT | Mod: S$GLB,,, | Performed by: DERMATOLOGY

## 2022-07-15 PROCEDURE — 1157F ADVNC CARE PLAN IN RCRD: CPT | Mod: CPTII,S$GLB,, | Performed by: DERMATOLOGY

## 2022-07-15 PROCEDURE — 4010F ACE/ARB THERAPY RXD/TAKEN: CPT | Mod: CPTII,S$GLB,, | Performed by: DERMATOLOGY

## 2022-07-15 PROCEDURE — 3008F PR BODY MASS INDEX (BMI) DOCUMENTED: ICD-10-PCS | Mod: CPTII,S$GLB,, | Performed by: DERMATOLOGY

## 2022-07-15 RX ORDER — KETOCONAZOLE 20 MG/G
CREAM TOPICAL
Qty: 60 G | Refills: 3 | Status: SHIPPED | OUTPATIENT
Start: 2022-07-15

## 2022-07-15 NOTE — PROGRESS NOTES
"  Subjective:       Patient ID:  Maggy Tapia is a 72 y.o. female who presents for   Chief Complaint   Patient presents with    Lump     Right buttock     LOV--4/1/21- sebaceous cyst    Here today for a "lump" on her right buttock- x 3 weeks that is painful    Has no hx of NMSC  Has fhx of MM- mother, father, brother    Current Outpatient Medications:     albuterol (PROVENTIL/VENTOLIN HFA) 90 mcg/actuation inhaler, Inhale 1-2 puffs into the lungs every 4 (four) hours as needed for Shortness of Breath (coughing). Rescue, Disp: 20.1 g, Rfl: 11    aspirin (ECOTRIN) 81 MG EC tablet, Take 81 mg by mouth once daily., Disp: , Rfl:     blood-glucose meter kit, Use as instructed. Insurance preferred., Disp: 1 each, Rfl: 0    CALCIUM CARBONATE/VITAMIN D3 (CALCIUM 500 + D ORAL), Take 1 tablet by mouth once daily. 10 mg daily, Disp: , Rfl:     cyanocobalamin (VITAMIN B-12) 1000 MCG tablet, Take 1 tablet (1,000 mcg total) by mouth once daily., Disp: 30 tablet, Rfl: 0    empagliflozin (JARDIANCE) 10 mg tablet, Take 1 tablet (10 mg total) by mouth once daily., Disp: 90 tablet, Rfl: 2    fluticasone furoate-vilanteroL (BREO ELLIPTA) 100-25 mcg/dose diskus inhaler, Inhale 1 puff into the lungs once daily. Controller, Disp: 60 each, Rfl: 11    gabapentin (NEURONTIN) 300 MG capsule, Take 1 capsule (300 mg total) by mouth every evening. Take 1 tablet every night x 7 days. Increase to twice a day x 7 days. Increase to three times a day., Disp: 90 capsule, Rfl: 0    levothyroxine (SYNTHROID) 100 MCG tablet, Take 1 tablet (100 mcg total) by mouth before breakfast., Disp: 30 tablet, Rfl: 11    losartan (COZAAR) 50 MG tablet, Take 0.5 tablets (25 mg total) by mouth once daily., Disp: 90 tablet, Rfl: 3    metFORMIN (GLUCOPHAGE-XR) 500 MG ER 24hr tablet, TAKE 2 TABLETS(1000 MG) BY MOUTH TWICE DAILY WITH MEALS, Disp: 360 tablet, Rfl: 3    potassium chloride SA (K-DUR,KLOR-CON) 20 MEQ tablet, Take 20 mEq by mouth once daily., " Disp: , Rfl:     prazosin (MINIPRESS) 5 MG capsule, Take 1 capsule (5 mg total) by mouth every evening., Disp: 90 capsule, Rfl: 0    sertraline (ZOLOFT) 50 MG tablet, Take 1 tablet (50 mg total) by mouth once daily., Disp: 30 tablet, Rfl: 2    simvastatin (ZOCOR) 40 MG tablet, Take 1 tablet (40 mg total) by mouth once daily., Disp: 90 tablet, Rfl: 3    tetrabenazine (XENAZINE) 25 mg tablet, Take one tablet daily for 7 days and then Take 1 tablet twice daily afterwards, Disp: 47 tablet, Rfl: 0    traZODone (DESYREL) 50 MG tablet, Take 1 tablet (50 mg total) by mouth nightly as needed for Insomnia., Disp: 30 tablet, Rfl: 2        Review of Systems   Constitutional: Negative for fever, chills and fatigue.   Skin: Negative for daily sunscreen use, activity-related sunscreen use and wears hat.   Hematologic/Lymphatic: Does not bruise/bleed easily.        Objective:    Physical Exam   Constitutional: She appears well-developed and well-nourished. No distress.   Neurological: She is alert and oriented to person, place, and time. She is not disoriented.   Psychiatric: She has a normal mood and affect.   Skin:   Areas Examined (abnormalities noted in diagram):   Genitals / Buttocks / Groin Inspection Performed              Diagram Legend     Erythematous scaling macule/papule c/w actinic keratosis       Vascular papule c/w angioma      Pigmented verrucoid papule/plaque c/w seborrheic keratosis      Yellow umbilicated papule c/w sebaceous hyperplasia      Irregularly shaped tan macule c/w lentigo     1-2 mm smooth white papules consistent with Milia      Movable subcutaneous cyst with punctum c/w epidermal inclusion cyst      Subcutaneous movable cyst c/w pilar cyst      Firm pink to brown papule c/w dermatofibroma      Pedunculated fleshy papule(s) c/w skin tag(s)      Evenly pigmented macule c/w junctional nevus     Mildly variegated pigmented, slightly irregular-bordered macule c/w mildly atypical nevus      Flesh  colored to evenly pigmented papule c/w intradermal nevus       Pink pearly papule/plaque c/w basal cell carcinoma      Erythematous hyperkeratotic cursted plaque c/w SCC      Surgical scar with no sign of skin cancer recurrence      Open and closed comedones      Inflammatory papules and pustules      Verrucoid papule consistent consistent with wart     Erythematous eczematous patches and plaques     Dystrophic onycholytic nail with subungual debris c/w onychomycosis     Umbilicated papule    Erythematous-base heme-crusted tan verrucoid plaque consistent with inflamed seborrheic keratosis     Erythematous Silvery Scaling Plaque c/w Psoriasis     See annotation              Assessment / Plan:        Intertrigo  -     ketoconazole (NIZORAL) 2 % cream; AAA pannus fold at least daily after the shower  Dispense: 60 g; Refill: 3  Severe on abdominal pannus  Clean daily (currently QOD)  Hold powders  Keto cream at least daily    Cyst of buttocks  -     Ambulatory referral/consult to Dermatology  No cyst  Pressure related injury      Pressure injury of right buttock, stage 1  -     Ambulatory referral/consult to Wound Clinic; Future; Expected date: 07/22/2022  Zinc oxide paste and pressure relief  Wound care for proper dressing and home supplies education  Change position frequently           No follow-ups on file.

## 2022-07-18 ENCOUNTER — TELEPHONE (OUTPATIENT)
Dept: FAMILY MEDICINE | Facility: CLINIC | Age: 72
End: 2022-07-18
Payer: MEDICARE

## 2022-07-18 ENCOUNTER — TELEPHONE (OUTPATIENT)
Dept: WOUND CARE | Facility: CLINIC | Age: 72
End: 2022-07-18
Payer: MEDICARE

## 2022-07-18 DIAGNOSIS — R42 DIZZINESS: ICD-10-CM

## 2022-07-18 DIAGNOSIS — H93.13 TINNITUS OF BOTH EARS: Primary | ICD-10-CM

## 2022-07-18 NOTE — TELEPHONE ENCOUNTER
----- Message from Munira Oden sent at 7/18/2022  9:06 AM CDT -----  Contact: sefl  Patient was asked to call and let you know if her vertigo was any better and it is not, but her sore is not cleared up.  She does have a referral in for wound care since Friday and waiting to hear back to hear from them to [facility], call back at [phone] (home) and thanks

## 2022-07-18 NOTE — TELEPHONE ENCOUNTER
ENT referral placed for dizziness and ringing in ears.  The wound care referral was placed by [facility]--patient needs to call their office to speed up referral

## 2022-07-18 NOTE — TELEPHONE ENCOUNTER
----- Message from Munira Oden sent at 7/18/2022  9:12 AM CDT -----  Contact: self  Patient has a referral in to see the doctor so call back at [phone] (home) to [facility] and thanks

## 2022-07-19 ENCOUNTER — TELEPHONE (OUTPATIENT)
Dept: FAMILY MEDICINE | Facility: CLINIC | Age: 72
End: 2022-07-19
Payer: MEDICARE

## 2022-07-19 ENCOUNTER — TELEPHONE (OUTPATIENT)
Dept: OTOLARYNGOLOGY | Facility: CLINIC | Age: 72
End: 2022-07-19
Payer: MEDICARE

## 2022-07-19 NOTE — TELEPHONE ENCOUNTER
----- Message from Munira Justintristian sent at 7/19/2022  3:05 PM CDT -----  Contact: self  Patient has an appt Kevin on 7/27/22  and needs to shama but I couldn't for some reason reschedule it.  Of course it states that she needs to have a hearing test but she has been tested recently and wears hearing aids.  Call back at [phone] (home) and thanks

## 2022-07-20 NOTE — TELEPHONE ENCOUNTER
Contacted and spoke to pt's . Explained that we had received msg stating that hearing test was needing to be scheduled.  added that pt has already had a hearing test.  Requested a copy of hearing test to be faxed over to our office; fax number was provided.

## 2022-07-25 ENCOUNTER — TELEPHONE (OUTPATIENT)
Dept: PULMONOLOGY | Facility: CLINIC | Age: 72
End: 2022-07-25
Payer: MEDICARE

## 2022-07-27 ENCOUNTER — TELEPHONE (OUTPATIENT)
Dept: AUDIOLOGY | Facility: CLINIC | Age: 72
End: 2022-07-27
Payer: MEDICARE

## 2022-07-27 ENCOUNTER — OFFICE VISIT (OUTPATIENT)
Dept: OTOLARYNGOLOGY | Facility: CLINIC | Age: 72
End: 2022-07-27
Payer: MEDICARE

## 2022-07-27 ENCOUNTER — TELEPHONE (OUTPATIENT)
Dept: OTOLARYNGOLOGY | Facility: CLINIC | Age: 72
End: 2022-07-27
Payer: MEDICARE

## 2022-07-27 VITALS — TEMPERATURE: 98 F | BODY MASS INDEX: 30.85 KG/M2 | WEIGHT: 163.38 LBS | HEIGHT: 61 IN

## 2022-07-27 DIAGNOSIS — R42 DIZZINESS: ICD-10-CM

## 2022-07-27 DIAGNOSIS — H93.13 TINNITUS OF BOTH EARS: ICD-10-CM

## 2022-07-27 DIAGNOSIS — Z86.69 HISTORY OF HEARING LOSS: Primary | ICD-10-CM

## 2022-07-27 PROCEDURE — 99203 OFFICE O/P NEW LOW 30 MIN: CPT | Mod: S$GLB,,, | Performed by: PHYSICIAN ASSISTANT

## 2022-07-27 PROCEDURE — 3288F FALL RISK ASSESSMENT DOCD: CPT | Mod: CPTII,S$GLB,, | Performed by: PHYSICIAN ASSISTANT

## 2022-07-27 PROCEDURE — 3044F HG A1C LEVEL LT 7.0%: CPT | Mod: CPTII,S$GLB,, | Performed by: PHYSICIAN ASSISTANT

## 2022-07-27 PROCEDURE — 99999 PR PBB SHADOW E&M-EST. PATIENT-LVL IV: CPT | Mod: PBBFAC,,, | Performed by: PHYSICIAN ASSISTANT

## 2022-07-27 PROCEDURE — 99203 PR OFFICE/OUTPT VISIT, NEW, LEVL III, 30-44 MIN: ICD-10-PCS | Mod: S$GLB,,, | Performed by: PHYSICIAN ASSISTANT

## 2022-07-27 PROCEDURE — 3008F BODY MASS INDEX DOCD: CPT | Mod: CPTII,S$GLB,, | Performed by: PHYSICIAN ASSISTANT

## 2022-07-27 PROCEDURE — 1126F PR PAIN SEVERITY QUANTIFIED, NO PAIN PRESENT: ICD-10-PCS | Mod: CPTII,S$GLB,, | Performed by: PHYSICIAN ASSISTANT

## 2022-07-27 PROCEDURE — 1157F ADVNC CARE PLAN IN RCRD: CPT | Mod: CPTII,S$GLB,, | Performed by: PHYSICIAN ASSISTANT

## 2022-07-27 PROCEDURE — 3008F PR BODY MASS INDEX (BMI) DOCUMENTED: ICD-10-PCS | Mod: CPTII,S$GLB,, | Performed by: PHYSICIAN ASSISTANT

## 2022-07-27 PROCEDURE — 1126F AMNT PAIN NOTED NONE PRSNT: CPT | Mod: CPTII,S$GLB,, | Performed by: PHYSICIAN ASSISTANT

## 2022-07-27 PROCEDURE — 3288F PR FALLS RISK ASSESSMENT DOCUMENTED: ICD-10-PCS | Mod: CPTII,S$GLB,, | Performed by: PHYSICIAN ASSISTANT

## 2022-07-27 PROCEDURE — 4010F ACE/ARB THERAPY RXD/TAKEN: CPT | Mod: CPTII,S$GLB,, | Performed by: PHYSICIAN ASSISTANT

## 2022-07-27 PROCEDURE — 4010F PR ACE/ARB THEARPY RXD/TAKEN: ICD-10-PCS | Mod: CPTII,S$GLB,, | Performed by: PHYSICIAN ASSISTANT

## 2022-07-27 PROCEDURE — 3044F PR MOST RECENT HEMOGLOBIN A1C LEVEL <7.0%: ICD-10-PCS | Mod: CPTII,S$GLB,, | Performed by: PHYSICIAN ASSISTANT

## 2022-07-27 PROCEDURE — 1101F PT FALLS ASSESS-DOCD LE1/YR: CPT | Mod: CPTII,S$GLB,, | Performed by: PHYSICIAN ASSISTANT

## 2022-07-27 PROCEDURE — 99999 PR PBB SHADOW E&M-EST. PATIENT-LVL IV: ICD-10-PCS | Mod: PBBFAC,,, | Performed by: PHYSICIAN ASSISTANT

## 2022-07-27 PROCEDURE — 1157F PR ADVANCE CARE PLAN OR EQUIV PRESENT IN MEDICAL RECORD: ICD-10-PCS | Mod: CPTII,S$GLB,, | Performed by: PHYSICIAN ASSISTANT

## 2022-07-27 PROCEDURE — 1101F PR PT FALLS ASSESS DOC 0-1 FALLS W/OUT INJ PAST YR: ICD-10-PCS | Mod: CPTII,S$GLB,, | Performed by: PHYSICIAN ASSISTANT

## 2022-07-27 NOTE — PROGRESS NOTES
[facility]    Subjective:      Patient: Maggy Tapia Patient PCP: Yanna Abbasi MD         :  1950     Sex:  female      MRN:  [id number]          Date of Visit: 2022      Chief Complaint: Tinnitus/dizziness    Patient ID: Maggy Tapia is a 72 y.o. female who presents to clinic for tinnitus/dizziness. Pt had audiogram that shows bilateral SNHL with slight upsloping pattern from low to high frequency. Pt states that she has been losing her balance for 3 weeks without episodes of true dizziness or vertigo. Pt states she has had ringing in her ears for around 3 weeks. Pt states she has had bilateral high-pitched non-pulsatile tinnitus. Pt presents to clinic with walker, which she had prior to her episodes of dysequilibrium. Pt states that for 3 weeks she has had increase in dysequilibrium and veers both left and right when walking. Pt endorses bilateral non-pulsatile tinnitus.  Pt states that she had sudden hearing loss around 3 weeks ago in both ears. Pt denies migraine. Pt is on metformin for diabetes. Pt denies fever/chills, flucutations in hearing or aural pressure.     Past Medical History  She has a past medical history of Anxiety, Arthritis, Asthma, Cancer, Depression, Diabetes mellitus, type 2, GERD (gastroesophageal reflux disease), Hyperlipidemia, Hypertension, Overactive bladder, Seizures, Sleep apnea, Stroke, Stroke, Thyroid disease, and Urinary tract infection without hematuria.    Family History  Her family history includes Breast cancer in her mother; Cataracts in her brother and mother; Diabetes in her mother; Heart failure in her father; Hypertension in her mother; Melanoma in her brother, father, and mother.    Past Surgical History:   Procedure Laterality Date    APPENDECTOMY      BREAST BIOPSY Left     BREAST LUMPECTOMY Left     2016    BREAST SURGERY      CATARACT EXTRACTION Bilateral     OU done//     SECTION      CHOLECYSTECTOMY      COLONOSCOPY N/A 2020     Procedure: COLONOSCOPY;  Surgeon: Mj Fernandez MD;  Location: [facility];  Service: Endoscopy;  Laterality: N/A;    CYST REMOVAL Left 04/01/2021    DILATION AND CURETTAGE OF UTERUS      EYE SURGERY       Social History     Tobacco Use    Smoking status: Never Smoker    Smokeless tobacco: Never Used   Substance and Sexual Activity    Alcohol use: Yes     Comment: seldom    Drug use: No    Sexual activity: Not Currently     Medications  She has a current medication list which includes the following prescription(s): albuterol, aspirin, blood-glucose meter, calcium carbonate/vitamin d3, cyanocobalamin, jardiance, fluticasone furoate-vilanterol, gabapentin, ketoconazole, levothyroxine, losartan, metformin, potassium chloride sa, prazosin, sertraline, simvastatin, tetrabenazine, trazodone, [DISCONTINUED] nystatin, and [DISCONTINUED] solifenacin.    Review of patient's allergies indicates:   Allergen Reactions    Penicillins Anaphylaxis    Sulfa (sulfonamide antibiotics) Anaphylaxis    Trintellix [vortioxetine] Nausea And Vomiting and Other (See Comments)     Patient has seizures and vomits     All medications, allergies, and past history have been reviewed.    Objective:      Vitals:  Vitals - 1 value per visit 7/15/2022 7/27/2022 7/27/2022   SYSTOLIC - - -   DIASTOLIC - - -   Pulse - - -   Temp - - 97.7   Resp - - -   SPO2 - - -   Weight (lb) 165 - 163.36   Weight (kg) 74.844 - 74.1   Height 61 - 61   BMI (Calculated) 31.2 - 30.9   VISIT REPORT - - -   Pain Score  - 0 -   Some encounter information is confidential and restricted. Go to Review Flowsheets activity to see all data.   Some recent data might be hidden       Body surface area is 1.79 meters squared.    Physical Exam  Constitutional:       General: She is not in acute distress.     Appearance: Normal appearance. She is not ill-appearing.   HENT:      Head: Normocephalic and atraumatic.      Right Ear: Tympanic membrane, ear canal and external  ear normal.      Left Ear: Tympanic membrane, ear canal and external ear normal.      Nose: Nose normal.      Mouth/Throat:      Lips: Pink. No lesions.      Mouth: Mucous membranes are moist. No oral lesions.      Tongue: No lesions.      Palate: No lesions.      Pharynx: Oropharynx is clear. Uvula midline. No pharyngeal swelling, oropharyngeal exudate, posterior oropharyngeal erythema or uvula swelling.   Eyes:      General:         Right eye: No discharge.         Left eye: No discharge.      Extraocular Movements: Extraocular movements intact.      Conjunctiva/sclera: Conjunctivae normal.   Pulmonary:      Effort: Pulmonary effort is normal.   Neurological:      General: No focal deficit present.      Mental Status: She is alert and oriented to person, place, and time. Mental status is at baseline.   Psychiatric:         Mood and Affect: Mood normal.         Behavior: Behavior normal.         Thought Content: Thought content normal.         Judgment: Judgment normal.       Garcia Hallpike: Negative    Labs:  WBC   Date Value Ref Range Status   03/29/2022 9.17 3.90 - 12.70 K/uL Final     Platelets   Date Value Ref Range Status   03/29/2022 153 150 - 450 K/uL Final     Creatinine   Date Value Ref Range Status   03/29/2022 0.9 0.5 - 1.4 mg/dL Final     TSH   Date Value Ref Range Status   02/22/2022 1.679 0.400 - 4.000 uIU/mL Final     Glucose   Date Value Ref Range Status   03/29/2022 96 70 - 110 mg/dL Final     Hemoglobin A1C   Date Value Ref Range Status   02/15/2022 6.0 (H) 4.0 - 5.6 % Final     Comment:     ADA Screening Guidelines:  5.7-6.4%  Consistent with prediabetes  >or=6.5%  Consistent with diabetes    High levels of fetal hemoglobin interfere with the HbA1C  assay. Heterozygous hemoglobin variants (HbS, HgC, etc)do  not significantly interfere with this assay.   However, presence of multiple variants may affect accuracy.         Audiogram Summary:        All lab results and imaging results have been  reviewed.    Assessment:        ICD-10-CM ICD-9-CM   1. History of hearing loss  Z86.69 V12.49   2. Tinnitus of both ears  H93.13 388.30   3. Dizziness  R42 780.4            Plan:      History of hearing loss with bilateral tinnitus with dizziness  - Pt states she has had ringing in her ears for around 3 weeks. Pt states she has had bilateral high-pitched non-pulsatile tinnitus. Pt states that for 3 weeks she has had increase in dysequilibrium and veers both left and right when walking. Pt states that she had sudden hearing loss around 3 weeks ago in both ears. I will hold on high dose oral steroid taper for now due to pt's issues with DM. No significant hearing asymmetry seen on audiogram. MRI Brain WO 02/23/2022 shows signs of encephalomalacia and gliosis. 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 Pt is to proceed with vestibular physical therapy for issues with dysequilibrium/dizziness. Pt is to proceed with VNG to further assess dizziness. If significant vestibulopathy noted, then will proceed with MRI IAC W/WO to further assess. Masking techniques for tinnitus discussed with the pt. Possibility of Meniere's disease. Pt is to follow up after VNG for results and recommendations and may follow up sooner if she has worsening of issues or ENT issues/concerns.

## 2022-07-27 NOTE — TELEPHONE ENCOUNTER
FRANKI asking patient to call our department to schedule.    ----- Message from Massiel Redmond MA sent at 7/27/2022 11:43 AM CDT -----  Regarding: VNG Needed  Good morning y'all!     The following pt is needing to be scheduled for a VNG per Errol Ferrara PA-C.  Please contact pt for scheduling.  Thank you!    GULSHAN Gray   [facility]  O: [phone]  F: [phone]

## 2022-07-28 ENCOUNTER — OFFICE VISIT (OUTPATIENT)
Dept: WOUND CARE | Facility: HOSPITAL | Age: 72
End: 2022-07-28
Attending: FAMILY MEDICINE
Payer: MEDICARE

## 2022-07-28 VITALS
HEART RATE: 91 BPM | TEMPERATURE: 98 F | RESPIRATION RATE: 17 BRPM | SYSTOLIC BLOOD PRESSURE: 127 MMHG | DIASTOLIC BLOOD PRESSURE: 80 MMHG

## 2022-07-28 DIAGNOSIS — S31.819A BUTTOCK WOUND, RIGHT, INITIAL ENCOUNTER: Primary | ICD-10-CM

## 2022-07-28 PROCEDURE — 3079F PR MOST RECENT DIASTOLIC BLOOD PRESSURE 80-89 MM HG: ICD-10-PCS | Mod: CPTII,,, | Performed by: FAMILY MEDICINE

## 2022-07-28 PROCEDURE — 99202 PR OFFICE/OUTPT VISIT, NEW, LEVL II, 15-29 MIN: ICD-10-PCS | Mod: ,,, | Performed by: FAMILY MEDICINE

## 2022-07-28 PROCEDURE — 3044F PR MOST RECENT HEMOGLOBIN A1C LEVEL <7.0%: ICD-10-PCS | Mod: CPTII,,, | Performed by: FAMILY MEDICINE

## 2022-07-28 PROCEDURE — 4010F ACE/ARB THERAPY RXD/TAKEN: CPT | Mod: CPTII,,, | Performed by: FAMILY MEDICINE

## 2022-07-28 PROCEDURE — 3079F DIAST BP 80-89 MM HG: CPT | Mod: CPTII,,, | Performed by: FAMILY MEDICINE

## 2022-07-28 PROCEDURE — 3074F SYST BP LT 130 MM HG: CPT | Mod: CPTII,,, | Performed by: FAMILY MEDICINE

## 2022-07-28 PROCEDURE — 99213 OFFICE O/P EST LOW 20 MIN: CPT | Performed by: FAMILY MEDICINE

## 2022-07-28 PROCEDURE — 1160F RVW MEDS BY RX/DR IN RCRD: CPT | Mod: CPTII,,, | Performed by: FAMILY MEDICINE

## 2022-07-28 PROCEDURE — 3074F PR MOST RECENT SYSTOLIC BLOOD PRESSURE < 130 MM HG: ICD-10-PCS | Mod: CPTII,,, | Performed by: FAMILY MEDICINE

## 2022-07-28 PROCEDURE — 1125F PR PAIN SEVERITY QUANTIFIED, PAIN PRESENT: ICD-10-PCS | Mod: CPTII,,, | Performed by: FAMILY MEDICINE

## 2022-07-28 PROCEDURE — 1160F PR REVIEW ALL MEDS BY PRESCRIBER/CLIN PHARMACIST DOCUMENTED: ICD-10-PCS | Mod: CPTII,,, | Performed by: FAMILY MEDICINE

## 2022-07-28 PROCEDURE — 1159F PR MEDICATION LIST DOCUMENTED IN MEDICAL RECORD: ICD-10-PCS | Mod: CPTII,,, | Performed by: FAMILY MEDICINE

## 2022-07-28 PROCEDURE — 1159F MED LIST DOCD IN RCRD: CPT | Mod: CPTII,,, | Performed by: FAMILY MEDICINE

## 2022-07-28 PROCEDURE — 1157F PR ADVANCE CARE PLAN OR EQUIV PRESENT IN MEDICAL RECORD: ICD-10-PCS | Mod: CPTII,,, | Performed by: FAMILY MEDICINE

## 2022-07-28 PROCEDURE — 1125F AMNT PAIN NOTED PAIN PRSNT: CPT | Mod: CPTII,,, | Performed by: FAMILY MEDICINE

## 2022-07-28 PROCEDURE — 4010F PR ACE/ARB THEARPY RXD/TAKEN: ICD-10-PCS | Mod: CPTII,,, | Performed by: FAMILY MEDICINE

## 2022-07-28 PROCEDURE — 99202 OFFICE O/P NEW SF 15 MIN: CPT | Mod: ,,, | Performed by: FAMILY MEDICINE

## 2022-07-28 PROCEDURE — 3044F HG A1C LEVEL LT 7.0%: CPT | Mod: CPTII,,, | Performed by: FAMILY MEDICINE

## 2022-07-28 PROCEDURE — 1157F ADVNC CARE PLAN IN RCRD: CPT | Mod: CPTII,,, | Performed by: FAMILY MEDICINE

## 2022-07-28 NOTE — PROGRESS NOTES
Chief complaint:  Wound Care      HPI:  Maggy Tapia is a 72 y.o. female presenting with a wound of the right buttock. Pt had seen a dermatologist, and was subsequently sent to our clinic for a FU visit. Pts wound is now closed, and she has no new complaints at this visit.    PMH:  As per HPI and below:  Past Medical History:   Diagnosis Date    Anxiety     Arthritis     Asthma     Cancer 2000    Left Breast    Depression     Diabetes mellitus, type 2     GERD (gastroesophageal reflux disease)     Hyperlipidemia     Hypertension     Overactive bladder     Seizures     Pseudo-seizures    Sleep apnea     Stroke     Stroke 03/2022    pt was in the hospital for a stroke, claims she was seeing people when the stroke happened    Thyroid disease     Urinary tract infection without hematuria 08/03/2017       Social History     Socioeconomic History    Marital status: Single    Number of children: 0   Tobacco Use    Smoking status: Never Smoker    Smokeless tobacco: Never Used   Substance and Sexual Activity    Alcohol use: Yes     Comment: seldom    Drug use: No    Sexual activity: Not Currently   Social History Narrative    Single, no children, lives with brother Apollo who is her primary caregiver.     Social Determinants of Health     Financial Resource Strain: Low Risk     Difficulty of Paying Living Expenses: Not hard at all   Food Insecurity: No Food Insecurity    Worried About Running Out of Food in the Last Year: Never true    Ran Out of Food in the Last Year: Never true   Transportation Needs: No Transportation Needs    Lack of Transportation (Medical): No    Lack of Transportation (Non-Medical): No   Physical Activity: Inactive    Days of Exercise per Week: 0 days    Minutes of Exercise per Session: 0 min   Stress: No Stress Concern Present    Feeling of Stress : Not at all   Social Connections: Socially Isolated    Frequency of Communication with Friends and Family: More than  three times a week    Frequency of Social Gatherings with Friends and Family: More than three times a week    Attends [employer]ism Services: Never    Active Member of Clubs or Organizations: No    Attends Club or Organization Meetings: Never    Marital Status: Never    Housing Stability: Unknown    Unable to Pay for Housing in the Last Year: No    Unstable Housing in the Last Year: No       Past Surgical History:   Procedure Laterality Date    APPENDECTOMY      BREAST BIOPSY Left     BREAST LUMPECTOMY Left     2016    BREAST SURGERY      CATARACT EXTRACTION Bilateral     OU done//     SECTION      CHOLECYSTECTOMY      COLONOSCOPY N/A 2020    Procedure: COLONOSCOPY;  Surgeon: Mj Fernandez MD;  Location: [facility];  Service: Endoscopy;  Laterality: N/A;    CYST REMOVAL Left 2021    DILATION AND CURETTAGE OF UTERUS      EYE SURGERY         Family History   Problem Relation Age of Onset    Diabetes Mother     Hypertension Mother     Breast cancer Mother     Cataracts Mother     Melanoma Mother     Heart failure Father     Melanoma Father     Cataracts Brother     Melanoma Brother     Glaucoma Neg Hx     Retinal detachment Neg Hx     Macular degeneration Neg Hx        Review of patient's allergies indicates:   Allergen Reactions    Penicillins Anaphylaxis    Sulfa (sulfonamide antibiotics) Anaphylaxis    Trintellix [vortioxetine] Nausea And Vomiting and Other (See Comments)     Patient has seizures and vomits       Current Outpatient Medications on File Prior to Visit   Medication Sig Dispense Refill    albuterol (PROVENTIL/VENTOLIN HFA) 90 mcg/actuation inhaler Inhale 1-2 puffs into the lungs every 4 (four) hours as needed for Shortness of Breath (coughing). Rescue 20.1 g 11    aspirin (ECOTRIN) 81 MG EC tablet Take 81 mg by mouth once daily.      blood-glucose meter kit Use as instructed. Insurance preferred. 1 each 0    CALCIUM CARBONATE/VITAMIN D3  (CALCIUM 500 + D ORAL) Take 1 tablet by mouth once daily. 10 mg daily      cyanocobalamin (VITAMIN B-12) 1000 MCG tablet Take 1 tablet (1,000 mcg total) by mouth once daily. 30 tablet 0    empagliflozin (JARDIANCE) 10 mg tablet Take 1 tablet (10 mg total) by mouth once daily. 90 tablet 2    fluticasone furoate-vilanteroL (BREO ELLIPTA) 100-25 mcg/dose diskus inhaler Inhale 1 puff into the lungs once daily. Controller 60 each 11    gabapentin (NEURONTIN) 300 MG capsule Take 1 capsule (300 mg total) by mouth every evening. Take 1 tablet every night x 7 days. Increase to twice a day x 7 days. Increase to three times a day. 90 capsule 0    ketoconazole (NIZORAL) 2 % cream AAA pannus fold at least daily after the shower 60 g 3    levothyroxine (SYNTHROID) 100 MCG tablet Take 1 tablet (100 mcg total) by mouth before breakfast. 30 tablet 11    losartan (COZAAR) 50 MG tablet Take 0.5 tablets (25 mg total) by mouth once daily. 90 tablet 3    metFORMIN (GLUCOPHAGE-XR) 500 MG ER 24hr tablet TAKE 2 TABLETS(1000 MG) BY MOUTH TWICE DAILY WITH MEALS 360 tablet 3    potassium chloride SA (K-DUR,KLOR-CON) 20 MEQ tablet Take 20 mEq by mouth once daily.      prazosin (MINIPRESS) 5 MG capsule Take 1 capsule (5 mg total) by mouth every evening. 90 capsule 0    sertraline (ZOLOFT) 50 MG tablet Take 1 tablet (50 mg total) by mouth once daily. 30 tablet 2    simvastatin (ZOCOR) 40 MG tablet Take 1 tablet (40 mg total) by mouth once daily. 90 tablet 3    tetrabenazine (XENAZINE) 25 mg tablet Take one tablet daily for 7 days and then  Take 1 tablet twice daily afterwards 47 tablet 0    traZODone (DESYREL) 50 MG tablet Take 1 tablet (50 mg total) by mouth nightly as needed for Insomnia. 30 tablet 2    [DISCONTINUED] nystatin (MYCOSTATIN) powder Apply topically 2 (two) times daily. for 14 days 1 Bottle 1    [DISCONTINUED] solifenacin (VESICARE) 10 MG tablet Take 1 tablet (10 mg total) by mouth once daily. 30 tablet 11     No  current facility-administered medications on file prior to visit.       ROS: As per HPI and below:  10 point ROS all negative except that noted in the HPI      Physical Exam:     Vitals:    07/28/22 0832   BP: 127/80   Pulse: 91   Resp: 17   Temp: 98.2 °F (36.8 °C)           There is no height or weight on file to calculate BMI.          General:             Well developed, well nourished, no apparent distress  HEENT:              NCAT, no JVD, mucous membranes moist, EOM intact  Cardiovascular:  Regular rate and rhythm, normal S1, normal S2, No murmurs, rubs, or gallops  Respiratory:        Normal breath sounds, no wheezes, no rales, no rhonchi  Abdomen:           Bowel sounds present, non tender, non distended, no masses, no hepatojugular reflux  Extremities:        No clubbing, no cyanosis, no edema.  Neurological:      No focal deficits  Skin:                   No obvious rashes or erythema, closed wound of the buttock on right, see wound care assessment cumentation in chart review scanned under the media tab    Lab Results   Component Value Date    WBC 9.17 03/29/2022    HGB 15.7 03/29/2022    HCT 48.3 03/29/2022    MCV 92 03/29/2022     03/29/2022     Lab Results   Component Value Date    CHOL 169 02/23/2022    CHOL 140 10/04/2021    CHOL 197 08/10/2020     Lab Results   Component Value Date    HDL 35 (L) 02/23/2022    HDL 39 (L) 10/04/2021    HDL 37 (L) 08/10/2020     Lab Results   Component Value Date    LDLCALC 90.6 02/23/2022    LDLCALC 62.4 (L) 10/04/2021    LDLCALC Invalid, Trig>400.0 08/10/2020     Lab Results   Component Value Date    TRIG 217 (H) 02/23/2022    TRIG 193 (H) 10/04/2021    TRIG 455 (H) 08/10/2020     Lab Results   Component Value Date    CHOLHDL 20.7 02/23/2022    CHOLHDL 27.9 10/04/2021    CHOLHDL 18.8 (L) 08/10/2020     CMP     Lab Results   Component Value Date    TSH 1.679 02/22/2022             Assessment and Recommendations       Diagnoses:    1. Wound to the right buttock,  now healed    Plan:  1. Continue the moisturizing cream to the buttock  2. DC from clinic  3. FU as needed

## 2022-08-02 ENCOUNTER — CLINICAL SUPPORT (OUTPATIENT)
Dept: REHABILITATION | Facility: HOSPITAL | Age: 72
End: 2022-08-02
Payer: MEDICARE

## 2022-08-02 DIAGNOSIS — R42 DIZZINESS: ICD-10-CM

## 2022-08-02 DIAGNOSIS — R29.898 LEG WEAKNESS, BILATERAL: ICD-10-CM

## 2022-08-02 DIAGNOSIS — R26.89 IMBALANCE: ICD-10-CM

## 2022-08-02 PROCEDURE — 97162 PT EVAL MOD COMPLEX 30 MIN: CPT | Mod: PN

## 2022-08-02 PROCEDURE — 97112 NEUROMUSCULAR REEDUCATION: CPT | Mod: PN

## 2022-08-02 NOTE — PLAN OF CARE
[facility]  Physical Therapy Neurological Rehabilitation Initial Evaluation    Name: Maggy Tapia  Clinic Number: [id number]    Therapy Diagnosis:   Encounter Diagnoses   Name Primary?    Dizziness     Imbalance     Leg weakness, bilateral      Physician: Errol Ferrara,*    Physician Orders: PT Eval and Treat   Medical Diagnosis from Referral: dizziness  Evaluation Date: 2022  Authorization Period Expiration: 2022  Plan of Care Expiration: 22  Visit # / Visits authorized:     Time In: 1500  Time Out: 1545  Total Billable Time: 45 minutes    Precautions: Diabetes, cancer and multiple strokes       Subjective     Date of onset: 22  History of Current Symptoms, Maggy reports: persistent dizziness while sitting and during ambulation; symptoms are somewhat alleviated in supine; also reports difficulty with bending over to pick something up; patient endorses history of stroke x 4.     History of migraines: no     Medical History:   Past Medical History:   Diagnosis Date    Anxiety     Arthritis     Asthma     Cancer     Left Breast    Depression     Diabetes mellitus, type 2     GERD (gastroesophageal reflux disease)     Hyperlipidemia     Hypertension     Overactive bladder     Seizures     Pseudo-seizures    Sleep apnea     Stroke     Stroke 2022    pt was in the hospital for a stroke, claims she was seeing people when the stroke happened    Thyroid disease     Urinary tract infection without hematuria 2017     Surgical History:   Maggy Tapia  has a past surgical history that includes Appendectomy; Breast surgery;  section; Cholecystectomy; Dilation and curettage of uterus; Eye surgery; Breast biopsy (Left); Breast lumpectomy (Left); Colonoscopy (N/A, 2020); Cataract extraction (Bilateral); and Cyst Removal (Left, 2021).    Medications:   Maggy has a current medication list which includes the  following prescription(s): albuterol, aspirin, blood-glucose meter, calcium carbonate/vitamin d3, cyanocobalamin, jardiance, fluticasone furoate-vilanterol, gabapentin, ketoconazole, levothyroxine, losartan, metformin, potassium chloride sa, prazosin, sertraline, simvastatin, tetrabenazine, trazodone, [DISCONTINUED] nystatin, and [DISCONTINUED] solifenacin.    Allergies:   Review of patient's allergies indicates:   Allergen Reactions    Penicillins Anaphylaxis    Sulfa (sulfonamide antibiotics) Anaphylaxis    Trintellix [vortioxetine] Nausea And Vomiting and Other (See Comments)     Patient has seizures and vomits      Imaging (MRI of brain on 02/23/22): There is no acute abnormality.  Specifically, there is no acute hemorrhage.  There is no acute infarction.  There is left posterior parietal encephalomalacia and gliosis from remote insult.    Prior Therapy: for previous strokes  Social History: lives with her brother in 1-story home (no steps)  Falls: none   DME: Rollator    Occupation: disabled  Prior Level of Function: supervision needed  Current Level of Function: moderate difficulty with activities of daily living     Pain: no complaints of pain    Pts goals: decrease dizziness during general mobility      Objective     - Follows commands: 75% of time   - Speech: no deficits     Functional Mobility & ADLs   Sit to stand: stand by assist     Mental status: alert, oriented to person, place, and time, anxious  Appearance: Casually dressed  Behavior:  calm and cooperative  Attention Span and Concentration:  Normal    Posture Alignment in sitting:   Head: slouched posture     Sensation: Light Touch: Impaired: intermittent numbness bilateral hands and feet          Proprioception: Intact, Kinesthesia Intact         Visual/Auditory: denies changes   Tracking/Smooth Pursuits:Impaired: moderate difficulty holding target  Saccades: Impaired: moderate difficulty holding target  VOR: Impaired: moderate difficulty  holding target    Coordination:   - fine motor: within functional limits   - UE coordination: within functional limits    - LE coordination: within functional limits    ROM:   CERVICAL SPINE  Flexion 40 degrees (80-90 deg)  Extension 50 degrees (70-80 deg)  L side bend 35 degrees, R side bend 35 degrees (20-45 degrees)  L rotation 50 degrees, R rotation 50 degrees (70-90 degrees)  Are concurrent symptoms present with any of these movements = no    Modified VAS (Vertebral Artery Screen), in sitting (rotation, then extension):  R: negative  L: negative    UPPER EXTREMITY--AROM/PROM  (R) UE: WFLs  (L) UE: WFLs         RANGE OF MOTION--LOWER EXTREMITIES  (R) LE Hip: normal   Knee: normal   Ankle: normal    (L) LE: Hip: normal   Knee: normal   Ankle: normal    Strength: manual muscle test grades below     Lower Extremity Strength  Right LE  Left LE    Hip flexion:  3+/5 Hip flexion: 3+/5   Knee extension: 4-/5 Knee extension: 4-/5   Ankle dorsiflexion:  3+/5 Ankle dorsiflexion: 3+/5       Gait Assessment:(if indicated)  - AD used: rollator  - Assistance: stand by assist   - Distance: 50 feet x 2     GAIT DEVIATIONS:  Maggy displays the following deviations with ambulation: mild path deviation    Impairments contributing to deviations: lower extremity weakness; imbalance    Endurance Deficit: minimal fatigue       POSITIONAL CANAL TESTING  Looking for nystagmus (slow phase followed by quick phase to the affected side for BPPV)    Garcia Hallpike (posterior / CL anterior)   Right : NT   Left: NT  Horizontal Canals   Right: NT   Left: NT  Treatment Performed: N/A        CMS Impairment/Limitation/Restriction for FOTO Balance Survey    Therapist reviewed FOTO scores for Maggy Tapia on 8/2/2022.     FOTO documents entered into [employer] - see Media section.    Limitation Score: 41.5%    Category: Mobility         Other: Dizziness Handicap Inventory = 68/100 (severe handicap); Tinetti = 16/28 (high fall risk)      TREATMENT      Treatment Time In: 1530  Treatment Time Out: 1545  Total Treatment time separate from Evaluation: 15 minutes      Ivana participated in neuromuscular re-education activities to improve: Balance and Central Hypofunction for 15 minutes. The following activities were included:     X 5 each seated smooth pursuits = side to side, up and down   X 5 each seated saccades = side to side, up and down   X 5 each seated VOR1 = side to side, up and down   X 10 static standing holds      Home Exercises and Patient Education Provided    Education provided:   - proper therapeutic exercise technique  - treatment plan    Written Home Exercises Provided: to be provided at future appointment.      Assessment     Maggy is a 72 y.o. female referred to outpatient Physical Therapy with a medical diagnosis of dizziness. Pt presents to PT with the following impairments leading to her functional decline: dizziness, imbalance, and difficulty holding target. Patient's symptoms are likely central in origin.    Pt prognosis is Fair.   Pt will benefit from skilled outpatient Physical Therapy to address the deficits stated above and in the chart below, provide pt/family education, and to maximize pt's level of independence.     Plan of care discussed with patient: Yes  Pt's spiritual, cultural and educational needs considered and patient is agreeable to the plan of care and goals as stated below:     Anticipated Barriers for therapy: severity of weakness and imbalance    Medical Necessity is demonstrated by the following  History  Co-morbidities and personal factors that may impact the plan of care Co-morbidities:   anxiety, COPD/asthma, depression, diabetes, history of cancer, history of CVA, HTN and prior abdominal surgery    Personal Factors:   no deficits     high   Examination  Body Structures and Functions, activity limitations and participation restrictions that may impact the plan of care Body Regions:   head    Body Systems:     strength  balance  gait  transfers    Participation Restrictions:   none    Activity limitations:   Learning and applying knowledge  no deficits    General Tasks and Commands  no deficits    Communication  no deficits    Mobility  lifting and carrying objects  walking  driving (bike, car, motorcycle)    Self care  washing oneself (bathing, drying, washing hands)  toileting  dressing    Domestic Life  shopping  cooking  doing house work (cleaning house, washing dishes, laundry)    Interactions/Relationships  no deficits    Life Areas  no deficits    Community and Social Life  no deficits         high   Clinical Presentation evolving clinical presentation with changing clinical characteristics moderate   Decision Making/ Complexity Score: moderate     Goals:    Short Term Goals (3 Weeks):   1. Patient to tolerate x 30 seconds oculomotor exercises without an elevation in her symptoms.  2. Patient to tolerate use of 2# weights during lower extremity therapeutic exercise to improve overall strength.  3. Patient to tolerate x 10 repetitions sit to stand so that she may rise without using her arms to help.  4. Patient to tolerate x 30 seconds full Romberg stance (eyes closed) to improve upright tolerance.    Long Term Goals (6 Weeks):   1. Patient to demonstrate comp with home exercise program to maintain therapeutic gains.  2. Patient to improve bilateral hip MMT 1/2 grade to demo strength gains from therapeutic intervention.  3. Patient to ambulate 200 ft with rollator and stand by assist with improved yasmin/symmetry.  4. Patient to report that quick movements of her head sometimes increases her symptoms.      Plan     Plan of care Certification: 8/2/2022 to 09/17/22.    Outpatient Physical Therapy evaluation, plus 2 times weekly for 6 weeks to include the following interventions (starting week of 08/08/22): Gait Training, Manual Therapy, Moist Heat/ Ice, Neuromuscular Re-ed, Patient Education, Self Care, Therapeutic  Activities, Therapeutic Exercise and HEP.     Everardo Dickens, PT

## 2022-08-08 ENCOUNTER — CLINICAL SUPPORT (OUTPATIENT)
Dept: REHABILITATION | Facility: HOSPITAL | Age: 72
End: 2022-08-08
Payer: MEDICARE

## 2022-08-08 DIAGNOSIS — R42 DIZZINESS: Primary | ICD-10-CM

## 2022-08-08 PROCEDURE — 97112 NEUROMUSCULAR REEDUCATION: CPT | Mod: PN

## 2022-08-08 PROCEDURE — 97110 THERAPEUTIC EXERCISES: CPT | Mod: PN

## 2022-08-08 NOTE — PROGRESS NOTES
[facility]   Physical Therapy Treatment Note     Name: Maggy Tapia  Clinic Number: [id number]    Therapy Diagnosis:   Encounter Diagnosis   Name Primary?    Dizziness Yes     Physician: Errol Ferrara,*    Visit Date: 8/8/2022    Physician Orders: PT Eval and Treat   Medical Diagnosis from Referral: dizziness  Evaluation Date: 8/2/2022  Authorization Period Expiration: 08/30/2022  Plan of Care Expiration: 09/17/22  Visit # / Visits authorized: 1/ 11     Time In: 1415  Time Out: 1500  Total Billable Time: 45 minutes     Precautions: Diabetes, cancer and multiple strokes       SUBJECTIVE     Pt reports: increased dizziness during transitional movements and bending over.  She does not have a home exercise program.  Response to previous treatment: no changes  Functional change: none    Pain: no complaints of pain      OBJECTIVE     Objective Measures updated at progress report unless specified.     Treatment     Ivana received the treatments listed below:      therapeutic exercises to develop strength, endurance and flexibility for 15 minutes including:     X 15 each seated bilateral lower extremity therapeutic exercise (1#) = marching, long arc quad, hip abduction (red theraband), ball squeeze   X 10 minutes SciFit (level 1) as an adjunct to strength/balance/vestibular training       neuromuscular re-education activities to improve: Balance and Oculomotor Hypofunction for 30 minutes. The following activities were included:     X 5 sit to stand while holding target*   X 5 lean over touch stool while holding target**   X 30 seconds each seated saccades = side to side, up and down   X 30 seconds each seated VOR1 = side to side**, up and down   10 x run of 5 cones on floor = convergence/divergence   X 15 each balance therapeutic exercise = mini squats and heel raises/ toe raises    X 15 feet each balance gait = side stepping, backwards gait      * minimal increase in symptoms    **  moderate increase in symptoms      Patient Education and Home Exercises     Home Exercises Provided and Patient Education Provided     Education provided:   - proper therapeutic exercise technique    Written Home Exercises Provided: to be provided at future appointment.       ASSESSMENT     Patient reported moderate elevation in symptoms while bending over and during VOR1 side to side activities; upper extremity support needed during balance training.          Ivana Is progressing fairly well towards her goals.   Pt prognosis is Fair.     Pt will continue to benefit from skilled outpatient physical therapy to address the deficits listed in the problem list box on initial evaluation, provide pt/family education and to maximize pt's level of independence in the home and community environment.     Pt's spiritual, cultural and educational needs considered and pt agreeable to plan of care and goals.     Anticipated barriers to physical therapy: severity of symptoms and imbalance    Goals:     Short Term Goals (3 Weeks):   1. Patient to tolerate x 30 seconds oculomotor exercises without an elevation in her symptoms. (PART MET)  2. Patient to tolerate use of 2# weights during lower extremity therapeutic exercise to improve overall strength. (NOT MET)  3. Patient to tolerate x 10 repetitions sit to stand so that she may rise without using her arms to help. (NOT MET)  4. Patient to tolerate x 30 seconds full Romberg stance (eyes closed) to improve upright tolerance. (NOT MET)     Long Term Goals (6 Weeks):   1. Patient to demonstrate comp with home exercise program to maintain therapeutic gains. (NOT MET)  2. Patient to improve bilateral hip MMT 1/2 grade to demo strength gains from therapeutic intervention. (NOT MET)  3. Patient to ambulate 200 ft with rollator and stand by assist with improved yasmin/symmetry. (NOT MET)  4. Patient to report that quick movements of her head sometimes increases her symptoms. (NOT  MET)      PLAN     Continue to advance strength/vestibular/balance training to patient's tolerance.      Everardo Dickens, PT

## 2022-08-12 ENCOUNTER — CLINICAL SUPPORT (OUTPATIENT)
Dept: REHABILITATION | Facility: HOSPITAL | Age: 72
End: 2022-08-12
Payer: MEDICARE

## 2022-08-12 DIAGNOSIS — R42 DIZZINESS: Primary | ICD-10-CM

## 2022-08-12 DIAGNOSIS — R29.898 LEG WEAKNESS, BILATERAL: ICD-10-CM

## 2022-08-12 DIAGNOSIS — R26.89 IMBALANCE: ICD-10-CM

## 2022-08-12 PROCEDURE — 97110 THERAPEUTIC EXERCISES: CPT | Mod: PN,CQ

## 2022-08-12 PROCEDURE — 97112 NEUROMUSCULAR REEDUCATION: CPT | Mod: PN,CQ

## 2022-08-12 NOTE — PROGRESS NOTES
[facility]   Physical Therapy Treatment Note     Name: Maggy Tapia  Clinic Number: [id number]    Therapy Diagnosis:   Encounter Diagnoses   Name Primary?    Dizziness Yes    Imbalance     Leg weakness, bilateral      Physician: Errol Ferrara,Lili    Visit Date: 8/12/2022    Physician Orders: PT Eval and Treat   Medical Diagnosis from Referral: dizziness  Evaluation Date: 8/2/2022  Authorization Period Expiration: 08/30/2022  Plan of Care Expiration: 09/17/22  Visit # / Visits authorized: 2/ 11     Time In: 130  Time Out: 215  Total Billable Time: 30 minutes     Precautions: Diabetes, cancer and multiple strokes, seizures       SUBJECTIVE     Pt reports: no pain or symptoms upon arrival. Feels like her balance is more of an issue. No dizziness   She does not have a home exercise program.  Response to previous treatment: no issues reported   Functional change: none    Pain: no complaints of pain      OBJECTIVE     Objective Measures updated at progress report unless specified.     Treatment     Ivana received the treatments listed below:      therapeutic exercises to develop strength, endurance and flexibility for 15 minutes including:     X 30 each seated bilateral lower extremity therapeutic exercise (1#) = marching, long arc quad, hip abduction (red theraband), ball squeeze   X 10 minutes SciFit (level 1) as an adjunct to strength/balance/vestibular training       neuromuscular re-education activities to improve: Balance and Oculomotor Hypofunction for 15 minutes. The following activities were included:     X 5 sit to stand while holding target   X 10 lean over touch stool while holding target*   X 30 seconds each seated saccades = side to side, up and down   X 30 seconds each seated VOR1 = side to side**, up and down      * minimal increase in symptoms    ** moderate increase in symptoms      Patient Education and Home Exercises     Home Exercises Provided and Patient  Education Provided     Education provided:   - proper therapeutic exercise technique    Written Home Exercises Provided: to be provided at future appointment.       ASSESSMENT     Patient tolerated session well with added repetitions performed of seated exercises.  Noted instability with functional mobility with decreased overall balance and strength. Maggy had a seizure after performing seated VOR with increased dizziness. Patient was transferred to mat via 3 therapists to lay side lying while Maggy came out of the seizure. Able to ambulate to restroom after symptoms subsided and left building with her brother.     Ivana Is progressing fairly well towards her goals.   Pt prognosis is Fair.     Pt will continue to benefit from skilled outpatient physical therapy to address the deficits listed in the problem list box on initial evaluation, provide pt/family education and to maximize pt's level of independence in the home and community environment.     Pt's spiritual, cultural and educational needs considered and pt agreeable to plan of care and goals.     Anticipated barriers to physical therapy: severity of symptoms and imbalance    Goals:     Short Term Goals (3 Weeks):   1. Patient to tolerate x 30 seconds oculomotor exercises without an elevation in her symptoms. (PART MET)  2. Patient to tolerate use of 2# weights during lower extremity therapeutic exercise to improve overall strength. (NOT MET)  3. Patient to tolerate x 10 repetitions sit to stand so that she may rise without using her arms to help. (NOT MET)  4. Patient to tolerate x 30 seconds full Romberg stance (eyes closed) to improve upright tolerance. (NOT MET)     Long Term Goals (6 Weeks):   1. Patient to demonstrate comp with home exercise program to maintain therapeutic gains. (NOT MET)  2. Patient to improve bilateral hip MMT 1/2 grade to demo strength gains from therapeutic intervention. (NOT MET)  3. Patient to ambulate 200 ft with rollator and  stand by assist with improved yasmin/symmetry. (NOT MET)  4. Patient to report that quick movements of her head sometimes increases her symptoms. (NOT MET)      PLAN     Continue to advance strength/vestibular/balance training to patient's tolerance.      Leonie Ackerman, PTA

## 2022-08-15 ENCOUNTER — LAB VISIT (OUTPATIENT)
Dept: LAB | Facility: HOSPITAL | Age: 72
End: 2022-08-15
Attending: STUDENT IN AN ORGANIZED HEALTH CARE EDUCATION/TRAINING PROGRAM
Payer: MEDICARE

## 2022-08-15 ENCOUNTER — CLINICAL SUPPORT (OUTPATIENT)
Dept: REHABILITATION | Facility: HOSPITAL | Age: 72
End: 2022-08-15
Payer: MEDICARE

## 2022-08-15 DIAGNOSIS — Z79.4 TYPE 2 DIABETES MELLITUS WITHOUT COMPLICATION, WITH LONG-TERM CURRENT USE OF INSULIN: ICD-10-CM

## 2022-08-15 DIAGNOSIS — R42 DIZZINESS: Primary | ICD-10-CM

## 2022-08-15 DIAGNOSIS — E11.9 TYPE 2 DIABETES MELLITUS WITHOUT COMPLICATION, WITH LONG-TERM CURRENT USE OF INSULIN: ICD-10-CM

## 2022-08-15 DIAGNOSIS — E03.9 ACQUIRED HYPOTHYROIDISM: ICD-10-CM

## 2022-08-15 LAB
ALBUMIN SERPL BCP-MCNC: 3.8 G/DL (ref 3.5–5.2)
ANION GAP SERPL CALC-SCNC: 9 MMOL/L (ref 8–16)
BUN SERPL-MCNC: 22 MG/DL (ref 8–23)
CALCIUM SERPL-MCNC: 9.9 MG/DL (ref 8.7–10.5)
CHLORIDE SERPL-SCNC: 107 MMOL/L (ref 95–110)
CO2 SERPL-SCNC: 28 MMOL/L (ref 23–29)
CREAT SERPL-MCNC: 0.8 MG/DL (ref 0.5–1.4)
EST. GFR  (NO RACE VARIABLE): >60 ML/MIN/1.73 M^2
ESTIMATED AVG GLUCOSE: 100 MG/DL (ref 68–131)
GLUCOSE SERPL-MCNC: 104 MG/DL (ref 70–110)
HBA1C MFR BLD: 5.1 % (ref 4–5.6)
PHOSPHATE SERPL-MCNC: 3.3 MG/DL (ref 2.7–4.5)
POTASSIUM SERPL-SCNC: 5 MMOL/L (ref 3.5–5.1)
SODIUM SERPL-SCNC: 144 MMOL/L (ref 136–145)
T4 FREE SERPL-MCNC: 0.91 NG/DL (ref 0.71–1.51)
TSH SERPL DL<=0.005 MIU/L-ACNC: 3.38 UIU/ML (ref 0.4–4)
TSH SERPL DL<=0.005 MIU/L-ACNC: 3.38 UIU/ML (ref 0.4–4)

## 2022-08-15 PROCEDURE — 36415 COLL VENOUS BLD VENIPUNCTURE: CPT | Mod: PO | Performed by: PHYSICIAN ASSISTANT

## 2022-08-15 PROCEDURE — 97112 NEUROMUSCULAR REEDUCATION: CPT | Mod: PN

## 2022-08-15 PROCEDURE — 83036 HEMOGLOBIN GLYCOSYLATED A1C: CPT | Performed by: PHYSICIAN ASSISTANT

## 2022-08-15 PROCEDURE — 84443 ASSAY THYROID STIM HORMONE: CPT | Performed by: PHYSICIAN ASSISTANT

## 2022-08-15 PROCEDURE — 80069 RENAL FUNCTION PANEL: CPT | Performed by: PHYSICIAN ASSISTANT

## 2022-08-15 PROCEDURE — 84439 ASSAY OF FREE THYROXINE: CPT | Performed by: PHYSICIAN ASSISTANT

## 2022-08-15 PROCEDURE — 97110 THERAPEUTIC EXERCISES: CPT | Mod: PN

## 2022-08-15 NOTE — PROGRESS NOTES
[facility]   Physical Therapy Treatment Note     Name: Maggy Tapia  Clinic Number: [id number]    Therapy Diagnosis:   Encounter Diagnosis   Name Primary?    Dizziness Yes     Physician: Errol Ferrara,*    Visit Date: 8/15/2022    Physician Orders: PT Eval and Treat   Medical Diagnosis from Referral: dizziness  Evaluation Date: 8/2/2022  Authorization Period Expiration: 10/02/2022  Plan of Care Expiration: 09/17/22  Visit # / Visits authorized: 3/ 11     Time In: 1330  Time Out: 1215  Total Billable Time: 45 minutes     Precautions: Diabetes, cancer and multiple strokes       SUBJECTIVE     Pt reports: that she ate prior to today's session - feeling better.  She does not have a home exercise program.  Response to previous treatment: no changes  Functional change: none    Pain: no complaints of pain      OBJECTIVE     Objective Measures updated at progress report unless specified.     Treatment     Ivana received the treatments listed below:      therapeutic exercises to develop strength, endurance and flexibility for 25 minutes including:     X 20 each seated bilateral lower extremity therapeutic exercise (1#) = marching, long arc quad, hip abduction (red theraband), ball squeeze    X 15 each supine bilateral lower extremity therapeutic exercise = heel slides, hip abduction/ adduction, short arc quads on roll   X 15 supine bridges on roll   X 10 minutes SciFit (level 1.5) as an adjunct to strength/balance/vestibular training      neuromuscular re-education activities to improve: Balance and Oculomotor Hypofunction for 20 minutes. The following activities were included:     X 6 sit to stand while holding target   X 6 lean over touch stool while holding target   X 45 seconds each seated saccades = side to side*, up and down   X 45 seconds each seated VOR1 = side to side*, up and down   X 8 each standing bilateral move rings in rainbow pattern while holding target      * minimal  increase in symptoms    ** moderate increase in symptoms      Patient Education and Home Exercises     Home Exercises Provided and Patient Education Provided     Education provided:   - proper therapeutic exercise technique    Written Home Exercises Provided: to be provided at future appointment.       ASSESSMENT     Patient reported mild elevation in symptoms while performing side to side saccades and VOR1 activities; able to increase endurance times during oculomotor exercises; increased resistance tolerated on the SciFit.          Ivana Is progressing fairly well towards her goals.   Pt prognosis is Fair.     Pt will continue to benefit from skilled outpatient physical therapy to address the deficits listed in the problem list box on initial evaluation, provide pt/family education and to maximize pt's level of independence in the home and community environment.     Pt's spiritual, cultural and educational needs considered and pt agreeable to plan of care and goals.     Anticipated barriers to physical therapy: severity of symptoms and imbalance    Goals:     Short Term Goals (3 Weeks):   1. Patient to tolerate x 30 seconds oculomotor exercises without an elevation in her symptoms. (PART MET)  2. Patient to tolerate use of 2# weights during lower extremity therapeutic exercise to improve overall strength. (NOT MET)  3. Patient to tolerate x 10 repetitions sit to stand so that she may rise without using her arms to help. (PART MET)  4. Patient to tolerate x 30 seconds full Romberg stance (eyes closed) to improve upright tolerance. (NOT MET)     Long Term Goals (6 Weeks):   1. Patient to demonstrate comp with home exercise program to maintain therapeutic gains. (NOT MET)  2. Patient to improve bilateral hip MMT 1/2 grade to demo strength gains from therapeutic intervention. (NOT MET)  3. Patient to ambulate 200 ft with rollator and stand by assist with improved yasmin/symmetry. (NOT MET)  4. Patient to report that  quick movements of her head sometimes increases her symptoms. (NOT MET)      PLAN     Continue to advance strength/vestibular/balance training to patient's tolerance.      Everardo Dickens, PT

## 2022-08-17 ENCOUNTER — CLINICAL SUPPORT (OUTPATIENT)
Dept: REHABILITATION | Facility: HOSPITAL | Age: 72
End: 2022-08-17
Payer: MEDICARE

## 2022-08-17 DIAGNOSIS — R42 DIZZINESS: Primary | ICD-10-CM

## 2022-08-17 PROCEDURE — 97110 THERAPEUTIC EXERCISES: CPT | Mod: PN

## 2022-08-17 PROCEDURE — 97112 NEUROMUSCULAR REEDUCATION: CPT | Mod: PN

## 2022-08-17 NOTE — PROGRESS NOTES
[facility]   Physical Therapy Treatment Note     Name: Maggy Tapia  Clinic Number: [id number]    Therapy Diagnosis:   Encounter Diagnosis   Name Primary?    Dizziness Yes     Physician: Errol Ferrara,*    Visit Date: 8/17/2022    Physician Orders: PT Eval and Treat   Medical Diagnosis from Referral: dizziness  Evaluation Date: 8/2/2022  Authorization Period Expiration: 10/02/2022  Plan of Care Expiration: 09/17/22  Visit # / Visits authorized: 4/ 11     Time In: 1330  Time Out: 1415  Total Billable Time: 45 minutes     Precautions: Diabetes, cancer and multiple strokes       SUBJECTIVE     Pt reports: no real complaints of pain at rest.  She does not have a home exercise program.  Response to previous treatment: no changes  Functional change: none    Pain: no complaints of pain      OBJECTIVE     Objective Measures updated at progress report unless specified.     Treatment     Ivana received the treatments listed below:      therapeutic exercises to develop strength, endurance and flexibility for 15 minutes including:     X 10 minutes SciFit (level 2) to promote flexibility prior to strength/balance/vestibular training   X 20 each seated bilateral lower extremity therapeutic exercise (1#) = marching, long arc quad, hip abduction (red theraband), ball squeeze       neuromuscular re-education activities to improve: Balance and Oculomotor Hypofunction for 30 minutes. The following activities were included:     X 7 sit to stand while holding target   X 7 lean over touch stool while holding target   5 letters x 2 lines seated four-square saccades   X 25 each seated read letters while perform VOR1 = side to side, up and down   X 15 each standing mini squats and heel raises/ toe raises    X 25 feet each balance gait = side stepping and backwards gait   X 1 minute stand on AirEx       * minimal increase in symptoms    ** moderate increase in symptoms      Patient Education and Home  Exercises     Home Exercises Provided and Patient Education Provided     Education provided:   - proper therapeutic exercise technique    Written Home Exercises Provided: to be provided at future appointment.       ASSESSMENT     Patient was able to tolerate increased resistance on SciFit; able to demonstrate increased repetitions during habituation exercises and performed without elevation in her symptoms; no elevation in symptoms reported while performing saccades and VOR1 activities; single upper extremity support needed balance therapeutic exercise; double arm support needed during balance gait; no loss of balance while standing on AirEx.          Ivana Is progressing fairly well towards her goals.   Pt prognosis is Fair.     Pt will continue to benefit from skilled outpatient physical therapy to address the deficits listed in the problem list box on initial evaluation, provide pt/family education and to maximize pt's level of independence in the home and community environment.     Pt's spiritual, cultural and educational needs considered and pt agreeable to plan of care and goals.     Anticipated barriers to physical therapy: severity of symptoms and imbalance    Goals:     Short Term Goals (3 Weeks):   1. Patient to tolerate x 30 seconds oculomotor exercises without an elevation in her symptoms. (PART MET)  2. Patient to tolerate use of 2# weights during lower extremity therapeutic exercise to improve overall strength. (PART MET)  3. Patient to tolerate x 10 repetitions sit to stand so that she may rise without using her arms to help. (PART MET)  4. Patient to tolerate x 30 seconds full Romberg stance (eyes closed) to improve upright tolerance. (NOT MET)     Long Term Goals (6 Weeks):   1. Patient to demonstrate comp with home exercise program to maintain therapeutic gains. (NOT MET)  2. Patient to improve bilateral hip MMT 1/2 grade to demo strength gains from therapeutic intervention. (NOT MET)  3. Patient  to ambulate 200 ft with rollator and stand by assist with improved yasmin/symmetry. (NOT MET)  4. Patient to report that quick movements of her head sometimes increases her symptoms. (NOT MET)      PLAN     Continue to advance strength/vestibular/balance training to patient's tolerance.      Everardo Dickens, PT

## 2022-08-22 ENCOUNTER — OFFICE VISIT (OUTPATIENT)
Dept: ENDOCRINOLOGY | Facility: CLINIC | Age: 72
End: 2022-08-22
Payer: MEDICARE

## 2022-08-22 VITALS
DIASTOLIC BLOOD PRESSURE: 80 MMHG | OXYGEN SATURATION: 97 % | HEIGHT: 61 IN | BODY MASS INDEX: 30.96 KG/M2 | HEART RATE: 73 BPM | WEIGHT: 164 LBS | SYSTOLIC BLOOD PRESSURE: 130 MMHG | TEMPERATURE: 98 F

## 2022-08-22 DIAGNOSIS — E66.9 OBESITY (BMI 30-39.9): ICD-10-CM

## 2022-08-22 DIAGNOSIS — E03.9 ACQUIRED HYPOTHYROIDISM: ICD-10-CM

## 2022-08-22 DIAGNOSIS — G47.33 OSA (OBSTRUCTIVE SLEEP APNEA): ICD-10-CM

## 2022-08-22 DIAGNOSIS — G62.9 NEUROPATHY: ICD-10-CM

## 2022-08-22 DIAGNOSIS — M85.80 OSTEOPENIA, UNSPECIFIED LOCATION: ICD-10-CM

## 2022-08-22 DIAGNOSIS — Z79.4 TYPE 2 DIABETES MELLITUS WITHOUT COMPLICATION, WITH LONG-TERM CURRENT USE OF INSULIN: Primary | ICD-10-CM

## 2022-08-22 DIAGNOSIS — E11.9 TYPE 2 DIABETES MELLITUS WITHOUT COMPLICATION, WITH LONG-TERM CURRENT USE OF INSULIN: Primary | ICD-10-CM

## 2022-08-22 DIAGNOSIS — E55.9 HYPOVITAMINOSIS D: ICD-10-CM

## 2022-08-22 DIAGNOSIS — R41.82 ALTERED MENTAL STATUS, UNSPECIFIED ALTERED MENTAL STATUS TYPE: ICD-10-CM

## 2022-08-22 LAB — GLUCOSE SERPL-MCNC: 103 MG/DL (ref 70–110)

## 2022-08-22 PROCEDURE — 1157F PR ADVANCE CARE PLAN OR EQUIV PRESENT IN MEDICAL RECORD: ICD-10-PCS | Mod: CPTII,S$GLB,, | Performed by: PHYSICIAN ASSISTANT

## 2022-08-22 PROCEDURE — 82962 POCT GLUCOSE, HAND-HELD DEVICE: ICD-10-PCS | Mod: S$GLB,,, | Performed by: PHYSICIAN ASSISTANT

## 2022-08-22 PROCEDURE — 1157F ADVNC CARE PLAN IN RCRD: CPT | Mod: CPTII,S$GLB,, | Performed by: PHYSICIAN ASSISTANT

## 2022-08-22 PROCEDURE — 4010F PR ACE/ARB THEARPY RXD/TAKEN: ICD-10-PCS | Mod: CPTII,S$GLB,, | Performed by: PHYSICIAN ASSISTANT

## 2022-08-22 PROCEDURE — 3075F PR MOST RECENT SYSTOLIC BLOOD PRESS GE 130-139MM HG: ICD-10-PCS | Mod: CPTII,S$GLB,, | Performed by: PHYSICIAN ASSISTANT

## 2022-08-22 PROCEDURE — 99213 OFFICE O/P EST LOW 20 MIN: CPT | Mod: S$GLB,,, | Performed by: PHYSICIAN ASSISTANT

## 2022-08-22 PROCEDURE — 99999 PR PBB SHADOW E&M-EST. PATIENT-LVL V: CPT | Mod: PBBFAC,,, | Performed by: PHYSICIAN ASSISTANT

## 2022-08-22 PROCEDURE — 3079F DIAST BP 80-89 MM HG: CPT | Mod: CPTII,S$GLB,, | Performed by: PHYSICIAN ASSISTANT

## 2022-08-22 PROCEDURE — 1126F PR PAIN SEVERITY QUANTIFIED, NO PAIN PRESENT: ICD-10-PCS | Mod: CPTII,S$GLB,, | Performed by: PHYSICIAN ASSISTANT

## 2022-08-22 PROCEDURE — 1159F PR MEDICATION LIST DOCUMENTED IN MEDICAL RECORD: ICD-10-PCS | Mod: CPTII,S$GLB,, | Performed by: PHYSICIAN ASSISTANT

## 2022-08-22 PROCEDURE — 4010F ACE/ARB THERAPY RXD/TAKEN: CPT | Mod: CPTII,S$GLB,, | Performed by: PHYSICIAN ASSISTANT

## 2022-08-22 PROCEDURE — 3044F PR MOST RECENT HEMOGLOBIN A1C LEVEL <7.0%: ICD-10-PCS | Mod: CPTII,S$GLB,, | Performed by: PHYSICIAN ASSISTANT

## 2022-08-22 PROCEDURE — 3288F FALL RISK ASSESSMENT DOCD: CPT | Mod: CPTII,S$GLB,, | Performed by: PHYSICIAN ASSISTANT

## 2022-08-22 PROCEDURE — 1101F PR PT FALLS ASSESS DOC 0-1 FALLS W/OUT INJ PAST YR: ICD-10-PCS | Mod: CPTII,S$GLB,, | Performed by: PHYSICIAN ASSISTANT

## 2022-08-22 PROCEDURE — 1160F PR REVIEW ALL MEDS BY PRESCRIBER/CLIN PHARMACIST DOCUMENTED: ICD-10-PCS | Mod: CPTII,S$GLB,, | Performed by: PHYSICIAN ASSISTANT

## 2022-08-22 PROCEDURE — 3044F HG A1C LEVEL LT 7.0%: CPT | Mod: CPTII,S$GLB,, | Performed by: PHYSICIAN ASSISTANT

## 2022-08-22 PROCEDURE — 3008F BODY MASS INDEX DOCD: CPT | Mod: CPTII,S$GLB,, | Performed by: PHYSICIAN ASSISTANT

## 2022-08-22 PROCEDURE — 1160F RVW MEDS BY RX/DR IN RCRD: CPT | Mod: CPTII,S$GLB,, | Performed by: PHYSICIAN ASSISTANT

## 2022-08-22 PROCEDURE — 3288F PR FALLS RISK ASSESSMENT DOCUMENTED: ICD-10-PCS | Mod: CPTII,S$GLB,, | Performed by: PHYSICIAN ASSISTANT

## 2022-08-22 PROCEDURE — 3008F PR BODY MASS INDEX (BMI) DOCUMENTED: ICD-10-PCS | Mod: CPTII,S$GLB,, | Performed by: PHYSICIAN ASSISTANT

## 2022-08-22 PROCEDURE — 99213 PR OFFICE/OUTPT VISIT, EST, LEVL III, 20-29 MIN: ICD-10-PCS | Mod: S$GLB,,, | Performed by: PHYSICIAN ASSISTANT

## 2022-08-22 PROCEDURE — 1126F AMNT PAIN NOTED NONE PRSNT: CPT | Mod: CPTII,S$GLB,, | Performed by: PHYSICIAN ASSISTANT

## 2022-08-22 PROCEDURE — 99999 PR PBB SHADOW E&M-EST. PATIENT-LVL V: ICD-10-PCS | Mod: PBBFAC,,, | Performed by: PHYSICIAN ASSISTANT

## 2022-08-22 PROCEDURE — 3079F PR MOST RECENT DIASTOLIC BLOOD PRESSURE 80-89 MM HG: ICD-10-PCS | Mod: CPTII,S$GLB,, | Performed by: PHYSICIAN ASSISTANT

## 2022-08-22 PROCEDURE — 1159F MED LIST DOCD IN RCRD: CPT | Mod: CPTII,S$GLB,, | Performed by: PHYSICIAN ASSISTANT

## 2022-08-22 PROCEDURE — 3075F SYST BP GE 130 - 139MM HG: CPT | Mod: CPTII,S$GLB,, | Performed by: PHYSICIAN ASSISTANT

## 2022-08-22 PROCEDURE — 1101F PT FALLS ASSESS-DOCD LE1/YR: CPT | Mod: CPTII,S$GLB,, | Performed by: PHYSICIAN ASSISTANT

## 2022-08-22 PROCEDURE — 82962 GLUCOSE BLOOD TEST: CPT | Mod: S$GLB,,, | Performed by: PHYSICIAN ASSISTANT

## 2022-08-22 RX ORDER — LEVOTHYROXINE SODIUM 100 UG/1
100 TABLET ORAL
Qty: 90 TABLET | Refills: 3 | Status: SHIPPED | OUTPATIENT
Start: 2022-08-22 | End: 2023-10-04

## 2022-08-22 RX ORDER — EMPAGLIFLOZIN 10 MG/1
10 TABLET, FILM COATED ORAL DAILY
Qty: 90 TABLET | Refills: 2 | Status: SHIPPED | OUTPATIENT
Start: 2022-08-22 | End: 2023-07-10

## 2022-08-22 NOTE — PROGRESS NOTES
CC: DM/Hypothyroidism    HPI: Maggy Tapia was diagnosed with Type 2 DM about 6 years ago. No hospitalizations for DM. Her mother had DM and thyroid disease. Arrives with her brother. Pt stopped Trulicity due to diarrhea since last visit. Reports confusion today starting this morning. Pt also had two seizures. Arrives with her brother. Pt reports her two dead relatives had haircuts today. Pt had a stroke since last visit.    CURRENT DIABETIC MEDS: metformin 1000 mg in the bid,  Jardiance 10 mg daily    BG readings are checked 2x daily. No meter or logs to clinic.          Hypoglycemia: She sometimes has hypoglycemia at night.  Type of Glucose Meter: True metrix    Physical Activity: None.     Diet: Her diet has not changed.  3 meals daily.    Last Eye Exam: 4/22 Dr. Bone  Last Podiatry Exam: 9/21    Last DM Education Attended: 1/19    No ETOH/tobacco use.    Hypothyroidism  Taking 100 mcg daily.  Dx in 2016  + mental fogginess  No constipation, sweating, changes in nails or hair.     No palpitations,  changes in nails, heat/cold intolerance.     DEXA 7/21 Osteopenia in the left hip. Taking ca +vd. No recent  Fractures, falls or steriod injections. No exercise.     JUAN  Wears CPAP    Wt Readings from Last 10 Encounters:   08/22/22 74.4 kg (164 lb 0.4 oz)   07/27/22 74.1 kg (163 lb 5.8 oz)   07/15/22 74.8 kg (165 lb)   07/14/22 75.1 kg (165 lb 9.1 oz)   07/06/22 73.9 kg (163 lb 0.5 oz)   06/08/22 75.3 kg (166 lb)   04/01/22 80.3 kg (177 lb)   03/29/22 80.3 kg (177 lb)   03/10/22 82.1 kg (181 lb)   03/09/22 82.5 kg (181 lb 14.1 oz)       REVIEW OF SYSTEMS  General: no weakness or fatigue, wt loss (17 lbs since 3/22).   Eyes: no intermittent blurry vision or visual disturbances.   Cardiac: no chest pain or palpitations.   Respiratory: + sob, no cough    GI: no abdominal pain or nausea.   Skin: no rashes or itching.   Musc: + back pain, neck pain  Neuro: no numbness or tingling.   Endocrine: + polyuria, no  polydipsia, polyphagia.   Remainder ROS negative    Personally reviewed labs below:  Hemoglobin A1C   Date Value Ref Range Status   08/15/2022 5.1 4.0 - 5.6 % Final     Comment:     ADA Screening Guidelines:  5.7-6.4%  Consistent with prediabetes  >or=6.5%  Consistent with diabetes    High levels of fetal hemoglobin interfere with the HbA1C  assay. Heterozygous hemoglobin variants (HbS, HgC, etc)do  not significantly interfere with this assay.   However, presence of multiple variants may affect accuracy.     02/15/2022 6.0 (H) 4.0 - 5.6 % Final     Comment:     ADA Screening Guidelines:  5.7-6.4%  Consistent with prediabetes  >or=6.5%  Consistent with diabetes    High levels of fetal hemoglobin interfere with the HbA1C  assay. Heterozygous hemoglobin variants (HbS, HgC, etc)do  not significantly interfere with this assay.   However, presence of multiple variants may affect accuracy.     10/04/2021 6.2 (H) 4.0 - 5.6 % Final     Comment:     ADA Screening Guidelines:  5.7-6.4%  Consistent with prediabetes  >or=6.5%  Consistent with diabetes    High levels of fetal hemoglobin interfere with the HbA1C  assay. Heterozygous hemoglobin variants (HbS, HgC, etc)do  not significantly interfere with this assay.   However, presence of multiple variants may affect accuracy.         Chemistry        Component Value Date/Time     08/15/2022 0822    K 5.0 08/15/2022 0822     08/15/2022 0822    CO2 28 08/15/2022 0822    BUN 22 08/15/2022 0822    CREATININE 0.8 08/15/2022 0822     08/15/2022 0822        Component Value Date/Time    CALCIUM 9.9 08/15/2022 0822    ALKPHOS 74 03/29/2022 1342    AST 19 03/29/2022 1342    ALT 13 03/29/2022 1342    BILITOT 0.5 03/29/2022 1342    ESTGFRAFRICA >60.0 03/29/2022 1342    EGFRNONAA >60.0 03/29/2022 1342        Lab Results   Component Value Date    CHOL 169 02/23/2022    CHOL 140 10/04/2021    CHOL 197 08/10/2020     Lab Results   Component Value Date    HDL 35 (L) 02/23/2022     HDL 39 (L) 10/04/2021    HDL 37 (L) 08/10/2020     Lab Results   Component Value Date    LDLCALC 90.6 02/23/2022    LDLCALC 62.4 (L) 10/04/2021    LDLCALC Invalid, Trig>400.0 08/10/2020     Lab Results   Component Value Date    TRIG 217 (H) 02/23/2022    TRIG 193 (H) 10/04/2021    TRIG 455 (H) 08/10/2020     Lab Results   Component Value Date    CHOLHDL 20.7 02/23/2022    CHOLHDL 27.9 10/04/2021    CHOLHDL 18.8 (L) 08/10/2020     Lab Results   Component Value Date    TSH 3.379 08/15/2022    TSH 3.379 08/15/2022     Lab Results   Component Value Date    MICALBCREAT 8.3 06/03/2021       Vit D, 25-Hydroxy   Date Value Ref Range Status   02/03/2021 26 (L) 30 - 96 ng/mL Final     Comment:     Vitamin D deficiency.........<10 ng/mL                              Vitamin D insufficiency......10-29 ng/mL       Vitamin D sufficiency........> or equal to 30 ng/mL  Vitamin D toxicity............>100 ng/mL       PHYSICAL EXAMINATION  Constitutional: elderly female, appears well, no distress  Neck: Supple, trachea midline.   Respiratory: even, CTA without wheezes or rales.  Cardiovascular: RRR; no carotid bruits or murmurs.   Lymph: DP pulses  2+ bilaterally; 1+ edema bl.   Skin: +mole on face, legs are very sensitive to touch, warm and dry; no acanthosis nigracans observed. No signs of infection.  Musc: ambulates with a walker, + FROM all ext  Neuro: + confusion, patient alert and cooperative;     Assessment/Plan    1. Type 2 diabetes mellitus without complication, with long-term current use of insulin  T4, Free    TSH    Hemoglobin A1C    empagliflozin (JARDIANCE) 10 mg tablet    POCT Glucose, Hand-Held Device   2. Neuropathy     3. Osteopenia, unspecified location     4. JUAN (obstructive sleep apnea)     5. Obesity (BMI 30-39.9)     6. Altered mental status, unspecified altered mental status type     7. Hypovitaminosis D  Vitamin D   8. Acquired hypothyroidism  levothyroxine (SYNTHROID) 100 MCG tablet     T2DM- A1c is low  for age but readings are in the desired range. Continue Metformin and Jardiance. BG checks 2x daily. Send log in two weeks.  Neuropathy-referral to neurology. Pt is very sensitive to touch bl.    Hypothyroidism-TFTs are stable. Continue LT4 dose.   Osteopenia-repeat DEXA scan 7/23. Start exercise 30 min daily.    RKJ-vicxqo-nbtkqzdo cpap.  SOB-referral to cardiology.   Obesity-Body mass index is 30.99 kg/m². Increase exercise to 30 min daily.       F/u in 6 mths w/ labs prior

## 2022-08-23 ENCOUNTER — CLINICAL SUPPORT (OUTPATIENT)
Dept: REHABILITATION | Facility: HOSPITAL | Age: 72
End: 2022-08-23
Payer: MEDICARE

## 2022-08-23 DIAGNOSIS — R29.898 LEG WEAKNESS, BILATERAL: ICD-10-CM

## 2022-08-23 DIAGNOSIS — R42 DIZZINESS: Primary | ICD-10-CM

## 2022-08-23 DIAGNOSIS — R26.89 IMBALANCE: ICD-10-CM

## 2022-08-23 PROCEDURE — 97112 NEUROMUSCULAR REEDUCATION: CPT | Mod: PN,CQ

## 2022-08-23 PROCEDURE — 97110 THERAPEUTIC EXERCISES: CPT | Mod: PN,CQ

## 2022-08-23 NOTE — PROGRESS NOTES
[facility]   Physical Therapy Treatment Note     Name: Maggy Tapia  Clinic Number: [id number]    Therapy Diagnosis:   Encounter Diagnoses   Name Primary?    Dizziness Yes    Imbalance     Leg weakness, bilateral      Physician: Errol Ferrara,Lili    Visit Date: 8/23/2022    Physician Orders: PT Eval and Treat   Medical Diagnosis from Referral: dizziness  Evaluation Date: 8/2/2022  Authorization Period Expiration: 10/02/2022  Plan of Care Expiration: 09/17/22  Visit # / Visits authorized: 6/ 11     Time In: 1330  Time Out: 1415  Total Billable Time: 45 minutes     Precautions: Diabetes, cancer and multiple strokes       SUBJECTIVE     Pt reports: she is feeling ok today and is not having any pain, headache, or dizziness   She does not have a home exercise program.  Response to previous treatment: no changes  Functional change: none    Pain: no pain reported      OBJECTIVE     Objective Measures updated at progress report unless specified.     Treatment     Ivana received the treatments listed below:      therapeutic exercises to develop strength, endurance and flexibility for 15  minutes including:     X 10 minutes SciFit (level 2) to promote flexibility prior to strength/balance/vestibular training   X 30 each seated bilateral lower extremity therapeutic exercise (1#) = marching, long arc quad, hip abduction (red theraband), ball squeeze       neuromuscular re-education activities to improve: Balance and Oculomotor Hypofunction for 30 minutes. The following activities were included:     X 7 sit to stand while holding target   X 7 lean over touch stool while holding target   5 letters x 2 lines seated four-square saccades   X 25 each seated read letters while perform VOR1 = side to side, up and down    X 20 each standing mini squats, Abduction  and heel raises/ toe raises    X 25 feet each balance gait = side stepping and backwards gait        * minimal increase in  symptoms    ** moderate increase in symptoms      Patient Education and Home Exercises     Home Exercises Provided and Patient Education Provided     Education provided:   - proper therapeutic exercise technique    Written Home Exercises Provided: to be provided at future appointment.       ASSESSMENT     Ivana provided good effort and participation toward therapeutic interventions today with focus on lower extremity strengthening, balance and vestibular exercises.  Pt did not experience any dizziness while performing her exercises today.    Ivana Is progressing fairly well towards her goals.   Pt prognosis is Fair.     Pt will continue to benefit from skilled outpatient physical therapy to address the deficits listed in the problem list box on initial evaluation, provide pt/family education and to maximize pt's level of independence in the home and community environment.     Pt's spiritual, cultural and educational needs considered and pt agreeable to plan of care and goals.     Anticipated barriers to physical therapy: severity of symptoms and imbalance    Goals:     Short Term Goals (3 Weeks):   1. Patient to tolerate x 30 seconds oculomotor exercises without an elevation in her symptoms. (PART MET)  2. Patient to tolerate use of 2# weights during lower extremity therapeutic exercise to improve overall strength. (PART MET)  3. Patient to tolerate x 10 repetitions sit to stand so that she may rise without using her arms to help. (PART MET)  4. Patient to tolerate x 30 seconds full Romberg stance (eyes closed) to improve upright tolerance. (NOT MET)     Long Term Goals (6 Weeks):   1. Patient to demonstrate comp with home exercise program to maintain therapeutic gains. (NOT MET)  2. Patient to improve bilateral hip MMT 1/2 grade to demo strength gains from therapeutic intervention. (NOT MET)  3. Patient to ambulate 200 ft with rollator and stand by assist with improved yasmin/symmetry. (NOT MET)  4. Patient to  report that quick movements of her head sometimes increases her symptoms. (NOT MET)      PLAN   Plan of care Certification: 8/2/2022 to 09/17/22.     Outpatient Physical Therapy evaluation, plus 2 times weekly for 6 weeks to include the following interventions (starting week of 08/08/22): Gait Training, Manual Therapy, Moist Heat/ Ice, Neuromuscular Re-ed, Patient Education, Self Care, Therapeutic Activities, Therapeutic Exercise and HEP.     Continue to advance strength/vestibular/balance training to patient's tolerance.      Joelle Grover, PTA

## 2022-08-23 NOTE — PROGRESS NOTES
PT/PTA met face to face to discuss pt's treatment plan and progress towards established goals. Pt will be seen by a physical therapist minimally every 6th visit or every 30 days.      Joelle Grover PTA

## 2022-08-24 ENCOUNTER — DOCUMENTATION ONLY (OUTPATIENT)
Dept: REHABILITATION | Facility: HOSPITAL | Age: 72
End: 2022-08-24
Payer: MEDICARE

## 2022-08-25 ENCOUNTER — CLINICAL SUPPORT (OUTPATIENT)
Dept: REHABILITATION | Facility: HOSPITAL | Age: 72
End: 2022-08-25
Payer: MEDICARE

## 2022-08-25 DIAGNOSIS — R29.898 LEG WEAKNESS, BILATERAL: ICD-10-CM

## 2022-08-25 DIAGNOSIS — R42 DIZZINESS: Primary | ICD-10-CM

## 2022-08-25 DIAGNOSIS — R26.89 IMBALANCE: ICD-10-CM

## 2022-08-25 PROCEDURE — 97112 NEUROMUSCULAR REEDUCATION: CPT | Mod: PN,CQ

## 2022-08-25 PROCEDURE — 97110 THERAPEUTIC EXERCISES: CPT | Mod: PN,CQ

## 2022-08-25 NOTE — PROGRESS NOTES
[facility]   Physical Therapy Treatment Note     Name: Maggy Tapia  Clinic Number: [id number]    Therapy Diagnosis:   Encounter Diagnoses   Name Primary?    Dizziness Yes    Imbalance     Leg weakness, bilateral      Physician: Errol Ferrara,Lili    Visit Date: 8/25/2022    Physician Orders: PT Eval and Treat   Medical Diagnosis from Referral: dizziness  Evaluation Date: 8/2/2022  Authorization Period Expiration: 10/02/2022  Plan of Care Expiration: 09/17/22  Visit # / Visits authorized:7/ 11  PTA visit # 3/5     Time In: 1335  Time Out: 1415  Total Billable Time: 40 minutes     Precautions: Diabetes, cancer and multiple strokes       SUBJECTIVE     Pt reports: no complaints today  She does not have a home exercise program.  Response to previous treatment: no changes  Functional change: none    Pain: no pain reported      OBJECTIVE     Objective Measures updated at progress report unless specified.     Treatment     Ivana received the treatments listed below:      therapeutic exercises to develop strength, endurance and flexibility for 15  minutes including:     X 10 minutes SciFit (level 2) to promote flexibility prior to strength/balance/vestibular training   X 30 each seated bilateral lower extremity therapeutic exercise (2#) = marching, long arc quad, hip abduction (red theraband), ball squeeze       neuromuscular re-education activities to improve: Balance and Oculomotor Hypofunction for 25 minutes. The following activities were included:     X 10 reps sit to stand while holding target   X 10 reps  lean over touch stool while holding target   5 letters x 2 lines seated four-square saccades   X 25 each seated read letters while perform VOR1 = side to side, up and down (difficulty with keeping head in motion while reading letters)   X 20 each standing mini squats, Abduction and heel raises/ toe raises    X 25 feet each balance gait = side stepping        * minimal increase  in symptoms    ** moderate increase in symptoms      Patient Education and Home Exercises     Home Exercises Provided and Patient Education Provided     Education provided:   - proper therapeutic exercise technique  - when doing standing Home exercise program make sure she holds onto a sturdy object.    Written Home Exercises Provided: Pt given a copy of her exercises in sitting and standing, as well as green theraband.  Pt also given vestibular exercises for saccades, smooth pursuits and VOR1 seated.      ASSESSMENT     Ivana provided good effort and participation toward therapeutic interventions today with focus on lower extremity strengthening, balance and vestibular exercises.  Pt did not experience any dizziness while performing her exercises today.    Ivana Is progressing fairly well towards her goals.   Pt prognosis is Fair.     Pt will continue to benefit from skilled outpatient physical therapy to address the deficits listed in the problem list box on initial evaluation, provide pt/family education and to maximize pt's level of independence in the home and community environment.     Pt's spiritual, cultural and educational needs considered and pt agreeable to plan of care and goals.     Anticipated barriers to physical therapy: severity of symptoms and imbalance    Goals:     Short Term Goals (3 Weeks):   1. Patient to tolerate x 30 seconds oculomotor exercises without an elevation in her symptoms. (PART MET)  2. Patient to tolerate use of 2# weights during lower extremity therapeutic exercise to improve overall strength. (PART MET)  3. Patient to tolerate x 10 repetitions sit to stand so that she may rise without using her arms to help. (PART MET)  4. Patient to tolerate x 30 seconds full Romberg stance (eyes closed) to improve upright tolerance. (NOT MET)     Long Term Goals (6 Weeks):   1. Patient to demonstrate comp with home exercise program to maintain therapeutic gains. (NOT MET)  2. Patient to  improve bilateral hip MMT 1/2 grade to demo strength gains from therapeutic intervention. (NOT MET)  3. Patient to ambulate 200 ft with rollator and stand by assist with improved yasmin/symmetry. (NOT MET)  4. Patient to report that quick movements of her head sometimes increases her symptoms. (NOT MET)      PLAN   Plan of care Certification: 8/2/2022 to 09/17/22.     Outpatient Physical Therapy evaluation, plus 2 times weekly for 6 weeks to include the following interventions (starting week of 08/08/22): Gait Training, Manual Therapy, Moist Heat/ Ice, Neuromuscular Re-ed, Patient Education, Self Care, Therapeutic Activities, Therapeutic Exercise and HEP.     Continue to advance strength/vestibular/balance training to patient's tolerance.      Joelle Grover, PTA

## 2022-08-31 ENCOUNTER — CLINICAL SUPPORT (OUTPATIENT)
Dept: REHABILITATION | Facility: HOSPITAL | Age: 72
End: 2022-08-31
Payer: MEDICARE

## 2022-08-31 DIAGNOSIS — R42 DIZZINESS: Primary | ICD-10-CM

## 2022-08-31 DIAGNOSIS — R26.89 IMBALANCE: ICD-10-CM

## 2022-08-31 DIAGNOSIS — R29.898 LEG WEAKNESS, BILATERAL: ICD-10-CM

## 2022-08-31 PROCEDURE — 97112 NEUROMUSCULAR REEDUCATION: CPT | Mod: PN

## 2022-08-31 PROCEDURE — 97110 THERAPEUTIC EXERCISES: CPT | Mod: PN

## 2022-08-31 NOTE — PROGRESS NOTES
[facility]   Physical Therapy Progress Note     Name: Maggy Tapia  Clinic Number: [id number]    Therapy Diagnosis:   Encounter Diagnoses   Name Primary?    Dizziness Yes    Imbalance     Leg weakness, bilateral      Physician: Errol Ferrara,*    Visit Date: 8/31/2022    Physician Orders: PT Eval and Treat   Medical Diagnosis from Referral: dizziness  Evaluation Date: 8/2/2022  Authorization Period Expiration: 10/02/2022  Plan of Care Expiration: 09/17/22  Visit # / Visits authorized: 7/ 11     Time In: 1330  Time Out: 1415  Total Billable Time: 45 minutes     Precautions: Diabetes, cancer and multiple strokes       SUBJECTIVE     Pt reports: no real complaints of pain at rest.    She was compliant with home exercise program.  Response to previous treatment: no changes  Functional change: none    Pain: no complaints of pain      OBJECTIVE     Lower Extremity Strength  Right LE   Left LE     Hip flexion:  3+/5 Hip flexion: 3+/5   Knee extension: 4-/5 Knee extension: 4-/5   Ankle dorsiflexion:  3+/5 Ankle dorsiflexion: 3+/5         Gait Assessment:(if indicated)  - AD used: rollator  - Assistance: stand by assist   - Distance: 140 feet       Treatment     Ivana received the treatments listed below:      therapeutic exercises to develop strength, endurance and flexibility for 15 minutes including:     X 10 minutes SciFit (level 2) to promote flexibility prior to strength/balance/vestibular training   X 15 each seated bilateral lower extremity therapeutic exercise (3#) = marching, long arc quad, hip abduction (red theraband), ball squeeze   Functional ambulation 140 feet with rollator and stand by assist      neuromuscular re-education activities to improve: Balance and Oculomotor Hypofunction for 30 minutes. The following activities were included:     X 10 sit to stand while holding target   X 10 lean over touch stool while holding target   X 30 seconds each standing saccades  with rollator for support = side to side, up and down              X 30 seconds each standing VOR1 with rollator for support = side to side, up and down  X 8 each standing bilateral move rings in rainbow pattern while holding target    X 15 each standing mini squats and heel raises/ toe raises    X 25 feet each balance gait = side stepping and backwards gait       * minimal increase in symptoms    ** moderate increase in symptoms      Patient Education and Home Exercises     Home Exercises Provided and Patient Education Provided     Education provided:   - proper therapeutic exercise technique  - continue home exercise program     Written Home Exercises Provided: Patient instructed to cont prior HEP.       ASSESSMENT     Patient was able to perform habituation and oculomotor exercises without an elevation in her symptoms; oculomotor exercises performed in standing; single upper extremity support needed during balance therapeutic exercise and balance gait.          Ivana Is progressing fairly well towards her goals.   Pt prognosis is Fair.     Pt will continue to benefit from skilled outpatient physical therapy to address the deficits listed in the problem list box on initial evaluation, provide pt/family education and to maximize pt's level of independence in the home and community environment.     Pt's spiritual, cultural and educational needs considered and pt agreeable to plan of care and goals.     Anticipated barriers to physical therapy: severity of symptoms and imbalance    Goals:     Short Term Goals (3 Weeks):   Patient to tolerate x 30 seconds oculomotor exercises without an elevation in her symptoms. (PART MET)  Patient to tolerate use of 2# weights during lower extremity therapeutic exercise to improve overall strength. (PART MET)  Patient to tolerate x 10 repetitions sit to stand so that she may rise without using her arms to help. (PART MET)  Patient to tolerate x 30 seconds full Romberg stance (eyes  closed) to improve upright tolerance. (NOT MET)     Long Term Goals (6 Weeks):   Patient to demonstrate comp with home exercise program to maintain therapeutic gains. (NOT MET)  Patient to improve bilateral hip MMT 1/2 grade to demo strength gains from therapeutic intervention. (NOT MET)  Patient to ambulate 200 ft with rollator and stand by assist with improved yasmin/symmetry. (NOT MET)  Patient to report that quick movements of her head sometimes increases her symptoms. (NOT MET)      PLAN     Continue to advance strength/vestibular/balance training to patient's tolerance.      Everardo Dickens, PT

## 2022-09-06 ENCOUNTER — CLINICAL SUPPORT (OUTPATIENT)
Dept: REHABILITATION | Facility: HOSPITAL | Age: 72
End: 2022-09-06
Payer: MEDICARE

## 2022-09-06 DIAGNOSIS — R26.89 IMBALANCE: ICD-10-CM

## 2022-09-06 DIAGNOSIS — R29.898 LEG WEAKNESS, BILATERAL: ICD-10-CM

## 2022-09-06 DIAGNOSIS — R42 DIZZINESS: Primary | ICD-10-CM

## 2022-09-06 PROCEDURE — 97110 THERAPEUTIC EXERCISES: CPT | Mod: PN

## 2022-09-06 PROCEDURE — 97112 NEUROMUSCULAR REEDUCATION: CPT | Mod: PN

## 2022-09-06 NOTE — PROGRESS NOTES
[facility] OUTPATIENT THERAPY AND WELLNESS   Physical Therapy Treatment Note     Name: Maggy Tapia  Clinic Number: [id number]    Therapy Diagnosis:   Encounter Diagnoses   Name Primary?    Dizziness Yes    Imbalance     Leg weakness, bilateral      Physician: Errol Ferrara,*    Visit Date: 9/6/2022    Physician Orders: PT Eval and Treat   Medical Diagnosis from Referral: dizziness  Evaluation Date: 8/2/2022  Authorization Period Expiration: 10/02/2022  Plan of Care Expiration: 09/17/22  Visit # / Visits authorized: 8/ 11     Time In: 1418  Time Out: 1503  Total Billable Time: 45 minutes     Precautions: Diabetes, cancer and multiple strokes       SUBJECTIVE     Pt reports: no real complaints of pain at rest.    She was compliant with home exercise program.  Response to previous treatment: no changes  Functional change: none    Pain: no complaints of pain      OBJECTIVE      n/a    Treatment     Ivana received the treatments listed below:      therapeutic exercises to develop strength, endurance and flexibility for 15 minutes including:     X 10 minutes SciFit (level 2.5) to promote flexibility prior to strength/balance/vestibular training   X 20 each seated bilateral lower extremity therapeutic exercise (3#) = marching, long arc quad, hip abduction (red theraband), ball squeeze       neuromuscular re-education activities to improve: Balance and Oculomotor Hypofunction for 30 minutes. The following activities were included:     X 10 sit to stand while holding target   X 10 lean over touch stool while holding target   X 45 seconds each standing saccades with rolling-walker for support = side to side, up and down              X 45 seconds standing VOR1 with rolling-walker for support = side to side   X 25 standing read letters while perform VOR1 with rolling-walker for support = up and down  X 8 each standing bilateral move rings in rainbow pattern while holding target    X 15 each standing mini squats and  heel raises/ toe raises with rolling-walker for support   X 25 feet each balance gait = side stepping and backwards gait       * minimal increase in symptoms    ** moderate increase in symptoms      Patient Education and Home Exercises     Home Exercises Provided and Patient Education Provided     Education provided:   - proper therapeutic exercise technique  - continue home exercise program     Written Home Exercises Provided: Patient instructed to cont prior HEP.       ASSESSMENT     Patient was able to tolerate increased resistance on the SciFit; increased repetitions performed during seated lower extremity therapeutic exercise; unable to hold target during standing VOR1, up and down - improved when instructed to read letters while performing task; performed habituation and oculomotor exercises without an elevation in her symptoms; single upper extremity support needed during balance therapeutic exercise and balance gait.          Ivana Is progressing fairly well towards her goals.   Pt prognosis is Fair.     Pt will continue to benefit from skilled outpatient physical therapy to address the deficits listed in the problem list box on initial evaluation, provide pt/family education and to maximize pt's level of independence in the home and community environment.     Pt's spiritual, cultural and educational needs considered and pt agreeable to plan of care and goals.     Anticipated barriers to physical therapy: severity of symptoms and imbalance    Goals:     Short Term Goals (3 Weeks):   Patient to tolerate x 30 seconds oculomotor exercises without an elevation in her symptoms. (PART MET)  Patient to tolerate use of 2# weights during lower extremity therapeutic exercise to improve overall strength. (PART MET)  Patient to tolerate x 10 repetitions sit to stand so that she may rise without using her arms to help. (PART MET)  Patient to tolerate x 30 seconds full Romberg stance (eyes closed) to improve upright  tolerance. (NOT MET)     Long Term Goals (6 Weeks):   Patient to demonstrate comp with home exercise program to maintain therapeutic gains. (NOT MET)  Patient to improve bilateral hip MMT 1/2 grade to demo strength gains from therapeutic intervention. (NOT MET)  Patient to ambulate 200 ft with rollator and stand by assist with improved yasmin/symmetry. (NOT MET)  Patient to report that quick movements of her head sometimes increases her symptoms. (NOT MET)      PLAN     Continue to advance strength/vestibular/balance training to patient's tolerance.      Everardo Dickens, PT

## 2022-09-07 ENCOUNTER — OFFICE VISIT (OUTPATIENT)
Dept: PODIATRY | Facility: CLINIC | Age: 72
End: 2022-09-07
Payer: MEDICARE

## 2022-09-07 VITALS — HEIGHT: 61 IN | BODY MASS INDEX: 31.53 KG/M2 | HEART RATE: 78 BPM | OXYGEN SATURATION: 96 % | WEIGHT: 167 LBS

## 2022-09-07 DIAGNOSIS — L60.2 ONYCHOGRYPOSIS: ICD-10-CM

## 2022-09-07 DIAGNOSIS — M20.42 HAMMER TOES OF BOTH FEET: ICD-10-CM

## 2022-09-07 DIAGNOSIS — I87.2 VENOUS INSUFFICIENCY OF BOTH LOWER EXTREMITIES: ICD-10-CM

## 2022-09-07 DIAGNOSIS — M20.41 HAMMER TOES OF BOTH FEET: ICD-10-CM

## 2022-09-07 DIAGNOSIS — E11.42 DIABETIC POLYNEUROPATHY ASSOCIATED WITH TYPE 2 DIABETES MELLITUS: Primary | ICD-10-CM

## 2022-09-07 DIAGNOSIS — L85.1 ACQUIRED KERATODERMA: ICD-10-CM

## 2022-09-07 PROCEDURE — 1126F AMNT PAIN NOTED NONE PRSNT: CPT | Mod: CPTII,S$GLB,, | Performed by: PODIATRIST

## 2022-09-07 PROCEDURE — 99499 UNLISTED E&M SERVICE: CPT | Mod: S$GLB,,, | Performed by: PODIATRIST

## 2022-09-07 PROCEDURE — 1160F PR REVIEW ALL MEDS BY PRESCRIBER/CLIN PHARMACIST DOCUMENTED: ICD-10-PCS | Mod: CPTII,S$GLB,, | Performed by: PODIATRIST

## 2022-09-07 PROCEDURE — 11721 ROUTINE FOOT CARE: ICD-10-PCS | Mod: Q9,S$GLB,, | Performed by: PODIATRIST

## 2022-09-07 PROCEDURE — 3008F PR BODY MASS INDEX (BMI) DOCUMENTED: ICD-10-PCS | Mod: CPTII,S$GLB,, | Performed by: PODIATRIST

## 2022-09-07 PROCEDURE — 3288F FALL RISK ASSESSMENT DOCD: CPT | Mod: CPTII,S$GLB,, | Performed by: PODIATRIST

## 2022-09-07 PROCEDURE — 1159F MED LIST DOCD IN RCRD: CPT | Mod: CPTII,S$GLB,, | Performed by: PODIATRIST

## 2022-09-07 PROCEDURE — 99499 NO LOS: ICD-10-PCS | Mod: S$GLB,,, | Performed by: PODIATRIST

## 2022-09-07 PROCEDURE — 1101F PT FALLS ASSESS-DOCD LE1/YR: CPT | Mod: CPTII,S$GLB,, | Performed by: PODIATRIST

## 2022-09-07 PROCEDURE — 3044F HG A1C LEVEL LT 7.0%: CPT | Mod: CPTII,S$GLB,, | Performed by: PODIATRIST

## 2022-09-07 PROCEDURE — 11721 DEBRIDE NAIL 6 OR MORE: CPT | Mod: Q9,S$GLB,, | Performed by: PODIATRIST

## 2022-09-07 PROCEDURE — 1160F RVW MEDS BY RX/DR IN RCRD: CPT | Mod: CPTII,S$GLB,, | Performed by: PODIATRIST

## 2022-09-07 PROCEDURE — 1159F PR MEDICATION LIST DOCUMENTED IN MEDICAL RECORD: ICD-10-PCS | Mod: CPTII,S$GLB,, | Performed by: PODIATRIST

## 2022-09-07 PROCEDURE — 4010F ACE/ARB THERAPY RXD/TAKEN: CPT | Mod: CPTII,S$GLB,, | Performed by: PODIATRIST

## 2022-09-07 PROCEDURE — 1157F PR ADVANCE CARE PLAN OR EQUIV PRESENT IN MEDICAL RECORD: ICD-10-PCS | Mod: CPTII,S$GLB,, | Performed by: PODIATRIST

## 2022-09-07 PROCEDURE — 3288F PR FALLS RISK ASSESSMENT DOCUMENTED: ICD-10-PCS | Mod: CPTII,S$GLB,, | Performed by: PODIATRIST

## 2022-09-07 PROCEDURE — 4010F PR ACE/ARB THEARPY RXD/TAKEN: ICD-10-PCS | Mod: CPTII,S$GLB,, | Performed by: PODIATRIST

## 2022-09-07 PROCEDURE — 3008F BODY MASS INDEX DOCD: CPT | Mod: CPTII,S$GLB,, | Performed by: PODIATRIST

## 2022-09-07 PROCEDURE — 1101F PR PT FALLS ASSESS DOC 0-1 FALLS W/OUT INJ PAST YR: ICD-10-PCS | Mod: CPTII,S$GLB,, | Performed by: PODIATRIST

## 2022-09-07 PROCEDURE — 3044F PR MOST RECENT HEMOGLOBIN A1C LEVEL <7.0%: ICD-10-PCS | Mod: CPTII,S$GLB,, | Performed by: PODIATRIST

## 2022-09-07 PROCEDURE — 1126F PR PAIN SEVERITY QUANTIFIED, NO PAIN PRESENT: ICD-10-PCS | Mod: CPTII,S$GLB,, | Performed by: PODIATRIST

## 2022-09-07 PROCEDURE — 1157F ADVNC CARE PLAN IN RCRD: CPT | Mod: CPTII,S$GLB,, | Performed by: PODIATRIST

## 2022-09-07 NOTE — PROGRESS NOTES
"  [address]  [place] [address]  Phone: [phone]   [phone]    Patient's PCP:Yanna Abbasi MD  Referring Provider: No ref. provider found    Subjective:      Chief Complaint:: Nail Care ( Nail Care (Q9 diabatic nail trimming) )    HPI  Maggy Tapia is a 72 y.o. female who presents today with a complaint of Maggy Tapia is a 72 y.o. female who presents today for routine nail trim/care.  C/o pain in both feet, especially bilateral 1st toenails due to the thickness.       Systemic Doctor: MIRACLE Richards PA-C  Date Last Seen: 2022   Blood Sugar: 127   Hemoglobin A1c: 6.0 ( 08/15/2022 )     Vitals:    22 1408   Pulse: 78   SpO2: 96%   Weight: 75.8 kg (167 lb)   Height: 5' 1" (1.549 m)   PainSc: 0-No pain   PainLoc: Foot      Shoe Size: 9.5    Past Surgical History:   Procedure Laterality Date    APPENDECTOMY      BREAST BIOPSY Left     BREAST LUMPECTOMY Left     2016    BREAST SURGERY      CATARACT EXTRACTION Bilateral     OU done//     SECTION      CHOLECYSTECTOMY      COLONOSCOPY N/A 2020    Procedure: COLONOSCOPY;  Surgeon: Mj Fernandez MD;  Location: [facility];  Service: Endoscopy;  Laterality: N/A;    CYST REMOVAL Left 2021    DILATION AND CURETTAGE OF UTERUS      EYE SURGERY       Past Medical History:   Diagnosis Date    Anxiety     Arthritis     Asthma     Cancer     Left Breast    Depression     Diabetes mellitus, type 2     GERD (gastroesophageal reflux disease)     Hyperlipidemia     Hypertension     Overactive bladder     Seizures     Pseudo-seizures    Sleep apnea     Stroke     Stroke 2022    pt was in the hospital for a stroke, claims she was seeing people when the stroke happened    Thyroid disease     Urinary tract infection without hematuria 2017     Family History   Problem Relation Age of Onset    Diabetes Mother     Hypertension Mother     Breast cancer Mother     Cataracts Mother     Melanoma Mother     Heart " failure Father     Melanoma Father     Cataracts Brother     Melanoma Brother     Glaucoma Neg Hx     Retinal detachment Neg Hx     Macular degeneration Neg Hx         Social History:   Marital Status: Single  Alcohol History:  reports current alcohol use.  Tobacco History:  reports that she has never smoked. She has never used smokeless tobacco.  Drug History:  reports no history of drug use.    Review of patient's allergies indicates:   Allergen Reactions    Penicillins Anaphylaxis    Sulfa (sulfonamide antibiotics) Anaphylaxis    Trintellix [vortioxetine] Nausea And Vomiting and Other (See Comments)     Patient has seizures and vomits       Current Outpatient Medications   Medication Sig Dispense Refill    albuterol (PROVENTIL/VENTOLIN HFA) 90 mcg/actuation inhaler Inhale 1-2 puffs into the lungs every 4 (four) hours as needed for Shortness of Breath (coughing). Rescue 20.1 g 11    aspirin (ECOTRIN) 81 MG EC tablet Take 81 mg by mouth once daily.      blood-glucose meter kit Use as instructed. Insurance preferred. 1 each 0    CALCIUM CARBONATE/VITAMIN D3 (CALCIUM 500 + D ORAL) Take 1 tablet by mouth once daily. 10 mg daily      cyanocobalamin (VITAMIN B-12) 1000 MCG tablet Take 1 tablet (1,000 mcg total) by mouth once daily. 30 tablet 0    empagliflozin (JARDIANCE) 10 mg tablet Take 1 tablet (10 mg total) by mouth once daily. 90 tablet 2    fluticasone furoate-vilanteroL (BREO ELLIPTA) 100-25 mcg/dose diskus inhaler Inhale 1 puff into the lungs once daily. Controller 60 each 11    gabapentin (NEURONTIN) 300 MG capsule Take 1 capsule (300 mg total) by mouth every evening. Take 1 tablet every night x 7 days. Increase to twice a day x 7 days. Increase to three times a day. 90 capsule 0    ketoconazole (NIZORAL) 2 % cream AAA pannus fold at least daily after the shower 60 g 3    levothyroxine (SYNTHROID) 100 MCG tablet Take 1 tablet (100 mcg total) by mouth before breakfast. 90 tablet 3    losartan (COZAAR) 50 MG  tablet Take 0.5 tablets (25 mg total) by mouth once daily. 90 tablet 3    metFORMIN (GLUCOPHAGE-XR) 500 MG ER 24hr tablet TAKE 2 TABLETS(1000 MG) BY MOUTH TWICE DAILY WITH MEALS 360 tablet 3    potassium chloride SA (K-DUR,KLOR-CON) 20 MEQ tablet Take 20 mEq by mouth once daily.      sertraline (ZOLOFT) 50 MG tablet Take 1 tablet (50 mg total) by mouth once daily. 30 tablet 2    simvastatin (ZOCOR) 40 MG tablet Take 1 tablet (40 mg total) by mouth once daily. 90 tablet 3    tetrabenazine (XENAZINE) 25 mg tablet Take one tablet daily for 7 days and then  Take 1 tablet twice daily afterwards 47 tablet 0    traZODone (DESYREL) 50 MG tablet Take 1 tablet (50 mg total) by mouth nightly as needed for Insomnia. 30 tablet 2    prazosin (MINIPRESS) 5 MG capsule Take 1 capsule (5 mg total) by mouth every evening. 90 capsule 0     No current facility-administered medications for this visit.       Review of Systems   Constitutional:  Negative for chills, fatigue, fever and unexpected weight change.   HENT:  Negative for hearing loss and trouble swallowing.    Eyes:  Negative for photophobia and visual disturbance.   Respiratory:  Negative for cough, shortness of breath and wheezing.    Cardiovascular:  Positive for leg swelling. Negative for chest pain and palpitations.   Gastrointestinal:  Negative for abdominal pain and nausea.   Genitourinary:  Negative for dysuria and frequency.   Musculoskeletal:  Positive for gait problem. Negative for back pain and myalgias.   Skin:  Negative for rash and wound.   Neurological:  Positive for numbness. Negative for tremors, seizures, weakness and headaches.   Hematological:  Bruises/bleeds easily.       Objective:        Physical Exam:   Foot Exam    General  General Appearance: appears stated age and healthy   Orientation: alert and oriented to person, place, and time   Affect: appropriate   Gait: unimpaired   Assistance: walker use       Right Foot/Ankle     Inspection and  Palpation  Ecchymosis: none  Tenderness: (Great toe)  Swelling: (Mild edema lower extremity)  Arch: normal  Hammertoes: second toe, third toe, fourth toe and fifth toe  Skin Exam: abnormal color; no ulcer and no erythema   Fungus Toenails: present    Neurovascular  Dorsalis pedis: 1+  Posterior tibial: 1+  Capillary Refill: 2+  Varicose veins: present  Saphenous nerve sensation: diminished  Tibial nerve sensation: diminished  Superficial peroneal nerve sensation: diminished  Deep peroneal nerve sensation: diminished  Sural nerve sensation: diminished    Edema  Type of edema: non-pitting    Muscle Strength  Ankle dorsiflexion: 4+  Ankle plantar flexion: 4+  Ankle inversion: 4+  Ankle eversion: 4+  Great toe extension: 4+  Great toe flexion: 4+    Range of Motion    Normal right ankle ROM    Tests  PT Tinel's sign: negative    Paresthesia: positive  Comments  Nails 1 through 5 are thickened, discolored, dystrophic, and elongated with subungual debris    Skin thin shiny and atrophic with diminished pedal hair growth       Left Foot/Ankle      Inspection and Palpation  Ecchymosis: none  Tenderness: (Great toe)  Swelling: (Mild lower extremity edema)  Arch: normal  Hammertoes: second toe, third toe, fourth toe and fifth toe  Skin Exam: callus (Third toe); no drainage, no ulcer and no erythema   Fungus Toenails: present    Neurovascular  Dorsalis pedis: 2+  Posterior tibial: 2+  Capillary refill: 2+  Varicose veins: present  Saphenous nerve sensation: diminished  Tibial nerve sensation: diminished  Superficial peroneal nerve sensation: diminished  Deep peroneal nerve sensation: diminished  Sural nerve sensation: diminished    Edema  Type of edema: non-pitting    Muscle Strength  Ankle dorsiflexion: 4+  Ankle plantar flexion: 4+  Ankle inversion: 4+  Ankle eversion: 4+  Great toe extension: 4+  Great toe flexion: 4+    Range of Motion    Normal left ankle ROM    Tests  PT Tinel's sign: negative  Paresthesia:  positive  Comments  Nails 1 through 5 are thickened, discolored, dystrophic, and elongated with subungual debris    Skin thin shiny and atrophic with diminished pedal hair growth     Physical Exam  Cardiovascular:      Pulses:           Dorsalis pedis pulses are 1+ on the right side and 2+ on the left side.        Posterior tibial pulses are 1+ on the right side and 2+ on the left side.   Feet:      Right foot:      Skin integrity: No ulcer or erythema.      Toenail Condition: Fungal disease present.     Left foot:      Skin integrity: Callus (Third toe) present. No ulcer or erythema.      Toenail Condition: Fungal disease present.            Right Ankle/Foot Exam     Range of Motion   The patient has normal right ankle ROM.    Comments:  Nails 1 through 5 are thickened, discolored, dystrophic, and elongated with subungual debris    Skin thin shiny and atrophic with diminished pedal hair growth       Left Ankle/Foot Exam     Range of Motion   The patient has normal left ankle ROM.     Comments:  Nails 1 through 5 are thickened, discolored, dystrophic, and elongated with subungual debris    Skin thin shiny and atrophic with diminished pedal hair growth         Muscle Strength   Right Lower Extremity   Ankle Dorsiflexion:  4+   Plantar flexion:  4+/5  Left Lower Extremity   Ankle Dorsiflexion:  4+   Plantar flexion:  4+/5     Reflexes     Left Side  Paresthesia: present    Right Side   Paresthesia: present    Vascular Exam     Right Pulses  Dorsalis Pedis:      1+  Posterior Tibial:      1+        Left Pulses  Dorsalis Pedis:      2+  Posterior Tibial:      2+         Imaging:            Assessment:       1. Diabetic polyneuropathy associated with type 2 diabetes mellitus    2. Venous insufficiency of both lower extremities    3. Onychogryposis    4. Acquired keratoderma    5. Hammer toes of both feet      Plan:   Diabetic polyneuropathy associated with type 2 diabetes mellitus  -     DIABETIC SHOES FOR HOME USE  -     " Routine Foot Care    Venous insufficiency of both lower extremities  -     DIABETIC SHOES FOR HOME USE  -     Routine Foot Care    Onychogryposis  -     Routine Foot Care    Acquired keratoderma  -     DIABETIC SHOES FOR HOME USE    Hammer toes of both feet  -     DIABETIC SHOES FOR HOME USE    Follow up if symptoms worsen or fail to improve.    Routine Foot Care    Date/Time: 9/7/2022 2:40 PM  Performed by: Felix Bean DPM  Authorized by: Felix Bean DPM     Time out: Immediately prior to procedure a "time out" was called to verify the correct patient, procedure, equipment, support staff and site/side marked as required.    Consent Done?:  Not Needed  Hyperkeratotic Skin Lesions?: No      Nail Care Type:  Debride  Location(s): All  (Left 1st Toe, Left 3rd Toe, Left 2nd Toe, Left 4th Toe, Left 5th Toe, Right 1st Toe, Right 2nd Toe, Right 3rd Toe, Right 4th Toe and Right 5th Toe)  Patient tolerance:  Patient tolerated the procedure well with no immediate complications     Instruments Used: Nail Nipper   Manually reduced with electric .             Counseling:     I provided patient education verbally regarding:   Patient diagnosis, treatment options, as well as alternatives, risks, and benefits.     Counseled patient on the aspects of diabetes and how it pertains to the feet.  I explained the importance of proper diabetic foot care and how it is essential for the health of their feet.      Shoe inspection. Patient instructed on proper foot hygeine. We discussed wearing proper shoe gear, daily foot inspections, never walking without protective shoe gear, never putting sharp instruments to feet, routine podiatric nail visits every 2-3 months.        This note was created using Dragon voice recognition software that occasionally misinterpreted phrases or words.                 "

## 2022-09-08 NOTE — PATIENT INSTRUCTIONS
Your Diabetes Foot Care Program    Every day you depend on your feet to keep you moving. But when you have diabetes, your feet need special care. Even a small foot problem can become very serious. So dont take your feet for granted. By working with your diabetes healthcare team, you can learn how to protect your feet and keep them healthy.  Evaluating your feet  An evaluation helps your healthcare provider check the condition of your feet. The evaluation includes a review of your diabetes history and overall health. It may also include a foot exam, X-rays, or other tests. These can help show problems beneath the skin that you cant see or feel.  Medical history  You will be asked about your overall health and any history of foot problems. Youll also discuss your diabetes history, such as whether your blood sugar level has changed over time. It also includes questions about sensations of pain, tingling, pins and needles, or numbness. Your healthcare provider will also want to know if you have high blood pressure and heart disease, or if you smoke. Be sure to mention any medicines (including over-the-counter), supplements, or herbal remedies you take.  Foot exam  A foot exam checks the condition of different parts of your foot. First, your skin and nails are examined for any signs of infection. Blood flow is checked by feeling for the pulses in each foot. You may also have tests to study the nerves in the foot. These include using a small filament (wire) to see how sensitive your feet are. In certain cases, you will be asked to walk a short distance to check for bone, joint, and muscle problems.  Diagnostic tests  If needed, your healthcare provider will suggest certain tests to learn more about your feet. These include:  Doppler tests to measure blood flow in the feet and lower leg.  X-rays, which can show bone or joint problems.  Other imaging tests, such as an MRI (magnetic resonance imaging), bone scan, and CT  (computed tomography) scan. These can help show bone infections.  Other tests, such as vascular tests, which study the blood flow in your feet and legs. You may also have nerve studies to learn how sensitive your feet are.  Creating a foot care program  Based on the evaluation, your healthcare provider will create a foot care program for you. Your program may be as simple as starting a daily self-care routine and changing the types of shoes your wear. It may also involve treating minor foot problems, such as a corn or blister. In some cases, surgery will be needed to treat an infection or mechanical problems, such as hammer toes.  Preventing problems  When you have diabetes, its easier to prevent problems than to treat them later on. So see your healthcare team for regular checkups and foot care. Your healthcare team can also help you learn more about caring for your feet at home. For example, you may be told to avoid walking barefoot. Or you may be told that special footwear is needed to protect your feet.  Have regular checkups  Foot problems can develop quickly. So be sure to follow your healthcare teams schedule for regular checkups. During office visits, take off your shoes and socks as soon as you get in the exam room. Ask your healthcare provider to examine your feet for problems. This will make it easier to find and treat small skin irritations before they get worse. Regular checkups can also help keep track of the blood flow and feeling in your feet. If you have neuropathy (lack of feeling in your feet), you will need to have checkups more often.  Learn about self-care  The more you know about diabetes and your feet, the easier it will be to prevent problems. Members of your healthcare team can teach you how to inspect your feet and teach you to look for warning signs. They can also give you other foot care tips. During office visits, be sure to ask any questions you have.  Date Last Reviewed: 7/1/2016  ©  2900-7108 [employer]. [address] [place] [address], [place] [address]. All rights reserved. This information is not intended as a substitute for professional medical care. Always follow your healthcare professional's instructions.

## 2022-09-12 ENCOUNTER — OFFICE VISIT (OUTPATIENT)
Dept: UROLOGY | Facility: CLINIC | Age: 72
End: 2022-09-12
Payer: MEDICARE

## 2022-09-12 VITALS
DIASTOLIC BLOOD PRESSURE: 68 MMHG | BODY MASS INDEX: 31.55 KG/M2 | WEIGHT: 167.13 LBS | HEART RATE: 89 BPM | SYSTOLIC BLOOD PRESSURE: 117 MMHG | HEIGHT: 61 IN

## 2022-09-12 DIAGNOSIS — R32 URINARY INCONTINENCE, UNSPECIFIED TYPE: Primary | ICD-10-CM

## 2022-09-12 LAB — POC RESIDUAL URINE VOLUME: 0 ML (ref 0–100)

## 2022-09-12 PROCEDURE — 1159F MED LIST DOCD IN RCRD: CPT | Mod: CPTII,S$GLB,, | Performed by: NURSE PRACTITIONER

## 2022-09-12 PROCEDURE — 3074F SYST BP LT 130 MM HG: CPT | Mod: CPTII,S$GLB,, | Performed by: NURSE PRACTITIONER

## 2022-09-12 PROCEDURE — 51798 US URINE CAPACITY MEASURE: CPT | Mod: S$GLB,,, | Performed by: NURSE PRACTITIONER

## 2022-09-12 PROCEDURE — 99213 OFFICE O/P EST LOW 20 MIN: CPT | Mod: S$GLB,,, | Performed by: NURSE PRACTITIONER

## 2022-09-12 PROCEDURE — 3078F PR MOST RECENT DIASTOLIC BLOOD PRESSURE < 80 MM HG: ICD-10-PCS | Mod: CPTII,S$GLB,, | Performed by: NURSE PRACTITIONER

## 2022-09-12 PROCEDURE — 3288F FALL RISK ASSESSMENT DOCD: CPT | Mod: CPTII,S$GLB,, | Performed by: NURSE PRACTITIONER

## 2022-09-12 PROCEDURE — 1101F PT FALLS ASSESS-DOCD LE1/YR: CPT | Mod: CPTII,S$GLB,, | Performed by: NURSE PRACTITIONER

## 2022-09-12 PROCEDURE — 1101F PR PT FALLS ASSESS DOC 0-1 FALLS W/OUT INJ PAST YR: ICD-10-PCS | Mod: CPTII,S$GLB,, | Performed by: NURSE PRACTITIONER

## 2022-09-12 PROCEDURE — 99213 PR OFFICE/OUTPT VISIT, EST, LEVL III, 20-29 MIN: ICD-10-PCS | Mod: S$GLB,,, | Performed by: NURSE PRACTITIONER

## 2022-09-12 PROCEDURE — 1159F PR MEDICATION LIST DOCUMENTED IN MEDICAL RECORD: ICD-10-PCS | Mod: CPTII,S$GLB,, | Performed by: NURSE PRACTITIONER

## 2022-09-12 PROCEDURE — 3288F PR FALLS RISK ASSESSMENT DOCUMENTED: ICD-10-PCS | Mod: CPTII,S$GLB,, | Performed by: NURSE PRACTITIONER

## 2022-09-12 PROCEDURE — 1160F RVW MEDS BY RX/DR IN RCRD: CPT | Mod: CPTII,S$GLB,, | Performed by: NURSE PRACTITIONER

## 2022-09-12 PROCEDURE — 1157F PR ADVANCE CARE PLAN OR EQUIV PRESENT IN MEDICAL RECORD: ICD-10-PCS | Mod: CPTII,S$GLB,, | Performed by: NURSE PRACTITIONER

## 2022-09-12 PROCEDURE — 51798 POCT BLADDER SCAN: ICD-10-PCS | Mod: S$GLB,,, | Performed by: NURSE PRACTITIONER

## 2022-09-12 PROCEDURE — 3074F PR MOST RECENT SYSTOLIC BLOOD PRESSURE < 130 MM HG: ICD-10-PCS | Mod: CPTII,S$GLB,, | Performed by: NURSE PRACTITIONER

## 2022-09-12 PROCEDURE — 4010F ACE/ARB THERAPY RXD/TAKEN: CPT | Mod: CPTII,S$GLB,, | Performed by: NURSE PRACTITIONER

## 2022-09-12 PROCEDURE — 99999 PR PBB SHADOW E&M-EST. PATIENT-LVL IV: ICD-10-PCS | Mod: PBBFAC,,, | Performed by: NURSE PRACTITIONER

## 2022-09-12 PROCEDURE — 99999 PR PBB SHADOW E&M-EST. PATIENT-LVL IV: CPT | Mod: PBBFAC,,, | Performed by: NURSE PRACTITIONER

## 2022-09-12 PROCEDURE — 3008F PR BODY MASS INDEX (BMI) DOCUMENTED: ICD-10-PCS | Mod: CPTII,S$GLB,, | Performed by: NURSE PRACTITIONER

## 2022-09-12 PROCEDURE — 1157F ADVNC CARE PLAN IN RCRD: CPT | Mod: CPTII,S$GLB,, | Performed by: NURSE PRACTITIONER

## 2022-09-12 PROCEDURE — 4010F PR ACE/ARB THEARPY RXD/TAKEN: ICD-10-PCS | Mod: CPTII,S$GLB,, | Performed by: NURSE PRACTITIONER

## 2022-09-12 PROCEDURE — 3044F PR MOST RECENT HEMOGLOBIN A1C LEVEL <7.0%: ICD-10-PCS | Mod: CPTII,S$GLB,, | Performed by: NURSE PRACTITIONER

## 2022-09-12 PROCEDURE — 3008F BODY MASS INDEX DOCD: CPT | Mod: CPTII,S$GLB,, | Performed by: NURSE PRACTITIONER

## 2022-09-12 PROCEDURE — 3078F DIAST BP <80 MM HG: CPT | Mod: CPTII,S$GLB,, | Performed by: NURSE PRACTITIONER

## 2022-09-12 PROCEDURE — 1160F PR REVIEW ALL MEDS BY PRESCRIBER/CLIN PHARMACIST DOCUMENTED: ICD-10-PCS | Mod: CPTII,S$GLB,, | Performed by: NURSE PRACTITIONER

## 2022-09-12 PROCEDURE — 3044F HG A1C LEVEL LT 7.0%: CPT | Mod: CPTII,S$GLB,, | Performed by: NURSE PRACTITIONER
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
## 2022-09-13 ENCOUNTER — CLINICAL SUPPORT (OUTPATIENT)
Dept: REHABILITATION | Facility: HOSPITAL | Age: 72
End: 2022-09-13
Payer: MEDICARE

## 2022-09-13 ENCOUNTER — PATIENT OUTREACH (OUTPATIENT)
Dept: ADMINISTRATIVE | Facility: HOSPITAL | Age: 72
End: 2022-09-13
Payer: MEDICARE

## 2022-09-13 DIAGNOSIS — R29.898 LEG WEAKNESS, BILATERAL: ICD-10-CM

## 2022-09-13 DIAGNOSIS — R42 DIZZINESS: Primary | ICD-10-CM

## 2022-09-13 DIAGNOSIS — R26.89 IMBALANCE: ICD-10-CM

## 2022-09-13 PROCEDURE — 97112 NEUROMUSCULAR REEDUCATION: CPT | Mod: PN,CQ

## 2022-09-13 PROCEDURE — 97110 THERAPEUTIC EXERCISES: CPT | Mod: PN,CQ

## 2022-09-13 NOTE — PROGRESS NOTES
[facility]   Physical Therapy Treatment Note     Name: Maggy Tapia  Clinic Number: [id number]    Therapy Diagnosis:   Encounter Diagnoses   Name Primary?    Dizziness Yes    Imbalance     Leg weakness, bilateral      Physician: Errol Ferrara,Lili    Visit Date: 9/13/2022    Physician Orders: PT Eval and Treat   Medical Diagnosis from Referral: dizziness  Evaluation Date: 8/2/2022  Authorization Period Expiration: 10/02/2022  Plan of Care Expiration: 09/17/22  Visit # / Visits authorized: 10/ 11     Time In: 1330  Time Out: 1412  Total Billable Time: 42 minutes     Precautions: Diabetes, cancer and multiple strokes       SUBJECTIVE     Pt reports: no complaints reported today.  She was compliant with home exercise program.  Response to previous treatment: no changes  Functional change: none    Pain: no complaints of pain      OBJECTIVE      n/a    Treatment     Ivana received the treatments listed below:      therapeutic exercises to develop strength, endurance and flexibility for 15 minutes including:     X 10 minutes SciFit (level 3 ) to promote flexibility prior to strength/balance/vestibular training   X 30 each seated bilateral lower extremity therapeutic exercise (3#) = marching, long arc quad, hip abduction (red theraband), ball squeeze   Sit to stand x 10 reps     neuromuscular re-education activities to improve: Balance and Oculomotor Hypofunction for 27 minutes. The following activities were included:     X 10 sit to stand while holding target   X 10 lean over touch stool while holding target   X 45 seconds each standing saccades with rolling-walker for support = side to side, up and down              X 45 seconds standing VOR1 with rolling-walker for support = side to side   X 25 standing read letters while perform VOR1 with rolling-walker for support = up and down  X 8 each standing bilateral move rings in rainbow pattern while holding target    X 20 each standing  mini squats and heel raises/ toe raises with rolling-walker for support         * minimal increase in symptoms    ** moderate increase in symptoms      Patient Education and Home Exercises     Home Exercises Provided and Patient Education Provided     Education provided:   - proper therapeutic exercise technique  - continue home exercise program     Written Home Exercises Provided: Patient instructed to cont prior HEP.       ASSESSMENT     Ivana provided good effort and participation toward therapeutic interventions today with focus on  strengthening and occulomotor exercises.  Patient reported no increase in dizziness, lightheadedness with vestibular exercises today.             Ivana Is progressing fairly well towards her goals.   Pt prognosis is Fair.     Pt will continue to benefit from skilled outpatient physical therapy to address the deficits listed in the problem list box on initial evaluation, provide pt/family education and to maximize pt's level of independence in the home and community environment.     Pt's spiritual, cultural and educational needs considered and pt agreeable to plan of care and goals.     Anticipated barriers to physical therapy: severity of symptoms and imbalance    Goals:     Short Term Goals (3 Weeks):   Patient to tolerate x 30 seconds oculomotor exercises without an elevation in her symptoms. (PART MET)  Patient to tolerate use of 2# weights during lower extremity therapeutic exercise to improve overall strength. (PART MET)  Patient to tolerate x 10 repetitions sit to stand so that she may rise without using her arms to help. (PART MET)  Patient to tolerate x 30 seconds full Romberg stance (eyes closed) to improve upright tolerance. (NOT MET)     Long Term Goals (6 Weeks):   Patient to demonstrate comp with home exercise program to maintain therapeutic gains. (NOT MET)  Patient to improve bilateral hip MMT 1/2 grade to demo strength gains from therapeutic intervention. (NOT  MET)  Patient to ambulate 200 ft with rollator and stand by assist with improved yasmin/symmetry. (NOT MET)  Patient to report that quick movements of her head sometimes increases her symptoms. (NOT MET)      PLAN   Plan of care Certification: 8/2/2022 to 09/17/22.     Outpatient Physical Therapy evaluation, plus 2 times weekly for 6 weeks to include the following interventions (starting week of 08/08/22): Gait Training, Manual Therapy, Moist Heat/ Ice, Neuromuscular Re-ed, Patient Education, Self Care, Therapeutic Activities, Therapeutic Exercise and HEP.     Continue to advance strength/vestibular/balance training to patient's tolerance.      Joelle Grover, PTA

## 2022-09-13 NOTE — PROGRESS NOTES
Health Maintenance Due   Topic Date Due    Shingles Vaccine (2 of 2) 05/04/2022    Diabetes Urine Screening  06/03/2022    COVID-19 Vaccine (4 - Booster for Pfizer series) 08/05/2022

## 2022-09-15 ENCOUNTER — CLINICAL SUPPORT (OUTPATIENT)
Dept: REHABILITATION | Facility: HOSPITAL | Age: 72
End: 2022-09-15
Payer: MEDICARE

## 2022-09-15 DIAGNOSIS — R29.898 LEG WEAKNESS, BILATERAL: ICD-10-CM

## 2022-09-15 DIAGNOSIS — R26.89 IMBALANCE: ICD-10-CM

## 2022-09-15 DIAGNOSIS — R42 DIZZINESS: Primary | ICD-10-CM

## 2022-09-15 PROCEDURE — 97112 NEUROMUSCULAR REEDUCATION: CPT | Mod: PN

## 2022-09-15 PROCEDURE — 97110 THERAPEUTIC EXERCISES: CPT | Mod: PN

## 2022-09-15 NOTE — PROGRESS NOTES
[facility]   Physical Therapy Discharge Notes    Name: Maggy Tapia  Clinic Number: [id number]    Therapy Diagnosis:   Encounter Diagnoses   Name Primary?    Dizziness Yes    Imbalance     Leg weakness, bilateral      Physician: Errol Ferrara,*    Visit Date: 9/15/2022    Physician Orders: PT Eval and Treat   Medical Diagnosis from Referral: dizziness  Evaluation Date: 8/2/2022    Date of Last visit: 09/15/22  Total Visits Received: 11     Time In: 1415  Time Out: 1505  Total Billable Time: 50 minutes     Precautions: Diabetes, cancer and multiple strokes       SUBJECTIVE     Pt reports: no real complaints of pain at rest.    She was compliant with home exercise program.  Response to previous treatment: no changes  Functional change: none    Pain: no complaints of pain      OBJECTIVE     Lower Extremity Strength  Right LE   Left LE     Hip flexion:  4-/5 Hip flexion: 4-/5   Knee extension: 4/5 Knee extension: 4/5   Ankle dorsiflexion:  4-/5 Ankle dorsiflexion: 4-/5         Gait Assessment:(if indicated)  - AD used: rollator  - Assistance: stand by assist   - Distance: 150 feet x 2      Other: Dizziness Handicap Inventory = 14/100 (less than mild handicap); Tinetti = 17/28 (high fall risk)      Treatment     Ivana received the treatments listed below:      therapeutic exercises to develop strength, endurance and flexibility for 18 minutes including:     X 10 minutes NuStep (level 3) to promote flexibility prior to strength/balance/vestibular training   X 20 each seated bilateral lower extremity therapeutic exercise (3#) = marching, long arc quad, ball squeeze    X 10 sit to stand with right upper extremity support   Functional ambulation 2 x 150 feet with rollator and stand by assist       neuromuscular re-education activities to improve: Balance and Oculomotor Hypofunction for 27 minutes. The following activities were included:     X 45 seconds each standing saccades with  rollator for support = side to side, up and down              X 45 seconds standing VOR1 with rollator for support = side to side   X 8 each standing bilateral move rings in rainbow pattern while holding target    X 15 each standing mini squats and heel raises/ toe raises with rolling-walker for support   X 25 feet each balance gait = side stepping and backwards gait   X 30 seconds full Romberg stance (eyes closed)   Reviewed oculomotor home exercise program - instructed to continue once a day        Patient Education and Home Exercises     Home Exercises Provided and Patient Education Provided     Education provided:   - proper therapeutic exercise technique  - continue home exercise program     Written Home Exercises Provided: Patient instructed to cont prior HEP.       ASSESSMENT     Patient scored a 54% improvement in her Dizziness Handicap Inventory score vs the initial assessment; was able to tolerate treatment session without an increase in her symptoms; increased resistance tolerated on the NuStep.          Ivana has progressed fairly well towards her goals.     Pt's spiritual, cultural and educational needs considered and pt agreeable to plan of care and goals.    Goals:     Short Term Goals (3 Weeks):   Patient to tolerate x 30 seconds oculomotor exercises without an elevation in her symptoms. (MET)  Patient to tolerate use of 2# weights during lower extremity therapeutic exercise to improve overall strength. (MET)  Patient to tolerate x 10 repetitions sit to stand so that she may rise without using her arms to help. (PART MET)  Patient to tolerate x 30 seconds full Romberg stance (eyes closed) to improve upright tolerance. (MET)     Long Term Goals (6 Weeks):   Patient to demonstrate comp with home exercise program to maintain therapeutic gains. (MET)  Patient to improve bilateral hip MMT 1/2 grade to demo strength gains from therapeutic intervention. (MET)  Patient to ambulate 200 ft with rollator and  stand by assist with improved yasmin/symmetry. (NOT MET)  Patient to report that quick movements of her head sometimes increases her symptoms. (MET)    Discharge reason: Patient has reached the maximum rehab potential for the present time.    Discharge FOTO Score: 59/77      PLAN     This patient is discharged from Physical Therapy.      Everardo Dickens, PT

## 2022-09-27 DIAGNOSIS — I10 ESSENTIAL HYPERTENSION: ICD-10-CM

## 2022-09-27 NOTE — TELEPHONE ENCOUNTER
No new care gaps identified.  [facility] Embedded Care Gaps. Reference number: [id number]. 9/27/2022   3:13:28 PM CDT

## 2022-09-27 NOTE — TELEPHONE ENCOUNTER
----- Message from Aguila Villanueva sent at 9/27/2022  3:02 PM CDT -----  Type:  RX Refill Request    Who Called:  Patient  Refill or New Rx:  Refill  RX Name and Strength:  losartan (COZAAR) 50 MG tablet  How is the patient currently taking it? (ex. 1XDay):  1XDay  Is this a 30 day or 90 day RX:  90  Preferred Pharmacy with phone number:        [id number] [address] & [address] [address]  Phone: [phone] Fax: [phone]      Local or Mail Order:  Local  Ordering Provider:  Elroy Mon Call Back Number:  [phone]  Additional Information:

## 2022-09-28 ENCOUNTER — TELEPHONE (OUTPATIENT)
Dept: PULMONOLOGY | Facility: CLINIC | Age: 72
End: 2022-09-28
Payer: MEDICARE

## 2022-09-28 RX ORDER — LOSARTAN POTASSIUM 50 MG/1
25 TABLET ORAL DAILY
Qty: 90 TABLET | Refills: 0
Start: 2022-09-28

## 2022-09-29 ENCOUNTER — CLINICAL SUPPORT (OUTPATIENT)
Dept: AUDIOLOGY | Facility: CLINIC | Age: 72
End: 2022-09-29
Payer: MEDICARE

## 2022-09-29 DIAGNOSIS — H93.13 TINNITUS OF BOTH EARS: Primary | ICD-10-CM

## 2022-09-29 DIAGNOSIS — H81.4 VERTIGO, CENTRAL: ICD-10-CM

## 2022-09-29 DIAGNOSIS — R42 DIZZINESS: ICD-10-CM

## 2022-09-29 PROCEDURE — 92540 PR VESTIBULAR EVAL NYSTAG FOVL&PERPH STIM OSCIL TRACKING: ICD-10-PCS | Mod: S$GLB,,, | Performed by: AUDIOLOGIST

## 2022-09-29 PROCEDURE — 92537 PR CALORIC VSTBLR TEST W/REC BITHERMAL: ICD-10-PCS | Mod: S$GLB,,, | Performed by: AUDIOLOGIST

## 2022-09-29 PROCEDURE — 92537 CALORIC VSTBLR TEST W/REC: CPT | Mod: S$GLB,,, | Performed by: AUDIOLOGIST

## 2022-09-29 PROCEDURE — 92540 BASIC VESTIBULAR EVALUATION: CPT | Mod: S$GLB,,, | Performed by: AUDIOLOGIST

## 2022-09-29 NOTE — Clinical Note
Impressions:  Abnormal central VNG due to abnormal sinusoidal tracking oculomotor subtest, vertical up-beating nystagmus present during positional testing and abnormal caloric fixation suppression for all caloric irrigations.  Negative for BPPV bilaterally.   No significant caloric asymmetry or weakness.   Recommendations: 1.  ENT review of results 2.  Neurology consult

## 2022-09-29 NOTE — PROGRESS NOTES
Referring provider:  Errol Ferrara PA-C     Maggy Tapia was seen 22 for Videonystagmography (VNG) testing.      Pt reportedly abstained from vestibular suppressants for the past 24-48 hours prior to her VNG.       VAT was negative bilaterally.      VNG Results (abnormal results italicized):   Oculomotor function tests:  Sinusoidal tracking - abnormal  Saccades - WNL  OPKs - WNL   Spontaneous nystagmus was absent.   Gaze nystagmus was absent.   Garcia-Hallpike Left was negative for BPPV but yielded 10 d/s UB nystagmus with fixation.  Houston-Hallpike Right was negative for BPPV but yielded 12 d/s UB nystagmus with fixation.    Static Positionals:  Supine - WNL  Head Right - 4 d/s UB nystagmus with a 2 d/s RB slant (non-torsional) with fixation   Head Left -  6 d/s UB nystagmus with a 6 d/s RB slant (non-torsional) with fixation  Bi-thermal caloric irrigations revealed a 9% caloric weakness in the left ear, which is within normal limits, and 4% directional preponderance to the right, which is within normal limits.  Fixation suppression following caloric irrigations were normal.  RC: 25 d/s    RW: 45 d/s    d/s    LW: 37 d/s     Impressions:  Abnormal central VNG due to abnormal sinusoidal tracking oculomotor subtest, vertical up-beating nystagmus present during positional testing and abnormal caloric fixation suppression for all caloric irrigations.  Negative for BPPV bilaterally.   No significant caloric asymmetry or weakness.     Recommendations:  1.  ENT review of results  2.  Neurology consult    Tracings will be scanned into the patient's chart.

## 2022-09-30 ENCOUNTER — OFFICE VISIT (OUTPATIENT)
Dept: OTOLARYNGOLOGY | Facility: CLINIC | Age: 72
End: 2022-09-30
Payer: MEDICARE

## 2022-09-30 VITALS — BODY MASS INDEX: 31.17 KG/M2 | TEMPERATURE: 98 F | WEIGHT: 158.75 LBS | HEIGHT: 60 IN

## 2022-09-30 DIAGNOSIS — R42 DIZZINESS: ICD-10-CM

## 2022-09-30 DIAGNOSIS — H93.13 TINNITUS OF BOTH EARS: ICD-10-CM

## 2022-09-30 DIAGNOSIS — H90.3 SENSORINEURAL HEARING LOSS (SNHL) OF BOTH EARS: Primary | ICD-10-CM

## 2022-09-30 PROCEDURE — 3008F PR BODY MASS INDEX (BMI) DOCUMENTED: ICD-10-PCS | Mod: CPTII,S$GLB,, | Performed by: PHYSICIAN ASSISTANT

## 2022-09-30 PROCEDURE — 3288F FALL RISK ASSESSMENT DOCD: CPT | Mod: CPTII,S$GLB,, | Performed by: PHYSICIAN ASSISTANT

## 2022-09-30 PROCEDURE — 1159F MED LIST DOCD IN RCRD: CPT | Mod: CPTII,S$GLB,, | Performed by: PHYSICIAN ASSISTANT

## 2022-09-30 PROCEDURE — 3008F BODY MASS INDEX DOCD: CPT | Mod: CPTII,S$GLB,, | Performed by: PHYSICIAN ASSISTANT

## 2022-09-30 PROCEDURE — 99213 PR OFFICE/OUTPT VISIT, EST, LEVL III, 20-29 MIN: ICD-10-PCS | Mod: S$GLB,,, | Performed by: PHYSICIAN ASSISTANT

## 2022-09-30 PROCEDURE — 1160F PR REVIEW ALL MEDS BY PRESCRIBER/CLIN PHARMACIST DOCUMENTED: ICD-10-PCS | Mod: CPTII,S$GLB,, | Performed by: PHYSICIAN ASSISTANT

## 2022-09-30 PROCEDURE — 4010F ACE/ARB THERAPY RXD/TAKEN: CPT | Mod: CPTII,S$GLB,, | Performed by: PHYSICIAN ASSISTANT

## 2022-09-30 PROCEDURE — 3044F PR MOST RECENT HEMOGLOBIN A1C LEVEL <7.0%: ICD-10-PCS | Mod: CPTII,S$GLB,, | Performed by: PHYSICIAN ASSISTANT

## 2022-09-30 PROCEDURE — 1101F PT FALLS ASSESS-DOCD LE1/YR: CPT | Mod: CPTII,S$GLB,, | Performed by: PHYSICIAN ASSISTANT

## 2022-09-30 PROCEDURE — 1126F AMNT PAIN NOTED NONE PRSNT: CPT | Mod: CPTII,S$GLB,, | Performed by: PHYSICIAN ASSISTANT

## 2022-09-30 PROCEDURE — 4010F PR ACE/ARB THEARPY RXD/TAKEN: ICD-10-PCS | Mod: CPTII,S$GLB,, | Performed by: PHYSICIAN ASSISTANT

## 2022-09-30 PROCEDURE — 3044F HG A1C LEVEL LT 7.0%: CPT | Mod: CPTII,S$GLB,, | Performed by: PHYSICIAN ASSISTANT

## 2022-09-30 PROCEDURE — 1160F RVW MEDS BY RX/DR IN RCRD: CPT | Mod: CPTII,S$GLB,, | Performed by: PHYSICIAN ASSISTANT

## 2022-09-30 PROCEDURE — 1101F PR PT FALLS ASSESS DOC 0-1 FALLS W/OUT INJ PAST YR: ICD-10-PCS | Mod: CPTII,S$GLB,, | Performed by: PHYSICIAN ASSISTANT

## 2022-09-30 PROCEDURE — 99213 OFFICE O/P EST LOW 20 MIN: CPT | Mod: S$GLB,,, | Performed by: PHYSICIAN ASSISTANT

## 2022-09-30 PROCEDURE — 3288F PR FALLS RISK ASSESSMENT DOCUMENTED: ICD-10-PCS | Mod: CPTII,S$GLB,, | Performed by: PHYSICIAN ASSISTANT

## 2022-09-30 PROCEDURE — 1159F PR MEDICATION LIST DOCUMENTED IN MEDICAL RECORD: ICD-10-PCS | Mod: CPTII,S$GLB,, | Performed by: PHYSICIAN ASSISTANT

## 2022-09-30 PROCEDURE — 99999 PR PBB SHADOW E&M-EST. PATIENT-LVL III: CPT | Mod: PBBFAC,,, | Performed by: PHYSICIAN ASSISTANT

## 2022-09-30 PROCEDURE — 1126F PR PAIN SEVERITY QUANTIFIED, NO PAIN PRESENT: ICD-10-PCS | Mod: CPTII,S$GLB,, | Performed by: PHYSICIAN ASSISTANT

## 2022-09-30 PROCEDURE — 1157F ADVNC CARE PLAN IN RCRD: CPT | Mod: CPTII,S$GLB,, | Performed by: PHYSICIAN ASSISTANT

## 2022-09-30 PROCEDURE — 99999 PR PBB SHADOW E&M-EST. PATIENT-LVL III: ICD-10-PCS | Mod: PBBFAC,,, | Performed by: PHYSICIAN ASSISTANT

## 2022-09-30 PROCEDURE — 1157F PR ADVANCE CARE PLAN OR EQUIV PRESENT IN MEDICAL RECORD: ICD-10-PCS | Mod: CPTII,S$GLB,, | Performed by: PHYSICIAN ASSISTANT

## 2022-09-30 NOTE — PROGRESS NOTES
[facility]    Subjective:      Patient: Maggy Tapia Patient PCP: Yanna Abbasi MD         :  1950     Sex:  female      MRN:  [id number]          Date of Visit: 2022      Chief Complaint: Dizziness follow up    Interval History 2022: Pt completed VNG, which shows central findings. Pt states that VPT helped and that she is not as dizzy and she does not lose her balance. Pt states that her tinnitus and hearing loss are the same. Pt has hearing aids through [employer]. Pt follows with Neurologist NP Waleska Farmer.    Patient ID 2022: Maggy Tapia is a 72 y.o. female who presents to clinic for tinnitus/dizziness. Pt had audiogram that shows bilateral SNHL with slight upsloping pattern from low to high frequency. Pt states that she has been losing her balance for 3 weeks without episodes of true dizziness or vertigo. Pt states she has had ringing in her ears for around 3 weeks. Pt states she has had bilateral high-pitched non-pulsatile tinnitus. Pt presents to clinic with walker, which she had prior to her episodes of dysequilibrium. Pt states that for 3 weeks she has had increase in dysequilibrium and veers both left and right when walking. Pt endorses bilateral non-pulsatile tinnitus.  Pt states that she had sudden hearing loss around 3 weeks ago in both ears. Pt denies migraine. Pt is on metformin for diabetes. Pt denies fever/chills, flucutations in hearing or aural pressure.     Past Medical History  She has a past medical history of Anxiety, Arthritis, Asthma, Cancer, Depression, Diabetes mellitus, type 2, GERD (gastroesophageal reflux disease), Hyperlipidemia, Hypertension, Overactive bladder, Seizures, Sleep apnea, Stroke, Stroke, Thyroid disease, and Urinary tract infection without hematuria.    Family History  Her family history includes Breast cancer in her mother; Cataracts in her brother and mother; Diabetes in her mother; Heart failure in her father; Hypertension in her  mother; Melanoma in her brother, father, and mother.    Past Surgical History:   Procedure Laterality Date    APPENDECTOMY      BREAST BIOPSY Left     BREAST LUMPECTOMY Left     2016    BREAST SURGERY      CATARACT EXTRACTION Bilateral     OU done//     SECTION      CHOLECYSTECTOMY      COLONOSCOPY N/A 2020    Procedure: COLONOSCOPY;  Surgeon: Mj Fernandez MD;  Location: [facility];  Service: Endoscopy;  Laterality: N/A;    CYST REMOVAL Left 2021    DILATION AND CURETTAGE OF UTERUS      EYE SURGERY       Social History     Tobacco Use    Smoking status: Never    Smokeless tobacco: Never   Substance and Sexual Activity    Alcohol use: Yes     Comment: seldom    Drug use: No    Sexual activity: Not Currently     Medications  She has a current medication list which includes the following prescription(s): albuterol, aspirin, blood-glucose meter, calcium carbonate/vitamin d3, cyanocobalamin, jardiance, fluticasone furoate-vilanterol, gabapentin, ketoconazole, levothyroxine, losartan, metformin, potassium chloride sa, prazosin, sertraline, simvastatin, tetrabenazine, trazodone, [DISCONTINUED] nystatin, and [DISCONTINUED] solifenacin.    Review of patient's allergies indicates:   Allergen Reactions    Penicillins Anaphylaxis    Sulfa (sulfonamide antibiotics) Anaphylaxis    Trintellix [vortioxetine] Nausea And Vomiting and Other (See Comments)     Patient has seizures and vomits     All medications, allergies, and past history have been reviewed.    Objective:      Vitals:  Vitals - 1 value per visit 2022   SYSTOLIC 117 - -   DIASTOLIC 68 - -   Pulse 89 - -   Temp - - 97.6   Resp - - -   SPO2 - - -   Weight (lb) 167.11 - 158.73   Weight (kg) 75.8 - 72   Height 61 - 60   BMI (Calculated) 31.6 - 31   VISIT REPORT - - -   Pain Score  - 0 -   Some encounter information is confidential and restricted. Go to Review Flowsheets activity to see all data.   Some recent data might be  hidden       Body surface area is 1.75 meters squared.    Physical Exam  Constitutional:       General: She is not in acute distress.     Appearance: Normal appearance. She is not ill-appearing.   HENT:      Head: Normocephalic and atraumatic.      Right Ear: Tympanic membrane, ear canal and external ear normal.      Left Ear: Tympanic membrane, ear canal and external ear normal.      Nose: Septal deviation (right) present.      Mouth/Throat:      Lips: Pink. No lesions.      Mouth: Mucous membranes are moist. No oral lesions.      Tongue: No lesions.      Palate: No lesions.      Pharynx: Oropharynx is clear. Uvula midline. No pharyngeal swelling, oropharyngeal exudate, posterior oropharyngeal erythema or uvula swelling.   Eyes:      General:         Right eye: No discharge.         Left eye: No discharge.      Extraocular Movements: Extraocular movements intact.      Conjunctiva/sclera: Conjunctivae normal.   Pulmonary:      Effort: Pulmonary effort is normal.   Neurological:      General: No focal deficit present.      Mental Status: She is alert and oriented to person, place, and time. Mental status is at baseline.   Psychiatric:         Mood and Affect: Mood normal.         Behavior: Behavior normal.         Thought Content: Thought content normal.         Judgment: Judgment normal.     Labs:  WBC   Date Value Ref Range Status   03/29/2022 9.17 3.90 - 12.70 K/uL Final     Platelets   Date Value Ref Range Status   03/29/2022 153 150 - 450 K/uL Final     Creatinine   Date Value Ref Range Status   08/15/2022 0.8 0.5 - 1.4 mg/dL Final     TSH   Date Value Ref Range Status   08/15/2022 3.379 0.400 - 4.000 uIU/mL Final   08/15/2022 3.379 0.400 - 4.000 uIU/mL Final     Glucose   Date Value Ref Range Status   08/15/2022 104 70 - 110 mg/dL Final     Hemoglobin A1C   Date Value Ref Range Status   08/15/2022 5.1 4.0 - 5.6 % Final     Comment:     ADA Screening Guidelines:  5.7-6.4%  Consistent with prediabetes  >or=6.5%   Consistent with diabetes    High levels of fetal hemoglobin interfere with the HbA1C  assay. Heterozygous hemoglobin variants (HbS, HgC, etc)do  not significantly interfere with this assay.   However, presence of multiple variants may affect accuracy.         Audiogram Summary:          VNG:  Referring provider:  Errol Ferrara PA-C      Maggy Tapia was seen 22 for Videonystagmography (VNG) testing.       Pt reported;y abstained from vestibular suppressants for the past 24-48 hours prior to her VNG.        VAT was negative bilaterally.       VNG Results (abnormal results italicized):   Oculomotor function tests:  Sinusoidal tracking - abnormal  Saccades - WNL  OPKs - WNL   Spontaneous nystagmus was absent.   Gaze nystagmus was absent.   Garcia-Hallpike Left was negative for BPPV but yielded 10 d/s UB nystagmus with fixation.  [place]-Hallpike Right was negative for BPPV but yielded 12 d/s UB nystagmus with fixation.    Static Positionals:  Supine - WNL  Head Right - 4 d/s UB nystagmus with a 2 d/s RB slant (non-torsional) with fixation   Head Left -  6 d/s UB nystagmus with a 6 d/s RB slant (non-torsional) with fixation  Bi-thermal caloric irrigations revealed a 9% caloric weakness in the left ear, which is within normal limits, and 4% directional preponderance to the right, which is within normal limits.  Fixation suppression following caloric irrigations were normal.  RC:      25 d/s                          RW:     45 d/s    d/s                          LW:      37 d/s      Impressions:  Abnormal central VNG due to abnormal sinusoidal tracking oculomotor subtest, vertical up-beating nystagmus present during both Garcia-Hallpikes head right/left static positionals and abnormal caloric fixation suppression for all caloric irrigations.  Negative for BPPV bilaterally.       Recommendations:  1.  ENT review of results  2.  Neurology consult     Tracings will be scanned into the patient's chart.   All  lab results and imaging results have been reviewed.    Assessment:        ICD-10-CM ICD-9-CM   1. Sensorineural hearing loss (SNHL) of both ears  H90.3 389.18   2. Dizziness  R42 780.4   3. Tinnitus of both ears  H93.13 388.30           Plan:      History of hearing loss with bilateral tinnitus with dizziness  -Pt has been doing well with dizziness and dysequilibrium since completing VPT.  -Annual audiograms to monitor hearing loss.  -Continue wearing hearing aids through [employer].   -VNG showed central pathology and provided for pt for follow up with Neurologist TANIA Farmer so that she may review.  -Follow up as needed for ENT issues/concerns.

## 2022-10-05 ENCOUNTER — OFFICE VISIT (OUTPATIENT)
Dept: PSYCHIATRY | Facility: CLINIC | Age: 72
End: 2022-10-05
Payer: COMMERCIAL

## 2022-10-05 VITALS
WEIGHT: 156.94 LBS | SYSTOLIC BLOOD PRESSURE: 136 MMHG | HEART RATE: 82 BPM | HEIGHT: 60 IN | DIASTOLIC BLOOD PRESSURE: 91 MMHG | BODY MASS INDEX: 30.81 KG/M2

## 2022-10-05 DIAGNOSIS — F43.10 PTSD (POST-TRAUMATIC STRESS DISORDER): Primary | ICD-10-CM

## 2022-10-05 DIAGNOSIS — F39 MOOD DISORDER: ICD-10-CM

## 2022-10-05 DIAGNOSIS — F41.9 ANXIETY DISORDER, UNSPECIFIED TYPE: ICD-10-CM

## 2022-10-05 PROCEDURE — 3075F SYST BP GE 130 - 139MM HG: CPT | Mod: CPTII,S$GLB,, | Performed by: PHYSICIAN ASSISTANT

## 2022-10-05 PROCEDURE — 3080F DIAST BP >= 90 MM HG: CPT | Mod: CPTII,S$GLB,, | Performed by: PHYSICIAN ASSISTANT

## 2022-10-05 PROCEDURE — 3075F PR MOST RECENT SYSTOLIC BLOOD PRESS GE 130-139MM HG: ICD-10-PCS | Mod: CPTII,S$GLB,, | Performed by: PHYSICIAN ASSISTANT

## 2022-10-05 PROCEDURE — 1126F PR PAIN SEVERITY QUANTIFIED, NO PAIN PRESENT: ICD-10-PCS | Mod: CPTII,S$GLB,, | Performed by: PHYSICIAN ASSISTANT

## 2022-10-05 PROCEDURE — 3080F PR MOST RECENT DIASTOLIC BLOOD PRESSURE >= 90 MM HG: ICD-10-PCS | Mod: CPTII,S$GLB,, | Performed by: PHYSICIAN ASSISTANT

## 2022-10-05 PROCEDURE — 90833 PSYTX W PT W E/M 30 MIN: CPT | Mod: S$GLB,,, | Performed by: PHYSICIAN ASSISTANT

## 2022-10-05 PROCEDURE — 99499 RISK ADDL DX/OHS AUDIT: ICD-10-PCS | Mod: S$GLB,,, | Performed by: PHYSICIAN ASSISTANT

## 2022-10-05 PROCEDURE — 3008F PR BODY MASS INDEX (BMI) DOCUMENTED: ICD-10-PCS | Mod: CPTII,S$GLB,, | Performed by: PHYSICIAN ASSISTANT

## 2022-10-05 PROCEDURE — 99214 PR OFFICE/OUTPT VISIT, EST, LEVL IV, 30-39 MIN: ICD-10-PCS | Mod: S$GLB,,, | Performed by: PHYSICIAN ASSISTANT

## 2022-10-05 PROCEDURE — 1101F PT FALLS ASSESS-DOCD LE1/YR: CPT | Mod: CPTII,S$GLB,, | Performed by: PHYSICIAN ASSISTANT

## 2022-10-05 PROCEDURE — 99214 OFFICE O/P EST MOD 30 MIN: CPT | Mod: S$GLB,,, | Performed by: PHYSICIAN ASSISTANT

## 2022-10-05 PROCEDURE — 1159F MED LIST DOCD IN RCRD: CPT | Mod: CPTII,S$GLB,, | Performed by: PHYSICIAN ASSISTANT

## 2022-10-05 PROCEDURE — 4010F PR ACE/ARB THEARPY RXD/TAKEN: ICD-10-PCS | Mod: CPTII,S$GLB,, | Performed by: PHYSICIAN ASSISTANT

## 2022-10-05 PROCEDURE — 99999 PR PBB SHADOW E&M-EST. PATIENT-LVL IV: CPT | Mod: PBBFAC,,, | Performed by: PHYSICIAN ASSISTANT

## 2022-10-05 PROCEDURE — 3288F FALL RISK ASSESSMENT DOCD: CPT | Mod: CPTII,S$GLB,, | Performed by: PHYSICIAN ASSISTANT

## 2022-10-05 PROCEDURE — 1157F ADVNC CARE PLAN IN RCRD: CPT | Mod: CPTII,S$GLB,, | Performed by: PHYSICIAN ASSISTANT

## 2022-10-05 PROCEDURE — 3288F PR FALLS RISK ASSESSMENT DOCUMENTED: ICD-10-PCS | Mod: CPTII,S$GLB,, | Performed by: PHYSICIAN ASSISTANT

## 2022-10-05 PROCEDURE — 99499 UNLISTED E&M SERVICE: CPT | Mod: S$GLB,,, | Performed by: PHYSICIAN ASSISTANT

## 2022-10-05 PROCEDURE — 1101F PR PT FALLS ASSESS DOC 0-1 FALLS W/OUT INJ PAST YR: ICD-10-PCS | Mod: CPTII,S$GLB,, | Performed by: PHYSICIAN ASSISTANT

## 2022-10-05 PROCEDURE — 3044F HG A1C LEVEL LT 7.0%: CPT | Mod: CPTII,S$GLB,, | Performed by: PHYSICIAN ASSISTANT

## 2022-10-05 PROCEDURE — 1126F AMNT PAIN NOTED NONE PRSNT: CPT | Mod: CPTII,S$GLB,, | Performed by: PHYSICIAN ASSISTANT

## 2022-10-05 PROCEDURE — 99999 PR PBB SHADOW E&M-EST. PATIENT-LVL IV: ICD-10-PCS | Mod: PBBFAC,,, | Performed by: PHYSICIAN ASSISTANT

## 2022-10-05 PROCEDURE — 3044F PR MOST RECENT HEMOGLOBIN A1C LEVEL <7.0%: ICD-10-PCS | Mod: CPTII,S$GLB,, | Performed by: PHYSICIAN ASSISTANT

## 2022-10-05 PROCEDURE — 90833 PR PSYCHOTHERAPY W/PATIENT W/E&M, 30 MIN (ADD ON): ICD-10-PCS | Mod: S$GLB,,, | Performed by: PHYSICIAN ASSISTANT

## 2022-10-05 PROCEDURE — 1159F PR MEDICATION LIST DOCUMENTED IN MEDICAL RECORD: ICD-10-PCS | Mod: CPTII,S$GLB,, | Performed by: PHYSICIAN ASSISTANT

## 2022-10-05 PROCEDURE — 1160F PR REVIEW ALL MEDS BY PRESCRIBER/CLIN PHARMACIST DOCUMENTED: ICD-10-PCS | Mod: CPTII,S$GLB,, | Performed by: PHYSICIAN ASSISTANT

## 2022-10-05 PROCEDURE — 4010F ACE/ARB THERAPY RXD/TAKEN: CPT | Mod: CPTII,S$GLB,, | Performed by: PHYSICIAN ASSISTANT

## 2022-10-05 PROCEDURE — 1157F PR ADVANCE CARE PLAN OR EQUIV PRESENT IN MEDICAL RECORD: ICD-10-PCS | Mod: CPTII,S$GLB,, | Performed by: PHYSICIAN ASSISTANT

## 2022-10-05 PROCEDURE — 1160F RVW MEDS BY RX/DR IN RCRD: CPT | Mod: CPTII,S$GLB,, | Performed by: PHYSICIAN ASSISTANT

## 2022-10-05 PROCEDURE — 3008F BODY MASS INDEX DOCD: CPT | Mod: CPTII,S$GLB,, | Performed by: PHYSICIAN ASSISTANT

## 2022-10-05 RX ORDER — PRAZOSIN HYDROCHLORIDE 5 MG/1
5 CAPSULE ORAL NIGHTLY
Qty: 30 CAPSULE | Refills: 2 | Status: SHIPPED | OUTPATIENT
Start: 2022-10-05 | End: 2023-01-05

## 2022-10-05 RX ORDER — SERTRALINE HYDROCHLORIDE 50 MG/1
50 TABLET, FILM COATED ORAL DAILY
Qty: 30 TABLET | Refills: 2 | Status: SHIPPED | OUTPATIENT
Start: 2022-10-05 | End: 2023-04-14 | Stop reason: SDUPTHER

## 2022-10-05 RX ORDER — TRAZODONE HYDROCHLORIDE 50 MG/1
50 TABLET ORAL NIGHTLY PRN
Qty: 30 TABLET | Refills: 2 | Status: SHIPPED | OUTPATIENT
Start: 2022-10-05 | End: 2023-01-05

## 2022-10-05 NOTE — PATIENT INSTRUCTIONS
Sertraline (Zoloft) - 50mg once daily in the morning    Trazodone - 50mg in the evening AS NEEDED for sleep/insomnia    Prazosin (minipress) 5mg in the evening for NIGHTMARES
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
## 2022-10-29 ENCOUNTER — IMMUNIZATION (OUTPATIENT)
Dept: FAMILY MEDICINE | Facility: CLINIC | Age: 72
End: 2022-10-29
Payer: MEDICARE

## 2022-10-29 PROCEDURE — G0008 ADMIN INFLUENZA VIRUS VAC: HCPCS | Mod: S$GLB,,, | Performed by: FAMILY MEDICINE

## 2022-10-29 PROCEDURE — G0008 FLU VACCINE - QUADRIVALENT - ADJUVANTED: ICD-10-PCS | Mod: S$GLB,,, | Performed by: FAMILY MEDICINE

## 2022-10-29 PROCEDURE — 90694 VACC AIIV4 NO PRSRV 0.5ML IM: CPT | Mod: S$GLB,,, | Performed by: FAMILY MEDICINE

## 2022-10-29 PROCEDURE — 90694 FLU VACCINE - QUADRIVALENT - ADJUVANTED: ICD-10-PCS | Mod: S$GLB,,, | Performed by: FAMILY MEDICINE

## 2022-11-10 ENCOUNTER — HOSPITAL ENCOUNTER (INPATIENT)
Facility: HOSPITAL | Age: 72
LOS: 5 days | Discharge: NURSING, REHAB OR LONG-TERM CARE | DRG: 481 | End: 2022-11-16
Attending: EMERGENCY MEDICINE | Admitting: HOSPITALIST
Payer: MEDICARE

## 2022-11-10 DIAGNOSIS — S72.001A: ICD-10-CM
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
## 2022-11-10 DIAGNOSIS — Z01.818 PRE-OP EVALUATION: ICD-10-CM

## 2022-11-10 DIAGNOSIS — S72.011A: Primary | ICD-10-CM

## 2022-11-10 PROBLEM — E11.42 TYPE 2 DIABETES MELLITUS WITH DIABETIC POLYNEUROPATHY, WITHOUT LONG-TERM CURRENT USE OF INSULIN: Status: ACTIVE | Noted: 2022-07-14

## 2022-11-10 LAB
ANION GAP SERPL CALC-SCNC: 11 MMOL/L (ref 8–16)
BASOPHILS # BLD AUTO: 0.03 K/UL (ref 0–0.2)
BASOPHILS NFR BLD: 0.3 % (ref 0–1.9)
BUN SERPL-MCNC: 21 MG/DL (ref 8–23)
CALCIUM SERPL-MCNC: 9.7 MG/DL (ref 8.7–10.5)
CHLORIDE SERPL-SCNC: 101 MMOL/L (ref 95–110)
CO2 SERPL-SCNC: 27 MMOL/L (ref 23–29)
CREAT SERPL-MCNC: 0.8 MG/DL (ref 0.5–1.4)
DIFFERENTIAL METHOD: ABNORMAL
EOSINOPHIL # BLD AUTO: 0.1 K/UL (ref 0–0.5)
EOSINOPHIL NFR BLD: 0.4 % (ref 0–8)
ERYTHROCYTE [DISTWIDTH] IN BLOOD BY AUTOMATED COUNT: 13.8 % (ref 11.5–14.5)
EST. GFR  (NO RACE VARIABLE): >60 ML/MIN/1.73 M^2
GLUCOSE SERPL-MCNC: 104 MG/DL (ref 70–110)
HCT VFR BLD AUTO: 47.5 % (ref 37–48.5)
HGB BLD-MCNC: 15.5 G/DL (ref 12–16)
IMM GRANULOCYTES # BLD AUTO: 0.06 K/UL (ref 0–0.04)
IMM GRANULOCYTES NFR BLD AUTO: 0.5 % (ref 0–0.5)
LYMPHOCYTES # BLD AUTO: 1.1 K/UL (ref 1–4.8)
LYMPHOCYTES NFR BLD: 9.2 % (ref 18–48)
MCH RBC QN AUTO: 30.9 PG (ref 27–31)
MCHC RBC AUTO-ENTMCNC: 32.6 G/DL (ref 32–36)
MCV RBC AUTO: 95 FL (ref 82–98)
MONOCYTES # BLD AUTO: 0.6 K/UL (ref 0.3–1)
MONOCYTES NFR BLD: 5.1 % (ref 4–15)
NEUTROPHILS # BLD AUTO: 10.1 K/UL (ref 1.8–7.7)
NEUTROPHILS NFR BLD: 84.5 % (ref 38–73)
NRBC BLD-RTO: 0 /100 WBC
PLATELET # BLD AUTO: 121 K/UL (ref 150–450)
PLATELET BLD QL SMEAR: ABNORMAL
PMV BLD AUTO: 10.3 FL (ref 9.2–12.9)
POCT GLUCOSE: 106 MG/DL (ref 70–110)
POTASSIUM SERPL-SCNC: 4.3 MMOL/L (ref 3.5–5.1)
RBC # BLD AUTO: 5.01 M/UL (ref 4–5.4)
SODIUM SERPL-SCNC: 139 MMOL/L (ref 136–145)
WBC # BLD AUTO: 11.88 K/UL (ref 3.9–12.7)

## 2022-11-10 PROCEDURE — 80048 BASIC METABOLIC PNL TOTAL CA: CPT | Performed by: EMERGENCY MEDICINE

## 2022-11-10 PROCEDURE — 25000003 PHARM REV CODE 250: Performed by: HOSPITALIST

## 2022-11-10 PROCEDURE — G0378 HOSPITAL OBSERVATION PER HR: HCPCS

## 2022-11-10 PROCEDURE — 63600175 PHARM REV CODE 636 W HCPCS: Performed by: EMERGENCY MEDICINE

## 2022-11-10 PROCEDURE — 93005 ELECTROCARDIOGRAM TRACING: CPT

## 2022-11-10 PROCEDURE — 27000221 HC OXYGEN, UP TO 24 HOURS

## 2022-11-10 PROCEDURE — 93010 EKG 12-LEAD: ICD-10-PCS | Mod: ,,, | Performed by: INTERNAL MEDICINE

## 2022-11-10 PROCEDURE — 99900035 HC TECH TIME PER 15 MIN (STAT)

## 2022-11-10 PROCEDURE — 63600175 PHARM REV CODE 636 W HCPCS: Performed by: HOSPITALIST

## 2022-11-10 PROCEDURE — 94660 CPAP INITIATION&MGMT: CPT

## 2022-11-10 PROCEDURE — 94761 N-INVAS EAR/PLS OXIMETRY MLT: CPT

## 2022-11-10 PROCEDURE — 85025 COMPLETE CBC W/AUTO DIFF WBC: CPT | Performed by: EMERGENCY MEDICINE

## 2022-11-10 PROCEDURE — 96372 THER/PROPH/DIAG INJ SC/IM: CPT | Performed by: HOSPITALIST

## 2022-11-10 PROCEDURE — 93010 ELECTROCARDIOGRAM REPORT: CPT | Mod: ,,, | Performed by: INTERNAL MEDICINE

## 2022-11-10 PROCEDURE — 27000190 HC CPAP FULL FACE MASK W/VALVE

## 2022-11-10 PROCEDURE — 36415 COLL VENOUS BLD VENIPUNCTURE: CPT | Performed by: EMERGENCY MEDICINE

## 2022-11-10 PROCEDURE — 99285 EMERGENCY DEPT VISIT HI MDM: CPT | Mod: 25

## 2022-11-10 RX ORDER — LEVOTHYROXINE SODIUM 50 UG/1
100 TABLET ORAL
Status: DISCONTINUED | OUTPATIENT
Start: 2022-11-11 | End: 2022-11-16 | Stop reason: HOSPADM

## 2022-11-10 RX ORDER — IPRATROPIUM BROMIDE AND ALBUTEROL SULFATE 2.5; .5 MG/3ML; MG/3ML
3 SOLUTION RESPIRATORY (INHALATION) EVERY 4 HOURS PRN
Status: DISCONTINUED | OUTPATIENT
Start: 2022-11-10 | End: 2022-11-16 | Stop reason: HOSPADM

## 2022-11-10 RX ORDER — NALOXONE HCL 0.4 MG/ML
0.02 VIAL (ML) INJECTION
Status: DISCONTINUED | OUTPATIENT
Start: 2022-11-10 | End: 2022-11-16 | Stop reason: HOSPADM

## 2022-11-10 RX ORDER — INSULIN ASPART 100 [IU]/ML
0-5 INJECTION, SOLUTION INTRAVENOUS; SUBCUTANEOUS
Status: DISCONTINUED | OUTPATIENT
Start: 2022-11-10 | End: 2022-11-11

## 2022-11-10 RX ORDER — SERTRALINE HYDROCHLORIDE 50 MG/1
50 TABLET, FILM COATED ORAL DAILY
Status: DISCONTINUED | OUTPATIENT
Start: 2022-11-11 | End: 2022-11-16 | Stop reason: HOSPADM

## 2022-11-10 RX ORDER — PRAZOSIN HYDROCHLORIDE 1 MG/1
5 CAPSULE ORAL NIGHTLY
Status: DISCONTINUED | OUTPATIENT
Start: 2022-11-10 | End: 2022-11-16 | Stop reason: HOSPADM

## 2022-11-10 RX ORDER — ACETAMINOPHEN 500 MG
1000 TABLET ORAL EVERY 8 HOURS PRN
Status: DISCONTINUED | OUTPATIENT
Start: 2022-11-10 | End: 2022-11-16 | Stop reason: HOSPADM

## 2022-11-10 RX ORDER — GLUCAGON 1 MG
1 KIT INJECTION
Status: DISCONTINUED | OUTPATIENT
Start: 2022-11-10 | End: 2022-11-16 | Stop reason: HOSPADM

## 2022-11-10 RX ORDER — SODIUM CHLORIDE 0.9 % (FLUSH) 0.9 %
10 SYRINGE (ML) INJECTION EVERY 12 HOURS PRN
Status: DISCONTINUED | OUTPATIENT
Start: 2022-11-10 | End: 2022-11-16 | Stop reason: HOSPADM

## 2022-11-10 RX ORDER — FLUTICASONE FUROATE AND VILANTEROL 100; 25 UG/1; UG/1
1 POWDER RESPIRATORY (INHALATION) DAILY
Status: DISCONTINUED | OUTPATIENT
Start: 2022-11-11 | End: 2022-11-16 | Stop reason: HOSPADM

## 2022-11-10 RX ORDER — SODIUM CHLORIDE 0.9 % (FLUSH) 0.9 %
10 SYRINGE (ML) INJECTION
Status: DISCONTINUED | OUTPATIENT
Start: 2022-11-10 | End: 2022-11-16 | Stop reason: HOSPADM

## 2022-11-10 RX ORDER — ONDANSETRON 2 MG/ML
4 INJECTION INTRAMUSCULAR; INTRAVENOUS EVERY 6 HOURS PRN
Status: DISCONTINUED | OUTPATIENT
Start: 2022-11-10 | End: 2022-11-16 | Stop reason: HOSPADM

## 2022-11-10 RX ORDER — MORPHINE SULFATE 4 MG/ML
4 INJECTION, SOLUTION INTRAMUSCULAR; INTRAVENOUS EVERY 4 HOURS PRN
Status: DISCONTINUED | OUTPATIENT
Start: 2022-11-10 | End: 2022-11-12

## 2022-11-10 RX ORDER — MORPHINE SULFATE 2 MG/ML
7 INJECTION, SOLUTION INTRAMUSCULAR; INTRAVENOUS
Status: COMPLETED | OUTPATIENT
Start: 2022-11-10 | End: 2022-11-10

## 2022-11-10 RX ORDER — IBUPROFEN 200 MG
16 TABLET ORAL
Status: DISCONTINUED | OUTPATIENT
Start: 2022-11-10 | End: 2022-11-16 | Stop reason: HOSPADM

## 2022-11-10 RX ORDER — TRAZODONE HYDROCHLORIDE 50 MG/1
50 TABLET ORAL NIGHTLY PRN
Status: DISCONTINUED | OUTPATIENT
Start: 2022-11-10 | End: 2022-11-16 | Stop reason: HOSPADM

## 2022-11-10 RX ORDER — ENOXAPARIN SODIUM 100 MG/ML
40 INJECTION SUBCUTANEOUS EVERY 24 HOURS
Status: DISCONTINUED | OUTPATIENT
Start: 2022-11-10 | End: 2022-11-12

## 2022-11-10 RX ORDER — IBUPROFEN 200 MG
24 TABLET ORAL
Status: DISCONTINUED | OUTPATIENT
Start: 2022-11-10 | End: 2022-11-16 | Stop reason: HOSPADM

## 2022-11-10 RX ORDER — LOSARTAN POTASSIUM 25 MG/1
25 TABLET ORAL DAILY
Status: DISCONTINUED | OUTPATIENT
Start: 2022-11-11 | End: 2022-11-12

## 2022-11-10 RX ORDER — GABAPENTIN 300 MG/1
300 CAPSULE ORAL 3 TIMES DAILY
Status: DISCONTINUED | OUTPATIENT
Start: 2022-11-11 | End: 2022-11-16 | Stop reason: HOSPADM

## 2022-11-10 RX ADMIN — ENOXAPARIN SODIUM 40 MG: 100 INJECTION SUBCUTANEOUS at 11:11

## 2022-11-10 RX ADMIN — PRAZOSIN HYDROCHLORIDE 5 MG: 1 CAPSULE ORAL at 11:11

## 2022-11-10 RX ADMIN — TRAZODONE HYDROCHLORIDE 50 MG: 50 TABLET ORAL at 11:11

## 2022-11-10 RX ADMIN — MORPHINE SULFATE 4 MG: 4 INJECTION INTRAVENOUS at 11:11

## 2022-11-10 RX ADMIN — MORPHINE SULFATE 7 MG: 2 INJECTION, SOLUTION INTRAMUSCULAR; INTRAVENOUS at 05:11

## 2022-11-10 NOTE — ED PROVIDER NOTES
Encounter Date: 11/10/2022       History   No chief complaint on file.    Chief complaint:  Right hip pain    HPI:  72-year-old female presents via ambulance with right hip pain after she lost her balance and fell on her right hip.  She denies any chest pain, palpitations and has no loss of consciousness.  She has no headache or neck pain.  Past medical history is significant for asthma, left breast cancer, depression, diabetes, hypertension, hyperlipidemia and pseudoseizures.    Review of patient's allergies indicates:   Allergen Reactions    Penicillins Anaphylaxis    Sulfa (sulfonamide antibiotics) Anaphylaxis    Trintellix [vortioxetine] Nausea And Vomiting and Other (See Comments)     Patient has seizures and vomits     Past Medical History:   Diagnosis Date    Anxiety     Arthritis     Asthma     Cancer     Left Breast    Depression     Diabetes mellitus, type 2     GERD (gastroesophageal reflux disease)     Hyperlipidemia     Hypertension     Overactive bladder     Seizures     Pseudo-seizures    Sleep apnea     Stroke     Stroke 2022    pt was in the hospital for a stroke, claims she was seeing people when the stroke happened    Thyroid disease     Urinary tract infection without hematuria 2017     Past Surgical History:   Procedure Laterality Date    APPENDECTOMY      BREAST BIOPSY Left     BREAST LUMPECTOMY Left     2016    BREAST SURGERY      CATARACT EXTRACTION Bilateral     OU done//     SECTION      CHOLECYSTECTOMY      COLONOSCOPY N/A 2020    Procedure: COLONOSCOPY;  Surgeon: Mj Fernandez MD;  Location: [facility];  Service: Endoscopy;  Laterality: N/A;    CYST REMOVAL Left 2021    DILATION AND CURETTAGE OF UTERUS      EYE SURGERY       Family History   Problem Relation Age of Onset    Diabetes Mother     Hypertension Mother     Breast cancer Mother     Cataracts Mother     Melanoma Mother     Heart failure Father     Melanoma Father     Cataracts Brother      Melanoma Brother     Glaucoma Neg Hx     Retinal detachment Neg Hx     Macular degeneration Neg Hx      Social History     Tobacco Use    Smoking status: Never    Smokeless tobacco: Never   Substance Use Topics    Alcohol use: Yes     Comment: seldom    Drug use: No     Review of Systems   Constitutional:  Negative for activity change, appetite change, chills, fatigue and fever.   Eyes:  Negative for visual disturbance.   Respiratory:  Negative for apnea and shortness of breath.    Cardiovascular:  Negative for chest pain and palpitations.   Gastrointestinal:  Negative for abdominal distention and abdominal pain.   Genitourinary:  Negative for difficulty urinating.   Musculoskeletal:  Positive for arthralgias. Negative for neck pain.   Skin:  Negative for pallor and rash.   Neurological:  Negative for headaches.   Hematological:  Does not bruise/bleed easily.   Psychiatric/Behavioral:  Negative for agitation.      Physical Exam     Initial Vitals   BP Pulse Resp Temp SpO2   -- -- -- -- --      MAP       --         Physical Exam    Nursing note and vitals reviewed.  Constitutional: She appears well-developed and well-nourished.   HENT:   Head: Normocephalic and atraumatic.   Eyes: Conjunctivae are normal.   Neck: Neck supple.   Normal range of motion.  Cardiovascular:  Normal rate, regular rhythm and normal heart sounds.     Exam reveals no gallop and no friction rub.       No murmur heard.  Pulmonary/Chest: Effort normal and breath sounds normal. No respiratory distress. She has no wheezes. She has no rhonchi. She has no rales.   Abdominal: Abdomen is soft. She exhibits no distension. There is no abdominal tenderness.   Musculoskeletal:         General: Tenderness (right hip tenderness) present. Normal range of motion.      Cervical back: Normal range of motion and neck supple.     Neurological: She is alert and oriented to person, place, and time. She has normal strength. GCS score is 15. GCS eye subscore is 4. GCS  verbal subscore is 5. GCS motor subscore is 6.   Skin: Skin is warm and dry. No erythema.   Psychiatric: She has a normal mood and affect.       ED Course   Procedures  Labs Reviewed   BASIC METABOLIC PANEL   CBC W/ AUTO DIFFERENTIAL     EKG Readings: (Independently Interpreted)   Rhythm: Normal Sinus Rhythm. Heart Rate: 64. Ectopy: No Ectopy. Conduction: Normal. ST Segments: Normal ST Segments. T Waves: Normal. Axis: Normal. Clinical Impression: Normal Sinus Rhythm     Imaging Results    None          Medications   morphine injection 7 mg (has no administration in time range)     Medical Decision Making:   ED Management:  72-year-old female presents with right hip pain after mechanical fall.  X-rays independently interpreted by me demonstrate a transfers femoral neck fracture.  She will be admitted for surgical repair.  I discussed the case with Dr. Joiner who plans surgery tomorrow.                        Clinical Impression:   Final diagnoses:  [W19.XXXA] Fall  [Z01.818] Pre-op evaluation               Mich Urbano III, MD  11/10/22 1628       Mich Urbano III, MD  11/10/22 7104

## 2022-11-10 NOTE — CONSULTS
CC:  My right hip hurts    HPI:  72-year-old female was trying to put on her shoe earlier today when she fell over and landed on the right side.  She had immediate onset of pain and inability to bear weight.  She was brought by ambulance to [facility] where she was noted to have a nondisplaced valgus impacted fracture of the right femoral neck.  She has been admitted by the hospitalist service and Orthopedics has been consulted for fracture management.    Past Medical History:   Diagnosis Date    Anxiety     Arthritis     Asthma     Cancer     Left Breast    Depression     Diabetes mellitus, type 2     GERD (gastroesophageal reflux disease)     Hyperlipidemia     Hypertension     Overactive bladder     Seizures     Pseudo-seizures    Sleep apnea     Stroke     Stroke 2022    pt was in the hospital for a stroke, claims she was seeing people when the stroke happened    Thyroid disease     Urinary tract infection without hematuria 2017       Past Surgical History:   Procedure Laterality Date    APPENDECTOMY      BREAST BIOPSY Left     BREAST LUMPECTOMY Left     2016    BREAST SURGERY      CATARACT EXTRACTION Bilateral     OU done//     SECTION      CHOLECYSTECTOMY      COLONOSCOPY N/A 2020    Procedure: COLONOSCOPY;  Surgeon: Mj Fernandez MD;  Location: [facility];  Service: Endoscopy;  Laterality: N/A;    CYST REMOVAL Left 2021    DILATION AND CURETTAGE OF UTERUS      EYE SURGERY         No current facility-administered medications on file prior to encounter.     Current Outpatient Medications on File Prior to Encounter   Medication Sig Dispense Refill    albuterol (PROVENTIL/VENTOLIN HFA) 90 mcg/actuation inhaler Inhale 1-2 puffs into the lungs every 4 (four) hours as needed for Shortness of Breath (coughing). Rescue 20.1 g 11    aspirin (ECOTRIN) 81 MG EC tablet Take 81 mg by mouth once daily.      blood-glucose meter kit Use as instructed. Insurance preferred. 1 each 0     CALCIUM CARBONATE/VITAMIN D3 (CALCIUM 500 + D ORAL) Take 1 tablet by mouth once daily. 10 mg daily      cyanocobalamin (VITAMIN B-12) 1000 MCG tablet Take 1 tablet (1,000 mcg total) by mouth once daily. 30 tablet 0    empagliflozin (JARDIANCE) 10 mg tablet Take 1 tablet (10 mg total) by mouth once daily. 90 tablet 2    fluticasone furoate-vilanteroL (BREO ELLIPTA) 100-25 mcg/dose diskus inhaler Inhale 1 puff into the lungs once daily. Controller 60 each 11    gabapentin (NEURONTIN) 300 MG capsule Take 1 capsule (300 mg total) by mouth every evening. Take 1 tablet every night x 7 days. Increase to twice a day x 7 days. Increase to three times a day. 90 capsule 0    ketoconazole (NIZORAL) 2 % cream AAA pannus fold at least daily after the shower 60 g 3    levothyroxine (SYNTHROID) 100 MCG tablet Take 1 tablet (100 mcg total) by mouth before breakfast. 90 tablet 3    losartan (COZAAR) 50 MG tablet Take 0.5 tablets (25 mg total) by mouth once daily. 90 tablet 0    metFORMIN (GLUCOPHAGE-XR) 500 MG ER 24hr tablet TAKE 2 TABLETS(1000 MG) BY MOUTH TWICE DAILY WITH MEALS 360 tablet 3    potassium chloride SA (K-DUR,KLOR-CON) 20 MEQ tablet Take 20 mEq by mouth once daily.      prazosin (MINIPRESS) 5 MG capsule Take 1 capsule (5 mg total) by mouth every evening. For nightmares 30 capsule 2    sertraline (ZOLOFT) 50 MG tablet Take 1 tablet (50 mg total) by mouth once daily. For depression/anxiety 30 tablet 2    simvastatin (ZOCOR) 40 MG tablet Take 1 tablet (40 mg total) by mouth once daily. 90 tablet 3    tetrabenazine (XENAZINE) 25 mg tablet Take one tablet daily for 7 days and then  Take 1 tablet twice daily afterwards 47 tablet 0    traZODone (DESYREL) 50 MG tablet Take 1 tablet (50 mg total) by mouth nightly as needed for Insomnia. 30 tablet 2    [DISCONTINUED] nystatin (MYCOSTATIN) powder Apply topically 2 (two) times daily. for 14 days 1 Bottle 1    [DISCONTINUED] solifenacin (VESICARE) 10 MG tablet Take 1 tablet (10  mg total) by mouth once daily. 30 tablet 11     ROS:    Constitution: Denies chills, fever, and sweats.  HENT: Denies headaches or blurry vision.  Cardiovascular: Denies chest pain or irregular heart beat.  Respiratory: Denies cough or shortness of breath.  Gastrointestinal: Denies abdominal pain, nausea, or vomiting.  Genitourinary:  Denies urinary incontinence, bladder and kidney issues  Musculoskeletal:  Denies muscle cramps.  Positive for right hip pain  Neurological: Denies dizziness or focal weakness.  Psychiatric/Behavioral: Normal mental status.  Hematologic/Lymphatic: Denies bleeding problem or easy bruising/bleeding.  Skin: Denies rash or suspicious lesions.    Physical examination     Gen - No acute distress, well nourished, well groomed   Eyes - Extraoccular motions intact, pupils equally round and reactive to light and accommodation   ENT - normocephalic, atruamtic, oropharynx clear   Neck - Supple, no abnormal masses   Cardiovascular - regular rate and rhythm   Pulmonary - clear to auscultation bilaterally, no wheezes, ronchi, or rales   Abdomen - soft, non-tender, non-distended, positive bowel sounds   Psych - The patient is alert and oriented x3 with normal mood and affect    RLE:  Skin is intact throughout  Grossly intact motor sensory function distally  Plus 2 distal pulses  Pain with any attempted motion of the right hip that localizes to the right groin  Positive C sign    X-ray images were examined and personally interpreted by me.  Three views the right hip dated 11/10/2022 show a valgus impacted nondisplaced fracture of the right femoral neck    Dx:  Subcapital valgus impacted right femoral neck fracture    Plan:  We are awaiting clearance by the hospitalist service.  The plan is to take the patient to the OR tomorrow for percutaneous pinning of her right femoral neck fracture.

## 2022-11-11 ENCOUNTER — ANESTHESIA EVENT (OUTPATIENT)
Dept: SURGERY | Facility: HOSPITAL | Age: 72
DRG: 481 | End: 2022-11-11
Payer: MEDICARE

## 2022-11-11 ENCOUNTER — ANESTHESIA (OUTPATIENT)
Dept: SURGERY | Facility: HOSPITAL | Age: 72
DRG: 481 | End: 2022-11-11
Payer: MEDICARE

## 2022-11-11 LAB
BASOPHILS # BLD AUTO: 0.02 K/UL (ref 0–0.2)
BASOPHILS NFR BLD: 0.2 % (ref 0–1.9)
DIFFERENTIAL METHOD: ABNORMAL
EOSINOPHIL # BLD AUTO: 0 K/UL (ref 0–0.5)
EOSINOPHIL NFR BLD: 0.3 % (ref 0–8)
ERYTHROCYTE [DISTWIDTH] IN BLOOD BY AUTOMATED COUNT: 13.6 % (ref 11.5–14.5)
HCT VFR BLD AUTO: 45 % (ref 37–48.5)
HGB BLD-MCNC: 14.6 G/DL (ref 12–16)
IMM GRANULOCYTES # BLD AUTO: 0.05 K/UL (ref 0–0.04)
IMM GRANULOCYTES NFR BLD AUTO: 0.5 % (ref 0–0.5)
LYMPHOCYTES # BLD AUTO: 1.4 K/UL (ref 1–4.8)
LYMPHOCYTES NFR BLD: 12.3 % (ref 18–48)
MCH RBC QN AUTO: 30.5 PG (ref 27–31)
MCHC RBC AUTO-ENTMCNC: 32.4 G/DL (ref 32–36)
MCV RBC AUTO: 94 FL (ref 82–98)
MONOCYTES # BLD AUTO: 0.9 K/UL (ref 0.3–1)
MONOCYTES NFR BLD: 8.2 % (ref 4–15)
NEUTROPHILS # BLD AUTO: 8.7 K/UL (ref 1.8–7.7)
NEUTROPHILS NFR BLD: 78.5 % (ref 38–73)
NRBC BLD-RTO: 0 /100 WBC
PLATELET # BLD AUTO: 120 K/UL (ref 150–450)
PMV BLD AUTO: 10.6 FL (ref 9.2–12.9)
POCT GLUCOSE: 136 MG/DL (ref 70–110)
POCT GLUCOSE: 139 MG/DL (ref 70–110)
POCT GLUCOSE: 170 MG/DL (ref 70–110)
RBC # BLD AUTO: 4.79 M/UL (ref 4–5.4)
WBC # BLD AUTO: 11.07 K/UL (ref 3.9–12.7)

## 2022-11-11 PROCEDURE — 37000009 HC ANESTHESIA EA ADD 15 MINS: Performed by: ORTHOPAEDIC SURGERY

## 2022-11-11 PROCEDURE — 25000003 PHARM REV CODE 250: Performed by: ORTHOPAEDIC SURGERY

## 2022-11-11 PROCEDURE — 99900103 DSU ONLY-NO CHARGE-INITIAL HR (STAT): Performed by: ORTHOPAEDIC SURGERY

## 2022-11-11 PROCEDURE — 63600175 PHARM REV CODE 636 W HCPCS: Performed by: ORTHOPAEDIC SURGERY

## 2022-11-11 PROCEDURE — 25000003 PHARM REV CODE 250: Performed by: HOSPITALIST

## 2022-11-11 PROCEDURE — D9220A PRA ANESTHESIA: Mod: ANES,,, | Performed by: ANESTHESIOLOGY

## 2022-11-11 PROCEDURE — 12000002 HC ACUTE/MED SURGE SEMI-PRIVATE ROOM

## 2022-11-11 PROCEDURE — 36000711: Performed by: ORTHOPAEDIC SURGERY

## 2022-11-11 PROCEDURE — 25000003 PHARM REV CODE 250: Performed by: ANESTHESIOLOGY

## 2022-11-11 PROCEDURE — D9220A PRA ANESTHESIA: ICD-10-PCS | Mod: ANES,,, | Performed by: ANESTHESIOLOGY

## 2022-11-11 PROCEDURE — 27000221 HC OXYGEN, UP TO 24 HOURS

## 2022-11-11 PROCEDURE — 63600175 PHARM REV CODE 636 W HCPCS: Performed by: ANESTHESIOLOGY

## 2022-11-11 PROCEDURE — 99900104 DSU ONLY-NO CHARGE-EA ADD'L HR (STAT): Performed by: ORTHOPAEDIC SURGERY

## 2022-11-11 PROCEDURE — 94799 UNLISTED PULMONARY SVC/PX: CPT

## 2022-11-11 PROCEDURE — D9220A PRA ANESTHESIA: Mod: CRNA,,, | Performed by: NURSE ANESTHETIST, CERTIFIED REGISTERED

## 2022-11-11 PROCEDURE — 85025 COMPLETE CBC W/AUTO DIFF WBC: CPT | Performed by: HOSPITALIST

## 2022-11-11 PROCEDURE — 27201423 OPTIME MED/SURG SUP & DEVICES STERILE SUPPLY: Performed by: ORTHOPAEDIC SURGERY

## 2022-11-11 PROCEDURE — 37000008 HC ANESTHESIA 1ST 15 MINUTES: Performed by: ORTHOPAEDIC SURGERY

## 2022-11-11 PROCEDURE — D9220A PRA ANESTHESIA: ICD-10-PCS | Mod: CRNA,,, | Performed by: NURSE ANESTHETIST, CERTIFIED REGISTERED

## 2022-11-11 PROCEDURE — 71000039 HC RECOVERY, EACH ADD'L HOUR: Performed by: ORTHOPAEDIC SURGERY

## 2022-11-11 PROCEDURE — 99223 PR INITIAL HOSPITAL CARE,LEVL III: ICD-10-PCS | Mod: 57,,, | Performed by: ORTHOPAEDIC SURGERY

## 2022-11-11 PROCEDURE — 27235 PR PERCUT FIX PROX/NECK FEMUR FX: ICD-10-PCS | Mod: RT,,, | Performed by: ORTHOPAEDIC SURGERY

## 2022-11-11 PROCEDURE — 36415 COLL VENOUS BLD VENIPUNCTURE: CPT | Performed by: HOSPITALIST

## 2022-11-11 PROCEDURE — 63600175 PHARM REV CODE 636 W HCPCS: Performed by: NURSE ANESTHETIST, CERTIFIED REGISTERED

## 2022-11-11 PROCEDURE — 25000003 PHARM REV CODE 250: Performed by: NURSE ANESTHETIST, CERTIFIED REGISTERED

## 2022-11-11 PROCEDURE — 71000033 HC RECOVERY, INTIAL HOUR: Performed by: ORTHOPAEDIC SURGERY

## 2022-11-11 PROCEDURE — 27235 TREAT THIGH FRACTURE: CPT | Mod: RT,,, | Performed by: ORTHOPAEDIC SURGERY

## 2022-11-11 PROCEDURE — 63600175 PHARM REV CODE 636 W HCPCS: Performed by: HOSPITALIST

## 2022-11-11 PROCEDURE — 25000242 PHARM REV CODE 250 ALT 637 W/ HCPCS: Performed by: HOSPITALIST

## 2022-11-11 PROCEDURE — 99900035 HC TECH TIME PER 15 MIN (STAT)

## 2022-11-11 PROCEDURE — 99223 1ST HOSP IP/OBS HIGH 75: CPT | Mod: 57,,, | Performed by: ORTHOPAEDIC SURGERY

## 2022-11-11 PROCEDURE — C1713 ANCHOR/SCREW BN/BN,TIS/BN: HCPCS | Performed by: ORTHOPAEDIC SURGERY

## 2022-11-11 PROCEDURE — 94640 AIRWAY INHALATION TREATMENT: CPT

## 2022-11-11 PROCEDURE — 36000710: Performed by: ORTHOPAEDIC SURGERY

## 2022-11-11 PROCEDURE — 94761 N-INVAS EAR/PLS OXIMETRY MLT: CPT

## 2022-11-11 PROCEDURE — 94660 CPAP INITIATION&MGMT: CPT

## 2022-11-11 RX ORDER — ONDANSETRON 2 MG/ML
INJECTION INTRAMUSCULAR; INTRAVENOUS
Status: DISCONTINUED | OUTPATIENT
Start: 2022-11-11 | End: 2022-11-11

## 2022-11-11 RX ORDER — PROPOFOL 10 MG/ML
VIAL (ML) INTRAVENOUS
Status: DISCONTINUED | OUTPATIENT
Start: 2022-11-11 | End: 2022-11-11

## 2022-11-11 RX ORDER — ACETAMINOPHEN 10 MG/ML
INJECTION, SOLUTION INTRAVENOUS
Status: DISCONTINUED | OUTPATIENT
Start: 2022-11-11 | End: 2022-11-11

## 2022-11-11 RX ORDER — MIDAZOLAM HYDROCHLORIDE 1 MG/ML
INJECTION, SOLUTION INTRAMUSCULAR; INTRAVENOUS
Status: DISCONTINUED | OUTPATIENT
Start: 2022-11-11 | End: 2022-11-11

## 2022-11-11 RX ORDER — MUPIROCIN 20 MG/G
OINTMENT TOPICAL
Status: DISCONTINUED | OUTPATIENT
Start: 2022-11-11 | End: 2022-11-11 | Stop reason: HOSPADM

## 2022-11-11 RX ORDER — DEXAMETHASONE SODIUM PHOSPHATE 4 MG/ML
INJECTION, SOLUTION INTRA-ARTICULAR; INTRALESIONAL; INTRAMUSCULAR; INTRAVENOUS; SOFT TISSUE
Status: DISCONTINUED | OUTPATIENT
Start: 2022-11-11 | End: 2022-11-11

## 2022-11-11 RX ORDER — ROCURONIUM BROMIDE 10 MG/ML
INJECTION, SOLUTION INTRAVENOUS
Status: DISCONTINUED | OUTPATIENT
Start: 2022-11-11 | End: 2022-11-11

## 2022-11-11 RX ORDER — HYDROCODONE BITARTRATE AND ACETAMINOPHEN 7.5; 325 MG/1; MG/1
1 TABLET ORAL EVERY 6 HOURS PRN
Qty: 28 TABLET | Refills: 0 | OUTPATIENT
Start: 2022-11-11 | End: 2022-11-16

## 2022-11-11 RX ORDER — ONDANSETRON 2 MG/ML
4 INJECTION INTRAMUSCULAR; INTRAVENOUS ONCE AS NEEDED
Status: COMPLETED | OUTPATIENT
Start: 2022-11-11 | End: 2022-11-11

## 2022-11-11 RX ORDER — LIDOCAINE HYDROCHLORIDE 20 MG/ML
INJECTION INTRAVENOUS
Status: DISCONTINUED | OUTPATIENT
Start: 2022-11-11 | End: 2022-11-11

## 2022-11-11 RX ORDER — PHENYLEPHRINE HYDROCHLORIDE 10 MG/ML
INJECTION INTRAVENOUS
Status: DISCONTINUED | OUTPATIENT
Start: 2022-11-11 | End: 2022-11-11

## 2022-11-11 RX ORDER — FENTANYL CITRATE 50 UG/ML
25 INJECTION, SOLUTION INTRAMUSCULAR; INTRAVENOUS EVERY 5 MIN PRN
Status: DISCONTINUED | OUTPATIENT
Start: 2022-11-11 | End: 2022-11-11 | Stop reason: HOSPADM

## 2022-11-11 RX ORDER — FENTANYL CITRATE 50 UG/ML
INJECTION, SOLUTION INTRAMUSCULAR; INTRAVENOUS
Status: DISCONTINUED | OUTPATIENT
Start: 2022-11-11 | End: 2022-11-11

## 2022-11-11 RX ORDER — OXYCODONE HYDROCHLORIDE 5 MG/1
5 TABLET ORAL ONCE AS NEEDED
Status: COMPLETED | OUTPATIENT
Start: 2022-11-11 | End: 2022-11-11

## 2022-11-11 RX ORDER — SUCCINYLCHOLINE CHLORIDE 20 MG/ML
INJECTION INTRAMUSCULAR; INTRAVENOUS
Status: DISCONTINUED | OUTPATIENT
Start: 2022-11-11 | End: 2022-11-11

## 2022-11-11 RX ORDER — BUPIVACAINE HYDROCHLORIDE AND EPINEPHRINE 5; 5 MG/ML; UG/ML
INJECTION, SOLUTION EPIDURAL; INTRACAUDAL; PERINEURAL
Status: DISCONTINUED | OUTPATIENT
Start: 2022-11-11 | End: 2022-11-11 | Stop reason: HOSPADM

## 2022-11-11 RX ORDER — CLINDAMYCIN PHOSPHATE 900 MG/50ML
900 INJECTION, SOLUTION INTRAVENOUS
Status: COMPLETED | OUTPATIENT
Start: 2022-11-11 | End: 2022-11-11

## 2022-11-11 RX ORDER — EPHEDRINE SULFATE 50 MG/ML
INJECTION, SOLUTION INTRAVENOUS
Status: DISCONTINUED | OUTPATIENT
Start: 2022-11-11 | End: 2022-11-11

## 2022-11-11 RX ADMIN — ONDANSETRON 4 MG: 2 INJECTION INTRAMUSCULAR; INTRAVENOUS at 02:11

## 2022-11-11 RX ADMIN — PHENYLEPHRINE HYDROCHLORIDE 200 MCG: 10 INJECTION INTRAVENOUS at 01:11

## 2022-11-11 RX ADMIN — ACETAMINOPHEN 1000 MG: 500 TABLET ORAL at 08:11

## 2022-11-11 RX ADMIN — MUPIROCIN: 20 OINTMENT TOPICAL at 12:11

## 2022-11-11 RX ADMIN — CLINDAMYCIN PHOSPHATE 900 MG: 18 INJECTION, SOLUTION INTRAVENOUS at 01:11

## 2022-11-11 RX ADMIN — TRAZODONE HYDROCHLORIDE 50 MG: 50 TABLET ORAL at 10:11

## 2022-11-11 RX ADMIN — GABAPENTIN 300 MG: 300 CAPSULE ORAL at 09:11

## 2022-11-11 RX ADMIN — PRAZOSIN HYDROCHLORIDE 5 MG: 1 CAPSULE ORAL at 09:11

## 2022-11-11 RX ADMIN — SERTRALINE HYDROCHLORIDE 50 MG: 50 TABLET ORAL at 08:11

## 2022-11-11 RX ADMIN — EPHEDRINE SULFATE 25 MG: 50 INJECTION, SOLUTION INTRAMUSCULAR; INTRAVENOUS; SUBCUTANEOUS at 01:11

## 2022-11-11 RX ADMIN — OXYCODONE 5 MG: 5 TABLET ORAL at 02:11

## 2022-11-11 RX ADMIN — ACETAMINOPHEN 650 MG: 10 INJECTION, SOLUTION INTRAVENOUS at 01:11

## 2022-11-11 RX ADMIN — ROCURONIUM BROMIDE 5 MG: 10 INJECTION, SOLUTION INTRAVENOUS at 01:11

## 2022-11-11 RX ADMIN — PROPOFOL 100 MG: 10 INJECTION, EMULSION INTRAVENOUS at 01:11

## 2022-11-11 RX ADMIN — PHENYLEPHRINE HYDROCHLORIDE 100 MCG: 10 INJECTION INTRAVENOUS at 01:11

## 2022-11-11 RX ADMIN — FENTANYL CITRATE 25 MCG: 50 INJECTION INTRAMUSCULAR; INTRAVENOUS at 03:11

## 2022-11-11 RX ADMIN — FENTANYL CITRATE 50 MCG: 50 INJECTION, SOLUTION INTRAMUSCULAR; INTRAVENOUS at 01:11

## 2022-11-11 RX ADMIN — ONDANSETRON 8 MG: 2 INJECTION INTRAMUSCULAR; INTRAVENOUS at 01:11

## 2022-11-11 RX ADMIN — SUCCINYLCHOLINE CHLORIDE 120 MG: 20 INJECTION, SOLUTION INTRAMUSCULAR; INTRAVENOUS; PARENTERAL at 01:11

## 2022-11-11 RX ADMIN — LIDOCAINE HYDROCHLORIDE 50 MG: 20 INJECTION, SOLUTION INTRAVENOUS at 01:11

## 2022-11-11 RX ADMIN — MIDAZOLAM 1 MG: 1 INJECTION INTRAMUSCULAR; INTRAVENOUS at 01:11

## 2022-11-11 RX ADMIN — FLUTICASONE FUROATE AND VILANTEROL TRIFENATATE 1 PUFF: 100; 25 POWDER RESPIRATORY (INHALATION) at 08:11

## 2022-11-11 RX ADMIN — GABAPENTIN 300 MG: 300 CAPSULE ORAL at 08:11

## 2022-11-11 RX ADMIN — ENOXAPARIN SODIUM 40 MG: 100 INJECTION SUBCUTANEOUS at 10:11

## 2022-11-11 RX ADMIN — SODIUM CHLORIDE, SODIUM GLUCONATE, SODIUM ACETATE, POTASSIUM CHLORIDE, MAGNESIUM CHLORIDE, SODIUM PHOSPHATE, DIBASIC, AND POTASSIUM PHOSPHATE: .53; .5; .37; .037; .03; .012; .00082 INJECTION, SOLUTION INTRAVENOUS at 12:11

## 2022-11-11 RX ADMIN — MORPHINE SULFATE 4 MG: 4 INJECTION INTRAVENOUS at 04:11

## 2022-11-11 RX ADMIN — DEXAMETHASONE SODIUM PHOSPHATE 4 MG: 4 INJECTION, SOLUTION INTRA-ARTICULAR; INTRALESIONAL; INTRAMUSCULAR; INTRAVENOUS; SOFT TISSUE at 01:11

## 2022-11-11 NOTE — PLAN OF CARE
"Patient was brought to preop via own bed with oxygen therapy in use. Brother at bedside and assist to answer questions. William intact and patient stated "I dont hurt now"  Plan of care reviewed   "

## 2022-11-11 NOTE — ASSESSMENT & PLAN NOTE
Consult with orthopedic surgeon.  Log roll as needed.  Order analgesics.  Order VTE prophylaxis x 2.

## 2022-11-11 NOTE — ASSESSMENT & PLAN NOTE
Diagnosed by her pulmonologist.  Proven by pulmonary function testing.  Etiology unknown.  Calculated ARISCAT score is 11, placing pt in the low-risk category (1.6% risk of post-op pulmonary complication).  As far as deciding between general anesthesia and maybe MAC with nerve block,, I will defer to anesthesiologist.  Either method would be appropriate.

## 2022-11-11 NOTE — HPI
Patient lost her balance today at home and fell, causing instant pain in her right hip.  Did not hit her head.  No loss of consciousness.  Plain films of the hip show a closed fracture of the neck of the femur.  Medical history is outlined below.  She's been in her usual state of health as of late.  She has lung problems, including COPD and a restrictive lung disease, the latter of which is felt to be either from scoliosis and/or from neuromuscular weakness.  Patient uses a NIV vent at night in AVAPS mode.

## 2022-11-11 NOTE — ASSESSMENT & PLAN NOTE
[facility]  Will resume her home bp meds as listed below.    Hypertension Medications             losartan (COZAAR) 50 MG tablet Take 0.5 tablets (25 mg total) by mouth once daily.    prazosin (MINIPRESS) 5 MG capsule Take 1 capsule (5 mg total) by mouth every evening. For nightmares

## 2022-11-11 NOTE — NURSING
Nurses Note -- 4 Eyes      11/11/2022   3:56 AM      Skin assessed during: Admit      [] No Pressure Injuries Present    []Prevention Measures Documented      [x] Yes- Altered Skin Integrity Present or Discovered   [x] LDA Added if Not in Epic (Describe Wound)   [x] New Altered Skin Integrity was Present on Admit and Documented in LDA   [x] Wound Image Taken    Wound Care Consulted? Yes    Attending Nurse:  Mey Coon RN     Second RN/Staff Member:  Mira

## 2022-11-11 NOTE — H&P
[facility] Hospital Medicine  History & Physical    Patient Name: Maggy Tapia  MRN: [id number]  Patient Class: OP- Observation  Admission Date: 11/10/2022  Attending Physician: Lb Mayfield MD   Primary Care Provider: Yanna Abbasi MD         Patient information was obtained from patient, past medical records and ER records.     Subjective:     Principal Problem:Closed traumatic nondisplaced fracture of neck of right femur    Chief Complaint:   Chief Complaint   Patient presents with    Hip Pain     Right hip pain s/p fall this afternoon          HPI: Patient lost her balance today at home and fell, causing instant pain in her right hip.  Did not hit her head.  No loss of consciousness.  Plain films of the hip show a closed fracture of the neck of the femur.  Medical history is outlined below.  She's been in her usual state of health as of late.  She has lung problems, including COPD and a restrictive lung disease, the latter of which is felt to be either from scoliosis and/or from neuromuscular weakness.  Patient uses a NIV vent at night in AVAPS mode.      Past Medical History:   Diagnosis Date    Anxiety     Arthritis     Asthma     Cancer     Left Breast    Depression     Diabetes mellitus, type 2     GERD (gastroesophageal reflux disease)     Hyperlipidemia     Hypertension     Overactive bladder     Seizures     Pseudo-seizures    Sleep apnea     Stroke     Stroke 2022    pt was in the hospital for a stroke, claims she was seeing people when the stroke happened    Thyroid disease     Urinary tract infection without hematuria 2017       Past Surgical History:   Procedure Laterality Date    APPENDECTOMY      BREAST BIOPSY Left     BREAST LUMPECTOMY Left     2016    BREAST SURGERY      CATARACT EXTRACTION Bilateral     OU done//     SECTION      CHOLECYSTECTOMY      COLONOSCOPY N/A 2020    Procedure: COLONOSCOPY;  Surgeon: Mj HOUSE  MD Jim;  Location: [facility];  Service: Endoscopy;  Laterality: N/A;    CYST REMOVAL Left 04/01/2021    DILATION AND CURETTAGE OF UTERUS      EYE SURGERY         Review of patient's allergies indicates:   Allergen Reactions    Penicillins Anaphylaxis    Sulfa (sulfonamide antibiotics) Anaphylaxis    Trintellix [vortioxetine] Nausea And Vomiting and Other (See Comments)     Patient has seizures and vomits       No current facility-administered medications on file prior to encounter.     Current Outpatient Medications on File Prior to Encounter   Medication Sig    albuterol (PROVENTIL/VENTOLIN HFA) 90 mcg/actuation inhaler Inhale 1-2 puffs into the lungs every 4 (four) hours as needed for Shortness of Breath (coughing). Rescue    aspirin (ECOTRIN) 81 MG EC tablet Take 81 mg by mouth once daily.    blood-glucose meter kit Use as instructed. Insurance preferred.    CALCIUM CARBONATE/VITAMIN D3 (CALCIUM 500 + D ORAL) Take 1 tablet by mouth once daily. 10 mg daily    cyanocobalamin (VITAMIN B-12) 1000 MCG tablet Take 1 tablet (1,000 mcg total) by mouth once daily.    empagliflozin (JARDIANCE) 10 mg tablet Take 1 tablet (10 mg total) by mouth once daily.    fluticasone furoate-vilanteroL (BREO ELLIPTA) 100-25 mcg/dose diskus inhaler Inhale 1 puff into the lungs once daily. Controller    gabapentin (NEURONTIN) 300 MG capsule Take 1 capsule (300 mg total) by mouth every evening. Take 1 tablet every night x 7 days. Increase to twice a day x 7 days. Increase to three times a day.    ketoconazole (NIZORAL) 2 % cream AAA pannus fold at least daily after the shower    levothyroxine (SYNTHROID) 100 MCG tablet Take 1 tablet (100 mcg total) by mouth before breakfast.    losartan (COZAAR) 50 MG tablet Take 0.5 tablets (25 mg total) by mouth once daily.    metFORMIN (GLUCOPHAGE-XR) 500 MG ER 24hr tablet TAKE 2 TABLETS(1000 MG) BY MOUTH TWICE DAILY WITH MEALS    potassium chloride SA (K-DUR,KLOR-CON) 20 MEQ  tablet Take 20 mEq by mouth once daily.    prazosin (MINIPRESS) 5 MG capsule Take 1 capsule (5 mg total) by mouth every evening. For nightmares    sertraline (ZOLOFT) 50 MG tablet Take 1 tablet (50 mg total) by mouth once daily. For depression/anxiety    simvastatin (ZOCOR) 40 MG tablet Take 1 tablet (40 mg total) by mouth once daily.    tetrabenazine (XENAZINE) 25 mg tablet Take one tablet daily for 7 days and then  Take 1 tablet twice daily afterwards    traZODone (DESYREL) 50 MG tablet Take 1 tablet (50 mg total) by mouth nightly as needed for Insomnia.    [DISCONTINUED] nystatin (MYCOSTATIN) powder Apply topically 2 (two) times daily. for 14 days    [DISCONTINUED] solifenacin (VESICARE) 10 MG tablet Take 1 tablet (10 mg total) by mouth once daily.     Family History       Problem Relation (Age of Onset)    Breast cancer Mother    Cataracts Mother, Brother    Diabetes Mother    Heart failure Father    Hypertension Mother    Melanoma Mother, Father, Brother          Tobacco Use    Smoking status: Never    Smokeless tobacco: Never   Substance and Sexual Activity    Alcohol use: Yes     Comment: seldom    Drug use: No    Sexual activity: Not Currently     Review of Systems   Constitutional:  Negative for chills, fatigue and fever.   HENT:  Negative for congestion and sinus pressure.    Eyes:  Negative for pain and visual disturbance.   Respiratory:  Positive for shortness of breath (chronic). Negative for cough and wheezing.    Cardiovascular:  Negative for chest pain, palpitations and leg swelling.   Gastrointestinal:  Negative for abdominal pain, diarrhea, nausea and vomiting.   Genitourinary:  Negative for difficulty urinating, hematuria, urgency and vaginal discharge.   Musculoskeletal:  Positive for arthralgias (right hip pain). Negative for joint swelling, myalgias and neck pain.   Skin:  Negative for rash and wound.   Neurological:  Negative for dizziness, weakness, light-headedness and headaches.    Hematological:  Negative for adenopathy. Does not bruise/bleed easily.   Psychiatric/Behavioral:  Negative for confusion and dysphoric mood. The patient is not nervous/anxious.    Objective:     Vital Signs (Most Recent):  Temp: 99 °F (37.2 °C) (11/10/22 2059)  Pulse: 68 (11/10/22 2059)  Resp: 16 (11/10/22 2059)  BP: 129/62 (11/10/22 2059)  SpO2: (!) 94 % (11/10/22 2059)   Vital Signs (24h Range):  Temp:  [98.1 °F (36.7 °C)-99 °F (37.2 °C)] 99 °F (37.2 °C)  Pulse:  [56-79] 68  Resp:  [16-20] 16  SpO2:  [93 %-96 %] 94 %  BP: (102-129)/(55-66) 129/62     Weight: 70.8 kg (156 lb)  Body mass index is 30.47 kg/m².    Physical Exam  Constitutional:       General: She is not in acute distress.     Appearance: She is not diaphoretic.   HENT:      Head: Normocephalic and atraumatic.      Right Ear: External ear normal.      Left Ear: External ear normal.      Mouth/Throat:      Pharynx: No oropharyngeal exudate.   Eyes:      General: No scleral icterus.        Right eye: No discharge.         Left eye: No discharge.      Conjunctiva/sclera: Conjunctivae normal.   Neck:      Thyroid: No thyromegaly.      Vascular: No JVD.   Cardiovascular:      Rate and Rhythm: Normal rate and regular rhythm.      Heart sounds:     No gallop.   Pulmonary:      Effort: Pulmonary effort is normal.      Breath sounds: Normal breath sounds. No wheezing.      Comments: Has small tidal volumes  Abdominal:      General: Bowel sounds are normal. There is no distension.      Palpations: Abdomen is soft. There is no hepatomegaly or mass.      Tenderness: There is no abdominal tenderness.   Musculoskeletal:         General: No deformity.      Cervical back: Normal range of motion and neck supple.      Right hip: Tenderness present.   Lymphadenopathy:      Cervical: No cervical adenopathy.   Skin:     General: Skin is warm and dry.      Findings: No rash.   Neurological:      Mental Status: She is alert and oriented to person, place, and time.    Psychiatric:         Behavior: Behavior normal. Behavior is cooperative.           Significant Labs:   Lab Results   Component Value Date    WBC 11.88 11/10/2022    HGB 15.5 11/10/2022    HCT 47.5 11/10/2022    MCV 95 11/10/2022     (L) 11/10/2022           EKG reviewed:  no evidence of LVH.  No evidence of acute ischemia or old infarction.    Significant Imaging:   Results for orders placed during the hospital encounter of 11/10/22    X-Ray Hip 2 or 3 views Right (with Pelvis when performed)    Narrative  EXAMINATION:  XR HIP WITH PELVIS WHEN PERFORMED, 2 OR 3  VIEWS RIGHT    CLINICAL HISTORY:  Unspecified fall, initial encounter    TECHNIQUE:  AP view of the pelvis and frog leg lateral view of the right hip were performed.    COMPARISON:  None    FINDINGS:  Mildly impacted right femoral neck fracture.    No dislocation.    Impression  Right femoral neck fracture      Electronically signed by: Ashley Lugo MD  Date:    11/10/2022  Time:    16:57        I reviewed her CXR independently.  I noted no acute disease in her lungs.  Cardiac silhouette is normal.    Assessment/Plan:     * Closed traumatic nondisplaced fracture of neck of right femur  Consult with orthopedic surgeon.  Log roll as needed.  Order analgesics.  Order VTE prophylaxis x 2.      Chronic obstructive pulmonary disease, unspecified COPD type  No issues.  Order aerosol treatments.  Continue her Breo.      Type 2 diabetes mellitus with diabetic polyneuropathy, without long-term current use of insulin  Will monitor FSG's and use low-dose aspart sliding scale insulin for hyperglycemia.  Hold oral DM meds.    Major depressive disorder, recurrent episode, moderate  Noted.  Stable.  Monitor mood.      Chronic restrictive lung disease  Diagnosed by her pulmonologist.  Proven by pulmonary function testing.  Etiology unknown.  Calculated ARISCAT score is 11, placing pt in the low-risk category (1.6% risk of post-op pulmonary complication).  As far  as deciding between general anesthesia and maybe MAC with nerve block,, I will defer to anesthesiologist.  Either method would be appropriate.      Hypothyroidism  Stable.  Continue usual dose of Synthroid.      Hyperlipidemia  Will resume her statin when appropriate.      Essential hypertension  moniotor blood pressure.  Will resume her home bp meds as listed below.    Hypertension Medications             losartan (COZAAR) 50 MG tablet Take 0.5 tablets (25 mg total) by mouth once daily.    prazosin (MINIPRESS) 5 MG capsule Take 1 capsule (5 mg total) by mouth every evening. For nightmares              History of ischemic left MCA stroke  No acute issues.  Will hold the aspirin in anticipation of surgery tomorrow.  Will resume her statin when appropriate.        VTE Risk Mitigation (From admission, onward)         Ordered     Place DARION hose  Until discontinued         11/10/22 1621     Place sequential compression device  Until discontinued         11/10/22 1621     IP VTE HIGH RISK PATIENT  Once         11/10/22 1621                   Lb Mayfield MD  Department of Hospital Medicine   [facility]

## 2022-11-11 NOTE — ASSESSMENT & PLAN NOTE
No acute issues.  Will hold the aspirin in anticipation of surgery tomorrow.  Will resume her statin when appropriate.

## 2022-11-11 NOTE — PLAN OF CARE
POC discussed with pt, pt verbalized understanding. Oriented x4. PIV cdi, flushed. NSR on tele. NPO for surgery tomorrow. William intact and draining. Blood glucose monitored, no insulin needed. Weight shift assistance provided. Complaints of pain to right hip and leg, controlled with prn. Bipap on while asleep. Pulses 2+. Wiped with bath wipes and patted dry between all skin folds with altered skin integrity, picture uploaded, wound care consulted. Call light in reach, bed alarm set, safety maintained. No complaints or requests at this time, will continue to monitor.

## 2022-11-11 NOTE — CONSULTS
Patient admitted with moisture associated skin injury beneath bilateral breasts and to bilateral abd folds. Patient is also noted with a chronic wound to bilateral buttocks which are deep tissue and all present on admit to this hospital stay. Triad to be applied to all areas and leave open to air.

## 2022-11-11 NOTE — PLAN OF CARE
CM attempted to complete DC assessment- pt has already left for surgery. CM will follow up before the end of the day       11/11/22 7632   Discharge Assessment   Assessment Type Discharge Planning Assessment

## 2022-11-11 NOTE — TRANSFER OF CARE
Anesthesia Transfer of Care Note    Patient: Maggy Tapia    Procedure(s) Performed: Procedure(s) (LRB):  PINNING, HIP, PERCUTANEOUS (Right)    Patient location: PACU    Anesthesia Type: general    Transport from OR: Transported from OR on 6-10 L/min O2 by face mask with adequate spontaneous ventilation    Post pain: adequate analgesia    Post assessment: no apparent anesthetic complications    Post vital signs: stable    Level of consciousness: sedated    Nausea/Vomiting: no nausea/vomiting    Complications: none    Transfer of care protocol was followed      Last vitals:   Visit Vitals  BP (!) 89/47 (BP Location: Right arm, Patient Position: Lying)   Pulse 72   Temp 36.7 °C (98 °F) (Temporal)   Resp 17   Ht 5' (1.524 m)   Wt 71.8 kg (158 lb 4.6 oz)   SpO2 95%   Breastfeeding No   BMI 30.91 kg/m²

## 2022-11-11 NOTE — PLAN OF CARE
Problem: Adult Inpatient Plan of Care  Goal: Plan of Care Review  11/11/2022 1746 by Ilana Castro LPN  Outcome: Ongoing, Progressing     Problem: Adult Inpatient Plan of Care  Goal: Optimal Comfort and Wellbeing  11/11/2022 1746 by Ilana Castro LPN  Outcome: Ongoing, Progressing     Problem: Adult Inpatient Plan of Care  Goal: Patient-Specific Goal (Individualized)  11/11/2022 1746 by Ilana Castro LPN  Outcome: Ongoing, Progressing     Problem: Diabetes Comorbidity  Goal: Blood Glucose Level Within Targeted Range  11/11/2022 1746 by Ilana Castro LPN  Outcome: Ongoing, Progressing    Problem: Skin Injury Risk Increased  Goal: Skin Health and Integrity  11/11/2022 1746 by Ilana Castro LPN  Outcome: Ongoing, Progressing     Medications and plan of care reviewed, pt verbalized understanding. Vitals stable throughout shift. 2L O2 NC. Tele in place NSR. William in place, good urine output. IS at bedside. Return demonstration performed. Reposition Q2h. BG closely monitored, no coverage required. Wound care provided. Safety maintained. Bed in lowest position. Wheels locked. Side rails up x2. Call light within reach. No complaints voiced at this time.

## 2022-11-11 NOTE — PLAN OF CARE
Pt still in surgery. CM called pt's emergency contact to complete DC assessment, no answer, left voicemail for call back. CM following       11/11/22 5995   Discharge Assessment   Assessment Type Discharge Planning Assessment

## 2022-11-11 NOTE — PLAN OF CARE
Report to Sukhdev. Patient denies pain, no nausea noted, dressing to right hip dry intact no drainage, ice to wound, vs stable, resting comfortably, brother in attendance

## 2022-11-11 NOTE — SUBJECTIVE & OBJECTIVE
Past Medical History:   Diagnosis Date    Anxiety     Arthritis     Asthma     Cancer     Left Breast    Depression     Diabetes mellitus, type 2     GERD (gastroesophageal reflux disease)     Hyperlipidemia     Hypertension     Overactive bladder     Seizures     Pseudo-seizures    Sleep apnea     Stroke     Stroke 2022    pt was in the hospital for a stroke, claims she was seeing people when the stroke happened    Thyroid disease     Urinary tract infection without hematuria 2017       Past Surgical History:   Procedure Laterality Date    APPENDECTOMY      BREAST BIOPSY Left     BREAST LUMPECTOMY Left     2016    BREAST SURGERY      CATARACT EXTRACTION Bilateral     OU done//     SECTION      CHOLECYSTECTOMY      COLONOSCOPY N/A 2020    Procedure: COLONOSCOPY;  Surgeon: Mj Fernandez MD;  Location: [facility];  Service: Endoscopy;  Laterality: N/A;    CYST REMOVAL Left 2021    DILATION AND CURETTAGE OF UTERUS      EYE SURGERY         Review of patient's allergies indicates:   Allergen Reactions    Penicillins Anaphylaxis    Sulfa (sulfonamide antibiotics) Anaphylaxis    Trintellix [vortioxetine] Nausea And Vomiting and Other (See Comments)     Patient has seizures and vomits       No current facility-administered medications on file prior to encounter.     Current Outpatient Medications on File Prior to Encounter   Medication Sig    albuterol (PROVENTIL/VENTOLIN HFA) 90 mcg/actuation inhaler Inhale 1-2 puffs into the lungs every 4 (four) hours as needed for Shortness of Breath (coughing). Rescue    aspirin (ECOTRIN) 81 MG EC tablet Take 81 mg by mouth once daily.    blood-glucose meter kit Use as instructed. Insurance preferred.    CALCIUM CARBONATE/VITAMIN D3 (CALCIUM 500 + D ORAL) Take 1 tablet by mouth once daily. 10 mg daily    cyanocobalamin (VITAMIN B-12) 1000 MCG tablet Take 1 tablet (1,000 mcg total) by mouth once daily.    empagliflozin (JARDIANCE) 10 mg tablet Take 1  tablet (10 mg total) by mouth once daily.    fluticasone furoate-vilanteroL (BREO ELLIPTA) 100-25 mcg/dose diskus inhaler Inhale 1 puff into the lungs once daily. Controller    gabapentin (NEURONTIN) 300 MG capsule Take 1 capsule (300 mg total) by mouth every evening. Take 1 tablet every night x 7 days. Increase to twice a day x 7 days. Increase to three times a day.    ketoconazole (NIZORAL) 2 % cream AAA pannus fold at least daily after the shower    levothyroxine (SYNTHROID) 100 MCG tablet Take 1 tablet (100 mcg total) by mouth before breakfast.    losartan (COZAAR) 50 MG tablet Take 0.5 tablets (25 mg total) by mouth once daily.    metFORMIN (GLUCOPHAGE-XR) 500 MG ER 24hr tablet TAKE 2 TABLETS(1000 MG) BY MOUTH TWICE DAILY WITH MEALS    potassium chloride SA (K-DUR,KLOR-CON) 20 MEQ tablet Take 20 mEq by mouth once daily.    prazosin (MINIPRESS) 5 MG capsule Take 1 capsule (5 mg total) by mouth every evening. For nightmares    sertraline (ZOLOFT) 50 MG tablet Take 1 tablet (50 mg total) by mouth once daily. For depression/anxiety    simvastatin (ZOCOR) 40 MG tablet Take 1 tablet (40 mg total) by mouth once daily.    tetrabenazine (XENAZINE) 25 mg tablet Take one tablet daily for 7 days and then  Take 1 tablet twice daily afterwards    traZODone (DESYREL) 50 MG tablet Take 1 tablet (50 mg total) by mouth nightly as needed for Insomnia.    [DISCONTINUED] nystatin (MYCOSTATIN) powder Apply topically 2 (two) times daily. for 14 days    [DISCONTINUED] solifenacin (VESICARE) 10 MG tablet Take 1 tablet (10 mg total) by mouth once daily.     Family History       Problem Relation (Age of Onset)    Breast cancer Mother    Cataracts Mother, Brother    Diabetes Mother    Heart failure Father    Hypertension Mother    Melanoma Mother, Father, Brother          Tobacco Use    Smoking status: Never    Smokeless tobacco: Never   Substance and Sexual Activity    Alcohol use: Yes     Comment: seldom    Drug use: No    Sexual  activity: Not Currently     Review of Systems   Constitutional:  Negative for chills, fatigue and fever.   HENT:  Negative for congestion and sinus pressure.    Eyes:  Negative for pain and visual disturbance.   Respiratory:  Positive for shortness of breath (chronic). Negative for cough and wheezing.    Cardiovascular:  Negative for chest pain, palpitations and leg swelling.   Gastrointestinal:  Negative for abdominal pain, diarrhea, nausea and vomiting.   Genitourinary:  Negative for difficulty urinating, hematuria, urgency and vaginal discharge.   Musculoskeletal:  Positive for arthralgias (right hip pain). Negative for joint swelling, myalgias and neck pain.   Skin:  Negative for rash and wound.   Neurological:  Negative for dizziness, weakness, light-headedness and headaches.   Hematological:  Negative for adenopathy. Does not bruise/bleed easily.   Psychiatric/Behavioral:  Negative for confusion and dysphoric mood. The patient is not nervous/anxious.    Objective:     Vital Signs (Most Recent):  Temp: 99 °F (37.2 °C) (11/10/22 2059)  Pulse: 68 (11/10/22 2059)  Resp: 16 (11/10/22 2059)  BP: 129/62 (11/10/22 2059)  SpO2: (!) 94 % (11/10/22 2059)   Vital Signs (24h Range):  Temp:  [98.1 °F (36.7 °C)-99 °F (37.2 °C)] 99 °F (37.2 °C)  Pulse:  [56-79] 68  Resp:  [16-20] 16  SpO2:  [93 %-96 %] 94 %  BP: (102-129)/(55-66) 129/62     Weight: 70.8 kg (156 lb)  Body mass index is 30.47 kg/m².    Physical Exam  Constitutional:       General: She is not in acute distress.     Appearance: She is not diaphoretic.   HENT:      Head: Normocephalic and atraumatic.      Right Ear: External ear normal.      Left Ear: External ear normal.      Mouth/Throat:      Pharynx: No oropharyngeal exudate.   Eyes:      General: No scleral icterus.        Right eye: No discharge.         Left eye: No discharge.      Conjunctiva/sclera: Conjunctivae normal.   Neck:      Thyroid: No thyromegaly.      Vascular: No JVD.   Cardiovascular:       Rate and Rhythm: Normal rate and regular rhythm.      Heart sounds:     No gallop.   Pulmonary:      Effort: Pulmonary effort is normal.      Breath sounds: Normal breath sounds. No wheezing.      Comments: Has small tidal volumes  Abdominal:      General: Bowel sounds are normal. There is no distension.      Palpations: Abdomen is soft. There is no hepatomegaly or mass.      Tenderness: There is no abdominal tenderness.   Musculoskeletal:         General: No deformity.      Cervical back: Normal range of motion and neck supple.      Right hip: Tenderness present.   Lymphadenopathy:      Cervical: No cervical adenopathy.   Skin:     General: Skin is warm and dry.      Findings: No rash.   Neurological:      Mental Status: She is alert and oriented to person, place, and time.   Psychiatric:         Behavior: Behavior normal. Behavior is cooperative.           Significant Labs:   Lab Results   Component Value Date    WBC 11.88 11/10/2022    HGB 15.5 11/10/2022    HCT 47.5 11/10/2022    MCV 95 11/10/2022     (L) 11/10/2022           EKG reviewed:  no evidence of LVH.  No evidence of acute ischemia or old infarction.    Significant Imaging:   Results for orders placed during the hospital encounter of 11/10/22    X-Ray Hip 2 or 3 views Right (with Pelvis when performed)    Narrative  EXAMINATION:  XR HIP WITH PELVIS WHEN PERFORMED, 2 OR 3  VIEWS RIGHT    CLINICAL HISTORY:  Unspecified fall, initial encounter    TECHNIQUE:  AP view of the pelvis and frog leg lateral view of the right hip were performed.    COMPARISON:  None    FINDINGS:  Mildly impacted right femoral neck fracture.    No dislocation.    Impression  Right femoral neck fracture      Electronically signed by: Ashley Lugo MD  Date:    11/10/2022  Time:    16:57        I reviewed her CXR independently.  I noted no acute disease in her lungs.  Cardiac silhouette is normal.

## 2022-11-11 NOTE — OP NOTE
[facility]  Orthopedic Surgery Department  Operative Note    SUMMARY     Date of Procedure: 11/11/2022     Procedure: Procedure(s) (LRB):  PINNING, HIP, PERCUTANEOUS (Right)     Surgeon(s) and Role:     * Ministerio Joiner II, MD - Primary    Assisting Surgeon: None    First Assist:  NAVID Chakraborty    Pre-Operative Diagnosis: Subcapital fracture of neck of femur, right, closed, initial encounter [S72.011A]    Post-Operative Diagnosis: Post-Op Diagnosis Codes:     * Subcapital fracture of neck of femur, right, closed, initial encounter [S72.011A]    Anesthesia: General    Technical Procedures Used:  Percutaneous pinning of a right femoral neck fracture    Description of the Findings of the Procedure:  Dictated    Significant Surgical Tasks Conducted by the Assistant(s), if Applicable:  Positioning and prepping the patient, assistance with screw insertion, wound closure and bandage application.    Complications: No    Estimated Blood Loss (EBL): * No values recorded between 11/11/2022  1:43 PM and 11/11/2022  2:06 PM *           Implants: * No implants in log *    Specimens:   Specimen (24h ago, onward)      None                    Condition: Good    Disposition: PACU - hemodynamically stable.    Attestation: I was present for the entire procedure.    Procedure In Detail:  The patient is brought to the operating room and under went anesthesia with the anesthesia service.  The patient was then transferred onto the [place] table.  The left leg was flexed and abducted out of the way in a well leg delarosa.  The right leg was placed in a traction boot but no traction was applied.  Fluoroscopy was used to verify that the fracture remained nondisplaced and then the right lower extremity was prepped and draped in the normal sterile fashion.  Under fluoroscopic visualization 3 guide pins from the 6 5 cannulated Smith and Nephew screw set were fired in an inverted triangle formation.  The 1st pin was fired  in the center position up the lower into the femoral neck into the head.  We then placed 2 more pins superior to that spread slightly anterior and posterior to create the inverted triangle formation.  Proper placement of all these pins were verified with fluoroscopy in AP and lateral planes.  Measurements off of the pins then determined the appropriate length screws.  The most inferior screw was 90 mm.  The cortex was tapped and the screw was inserted and the guide pin was removed.  The 2 superior screws were both 85 mm and the cortex was tapped for each of those and they were inserted the guide pins removed.  After placement of the screws fluoroscopy was used to verify anatomic alignment and proper placement of all hardware.  Once that was confirmed the patient was transferred off the [place] table onto the transport gurney where she was awakened from anesthesia and taken recovery.  She was noted to be stable postoperatively.  Needle and lap counts were correct at the end of the case.

## 2022-11-11 NOTE — ASSESSMENT & PLAN NOTE
Will monitor FSG's and use low-dose aspart sliding scale insulin for hyperglycemia.  Hold oral DM meds.

## 2022-11-11 NOTE — ANESTHESIA PROCEDURE NOTES
Intubation    Date/Time: 11/11/2022 1:22 PM  Performed by: Mehnaz Garcia CRNA  Authorized by: Robert Quintana MD     Intubation:     Induction:  Intravenous    Intubated:  Postinduction    Mask Ventilation:  Easy mask    Attempts:  1    Attempted By:  CRNA    Method of Intubation:  Video laryngoscopy    Blade:  Colbert 3    Laryngeal View Grade: Grade I - full view of cords      Difficult Airway Encountered?: No      Complications:  None    Airway Device:  Oral endotracheal tube    Style/Cuff Inflation:  Cuffed (inflated to minimal occlusive pressure)    Inflation Amount (mL):  5    Tube secured:  22    Secured at:  The lips    Placement Verified By:  Capnometry and Revisualization with laryngoscopy    Complicating Factors:  None    Findings Post-Intubation:  BS equal bilateral and atraumatic/condition of teeth unchanged

## 2022-11-11 NOTE — CARE UPDATE
Pt placed on BIPAP V60 on AVAPS settings. Priority(red alarm) alarm tested and working. Pt is on continuous pulse ox and cardiac monitoring.     11/10/22 7375   Patient Assessment/Suction   Level of Consciousness (AVPU) alert   Respiratory Effort Normal;Unlabored   Expansion/Accessory Muscles/Retractions no use of accessory muscles   Rhythm/Pattern, Respiratory unlabored   Skin Integrity   $ Wound Care Tech Time 15 min   Area Observed Bridge of nose   Skin Appearance without discoloration   Barrier used? Liquid Filled Cushion   PRE-TX-O2   O2 Device (Oxygen Therapy) BiPAP   $ Is the patient on Low Flow Oxygen? Yes   Oxygen Analyzed Concentration (%) 36 %   SpO2 99 %   Pulse Oximetry Type Continuous   $ Pulse Oximetry - Multiple Charge Pulse Oximetry - Multiple   Pulse 68   Resp (!) 23   Preset CPAP/BiPAP Settings   Mode Of Delivery AVAPS   $ CPAP/BiPAP Daily Charge BiPAP/CPAP Daily   $ Initial CPAP/BiPAP Setup? Yes   $ Is patient using? Yes   Size of Mask Small   Sized Appropriately? Yes   Equipment Type V60   Airway Device Type small full face mask   Humidifier not applicable   EPAP (cm H2O) 5   AVAPS Min P (cm H2O) 10   AVAPS Max P (cm H2O) 20   Set Tidal Volume (mL) 350 mL   Set Rate (Breaths/Min) 18   ITime (sec) 1   Rise Time (sec) 3   Patient CPAP/BiPAP Settings   Timed Inspiration (Sec) 1   IPAP Rise Time (sec) 3   RR Total (Breaths/Min) 28   Tidal Volume (mL) 324   VE Minute Ventilation (L/min) 10.8 L/min   Peak Inspiratory Pressure (cm H2O) 13   TiTOT (%) 37   Total Leak (L/Min) 3   Patient Trigger - ST Mode Only (%) 97   CPAP/BiPAP Alarms   High Pressure (cm H2O) 25   Low Pressure (cm H2O) 10   Minute Ventilation (L/Min) 3   High RR (breaths/min) 45   Low RR (breaths/min) 8   Apnea (Sec) 20

## 2022-11-12 LAB
BASOPHILS # BLD AUTO: 0.02 K/UL (ref 0–0.2)
BASOPHILS NFR BLD: 0.2 % (ref 0–1.9)
DIFFERENTIAL METHOD: ABNORMAL
EOSINOPHIL # BLD AUTO: 0 K/UL (ref 0–0.5)
EOSINOPHIL NFR BLD: 0.1 % (ref 0–8)
ERYTHROCYTE [DISTWIDTH] IN BLOOD BY AUTOMATED COUNT: 13.8 % (ref 11.5–14.5)
HCT VFR BLD AUTO: 41.5 % (ref 37–48.5)
HGB BLD-MCNC: 13.3 G/DL (ref 12–16)
IMM GRANULOCYTES # BLD AUTO: 0.05 K/UL (ref 0–0.04)
IMM GRANULOCYTES NFR BLD AUTO: 0.4 % (ref 0–0.5)
LYMPHOCYTES # BLD AUTO: 0.7 K/UL (ref 1–4.8)
LYMPHOCYTES NFR BLD: 6.4 % (ref 18–48)
MCH RBC QN AUTO: 30.5 PG (ref 27–31)
MCHC RBC AUTO-ENTMCNC: 32 G/DL (ref 32–36)
MCV RBC AUTO: 95 FL (ref 82–98)
MONOCYTES # BLD AUTO: 0.7 K/UL (ref 0.3–1)
MONOCYTES NFR BLD: 6.6 % (ref 4–15)
NEUTROPHILS # BLD AUTO: 9.7 K/UL (ref 1.8–7.7)
NEUTROPHILS NFR BLD: 86.3 % (ref 38–73)
NRBC BLD-RTO: 0 /100 WBC
PLATELET # BLD AUTO: 102 K/UL (ref 150–450)
PMV BLD AUTO: 10.5 FL (ref 9.2–12.9)
POCT GLUCOSE: 157 MG/DL (ref 70–110)
POCT GLUCOSE: 188 MG/DL (ref 70–110)
RBC # BLD AUTO: 4.36 M/UL (ref 4–5.4)
WBC # BLD AUTO: 11.27 K/UL (ref 3.9–12.7)

## 2022-11-12 PROCEDURE — 94799 UNLISTED PULMONARY SVC/PX: CPT

## 2022-11-12 PROCEDURE — 97161 PT EVAL LOW COMPLEX 20 MIN: CPT

## 2022-11-12 PROCEDURE — 12000002 HC ACUTE/MED SURGE SEMI-PRIVATE ROOM

## 2022-11-12 PROCEDURE — 27000221 HC OXYGEN, UP TO 24 HOURS

## 2022-11-12 PROCEDURE — 63600175 PHARM REV CODE 636 W HCPCS: Performed by: ORTHOPAEDIC SURGERY

## 2022-11-12 PROCEDURE — 94660 CPAP INITIATION&MGMT: CPT

## 2022-11-12 PROCEDURE — 99900035 HC TECH TIME PER 15 MIN (STAT)

## 2022-11-12 PROCEDURE — 97110 THERAPEUTIC EXERCISES: CPT

## 2022-11-12 PROCEDURE — 94640 AIRWAY INHALATION TREATMENT: CPT

## 2022-11-12 PROCEDURE — 94761 N-INVAS EAR/PLS OXIMETRY MLT: CPT

## 2022-11-12 PROCEDURE — 25000003 PHARM REV CODE 250: Performed by: ORTHOPAEDIC SURGERY

## 2022-11-12 PROCEDURE — 25000242 PHARM REV CODE 250 ALT 637 W/ HCPCS: Performed by: ORTHOPAEDIC SURGERY

## 2022-11-12 PROCEDURE — 25000003 PHARM REV CODE 250: Performed by: NURSE PRACTITIONER

## 2022-11-12 PROCEDURE — 36415 COLL VENOUS BLD VENIPUNCTURE: CPT | Performed by: ORTHOPAEDIC SURGERY

## 2022-11-12 PROCEDURE — 85025 COMPLETE CBC W/AUTO DIFF WBC: CPT | Performed by: ORTHOPAEDIC SURGERY

## 2022-11-12 RX ORDER — HYDROCODONE BITARTRATE AND ACETAMINOPHEN 5; 325 MG/1; MG/1
1 TABLET ORAL EVERY 4 HOURS PRN
Status: DISCONTINUED | OUTPATIENT
Start: 2022-11-12 | End: 2022-11-13

## 2022-11-12 RX ORDER — NAPROXEN SODIUM 220 MG/1
81 TABLET, FILM COATED ORAL DAILY
Status: DISCONTINUED | OUTPATIENT
Start: 2022-11-12 | End: 2022-11-12

## 2022-11-12 RX ORDER — NAPROXEN SODIUM 220 MG/1
81 TABLET, FILM COATED ORAL 2 TIMES DAILY
Status: DISCONTINUED | OUTPATIENT
Start: 2022-11-12 | End: 2022-11-16 | Stop reason: HOSPADM

## 2022-11-12 RX ORDER — ATORVASTATIN CALCIUM 20 MG/1
20 TABLET, FILM COATED ORAL DAILY
Status: DISCONTINUED | OUTPATIENT
Start: 2022-11-12 | End: 2022-11-16 | Stop reason: HOSPADM

## 2022-11-12 RX ADMIN — LEVOTHYROXINE SODIUM 100 MCG: 50 TABLET ORAL at 05:11

## 2022-11-12 RX ADMIN — IPRATROPIUM BROMIDE AND ALBUTEROL SULFATE 3 ML: 2.5; .5 SOLUTION RESPIRATORY (INHALATION) at 12:11

## 2022-11-12 RX ADMIN — FLUTICASONE FUROATE AND VILANTEROL TRIFENATATE 1 PUFF: 100; 25 POWDER RESPIRATORY (INHALATION) at 06:11

## 2022-11-12 RX ADMIN — GABAPENTIN 300 MG: 300 CAPSULE ORAL at 03:11

## 2022-11-12 RX ADMIN — ATORVASTATIN CALCIUM 20 MG: 20 TABLET, FILM COATED ORAL at 12:11

## 2022-11-12 RX ADMIN — IPRATROPIUM BROMIDE AND ALBUTEROL SULFATE 3 ML: 2.5; .5 SOLUTION RESPIRATORY (INHALATION) at 06:11

## 2022-11-12 RX ADMIN — TRAZODONE HYDROCHLORIDE 50 MG: 50 TABLET ORAL at 08:11

## 2022-11-12 RX ADMIN — PRAZOSIN HYDROCHLORIDE 5 MG: 1 CAPSULE ORAL at 08:11

## 2022-11-12 RX ADMIN — HYDROCODONE BITARTRATE AND ACETAMINOPHEN 1 TABLET: 5; 325 TABLET ORAL at 12:11

## 2022-11-12 RX ADMIN — MORPHINE SULFATE 4 MG: 4 INJECTION INTRAVENOUS at 08:11

## 2022-11-12 RX ADMIN — SERTRALINE HYDROCHLORIDE 50 MG: 50 TABLET ORAL at 08:11

## 2022-11-12 RX ADMIN — GABAPENTIN 300 MG: 300 CAPSULE ORAL at 08:11

## 2022-11-12 RX ADMIN — HYDROCODONE BITARTRATE AND ACETAMINOPHEN 1 TABLET: 5; 325 TABLET ORAL at 04:11

## 2022-11-12 RX ADMIN — HYDROCODONE BITARTRATE AND ACETAMINOPHEN 1 TABLET: 5; 325 TABLET ORAL at 09:11

## 2022-11-12 RX ADMIN — ASPIRIN 81 MG CHEWABLE TABLET 81 MG: 81 TABLET CHEWABLE at 12:11

## 2022-11-12 RX ADMIN — ASPIRIN 81 MG CHEWABLE TABLET 81 MG: 81 TABLET CHEWABLE at 08:11

## 2022-11-12 NOTE — PLAN OF CARE
Problem: Physical Therapy  Goal: Physical Therapy Goal  Description: Goals to be met by: 22     Patient will increase functional independence with mobility by performin. Supine to sit with Stand-by Assistance  2. Sit to supine with Stand-by Assistance  3. Sit to stand transfer with Stand-by Assistance  4. Bed to chair transfer with Stand-by Assistance using Rolling Walker  5. Gait  x 25 feet with Stand-by Assistance using Rolling Walker and PWB right LE.     Outcome: Ongoing, Progressing

## 2022-11-12 NOTE — ASSESSMENT & PLAN NOTE
No acute issues.  Will hold the aspirin in anticipation of surgery today.  Will resume her statin when appropriate.

## 2022-11-12 NOTE — PLAN OF CARE
POC discussed with patient, verbalized understanding. Patient with uneventful night, slept off and on between care. VS stable. Denies pain. Surgical dressing R hip CDI. NV wnl. IVF infusing. Call light at bedside. IS encouraged use. SCD in place.

## 2022-11-12 NOTE — CARE UPDATE
11/11/22 1930   Patient Assessment/Suction   Level of Consciousness (AVPU) alert   Respiratory Effort Normal;Unlabored   Expansion/Accessory Muscles/Retractions no use of accessory muscles   All Lung Fields Breath Sounds Anterior:;clear;diminished   Rhythm/Pattern, Respiratory unlabored;pattern regular   Cough Frequency no cough   Skin Integrity   $ Wound Care Tech Time 15 min   Area Observed Bridge of nose   Skin Appearance without discoloration   Barrier used? Liquid Filled Cushion   PRE-TX-O2   O2 Device (Oxygen Therapy) nasal cannula   $ Is the patient on Low Flow Oxygen? Yes   Flow (L/min) 2.5   SpO2 96 %   Pulse Oximetry Type Continuous   $ Pulse Oximetry - Multiple Charge Pulse Oximetry - Multiple   Pulse 90   Resp 20   Aerosol Therapy   $ Aerosol Therapy Charges PRN treatment not required   Respiratory Treatment Status (SVN) PRN treatment not required   Incentive Spirometer   $ Incentive Spirometer Charges postop instruction   Administration (IS) instruction provided, follow-up   Number of Repetitions (IS) 8   Level Incentive Spirometer (mL) 500   Patient Tolerance (IS) fair;no adverse signs/symptoms present   Preset CPAP/BiPAP Settings   Mode Of Delivery Standby;AVAPS

## 2022-11-12 NOTE — ASSESSMENT & PLAN NOTE
Consulting with orthopedic surgeon.  Log roll as needed.  Order analgesics.  Continue VTE prophylaxis x 2.

## 2022-11-12 NOTE — CONSULTS
"  [facility] [place]  Adult Nutrition  Consult Note    SUMMARY     Recommendations    Recommendation/Intervention:   1. When medically appropriate, recommend diet advance to Diabetic/Cardiac diet   2. Recommend Wesley BID for wound healing   3. Recommend Boost glucose control TID to fill nutritional gaps    Goals:   1. Pt diet will advance within 24-48 hrs   2. Pt will consume >75% PO and supplement intake by RD follow up   3. Pt will consume >75% Wesley by RD follow up  Nutrition Goal Status: new  Communication of RD Recs: other (comment); (POC, sticky note)    Assessment and Plan    Nutrition Problem  Increased protein needs    Related to (etiology):   Increased demand for nutrition     Signs and Symptoms (as evidenced by):   Surgery: R hip; Wounds: RUQ, L anterior, Buttocks     Interventions(treatment strategy):  1. When medically appropriate, recommend diet advance to Diabetic/Cardiac diet   2. Recommend Wesley BID for wound healing   3. Recommend Boost glucose control TID to fill nutritional gaps  4.Collaboration of care with medical providers    Nutrition Diagnosis Status:   New      Malnutrition Assessment         Reason for Assessment    Reason For Assessment: consult  Diagnosis: other (see comments) (Closed traumatic nondisplaced fracture of neck of right femur)  Relevant Medical History: DMT2, COPD, Chronic restrictive lung disease, hypothyrodisim, Hyperlipidemia, HTN; Hx of: Schizophrenia, Ischemic L MCA stroke, L breast cancer  General Information Comments: 72 y.o. Female pt admitted for closed traumatic nondisplaced fracture of neck of right femur. (RD off-site) Reviewed chart: PO intake 75% of meals, Per NP note 11/12- not experiencing any N/V/D, no chewing/swallowing difficulties, no abdominal distention. Per wound care note 11/11 "Patient admitted with moisture associated skin injury beneath bilateral breasts and to bilateral abd folds. Patient is also noted with a chronic wound to bilateral " "buttocks which are deep tissue and all present on admit to this hospital stay". Skin: incsion R hip; LBM: 11/9 (x 3 days no BM); Cristi score: 15; Edema: None. Pt appears well-nourished. Labs, meds, weight reviewed. Labs 11/10 WNL, 11/12 Glu (H). Weight stable. RD will continue to monitor.  Nutrition Discharge Planning: Diabetic/Cardiac diet    Nutrition Risk Screen    Nutrition Risk Screen: large or nonhealing wound, burn or pressure injury    Nutrition/Diet History    Spiritual, Cultural Beliefs, Gnosticist Practices, Values that Affect Care: yes  Food Allergies: NKFA  Factors Affecting Nutritional Intake: impaired cognitive status/motor control, depression    Anthropometrics    Temp: 97.4 °F (36.3 °C)  Height Method: Stated  Height: 5' (152.4 cm)  Height (inches): 60 in  Weight Method: Bed Scale  Weight: 71.8 kg (158 lb 4.6 oz)  Weight (lb): 158.29 lb  Ideal Body Weight (IBW), Female: 100 lb  % Ideal Body Weight, Female (lb): 158.29 %  BMI (Calculated): 30.9  BMI Grade: 30 - 34.9- obesity - grade I     Wt Readings from Last 15 Encounters:   11/12/22 71.8 kg (158 lb 4.6 oz)   09/30/22 72 kg (158 lb 11.7 oz)   09/12/22 75.8 kg (167 lb 1.7 oz)   09/07/22 75.8 kg (167 lb)   08/22/22 74.4 kg (164 lb 0.4 oz)   07/27/22 74.1 kg (163 lb 5.8 oz)   07/15/22 74.8 kg (165 lb)   07/14/22 75.1 kg (165 lb 9.1 oz)   07/06/22 73.9 kg (163 lb 0.5 oz)   06/08/22 75.3 kg (166 lb)   04/01/22 80.3 kg (177 lb)   03/29/22 80.3 kg (177 lb)   03/10/22 82.1 kg (181 lb)   03/09/22 82.5 kg (181 lb 14.1 oz)   02/23/22 85.3 kg (188 lb)     Lab/Procedures/Meds    Pertinent Labs Reviewed: reviewed  Pertinent Labs Comments: Glu (H)  Pertinent Medications Reviewed: reviewed  BMP  Lab Results   Component Value Date     11/10/2022    K 4.3 11/10/2022     11/10/2022    CO2 27 11/10/2022    BUN 21 11/10/2022    CREATININE 0.8 11/10/2022    CALCIUM 9.7 11/10/2022    ANIONGAP 11 11/10/2022    EGFRNORACEVR >60 11/10/2022      Recent Labs   Lab " 11/12/22  1127   POCTGLUCOSE 188*    Scheduled Meds:   aspirin  81 mg Oral BID    atorvastatin  20 mg Oral Daily    fluticasone furoate-vilanteroL  1 puff Inhalation Daily    gabapentin  300 mg Oral TID    levothyroxine  100 mcg Oral Before breakfast    prazosin  5 mg Oral QHS    sertraline  50 mg Oral Daily     Continuous Infusions:  PRN Meds:.acetaminophen, albuterol-ipratropium, dextrose 10%, dextrose 10%, glucagon (human recombinant), glucose, glucose, HYDROcodone-acetaminophen, naloxone, ondansetron, potassium bicarbonate, potassium bicarbonate, potassium bicarbonate, sodium chloride 0.9%, sodium chloride 0.9%, traZODone   Physical Findings/Assessment         Estimated/Assessed Needs    Weight Used For Calorie Calculations: 71.8 kg (158 lb 4.6 oz)  Energy Calorie Requirements (kcal): 1366 (MSJ x 1.2 AF (DM))  Energy Need Method: [employer]  Protein Requirements:  (1.0-1.5 g/kg ABW (DM w/ wounds))  Weight Used For Protein Calculations: 71.8 kg (158 lb 4.6 oz)  Fluid Requirements (mL): 1366 (1 mL/kcal)  Estimated Fluid Requirement Method: RDA Method  RDA Method (mL): 1366  CHO Requirement: 170      Nutrition Prescription Ordered    Current Diet Order: Regular diet    Evaluation of Received Nutrient/Fluid Intake  I/O: (Net since admit)  11/12: -995 mL  Energy Calories Required: meeting needs  Protein Required: meeting needs  Fluid Required: meeting needs  % Intake of Estimated Energy Needs: 75 - 100 %  % Meal Intake: 75 - 100 %    Nutrition Risk    Level of Risk/Frequency of Follow-up: low (F/u 1 x weekly)       Monitor and Evaluation    Food and Nutrient Intake: food and beverage intake  Food and Nutrient Adminstration: diet order  Knowledge/Beliefs/Attitudes: food and nutrition knowledge/skill, beliefs and attitudes  Anthropometric Measurements: weight, weight change, body mass index  Biochemical Data, Medical Tests and Procedures: electrolyte and renal panel, gastrointestinal profile,  glucose/endocrine profile, inflammatory profile, lipid profile       Nutrition Follow-Up    RD Follow-up?: Yes  Cleopatra Gupta Dietitian (Off-site)

## 2022-11-12 NOTE — PT/OT/SLP EVAL
Physical Therapy Evaluation    Patient Name:  Maggy Tapia   MRN:  [id number]    Recommendations:     Discharge Recommendations:  home, home health PT, rehabilitation facility   Discharge Equipment Recommendations: none   Barriers to discharge:  patient may need admission to facility for rehab depending on functional progress in hospital    Assessment:     Maggy Tapia is a 72 y.o. female admitted with a medical diagnosis of Closed traumatic nondisplaced fracture of neck of right femur.  She presents with the following impairments/functional limitations:  weakness, impaired endurance, impaired functional mobility, gait instability, impaired balance, decreased lower extremity function, pain, decreased ROM, edema, orthopedic precautions .  Patient agreeable to PT evaluation this morning.  Patient presented supine in bed and required mod assist to transfer to sitting and then min assist to stand with a RW with PWB right LE.  Patient then able to ambulate x 5 feet RW min assist PWB right LE.      Rehab Prognosis: Good; patient would benefit from acute skilled PT services to address these deficits and reach maximum level of function.    Recent Surgery: Procedure(s) (LRB):  PINNING, HIP, PERCUTANEOUS (Right) 1 Day Post-Op    Plan:     During this hospitalization, patient to be seen BID to address the identified rehab impairments via gait training, therapeutic activities, therapeutic exercises and progress toward the following goals:    Plan of Care Expires:  12/09/22    Subjective     Chief Complaint: pain  Patient/Family Comments/goals: go home  Pain/Comfort:  Pain Rating 1: 8/10  Location - Side 1: Right  Location - Orientation 1: lower  Location 1: hip  Pain Addressed 1: Reposition, Cessation of Activity  Pain Rating Post-Intervention 1: 9/10    Patients cultural, spiritual, Shinto conflicts given the current situation:      Living Environment:  Currently lives with brother in [address] [place] home.  Prior to  admission, patients level of function was modified independent.  Equipment used at home: cane, straight, walker, rolling, wheelchair, bedside commode, oxygen.  DME owned (not currently used): none.  Upon discharge, patient will have assistance from family.    Objective:     Communicated with nurse prior to session.  Patient found supine with oxygen  upon PT entry to room.    General Precautions: Standard, fall   Orthopedic Precautions:RLE partial weight bearing   Braces:    Respiratory Status: Nasal cannula, flow 2 L/min    Exams:  RLE ROM: not tested  RLE Strength: Deficits: 2/5 overall  LLE ROM: WFL  LLE Strength: WFL    Functional Mobility:  Bed Mobility:     Supine to Sit: moderate assistance  Transfers:     Sit to Stand:  minimum assistance with rolling walker  Gait: x 5 feet rW min assist and PWB right  LE      AM-PAC 6 CLICK MOBILITY  Total Score:16       Treatment & Education:  Exercise to include ankle pumps and sets.  All done bilateral LE x 10 reps with 3 second hold.  Patient instructed to perform them frequently during the day.    Patient left up in chair with call button in reach, chair alarm on, and nurse notified.    GOALS:   Multidisciplinary Problems       Physical Therapy Goals          Problem: Physical Therapy    Goal Priority Disciplines Outcome Goal Variances Interventions   Physical Therapy Goal     PT, PT/OT Ongoing, Progressing     Description: Goals to be met by: 22     Patient will increase functional independence with mobility by performin. Supine to sit with Stand-by Assistance  2. Sit to supine with Stand-by Assistance  3. Sit to stand transfer with Stand-by Assistance  4. Bed to chair transfer with Stand-by Assistance using Rolling Walker  5. Gait  x 25 feet with Stand-by Assistance using Rolling Walker and PWB right LE.                          History:     Past Medical History:   Diagnosis Date    Anxiety     Arthritis     Asthma     Cancer     Left Breast     Depression     Diabetes mellitus, type 2     GERD (gastroesophageal reflux disease)     Hyperlipidemia     Hypertension     Overactive bladder     Seizures     Pseudo-seizures    Sleep apnea     Stroke     Stroke 2022    pt was in the hospital for a stroke, claims she was seeing people when the stroke happened    Thyroid disease     Urinary tract infection without hematuria 2017       Past Surgical History:   Procedure Laterality Date    APPENDECTOMY      BREAST BIOPSY Left     BREAST LUMPECTOMY Left     2016    BREAST SURGERY      CATARACT EXTRACTION Bilateral     OU done//     SECTION      CHOLECYSTECTOMY      COLONOSCOPY N/A 2020    Procedure: COLONOSCOPY;  Surgeon: Mj Fernandez MD;  Location: [facility];  Service: Endoscopy;  Laterality: N/A;    CYST REMOVAL Left 2021    DILATION AND CURETTAGE OF UTERUS      EYE SURGERY         Time Tracking:     PT Received On: 22  PT Start Time: 831     PT Stop Time: 855  PT Total Time (min): 24 min     Billable Minutes: Evaluation 15 and Therapeutic Exercise 9      2022

## 2022-11-12 NOTE — PLAN OF CARE
Plan of care reviewed with patient. Patient verbalized complete understanding. Patient c/o tenderness to the surgical site. PRN pain meds administered. Hypotension noted, MD made aware. Medications adjusted. Ambulated with PT. NSR on cardiac monitor. Pending discharge to Rehab facility Monday. All fall precautions maintained. Bed in lowest position, locked, call light within reach. Side rails up x's 2. Slip resistant socks maintained.

## 2022-11-12 NOTE — PLAN OF CARE
Nutrition Recommendations 11/12:  1. When medically appropriate, recommend diet advance to Diabetic/Cardiac diet   2. Recommend Wesley BID for wound healing   3. Recommend Boost glucose control TID to fill nutritional gaps  4.Collaboration of care with medical providers  Beverley Parra (Off-site)

## 2022-11-12 NOTE — CARE UPDATE
11/12/22 0649 11/12/22 0653   Patient Assessment/Suction   Level of Consciousness (AVPU) alert  --    Respiratory Effort Unlabored;Normal  --    Expansion/Accessory Muscles/Retractions no use of accessory muscles;no retractions  --    All Lung Fields Breath Sounds coarse  --    Rhythm/Pattern, Respiratory pattern regular;depth regular  --    PRE-TX-O2   O2 Device (Oxygen Therapy) nasal cannula  --    $ Is the patient on Low Flow Oxygen? Yes  --    Flow (L/min) 2.5  (decreased to 1 lpm)  --    SpO2 98 % 100 %   Pulse Oximetry Type Intermittent  --    $ Pulse Oximetry - Multiple Charge Pulse Oximetry - Multiple  --    Pulse 62 65   Resp 18 17   Aerosol Therapy   $ Aerosol Therapy Charges Aerosol Treatment  --    Respiratory Treatment Status (SVN) given  --    Treatment Route (SVN) mask  --    Patient Position (SVN) Pringle's  --    Signs of Intolerance (SVN) none  --    Inhaler   $ Inhaler Charges  --  MDI (Metered Dose Inahler) Treatment;Mouth rinsed post treatment   Respiratory Treatment Status (Inhaler)  --  given;mouth rinsed post treatment   Treatment Route (Inhaler)  --  mouthpiece   Patient Position (Inhaler)  --  Pringle's   Signs of Intolerance (Inhaler)  --  none   Breath Sounds Post-Respiratory Treatment   Throughout All Fields Post-Treatment  --  All Fields   Throughout All Fields Post-Treatment  --  aeration increased   Post-treatment Heart Rate (beats/min)  --  65   Post-treatment Resp Rate (breaths/min)  --  17

## 2022-11-12 NOTE — ASSESSMENT & PLAN NOTE
Diagnosed by her pulmonologist.  Proven by pulmonary function testing.  Etiology unknown.  Calculated ARISCAT score is 11, placing pt in the low-risk category (1.6% risk of post-op pulmonary complication).  As far as deciding between general anesthesia and maybe MAC with nerve block,, I will defer to anesthesiologist.  Either method would be appropriate.    11/12 - no acute post op issues, cont home bipap

## 2022-11-12 NOTE — PLAN OF CARE
Discussed SNF with pt and brother. Both in agreement. Okay with CM sending [facility] States they will decide on facility once they find out who has bed availability.       11/12/22 1643   Post-Acute Status   Post-Acute Authorization Placement   Post-Acute Placement Status Referrals Sent

## 2022-11-12 NOTE — PROGRESS NOTES
[facility] Hospital Medicine  Progress Note    Patient Name: Maggy Tapia  MRN: [id number]  Patient Class: IP- Inpatient   Admission Date: 11/10/2022  Length of Stay: 0 days  Attending Physician: Lb Mayfield MD  Primary Care Provider: Yanna Abbasi MD        Subjective:     Principal Problem:Closed traumatic nondisplaced fracture of neck of right femur        HPI:  Patient lost her balance today at home and fell, causing instant pain in her right hip.  Did not hit her head.  No loss of consciousness.  Plain films of the hip show a closed fracture of the neck of the femur.  Medical history is outlined below.  She's been in her usual state of health as of late.  She has lung problems, including COPD and a restrictive lung disease, the latter of which is felt to be either from scoliosis and/or from neuromuscular weakness.  Patient uses a NIV vent at night in AVAPS mode.      Overview/Hospital Course:  No notes on file    Interval History:  no new complaints.  Pain controlled.  To have surgery later today.    Review of Systems   Constitutional:  Negative for chills and fever.   Respiratory:  Negative for cough and shortness of breath.    Cardiovascular:  Negative for chest pain and leg swelling.   Gastrointestinal:  Negative for abdominal pain, nausea and vomiting.   Objective:     Vital Signs (Most Recent):  Temp: 97.6 °F (36.4 °C) (11/11/22 1910)  Pulse: 90 (11/11/22 1930)  Resp: 20 (11/11/22 1930)  BP: (!) 117/58 (11/11/22 1910)  SpO2: 96 % (11/11/22 1930)   Vital Signs (24h Range):  Temp:  [97.3 °F (36.3 °C)-99 °F (37.2 °C)] 97.6 °F (36.4 °C)  Pulse:  [64-91] 90  Resp:  [15-23] 20  SpO2:  [94 %-100 %] 96 %  BP: ()/(41-62) 117/58     Weight: 71.8 kg (158 lb 4.6 oz)  Body mass index is 30.91 kg/m².    Intake/Output Summary (Last 24 hours) at 11/11/2022 1951  Last data filed at 11/11/2022 1746  Gross per 24 hour   Intake 1400 ml   Output 1790 ml   Net -390 ml      Physical  Exam  Constitutional:       General: She is not in acute distress.     Appearance: She is not ill-appearing.   Eyes:      General:         Right eye: No discharge.         Left eye: No discharge.   Neck:      Vascular: No JVD.   Cardiovascular:      Rate and Rhythm: Normal rate and regular rhythm.   Pulmonary:      Effort: Pulmonary effort is normal.      Breath sounds: Normal breath sounds.   Abdominal:      General: Abdomen is flat. Bowel sounds are normal. There is no distension.      Palpations: Abdomen is soft.      Tenderness: There is no abdominal tenderness.   Musculoskeletal:      Right lower leg: No edema.      Left lower leg: No edema.   Skin:     General: Skin is warm and moist.      Findings: No rash.   Neurological:      Mental Status: She is alert and oriented to person, place, and time.   Psychiatric:         Attention and Perception: Attention normal.         Mood and Affect: Mood and affect normal.         Speech: Speech normal.       Significant Labs: All pertinent labs within the past 24 hours have been reviewed.    Significant Imaging: I have reviewed all pertinent imaging results/findings within the past 24 hours.      Assessment/Plan:      * Closed traumatic nondisplaced fracture of neck of right femur  Consulting with orthopedic surgeon.  Log roll as needed.  Order analgesics.  Continue VTE prophylaxis x 2.      Chronic obstructive pulmonary disease, unspecified COPD type  No issues.  Order aerosol treatments.  Continue her Breo.      Type 2 diabetes mellitus with diabetic polyneuropathy, without long-term current use of insulin  Will monitor FSG's and use low-dose aspart sliding scale insulin for hyperglycemia.  So far, levels are controlled.  Hold oral DM meds.    Major depressive disorder, recurrent episode, moderate  Noted.  Stable.  Monitor mood.      Chronic restrictive lung disease  Diagnosed by her pulmonologist.  Proven by pulmonary function testing.  Etiology unknown.  Calculated  ARISCAT score is 11, placing pt in the low-risk category (1.6% risk of post-op pulmonary complication).  As far as deciding between general anesthesia and maybe MAC with nerve block,, I will defer to anesthesiologist.  Either method would be appropriate.      Hypothyroidism  Stable.  Continue usual dose of Synthroid.      Hyperlipidemia  Will resume her statin when appropriate.      Essential hypertension  moniotor blood pressure.  Controlled so far.  Will continue her home bp meds as listed below.    Hypertension Medications             losartan (COZAAR) 50 MG tablet Take 0.5 tablets (25 mg total) by mouth once daily.    prazosin (MINIPRESS) 5 MG capsule Take 1 capsule (5 mg total) by mouth every evening. For nightmares              History of ischemic left MCA stroke  No acute issues.  Will hold the aspirin in anticipation of surgery today.  Will resume her statin when appropriate.        VTE Risk Mitigation (From admission, onward)         Ordered     enoxaparin injection 40 mg  Daily         11/10/22 2232     Place DARION hose  Until discontinued         11/10/22 1621     Place sequential compression device  Until discontinued         11/10/22 1621     IP VTE HIGH RISK PATIENT  Once         11/10/22 1621                Discharge Planning   LI:      Code Status: Full Code   Is the patient medically ready for discharge?:     Reason for patient still in hospital (select all that apply): Patient trending condition, Treatment and PT / OT recommendations                     Lb Mayfield MD  Department of Hospital Medicine   [facility]

## 2022-11-12 NOTE — SUBJECTIVE & OBJECTIVE
Interval History:  no new complaints.  Pain controlled.  To have surgery later today.    Review of Systems   Constitutional:  Negative for chills and fever.   Respiratory:  Negative for cough and shortness of breath.    Cardiovascular:  Negative for chest pain and leg swelling.   Gastrointestinal:  Negative for abdominal pain, nausea and vomiting.   Objective:     Vital Signs (Most Recent):  Temp: 97.6 °F (36.4 °C) (11/11/22 1910)  Pulse: 90 (11/11/22 1930)  Resp: 20 (11/11/22 1930)  BP: (!) 117/58 (11/11/22 1910)  SpO2: 96 % (11/11/22 1930)   Vital Signs (24h Range):  Temp:  [97.3 °F (36.3 °C)-99 °F (37.2 °C)] 97.6 °F (36.4 °C)  Pulse:  [64-91] 90  Resp:  [15-23] 20  SpO2:  [94 %-100 %] 96 %  BP: ()/(41-62) 117/58     Weight: 71.8 kg (158 lb 4.6 oz)  Body mass index is 30.91 kg/m².    Intake/Output Summary (Last 24 hours) at 11/11/2022 1951  Last data filed at 11/11/2022 1746  Gross per 24 hour   Intake 1400 ml   Output 1790 ml   Net -390 ml      Physical Exam  Constitutional:       General: She is not in acute distress.     Appearance: She is not ill-appearing.   Eyes:      General:         Right eye: No discharge.         Left eye: No discharge.   Neck:      Vascular: No JVD.   Cardiovascular:      Rate and Rhythm: Normal rate and regular rhythm.   Pulmonary:      Effort: Pulmonary effort is normal.      Breath sounds: Normal breath sounds.   Abdominal:      General: Abdomen is flat. Bowel sounds are normal. There is no distension.      Palpations: Abdomen is soft.      Tenderness: There is no abdominal tenderness.   Musculoskeletal:      Right lower leg: No edema.      Left lower leg: No edema.   Skin:     General: Skin is warm and moist.      Findings: No rash.   Neurological:      Mental Status: She is alert and oriented to person, place, and time.   Psychiatric:         Attention and Perception: Attention normal.         Mood and Affect: Mood and affect normal.         Speech: Speech normal.        Significant Labs: All pertinent labs within the past 24 hours have been reviewed.    Significant Imaging: I have reviewed all pertinent imaging results/findings within the past 24 hours.

## 2022-11-12 NOTE — SUBJECTIVE & OBJECTIVE
Interval History:  notes reviewed, no acute events overnight.  Patient seen sitting up on bedside commode without acute complaints.  Patient participating with PT but will require additional therapy.  Discussed with patient SNF/rehab placement for continued therapy on discharge and patient agreeable.  Case management consulted for assistance.  Patient states pain to right leg currently 6/10 intensity after working with therapy, sharp quality, improves with medication and rest.  Will continue to monitor closely and start discharge planning.    Review of Systems   Constitutional:  Negative for chills, fatigue and fever.   HENT:  Negative for sore throat and trouble swallowing.    Respiratory:  Negative for cough and shortness of breath.    Cardiovascular:  Negative for chest pain and leg swelling.   Gastrointestinal:  Negative for abdominal pain, diarrhea, nausea and vomiting.   Genitourinary:  Negative for difficulty urinating, dysuria and urgency.   Musculoskeletal:  Positive for arthralgias and gait problem. Negative for back pain.   Skin:  Negative for color change, pallor, rash and wound.   Neurological:  Negative for dizziness, weakness and light-headedness.   Hematological:  Negative for adenopathy.   Psychiatric/Behavioral:  Negative for agitation, behavioral problems and confusion.    All other systems reviewed and are negative.  Objective:     Vital Signs (Most Recent):  Temp: 97.4 °F (36.3 °C) (11/12/22 1140)  Pulse: 76 (11/12/22 1140)  Resp: 18 (11/12/22 1140)  BP: (!) 89/49 (11/12/22 1140)  SpO2: 95 % (11/12/22 1140)   Vital Signs (24h Range):  Temp:  [97 °F (36.1 °C)-98 °F (36.7 °C)] 97.4 °F (36.3 °C)  Pulse:  [62-91] 76  Resp:  [15-23] 18  SpO2:  [94 %-100 %] 95 %  BP: ()/(46-58) 89/49     Weight: 71.8 kg (158 lb 4.6 oz)  Body mass index is 30.91 kg/m².    Intake/Output Summary (Last 24 hours) at 11/12/2022 1146  Last data filed at 11/12/2022 0533  Gross per 24 hour   Intake 1280 ml   Output 1765  ml   Net -485 ml        Physical Exam  Vitals and nursing note reviewed.   Constitutional:       General: She is not in acute distress.     Appearance: Normal appearance. She is well-developed. She is not ill-appearing or diaphoretic.   HENT:      Head: Normocephalic and atraumatic.      Right Ear: External ear normal.      Left Ear: External ear normal.      Nose: Nose normal. No congestion or rhinorrhea.      Mouth/Throat:      Mouth: Mucous membranes are moist.      Pharynx: Oropharynx is clear. No oropharyngeal exudate or posterior oropharyngeal erythema.   Eyes:      General: No scleral icterus.        Right eye: No discharge.         Left eye: No discharge.      Conjunctiva/sclera: Conjunctivae normal.      Pupils: Pupils are equal, round, and reactive to light.   Neck:      Vascular: No JVD.   Cardiovascular:      Rate and Rhythm: Normal rate and regular rhythm.      Pulses: Normal pulses.      Heart sounds: Normal heart sounds. No murmur heard.  Pulmonary:      Effort: Pulmonary effort is normal. No respiratory distress.      Breath sounds: Normal breath sounds. No stridor. No wheezing, rhonchi or rales.   Abdominal:      General: Abdomen is flat. Bowel sounds are normal. There is no distension.      Palpations: Abdomen is soft.      Tenderness: There is no abdominal tenderness.   Musculoskeletal:         General: No swelling or tenderness. Normal range of motion.      Cervical back: Normal range of motion and neck supple.      Right lower leg: No edema.      Left lower leg: No edema.   Skin:     General: Skin is warm and moist.      Capillary Refill: Capillary refill takes 2 to 3 seconds.      Coloration: Skin is not jaundiced or pale.      Findings: No erythema or rash.      Comments: Surgical incision covered with drsg CDI   Neurological:      General: No focal deficit present.      Mental Status: She is alert and oriented to person, place, and time.      Cranial Nerves: No cranial nerve deficit.       Sensory: No sensory deficit.   Psychiatric:         Attention and Perception: Attention normal.         Mood and Affect: Mood and affect normal.         Speech: Speech normal.         Behavior: Behavior normal.         Thought Content: Thought content normal.       Significant Labs: All pertinent labs within the past 24 hours have been reviewed.  CBC:   Recent Labs   Lab 11/10/22  1617 11/11/22  0533 11/12/22  0427   WBC 11.88 11.07 11.27   HGB 15.5 14.6 13.3   HCT 47.5 45.0 41.5   * 120* 102*       Significant Imaging: I have reviewed all pertinent imaging results/findings within the past 24 hours.

## 2022-11-12 NOTE — ANESTHESIA POSTPROCEDURE EVALUATION
Anesthesia Post Evaluation    Patient: Maggy Tapia    Procedure(s) Performed: Procedure(s) (LRB):  PINNING, HIP, PERCUTANEOUS (Right)    Final Anesthesia Type: general      Patient location during evaluation: PACU  Patient participation: Yes- Able to Participate  Level of consciousness: awake and alert  Post-procedure vital signs: reviewed and stable  Pain management: adequate  Airway patency: patent    PONV status at discharge: No PONV  Anesthetic complications: no      Cardiovascular status: hemodynamically stable  Respiratory status: unassisted and room air  Hydration status: euvolemic  Follow-up not needed.          Vitals Value Taken Time   BP 97/51 11/11/22 1550   Temp 36.6 °C (97.9 °F) 11/11/22 1550   Pulse 84 11/11/22 1550   Resp 16 11/11/22 1550   SpO2 95 % 11/11/22 1550         Event Time   Out of Recovery 11/11/2022 15:15:00         Pain/Geraldo Score: Pain Rating Prior to Med Admin: 4 (11/11/2022  3:10 PM)  Pain Rating Post Med Admin: 0 (11/11/2022  3:41 PM)  Geraldo Score: 8 (11/11/2022  3:10 PM)

## 2022-11-12 NOTE — ASSESSMENT & PLAN NOTE
Chronic, controlled.  Latest blood pressure and vitals reviewed-   Temp:  [97 °F (36.1 °C)-98 °F (36.7 °C)]   Pulse:  [62-91]   Resp:  [15-23]   BP: ()/(46-58)   SpO2:  [94 %-100 %] .   Home meds for hypertension were reviewed and noted below.   Hypertension Medications             losartan (COZAAR) 50 MG tablet Take 0.5 tablets (25 mg total) by mouth once daily.    prazosin (MINIPRESS) 5 MG capsule Take 1 capsule (5 mg total) by mouth every evening. For nightmares          While in the hospital, will manage blood pressure as follows; Adjust home antihypertensive regimen as follows- holding bp meds secondary to soft blood pressures, will monitor closely and resume when appropriate    Will utilize p.r.n. blood pressure medication only if patient's blood pressure greater than  180/110 and she develops symptoms such as worsening chest pain or shortness of breath.

## 2022-11-12 NOTE — ASSESSMENT & PLAN NOTE
POD 1 s/p right hip ORIF with Dr. Joiner  Continue to follow Orthopedic recommendations.  Needs aggressive incentive spirometry.  Follow hemoglobin and hematocrit closely.  Pain control with oral narcotics and antiemetics as needed.  Physical therapy as per Orthopedics protocol with fall precautions.  ASA 81 mg PO BID for DVT prophylaxis per orthopedic recommendations.

## 2022-11-12 NOTE — ANESTHESIA PREPROCEDURE EVALUATION
11/11/2022  Maggy Tapia is a 72 y.o., female.      Pre-op Assessment    I have reviewed the Patient Summary Reports.     I have reviewed the Nursing Notes. I have reviewed the NPO Status.   I have reviewed the Medications.     Review of Systems  Anesthesia Hx:  No problems with previous Anesthesia    Social:  Non-Smoker    Hematology/Oncology:         -- Cancer in past history: Breast left   Cardiovascular:   Hypertension hyperlipidemia    Pulmonary:   COPD Asthma Shortness of breath Sleep Apnea    Hepatic/GI:   GERD    Neurological:   CVA Neuromuscular Disease, Seizures    Endocrine:   Diabetes Hypothyroidism  Obesity / BMI > 30  Psych:   Psychiatric History depression          Physical Exam  General: Cooperative, Alert, Oriented and Well nourished    Airway:  Mallampati: II   Mouth Opening: Normal  TM Distance: Normal  Neck ROM: Normal ROM    Dental:  Dentures        Anesthesia Plan  Type of Anesthesia, risks & benefits discussed:    Anesthesia Type: Gen ETT  Intra-op Monitoring Plan: Standard ASA Monitors  Post Op Pain Control Plan: multimodal analgesia and IV/PO Opioids PRN  Induction:  IV  Informed Consent: Informed consent signed with the Patient and all parties understand the risks and agree with anesthesia plan.  All questions answered.   ASA Score: 4  Day of Surgery Review of History & Physical: H&P Update referred to the surgeon/provider.    Ready For Surgery From Anesthesia Perspective.     .

## 2022-11-12 NOTE — ASSESSMENT & PLAN NOTE
[facility] blood pressure.  Controlled so far.  Will continue her home bp meds as listed below.    Hypertension Medications             losartan (COZAAR) 50 MG tablet Take 0.5 tablets (25 mg total) by mouth once daily.    prazosin (MINIPRESS) 5 MG capsule Take 1 capsule (5 mg total) by mouth every evening. For nightmares

## 2022-11-12 NOTE — ASSESSMENT & PLAN NOTE
Patient's FSGs are controlled on current medication regimen.  Last A1c reviewed-   Lab Results   Component Value Date    HGBA1C 5.1 08/15/2022     Most recent fingerstick glucose reviewed-   Recent Labs   Lab 11/11/22  1720 11/11/22  2108 11/12/22  1127   POCTGLUCOSE 139* 170* 188*     Current correctional scale  Low  Maintain anti-hyperglycemic dose as follows-   Antihyperglycemics (From admission, onward)    None        Hold Oral hypoglycemics while patient is in the hospital.

## 2022-11-12 NOTE — ASSESSMENT & PLAN NOTE
Will monitor FSG's and use low-dose aspart sliding scale insulin for hyperglycemia.  So far, levels are controlled.  Hold oral DM meds.

## 2022-11-12 NOTE — PROGRESS NOTES
[facility] Hospital Medicine  Progress Note    Patient Name: Maggy Tapia  MRN: [id number]  Patient Class: IP- Inpatient   Admission Date: 11/10/2022  Length of Stay: 1 days  Attending Physician: Gely Tejada MD  Primary Care Provider: Yanna Abbasi MD        Subjective:     Principal Problem:Closed traumatic nondisplaced fracture of neck of right femur        HPI:  Patient lost her balance today at home and fell, causing instant pain in her right hip.  Did not hit her head.  No loss of consciousness.  Plain films of the hip show a closed fracture of the neck of the femur.  Medical history is outlined below.  She's been in her usual state of health as of late.  She has lung problems, including COPD and a restrictive lung disease, the latter of which is felt to be either from scoliosis and/or from neuromuscular weakness.  Patient uses a NIV vent at night in AVAPS mode.      Overview/Hospital Course:  No notes on file    Interval History:  notes reviewed, no acute events overnight.  Patient seen sitting up on bedside commode without acute complaints.  Patient participating with PT but will require additional therapy.  Discussed with patient SNF/rehab placement for continued therapy on discharge and patient agreeable.  Case management consulted for assistance.  Patient states pain to right leg currently 6/10 intensity after working with therapy, sharp quality, improves with medication and rest.  Will continue to monitor closely and start discharge planning.    Review of Systems   Constitutional:  Negative for chills, fatigue and fever.   HENT:  Negative for sore throat and trouble swallowing.    Respiratory:  Negative for cough and shortness of breath.    Cardiovascular:  Negative for chest pain and leg swelling.   Gastrointestinal:  Negative for abdominal pain, diarrhea, nausea and vomiting.   Genitourinary:  Negative for difficulty urinating, dysuria and urgency.   Musculoskeletal:   Positive for arthralgias and gait problem. Negative for back pain.   Skin:  Negative for color change, pallor, rash and wound.   Neurological:  Negative for dizziness, weakness and light-headedness.   Hematological:  Negative for adenopathy.   Psychiatric/Behavioral:  Negative for agitation, behavioral problems and confusion.    All other systems reviewed and are negative.  Objective:     Vital Signs (Most Recent):  Temp: 97.4 °F (36.3 °C) (11/12/22 1140)  Pulse: 76 (11/12/22 1140)  Resp: 18 (11/12/22 1140)  BP: (!) 89/49 (11/12/22 1140)  SpO2: 95 % (11/12/22 1140)   Vital Signs (24h Range):  Temp:  [97 °F (36.1 °C)-98 °F (36.7 °C)] 97.4 °F (36.3 °C)  Pulse:  [62-91] 76  Resp:  [15-23] 18  SpO2:  [94 %-100 %] 95 %  BP: ()/(46-58) 89/49     Weight: 71.8 kg (158 lb 4.6 oz)  Body mass index is 30.91 kg/m².    Intake/Output Summary (Last 24 hours) at 11/12/2022 1146  Last data filed at 11/12/2022 0533  Gross per 24 hour   Intake 1280 ml   Output 1765 ml   Net -485 ml        Physical Exam  Vitals and nursing note reviewed.   Constitutional:       General: She is not in acute distress.     Appearance: Normal appearance. She is well-developed. She is not ill-appearing or diaphoretic.   HENT:      Head: Normocephalic and atraumatic.      Right Ear: External ear normal.      Left Ear: External ear normal.      Nose: Nose normal. No congestion or rhinorrhea.      Mouth/Throat:      Mouth: Mucous membranes are moist.      Pharynx: Oropharynx is clear. No oropharyngeal exudate or posterior oropharyngeal erythema.   Eyes:      General: No scleral icterus.        Right eye: No discharge.         Left eye: No discharge.      Conjunctiva/sclera: Conjunctivae normal.      Pupils: Pupils are equal, round, and reactive to light.   Neck:      Vascular: No JVD.   Cardiovascular:      Rate and Rhythm: Normal rate and regular rhythm.      Pulses: Normal pulses.      Heart sounds: Normal heart sounds. No murmur heard.  Pulmonary:       Effort: Pulmonary effort is normal. No respiratory distress.      Breath sounds: Normal breath sounds. No stridor. No wheezing, rhonchi or rales.   Abdominal:      General: Abdomen is flat. Bowel sounds are normal. There is no distension.      Palpations: Abdomen is soft.      Tenderness: There is no abdominal tenderness.   Musculoskeletal:         General: No swelling or tenderness. Normal range of motion.      Cervical back: Normal range of motion and neck supple.      Right lower leg: No edema.      Left lower leg: No edema.   Skin:     General: Skin is warm and moist.      Capillary Refill: Capillary refill takes 2 to 3 seconds.      Coloration: Skin is not jaundiced or pale.      Findings: No erythema or rash.      Comments: Surgical incision covered with drsg CDI   Neurological:      General: No focal deficit present.      Mental Status: She is alert and oriented to person, place, and time.      Cranial Nerves: No cranial nerve deficit.      Sensory: No sensory deficit.   Psychiatric:         Attention and Perception: Attention normal.         Mood and Affect: Mood and affect normal.         Speech: Speech normal.         Behavior: Behavior normal.         Thought Content: Thought content normal.       Significant Labs: All pertinent labs within the past 24 hours have been reviewed.  CBC:   Recent Labs   Lab 11/10/22  1617 11/11/22  0533 11/12/22  0427   WBC 11.88 11.07 11.27   HGB 15.5 14.6 13.3   HCT 47.5 45.0 41.5   * 120* 102*       Significant Imaging: I have reviewed all pertinent imaging results/findings within the past 24 hours.      Assessment/Plan:      * Closed traumatic nondisplaced fracture of neck of right femur  POD 1 s/p right hip ORIF with Dr. Joiner  Continue to follow Orthopedic recommendations.  Needs aggressive incentive spirometry.  Follow hemoglobin and hematocrit closely.  Pain control with oral narcotics and antiemetics as needed.  Physical therapy as per Orthopedics protocol  with fall precautions.  ASA 81 mg PO BID for DVT prophylaxis per orthopedic recommendations.      Chronic obstructive pulmonary disease, unspecified COPD type  stable  Neb tx prn  Continue Breo      Type 2 diabetes mellitus with diabetic polyneuropathy, without long-term current use of insulin  Patient's FSGs are controlled on current medication regimen.  Last A1c reviewed-   Lab Results   Component Value Date    HGBA1C 5.1 08/15/2022     Most recent fingerstick glucose reviewed-   Recent Labs   Lab 11/11/22  1720 11/11/22  2108 11/12/22  1127   POCTGLUCOSE 139* 170* 188*     Current correctional scale  Low  Maintain anti-hyperglycemic dose as follows-   Antihyperglycemics (From admission, onward)    None        Hold Oral hypoglycemics while patient is in the hospital.      Major depressive disorder, recurrent episode, moderate  Noted.  Stable.  Monitor mood.      Chronic restrictive lung disease  Diagnosed by her pulmonologist.  Proven by pulmonary function testing.  Etiology unknown.  Calculated ARISCAT score is 11, placing pt in the low-risk category (1.6% risk of post-op pulmonary complication).  As far as deciding between general anesthesia and maybe MAC with nerve block,, I will defer to anesthesiologist.  Either method would be appropriate.    11/12 - no acute post op issues, cont home bipap      Hypothyroidism  Stable.  Continue usual dose of Synthroid.      Hyperlipidemia  Chronic, stable  Cont home statin      Essential hypertension  Chronic, controlled.  Latest blood pressure and vitals reviewed-   Temp:  [97 °F (36.1 °C)-98 °F (36.7 °C)]   Pulse:  [62-91]   Resp:  [15-23]   BP: ()/(46-58)   SpO2:  [94 %-100 %] .   Home meds for hypertension were reviewed and noted below.   Hypertension Medications             losartan (COZAAR) 50 MG tablet Take 0.5 tablets (25 mg total) by mouth once daily.    prazosin (MINIPRESS) 5 MG capsule Take 1 capsule (5 mg total) by mouth every evening. For nightmares           While in the hospital, will manage blood pressure as follows; Adjust home antihypertensive regimen as follows- holding bp meds secondary to soft blood pressures, will monitor closely and resume when appropriate    Will utilize p.r.n. blood pressure medication only if patient's blood pressure greater than  180/110 and she develops symptoms such as worsening chest pain or shortness of breath.          History of ischemic left MCA stroke  No acute issues.  Cont asa and statin        VTE Risk Mitigation (From admission, onward)         Ordered     Place DARION hose  Until discontinued         11/10/22 1621     Place sequential compression device  Until discontinued         11/10/22 1621     IP VTE HIGH RISK PATIENT  Once         11/10/22 1621                Discharge Planning   LI:      Code Status: Full Code   Is the patient medically ready for discharge?:     Reason for patient still in hospital (select all that apply): Patient trending condition, Treatment and Pending disposition                     Aracelis Orr NP  Department of Hospital Medicine   [facility]

## 2022-11-13 LAB
ALBUMIN SERPL BCP-MCNC: 2.7 G/DL (ref 3.5–5.2)
ALP SERPL-CCNC: 77 U/L (ref 55–135)
ALT SERPL W/O P-5'-P-CCNC: 27 U/L (ref 10–44)
ANION GAP SERPL CALC-SCNC: 11 MMOL/L (ref 8–16)
AST SERPL-CCNC: 21 U/L (ref 10–40)
BASOPHILS # BLD AUTO: 0.02 K/UL (ref 0–0.2)
BASOPHILS NFR BLD: 0.2 % (ref 0–1.9)
BILIRUB SERPL-MCNC: 0.3 MG/DL (ref 0.1–1)
BUN SERPL-MCNC: 22 MG/DL (ref 8–23)
CALCIUM SERPL-MCNC: 8.8 MG/DL (ref 8.7–10.5)
CHLORIDE SERPL-SCNC: 103 MMOL/L (ref 95–110)
CO2 SERPL-SCNC: 27 MMOL/L (ref 23–29)
CREAT SERPL-MCNC: 1 MG/DL (ref 0.5–1.4)
DIFFERENTIAL METHOD: ABNORMAL
EOSINOPHIL # BLD AUTO: 0.3 K/UL (ref 0–0.5)
EOSINOPHIL NFR BLD: 2.6 % (ref 0–8)
ERYTHROCYTE [DISTWIDTH] IN BLOOD BY AUTOMATED COUNT: 14.1 % (ref 11.5–14.5)
EST. GFR  (NO RACE VARIABLE): 59 ML/MIN/1.73 M^2
GLUCOSE SERPL-MCNC: 112 MG/DL (ref 70–110)
HCT VFR BLD AUTO: 41.6 % (ref 37–48.5)
HGB BLD-MCNC: 13.3 G/DL (ref 12–16)
IMM GRANULOCYTES # BLD AUTO: 0.04 K/UL (ref 0–0.04)
IMM GRANULOCYTES NFR BLD AUTO: 0.4 % (ref 0–0.5)
LYMPHOCYTES # BLD AUTO: 1.3 K/UL (ref 1–4.8)
LYMPHOCYTES NFR BLD: 13.2 % (ref 18–48)
MCH RBC QN AUTO: 30.9 PG (ref 27–31)
MCHC RBC AUTO-ENTMCNC: 32 G/DL (ref 32–36)
MCV RBC AUTO: 97 FL (ref 82–98)
MONOCYTES # BLD AUTO: 0.9 K/UL (ref 0.3–1)
MONOCYTES NFR BLD: 8.8 % (ref 4–15)
NEUTROPHILS # BLD AUTO: 7.5 K/UL (ref 1.8–7.7)
NEUTROPHILS NFR BLD: 74.8 % (ref 38–73)
NRBC BLD-RTO: 0 /100 WBC
PLATELET # BLD AUTO: 93 K/UL (ref 150–450)
PMV BLD AUTO: 10.9 FL (ref 9.2–12.9)
POTASSIUM SERPL-SCNC: 4.2 MMOL/L (ref 3.5–5.1)
PROT SERPL-MCNC: 6.2 G/DL (ref 6–8.4)
RBC # BLD AUTO: 4.31 M/UL (ref 4–5.4)
SODIUM SERPL-SCNC: 141 MMOL/L (ref 136–145)
WBC # BLD AUTO: 9.98 K/UL (ref 3.9–12.7)

## 2022-11-13 PROCEDURE — 25000003 PHARM REV CODE 250: Performed by: NURSE PRACTITIONER

## 2022-11-13 PROCEDURE — 85025 COMPLETE CBC W/AUTO DIFF WBC: CPT | Performed by: ORTHOPAEDIC SURGERY

## 2022-11-13 PROCEDURE — 97530 THERAPEUTIC ACTIVITIES: CPT

## 2022-11-13 PROCEDURE — 94640 AIRWAY INHALATION TREATMENT: CPT

## 2022-11-13 PROCEDURE — 99900035 HC TECH TIME PER 15 MIN (STAT)

## 2022-11-13 PROCEDURE — 80053 COMPREHEN METABOLIC PANEL: CPT | Performed by: NURSE PRACTITIONER

## 2022-11-13 PROCEDURE — 36415 COLL VENOUS BLD VENIPUNCTURE: CPT | Performed by: ORTHOPAEDIC SURGERY

## 2022-11-13 PROCEDURE — 94761 N-INVAS EAR/PLS OXIMETRY MLT: CPT

## 2022-11-13 PROCEDURE — 12000002 HC ACUTE/MED SURGE SEMI-PRIVATE ROOM

## 2022-11-13 PROCEDURE — 97165 OT EVAL LOW COMPLEX 30 MIN: CPT

## 2022-11-13 PROCEDURE — 25000242 PHARM REV CODE 250 ALT 637 W/ HCPCS: Performed by: ORTHOPAEDIC SURGERY

## 2022-11-13 PROCEDURE — 94799 UNLISTED PULMONARY SVC/PX: CPT

## 2022-11-13 PROCEDURE — 27000221 HC OXYGEN, UP TO 24 HOURS

## 2022-11-13 PROCEDURE — 25000003 PHARM REV CODE 250: Performed by: ORTHOPAEDIC SURGERY

## 2022-11-13 RX ORDER — HYDROCODONE BITARTRATE AND ACETAMINOPHEN 10; 325 MG/1; MG/1
1 TABLET ORAL EVERY 4 HOURS PRN
Status: DISCONTINUED | OUTPATIENT
Start: 2022-11-13 | End: 2022-11-16 | Stop reason: HOSPADM

## 2022-11-13 RX ADMIN — HYDROCODONE BITARTRATE AND ACETAMINOPHEN 1 TABLET: 10; 325 TABLET ORAL at 09:11

## 2022-11-13 RX ADMIN — ASPIRIN 81 MG CHEWABLE TABLET 81 MG: 81 TABLET CHEWABLE at 09:11

## 2022-11-13 RX ADMIN — IPRATROPIUM BROMIDE AND ALBUTEROL SULFATE 3 ML: 2.5; .5 SOLUTION RESPIRATORY (INHALATION) at 12:11

## 2022-11-13 RX ADMIN — PRAZOSIN HYDROCHLORIDE 5 MG: 1 CAPSULE ORAL at 09:11

## 2022-11-13 RX ADMIN — GABAPENTIN 300 MG: 300 CAPSULE ORAL at 08:11

## 2022-11-13 RX ADMIN — GABAPENTIN 300 MG: 300 CAPSULE ORAL at 03:11

## 2022-11-13 RX ADMIN — HYDROCODONE BITARTRATE AND ACETAMINOPHEN 1 TABLET: 5; 325 TABLET ORAL at 05:11

## 2022-11-13 RX ADMIN — FLUTICASONE FUROATE AND VILANTEROL TRIFENATATE 1 PUFF: 100; 25 POWDER RESPIRATORY (INHALATION) at 07:11

## 2022-11-13 RX ADMIN — ATORVASTATIN CALCIUM 20 MG: 20 TABLET, FILM COATED ORAL at 08:11

## 2022-11-13 RX ADMIN — IPRATROPIUM BROMIDE AND ALBUTEROL SULFATE 3 ML: 2.5; .5 SOLUTION RESPIRATORY (INHALATION) at 07:11

## 2022-11-13 RX ADMIN — SERTRALINE HYDROCHLORIDE 50 MG: 50 TABLET ORAL at 08:11

## 2022-11-13 RX ADMIN — LEVOTHYROXINE SODIUM 100 MCG: 50 TABLET ORAL at 05:11

## 2022-11-13 RX ADMIN — HYDROCODONE BITARTRATE AND ACETAMINOPHEN 1 TABLET: 10; 325 TABLET ORAL at 12:11

## 2022-11-13 RX ADMIN — ASPIRIN 81 MG CHEWABLE TABLET 81 MG: 81 TABLET CHEWABLE at 08:11

## 2022-11-13 RX ADMIN — HYDROCODONE BITARTRATE AND ACETAMINOPHEN 1 TABLET: 5; 325 TABLET ORAL at 09:11

## 2022-11-13 RX ADMIN — GABAPENTIN 300 MG: 300 CAPSULE ORAL at 09:11

## 2022-11-13 NOTE — PLAN OF CARE
Goals to be met by: 11/27/2022     Patient will increase functional independence with ADLs by performing:    LE Dressing with Minimal Assistance with use of AD/AE as needed.  Grooming while standing with Contact Guard Assistance with seated rest breaks as needed.  Toileting from toilet with Moderate Assistance for hygiene and clothing management.   Toilet transfer to toilet with Stand-by Assistance with use of AD.    OT POC initiated and established.

## 2022-11-13 NOTE — PLAN OF CARE
POC discussed with patient, verbalized understanding. Patient with uneventful night, slept off and on between care. VS stable. Surgical dressing R hip CDI. NV wnl. Wore CPAP most of night, otherwise 02 in use. NSR. Pure wick draining clear yellow urine. Call light at bedside. IS encouraged.

## 2022-11-13 NOTE — ASSESSMENT & PLAN NOTE
POD 2 s/p right hip ORIF with Dr. Joiner  Continue to follow Orthopedic recommendations.  Needs aggressive incentive spirometry.  Follow hemoglobin and hematocrit closely.  Pain control with oral narcotics and antiemetics as needed - adjusting oral meds today for better pain control, increasing norco to 10mg   Physical therapy as per Orthopedics protocol with fall precautions.  ASA 81 mg PO BID for DVT prophylaxis per orthopedic recommendations.

## 2022-11-13 NOTE — CARE UPDATE
RT and PCT went into room and found pt on home bipap machine satting 86%, RT bled in 4L into bipap machine and sats came up to 98%.     11/12/22 1925 11/12/22 1927   Patient Assessment/Suction   Level of Consciousness (AVPU) alert alert   Respiratory Effort Normal;Unlabored  --    Expansion/Accessory Muscles/Retractions no use of accessory muscles  --    Rhythm/Pattern, Respiratory tachypneic;no shortness of breath reported  --    PRE-TX-O2   O2 Device (Oxygen Therapy) BiPAP  (pt home unit) BiPAP  (home unit)   $ Is the patient on Low Flow Oxygen?  --  Yes   Flow (L/min)  --  4  (4L bled into bipap)   SpO2 (!) 86 % 98 %   Pulse Oximetry Type Continuous Continuous   $ Pulse Oximetry - Multiple Charge Pulse Oximetry - Multiple Pulse Oximetry - Multiple   Pulse 81 83   Resp (!) 24 (!) 22   Aerosol Therapy   $ Aerosol Therapy Charges  --  PRN treatment not required   Respiratory Treatment Status (SVN)  --  PRN treatment not required   Incentive Spirometer   $ Incentive Spirometer Charges  --  unable to perform  (on bipap)

## 2022-11-13 NOTE — CARE UPDATE
11/13/22 0737 11/13/22 0743   Patient Assessment/Suction   Level of Consciousness (AVPU) alert  --    Respiratory Effort Unlabored;Normal  --    Expansion/Accessory Muscles/Retractions no use of accessory muscles;no retractions  --    All Lung Fields Breath Sounds equal bilaterally  --    Rhythm/Pattern, Respiratory assisted mechanically  --    Skin Integrity   $ Wound Care Tech Time 15 min  --    Area Observed Bridge of nose  --    Skin Appearance without discoloration  --    Barrier used? Liquid Filled Cushion  --    PRE-TX-O2   O2 Device (Oxygen Therapy) BiPAP  (home unit) nasal cannula   Flow (L/min) 4  --    SpO2 95 % 99 %   Pulse Oximetry Type Intermittent  --    $ Pulse Oximetry - Multiple Charge Pulse Oximetry - Multiple  --    Pulse 73 71   Resp 20 18   Aerosol Therapy   $ Aerosol Therapy Charges Aerosol Treatment  --    Respiratory Treatment Status (SVN) given  --    Treatment Route (SVN) mask  --    Patient Position (SVN) Pringle's  --    Signs of Intolerance (SVN) none  --    Inhaler   $ Inhaler Charges  --  MDI (Metered Dose Inahler) Treatment;Mouth rinsed post treatment   Respiratory Treatment Status (Inhaler)  --  given   Treatment Route (Inhaler)  --  mouthpiece   Patient Position (Inhaler)  --  Pringle's   Signs of Intolerance (Inhaler)  --  none   Breath Sounds Post-Respiratory Treatment   Throughout All Fields Post-Treatment  --  All Fields   Throughout All Fields Post-Treatment  --  aeration increased   Post-treatment Heart Rate (beats/min)  --  72   Post-treatment Resp Rate (breaths/min)  --  18   Incentive Spirometer   $ Incentive Spirometer Charges  --  done with encouragement   Incentive Spirometer Predicted Level (mL)  --  1000   Administration (IS)  --  proper technique demonstrated   Number of Repetitions (IS)  --  10   Level Incentive Spirometer (mL)  --  750   Patient Tolerance (IS)  --  good   Preset CPAP/BiPAP Settings   Mode Of Delivery   (home unit)  --    EPAP (cm H2O) 5  --     AVAPS Min P (cm H2O) 10  --    AVAPS Max P (cm H2O) 20  --    Set Tidal Volume (mL) 350 mL  --    Set Rate (Breaths/Min) 15  --

## 2022-11-13 NOTE — PROGRESS NOTES
[facility] Hospital Medicine  Progress Note    Patient Name: Maggy Tapia  MRN: [id number]  Patient Class: IP- Inpatient   Admission Date: 11/10/2022  Length of Stay: 2 days  Attending Physician: Gely Tejada MD  Primary Care Provider: Yanna Abbasi MD        Subjective:     Principal Problem:Closed traumatic nondisplaced fracture of neck of right femur        HPI:  Patient lost her balance today at home and fell, causing instant pain in her right hip.  Did not hit her head.  No loss of consciousness.  Plain films of the hip show a closed fracture of the neck of the femur.  Medical history is outlined below.  She's been in her usual state of health as of late.  She has lung problems, including COPD and a restrictive lung disease, the latter of which is felt to be either from scoliosis and/or from neuromuscular weakness.  Patient uses a NIV vent at night in AVAPS mode.      Overview/Hospital Course:  No notes on file    Interval History:  notes reviewed, no acute events overnight.  Patient resting comfortably in bed.  She states her pain to her right hip is not well controlled with current medication.  Advised patient will increase dosing for adequate pain control.  Patient verbalized understanding and appreciative of care.  Patient participating well with therapy with slow progression.  Awaiting SNF/rehab referral and acceptance.  Patient agreeable for placement and she is medically cleared pending acceptance.    Review of Systems   Constitutional:  Negative for chills, fatigue and fever.   HENT:  Negative for sore throat and trouble swallowing.    Respiratory:  Negative for cough and shortness of breath.    Cardiovascular:  Negative for chest pain and leg swelling.   Gastrointestinal:  Negative for abdominal pain, diarrhea, nausea and vomiting.   Genitourinary:  Negative for difficulty urinating, dysuria and urgency.   Musculoskeletal:  Positive for arthralgias and gait problem. Negative  for back pain.   Skin:  Negative for color change, pallor, rash and wound.   Neurological:  Negative for dizziness, weakness and light-headedness.   Hematological:  Negative for adenopathy.   Psychiatric/Behavioral:  Negative for agitation, behavioral problems and confusion.    All other systems reviewed and are negative.  Objective:     Vital Signs (Most Recent):  Temp: 97.2 °F (36.2 °C) (11/13/22 0712)  Pulse: 71 (11/13/22 0743)  Resp: 16 (11/13/22 0916)  BP: (!) 122/56 (11/13/22 0712)  SpO2: 99 % (11/13/22 0743)   Vital Signs (24h Range):  Temp:  [96.5 °F (35.8 °C)-97.8 °F (36.6 °C)] 97.2 °F (36.2 °C)  Pulse:  [71-85] 71  Resp:  [16-24] 16  SpO2:  [86 %-100 %] 99 %  BP: ()/(49-57) 122/56     Weight: 71.8 kg (158 lb 4.6 oz)  Body mass index is 30.91 kg/m².    Intake/Output Summary (Last 24 hours) at 11/13/2022 1017  Last data filed at 11/13/2022 0547  Gross per 24 hour   Intake 120 ml   Output 200 ml   Net -80 ml        Physical Exam  Vitals and nursing note reviewed.   Constitutional:       General: She is not in acute distress.     Appearance: Normal appearance. She is well-developed. She is not ill-appearing or diaphoretic.   HENT:      Head: Normocephalic and atraumatic.      Right Ear: External ear normal.      Left Ear: External ear normal.      Nose: Nose normal. No congestion or rhinorrhea.      Mouth/Throat:      Mouth: Mucous membranes are moist.      Pharynx: Oropharynx is clear. No oropharyngeal exudate or posterior oropharyngeal erythema.   Eyes:      General: No scleral icterus.        Right eye: No discharge.         Left eye: No discharge.      Conjunctiva/sclera: Conjunctivae normal.      Pupils: Pupils are equal, round, and reactive to light.   Neck:      Vascular: No JVD.   Cardiovascular:      Rate and Rhythm: Normal rate and regular rhythm.      Pulses: Normal pulses.      Heart sounds: Normal heart sounds. No murmur heard.  Pulmonary:      Effort: Pulmonary effort is normal. No  respiratory distress.      Breath sounds: Normal breath sounds. No stridor. No wheezing, rhonchi or rales.   Abdominal:      General: Abdomen is flat. Bowel sounds are normal. There is no distension.      Palpations: Abdomen is soft.      Tenderness: There is no abdominal tenderness.   Musculoskeletal:         General: No swelling or tenderness. Normal range of motion.      Cervical back: Normal range of motion and neck supple.      Right lower leg: No edema.      Left lower leg: No edema.   Skin:     General: Skin is warm and moist.      Capillary Refill: Capillary refill takes 2 to 3 seconds.      Coloration: Skin is not jaundiced or pale.      Findings: No erythema or rash.      Comments: Surgical incision covered with drsg CDI   Neurological:      General: No focal deficit present.      Mental Status: She is alert and oriented to person, place, and time.      Cranial Nerves: No cranial nerve deficit.      Sensory: No sensory deficit.   Psychiatric:         Attention and Perception: Attention normal.         Mood and Affect: Mood and affect normal.         Speech: Speech normal.         Behavior: Behavior normal.         Thought Content: Thought content normal.       Significant Labs: All pertinent labs within the past 24 hours have been reviewed.  CBC:   Recent Labs   Lab 11/12/22  0427 11/13/22  0457   WBC 11.27 9.98   HGB 13.3 13.3   HCT 41.5 41.6   * 93*         Significant Imaging: I have reviewed all pertinent imaging results/findings within the past 24 hours.      Assessment/Plan:      * Closed traumatic nondisplaced fracture of neck of right femur  POD 2 s/p right hip ORIF with Dr. Joiner  Continue to follow Orthopedic recommendations.  Needs aggressive incentive spirometry.  Follow hemoglobin and hematocrit closely.  Pain control with oral narcotics and antiemetics as needed - adjusting oral meds today for better pain control, increasing norco to 10mg   Physical therapy as per Orthopedics protocol  with fall precautions.  ASA 81 mg PO BID for DVT prophylaxis per orthopedic recommendations.      Chronic obstructive pulmonary disease, unspecified COPD type  stable  Neb tx prn  Continue Breo      Type 2 diabetes mellitus with diabetic polyneuropathy, without long-term current use of insulin  Patient's FSGs are controlled on current medication regimen.  Last A1c reviewed-   Lab Results   Component Value Date    HGBA1C 5.1 08/15/2022     Most recent fingerstick glucose reviewed-   Recent Labs   Lab 11/12/22  1127 11/12/22  1637   POCTGLUCOSE 188* 157*     Current correctional scale  Low  Maintain anti-hyperglycemic dose as follows-   Antihyperglycemics (From admission, onward)    None        Hold Oral hypoglycemics while patient is in the hospital.      Major depressive disorder, recurrent episode, moderate  Noted.  Stable.  Monitor mood.      Chronic restrictive lung disease  Diagnosed by her pulmonologist.  Proven by pulmonary function testing.  Etiology unknown.  Calculated ARISCAT score is 11, placing pt in the low-risk category (1.6% risk of post-op pulmonary complication).  As far as deciding between general anesthesia and maybe MAC with nerve block,, I will defer to anesthesiologist.  Either method would be appropriate.    11/12 - no acute post op issues, cont home bipap      Hypothyroidism  Stable.  Continue usual dose of Synthroid.      Hyperlipidemia  Chronic, stable  Cont home statin      Essential hypertension  Chronic, controlled.  Latest blood pressure and vitals reviewed-   Temp:  [97 °F (36.1 °C)-98 °F (36.7 °C)]   Pulse:  [62-91]   Resp:  [15-23]   BP: ()/(46-58)   SpO2:  [94 %-100 %] .   Home meds for hypertension were reviewed and noted below.   Hypertension Medications             losartan (COZAAR) 50 MG tablet Take 0.5 tablets (25 mg total) by mouth once daily.    prazosin (MINIPRESS) 5 MG capsule Take 1 capsule (5 mg total) by mouth every evening. For nightmares          While in the  hospital, will manage blood pressure as follows; Adjust home antihypertensive regimen as follows- holding bp meds secondary to soft blood pressures, will monitor closely and resume when appropriate    Will utilize p.r.n. blood pressure medication only if patient's blood pressure greater than  180/110 and she develops symptoms such as worsening chest pain or shortness of breath.          History of ischemic left MCA stroke  No acute issues.  Cont asa and statin        VTE Risk Mitigation (From admission, onward)         Ordered     Place DARION hose  Until discontinued         11/10/22 1621     Place sequential compression device  Until discontinued         11/10/22 1621     IP VTE HIGH RISK PATIENT  Once        ASA bid 11/10/22 1621                Discharge Planning   LI:      Code Status: Full Code   Is the patient medically ready for discharge?:     Reason for patient still in hospital (select all that apply): Pending disposition  Discharge Plan A: Skilled Nursing Facility                  Aracelis Orr NP  Department of Hospital Medicine   [facility]

## 2022-11-13 NOTE — PT/OT/SLP EVAL
Occupational Therapy   Evaluation    Name: Maggy Tapia  MRN: [id number]  Admitting Diagnosis:  Closed traumatic nondisplaced fracture of neck of right femur  Recent Surgery: Procedure(s) (LRB):  PINNING, HIP, PERCUTANEOUS (Right) 2 Days Post-Op    Recommendations:     Discharge Recommendations: nursing facility, skilled, rehabilitation facility  Discharge Equipment Recommendations:  none  Barriers to discharge:  None    Assessment:     Maggy Tapia is a 72 y.o. female with a medical diagnosis of Closed traumatic nondisplaced fracture of neck of right femur.  She presents with the following performance deficits affecting function: weakness, impaired endurance, impaired self care skills, impaired functional mobility, gait instability, impaired balance, decreased upper extremity function, decreased lower extremity function, decreased safety awareness, pain, decreased ROM, impaired cardiopulmonary response to activity, orthopedic precautions.      Patient agreeable to and highly participatory in OT evaluation/treatment. Mod (A) using RW sit<>stand with verbal instruction for proper hand placement, transfer technique, adherence to R LE PWB precautions, and pursed lip breathing techniques. Mod (A) using RW chair <> BSC with step by step verbal instruction for gait/walker sequence approximately 4 steps; cues for pursed lip breathing and to ensure adherence to R LE PWB precautions. Patient required 2 trials with significant rest break between trials to complete sit<>stand from commode secondary to fatigue and R hip pain. Patient remaining motivated despite fatigue and pain. Max/Total (A) for LB dressing, toileting and Set-up with Supervision for grooming from bedside chair and Set-up for feeding.     Patient will benefit from continued therapy post acute - SNF versus IRF - to increase strength, endurance, and AD/AE training to facilitate independence and safety with functional mobility and ADL in adherence with R LE  PWB precautions prior to returning home with brother.      Rehab Prognosis: Good; patient would benefit from acute skilled OT services to address these deficits and reach maximum level of function.       Plan:     Patient to be seen 5 x/week to address the above listed problems via self-care/home management, therapeutic activities, therapeutic exercises  Plan of Care Expires: 11/27/22  Plan of Care Reviewed with: patient    Subjective     Chief Complaint: R hip pain  Patient/Family Comments/goals: To get stronger and more independent and to go to facility for more therapy    Occupational Profile:  Living Environment: Lives with brother in [facility] with walk-in shower with shower chair  Previous level of function: Modified Independent using RW for in-home mobility, rollator for community mobility, and use of shower chair; patient also owns but does not use single point cane and wheelchair  Roles and Routines: [place], Mosque - Shinto  Equipment Used at Home:  walker, rolling, wheelchair, rollator, shower chair, bedside commode  Assistance upon Discharge: Facility staff; will have assistance from brother upon returning home    Pain/Comfort:  Pain Rating 1: other (see comments) (not rated)  Location - Side 1: Right  Location - Orientation 1: generalized  Location 1: hip  Pain Addressed 1: Reposition, Distraction, Cessation of Activity, Nurse notified, Other (see comments) (Ice pack applied to R hip with skin barrier at end of session)  Pain Rating Post-Intervention 1: other (see comments) (not rated)    Patients cultural, spiritual, Cheondoism conflicts given the current situation: no    Objective:     Communicated with: El Pulliam prior to session.  Patient found up in chair with chair check, oxygen, PureWick, telemetry upon OT entry to room.    General Precautions: Standard, fall   Orthopedic Precautions:RLE partial weight bearing   Braces: N/A  Respiratory Status: Nasal cannula    Occupational  Performance:    Functional Mobility/Transfers:  Patient completed Sit <> Stand Transfer with moderate assistance  with  rolling walker   Patient completed Toilet Transfer Stand Pivot technique with moderate assistance with  rolling walker  Functional Mobility: Mod (A) using RW to ambulate approximately 4 steps x 2 to/from bedside chair and BSC - step by step verbal instruction for gait/walker sequence and adherence to R LE PWB precautions as well as pursed lip breathing techniques throughout as patient noted to hold breath     Activities of Daily Living:  Feeding:  Set-up    Grooming: Set-up from bedside chair  Lower Body Dressing: maximal assistance and total assistance    Toileting: maximal assistance and total assistance      Cognitive/Visual Perceptual:  Cognitive/Psychosocial Skills:     -       Oriented to: Person, Place, Time, and Situation   -       Follows Commands/attention:Follows one-step commands  -       Safety awareness/insight to disability: impaired   -       Mood/Affect/Coping skills/emotional control: Appropriate to situation    Physical Exam:  Upper Extremity Strength:    -       Right Upper Extremity: 3+/5  -       Left Upper Extremity: 3+/5    AMPAC 6 Click ADL:  AMPAC Total Score:  15    Treatment & Education:  - OTR providing education/instruction regarding OT role/POC, safety awareness/fall prevention including use of call button, use of AD with implementing correct transfer sequence/techniques in adherence with R LE PWB precautions, gait/walker sequence during mobility, pursed lip breathing techniques, and initiating adaptive dressing techniques and use of AE to increase safety/independence with ADL - patient verbalizes understanding and demonstrating when appropriate  - OTR initiating discharge planning - recommend SNF versus IRF  - OTR notifying nurse, El, of patient's request for pain medication at end of session; El acknowledging; OTR applying ice pack to R hip with skin barrier  prior to end of session - instructing patient to maintain skin barrier to prevent injury - patient verbalizes understanding and in agreement.    Patient left up in chair with all lines intact, call button in reach, chair alarm on, and NurseEl notified    GOALS:   Multidisciplinary Problems       Occupational Therapy Goals          Problem: Occupational Therapy    Goal Priority Disciplines Outcome Interventions   Occupational Therapy Goal     OT, PT/OT     Description: Goals to be met by: 2022     Patient will increase functional independence with ADLs by performing:    LE Dressing with Minimal Assistance with use of AD/AE as needed.  Grooming while standing with Contact Guard Assistance with seated rest breaks as needed.  Toileting from toilet with Moderate Assistance for hygiene and clothing management.   Toilet transfer to toilet with Stand-by Assistance with use of AD.                         History:     Past Medical History:   Diagnosis Date    Anxiety     Arthritis     Asthma     Cancer     Left Breast    Depression     Diabetes mellitus, type 2     GERD (gastroesophageal reflux disease)     Hyperlipidemia     Hypertension     Overactive bladder     Seizures     Pseudo-seizures    Sleep apnea     Stroke     Stroke 2022    pt was in the hospital for a stroke, claims she was seeing people when the stroke happened    Thyroid disease     Urinary tract infection without hematuria 2017         Past Surgical History:   Procedure Laterality Date    APPENDECTOMY      BREAST BIOPSY Left     BREAST LUMPECTOMY Left     2016    BREAST SURGERY      CATARACT EXTRACTION Bilateral     OU done//     SECTION      CHOLECYSTECTOMY      COLONOSCOPY N/A 2020    Procedure: COLONOSCOPY;  Surgeon: Mj Fernandez MD;  Location: [facility];  Service: Endoscopy;  Laterality: N/A;    CYST REMOVAL Left 2021    DILATION AND CURETTAGE OF UTERUS      EYE SURGERY         Time Tracking:     OT Date of  Treatment: 11/13/22  OT Start Time: 1039  OT Stop Time: 1117  OT Total Time (min): 38 min    Billable Minutes:Evaluation 12  Self Care/Home Management 23  Therapeutic Activity 16    11/13/2022

## 2022-11-13 NOTE — PT/OT/SLP PROGRESS
Physical Therapy Treatment    Patient Name:  Maggy Tapia   MRN:  [id number]    Recommendations:     Discharge Recommendations:  home, home health PT, rehabilitation facility   Discharge Equipment Recommendations: none   Barriers to discharge: None    Assessment:     Maggy Tapia is a 72 y.o. female admitted with a medical diagnosis of Closed traumatic nondisplaced fracture of neck of right femur.  She presents with the following impairments/functional limitations:  weakness, impaired endurance, impaired self care skills, impaired functional mobility, gait instability, impaired balance, decreased lower extremity function, decreased ROM, pain, orthopedic precautions, edema.    Rehab Prognosis: Good; patient would benefit from acute skilled PT services to address these deficits and reach maximum level of function.    Recent Surgery: Procedure(s) (LRB):  PINNING, HIP, PERCUTANEOUS (Right) 2 Days Post-Op    Plan:     During this hospitalization, patient to be seen BID to address the identified rehab impairments via gait training, therapeutic activities, therapeutic exercises and progress toward the following goals:    Plan of Care Expires:  12/09/22    Subjective     Chief Complaint: pt c/o pain in Right hip with movement  Patient/Family Comments/goals:   Pain/Comfort:  Pain Addressed 1: Pre-medicate for activity      Objective:     Communicated with nurseEl prior to session.  Patient found supine with bed alarm, oxygen, PureWick upon PT entry to room.     General Precautions: Standard, fall   Orthopedic Precautions:RLE partial weight bearing   Braces:    Respiratory Status: Nasal cannula, flow 2 L/min     Functional Mobility:  Bed Mobility:     Supine to Sit: moderate assistance  Transfers:     Sit to Stand:  moderate assistance with rolling walker  Gait: with RW 10' x 1 with Min A + IV and O2      AM-PAC 6 CLICK MOBILITY        Treatment & Education:  Supine exercises: Quad sets, Ankle pumps, heel slides,  Abduction x 15 reps active assist range of motion  Seated exercises: Long arc quad,hip flexion, hip adduction x 15 reps     Patient left up in chair with all lines intact, call button in reach, and chair alarm on..    GOALS:   Multidisciplinary Problems       Physical Therapy Goals          Problem: Physical Therapy    Goal Priority Disciplines Outcome Goal Variances Interventions   Physical Therapy Goal     PT, PT/OT Ongoing, Progressing     Description: Goals to be met by: 22     Patient will increase functional independence with mobility by performin. Supine to sit with Stand-by Assistance  2. Sit to supine with Stand-by Assistance  3. Sit to stand transfer with Stand-by Assistance  4. Bed to chair transfer with Stand-by Assistance using Rolling Walker  5. Gait  x 25 feet with Stand-by Assistance using Rolling Walker and PWB right LE.                          Time Tracking:     PT Received On: 22  PT Start Time: 0900     PT Stop Time: 0930  PT Total Time (min): 30 min     Billable Minutes: Gait Training 12 and Therapeutic Exercise 18    Treatment Type: Treatment  PT/PTA: PTA     PTA Visit Number: 1     2022

## 2022-11-13 NOTE — SUBJECTIVE & OBJECTIVE
Interval History:  notes reviewed, no acute events overnight.  Patient resting comfortably in bed.  She states her pain to her right hip is not well controlled with current medication.  Advised patient will increase dosing for adequate pain control.  Patient verbalized understanding and appreciative of care.  Patient participating well with therapy with slow progression.  Awaiting SNF/rehab referral and acceptance.  Patient agreeable for placement and she is medically cleared pending acceptance.    Review of Systems   Constitutional:  Negative for chills, fatigue and fever.   HENT:  Negative for sore throat and trouble swallowing.    Respiratory:  Negative for cough and shortness of breath.    Cardiovascular:  Negative for chest pain and leg swelling.   Gastrointestinal:  Negative for abdominal pain, diarrhea, nausea and vomiting.   Genitourinary:  Negative for difficulty urinating, dysuria and urgency.   Musculoskeletal:  Positive for arthralgias and gait problem. Negative for back pain.   Skin:  Negative for color change, pallor, rash and wound.   Neurological:  Negative for dizziness, weakness and light-headedness.   Hematological:  Negative for adenopathy.   Psychiatric/Behavioral:  Negative for agitation, behavioral problems and confusion.    All other systems reviewed and are negative.  Objective:     Vital Signs (Most Recent):  Temp: 97.2 °F (36.2 °C) (11/13/22 0712)  Pulse: 71 (11/13/22 0743)  Resp: 16 (11/13/22 0916)  BP: (!) 122/56 (11/13/22 0712)  SpO2: 99 % (11/13/22 0743)   Vital Signs (24h Range):  Temp:  [96.5 °F (35.8 °C)-97.8 °F (36.6 °C)] 97.2 °F (36.2 °C)  Pulse:  [71-85] 71  Resp:  [16-24] 16  SpO2:  [86 %-100 %] 99 %  BP: ()/(49-57) 122/56     Weight: 71.8 kg (158 lb 4.6 oz)  Body mass index is 30.91 kg/m².    Intake/Output Summary (Last 24 hours) at 11/13/2022 1019  Last data filed at 11/13/2022 0536  Gross per 24 hour   Intake 120 ml   Output 200 ml   Net -80 ml        Physical  Exam  Vitals and nursing note reviewed.   Constitutional:       General: She is not in acute distress.     Appearance: Normal appearance. She is well-developed. She is not ill-appearing or diaphoretic.   HENT:      Head: Normocephalic and atraumatic.      Right Ear: External ear normal.      Left Ear: External ear normal.      Nose: Nose normal. No congestion or rhinorrhea.      Mouth/Throat:      Mouth: Mucous membranes are moist.      Pharynx: Oropharynx is clear. No oropharyngeal exudate or posterior oropharyngeal erythema.   Eyes:      General: No scleral icterus.        Right eye: No discharge.         Left eye: No discharge.      Conjunctiva/sclera: Conjunctivae normal.      Pupils: Pupils are equal, round, and reactive to light.   Neck:      Vascular: No JVD.   Cardiovascular:      Rate and Rhythm: Normal rate and regular rhythm.      Pulses: Normal pulses.      Heart sounds: Normal heart sounds. No murmur heard.  Pulmonary:      Effort: Pulmonary effort is normal. No respiratory distress.      Breath sounds: Normal breath sounds. No stridor. No wheezing, rhonchi or rales.   Abdominal:      General: Abdomen is flat. Bowel sounds are normal. There is no distension.      Palpations: Abdomen is soft.      Tenderness: There is no abdominal tenderness.   Musculoskeletal:         General: No swelling or tenderness. Normal range of motion.      Cervical back: Normal range of motion and neck supple.      Right lower leg: No edema.      Left lower leg: No edema.   Skin:     General: Skin is warm and moist.      Capillary Refill: Capillary refill takes 2 to 3 seconds.      Coloration: Skin is not jaundiced or pale.      Findings: No erythema or rash.      Comments: Surgical incision covered with drsg CDI   Neurological:      General: No focal deficit present.      Mental Status: She is alert and oriented to person, place, and time.      Cranial Nerves: No cranial nerve deficit.      Sensory: No sensory deficit.    Psychiatric:         Attention and Perception: Attention normal.         Mood and Affect: Mood and affect normal.         Speech: Speech normal.         Behavior: Behavior normal.         Thought Content: Thought content normal.       Significant Labs: All pertinent labs within the past 24 hours have been reviewed.  CBC:   Recent Labs   Lab 11/12/22  0427 11/13/22  0457   WBC 11.27 9.98   HGB 13.3 13.3   HCT 41.5 41.6   * 93*         Significant Imaging: I have reviewed all pertinent imaging results/findings within the past 24 hours.

## 2022-11-13 NOTE — ASSESSMENT & PLAN NOTE
Patient's FSGs are controlled on current medication regimen.  Last A1c reviewed-   Lab Results   Component Value Date    HGBA1C 5.1 08/15/2022     Most recent fingerstick glucose reviewed-   Recent Labs   Lab 11/12/22  1127 11/12/22  1637   POCTGLUCOSE 188* 157*     Current correctional scale  Low  Maintain anti-hyperglycemic dose as follows-   Antihyperglycemics (From admission, onward)    None        Hold Oral hypoglycemics while patient is in the hospital.

## 2022-11-13 NOTE — PLAN OF CARE
Plan of care reviewed with patient. Patient verbalized complete understanding. Patient c/o tenderness to the surgical site. PRN pain meds administered. Ambulated with PT. NSR on cardiac monitor. Pending discharge to Rehab facility Monday. All fall precautions maintained. Bed in lowest position, locked, call light within reach. Side rails up x's 2. Slip resistant socks maintained.

## 2022-11-14 LAB
ALBUMIN SERPL BCP-MCNC: 2.3 G/DL (ref 3.5–5.2)
ALP SERPL-CCNC: 69 U/L (ref 55–135)
ALT SERPL W/O P-5'-P-CCNC: 33 U/L (ref 10–44)
ANION GAP SERPL CALC-SCNC: 9 MMOL/L (ref 8–16)
AST SERPL-CCNC: 26 U/L (ref 10–40)
BASOPHILS # BLD AUTO: 0.03 K/UL (ref 0–0.2)
BASOPHILS NFR BLD: 0.3 % (ref 0–1.9)
BILIRUB SERPL-MCNC: 0.3 MG/DL (ref 0.1–1)
BUN SERPL-MCNC: 19 MG/DL (ref 8–23)
CALCIUM SERPL-MCNC: 8.6 MG/DL (ref 8.7–10.5)
CHLORIDE SERPL-SCNC: 103 MMOL/L (ref 95–110)
CO2 SERPL-SCNC: 28 MMOL/L (ref 23–29)
CREAT SERPL-MCNC: 0.8 MG/DL (ref 0.5–1.4)
DIFFERENTIAL METHOD: ABNORMAL
EOSINOPHIL # BLD AUTO: 0.2 K/UL (ref 0–0.5)
EOSINOPHIL NFR BLD: 2.7 % (ref 0–8)
ERYTHROCYTE [DISTWIDTH] IN BLOOD BY AUTOMATED COUNT: 14 % (ref 11.5–14.5)
EST. GFR  (NO RACE VARIABLE): >60 ML/MIN/1.73 M^2
GLUCOSE SERPL-MCNC: 102 MG/DL (ref 70–110)
HCT VFR BLD AUTO: 37.8 % (ref 37–48.5)
HGB BLD-MCNC: 12 G/DL (ref 12–16)
IMM GRANULOCYTES # BLD AUTO: 0.05 K/UL (ref 0–0.04)
IMM GRANULOCYTES NFR BLD AUTO: 0.6 % (ref 0–0.5)
LYMPHOCYTES # BLD AUTO: 1.3 K/UL (ref 1–4.8)
LYMPHOCYTES NFR BLD: 15.4 % (ref 18–48)
MCH RBC QN AUTO: 30.7 PG (ref 27–31)
MCHC RBC AUTO-ENTMCNC: 31.7 G/DL (ref 32–36)
MCV RBC AUTO: 97 FL (ref 82–98)
MONOCYTES # BLD AUTO: 0.6 K/UL (ref 0.3–1)
MONOCYTES NFR BLD: 7.2 % (ref 4–15)
NEUTROPHILS # BLD AUTO: 6.4 K/UL (ref 1.8–7.7)
NEUTROPHILS NFR BLD: 73.8 % (ref 38–73)
NRBC BLD-RTO: 0 /100 WBC
PLATELET # BLD AUTO: 110 K/UL (ref 150–450)
PMV BLD AUTO: 11 FL (ref 9.2–12.9)
POCT GLUCOSE: 96 MG/DL (ref 70–110)
POTASSIUM SERPL-SCNC: 4.4 MMOL/L (ref 3.5–5.1)
PROT SERPL-MCNC: 5.6 G/DL (ref 6–8.4)
RBC # BLD AUTO: 3.91 M/UL (ref 4–5.4)
SODIUM SERPL-SCNC: 140 MMOL/L (ref 136–145)
WBC # BLD AUTO: 8.65 K/UL (ref 3.9–12.7)

## 2022-11-14 PROCEDURE — 94799 UNLISTED PULMONARY SVC/PX: CPT

## 2022-11-14 PROCEDURE — 25000003 PHARM REV CODE 250: Performed by: ORTHOPAEDIC SURGERY

## 2022-11-14 PROCEDURE — 80053 COMPREHEN METABOLIC PANEL: CPT | Performed by: NURSE PRACTITIONER

## 2022-11-14 PROCEDURE — 36415 COLL VENOUS BLD VENIPUNCTURE: CPT | Performed by: NURSE PRACTITIONER

## 2022-11-14 PROCEDURE — 25000003 PHARM REV CODE 250: Performed by: NURSE PRACTITIONER

## 2022-11-14 PROCEDURE — 97530 THERAPEUTIC ACTIVITIES: CPT

## 2022-11-14 PROCEDURE — 27000221 HC OXYGEN, UP TO 24 HOURS

## 2022-11-14 PROCEDURE — 12000002 HC ACUTE/MED SURGE SEMI-PRIVATE ROOM

## 2022-11-14 PROCEDURE — 85025 COMPLETE CBC W/AUTO DIFF WBC: CPT | Performed by: NURSE PRACTITIONER

## 2022-11-14 PROCEDURE — 86580 TB INTRADERMAL TEST: CPT | Performed by: NURSE PRACTITIONER

## 2022-11-14 PROCEDURE — 97116 GAIT TRAINING THERAPY: CPT

## 2022-11-14 PROCEDURE — 99900035 HC TECH TIME PER 15 MIN (STAT)

## 2022-11-14 PROCEDURE — 94761 N-INVAS EAR/PLS OXIMETRY MLT: CPT

## 2022-11-14 PROCEDURE — 30200315 PPD INTRADERMAL TEST REV CODE 302: Performed by: NURSE PRACTITIONER

## 2022-11-14 PROCEDURE — 94640 AIRWAY INHALATION TREATMENT: CPT

## 2022-11-14 PROCEDURE — 97535 SELF CARE MNGMENT TRAINING: CPT

## 2022-11-14 PROCEDURE — 97110 THERAPEUTIC EXERCISES: CPT

## 2022-11-14 RX ORDER — LACTULOSE 10 G/15ML
30 SOLUTION ORAL ONCE
Status: COMPLETED | OUTPATIENT
Start: 2022-11-14 | End: 2022-11-14

## 2022-11-14 RX ADMIN — TUBERCULIN PURIFIED PROTEIN DERIVATIVE 5 UNITS: 5 INJECTION, SOLUTION INTRADERMAL at 03:11

## 2022-11-14 RX ADMIN — HYDROCODONE BITARTRATE AND ACETAMINOPHEN 1 TABLET: 10; 325 TABLET ORAL at 08:11

## 2022-11-14 RX ADMIN — PRAZOSIN HYDROCHLORIDE 5 MG: 1 CAPSULE ORAL at 08:11

## 2022-11-14 RX ADMIN — GABAPENTIN 300 MG: 300 CAPSULE ORAL at 08:11

## 2022-11-14 RX ADMIN — LEVOTHYROXINE SODIUM 100 MCG: 50 TABLET ORAL at 06:11

## 2022-11-14 RX ADMIN — GABAPENTIN 300 MG: 300 CAPSULE ORAL at 02:11

## 2022-11-14 RX ADMIN — SERTRALINE HYDROCHLORIDE 50 MG: 50 TABLET ORAL at 08:11

## 2022-11-14 RX ADMIN — ATORVASTATIN CALCIUM 20 MG: 20 TABLET, FILM COATED ORAL at 08:11

## 2022-11-14 RX ADMIN — ASPIRIN 81 MG CHEWABLE TABLET 81 MG: 81 TABLET CHEWABLE at 08:11

## 2022-11-14 RX ADMIN — FLUTICASONE FUROATE AND VILANTEROL TRIFENATATE 1 PUFF: 100; 25 POWDER RESPIRATORY (INHALATION) at 07:11

## 2022-11-14 RX ADMIN — LACTULOSE 30 G: 20 SOLUTION ORAL at 11:11

## 2022-11-14 NOTE — ASSESSMENT & PLAN NOTE
POD 3 s/p right hip ORIF with Dr. Joiner  Continue to follow Orthopedic recommendations.  Needs aggressive incentive spirometry.  Follow hemoglobin and hematocrit closely.  Pain control with oral narcotics and antiemetics as needed - pain well controlled on norco 10mg   Physical therapy as per Orthopedics protocol with fall precautions.  ASA 81 mg PO BID for DVT prophylaxis per orthopedic recommendations.  CM working on SNF placement

## 2022-11-14 NOTE — ASSESSMENT & PLAN NOTE
Diagnosed by her pulmonologist.  Proven by pulmonary function testing.  Etiology unknown.  Calculated ARISCAT score is 11, placing pt in the low-risk category (1.6% risk of post-op pulmonary complication).  As far as deciding between general anesthesia and maybe MAC with nerve block,, I will defer to anesthesiologist.  Either method would be appropriate.    11/12 - no acute post op issues, cont home bipap    11/14 - pt requiring O2, IS ordered and pt encouraged on use, wean O2 as tolerated

## 2022-11-14 NOTE — PROGRESS NOTES
[facility] Hospital Medicine  Progress Note    Patient Name: Maggy Tapia  MRN: [id number]  Patient Class: IP- Inpatient   Admission Date: 11/10/2022  Length of Stay: 3 days  Attending Physician: Gely Tejada MD  Primary Care Provider: Yanna Abbasi MD        Subjective:     Principal Problem:Closed traumatic nondisplaced fracture of neck of right femur        HPI:  Patient lost her balance today at home and fell, causing instant pain in her right hip.  Did not hit her head.  No loss of consciousness.  Plain films of the hip show a closed fracture of the neck of the femur.  Medical history is outlined below.  She's been in her usual state of health as of late.  She has lung problems, including COPD and a restrictive lung disease, the latter of which is felt to be either from scoliosis and/or from neuromuscular weakness.  Patient uses a NIV vent at night in AVAPS mode.      Overview/Hospital Course:  No notes on file    Interval History:  notes reviewed, no acute events overnight.  Patient is sitting up in bedside chair in no acute distress.  States she feels well today.  Pain is well controlled.  She is participating well with therapy but still progressing slowly.  Case management working on placement.  Patient has not had a bowel movement in several days.  Advised patient will order lactulose to facilitate bowel movement and she verbalized understanding.  Patient denies abdominal pain, nausea or vomiting.  She is tolerating diet well.   Patient continues with hypoxemia, incentive spirometer ordered today and patient encouraged on use.  Patient denies shortness of breath, cough or chest pain.  Will continue to monitor closely and wean oxygen as able.    Review of Systems   Constitutional:  Negative for chills, fatigue and fever.   HENT:  Negative for sore throat and trouble swallowing.    Respiratory:  Negative for cough and shortness of breath.    Cardiovascular:  Negative for chest pain  and leg swelling.   Gastrointestinal:  Positive for constipation. Negative for abdominal pain, diarrhea, nausea and vomiting.   Genitourinary:  Negative for difficulty urinating, dysuria and urgency.   Musculoskeletal:  Positive for arthralgias and gait problem. Negative for back pain.   Skin:  Negative for color change, pallor, rash and wound.   Neurological:  Negative for dizziness, weakness and light-headedness.   Hematological:  Negative for adenopathy.   Psychiatric/Behavioral:  Negative for agitation, behavioral problems and confusion.    All other systems reviewed and are negative.  Objective:     Vital Signs (Most Recent):  Temp: 97.9 °F (36.6 °C) (11/14/22 1116)  Pulse: 78 (11/14/22 1116)  Resp: 15 (11/14/22 1116)  BP: (!) 108/51 (11/14/22 1116)  SpO2: 95 % (11/14/22 1116)   Vital Signs (24h Range):  Temp:  [96.7 °F (35.9 °C)-98.4 °F (36.9 °C)] 97.9 °F (36.6 °C)  Pulse:  [75-86] 78  Resp:  [15-20] 15  SpO2:  [95 %-99 %] 95 %  BP: ()/(51-84) 108/51     Weight: 71.8 kg (158 lb 4.6 oz)  Body mass index is 30.91 kg/m².    Intake/Output Summary (Last 24 hours) at 11/14/2022 1118  Last data filed at 11/14/2022 0418  Gross per 24 hour   Intake 160 ml   Output 400 ml   Net -240 ml        Physical Exam  Vitals and nursing note reviewed.   Constitutional:       General: She is not in acute distress.     Appearance: Normal appearance. She is well-developed. She is not ill-appearing or diaphoretic.   HENT:      Head: Normocephalic and atraumatic.      Right Ear: External ear normal.      Left Ear: External ear normal.      Nose: Nose normal. No congestion or rhinorrhea.      Mouth/Throat:      Mouth: Mucous membranes are moist.      Pharynx: Oropharynx is clear. No oropharyngeal exudate or posterior oropharyngeal erythema.   Eyes:      General: No scleral icterus.        Right eye: No discharge.         Left eye: No discharge.      Conjunctiva/sclera: Conjunctivae normal.      Pupils: Pupils are equal, round, and  reactive to light.   Neck:      Vascular: No JVD.   Cardiovascular:      Rate and Rhythm: Normal rate and regular rhythm.      Pulses: Normal pulses.      Heart sounds: Normal heart sounds. No murmur heard.  Pulmonary:      Effort: Pulmonary effort is normal. No respiratory distress.      Breath sounds: Normal breath sounds. No stridor. No wheezing, rhonchi or rales.   Abdominal:      General: Abdomen is flat. Bowel sounds are normal. There is no distension.      Palpations: Abdomen is soft.      Tenderness: There is no abdominal tenderness.   Musculoskeletal:         General: No swelling or tenderness. Normal range of motion.      Cervical back: Normal range of motion and neck supple.      Right lower leg: No edema.      Left lower leg: No edema.   Skin:     General: Skin is warm and moist.      Capillary Refill: Capillary refill takes 2 to 3 seconds.      Coloration: Skin is not jaundiced or pale.      Findings: No erythema or rash.      Comments: Surgical incision covered with drsg CDI   Neurological:      General: No focal deficit present.      Mental Status: She is alert and oriented to person, place, and time.      Cranial Nerves: No cranial nerve deficit.      Sensory: No sensory deficit.   Psychiatric:         Attention and Perception: Attention normal.         Mood and Affect: Mood and affect normal.         Speech: Speech normal.         Behavior: Behavior normal.         Thought Content: Thought content normal.       Significant Labs: All pertinent labs within the past 24 hours have been reviewed.  CBC:   Recent Labs   Lab 11/13/22  0457 11/14/22  0439   WBC 9.98 8.65   HGB 13.3 12.0   HCT 41.6 37.8   PLT 93* 110*         Significant Imaging: I have reviewed all pertinent imaging results/findings within the past 24 hours.      Assessment/Plan:      * Closed traumatic nondisplaced fracture of neck of right femur  POD 3 s/p right hip ORIF with Dr. Joiner  Continue to follow Orthopedic  recommendations.  Needs aggressive incentive spirometry.  Follow hemoglobin and hematocrit closely.  Pain control with oral narcotics and antiemetics as needed - pain well controlled on norco 10mg   Physical therapy as per Orthopedics protocol with fall precautions.  ASA 81 mg PO BID for DVT prophylaxis per orthopedic recommendations.  CM working on SNF placement      Chronic obstructive pulmonary disease, unspecified COPD type  stable  Neb tx prn  Continue Breo      Type 2 diabetes mellitus with diabetic polyneuropathy, without long-term current use of insulin  Patient's FSGs are controlled on current medication regimen.  Last A1c reviewed-   Lab Results   Component Value Date    HGBA1C 5.1 08/15/2022     Most recent fingerstick glucose reviewed-   Recent Labs   Lab 11/12/22  1127 11/12/22  1637   POCTGLUCOSE 188* 157*     Current correctional scale  Low  Maintain anti-hyperglycemic dose as follows-   Antihyperglycemics (From admission, onward)    None        Hold Oral hypoglycemics while patient is in the hospital.      Major depressive disorder, recurrent episode, moderate  Noted.  Stable.  Monitor mood.      Chronic restrictive lung disease  Diagnosed by her pulmonologist.  Proven by pulmonary function testing.  Etiology unknown.  Calculated ARISCAT score is 11, placing pt in the low-risk category (1.6% risk of post-op pulmonary complication).  As far as deciding between general anesthesia and maybe MAC with nerve block,, I will defer to anesthesiologist.  Either method would be appropriate.    11/12 - no acute post op issues, cont home bipap    11/14 - pt requiring O2, IS ordered and pt encouraged on use, wean O2 as tolerated      Hypothyroidism  Stable.  Continue usual dose of Synthroid.      Hyperlipidemia  Chronic, stable  Cont home statin      Essential hypertension  Chronic, controlled.  Latest blood pressure and vitals reviewed-   Temp:  [97 °F (36.1 °C)-98 °F (36.7 °C)]   Pulse:  [62-91]   Resp:  [15-23]    BP: ()/(46-58)   SpO2:  [94 %-100 %] .   Home meds for hypertension were reviewed and noted below.   Hypertension Medications             losartan (COZAAR) 50 MG tablet Take 0.5 tablets (25 mg total) by mouth once daily.    prazosin (MINIPRESS) 5 MG capsule Take 1 capsule (5 mg total) by mouth every evening. For nightmares          While in the hospital, will manage blood pressure as follows; Adjust home antihypertensive regimen as follows- holding bp meds secondary to soft blood pressures, will monitor closely and resume when appropriate    Will utilize p.r.n. blood pressure medication only if patient's blood pressure greater than  180/110 and she develops symptoms such as worsening chest pain or shortness of breath.          History of ischemic left MCA stroke  No acute issues.  Cont asa and statin        VTE Risk Mitigation (From admission, onward)         Ordered     Place DARION hose  Until discontinued         11/10/22 1621     Place sequential compression device  Until discontinued         11/10/22 1621     IP VTE HIGH RISK PATIENT  Once         11/10/22 1621                Discharge Planning   LI:      Code Status: Full Code   Is the patient medically ready for discharge?:     Reason for patient still in hospital (select all that apply): Pending disposition  Discharge Plan A: Skilled Nursing Facility                  Aracelis Orr NP  Department of Hospital Medicine   [facility]

## 2022-11-14 NOTE — PLAN OF CARE
Problem: Occupational Therapy  Goal: Occupational Therapy Goal  Description: Goals to be met by: 11/27/2022     Patient will increase functional independence with ADLs by performing:    LE Dressing with Minimal Assistance with use of AD/AE as needed.  Grooming while standing with Contact Guard Assistance with seated rest breaks as needed.  Toileting from toilet with Moderate Assistance for hygiene and clothing management.   Toilet transfer to toilet with Stand-by Assistance with use of AD.    Outcome: Ongoing, Progressing   LE dressing with overall Min (A) for managing clothing over hips. Patient threading B LE clothing with use of adaptive equipment with SBA following verbal instruction/demonstration.

## 2022-11-14 NOTE — PT/OT/SLP PROGRESS
Physical Therapy Treatment    Patient Name:  Maggy Tapia   MRN:  [id number]    Recommendations:     Discharge Recommendations:  nursing facility, skilled   Discharge Equipment Recommendations: none   Barriers to discharge: None    Assessment:     Maggy Tapia is a 72 y.o. female admitted with a medical diagnosis of Closed traumatic nondisplaced fracture of neck of right femur.  She presents with the following impairments/functional limitations:  weakness, impaired endurance, impaired functional mobility, gait instability, impaired balance, decreased lower extremity function, decreased ROM, edema, orthopedic precautions .  Patient  agreeable to PT treatment this morning.  Patient presented supine in bed and required min assist to transfer to sitting and then min assist to stand with RW and PWB right LE.  Patient then able to ambulate x 10 feet RW min assist and PWB right LE.    Rehab Prognosis: Good; patient would benefit from acute skilled PT services to address these deficits and reach maximum level of function.    Recent Surgery: Procedure(s) (LRB):  PINNING, HIP, PERCUTANEOUS (Right) 3 Days Post-Op    Plan:     During this hospitalization, patient to be seen daily to address the identified rehab impairments via gait training, therapeutic activities, therapeutic exercises and progress toward the following goals:    Plan of Care Expires:  12/09/22    Subjective     Chief Complaint: pain  Patient/Family Comments/goals: get better  Pain/Comfort:  Pain Rating 1: 0/10  Location - Side 1: Right  Location - Orientation 1: lower  Location 1: hip  Pain Addressed 1: Reposition, Cessation of Activity  Pain Rating Post-Intervention 1: 5/10 (pain increased with movement)      Objective:     Communicated with nurse prior to session.  Patient found supine with bed alarm, oxygen, PureWick, telemetry upon PT entry to room.     General Precautions: Standard, fall   Orthopedic Precautions:RLE partial weight bearing   Braces:     Respiratory Status: Nasal cannula, flow 2 L/min     Functional Mobility:  Bed Mobility:     Supine to Sit: minimum assistance  Transfers:     Sit to Stand:  minimum assistance with rolling walker and PWB right LE  Gait: x 10 feet RW min assist and PWB right LE      AM-PAC 6 CLICK MOBILITY          Treatment & Education:  Exercise to include ankle pumps, quad sets, heel slides, hip abd, LAQ and seated hip flexion. All done x 10 reps right LE as AAROM.  Gait training x 10 feet RW min assist and PWB right LE    Patient left up in chair with call button in reach, chair alarm on, and nurse notified..    GOALS:   Multidisciplinary Problems       Physical Therapy Goals          Problem: Physical Therapy    Goal Priority Disciplines Outcome Goal Variances Interventions   Physical Therapy Goal     PT, PT/OT Ongoing, Progressing     Description: Goals to be met by: 22     Patient will increase functional independence with mobility by performin. Supine to sit with Stand-by Assistance  2. Sit to supine with Stand-by Assistance  3. Sit to stand transfer with Stand-by Assistance  4. Bed to chair transfer with Stand-by Assistance using Rolling Walker  5. Gait  x 25 feet with Stand-by Assistance using Rolling Walker and PWB right LE.                          Time Tracking:     PT Received On: 22  PT Start Time: 830     PT Stop Time: 901  PT Total Time (min): 31 min     Billable Minutes: Gait Training 11 and Therapeutic Exercise 20    Treatment Type: Treatment  PT/PTA: PT     PTA Visit Number: 0     2022

## 2022-11-14 NOTE — PLAN OF CARE
11/14/2022 8:26 (CT)    [facility]-- Response: Interested, but need more information  Comments: Pt is on Xenazine medication for [place]'s But i do not see that dx listed in her H&P? This medication alone is $4500.    DENNIS left voice message for return from [place] with Margaret for return call [phone].  DENNIS spoke [place] [facility] [place] who stated she will contact intake and return call.    11/14/2022 10:48 (CT)     [facility]-- Response: Interested, but need more information  Comments: reviewing referral     DENNIS sent careport message to [facility] requesting acceptance/decline update.       11/14/22 1306   Post-Acute Status   Post-Acute Authorization Placement   Post-Acute Placement Status Pending post-acute provider review/more information requested

## 2022-11-14 NOTE — PT/OT/SLP PROGRESS
Occupational Therapy   Treatment    Name: Maggy Tapia  MRN: [id number]  Admitting Diagnosis:  Closed traumatic nondisplaced fracture of neck of right femur  3 Days Post-Op    Recommendations:     Discharge Recommendations: nursing facility, skilled  Discharge Equipment Recommendations:  none  Barriers to discharge:  None    Assessment:     Maggy Tapia is a 72 y.o. female with a medical diagnosis of Closed traumatic nondisplaced fracture of neck of right femur. Patient found up in bedside chair and agreeable to participate in OT treatment session. Patient demonstrates improvement with LE dressing during today's session and able to thread/unthread B LE through clothing with SBA using adaptive equipment following education/instruction and demonstration by OTR. Patient performing sit<>stand using RW x 2 throughout session; patient requires 2 trials to stand the first attempt. Patient reports difficulty bringing UE from armrest to walker resulting in loss of balance posteriorly and Max (A) for controlled descent to chair. First sit<>stand tolerated standing approximately 30 seconds and 2nd sit<>stand approximately 1 minute with verbal cues throughout for pursed lip breathing and to maintain upright posture and hold head up.     She presents with the following performance deficits affecting function are weakness, impaired endurance, impaired self care skills, impaired functional mobility, gait instability, impaired balance, decreased upper extremity function, decreased lower extremity function, decreased safety awareness, pain, decreased ROM, impaired cardiopulmonary response to activity, orthopedic precautions.     Recommend SNF upon discharge to facilitate patient's safe return home with brother.     Rehab Prognosis:  Good; patient would benefit from acute skilled OT services to address these deficits and reach maximum level of function.       Plan:     Patient to be seen 5 x/week to address the above listed  problems via self-care/home management, therapeutic activities, therapeutic exercises  Plan of Care Expires: 11/27/22  Plan of Care Reviewed with: patient, sibling    Subjective     Pain/Comfort:  Pain Rating 1: other (see comments) (not rated)  Location - Side 1: Right  Location - Orientation 1: generalized  Location 1: hip  Pain Addressed 1: Reposition, Distraction, Cessation of Activity, Other (see comments) (Patient received pain medication prior to OT treatment session.)    Objective:     Communicated with: Nurse, Ilana/Yanna prior to session.  Patient found up in chair with chair check, oxygen, PureWick, telemetry (Brother bedside) upon OT entry to room.    General Precautions: Standard, fall   Orthopedic Precautions:RLE partial weight bearing   Braces: N/A  Respiratory Status: Nasal cannula     Occupational Performance:     Functional Mobility/Transfers:  Patient completed Sit <> Stand Transfer with Max/Mod (A)  with  rolling walker - patient performing x 2; requires 2 trials to complete 1st stand as patient reports difficulty transitioning UE from arm rest to walker resulting in posterior loss of balance and Max (A) needed for slow, controlled descent to chair. 1st sit<>stand approximately 30 seconds and 2nd approximately 1 minutes - constant verbal cues for pursed lip breathing and to maintain upright posture and hold head up     Activities of Daily Living:  Lower Body Dressing: Overall Min (A) for managing clothing over hips. OTR providing education/instruction with demonstration for use of reacher to thread LEs through clothing. Patient threading B LE clothing with use of adaptive equipment with SBA following verbal instruction/demonstration.       [facility] 6 Click ADL: 15    Treatment & Education:  - OTR providing reinforcement of education/instruction regarding OT role/POC, safety awareness/fall prevention, correct transfer sequence/techniques with proper hand placement, importance of pursed lip breathing  and techniques - cues to slow in/exhalation rate - and maintaining upright posture and holding head up while in standing - patient verbalizes/demonstrates understanding when appropriate; education/instruction regarding adaptive dressing techniques and use of AE (reacher) to complete    Patient left up in chair with all lines intact, call button in reach, chair alarm on, and patient's brother present    GOALS:   Multidisciplinary Problems       Occupational Therapy Goals          Problem: Occupational Therapy    Goal Priority Disciplines Outcome Interventions   Occupational Therapy Goal     OT, PT/OT Ongoing, Progressing    Description: Goals to be met by: 11/27/2022     Patient will increase functional independence with ADLs by performing:    LE Dressing with Minimal Assistance with use of AD/AE as needed.  Grooming while standing with Contact Guard Assistance with seated rest breaks as needed.  Toileting from toilet with Moderate Assistance for hygiene and clothing management.   Toilet transfer to toilet with Stand-by Assistance with use of AD.                         Time Tracking:     OT Date of Treatment: 11/14/22  OT Start Time: 1015  OT Stop Time: 1045  OT Total Time (min): 30 min    Billable Minutes:Self Care/Home Management 15  Therapeutic Activity 15    OT/ALY: OT     ALY Visit Number: 0    11/14/2022

## 2022-11-14 NOTE — SUBJECTIVE & OBJECTIVE
Interval History:  notes reviewed, no acute events overnight.  Patient is sitting up in bedside chair in no acute distress.  States she feels well today.  Pain is well controlled.  She is participating well with therapy but still progressing slowly.  Case management working on placement.  Patient has not had a bowel movement in several days.  Advised patient will order lactulose to facilitate bowel movement and she verbalized understanding.  Patient denies abdominal pain, nausea or vomiting.  She is tolerating diet well.   Patient continues with hypoxemia, incentive spirometer ordered today and patient encouraged on use.  Patient denies shortness of breath, cough or chest pain.  Will continue to monitor closely and wean oxygen as able.    Review of Systems   Constitutional:  Negative for chills, fatigue and fever.   HENT:  Negative for sore throat and trouble swallowing.    Respiratory:  Negative for cough and shortness of breath.    Cardiovascular:  Negative for chest pain and leg swelling.   Gastrointestinal:  Positive for constipation. Negative for abdominal pain, diarrhea, nausea and vomiting.   Genitourinary:  Negative for difficulty urinating, dysuria and urgency.   Musculoskeletal:  Positive for arthralgias and gait problem. Negative for back pain.   Skin:  Negative for color change, pallor, rash and wound.   Neurological:  Negative for dizziness, weakness and light-headedness.   Hematological:  Negative for adenopathy.   Psychiatric/Behavioral:  Negative for agitation, behavioral problems and confusion.    All other systems reviewed and are negative.  Objective:     Vital Signs (Most Recent):  Temp: 97.9 °F (36.6 °C) (11/14/22 1116)  Pulse: 78 (11/14/22 1116)  Resp: 15 (11/14/22 1116)  BP: (!) 108/51 (11/14/22 1116)  SpO2: 95 % (11/14/22 1116)   Vital Signs (24h Range):  Temp:  [96.7 °F (35.9 °C)-98.4 °F (36.9 °C)] 97.9 °F (36.6 °C)  Pulse:  [75-86] 78  Resp:  [15-20] 15  SpO2:  [95 %-99 %] 95 %  BP:  ()/(51-84) 108/51     Weight: 71.8 kg (158 lb 4.6 oz)  Body mass index is 30.91 kg/m².    Intake/Output Summary (Last 24 hours) at 11/14/2022 1118  Last data filed at 11/14/2022 0418  Gross per 24 hour   Intake 160 ml   Output 400 ml   Net -240 ml        Physical Exam  Vitals and nursing note reviewed.   Constitutional:       General: She is not in acute distress.     Appearance: Normal appearance. She is well-developed. She is not ill-appearing or diaphoretic.   HENT:      Head: Normocephalic and atraumatic.      Right Ear: External ear normal.      Left Ear: External ear normal.      Nose: Nose normal. No congestion or rhinorrhea.      Mouth/Throat:      Mouth: Mucous membranes are moist.      Pharynx: Oropharynx is clear. No oropharyngeal exudate or posterior oropharyngeal erythema.   Eyes:      General: No scleral icterus.        Right eye: No discharge.         Left eye: No discharge.      Conjunctiva/sclera: Conjunctivae normal.      Pupils: Pupils are equal, round, and reactive to light.   Neck:      Vascular: No JVD.   Cardiovascular:      Rate and Rhythm: Normal rate and regular rhythm.      Pulses: Normal pulses.      Heart sounds: Normal heart sounds. No murmur heard.  Pulmonary:      Effort: Pulmonary effort is normal. No respiratory distress.      Breath sounds: Normal breath sounds. No stridor. No wheezing, rhonchi or rales.   Abdominal:      General: Abdomen is flat. Bowel sounds are normal. There is no distension.      Palpations: Abdomen is soft.      Tenderness: There is no abdominal tenderness.   Musculoskeletal:         General: No swelling or tenderness. Normal range of motion.      Cervical back: Normal range of motion and neck supple.      Right lower leg: No edema.      Left lower leg: No edema.   Skin:     General: Skin is warm and moist.      Capillary Refill: Capillary refill takes 2 to 3 seconds.      Coloration: Skin is not jaundiced or pale.      Findings: No erythema or rash.       Comments: Surgical incision covered with drsg CDI   Neurological:      General: No focal deficit present.      Mental Status: She is alert and oriented to person, place, and time.      Cranial Nerves: No cranial nerve deficit.      Sensory: No sensory deficit.   Psychiatric:         Attention and Perception: Attention normal.         Mood and Affect: Mood and affect normal.         Speech: Speech normal.         Behavior: Behavior normal.         Thought Content: Thought content normal.       Significant Labs: All pertinent labs within the past 24 hours have been reviewed.  CBC:   Recent Labs   Lab 11/13/22  0457 11/14/22  0439   WBC 9.98 8.65   HGB 13.3 12.0   HCT 41.6 37.8   PLT 93* 110*         Significant Imaging: I have reviewed all pertinent imaging results/findings within the past 24 hours.

## 2022-11-14 NOTE — PLAN OF CARE
POC discussed with patient, verbalized understanding. Patient with uneventful night, slept off and on between care. VS stable. Medicated with oral pain medication for complaints of pain R hip, relief obtained. Pure wick with yellow urine. Surgical dressing R hip CDI. NV wnl. Call light at bedside.

## 2022-11-14 NOTE — CARE UPDATE
11/13/22 1907   Patient Assessment/Suction   Level of Consciousness (AVPU) alert   Respiratory Effort Normal;Unlabored   Expansion/Accessory Muscles/Retractions no use of accessory muscles   All Lung Fields Breath Sounds Anterior:;diminished   Rhythm/Pattern, Respiratory unlabored;pattern regular   PRE-TX-O2   O2 Device (Oxygen Therapy) nasal cannula   $ Is the patient on Low Flow Oxygen? Yes   Flow (L/min) 4   Oxygen Analyzed Concentration (%) 36 %   SpO2 96 %   Pulse Oximetry Type Intermittent   $ Pulse Oximetry - Multiple Charge Pulse Oximetry - Multiple   Pulse 77   Resp 18   Aerosol Therapy   $ Aerosol Therapy Charges PRN treatment not required   Respiratory Treatment Status (SVN) PRN treatment not required   Incentive Spirometer   $ Incentive Spirometer Charges done with encouragement   Administration (IS) proper technique demonstrated   Number of Repetitions (IS) 8   Level Incentive Spirometer (mL) 750   Patient Tolerance (IS) good   Preset CPAP/BiPAP Settings   Mode Of Delivery other (see comments)  (home unit standby)

## 2022-11-14 NOTE — PLAN OF CARE
Problem: Adult Inpatient Plan of Care  Goal: Plan of Care Review  11/14/2022 1450 by Ilana Castro LPN  Outcome: Ongoing, Progressing  Pt found sitting up in bed. AAO. Medications and plan of care reviewed. Pt verbalized understanding. VS stable. O2 2L NC. Tele in place NSR. 20G left AC saline locked, clean, dry, and intact. IS at bedside, return demonstration performed. Head to toe assessment performed. No pain or discomfort voiced. Bed in lowest position with wheels locked. Side rails up x2. Call light and personal items within reach.      Problem: Adult Inpatient Plan of Care  Goal: Optimal Comfort and Wellbeing  11/14/2022 1450 by Ilana Castro LPN  Outcome: Ongoing, Progressing  Q2H patient rounds performed. Pain, positioning, bathroom needs, and IV site assessed.     Problem: Diabetes Comorbidity  Goal: Blood Glucose Level Within Targeted Range  11/14/2022 1450 by Ilana Castro LPN  Outcome: Ongoing, Progressing  Pt BG within normal range, no coverage needed this shift.      Problem: Skin Injury Risk Increased  Goal: Skin Health and Integrity  11/14/2022 1450 by Ilana Castro LPN  Outcome: Ongoing, Progressing  Pt cleaned with bath cloths and wound care performed per MD order.

## 2022-11-14 NOTE — CARE UPDATE
11/14/22 0751   Patient Assessment/Suction   Level of Consciousness (AVPU) alert   Respiratory Effort Normal;Unlabored   Expansion/Accessory Muscles/Retractions no use of accessory muscles   All Lung Fields Breath Sounds diminished   Rhythm/Pattern, Respiratory unlabored;pattern regular   Skin Integrity   $ Wound Care Tech Time 15 min   Area Observed Bridge of nose   Skin Appearance without discoloration   Barrier used? Liquid Filled Cushion   PRE-TX-O2   O2 Device (Oxygen Therapy) nasal cannula   $ Is the patient on Low Flow Oxygen? Yes   Flow (L/min) 4   SpO2 97 %   Pulse Oximetry Type Intermittent   $ Pulse Oximetry - Multiple Charge Pulse Oximetry - Multiple   Pulse 77   Resp 18   Aerosol Therapy   $ Aerosol Therapy Charges PRN treatment not required   Inhaler   $ Inhaler Charges MDI (Metered Dose Inahler) Treatment   Daily Review of Necessity (Inhaler) completed   Respiratory Treatment Status (Inhaler) given;mouth rinsed post treatment   Treatment Route (Inhaler) mouthpiece   Patient Position (Inhaler) HOB elevated   Post Treatment Assessment (Inhaler) breath sounds unchanged   Signs of Intolerance (Inhaler) none   Incentive Spirometer   $ Incentive Spirometer Charges done with encouragement   Incentive Spirometer Predicted Level (mL) 1000   Administration (IS) instruction provided, follow-up   Number of Repetitions (IS) 5   Level Incentive Spirometer (mL) 500   Patient Tolerance (IS) fair

## 2022-11-15 LAB — POCT GLUCOSE: 114 MG/DL (ref 70–110)

## 2022-11-15 PROCEDURE — 94799 UNLISTED PULMONARY SVC/PX: CPT

## 2022-11-15 PROCEDURE — 27000221 HC OXYGEN, UP TO 24 HOURS

## 2022-11-15 PROCEDURE — 25000003 PHARM REV CODE 250: Performed by: ORTHOPAEDIC SURGERY

## 2022-11-15 PROCEDURE — 94761 N-INVAS EAR/PLS OXIMETRY MLT: CPT

## 2022-11-15 PROCEDURE — 99900035 HC TECH TIME PER 15 MIN (STAT)

## 2022-11-15 PROCEDURE — 97116 GAIT TRAINING THERAPY: CPT | Mod: CQ

## 2022-11-15 PROCEDURE — 94640 AIRWAY INHALATION TREATMENT: CPT

## 2022-11-15 PROCEDURE — 12000002 HC ACUTE/MED SURGE SEMI-PRIVATE ROOM

## 2022-11-15 PROCEDURE — 25000003 PHARM REV CODE 250: Performed by: NURSE PRACTITIONER

## 2022-11-15 PROCEDURE — 97530 THERAPEUTIC ACTIVITIES: CPT | Mod: CQ

## 2022-11-15 PROCEDURE — 97535 SELF CARE MNGMENT TRAINING: CPT

## 2022-11-15 RX ORDER — POLYETHYLENE GLYCOL 3350 17 G/17G
17 POWDER, FOR SOLUTION ORAL DAILY
Status: DISCONTINUED | OUTPATIENT
Start: 2022-11-15 | End: 2022-11-16 | Stop reason: HOSPADM

## 2022-11-15 RX ADMIN — POLYETHYLENE GLYCOL 3350 17 G: 17 POWDER, FOR SOLUTION ORAL at 11:11

## 2022-11-15 RX ADMIN — SERTRALINE HYDROCHLORIDE 50 MG: 50 TABLET ORAL at 08:11

## 2022-11-15 RX ADMIN — FLUTICASONE FUROATE AND VILANTEROL TRIFENATATE 1 PUFF: 100; 25 POWDER RESPIRATORY (INHALATION) at 08:11

## 2022-11-15 RX ADMIN — HYDROCODONE BITARTRATE AND ACETAMINOPHEN 1 TABLET: 10; 325 TABLET ORAL at 07:11

## 2022-11-15 RX ADMIN — ASPIRIN 81 MG CHEWABLE TABLET 81 MG: 81 TABLET CHEWABLE at 09:11

## 2022-11-15 RX ADMIN — GABAPENTIN 300 MG: 300 CAPSULE ORAL at 09:11

## 2022-11-15 RX ADMIN — ATORVASTATIN CALCIUM 20 MG: 20 TABLET, FILM COATED ORAL at 08:11

## 2022-11-15 RX ADMIN — PRAZOSIN HYDROCHLORIDE 5 MG: 1 CAPSULE ORAL at 09:11

## 2022-11-15 RX ADMIN — GABAPENTIN 300 MG: 300 CAPSULE ORAL at 02:11

## 2022-11-15 RX ADMIN — ASPIRIN 81 MG CHEWABLE TABLET 81 MG: 81 TABLET CHEWABLE at 08:11

## 2022-11-15 RX ADMIN — GABAPENTIN 300 MG: 300 CAPSULE ORAL at 08:11

## 2022-11-15 RX ADMIN — LEVOTHYROXINE SODIUM 100 MCG: 50 TABLET ORAL at 06:11

## 2022-11-15 NOTE — CARE UPDATE
11/14/22 5580   Patient Assessment/Suction   Level of Consciousness (AVPU) alert   Respiratory Effort Unlabored;Normal   Expansion/Accessory Muscles/Retractions no use of accessory muscles;no retractions;expansion symmetric   All Lung Fields Breath Sounds Anterior:;Lateral:;diminished   Rhythm/Pattern, Respiratory unlabored;pattern regular;depth regular;no shortness of breath reported   PRE-TX-O2   O2 Device (Oxygen Therapy) nasal cannula   $ Is the patient on Low Flow Oxygen? Yes   Flow (L/min) 2   SpO2 97 %   Pulse Oximetry Type Intermittent   $ Pulse Oximetry - Multiple Charge Pulse Oximetry - Multiple   Pulse 83   Resp 18   Aerosol Therapy   $ Aerosol Therapy Charges PRN treatment not required   Respiratory Treatment Status (SVN) PRN treatment not required   Preset CPAP/BiPAP Settings   Mode Of Delivery Standby  (pt home unit)

## 2022-11-15 NOTE — PT/OT/SLP PROGRESS
Physical Therapy Treatment    Patient Name:  Maggy Tapia   MRN:  [id number]    Recommendations:     Discharge Recommendations:  nursing facility, skilled   Discharge Equipment Recommendations: none   Barriers to discharge: None    Assessment:     Maggy Tapia is a 72 y.o. female admitted with a medical diagnosis of Closed traumatic nondisplaced fracture of neck of right femur.  She presents with the following impairments/functional limitations:  weakness, impaired endurance, impaired self care skills, impaired functional mobility, gait instability, impaired balance, decreased upper extremity function, decreased lower extremity function, pain, impaired skin, impaired cardiopulmonary response to activity, orthopedic precautions.  Pt sleeping but roused easily; eager to participate, and requested assist to BSC.    Transferred slowly to sit EOB, then stand piv tf with RW to BSC with Sunil and PWB RLE.   Flatus but without BM.   Stand pivot TF to chair, then pt increased ambulation distance to 50' today with RW, Sunil, PWB, close chair follow, and occasional verbal cues for sequencing/RW management.   Remained up in chair, fatigued but with good overall tolerance.     Rehab Prognosis: Good; patient would benefit from acute skilled PT services to address these deficits and reach maximum level of function.    Recent Surgery: Procedure(s) (LRB):  PINNING, HIP, PERCUTANEOUS (Right) 4 Days Post-Op    Plan:     During this hospitalization, patient to be seen BID to address the identified rehab impairments via gait training, therapeutic activities, therapeutic exercises and progress toward the following goals:    Plan of Care Expires:  12/09/22    Subjective     Chief Complaint: hip and kn pain; inability to produce BM  Patient/Family Comments/goals: BM  Pain/Comfort:  Pain Rating 1: 2/10  Location - Side 1: Right  Location - Orientation 1: generalized  Location 1: hip  Pain Addressed 1: Reposition, Distraction, Cessation  of Activity  Pain Rating 2: 4/10  Location - Side 2: Right  Location - Orientation 2: generalized  Location 2: knee  Pain Addressed 2: Reposition, Distraction, Cessation of Activity      Objective:     Communicated with nurse Raines prior to session.  Patient found HOB elevated with bed alarm, oxygen, telemetry, peripheral IV upon PT entry to room.     General Precautions: Standard, fall   Orthopedic Precautions:RLE partial weight bearing   Braces: N/A  Respiratory Status: Nasal cannula, flow 2 L/min     Functional Mobility:  Bed Mobility:     Supine to Sit: contact guard assistance  Transfers:     Sit to Stand:  minimum assistance with rolling walker  Bed to Chair: minimum assistance with  rolling walker  using  Stand Pivot  Toilet Transfer: minimum assistance with  rolling walker  using  Stand Pivot  Gait: 50' with RW, Sunil, PWB RLE, chair follow, 2L O2, occasional VC      AM-PAC 6 CLICK MOBILITY          Treatment & Education:      Patient left up in chair with all lines intact, call button in reach, chair alarm on, and nurse Raines present..    GOALS:   Multidisciplinary Problems       Physical Therapy Goals          Problem: Physical Therapy    Goal Priority Disciplines Outcome Goal Variances Interventions   Physical Therapy Goal     PT, PT/OT Ongoing, Progressing     Description: Goals to be met by: 22     Patient will increase functional independence with mobility by performin. Supine to sit with Stand-by Assistance  2. Sit to supine with Stand-by Assistance  3. Sit to stand transfer with Stand-by Assistance  4. Bed to chair transfer with Stand-by Assistance using Rolling Walker  5. Gait  x 25 feet with Stand-by Assistance using Rolling Walker and PWB right LE.                          Time Tracking:     PT Received On: 11/15/22  PT Start Time: 1338     PT Stop Time: 1407  PT Total Time (min): 29 min     Billable Minutes: Gait Training 10 and Therapeutic Activity 19    Treatment Type:  Treatment  PT/PTA: PTA     PTA Visit Number: 1     11/15/2022

## 2022-11-15 NOTE — PT/OT/SLP PROGRESS
Occupational Therapy   Treatment    Name: Maggy Tapia  MRN: [id number]  Admitting Diagnosis:  Closed traumatic nondisplaced fracture of neck of right femur  4 Days Post-Op    Recommendations:     Discharge Recommendations: nursing facility, skilled  Discharge Equipment Recommendations:  none  Barriers to discharge:  None    Assessment:     Maggy Tapia is a 72 y.o. female with a medical diagnosis of Closed traumatic nondisplaced fracture of neck of right femur. Patient found up in chair and agreeable to participate in OT treatment session.     Set-up assistance to perform grooming from chair level, Max (A) toileting from BSC level - patient able to perform hygiene with Supervision and set up bath cloths following void (measuring hat inserted in commode and nurseYanna notified at end of session).Mod (A) sit<>stand, chair > BSC with verbal cues for adherence to R LE PWB precautions. With Mod (A) using RW, patient ambulating approximately 5-7 steps with step by step verbal instruction for correct gait/walker sequence and adherence to R LE PWB precautions. Patient continues to require cues for pursed lip breathing throughout and following mobility/exertion. She presents with the following performance deficits affecting function are weakness, impaired endurance, impaired self care skills, impaired functional mobility, gait instability, impaired balance, decreased upper extremity function, decreased lower extremity function, decreased safety awareness, decreased ROM, pain, impaired cardiopulmonary response to activity, orthopedic precautions.     Recommend SNF placement to facilitate patient's safe return home.     Rehab Prognosis:  Good; patient would benefit from acute skilled OT services to address these deficits and reach maximum level of function.       Plan:     Patient to be seen 5 x/week to address the above listed problems via self-care/home management, therapeutic activities, therapeutic exercises  Plan of  Care Expires: 11/27/22  Plan of Care Reviewed with: patient    Subjective     Pain/Comfort:  Pain Rating 1: other (see comments) (no pain reported but patient does report fatigue)  Pain Addressed 2: Reposition, Cessation of Activity    Objective:     Communicated with: Nurse prior to session.  Patient found up in chair with chair check, oxygen, telemetry upon OT entry to room.    General Precautions: Standard, fall   Orthopedic Precautions:RLE partial weight bearing   Braces: N/A  Respiratory Status: Nasal cannula, flow 2 L/min     Occupational Performance:     Bed Mobility:    Patient completed Sit to Supine with minimum assistance   Scooting/bridging with CGA for R LE PWB precautions to avoid excessive weight bearing - use of upper bed rails and L LE    Functional Mobility/Transfers:  Patient completed Sit <> Stand Transfer with moderate assistance  with  rolling walker   Patient completed Bed > BSC Transfer with Moderate assistance with RW and verbal cues to adhere to R LE PWB precautions  Patient completed BSC > bed transfer with Moderate assistance with RW - ambulating 5-7 steps with verbal instruction to adhere to R LE PWB precautions and gait/walker sequence as well as pursed lip breathing techniques  Functional Mobility: 5-7 steps with Moderate assistance with RW with step by step verbal instruction for gait/walker sequence and adherence to R LE PWB    Activities of Daily Living:  Grooming: Set-up/Supervision to perform grooming tasks from chair level    [facility] 6 Click ADL: 15    Treatment & Education:  - OTR providing reinforcement of transfer sequence/techniques with proper hand placement, techniques for adherence to R LE PWB precautions, gait/walker sequence as well as correct walker management - patient verbalizes/demonstrates understanding and in agreement when appropriate; patient will benefit from continued reinforcement of education/instruction; OTR also reinforcing importance of OOB mobility -  patient agreeable to sit up in chair again later today.     Patient left HOB elevated with all lines intact, call button in reach, bed alarm on, and NurseYanna notified    GOALS:   Multidisciplinary Problems       Occupational Therapy Goals          Problem: Occupational Therapy    Goal Priority Disciplines Outcome Interventions   Occupational Therapy Goal     OT, PT/OT Ongoing, Progressing    Description: Goals to be met by: 11/27/2022     Patient will increase functional independence with ADLs by performing:    LE Dressing with Minimal Assistance with use of AD/AE as needed.  Grooming while standing with Contact Guard Assistance with seated rest breaks as needed.  Toileting from toilet with Moderate Assistance for hygiene and clothing management.   Toilet transfer to toilet with Stand-by Assistance with use of AD.                         Time Tracking:     OT Date of Treatment: 11/15/22  OT Start Time: 0957  OT Stop Time: 1014  OT Total Time (min): 17 min    Billable Minutes:Self Care/Home Management 17    OT/ALY: OT     ALY Visit Number: 0    11/15/2022

## 2022-11-15 NOTE — PLAN OF CARE
Called [facility] in assessment, faxed PSSR in with physician exempt due to mental health dx       11/15/22 1114   Post-Acute Status   Post-Acute Authorization Placement   Post-Acute Placement Status Pending state direction/certification

## 2022-11-15 NOTE — CONSULTS
Re-consulted for wound check. Wounds to perineum and buttocks are much improved from admit. Patient has resolving blisters to her left upper thighs. Abd folds continue to improve as well. Wound care to continue to follow this patient.

## 2022-11-15 NOTE — SUBJECTIVE & OBJECTIVE
Interval History:  notes reviewed, no acute events overnight.  Patient lying in bed in no acute distress.  Pt states her hip pain is much better and well controlled but she is now having pain to her right knee which she describes as aching quality with intermittent sharp pains, worsens with ambulation and movement, 8/10 intensity at its worst.  The pain has been present for the past couple days and it is hindering therapy.  Advised patient will order x-ray.  Patient encouraged to participate with therapy as much as she can.  Case management working on SNF placement for continued therapy.  Will continue to monitor closely.   Review of Systems   Constitutional:  Negative for chills, fatigue and fever.   HENT:  Negative for sore throat and trouble swallowing.    Respiratory:  Negative for cough and shortness of breath.    Cardiovascular:  Negative for chest pain and leg swelling.   Gastrointestinal:  Positive for constipation. Negative for abdominal pain, diarrhea, nausea and vomiting.   Genitourinary:  Negative for difficulty urinating, dysuria and urgency.   Musculoskeletal:  Positive for arthralgias and gait problem. Negative for back pain.   Skin:  Negative for color change, pallor, rash and wound.   Neurological:  Negative for dizziness, weakness and light-headedness.   Hematological:  Negative for adenopathy.   Psychiatric/Behavioral:  Negative for agitation, behavioral problems and confusion.    All other systems reviewed and are negative.  Objective:     Vital Signs (Most Recent):  Temp: 97.6 °F (36.4 °C) (11/15/22 1111)  Pulse: 76 (11/15/22 1111)  Resp: 18 (11/15/22 1111)  BP: (!) 91/53 (11/15/22 1111)  SpO2: (!) 94 % (11/15/22 1135)   Vital Signs (24h Range):  Temp:  [96.9 °F (36.1 °C)-100.2 °F (37.9 °C)] 97.6 °F (36.4 °C)  Pulse:  [72-95] 76  Resp:  [17-18] 18  SpO2:  [93 %-99 %] 94 %  BP: ()/(53-83) 91/53     Weight: 71.8 kg (158 lb 4.6 oz)  Body mass index is 30.91 kg/m².    Intake/Output Summary  (Last 24 hours) at 11/15/2022 1431  Last data filed at 11/15/2022 1214  Gross per 24 hour   Intake 460 ml   Output 2100 ml   Net -1640 ml        Physical Exam  Vitals and nursing note reviewed.   Constitutional:       General: She is not in acute distress.     Appearance: Normal appearance. She is well-developed. She is not ill-appearing or diaphoretic.   HENT:      Head: Normocephalic and atraumatic.      Right Ear: External ear normal.      Left Ear: External ear normal.      Nose: Nose normal. No congestion or rhinorrhea.      Mouth/Throat:      Mouth: Mucous membranes are moist.      Pharynx: Oropharynx is clear. No oropharyngeal exudate or posterior oropharyngeal erythema.   Eyes:      General: No scleral icterus.        Right eye: No discharge.         Left eye: No discharge.      Conjunctiva/sclera: Conjunctivae normal.      Pupils: Pupils are equal, round, and reactive to light.   Neck:      Vascular: No JVD.   Cardiovascular:      Rate and Rhythm: Normal rate and regular rhythm.      Pulses: Normal pulses.      Heart sounds: Normal heart sounds. No murmur heard.  Pulmonary:      Effort: Pulmonary effort is normal. No respiratory distress.      Breath sounds: Normal breath sounds. No stridor. No wheezing, rhonchi or rales.   Abdominal:      General: Abdomen is flat. Bowel sounds are normal. There is no distension.      Palpations: Abdomen is soft.      Tenderness: There is no abdominal tenderness.   Musculoskeletal:         General: Tenderness present. No swelling.      Cervical back: Normal range of motion and neck supple.      Right lower leg: No edema.      Left lower leg: No edema.      Comments: Limited ROM right knee secondary to pain with ext and flexion and stiffness to joint with passive ROM. TTP ant knee.    Skin:     General: Skin is warm and moist.      Capillary Refill: Capillary refill takes 2 to 3 seconds.      Coloration: Skin is not jaundiced or pale.      Findings: No erythema or rash.       Comments: Surgical incision covered with drsg CDI   Neurological:      General: No focal deficit present.      Mental Status: She is alert and oriented to person, place, and time.      Cranial Nerves: No cranial nerve deficit.      Sensory: No sensory deficit.   Psychiatric:         Attention and Perception: Attention normal.         Mood and Affect: Mood and affect normal.         Speech: Speech normal.         Behavior: Behavior normal.         Thought Content: Thought content normal.       Significant Labs: All pertinent labs within the past 24 hours have been reviewed.  CBC:   Recent Labs   Lab 11/14/22  0439   WBC 8.65   HGB 12.0   HCT 37.8   *         Significant Imaging: I have reviewed all pertinent imaging results/findings within the past 24 hours.

## 2022-11-15 NOTE — PLAN OF CARE
Received 142 with physician exemption, sent to [facility] via [facility].        11/15/22 4533   Post-Acute Status   Post-Acute Authorization Placement

## 2022-11-15 NOTE — ASSESSMENT & PLAN NOTE
Diagnosed by her pulmonologist.  Proven by pulmonary function testing.  Etiology unknown.  Calculated ARISCAT score is 11, placing pt in the low-risk category (1.6% risk of post-op pulmonary complication).  As far as deciding between general anesthesia and maybe MAC with nerve block,, I will defer to anesthesiologist.  Either method would be appropriate.    11/12 - no acute post op issues, cont home bipap    11/14 - pt requiring O2, IS ordered and pt encouraged on use, wean O2 as tolerated  11/15- O2 down to 2L NC today, pt performing IS, cont to monitor

## 2022-11-15 NOTE — PLAN OF CARE
Problem: Occupational Therapy  Goal: Occupational Therapy Goal  Description: Goals to be met by: 11/27/2022     Patient will increase functional independence with ADLs by performing:    LE Dressing with Minimal Assistance with use of AD/AE as needed.  Grooming while standing with Contact Guard Assistance with seated rest breaks as needed.  Toileting from toilet with Moderate Assistance for hygiene and clothing management.   Toilet transfer to toilet with Stand-by Assistance with use of AD.    Outcome: Ongoing, Progressing  Toileting with Max (A) from BSC level; Mod (A) using RW chair>BSC>EOB with verbal cues throughout to maintain R LE PWB precaution and for gait/walker sequence.

## 2022-11-15 NOTE — PLAN OF CARE
Asked pt about home med that is question with [facility]. Per pt and pt's brother- pt no longer on that medication. CM updated [facility]rit via [facility]       11/15/22 1038   Post-Acute Status   Post-Acute Authorization Placement   Post-Acute Placement Status Pending post-acute provider review/more information requested

## 2022-11-15 NOTE — PT/OT/SLP PROGRESS
Physical Therapy      Patient Name:  Maggy Tapia   MRN:  [id number]    Patient not seen today secondary to PT hold for AM session due to pt's c/o continued R knee pain 6/10 that is more pronounced now that the hip has been repaired; awaiting results of x-ray. Will follow-up later today if x-ray clear and schedule allows.

## 2022-11-15 NOTE — PLAN OF CARE
Per Leonor with [facility], pt accepted and can admit tomorrow as long as auth obtained    CM called [facility] to verify that SNF request has been received. Updated on plan to DC tomorrow       11/15/22 9178   Post-Acute Status   Post-Acute Authorization Placement   Post-Acute Placement Status Pending payor review/awaiting authorization (if required)

## 2022-11-15 NOTE — PLAN OF CARE
Submitted to [facility] for SNF auth       11/15/22 1125   Post-Acute Status   Post-Acute Authorization Placement   Post-Acute Placement Status Pending payor review/awaiting authorization (if required)

## 2022-11-15 NOTE — HOSPITAL COURSE
Patient admitted for right hip fracture.  She underwent ORIF with Dr. Joiner on 11/11.  Patient was monitored closely postoperatively.  Her pain was not well-controlled and she required adjustment in pain medications.  Patient has history of COPD and restrictive lung disease and had drop in oxygen saturations postop.  Her oxygen improved with supplemental oxygen and IS was added to therapy and O2 able to be weaned down during hospital stay. Pt was cont on her home NIV vent at night in AVAPS mode. PT/OT was consulted and patient had slow progression therefore was recommended for SNF placement.  Case management was consulted and assisted with discharge planning.  She had some complaints during her stay of some knee pain.  An x-ray was performed which revealed no acute abnormalities, just some degenerative changes and very small joint effusion.  She continued to progress with therapy and had no further complaints of knee pain.  She was accepted for SNF at [facility] and authorization was obtained.  She was D/C to [facility] on 11/16.  She will follow closely postoperatively in clinic with the orthopedic surgeon.  She will complete 30 days of twice daily aspirin 81 mg per orthopedic recommendations for DVT prophylaxis.  She can return to once daily dosing as per prior thereafter.     Pt was seen on day of discharge and deemed appropriate for discharge.   Admission

## 2022-11-15 NOTE — ASSESSMENT & PLAN NOTE
POD 4 s/p right hip ORIF with Dr. Joiner  Continue to follow Orthopedic recommendations.  Needs aggressive incentive spirometry.  Follow hemoglobin and hematocrit closely.  Pain control with oral narcotics and antiemetics as needed - pain well controlled on norco 10mg   Physical therapy as per Orthopedics protocol with fall precautions.  ASA 81 mg PO BID for DVT prophylaxis per orthopedic recommendations.  CM working on SNF placement

## 2022-11-15 NOTE — PROGRESS NOTES
[facility] Hospital Medicine  Progress Note    Patient Name: Maggy Tapia  MRN: [id number]  Patient Class: IP- Inpatient   Admission Date: 11/10/2022  Length of Stay: 4 days  Attending Physician: Gely Tejada MD  Primary Care Provider: Yanna Abbasi MD        Subjective:     Principal Problem:Closed traumatic nondisplaced fracture of neck of right femur        HPI:  Patient lost her balance today at home and fell, causing instant pain in her right hip.  Did not hit her head.  No loss of consciousness.  Plain films of the hip show a closed fracture of the neck of the femur.  Medical history is outlined below.  She's been in her usual state of health as of late.  She has lung problems, including COPD and a restrictive lung disease, the latter of which is felt to be either from scoliosis and/or from neuromuscular weakness.  Patient uses a NIV vent at night in AVAPS mode.      Overview/Hospital Course:  No notes on file    Interval History:  notes reviewed, no acute events overnight.  Patient lying in bed in no acute distress.  Pt states her hip pain is much better and well controlled but she is now having pain to her right knee which she describes as aching quality with intermittent sharp pains, worsens with ambulation and movement, 8/10 intensity at its worst.  The pain has been present for the past couple days and it is hindering therapy.  Advised patient will order x-ray.  Patient encouraged to participate with therapy as much as she can.  Case management working on SNF placement for continued therapy.  Will continue to monitor closely.   Review of Systems   Constitutional:  Negative for chills, fatigue and fever.   HENT:  Negative for sore throat and trouble swallowing.    Respiratory:  Negative for cough and shortness of breath.    Cardiovascular:  Negative for chest pain and leg swelling.   Gastrointestinal:  Positive for constipation. Negative for abdominal pain, diarrhea, nausea and  vomiting.   Genitourinary:  Negative for difficulty urinating, dysuria and urgency.   Musculoskeletal:  Positive for arthralgias and gait problem. Negative for back pain.   Skin:  Negative for color change, pallor, rash and wound.   Neurological:  Negative for dizziness, weakness and light-headedness.   Hematological:  Negative for adenopathy.   Psychiatric/Behavioral:  Negative for agitation, behavioral problems and confusion.    All other systems reviewed and are negative.  Objective:     Vital Signs (Most Recent):  Temp: 97.6 °F (36.4 °C) (11/15/22 1111)  Pulse: 76 (11/15/22 1111)  Resp: 18 (11/15/22 1111)  BP: (!) 91/53 (11/15/22 1111)  SpO2: (!) 94 % (11/15/22 1135)   Vital Signs (24h Range):  Temp:  [96.9 °F (36.1 °C)-100.2 °F (37.9 °C)] 97.6 °F (36.4 °C)  Pulse:  [72-95] 76  Resp:  [17-18] 18  SpO2:  [93 %-99 %] 94 %  BP: ()/(53-83) 91/53     Weight: 71.8 kg (158 lb 4.6 oz)  Body mass index is 30.91 kg/m².    Intake/Output Summary (Last 24 hours) at 11/15/2022 1431  Last data filed at 11/15/2022 1214  Gross per 24 hour   Intake 460 ml   Output 2100 ml   Net -1640 ml        Physical Exam  Vitals and nursing note reviewed.   Constitutional:       General: She is not in acute distress.     Appearance: Normal appearance. She is well-developed. She is not ill-appearing or diaphoretic.   HENT:      Head: Normocephalic and atraumatic.      Right Ear: External ear normal.      Left Ear: External ear normal.      Nose: Nose normal. No congestion or rhinorrhea.      Mouth/Throat:      Mouth: Mucous membranes are moist.      Pharynx: Oropharynx is clear. No oropharyngeal exudate or posterior oropharyngeal erythema.   Eyes:      General: No scleral icterus.        Right eye: No discharge.         Left eye: No discharge.      Conjunctiva/sclera: Conjunctivae normal.      Pupils: Pupils are equal, round, and reactive to light.   Neck:      Vascular: No JVD.   Cardiovascular:      Rate and Rhythm: Normal rate and  regular rhythm.      Pulses: Normal pulses.      Heart sounds: Normal heart sounds. No murmur heard.  Pulmonary:      Effort: Pulmonary effort is normal. No respiratory distress.      Breath sounds: Normal breath sounds. No stridor. No wheezing, rhonchi or rales.   Abdominal:      General: Abdomen is flat. Bowel sounds are normal. There is no distension.      Palpations: Abdomen is soft.      Tenderness: There is no abdominal tenderness.   Musculoskeletal:         General: Tenderness present. No swelling.      Cervical back: Normal range of motion and neck supple.      Right lower leg: No edema.      Left lower leg: No edema.      Comments: Limited ROM right knee secondary to pain with ext and flexion and stiffness to joint with passive ROM. TTP ant knee.    Skin:     General: Skin is warm and moist.      Capillary Refill: Capillary refill takes 2 to 3 seconds.      Coloration: Skin is not jaundiced or pale.      Findings: No erythema or rash.      Comments: Surgical incision covered with drsg CDI   Neurological:      General: No focal deficit present.      Mental Status: She is alert and oriented to person, place, and time.      Cranial Nerves: No cranial nerve deficit.      Sensory: No sensory deficit.   Psychiatric:         Attention and Perception: Attention normal.         Mood and Affect: Mood and affect normal.         Speech: Speech normal.         Behavior: Behavior normal.         Thought Content: Thought content normal.       Significant Labs: All pertinent labs within the past 24 hours have been reviewed.  CBC:   Recent Labs   Lab 11/14/22  0439   WBC 8.65   HGB 12.0   HCT 37.8   *         Significant Imaging: I have reviewed all pertinent imaging results/findings within the past 24 hours.      Assessment/Plan:      * Closed traumatic nondisplaced fracture of neck of right femur  POD 4 s/p right hip ORIF with Dr. Joiner  Continue to follow Orthopedic recommendations.  Needs aggressive incentive  spirometry.  Follow hemoglobin and hematocrit closely.  Pain control with oral narcotics and antiemetics as needed - pain well controlled on norco 10mg   Physical therapy as per Orthopedics protocol with fall precautions.  ASA 81 mg PO BID for DVT prophylaxis per orthopedic recommendations.  CM working on SNF placement      Chronic obstructive pulmonary disease, unspecified COPD type  stable  Neb tx prn  Continue Breo      Type 2 diabetes mellitus with diabetic polyneuropathy, without long-term current use of insulin  Patient's FSGs are controlled on current medication regimen.  Last A1c reviewed-   Lab Results   Component Value Date    HGBA1C 5.1 08/15/2022     Most recent fingerstick glucose reviewed-   Recent Labs   Lab 11/12/22  1127 11/12/22  1637   POCTGLUCOSE 188* 157*     Current correctional scale  Low  Maintain anti-hyperglycemic dose as follows-   Antihyperglycemics (From admission, onward)    None        Hold Oral hypoglycemics while patient is in the hospital.      Major depressive disorder, recurrent episode, moderate  Noted.  Stable.  Monitor mood.      Chronic restrictive lung disease  Diagnosed by her pulmonologist.  Proven by pulmonary function testing.  Etiology unknown.  Calculated ARISCAT score is 11, placing pt in the low-risk category (1.6% risk of post-op pulmonary complication).  As far as deciding between general anesthesia and maybe MAC with nerve block,, I will defer to anesthesiologist.  Either method would be appropriate.    11/12 - no acute post op issues, cont home bipap    11/14 - pt requiring O2, IS ordered and pt encouraged on use, wean O2 as tolerated  11/15- O2 down to 2L NC today, pt performing IS, cont to monitor      Hypothyroidism  Stable.  Continue usual dose of Synthroid.      Hyperlipidemia  Chronic, stable  Cont home statin      Essential hypertension  Chronic, controlled.  Latest blood pressure and vitals reviewed-   Temp:  [96.9 °F (36.1 °C)-100.2 °F (37.9 °C)]   Pulse:   [72-95]   Resp:  [17-18]   BP: ()/(53-83)   SpO2:  [93 %-99 %] .   Home meds for hypertension were reviewed and noted below.   Hypertension Medications             losartan (COZAAR) 50 MG tablet Take 0.5 tablets (25 mg total) by mouth once daily.    prazosin (MINIPRESS) 5 MG capsule Take 1 capsule (5 mg total) by mouth every evening. For nightmares          While in the hospital, will manage blood pressure as follows; Adjust home antihypertensive regimen as follows- holding bp meds secondary to soft blood pressures, will monitor closely and resume when appropriate    Will utilize p.r.n. blood pressure medication only if patient's blood pressure greater than  180/110 and she develops symptoms such as worsening chest pain or shortness of breath.          History of ischemic left MCA stroke  No acute issues.  Cont asa and statin        VTE Risk Mitigation (From admission, onward)         Ordered     Place DARION hose  Until discontinued         11/10/22 1621     Place sequential compression device  Until discontinued         11/10/22 1621     IP VTE HIGH RISK PATIENT  Once         11/10/22 1621                Discharge Planning   LI: 11/15/2022     Code Status: Full Code   Is the patient medically ready for discharge?:     Reason for patient still in hospital (select all that apply): Pending disposition  Discharge Plan A: Skilled Nursing Facility                  Aracelis Orr NP  Department of Hospital Medicine   [facility]

## 2022-11-15 NOTE — ASSESSMENT & PLAN NOTE
Chronic, controlled.  Latest blood pressure and vitals reviewed-   Temp:  [96.9 °F (36.1 °C)-100.2 °F (37.9 °C)]   Pulse:  [72-95]   Resp:  [17-18]   BP: ()/(53-83)   SpO2:  [93 %-99 %] .   Home meds for hypertension were reviewed and noted below.   Hypertension Medications             losartan (COZAAR) 50 MG tablet Take 0.5 tablets (25 mg total) by mouth once daily.    prazosin (MINIPRESS) 5 MG capsule Take 1 capsule (5 mg total) by mouth every evening. For nightmares          While in the hospital, will manage blood pressure as follows; Adjust home antihypertensive regimen as follows- holding bp meds secondary to soft blood pressures, will monitor closely and resume when appropriate    Will utilize p.r.n. blood pressure medication only if patient's blood pressure greater than  180/110 and she develops symptoms such as worsening chest pain or shortness of breath.

## 2022-11-15 NOTE — PLAN OF CARE
11/15/22 0848   Patient Assessment/Suction   Level of Consciousness (AVPU) alert   Respiratory Effort Normal;Unlabored   Expansion/Accessory Muscles/Retractions no retractions;no use of accessory muscles   All Lung Fields Breath Sounds diminished;Anterior:;Lateral:   Rhythm/Pattern, Respiratory depth regular;pattern regular;unlabored   Cough Frequency no cough   Skin Integrity   $ Wound Care Tech Time 15 min   Area Observed Bridge of nose   Skin Appearance without discoloration   Barrier used? Liquid Filled Cushion   PRE-TX-O2   O2 Device (Oxygen Therapy) nasal cannula   $ Is the patient on Low Flow Oxygen? Yes   Flow (L/min) 2   SpO2 (!) 93 %   Pulse Oximetry Type Intermittent   $ Pulse Oximetry - Multiple Charge Pulse Oximetry - Multiple   Pulse 83   Resp 18   Aerosol Therapy   $ Aerosol Therapy Charges PRN treatment not required   Respiratory Treatment Status (SVN) PRN treatment not required   Inhaler   $ Inhaler Charges MDI (Metered Dose Inahler) Treatment   Daily Review of Necessity (Inhaler) completed   Respiratory Treatment Status (Inhaler) given   Treatment Route (Inhaler) mouthpiece   Patient Position (Inhaler) sitting in chair   Post Treatment Assessment (Inhaler) breath sounds unchanged   Signs of Intolerance (Inhaler) none   Breath Sounds Post-Respiratory Treatment   Throughout All Fields Post-Treatment All Fields   Throughout All Fields Post-Treatment no change   Post-treatment Heart Rate (beats/min) 80   Post-treatment Resp Rate (breaths/min) 18   Incentive Spirometer   $ Incentive Spirometer Charges done with encouragement   Incentive Spirometer Predicted Level (mL) 1000   Administration (IS) instruction provided, follow-up   Number of Repetitions (IS) 10   Level Incentive Spirometer (mL) 500   Patient Tolerance (IS) fair   Preset CPAP/BiPAP Settings   Mode Of Delivery other (see comments)  (home cpap at bedside)

## 2022-11-15 NOTE — PLAN OF CARE
Problem: Adult Inpatient Plan of Care  Goal: Plan of Care Review  Outcome: Ongoing, Progressing   Supine with HOB up 45. O2at 2L/NC in use. Tele [id number] in use on Am assessment. Dsg to right upper thigh intact and without drainage. POC and medications reviewed with patient. Verbalized understanding. SR up x 2. Call light in reach. Bed in lowest position with brakes locked. Will continue to monitor.   Problem: Adult Inpatient Plan of Care  Goal: Optimal Comfort and Wellbeing  Outcome: Ongoing, Progressing   Q 2 hourly rounds made through out shift and IV site, pain and position monitored. Pain med given per MD order.   Problem: Adult Inpatient Plan of Care  Goal: Patient-Specific Goal (Individualized)  Outcome: Ongoing, Progressing   PT and OT worked with patient today. Patient did tolerated well.

## 2022-11-16 VITALS
HEIGHT: 60 IN | SYSTOLIC BLOOD PRESSURE: 122 MMHG | DIASTOLIC BLOOD PRESSURE: 67 MMHG | OXYGEN SATURATION: 97 % | BODY MASS INDEX: 31.08 KG/M2 | HEART RATE: 82 BPM | RESPIRATION RATE: 16 BRPM | WEIGHT: 158.31 LBS | TEMPERATURE: 98 F

## 2022-11-16 LAB
ALBUMIN SERPL BCP-MCNC: 2.4 G/DL (ref 3.5–5.2)
ALP SERPL-CCNC: 78 U/L (ref 55–135)
ALT SERPL W/O P-5'-P-CCNC: 27 U/L (ref 10–44)
ANION GAP SERPL CALC-SCNC: 10 MMOL/L (ref 8–16)
AST SERPL-CCNC: 17 U/L (ref 10–40)
BASOPHILS # BLD AUTO: 0.03 K/UL (ref 0–0.2)
BASOPHILS NFR BLD: 0.3 % (ref 0–1.9)
BILIRUB SERPL-MCNC: 0.3 MG/DL (ref 0.1–1)
BUN SERPL-MCNC: 20 MG/DL (ref 8–23)
CALCIUM SERPL-MCNC: 9.3 MG/DL (ref 8.7–10.5)
CHLORIDE SERPL-SCNC: 101 MMOL/L (ref 95–110)
CO2 SERPL-SCNC: 29 MMOL/L (ref 23–29)
CREAT SERPL-MCNC: 0.8 MG/DL (ref 0.5–1.4)
DIFFERENTIAL METHOD: ABNORMAL
EOSINOPHIL # BLD AUTO: 0.3 K/UL (ref 0–0.5)
EOSINOPHIL NFR BLD: 3.1 % (ref 0–8)
ERYTHROCYTE [DISTWIDTH] IN BLOOD BY AUTOMATED COUNT: 13.9 % (ref 11.5–14.5)
EST. GFR  (NO RACE VARIABLE): >60 ML/MIN/1.73 M^2
GLUCOSE SERPL-MCNC: 133 MG/DL (ref 70–110)
HCT VFR BLD AUTO: 40.3 % (ref 37–48.5)
HGB BLD-MCNC: 12.8 G/DL (ref 12–16)
IMM GRANULOCYTES # BLD AUTO: 0.03 K/UL (ref 0–0.04)
IMM GRANULOCYTES NFR BLD AUTO: 0.3 % (ref 0–0.5)
LYMPHOCYTES # BLD AUTO: 1.3 K/UL (ref 1–4.8)
LYMPHOCYTES NFR BLD: 14.8 % (ref 18–48)
MCH RBC QN AUTO: 30.3 PG (ref 27–31)
MCHC RBC AUTO-ENTMCNC: 31.8 G/DL (ref 32–36)
MCV RBC AUTO: 95 FL (ref 82–98)
MONOCYTES # BLD AUTO: 0.6 K/UL (ref 0.3–1)
MONOCYTES NFR BLD: 6.2 % (ref 4–15)
NEUTROPHILS # BLD AUTO: 6.7 K/UL (ref 1.8–7.7)
NEUTROPHILS NFR BLD: 75.3 % (ref 38–73)
NRBC BLD-RTO: 0 /100 WBC
PLATELET # BLD AUTO: 167 K/UL (ref 150–450)
PLATELET BLD QL SMEAR: ABNORMAL
PMV BLD AUTO: 10.2 FL (ref 9.2–12.9)
POTASSIUM SERPL-SCNC: 4.5 MMOL/L (ref 3.5–5.1)
PROT SERPL-MCNC: 6.3 G/DL (ref 6–8.4)
RBC # BLD AUTO: 4.23 M/UL (ref 4–5.4)
SODIUM SERPL-SCNC: 140 MMOL/L (ref 136–145)
TB INDURATION 48 - 72 HR READ: 0 MM
WBC # BLD AUTO: 8.97 K/UL (ref 3.9–12.7)

## 2022-11-16 PROCEDURE — 25000003 PHARM REV CODE 250: Performed by: ORTHOPAEDIC SURGERY

## 2022-11-16 PROCEDURE — 80053 COMPREHEN METABOLIC PANEL: CPT | Performed by: NURSE PRACTITIONER

## 2022-11-16 PROCEDURE — 25000003 PHARM REV CODE 250: Performed by: NURSE PRACTITIONER

## 2022-11-16 PROCEDURE — 94799 UNLISTED PULMONARY SVC/PX: CPT

## 2022-11-16 PROCEDURE — 36415 COLL VENOUS BLD VENIPUNCTURE: CPT | Performed by: NURSE PRACTITIONER

## 2022-11-16 PROCEDURE — 94761 N-INVAS EAR/PLS OXIMETRY MLT: CPT

## 2022-11-16 PROCEDURE — 97530 THERAPEUTIC ACTIVITIES: CPT | Mod: CQ

## 2022-11-16 PROCEDURE — 99900035 HC TECH TIME PER 15 MIN (STAT)

## 2022-11-16 PROCEDURE — 27000221 HC OXYGEN, UP TO 24 HOURS

## 2022-11-16 PROCEDURE — 97116 GAIT TRAINING THERAPY: CPT | Mod: CQ

## 2022-11-16 PROCEDURE — 94640 AIRWAY INHALATION TREATMENT: CPT

## 2022-11-16 PROCEDURE — 85025 COMPLETE CBC W/AUTO DIFF WBC: CPT | Performed by: NURSE PRACTITIONER

## 2022-11-16 RX ORDER — NAPROXEN SODIUM 220 MG/1
81 TABLET, FILM COATED ORAL 2 TIMES DAILY
Qty: 60 TABLET | Refills: 11
Start: 2022-11-16 | End: 2022-12-16

## 2022-11-16 RX ORDER — HYDROCODONE BITARTRATE AND ACETAMINOPHEN 10; 325 MG/1; MG/1
1 TABLET ORAL EVERY 4 HOURS PRN
Qty: 12 TABLET | Refills: 0 | Status: SHIPPED | OUTPATIENT
Start: 2022-11-16 | End: 2023-01-05

## 2022-11-16 RX ORDER — HYDROCODONE BITARTRATE AND ACETAMINOPHEN 10; 325 MG/1; MG/1
1 TABLET ORAL EVERY 4 HOURS PRN
Qty: 12 TABLET | Refills: 0 | Status: SHIPPED | OUTPATIENT
Start: 2022-11-16 | End: 2022-11-16 | Stop reason: SDUPTHER

## 2022-11-16 RX ORDER — POLYETHYLENE GLYCOL 3350 17 G/17G
17 POWDER, FOR SOLUTION ORAL DAILY
Refills: 0
Start: 2022-11-17

## 2022-11-16 RX ADMIN — ATORVASTATIN CALCIUM 20 MG: 20 TABLET, FILM COATED ORAL at 08:11

## 2022-11-16 RX ADMIN — LEVOTHYROXINE SODIUM 100 MCG: 50 TABLET ORAL at 05:11

## 2022-11-16 RX ADMIN — SERTRALINE HYDROCHLORIDE 50 MG: 50 TABLET ORAL at 08:11

## 2022-11-16 RX ADMIN — POLYETHYLENE GLYCOL 3350 17 G: 17 POWDER, FOR SOLUTION ORAL at 08:11

## 2022-11-16 RX ADMIN — ASPIRIN 81 MG CHEWABLE TABLET 81 MG: 81 TABLET CHEWABLE at 08:11

## 2022-11-16 RX ADMIN — GABAPENTIN 300 MG: 300 CAPSULE ORAL at 08:11

## 2022-11-16 RX ADMIN — FLUTICASONE FUROATE AND VILANTEROL TRIFENATATE 1 PUFF: 100; 25 POWDER RESPIRATORY (INHALATION) at 07:11

## 2022-11-16 NOTE — PLAN OF CARE
Recommendations  1. Continue regular diet to promote PO intakes  -Add DM 1500 kcal if glucose elevated > 150 mg/dl  2. Weigh weekly  3. Add Wesley BID    Goals:   1. Pt diet will advance within 24-48 hrs   2. Pt will consume >75% PO and supplement intake by RD follow up   3. Pt will consume >75% Wesley by RD follow up  Nutrition Goal Status: met/mew/not met  Communication of RD Recs: POC, sticky note

## 2022-11-16 NOTE — PLAN OF CARE
Updated Leonor with [facility] requesting in person evaluation due to confusion on ventilation needs before official acceptance and they will send someone today.     ** NIV documented in chart. Per pt, its bipap. CM also communicated with resp who also confirmed bipap         11/16/22 1025   Post-Acute Status   Post-Acute Authorization Placement   Post-Acute Placement Status Pending payor review/awaiting authorization (if required)

## 2022-11-16 NOTE — PLAN OF CARE
Received call back from Yanna with [facility], updated me that everything is good to go with pt's home bipap. States she is trying to get in touch with pt's brother to schedule apt to get admit paperwork completed    Updated by Yanna that pt's brother called her back and scheduled apt for 3pm       11/16/22 1331   Post-Acute Status   Post-Acute Authorization Placement   Post-Acute Placement Status Pending post-acute provider review/more information requested

## 2022-11-16 NOTE — PLAN OF CARE
Per Yanna Robles, report can be called to [phone] - pt going to room A 20. [facility] to provide transportation        11/16/22 3145   Post-Acute Status   Post-Acute Authorization Placement   Post-Acute Placement Status Set-up Complete/Auth obtained

## 2022-11-16 NOTE — PLAN OF CARE
POC/Meds reviewed, pt verbalized understanding. Vitals stable.  pt had a BM this shift. Up with x1 assist. 2l nc on. Repositions self. Hourly/Q2hr rounding performed, safety maintained. Bed in lowest position, wheels locked, SR up x2, call light in easy reach. No  complaints at this time.

## 2022-11-16 NOTE — PLAN OF CARE
11/16/22 0709   Patient Assessment/Suction   Respiratory Effort Normal;Unlabored   Expansion/Accessory Muscles/Retractions expansion symmetric;no retractions   All Lung Fields Breath Sounds Anterior:;Lateral:;diminished   Rhythm/Pattern, Respiratory depth regular;pattern regular   PRE-TX-O2   O2 Device (Oxygen Therapy) nasal cannula   $ Is the patient on Low Flow Oxygen? Yes   Flow (L/min) 2   SpO2 98 %   Pulse Oximetry Type Intermittent   $ Pulse Oximetry - Multiple Charge Pulse Oximetry - Multiple   Pulse 80   Resp 16   Aerosol Therapy   $ Aerosol Therapy Charges PRN treatment not required   Inhaler   $ Inhaler Charges MDI (Metered Dose Inahler) Treatment   Respiratory Treatment Status (Inhaler) mouth rinsed post treatment;given   Treatment Route (Inhaler) mouthpiece   Patient Position (Inhaler) HOB elevated   Post Treatment Assessment (Inhaler) breath sounds unchanged   Signs of Intolerance (Inhaler) none   Breath Sounds Post-Respiratory Treatment   Post-treatment Heart Rate (beats/min) 80   Post-treatment Resp Rate (breaths/min) 18   Incentive Spirometer   $ Incentive Spirometer Charges done with encouragement   Administration (IS) self-administered   Number of Repetitions (IS) 10   Level Incentive Spirometer (mL) 750   Patient Tolerance (IS) good

## 2022-11-16 NOTE — CARE UPDATE
11/15/22 1923   Patient Assessment/Suction   Level of Consciousness (AVPU) alert   Respiratory Effort Normal;Unlabored   Expansion/Accessory Muscles/Retractions no use of accessory muscles   Rhythm/Pattern, Respiratory unlabored   Skin Integrity   $ Wound Care Tech Time 15 min   Area Observed Bridge of nose   Skin Appearance without discoloration   Barrier used? Liquid Filled Cushion   PRE-TX-O2   O2 Device (Oxygen Therapy) nasal cannula   $ Is the patient on Low Flow Oxygen? Yes   Flow (L/min) 2   SpO2 (!) 93 %   Pulse Oximetry Type Intermittent   $ Pulse Oximetry - Multiple Charge Pulse Oximetry - Multiple   Pulse 74   Resp 18   Aerosol Therapy   $ Aerosol Therapy Charges PRN treatment not required   Respiratory Treatment Status (SVN) PRN treatment not required   Incentive Spirometer   $ Incentive Spirometer Charges done with encouragement   Administration (IS) proper technique demonstrated   Number of Repetitions (IS) 10   Level Incentive Spirometer (mL) 750   Patient Tolerance (IS) good   Preset CPAP/BiPAP Settings   Mode Of Delivery other (see comments)  (home bipap at bedside)

## 2022-11-16 NOTE — NURSING
Report called to Mattie HANLEY at [facility].  Discharge instructions reviewed  including discharge medications, follow-up appointments,  and activity restrictions. PIV removed. Wheelchair van en route to transport patient per [facility] staff.

## 2022-11-16 NOTE — NURSING
Called staff at [facility] at [phone], Thu notified that transporter left without pt prescriptions. Attempted to reach her before van departed. Nurse to be notified.

## 2022-11-16 NOTE — PLAN OF CARE
PHN auth obtained for [facility]. Auth # [id number]       11/16/22 1052   Post-Acute Status   Post-Acute Authorization Placement   Post-Acute Placement Status Set-up Complete/Auth obtained

## 2022-11-16 NOTE — PLAN OF CARE
Spoke with Yanna from [facility] to request update on pt's acceptance for today. States she is going to touch base with Leonor and then call me back       11/16/22 1250   Post-Acute Status   Post-Acute Authorization Placement   Post-Acute Placement Status Pending post-acute provider review/more information requested

## 2022-11-16 NOTE — PT/OT/SLP PROGRESS
Physical Therapy Treatment    Patient Name:  Maggy Tapia   MRN:  [id number]    Recommendations:     Discharge Recommendations:  nursing facility, skilled   Discharge Equipment Recommendations: none   Barriers to discharge: None    Assessment:     Maggy Tapia is a 72 y.o. female admitted with a medical diagnosis of Closed traumatic nondisplaced fracture of neck of right femur.  She presents with the following impairments/functional limitations:  weakness, impaired endurance, impaired self care skills, impaired functional mobility, gait instability, decreased lower extremity function, impaired skin, orthopedic precautions, impaired cardiopulmonary response to activity.  Pt found up in chiar, reporting no pain at hip or knee, and agreeable to ambulate.   Ambulated 50' with RW and PWB, continuing to require frequent VC for sequencing and RW placement in order to adhere to WB restrictions. Occasional gasping breaths required cues for pursed-lip breathing.   Transferred to/from C to void via stand piv transfer with RW and Sunil.   Remained up in chair reporting some fatigue but overall good tolerance.     Rehab Prognosis: Good; patient would benefit from acute skilled PT services to address these deficits and reach maximum level of function.    Recent Surgery: Procedure(s) (LRB):  PINNING, HIP, PERCUTANEOUS (Right) 5 Days Post-Op    Plan:     During this hospitalization, patient to be seen BID to address the identified rehab impairments via gait training, therapeutic activities, therapeutic exercises and progress toward the following goals:    Plan of Care Expires:  12/09/22    Subjective     Chief Complaint: none  Patient/Family Comments/goals: none expressed  Pain/Comfort:  Pain Rating 1: 0/10      Objective:     Communicated with nurse [place] prior to session.  Patient found up in chair with chair check, oxygen, telemetry, peripheral IV upon PT entry to room.     General Precautions: Standard, fall    Orthopedic Precautions:RLE partial weight bearing   Braces: N/A  Respiratory Status: Nasal cannula, flow 2 L/min     Functional Mobility:  Transfers:     Sit to Stand:  minimum assistance with rolling walker  Bed to Chair: minimum assistance with  rolling walker  using  Stand Pivot  Toilet Transfer: minimum assistance with  rolling walker  using  Stand Pivot  Gait: 50' RW, Sunil, chair follow, 2L O2; cues for upright posture and PLB; verbal reminders of sequencing pattern and RW placement for PWB adherence      AM-PAC 6 CLICK MOBILITY          Treatment & Education:  -VC and pt ed on RW management to adhere to PWB restrictions    Patient left up in chair with all lines intact, call button in reach, chair alarm on, and rounding doctors and nursing staff notified..    GOALS:   Multidisciplinary Problems       Physical Therapy Goals          Problem: Physical Therapy    Goal Priority Disciplines Outcome Goal Variances Interventions   Physical Therapy Goal     PT, PT/OT Ongoing, Progressing     Description: Goals to be met by: 22     Patient will increase functional independence with mobility by performin. Supine to sit with Stand-by Assistance  2. Sit to supine with Stand-by Assistance  3. Sit to stand transfer with Stand-by Assistance  4. Bed to chair transfer with Stand-by Assistance using Rolling Walker  5. Gait  x 25 feet with Stand-by Assistance using Rolling Walker and PWB right LE.                          Time Tracking:     PT Received On: 22  PT Start Time: 915     PT Stop Time: 940  PT Total Time (min): 25 min     Billable Minutes: Gait Training 15 and Therapeutic Activity 10    Treatment Type: Treatment  PT/PTA: PTA     PTA Visit Number: 2     2022

## 2022-11-16 NOTE — PLAN OF CARE
DC orders sent to [facility] via [facility].        11/16/22 1109   Post-Acute Status   Post-Acute Authorization Placement   Post-Acute Placement Status Pending post-acute provider review/more information requested

## 2022-11-16 NOTE — DISCHARGE INSTRUCTIONS
[facility] Facility Transfer Orders        Admit to: [facility]    Diagnoses:   Active Hospital Problems    Diagnosis  POA    *Closed traumatic nondisplaced fracture of neck of right femur [S72.001A]  Yes    Chronic obstructive pulmonary disease, unspecified COPD type [J44.9]  Yes    Type 2 diabetes mellitus with diabetic polyneuropathy, without long-term current use of insulin [E11.42]  Yes    Major depressive disorder, recurrent episode, moderate [F33.1]  Yes    Chronic restrictive lung disease [J98.4]  Yes    Essential hypertension [I10]  Yes    History of ischemic left MCA stroke [Z86.73]  Not Applicable    Hyperlipidemia [E78.5]  Yes    Hypothyroidism [E03.9]  Yes     Chronic      Resolved Hospital Problems   No resolved problems to display.     Allergies:   Review of patient's allergies indicates:   Allergen Reactions    Penicillins Anaphylaxis    Sulfa (sulfonamide antibiotics) Anaphylaxis    Trintellix [vortioxetine] Nausea And Vomiting and Other (See Comments)     Patient has seizures and vomits       Code Status: FULL    Vitals: Routine       Diet: diabetic diet: 2000 calorie; cardiac    Activity: Weight bearing status: weight bearing as tolerated: right leg    Nursing Precautions: Fall and Pressure ulcer prevention    Bed/Surface: Low Air Loss    Consults: PT to evaluate and treat- 5 times a week and OT to evaluate and treat- 5 times a week    Oxygen: 2 liters/min via nasal cannula;   Home Bipap HS. Settings below:  Question Answer   Mode AVAPS   FiO2% 36   Expiratory pressure: 5   Minimum Pressure 10   Maximum Pressure 20   Tidal Volume 350       Dialysis: Patient is not on dialysis. na    Labs: First blood draw on 11/23/22.              CBL and BMP    Pending Diagnostic Studies:       None          Imaging: NA    Miscellaneous Care:   Routine Skin for Bedridden Patients:  Apply moisture barrier cream to all  Diabetes Care: Diabetes: Check blood sugar. Fingerstick blood sugar AC  and HS  Sliding Scale/Hypoglycemia Protocol: For FSG<80, give 1 amp D50 or 1 glucose tablet. For FSG , do nothing. For -200, give 1 unit of novolog in addition to pre-meal insulin. For -250, give 2 units of novolog in addition to pre-meal insulin. For -300, give 3 units of novolog in addition to pre-meal insulin. For 301-350, give 4 units of novolog in addition to pre-meal insulin. For 351-400, give 5 units of novolog in addition to pre-meal insulin. For FSG >400, give 5 units of novolog in addition to pre-meal insulin and please call MD    IV Access: NA     Medications: Discontinue all previous medication orders, if any. See new list below.  Current Discharge Medication List        START taking these medications    Details   aspirin 81 MG Chew Take 1 tablet (81 mg total) by mouth 2 (two) times a day.  Qty: 60 tablet, Refills: 11    Comments: Can go back to daily aspirin 30 days postoperatively.      HYDROcodone-acetaminophen (NORCO)  mg per tablet Take 1 tablet by mouth every 4 (four) hours as needed for Pain.  Qty: 12 tablet, Refills: 0    Comments: Quantity prescribed more than 7 day supply? No      polyethylene glycol (GLYCOLAX) 17 gram PwPk Take 17 g by mouth once daily.  Refills: 0           CONTINUE these medications which have NOT CHANGED    Details   albuterol (PROVENTIL/VENTOLIN HFA) 90 mcg/actuation inhaler Inhale 1-2 puffs into the lungs every 4 (four) hours as needed for Shortness of Breath (coughing). Rescue  Qty: 20.1 g, Refills: 11    Associated Diagnoses: Shortness of breath      aspirin (ECOTRIN) 81 MG EC tablet Take 81 mg by mouth once daily.      CALCIUM CARBONATE/VITAMIN D3 (CALCIUM 500 + D ORAL) Take 1 tablet by mouth once daily. 10 mg daily      cyanocobalamin (VITAMIN B-12) 1000 MCG tablet Take 1 tablet (1,000 mcg total) by mouth once daily.  Qty: 30 tablet, Refills: 0      empagliflozin (JARDIANCE) 10 mg tablet Take 1 tablet (10 mg total) by mouth once  daily.  Qty: 90 tablet, Refills: 2    Associated Diagnoses: Type 2 diabetes mellitus without complication, with long-term current use of insulin      fluticasone furoate-vilanteroL (BREO ELLIPTA) 100-25 mcg/dose diskus inhaler Inhale 1 puff into the lungs once daily. Controller  Qty: 60 each, Refills: 11    Associated Diagnoses: Dyspnea, unspecified type      ketoconazole (NIZORAL) 2 % cream AAA pannus fold at least daily after the shower  Qty: 60 g, Refills: 3    Associated Diagnoses: Intertrigo      levothyroxine (SYNTHROID) 100 MCG tablet Take 1 tablet (100 mcg total) by mouth before breakfast.  Qty: 90 tablet, Refills: 3    Associated Diagnoses: Acquired hypothyroidism      losartan (COZAAR) 50 MG tablet Take 0.5 tablets (25 mg total) by mouth once daily.  Qty: 90 tablet, Refills: 0    Comments: .  Associated Diagnoses: Essential hypertension      metFORMIN (GLUCOPHAGE-XR) 500 MG ER 24hr tablet TAKE 2 TABLETS(1000 MG) BY MOUTH TWICE DAILY WITH MEALS  Qty: 360 tablet, Refills: 3    Associated Diagnoses: Type 2 diabetes mellitus without complication, with long-term current use of insulin      potassium chloride SA (K-DUR,KLOR-CON) 20 MEQ tablet Take 20 mEq by mouth once daily.      prazosin (MINIPRESS) 5 MG capsule Take 1 capsule (5 mg total) by mouth every evening. For nightmares  Qty: 30 capsule, Refills: 2    Associated Diagnoses: PTSD (post-traumatic stress disorder)      sertraline (ZOLOFT) 50 MG tablet Take 1 tablet (50 mg total) by mouth once daily. For depression/anxiety  Qty: 30 tablet, Refills: 2    Associated Diagnoses: PTSD (post-traumatic stress disorder)      simvastatin (ZOCOR) 40 MG tablet Take 1 tablet (40 mg total) by mouth once daily.  Qty: 90 tablet, Refills: 3    Associated Diagnoses: Hyperlipidemia associated with type 2 diabetes mellitus      traZODone (DESYREL) 50 MG tablet Take 1 tablet (50 mg total) by mouth nightly as needed for Insomnia.  Qty: 30 tablet, Refills: 2    Associated  Diagnoses: PTSD (post-traumatic stress disorder)      blood-glucose meter kit Use as instructed. Insurance preferred.  Qty: 1 each, Refills: 0    Associated Diagnoses: Type 2 diabetes mellitus without complication, with long-term current use of insulin      gabapentin (NEURONTIN) 300 MG capsule Take 1 capsule (300 mg total) by mouth every evening. Take 1 tablet every night x 7 days. Increase to twice a day x 7 days. Increase to three times a day.  Qty: 90 capsule, Refills: 0    Associated Diagnoses: Degenerative lumbar disc; Diabetic peripheral neuropathy associated with type 2 diabetes mellitus      tetrabenazine (XENAZINE) 25 mg tablet Take one tablet daily for 7 days and then  Take 1 tablet twice daily afterwards  Qty: 47 tablet, Refills: 0           Follow up:   Dr. Joiner 2 weeks    Immunizations Administered as of 11/16/2022       Name Date Dose VIS Date Route Exp Date    COVID-19, MRNA, LN-S, PF (Pfizer) (Purple Cap) 3/17/2021  2:01 PM 0.3 mL 12/12/2020 Intramuscular 6/30/2021    Site: Left deltoid     Given By: Faiza Arguelles RN     : [employer]     Lot: [id number]     COVID-19, MRNA, LN-S, PF (Pfizer) (Purple Cap) 2/24/2021  1:59 PM 0.3 mL 12/12/2020 Intramuscular 6/30/2021    Site: Right deltoid     Given By: Catherine Gusman LPN     : [employer]     Lot: [id number]             This patient has had both covid vaccinations    Some patients may experience side effects after vaccination.  These may include fever, headache, muscle or joint aches.  Most symptoms resolve with 24-48 hours and do not require urgent medical evaluation unless they persist for more than 72 hours or symptoms are concerning for an unrelated medical condition.          Grace Najera NP

## 2022-11-16 NOTE — PLAN OF CARE
Pt clear for DC from case management standpoint. Discharging to [facility]     11/16/22 1527   Final Note   Assessment Type Final Discharge Note   Anticipated Discharge Disposition SNF

## 2022-11-16 NOTE — PLAN OF CARE
Problem: Physical Therapy  Goal: Physical Therapy Goal  Description: Goals to be met by: 22     Patient will increase functional independence with mobility by performin. Supine to sit with Stand-by Assistance  2. Sit to supine with Stand-by Assistance  3. Sit to stand transfer with Stand-by Assistance  4. Bed to chair transfer with Stand-by Assistance using Rolling Walker  5. Gait  x 25 feet with Stand-by Assistance using Rolling Walker and PWB right LE.     Outcome: Ongoing, Progressing     Pt performed ambulation x 50' with RW, 2L O2, Sunil, and frequent VC for sequencing/RW management for PWB adherence. Toilet transfer with RW and Sunil. Remained up in chair. Will continue to progress patient toward physical therapy goals.

## 2022-11-17 ENCOUNTER — TELEPHONE (OUTPATIENT)
Dept: MEDSURG UNIT | Facility: HOSPITAL | Age: 72
End: 2022-11-17
Payer: MEDICARE

## 2022-11-18 ENCOUNTER — TELEPHONE (OUTPATIENT)
Dept: ORTHOPEDICS | Facility: CLINIC | Age: 72
End: 2022-11-18
Payer: MEDICARE

## 2022-11-18 NOTE — TELEPHONE ENCOUNTER
Received an in basket message to contact nereyda regarding the pt's post op appt. Called and LVM for nereyda to call back. Pt is scheduled to see Dr. Tinoco on 11/28/22 at 9:15am.

## 2022-11-18 NOTE — TELEPHONE ENCOUNTER
----- Message from Ananya Holden LPN sent at 11/17/2022  5:14 PM CST -----  Regarding: FW: 3 week post op  Contact: Bre[facility]    ----- Message -----  From: Arturo Green MA  Sent: 11/17/2022   3:56 PM CST  To: [facility] Staff  Subject: 3 week post op                                   Breanne/[facility] called in and stated patient is at [facility] for Rehab & would like a 3 week post op scheduled.    Please call Soo back at [phone] & she will notify [facility] herself.

## 2022-11-23 DIAGNOSIS — S72.011A CLOSED SUBCAPITAL FRACTURE OF RIGHT FEMUR, INITIAL ENCOUNTER: Primary | ICD-10-CM

## 2022-11-28 ENCOUNTER — HOSPITAL ENCOUNTER (OUTPATIENT)
Dept: RADIOLOGY | Facility: HOSPITAL | Age: 72
Discharge: HOME | End: 2022-11-28
Attending: ORTHOPAEDIC SURGERY
Payer: MEDICARE

## 2022-11-28 ENCOUNTER — OFFICE VISIT (OUTPATIENT)
Dept: ORTHOPEDICS | Facility: CLINIC | Age: 72
End: 2022-11-28
Payer: MEDICARE

## 2022-11-28 VITALS — HEIGHT: 60 IN | BODY MASS INDEX: 31.08 KG/M2 | RESPIRATION RATE: 18 BRPM | WEIGHT: 158.31 LBS

## 2022-11-28 DIAGNOSIS — S72.011D CLOSED SUBCAPITAL FRACTURE OF RIGHT FEMUR WITH ROUTINE HEALING, SUBSEQUENT ENCOUNTER: Primary | ICD-10-CM

## 2022-11-28 DIAGNOSIS — S72.011A CLOSED SUBCAPITAL FRACTURE OF RIGHT FEMUR, INITIAL ENCOUNTER: ICD-10-CM

## 2022-11-28 PROCEDURE — 99024 POSTOP FOLLOW-UP VISIT: CPT | Mod: S$GLB,,, | Performed by: ORTHOPAEDIC SURGERY

## 2022-11-28 PROCEDURE — 3008F BODY MASS INDEX DOCD: CPT | Mod: CPTII,S$GLB,, | Performed by: ORTHOPAEDIC SURGERY

## 2022-11-28 PROCEDURE — 3008F PR BODY MASS INDEX (BMI) DOCUMENTED: ICD-10-PCS | Mod: CPTII,S$GLB,, | Performed by: ORTHOPAEDIC SURGERY

## 2022-11-28 PROCEDURE — 73502 X-RAY EXAM HIP UNI 2-3 VIEWS: CPT | Mod: 26,RT,, | Performed by: RADIOLOGY

## 2022-11-28 PROCEDURE — 1159F PR MEDICATION LIST DOCUMENTED IN MEDICAL RECORD: ICD-10-PCS | Mod: CPTII,S$GLB,, | Performed by: ORTHOPAEDIC SURGERY

## 2022-11-28 PROCEDURE — 73502 XR HIP WITH PELVIS WHEN PERFORMED, 2 OR 3  VIEWS RIGHT: ICD-10-PCS | Mod: 26,RT,, | Performed by: RADIOLOGY

## 2022-11-28 PROCEDURE — 4010F PR ACE/ARB THEARPY RXD/TAKEN: ICD-10-PCS | Mod: CPTII,S$GLB,, | Performed by: ORTHOPAEDIC SURGERY

## 2022-11-28 PROCEDURE — 99999 PR PBB SHADOW E&M-EST. PATIENT-LVL II: CPT | Mod: PBBFAC,,, | Performed by: ORTHOPAEDIC SURGERY

## 2022-11-28 PROCEDURE — 3044F PR MOST RECENT HEMOGLOBIN A1C LEVEL <7.0%: ICD-10-PCS | Mod: CPTII,S$GLB,, | Performed by: ORTHOPAEDIC SURGERY

## 2022-11-28 PROCEDURE — 4010F ACE/ARB THERAPY RXD/TAKEN: CPT | Mod: CPTII,S$GLB,, | Performed by: ORTHOPAEDIC SURGERY

## 2022-11-28 PROCEDURE — 99999 PR PBB SHADOW E&M-EST. PATIENT-LVL II: ICD-10-PCS | Mod: PBBFAC,,, | Performed by: ORTHOPAEDIC SURGERY

## 2022-11-28 PROCEDURE — 1159F MED LIST DOCD IN RCRD: CPT | Mod: CPTII,S$GLB,, | Performed by: ORTHOPAEDIC SURGERY

## 2022-11-28 PROCEDURE — 1160F RVW MEDS BY RX/DR IN RCRD: CPT | Mod: CPTII,S$GLB,, | Performed by: ORTHOPAEDIC SURGERY

## 2022-11-28 PROCEDURE — 1157F PR ADVANCE CARE PLAN OR EQUIV PRESENT IN MEDICAL RECORD: ICD-10-PCS | Mod: CPTII,S$GLB,, | Performed by: ORTHOPAEDIC SURGERY

## 2022-11-28 PROCEDURE — 73502 X-RAY EXAM HIP UNI 2-3 VIEWS: CPT | Mod: TC,PN,RT

## 2022-11-28 PROCEDURE — 1160F PR REVIEW ALL MEDS BY PRESCRIBER/CLIN PHARMACIST DOCUMENTED: ICD-10-PCS | Mod: CPTII,S$GLB,, | Performed by: ORTHOPAEDIC SURGERY

## 2022-11-28 PROCEDURE — 3044F HG A1C LEVEL LT 7.0%: CPT | Mod: CPTII,S$GLB,, | Performed by: ORTHOPAEDIC SURGERY

## 2022-11-28 PROCEDURE — 1157F ADVNC CARE PLAN IN RCRD: CPT | Mod: CPTII,S$GLB,, | Performed by: ORTHOPAEDIC SURGERY

## 2022-11-28 PROCEDURE — 99024 PR POST-OP FOLLOW-UP VISIT: ICD-10-PCS | Mod: S$GLB,,, | Performed by: ORTHOPAEDIC SURGERY

## 2022-11-28 NOTE — PROGRESS NOTES
CC:  72-year-old female follows up status post percutaneous pinning of the right hip.  Date of surgery was 11/11/2022.  She is convalescing in a nursing home.  Overall she is doing well and progressing well with PT.  She is been up and walking and walked up and down the ramp at the nursing home 3 times.  She states her pain is 0/10.    Examination of the Right Lower Extremity    Skin is intact throughout, percutaneous sites are all clean, dry, and intact.  Healing well with no sign of infection  Motor in intact EHL,FHL,TA,alex  +2 DP/PT  Sensation LT intact D/P/1st    Examination of the Right Hip    C-Sign negative  Logroll negative  Stenchfield negative    Pain with ROM negative    ROM:    Flexion   120  Extension   30  Abduction   45  Adduction   20  External Rotation 45  Internal Rotation 35    Flexion contracture negative    FADIR negative  FADER negative    Tenderness to palpation over lateral and posterolateral greater tochanter negative    X-ray images were examined and personally interpreted by me.  Three views the right hip dated 11/28/2022 show 3 screws transversing a healing fracture of the right femoral neck.    Dx:  Healing right femoral neck fracture, stable    Plan:  Sutures removed and Steri-Strips applied.  Progress physical therapy as tolerated.  Follow-up in 4 weeks with an x-ray.

## 2022-11-29 ENCOUNTER — TELEPHONE (OUTPATIENT)
Dept: ORTHOPEDICS | Facility: CLINIC | Age: 72
End: 2022-11-29
Payer: MEDICARE

## 2022-11-29 NOTE — TELEPHONE ENCOUNTER
Called and spoke with Dia regarding pt appt. I informed mary of pt appt for 12/20/22 @ 2:15pm. Dia verbalized understanding.

## 2022-11-29 NOTE — TELEPHONE ENCOUNTER
----- Message from Nelson Thayer sent at 11/29/2022 12:20 PM CST -----  Regarding: needs post op week of 12/19, I can't jennifer , call Dia  ext  [phone]  Contact: Dia Yan [phone]  needs post op week of 12/19, I can't [facility] , josephine Alvares  ext  [phone]

## 2022-12-14 ENCOUNTER — OFFICE VISIT (OUTPATIENT)
Dept: PODIATRY | Facility: CLINIC | Age: 72
End: 2022-12-14
Payer: MEDICARE

## 2022-12-14 VITALS — HEART RATE: 76 BPM | WEIGHT: 158 LBS | BODY MASS INDEX: 31.02 KG/M2 | HEIGHT: 60 IN | OXYGEN SATURATION: 96 %

## 2022-12-14 DIAGNOSIS — M20.41 HAMMER TOES OF BOTH FEET: ICD-10-CM

## 2022-12-14 DIAGNOSIS — I87.2 VENOUS INSUFFICIENCY OF BOTH LOWER EXTREMITIES: ICD-10-CM

## 2022-12-14 DIAGNOSIS — R26.2 DIFFICULTY IN WALKING, NOT ELSEWHERE CLASSIFIED: ICD-10-CM

## 2022-12-14 DIAGNOSIS — E11.42 DIABETIC POLYNEUROPATHY ASSOCIATED WITH TYPE 2 DIABETES MELLITUS: Primary | ICD-10-CM

## 2022-12-14 DIAGNOSIS — L60.2 ONYCHOGRYPOSIS: ICD-10-CM

## 2022-12-14 DIAGNOSIS — L60.3 ONYCHODYSTROPHY: ICD-10-CM

## 2022-12-14 DIAGNOSIS — M20.42 HAMMER TOES OF BOTH FEET: ICD-10-CM

## 2022-12-14 PROCEDURE — 1160F RVW MEDS BY RX/DR IN RCRD: CPT | Mod: CPTII,S$GLB,, | Performed by: PODIATRIST

## 2022-12-14 PROCEDURE — 1160F PR REVIEW ALL MEDS BY PRESCRIBER/CLIN PHARMACIST DOCUMENTED: ICD-10-PCS | Mod: CPTII,S$GLB,, | Performed by: PODIATRIST

## 2022-12-14 PROCEDURE — 3044F HG A1C LEVEL LT 7.0%: CPT | Mod: CPTII,S$GLB,, | Performed by: PODIATRIST

## 2022-12-14 PROCEDURE — 99499 UNLISTED E&M SERVICE: CPT | Mod: S$GLB,,, | Performed by: PODIATRIST

## 2022-12-14 PROCEDURE — 11721 ROUTINE FOOT CARE: ICD-10-PCS | Mod: Q9,S$GLB,, | Performed by: PODIATRIST

## 2022-12-14 PROCEDURE — 3288F FALL RISK ASSESSMENT DOCD: CPT | Mod: CPTII,S$GLB,, | Performed by: PODIATRIST

## 2022-12-14 PROCEDURE — 1159F PR MEDICATION LIST DOCUMENTED IN MEDICAL RECORD: ICD-10-PCS | Mod: CPTII,S$GLB,, | Performed by: PODIATRIST

## 2022-12-14 PROCEDURE — 1126F PR PAIN SEVERITY QUANTIFIED, NO PAIN PRESENT: ICD-10-PCS | Mod: CPTII,S$GLB,, | Performed by: PODIATRIST

## 2022-12-14 PROCEDURE — 4010F PR ACE/ARB THEARPY RXD/TAKEN: ICD-10-PCS | Mod: CPTII,S$GLB,, | Performed by: PODIATRIST

## 2022-12-14 PROCEDURE — 1157F PR ADVANCE CARE PLAN OR EQUIV PRESENT IN MEDICAL RECORD: ICD-10-PCS | Mod: CPTII,S$GLB,, | Performed by: PODIATRIST

## 2022-12-14 PROCEDURE — 1100F PR PT FALLS ASSESS DOC 2+ FALLS/FALL W/INJURY/YR: ICD-10-PCS | Mod: CPTII,S$GLB,, | Performed by: PODIATRIST

## 2022-12-14 PROCEDURE — 1157F ADVNC CARE PLAN IN RCRD: CPT | Mod: CPTII,S$GLB,, | Performed by: PODIATRIST

## 2022-12-14 PROCEDURE — 4010F ACE/ARB THERAPY RXD/TAKEN: CPT | Mod: CPTII,S$GLB,, | Performed by: PODIATRIST

## 2022-12-14 PROCEDURE — 1126F AMNT PAIN NOTED NONE PRSNT: CPT | Mod: CPTII,S$GLB,, | Performed by: PODIATRIST

## 2022-12-14 PROCEDURE — 11721 DEBRIDE NAIL 6 OR MORE: CPT | Mod: Q9,S$GLB,, | Performed by: PODIATRIST

## 2022-12-14 PROCEDURE — 3008F BODY MASS INDEX DOCD: CPT | Mod: CPTII,S$GLB,, | Performed by: PODIATRIST

## 2022-12-14 PROCEDURE — 3008F PR BODY MASS INDEX (BMI) DOCUMENTED: ICD-10-PCS | Mod: CPTII,S$GLB,, | Performed by: PODIATRIST

## 2022-12-14 PROCEDURE — 3044F PR MOST RECENT HEMOGLOBIN A1C LEVEL <7.0%: ICD-10-PCS | Mod: CPTII,S$GLB,, | Performed by: PODIATRIST

## 2022-12-14 PROCEDURE — 1100F PTFALLS ASSESS-DOCD GE2>/YR: CPT | Mod: CPTII,S$GLB,, | Performed by: PODIATRIST

## 2022-12-14 PROCEDURE — 99499 NO LOS: ICD-10-PCS | Mod: S$GLB,,, | Performed by: PODIATRIST

## 2022-12-14 PROCEDURE — 3288F PR FALLS RISK ASSESSMENT DOCUMENTED: ICD-10-PCS | Mod: CPTII,S$GLB,, | Performed by: PODIATRIST

## 2022-12-14 PROCEDURE — 1159F MED LIST DOCD IN RCRD: CPT | Mod: CPTII,S$GLB,, | Performed by: PODIATRIST

## 2022-12-14 NOTE — PATIENT INSTRUCTIONS
Your Diabetes Foot Care Program    Every day you depend on your feet to keep you moving. But when you have diabetes, your feet need special care. Even a small foot problem can become very serious. So dont take your feet for granted. By working with your diabetes healthcare team, you can learn how to protect your feet and keep them healthy.  Evaluating your feet  An evaluation helps your healthcare provider check the condition of your feet. The evaluation includes a review of your diabetes history and overall health. It may also include a foot exam, X-rays, or other tests. These can help show problems beneath the skin that you cant see or feel.  Medical history  You will be asked about your overall health and any history of foot problems. Youll also discuss your diabetes history, such as whether your blood sugar level has changed over time. It also includes questions about sensations of pain, tingling, pins and needles, or numbness. Your healthcare provider will also want to know if you have high blood pressure and heart disease, or if you smoke. Be sure to mention any medicines (including over-the-counter), supplements, or herbal remedies you take.  Foot exam  A foot exam checks the condition of different parts of your foot. First, your skin and nails are examined for any signs of infection. Blood flow is checked by feeling for the pulses in each foot. You may also have tests to study the nerves in the foot. These include using a small filament (wire) to see how sensitive your feet are. In certain cases, you will be asked to walk a short distance to check for bone, joint, and muscle problems.  Diagnostic tests  If needed, your healthcare provider will suggest certain tests to learn more about your feet. These include:  Doppler tests to measure blood flow in the feet and lower leg.  X-rays, which can show bone or joint problems.  Other imaging tests, such as an MRI (magnetic resonance imaging), bone scan, and CT  (computed tomography) scan. These can help show bone infections.  Other tests, such as vascular tests, which study the blood flow in your feet and legs. You may also have nerve studies to learn how sensitive your feet are.  Creating a foot care program  Based on the evaluation, your healthcare provider will create a foot care program for you. Your program may be as simple as starting a daily self-care routine and changing the types of shoes your wear. It may also involve treating minor foot problems, such as a corn or blister. In some cases, surgery will be needed to treat an infection or mechanical problems, such as hammer toes.  Preventing problems  When you have diabetes, its easier to prevent problems than to treat them later on. So see your healthcare team for regular checkups and foot care. Your healthcare team can also help you learn more about caring for your feet at home. For example, you may be told to avoid walking barefoot. Or you may be told that special footwear is needed to protect your feet.  Have regular checkups  Foot problems can develop quickly. So be sure to follow your healthcare teams schedule for regular checkups. During office visits, take off your shoes and socks as soon as you get in the exam room. Ask your healthcare provider to examine your feet for problems. This will make it easier to find and treat small skin irritations before they get worse. Regular checkups can also help keep track of the blood flow and feeling in your feet. If you have neuropathy (lack of feeling in your feet), you will need to have checkups more often.  Learn about self-care  The more you know about diabetes and your feet, the easier it will be to prevent problems. Members of your healthcare team can teach you how to inspect your feet and teach you to look for warning signs. They can also give you other foot care tips. During office visits, be sure to ask any questions you have.  Date Last Reviewed: 7/1/2016  ©  3010-6654 [employer]. [address] [place] [address]. All rights reserved. This information is not intended as a substitute for professional medical care. Always follow your healthcare professional's instructions.       What Are Mallet, Hammer, and Claw Toes?  Mallet, hammer, and claw toes are most often caused by wearing shoes that are too short or heels that are too high. This jams the toes against the front of the shoe and causes one or more joints to bend. Rarely, disease can cause the joints in the toes to bend. Mallet, hammer, and claw toes are among the most common toe problems. They occur most often in the longest of the four smaller toes.      Inside your toes  There are 3 bones in each of your 4 smaller toes. Where 2 bones connect is called a joint. Normally the toes lie flat. But pressure on the toes or the front of the foot can cause one or more joints to bend. This curls the toe. Toes that stay curled are called mallet toes, hammer toes, or claw toes, depending on which joints are bent.    Symptoms  You may feel pain in the toe or in the ball of your foot. A corn (a hard growth of skin on the top of the toe) may form where the toe rubs against the top of the shoe. Or a callus (a hard growth of skin on the bottom of the foot) may form under the tip of the toe or on the ball of the foot. Corns and calluses can also be painful.    Date Last Reviewed: 10/18/2015  © 7051-9012 [employer]. [address] [place] [address]. All rights reserved. This information is not intended as a substitute for professional medical care. Always follow your healthcare professional's instructions.

## 2022-12-14 NOTE — PROGRESS NOTES
[address] [facility] [address]  [place] [address]  Phone: [phone]   [phone]    Patient's PCP:Yanna Abbasi MD  Referring Provider: No ref. provider found    Subjective:      Chief Complaint:: Routine Foot Care    HPI  Maggy Tapia is a 72 y.o. female who presents today routine nail trim/care. No other complaints, no pain.     Systemic Doctor: MIRACLE Richards PA-C  Date Last Seen: 2022   Blood Sugar: 94-83 at night  Hemoglobin A1c: 5.1 (08/15/2022 )     Vitals:    22 1155   Pulse: 76   SpO2: 96%   Weight: 71.7 kg (158 lb)   Height: 5' (1.524 m)   PainSc: 0-No pain      Shoe Size:     Past Surgical History:   Procedure Laterality Date    APPENDECTOMY      BREAST BIOPSY Left     BREAST LUMPECTOMY Left     2016    BREAST SURGERY      CATARACT EXTRACTION Bilateral     OU done//     SECTION      CHOLECYSTECTOMY      COLONOSCOPY N/A 2020    Procedure: COLONOSCOPY;  Surgeon: Mj Fernandez MD;  Location: [facility] ENDO;  Service: Endoscopy;  Laterality: N/A;    CYST REMOVAL Left 2021    DILATION AND CURETTAGE OF UTERUS      EYE SURGERY      PERCUTANEOUS PINNING OF HIP Right 2022    Procedure: PINNING, HIP, PERCUTANEOUS;  Surgeon: Ministerio Joiner II, MD;  Location: [facility] OR;  Service: Orthopedics;  Laterality: Right;     Past Medical History:   Diagnosis Date    Anxiety     Arthritis     Asthma     Cancer     Left Breast    Depression     Diabetes mellitus, type 2     GERD (gastroesophageal reflux disease)     Hyperlipidemia     Hypertension     Overactive bladder     Seizures     Pseudo-seizures    Sleep apnea     Stroke     Stroke 2022    pt was in the hospital for a stroke, claims she was seeing people when the stroke happened    Thyroid disease     Urinary tract infection without hematuria 2017     Family History   Problem Relation Age of Onset    Diabetes Mother     Hypertension Mother     Breast cancer Mother     Cataracts Mother     Melanoma Mother      Heart failure Father     Melanoma Father     Cataracts Brother     Melanoma Brother     Glaucoma Neg Hx     Retinal detachment Neg Hx     Macular degeneration Neg Hx         Social History:   Marital Status: Single  Alcohol History:  reports current alcohol use.  Tobacco History:  reports that she has never smoked. She has never used smokeless tobacco.  Drug History:  reports no history of drug use.    Review of patient's allergies indicates:   Allergen Reactions    Penicillins Anaphylaxis    Sulfa (sulfonamide antibiotics) Anaphylaxis    Trintellix [vortioxetine] Nausea And Vomiting and Other (See Comments)     Patient has seizures and vomits       Current Outpatient Medications   Medication Sig Dispense Refill    albuterol (PROVENTIL/VENTOLIN HFA) 90 mcg/actuation inhaler Inhale 1-2 puffs into the lungs every 4 (four) hours as needed for Shortness of Breath (coughing). Rescue 20.1 g 11    aspirin (ECOTRIN) 81 MG EC tablet Take 81 mg by mouth once daily.      aspirin 81 MG Chew Take 1 tablet (81 mg total) by mouth 2 (two) times a day. 60 tablet 11    blood-glucose meter kit Use as instructed. Insurance preferred. 1 each 0    CALCIUM CARBONATE/VITAMIN D3 (CALCIUM 500 + D ORAL) Take 1 tablet by mouth once daily. 10 mg daily      cyanocobalamin (VITAMIN B-12) 1000 MCG tablet Take 1 tablet (1,000 mcg total) by mouth once daily. 30 tablet 0    empagliflozin (JARDIANCE) 10 mg tablet Take 1 tablet (10 mg total) by mouth once daily. 90 tablet 2    fluticasone furoate-vilanteroL (BREO ELLIPTA) 100-25 mcg/dose diskus inhaler Inhale 1 puff into the lungs once daily. Controller 60 each 11    HYDROcodone-acetaminophen (NORCO)  mg per tablet Take 1 tablet by mouth every 4 (four) hours as needed for Pain. 12 tablet 0    ketoconazole (NIZORAL) 2 % cream AAA pannus fold at least daily after the shower 60 g 3    levothyroxine (SYNTHROID) 100 MCG tablet Take 1 tablet (100 mcg total) by mouth before breakfast. 90 tablet 3     losartan (COZAAR) 50 MG tablet Take 0.5 tablets (25 mg total) by mouth once daily. 90 tablet 0    metFORMIN (GLUCOPHAGE-XR) 500 MG ER 24hr tablet TAKE 2 TABLETS(1000 MG) BY MOUTH TWICE DAILY WITH MEALS 360 tablet 3    polyethylene glycol (GLYCOLAX) 17 gram PwPk Take 17 g by mouth once daily.  0    potassium chloride SA (K-DUR,KLOR-CON) 20 MEQ tablet Take 20 mEq by mouth once daily.      prazosin (MINIPRESS) 5 MG capsule Take 1 capsule (5 mg total) by mouth every evening. For nightmares 30 capsule 2    sertraline (ZOLOFT) 50 MG tablet Take 1 tablet (50 mg total) by mouth once daily. For depression/anxiety 30 tablet 2    simvastatin (ZOCOR) 40 MG tablet Take 1 tablet (40 mg total) by mouth once daily. 90 tablet 3    tetrabenazine (XENAZINE) 25 mg tablet Take one tablet daily for 7 days and then  Take 1 tablet twice daily afterwards 47 tablet 0    traZODone (DESYREL) 50 MG tablet Take 1 tablet (50 mg total) by mouth nightly as needed for Insomnia. 30 tablet 2    gabapentin (NEURONTIN) 300 MG capsule Take 1 capsule (300 mg total) by mouth every evening. Take 1 tablet every night x 7 days. Increase to twice a day x 7 days. Increase to three times a day. 90 capsule 0     No current facility-administered medications for this visit.       Review of Systems   Constitutional:  Negative for chills, fatigue, fever and unexpected weight change.   HENT:  Positive for hearing loss. Negative for trouble swallowing.    Eyes:  Negative for photophobia and visual disturbance.   Respiratory:  Negative for cough, shortness of breath and wheezing.    Cardiovascular:  Negative for chest pain, palpitations and leg swelling.   Gastrointestinal:  Negative for abdominal pain and nausea.   Genitourinary:  Negative for dysuria and frequency.   Musculoskeletal:  Positive for arthralgias, gait problem, joint swelling and myalgias. Negative for back pain.   Skin:  Negative for rash and wound.   Neurological:  Positive for seizures and weakness.  Negative for tremors, numbness and headaches.   Hematological:  Bruises/bleeds easily.       Objective:        Physical Exam:   Foot Exam    General  General Appearance: appears stated age and healthy   Orientation: alert and oriented to person, place, and time   Affect: appropriate   Gait: unimpaired   Assistance: walker use       Right Foot/Ankle     Inspection and Palpation  Ecchymosis: none  Tenderness: (Great toe)  Swelling: (Mild edema lower extremity)  Arch: normal  Hammertoes: second toe, third toe, fourth toe and fifth toe  Skin Exam: dry skin; no ulcer and no erythema   Fungus Toenails: present    Neurovascular  Dorsalis pedis: 1+  Posterior tibial: 1+  Capillary Refill: 2+  Varicose veins: present  Saphenous nerve sensation: diminished  Tibial nerve sensation: diminished  Superficial peroneal nerve sensation: diminished  Deep peroneal nerve sensation: diminished  Sural nerve sensation: diminished    Edema  Type of edema: non-pitting    Muscle Strength  Ankle dorsiflexion: 4+  Ankle plantar flexion: 4+  Ankle inversion: 4+  Ankle eversion: 4+  Great toe extension: 4+  Great toe flexion: 4+    Range of Motion    Normal right ankle ROM    Tests  PT Tinel's sign: negative    Paresthesia: positive  Comments  Nails 1 through 5 are thickened, discolored, dystrophic, and elongated with subungual debris    Skin thin shiny and atrophic with diminished pedal hair growth       Left Foot/Ankle      Inspection and Palpation  Ecchymosis: none  Tenderness: (Great toe)  Swelling: (Mild lower extremity edema)  Arch: normal  Hammertoes: second toe, third toe, fourth toe and fifth toe  Skin Exam: dry skin; no drainage, no ulcer and no erythema   Fungus Toenails: present    Neurovascular  Dorsalis pedis: 2+  Posterior tibial: 2+  Capillary refill: 2+  Varicose veins: present  Saphenous nerve sensation: diminished  Tibial nerve sensation: diminished  Superficial peroneal nerve sensation: diminished  Deep peroneal nerve  sensation: diminished  Sural nerve sensation: diminished    Edema  Type of edema: non-pitting    Muscle Strength  Ankle dorsiflexion: 4+  Ankle plantar flexion: 4+  Ankle inversion: 4+  Ankle eversion: 4+  Great toe extension: 4+  Great toe flexion: 4+    Range of Motion    Normal left ankle ROM    Tests  PT Tinel's sign: negative  Paresthesia: positive  Comments  Nails 1 through 5 are thickened, discolored, dystrophic, and elongated with subungual debris    Skin thin shiny and atrophic with diminished pedal hair growth     Physical Exam  Cardiovascular:      Pulses:           Dorsalis pedis pulses are 1+ on the right side and 2+ on the left side.        Posterior tibial pulses are 1+ on the right side and 2+ on the left side.   Feet:      Right foot:      Skin integrity: Dry skin present. No ulcer or erythema.      Toenail Condition: Fungal disease present.     Left foot:      Skin integrity: Dry skin present. No ulcer or erythema.      Toenail Condition: Fungal disease present.            Right Ankle/Foot Exam     Range of Motion   The patient has normal right ankle ROM.    Comments:  Nails 1 through 5 are thickened, discolored, dystrophic, and elongated with subungual debris    Skin thin shiny and atrophic with diminished pedal hair growth       Left Ankle/Foot Exam     Range of Motion   The patient has normal left ankle ROM.     Comments:  Nails 1 through 5 are thickened, discolored, dystrophic, and elongated with subungual debris    Skin thin shiny and atrophic with diminished pedal hair growth         Muscle Strength   Right Lower Extremity   Ankle Dorsiflexion:  4+   Plantar flexion:  4+/5  Left Lower Extremity   Ankle Dorsiflexion:  4+   Plantar flexion:  4+/5     Reflexes     Left Side  Paresthesia: present    Right Side   Paresthesia: present    Vascular Exam     Right Pulses  Dorsalis Pedis:      1+  Posterior Tibial:      1+        Left Pulses  Dorsalis Pedis:      2+  Posterior Tibial:      2+      "    Imaging: none            Assessment:       1. Diabetic polyneuropathy associated with type 2 diabetes mellitus    2. Venous insufficiency of both lower extremities    3. Hammer toes of both feet    4. Difficulty in walking, not elsewhere classified    5. Onychodystrophy    6. Onychogryposis      Plan:   Diabetic polyneuropathy associated with type 2 diabetes mellitus  -     Routine Foot Care    Venous insufficiency of both lower extremities  -     Routine Foot Care    Hammer toes of both feet    Difficulty in walking, not elsewhere classified  -     Routine Foot Care    Onychodystrophy    Onychogryposis  -     Routine Foot Care      Follow up if symptoms worsen or fail to improve.    Routine Foot Care    Date/Time: 12/14/2022 11:00 AM  Performed by: Felix Bean DPM  Authorized by: Felix Bean DPM     Time out: Immediately prior to procedure a "time out" was called to verify the correct patient, procedure, equipment, support staff and site/side marked as required.    Consent Done?:  Not Needed  Hyperkeratotic Skin Lesions?: No      Nail Care Type:  Debride  Location(s): All  (Left 1st Toe, Left 3rd Toe, Left 2nd Toe, Left 4th Toe, Left 5th Toe, Right 1st Toe, Right 2nd Toe, Right 3rd Toe, Right 4th Toe and Right 5th Toe)  Patient tolerance:  Patient tolerated the procedure well with no immediate complications     Instruments Used: Nail Nipper   Manually reduced with electric .             Counseling:     I provided patient education verbally regarding:   Patient diagnosis, treatment options, as well as alternatives, risks, and benefits.     This note was created using Dragon voice recognition software that occasionally misinterpreted phrases or words.               "

## 2022-12-16 DIAGNOSIS — M25.551 RIGHT HIP PAIN: Primary | ICD-10-CM

## 2022-12-20 ENCOUNTER — TELEPHONE (OUTPATIENT)
Dept: ORTHOPEDICS | Facility: CLINIC | Age: 72
End: 2022-12-20
Payer: MEDICARE

## 2023-01-03 ENCOUNTER — LAB VISIT (OUTPATIENT)
Dept: LAB | Facility: HOSPITAL | Age: 73
End: 2023-01-03
Attending: NURSE PRACTITIONER
Payer: MEDICARE

## 2023-01-03 DIAGNOSIS — E78.5 HYPERLIPIDEMIA ASSOCIATED WITH TYPE 2 DIABETES MELLITUS: ICD-10-CM

## 2023-01-03 DIAGNOSIS — I15.2 HYPERTENSION ASSOCIATED WITH TYPE 2 DIABETES MELLITUS: ICD-10-CM

## 2023-01-03 DIAGNOSIS — E11.59 HYPERTENSION ASSOCIATED WITH TYPE 2 DIABETES MELLITUS: ICD-10-CM

## 2023-01-03 DIAGNOSIS — Z79.4 TYPE 2 DIABETES MELLITUS WITHOUT COMPLICATION, WITH LONG-TERM CURRENT USE OF INSULIN: ICD-10-CM

## 2023-01-03 DIAGNOSIS — E11.9 TYPE 2 DIABETES MELLITUS WITHOUT COMPLICATION, WITH LONG-TERM CURRENT USE OF INSULIN: ICD-10-CM

## 2023-01-03 DIAGNOSIS — E11.69 HYPERLIPIDEMIA ASSOCIATED WITH TYPE 2 DIABETES MELLITUS: ICD-10-CM

## 2023-01-03 LAB
ALBUMIN SERPL BCP-MCNC: 3.3 G/DL (ref 3.5–5.2)
ALP SERPL-CCNC: 93 U/L (ref 55–135)
ALT SERPL W/O P-5'-P-CCNC: 13 U/L (ref 10–44)
ANION GAP SERPL CALC-SCNC: 5 MMOL/L (ref 8–16)
AST SERPL-CCNC: 12 U/L (ref 10–40)
BASOPHILS # BLD AUTO: 0.04 K/UL (ref 0–0.2)
BASOPHILS NFR BLD: 0.5 % (ref 0–1.9)
BILIRUB SERPL-MCNC: 0.2 MG/DL (ref 0.1–1)
BUN SERPL-MCNC: 23 MG/DL (ref 8–23)
CALCIUM SERPL-MCNC: 9.7 MG/DL (ref 8.7–10.5)
CHLORIDE SERPL-SCNC: 108 MMOL/L (ref 95–110)
CHOLEST SERPL-MCNC: 153 MG/DL (ref 120–199)
CHOLEST/HDLC SERPL: 3.6 {RATIO} (ref 2–5)
CO2 SERPL-SCNC: 29 MMOL/L (ref 23–29)
CREAT SERPL-MCNC: 0.9 MG/DL (ref 0.5–1.4)
DIFFERENTIAL METHOD: ABNORMAL
EOSINOPHIL # BLD AUTO: 0.2 K/UL (ref 0–0.5)
EOSINOPHIL NFR BLD: 2 % (ref 0–8)
ERYTHROCYTE [DISTWIDTH] IN BLOOD BY AUTOMATED COUNT: 14.2 % (ref 11.5–14.5)
EST. GFR  (NO RACE VARIABLE): >60 ML/MIN/1.73 M^2
ESTIMATED AVG GLUCOSE: 111 MG/DL (ref 68–131)
GLUCOSE SERPL-MCNC: 156 MG/DL (ref 70–110)
HBA1C MFR BLD: 5.5 % (ref 4–5.6)
HCT VFR BLD AUTO: 45.3 % (ref 37–48.5)
HDLC SERPL-MCNC: 43 MG/DL (ref 40–75)
HDLC SERPL: 28.1 % (ref 20–50)
HGB BLD-MCNC: 14.1 G/DL (ref 12–16)
IMM GRANULOCYTES # BLD AUTO: 0.02 K/UL (ref 0–0.04)
IMM GRANULOCYTES NFR BLD AUTO: 0.3 % (ref 0–0.5)
LDLC SERPL CALC-MCNC: 69.4 MG/DL (ref 63–159)
LYMPHOCYTES # BLD AUTO: 1.8 K/UL (ref 1–4.8)
LYMPHOCYTES NFR BLD: 22.8 % (ref 18–48)
MCH RBC QN AUTO: 30.1 PG (ref 27–31)
MCHC RBC AUTO-ENTMCNC: 31.1 G/DL (ref 32–36)
MCV RBC AUTO: 97 FL (ref 82–98)
MONOCYTES # BLD AUTO: 0.5 K/UL (ref 0.3–1)
MONOCYTES NFR BLD: 5.7 % (ref 4–15)
NEUTROPHILS # BLD AUTO: 5.4 K/UL (ref 1.8–7.7)
NEUTROPHILS NFR BLD: 68.7 % (ref 38–73)
NONHDLC SERPL-MCNC: 110 MG/DL
NRBC BLD-RTO: 0 /100 WBC
PLATELET # BLD AUTO: 143 K/UL (ref 150–450)
PMV BLD AUTO: 11.7 FL (ref 9.2–12.9)
POTASSIUM SERPL-SCNC: 4.4 MMOL/L (ref 3.5–5.1)
PROT SERPL-MCNC: 6.6 G/DL (ref 6–8.4)
RBC # BLD AUTO: 4.69 M/UL (ref 4–5.4)
SODIUM SERPL-SCNC: 142 MMOL/L (ref 136–145)
TRIGL SERPL-MCNC: 203 MG/DL (ref 30–150)
WBC # BLD AUTO: 7.88 K/UL (ref 3.9–12.7)

## 2023-01-03 PROCEDURE — 36415 COLL VENOUS BLD VENIPUNCTURE: CPT | Mod: PO | Performed by: NURSE PRACTITIONER

## 2023-01-03 PROCEDURE — 85025 COMPLETE CBC W/AUTO DIFF WBC: CPT | Performed by: NURSE PRACTITIONER

## 2023-01-03 PROCEDURE — 80053 COMPREHEN METABOLIC PANEL: CPT | Performed by: NURSE PRACTITIONER

## 2023-01-03 PROCEDURE — 83036 HEMOGLOBIN GLYCOSYLATED A1C: CPT | Performed by: NURSE PRACTITIONER

## 2023-01-03 PROCEDURE — 80061 LIPID PANEL: CPT | Performed by: NURSE PRACTITIONER

## 2023-01-05 ENCOUNTER — OFFICE VISIT (OUTPATIENT)
Dept: PSYCHIATRY | Facility: CLINIC | Age: 73
End: 2023-01-05
Payer: COMMERCIAL

## 2023-01-05 VITALS
DIASTOLIC BLOOD PRESSURE: 77 MMHG | BODY MASS INDEX: 32.02 KG/M2 | WEIGHT: 163.13 LBS | HEART RATE: 81 BPM | SYSTOLIC BLOOD PRESSURE: 121 MMHG | HEIGHT: 60 IN

## 2023-01-05 DIAGNOSIS — F44.5 PSEUDOSEIZURES: ICD-10-CM

## 2023-01-05 DIAGNOSIS — F34.1 PERSISTENT DEPRESSIVE DISORDER: ICD-10-CM

## 2023-01-05 DIAGNOSIS — F43.10 PTSD (POST-TRAUMATIC STRESS DISORDER): Primary | ICD-10-CM

## 2023-01-05 DIAGNOSIS — F41.9 ANXIETY DISORDER, UNSPECIFIED TYPE: ICD-10-CM

## 2023-01-05 PROCEDURE — 1160F PR REVIEW ALL MEDS BY PRESCRIBER/CLIN PHARMACIST DOCUMENTED: ICD-10-PCS | Mod: CPTII,S$GLB,, | Performed by: PHYSICIAN ASSISTANT

## 2023-01-05 PROCEDURE — 99499 UNLISTED E&M SERVICE: CPT | Mod: S$GLB,,, | Performed by: PHYSICIAN ASSISTANT

## 2023-01-05 PROCEDURE — 99214 OFFICE O/P EST MOD 30 MIN: CPT | Mod: S$GLB,,, | Performed by: PHYSICIAN ASSISTANT

## 2023-01-05 PROCEDURE — 3074F PR MOST RECENT SYSTOLIC BLOOD PRESSURE < 130 MM HG: ICD-10-PCS | Mod: CPTII,S$GLB,, | Performed by: PHYSICIAN ASSISTANT

## 2023-01-05 PROCEDURE — 1160F RVW MEDS BY RX/DR IN RCRD: CPT | Mod: CPTII,S$GLB,, | Performed by: PHYSICIAN ASSISTANT

## 2023-01-05 PROCEDURE — 3078F DIAST BP <80 MM HG: CPT | Mod: CPTII,S$GLB,, | Performed by: PHYSICIAN ASSISTANT

## 2023-01-05 PROCEDURE — 99999 PR PBB SHADOW E&M-EST. PATIENT-LVL IV: ICD-10-PCS | Mod: PBBFAC,,, | Performed by: PHYSICIAN ASSISTANT

## 2023-01-05 PROCEDURE — 1100F PTFALLS ASSESS-DOCD GE2>/YR: CPT | Mod: CPTII,S$GLB,, | Performed by: PHYSICIAN ASSISTANT

## 2023-01-05 PROCEDURE — 90833 PR PSYCHOTHERAPY W/PATIENT W/E&M, 30 MIN (ADD ON): ICD-10-PCS | Mod: S$GLB,,, | Performed by: PHYSICIAN ASSISTANT

## 2023-01-05 PROCEDURE — 99499 RISK ADDL DX/OHS AUDIT: ICD-10-PCS | Mod: S$GLB,,, | Performed by: PHYSICIAN ASSISTANT

## 2023-01-05 PROCEDURE — 90833 PSYTX W PT W E/M 30 MIN: CPT | Mod: S$GLB,,, | Performed by: PHYSICIAN ASSISTANT

## 2023-01-05 PROCEDURE — 3044F PR MOST RECENT HEMOGLOBIN A1C LEVEL <7.0%: ICD-10-PCS | Mod: CPTII,S$GLB,, | Performed by: PHYSICIAN ASSISTANT

## 2023-01-05 PROCEDURE — 1126F AMNT PAIN NOTED NONE PRSNT: CPT | Mod: CPTII,S$GLB,, | Performed by: PHYSICIAN ASSISTANT

## 2023-01-05 PROCEDURE — 3044F HG A1C LEVEL LT 7.0%: CPT | Mod: CPTII,S$GLB,, | Performed by: PHYSICIAN ASSISTANT

## 2023-01-05 PROCEDURE — 1157F PR ADVANCE CARE PLAN OR EQUIV PRESENT IN MEDICAL RECORD: ICD-10-PCS | Mod: CPTII,S$GLB,, | Performed by: PHYSICIAN ASSISTANT

## 2023-01-05 PROCEDURE — 3072F LOW RISK FOR RETINOPATHY: CPT | Mod: CPTII,S$GLB,, | Performed by: PHYSICIAN ASSISTANT

## 2023-01-05 PROCEDURE — 1126F PR PAIN SEVERITY QUANTIFIED, NO PAIN PRESENT: ICD-10-PCS | Mod: CPTII,S$GLB,, | Performed by: PHYSICIAN ASSISTANT

## 2023-01-05 PROCEDURE — 3288F PR FALLS RISK ASSESSMENT DOCUMENTED: ICD-10-PCS | Mod: CPTII,S$GLB,, | Performed by: PHYSICIAN ASSISTANT

## 2023-01-05 PROCEDURE — 3078F PR MOST RECENT DIASTOLIC BLOOD PRESSURE < 80 MM HG: ICD-10-PCS | Mod: CPTII,S$GLB,, | Performed by: PHYSICIAN ASSISTANT

## 2023-01-05 PROCEDURE — 3072F PR LOW RISK FOR RETINOPATHY: ICD-10-PCS | Mod: CPTII,S$GLB,, | Performed by: PHYSICIAN ASSISTANT

## 2023-01-05 PROCEDURE — 99214 PR OFFICE/OUTPT VISIT, EST, LEVL IV, 30-39 MIN: ICD-10-PCS | Mod: S$GLB,,, | Performed by: PHYSICIAN ASSISTANT

## 2023-01-05 PROCEDURE — 1157F ADVNC CARE PLAN IN RCRD: CPT | Mod: CPTII,S$GLB,, | Performed by: PHYSICIAN ASSISTANT

## 2023-01-05 PROCEDURE — 3074F SYST BP LT 130 MM HG: CPT | Mod: CPTII,S$GLB,, | Performed by: PHYSICIAN ASSISTANT

## 2023-01-05 PROCEDURE — 1159F PR MEDICATION LIST DOCUMENTED IN MEDICAL RECORD: ICD-10-PCS | Mod: CPTII,S$GLB,, | Performed by: PHYSICIAN ASSISTANT

## 2023-01-05 PROCEDURE — 3288F FALL RISK ASSESSMENT DOCD: CPT | Mod: CPTII,S$GLB,, | Performed by: PHYSICIAN ASSISTANT

## 2023-01-05 PROCEDURE — 1100F PR PT FALLS ASSESS DOC 2+ FALLS/FALL W/INJURY/YR: ICD-10-PCS | Mod: CPTII,S$GLB,, | Performed by: PHYSICIAN ASSISTANT

## 2023-01-05 PROCEDURE — 99999 PR PBB SHADOW E&M-EST. PATIENT-LVL IV: CPT | Mod: PBBFAC,,, | Performed by: PHYSICIAN ASSISTANT

## 2023-01-05 PROCEDURE — 1159F MED LIST DOCD IN RCRD: CPT | Mod: CPTII,S$GLB,, | Performed by: PHYSICIAN ASSISTANT
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
## 2023-01-05 NOTE — PATIENT INSTRUCTIONS
Stop Prazosin (Minipress)  Stop trazodone.     Continue zoloft 50mg daily for mood/anxiety.    Please go to emergency department if feeling as though you are a harm to yourself or others or if you are in crisis. Please call the clinic to report any worsening of symptoms or problems associated with medication.

## 2023-01-09 ENCOUNTER — OFFICE VISIT (OUTPATIENT)
Dept: FAMILY MEDICINE | Facility: CLINIC | Age: 73
End: 2023-01-09
Payer: MEDICARE

## 2023-01-09 VITALS
SYSTOLIC BLOOD PRESSURE: 117 MMHG | RESPIRATION RATE: 18 BRPM | BODY MASS INDEX: 32.11 KG/M2 | WEIGHT: 163.56 LBS | HEIGHT: 60 IN | DIASTOLIC BLOOD PRESSURE: 60 MMHG | TEMPERATURE: 98 F | OXYGEN SATURATION: 96 % | HEART RATE: 70 BPM

## 2023-01-09 DIAGNOSIS — F44.5 PSYCHOGENIC NONEPILEPTIC SEIZURE: ICD-10-CM

## 2023-01-09 DIAGNOSIS — E78.2 MIXED HYPERLIPIDEMIA: ICD-10-CM

## 2023-01-09 DIAGNOSIS — E11.42 TYPE 2 DIABETES MELLITUS WITH DIABETIC POLYNEUROPATHY, WITHOUT LONG-TERM CURRENT USE OF INSULIN: ICD-10-CM

## 2023-01-09 DIAGNOSIS — J96.10 CHRONIC NEUROMUSCULAR RESPIRATORY FAILURE: ICD-10-CM

## 2023-01-09 DIAGNOSIS — E11.51 TYPE 2 DIABETES MELLITUS WITH DIABETIC PERIPHERAL ANGIOPATHY WITHOUT GANGRENE, WITHOUT LONG-TERM CURRENT USE OF INSULIN: ICD-10-CM

## 2023-01-09 DIAGNOSIS — Z12.31 ENCOUNTER FOR SCREENING MAMMOGRAM FOR MALIGNANT NEOPLASM OF BREAST: Primary | ICD-10-CM

## 2023-01-09 DIAGNOSIS — I10 ESSENTIAL HYPERTENSION: ICD-10-CM

## 2023-01-09 DIAGNOSIS — E53.8 B12 DEFICIENCY: ICD-10-CM

## 2023-01-09 DIAGNOSIS — E03.9 ACQUIRED HYPOTHYROIDISM: Chronic | ICD-10-CM

## 2023-01-09 DIAGNOSIS — G47.10 HYPERSOMNOLENCE: ICD-10-CM

## 2023-01-09 PROCEDURE — 3072F PR LOW RISK FOR RETINOPATHY: ICD-10-PCS | Mod: CPTII,S$GLB,, | Performed by: FAMILY MEDICINE

## 2023-01-09 PROCEDURE — 1126F PR PAIN SEVERITY QUANTIFIED, NO PAIN PRESENT: ICD-10-PCS | Mod: CPTII,S$GLB,, | Performed by: FAMILY MEDICINE

## 2023-01-09 PROCEDURE — 1160F RVW MEDS BY RX/DR IN RCRD: CPT | Mod: CPTII,S$GLB,, | Performed by: FAMILY MEDICINE

## 2023-01-09 PROCEDURE — 99214 PR OFFICE/OUTPT VISIT, EST, LEVL IV, 30-39 MIN: ICD-10-PCS | Mod: S$GLB,,, | Performed by: FAMILY MEDICINE

## 2023-01-09 PROCEDURE — 99499 UNLISTED E&M SERVICE: CPT | Mod: S$GLB,,, | Performed by: FAMILY MEDICINE

## 2023-01-09 PROCEDURE — 1160F PR REVIEW ALL MEDS BY PRESCRIBER/CLIN PHARMACIST DOCUMENTED: ICD-10-PCS | Mod: CPTII,S$GLB,, | Performed by: FAMILY MEDICINE

## 2023-01-09 PROCEDURE — 3074F SYST BP LT 130 MM HG: CPT | Mod: CPTII,S$GLB,, | Performed by: FAMILY MEDICINE

## 2023-01-09 PROCEDURE — 3288F PR FALLS RISK ASSESSMENT DOCUMENTED: ICD-10-PCS | Mod: CPTII,S$GLB,, | Performed by: FAMILY MEDICINE

## 2023-01-09 PROCEDURE — 3074F PR MOST RECENT SYSTOLIC BLOOD PRESSURE < 130 MM HG: ICD-10-PCS | Mod: CPTII,S$GLB,, | Performed by: FAMILY MEDICINE

## 2023-01-09 PROCEDURE — 1100F PTFALLS ASSESS-DOCD GE2>/YR: CPT | Mod: CPTII,S$GLB,, | Performed by: FAMILY MEDICINE

## 2023-01-09 PROCEDURE — 1159F MED LIST DOCD IN RCRD: CPT | Mod: CPTII,S$GLB,, | Performed by: FAMILY MEDICINE

## 2023-01-09 PROCEDURE — 1157F ADVNC CARE PLAN IN RCRD: CPT | Mod: CPTII,S$GLB,, | Performed by: FAMILY MEDICINE

## 2023-01-09 PROCEDURE — 1100F PR PT FALLS ASSESS DOC 2+ FALLS/FALL W/INJURY/YR: ICD-10-PCS | Mod: CPTII,S$GLB,, | Performed by: FAMILY MEDICINE

## 2023-01-09 PROCEDURE — 3072F LOW RISK FOR RETINOPATHY: CPT | Mod: CPTII,S$GLB,, | Performed by: FAMILY MEDICINE

## 2023-01-09 PROCEDURE — 3044F HG A1C LEVEL LT 7.0%: CPT | Mod: CPTII,S$GLB,, | Performed by: FAMILY MEDICINE

## 2023-01-09 PROCEDURE — 99214 OFFICE O/P EST MOD 30 MIN: CPT | Mod: S$GLB,,, | Performed by: FAMILY MEDICINE

## 2023-01-09 PROCEDURE — 3078F DIAST BP <80 MM HG: CPT | Mod: CPTII,S$GLB,, | Performed by: FAMILY MEDICINE

## 2023-01-09 PROCEDURE — 1126F AMNT PAIN NOTED NONE PRSNT: CPT | Mod: CPTII,S$GLB,, | Performed by: FAMILY MEDICINE

## 2023-01-09 PROCEDURE — 3078F PR MOST RECENT DIASTOLIC BLOOD PRESSURE < 80 MM HG: ICD-10-PCS | Mod: CPTII,S$GLB,, | Performed by: FAMILY MEDICINE

## 2023-01-09 PROCEDURE — 99999 PR PBB SHADOW E&M-EST. PATIENT-LVL IV: ICD-10-PCS | Mod: PBBFAC,,, | Performed by: FAMILY MEDICINE

## 2023-01-09 PROCEDURE — 99499 RISK ADDL DX/OHS AUDIT: ICD-10-PCS | Mod: S$GLB,,, | Performed by: FAMILY MEDICINE

## 2023-01-09 PROCEDURE — 99999 PR PBB SHADOW E&M-EST. PATIENT-LVL IV: CPT | Mod: PBBFAC,,, | Performed by: FAMILY MEDICINE

## 2023-01-09 PROCEDURE — 3288F FALL RISK ASSESSMENT DOCD: CPT | Mod: CPTII,S$GLB,, | Performed by: FAMILY MEDICINE

## 2023-01-09 PROCEDURE — 3044F PR MOST RECENT HEMOGLOBIN A1C LEVEL <7.0%: ICD-10-PCS | Mod: CPTII,S$GLB,, | Performed by: FAMILY MEDICINE

## 2023-01-09 PROCEDURE — 1157F PR ADVANCE CARE PLAN OR EQUIV PRESENT IN MEDICAL RECORD: ICD-10-PCS | Mod: CPTII,S$GLB,, | Performed by: FAMILY MEDICINE

## 2023-01-09 PROCEDURE — 1159F PR MEDICATION LIST DOCUMENTED IN MEDICAL RECORD: ICD-10-PCS | Mod: CPTII,S$GLB,, | Performed by: FAMILY MEDICINE
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
## 2023-01-19 ENCOUNTER — OFFICE VISIT (OUTPATIENT)
Dept: ORTHOPEDICS | Facility: CLINIC | Age: 73
End: 2023-01-19
Payer: MEDICARE

## 2023-01-19 ENCOUNTER — HOSPITAL ENCOUNTER (OUTPATIENT)
Dept: RADIOLOGY | Facility: HOSPITAL | Age: 73
Discharge: HOME | End: 2023-01-19
Attending: ORTHOPAEDIC SURGERY
Payer: MEDICARE

## 2023-01-19 VITALS — BODY MASS INDEX: 32.11 KG/M2 | WEIGHT: 163.56 LBS | HEIGHT: 60 IN

## 2023-01-19 DIAGNOSIS — M25.551 RIGHT HIP PAIN: ICD-10-CM

## 2023-01-19 DIAGNOSIS — S72.011D CLOSED SUBCAPITAL FRACTURE OF RIGHT FEMUR WITH ROUTINE HEALING, SUBSEQUENT ENCOUNTER: Primary | ICD-10-CM

## 2023-01-19 PROCEDURE — 1160F PR REVIEW ALL MEDS BY PRESCRIBER/CLIN PHARMACIST DOCUMENTED: ICD-10-PCS | Mod: CPTII,S$GLB,, | Performed by: ORTHOPAEDIC SURGERY

## 2023-01-19 PROCEDURE — 3288F FALL RISK ASSESSMENT DOCD: CPT | Mod: CPTII,S$GLB,, | Performed by: ORTHOPAEDIC SURGERY

## 2023-01-19 PROCEDURE — 3072F PR LOW RISK FOR RETINOPATHY: ICD-10-PCS | Mod: CPTII,S$GLB,, | Performed by: ORTHOPAEDIC SURGERY

## 2023-01-19 PROCEDURE — 73502 X-RAY EXAM HIP UNI 2-3 VIEWS: CPT | Mod: 26,RT,, | Performed by: RADIOLOGY

## 2023-01-19 PROCEDURE — 99999 PR PBB SHADOW E&M-EST. PATIENT-LVL III: CPT | Mod: PBBFAC,,, | Performed by: ORTHOPAEDIC SURGERY

## 2023-01-19 PROCEDURE — 3044F PR MOST RECENT HEMOGLOBIN A1C LEVEL <7.0%: ICD-10-PCS | Mod: CPTII,S$GLB,, | Performed by: ORTHOPAEDIC SURGERY

## 2023-01-19 PROCEDURE — 1159F MED LIST DOCD IN RCRD: CPT | Mod: CPTII,S$GLB,, | Performed by: ORTHOPAEDIC SURGERY

## 2023-01-19 PROCEDURE — 1101F PT FALLS ASSESS-DOCD LE1/YR: CPT | Mod: CPTII,S$GLB,, | Performed by: ORTHOPAEDIC SURGERY

## 2023-01-19 PROCEDURE — 73502 X-RAY EXAM HIP UNI 2-3 VIEWS: CPT | Mod: TC,PN,RT

## 2023-01-19 PROCEDURE — 99024 POSTOP FOLLOW-UP VISIT: CPT | Mod: S$GLB,,, | Performed by: ORTHOPAEDIC SURGERY

## 2023-01-19 PROCEDURE — 1157F PR ADVANCE CARE PLAN OR EQUIV PRESENT IN MEDICAL RECORD: ICD-10-PCS | Mod: CPTII,S$GLB,, | Performed by: ORTHOPAEDIC SURGERY

## 2023-01-19 PROCEDURE — 3072F LOW RISK FOR RETINOPATHY: CPT | Mod: CPTII,S$GLB,, | Performed by: ORTHOPAEDIC SURGERY

## 2023-01-19 PROCEDURE — 1159F PR MEDICATION LIST DOCUMENTED IN MEDICAL RECORD: ICD-10-PCS | Mod: CPTII,S$GLB,, | Performed by: ORTHOPAEDIC SURGERY

## 2023-01-19 PROCEDURE — 73502 XR HIP WITH PELVIS WHEN PERFORMED, 2 OR 3  VIEWS RIGHT: ICD-10-PCS | Mod: 26,RT,, | Performed by: RADIOLOGY

## 2023-01-19 PROCEDURE — 99024 PR POST-OP FOLLOW-UP VISIT: ICD-10-PCS | Mod: S$GLB,,, | Performed by: ORTHOPAEDIC SURGERY

## 2023-01-19 PROCEDURE — 1157F ADVNC CARE PLAN IN RCRD: CPT | Mod: CPTII,S$GLB,, | Performed by: ORTHOPAEDIC SURGERY

## 2023-01-19 PROCEDURE — 1101F PR PT FALLS ASSESS DOC 0-1 FALLS W/OUT INJ PAST YR: ICD-10-PCS | Mod: CPTII,S$GLB,, | Performed by: ORTHOPAEDIC SURGERY

## 2023-01-19 PROCEDURE — 3044F HG A1C LEVEL LT 7.0%: CPT | Mod: CPTII,S$GLB,, | Performed by: ORTHOPAEDIC SURGERY

## 2023-01-19 PROCEDURE — 1160F RVW MEDS BY RX/DR IN RCRD: CPT | Mod: CPTII,S$GLB,, | Performed by: ORTHOPAEDIC SURGERY

## 2023-01-19 PROCEDURE — 99999 PR PBB SHADOW E&M-EST. PATIENT-LVL III: ICD-10-PCS | Mod: PBBFAC,,, | Performed by: ORTHOPAEDIC SURGERY

## 2023-01-19 PROCEDURE — 3288F PR FALLS RISK ASSESSMENT DOCUMENTED: ICD-10-PCS | Mod: CPTII,S$GLB,, | Performed by: ORTHOPAEDIC SURGERY

## 2023-01-19 NOTE — PROGRESS NOTES
CC:  73-year-old female follows up with the right hip.  She underwent a percutaneous pinning of right femoral neck fracture on 11/11/2022.  The patient has finished home health physical therapy and today states she is pain-free.  She does ambulate with a walker for stability.    Examination of the Right Lower Extremity    Skin is intact throughout  Motor in intact EHL,FHL,TA,alex  +2 DP/PT  Sensation LT intact D/P/1st    Examination of the Right Hip    C-Sign negative  Logroll negative  Stenchfield negative    Pain with ROM negative    ROM:    Flexion   120  Extension   30  Abduction   45  Adduction   20  External Rotation 45  Internal Rotation 35    Flexion contracture negative    FADIR negative  FADER negative    Tenderness to palpation over lateral and posterolateral greater tochanter negative    X-ray images were examined and personally interpreted by me.  Three views the right hip dated 01/19/2023 show 3 screws transfixing a healed femoral neck fracture in good alignment.  Hardware is well fixed with no evidence of loosening.    Dx:  Status post percutaneous pinning of a right femoral neck fracture, stable    Plan:  Activity as tolerated.  Follow up p.r.n..

## 2023-01-26 ENCOUNTER — LAB VISIT (OUTPATIENT)
Dept: LAB | Facility: HOSPITAL | Age: 73
End: 2023-01-26
Attending: PHYSICIAN ASSISTANT
Payer: MEDICARE

## 2023-01-26 DIAGNOSIS — Z79.4 TYPE 2 DIABETES MELLITUS WITHOUT COMPLICATION, WITH LONG-TERM CURRENT USE OF INSULIN: ICD-10-CM

## 2023-01-26 DIAGNOSIS — E55.9 HYPOVITAMINOSIS D: ICD-10-CM

## 2023-01-26 DIAGNOSIS — E53.8 B12 DEFICIENCY: ICD-10-CM

## 2023-01-26 DIAGNOSIS — E11.9 TYPE 2 DIABETES MELLITUS WITHOUT COMPLICATION, WITH LONG-TERM CURRENT USE OF INSULIN: ICD-10-CM

## 2023-01-26 DIAGNOSIS — E11.51 TYPE 2 DIABETES MELLITUS WITH DIABETIC PERIPHERAL ANGIOPATHY WITHOUT GANGRENE, WITHOUT LONG-TERM CURRENT USE OF INSULIN: ICD-10-CM

## 2023-01-26 LAB
ESTIMATED AVG GLUCOSE: 114 MG/DL (ref 68–131)
HBA1C MFR BLD: 5.6 % (ref 4–5.6)

## 2023-01-26 PROCEDURE — 84439 ASSAY OF FREE THYROXINE: CPT | Performed by: PHYSICIAN ASSISTANT

## 2023-01-26 PROCEDURE — 36415 COLL VENOUS BLD VENIPUNCTURE: CPT | Mod: PO | Performed by: PHYSICIAN ASSISTANT

## 2023-01-26 PROCEDURE — 83036 HEMOGLOBIN GLYCOSYLATED A1C: CPT | Performed by: PHYSICIAN ASSISTANT

## 2023-01-26 PROCEDURE — 84443 ASSAY THYROID STIM HORMONE: CPT | Performed by: PHYSICIAN ASSISTANT

## 2023-01-26 PROCEDURE — 82306 VITAMIN D 25 HYDROXY: CPT | Performed by: PHYSICIAN ASSISTANT

## 2023-01-26 PROCEDURE — 82607 VITAMIN B-12: CPT | Performed by: FAMILY MEDICINE

## 2023-01-26 PROCEDURE — 82570 ASSAY OF URINE CREATININE: CPT | Performed by: FAMILY MEDICINE

## 2023-01-27 LAB
25(OH)D3+25(OH)D2 SERPL-MCNC: 37 NG/ML (ref 30–96)
ALBUMIN/CREAT UR: NORMAL UG/MG (ref 0–30)
CREAT UR-MCNC: 65 MG/DL (ref 15–325)
MICROALBUMIN UR DL<=1MG/L-MCNC: <5 UG/ML
T4 FREE SERPL-MCNC: 1.01 NG/DL (ref 0.71–1.51)
TSH SERPL DL<=0.005 MIU/L-ACNC: 1.22 UIU/ML (ref 0.4–4)
VIT B12 SERPL-MCNC: 935 PG/ML (ref 210–950)

## 2023-02-02 ENCOUNTER — OFFICE VISIT (OUTPATIENT)
Dept: ENDOCRINOLOGY | Facility: CLINIC | Age: 73
End: 2023-02-02
Payer: MEDICARE

## 2023-02-02 ENCOUNTER — LAB VISIT (OUTPATIENT)
Dept: LAB | Facility: HOSPITAL | Age: 73
End: 2023-02-02
Attending: INTERNAL MEDICINE
Payer: MEDICARE

## 2023-02-02 VITALS
HEART RATE: 79 BPM | OXYGEN SATURATION: 96 % | BODY MASS INDEX: 32.16 KG/M2 | WEIGHT: 163.81 LBS | HEIGHT: 60 IN | TEMPERATURE: 98 F | DIASTOLIC BLOOD PRESSURE: 78 MMHG | SYSTOLIC BLOOD PRESSURE: 130 MMHG

## 2023-02-02 DIAGNOSIS — E03.9 ACQUIRED HYPOTHYROIDISM: ICD-10-CM

## 2023-02-02 DIAGNOSIS — E11.9 TYPE 2 DIABETES MELLITUS WITHOUT COMPLICATION, WITH LONG-TERM CURRENT USE OF INSULIN: Primary | ICD-10-CM

## 2023-02-02 DIAGNOSIS — M81.0 AGE-RELATED OSTEOPOROSIS WITHOUT CURRENT PATHOLOGICAL FRACTURE: ICD-10-CM

## 2023-02-02 DIAGNOSIS — R06.02 SHORTNESS OF BREATH: ICD-10-CM

## 2023-02-02 DIAGNOSIS — G47.33 OSA (OBSTRUCTIVE SLEEP APNEA): ICD-10-CM

## 2023-02-02 DIAGNOSIS — M85.80 OSTEOPENIA, UNSPECIFIED LOCATION: ICD-10-CM

## 2023-02-02 DIAGNOSIS — E46 UNSPECIFIED PROTEIN-CALORIE MALNUTRITION: ICD-10-CM

## 2023-02-02 DIAGNOSIS — E66.9 OBESITY (BMI 30-39.9): ICD-10-CM

## 2023-02-02 DIAGNOSIS — Z79.4 TYPE 2 DIABETES MELLITUS WITHOUT COMPLICATION, WITH LONG-TERM CURRENT USE OF INSULIN: Primary | ICD-10-CM

## 2023-02-02 DIAGNOSIS — G62.9 NEUROPATHY: ICD-10-CM

## 2023-02-02 DIAGNOSIS — Z78.0 POSTMENOPAUSAL: ICD-10-CM

## 2023-02-02 DIAGNOSIS — G25.81 RESTLESS LEGS: Primary | ICD-10-CM

## 2023-02-02 LAB
FERRITIN SERPL-MCNC: 92 NG/ML (ref 20–300)
GLUCOSE SERPL-MCNC: 93 MG/DL (ref 70–110)

## 2023-02-02 PROCEDURE — 3288F PR FALLS RISK ASSESSMENT DOCUMENTED: ICD-10-PCS | Mod: CPTII,S$GLB,, | Performed by: PHYSICIAN ASSISTANT

## 2023-02-02 PROCEDURE — 3044F PR MOST RECENT HEMOGLOBIN A1C LEVEL <7.0%: ICD-10-PCS | Mod: CPTII,S$GLB,, | Performed by: PHYSICIAN ASSISTANT

## 2023-02-02 PROCEDURE — 3044F HG A1C LEVEL LT 7.0%: CPT | Mod: CPTII,S$GLB,, | Performed by: PHYSICIAN ASSISTANT

## 2023-02-02 PROCEDURE — 3066F NEPHROPATHY DOC TX: CPT | Mod: CPTII,S$GLB,, | Performed by: PHYSICIAN ASSISTANT

## 2023-02-02 PROCEDURE — 1126F PR PAIN SEVERITY QUANTIFIED, NO PAIN PRESENT: ICD-10-PCS | Mod: CPTII,S$GLB,, | Performed by: PHYSICIAN ASSISTANT

## 2023-02-02 PROCEDURE — 1157F ADVNC CARE PLAN IN RCRD: CPT | Mod: CPTII,S$GLB,, | Performed by: PHYSICIAN ASSISTANT

## 2023-02-02 PROCEDURE — 3072F PR LOW RISK FOR RETINOPATHY: ICD-10-PCS | Mod: CPTII,S$GLB,, | Performed by: PHYSICIAN ASSISTANT

## 2023-02-02 PROCEDURE — 82962 GLUCOSE BLOOD TEST: CPT | Mod: S$GLB,,, | Performed by: PHYSICIAN ASSISTANT

## 2023-02-02 PROCEDURE — 99999 PR PBB SHADOW E&M-EST. PATIENT-LVL V: CPT | Mod: PBBFAC,,, | Performed by: PHYSICIAN ASSISTANT

## 2023-02-02 PROCEDURE — 3288F FALL RISK ASSESSMENT DOCD: CPT | Mod: CPTII,S$GLB,, | Performed by: PHYSICIAN ASSISTANT

## 2023-02-02 PROCEDURE — 3075F PR MOST RECENT SYSTOLIC BLOOD PRESS GE 130-139MM HG: ICD-10-PCS | Mod: CPTII,S$GLB,, | Performed by: PHYSICIAN ASSISTANT

## 2023-02-02 PROCEDURE — 3066F PR DOCUMENTATION OF TREATMENT FOR NEPHROPATHY: ICD-10-PCS | Mod: CPTII,S$GLB,, | Performed by: PHYSICIAN ASSISTANT

## 2023-02-02 PROCEDURE — 3078F PR MOST RECENT DIASTOLIC BLOOD PRESSURE < 80 MM HG: ICD-10-PCS | Mod: CPTII,S$GLB,, | Performed by: PHYSICIAN ASSISTANT

## 2023-02-02 PROCEDURE — 3072F LOW RISK FOR RETINOPATHY: CPT | Mod: CPTII,S$GLB,, | Performed by: PHYSICIAN ASSISTANT

## 2023-02-02 PROCEDURE — 1159F MED LIST DOCD IN RCRD: CPT | Mod: CPTII,S$GLB,, | Performed by: PHYSICIAN ASSISTANT

## 2023-02-02 PROCEDURE — 3061F PR NEG MICROALBUMINURIA RESULT DOCUMENTED/REVIEW: ICD-10-PCS | Mod: CPTII,S$GLB,, | Performed by: PHYSICIAN ASSISTANT

## 2023-02-02 PROCEDURE — 1157F PR ADVANCE CARE PLAN OR EQUIV PRESENT IN MEDICAL RECORD: ICD-10-PCS | Mod: CPTII,S$GLB,, | Performed by: PHYSICIAN ASSISTANT

## 2023-02-02 PROCEDURE — 1160F PR REVIEW ALL MEDS BY PRESCRIBER/CLIN PHARMACIST DOCUMENTED: ICD-10-PCS | Mod: CPTII,S$GLB,, | Performed by: PHYSICIAN ASSISTANT

## 2023-02-02 PROCEDURE — 1100F PTFALLS ASSESS-DOCD GE2>/YR: CPT | Mod: CPTII,S$GLB,, | Performed by: PHYSICIAN ASSISTANT

## 2023-02-02 PROCEDURE — 36415 COLL VENOUS BLD VENIPUNCTURE: CPT | Performed by: INTERNAL MEDICINE

## 2023-02-02 PROCEDURE — 99214 PR OFFICE/OUTPT VISIT, EST, LEVL IV, 30-39 MIN: ICD-10-PCS | Mod: S$GLB,,, | Performed by: PHYSICIAN ASSISTANT

## 2023-02-02 PROCEDURE — 1160F RVW MEDS BY RX/DR IN RCRD: CPT | Mod: CPTII,S$GLB,, | Performed by: PHYSICIAN ASSISTANT

## 2023-02-02 PROCEDURE — 3075F SYST BP GE 130 - 139MM HG: CPT | Mod: CPTII,S$GLB,, | Performed by: PHYSICIAN ASSISTANT

## 2023-02-02 PROCEDURE — 1159F PR MEDICATION LIST DOCUMENTED IN MEDICAL RECORD: ICD-10-PCS | Mod: CPTII,S$GLB,, | Performed by: PHYSICIAN ASSISTANT

## 2023-02-02 PROCEDURE — 82962 POCT GLUCOSE, HAND-HELD DEVICE: ICD-10-PCS | Mod: S$GLB,,, | Performed by: PHYSICIAN ASSISTANT

## 2023-02-02 PROCEDURE — 1126F AMNT PAIN NOTED NONE PRSNT: CPT | Mod: CPTII,S$GLB,, | Performed by: PHYSICIAN ASSISTANT

## 2023-02-02 PROCEDURE — 99999 PR PBB SHADOW E&M-EST. PATIENT-LVL V: ICD-10-PCS | Mod: PBBFAC,,, | Performed by: PHYSICIAN ASSISTANT

## 2023-02-02 PROCEDURE — 3061F NEG MICROALBUMINURIA REV: CPT | Mod: CPTII,S$GLB,, | Performed by: PHYSICIAN ASSISTANT

## 2023-02-02 PROCEDURE — 82728 ASSAY OF FERRITIN: CPT | Performed by: INTERNAL MEDICINE

## 2023-02-02 PROCEDURE — 99214 OFFICE O/P EST MOD 30 MIN: CPT | Mod: S$GLB,,, | Performed by: PHYSICIAN ASSISTANT

## 2023-02-02 PROCEDURE — 1100F PR PT FALLS ASSESS DOC 2+ FALLS/FALL W/INJURY/YR: ICD-10-PCS | Mod: CPTII,S$GLB,, | Performed by: PHYSICIAN ASSISTANT

## 2023-02-02 PROCEDURE — 3078F DIAST BP <80 MM HG: CPT | Mod: CPTII,S$GLB,, | Performed by: PHYSICIAN ASSISTANT

## 2023-02-02 RX ORDER — METFORMIN HYDROCHLORIDE 500 MG/1
1000 TABLET, EXTENDED RELEASE ORAL
Qty: 180 TABLET | Refills: 3 | Status: SHIPPED | OUTPATIENT
Start: 2023-02-02

## 2023-02-02 RX ORDER — ROPINIROLE 0.5 MG/1
TABLET, FILM COATED ORAL NIGHTLY
COMMUNITY
Start: 2023-02-01

## 2023-02-02 RX ORDER — ALENDRONATE SODIUM 70 MG/1
TABLET ORAL
Qty: 4 TABLET | Refills: 11 | Status: SHIPPED | OUTPATIENT
Start: 2023-02-02

## 2023-02-02 NOTE — PROGRESS NOTES
CC: DM/Hypothyroidism    HPI: Maggy Tapia was diagnosed with Type 2 DM about 6 years ago. No hospitalizations for DM. Her mother had DM and thyroid disease. Arrives with her brother. Pt fractured her hip in . Pt is seeing sleep disorders. She was diagnosed w/ restless legs.    CURRENT DIABETIC MEDS: metformin 1000 mg in the bid,  Jardiance 10 mg daily    Previous meds:  Trulicity-diarrhea    BG readings are checked 2x daily. No meter or logs to clinic.  Fastins    Hypoglycemia: She sometimes has hypoglycemia at night.  Type of Glucose Meter: True metrix    Physical Activity: None.     Diet: Her diet has not changed.  3 meals daily.    Last Eye Exam:  Dr. Bone  Last Podiatry Exam:  Dr. Bean    Last DM Education Attended:     No ETOH/tobacco use.    Hypothyroidism  Taking 100 mcg daily.  Dx in   No constipation, diarrhea, sweating, changes in nails or hair.     No palpitations,  changes in nails, heat/cold intolerance.     DEXA  Osteopenia in the left hip. Taking ca +vd. No recent  Fractures, falls or steriod injections. No exercise.     JUAN  Wears CPAP    Wt Readings from Last 10 Encounters:   23 74.3 kg (163 lb 12.8 oz)   23 74.2 kg (163 lb 9.3 oz)   23 74.2 kg (163 lb 9.3 oz)   22 71.7 kg (158 lb)   22 71.8 kg (158 lb 4.6 oz)   22 71.8 kg (158 lb 4.6 oz)   22 72 kg (158 lb 11.7 oz)   22 75.8 kg (167 lb 1.7 oz)   22 75.8 kg (167 lb)   22 74.4 kg (164 lb 0.4 oz)       REVIEW OF SYSTEMS  General: no weakness or fatigue, wt loss (17 lbs since 3/22).   Eyes: no intermittent blurry vision or visual disturbances.   Cardiac: no chest pain or palpitations.   Respiratory: + sob, no cough    GI: no abdominal pain or nausea.   Skin: no rashes or itching.   Musc: + back pain, neck pain  Neuro: no numbness or tingling.   Endocrine: + polyuria, no polydipsia, polyphagia.   Remainder ROS negative    Personally reviewed labs  below:  Hemoglobin A1C   Date Value Ref Range Status   01/26/2023 5.6 4.0 - 5.6 % Final     Comment:     ADA Screening Guidelines:  5.7-6.4%  Consistent with prediabetes  >or=6.5%  Consistent with diabetes    High levels of fetal hemoglobin interfere with the HbA1C  assay. Heterozygous hemoglobin variants (HbS, HgC, etc)do  not significantly interfere with this assay.   However, presence of multiple variants may affect accuracy.     01/03/2023 5.5 4.0 - 5.6 % Final     Comment:     ADA Screening Guidelines:  5.7-6.4%  Consistent with prediabetes  >or=6.5%  Consistent with diabetes    High levels of fetal hemoglobin interfere with the HbA1C  assay. Heterozygous hemoglobin variants (HbS, HgC, etc)do  not significantly interfere with this assay.   However, presence of multiple variants may affect accuracy.     08/15/2022 5.1 4.0 - 5.6 % Final     Comment:     ADA Screening Guidelines:  5.7-6.4%  Consistent with prediabetes  >or=6.5%  Consistent with diabetes    High levels of fetal hemoglobin interfere with the HbA1C  assay. Heterozygous hemoglobin variants (HbS, HgC, etc)do  not significantly interfere with this assay.   However, presence of multiple variants may affect accuracy.         Chemistry        Component Value Date/Time     01/03/2023 0845    K 4.4 01/03/2023 0845     01/03/2023 0845    CO2 29 01/03/2023 0845    BUN 23 01/03/2023 0845    CREATININE 0.9 01/03/2023 0845     (H) 01/03/2023 0845        Component Value Date/Time    CALCIUM 9.7 01/03/2023 0845    ALKPHOS 93 01/03/2023 0845    AST 12 01/03/2023 0845    ALT 13 01/03/2023 0845    BILITOT 0.2 01/03/2023 0845    ESTGFRAFRICA >60.0 03/29/2022 1342    EGFRNONAA >60.0 03/29/2022 1342        Lab Results   Component Value Date    CHOL 153 01/03/2023    CHOL 169 02/23/2022    CHOL 140 10/04/2021     Lab Results   Component Value Date    HDL 43 01/03/2023    HDL 35 (L) 02/23/2022    HDL 39 (L) 10/04/2021     Lab Results   Component Value  Date    LDLCALC 69.4 01/03/2023    LDLCALC 90.6 02/23/2022    LDLCALC 62.4 (L) 10/04/2021     Lab Results   Component Value Date    TRIG 203 (H) 01/03/2023    TRIG 217 (H) 02/23/2022    TRIG 193 (H) 10/04/2021     Lab Results   Component Value Date    CHOLHDL 28.1 01/03/2023    CHOLHDL 20.7 02/23/2022    CHOLHDL 27.9 10/04/2021     Lab Results   Component Value Date    TSH 1.218 01/26/2023     Lab Results   Component Value Date    MICALBCREAT Unable to calculate 01/26/2023       Vit D, 25-Hydroxy   Date Value Ref Range Status   01/26/2023 37 30 - 96 ng/mL Final     Comment:     Vitamin D deficiency.........<10 ng/mL                              Vitamin D insufficiency......10-29 ng/mL       Vitamin D sufficiency........> or equal to 30 ng/mL  Vitamin D toxicity............>100 ng/mL       PHYSICAL EXAMINATION  Constitutional: elderly female, appears well, no distress  Neck: Supple, trachea midline.   Respiratory: even, CTA without wheezes or rales.  Cardiovascular: RRR; no carotid bruits or murmurs.   Lymph: DP pulses  2+ bilaterally; 1+ edema bl.   Skin: +mole on face, legs are very sensitive to touch, warm and dry; no acanthosis nigracans observed. No signs of infection.  Musc: ambulates with a walker, + FROM all ext  Neuro: + confusion, patient alert and cooperative;     Assessment/Plan    1. Type 2 diabetes mellitus without complication, with long-term current use of insulin        2. Neuropathy        3. Acquired hypothyroidism        4. Osteopenia, unspecified location        5. JUAN (obstructive sleep apnea)        6. Shortness of breath        7. Obesity (BMI 30-39.9)          T2DM- A1c is low for age. Decrease Metformin to 1000 mg daily. Continue Jardiance 10 mg daily.  BG checks 2x daily. Send log in two weeks.  Neuropathy-stable-f/u w/ neurology.  Hypothyroidism-TFTs are stable. Continue LT4 dose.   Osteoporosis-start fosamax 70 mg weekly. Obtain bone markers. Check for secondary causes. -repeat DEXA scan  7/23. Start exercise 30 min daily.    SFL-zmknfh-njhrschs cpap.  SOB-referral to cardiology.   Obesity-Body mass index is 31.99 kg/m². Increase exercise to 30 min daily.     Fasting Spep, upep, pth, ica, ctx, ntx, ctx, osteocalcin  F/u in 6 mths w/ fasting labs and dexa

## 2023-02-14 ENCOUNTER — HOSPITAL ENCOUNTER (OUTPATIENT)
Dept: RADIOLOGY | Facility: CLINIC | Age: 73
Discharge: HOME | End: 2023-02-14
Attending: FAMILY MEDICINE
Payer: MEDICARE

## 2023-02-14 DIAGNOSIS — Z12.31 ENCOUNTER FOR SCREENING MAMMOGRAM FOR MALIGNANT NEOPLASM OF BREAST: ICD-10-CM

## 2023-02-14 PROCEDURE — 77067 SCR MAMMO BI INCL CAD: CPT | Mod: 26,,, | Performed by: RADIOLOGY

## 2023-02-14 PROCEDURE — 77067 MAMMO DIGITAL SCREENING BILAT WITH TOMO: ICD-10-PCS | Mod: 26,,, | Performed by: RADIOLOGY

## 2023-02-14 PROCEDURE — 77063 MAMMO DIGITAL SCREENING BILAT WITH TOMO: ICD-10-PCS | Mod: 26,,, | Performed by: RADIOLOGY

## 2023-02-14 PROCEDURE — 77063 BREAST TOMOSYNTHESIS BI: CPT | Mod: 26,,, | Performed by: RADIOLOGY

## 2023-02-14 PROCEDURE — 77067 SCR MAMMO BI INCL CAD: CPT | Mod: TC,PO

## 2023-02-20 ENCOUNTER — IMMUNIZATION (OUTPATIENT)
Dept: PRIMARY CARE CLINIC | Facility: CLINIC | Age: 73
End: 2023-02-20
Payer: MEDICARE

## 2023-02-20 DIAGNOSIS — Z23 NEED FOR VACCINATION: Primary | ICD-10-CM

## 2023-02-20 PROCEDURE — 91312 COVID-19, MRNA, LNP-S, BIVALENT BOOSTER, PF, 30 MCG/0.3 ML DOSE: CPT | Mod: S$GLB,,, | Performed by: FAMILY MEDICINE

## 2023-02-20 PROCEDURE — 0124A COVID-19, MRNA, LNP-S, BIVALENT BOOSTER, PF, 30 MCG/0.3 ML DOSE: CPT | Mod: S$GLB,,, | Performed by: FAMILY MEDICINE

## 2023-02-20 PROCEDURE — 0124A COVID-19, MRNA, LNP-S, BIVALENT BOOSTER, PF, 30 MCG/0.3 ML DOSE: ICD-10-PCS | Mod: S$GLB,,, | Performed by: FAMILY MEDICINE

## 2023-02-20 PROCEDURE — 91312 COVID-19, MRNA, LNP-S, BIVALENT BOOSTER, PF, 30 MCG/0.3 ML DOSE: ICD-10-PCS | Mod: S$GLB,,, | Performed by: FAMILY MEDICINE

## 2023-03-01 ENCOUNTER — TELEPHONE (OUTPATIENT)
Dept: FAMILY MEDICINE | Facility: CLINIC | Age: 73
End: 2023-03-01
Payer: MEDICARE

## 2023-03-01 NOTE — TELEPHONE ENCOUNTER
"Called patient to advise per Dr Abbasi    "  I have reviewed your labs and the following are in the normal or acceptable range including: B12 and urine microprotein (shows normal kidney function)."    No answer , left voicemail to return call.   "

## 2023-03-01 NOTE — TELEPHONE ENCOUNTER
----- Message from Milan Pino sent at 3/1/2023 10:40 AM CST -----  Contact: pt  Type:  Patient Returning Call    Who Called:  pt   Who Left Message for Patient:  n/a   Does the patient know what this is regarding?:  results   Best Call Back Number:  [phone]    Additional Information:  pt is trying to return a call. Please advise.

## 2023-03-12 DIAGNOSIS — R92.8 ABNORMAL MAMMOGRAM OF LEFT BREAST: Primary | ICD-10-CM

## 2023-03-13 ENCOUNTER — OFFICE VISIT (OUTPATIENT)
Dept: UROLOGY | Facility: CLINIC | Age: 73
End: 2023-03-13
Payer: MEDICARE

## 2023-03-13 VITALS
HEART RATE: 68 BPM | BODY MASS INDEX: 32.16 KG/M2 | HEIGHT: 60 IN | DIASTOLIC BLOOD PRESSURE: 87 MMHG | WEIGHT: 163.81 LBS | SYSTOLIC BLOOD PRESSURE: 157 MMHG

## 2023-03-13 DIAGNOSIS — Z87.440 HISTORY OF RECURRENT UTIS: ICD-10-CM

## 2023-03-13 DIAGNOSIS — R32 URINARY INCONTINENCE, UNSPECIFIED TYPE: Primary | ICD-10-CM

## 2023-03-13 LAB
BILIRUBIN, UA POC OHS: NEGATIVE
BLOOD, UA POC OHS: NEGATIVE
CLARITY, UA POC OHS: CLEAR
COLOR, UA POC OHS: YELLOW
GLUCOSE, UA POC OHS: >=1000
KETONES, UA POC OHS: NEGATIVE
LEUKOCYTES, UA POC OHS: ABNORMAL
NITRITE, UA POC OHS: NEGATIVE
PH, UA POC OHS: 5.5
PROTEIN, UA POC OHS: NEGATIVE
SPECIFIC GRAVITY, UA POC OHS: 1.02
UROBILINOGEN, UA POC OHS: 0.2

## 2023-03-13 PROCEDURE — 1159F MED LIST DOCD IN RCRD: CPT | Mod: CPTII,S$GLB,, | Performed by: NURSE PRACTITIONER

## 2023-03-13 PROCEDURE — 81003 POCT URINALYSIS(INSTRUMENT): ICD-10-PCS | Mod: QW,S$GLB,, | Performed by: NURSE PRACTITIONER

## 2023-03-13 PROCEDURE — 1159F PR MEDICATION LIST DOCUMENTED IN MEDICAL RECORD: ICD-10-PCS | Mod: CPTII,S$GLB,, | Performed by: NURSE PRACTITIONER

## 2023-03-13 PROCEDURE — 1157F ADVNC CARE PLAN IN RCRD: CPT | Mod: CPTII,S$GLB,, | Performed by: NURSE PRACTITIONER

## 2023-03-13 PROCEDURE — 99999 PR PBB SHADOW E&M-EST. PATIENT-LVL IV: ICD-10-PCS | Mod: PBBFAC,,, | Performed by: NURSE PRACTITIONER

## 2023-03-13 PROCEDURE — 3066F PR DOCUMENTATION OF TREATMENT FOR NEPHROPATHY: ICD-10-PCS | Mod: CPTII,S$GLB,, | Performed by: NURSE PRACTITIONER

## 2023-03-13 PROCEDURE — 99213 OFFICE O/P EST LOW 20 MIN: CPT | Mod: S$GLB,,, | Performed by: NURSE PRACTITIONER

## 2023-03-13 PROCEDURE — 3072F PR LOW RISK FOR RETINOPATHY: ICD-10-PCS | Mod: CPTII,S$GLB,, | Performed by: NURSE PRACTITIONER

## 2023-03-13 PROCEDURE — 3061F PR NEG MICROALBUMINURIA RESULT DOCUMENTED/REVIEW: ICD-10-PCS | Mod: CPTII,S$GLB,, | Performed by: NURSE PRACTITIONER

## 2023-03-13 PROCEDURE — 1160F RVW MEDS BY RX/DR IN RCRD: CPT | Mod: CPTII,S$GLB,, | Performed by: NURSE PRACTITIONER

## 2023-03-13 PROCEDURE — 1157F PR ADVANCE CARE PLAN OR EQUIV PRESENT IN MEDICAL RECORD: ICD-10-PCS | Mod: CPTII,S$GLB,, | Performed by: NURSE PRACTITIONER

## 2023-03-13 PROCEDURE — 99999 PR PBB SHADOW E&M-EST. PATIENT-LVL IV: CPT | Mod: PBBFAC,,, | Performed by: NURSE PRACTITIONER

## 2023-03-13 PROCEDURE — 1160F PR REVIEW ALL MEDS BY PRESCRIBER/CLIN PHARMACIST DOCUMENTED: ICD-10-PCS | Mod: CPTII,S$GLB,, | Performed by: NURSE PRACTITIONER

## 2023-03-13 PROCEDURE — 99213 PR OFFICE/OUTPT VISIT, EST, LEVL III, 20-29 MIN: ICD-10-PCS | Mod: S$GLB,,, | Performed by: NURSE PRACTITIONER

## 2023-03-13 PROCEDURE — 3079F DIAST BP 80-89 MM HG: CPT | Mod: CPTII,S$GLB,, | Performed by: NURSE PRACTITIONER

## 2023-03-13 PROCEDURE — 3077F PR MOST RECENT SYSTOLIC BLOOD PRESSURE >= 140 MM HG: ICD-10-PCS | Mod: CPTII,S$GLB,, | Performed by: NURSE PRACTITIONER

## 2023-03-13 PROCEDURE — 3044F PR MOST RECENT HEMOGLOBIN A1C LEVEL <7.0%: ICD-10-PCS | Mod: CPTII,S$GLB,, | Performed by: NURSE PRACTITIONER

## 2023-03-13 PROCEDURE — 3077F SYST BP >= 140 MM HG: CPT | Mod: CPTII,S$GLB,, | Performed by: NURSE PRACTITIONER

## 2023-03-13 PROCEDURE — 3061F NEG MICROALBUMINURIA REV: CPT | Mod: CPTII,S$GLB,, | Performed by: NURSE PRACTITIONER

## 2023-03-13 PROCEDURE — 3072F LOW RISK FOR RETINOPATHY: CPT | Mod: CPTII,S$GLB,, | Performed by: NURSE PRACTITIONER

## 2023-03-13 PROCEDURE — 3044F HG A1C LEVEL LT 7.0%: CPT | Mod: CPTII,S$GLB,, | Performed by: NURSE PRACTITIONER

## 2023-03-13 PROCEDURE — 3066F NEPHROPATHY DOC TX: CPT | Mod: CPTII,S$GLB,, | Performed by: NURSE PRACTITIONER

## 2023-03-13 PROCEDURE — 81003 URINALYSIS AUTO W/O SCOPE: CPT | Mod: QW,S$GLB,, | Performed by: NURSE PRACTITIONER

## 2023-03-13 PROCEDURE — 3079F PR MOST RECENT DIASTOLIC BLOOD PRESSURE 80-89 MM HG: ICD-10-PCS | Mod: CPTII,S$GLB,, | Performed by: NURSE PRACTITIONER
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
## 2023-03-15 ENCOUNTER — OFFICE VISIT (OUTPATIENT)
Dept: PODIATRY | Facility: CLINIC | Age: 73
End: 2023-03-15
Payer: MEDICARE

## 2023-03-15 VITALS — BODY MASS INDEX: 32 KG/M2 | HEIGHT: 60 IN | WEIGHT: 163 LBS

## 2023-03-15 DIAGNOSIS — M79.671 PAIN IN BOTH FEET: ICD-10-CM

## 2023-03-15 DIAGNOSIS — M20.5X2 ACQUIRED HALLUX LIMITUS OF BOTH FEET: Primary | ICD-10-CM

## 2023-03-15 DIAGNOSIS — M20.5X1 ACQUIRED HALLUX LIMITUS OF BOTH FEET: Primary | ICD-10-CM

## 2023-03-15 DIAGNOSIS — M79.672 PAIN IN BOTH FEET: ICD-10-CM

## 2023-03-15 PROCEDURE — 3072F PR LOW RISK FOR RETINOPATHY: ICD-10-PCS | Mod: CPTII,S$GLB,, | Performed by: PODIATRIST

## 2023-03-15 PROCEDURE — 1126F AMNT PAIN NOTED NONE PRSNT: CPT | Mod: CPTII,S$GLB,, | Performed by: PODIATRIST

## 2023-03-15 PROCEDURE — 3066F PR DOCUMENTATION OF TREATMENT FOR NEPHROPATHY: ICD-10-PCS | Mod: CPTII,S$GLB,, | Performed by: PODIATRIST

## 2023-03-15 PROCEDURE — 3072F LOW RISK FOR RETINOPATHY: CPT | Mod: CPTII,S$GLB,, | Performed by: PODIATRIST

## 2023-03-15 PROCEDURE — 3044F PR MOST RECENT HEMOGLOBIN A1C LEVEL <7.0%: ICD-10-PCS | Mod: CPTII,S$GLB,, | Performed by: PODIATRIST

## 2023-03-15 PROCEDURE — 1157F PR ADVANCE CARE PLAN OR EQUIV PRESENT IN MEDICAL RECORD: ICD-10-PCS | Mod: CPTII,S$GLB,, | Performed by: PODIATRIST

## 2023-03-15 PROCEDURE — 3288F FALL RISK ASSESSMENT DOCD: CPT | Mod: CPTII,S$GLB,, | Performed by: PODIATRIST

## 2023-03-15 PROCEDURE — 3044F HG A1C LEVEL LT 7.0%: CPT | Mod: CPTII,S$GLB,, | Performed by: PODIATRIST

## 2023-03-15 PROCEDURE — 3061F PR NEG MICROALBUMINURIA RESULT DOCUMENTED/REVIEW: ICD-10-PCS | Mod: CPTII,S$GLB,, | Performed by: PODIATRIST

## 2023-03-15 PROCEDURE — 1159F PR MEDICATION LIST DOCUMENTED IN MEDICAL RECORD: ICD-10-PCS | Mod: CPTII,S$GLB,, | Performed by: PODIATRIST

## 2023-03-15 PROCEDURE — 3061F NEG MICROALBUMINURIA REV: CPT | Mod: CPTII,S$GLB,, | Performed by: PODIATRIST

## 2023-03-15 PROCEDURE — 1160F PR REVIEW ALL MEDS BY PRESCRIBER/CLIN PHARMACIST DOCUMENTED: ICD-10-PCS | Mod: CPTII,S$GLB,, | Performed by: PODIATRIST

## 2023-03-15 PROCEDURE — 1100F PR PT FALLS ASSESS DOC 2+ FALLS/FALL W/INJURY/YR: ICD-10-PCS | Mod: CPTII,S$GLB,, | Performed by: PODIATRIST

## 2023-03-15 PROCEDURE — 99213 OFFICE O/P EST LOW 20 MIN: CPT | Mod: S$GLB,,, | Performed by: PODIATRIST

## 2023-03-15 PROCEDURE — 3288F PR FALLS RISK ASSESSMENT DOCUMENTED: ICD-10-PCS | Mod: CPTII,S$GLB,, | Performed by: PODIATRIST

## 2023-03-15 PROCEDURE — 99213 PR OFFICE/OUTPT VISIT, EST, LEVL III, 20-29 MIN: ICD-10-PCS | Mod: S$GLB,,, | Performed by: PODIATRIST

## 2023-03-15 PROCEDURE — 1100F PTFALLS ASSESS-DOCD GE2>/YR: CPT | Mod: CPTII,S$GLB,, | Performed by: PODIATRIST

## 2023-03-15 PROCEDURE — 3066F NEPHROPATHY DOC TX: CPT | Mod: CPTII,S$GLB,, | Performed by: PODIATRIST

## 2023-03-15 PROCEDURE — 1126F PR PAIN SEVERITY QUANTIFIED, NO PAIN PRESENT: ICD-10-PCS | Mod: CPTII,S$GLB,, | Performed by: PODIATRIST

## 2023-03-15 PROCEDURE — 1157F ADVNC CARE PLAN IN RCRD: CPT | Mod: CPTII,S$GLB,, | Performed by: PODIATRIST

## 2023-03-15 PROCEDURE — 1160F RVW MEDS BY RX/DR IN RCRD: CPT | Mod: CPTII,S$GLB,, | Performed by: PODIATRIST

## 2023-03-15 PROCEDURE — 1159F MED LIST DOCD IN RCRD: CPT | Mod: CPTII,S$GLB,, | Performed by: PODIATRIST

## 2023-03-15 RX ORDER — METHYLPREDNISOLONE 4 MG/1
TABLET ORAL
Qty: 1 EACH | Refills: 0 | Status: SHIPPED | OUTPATIENT
Start: 2023-03-15 | End: 2023-05-16 | Stop reason: CLARIF

## 2023-03-15 NOTE — PROGRESS NOTES
[address]  [place] [address]  Phone: [phone]   [phone]    Patient's PCP:Yanna Abbasi MD  Referring Provider: No ref. provider found    Subjective:      Chief Complaint:: Toe Pain (Bilateral 1st toes)    JOSE Tapia is a 73 y.o. female who presents today with a complaint of bilateral 1st toe pain lasting for awhile. Onset of symptoms unknown and reports no trauma.  Current symptoms include pain.  Aggravating factors are pressure. Symptoms have not improved. Treatment to date have included none.    Systemic Doctor: MIRACLE Richards PA-C  Date Last Seen: 2022   Blood Sugar: has not checked today  Hemoglobin A1c: 5.6 (2023)     Vitals:    03/15/23 1122   Weight: 73.9 kg (163 lb)   Height: 5' (1.524 m)   PainSc: 0-No pain      Shoe Size:     Past Surgical History:   Procedure Laterality Date    APPENDECTOMY      BREAST BIOPSY Left     BREAST LUMPECTOMY Left     2016    BREAST SURGERY      CATARACT EXTRACTION Bilateral     OU done//     SECTION      CHOLECYSTECTOMY      COLONOSCOPY N/A 2020    Procedure: COLONOSCOPY;  Surgeon: Mj Fernandez MD;  Location: [facility];  Service: Endoscopy;  Laterality: N/A;    CYST REMOVAL Left 2021    DILATION AND CURETTAGE OF UTERUS      EYE SURGERY      PERCUTANEOUS PINNING OF HIP Right 2022    Procedure: PINNING, HIP, PERCUTANEOUS;  Surgeon: Ministerio Joiner II, MD;  Location: [facility] OR;  Service: Orthopedics;  Laterality: Right;     Past Medical History:   Diagnosis Date    Anxiety     Arthritis     Asthma     Cancer     Left Breast    Depression     Diabetes mellitus, type 2     GERD (gastroesophageal reflux disease)     Hyperlipidemia     Hypertension     Overactive bladder     Seizures     Pseudo-seizures    Sleep apnea     Stroke     Stroke 2022    pt was in the hospital for a stroke, claims she was seeing people when the stroke happened    Thyroid disease     Urinary tract infection without  hematuria 08/03/2017     Family History   Problem Relation Age of Onset    Diabetes Mother     Hypertension Mother     Breast cancer Mother     Cataracts Mother     Melanoma Mother     Heart failure Father     Melanoma Father     Cataracts Brother     Melanoma Brother     Glaucoma Neg Hx     Retinal detachment Neg Hx     Macular degeneration Neg Hx         Social History:   Marital Status: Single  Alcohol History:  reports current alcohol use.  Tobacco History:  reports that she has never smoked. She has never used smokeless tobacco.  Drug History:  reports no history of drug use.    Review of patient's allergies indicates:   Allergen Reactions    Penicillins Anaphylaxis    Sulfa (sulfonamide antibiotics) Anaphylaxis    Trintellix [vortioxetine] Nausea And Vomiting and Other (See Comments)     Patient has seizures and vomits       Current Outpatient Medications   Medication Sig Dispense Refill    albuterol (PROVENTIL/VENTOLIN HFA) 90 mcg/actuation inhaler Inhale 1-2 puffs into the lungs every 4 (four) hours as needed for Shortness of Breath (coughing). Rescue 20.1 g 11    alendronate (FOSAMAX) 70 MG tablet Take one tablet every 7 days. 4 tablet 11    aspirin (ECOTRIN) 81 MG EC tablet Take 81 mg by mouth once daily.      blood-glucose meter kit Use as instructed. Insurance preferred. 1 each 0    CALCIUM CARBONATE/VITAMIN D3 (CALCIUM 500 + D ORAL) Take 1 tablet by mouth once daily. 10 mg daily      cyanocobalamin (VITAMIN B-12) 1000 MCG tablet Take 1 tablet (1,000 mcg total) by mouth once daily. 30 tablet 0    empagliflozin (JARDIANCE) 10 mg tablet Take 1 tablet (10 mg total) by mouth once daily. 90 tablet 2    fluticasone furoate-vilanteroL (BREO ELLIPTA) 100-25 mcg/dose diskus inhaler Inhale 1 puff into the lungs once daily. Controller 60 each 11    ketoconazole (NIZORAL) 2 % cream AAA pannus fold at least daily after the shower 60 g 3    levothyroxine (SYNTHROID) 100 MCG tablet Take 1 tablet (100 mcg total) by  mouth before breakfast. 90 tablet 3    losartan (COZAAR) 50 MG tablet Take 0.5 tablets (25 mg total) by mouth once daily. 90 tablet 0    metFORMIN (GLUCOPHAGE-XR) 500 MG ER 24hr tablet Take 2 tablets (1,000 mg total) by mouth daily with breakfast. 180 tablet 3    polyethylene glycol (GLYCOLAX) 17 gram PwPk Take 17 g by mouth once daily.  0    potassium chloride SA (K-DUR,KLOR-CON) 20 MEQ tablet Take 20 mEq by mouth once daily.      rOPINIRole (REQUIP) 0.5 MG tablet Take by mouth.      simvastatin (ZOCOR) 40 MG tablet Take 1 tablet (40 mg total) by mouth once daily. 90 tablet 3    tetrabenazine (XENAZINE) 25 mg tablet Take one tablet daily for 7 days and then  Take 1 tablet twice daily afterwards (Patient taking differently: 25 mg 2 (two) times daily. Take one tablet daily for 7 days and then  Take 1 tablet twice daily afterwards) 47 tablet 0    gabapentin (NEURONTIN) 300 MG capsule Take 1 capsule (300 mg total) by mouth every evening. Take 1 tablet every night x 7 days. Increase to twice a day x 7 days. Increase to three times a day. 90 capsule 0    methylPREDNISolone (MEDROL DOSEPACK) 4 mg tablet use as directed 1 each 0    sertraline (ZOLOFT) 50 MG tablet Take 1 tablet (50 mg total) by mouth once daily. For depression/anxiety 30 tablet 2     No current facility-administered medications for this visit.       Review of Systems   Constitutional:  Negative for chills, fatigue, fever and unexpected weight change.   HENT:  Positive for hearing loss. Negative for trouble swallowing.    Eyes:  Negative for photophobia and visual disturbance.   Respiratory:  Negative for cough, shortness of breath and wheezing.    Cardiovascular:  Positive for leg swelling. Negative for chest pain and palpitations.   Gastrointestinal:  Negative for abdominal pain and nausea.   Genitourinary:  Negative for dysuria and frequency.   Musculoskeletal:  Positive for arthralgias, gait problem, joint swelling and myalgias. Negative for back pain.    Skin:  Negative for rash and wound.   Neurological:  Positive for seizures and weakness. Negative for tremors, numbness and headaches.   Hematological:  Bruises/bleeds easily.       Objective:        Physical Exam:   Foot Exam    General  Orientation: alert and oriented to person, place, and time   Affect: appropriate   Gait: antalgic   Assistance: walker use       Right Foot/Ankle     Inspection and Palpation  Ecchymosis: none  Tenderness: great toe metatarsophalangeal joint   Swelling: (Mild edema lower extremity)  Arch: normal  Hammertoes: second toe, third toe, fourth toe and fifth toe  Hallux limitus: yes  Skin Exam: dry skin; no ulcer and no erythema   Fungus Toenails: present    Neurovascular  Dorsalis pedis: 1+  Posterior tibial: 1+  Capillary Refill: 2+  Varicose veins: present  Saphenous nerve sensation: diminished  Tibial nerve sensation: diminished  Superficial peroneal nerve sensation: diminished  Deep peroneal nerve sensation: diminished  Sural nerve sensation: diminished    Edema  Type of edema: non-pitting    Muscle Strength  Ankle dorsiflexion: 4+  Ankle plantar flexion: 4+  Ankle inversion: 4+  Ankle eversion: 4+  Great toe extension: 4+  Great toe flexion: 4+    Range of Motion    Normal right ankle ROM    Tests  PT Tinel's sign: negative    Paresthesia: positive  Comments  Nails 1 through 5 are thickened, discolored, dystrophic, and elongated with subungual debris    Skin thin shiny and atrophic with diminished pedal hair growth       Left Foot/Ankle      Inspection and Palpation  Ecchymosis: none  Tenderness: great toe metatarsophalangeal joint   Swelling: (Mild lower extremity edema)  Arch: normal  Hammertoes: second toe, third toe, fourth toe and fifth toe  Hallux limitus: yes  Skin Exam: dry skin; no drainage, no ulcer and no erythema   Fungus Toenails: present    Neurovascular  Dorsalis pedis: 2+  Posterior tibial: 2+  Capillary refill: 2+  Varicose veins: present  Saphenous nerve  sensation: diminished  Tibial nerve sensation: diminished  Superficial peroneal nerve sensation: diminished  Deep peroneal nerve sensation: diminished  Sural nerve sensation: diminished    Edema  Type of edema: non-pitting    Muscle Strength  Ankle dorsiflexion: 4+  Ankle plantar flexion: 4+  Ankle inversion: 4+  Ankle eversion: 4+  Great toe extension: 4+  Great toe flexion: 4+    Range of Motion    Normal left ankle ROM    Tests  PT Tinel's sign: negative  Paresthesia: positive  Comments  Nails 1 through 5 are thickened, discolored, dystrophic, and elongated with subungual debris    Skin thin shiny and atrophic with diminished pedal hair growth     Physical Exam  Cardiovascular:      Pulses:           Dorsalis pedis pulses are 1+ on the right side and 2+ on the left side.        Posterior tibial pulses are 1+ on the right side and 2+ on the left side.   Feet:      Right foot:      Skin integrity: Dry skin present. No ulcer or erythema.      Toenail Condition: Fungal disease present.     Left foot:      Skin integrity: Dry skin present. No ulcer or erythema.      Toenail Condition: Fungal disease present.            Right Ankle/Foot Exam     Tenderness   The patient is tender to palpation of the great toe metatarsophalangeal joint.    Range of Motion   The patient has normal right ankle ROM.    Comments:  Nails 1 through 5 are thickened, discolored, dystrophic, and elongated with subungual debris    Skin thin shiny and atrophic with diminished pedal hair growth       Left Ankle/Foot Exam     Tenderness   The patient is tender to palpation of the great toe metatarsophalangeal joint.    Range of Motion   The patient has normal left ankle ROM.     Comments:  Nails 1 through 5 are thickened, discolored, dystrophic, and elongated with subungual debris    Skin thin shiny and atrophic with diminished pedal hair growth         Muscle Strength   Right Lower Extremity   Ankle Dorsiflexion:  4+   Plantar flexion:  4+/5  Left  Lower Extremity   Ankle Dorsiflexion:  4+   Plantar flexion:  4+/5     Reflexes     Left Side  Paresthesia: present    Right Side   Paresthesia: present    Vascular Exam     Right Pulses  Dorsalis Pedis:      1+  Posterior Tibial:      1+        Left Pulses  Dorsalis Pedis:      2+  Posterior Tibial:      2+         Imaging: none            Assessment:       1. Acquired hallux limitus of both feet    2. Pain in both feet      Plan:   Acquired hallux limitus of both feet  -     methylPREDNISolone (MEDROL DOSEPACK) 4 mg tablet; use as directed  Dispense: 1 each; Refill: 0    Pain in both feet  -     methylPREDNISolone (MEDROL DOSEPACK) 4 mg tablet; use as directed  Dispense: 1 each; Refill: 0      Follow up if symptoms worsen or fail to improve.    Procedures        I discussed the patient findings of hallux limitus bilaterally.  We discussed different treatment options.  I am going to send in a Medrol Dosepak for her.  I discussed there that if this does not alleviate her symptoms then I would recommend intra-articular steroid injection.  Recommend ice over heat and weight-bearing in proper shoe gear.      Counseling:     I provided patient education verbally regarding:   Patient diagnosis, treatment options, as well as alternatives, risks, and benefits.     This note was created using Dragon voice recognition software that occasionally misinterpreted phrases or words.

## 2023-03-16 ENCOUNTER — HOSPITAL ENCOUNTER (OUTPATIENT)
Dept: RADIOLOGY | Facility: HOSPITAL | Age: 73
Discharge: HOME | End: 2023-03-16
Attending: FAMILY MEDICINE
Payer: MEDICARE

## 2023-03-16 DIAGNOSIS — R92.8 ABNORMAL MAMMOGRAPHY: ICD-10-CM

## 2023-03-16 PROCEDURE — 77061 MAMMO DIGITAL DIAGNOSTIC LEFT WITH TOMO: ICD-10-PCS | Mod: 26,LT,, | Performed by: RADIOLOGY

## 2023-03-16 PROCEDURE — 77061 BREAST TOMOSYNTHESIS UNI: CPT | Mod: TC,LT

## 2023-03-16 PROCEDURE — 77061 BREAST TOMOSYNTHESIS UNI: CPT | Mod: 26,LT,, | Performed by: RADIOLOGY

## 2023-03-16 PROCEDURE — 77065 DX MAMMO INCL CAD UNI: CPT | Mod: 26,LT,, | Performed by: RADIOLOGY

## 2023-03-16 PROCEDURE — 76642 ULTRASOUND BREAST LIMITED: CPT | Mod: TC,LT

## 2023-03-16 PROCEDURE — 76642 ULTRASOUND BREAST LIMITED: CPT | Mod: 26,LT,, | Performed by: RADIOLOGY

## 2023-03-16 PROCEDURE — 76642 US BREAST LEFT LIMITED: ICD-10-PCS | Mod: 26,LT,, | Performed by: RADIOLOGY

## 2023-03-16 PROCEDURE — 77065 MAMMO DIGITAL DIAGNOSTIC LEFT WITH TOMO: ICD-10-PCS | Mod: 26,LT,, | Performed by: RADIOLOGY

## 2023-03-21 ENCOUNTER — HOSPITAL ENCOUNTER (OUTPATIENT)
Dept: RADIOLOGY | Facility: HOSPITAL | Age: 73
Discharge: HOME | End: 2023-03-21
Attending: FAMILY MEDICINE
Payer: MEDICARE

## 2023-03-21 DIAGNOSIS — R92.8 ABNORMAL MAMMOGRAM OF LEFT BREAST: ICD-10-CM

## 2023-03-21 PROCEDURE — 88305 TISSUE EXAM BY PATHOLOGIST: CPT | Mod: 26,,, | Performed by: STUDENT IN AN ORGANIZED HEALTH CARE EDUCATION/TRAINING PROGRAM

## 2023-03-21 PROCEDURE — 77061 BREAST TOMOSYNTHESIS UNI: CPT | Mod: TC,LT

## 2023-03-21 PROCEDURE — 88305 TISSUE EXAM BY PATHOLOGIST: CPT | Performed by: STUDENT IN AN ORGANIZED HEALTH CARE EDUCATION/TRAINING PROGRAM

## 2023-03-21 PROCEDURE — 88360 PR  TUMOR IMMUNOHISTOCHEM/MANUAL: ICD-10-PCS | Mod: 26,,, | Performed by: STUDENT IN AN ORGANIZED HEALTH CARE EDUCATION/TRAINING PROGRAM

## 2023-03-21 PROCEDURE — 88360 TUMOR IMMUNOHISTOCHEM/MANUAL: CPT | Performed by: STUDENT IN AN ORGANIZED HEALTH CARE EDUCATION/TRAINING PROGRAM

## 2023-03-21 PROCEDURE — 88342 IMHCHEM/IMCYTCHM 1ST ANTB: CPT | Performed by: STUDENT IN AN ORGANIZED HEALTH CARE EDUCATION/TRAINING PROGRAM

## 2023-03-21 PROCEDURE — 88342 IMHCHEM/IMCYTCHM 1ST ANTB: CPT | Mod: 26,59,, | Performed by: STUDENT IN AN ORGANIZED HEALTH CARE EDUCATION/TRAINING PROGRAM

## 2023-03-21 PROCEDURE — 77061 BREAST TOMOSYNTHESIS UNI: CPT | Mod: 26,LT,, | Performed by: RADIOLOGY

## 2023-03-21 PROCEDURE — 19083 BX BREAST 1ST LESION US IMAG: CPT | Mod: LT,,, | Performed by: RADIOLOGY

## 2023-03-21 PROCEDURE — 77065 MAMMO DIGITAL DIAGNOSTIC LEFT WITH TOMO: ICD-10-PCS | Mod: 26,LT,, | Performed by: RADIOLOGY

## 2023-03-21 PROCEDURE — 88342 CHG IMMUNOCYTOCHEMISTRY: ICD-10-PCS | Mod: 26,59,, | Performed by: STUDENT IN AN ORGANIZED HEALTH CARE EDUCATION/TRAINING PROGRAM

## 2023-03-21 PROCEDURE — 88305 TISSUE EXAM BY PATHOLOGIST: ICD-10-PCS | Mod: 26,,, | Performed by: STUDENT IN AN ORGANIZED HEALTH CARE EDUCATION/TRAINING PROGRAM

## 2023-03-21 PROCEDURE — 77065 DX MAMMO INCL CAD UNI: CPT | Mod: 26,LT,, | Performed by: RADIOLOGY

## 2023-03-21 PROCEDURE — 19083 BX BREAST 1ST LESION US IMAG: CPT | Mod: LT

## 2023-03-21 PROCEDURE — 77061 MAMMO DIGITAL DIAGNOSTIC LEFT WITH TOMO: ICD-10-PCS | Mod: 26,LT,, | Performed by: RADIOLOGY

## 2023-03-21 PROCEDURE — 88360 TUMOR IMMUNOHISTOCHEM/MANUAL: CPT | Mod: 26,,, | Performed by: STUDENT IN AN ORGANIZED HEALTH CARE EDUCATION/TRAINING PROGRAM

## 2023-03-21 PROCEDURE — 19083 US BREAST BIOPSY WITH IMAGING 1ST SITE LEFT: ICD-10-PCS | Mod: LT,,, | Performed by: RADIOLOGY

## 2023-03-22 ENCOUNTER — TELEPHONE (OUTPATIENT)
Dept: RADIOLOGY | Facility: CLINIC | Age: 73
End: 2023-03-22
Payer: MEDICARE

## 2023-03-22 NOTE — TELEPHONE ENCOUNTER
Spoke with patient to follow up after yesterdays breast bx, pt states feeling fine. Will call her back when reults are in.

## 2023-03-24 ENCOUNTER — TELEPHONE (OUTPATIENT)
Dept: RADIOLOGY | Facility: HOSPITAL | Age: 73
End: 2023-03-24
Payer: MEDICARE

## 2023-03-27 ENCOUNTER — TELEPHONE (OUTPATIENT)
Dept: HEMATOLOGY/ONCOLOGY | Facility: CLINIC | Age: 73
End: 2023-03-27
Payer: MEDICARE

## 2023-03-27 NOTE — TELEPHONE ENCOUNTER
Incoming call from pt stating that she needs to be scheduled for oncology visit. Pt has been referred to [facility] breast clinic.

## 2023-03-28 ENCOUNTER — TELEPHONE (OUTPATIENT)
Dept: HEMATOLOGY/ONCOLOGY | Facility: CLINIC | Age: 73
End: 2023-03-28

## 2023-04-05 LAB
FINAL PATHOLOGIC DIAGNOSIS: NORMAL
GROSS: NORMAL
Lab: NORMAL
SUPPLEMENTAL DIAGNOSIS: NORMAL

## 2023-04-10 ENCOUNTER — OFFICE VISIT (OUTPATIENT)
Dept: ONCOLOGY | Facility: CLINIC | Age: 73
End: 2023-04-10
Payer: MEDICARE

## 2023-04-10 VITALS
BODY MASS INDEX: 31.64 KG/M2 | TEMPERATURE: 98 F | DIASTOLIC BLOOD PRESSURE: 62 MMHG | DIASTOLIC BLOOD PRESSURE: 62 MMHG | HEART RATE: 80 BPM | HEART RATE: 80 BPM | BODY MASS INDEX: 31.64 KG/M2 | TEMPERATURE: 98 F | SYSTOLIC BLOOD PRESSURE: 131 MMHG | DIASTOLIC BLOOD PRESSURE: 62 MMHG | WEIGHT: 162 LBS | WEIGHT: 162 LBS | SYSTOLIC BLOOD PRESSURE: 131 MMHG | SYSTOLIC BLOOD PRESSURE: 131 MMHG | WEIGHT: 162 LBS | BODY MASS INDEX: 31.64 KG/M2 | HEART RATE: 80 BPM

## 2023-04-10 VITALS
HEART RATE: 80 BPM | WEIGHT: 162 LBS | SYSTOLIC BLOOD PRESSURE: 131 MMHG | BODY MASS INDEX: 31.64 KG/M2 | TEMPERATURE: 98 F | DIASTOLIC BLOOD PRESSURE: 62 MMHG

## 2023-04-10 DIAGNOSIS — C50.212 MALIGNANT NEOPLASM OF UPPER-INNER QUADRANT OF LEFT BREAST IN FEMALE, ESTROGEN RECEPTOR POSITIVE: Primary | ICD-10-CM

## 2023-04-10 DIAGNOSIS — C50.912 INVASIVE DUCTAL CARCINOMA OF BREAST, FEMALE, LEFT: Primary | ICD-10-CM

## 2023-04-10 DIAGNOSIS — Z17.0 MALIGNANT NEOPLASM OF UPPER-INNER QUADRANT OF LEFT BREAST IN FEMALE, ESTROGEN RECEPTOR POSITIVE: Primary | ICD-10-CM

## 2023-04-10 PROCEDURE — 1159F MED LIST DOCD IN RCRD: CPT | Mod: CPTII,,, | Performed by: INTERNAL MEDICINE

## 2023-04-10 PROCEDURE — 1101F PR PT FALLS ASSESS DOC 0-1 FALLS W/OUT INJ PAST YR: ICD-10-PCS | Mod: CPTII,S$GLB,, | Performed by: RADIOLOGY

## 2023-04-10 PROCEDURE — 1157F ADVNC CARE PLAN IN RCRD: CPT | Mod: CPTII,,, | Performed by: STUDENT IN AN ORGANIZED HEALTH CARE EDUCATION/TRAINING PROGRAM

## 2023-04-10 PROCEDURE — 3061F PR NEG MICROALBUMINURIA RESULT DOCUMENTED/REVIEW: ICD-10-PCS | Mod: CPTII,S$GLB,, | Performed by: RADIOLOGY

## 2023-04-10 PROCEDURE — 3044F PR MOST RECENT HEMOGLOBIN A1C LEVEL <7.0%: ICD-10-PCS | Mod: CPTII,,, | Performed by: STUDENT IN AN ORGANIZED HEALTH CARE EDUCATION/TRAINING PROGRAM

## 2023-04-10 PROCEDURE — 3072F LOW RISK FOR RETINOPATHY: CPT | Mod: CPTII,,, | Performed by: STUDENT IN AN ORGANIZED HEALTH CARE EDUCATION/TRAINING PROGRAM

## 2023-04-10 PROCEDURE — 99205 OFFICE O/P NEW HI 60 MIN: CPT | Mod: S$GLB,,, | Performed by: RADIOLOGY

## 2023-04-10 PROCEDURE — 3078F DIAST BP <80 MM HG: CPT | Mod: CPTII,,, | Performed by: STUDENT IN AN ORGANIZED HEALTH CARE EDUCATION/TRAINING PROGRAM

## 2023-04-10 PROCEDURE — 1126F AMNT PAIN NOTED NONE PRSNT: CPT | Mod: CPTII,,, | Performed by: INTERNAL MEDICINE

## 2023-04-10 PROCEDURE — 3044F HG A1C LEVEL LT 7.0%: CPT | Mod: CPTII,,, | Performed by: INTERNAL MEDICINE

## 2023-04-10 PROCEDURE — 3072F PR LOW RISK FOR RETINOPATHY: ICD-10-PCS | Mod: CPTII,,, | Performed by: STUDENT IN AN ORGANIZED HEALTH CARE EDUCATION/TRAINING PROGRAM

## 2023-04-10 PROCEDURE — 3044F HG A1C LEVEL LT 7.0%: CPT | Mod: CPTII,S$GLB,, | Performed by: RADIOLOGY

## 2023-04-10 PROCEDURE — 1126F AMNT PAIN NOTED NONE PRSNT: CPT | Mod: CPTII,,, | Performed by: STUDENT IN AN ORGANIZED HEALTH CARE EDUCATION/TRAINING PROGRAM

## 2023-04-10 PROCEDURE — 3072F LOW RISK FOR RETINOPATHY: CPT | Mod: CPTII,S$GLB,, | Performed by: RADIOLOGY

## 2023-04-10 PROCEDURE — 3072F PR LOW RISK FOR RETINOPATHY: ICD-10-PCS | Mod: CPTII,,, | Performed by: INTERNAL MEDICINE

## 2023-04-10 PROCEDURE — 99205 PR OFFICE/OUTPT VISIT, NEW, LEVL V, 60-74 MIN: ICD-10-PCS | Mod: S$GLB,,, | Performed by: RADIOLOGY

## 2023-04-10 PROCEDURE — 3044F HG A1C LEVEL LT 7.0%: CPT | Mod: CPTII,,, | Performed by: STUDENT IN AN ORGANIZED HEALTH CARE EDUCATION/TRAINING PROGRAM

## 2023-04-10 PROCEDURE — 3061F NEG MICROALBUMINURIA REV: CPT | Mod: CPTII,,, | Performed by: INTERNAL MEDICINE

## 2023-04-10 PROCEDURE — 99205 OFFICE O/P NEW HI 60 MIN: CPT | Mod: ,,, | Performed by: INTERNAL MEDICINE

## 2023-04-10 PROCEDURE — 99205 OFFICE O/P NEW HI 60 MIN: CPT | Mod: ,,, | Performed by: STUDENT IN AN ORGANIZED HEALTH CARE EDUCATION/TRAINING PROGRAM

## 2023-04-10 PROCEDURE — 3061F PR NEG MICROALBUMINURIA RESULT DOCUMENTED/REVIEW: ICD-10-PCS | Mod: CPTII,,, | Performed by: STUDENT IN AN ORGANIZED HEALTH CARE EDUCATION/TRAINING PROGRAM

## 2023-04-10 PROCEDURE — 3078F PR MOST RECENT DIASTOLIC BLOOD PRESSURE < 80 MM HG: ICD-10-PCS | Mod: CPTII,,, | Performed by: INTERNAL MEDICINE

## 2023-04-10 PROCEDURE — 3075F PR MOST RECENT SYSTOLIC BLOOD PRESS GE 130-139MM HG: ICD-10-PCS | Mod: CPTII,,, | Performed by: INTERNAL MEDICINE

## 2023-04-10 PROCEDURE — 1101F PT FALLS ASSESS-DOCD LE1/YR: CPT | Mod: CPTII,S$GLB,, | Performed by: RADIOLOGY

## 2023-04-10 PROCEDURE — 99213 OFFICE O/P EST LOW 20 MIN: CPT | Performed by: INTERNAL MEDICINE

## 2023-04-10 PROCEDURE — 3075F SYST BP GE 130 - 139MM HG: CPT | Mod: CPTII,S$GLB,, | Performed by: RADIOLOGY

## 2023-04-10 PROCEDURE — 99205 PR OFFICE/OUTPT VISIT, NEW, LEVL V, 60-74 MIN: ICD-10-PCS | Mod: ,,, | Performed by: INTERNAL MEDICINE

## 2023-04-10 PROCEDURE — 3044F PR MOST RECENT HEMOGLOBIN A1C LEVEL <7.0%: ICD-10-PCS | Mod: CPTII,S$GLB,, | Performed by: RADIOLOGY

## 2023-04-10 PROCEDURE — 3044F PR MOST RECENT HEMOGLOBIN A1C LEVEL <7.0%: ICD-10-PCS | Mod: CPTII,,, | Performed by: INTERNAL MEDICINE

## 2023-04-10 PROCEDURE — 3061F PR NEG MICROALBUMINURIA RESULT DOCUMENTED/REVIEW: ICD-10-PCS | Mod: CPTII,,, | Performed by: INTERNAL MEDICINE

## 2023-04-10 PROCEDURE — 1160F RVW MEDS BY RX/DR IN RCRD: CPT | Mod: CPTII,,, | Performed by: INTERNAL MEDICINE

## 2023-04-10 PROCEDURE — 1126F PR PAIN SEVERITY QUANTIFIED, NO PAIN PRESENT: ICD-10-PCS | Mod: CPTII,,, | Performed by: STUDENT IN AN ORGANIZED HEALTH CARE EDUCATION/TRAINING PROGRAM

## 2023-04-10 PROCEDURE — 3078F PR MOST RECENT DIASTOLIC BLOOD PRESSURE < 80 MM HG: ICD-10-PCS | Mod: CPTII,,, | Performed by: STUDENT IN AN ORGANIZED HEALTH CARE EDUCATION/TRAINING PROGRAM

## 2023-04-10 PROCEDURE — 3078F DIAST BP <80 MM HG: CPT | Mod: CPTII,,, | Performed by: INTERNAL MEDICINE

## 2023-04-10 PROCEDURE — 3288F FALL RISK ASSESSMENT DOCD: CPT | Mod: CPTII,S$GLB,, | Performed by: RADIOLOGY

## 2023-04-10 PROCEDURE — 3066F PR DOCUMENTATION OF TREATMENT FOR NEPHROPATHY: ICD-10-PCS | Mod: CPTII,S$GLB,, | Performed by: RADIOLOGY

## 2023-04-10 PROCEDURE — 1159F MED LIST DOCD IN RCRD: CPT | Mod: CPTII,S$GLB,, | Performed by: RADIOLOGY

## 2023-04-10 PROCEDURE — 3061F NEG MICROALBUMINURIA REV: CPT | Mod: CPTII,,, | Performed by: STUDENT IN AN ORGANIZED HEALTH CARE EDUCATION/TRAINING PROGRAM

## 2023-04-10 PROCEDURE — 1157F PR ADVANCE CARE PLAN OR EQUIV PRESENT IN MEDICAL RECORD: ICD-10-PCS | Mod: CPTII,S$GLB,, | Performed by: RADIOLOGY

## 2023-04-10 PROCEDURE — 1157F PR ADVANCE CARE PLAN OR EQUIV PRESENT IN MEDICAL RECORD: ICD-10-PCS | Mod: CPTII,,, | Performed by: STUDENT IN AN ORGANIZED HEALTH CARE EDUCATION/TRAINING PROGRAM

## 2023-04-10 PROCEDURE — 1160F PR REVIEW ALL MEDS BY PRESCRIBER/CLIN PHARMACIST DOCUMENTED: ICD-10-PCS | Mod: CPTII,S$GLB,, | Performed by: RADIOLOGY

## 2023-04-10 PROCEDURE — 3066F PR DOCUMENTATION OF TREATMENT FOR NEPHROPATHY: ICD-10-PCS | Mod: CPTII,,, | Performed by: STUDENT IN AN ORGANIZED HEALTH CARE EDUCATION/TRAINING PROGRAM

## 2023-04-10 PROCEDURE — 3072F PR LOW RISK FOR RETINOPATHY: ICD-10-PCS | Mod: CPTII,S$GLB,, | Performed by: RADIOLOGY

## 2023-04-10 PROCEDURE — 1126F AMNT PAIN NOTED NONE PRSNT: CPT | Mod: CPTII,S$GLB,, | Performed by: RADIOLOGY

## 2023-04-10 PROCEDURE — 1157F PR ADVANCE CARE PLAN OR EQUIV PRESENT IN MEDICAL RECORD: ICD-10-PCS | Mod: CPTII,,, | Performed by: INTERNAL MEDICINE

## 2023-04-10 PROCEDURE — 99499 UNLISTED E&M SERVICE: CPT | Mod: CPTII,S$GLB,, | Performed by: INTERNAL MEDICINE

## 2023-04-10 PROCEDURE — 3288F PR FALLS RISK ASSESSMENT DOCUMENTED: ICD-10-PCS | Mod: CPTII,S$GLB,, | Performed by: RADIOLOGY

## 2023-04-10 PROCEDURE — 3078F DIAST BP <80 MM HG: CPT | Mod: CPTII,S$GLB,, | Performed by: RADIOLOGY

## 2023-04-10 PROCEDURE — 1159F PR MEDICATION LIST DOCUMENTED IN MEDICAL RECORD: ICD-10-PCS | Mod: CPTII,S$GLB,, | Performed by: RADIOLOGY

## 2023-04-10 PROCEDURE — 3066F PR DOCUMENTATION OF TREATMENT FOR NEPHROPATHY: ICD-10-PCS | Mod: CPTII,,, | Performed by: INTERNAL MEDICINE

## 2023-04-10 PROCEDURE — 3078F PR MOST RECENT DIASTOLIC BLOOD PRESSURE < 80 MM HG: ICD-10-PCS | Mod: CPTII,S$GLB,, | Performed by: RADIOLOGY

## 2023-04-10 PROCEDURE — 99205 PR OFFICE/OUTPT VISIT, NEW, LEVL V, 60-74 MIN: ICD-10-PCS | Mod: ,,, | Performed by: STUDENT IN AN ORGANIZED HEALTH CARE EDUCATION/TRAINING PROGRAM

## 2023-04-10 PROCEDURE — 1157F ADVNC CARE PLAN IN RCRD: CPT | Mod: CPTII,S$GLB,, | Performed by: RADIOLOGY

## 2023-04-10 PROCEDURE — 1126F PR PAIN SEVERITY QUANTIFIED, NO PAIN PRESENT: ICD-10-PCS | Mod: CPTII,S$GLB,, | Performed by: RADIOLOGY

## 2023-04-10 PROCEDURE — 3075F PR MOST RECENT SYSTOLIC BLOOD PRESS GE 130-139MM HG: ICD-10-PCS | Mod: CPTII,S$GLB,, | Performed by: RADIOLOGY

## 2023-04-10 PROCEDURE — 3075F PR MOST RECENT SYSTOLIC BLOOD PRESS GE 130-139MM HG: ICD-10-PCS | Mod: CPTII,,, | Performed by: STUDENT IN AN ORGANIZED HEALTH CARE EDUCATION/TRAINING PROGRAM

## 2023-04-10 PROCEDURE — 3066F NEPHROPATHY DOC TX: CPT | Mod: CPTII,,, | Performed by: STUDENT IN AN ORGANIZED HEALTH CARE EDUCATION/TRAINING PROGRAM

## 2023-04-10 PROCEDURE — 3072F LOW RISK FOR RETINOPATHY: CPT | Mod: CPTII,,, | Performed by: INTERNAL MEDICINE

## 2023-04-10 PROCEDURE — 1159F PR MEDICATION LIST DOCUMENTED IN MEDICAL RECORD: ICD-10-PCS | Mod: CPTII,,, | Performed by: INTERNAL MEDICINE

## 2023-04-10 PROCEDURE — 99499 NO LOS: ICD-10-PCS | Mod: CPTII,S$GLB,, | Performed by: INTERNAL MEDICINE

## 2023-04-10 PROCEDURE — 1160F PR REVIEW ALL MEDS BY PRESCRIBER/CLIN PHARMACIST DOCUMENTED: ICD-10-PCS | Mod: CPTII,,, | Performed by: INTERNAL MEDICINE

## 2023-04-10 PROCEDURE — 3061F NEG MICROALBUMINURIA REV: CPT | Mod: CPTII,S$GLB,, | Performed by: RADIOLOGY

## 2023-04-10 PROCEDURE — 3066F NEPHROPATHY DOC TX: CPT | Mod: CPTII,S$GLB,, | Performed by: RADIOLOGY

## 2023-04-10 PROCEDURE — 3075F SYST BP GE 130 - 139MM HG: CPT | Mod: CPTII,,, | Performed by: INTERNAL MEDICINE

## 2023-04-10 PROCEDURE — 1157F ADVNC CARE PLAN IN RCRD: CPT | Mod: CPTII,,, | Performed by: INTERNAL MEDICINE

## 2023-04-10 PROCEDURE — 3066F NEPHROPATHY DOC TX: CPT | Mod: CPTII,,, | Performed by: INTERNAL MEDICINE

## 2023-04-10 PROCEDURE — 1160F RVW MEDS BY RX/DR IN RCRD: CPT | Mod: CPTII,S$GLB,, | Performed by: RADIOLOGY

## 2023-04-10 PROCEDURE — 1126F PR PAIN SEVERITY QUANTIFIED, NO PAIN PRESENT: ICD-10-PCS | Mod: CPTII,,, | Performed by: INTERNAL MEDICINE

## 2023-04-10 PROCEDURE — 3075F SYST BP GE 130 - 139MM HG: CPT | Mod: CPTII,,, | Performed by: STUDENT IN AN ORGANIZED HEALTH CARE EDUCATION/TRAINING PROGRAM

## 2023-04-10 NOTE — PROGRESS NOTES
Maggy Tapia  [id number]  1950  4/10/2023  No referring provider defined for this encounter.    REASON FOR CONSULTATION: IA, [id number] g2 IDC UIQ L breast, ER+/VT+/HER2(-)    TREATMENT GOAL: adjuvant    HISTORY OF PRESENT ILLNESS:   Maggy Tapia is a 73 y.o. female with PMHx epilepsy disorder, TD, PTSD/anxiety disorder, prior CVA and history of R-sided BCa at 13y/o treated with surgery + RT as well as +FHx BCa who presents with abnormal mammogram history dating to  with recent screening mammogram noting architectural distortion and left breast confirmed on diagnostic mammogram and ultrasound to be a 1.4 cm irregular hypoechoic, spiculated mass at 10:00.  Core needle biopsy returned grade 2 (6/9) invasive ductal carcinoma, ER+ 90%, VT+ 20%, HER2(-) by FISH; Ki-67 40%.     She presents to [facility] at [facility] to meet with Dr. Khoobehi (Medical Oncology), Dr. Paredes (Surgery) and myself (Radiation Oncology) to formulate treatment plan.    Denies pain or discomfort within left breast.  Endorses good range of motion without swelling of extremity without new lumps or bumps in L axilla.    Review of systems otherwise negative unless indicated in HPI.    Past Medical History:   Diagnosis Date    Anxiety     Arthritis     Asthma     Cancer     Left Breast    Depression     Diabetes mellitus, type 2     GERD (gastroesophageal reflux disease)     Hyperlipidemia     Hypertension     Overactive bladder     Seizures     Pseudo-seizures    Sleep apnea     Stroke     Stroke 2022    pt was in the hospital for a stroke, claims she was seeing people when the stroke happened    Thyroid disease     Urinary tract infection without hematuria 2017     Past Surgical History:   Procedure Laterality Date    APPENDECTOMY      BREAST BIOPSY Left     BREAST LUMPECTOMY Left     2016    BREAST SURGERY      CATARACT EXTRACTION Bilateral     OU done//     SECTION       CHOLECYSTECTOMY      COLONOSCOPY N/A 6/24/2020    Procedure: COLONOSCOPY;  Surgeon: Mj Fernandez MD;  Location: [facility] ENDO;  Service: Endoscopy;  Laterality: N/A;    CYST REMOVAL Left 04/01/2021    DILATION AND CURETTAGE OF UTERUS      EYE SURGERY      PERCUTANEOUS PINNING OF HIP Right 11/11/2022    Procedure: PINNING, HIP, PERCUTANEOUS;  Surgeon: Ministerio Joiner II, MD;  Location: [facility] OR;  Service: Orthopedics;  Laterality: Right;     Social History     Socioeconomic History    Marital status: Single    Number of children: 0   Tobacco Use    Smoking status: Never    Smokeless tobacco: Never   Substance and Sexual Activity    Alcohol use: Yes     Comment: seldom    Drug use: No    Sexual activity: Not Currently   Social History Narrative    Single, no children, lives with brother Apollo who is her primary caregiver.     Family History   Problem Relation Age of Onset    Diabetes Mother     Hypertension Mother     Breast cancer Mother     Cataracts Mother     Melanoma Mother     Heart failure Father     Melanoma Father     Cataracts Brother     Melanoma Brother     Glaucoma Neg Hx     Retinal detachment Neg Hx     Macular degeneration Neg Hx        PRIOR HISTORY OF CHEMOTHERAPY OR RADIOTHERAPY: Please see HPI for patients prior oncologic history.    Medication List with Changes/Refills   Current Medications    ALBUTEROL (PROVENTIL/VENTOLIN HFA) 90 MCG/ACTUATION INHALER    Inhale 1-2 puffs into the lungs every 4 (four) hours as needed for Shortness of Breath (coughing). Rescue    ALENDRONATE (FOSAMAX) 70 MG TABLET    Take one tablet every 7 days.    ASPIRIN (ECOTRIN) 81 MG EC TABLET    Take 81 mg by mouth once daily.    BLOOD-GLUCOSE METER KIT    Use as instructed. Insurance preferred.    CALCIUM CARBONATE/VITAMIN D3 (CALCIUM 500 + D ORAL)    Take 1 tablet by mouth once daily. 10 mg daily    CYANOCOBALAMIN (VITAMIN B-12) 1000 MCG TABLET    Take 1 tablet (1,000 mcg total) by mouth once daily.    EMPAGLIFLOZIN  (JARDIANCE) 10 MG TABLET    Take 1 tablet (10 mg total) by mouth once daily.    FLUTICASONE FUROATE-VILANTEROL (BREO ELLIPTA) 100-25 MCG/DOSE DISKUS INHALER    Inhale 1 puff into the lungs once daily. Controller    GABAPENTIN (NEURONTIN) 300 MG CAPSULE    Take 1 capsule (300 mg total) by mouth every evening. Take 1 tablet every night x 7 days. Increase to twice a day x 7 days. Increase to three times a day.    KETOCONAZOLE (NIZORAL) 2 % CREAM    AAA pannus fold at least daily after the shower    LEVOTHYROXINE (SYNTHROID) 100 MCG TABLET    Take 1 tablet (100 mcg total) by mouth before breakfast.    LOSARTAN (COZAAR) 50 MG TABLET    Take 0.5 tablets (25 mg total) by mouth once daily.    METFORMIN (GLUCOPHAGE-XR) 500 MG ER 24HR TABLET    Take 2 tablets (1,000 mg total) by mouth daily with breakfast.    METHYLPREDNISOLONE (MEDROL DOSEPACK) 4 MG TABLET    use as directed    POLYETHYLENE GLYCOL (GLYCOLAX) 17 GRAM PWPK    Take 17 g by mouth once daily.    POTASSIUM CHLORIDE SA (K-DUR,KLOR-CON) 20 MEQ TABLET    Take 20 mEq by mouth once daily.    ROPINIROLE (REQUIP) 0.5 MG TABLET    Take by mouth.    SERTRALINE (ZOLOFT) 50 MG TABLET    Take 1 tablet (50 mg total) by mouth once daily. For depression/anxiety    SIMVASTATIN (ZOCOR) 40 MG TABLET    Take 1 tablet (40 mg total) by mouth once daily.    TETRABENAZINE (XENAZINE) 25 MG TABLET    Take one tablet daily for 7 days and then  Take 1 tablet twice daily afterwards     Review of patient's allergies indicates:   Allergen Reactions    Penicillins Anaphylaxis    Sulfa (sulfonamide antibiotics) Anaphylaxis    Trintellix [vortioxetine] Nausea And Vomiting and Other (See Comments)     Patient has seizures and vomits       QUALITY OF LIFE: 80%- Normal Activity with Effort: Some Symptoms of Disease    Vitals:    04/10/23 1404   BP: 131/62   Pulse: 80   Weight: 73.5 kg (162 lb)   PainSc: 0-No pain     Body mass index is 31.64 kg/m².    PHYSICAL EXAM:   GENERAL: alert; in no apparent  distress.   HEAD: normocephalic, atraumatic.  EYES: pupils are equal, round, reactive to light and accommodation. Sclera anicteric. Conjunctiva not injected.   NOSE/THROAT: no nasal erythema or rhinorrhea. Oropharynx pink, without erythema, ulcerations or thrush.   NECK: no cervical motion rigidity; supple with no masses.    CHEST: Patient is speaking comfortably on room air with normal work of breathing without using accessory muscles of respiration.  CARDIOVASCULAR: regular rate and rhythm  ABDOMEN: soft, nontender, nondistended.   MUSCULOSKELETAL: no tenderness to palpation along the spine or scapulae. Normal range of motion.  NEUROLOGIC: cranial nerves II-XII intact bilaterally. Strength 5/5 in bilateral upper and lower extremities. No sensory deficits appreciated. Tardive dyskinesia  LYMPHATIC: no left axillary adenopathy appreciated   EXTREMITIES: no clubbing, cyanosis, edema.  SKIN: no erythema, rashes, ulcerations noted.     REVIEW OF IMAGING/PATHOLOGY/LABS: Please see HPI. All images reviewed personally by dictating physician.     ASSESSMENT: Maggy Tapia is a 73 y.o. female with stage IA, [id number] g2 IDC UIQ L breast, ER+/NM+/HER2(-)  PLAN:  Maggy Tapia presents with positive family history of breast cancer in mother and reports a personal history of a right-sided breast cancer at 14 years of age treated with lumpectomy followed by adjuvant radiotherapy in [place].  She now has a screen detected, biopsy-proven grade 2 invasive ductal carcinoma of the upper inner quadrant left breast at the strongly estrogen receptor positive.  Consensus recommendation is to proceed with upfront lumpectomy and sentinel lymph node assessment and this was discussed in detail by Dr. Paredes.  Patient also met with Dr. Khoobehi who described Oncotype DX testing, systemic therapy including antiestrogen treatment.    Today I described adjuvant radiotherapy following lumpectomy to eliminate residual disease within the  breast thereby providing equivalent oncologic results to mastectomy using a breast conserving protocol.  Patient was unable to provide details regarding right-sided treatment that she received this teenager and I am doubtful these records will be available.  She is unable to name the hospital where this was performed or provide relevant details.  To minimize overlap while providing adjuvant treatment and pending final pathology, treatment may be restricted to lumpectomy cavity using IMRT and she does appear to be a good candidate for hypo fractionated regimen.  I do have concerns regarding her ability to perform DIBH given preexisting neurological disorder. IMRT will allow sparing of underlying lung and heart in absence of DIBH.    I carefully explained the process of simulation and treatment delivery with weekly physician visits. Patient wishes to proceed.     We discussed the risks and benefits of the above treatment and have gone over in detail the acute and late toxicities of radiation therapy to the left breast.     Consent deferred.     The patient has our contact information and understands that they are free to contact us at any time with questions or concerns regarding radiation therapy.     DISPOSITION: RTC FOR CT SIM 4wks post-op     I have personally seen and evaluated this patient with a high complexity diagnosis.      Greater than 60 minutes were dedicated to reviewing/interpreting pertinent laboratory/imaging/pathology as well as prior consultations; reviewing and performing history and physical; counseling patient on oncologic recommendations; documentation in the electronic medical record including ordering of additional tests and/or radiation treatment protocol; and coordination of care with physicians with referrals placed as appropriate.     COVID-19 precautions and Cancer Center policy discussed.      PHYSICIAN: Sriram Cooper Jr, MD    Thank you for the opportunity to meet and consult with  Maggy Tapia.   Please feel free to contact me to discuss the above recommendation further.

## 2023-04-10 NOTE — H&P (VIEW-ONLY)
History & Physical    SUBJECTIVE:     History of Present Illness:  Patient is a 73 y.o. female presents with an abnormal screening mammogram.  She subsequently underwent diagnostic imaging with core needle biopsy on the left which showed a roughly 17 mm ER positive invasive breast cancer.  Patient reports that she had previous breast cancer at a very young age on the right side and had radiation on the right as well.  Patient uses a walker related to neurologic symptoms.  She reports that her diaphragm does not work that well.  She is not on oxygen.    No chief complaint on file.      Review of patient's allergies indicates:   Allergen Reactions    Penicillins Anaphylaxis    Sulfa (sulfonamide antibiotics) Anaphylaxis    Trintellix [vortioxetine] Nausea And Vomiting and Other (See Comments)     Patient has seizures and vomits       Current Outpatient Medications   Medication Sig Dispense Refill    albuterol (PROVENTIL/VENTOLIN HFA) 90 mcg/actuation inhaler Inhale 1-2 puffs into the lungs every 4 (four) hours as needed for Shortness of Breath (coughing). Rescue 20.1 g 11    alendronate (FOSAMAX) 70 MG tablet Take one tablet every 7 days. 4 tablet 11    aspirin (ECOTRIN) 81 MG EC tablet Take 81 mg by mouth once daily.      blood-glucose meter kit Use as instructed. Insurance preferred. 1 each 0    CALCIUM CARBONATE/VITAMIN D3 (CALCIUM 500 + D ORAL) Take 1 tablet by mouth once daily. 10 mg daily      cyanocobalamin (VITAMIN B-12) 1000 MCG tablet Take 1 tablet (1,000 mcg total) by mouth once daily. 30 tablet 0    empagliflozin (JARDIANCE) 10 mg tablet Take 1 tablet (10 mg total) by mouth once daily. 90 tablet 2    fluticasone furoate-vilanteroL (BREO ELLIPTA) 100-25 mcg/dose diskus inhaler Inhale 1 puff into the lungs once daily. Controller 60 each 11    gabapentin (NEURONTIN) 300 MG capsule Take 1 capsule (300 mg total) by mouth every evening. Take 1 tablet every night x 7 days. Increase to twice a day x 7 days.  Increase to three times a day. 90 capsule 0    ketoconazole (NIZORAL) 2 % cream AAA pannus fold at least daily after the shower 60 g 3    levothyroxine (SYNTHROID) 100 MCG tablet Take 1 tablet (100 mcg total) by mouth before breakfast. 90 tablet 3    losartan (COZAAR) 50 MG tablet Take 0.5 tablets (25 mg total) by mouth once daily. 90 tablet 0    metFORMIN (GLUCOPHAGE-XR) 500 MG ER 24hr tablet Take 2 tablets (1,000 mg total) by mouth daily with breakfast. 180 tablet 3    methylPREDNISolone (MEDROL DOSEPACK) 4 mg tablet use as directed 1 each 0    polyethylene glycol (GLYCOLAX) 17 gram PwPk Take 17 g by mouth once daily.  0    potassium chloride SA (K-DUR,KLOR-CON) 20 MEQ tablet Take 20 mEq by mouth once daily.      rOPINIRole (REQUIP) 0.5 MG tablet Take by mouth.      sertraline (ZOLOFT) 50 MG tablet Take 1 tablet (50 mg total) by mouth once daily. For depression/anxiety 30 tablet 2    simvastatin (ZOCOR) 40 MG tablet Take 1 tablet (40 mg total) by mouth once daily. 90 tablet 3    tetrabenazine (XENAZINE) 25 mg tablet Take one tablet daily for 7 days and then  Take 1 tablet twice daily afterwards (Patient taking differently: 25 mg 2 (two) times daily. Take one tablet daily for 7 days and then  Take 1 tablet twice daily afterwards) 47 tablet 0     No current facility-administered medications for this visit.       Past Medical History:   Diagnosis Date    Anxiety     Arthritis     Asthma     Cancer 2000    Left Breast    Depression     Diabetes mellitus, type 2     GERD (gastroesophageal reflux disease)     Hyperlipidemia     Hypertension     Overactive bladder     Seizures     Pseudo-seizures    Sleep apnea     Stroke     Stroke 03/2022    pt was in the hospital for a stroke, claims she was seeing people when the stroke happened    Thyroid disease     Urinary tract infection without hematuria 08/03/2017     Past Surgical History:   Procedure Laterality Date    APPENDECTOMY      BREAST BIOPSY Left     BREAST  LUMPECTOMY Left     2016    BREAST SURGERY      CATARACT EXTRACTION Bilateral     OU done//     SECTION      CHOLECYSTECTOMY      COLONOSCOPY N/A 2020    Procedure: COLONOSCOPY;  Surgeon: Mj Fernandez MD;  Location: [facility];  Service: Endoscopy;  Laterality: N/A;    CYST REMOVAL Left 2021    DILATION AND CURETTAGE OF UTERUS      EYE SURGERY      PERCUTANEOUS PINNING OF HIP Right 2022    Procedure: PINNING, HIP, PERCUTANEOUS;  Surgeon: Ministerio Joiner II, MD;  Location: [facility] OR;  Service: Orthopedics;  Laterality: Right;     Family History   Problem Relation Age of Onset    Diabetes Mother     Hypertension Mother     Breast cancer Mother     Cataracts Mother     Melanoma Mother     Heart failure Father     Melanoma Father     Cataracts Brother     Melanoma Brother     Glaucoma Neg Hx     Retinal detachment Neg Hx     Macular degeneration Neg Hx      Social History     Tobacco Use    Smoking status: Never    Smokeless tobacco: Never   Substance Use Topics    Alcohol use: Yes     Comment: seldom    Drug use: No        Review of Systems:  Review of Systems   Constitutional:  Negative for fever.   HENT: Negative.     Eyes: Negative.    Respiratory: Negative.     Cardiovascular: Negative.    Gastrointestinal: Negative.    Endocrine: Negative.    Genitourinary: Negative.    Musculoskeletal: Negative.    Skin: Negative.    Allergic/Immunologic: Negative.    Neurological: Negative.    Hematological: Negative.    Psychiatric/Behavioral: Negative.       OBJECTIVE:     Vital Signs (Most Recent)  Temp: 97.9 °F (36.6 °C) (04/10/23 1407)  Pulse: 80 (04/10/23 1407)  BP: 131/62 (04/10/23 1407)     73.5 kg (162 lb)     Physical Exam:  Physical Exam  Constitutional:       General: She is not in acute distress.     Appearance: Normal appearance. She is not ill-appearing, toxic-appearing or diaphoretic.   HENT:      Head: Normocephalic.      Nose: Nose normal.   Eyes:      Conjunctiva/sclera:  Conjunctivae normal.   Cardiovascular:      Rate and Rhythm: Normal rate and regular rhythm.   Pulmonary:      Effort: Pulmonary effort is normal.   Abdominal:      Palpations: Abdomen is soft.   Musculoskeletal:         General: Normal range of motion.      Cervical back: Normal range of motion.   Skin:     General: Skin is warm.      Comments: Post biopsy changes are likely present in the left breast at about the 10 o'clock position.  No gross adenopathy identified in the left axilla.   Neurological:      General: No focal deficit present.      Mental Status: She is alert.   Psychiatric:         Mood and Affect: Mood normal.       Diagnostic Results:  All relevant imaging studies were reviewed with the patient including mammograms and breast ultrasounds.  Roughly 17 mm mass on mammogram at the 10 o'clock position which is slightly smaller on ultrasound.    ASSESSMENT/PLAN:     73-year-old female with a T1, roughly 17 mm mass on mammogram, at the 10 o'clock position in the left breast.  Clinically node negative.  The invasive cancer is ER positive.    PLAN:  Case was discussed at multidisciplinary tumor conference and conjunction with hematology oncology and radiation oncology.  Given that her tumor is T1 in ER positive, it was discussed proceeding with upfront surgery with lumpectomy and sentinel lymph node biopsy.  I also discussed the possibility of mastectomy with patient as well as complete axillary rosalino dissection should she fail to map with technetium.  Will have an ongoing discussion with Radiation Oncology about the usefulness of complete axillary rosalino dissection should she not map given her age and ER positivity.  Risks benefits of lumpectomy using a ABELARDO  localizer were discussed.  Consent was obtained.  Will wait 2 weeks to allow for some of the post biopsy changes to soften up to allow for better operative approach.  Surgery was tentatively scheduled for the 26.

## 2023-04-10 NOTE — PROGRESS NOTES
History & Physical    SUBJECTIVE:     History of Present Illness:  Patient is a 73 y.o. female presents with an abnormal screening mammogram.  She subsequently underwent diagnostic imaging with core needle biopsy on the left which showed a roughly 17 mm ER positive invasive breast cancer.  Patient reports that she had previous breast cancer at a very young age on the right side and had radiation on the right as well.  Patient uses a walker related to neurologic symptoms.  She reports that her diaphragm does not work that well.  She is not on oxygen.    No chief complaint on file.      Review of patient's allergies indicates:   Allergen Reactions    Penicillins Anaphylaxis    Sulfa (sulfonamide antibiotics) Anaphylaxis    Trintellix [vortioxetine] Nausea And Vomiting and Other (See Comments)     Patient has seizures and vomits       Current Outpatient Medications   Medication Sig Dispense Refill    albuterol (PROVENTIL/VENTOLIN HFA) 90 mcg/actuation inhaler Inhale 1-2 puffs into the lungs every 4 (four) hours as needed for Shortness of Breath (coughing). Rescue 20.1 g 11    alendronate (FOSAMAX) 70 MG tablet Take one tablet every 7 days. 4 tablet 11    aspirin (ECOTRIN) 81 MG EC tablet Take 81 mg by mouth once daily.      blood-glucose meter kit Use as instructed. Insurance preferred. 1 each 0    CALCIUM CARBONATE/VITAMIN D3 (CALCIUM 500 + D ORAL) Take 1 tablet by mouth once daily. 10 mg daily      cyanocobalamin (VITAMIN B-12) 1000 MCG tablet Take 1 tablet (1,000 mcg total) by mouth once daily. 30 tablet 0    empagliflozin (JARDIANCE) 10 mg tablet Take 1 tablet (10 mg total) by mouth once daily. 90 tablet 2    fluticasone furoate-vilanteroL (BREO ELLIPTA) 100-25 mcg/dose diskus inhaler Inhale 1 puff into the lungs once daily. Controller 60 each 11    gabapentin (NEURONTIN) 300 MG capsule Take 1 capsule (300 mg total) by mouth every evening. Take 1 tablet every night x 7 days. Increase to twice a day x 7 days.  Increase to three times a day. 90 capsule 0    ketoconazole (NIZORAL) 2 % cream AAA pannus fold at least daily after the shower 60 g 3    levothyroxine (SYNTHROID) 100 MCG tablet Take 1 tablet (100 mcg total) by mouth before breakfast. 90 tablet 3    losartan (COZAAR) 50 MG tablet Take 0.5 tablets (25 mg total) by mouth once daily. 90 tablet 0    metFORMIN (GLUCOPHAGE-XR) 500 MG ER 24hr tablet Take 2 tablets (1,000 mg total) by mouth daily with breakfast. 180 tablet 3    methylPREDNISolone (MEDROL DOSEPACK) 4 mg tablet use as directed 1 each 0    polyethylene glycol (GLYCOLAX) 17 gram PwPk Take 17 g by mouth once daily.  0    potassium chloride SA (K-DUR,KLOR-CON) 20 MEQ tablet Take 20 mEq by mouth once daily.      rOPINIRole (REQUIP) 0.5 MG tablet Take by mouth.      sertraline (ZOLOFT) 50 MG tablet Take 1 tablet (50 mg total) by mouth once daily. For depression/anxiety 30 tablet 2    simvastatin (ZOCOR) 40 MG tablet Take 1 tablet (40 mg total) by mouth once daily. 90 tablet 3    tetrabenazine (XENAZINE) 25 mg tablet Take one tablet daily for 7 days and then  Take 1 tablet twice daily afterwards (Patient taking differently: 25 mg 2 (two) times daily. Take one tablet daily for 7 days and then  Take 1 tablet twice daily afterwards) 47 tablet 0     No current facility-administered medications for this visit.       Past Medical History:   Diagnosis Date    Anxiety     Arthritis     Asthma     Cancer 2000    Left Breast    Depression     Diabetes mellitus, type 2     GERD (gastroesophageal reflux disease)     Hyperlipidemia     Hypertension     Overactive bladder     Seizures     Pseudo-seizures    Sleep apnea     Stroke     Stroke 03/2022    pt was in the hospital for a stroke, claims she was seeing people when the stroke happened    Thyroid disease     Urinary tract infection without hematuria 08/03/2017     Past Surgical History:   Procedure Laterality Date    APPENDECTOMY      BREAST BIOPSY Left     BREAST  LUMPECTOMY Left     2016    BREAST SURGERY      CATARACT EXTRACTION Bilateral     OU done//     SECTION      CHOLECYSTECTOMY      COLONOSCOPY N/A 2020    Procedure: COLONOSCOPY;  Surgeon: Mj Fernandez MD;  Location: [facility];  Service: Endoscopy;  Laterality: N/A;    CYST REMOVAL Left 2021    DILATION AND CURETTAGE OF UTERUS      EYE SURGERY      PERCUTANEOUS PINNING OF HIP Right 2022    Procedure: PINNING, HIP, PERCUTANEOUS;  Surgeon: Ministerio Joiner II, MD;  Location: [facility] OR;  Service: Orthopedics;  Laterality: Right;     Family History   Problem Relation Age of Onset    Diabetes Mother     Hypertension Mother     Breast cancer Mother     Cataracts Mother     Melanoma Mother     Heart failure Father     Melanoma Father     Cataracts Brother     Melanoma Brother     Glaucoma Neg Hx     Retinal detachment Neg Hx     Macular degeneration Neg Hx      Social History     Tobacco Use    Smoking status: Never    Smokeless tobacco: Never   Substance Use Topics    Alcohol use: Yes     Comment: seldom    Drug use: No        Review of Systems:  Review of Systems   Constitutional:  Negative for fever.   HENT: Negative.     Eyes: Negative.    Respiratory: Negative.     Cardiovascular: Negative.    Gastrointestinal: Negative.    Endocrine: Negative.    Genitourinary: Negative.    Musculoskeletal: Negative.    Skin: Negative.    Allergic/Immunologic: Negative.    Neurological: Negative.    Hematological: Negative.    Psychiatric/Behavioral: Negative.       OBJECTIVE:     Vital Signs (Most Recent)  Temp: 97.9 °F (36.6 °C) (04/10/23 1407)  Pulse: 80 (04/10/23 1407)  BP: 131/62 (04/10/23 1407)     73.5 kg (162 lb)     Physical Exam:  Physical Exam  Constitutional:       General: She is not in acute distress.     Appearance: Normal appearance. She is not ill-appearing, toxic-appearing or diaphoretic.   HENT:      Head: Normocephalic.      Nose: Nose normal.   Eyes:      Conjunctiva/sclera:  Conjunctivae normal.   Cardiovascular:      Rate and Rhythm: Normal rate and regular rhythm.   Pulmonary:      Effort: Pulmonary effort is normal.   Abdominal:      Palpations: Abdomen is soft.   Musculoskeletal:         General: Normal range of motion.      Cervical back: Normal range of motion.   Skin:     General: Skin is warm.      Comments: Post biopsy changes are likely present in the left breast at about the 10 o'clock position.  No gross adenopathy identified in the left axilla.   Neurological:      General: No focal deficit present.      Mental Status: She is alert.   Psychiatric:         Mood and Affect: Mood normal.       Diagnostic Results:  All relevant imaging studies were reviewed with the patient including mammograms and breast ultrasounds.  Roughly 17 mm mass on mammogram at the 10 o'clock position which is slightly smaller on ultrasound.    ASSESSMENT/PLAN:     73-year-old female with a T1, roughly 17 mm mass on mammogram, at the 10 o'clock position in the left breast.  Clinically node negative.  The invasive cancer is ER positive.    PLAN:  Case was discussed at multidisciplinary tumor conference and conjunction with hematology oncology and radiation oncology.  Given that her tumor is T1 in ER positive, it was discussed proceeding with upfront surgery with lumpectomy and sentinel lymph node biopsy.  I also discussed the possibility of mastectomy with patient as well as complete axillary rosalino dissection should she fail to map with technetium.  Will have an ongoing discussion with Radiation Oncology about the usefulness of complete axillary rosalino dissection should she not map given her age and ER positivity.  Risks benefits of lumpectomy using a ABELARDO  localizer were discussed.  Consent was obtained.  Will wait 2 weeks to allow for some of the post biopsy changes to soften up to allow for better operative approach.  Surgery was tentatively scheduled for the 26.

## 2023-04-10 NOTE — PROGRESS NOTES
Service Date:  4/10/23    Chief Complaint: Breast cancer    Maggy Tapia is a 73 y.o. female here with L breast invasive ductal carcinoma. Patient had a routine screening mammogram which led to biopsy of a concerning lesion in the upper inner quadrant. Lesion measured about 17 mm on US. [id number] ER/WI+, HER2 negative by FISH, Ki 67 40%.    Presents to multi-D clinic with Dr Paredes and Kenneth.    Recovering from her biopsy. Endorses a personal hx of R sided breast cancer at age 14 treated with resection and radiation therapy.    Review of Systems   Constitutional: Negative.    HENT: Negative.     Eyes: Negative.    Respiratory: Negative.     Cardiovascular: Negative.    Gastrointestinal: Negative.    Endocrine: Negative.    Genitourinary: Negative.    Musculoskeletal: Negative.    Integumentary:  Negative.   Neurological: Negative.    Hematological: Negative.    Psychiatric/Behavioral: Negative.        Current Outpatient Medications   Medication Instructions    albuterol (PROVENTIL/VENTOLIN HFA) 90 mcg/actuation inhaler 1-2 puffs, Inhalation, Every 4 hours PRN, Rescue    alendronate (FOSAMAX) 70 MG tablet Take one tablet every 7 days.    aspirin (ECOTRIN) 81 mg, Oral, Daily    blood-glucose meter kit Use as instructed. Insurance preferred.    CALCIUM CARBONATE/VITAMIN D3 (CALCIUM 500 + D ORAL) 1 tablet, Oral, Daily, 10 mg daily    cyanocobalamin (VITAMIN B-12) 1,000 mcg, Oral, Daily    fluticasone furoate-vilanteroL (BREO ELLIPTA) 100-25 mcg/dose diskus inhaler 1 puff, Inhalation, Daily, Controller    gabapentin (NEURONTIN) 300 mg, Oral, Nightly, Take 1 tablet every night x 7 days. Increase to twice a day x 7 days. Increase to three times a day.    JARDIANCE 10 mg, Oral, Daily    ketoconazole (NIZORAL) 2 % cream AAA pannus fold at least daily after the shower    levothyroxine (SYNTHROID) 100 mcg, Oral, Before breakfast    losartan (COZAAR) 25 mg, Oral, Daily    metFORMIN (GLUCOPHAGE-XR) 1,000 mg, Oral, With  breakfast    methylPREDNISolone (MEDROL DOSEPACK) 4 mg tablet use as directed    polyethylene glycol (GLYCOLAX) 17 g, Oral, Daily    potassium chloride SA (K-DUR,KLOR-CON) 20 MEQ tablet 20 mEq, Oral, Daily    rOPINIRole (REQUIP) 0.5 MG tablet Oral    sertraline (ZOLOFT) 50 mg, Oral, Daily, For depression/anxiety    simvastatin (ZOCOR) 40 mg, Oral, Daily    tetrabenazine (XENAZINE) 25 mg tablet Take one tablet daily for 7 days and then<BR>Take 1 tablet twice daily afterwards        Past Medical History:   Diagnosis Date    Anxiety     Arthritis     Asthma     Cancer     Left Breast    Depression     Diabetes mellitus, type 2     GERD (gastroesophageal reflux disease)     Hyperlipidemia     Hypertension     Overactive bladder     Seizures     Pseudo-seizures    Sleep apnea     Stroke     Stroke 2022    pt was in the hospital for a stroke, claims she was seeing people when the stroke happened    Thyroid disease     Urinary tract infection without hematuria 2017        Past Surgical History:   Procedure Laterality Date    APPENDECTOMY      BREAST BIOPSY Left     BREAST LUMPECTOMY Left     2016    BREAST SURGERY      CATARACT EXTRACTION Bilateral     OU done//     SECTION      CHOLECYSTECTOMY      COLONOSCOPY N/A 2020    Procedure: COLONOSCOPY;  Surgeon: Mj Fernandez MD;  Location: [facility] ENDO;  Service: Endoscopy;  Laterality: N/A;    CYST REMOVAL Left 2021    DILATION AND CURETTAGE OF UTERUS      EYE SURGERY      PERCUTANEOUS PINNING OF HIP Right 2022    Procedure: PINNING, HIP, PERCUTANEOUS;  Surgeon: Ministerio Joiner II, MD;  Location: [facility] OR;  Service: Orthopedics;  Laterality: Right;        Family History   Problem Relation Age of Onset    Diabetes Mother     Hypertension Mother     Breast cancer Mother     Cataracts Mother     Melanoma Mother     Heart failure Father     Melanoma Father     Cataracts Brother     Melanoma Brother     Glaucoma Neg Hx     Retinal detachment  Neg Hx     Macular degeneration Neg Hx        Social History     Tobacco Use    Smoking status: Never    Smokeless tobacco: Never   Substance Use Topics    Alcohol use: Yes     Comment: seldom    Drug use: No         Vitals:    04/10/23 1408   BP: 131/62   Pulse: 80   Temp: 97.9 °F (36.6 °C)        Physical Exam:  /62   Pulse 80   Temp 97.9 °F (36.6 °C)   Wt 73.5 kg (162 lb)   BMI 31.64 kg/m²     Physical Exam  Constitutional:       Appearance: Normal appearance.   HENT:      Head: Normocephalic and atraumatic.      Nose: Nose normal.      Mouth/Throat:      Mouth: Mucous membranes are moist.      Pharynx: Oropharynx is clear.   Eyes:      Conjunctiva/sclera: Conjunctivae normal.   Cardiovascular:      Rate and Rhythm: Normal rate and regular rhythm.      Heart sounds: Normal heart sounds.   Pulmonary:      Effort: Pulmonary effort is normal.      Breath sounds: Normal breath sounds.   Abdominal:      General: Abdomen is flat. Bowel sounds are normal.      Palpations: Abdomen is soft.   Musculoskeletal:         General: Normal range of motion.      Cervical back: Normal range of motion and neck supple.   Skin:     General: Skin is warm and dry.   Neurological:      General: No focal deficit present.      Mental Status: She is alert and oriented to person, place, and time. Mental status is at baseline.   Psychiatric:         Mood and Affect: Mood normal.        Labs:  Lab Results   Component Value Date    WBC 7.88 01/03/2023    RBC 4.69 01/03/2023    HGB 14.1 01/03/2023    HCT 45.3 01/03/2023    MCV 97 01/03/2023    MCH 30.1 01/03/2023    MCHC 31.1 (L) 01/03/2023    RDW 14.2 01/03/2023     (L) 01/03/2023    MPV 11.7 01/03/2023    GRAN 5.4 01/03/2023    GRAN 68.7 01/03/2023    LYMPH 1.8 01/03/2023    LYMPH 22.8 01/03/2023    MONO 0.5 01/03/2023    MONO 5.7 01/03/2023    EOS 0.2 01/03/2023    BASO 0.04 01/03/2023    EOSINOPHIL 2.0 01/03/2023    BASOPHIL 0.5 01/03/2023     Sodium   Date Value Ref Range  Status   01/03/2023 142 136 - 145 mmol/L Final     Potassium   Date Value Ref Range Status   01/03/2023 4.4 3.5 - 5.1 mmol/L Final     Chloride   Date Value Ref Range Status   01/03/2023 108 95 - 110 mmol/L Final     CO2   Date Value Ref Range Status   01/03/2023 29 23 - 29 mmol/L Final     Glucose   Date Value Ref Range Status   01/03/2023 156 (H) 70 - 110 mg/dL Final     BUN   Date Value Ref Range Status   01/03/2023 23 8 - 23 mg/dL Final     Creatinine   Date Value Ref Range Status   01/03/2023 0.9 0.5 - 1.4 mg/dL Final     Calcium   Date Value Ref Range Status   01/03/2023 9.7 8.7 - 10.5 mg/dL Final     Total Protein   Date Value Ref Range Status   01/03/2023 6.6 6.0 - 8.4 g/dL Final     Albumin   Date Value Ref Range Status   01/03/2023 3.3 (L) 3.5 - 5.2 g/dL Final     Total Bilirubin   Date Value Ref Range Status   01/03/2023 0.2 0.1 - 1.0 mg/dL Final     Comment:     For infants and newborns, interpretation of results should be based  on gestational age, weight and in agreement with clinical  observations.    Premature Infant recommended reference ranges:  Up to 24 hours.............<8.0 mg/dL  Up to 48 hours............<12.0 mg/dL  3-5 days..................<15.0 mg/dL  6-29 days.................<15.0 mg/dL       Alkaline Phosphatase   Date Value Ref Range Status   01/03/2023 93 55 - 135 U/L Final     AST   Date Value Ref Range Status   01/03/2023 12 10 - 40 U/L Final     ALT   Date Value Ref Range Status   01/03/2023 13 10 - 44 U/L Final     Anion Gap   Date Value Ref Range Status   01/03/2023 5 (L) 8 - 16 mmol/L Final     eGFR if    Date Value Ref Range Status   03/29/2022 >60.0 >60 mL/min/1.73 m^2 Final     eGFR if non    Date Value Ref Range Status   03/29/2022 >60.0 >60 mL/min/1.73 m^2 Final     Comment:     Calculation used to obtain the estimated glomerular filtration  rate (eGFR) is the CKD-EPI equation.          A/P:    L breast invasive ductal carcinoma  -[id number]  -ER  90%, UT 20%, HER2 -, Ki 40%  -discussed the plan of surgery followed by radiation therapy and oncotyping to determine need for chemotherapy vs AI alone. She understood and is ok with the plan  -I will see her back after the oncotype is in. Discussed case with Herminia  -rtc after oncotype is in      Aurash Khoobehi, MD  Hematology and Oncology

## 2023-04-10 NOTE — PATIENT INSTRUCTIONS
Your procedure has been scheduled at:    [facility]pre-admit nurse  [phone]      DAY Wednesday    DATE   April 26, 2023       Someone from the hospital will call you the evening before surgery ( it may be after 5 pm) to let you know what time you need to be at the hospital for surgery.                                               1:  DO NOT EAT OR DRINK ANYTHING AFTER MIDNIGHT THE NIGHT BEFORE SURGERY.     2:  You will need to stop all blood thinners 7 days prior to surgery except Eliquis which is 48 hours.      3:  Pre-admit nurse will go over your medicines and let you know which ones not to take the morning of surgery    4:  Plan to have someone drive you home after you are released from the hospital.  You WILL NOT be able to drive yourself.    5:  If you have any questions or need to change your surgery date, please call Petty or Cameron  at [phone] or [phone]        AFTER SURGERY:    **You can shower 24-48 hours after surgery, incision can get wet but do not soak. You  may have bandages and steri strips or you may just have glue over the incision with no bandage.  The glue will peel away after a few days, steri strips you can remove after 1 week.   **After surgery you will get pain medication to take every 6 hours.  If you are not getting relief you can take the pain medicine every 4 hours and you can also take 2 tabs  if needed.  Also if you are able to take Ibuprofen you can take 600 mg every 6 hours.  Applying an ice pack for 15-20 minutes 3-4 times a day will be helpful.  **If you have not had a bowel movement within 3 days after surgery you may take a laxative of your choice.  ** Do not lift anything over 5-10 pounds.    You need to have a follow up appointment 2 weeks after surgery, call the office to schedule or if you have questions or concerns.

## 2023-04-11 RX ORDER — SODIUM CHLORIDE 9 MG/ML
INJECTION, SOLUTION INTRAVENOUS CONTINUOUS
Status: CANCELLED | OUTPATIENT
Start: 2023-04-26

## 2023-04-14 ENCOUNTER — PATIENT MESSAGE (OUTPATIENT)
Dept: PSYCHIATRY | Facility: CLINIC | Age: 73
End: 2023-04-14
Payer: MEDICARE

## 2023-04-14 ENCOUNTER — OFFICE VISIT (OUTPATIENT)
Dept: PSYCHIATRY | Facility: CLINIC | Age: 73
End: 2023-04-14
Payer: COMMERCIAL

## 2023-04-14 VITALS
HEIGHT: 60 IN | SYSTOLIC BLOOD PRESSURE: 122 MMHG | BODY MASS INDEX: 31.55 KG/M2 | WEIGHT: 160.69 LBS | DIASTOLIC BLOOD PRESSURE: 72 MMHG | HEART RATE: 81 BPM

## 2023-04-14 DIAGNOSIS — F43.10 PTSD (POST-TRAUMATIC STRESS DISORDER): ICD-10-CM

## 2023-04-14 PROCEDURE — 3072F LOW RISK FOR RETINOPATHY: CPT | Mod: CPTII,S$GLB,, | Performed by: PHYSICIAN ASSISTANT

## 2023-04-14 PROCEDURE — 3066F PR DOCUMENTATION OF TREATMENT FOR NEPHROPATHY: ICD-10-PCS | Mod: CPTII,S$GLB,, | Performed by: PHYSICIAN ASSISTANT

## 2023-04-14 PROCEDURE — 1160F PR REVIEW ALL MEDS BY PRESCRIBER/CLIN PHARMACIST DOCUMENTED: ICD-10-PCS | Mod: CPTII,S$GLB,, | Performed by: PHYSICIAN ASSISTANT

## 2023-04-14 PROCEDURE — 99214 PR OFFICE/OUTPT VISIT, EST, LEVL IV, 30-39 MIN: ICD-10-PCS | Mod: S$GLB,,, | Performed by: PHYSICIAN ASSISTANT

## 2023-04-14 PROCEDURE — 1101F PR PT FALLS ASSESS DOC 0-1 FALLS W/OUT INJ PAST YR: ICD-10-PCS | Mod: CPTII,S$GLB,, | Performed by: PHYSICIAN ASSISTANT

## 2023-04-14 PROCEDURE — 1159F MED LIST DOCD IN RCRD: CPT | Mod: CPTII,S$GLB,, | Performed by: PHYSICIAN ASSISTANT

## 2023-04-14 PROCEDURE — 1157F PR ADVANCE CARE PLAN OR EQUIV PRESENT IN MEDICAL RECORD: ICD-10-PCS | Mod: CPTII,S$GLB,, | Performed by: PHYSICIAN ASSISTANT

## 2023-04-14 PROCEDURE — 3074F SYST BP LT 130 MM HG: CPT | Mod: CPTII,S$GLB,, | Performed by: PHYSICIAN ASSISTANT

## 2023-04-14 PROCEDURE — 3061F NEG MICROALBUMINURIA REV: CPT | Mod: CPTII,S$GLB,, | Performed by: PHYSICIAN ASSISTANT

## 2023-04-14 PROCEDURE — 1101F PT FALLS ASSESS-DOCD LE1/YR: CPT | Mod: CPTII,S$GLB,, | Performed by: PHYSICIAN ASSISTANT

## 2023-04-14 PROCEDURE — 99214 OFFICE O/P EST MOD 30 MIN: CPT | Mod: S$GLB,,, | Performed by: PHYSICIAN ASSISTANT

## 2023-04-14 PROCEDURE — 3288F PR FALLS RISK ASSESSMENT DOCUMENTED: ICD-10-PCS | Mod: CPTII,S$GLB,, | Performed by: PHYSICIAN ASSISTANT

## 2023-04-14 PROCEDURE — 3066F NEPHROPATHY DOC TX: CPT | Mod: CPTII,S$GLB,, | Performed by: PHYSICIAN ASSISTANT

## 2023-04-14 PROCEDURE — 1126F PR PAIN SEVERITY QUANTIFIED, NO PAIN PRESENT: ICD-10-PCS | Mod: CPTII,S$GLB,, | Performed by: PHYSICIAN ASSISTANT

## 2023-04-14 PROCEDURE — 1126F AMNT PAIN NOTED NONE PRSNT: CPT | Mod: CPTII,S$GLB,, | Performed by: PHYSICIAN ASSISTANT

## 2023-04-14 PROCEDURE — 3074F PR MOST RECENT SYSTOLIC BLOOD PRESSURE < 130 MM HG: ICD-10-PCS | Mod: CPTII,S$GLB,, | Performed by: PHYSICIAN ASSISTANT

## 2023-04-14 PROCEDURE — 1159F PR MEDICATION LIST DOCUMENTED IN MEDICAL RECORD: ICD-10-PCS | Mod: CPTII,S$GLB,, | Performed by: PHYSICIAN ASSISTANT

## 2023-04-14 PROCEDURE — 3078F DIAST BP <80 MM HG: CPT | Mod: CPTII,S$GLB,, | Performed by: PHYSICIAN ASSISTANT

## 2023-04-14 PROCEDURE — 99999 PR PBB SHADOW E&M-EST. PATIENT-LVL V: ICD-10-PCS | Mod: PBBFAC,,, | Performed by: PHYSICIAN ASSISTANT

## 2023-04-14 PROCEDURE — 3078F PR MOST RECENT DIASTOLIC BLOOD PRESSURE < 80 MM HG: ICD-10-PCS | Mod: CPTII,S$GLB,, | Performed by: PHYSICIAN ASSISTANT

## 2023-04-14 PROCEDURE — 90833 PR PSYCHOTHERAPY W/PATIENT W/E&M, 30 MIN (ADD ON): ICD-10-PCS | Mod: S$GLB,,, | Performed by: PHYSICIAN ASSISTANT

## 2023-04-14 PROCEDURE — 3044F HG A1C LEVEL LT 7.0%: CPT | Mod: CPTII,S$GLB,, | Performed by: PHYSICIAN ASSISTANT

## 2023-04-14 PROCEDURE — 90833 PSYTX W PT W E/M 30 MIN: CPT | Mod: S$GLB,,, | Performed by: PHYSICIAN ASSISTANT

## 2023-04-14 PROCEDURE — 1157F ADVNC CARE PLAN IN RCRD: CPT | Mod: CPTII,S$GLB,, | Performed by: PHYSICIAN ASSISTANT

## 2023-04-14 PROCEDURE — 3061F PR NEG MICROALBUMINURIA RESULT DOCUMENTED/REVIEW: ICD-10-PCS | Mod: CPTII,S$GLB,, | Performed by: PHYSICIAN ASSISTANT

## 2023-04-14 PROCEDURE — 3288F FALL RISK ASSESSMENT DOCD: CPT | Mod: CPTII,S$GLB,, | Performed by: PHYSICIAN ASSISTANT

## 2023-04-14 PROCEDURE — 1160F RVW MEDS BY RX/DR IN RCRD: CPT | Mod: CPTII,S$GLB,, | Performed by: PHYSICIAN ASSISTANT

## 2023-04-14 PROCEDURE — 3072F PR LOW RISK FOR RETINOPATHY: ICD-10-PCS | Mod: CPTII,S$GLB,, | Performed by: PHYSICIAN ASSISTANT

## 2023-04-14 PROCEDURE — 3044F PR MOST RECENT HEMOGLOBIN A1C LEVEL <7.0%: ICD-10-PCS | Mod: CPTII,S$GLB,, | Performed by: PHYSICIAN ASSISTANT

## 2023-04-14 PROCEDURE — 99999 PR PBB SHADOW E&M-EST. PATIENT-LVL V: CPT | Mod: PBBFAC,,, | Performed by: PHYSICIAN ASSISTANT

## 2023-04-14 RX ORDER — SERTRALINE HYDROCHLORIDE 50 MG/1
50 TABLET, FILM COATED ORAL DAILY
Qty: 90 TABLET | Refills: 0 | Status: SHIPPED | OUTPATIENT
Start: 2023-04-14 | End: 2023-07-13 | Stop reason: SDUPTHER

## 2023-04-14 NOTE — PATIENT INSTRUCTIONS
Trial zoloft 50mg nightly for sleep, mood, anxiety.    Please go to emergency department if feeling as though you are a harm to yourself or others or if you are in crisis. Please call the clinic to report any worsening of symptoms or problems associated with medication.       yes
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
## 2023-04-18 ENCOUNTER — HOSPITAL ENCOUNTER (OUTPATIENT)
Dept: RADIOLOGY | Facility: HOSPITAL | Age: 73
Discharge: HOME | End: 2023-04-18
Attending: STUDENT IN AN ORGANIZED HEALTH CARE EDUCATION/TRAINING PROGRAM
Payer: MEDICARE

## 2023-04-18 ENCOUNTER — HOSPITAL ENCOUNTER (OUTPATIENT)
Dept: PREADMISSION TESTING | Facility: HOSPITAL | Age: 73
Discharge: HOME | End: 2023-04-18
Attending: STUDENT IN AN ORGANIZED HEALTH CARE EDUCATION/TRAINING PROGRAM
Payer: MEDICARE

## 2023-04-18 DIAGNOSIS — Z17.0 MALIGNANT NEOPLASM OF UPPER-INNER QUADRANT OF LEFT BREAST IN FEMALE, ESTROGEN RECEPTOR POSITIVE: ICD-10-CM

## 2023-04-18 DIAGNOSIS — C50.212 MALIGNANT NEOPLASM OF UPPER-INNER QUADRANT OF LEFT BREAST IN FEMALE, ESTROGEN RECEPTOR POSITIVE: ICD-10-CM

## 2023-04-18 LAB
ALBUMIN SERPL BCP-MCNC: 3.6 G/DL (ref 3.5–5.2)
ALP SERPL-CCNC: 88 U/L (ref 55–135)
ALT SERPL W/O P-5'-P-CCNC: 14 U/L (ref 10–44)
ANION GAP SERPL CALC-SCNC: 10 MMOL/L (ref 8–16)
AST SERPL-CCNC: 23 U/L (ref 10–40)
BASOPHILS # BLD AUTO: 0.04 K/UL (ref 0–0.2)
BASOPHILS NFR BLD: 0.4 % (ref 0–1.9)
BILIRUB SERPL-MCNC: 0.6 MG/DL (ref 0.1–1)
BUN SERPL-MCNC: 24 MG/DL (ref 8–23)
CALCIUM SERPL-MCNC: 9.9 MG/DL (ref 8.7–10.5)
CHLORIDE SERPL-SCNC: 105 MMOL/L (ref 95–110)
CO2 SERPL-SCNC: 22 MMOL/L (ref 23–29)
CREAT SERPL-MCNC: 0.9 MG/DL (ref 0.5–1.4)
DIFFERENTIAL METHOD: ABNORMAL
EOSINOPHIL # BLD AUTO: 0.1 K/UL (ref 0–0.5)
EOSINOPHIL NFR BLD: 1 % (ref 0–8)
ERYTHROCYTE [DISTWIDTH] IN BLOOD BY AUTOMATED COUNT: 13.3 % (ref 11.5–14.5)
EST. GFR  (NO RACE VARIABLE): >60 ML/MIN/1.73 M^2
GLUCOSE SERPL-MCNC: 124 MG/DL (ref 70–110)
HCT VFR BLD AUTO: 50.9 % (ref 37–48.5)
HGB BLD-MCNC: 16.6 G/DL (ref 12–16)
IMM GRANULOCYTES # BLD AUTO: 0.03 K/UL (ref 0–0.04)
IMM GRANULOCYTES NFR BLD AUTO: 0.3 % (ref 0–0.5)
LYMPHOCYTES # BLD AUTO: 1.6 K/UL (ref 1–4.8)
LYMPHOCYTES NFR BLD: 16.1 % (ref 18–48)
MCH RBC QN AUTO: 30.6 PG (ref 27–31)
MCHC RBC AUTO-ENTMCNC: 32.6 G/DL (ref 32–36)
MCV RBC AUTO: 94 FL (ref 82–98)
MONOCYTES # BLD AUTO: 0.5 K/UL (ref 0.3–1)
MONOCYTES NFR BLD: 5.2 % (ref 4–15)
NEUTROPHILS # BLD AUTO: 7.5 K/UL (ref 1.8–7.7)
NEUTROPHILS NFR BLD: 77 % (ref 38–73)
NRBC BLD-RTO: 0 /100 WBC
PLATELET # BLD AUTO: 156 K/UL (ref 150–450)
PMV BLD AUTO: 11.1 FL (ref 9.2–12.9)
POTASSIUM SERPL-SCNC: 4.3 MMOL/L (ref 3.5–5.1)
PROT SERPL-MCNC: 7.3 G/DL (ref 6–8.4)
RBC # BLD AUTO: 5.42 M/UL (ref 4–5.4)
SODIUM SERPL-SCNC: 137 MMOL/L (ref 136–145)
WBC # BLD AUTO: 9.78 K/UL (ref 3.9–12.7)

## 2023-04-18 PROCEDURE — 19281 PERQ DEVICE BREAST 1ST IMAG: CPT | Mod: LT,,, | Performed by: RADIOLOGY

## 2023-04-18 PROCEDURE — 99900103 DSU ONLY-NO CHARGE-INITIAL HR (STAT)

## 2023-04-18 PROCEDURE — 19281 MAMMO NEEDLE LOC WITH MAMMO GUIDANCE 1ST SITE: ICD-10-PCS | Mod: LT,,, | Performed by: RADIOLOGY

## 2023-04-18 PROCEDURE — 25000003 PHARM REV CODE 250: Performed by: STUDENT IN AN ORGANIZED HEALTH CARE EDUCATION/TRAINING PROGRAM

## 2023-04-18 PROCEDURE — 19281 PERQ DEVICE BREAST 1ST IMAG: CPT | Mod: LT

## 2023-04-18 PROCEDURE — 36415 COLL VENOUS BLD VENIPUNCTURE: CPT | Performed by: STUDENT IN AN ORGANIZED HEALTH CARE EDUCATION/TRAINING PROGRAM

## 2023-04-18 PROCEDURE — 99900104 DSU ONLY-NO CHARGE-EA ADD'L HR (STAT)

## 2023-04-18 PROCEDURE — 85025 COMPLETE CBC W/AUTO DIFF WBC: CPT | Performed by: STUDENT IN AN ORGANIZED HEALTH CARE EDUCATION/TRAINING PROGRAM

## 2023-04-18 PROCEDURE — 80053 COMPREHEN METABOLIC PANEL: CPT | Performed by: STUDENT IN AN ORGANIZED HEALTH CARE EDUCATION/TRAINING PROGRAM

## 2023-04-18 PROCEDURE — A4648 IMPLANTABLE TISSUE MARKER: HCPCS

## 2023-04-18 RX ORDER — LIDOCAINE HYDROCHLORIDE 10 MG/ML
1 INJECTION INFILTRATION; PERINEURAL ONCE
Status: COMPLETED | OUTPATIENT
Start: 2023-04-18 | End: 2023-04-18

## 2023-04-18 RX ADMIN — LIDOCAINE HYDROCHLORIDE 10 ML: 10 INJECTION, SOLUTION INFILTRATION; PERINEURAL at 11:04

## 2023-04-18 NOTE — DISCHARGE INSTRUCTIONS
To confirm, Your doctor has instructed you that surgery is scheduled for: 4/26/23 with DR. Paredes    Please report to [facility] the morning of surgery. You must check-in and receive a wristband before going to your procedure.    Pre-Op will call the afternoon prior to surgery between 1:00 and 6:00 PM with the final arrival time.  Phone number: [phone]    PLEASE NOTE:  The surgery schedule has many variables which may affect the time of your surgery case.  Family members should be available if your surgery time changes.  Plan to be here the day of your procedure between 4-6 hours.    MEDICATIONS:  TAKE ONLY THESE MEDICATIONS WITH A SMALL SIP OF WATER THE MORNING OF YOUR PROCEDURE:  ALBUTEROL, BREO, GABAPENTIN, LEVOTHYROXINE, METHYLPREDNISOLONE, SIMVASTATIN, SOLIFENACIN    NO JARDIANCE, NO METFORMIN, AND NO LOSARTAN MORNING OF SURGERY BUT DO NOT STOP DAYS BEFORE.      DO NOT TAKE THESE MEDICATIONS 5-7 DAYS PRIOR to your procedure or per your surgeon's request:   ASPIRIN, ALEVE, ADVIL, IBUPROFEN, FISH OIL VITAMIN E, HERBALS  (May take Tylenol)    ONLY if you are prescribed any types of blood thinners such as:  Aspirin, Coumadin, Plavix, Pradaxa, Xarelto, Aggrenox, Effient, Eliquis, Savasya, Brilinta, or any other, ask your surgeon whether you should stop taking them and how long before surgery you should stop.  You may also need to verify with the prescribing physician if it is ok to stop your medication.      INSTRUCTIONS IMPORTANT!!  Do not eat or drink anything between midnight and the time of your procedure- this includes gum, mints, and candy.  Do not smoke or drink alcoholic beverages 24 hours prior to your procedure.  Shower the night before AND the morning of your procedure with a Chlorhexidine wash such as Hibiclens or Dial antibacterial soap from the neck down.  Do not get it on your face or in your eyes.  You may use your own shampoo and face wash. This helps your  skin to be as bacteria free as possible.    If you wear contact lenses, dentures, hearing aids or glasses, bring a container to put them in during surgery and give to a family member for safe keeping.  Please leave all jewelry, piercing's and valuables at home.   DO NOT remove hair from the surgery site.  Do not shave the incision site unless you are given specific instructions to do so.    ONLY if you have been diagnosed with sleep apnea please bring your C-PAP machine.  ONLY if you wear home oxygen please bring your portable oxygen tank the day of your procedure.  ONLY if you have a history of OPEN HEART SURGERY you will need a clearance from your Cardiologist per Anesthesia.      ONLY for patients requiring bowel prep, written instructions will be given by your doctor's office.  ONLY if you have a neuro stimulator, please bring the controller with you the morning of surgery  ONLY if a type and screen test is needed before surgery, please return:  If your doctor has scheduled you for an overnight stay, bring a small overnight bag with any personal items you need.  Make arrangements in advance for transportation home by a responsible adult.  It is not safe to drive a vehicle during the 24 hours after anesthesia.      [facility] Visitor Policy    Effective September 26, 2022    [facility] will resume routine visitation for COVID-19 negative patients, including inpatients, outpatients, and procedural areas, in accordance with local campus procedures.    All [facility] facilities and properties are tobacco free.  Smoking is NOT allowed.   If you have any questions about these instructions, call Pre-Op Admit  Nursing at [phone] or the Pre-Op Day Surgery Unit at [phone].

## 2023-04-25 ENCOUNTER — ANESTHESIA EVENT (OUTPATIENT)
Dept: SURGERY | Facility: HOSPITAL | Age: 73
End: 2023-04-25
Payer: MEDICARE

## 2023-04-26 ENCOUNTER — ANESTHESIA (OUTPATIENT)
Dept: SURGERY | Facility: HOSPITAL | Age: 73
End: 2023-04-26
Payer: MEDICARE

## 2023-04-26 ENCOUNTER — HOSPITAL ENCOUNTER (OUTPATIENT)
Dept: RADIOLOGY | Facility: HOSPITAL | Age: 73
Discharge: HOME | End: 2023-04-26
Attending: STUDENT IN AN ORGANIZED HEALTH CARE EDUCATION/TRAINING PROGRAM
Payer: MEDICARE

## 2023-04-26 ENCOUNTER — HOSPITAL ENCOUNTER (OUTPATIENT)
Facility: HOSPITAL | Age: 73
Discharge: HOME | End: 2023-04-26
Attending: STUDENT IN AN ORGANIZED HEALTH CARE EDUCATION/TRAINING PROGRAM | Admitting: STUDENT IN AN ORGANIZED HEALTH CARE EDUCATION/TRAINING PROGRAM
Payer: MEDICARE

## 2023-04-26 ENCOUNTER — TELEPHONE (OUTPATIENT)
Dept: SURGERY | Facility: CLINIC | Age: 73
End: 2023-04-26
Payer: MEDICARE

## 2023-04-26 DIAGNOSIS — R92.8 ABNORMAL MAMMOGRAM OF LEFT BREAST: ICD-10-CM

## 2023-04-26 DIAGNOSIS — C50.212 MALIGNANT NEOPLASM OF UPPER-INNER QUADRANT OF LEFT BREAST IN FEMALE, ESTROGEN RECEPTOR POSITIVE: ICD-10-CM

## 2023-04-26 DIAGNOSIS — Z17.0 MALIGNANT NEOPLASM OF UPPER-INNER QUADRANT OF LEFT BREAST IN FEMALE, ESTROGEN RECEPTOR POSITIVE: Primary | ICD-10-CM

## 2023-04-26 DIAGNOSIS — Z17.0 MALIGNANT NEOPLASM OF UPPER-INNER QUADRANT OF LEFT BREAST IN FEMALE, ESTROGEN RECEPTOR POSITIVE: ICD-10-CM

## 2023-04-26 DIAGNOSIS — C50.212 MALIGNANT NEOPLASM OF UPPER-INNER QUADRANT OF LEFT BREAST IN FEMALE, ESTROGEN RECEPTOR POSITIVE: Primary | ICD-10-CM

## 2023-04-26 PROCEDURE — 25000003 PHARM REV CODE 250: Performed by: NURSE ANESTHETIST, CERTIFIED REGISTERED

## 2023-04-26 PROCEDURE — D9220A PRA ANESTHESIA: ICD-10-PCS | Mod: ANES,,, | Performed by: ANESTHESIOLOGY

## 2023-04-26 PROCEDURE — 63600175 PHARM REV CODE 636 W HCPCS: Performed by: NURSE ANESTHETIST, CERTIFIED REGISTERED

## 2023-04-26 PROCEDURE — 38792 RA TRACER ID OF SENTINL NODE: CPT | Mod: LT,,, | Performed by: RADIOLOGY

## 2023-04-26 PROCEDURE — 38900 PR INTRAOPERATIVE SENTINEL LYMPH NODE ID W DYE INJECTION: ICD-10-PCS | Mod: LT,,, | Performed by: STUDENT IN AN ORGANIZED HEALTH CARE EDUCATION/TRAINING PROGRAM

## 2023-04-26 PROCEDURE — 76098 X-RAY EXAM SURGICAL SPECIMEN: CPT | Mod: 26,,, | Performed by: STUDENT IN AN ORGANIZED HEALTH CARE EDUCATION/TRAINING PROGRAM

## 2023-04-26 PROCEDURE — 88307 TISSUE EXAM BY PATHOLOGIST: CPT | Mod: 59 | Performed by: PATHOLOGY

## 2023-04-26 PROCEDURE — 76098 X-RAY EXAM SURGICAL SPECIMEN: CPT | Mod: TC

## 2023-04-26 PROCEDURE — 88307 PR  SURG PATH,LEVEL V: ICD-10-PCS | Mod: 26,,, | Performed by: PATHOLOGY

## 2023-04-26 PROCEDURE — 99900104 DSU ONLY-NO CHARGE-EA ADD'L HR (STAT): Performed by: STUDENT IN AN ORGANIZED HEALTH CARE EDUCATION/TRAINING PROGRAM

## 2023-04-26 PROCEDURE — D9220A PRA ANESTHESIA: ICD-10-PCS | Mod: CRNA,,, | Performed by: NURSE ANESTHETIST, CERTIFIED REGISTERED

## 2023-04-26 PROCEDURE — 38525 PR BIOPSY/REM LYMPH NODES, AXILLARY: ICD-10-PCS | Mod: 51,LT,, | Performed by: STUDENT IN AN ORGANIZED HEALTH CARE EDUCATION/TRAINING PROGRAM

## 2023-04-26 PROCEDURE — 71000015 HC POSTOP RECOV 1ST HR: Performed by: STUDENT IN AN ORGANIZED HEALTH CARE EDUCATION/TRAINING PROGRAM

## 2023-04-26 PROCEDURE — 19301 PARTIAL MASTECTOMY: CPT | Mod: LT,,, | Performed by: STUDENT IN AN ORGANIZED HEALTH CARE EDUCATION/TRAINING PROGRAM

## 2023-04-26 PROCEDURE — 88342 IMHCHEM/IMCYTCHM 1ST ANTB: CPT | Mod: 59 | Performed by: PATHOLOGY

## 2023-04-26 PROCEDURE — 99900103 DSU ONLY-NO CHARGE-INITIAL HR (STAT): Performed by: STUDENT IN AN ORGANIZED HEALTH CARE EDUCATION/TRAINING PROGRAM

## 2023-04-26 PROCEDURE — 88342 IMHCHEM/IMCYTCHM 1ST ANTB: CPT | Mod: 26,,, | Performed by: PATHOLOGY

## 2023-04-26 PROCEDURE — 88307 TISSUE EXAM BY PATHOLOGIST: CPT | Mod: 26,,, | Performed by: PATHOLOGY

## 2023-04-26 PROCEDURE — 63600175 PHARM REV CODE 636 W HCPCS: Mod: JW | Performed by: STUDENT IN AN ORGANIZED HEALTH CARE EDUCATION/TRAINING PROGRAM

## 2023-04-26 PROCEDURE — 38525 BIOPSY/REMOVAL LYMPH NODES: CPT | Mod: 51,LT,, | Performed by: STUDENT IN AN ORGANIZED HEALTH CARE EDUCATION/TRAINING PROGRAM

## 2023-04-26 PROCEDURE — 38900 IO MAP OF SENT LYMPH NODE: CPT | Mod: LT,,, | Performed by: STUDENT IN AN ORGANIZED HEALTH CARE EDUCATION/TRAINING PROGRAM

## 2023-04-26 PROCEDURE — 63600175 PHARM REV CODE 636 W HCPCS: Performed by: ANESTHESIOLOGY

## 2023-04-26 PROCEDURE — 76098 X-RAY EXAM SURGICAL SPECIMEN: CPT | Mod: 26,,, | Performed by: RADIOLOGY

## 2023-04-26 PROCEDURE — 37000008 HC ANESTHESIA 1ST 15 MINUTES: Performed by: STUDENT IN AN ORGANIZED HEALTH CARE EDUCATION/TRAINING PROGRAM

## 2023-04-26 PROCEDURE — 25000003 PHARM REV CODE 250: Performed by: ANESTHESIOLOGY

## 2023-04-26 PROCEDURE — 19301 PR MASTECTOMY, PARTIAL: ICD-10-PCS | Mod: LT,,, | Performed by: STUDENT IN AN ORGANIZED HEALTH CARE EDUCATION/TRAINING PROGRAM

## 2023-04-26 PROCEDURE — 38792 RA TRACER ID OF SENTINL NODE: CPT | Mod: TC,LT

## 2023-04-26 PROCEDURE — 76098 MAMMO BREAST SPECIMEN: ICD-10-PCS | Mod: 26,,, | Performed by: RADIOLOGY

## 2023-04-26 PROCEDURE — 88341 PR IHC OR ICC EACH ADD'L SINGLE ANTIBODY  STAINPR: ICD-10-PCS | Mod: 26,,, | Performed by: PATHOLOGY

## 2023-04-26 PROCEDURE — D9220A PRA ANESTHESIA: Mod: CRNA,,, | Performed by: NURSE ANESTHETIST, CERTIFIED REGISTERED

## 2023-04-26 PROCEDURE — 94799 UNLISTED PULMONARY SVC/PX: CPT

## 2023-04-26 PROCEDURE — 71000033 HC RECOVERY, INTIAL HOUR: Performed by: STUDENT IN AN ORGANIZED HEALTH CARE EDUCATION/TRAINING PROGRAM

## 2023-04-26 PROCEDURE — 76098 PR  X-RAY EXAM, BREAST SPECIMEN: ICD-10-PCS | Mod: 26,,, | Performed by: STUDENT IN AN ORGANIZED HEALTH CARE EDUCATION/TRAINING PROGRAM

## 2023-04-26 PROCEDURE — 88341 IMHCHEM/IMCYTCHM EA ADD ANTB: CPT | Mod: 26,,, | Performed by: PATHOLOGY

## 2023-04-26 PROCEDURE — 36000706: Performed by: STUDENT IN AN ORGANIZED HEALTH CARE EDUCATION/TRAINING PROGRAM

## 2023-04-26 PROCEDURE — 38792 NM SENTINEL LYMPH NODE INJECTION ONLY_RAD PERFORMED: ICD-10-PCS | Mod: LT,,, | Performed by: RADIOLOGY

## 2023-04-26 PROCEDURE — 25000003 PHARM REV CODE 250: Performed by: STUDENT IN AN ORGANIZED HEALTH CARE EDUCATION/TRAINING PROGRAM

## 2023-04-26 PROCEDURE — 37000009 HC ANESTHESIA EA ADD 15 MINS: Performed by: STUDENT IN AN ORGANIZED HEALTH CARE EDUCATION/TRAINING PROGRAM

## 2023-04-26 PROCEDURE — 36000707: Performed by: STUDENT IN AN ORGANIZED HEALTH CARE EDUCATION/TRAINING PROGRAM

## 2023-04-26 PROCEDURE — D9220A PRA ANESTHESIA: Mod: ANES,,, | Performed by: ANESTHESIOLOGY

## 2023-04-26 PROCEDURE — 94761 N-INVAS EAR/PLS OXIMETRY MLT: CPT

## 2023-04-26 PROCEDURE — 71000039 HC RECOVERY, EACH ADD'L HOUR: Performed by: STUDENT IN AN ORGANIZED HEALTH CARE EDUCATION/TRAINING PROGRAM

## 2023-04-26 PROCEDURE — 88342 CHG IMMUNOCYTOCHEMISTRY: ICD-10-PCS | Mod: 26,,, | Performed by: PATHOLOGY

## 2023-04-26 PROCEDURE — 88341 IMHCHEM/IMCYTCHM EA ADD ANTB: CPT | Mod: 59 | Performed by: PATHOLOGY

## 2023-04-26 PROCEDURE — 27200651 HC AIRWAY, LMA: Performed by: ANESTHESIOLOGY

## 2023-04-26 RX ORDER — PHENYLEPHRINE HYDROCHLORIDE 10 MG/ML
INJECTION INTRAVENOUS
Status: DISCONTINUED | OUTPATIENT
Start: 2023-04-26 | End: 2023-04-26

## 2023-04-26 RX ORDER — FENTANYL CITRATE 50 UG/ML
INJECTION, SOLUTION INTRAMUSCULAR; INTRAVENOUS
Status: DISCONTINUED | OUTPATIENT
Start: 2023-04-26 | End: 2023-04-26

## 2023-04-26 RX ORDER — ONDANSETRON 2 MG/ML
4 INJECTION INTRAMUSCULAR; INTRAVENOUS ONCE AS NEEDED
Status: COMPLETED | OUTPATIENT
Start: 2023-04-26 | End: 2023-04-26

## 2023-04-26 RX ORDER — CLINDAMYCIN PHOSPHATE 900 MG/50ML
900 INJECTION, SOLUTION INTRAVENOUS
Status: COMPLETED | OUTPATIENT
Start: 2023-04-26 | End: 2023-04-26

## 2023-04-26 RX ORDER — LIDOCAINE HYDROCHLORIDE 20 MG/ML
INJECTION INTRAVENOUS
Status: DISCONTINUED | OUTPATIENT
Start: 2023-04-26 | End: 2023-04-26

## 2023-04-26 RX ORDER — OXYCODONE AND ACETAMINOPHEN 5; 325 MG/1; MG/1
1 TABLET ORAL EVERY 4 HOURS PRN
Qty: 21 TABLET | Refills: 0 | Status: SHIPPED | OUTPATIENT
Start: 2023-04-26 | End: 2023-05-16 | Stop reason: CLARIF

## 2023-04-26 RX ORDER — FENTANYL CITRATE 50 UG/ML
25 INJECTION, SOLUTION INTRAMUSCULAR; INTRAVENOUS EVERY 5 MIN PRN
Status: DISCONTINUED | OUTPATIENT
Start: 2023-04-26 | End: 2023-04-26 | Stop reason: HOSPADM

## 2023-04-26 RX ORDER — BUPIVACAINE HYDROCHLORIDE 2.5 MG/ML
INJECTION, SOLUTION EPIDURAL; INFILTRATION; INTRACAUDAL
Status: DISCONTINUED | OUTPATIENT
Start: 2023-04-26 | End: 2023-04-26 | Stop reason: HOSPADM

## 2023-04-26 RX ORDER — DEXAMETHASONE SODIUM PHOSPHATE 4 MG/ML
INJECTION, SOLUTION INTRA-ARTICULAR; INTRALESIONAL; INTRAMUSCULAR; INTRAVENOUS; SOFT TISSUE
Status: DISCONTINUED | OUTPATIENT
Start: 2023-04-26 | End: 2023-04-26

## 2023-04-26 RX ORDER — ACETAMINOPHEN 10 MG/ML
INJECTION, SOLUTION INTRAVENOUS
Status: DISCONTINUED | OUTPATIENT
Start: 2023-04-26 | End: 2023-04-26

## 2023-04-26 RX ORDER — LIDOCAINE HYDROCHLORIDE 10 MG/ML
1 INJECTION, SOLUTION EPIDURAL; INFILTRATION; INTRACAUDAL; PERINEURAL ONCE
Status: DISCONTINUED | OUTPATIENT
Start: 2023-04-26 | End: 2023-04-26 | Stop reason: HOSPADM

## 2023-04-26 RX ORDER — PROPOFOL 10 MG/ML
VIAL (ML) INTRAVENOUS
Status: DISCONTINUED | OUTPATIENT
Start: 2023-04-26 | End: 2023-04-26

## 2023-04-26 RX ORDER — ISOSULFAN BLUE 50 MG/5ML
INJECTION, SOLUTION SUBCUTANEOUS
Status: DISCONTINUED | OUTPATIENT
Start: 2023-04-26 | End: 2023-04-26 | Stop reason: HOSPADM

## 2023-04-26 RX ORDER — SODIUM CHLORIDE 9 MG/ML
INJECTION, SOLUTION INTRAVENOUS CONTINUOUS
Status: DISCONTINUED | OUTPATIENT
Start: 2023-04-26 | End: 2023-04-26 | Stop reason: HOSPADM

## 2023-04-26 RX ORDER — OXYCODONE HYDROCHLORIDE 5 MG/1
5 TABLET ORAL ONCE AS NEEDED
Status: COMPLETED | OUTPATIENT
Start: 2023-04-26 | End: 2023-04-26

## 2023-04-26 RX ORDER — ONDANSETRON HYDROCHLORIDE 2 MG/ML
INJECTION, SOLUTION INTRAMUSCULAR; INTRAVENOUS
Status: DISCONTINUED | OUTPATIENT
Start: 2023-04-26 | End: 2023-04-26

## 2023-04-26 RX ADMIN — SODIUM CHLORIDE, SODIUM GLUCONATE, SODIUM ACETATE, POTASSIUM CHLORIDE AND MAGNESIUM CHLORIDE: 526; 502; 368; 37; 30 INJECTION, SOLUTION INTRAVENOUS at 08:04

## 2023-04-26 RX ADMIN — FENTANYL CITRATE 25 MCG: 50 INJECTION, SOLUTION INTRAMUSCULAR; INTRAVENOUS at 11:04

## 2023-04-26 RX ADMIN — GLYCOPYRROLATE 0.2 MG: 0.2 INJECTION, SOLUTION INTRAMUSCULAR; INTRAVITREAL at 09:04

## 2023-04-26 RX ADMIN — FENTANYL CITRATE 25 MCG: 50 INJECTION, SOLUTION INTRAMUSCULAR; INTRAVENOUS at 09:04

## 2023-04-26 RX ADMIN — OXYCODONE HYDROCHLORIDE 5 MG: 5 TABLET ORAL at 11:04

## 2023-04-26 RX ADMIN — PHENYLEPHRINE HYDROCHLORIDE 100 MCG: 10 INJECTION INTRAVENOUS at 10:04

## 2023-04-26 RX ADMIN — FENTANYL CITRATE 25 MCG: 50 INJECTION, SOLUTION INTRAMUSCULAR; INTRAVENOUS at 10:04

## 2023-04-26 RX ADMIN — ONDANSETRON 4 MG: 2 INJECTION INTRAMUSCULAR; INTRAVENOUS at 09:04

## 2023-04-26 RX ADMIN — SODIUM CHLORIDE, SODIUM GLUCONATE, SODIUM ACETATE, POTASSIUM CHLORIDE AND MAGNESIUM CHLORIDE: 526; 502; 368; 37; 30 INJECTION, SOLUTION INTRAVENOUS at 10:04

## 2023-04-26 RX ADMIN — PHENYLEPHRINE HYDROCHLORIDE 100 MCG: 10 INJECTION INTRAVENOUS at 09:04

## 2023-04-26 RX ADMIN — PROPOFOL 100 MG: 10 INJECTION, EMULSION INTRAVENOUS at 09:04

## 2023-04-26 RX ADMIN — LIDOCAINE HYDROCHLORIDE 75 MG: 20 INJECTION, SOLUTION INTRAVENOUS at 09:04

## 2023-04-26 RX ADMIN — CLINDAMYCIN PHOSPHATE 900 MG: 18 INJECTION, SOLUTION INTRAVENOUS at 09:04

## 2023-04-26 RX ADMIN — ACETAMINOPHEN 1000 MG: 10 INJECTION, SOLUTION INTRAVENOUS at 09:04

## 2023-04-26 RX ADMIN — ONDANSETRON 4 MG: 2 INJECTION INTRAMUSCULAR; INTRAVENOUS at 11:04

## 2023-04-26 RX ADMIN — DEXAMETHASONE SODIUM PHOSPHATE 4 MG: 4 INJECTION, SOLUTION INTRA-ARTICULAR; INTRALESIONAL; INTRAMUSCULAR; INTRAVENOUS; SOFT TISSUE at 09:04

## 2023-04-26 NOTE — PLAN OF CARE
Pt awake and alert, states that pain is minimal  denies nausea or po.  Ambulated in room with walker with steady gait.  Surgical bra on with fluffs.  Additional fluffs given for later use.  States that she is ready for discharge.

## 2023-04-26 NOTE — ANESTHESIA PROCEDURE NOTES
Intubation    Date/Time: 4/26/2023 9:36 AM  Performed by: Nelson Bonilla CRNA  Authorized by: Lukasz Ortiz MD     Intubation:     Induction:  Intravenous    Intubated:  Postinduction    Mask Ventilation:  Easy mask    Attempts:  1    Attempted By:  CRNA    Difficult Airway Encountered?: No      Complications:  None    Airway Device:  Supraglottic airway/LMA    Airway Device Size:  3.0    Style/Cuff Inflation:  Cuffed (inflated to minimal occlusive pressure)    Secured at:  The lips    Placement Verified By:  Capnometry    Complicating Factors:  None

## 2023-04-26 NOTE — PLAN OF CARE
Pt prepped for surgery. Consents at bedside. Incentive spirometry taught by respiratory. Pt belongings placed in postop cabinet including personal walker.

## 2023-04-26 NOTE — OP NOTE
[facility]  Surgery Department  Operative Note    SUMMARY     Date of Procedure: 4/26/2023     Procedure: Procedure(s) (LRB):  LUMPECTOMY, BREAST (Left)  BIOPSY, LYMPH NODE, SENTINEL (Left)     Surgeon(s) and Role:     * Toño Paredes MD - Primary    Assisting Surgeon: None    Pre-Operative Diagnosis: Malignant neoplasm of upper-inner quadrant of left breast in female, estrogen receptor positive [C50.212, Z17.0]    Post-Operative Diagnosis: Post-Op Diagnosis Codes:     * Malignant neoplasm of upper-inner quadrant of left breast in female, estrogen receptor positive [C50.212, Z17.0]    Anesthesia: General    Operative Findings (including complications, if any):  Left breast lumpectomy.  Anterior margin was very close to the dermis, especially just superior to the incision.  [place] lymph node biopsy    Description of Technical Procedures:  This is a 73-year-old female with left-sided breast cancer.  I recommended proceeding with lumpectomy and sentinel lymph node biopsy.  She did consent to the planned procedure.  She was taken to the OR, placed supine, general endotracheal anesthesia was induced, the left breast and axilla were prepped and draped in the usual fashion, a time-out was completed.  Methylene blue was injected superior to the nipple-areolar complex.  Using a ABELARDO  probe, the ABELARDO marker was identified in the upper inner quadrant of the breast.  There appeared to be skin dimpling directly below this location.  A curvilinear incision was made directly over the  marker.  Skin flaps were made superiorly and inferiorly using electrocautery.  The superior flap was taken right along the undersurface of the dermis as this is where the skin dimpling was most apparent..  The lumpectomy specimen was completed using electrocautery without other significant event.  The specimen was marked as short stitch superior, long stitch lateral, single stitch anterior.  I personally interpreted  the mammogram of the specimen which showed the biopsy clip as well as the ABELARDO  marker within the center of the specimen.  I then returned to the OR.  A curvilinear incision was made at the inferior aspect of the hair-bearing skin in the axilla.  Deep tissues were divided using electrocautery down to and through clavipectoral fascia.  A Neoprobe was used to identify a hot sentinel lymph node with a count of 160.  The node was colorless.  After removal, there was no other significant background radiation or palpable adenopathy.  The sentinel node incision was closed in layers with 3-0 Vicryl for clavipectoral fascia, 3-0 Vicryl and a running 4-0 Monocryl for skin.  The breast incision was closed similarly with 3-0 Vicryl and a running 4-0 Monocryl.  Dermabond, gauze fluffs, and a surgical bra were applied as a dressing.  Patient tolerated the entire procedure without apparent complication, was extubated in the OR, brought to recovery in stable condition.  All counts were correct.    Significant Surgical Tasks Conducted by the Assistant(s), if Applicable: n/a    Estimated Blood Loss (EBL): min           Implants: * No implants in log *    Specimens:   Specimen (24h ago, onward)       Start     Ordered    04/26/23 1047  Specimen to Pathology, Surgery General Surgery  Once        Comments: Pre-op Diagnosis: Malignant neoplasm of upper-inner quadrant of left breast in female, estrogen receptor positive [C50.212, Z17.0]Procedure(s):LUMPECTOMY, BREASTBIOPSY, LYMPH NODE, SENTINEL Number of specimens: 2Name of specimens: 1. LEFT BREAST LUMPECTOMY SHORT STITCH=SUPERIOR, LONG STITCH=LATERAL, SINGLE STITCH=ANTERIOR 2. SENTINEL LYMPH NODE #1  COLORLESS     References:    Click here for ordering Quick Tip   Question Answer Comment   Procedure Type: General Surgery    Which provider would you like to cc? ROCCO VANEGAS    Release to patient Immediate        04/26/23 1046                            Condition:  Good    Disposition: PACU - hemodynamically stable.    Attestation: I was present and scrubbed for the entire procedure.

## 2023-04-26 NOTE — DISCHARGE INSTRUCTIONS
"Discharge Instructions: After Your Surgery/Procedure  Youve just had surgery. During surgery you were given medicine called anesthesia to keep you relaxed and free of pain. After surgery you may have some pain or nausea. This is common. Here are some tips for feeling better and getting well after surgery.     Stay on schedule with your medication.   Going home  Your doctor or nurse will show you how to take care of yourself when you go home. He or she will also answer your questions. Have an adult family member or friend drive you home.      For your safety we recommend these precaution for the first 24 hours after your procedure:  Do not drive or use heavy equipment.  Do not make important decisions or sign legal papers.  Do not drink alcohol.  Have someone stay with you, if needed. He or she can watch for problems and help keep you safe.  Your concentration, balance, coordination, and judgement may be impaired for many hours after anesthesia.  Use caution when ambulating or standing up.     You may feel weak and "washed out" after anesthesia and surgery.      Subtle residual effects of general anesthesia or sedation with regional / local anesthesia can last more than 24 hours.  Rest for the remainder of the day or longer if your Doctor/Surgeon has advised you to do so.  Although you may feel normal within the first 24 hours, your reflexes and mental ability may be impaired without you realizing it.  You may feel dizzy, lightheaded or sleepy for 24 hours or longer.      Be sure to go to all follow-up visits with your doctor. And rest after your surgery for as long as your doctor tells you to.  Coping with pain  If you have pain after surgery, pain medicine will help you feel better. Take it as told, before pain becomes severe. Also, ask your doctor or pharmacist about other ways to control pain. This might be with heat, ice, or relaxation. And follow any other instructions your surgeon or nurse gives you.  Tips " for taking pain medicine  To get the best relief possible, remember these points:  Pain medicines can upset your stomach. Taking them with a little food may help.  Most pain relievers taken by mouth need at least 20 to 30 minutes to start to work.  Taking medicine on a schedule can help you remember to take it. Try to time your medicine so that you can take it before starting an activity. This might be before you get dressed, go for a walk, or sit down for dinner.  Constipation is a common side effect of pain medicines. Call your doctor before taking any medicines such as laxatives or stool softeners to help ease constipation. Also ask if you should skip any foods. Drinking lots of fluids and eating foods such as fruits and vegetables that are high in fiber can also help. Remember, do not take laxatives unless your surgeon has prescribed them.  Drinking alcohol and taking pain medicine can cause dizziness and slow your breathing. It can even be deadly. Do not drink alcohol while taking pain medicine.  Pain medicine can make you react more slowly to things. Do not drive or run machinery while taking pain medicine.  Your health care provider may tell you to take acetaminophen to help ease your pain. Ask him or her how much you are supposed to take each day. Acetaminophen or other pain relievers may interact with your prescription medicines or other over-the-counter (OTC) drugs. Some prescription medicines have acetaminophen and other ingredients. Using both prescription and OTC acetaminophen for pain can cause you to overdose. Read the labels on your OTC medicines with care. This will help you to clearly know the list of ingredients, how much to take, and any warnings. It may also help you not take too much acetaminophen. If you have questions or do not understand the information, ask your pharmacist or health care provider to explain it to you before you take the OTC medicine.  Managing nausea  Some people have an  upset stomach after surgery. This is often because of anesthesia, pain, or pain medicine, or the stress of surgery. These tips will help you handle nausea and eat healthy foods as you get better. If you were on a special food plan before surgery, ask your doctor if you should follow it while you get better. These tips may help:  Do not push yourself to eat. Your body will tell you when to eat and how much.  Start off with clear liquids and soup. They are easier to digest.  Next try semi-solid foods, such as mashed potatoes, applesauce, and gelatin, as you feel ready.  Slowly move to solid foods. Dont eat fatty, rich, or spicy foods at first.  Do not force yourself to have 3 large meals a day. Instead eat smaller amounts more often.  Take pain medicines with a small amount of solid food, such as crackers or toast, to avoid nausea.     Call your surgeon if  You still have pain an hour after taking medicine. The medicine may not be strong enough.  You feel too sleepy, dizzy, or groggy. The medicine may be too strong.  You have side effects like nausea, vomiting, or skin changes, such as rash, itching, or hives.       If you have obstructive sleep apnea  You were given anesthesia medicine during surgery to keep you comfortable and free of pain. After surgery, you may have more apnea spells because of this medicine and other medicines you were given. The spells may last longer than usual.   At home:  Keep using the continuous positive airway pressure (CPAP) device when you sleep. Unless your health care provider tells you not to, use it when you sleep, day or night. CPAP is a common device used to treat obstructive sleep apnea.  Talk with your provider before taking any pain medicine, muscle relaxants, or sedatives. Your provider will tell you about the possible dangers of taking these medicines.  © 1663-9966 [employer]. [address] [place] [address], [place] [address]. All rights reserved. This information is  not intended as a substitute for professional medical care. Always follow your healthcare professional's instructions.

## 2023-04-26 NOTE — TELEPHONE ENCOUNTER
----- Message from Leona Vizcaino sent at 4/26/2023  1:20 PM CDT -----  Name of Who is Calling: brother George        What is the request in detail: stated that the pt is needing an appt following the one today 4/26/23 to clarify what kind of appt the pt is needing because the brother is unsure       Can the clinic reply by [facility]: no        [facility]: [phone]

## 2023-04-26 NOTE — TRANSFER OF CARE
Anesthesia Transfer of Care Note    Patient: Maggy Tapia    Procedure(s) Performed: Procedure(s) (LRB):  LUMPECTOMY, BREAST (Left)  BIOPSY, LYMPH NODE, SENTINEL (Left)    Patient location: PACU    Anesthesia Type: general    Transport from OR: Transported from OR on 2-3 L/min O2 by NC with adequate spontaneous ventilation    Post pain: adequate analgesia    Post assessment: no apparent anesthetic complications    Post vital signs: stable    Level of consciousness: responds to stimulation and sedated    Nausea/Vomiting: no nausea/vomiting    Complications: none    Transfer of care protocol was followed      Last vitals:   Visit Vitals  BP (!) 165/71 (BP Location: Left arm, Patient Position: Lying)   Pulse 65   Temp 36.8 °C (98.2 °F) (Oral)   Resp 16   Ht 5' (1.524 m)   Wt 72.6 kg (160 lb)   SpO2 97%   Breastfeeding No   BMI 31.25 kg/m²

## 2023-04-26 NOTE — ANESTHESIA PREPROCEDURE EVALUATION
04/26/2023  Maggy Tapia is a 73 y.o., female.      Pre-op Assessment    I have reviewed the Patient Summary Reports.     I have reviewed the Nursing Notes. I have reviewed the NPO Status.   I have reviewed the Medications.     Review of Systems  Anesthesia Hx:  No problems with previous Anesthesia    Social:  Non-Smoker    Hematology/Oncology:         -- Cancer in past history: Breast   Cardiovascular:   Hypertension, well controlled hyperlipidemia    Pulmonary:   COPD, moderate Asthma mild Shortness of breath Sleep Apnea    Renal/:  Renal/ Normal     Hepatic/GI:   GERD    Neurological:   CVA (walks with walker), residual symptoms Neuromuscular Disease, Seizures, well controlled Tardive dyskinesia  Peripheral Neuropathy    Endocrine:   Diabetes, well controlled, type 2 Hypothyroidism    Psych:   Psychiatric History (schizophrenia) anxiety depression          Physical Exam  General: Well nourished, Cooperative, Alert and Oriented    Airway:  Mallampati: II   Mouth Opening: Normal  TM Distance: Normal  Neck ROM: Normal ROM        Anesthesia Plan  Type of Anesthesia, risks & benefits discussed:    Anesthesia Type: Gen ETT, Gen Supraglottic Airway, Gen Natural Airway, MAC  Intra-op Monitoring Plan: Standard ASA Monitors  Post Op Pain Control Plan: multimodal analgesia  Induction:  IV  Airway Plan: Direct, Video and Fiberoptic, Post-Induction  Informed Consent: Informed consent signed with the Patient and all parties understand the risks and agree with anesthesia plan.  All questions answered.   ASA Score: 3    Ready For Surgery From Anesthesia Perspective.     .

## 2023-04-26 NOTE — TELEPHONE ENCOUNTER
Post op appointment scheduled 5- at 145 pm with Dr Paredes at [address] [facility]  [address].

## 2023-04-26 NOTE — ANESTHESIA POSTPROCEDURE EVALUATION
Anesthesia Post Evaluation    Patient: Maggy Tapia    Procedure(s) Performed: Procedure(s) (LRB):  LUMPECTOMY, BREAST (Left)  BIOPSY, LYMPH NODE, SENTINEL (Left)    Final Anesthesia Type: general      Patient location during evaluation: PACU  Patient participation: Yes- Able to Participate  Level of consciousness: awake and alert and oriented  Post-procedure vital signs: reviewed and stable  Pain management: adequate  Airway patency: patent    PONV status at discharge: No PONV  Anesthetic complications: no      Cardiovascular status: blood pressure returned to baseline and stable  Respiratory status: unassisted and spontaneous ventilation  Hydration status: euvolemic  Follow-up not needed.          Vitals Value Taken Time   /77 04/26/23 1245   Temp 36.7 °C (98 °F) 04/26/23 1245   Pulse 8 04/26/23 1245   Resp 18 04/26/23 1245   SpO2 100 % 04/26/23 1245         No case tracking events are documented in the log.      Pain/Geraldo Score: Pain Rating Prior to Med Admin: 3 (4/26/2023 12:00 PM)  Pain Rating Post Med Admin: 4 (4/26/2023 11:50 AM)  Geraldo Score: 10 (4/26/2023 12:47 PM)

## 2023-04-26 NOTE — CARE UPDATE
04/26/23 0840   Patient Assessment/Suction   Level of Consciousness (AVPU) alert   Respiratory Effort Normal;Unlabored   Expansion/Accessory Muscles/Retractions no use of accessory muscles;no retractions;expansion symmetric   All Lung Fields Breath Sounds clear;equal bilaterally   Rhythm/Pattern, Respiratory unlabored;pattern regular;depth regular   Cough Frequency no cough   PRE-TX-O2   Device (Oxygen Therapy) room air   Pulse Oximetry Type Intermittent   $ Pulse Oximetry - Multiple Charge Pulse Oximetry - Multiple   Pulse 67   Resp 18   Positioning HOB elevated 90 degrees   Incentive Spirometer   $ Incentive Spirometer Charges preop instruction   Incentive Spirometer Predicted Level (mL) 620   Administration (IS) instruction provided, initial   Number of Repetitions (IS) 10   Level Incentive Spirometer (mL) 1000   Patient Tolerance (IS) good

## 2023-04-26 NOTE — DISCHARGE SUMMARY
[facility]  Brief Operative Note    Surgery Date: 4/26/2023     Surgeon(s) and Role:     * Rcoco Vanegas MD - Primary    Assisting Surgeon: None    Pre-op Diagnosis:  Malignant neoplasm of upper-inner quadrant of left breast in female, estrogen receptor positive [C50.212, Z17.0]    Post-op Diagnosis:  Post-Op Diagnosis Codes:     * Malignant neoplasm of upper-inner quadrant of left breast in female, estrogen receptor positive [C50.212, Z17.0]    Procedure(s) (LRB):  LUMPECTOMY, BREAST (Left)  BIOPSY, LYMPH NODE, SENTINEL (Left)    Anesthesia: General    Operative Findings:  Left breast lumpectomy and sentinel lymph node biopsy    Estimated Blood Loss:  Minimal         Specimens:   Specimen (24h ago, onward)       Start     Ordered    04/26/23 1047  Specimen to Pathology, Surgery General Surgery  Once        Comments: Pre-op Diagnosis: Malignant neoplasm of upper-inner quadrant of left breast in female, estrogen receptor positive [C50.212, Z17.0]Procedure(s):LUMPECTOMY, BREASTBIOPSY, LYMPH NODE, SENTINEL Number of specimens: 2Name of specimens: 1. LEFT BREAST LUMPECTOMY SHORT STITCH=SUPERIOR, LONG STITCH=LATERAL, SINGLE STITCH=ANTERIOR 2. SENTINEL LYMPH NODE #1  COLORLESS     References:    Click here for ordering Quick Tip   Question Answer Comment   Procedure Type: General Surgery    Which provider would you like to cc? ROCCO VANEGAS    Release to patient Immediate        04/26/23 1046                      Discharge Note    OUTCOME: Patient tolerated treatment/procedure well without complication and is now ready for discharge.    DISPOSITION: Home or Self Care    FINAL DIAGNOSIS:  Invasive ductal carcinoma of breast, female, left    FOLLOWUP: In clinic    DISCHARGE INSTRUCTIONS:    Discharge Procedure Orders   Diet Adult Regular     Lifting restrictions   Order Comments: No more than 10 lbs     No driving until:   Order Comments: Off narcotics     Notify your health care provider if  you experience any of the following:  increased confusion or weakness     Notify your health care provider if you experience any of the following:  persistent dizziness, light-headedness, or visual disturbances     Notify your health care provider if you experience any of the following:  worsening rash     Notify your health care provider if you experience any of the following:  severe persistent headache     Notify your health care provider if you experience any of the following:  difficulty breathing or increased cough     Notify your health care provider if you experience any of the following:  redness, tenderness, or signs of infection (pain, swelling, redness, odor or green/yellow discharge around incision site)     Notify your health care provider if you experience any of the following:  severe uncontrolled pain     Notify your health care provider if you experience any of the following:  persistent nausea and vomiting or diarrhea     Notify your health care provider if you experience any of the following:  temperature >100.4     No dressing needed   Order Comments: Okay to shower gently over your incisions.  No swimming or soaking for 2-3 weeks.  Please keep gentle compression with gauze fluffs and a surgical bra or similar, as much as possible, for the next 2-3 weeks.

## 2023-04-27 VITALS
SYSTOLIC BLOOD PRESSURE: 133 MMHG | TEMPERATURE: 98 F | BODY MASS INDEX: 31.41 KG/M2 | OXYGEN SATURATION: 100 % | RESPIRATION RATE: 18 BRPM | HEART RATE: 8 BPM | WEIGHT: 160 LBS | HEIGHT: 60 IN | DIASTOLIC BLOOD PRESSURE: 77 MMHG

## 2023-04-27 NOTE — PROGRESS NOTES
Very pleased with care given. All staff were very helpful. States she has read and understands all discharge instructions.

## 2023-05-06 LAB
FINAL PATHOLOGIC DIAGNOSIS: NORMAL
GROSS: NORMAL
Lab: NORMAL
MICROSCOPIC EXAM: NORMAL

## 2023-05-11 ENCOUNTER — OFFICE VISIT (OUTPATIENT)
Dept: SURGERY | Facility: CLINIC | Age: 73
End: 2023-05-11
Payer: MEDICARE

## 2023-05-11 VITALS
SYSTOLIC BLOOD PRESSURE: 168 MMHG | HEART RATE: 84 BPM | HEIGHT: 60 IN | DIASTOLIC BLOOD PRESSURE: 81 MMHG | BODY MASS INDEX: 31.25 KG/M2 | RESPIRATION RATE: 16 BRPM | TEMPERATURE: 98 F

## 2023-05-11 DIAGNOSIS — C50.912 INVASIVE DUCTAL CARCINOMA OF BREAST, FEMALE, LEFT: ICD-10-CM

## 2023-05-11 DIAGNOSIS — Z98.890 POST-OPERATIVE STATE: Primary | ICD-10-CM

## 2023-05-11 PROCEDURE — 3079F DIAST BP 80-89 MM HG: CPT | Mod: CPTII,S$GLB,, | Performed by: STUDENT IN AN ORGANIZED HEALTH CARE EDUCATION/TRAINING PROGRAM

## 2023-05-11 PROCEDURE — 3288F PR FALLS RISK ASSESSMENT DOCUMENTED: ICD-10-PCS | Mod: CPTII,S$GLB,, | Performed by: STUDENT IN AN ORGANIZED HEALTH CARE EDUCATION/TRAINING PROGRAM

## 2023-05-11 PROCEDURE — 1157F ADVNC CARE PLAN IN RCRD: CPT | Mod: CPTII,S$GLB,, | Performed by: STUDENT IN AN ORGANIZED HEALTH CARE EDUCATION/TRAINING PROGRAM

## 2023-05-11 PROCEDURE — 3044F PR MOST RECENT HEMOGLOBIN A1C LEVEL <7.0%: ICD-10-PCS | Mod: CPTII,S$GLB,, | Performed by: STUDENT IN AN ORGANIZED HEALTH CARE EDUCATION/TRAINING PROGRAM

## 2023-05-11 PROCEDURE — 3077F SYST BP >= 140 MM HG: CPT | Mod: CPTII,S$GLB,, | Performed by: STUDENT IN AN ORGANIZED HEALTH CARE EDUCATION/TRAINING PROGRAM

## 2023-05-11 PROCEDURE — 3077F PR MOST RECENT SYSTOLIC BLOOD PRESSURE >= 140 MM HG: ICD-10-PCS | Mod: CPTII,S$GLB,, | Performed by: STUDENT IN AN ORGANIZED HEALTH CARE EDUCATION/TRAINING PROGRAM

## 2023-05-11 PROCEDURE — 1101F PT FALLS ASSESS-DOCD LE1/YR: CPT | Mod: CPTII,S$GLB,, | Performed by: STUDENT IN AN ORGANIZED HEALTH CARE EDUCATION/TRAINING PROGRAM

## 2023-05-11 PROCEDURE — 3072F PR LOW RISK FOR RETINOPATHY: ICD-10-PCS | Mod: CPTII,S$GLB,, | Performed by: STUDENT IN AN ORGANIZED HEALTH CARE EDUCATION/TRAINING PROGRAM

## 2023-05-11 PROCEDURE — 1157F PR ADVANCE CARE PLAN OR EQUIV PRESENT IN MEDICAL RECORD: ICD-10-PCS | Mod: CPTII,S$GLB,, | Performed by: STUDENT IN AN ORGANIZED HEALTH CARE EDUCATION/TRAINING PROGRAM

## 2023-05-11 PROCEDURE — 3066F NEPHROPATHY DOC TX: CPT | Mod: CPTII,S$GLB,, | Performed by: STUDENT IN AN ORGANIZED HEALTH CARE EDUCATION/TRAINING PROGRAM

## 2023-05-11 PROCEDURE — 99999 PR PBB SHADOW E&M-EST. PATIENT-LVL V: ICD-10-PCS | Mod: PBBFAC,,, | Performed by: STUDENT IN AN ORGANIZED HEALTH CARE EDUCATION/TRAINING PROGRAM

## 2023-05-11 PROCEDURE — 1126F PR PAIN SEVERITY QUANTIFIED, NO PAIN PRESENT: ICD-10-PCS | Mod: CPTII,S$GLB,, | Performed by: STUDENT IN AN ORGANIZED HEALTH CARE EDUCATION/TRAINING PROGRAM

## 2023-05-11 PROCEDURE — 99999 PR PBB SHADOW E&M-EST. PATIENT-LVL V: CPT | Mod: PBBFAC,,, | Performed by: STUDENT IN AN ORGANIZED HEALTH CARE EDUCATION/TRAINING PROGRAM

## 2023-05-11 PROCEDURE — 1126F AMNT PAIN NOTED NONE PRSNT: CPT | Mod: CPTII,S$GLB,, | Performed by: STUDENT IN AN ORGANIZED HEALTH CARE EDUCATION/TRAINING PROGRAM

## 2023-05-11 PROCEDURE — 1159F PR MEDICATION LIST DOCUMENTED IN MEDICAL RECORD: ICD-10-PCS | Mod: CPTII,S$GLB,, | Performed by: STUDENT IN AN ORGANIZED HEALTH CARE EDUCATION/TRAINING PROGRAM

## 2023-05-11 PROCEDURE — 3072F LOW RISK FOR RETINOPATHY: CPT | Mod: CPTII,S$GLB,, | Performed by: STUDENT IN AN ORGANIZED HEALTH CARE EDUCATION/TRAINING PROGRAM

## 2023-05-11 PROCEDURE — 3288F FALL RISK ASSESSMENT DOCD: CPT | Mod: CPTII,S$GLB,, | Performed by: STUDENT IN AN ORGANIZED HEALTH CARE EDUCATION/TRAINING PROGRAM

## 2023-05-11 PROCEDURE — 99024 POSTOP FOLLOW-UP VISIT: CPT | Mod: S$GLB,,, | Performed by: STUDENT IN AN ORGANIZED HEALTH CARE EDUCATION/TRAINING PROGRAM

## 2023-05-11 PROCEDURE — 1101F PR PT FALLS ASSESS DOC 0-1 FALLS W/OUT INJ PAST YR: ICD-10-PCS | Mod: CPTII,S$GLB,, | Performed by: STUDENT IN AN ORGANIZED HEALTH CARE EDUCATION/TRAINING PROGRAM

## 2023-05-11 PROCEDURE — 3079F PR MOST RECENT DIASTOLIC BLOOD PRESSURE 80-89 MM HG: ICD-10-PCS | Mod: CPTII,S$GLB,, | Performed by: STUDENT IN AN ORGANIZED HEALTH CARE EDUCATION/TRAINING PROGRAM

## 2023-05-11 PROCEDURE — 3066F PR DOCUMENTATION OF TREATMENT FOR NEPHROPATHY: ICD-10-PCS | Mod: CPTII,S$GLB,, | Performed by: STUDENT IN AN ORGANIZED HEALTH CARE EDUCATION/TRAINING PROGRAM

## 2023-05-11 PROCEDURE — 3061F NEG MICROALBUMINURIA REV: CPT | Mod: CPTII,S$GLB,, | Performed by: STUDENT IN AN ORGANIZED HEALTH CARE EDUCATION/TRAINING PROGRAM

## 2023-05-11 PROCEDURE — 3061F PR NEG MICROALBUMINURIA RESULT DOCUMENTED/REVIEW: ICD-10-PCS | Mod: CPTII,S$GLB,, | Performed by: STUDENT IN AN ORGANIZED HEALTH CARE EDUCATION/TRAINING PROGRAM

## 2023-05-11 PROCEDURE — 3044F HG A1C LEVEL LT 7.0%: CPT | Mod: CPTII,S$GLB,, | Performed by: STUDENT IN AN ORGANIZED HEALTH CARE EDUCATION/TRAINING PROGRAM

## 2023-05-11 PROCEDURE — 1159F MED LIST DOCD IN RCRD: CPT | Mod: CPTII,S$GLB,, | Performed by: STUDENT IN AN ORGANIZED HEALTH CARE EDUCATION/TRAINING PROGRAM

## 2023-05-11 PROCEDURE — 99024 PR POST-OP FOLLOW-UP VISIT: ICD-10-PCS | Mod: S$GLB,,, | Performed by: STUDENT IN AN ORGANIZED HEALTH CARE EDUCATION/TRAINING PROGRAM

## 2023-05-11 RX ORDER — SODIUM CHLORIDE 9 MG/ML
INJECTION, SOLUTION INTRAVENOUS CONTINUOUS
Status: CANCELLED | OUTPATIENT
Start: 2023-05-19

## 2023-05-11 NOTE — PATIENT INSTRUCTIONS
Your procedure has been scheduled at:    [facility]pre-admit nurse  [phone]  [facility].........................................................pre_admit  [phone]  [place]  [facility]  [address] [facility] [address]    DAY   Friday    DATE   May 19, 2023       Someone from the hospital will call you the evening before surgery ( it may be after 5 pm) to let you know what time you need to be at the hospital for surgery.                                               1:  DO NOT EAT OR DRINK ANYTHING AFTER MIDNIGHT THE NIGHT BEFORE SURGERY.     2:  You will need to stop all blood thinners 7 days prior to surgery except Eliquis which is 48 hours.      3:  Pre-admit nurse will go over your medicines and let you know which ones not to take the morning of surgery    4:  Plan to have someone drive you home after you are released from the hospital.  You WILL NOT be able to drive yourself.    5:  If you have any questions or need to change your surgery date, please call Petty or Cameron  at [phone] or [phone]        AFTER SURGERY:    **You can shower 24-48 hours after surgery, incision can get wet but do not soak. You  may have bandages and steri strips or you may just have glue over the incision with no bandage.  The glue will peel away after a few days, steri strips you can remove after 1 week.   **After surgery you will get pain medication to take every 6 hours.  If you are not getting relief you can take the pain medicine every 4 hours and you can also take 2 tabs  if needed.  Also if you are able to take Ibuprofen you can take 600 mg every 6 hours.  Applying an ice pack for 15-20 minutes 3-4 times a day will be helpful.  **If you have not had a bowel movement within 3 days after surgery you may take a laxative of your choice.  ** Do not lift anything over 5-10 pounds.    You need to have a follow up appointment 2 weeks after surgery, call the office to schedule or if  you have questions or concerns.

## 2023-05-11 NOTE — PROGRESS NOTES
History & Physical    SUBJECTIVE:     History of Present Illness:  Patient is a 73 y.o. female presents with for postop follow-up.  She underwent left breast lumpectomy and sentinel lymph node biopsy for a 1.7 cm ER positive breast cancer.  Healing well.    No chief complaint on file.      Review of patient's allergies indicates:   Allergen Reactions    Penicillins Anaphylaxis    Sulfa (sulfonamide antibiotics) Anaphylaxis    Trintellix [vortioxetine] Nausea And Vomiting and Other (See Comments)     Patient has seizures and vomits       Current Outpatient Medications   Medication Sig Dispense Refill    albuterol (PROVENTIL/VENTOLIN HFA) 90 mcg/actuation inhaler Inhale 1-2 puffs into the lungs every 4 (four) hours as needed for Shortness of Breath (coughing). Rescue 20.1 g 11    alendronate (FOSAMAX) 70 MG tablet Take one tablet every 7 days. 4 tablet 11    aspirin (ECOTRIN) 81 MG EC tablet Take 81 mg by mouth once daily.      blood-glucose meter kit Use as instructed. Insurance preferred. 1 each 0    CALCIUM CARBONATE/VITAMIN D3 (CALCIUM 500 + D ORAL) Take 1 tablet by mouth once daily. 10 mg daily      cyanocobalamin (VITAMIN B-12) 1000 MCG tablet Take 1 tablet (1,000 mcg total) by mouth once daily. 30 tablet 0    empagliflozin (JARDIANCE) 10 mg tablet Take 1 tablet (10 mg total) by mouth once daily. 90 tablet 2    fluticasone furoate-vilanteroL (BREO ELLIPTA) 100-25 mcg/dose diskus inhaler Inhale 1 puff into the lungs once daily. Controller 60 each 11    ketoconazole (NIZORAL) 2 % cream AAA pannus fold at least daily after the shower 60 g 3    levothyroxine (SYNTHROID) 100 MCG tablet Take 1 tablet (100 mcg total) by mouth before breakfast. 90 tablet 3    losartan (COZAAR) 50 MG tablet Take 0.5 tablets (25 mg total) by mouth once daily. 90 tablet 0    metFORMIN (GLUCOPHAGE-XR) 500 MG ER 24hr tablet Take 2 tablets (1,000 mg total) by mouth daily with breakfast. 180 tablet 3    methylPREDNISolone (MEDROL DOSEPACK) 4  mg tablet use as directed 1 each 0    polyethylene glycol (GLYCOLAX) 17 gram PwPk Take 17 g by mouth once daily.  0    potassium chloride SA (K-DUR,KLOR-CON) 20 MEQ tablet Take 20 mEq by mouth once daily.      rOPINIRole (REQUIP) 0.5 MG tablet Take by mouth every evening.      sertraline (ZOLOFT) 50 MG tablet Take 1 tablet (50 mg total) by mouth once daily. For depression/anxiety 90 tablet 0    simvastatin (ZOCOR) 40 MG tablet Take 1 tablet (40 mg total) by mouth once daily. 90 tablet 3    tetrabenazine (XENAZINE) 25 mg tablet Take one tablet daily for 7 days and then  Take 1 tablet twice daily afterwards 47 tablet 0    gabapentin (NEURONTIN) 300 MG capsule Take 1 capsule (300 mg total) by mouth every evening. Take 1 tablet every night x 7 days. Increase to twice a day x 7 days. Increase to three times a day. 90 capsule 0    oxyCODONE-acetaminophen (PERCOCET) 5-325 mg per tablet Take 1 tablet by mouth every 4 (four) hours as needed. (Patient not taking: Reported on 2023) 21 tablet 0     No current facility-administered medications for this visit.       Past Medical History:   Diagnosis Date    Anxiety     Arthritis     Asthma     Cancer     Left Breast    Depression     Diabetes mellitus, type 2     GERD (gastroesophageal reflux disease)     Hyperlipidemia     Hypertension     Overactive bladder     Seizures     Pseudo-seizures    Sleep apnea     Stroke     Stroke 2022    pt was in the hospital for a stroke, claims she was seeing people when the stroke happened    Thyroid disease     Urinary tract infection without hematuria 2017     Past Surgical History:   Procedure Laterality Date    APPENDECTOMY      BREAST BIOPSY Left     BREAST LUMPECTOMY Left     2016    BREAST SURGERY      CATARACT EXTRACTION Bilateral     OU done//     SECTION      CHOLECYSTECTOMY      COLONOSCOPY N/A 2020    Procedure: COLONOSCOPY;  Surgeon: Mj Fernandez MD;  Location: [facility];  Service: Endoscopy;   Laterality: N/A;    CYST REMOVAL Left 04/01/2021    DILATION AND CURETTAGE OF UTERUS      EYE SURGERY      LUMPECTOMY, BREAST Left 4/26/2023    Procedure: LUMPECTOMY, BREAST;  Surgeon: Toño Paredes MD;  Location: [facility] OR;  Service: General;  Laterality: Left;    PERCUTANEOUS PINNING OF HIP Right 11/11/2022    Procedure: PINNING, HIP, PERCUTANEOUS;  Surgeon: Ministerio Joiner II, MD;  Location: [facility] OR;  Service: Orthopedics;  Laterality: Right;    SENTINEL LYMPH NODE BIOPSY Left 4/26/2023    Procedure: BIOPSY, LYMPH NODE, SENTINEL;  Surgeon: Toño Paredes MD;  Location: [facility] OR;  Service: General;  Laterality: Left;     Family History   Problem Relation Age of Onset    Diabetes Mother     Hypertension Mother     Breast cancer Mother     Cataracts Mother     Melanoma Mother     Heart failure Father     Melanoma Father     Cataracts Brother     Melanoma Brother     Glaucoma Neg Hx     Retinal detachment Neg Hx     Macular degeneration Neg Hx      Social History     Tobacco Use    Smoking status: Never    Smokeless tobacco: Never   Substance Use Topics    Alcohol use: Yes     Comment: seldom    Drug use: No        Review of Systems:  Review of Systems   Constitutional:  Negative for fever.   HENT: Negative.     Eyes: Negative.    Respiratory: Negative.     Cardiovascular: Negative.    Gastrointestinal: Negative.    Endocrine: Negative.    Genitourinary: Negative.    Musculoskeletal: Negative.    Skin: Negative.    Allergic/Immunologic: Negative.    Neurological: Negative.    Hematological: Negative.    Psychiatric/Behavioral: Negative.       OBJECTIVE:     Vital Signs (Most Recent)  Temp: 98.2 °F (36.8 °C) (05/11/23 1322)  Pulse: 84 (05/11/23 1322)  Resp: 16 (05/11/23 1322)  BP: (!) 168/81 (05/11/23 1322)  5' (1.524 m)        Physical Exam:  Physical Exam  Constitutional:       General: She is not in acute distress.     Appearance: Normal appearance. She is not ill-appearing, toxic-appearing or diaphoretic.   HENT:       Head: Normocephalic.      Nose: Nose normal.   Eyes:      Conjunctiva/sclera: Conjunctivae normal.   Cardiovascular:      Rate and Rhythm: Normal rate and regular rhythm.   Pulmonary:      Effort: Pulmonary effort is normal.   Abdominal:      Palpations: Abdomen is soft.   Musculoskeletal:         General: Normal range of motion.      Cervical back: Normal range of motion.   Skin:     General: Skin is warm.      Comments: Incisions are healing well   Neurological:      General: No focal deficit present.      Mental Status: She is alert.   Psychiatric:         Mood and Affect: Mood normal.       Diagnostic Results:  Pathology showed a 4.8 cm invasive ductal carcinoma, ER positive, 40% Ki-67 score.  This is much larger than initially thought based off of preoperative imaging.  One of 1 sentinel lymph nodes were negative.  Anterior and superior margins were positive    ASSESSMENT/PLAN:     73-year-old female who underwent lumpectomy for what was felt to be a 1.7 cm breast cancer on the left side.  Final pathology shows a 4.8 cm breast cancer with 2 margins.  In the OR, the anterior margin was the dermis.    PLAN:  Discussed re-excision lumpectomy and taking additional skin as well as a new superior margin verses completion mastectomy.  Given the large tumor size which was unanticipated and the 2 positive margins, would recommend pursuing definitive completion mastectomy.  This is also what the patient wishes to pursue.  Risks versus benefits of re-excision mastectomy were discussed.  Surgery was tentatively scheduled for the 19th.  Will discuss case with her oncologist and radiation oncologist

## 2023-05-11 NOTE — H&P (VIEW-ONLY)
History & Physical    SUBJECTIVE:     History of Present Illness:  Patient is a 73 y.o. female presents with for postop follow-up.  She underwent left breast lumpectomy and sentinel lymph node biopsy for a 1.7 cm ER positive breast cancer.  Healing well.    No chief complaint on file.      Review of patient's allergies indicates:   Allergen Reactions    Penicillins Anaphylaxis    Sulfa (sulfonamide antibiotics) Anaphylaxis    Trintellix [vortioxetine] Nausea And Vomiting and Other (See Comments)     Patient has seizures and vomits       Current Outpatient Medications   Medication Sig Dispense Refill    albuterol (PROVENTIL/VENTOLIN HFA) 90 mcg/actuation inhaler Inhale 1-2 puffs into the lungs every 4 (four) hours as needed for Shortness of Breath (coughing). Rescue 20.1 g 11    alendronate (FOSAMAX) 70 MG tablet Take one tablet every 7 days. 4 tablet 11    aspirin (ECOTRIN) 81 MG EC tablet Take 81 mg by mouth once daily.      blood-glucose meter kit Use as instructed. Insurance preferred. 1 each 0    CALCIUM CARBONATE/VITAMIN D3 (CALCIUM 500 + D ORAL) Take 1 tablet by mouth once daily. 10 mg daily      cyanocobalamin (VITAMIN B-12) 1000 MCG tablet Take 1 tablet (1,000 mcg total) by mouth once daily. 30 tablet 0    empagliflozin (JARDIANCE) 10 mg tablet Take 1 tablet (10 mg total) by mouth once daily. 90 tablet 2    fluticasone furoate-vilanteroL (BREO ELLIPTA) 100-25 mcg/dose diskus inhaler Inhale 1 puff into the lungs once daily. Controller 60 each 11    ketoconazole (NIZORAL) 2 % cream AAA pannus fold at least daily after the shower 60 g 3    levothyroxine (SYNTHROID) 100 MCG tablet Take 1 tablet (100 mcg total) by mouth before breakfast. 90 tablet 3    losartan (COZAAR) 50 MG tablet Take 0.5 tablets (25 mg total) by mouth once daily. 90 tablet 0    metFORMIN (GLUCOPHAGE-XR) 500 MG ER 24hr tablet Take 2 tablets (1,000 mg total) by mouth daily with breakfast. 180 tablet 3    methylPREDNISolone (MEDROL DOSEPACK) 4  mg tablet use as directed 1 each 0    polyethylene glycol (GLYCOLAX) 17 gram PwPk Take 17 g by mouth once daily.  0    potassium chloride SA (K-DUR,KLOR-CON) 20 MEQ tablet Take 20 mEq by mouth once daily.      rOPINIRole (REQUIP) 0.5 MG tablet Take by mouth every evening.      sertraline (ZOLOFT) 50 MG tablet Take 1 tablet (50 mg total) by mouth once daily. For depression/anxiety 90 tablet 0    simvastatin (ZOCOR) 40 MG tablet Take 1 tablet (40 mg total) by mouth once daily. 90 tablet 3    tetrabenazine (XENAZINE) 25 mg tablet Take one tablet daily for 7 days and then  Take 1 tablet twice daily afterwards 47 tablet 0    gabapentin (NEURONTIN) 300 MG capsule Take 1 capsule (300 mg total) by mouth every evening. Take 1 tablet every night x 7 days. Increase to twice a day x 7 days. Increase to three times a day. 90 capsule 0    oxyCODONE-acetaminophen (PERCOCET) 5-325 mg per tablet Take 1 tablet by mouth every 4 (four) hours as needed. (Patient not taking: Reported on 2023) 21 tablet 0     No current facility-administered medications for this visit.       Past Medical History:   Diagnosis Date    Anxiety     Arthritis     Asthma     Cancer     Left Breast    Depression     Diabetes mellitus, type 2     GERD (gastroesophageal reflux disease)     Hyperlipidemia     Hypertension     Overactive bladder     Seizures     Pseudo-seizures    Sleep apnea     Stroke     Stroke 2022    pt was in the hospital for a stroke, claims she was seeing people when the stroke happened    Thyroid disease     Urinary tract infection without hematuria 2017     Past Surgical History:   Procedure Laterality Date    APPENDECTOMY      BREAST BIOPSY Left     BREAST LUMPECTOMY Left     2016    BREAST SURGERY      CATARACT EXTRACTION Bilateral     OU done//     SECTION      CHOLECYSTECTOMY      COLONOSCOPY N/A 2020    Procedure: COLONOSCOPY;  Surgeon: Mj Fernandez MD;  Location: [facility];  Service: Endoscopy;   Laterality: N/A;    CYST REMOVAL Left 04/01/2021    DILATION AND CURETTAGE OF UTERUS      EYE SURGERY      LUMPECTOMY, BREAST Left 4/26/2023    Procedure: LUMPECTOMY, BREAST;  Surgeon: Toño Paredes MD;  Location: [facility] OR;  Service: General;  Laterality: Left;    PERCUTANEOUS PINNING OF HIP Right 11/11/2022    Procedure: PINNING, HIP, PERCUTANEOUS;  Surgeon: Ministerio Joiner II, MD;  Location: [facility] OR;  Service: Orthopedics;  Laterality: Right;    SENTINEL LYMPH NODE BIOPSY Left 4/26/2023    Procedure: BIOPSY, LYMPH NODE, SENTINEL;  Surgeon: Toño Paredes MD;  Location: [facility] OR;  Service: General;  Laterality: Left;     Family History   Problem Relation Age of Onset    Diabetes Mother     Hypertension Mother     Breast cancer Mother     Cataracts Mother     Melanoma Mother     Heart failure Father     Melanoma Father     Cataracts Brother     Melanoma Brother     Glaucoma Neg Hx     Retinal detachment Neg Hx     Macular degeneration Neg Hx      Social History     Tobacco Use    Smoking status: Never    Smokeless tobacco: Never   Substance Use Topics    Alcohol use: Yes     Comment: seldom    Drug use: No        Review of Systems:  Review of Systems   Constitutional:  Negative for fever.   HENT: Negative.     Eyes: Negative.    Respiratory: Negative.     Cardiovascular: Negative.    Gastrointestinal: Negative.    Endocrine: Negative.    Genitourinary: Negative.    Musculoskeletal: Negative.    Skin: Negative.    Allergic/Immunologic: Negative.    Neurological: Negative.    Hematological: Negative.    Psychiatric/Behavioral: Negative.       OBJECTIVE:     Vital Signs (Most Recent)  Temp: 98.2 °F (36.8 °C) (05/11/23 1322)  Pulse: 84 (05/11/23 1322)  Resp: 16 (05/11/23 1322)  BP: (!) 168/81 (05/11/23 1322)  5' (1.524 m)        Physical Exam:  Physical Exam  Constitutional:       General: She is not in acute distress.     Appearance: Normal appearance. She is not ill-appearing, toxic-appearing or diaphoretic.   HENT:       Head: Normocephalic.      Nose: Nose normal.   Eyes:      Conjunctiva/sclera: Conjunctivae normal.   Cardiovascular:      Rate and Rhythm: Normal rate and regular rhythm.   Pulmonary:      Effort: Pulmonary effort is normal.   Abdominal:      Palpations: Abdomen is soft.   Musculoskeletal:         General: Normal range of motion.      Cervical back: Normal range of motion.   Skin:     General: Skin is warm.      Comments: Incisions are healing well   Neurological:      General: No focal deficit present.      Mental Status: She is alert.   Psychiatric:         Mood and Affect: Mood normal.       Diagnostic Results:  Pathology showed a 4.8 cm invasive ductal carcinoma, ER positive, 40% Ki-67 score.  This is much larger than initially thought based off of preoperative imaging.  One of 1 sentinel lymph nodes were negative.  Anterior and superior margins were positive    ASSESSMENT/PLAN:     73-year-old female who underwent lumpectomy for what was felt to be a 1.7 cm breast cancer on the left side.  Final pathology shows a 4.8 cm breast cancer with 2 margins.  In the OR, the anterior margin was the dermis.    PLAN:  Discussed re-excision lumpectomy and taking additional skin as well as a new superior margin verses completion mastectomy.  Given the large tumor size which was unanticipated and the 2 positive margins, would recommend pursuing definitive completion mastectomy.  This is also what the patient wishes to pursue.  Risks versus benefits of re-excision mastectomy were discussed.  Surgery was tentatively scheduled for the 19th.  Will discuss case with her oncologist and radiation oncologist

## 2023-05-16 ENCOUNTER — HOSPITAL ENCOUNTER (OUTPATIENT)
Dept: RADIOLOGY | Facility: HOSPITAL | Age: 73
Discharge: HOME | End: 2023-05-16
Attending: STUDENT IN AN ORGANIZED HEALTH CARE EDUCATION/TRAINING PROGRAM
Payer: MEDICARE

## 2023-05-16 ENCOUNTER — HOSPITAL ENCOUNTER (OUTPATIENT)
Dept: PREADMISSION TESTING | Facility: HOSPITAL | Age: 73
Discharge: HOME | End: 2023-05-16
Attending: STUDENT IN AN ORGANIZED HEALTH CARE EDUCATION/TRAINING PROGRAM
Payer: MEDICARE

## 2023-05-16 VITALS
DIASTOLIC BLOOD PRESSURE: 82 MMHG | SYSTOLIC BLOOD PRESSURE: 144 MMHG | WEIGHT: 160 LBS | OXYGEN SATURATION: 98 % | RESPIRATION RATE: 17 BRPM | BODY MASS INDEX: 31.41 KG/M2 | HEART RATE: 67 BPM | HEIGHT: 60 IN | TEMPERATURE: 98 F

## 2023-05-16 DIAGNOSIS — C50.912 INVASIVE DUCTAL CARCINOMA OF BREAST, FEMALE, LEFT: ICD-10-CM

## 2023-05-16 LAB
ALBUMIN SERPL BCP-MCNC: 3.8 G/DL (ref 3.5–5.2)
ALP SERPL-CCNC: 99 U/L (ref 55–135)
ALT SERPL W/O P-5'-P-CCNC: 13 U/L (ref 10–44)
ANION GAP SERPL CALC-SCNC: 6 MMOL/L (ref 8–16)
AST SERPL-CCNC: 15 U/L (ref 10–40)
BASOPHILS # BLD AUTO: 0.06 K/UL (ref 0–0.2)
BASOPHILS NFR BLD: 0.6 % (ref 0–1.9)
BILIRUB SERPL-MCNC: 0.7 MG/DL (ref 0.1–1)
BUN SERPL-MCNC: 19 MG/DL (ref 8–23)
CALCIUM SERPL-MCNC: 9.6 MG/DL (ref 8.7–10.5)
CHLORIDE SERPL-SCNC: 104 MMOL/L (ref 95–110)
CO2 SERPL-SCNC: 29 MMOL/L (ref 23–29)
CREAT SERPL-MCNC: 0.8 MG/DL (ref 0.5–1.4)
DIFFERENTIAL METHOD: ABNORMAL
EOSINOPHIL # BLD AUTO: 0.2 K/UL (ref 0–0.5)
EOSINOPHIL NFR BLD: 1.9 % (ref 0–8)
ERYTHROCYTE [DISTWIDTH] IN BLOOD BY AUTOMATED COUNT: 13.6 % (ref 11.5–14.5)
EST. GFR  (NO RACE VARIABLE): >60 ML/MIN/1.73 M^2
GLUCOSE SERPL-MCNC: 89 MG/DL (ref 70–110)
HCT VFR BLD AUTO: 50.2 % (ref 37–48.5)
HGB BLD-MCNC: 16 G/DL (ref 12–16)
IMM GRANULOCYTES # BLD AUTO: 0.06 K/UL (ref 0–0.04)
IMM GRANULOCYTES NFR BLD AUTO: 0.6 % (ref 0–0.5)
LYMPHOCYTES # BLD AUTO: 2.7 K/UL (ref 1–4.8)
LYMPHOCYTES NFR BLD: 27.6 % (ref 18–48)
MCH RBC QN AUTO: 30.6 PG (ref 27–31)
MCHC RBC AUTO-ENTMCNC: 31.9 G/DL (ref 32–36)
MCV RBC AUTO: 96 FL (ref 82–98)
MONOCYTES # BLD AUTO: 0.6 K/UL (ref 0.3–1)
MONOCYTES NFR BLD: 6.1 % (ref 4–15)
NEUTROPHILS # BLD AUTO: 6.1 K/UL (ref 1.8–7.7)
NEUTROPHILS NFR BLD: 63.2 % (ref 38–73)
NRBC BLD-RTO: 0 /100 WBC
PLATELET # BLD AUTO: 199 K/UL (ref 150–450)
PMV BLD AUTO: 10.6 FL (ref 9.2–12.9)
POTASSIUM SERPL-SCNC: 4.8 MMOL/L (ref 3.5–5.1)
PROT SERPL-MCNC: 7.6 G/DL (ref 6–8.4)
RBC # BLD AUTO: 5.23 M/UL (ref 4–5.4)
SODIUM SERPL-SCNC: 139 MMOL/L (ref 136–145)
WBC # BLD AUTO: 9.66 K/UL (ref 3.9–12.7)

## 2023-05-16 PROCEDURE — 85025 COMPLETE CBC W/AUTO DIFF WBC: CPT | Performed by: STUDENT IN AN ORGANIZED HEALTH CARE EDUCATION/TRAINING PROGRAM

## 2023-05-16 PROCEDURE — 93010 EKG 12-LEAD: ICD-10-PCS | Mod: ,,, | Performed by: INTERNAL MEDICINE

## 2023-05-16 PROCEDURE — 71046 X-RAY EXAM CHEST 2 VIEWS: CPT | Mod: TC

## 2023-05-16 PROCEDURE — 36415 COLL VENOUS BLD VENIPUNCTURE: CPT | Performed by: STUDENT IN AN ORGANIZED HEALTH CARE EDUCATION/TRAINING PROGRAM

## 2023-05-16 PROCEDURE — 93010 ELECTROCARDIOGRAM REPORT: CPT | Mod: ,,, | Performed by: INTERNAL MEDICINE

## 2023-05-16 PROCEDURE — 80053 COMPREHEN METABOLIC PANEL: CPT | Performed by: STUDENT IN AN ORGANIZED HEALTH CARE EDUCATION/TRAINING PROGRAM

## 2023-05-16 PROCEDURE — 93005 ELECTROCARDIOGRAM TRACING: CPT | Performed by: INTERNAL MEDICINE

## 2023-05-19 ENCOUNTER — ANESTHESIA EVENT (OUTPATIENT)
Dept: SURGERY | Facility: HOSPITAL | Age: 73
End: 2023-05-19
Payer: MEDICARE

## 2023-05-19 ENCOUNTER — HOSPITAL ENCOUNTER (OUTPATIENT)
Facility: HOSPITAL | Age: 73
Discharge: HOME | End: 2023-05-20
Attending: STUDENT IN AN ORGANIZED HEALTH CARE EDUCATION/TRAINING PROGRAM | Admitting: STUDENT IN AN ORGANIZED HEALTH CARE EDUCATION/TRAINING PROGRAM
Payer: MEDICARE

## 2023-05-19 ENCOUNTER — ANESTHESIA (OUTPATIENT)
Dept: SURGERY | Facility: HOSPITAL | Age: 73
End: 2023-05-19
Payer: MEDICARE

## 2023-05-19 DIAGNOSIS — Z98.890 POST-OPERATIVE STATE: ICD-10-CM

## 2023-05-19 DIAGNOSIS — C50.912 INVASIVE DUCTAL CARCINOMA OF BREAST, FEMALE, LEFT: ICD-10-CM

## 2023-05-19 LAB
ANION GAP SERPL CALC-SCNC: 7 MMOL/L (ref 8–16)
BASOPHILS # BLD AUTO: 0.02 K/UL (ref 0–0.2)
BASOPHILS NFR BLD: 0.2 % (ref 0–1.9)
BUN SERPL-MCNC: 18 MG/DL (ref 8–23)
CALCIUM SERPL-MCNC: 9.3 MG/DL (ref 8.7–10.5)
CHLORIDE SERPL-SCNC: 103 MMOL/L (ref 95–110)
CO2 SERPL-SCNC: 28 MMOL/L (ref 23–29)
CREAT SERPL-MCNC: 0.9 MG/DL (ref 0.5–1.4)
DIFFERENTIAL METHOD: ABNORMAL
EOSINOPHIL # BLD AUTO: 0 K/UL (ref 0–0.5)
EOSINOPHIL NFR BLD: 0.2 % (ref 0–8)
ERYTHROCYTE [DISTWIDTH] IN BLOOD BY AUTOMATED COUNT: 13.3 % (ref 11.5–14.5)
EST. GFR  (NO RACE VARIABLE): >60 ML/MIN/1.73 M^2
ESTIMATED AVG GLUCOSE: 126 MG/DL (ref 68–131)
GLUCOSE SERPL-MCNC: 118 MG/DL (ref 70–110)
GLUCOSE SERPL-MCNC: 121 MG/DL (ref 70–110)
GLUCOSE SERPL-MCNC: 163 MG/DL (ref 70–110)
GLUCOSE SERPL-MCNC: 163 MG/DL (ref 70–110)
GLUCOSE SERPL-MCNC: 229 MG/DL (ref 70–110)
HBA1C MFR BLD: 6 % (ref 4.5–6.2)
HCT VFR BLD AUTO: 46.1 % (ref 37–48.5)
HGB BLD-MCNC: 14.9 G/DL (ref 12–16)
IMM GRANULOCYTES # BLD AUTO: 0.07 K/UL (ref 0–0.04)
IMM GRANULOCYTES NFR BLD AUTO: 0.6 % (ref 0–0.5)
LYMPHOCYTES # BLD AUTO: 0.9 K/UL (ref 1–4.8)
LYMPHOCYTES NFR BLD: 8.2 % (ref 18–48)
MCH RBC QN AUTO: 30.5 PG (ref 27–31)
MCHC RBC AUTO-ENTMCNC: 32.3 G/DL (ref 32–36)
MCV RBC AUTO: 95 FL (ref 82–98)
MONOCYTES # BLD AUTO: 0.2 K/UL (ref 0.3–1)
MONOCYTES NFR BLD: 1.3 % (ref 4–15)
NEUTROPHILS # BLD AUTO: 10.1 K/UL (ref 1.8–7.7)
NEUTROPHILS NFR BLD: 89.5 % (ref 38–73)
NRBC BLD-RTO: 0 /100 WBC
PLATELET # BLD AUTO: 136 K/UL (ref 150–450)
PMV BLD AUTO: 10 FL (ref 9.2–12.9)
POTASSIUM SERPL-SCNC: 4.5 MMOL/L (ref 3.5–5.1)
RBC # BLD AUTO: 4.88 M/UL (ref 4–5.4)
SODIUM SERPL-SCNC: 138 MMOL/L (ref 136–145)
WBC # BLD AUTO: 11.28 K/UL (ref 3.9–12.7)

## 2023-05-19 PROCEDURE — 25000003 PHARM REV CODE 250: Performed by: STUDENT IN AN ORGANIZED HEALTH CARE EDUCATION/TRAINING PROGRAM

## 2023-05-19 PROCEDURE — 63600175 PHARM REV CODE 636 W HCPCS: Performed by: NURSE ANESTHETIST, CERTIFIED REGISTERED

## 2023-05-19 PROCEDURE — D9220A PRA ANESTHESIA: Mod: ANES,,, | Performed by: ANESTHESIOLOGY

## 2023-05-19 PROCEDURE — 19303 PR MASTECTOMY, SIMPLE, COMPLETE: ICD-10-PCS | Mod: LT,,, | Performed by: STUDENT IN AN ORGANIZED HEALTH CARE EDUCATION/TRAINING PROGRAM

## 2023-05-19 PROCEDURE — 36000707: Performed by: STUDENT IN AN ORGANIZED HEALTH CARE EDUCATION/TRAINING PROGRAM

## 2023-05-19 PROCEDURE — G0378 HOSPITAL OBSERVATION PER HR: HCPCS

## 2023-05-19 PROCEDURE — 37000009 HC ANESTHESIA EA ADD 15 MINS: Performed by: STUDENT IN AN ORGANIZED HEALTH CARE EDUCATION/TRAINING PROGRAM

## 2023-05-19 PROCEDURE — 36000706: Performed by: STUDENT IN AN ORGANIZED HEALTH CARE EDUCATION/TRAINING PROGRAM

## 2023-05-19 PROCEDURE — D9220A PRA ANESTHESIA: Mod: CRNA,,, | Performed by: NURSE ANESTHETIST, CERTIFIED REGISTERED

## 2023-05-19 PROCEDURE — 25000003 PHARM REV CODE 250: Performed by: ANESTHESIOLOGY

## 2023-05-19 PROCEDURE — 71000039 HC RECOVERY, EACH ADD'L HOUR: Performed by: STUDENT IN AN ORGANIZED HEALTH CARE EDUCATION/TRAINING PROGRAM

## 2023-05-19 PROCEDURE — 37000008 HC ANESTHESIA 1ST 15 MINUTES: Performed by: STUDENT IN AN ORGANIZED HEALTH CARE EDUCATION/TRAINING PROGRAM

## 2023-05-19 PROCEDURE — 80048 BASIC METABOLIC PNL TOTAL CA: CPT | Performed by: STUDENT IN AN ORGANIZED HEALTH CARE EDUCATION/TRAINING PROGRAM

## 2023-05-19 PROCEDURE — 25000242 PHARM REV CODE 250 ALT 637 W/ HCPCS: Performed by: STUDENT IN AN ORGANIZED HEALTH CARE EDUCATION/TRAINING PROGRAM

## 2023-05-19 PROCEDURE — C1729 CATH, DRAINAGE: HCPCS | Performed by: STUDENT IN AN ORGANIZED HEALTH CARE EDUCATION/TRAINING PROGRAM

## 2023-05-19 PROCEDURE — 27201423 OPTIME MED/SURG SUP & DEVICES STERILE SUPPLY: Performed by: STUDENT IN AN ORGANIZED HEALTH CARE EDUCATION/TRAINING PROGRAM

## 2023-05-19 PROCEDURE — 94640 AIRWAY INHALATION TREATMENT: CPT

## 2023-05-19 PROCEDURE — D9220A PRA ANESTHESIA: ICD-10-PCS | Mod: ANES,,, | Performed by: ANESTHESIOLOGY

## 2023-05-19 PROCEDURE — 83036 HEMOGLOBIN GLYCOSYLATED A1C: CPT | Performed by: STUDENT IN AN ORGANIZED HEALTH CARE EDUCATION/TRAINING PROGRAM

## 2023-05-19 PROCEDURE — 96372 THER/PROPH/DIAG INJ SC/IM: CPT | Mod: 59 | Performed by: STUDENT IN AN ORGANIZED HEALTH CARE EDUCATION/TRAINING PROGRAM

## 2023-05-19 PROCEDURE — 94761 N-INVAS EAR/PLS OXIMETRY MLT: CPT

## 2023-05-19 PROCEDURE — D9220A PRA ANESTHESIA: ICD-10-PCS | Mod: CRNA,,, | Performed by: NURSE ANESTHETIST, CERTIFIED REGISTERED

## 2023-05-19 PROCEDURE — 71000033 HC RECOVERY, INTIAL HOUR: Performed by: STUDENT IN AN ORGANIZED HEALTH CARE EDUCATION/TRAINING PROGRAM

## 2023-05-19 PROCEDURE — 85025 COMPLETE CBC W/AUTO DIFF WBC: CPT | Performed by: STUDENT IN AN ORGANIZED HEALTH CARE EDUCATION/TRAINING PROGRAM

## 2023-05-19 PROCEDURE — 36415 COLL VENOUS BLD VENIPUNCTURE: CPT | Performed by: STUDENT IN AN ORGANIZED HEALTH CARE EDUCATION/TRAINING PROGRAM

## 2023-05-19 PROCEDURE — 63600175 PHARM REV CODE 636 W HCPCS: Performed by: STUDENT IN AN ORGANIZED HEALTH CARE EDUCATION/TRAINING PROGRAM

## 2023-05-19 PROCEDURE — 25000003 PHARM REV CODE 250: Performed by: NURSE ANESTHETIST, CERTIFIED REGISTERED

## 2023-05-19 PROCEDURE — 19303 MAST SIMPLE COMPLETE: CPT | Mod: LT,,, | Performed by: STUDENT IN AN ORGANIZED HEALTH CARE EDUCATION/TRAINING PROGRAM

## 2023-05-19 PROCEDURE — 63600175 PHARM REV CODE 636 W HCPCS: Performed by: ANESTHESIOLOGY

## 2023-05-19 PROCEDURE — 88305 TISSUE EXAM BY PATHOLOGIST: CPT | Mod: TC

## 2023-05-19 RX ORDER — FLUTICASONE FUROATE AND VILANTEROL 100; 25 UG/1; UG/1
1 POWDER RESPIRATORY (INHALATION) DAILY
Status: DISCONTINUED | OUTPATIENT
Start: 2023-05-19 | End: 2023-05-19

## 2023-05-19 RX ORDER — BUDESONIDE 0.5 MG/2ML
0.5 INHALANT ORAL EVERY 12 HOURS
Status: DISCONTINUED | OUTPATIENT
Start: 2023-05-19 | End: 2023-05-20 | Stop reason: HOSPADM

## 2023-05-19 RX ORDER — OXYCODONE AND ACETAMINOPHEN 5; 325 MG/1; MG/1
1 TABLET ORAL EVERY 4 HOURS PRN
Status: DISCONTINUED | OUTPATIENT
Start: 2023-05-19 | End: 2023-05-20 | Stop reason: HOSPADM

## 2023-05-19 RX ORDER — FENTANYL CITRATE 50 UG/ML
INJECTION, SOLUTION INTRAMUSCULAR; INTRAVENOUS
Status: DISCONTINUED | OUTPATIENT
Start: 2023-05-19 | End: 2023-05-19

## 2023-05-19 RX ORDER — ONDANSETRON HYDROCHLORIDE 2 MG/ML
INJECTION, SOLUTION INTRAMUSCULAR; INTRAVENOUS
Status: DISCONTINUED | OUTPATIENT
Start: 2023-05-19 | End: 2023-05-19

## 2023-05-19 RX ORDER — ACETAMINOPHEN 10 MG/ML
INJECTION, SOLUTION INTRAVENOUS
Status: DISCONTINUED | OUTPATIENT
Start: 2023-05-19 | End: 2023-05-19

## 2023-05-19 RX ORDER — ASPIRIN 81 MG/1
81 TABLET ORAL DAILY
Status: DISCONTINUED | OUTPATIENT
Start: 2023-05-20 | End: 2023-05-20 | Stop reason: HOSPADM

## 2023-05-19 RX ORDER — CLINDAMYCIN PHOSPHATE 900 MG/50ML
900 INJECTION, SOLUTION INTRAVENOUS
Status: COMPLETED | OUTPATIENT
Start: 2023-05-19 | End: 2023-05-19

## 2023-05-19 RX ORDER — HEPARIN SODIUM 5000 [USP'U]/ML
5000 INJECTION, SOLUTION INTRAVENOUS; SUBCUTANEOUS EVERY 8 HOURS
Status: DISCONTINUED | OUTPATIENT
Start: 2023-05-19 | End: 2023-05-20 | Stop reason: HOSPADM

## 2023-05-19 RX ORDER — GABAPENTIN 300 MG/1
300 CAPSULE ORAL NIGHTLY
Status: DISCONTINUED | OUTPATIENT
Start: 2023-05-19 | End: 2023-05-20 | Stop reason: HOSPADM

## 2023-05-19 RX ORDER — LEVOTHYROXINE SODIUM 100 UG/1
100 TABLET ORAL
Status: DISCONTINUED | OUTPATIENT
Start: 2023-05-20 | End: 2023-05-20 | Stop reason: HOSPADM

## 2023-05-19 RX ORDER — SERTRALINE HYDROCHLORIDE 50 MG/1
50 TABLET, FILM COATED ORAL DAILY
Status: DISCONTINUED | OUTPATIENT
Start: 2023-05-19 | End: 2023-05-20 | Stop reason: HOSPADM

## 2023-05-19 RX ORDER — HYDROMORPHONE HYDROCHLORIDE 1 MG/ML
0.2 INJECTION, SOLUTION INTRAMUSCULAR; INTRAVENOUS; SUBCUTANEOUS EVERY 5 MIN PRN
Status: DISCONTINUED | OUTPATIENT
Start: 2023-05-19 | End: 2023-05-19 | Stop reason: HOSPADM

## 2023-05-19 RX ORDER — DEXAMETHASONE SODIUM PHOSPHATE 4 MG/ML
INJECTION, SOLUTION INTRA-ARTICULAR; INTRALESIONAL; INTRAMUSCULAR; INTRAVENOUS; SOFT TISSUE
Status: DISCONTINUED | OUTPATIENT
Start: 2023-05-19 | End: 2023-05-19

## 2023-05-19 RX ORDER — ARFORMOTEROL TARTRATE 15 UG/2ML
15 SOLUTION RESPIRATORY (INHALATION) 2 TIMES DAILY
Status: DISCONTINUED | OUTPATIENT
Start: 2023-05-19 | End: 2023-05-20 | Stop reason: HOSPADM

## 2023-05-19 RX ORDER — OXYCODONE HYDROCHLORIDE 5 MG/1
5 TABLET ORAL
Status: COMPLETED | OUTPATIENT
Start: 2023-05-19 | End: 2023-05-19

## 2023-05-19 RX ORDER — INSULIN ASPART 100 [IU]/ML
0-5 INJECTION, SOLUTION INTRAVENOUS; SUBCUTANEOUS
Status: DISCONTINUED | OUTPATIENT
Start: 2023-05-19 | End: 2023-05-20 | Stop reason: HOSPADM

## 2023-05-19 RX ORDER — ROPINIROLE 0.25 MG/1
0.5 TABLET, FILM COATED ORAL NIGHTLY
Status: DISCONTINUED | OUTPATIENT
Start: 2023-05-19 | End: 2023-05-20 | Stop reason: HOSPADM

## 2023-05-19 RX ORDER — ALBUTEROL SULFATE 90 UG/1
2 AEROSOL, METERED RESPIRATORY (INHALATION) EVERY 4 HOURS PRN
Status: DISCONTINUED | OUTPATIENT
Start: 2023-05-19 | End: 2023-05-19

## 2023-05-19 RX ORDER — IBUPROFEN 200 MG
16 TABLET ORAL
Status: DISCONTINUED | OUTPATIENT
Start: 2023-05-19 | End: 2023-05-20 | Stop reason: HOSPADM

## 2023-05-19 RX ORDER — ACETAMINOPHEN 500 MG
500 TABLET ORAL EVERY 4 HOURS PRN
Status: DISCONTINUED | OUTPATIENT
Start: 2023-05-19 | End: 2023-05-20 | Stop reason: HOSPADM

## 2023-05-19 RX ORDER — LIDOCAINE HYDROCHLORIDE 20 MG/ML
INJECTION, SOLUTION EPIDURAL; INFILTRATION; INTRACAUDAL; PERINEURAL
Status: DISCONTINUED | OUTPATIENT
Start: 2023-05-19 | End: 2023-05-19

## 2023-05-19 RX ORDER — NEOSTIGMINE METHYLSULFATE 1 MG/ML
INJECTION, SOLUTION INTRAVENOUS
Status: DISCONTINUED | OUTPATIENT
Start: 2023-05-19 | End: 2023-05-19

## 2023-05-19 RX ORDER — BUPIVACAINE HCL/EPINEPHRINE 0.25-.0005
VIAL (ML) INJECTION
Status: DISCONTINUED | OUTPATIENT
Start: 2023-05-19 | End: 2023-05-19 | Stop reason: HOSPADM

## 2023-05-19 RX ORDER — ALBUTEROL SULFATE 0.83 MG/ML
2.5 SOLUTION RESPIRATORY (INHALATION) EVERY 4 HOURS PRN
Status: DISCONTINUED | OUTPATIENT
Start: 2023-05-19 | End: 2023-05-20 | Stop reason: HOSPADM

## 2023-05-19 RX ORDER — SODIUM CHLORIDE 9 MG/ML
INJECTION, SOLUTION INTRAVENOUS CONTINUOUS
Status: DISCONTINUED | OUTPATIENT
Start: 2023-05-19 | End: 2023-05-20 | Stop reason: HOSPADM

## 2023-05-19 RX ORDER — GLUCAGON 1 MG
1 KIT INJECTION
Status: DISCONTINUED | OUTPATIENT
Start: 2023-05-19 | End: 2023-05-20 | Stop reason: HOSPADM

## 2023-05-19 RX ORDER — ONDANSETRON 2 MG/ML
4 INJECTION INTRAMUSCULAR; INTRAVENOUS DAILY PRN
Status: DISCONTINUED | OUTPATIENT
Start: 2023-05-19 | End: 2023-05-19 | Stop reason: HOSPADM

## 2023-05-19 RX ORDER — IBUPROFEN 200 MG
24 TABLET ORAL
Status: DISCONTINUED | OUTPATIENT
Start: 2023-05-19 | End: 2023-05-20 | Stop reason: HOSPADM

## 2023-05-19 RX ORDER — ATORVASTATIN CALCIUM 20 MG/1
20 TABLET, FILM COATED ORAL NIGHTLY
Status: DISCONTINUED | OUTPATIENT
Start: 2023-05-19 | End: 2023-05-20 | Stop reason: HOSPADM

## 2023-05-19 RX ORDER — ROCURONIUM BROMIDE 10 MG/ML
INJECTION, SOLUTION INTRAVENOUS
Status: DISCONTINUED | OUTPATIENT
Start: 2023-05-19 | End: 2023-05-19

## 2023-05-19 RX ORDER — PROPOFOL 10 MG/ML
VIAL (ML) INTRAVENOUS
Status: DISCONTINUED | OUTPATIENT
Start: 2023-05-19 | End: 2023-05-19

## 2023-05-19 RX ADMIN — HYDROMORPHONE HYDROCHLORIDE 0.2 MG: 1 INJECTION, SOLUTION INTRAMUSCULAR; INTRAVENOUS; SUBCUTANEOUS at 10:05

## 2023-05-19 RX ADMIN — ROPINIROLE HYDROCHLORIDE 0.5 MG: 0.25 TABLET, FILM COATED ORAL at 08:05

## 2023-05-19 RX ADMIN — LIDOCAINE HYDROCHLORIDE 50 MG: 20 INJECTION, SOLUTION INTRAVENOUS at 08:05

## 2023-05-19 RX ADMIN — DEXAMETHASONE SODIUM PHOSPHATE 4 MG: 4 INJECTION, SOLUTION INTRAMUSCULAR; INTRAVENOUS at 08:05

## 2023-05-19 RX ADMIN — CLINDAMYCIN PHOSPHATE 900 MG: 900 INJECTION, SOLUTION INTRAVENOUS at 08:05

## 2023-05-19 RX ADMIN — ROCURONIUM BROMIDE 30 MG: 10 INJECTION, SOLUTION INTRAVENOUS at 08:05

## 2023-05-19 RX ADMIN — INSULIN ASPART 1 UNITS: 100 INJECTION, SOLUTION INTRAVENOUS; SUBCUTANEOUS at 08:05

## 2023-05-19 RX ADMIN — SERTRALINE HYDROCHLORIDE 50 MG: 50 TABLET ORAL at 04:05

## 2023-05-19 RX ADMIN — ONDANSETRON 4 MG: 2 INJECTION INTRAMUSCULAR; INTRAVENOUS at 08:05

## 2023-05-19 RX ADMIN — OXYCODONE HYDROCHLORIDE 5 MG: 5 TABLET ORAL at 10:05

## 2023-05-19 RX ADMIN — ACETAMINOPHEN 500 MG: 500 TABLET, FILM COATED ORAL at 05:05

## 2023-05-19 RX ADMIN — GLYCOPYRROLATE 0.6 MG: 0.2 INJECTION, SOLUTION INTRAMUSCULAR; INTRAVITREAL at 09:05

## 2023-05-19 RX ADMIN — BUDESONIDE INHALATION 0.5 MG: 0.5 SUSPENSION RESPIRATORY (INHALATION) at 08:05

## 2023-05-19 RX ADMIN — ARFORMOTEROL TARTRATE 15 MCG: 15 SOLUTION RESPIRATORY (INHALATION) at 08:05

## 2023-05-19 RX ADMIN — GABAPENTIN 300 MG: 300 CAPSULE ORAL at 08:05

## 2023-05-19 RX ADMIN — HEPARIN SODIUM 5000 UNITS: 5000 INJECTION INTRAVENOUS; SUBCUTANEOUS at 09:05

## 2023-05-19 RX ADMIN — PROPOFOL 120 MG: 10 INJECTION, EMULSION INTRAVENOUS at 08:05

## 2023-05-19 RX ADMIN — ACETAMINOPHEN 1000 MG: 10 INJECTION, SOLUTION INTRAVENOUS at 09:05

## 2023-05-19 RX ADMIN — NEOSTIGMINE 2.5 MG: 1 INJECTION INTRAVENOUS at 09:05

## 2023-05-19 RX ADMIN — ATORVASTATIN CALCIUM 20 MG: 20 TABLET, FILM COATED ORAL at 08:05

## 2023-05-19 RX ADMIN — SODIUM CHLORIDE, SODIUM LACTATE, POTASSIUM CHLORIDE, AND CALCIUM CHLORIDE: .6; .31; .03; .02 INJECTION, SOLUTION INTRAVENOUS at 08:05

## 2023-05-19 RX ADMIN — FENTANYL CITRATE 100 MCG: 0.05 INJECTION, SOLUTION INTRAMUSCULAR; INTRAVENOUS at 08:05

## 2023-05-19 NOTE — PT/OT/SLP PROGRESS
Occupational Therapy      Patient Name:  Maggy Tapia   MRN:  [id number]    Patient not seen today secondary to Off the floor for procedure/surgery. Will follow-up 5/20/23.    5/19/2023

## 2023-05-19 NOTE — TRANSFER OF CARE
Anesthesia Transfer of Care Note    Patient: Maggy Tapia    Procedure(s) Performed: Procedure(s) (LRB):  MASTECTOMY, SIMPLE (Left)    Patient location: PACU    Anesthesia Type: general    Transport from OR: Transported from OR on room air with adequate spontaneous ventilation    Post pain: adequate analgesia    Post assessment: no apparent anesthetic complications and tolerated procedure well    Post vital signs: stable    Level of consciousness: awake, alert and oriented    Nausea/Vomiting: no nausea/vomiting    Complications: none    Transfer of care protocol was followed      Last vitals:   Visit Vitals  BP (!) 125/58   Pulse 60   Temp 36.1 °C (97 °F) (Temporal)   Resp 16   Ht 5' (1.524 m)   Wt 76.7 kg (169 lb)   SpO2 99%   Breastfeeding No   BMI 33.01 kg/m²

## 2023-05-19 NOTE — H&P
[facility]  History & Physical    Patient Name: Maggy Tapia  MRN: [id number]  Patient Class: OP- Observation  Admission Date: 2023  Attending Physician: Lukasz Villatoro MD   Primary Care Provider: Yanna Abbasi MD         Patient information was obtained from patient and past medical records.     Subjective:     Principal Problem:Invasive ductal carcinoma of breast, female, left    Chief Complaint: No chief complaint on file.       HPI: Patient is a 73 year old female with Invasive ductal carcinoma of breast who underwent Left simple mastectomy on May 19.  Hospital medicine consulted for medical management.  Patient is doing well postoperatively.  Patient is afebrile and hemodynamically stable.  Pain is controlled.      Past Medical History:   Diagnosis Date    Anxiety     Arthritis     Asthma     Cancer     Left Breast    Depression     Diabetes mellitus, type 2     GERD (gastroesophageal reflux disease)     Hyperlipidemia     Hypertension     Overactive bladder     Seizures     Pseudo-seizures    Sleep apnea     Stroke     Stroke 2022    pt was in the hospital for a stroke, claims she was seeing people when the stroke happened    Thyroid disease     Urinary tract infection without hematuria 2017       Past Surgical History:   Procedure Laterality Date    APPENDECTOMY      BREAST BIOPSY Left     BREAST LUMPECTOMY Left         BREAST SURGERY      CATARACT EXTRACTION Bilateral     OU done//     SECTION      CHOLECYSTECTOMY      COLONOSCOPY N/A 2020    Procedure: COLONOSCOPY;  Surgeon: Mj Fernandez MD;  Location: [facility] ENDO;  Service: Endoscopy;  Laterality: N/A;    CYST REMOVAL Left 2021    DILATION AND CURETTAGE OF UTERUS      EYE SURGERY      LUMPECTOMY, BREAST Left 2023    Procedure: LUMPECTOMY, BREAST;  Surgeon: Toño Paredes MD;  Location: [facility] OR;  Service: General;  Laterality: Left;     PERCUTANEOUS PINNING OF HIP Right 11/11/2022    Procedure: PINNING, HIP, PERCUTANEOUS;  Surgeon: Ministerio Joiner II, MD;  Location: [facility] OR;  Service: Orthopedics;  Laterality: Right;    SENTINEL LYMPH NODE BIOPSY Left 4/26/2023    Procedure: BIOPSY, LYMPH NODE, SENTINEL;  Surgeon: Toño Paredes MD;  Location: [facility] OR;  Service: General;  Laterality: Left;       Review of patient's allergies indicates:   Allergen Reactions    Penicillins Anaphylaxis    Sulfa (sulfonamide antibiotics) Anaphylaxis    Trintellix [vortioxetine] Nausea And Vomiting and Other (See Comments)     Patient has seizures and vomits       No current facility-administered medications on file prior to encounter.     Current Outpatient Medications on File Prior to Encounter   Medication Sig    albuterol (PROVENTIL/VENTOLIN HFA) 90 mcg/actuation inhaler Inhale 1-2 puffs into the lungs every 4 (four) hours as needed for Shortness of Breath (coughing). Rescue    aspirin (ECOTRIN) 81 MG EC tablet Take 81 mg by mouth once daily.    CALCIUM CARBONATE/VITAMIN D3 (CALCIUM 500 + D ORAL) Take 1 tablet by mouth once daily. 10 mg daily    cyanocobalamin (VITAMIN B-12) 1000 MCG tablet Take 1 tablet (1,000 mcg total) by mouth once daily.    empagliflozin (JARDIANCE) 10 mg tablet Take 1 tablet (10 mg total) by mouth once daily.    fluticasone furoate-vilanteroL (BREO ELLIPTA) 100-25 mcg/dose diskus inhaler Inhale 1 puff into the lungs once daily. Controller    gabapentin (NEURONTIN) 300 MG capsule Take 1 capsule (300 mg total) by mouth every evening. Take 1 tablet every night x 7 days. Increase to twice a day x 7 days. Increase to three times a day.    levothyroxine (SYNTHROID) 100 MCG tablet Take 1 tablet (100 mcg total) by mouth before breakfast.    losartan (COZAAR) 50 MG tablet Take 0.5 tablets (25 mg total) by mouth once daily.    metFORMIN (GLUCOPHAGE-XR) 500 MG ER 24hr tablet Take 2 tablets (1,000 mg total) by mouth daily with breakfast.     polyethylene glycol (GLYCOLAX) 17 gram PwPk Take 17 g by mouth once daily.    potassium chloride SA (K-DUR,KLOR-CON) 20 MEQ tablet Take 20 mEq by mouth once daily.    rOPINIRole (REQUIP) 0.5 MG tablet Take by mouth every evening.    sertraline (ZOLOFT) 50 MG tablet Take 1 tablet (50 mg total) by mouth once daily. For depression/anxiety    simvastatin (ZOCOR) 40 MG tablet Take 1 tablet (40 mg total) by mouth once daily.    tetrabenazine (XENAZINE) 25 mg tablet Take one tablet daily for 7 days and then  Take 1 tablet twice daily afterwards    alendronate (FOSAMAX) 70 MG tablet Take one tablet every 7 days.    blood-glucose meter kit Use as instructed. Insurance preferred.    ketoconazole (NIZORAL) 2 % cream AAA pannus fold at least daily after the shower    [DISCONTINUED] nystatin (MYCOSTATIN) powder Apply topically 2 (two) times daily. for 14 days    [DISCONTINUED] solifenacin (VESICARE) 10 MG tablet Take 1 tablet (10 mg total) by mouth once daily.     Family History       Problem Relation (Age of Onset)    Breast cancer Mother    Cataracts Mother, Brother    Diabetes Mother    Heart failure Father    Hypertension Mother    Melanoma Mother, Father, Brother          Tobacco Use    Smoking status: Never    Smokeless tobacco: Never   Substance and Sexual Activity    Alcohol use: Yes     Comment: seldom    Drug use: No    Sexual activity: Not Currently     Review of Systems   Constitutional: Negative.    HENT: Negative.     Eyes: Negative.    Respiratory: Negative.  Negative for cough, shortness of breath, wheezing and stridor.    Cardiovascular: Negative.  Negative for chest pain, palpitations and leg swelling.   Gastrointestinal: Negative.    Endocrine: Negative.    Genitourinary: Negative.    Musculoskeletal: Negative.    Skin: Negative.    Neurological: Negative.    Hematological: Negative.    Psychiatric/Behavioral: Negative.     Objective:     Vital Signs (Most Recent):  Temp: 97.5 °F (36.4 °C)  (05/19/23 1236)  Pulse: 61 (05/19/23 1236)  Resp: 16 (05/19/23 1236)  BP: 125/69 (05/19/23 1236)  SpO2: (!) 94 % (05/19/23 1236) Vital Signs (24h Range):  Temp:  [97 °F (36.1 °C)-97.9 °F (36.6 °C)] 97.5 °F (36.4 °C)  Pulse:  [57-90] 61  Resp:  [16-20] 16  SpO2:  [94 %-100 %] 94 %  BP: (121-155)/(58-87) 125/69     Weight: 77.9 kg (171 lb 11.8 oz)  Body mass index is 33.54 kg/m².     Physical Exam  Vitals and nursing note reviewed.   Constitutional:       General: She is not in acute distress.     Appearance: Normal appearance.   HENT:      Head: Normocephalic and atraumatic.      Nose: Nose normal.   Eyes:      Extraocular Movements: Extraocular movements intact.      Conjunctiva/sclera: Conjunctivae normal.      Pupils: Pupils are equal, round, and reactive to light.   Cardiovascular:      Rate and Rhythm: Normal rate and regular rhythm.   Pulmonary:      Effort: Pulmonary effort is normal. No respiratory distress.      Breath sounds: Normal breath sounds. No stridor. No wheezing or rales.   Chest:      Comments: Dressing in place over left upper chest  Abdominal:      General: Bowel sounds are normal. There is no distension.      Palpations: Abdomen is soft.      Tenderness: There is no abdominal tenderness.   Musculoskeletal:         General: No swelling or tenderness. Normal range of motion.      Cervical back: Normal range of motion and neck supple.      Right lower leg: No edema.      Left lower leg: No edema.   Skin:     General: Skin is warm and dry.   Neurological:      General: No focal deficit present.      Mental Status: She is alert. She is disoriented.   Psychiatric:         Mood and Affect: Mood normal.         Behavior: Behavior normal.         Thought Content: Thought content normal.         Judgment: Judgment normal.            CRANIAL NERVES     CN III, IV, VI   Pupils are equal, round, and reactive to light.     Significant Labs: All pertinent labs within the past 24 hours have been  reviewed.  Recent Lab Results         05/19/23  1311   05/19/23  1012   05/19/23  0745        Anion Gap 7           Baso # 0.02           Basophil % 0.2           BUN 18           Calcium 9.3           Chloride 103           CO2 28           Creatinine 0.9           Differential Method Automated           eGFR >60.0           Eos # 0.0           Eosinophil % 0.2           Estimated Avg Glucose 126           Glucose 163           Gran # (ANC) 10.1           Gran % 89.5           Hematocrit 46.1           Hemoglobin 14.9           Hemoglobin A1C External 6.0  Comment: According to ADA guidelines, hemoglobin A1C <7.0% represents  optimal control in non-pregnant diabetic patients.  Different  metrics may apply to specific populations.   Standards of Medical Care in Diabetes - 2016.    For the purpose of screening for the presence of diabetes:  <5.7%     Consistent with the absence of diabetes  5.7-6.4%  Consistent with increasing risk for diabetes   (prediabetes)  >or=6.5%  Consistent with diabetes    Currently no consensus exists for use of hemoglobin A1C  for diagnosis of diabetes for children.             Immature Grans (Abs) 0.07  Comment: Mild elevation in immature granulocytes is non specific and   can be seen in a variety of conditions including stress response,   acute inflammation, trauma and pregnancy. Correlation with other   laboratory and clinical findings is essential.             Immature Granulocytes 0.6           Lymph # 0.9           Lymph % 8.2           MCH 30.5           MCHC 32.3           MCV 95           Mono # 0.2           Mono % 1.3           MPV 10.0           nRBC 0           Platelets 136           POC Glucose   118   121       Potassium 4.5           RBC 4.88           RDW 13.3           Sodium 138           WBC 11.28                   Significant Imaging: I have reviewed all pertinent imaging results/findings within the past 24 hours.    Assessment/Plan:     * Invasive ductal carcinoma of  breast, female, left  underwent Left simple mastectomy on May 19  Hospital medicine consulted for medical management  Patient is doing well postoperatively  Continue with pain control   Encourage ambulation   CBC and BMP reviewed  Likely discharge home tomorrow    Depression  Resume home medication        Chronic obstructive pulmonary disease, unspecified COPD type  No acute exacerbation.  Resume home medications.        Type 2 diabetes mellitus with diabetic polyneuropathy, without long-term current use of insulin  Patient's FSGs are controlled on current medication regimen.  Last A1c reviewed-   Lab Results   Component Value Date    HGBA1C 6.0 05/19/2023     Most recent fingerstick glucose reviewed- No results for input(s): POCTGLUCOSE in the last 24 hours.  Current correctional scale  Low  Maintain anti-hyperglycemic dose as follows-   Antihyperglycemics (From admission, onward)    Start     Stop Route Frequency Ordered    05/19/23 1138  insulin aspart U-100 pen 0-5 Units         -- SubQ Before meals & nightly PRN 05/19/23 1038        Hold Oral hypoglycemics while patient is in the hospital.    Essential hypertension  Currently normotensive.  Resume home medication when appropriate        VTE Risk Mitigation (From admission, onward)         Ordered     heparin (porcine) injection 5,000 Units  Every 8 hours         05/19/23 1451     Place sequential compression device  Until discontinued         05/19/23 0723                   On 05/19/2023, patient should be placed in hospital observation services under my care.        Lukasz Villatoro MD  Department of Hospital Medicine  [facility]

## 2023-05-19 NOTE — SUBJECTIVE & OBJECTIVE
Past Medical History:   Diagnosis Date    Anxiety     Arthritis     Asthma     Cancer     Left Breast    Depression     Diabetes mellitus, type 2     GERD (gastroesophageal reflux disease)     Hyperlipidemia     Hypertension     Overactive bladder     Seizures     Pseudo-seizures    Sleep apnea     Stroke     Stroke 2022    pt was in the hospital for a stroke, claims she was seeing people when the stroke happened    Thyroid disease     Urinary tract infection without hematuria 2017       Past Surgical History:   Procedure Laterality Date    APPENDECTOMY      BREAST BIOPSY Left     BREAST LUMPECTOMY Left     2016    BREAST SURGERY      CATARACT EXTRACTION Bilateral     OU done//     SECTION      CHOLECYSTECTOMY      COLONOSCOPY N/A 2020    Procedure: COLONOSCOPY;  Surgeon: Mj Fernandez MD;  Location: [facility];  Service: Endoscopy;  Laterality: N/A;    CYST REMOVAL Left 2021    DILATION AND CURETTAGE OF UTERUS      EYE SURGERY      LUMPECTOMY, BREAST Left 2023    Procedure: LUMPECTOMY, BREAST;  Surgeon: Toño Paredes MD;  Location: [facility] OR;  Service: General;  Laterality: Left;    PERCUTANEOUS PINNING OF HIP Right 2022    Procedure: PINNING, HIP, PERCUTANEOUS;  Surgeon: Ministerio Joiner II, MD;  Location: [facility] OR;  Service: Orthopedics;  Laterality: Right;    SENTINEL LYMPH NODE BIOPSY Left 2023    Procedure: BIOPSY, LYMPH NODE, SENTINEL;  Surgeon: Toño Paredes MD;  Location: [facility] OR;  Service: General;  Laterality: Left;       Review of patient's allergies indicates:   Allergen Reactions    Penicillins Anaphylaxis    Sulfa (sulfonamide antibiotics) Anaphylaxis    Trintellix [vortioxetine] Nausea And Vomiting and Other (See Comments)     Patient has seizures and vomits       No current facility-administered medications on file prior to encounter.     Current Outpatient Medications on File Prior to Encounter   Medication Sig    albuterol (PROVENTIL/VENTOLIN  HFA) 90 mcg/actuation inhaler Inhale 1-2 puffs into the lungs every 4 (four) hours as needed for Shortness of Breath (coughing). Rescue    aspirin (ECOTRIN) 81 MG EC tablet Take 81 mg by mouth once daily.    CALCIUM CARBONATE/VITAMIN D3 (CALCIUM 500 + D ORAL) Take 1 tablet by mouth once daily. 10 mg daily    cyanocobalamin (VITAMIN B-12) 1000 MCG tablet Take 1 tablet (1,000 mcg total) by mouth once daily.    empagliflozin (JARDIANCE) 10 mg tablet Take 1 tablet (10 mg total) by mouth once daily.    fluticasone furoate-vilanteroL (BREO ELLIPTA) 100-25 mcg/dose diskus inhaler Inhale 1 puff into the lungs once daily. Controller    gabapentin (NEURONTIN) 300 MG capsule Take 1 capsule (300 mg total) by mouth every evening. Take 1 tablet every night x 7 days. Increase to twice a day x 7 days. Increase to three times a day.    levothyroxine (SYNTHROID) 100 MCG tablet Take 1 tablet (100 mcg total) by mouth before breakfast.    losartan (COZAAR) 50 MG tablet Take 0.5 tablets (25 mg total) by mouth once daily.    metFORMIN (GLUCOPHAGE-XR) 500 MG ER 24hr tablet Take 2 tablets (1,000 mg total) by mouth daily with breakfast.    polyethylene glycol (GLYCOLAX) 17 gram PwPk Take 17 g by mouth once daily.    potassium chloride SA (K-DUR,KLOR-CON) 20 MEQ tablet Take 20 mEq by mouth once daily.    rOPINIRole (REQUIP) 0.5 MG tablet Take by mouth every evening.    sertraline (ZOLOFT) 50 MG tablet Take 1 tablet (50 mg total) by mouth once daily. For depression/anxiety    simvastatin (ZOCOR) 40 MG tablet Take 1 tablet (40 mg total) by mouth once daily.    tetrabenazine (XENAZINE) 25 mg tablet Take one tablet daily for 7 days and then  Take 1 tablet twice daily afterwards    alendronate (FOSAMAX) 70 MG tablet Take one tablet every 7 days.    blood-glucose meter kit Use as instructed. Insurance preferred.    ketoconazole (NIZORAL) 2 % cream AAA pannus fold at least daily after the shower    [DISCONTINUED] nystatin (MYCOSTATIN) powder Apply  topically 2 (two) times daily. for 14 days    [DISCONTINUED] solifenacin (VESICARE) 10 MG tablet Take 1 tablet (10 mg total) by mouth once daily.     Family History       Problem Relation (Age of Onset)    Breast cancer Mother    Cataracts Mother, Brother    Diabetes Mother    Heart failure Father    Hypertension Mother    Melanoma Mother, Father, Brother          Tobacco Use    Smoking status: Never    Smokeless tobacco: Never   Substance and Sexual Activity    Alcohol use: Yes     Comment: seldom    Drug use: No    Sexual activity: Not Currently     Review of Systems   Constitutional: Negative.    HENT: Negative.     Eyes: Negative.    Respiratory: Negative.  Negative for cough, shortness of breath, wheezing and stridor.    Cardiovascular: Negative.  Negative for chest pain, palpitations and leg swelling.   Gastrointestinal: Negative.    Endocrine: Negative.    Genitourinary: Negative.    Musculoskeletal: Negative.    Skin: Negative.    Neurological: Negative.    Hematological: Negative.    Psychiatric/Behavioral: Negative.     Objective:     Vital Signs (Most Recent):  Temp: 97.5 °F (36.4 °C) (05/19/23 1236)  Pulse: 61 (05/19/23 1236)  Resp: 16 (05/19/23 1236)  BP: 125/69 (05/19/23 1236)  SpO2: (!) 94 % (05/19/23 1236) Vital Signs (24h Range):  Temp:  [97 °F (36.1 °C)-97.9 °F (36.6 °C)] 97.5 °F (36.4 °C)  Pulse:  [57-90] 61  Resp:  [16-20] 16  SpO2:  [94 %-100 %] 94 %  BP: (121-155)/(58-87) 125/69     Weight: 77.9 kg (171 lb 11.8 oz)  Body mass index is 33.54 kg/m².     Physical Exam  Vitals and nursing note reviewed.   Constitutional:       General: She is not in acute distress.     Appearance: Normal appearance.   HENT:      Head: Normocephalic and atraumatic.      Nose: Nose normal.   Eyes:      Extraocular Movements: Extraocular movements intact.      Conjunctiva/sclera: Conjunctivae normal.      Pupils: Pupils are equal, round, and reactive to light.   Cardiovascular:      Rate and Rhythm: Normal rate and  regular rhythm.   Pulmonary:      Effort: Pulmonary effort is normal. No respiratory distress.      Breath sounds: Normal breath sounds. No stridor. No wheezing or rales.   Chest:      Comments: Dressing in place over left upper chest  Abdominal:      General: Bowel sounds are normal. There is no distension.      Palpations: Abdomen is soft.      Tenderness: There is no abdominal tenderness.   Musculoskeletal:         General: No swelling or tenderness. Normal range of motion.      Cervical back: Normal range of motion and neck supple.      Right lower leg: No edema.      Left lower leg: No edema.   Skin:     General: Skin is warm and dry.   Neurological:      General: No focal deficit present.      Mental Status: She is alert. She is disoriented.   Psychiatric:         Mood and Affect: Mood normal.         Behavior: Behavior normal.         Thought Content: Thought content normal.         Judgment: Judgment normal.            CRANIAL NERVES     CN III, IV, VI   Pupils are equal, round, and reactive to light.     Significant Labs: All pertinent labs within the past 24 hours have been reviewed.  Recent Lab Results         05/19/23  1311   05/19/23  1012   05/19/23  0745        Anion Gap 7           Baso # 0.02           Basophil % 0.2           BUN 18           Calcium 9.3           Chloride 103           CO2 28           Creatinine 0.9           Differential Method Automated           eGFR >60.0           Eos # 0.0           Eosinophil % 0.2           Estimated Avg Glucose 126           Glucose 163           Gran # (ANC) 10.1           Gran % 89.5           Hematocrit 46.1           Hemoglobin 14.9           Hemoglobin A1C External 6.0  Comment: According to ADA guidelines, hemoglobin A1C <7.0% represents  optimal control in non-pregnant diabetic patients.  Different  metrics may apply to specific populations.   Standards of Medical Care in Diabetes - 2016.    For the purpose of screening for the presence of  diabetes:  <5.7%     Consistent with the absence of diabetes  5.7-6.4%  Consistent with increasing risk for diabetes   (prediabetes)  >or=6.5%  Consistent with diabetes    Currently no consensus exists for use of hemoglobin A1C  for diagnosis of diabetes for children.             Immature Grans (Abs) 0.07  Comment: Mild elevation in immature granulocytes is non specific and   can be seen in a variety of conditions including stress response,   acute inflammation, trauma and pregnancy. Correlation with other   laboratory and clinical findings is essential.             Immature Granulocytes 0.6           Lymph # 0.9           Lymph % 8.2           MCH 30.5           MCHC 32.3           MCV 95           Mono # 0.2           Mono % 1.3           MPV 10.0           nRBC 0           Platelets 136           POC Glucose   118   121       Potassium 4.5           RBC 4.88           RDW 13.3           Sodium 138           WBC 11.28                   Significant Imaging: I have reviewed all pertinent imaging results/findings within the past 24 hours.

## 2023-05-19 NOTE — PT/OT/SLP PROGRESS
Physical Therapy      Patient Name:  Maggy Tapia   MRN:  [id number]    Patient not seen today secondary to Other (Comment) (underwent mastectomy today, will follow up post op day 1). Will follow-up 5/20/23.

## 2023-05-19 NOTE — PROGRESS NOTES
Automatic Inhaler to Nebulizer Interchange    fluticasone/vilanterol (Breo Ellipta) 100 mcg/25 mcg changed to budesonide 0.5 mg twice daily AND arformoterol 15 mcg twice daily per [facility] Automatic Therapeutic Substitutions Protocol.    Please contact pharmacy at extension [phone] with any questions.     Thank you,   Walt Pearson

## 2023-05-19 NOTE — ANESTHESIA POSTPROCEDURE EVALUATION
Anesthesia Post Evaluation    Patient: Maggy Tapia    Procedure(s) Performed: Procedure(s) (LRB):  MASTECTOMY, SIMPLE (Left)    Final Anesthesia Type: general      Patient location during evaluation: PACU  Patient participation: Yes- Able to Participate  Level of consciousness: awake and alert and oriented  Post-procedure vital signs: reviewed and stable  Pain management: adequate  Airway patency: patent    PONV status at discharge: No PONV  Anesthetic complications: no      Cardiovascular status: blood pressure returned to baseline and hemodynamically stable  Respiratory status: unassisted, spontaneous ventilation and room air  Hydration status: euvolemic  Follow-up not needed.          Vitals Value Taken Time   /60 05/19/23 1115   Temp 36.1 °C (97 °F) 05/19/23 1004   Pulse 61 05/19/23 1124   Resp 15 05/19/23 1124   SpO2 97 % 05/19/23 1124   Vitals shown include unvalidated device data.      No case tracking events are documented in the log.      Pain/Geradlo Score: Pain Rating Prior to Med Admin: 5 (5/19/2023 10:42 AM)  Geraldo Score: 9 (5/19/2023 11:15 AM)

## 2023-05-19 NOTE — UM SECONDARY REVIEW
Secure chat included dr [email or web address], hospitalist. Regarding obs order post out pt  surgery. No documentation of complications.

## 2023-05-19 NOTE — ANESTHESIA PROCEDURE NOTES
Intubation    Date/Time: 5/19/2023 8:52 AM  Performed by: Areil Best CRNA  Authorized by: Bhavin Palmer Jr., MD     Intubation:     Induction:  Intravenous    Mask Ventilation:  Easy mask    Attempts:  1    Attempted By:  CRNA    Method of Intubation:  Video laryngoscopy    Blade:  Glidescope 3    Laryngeal View Grade: Grade I - full view of cords      Difficult Airway Encountered?: No      Complications:  None    Airway Device:  Oral endotracheal tube    Airway Device Size:  7.5    Style/Cuff Inflation:  Cuffed    Inflation Amount (mL):  5    Tube secured:  21    Secured at:  The lips    Placement Verified By:  Capnometry    Complicating Factors:  None    Findings Post-Intubation:  BS equal bilateral and atraumatic/condition of teeth unchanged

## 2023-05-19 NOTE — OP NOTE
[facility]  Surgery Department  Operative Note    SUMMARY     Date of Procedure: 5/19/2023     Procedure: Procedure(s) (LRB):  MASTECTOMY, SIMPLE (Left)     Surgeon(s) and Role:     * Toño Paredes MD - Primary    Assisting Surgeon: None    Pre-Operative Diagnosis: Invasive ductal carcinoma of breast, female, left [C50.912]    Post-Operative Diagnosis: Post-Op Diagnosis Codes:     * Invasive ductal carcinoma of breast, female, left [C50.912]    Anesthesia: General    Operative Findings (including complications, if any):  Left simple mastectomy.  Indurated tissue at the posterior aspect of her prior lumpectomy.  This was sent as additional posterior central margin    Description of Technical Procedures:  This is a 73-year-old female who underwent left breast lumpectomy for invasive ductal carcinoma.  Her final pathologic specimen came back with the primary tumor that was much larger than anticipated with positive margins superiorly and anteriorly.  Given the large tumor size, I recommended proceeding with completion mastectomy.  She did consent to the planned procedure.  She was taken to the OR, placed supine, general endotracheal anesthesia was induced, the left breast was prepped and draped in the usual fashion, a time-out was completed.  Her old lumpectomy incision was identified.  Given the positive anterior and superior margins, I shifted my elliptical incisions superiorly to encompass more of the dermis.  The elliptical incision included the entire nipple-areolar complex.  Skin flaps were made superiorly to the inferior border of the clavicle, medially to the lateral border of the sternum, inferiorly to the inframammary crease, and laterally to the latissimus.  The breast was peeled off of the chest wall using electrocautery.  It was marked as short stitch superior, long stitch lateral.  At the central portion below what I expect was her old lumpectomy cavity, there was an area of indurated  fatty tissue along the chest wall.  This was excised and sent as an additional specimen to evaluate for the posterior margin.  Hemostasis was achieved using electrocautery and clips as needed.  A 19 French round drain was brought in from the inferior aspect of the incision.  It was secured to the skin using a 2-0 nylon suture.  The mastectomy incision was then closed in multiple layers with multiple interrupted 3-0 Vicryl sutures and a running 4-0 Monocryl.  Dermabond, gauze fluffs, and a surgical bra were applied as a dressing.  Patient tolerated the entire procedure without apparent complication, was extubated in the OR, brought to recovery in stable condition.  All counts were correct.    Significant Surgical Tasks Conducted by the Assistant(s), if Applicable: n/a    Estimated Blood Loss (EBL): min           Implants: * No implants in log *    Specimens:   Specimen (24h ago, onward)       Start     Ordered    05/19/23 0928  Specimen to Pathology - Surgery  Once        Comments: Pre-op Diagnosis: Invasive ductal carcinoma of breast, female, left [C50.912]Procedure(s):MASTECTOMY, SIMPLE Number of specimens: 2Name of specimens: 1. Left breast, short stitch - superior, long stitch - lateral  2. Posterior central margin     Question:  Release to patient  Answer:  Immediate    05/19/23 2955                            Condition: Good    Disposition: PACU - hemodynamically stable.    Attestation: I was present and scrubbed for the entire procedure.

## 2023-05-19 NOTE — ANESTHESIA PREPROCEDURE EVALUATION
05/19/2023  Maggy Tapia is a 73 y.o., female.      Pre-op Assessment    I have reviewed the Patient Summary Reports.     I have reviewed the Nursing Notes. I have reviewed the NPO Status.   I have reviewed the Medications.     Review of Systems  Anesthesia Hx:  No problems with previous Anesthesia  Neg history of prior surgery. Denies Family Hx of Anesthesia complications.   Denies Personal Hx of Anesthesia complications.   Social:  Non-Smoker, Alcohol Use    Hematology/Oncology:        Current/Recent Cancer. Breast left   Cardiovascular:   Hypertension hyperlipidemia    Pulmonary:   COPD, mild Asthma Shortness of breath Sleep Apnea, CPAP    Education provided regarding risk of obstructive sleep apnea     Hepatic/GI:   GERD    Neurological:   CVA (Left sided weakness, walks with walker), residual symptoms Neuromuscular disease: Diaphragm paralysis. Seizures   Peripheral Neuropathy    Endocrine:   Diabetes, type 2 Hypothyroidism    Psych:   depression          Patient Active Problem List   Diagnosis    Syncope and collapse    Frequent falls (possibly related to polypharmacy)    History of left breast cancer    History of ischemic left MCA stroke    Seizures    Essential hypertension    Hyperlipidemia    Thrombocytopenia    Depression    Hypothyroidism    Valproic acid toxicity    Psychogenic nonepileptic seizure    Osteopenia    JUAN (obstructive sleep apnea)    Near syncope    Diplopia    Cervical strain    Left homonymous hemianopsia    Tardive dyskinesia    Gait instability    Hypoglycemia    History of subdural hematoma    Rectal bleeding    Shortness of breath    Vomiting    Acute cystitis with hematuria    Chronic restrictive lung disease    Paralysis of diaphragm    Chronic neuromuscular respiratory failure    Major depressive disorder, recurrent episode, moderate     Diabetic polyneuropathy    Bilateral hearing loss    Urinary incontinence    Stroke    Confusion    Schizophrenia    Type 2 diabetes mellitus with diabetic polyneuropathy, without long-term current use of insulin    Chronic obstructive pulmonary disease, unspecified COPD type    Buttock wound, right, initial encounter    Dizziness    Imbalance    Leg weakness, bilateral    Closed traumatic nondisplaced fracture of neck of right femur    Invasive ductal carcinoma of breast, female, left       Past Surgical History:   Procedure Laterality Date    APPENDECTOMY      BREAST BIOPSY Left     BREAST LUMPECTOMY Left         BREAST SURGERY      CATARACT EXTRACTION Bilateral     OU done//     SECTION      CHOLECYSTECTOMY      COLONOSCOPY N/A 2020    Procedure: COLONOSCOPY;  Surgeon: Mj Fernandez MD;  Location: [facility] ENDO;  Service: Endoscopy;  Laterality: N/A;    CYST REMOVAL Left 2021    DILATION AND CURETTAGE OF UTERUS      EYE SURGERY      LUMPECTOMY, BREAST Left 2023    Procedure: LUMPECTOMY, BREAST;  Surgeon: Toño Paredes MD;  Location: [facility] OR;  Service: General;  Laterality: Left;    PERCUTANEOUS PINNING OF HIP Right 2022    Procedure: PINNING, HIP, PERCUTANEOUS;  Surgeon: Ministerio Joiner II, MD;  Location: [facility] OR;  Service: Orthopedics;  Laterality: Right;    SENTINEL LYMPH NODE BIOPSY Left 2023    Procedure: BIOPSY, LYMPH NODE, SENTINEL;  Surgeon: Toño Paredes MD;  Location: [facility] OR;  Service: General;  Laterality: Left;        Tobacco Use:  The patient  reports that she has never smoked. She has never used smokeless tobacco.     Results for orders placed or performed during the hospital encounter of 23   EKG 12-lead    Collection Time: 23 11:55 AM    Narrative    Test Reason : C50.912,    Vent. Rate : 066 BPM     Atrial Rate : 066 BPM     P-R Int : 148 ms          QRS Dur : 068 ms      QT Int : 410 ms       P-R-T Axes : 062 -11  041 degrees     QTc Int : 429 ms    Normal sinus rhythm  Normal ECG  When compared with ECG of 10-NOV-2022 17:23,  No significant change was found  Confirmed by Jose Chin MD ([id number]) on 5/16/2023 6:34:56 PM    Referred By:             Confirmed By:Jose Chin MD             Lab Results   Component Value Date    WBC 9.66 05/16/2023    HGB 16.0 05/16/2023    HCT 50.2 (H) 05/16/2023    MCV 96 05/16/2023     05/16/2023     BMP  Lab Results   Component Value Date     05/16/2023    K 4.8 05/16/2023     05/16/2023    CO2 29 05/16/2023    BUN 19 05/16/2023    CREATININE 0.8 05/16/2023    CALCIUM 9.6 05/16/2023    ANIONGAP 6 (L) 05/16/2023    GLU 89 05/16/2023     (H) 04/18/2023     (H) 01/03/2023       Results for orders placed during the hospital encounter of 02/22/22    Echo Saline Bubble? Yes    Interpretation Summary  · There is no evidence of intracardiac shunting.  · The estimated ejection fraction is 74%.  · Normal left ventricular diastolic function.  · Normal systolic function.  · Normal right ventricular size with normal right ventricular systolic function.  · Mild aortic regurgitation.  · Normal central venous pressure (3 mmHg).          Physical Exam  General: Well nourished and Alert    Airway:  Mallampati: II   Mouth Opening: Normal  TM Distance: Normal  Tongue: Normal  Neck ROM: Normal ROM    Dental:  Intact    Chest/Lungs:  Clear to auscultation, Normal Respiratory Rate    Heart:  Rate: Normal  Rhythm: Regular Rhythm  Sounds: Normal        Anesthesia Plan  Type of Anesthesia, risks & benefits discussed:    Anesthesia Type: Gen ETT  Intra-op Monitoring Plan: Standard ASA Monitors  Post Op Pain Control Plan: multimodal analgesia  Induction:  IV  Airway Plan: Video, Post-Induction  Informed Consent: Informed consent signed with the Patient and all parties understand the risks and agree with anesthesia plan.  All questions answered. Patient consented to blood products?  Yes  ASA Score: 3  Anesthesia Plan Notes: Tylenol 1 gm,   Zofran 4 mg, Pepcid 20 mg, Decadron 4 mg    Ready For Surgery From Anesthesia Perspective.     .

## 2023-05-19 NOTE — HPI
Patient is a 73 year old female with Invasive ductal carcinoma of breast who underwent Left simple mastectomy on May 19.  Hospital medicine consulted for medical management.  Patient is doing well postoperatively.  Patient is afebrile and hemodynamically stable.  Pain is controlled.

## 2023-05-19 NOTE — ASSESSMENT & PLAN NOTE
Patient's FSGs are controlled on current medication regimen.  Last A1c reviewed-   Lab Results   Component Value Date    HGBA1C 6.0 05/19/2023     Most recent fingerstick glucose reviewed- No results for input(s): POCTGLUCOSE in the last 24 hours.  Current correctional scale  Low  Maintain anti-hyperglycemic dose as follows-   Antihyperglycemics (From admission, onward)    Start     Stop Route Frequency Ordered    05/19/23 1138  insulin aspart U-100 pen 0-5 Units         -- SubQ Before meals & nightly PRN 05/19/23 1038        Hold Oral hypoglycemics while patient is in the hospital.

## 2023-05-19 NOTE — ASSESSMENT & PLAN NOTE
underwent Left simple mastectomy on May 19  Hospital medicine consulted for medical management  Patient is doing well postoperatively  Continue with pain control   Encourage ambulation   CBC and BMP reviewed  Likely discharge home tomorrow

## 2023-05-19 NOTE — PROGRESS NOTES
Automatic Inhaler to Nebulizer Interchange    albuterol (Ventolin, ProAir, or Proventil)  mcg given multiple times per day changed to albuterol 2.5 mg Q4H PRN  per [facility] Automatic Therapeutic Substitutions Protocol.    Please contact pharmacy at extension [phone] with any questions.     Thank you,   Walt Pearson

## 2023-05-19 NOTE — UM SECONDARY REVIEW
Secure chat regarding level of care seen by both providers. No changes in level of care noted.

## 2023-05-20 VITALS
WEIGHT: 171.75 LBS | HEIGHT: 60 IN | BODY MASS INDEX: 33.72 KG/M2 | RESPIRATION RATE: 17 BRPM | HEART RATE: 60 BPM | DIASTOLIC BLOOD PRESSURE: 71 MMHG | SYSTOLIC BLOOD PRESSURE: 115 MMHG | OXYGEN SATURATION: 95 % | TEMPERATURE: 98 F

## 2023-05-20 LAB — GLUCOSE SERPL-MCNC: 157 MG/DL (ref 70–110)

## 2023-05-20 PROCEDURE — 94761 N-INVAS EAR/PLS OXIMETRY MLT: CPT

## 2023-05-20 PROCEDURE — 96372 THER/PROPH/DIAG INJ SC/IM: CPT | Mod: 59 | Performed by: STUDENT IN AN ORGANIZED HEALTH CARE EDUCATION/TRAINING PROGRAM

## 2023-05-20 PROCEDURE — 99900035 HC TECH TIME PER 15 MIN (STAT)

## 2023-05-20 PROCEDURE — 63600175 PHARM REV CODE 636 W HCPCS: Performed by: STUDENT IN AN ORGANIZED HEALTH CARE EDUCATION/TRAINING PROGRAM

## 2023-05-20 PROCEDURE — 97165 OT EVAL LOW COMPLEX 30 MIN: CPT

## 2023-05-20 PROCEDURE — 94640 AIRWAY INHALATION TREATMENT: CPT

## 2023-05-20 PROCEDURE — 25000003 PHARM REV CODE 250: Performed by: STUDENT IN AN ORGANIZED HEALTH CARE EDUCATION/TRAINING PROGRAM

## 2023-05-20 PROCEDURE — 97161 PT EVAL LOW COMPLEX 20 MIN: CPT

## 2023-05-20 PROCEDURE — 97116 GAIT TRAINING THERAPY: CPT

## 2023-05-20 PROCEDURE — G0378 HOSPITAL OBSERVATION PER HR: HCPCS

## 2023-05-20 PROCEDURE — 99900031 HC PATIENT EDUCATION (STAT)

## 2023-05-20 PROCEDURE — 25000242 PHARM REV CODE 250 ALT 637 W/ HCPCS: Performed by: STUDENT IN AN ORGANIZED HEALTH CARE EDUCATION/TRAINING PROGRAM

## 2023-05-20 RX ORDER — OXYCODONE AND ACETAMINOPHEN 5; 325 MG/1; MG/1
1 TABLET ORAL EVERY 6 HOURS PRN
Qty: 12 TABLET | Refills: 0 | Status: SHIPPED | OUTPATIENT
Start: 2023-05-20 | End: 2023-05-23

## 2023-05-20 RX ADMIN — ASPIRIN 81 MG: 81 TABLET, COATED ORAL at 08:05

## 2023-05-20 RX ADMIN — ACETAMINOPHEN 500 MG: 500 TABLET, FILM COATED ORAL at 07:05

## 2023-05-20 RX ADMIN — HEPARIN SODIUM 5000 UNITS: 5000 INJECTION INTRAVENOUS; SUBCUTANEOUS at 05:05

## 2023-05-20 RX ADMIN — ACETAMINOPHEN 500 MG: 500 TABLET, FILM COATED ORAL at 12:05

## 2023-05-20 RX ADMIN — LEVOTHYROXINE SODIUM 100 MCG: 0.1 TABLET ORAL at 05:05

## 2023-05-20 RX ADMIN — SERTRALINE HYDROCHLORIDE 50 MG: 50 TABLET ORAL at 08:05

## 2023-05-20 RX ADMIN — ARFORMOTEROL TARTRATE 15 MCG: 15 SOLUTION RESPIRATORY (INHALATION) at 07:05

## 2023-05-20 RX ADMIN — BUDESONIDE INHALATION 0.5 MG: 0.5 SUSPENSION RESPIRATORY (INHALATION) at 07:05

## 2023-05-20 NOTE — DISCHARGE SUMMARY
[facility]  Discharge Summary      Patient Name: Maggy Tapia  MRN: [id number]  KEVEN: [id number]  Patient Class: OP- Observation  Admission Date: 5/19/2023  Hospital Length of Stay: 0 days  Discharge Date and Time:  05/20/2023 12:49 PM  Attending Physician: No att. providers found   Discharging Provider: Lukasz Villatoro MD  Primary Care Provider: Yanna Abbasi MD    Primary Care Team: Networked reference to record PCT     HPI:   Patient is a 73 year old female with Invasive ductal carcinoma of breast who underwent Left simple mastectomy on May 19.  Hospital medicine consulted for medical management.  Patient is doing well postoperatively.  Patient is afebrile and hemodynamically stable.  Pain is controlled.      Procedure(s) (LRB):  MASTECTOMY, SIMPLE (Left)      Hospital Course:   Patient is a 73 year old female with Invasive ductal carcinoma of breast who underwent Left simple mastectomy on May 19.  Hospital medicine consulted for medical management.  Patient is doing well postoperatively.  Patient is afebrile and hemodynamically stable.  Pain is controlled.  Patient did well overnight.  Pain remains controlled.  Patient discharged home on May 20th.       Goals of Care Treatment Preferences:  Code Status: Full Code    Living Will: Yes              Physical Exam  Vitals and nursing note reviewed.   Constitutional:       General: She is not in acute distress.     Appearance: Normal appearance.   HENT:      Head: Normocephalic and atraumatic.      Nose: Nose normal.   Eyes:      Extraocular Movements: Extraocular movements intact.      Conjunctiva/sclera: Conjunctivae normal.      Pupils: Pupils are equal, round, and reactive to light.   Cardiovascular:      Rate and Rhythm: Normal rate and regular rhythm.   Pulmonary:      Effort: Pulmonary effort is normal. No respiratory distress.      Breath sounds: Normal breath sounds. No stridor. No wheezing or rales.   Chest:      Comments:  Dressing in place over left upper chest  Abdominal:      General: Bowel sounds are normal. There is no distension.      Palpations: Abdomen is soft.      Tenderness: There is no abdominal tenderness.   Musculoskeletal:         General: No swelling or tenderness. Normal range of motion.      Cervical back: Normal range of motion and neck supple.      Right lower leg: No edema.      Left lower leg: No edema.   Skin:     General: Skin is warm and dry.   Neurological:      General: No focal deficit present.      Mental Status: She is alert. She is disoriented.   Psychiatric:         Mood and Affect: Mood normal.         Behavior: Behavior normal.         Thought Content: Thought content normal.         Judgment: Judgment normal.     Consults:   Consults (From admission, onward)        Status Ordering Provider     IP consult to case management  Once        Provider:  (Not yet assigned)    Acknowledged LUKASZ VILLATORO     Inpatient consult to Hospitalist  Once        Provider:  Lukasz Villatoro MD    Acknowledged ROCCO VANEGAS          No new Assessment & Plan notes have been filed under this hospital service since the last note was generated.  Service: Hospital Medicine    Final Active Diagnoses:    Diagnosis Date Noted POA    PRINCIPAL PROBLEM:  Invasive ductal carcinoma of breast, female, left [C50.912] 04/10/2023 Yes    Depression [F32.A] 07/01/2016 Yes    Chronic obstructive pulmonary disease, unspecified COPD type [J44.9] 07/14/2022 Yes    Type 2 diabetes mellitus with diabetic polyneuropathy, without long-term current use of insulin [E11.42] 07/14/2022 Yes    Essential hypertension [I10] 07/01/2016 Yes      Problems Resolved During this Admission:       Discharged Condition: good    Disposition: Home or Self Care    Follow Up:    Patient Instructions:   No discharge procedures on file.    Significant Diagnostic Studies: N/A    Pending Diagnostic Studies:     Procedure Component Value Units Date/Time    Specimen to  Pathology - Surgery [[id number]] Collected: 05/19/23 0933    Order Status: Sent Lab Status: No result     Specimen: Tissue          Medications:  Reconciled Home Medications:      Medication List      START taking these medications    oxyCODONE-acetaminophen 5-325 mg per tablet  Commonly known as: PERCOCET  Take 1 tablet by mouth every 6 (six) hours as needed for Pain.        CONTINUE taking these medications    albuterol 90 mcg/actuation inhaler  Commonly known as: PROVENTIL/VENTOLIN HFA  Inhale 1-2 puffs into the lungs every 4 (four) hours as needed for Shortness of Breath (coughing). Rescue     alendronate 70 MG tablet  Commonly known as: FOSAMAX  Take one tablet every 7 days.     aspirin 81 MG EC tablet  Commonly known as: ECOTRIN  Take 81 mg by mouth once daily.     blood-glucose meter kit  Use as instructed. Insurance preferred.     CALCIUM 500 + D ORAL  Take 1 tablet by mouth once daily. 10 mg daily     cyanocobalamin 1000 MCG tablet  Commonly known as: VITAMIN B-12  Take 1 tablet (1,000 mcg total) by mouth once daily.     fluticasone furoate-vilanteroL 100-25 mcg/dose diskus inhaler  Commonly known as: BREO ELLIPTA  Inhale 1 puff into the lungs once daily. Controller     gabapentin 300 MG capsule  Commonly known as: NEURONTIN  Take 1 capsule (300 mg total) by mouth every evening. Take 1 tablet every night x 7 days. Increase to twice a day x 7 days. Increase to three times a day.     JARDIANCE 10 mg tablet  Generic drug: empagliflozin  Take 1 tablet (10 mg total) by mouth once daily.     ketoconazole 2 % cream  Commonly known as: NIZORAL  AAA pannus fold at least daily after the shower     levothyroxine 100 MCG tablet  Commonly known as: SYNTHROID  Take 1 tablet (100 mcg total) by mouth before breakfast.     losartan 50 MG tablet  Commonly known as: COZAAR  Take 0.5 tablets (25 mg total) by mouth once daily.     metFORMIN 500 MG ER 24hr tablet  Commonly known as: GLUCOPHAGE-XR  Take 2 tablets (1,000 mg total)  by mouth daily with breakfast.     polyethylene glycol 17 gram Pwpk  Commonly known as: GLYCOLAX  Take 17 g by mouth once daily.     potassium chloride SA 20 MEQ tablet  Commonly known as: K-DUR,KLOR-CON  Take 20 mEq by mouth once daily.     rOPINIRole 0.5 MG tablet  Commonly known as: REQUIP  Take by mouth every evening.     sertraline 50 MG tablet  Commonly known as: ZOLOFT  Take 1 tablet (50 mg total) by mouth once daily. For depression/anxiety     simvastatin 40 MG tablet  Commonly known as: ZOCOR  Take 1 tablet (40 mg total) by mouth once daily.     tetrabenazine 25 mg tablet  Commonly known as: XENAZINE  Take one tablet daily for 7 days and then  Take 1 tablet twice daily afterwards            Indwelling Lines/Drains at time of discharge:   Lines/Drains/Airways     Drain  Duration                Closed/Suction Drain 05/19/23 0944 Left Breast Bulb 19 Fr. 1 day                Time spent on the discharge of patient: 40 minutes         Lukasz Villatoro MD  Department of Hospital Medicine  [facility]

## 2023-05-20 NOTE — CARE UPDATE
05/20/23 0744   Patient Assessment/Suction   Level of Consciousness (AVPU) alert   Respiratory Effort Normal;Unlabored   Expansion/Accessory Muscles/Retractions no use of accessory muscles;no retractions;expansion symmetric   All Lung Fields Breath Sounds clear;diminished   Rhythm/Pattern, Respiratory unlabored;pattern regular;depth regular;chest wiggle adequate;no shortness of breath reported   Cough Frequency infrequent   Cough Type good   PRE-TX-O2   Device (Oxygen Therapy) room air   SpO2 95 %   Pulse Oximetry Type Intermittent   $ Pulse Oximetry - Multiple Charge Pulse Oximetry - Multiple   Pulse 60   Resp 17   Aerosol Therapy   $ Aerosol Therapy Charges Aerosol Treatment   Daily Review of Necessity (SVN) completed   Respiratory Treatment Status (SVN) given   Treatment Route (SVN) oxygen;mask   Patient Position (SVN) HOB elevated   Post Treatment Assessment (SVN) increased aeration   Signs of Intolerance (SVN) none   Breath Sounds Post-Respiratory Treatment   Throughout All Fields Post-Treatment All Fields   Throughout All Fields Post-Treatment aeration increased   Post-treatment Heart Rate (beats/min) 60   Post-treatment Resp Rate (breaths/min) 18   Education   $ Education Bronchodilator;15 min   Respiratory Evaluation   $ Care Plan Tech Time 15 min

## 2023-05-20 NOTE — NURSING
Discharge instructions given to pt, pt verbalized understanding.   PIV removed. Pt leaving with personal belongings. Pt leaving with PIA drain in place, pt return demonstrated how to empty drain. Incision instructions added to AVS.   Meds to be picked up from pharmacy.    Pt leaving with family in wheelchair.

## 2023-05-20 NOTE — DISCHARGE INSTRUCTIONS
Okay to remove gauze from L breast incision 5/21. Okay to shower with soap and water, do not scrub incision (okay to let soap and water run over incision), pat dry. No soaking in a tub/pool/hot tub/swimming until cleared by Dr. Paredes    Empty PIA drain at least twice a day, record output.

## 2023-05-20 NOTE — PT/OT/SLP EVAL
Physical Therapy Evaluation and Discharge Note    Patient Name:  Maggy Tapia   MRN:  [id number]    Recommendations:     Discharge Recommendations:  Home  Discharge Equipment Recommendations: walker, rolling   Barriers to discharge: None    Assessment:     Maggy Tapia is a 73 y.o. female admitted with a medical diagnosis of Invasive ductal carcinoma of breast, female, left. .  At this time, patient is functioning at her prior level of function and does not require further acute PT services.     Recent Surgery: Procedure(s) (LRB):  MASTECTOMY, SIMPLE (Left) 1 Day Post-Op    Plan:     During this hospitalization, patient does not require further acute PT services.  Please re-consult if situation changes.      Subjective     Chief Complaint:none  Patient/Family Comments/goals: Home with support of  her brother  Pain/Comfort:       Patients cultural, spiritual, L[employer] conflicts given the current situation:      Living Environment:  Pt lives with her brother in  a [facility]  Prior to admission, patients level of function was Mod I  with rollator.  Equipment used at home: bedside commode, BIPAP, shower chair, rollator, walker, rolling.  DME owned (not currently used): none.  Upon discharge, patient will have assistance from .    Objective:     Communicated with OT prior to session.  Patient found up in chair with PIA drain upon PT entry to room.    General Precautions: Standard, fall    Orthopedic Precautions:    Braces:    Respiratory Status: Room air    Exams:  Cognitive Exam:  Patient is oriented to Person, Place, Time, and Situation  RLE ROM: WFL  RLE Strength: WFL  LLE ROM: WFL  LLE Strength: WFL    Functional Mobility:  Transfers:     Sit to Stand:  stand by assistance with rollator  Gait: 100 ft rollator and SBA    AM-PAC 6 CLICK MOBILITY  Total Score:        Treatment and Education:  PT eval and D/C .    AM-PAC 6 CLICK MOBILITY  Total Score:      Patient left up in chair with all lines intact, call button in  reach, and her brother present.    GOALS:   Multidisciplinary Problems       Physical Therapy Goals       Not on file                    History:     Past Medical History:   Diagnosis Date    Anxiety     Arthritis     Asthma     Cancer     Left Breast    Depression     Diabetes mellitus, type 2     GERD (gastroesophageal reflux disease)     Hyperlipidemia     Hypertension     Overactive bladder     Seizures     Pseudo-seizures    Sleep apnea     Stroke     Stroke 2022    pt was in the hospital for a stroke, claims she was seeing people when the stroke happened    Thyroid disease     Urinary tract infection without hematuria 2017       Past Surgical History:   Procedure Laterality Date    APPENDECTOMY      BREAST BIOPSY Left     BREAST LUMPECTOMY Left     2016    BREAST SURGERY      CATARACT EXTRACTION Bilateral     OU done//     SECTION      CHOLECYSTECTOMY      COLONOSCOPY N/A 2020    Procedure: COLONOSCOPY;  Surgeon: Mj Fernandez MD;  Location: [facility] ENDO;  Service: Endoscopy;  Laterality: N/A;    CYST REMOVAL Left 2021    DILATION AND CURETTAGE OF UTERUS      EYE SURGERY      LUMPECTOMY, BREAST Left 2023    Procedure: LUMPECTOMY, BREAST;  Surgeon: Toño Paredes MD;  Location: [facility] OR;  Service: General;  Laterality: Left;    PERCUTANEOUS PINNING OF HIP Right 2022    Procedure: PINNING, HIP, PERCUTANEOUS;  Surgeon: Ministerio Joiner II, MD;  Location: [facility] OR;  Service: Orthopedics;  Laterality: Right;    SENTINEL LYMPH NODE BIOPSY Left 2023    Procedure: BIOPSY, LYMPH NODE, SENTINEL;  Surgeon: Toño Paredes MD;  Location: [facility] OR;  Service: General;  Laterality: Left;       Time Tracking:     PT Received On: 23  PT Start Time: 1035     PT Stop Time: 1047  PT Total Time (min): 12 min     Billable Minutes: Evaluation 4 minutes and Gait Training 8 minutes      2023

## 2023-05-20 NOTE — PT/OT/SLP EVAL
Occupational Therapy   Evaluation and Discharge Note    Name: Maggy Tapia  MRN: [id number]  Admitting Diagnosis: Invasive ductal carcinoma of breast, female, left  Recent Surgery: Procedure(s) (LRB):  MASTECTOMY, SIMPLE (Left) 1 Day Post-Op    Recommendations:     Discharge Recommendations: home  Discharge Equipment Recommendations: walker, rolling  Barriers to discharge:  None    Assessment:     Maggy Tapia is a 73 y.o. female with a medical diagnosis of Invasive ductal carcinoma of breast, female, left. At this time, patient is functioning at their prior level of function and does not require further acute OT services.     Plan:     During this hospitalization, patient does not require further acute OT services.  Please re-consult if situation changes.    Plan of Care Reviewed with: patient, family    Subjective     Chief Complaint: no complaints; pain is controlled  Patient/Family Comments/goals: return home    Occupational Profile:  Living Environment: lives with brother in single story home with ramp entry  Previous level of function: performed self care with use of devices  Equipment Used at home: bedside commode, BIPAP, shower chair, rollator  Assistance upon Discharge: brother is available for assistance    Pain/Comfort:  Pain Rating 1: 0/10  Pain Rating Post-Intervention 1: 0/10    Patients cultural, spiritual, Anglican conflicts given the current situation: no    Objective:     Communicated with: nurse prior to session.  Patient found HOB elevated with PIA drain, peripheral IV upon OT entry to room.    General Precautions: Standard, fall  Orthopedic Precautions:    Braces: N/A  Respiratory Status: Room air     Occupational Performance:    Bed Mobility:    Patient completed Rolling/Turning to Left with  contact guard assistance  Patient completed Scooting/Bridging with contact guard assistance  Patient completed Supine to Sit with contact guard assistance    Functional Mobility/Transfers:  Patient  completed Bed <> Chair Transfer using Stand Pivot technique with contact guard assistance with rolling walker    Activities of Daily Living:  Grooming: stand by assistance setup for washing face and oral care    Cognitive/Visual Perceptual:  Cognitive/Psychosocial Skills:     -       Oriented to: Person, Place, and Time   -       Follows Commands/attention:Follows multistep  commands  -       Communication: clear/fluent  -       Memory: No Deficits noted  -       Safety awareness/insight to disability: intact   -       Mood/Affect/Coping skills/emotional control: Appropriate to situation and Pleasant    Physical Exam:  Upper Extremity Range of Motion:     -       Right Upper Extremity: WNL  -       Left Upper Extremity: did not test  Upper Extremity Strength:    -       Right Upper Extremity: WNL  -       Left Upper Extremity: did not test   Strength:    -       Right Upper Extremity: WNL  -       Left Upper Extremity: WNL  Fine Motor Coordination:    -       Intact    AMPAC 6 Click ADL:  AMPAC Total Score: 22    Treatment & Education:  Reviewed post mastectomy lifting restrictions; gentle range of motion exercises, lymphedema precautions, and demonstrated call bell for assistance.     Patient left up in chair with all lines intact, call button in reach, and brother present      History:     Past Medical History:   Diagnosis Date    Anxiety     Arthritis     Asthma     Cancer 2000    Left Breast    Depression     Diabetes mellitus, type 2     GERD (gastroesophageal reflux disease)     Hyperlipidemia     Hypertension     Overactive bladder     Seizures     Pseudo-seizures    Sleep apnea     Stroke     Stroke 03/2022    pt was in the hospital for a stroke, claims she was seeing people when the stroke happened    Thyroid disease     Urinary tract infection without hematuria 08/03/2017         Past Surgical History:   Procedure Laterality Date    APPENDECTOMY      BREAST BIOPSY Left     BREAST LUMPECTOMY Left      2016    BREAST SURGERY      CATARACT EXTRACTION Bilateral     OU done//     SECTION      CHOLECYSTECTOMY      COLONOSCOPY N/A 2020    Procedure: COLONOSCOPY;  Surgeon: Mj Fernandez MD;  Location: [facility];  Service: Endoscopy;  Laterality: N/A;    CYST REMOVAL Left 2021    DILATION AND CURETTAGE OF UTERUS      EYE SURGERY      LUMPECTOMY, BREAST Left 2023    Procedure: LUMPECTOMY, BREAST;  Surgeon: Toño Paredes MD;  Location: [facility] OR;  Service: General;  Laterality: Left;    PERCUTANEOUS PINNING OF HIP Right 2022    Procedure: PINNING, HIP, PERCUTANEOUS;  Surgeon: Ministerio Joiner II, MD;  Location: [facility] OR;  Service: Orthopedics;  Laterality: Right;    SENTINEL LYMPH NODE BIOPSY Left 2023    Procedure: BIOPSY, LYMPH NODE, SENTINEL;  Surgeon: Toño Paredes MD;  Location: [facility] OR;  Service: General;  Laterality: Left;       Time Tracking:     OT Date of Treatment: 23  OT Start Time: 1006  OT Stop Time: 1018  OT Total Time (min): 12 min    Billable Minutes:Evaluation 12    2023

## 2023-05-20 NOTE — PROGRESS NOTES
General Surgery Progress Note    Admit Date: 5/19/2023  S/P: Procedure(s) (LRB):  MASTECTOMY, SIMPLE (Left)    Post-operative Day: 1 Day Post-Op    Hospital Day: 2    SUBJECTIVE:   Status post left simple mastectomy.  Overall doing well. No acute events overnight.  Ready for discharge home.    OBJECTIVE:     Vital Signs (Most Recent)  Temp:  [97.5 °F (36.4 °C)-98.4 °F (36.9 °C)] 97.5 °F (36.4 °C)  Pulse:  [52-84] 60  Resp:  [16-18] 17  SpO2:  [94 %-100 %] 95 %  BP: (109-146)/(52-75) 115/71    I&Os:  I/O last 3 completed shifts:  In: 250 [P.O.:240; I.V.:10]  Out: 275 [Urine:200; Drains:75]    Physical Exam:  Gen: NAD, AAOx3  HEENT: Anicteric sclera  Pulm: unlabored, symmetrical   Abd: Soft  CHEST: Left breast mastectomy incision appears intact without any evidence of necrosis, PIA drain with serosanguineous output      ASSESSMENT/PLAN:     Patient Active Problem List    Diagnosis Date Noted    Invasive ductal carcinoma of breast, female, left 04/10/2023    Closed traumatic nondisplaced fracture of neck of right femur 11/10/2022    Dizziness 08/02/2022    Imbalance 08/02/2022    Leg weakness, bilateral 08/02/2022    Buttock wound, right, initial encounter 07/28/2022    Type 2 diabetes mellitus with diabetic polyneuropathy, without long-term current use of insulin 07/14/2022    Chronic obstructive pulmonary disease, unspecified COPD type 07/14/2022    Confusion 02/23/2022    Schizophrenia 02/23/2022    Stroke 02/22/2022    Bilateral hearing loss 01/18/2022    Chronic neuromuscular respiratory failure 01/06/2022    Diabetic polyneuropathy 12/08/2021    Paralysis of diaphragm 10/07/2021    Major depressive disorder, recurrent episode, moderate 09/30/2021    Chronic restrictive lung disease 08/25/2021    Urinary incontinence 07/23/2021    Acute cystitis with hematuria 03/22/2021    Vomiting 03/21/2021    Shortness of breath 02/15/2021    Rectal bleeding 06/24/2020    History of subdural hematoma 01/30/2020    Hypoglycemia  09/03/2019    Gait instability 05/03/2019    Left homonymous hemianopsia 01/07/2019    Tardive dyskinesia 01/07/2019    Cervical strain 06/27/2018    Diplopia 06/07/2018    Near syncope 08/04/2017    JUAN (obstructive sleep apnea) 04/18/2017    Osteopenia 12/07/2016    Valproic acid toxicity 08/02/2016    Psychogenic nonepileptic seizure 08/02/2016    Syncope and collapse 07/01/2016    Frequent falls (possibly related to polypharmacy) 07/01/2016    History of left breast cancer 07/01/2016    History of ischemic left MCA stroke 07/01/2016    Seizures 07/01/2016    Essential hypertension 07/01/2016    Hyperlipidemia 07/01/2016    Thrombocytopenia 07/01/2016    Depression 07/01/2016    Hypothyroidism 07/01/2016         73 y.o. female status post left simple mastectomy  -okay for discharge home from general surgery standpoint  -discussed with the patient removing outer bandage in 2 days in the showering gently with soap and water over the incisions and around the drain  -drain care at also reviewed, patient instructed to record output each day  -okay to discharge home with [place] 5-325 x 20 tabs  -patient has follow-up with Dr. Paredes on 06/01/2023 scheduled

## 2023-05-20 NOTE — PLAN OF CARE
Patient cleared for discharge from case management standpoint.    Chart and discharge orders reviewed.  Patient discharged home with no further case management needs.       05/20/23 1057   Final Note   Assessment Type Final Discharge Note   Anticipated Discharge Disposition Home   What phone number can be called within the next 1-3 days to see how you are doing after discharge? [phone]   Post-Acute Status   Discharge Delays None known at this time

## 2023-05-20 NOTE — PLAN OF CARE
[facility]  Initial Discharge Assessment       Primary Care Provider: Yanna Abbasi MD    Admission Diagnosis: Invasive ductal carcinoma of breast, female, left [C50.912]  Post-operative state [Z98.890]    Admission Date: 5/19/2023  Expected Discharge Date: TBD    Assessment completed with patient at bedside in 1204. Patient lives with brother, has all necessary DME including BiPap from [employer]. Patient states she has no HH currently, but  shows [facility] as recently as February. Case management to continue to follow.    Transition of Care Barriers: None    Payor: [employer]ED [employer]ICARE / Plan: [employer] SECURE COMPLETE / Product Type: Medicare Advantage /     Extended Emergency Contact Information  Primary Emergency Contact: George Tapia  Address: [address] [place] [address] [address] [place] [address] [facility]  Home Phone: [phone]  Relation: Brother  Preferred language: English   needed? No    Discharge Plan A: Home with family  Discharge Plan B: [id number] [address] [facility] [address] [address]  Phone: [phone] Fax: [phone]      Initial Assessment (most recent)       Adult Discharge Assessment - 05/20/23 1017          Discharge Assessment    Assessment Type Discharge Planning Assessment     Confirmed/corrected address, phone number and insurance Yes     Confirmed Demographics Correct on Facesheet     Source of Information patient     When was your last doctors appointment? --   3 months ago    Does patient/caregiver understand observation status Yes     Communicated LI with patient/caregiver Date not available/Unable to determine     Reason For Admission Invasive ductal carcinoma of breast, female, left     People in Home sibling(s)     Facility Arrived From: home     Do you expect to return to your current living situation? Yes     Do you have  help at home or someone to help you manage your care at home? Yes     Who are your caregiver(s) and their phone number(s)?  / George [phone]     Walking or Climbing Stairs ambulation difficulty, requires equipment;stair climbing difficulty, requires equipment;transferring difficulty, requires equipment     Dressing/Bathing bathing difficulty, requires equipment     Home Accessibility wheelchair accessible     Home Layout Able to live on 1st floor     Equipment Currently Used at Home bedside commode;BIPAP;shower chair;rollator;walker, rolling   BiPap from [employer]    Readmission within 30 days? No     Patient currently being followed by outpatient case management? No     Do you currently have service(s) that help you manage your care at home? No   Patient previouslu had [facility], but now states no HH    Do you take prescription medications? Yes     Do you have prescription coverage? Yes     Do you have any problems affording any of your prescribed medications? No     Is the patient taking medications as prescribed? yes     Who is going to help you get home at discharge?  / George [phone]     How do you get to doctors appointments? family or friend will provide     Are you on dialysis? No     Do you take coumadin? No     Discharge Plan A Home with family     Discharge Plan B Home Health     DME Needed Upon Discharge  none     Discharge Plan discussed with: Patient     Transition of Care Barriers None

## 2023-05-20 NOTE — PLAN OF CARE
05/20/23 0942   GALDAMEZ Message   Medicare Outpatient and Observation Notification regarding financial responsibility Given to patient/caregiver;Explained to patient/caregiver;Signed/date by patient/caregiver   Date GALDAMEZ was signed 05/20/23   Time GALDAMEZ was signed 0942

## 2023-05-20 NOTE — HOSPITAL COURSE
Patient is a 73 year old female with Invasive ductal carcinoma of breast who underwent Left simple mastectomy on May 19.  Hospital medicine consulted for medical management.  Patient is doing well postoperatively.  Patient is afebrile and hemodynamically stable.  Pain is controlled.  Patient did well overnight.  Pain remains controlled.  Patient discharged home on May 20th.

## 2023-06-01 ENCOUNTER — OFFICE VISIT (OUTPATIENT)
Dept: SURGERY | Facility: CLINIC | Age: 73
End: 2023-06-01
Payer: MEDICARE

## 2023-06-01 VITALS
HEIGHT: 60 IN | HEART RATE: 89 BPM | SYSTOLIC BLOOD PRESSURE: 141 MMHG | WEIGHT: 163 LBS | BODY MASS INDEX: 32 KG/M2 | DIASTOLIC BLOOD PRESSURE: 64 MMHG | TEMPERATURE: 98 F

## 2023-06-01 DIAGNOSIS — Z98.890 POST-OPERATIVE STATE: Primary | ICD-10-CM

## 2023-06-01 PROCEDURE — 3077F PR MOST RECENT SYSTOLIC BLOOD PRESSURE >= 140 MM HG: ICD-10-PCS | Mod: CPTII,S$GLB,, | Performed by: STUDENT IN AN ORGANIZED HEALTH CARE EDUCATION/TRAINING PROGRAM

## 2023-06-01 PROCEDURE — 3072F LOW RISK FOR RETINOPATHY: CPT | Mod: CPTII,S$GLB,, | Performed by: STUDENT IN AN ORGANIZED HEALTH CARE EDUCATION/TRAINING PROGRAM

## 2023-06-01 PROCEDURE — 3077F SYST BP >= 140 MM HG: CPT | Mod: CPTII,S$GLB,, | Performed by: STUDENT IN AN ORGANIZED HEALTH CARE EDUCATION/TRAINING PROGRAM

## 2023-06-01 PROCEDURE — 1101F PR PT FALLS ASSESS DOC 0-1 FALLS W/OUT INJ PAST YR: ICD-10-PCS | Mod: CPTII,S$GLB,, | Performed by: STUDENT IN AN ORGANIZED HEALTH CARE EDUCATION/TRAINING PROGRAM

## 2023-06-01 PROCEDURE — 3061F NEG MICROALBUMINURIA REV: CPT | Mod: CPTII,S$GLB,, | Performed by: STUDENT IN AN ORGANIZED HEALTH CARE EDUCATION/TRAINING PROGRAM

## 2023-06-01 PROCEDURE — 3061F PR NEG MICROALBUMINURIA RESULT DOCUMENTED/REVIEW: ICD-10-PCS | Mod: CPTII,S$GLB,, | Performed by: STUDENT IN AN ORGANIZED HEALTH CARE EDUCATION/TRAINING PROGRAM

## 2023-06-01 PROCEDURE — 3078F DIAST BP <80 MM HG: CPT | Mod: CPTII,S$GLB,, | Performed by: STUDENT IN AN ORGANIZED HEALTH CARE EDUCATION/TRAINING PROGRAM

## 2023-06-01 PROCEDURE — 1101F PT FALLS ASSESS-DOCD LE1/YR: CPT | Mod: CPTII,S$GLB,, | Performed by: STUDENT IN AN ORGANIZED HEALTH CARE EDUCATION/TRAINING PROGRAM

## 2023-06-01 PROCEDURE — 1157F PR ADVANCE CARE PLAN OR EQUIV PRESENT IN MEDICAL RECORD: ICD-10-PCS | Mod: CPTII,S$GLB,, | Performed by: STUDENT IN AN ORGANIZED HEALTH CARE EDUCATION/TRAINING PROGRAM

## 2023-06-01 PROCEDURE — 99024 PR POST-OP FOLLOW-UP VISIT: ICD-10-PCS | Mod: S$GLB,,, | Performed by: STUDENT IN AN ORGANIZED HEALTH CARE EDUCATION/TRAINING PROGRAM

## 2023-06-01 PROCEDURE — 99999 PR PBB SHADOW E&M-EST. PATIENT-LVL IV: CPT | Mod: PBBFAC,,, | Performed by: STUDENT IN AN ORGANIZED HEALTH CARE EDUCATION/TRAINING PROGRAM

## 2023-06-01 PROCEDURE — 3288F PR FALLS RISK ASSESSMENT DOCUMENTED: ICD-10-PCS | Mod: CPTII,S$GLB,, | Performed by: STUDENT IN AN ORGANIZED HEALTH CARE EDUCATION/TRAINING PROGRAM

## 2023-06-01 PROCEDURE — 3044F HG A1C LEVEL LT 7.0%: CPT | Mod: CPTII,S$GLB,, | Performed by: STUDENT IN AN ORGANIZED HEALTH CARE EDUCATION/TRAINING PROGRAM

## 2023-06-01 PROCEDURE — 3044F PR MOST RECENT HEMOGLOBIN A1C LEVEL <7.0%: ICD-10-PCS | Mod: CPTII,S$GLB,, | Performed by: STUDENT IN AN ORGANIZED HEALTH CARE EDUCATION/TRAINING PROGRAM

## 2023-06-01 PROCEDURE — 3072F PR LOW RISK FOR RETINOPATHY: ICD-10-PCS | Mod: CPTII,S$GLB,, | Performed by: STUDENT IN AN ORGANIZED HEALTH CARE EDUCATION/TRAINING PROGRAM

## 2023-06-01 PROCEDURE — 1159F PR MEDICATION LIST DOCUMENTED IN MEDICAL RECORD: ICD-10-PCS | Mod: CPTII,S$GLB,, | Performed by: STUDENT IN AN ORGANIZED HEALTH CARE EDUCATION/TRAINING PROGRAM

## 2023-06-01 PROCEDURE — 99999 PR PBB SHADOW E&M-EST. PATIENT-LVL IV: ICD-10-PCS | Mod: PBBFAC,,, | Performed by: STUDENT IN AN ORGANIZED HEALTH CARE EDUCATION/TRAINING PROGRAM

## 2023-06-01 PROCEDURE — 3066F NEPHROPATHY DOC TX: CPT | Mod: CPTII,S$GLB,, | Performed by: STUDENT IN AN ORGANIZED HEALTH CARE EDUCATION/TRAINING PROGRAM

## 2023-06-01 PROCEDURE — 1125F PR PAIN SEVERITY QUANTIFIED, PAIN PRESENT: ICD-10-PCS | Mod: CPTII,S$GLB,, | Performed by: STUDENT IN AN ORGANIZED HEALTH CARE EDUCATION/TRAINING PROGRAM

## 2023-06-01 PROCEDURE — 3288F FALL RISK ASSESSMENT DOCD: CPT | Mod: CPTII,S$GLB,, | Performed by: STUDENT IN AN ORGANIZED HEALTH CARE EDUCATION/TRAINING PROGRAM

## 2023-06-01 PROCEDURE — 1159F MED LIST DOCD IN RCRD: CPT | Mod: CPTII,S$GLB,, | Performed by: STUDENT IN AN ORGANIZED HEALTH CARE EDUCATION/TRAINING PROGRAM

## 2023-06-01 PROCEDURE — 99024 POSTOP FOLLOW-UP VISIT: CPT | Mod: S$GLB,,, | Performed by: STUDENT IN AN ORGANIZED HEALTH CARE EDUCATION/TRAINING PROGRAM

## 2023-06-01 PROCEDURE — 1125F AMNT PAIN NOTED PAIN PRSNT: CPT | Mod: CPTII,S$GLB,, | Performed by: STUDENT IN AN ORGANIZED HEALTH CARE EDUCATION/TRAINING PROGRAM

## 2023-06-01 PROCEDURE — 3078F PR MOST RECENT DIASTOLIC BLOOD PRESSURE < 80 MM HG: ICD-10-PCS | Mod: CPTII,S$GLB,, | Performed by: STUDENT IN AN ORGANIZED HEALTH CARE EDUCATION/TRAINING PROGRAM

## 2023-06-01 PROCEDURE — 1157F ADVNC CARE PLAN IN RCRD: CPT | Mod: CPTII,S$GLB,, | Performed by: STUDENT IN AN ORGANIZED HEALTH CARE EDUCATION/TRAINING PROGRAM

## 2023-06-01 PROCEDURE — 3066F PR DOCUMENTATION OF TREATMENT FOR NEPHROPATHY: ICD-10-PCS | Mod: CPTII,S$GLB,, | Performed by: STUDENT IN AN ORGANIZED HEALTH CARE EDUCATION/TRAINING PROGRAM

## 2023-06-01 NOTE — PROGRESS NOTES
Postop note     Patient underwent re-excision mastectomy for positive margins related to a larger than expected breast cancer at 2 of the margins during lumpectomy.  Doing well.  Minimal PIA output.      Incisions well approximated and healing well   Minimal PIA serous output     Pathology:  No residual invasive disease identified.  Patient did have high-grade DCIS in the specimen.  All margins are negative.  No comment on the 2nd specimen which is the central posterior margin along the chest wall.      Overall, patient is doing well.  Low suspicion for her posterior central margin to be positive given the other pathology.  Drain was removed without complication.  Patient has scheduled follow-up with Oncology to discuss endocrine therapy and the possible need for chemo.  If a port is needed for true and true chemo, she will come back to set up an appointment.

## 2023-06-12 ENCOUNTER — OFFICE VISIT (OUTPATIENT)
Dept: HEMATOLOGY/ONCOLOGY | Facility: CLINIC | Age: 73
End: 2023-06-12
Payer: MEDICARE

## 2023-06-12 ENCOUNTER — OFFICE VISIT (OUTPATIENT)
Dept: RADIATION ONCOLOGY | Facility: CLINIC | Age: 73
End: 2023-06-12
Payer: MEDICARE

## 2023-06-12 VITALS
OXYGEN SATURATION: 98 % | RESPIRATION RATE: 20 BRPM | DIASTOLIC BLOOD PRESSURE: 84 MMHG | RESPIRATION RATE: 16 BRPM | TEMPERATURE: 96 F | DIASTOLIC BLOOD PRESSURE: 68 MMHG | SYSTOLIC BLOOD PRESSURE: 137 MMHG | OXYGEN SATURATION: 97 % | SYSTOLIC BLOOD PRESSURE: 146 MMHG | HEIGHT: 61 IN | HEART RATE: 78 BPM | HEART RATE: 65 BPM | BODY MASS INDEX: 31.59 KG/M2 | BODY MASS INDEX: 31.59 KG/M2 | TEMPERATURE: 98 F | WEIGHT: 167.31 LBS | HEIGHT: 61 IN | WEIGHT: 167.31 LBS

## 2023-06-12 DIAGNOSIS — H91.93 BILATERAL HEARING LOSS, UNSPECIFIED HEARING LOSS TYPE: ICD-10-CM

## 2023-06-12 DIAGNOSIS — S72.001D: ICD-10-CM

## 2023-06-12 DIAGNOSIS — C50.912 INVASIVE DUCTAL CARCINOMA OF BREAST, FEMALE, LEFT: Primary | ICD-10-CM

## 2023-06-12 DIAGNOSIS — R29.898 LEG WEAKNESS, BILATERAL: ICD-10-CM

## 2023-06-12 DIAGNOSIS — J44.9 CHRONIC OBSTRUCTIVE PULMONARY DISEASE, UNSPECIFIED COPD TYPE: ICD-10-CM

## 2023-06-12 DIAGNOSIS — E11.42 TYPE 2 DIABETES MELLITUS WITH DIABETIC POLYNEUROPATHY, WITHOUT LONG-TERM CURRENT USE OF INSULIN: ICD-10-CM

## 2023-06-12 PROCEDURE — 3288F PR FALLS RISK ASSESSMENT DOCUMENTED: ICD-10-PCS | Mod: CPTII,S$GLB,, | Performed by: INTERNAL MEDICINE

## 2023-06-12 PROCEDURE — 3072F LOW RISK FOR RETINOPATHY: CPT | Mod: CPTII,S$GLB,, | Performed by: INTERNAL MEDICINE

## 2023-06-12 PROCEDURE — 3078F DIAST BP <80 MM HG: CPT | Mod: CPTII,S$GLB,, | Performed by: INTERNAL MEDICINE

## 2023-06-12 PROCEDURE — 99215 OFFICE O/P EST HI 40 MIN: CPT | Mod: S$GLB,,, | Performed by: RADIOLOGY

## 2023-06-12 PROCEDURE — 3061F PR NEG MICROALBUMINURIA RESULT DOCUMENTED/REVIEW: ICD-10-PCS | Mod: CPTII,S$GLB,, | Performed by: RADIOLOGY

## 2023-06-12 PROCEDURE — 3077F SYST BP >= 140 MM HG: CPT | Mod: CPTII,S$GLB,, | Performed by: RADIOLOGY

## 2023-06-12 PROCEDURE — 3075F PR MOST RECENT SYSTOLIC BLOOD PRESS GE 130-139MM HG: ICD-10-PCS | Mod: CPTII,S$GLB,, | Performed by: INTERNAL MEDICINE

## 2023-06-12 PROCEDURE — 99214 PR OFFICE/OUTPT VISIT, EST, LEVL IV, 30-39 MIN: ICD-10-PCS | Mod: S$GLB,,, | Performed by: INTERNAL MEDICINE

## 2023-06-12 PROCEDURE — 1160F PR REVIEW ALL MEDS BY PRESCRIBER/CLIN PHARMACIST DOCUMENTED: ICD-10-PCS | Mod: CPTII,S$GLB,, | Performed by: INTERNAL MEDICINE

## 2023-06-12 PROCEDURE — 3044F PR MOST RECENT HEMOGLOBIN A1C LEVEL <7.0%: ICD-10-PCS | Mod: CPTII,S$GLB,, | Performed by: INTERNAL MEDICINE

## 2023-06-12 PROCEDURE — 1157F PR ADVANCE CARE PLAN OR EQUIV PRESENT IN MEDICAL RECORD: ICD-10-PCS | Mod: CPTII,S$GLB,, | Performed by: RADIOLOGY

## 2023-06-12 PROCEDURE — 1160F PR REVIEW ALL MEDS BY PRESCRIBER/CLIN PHARMACIST DOCUMENTED: ICD-10-PCS | Mod: CPTII,S$GLB,, | Performed by: RADIOLOGY

## 2023-06-12 PROCEDURE — 3066F NEPHROPATHY DOC TX: CPT | Mod: CPTII,S$GLB,, | Performed by: RADIOLOGY

## 2023-06-12 PROCEDURE — 3077F PR MOST RECENT SYSTOLIC BLOOD PRESSURE >= 140 MM HG: ICD-10-PCS | Mod: CPTII,S$GLB,, | Performed by: RADIOLOGY

## 2023-06-12 PROCEDURE — 99215 PR OFFICE/OUTPT VISIT, EST, LEVL V, 40-54 MIN: ICD-10-PCS | Mod: S$GLB,,, | Performed by: RADIOLOGY

## 2023-06-12 PROCEDURE — 3061F NEG MICROALBUMINURIA REV: CPT | Mod: CPTII,S$GLB,, | Performed by: INTERNAL MEDICINE

## 2023-06-12 PROCEDURE — 3072F PR LOW RISK FOR RETINOPATHY: ICD-10-PCS | Mod: CPTII,S$GLB,, | Performed by: INTERNAL MEDICINE

## 2023-06-12 PROCEDURE — 1101F PR PT FALLS ASSESS DOC 0-1 FALLS W/OUT INJ PAST YR: ICD-10-PCS | Mod: CPTII,S$GLB,, | Performed by: INTERNAL MEDICINE

## 2023-06-12 PROCEDURE — 3072F LOW RISK FOR RETINOPATHY: CPT | Mod: CPTII,S$GLB,, | Performed by: RADIOLOGY

## 2023-06-12 PROCEDURE — 1157F ADVNC CARE PLAN IN RCRD: CPT | Mod: CPTII,S$GLB,, | Performed by: INTERNAL MEDICINE

## 2023-06-12 PROCEDURE — 3288F FALL RISK ASSESSMENT DOCD: CPT | Mod: CPTII,S$GLB,, | Performed by: RADIOLOGY

## 2023-06-12 PROCEDURE — 1101F PR PT FALLS ASSESS DOC 0-1 FALLS W/OUT INJ PAST YR: ICD-10-PCS | Mod: CPTII,S$GLB,, | Performed by: RADIOLOGY

## 2023-06-12 PROCEDURE — 3061F NEG MICROALBUMINURIA REV: CPT | Mod: CPTII,S$GLB,, | Performed by: RADIOLOGY

## 2023-06-12 PROCEDURE — 3044F PR MOST RECENT HEMOGLOBIN A1C LEVEL <7.0%: ICD-10-PCS | Mod: CPTII,S$GLB,, | Performed by: RADIOLOGY

## 2023-06-12 PROCEDURE — 1160F RVW MEDS BY RX/DR IN RCRD: CPT | Mod: CPTII,S$GLB,, | Performed by: RADIOLOGY

## 2023-06-12 PROCEDURE — 3079F PR MOST RECENT DIASTOLIC BLOOD PRESSURE 80-89 MM HG: ICD-10-PCS | Mod: CPTII,S$GLB,, | Performed by: RADIOLOGY

## 2023-06-12 PROCEDURE — 1157F PR ADVANCE CARE PLAN OR EQUIV PRESENT IN MEDICAL RECORD: ICD-10-PCS | Mod: CPTII,S$GLB,, | Performed by: INTERNAL MEDICINE

## 2023-06-12 PROCEDURE — 99999 PR PBB SHADOW E&M-EST. PATIENT-LVL IV: ICD-10-PCS | Mod: PBBFAC,,, | Performed by: INTERNAL MEDICINE

## 2023-06-12 PROCEDURE — 1125F PR PAIN SEVERITY QUANTIFIED, PAIN PRESENT: ICD-10-PCS | Mod: CPTII,S$GLB,, | Performed by: RADIOLOGY

## 2023-06-12 PROCEDURE — 3079F DIAST BP 80-89 MM HG: CPT | Mod: CPTII,S$GLB,, | Performed by: RADIOLOGY

## 2023-06-12 PROCEDURE — 1159F PR MEDICATION LIST DOCUMENTED IN MEDICAL RECORD: ICD-10-PCS | Mod: CPTII,S$GLB,, | Performed by: INTERNAL MEDICINE

## 2023-06-12 PROCEDURE — 3044F HG A1C LEVEL LT 7.0%: CPT | Mod: CPTII,S$GLB,, | Performed by: RADIOLOGY

## 2023-06-12 PROCEDURE — 3288F PR FALLS RISK ASSESSMENT DOCUMENTED: ICD-10-PCS | Mod: CPTII,S$GLB,, | Performed by: RADIOLOGY

## 2023-06-12 PROCEDURE — 1101F PT FALLS ASSESS-DOCD LE1/YR: CPT | Mod: CPTII,S$GLB,, | Performed by: INTERNAL MEDICINE

## 2023-06-12 PROCEDURE — 1160F RVW MEDS BY RX/DR IN RCRD: CPT | Mod: CPTII,S$GLB,, | Performed by: INTERNAL MEDICINE

## 2023-06-12 PROCEDURE — 3066F PR DOCUMENTATION OF TREATMENT FOR NEPHROPATHY: ICD-10-PCS | Mod: CPTII,S$GLB,, | Performed by: INTERNAL MEDICINE

## 2023-06-12 PROCEDURE — 1101F PT FALLS ASSESS-DOCD LE1/YR: CPT | Mod: CPTII,S$GLB,, | Performed by: RADIOLOGY

## 2023-06-12 PROCEDURE — 1159F MED LIST DOCD IN RCRD: CPT | Mod: CPTII,S$GLB,, | Performed by: INTERNAL MEDICINE

## 2023-06-12 PROCEDURE — 3061F PR NEG MICROALBUMINURIA RESULT DOCUMENTED/REVIEW: ICD-10-PCS | Mod: CPTII,S$GLB,, | Performed by: INTERNAL MEDICINE

## 2023-06-12 PROCEDURE — 99999 PR PBB SHADOW E&M-EST. PATIENT-LVL IV: CPT | Mod: PBBFAC,,, | Performed by: INTERNAL MEDICINE

## 2023-06-12 PROCEDURE — 1159F PR MEDICATION LIST DOCUMENTED IN MEDICAL RECORD: ICD-10-PCS | Mod: CPTII,S$GLB,, | Performed by: RADIOLOGY

## 2023-06-12 PROCEDURE — 1125F AMNT PAIN NOTED PAIN PRSNT: CPT | Mod: CPTII,S$GLB,, | Performed by: INTERNAL MEDICINE

## 2023-06-12 PROCEDURE — 3044F HG A1C LEVEL LT 7.0%: CPT | Mod: CPTII,S$GLB,, | Performed by: INTERNAL MEDICINE

## 2023-06-12 PROCEDURE — 1125F PR PAIN SEVERITY QUANTIFIED, PAIN PRESENT: ICD-10-PCS | Mod: CPTII,S$GLB,, | Performed by: INTERNAL MEDICINE

## 2023-06-12 PROCEDURE — 99214 OFFICE O/P EST MOD 30 MIN: CPT | Mod: S$GLB,,, | Performed by: INTERNAL MEDICINE

## 2023-06-12 PROCEDURE — 1125F AMNT PAIN NOTED PAIN PRSNT: CPT | Mod: CPTII,S$GLB,, | Performed by: RADIOLOGY

## 2023-06-12 PROCEDURE — 3288F FALL RISK ASSESSMENT DOCD: CPT | Mod: CPTII,S$GLB,, | Performed by: INTERNAL MEDICINE

## 2023-06-12 PROCEDURE — 3066F PR DOCUMENTATION OF TREATMENT FOR NEPHROPATHY: ICD-10-PCS | Mod: CPTII,S$GLB,, | Performed by: RADIOLOGY

## 2023-06-12 PROCEDURE — 3075F SYST BP GE 130 - 139MM HG: CPT | Mod: CPTII,S$GLB,, | Performed by: INTERNAL MEDICINE

## 2023-06-12 PROCEDURE — 1157F ADVNC CARE PLAN IN RCRD: CPT | Mod: CPTII,S$GLB,, | Performed by: RADIOLOGY

## 2023-06-12 PROCEDURE — 3072F PR LOW RISK FOR RETINOPATHY: ICD-10-PCS | Mod: CPTII,S$GLB,, | Performed by: RADIOLOGY

## 2023-06-12 PROCEDURE — 3078F PR MOST RECENT DIASTOLIC BLOOD PRESSURE < 80 MM HG: ICD-10-PCS | Mod: CPTII,S$GLB,, | Performed by: INTERNAL MEDICINE

## 2023-06-12 PROCEDURE — 3066F NEPHROPATHY DOC TX: CPT | Mod: CPTII,S$GLB,, | Performed by: INTERNAL MEDICINE

## 2023-06-12 PROCEDURE — 1159F MED LIST DOCD IN RCRD: CPT | Mod: CPTII,S$GLB,, | Performed by: RADIOLOGY

## 2023-06-12 RX ORDER — LETROZOLE 2.5 MG/1
2.5 TABLET, FILM COATED ORAL DAILY
Qty: 30 TABLET | Refills: 2 | Status: SHIPPED | OUTPATIENT
Start: 2023-06-12 | End: 2023-07-12 | Stop reason: SDUPTHER

## 2023-06-12 NOTE — PROGRESS NOTES
Maggy Tapia  [id number]  1950 6/12/2023  Aurash Khoobehi, [place]  [address] [place] [address]    DIAGNOSIS: IA, pT2N0(sn)M0 g2 IDC UIQ L breast, ER+/VA+/HER2(-)  REASON FOR VISIT: Routine scheduled follow-up.    HISTORY OF PRESENT ILLNESS:   Maggy Tapia is a 73 y.o. female 73F with PMHx epilepsy disorder, TD, PTSD/anxiety disorder, prior CVA and history of R-sided BCa at 15y/o treated with surgery + RT as well as +FHx BCa who presents with abnormal mammogram history dating to 2016 with recent screening mammogram noting architectural distortion and left breast confirmed on diagnostic mammogram and ultrasound to be a 1.4 cm irregular hypoechoic, spiculated mass at 10:00.  Core needle biopsy returned grade 2 (6/9) invasive ductal carcinoma, ER+ 90%, VA+ 20%, HER2(-) by FISH; Ki-67 40%.     She presents to Multid[facility] at [facility] to meet with Dr. Khoobehi (Medical Oncology), Dr. Paredes (Surgery) and myself (Radiation Oncology) to formulate treatment plan.  Consensus recommendation was to proceed with upfront surgery.  I discussed indications for adjuvant radiotherapy noting uncertainty regarding prior right-sided treatment, concerns with her ability to perform DIBH and likely need for IMRT.    INTERVAL HISTORY:   She underwent lumpectomy with Dr. Paredes, 04/26/2023:   - 4.8 cm grade 3 (8/9) invasive ductal carcinoma, LVSI(-); positive margin anterior, superior   - at least 3.7 cm grade 3 DCIS with comedo necrosis; negative margin at 6mm    - 0/1 SLN    Oncotype recurrence score 21 (no chemotherapy recommended).    She underwent left simple mastectomy revealing residual high-grade DCIS measuring <1mm with widely negative margins.  Dr. Paredes took a 2nd specimen of indurated fatty tissue at the central posterior margin along the chest wall, not reported in pathology.    Reports mild sensitivity of left chest wall denies pain or discomfort.  Denies fever, chills, chest pain,  shortness of breath, cough or hemoptysis.    Review of systems otherwise negative unless indicated in HPI/interval history.    Past Medical History:   Diagnosis Date    Anxiety     Arthritis     Asthma     Breast cancer     Cancer     Left Breast    Depression     Diabetes mellitus, type 2     GERD (gastroesophageal reflux disease)     Hyperlipidemia     Hypertension     Overactive bladder     Seizures     Pseudo-seizures    Sleep apnea     Stroke     Stroke 2022    pt was in the hospital for a stroke, claims she was seeing people when the stroke happened    Thyroid disease     Urinary tract infection without hematuria 2017     Past Surgical History:   Procedure Laterality Date    APPENDECTOMY      BREAST BIOPSY Left     BREAST LUMPECTOMY Left     2016    BREAST SURGERY      CATARACT EXTRACTION Bilateral     OU done//     SECTION      CHOLECYSTECTOMY      COLONOSCOPY N/A 2020    Procedure: COLONOSCOPY;  Surgeon: Mj Fernandez MD;  Location[facility];  Service: Endoscopy;  Laterality: N/A;    CYST REMOVAL Left 2021    DILATION AND CURETTAGE OF UTERUS      EYE SURGERY      LUMPECTOMY, BREAST Left 2023    Procedure: LUMPECTOMY, BREAST;  Surgeon: Toño Paredes MD;  Location: [facility];  Service: General;  Laterality: Left;    PERCUTANEOUS PINNING OF HIP Right 2022    Procedure: PINNING, HIP, PERCUTANEOUS;  Surgeon: Ministerio Joiner II, MD;  Location: [facility] OR;  Service: Orthopedics;  Laterality: Right;    SENTINEL LYMPH NODE BIOPSY Left 2023    Procedure: BIOPSY, LYMPH NODE, SENTINEL;  Surgeon: Toño Paredes MD;  Location: [facility] OR;  Service: General;  Laterality: Left;    SIMPLE MASTECTOMY Left 2023    Procedure: MASTECTOMY, SIMPLE;  Surgeon: Toño Paredes MD;  Location: [facility] OR;  Service: General;  Laterality: Left;     Social History     Socioeconomic History    Marital status: Single    Number of children: 0   Tobacco Use    Smoking status: Never     Smokeless tobacco: Never   Substance and Sexual Activity    Alcohol use: Yes     Comment: seldom    Drug use: No    Sexual activity: Not Currently   Social History Narrative    Single, no children, lives with brother Apollo who is her primary caregiver.     Family History   Problem Relation Age of Onset    Diabetes Mother     Hypertension Mother     Breast cancer Mother     Cataracts Mother     Melanoma Mother     Heart failure Father     Melanoma Father     Cataracts Brother     Melanoma Brother     Glaucoma Neg Hx     Retinal detachment Neg Hx     Macular degeneration Neg Hx      Medication List with Changes/Refills   Current Medications    ALBUTEROL (PROVENTIL/VENTOLIN HFA) 90 MCG/ACTUATION INHALER    Inhale 1-2 puffs into the lungs every 4 (four) hours as needed for Shortness of Breath (coughing). Rescue    ALENDRONATE (FOSAMAX) 70 MG TABLET    Take one tablet every 7 days.    ASPIRIN (ECOTRIN) 81 MG EC TABLET    Take 81 mg by mouth once daily.    BLOOD-GLUCOSE METER KIT    Use as instructed. Insurance preferred.    CALCIUM CARBONATE/VITAMIN D3 (CALCIUM 500 + D ORAL)    Take 1 tablet by mouth once daily. 10 mg daily    CYANOCOBALAMIN (VITAMIN B-12) 1000 MCG TABLET    Take 1 tablet (1,000 mcg total) by mouth once daily.    EMPAGLIFLOZIN (JARDIANCE) 10 MG TABLET    Take 1 tablet (10 mg total) by mouth once daily.    FLUTICASONE FUROATE-VILANTEROL (BREO ELLIPTA) 100-25 MCG/DOSE DISKUS INHALER    Inhale 1 puff into the lungs once daily. Controller    GABAPENTIN (NEURONTIN) 300 MG CAPSULE    Take 1 capsule (300 mg total) by mouth every evening. Take 1 tablet every night x 7 days. Increase to twice a day x 7 days. Increase to three times a day.    KETOCONAZOLE (NIZORAL) 2 % CREAM    AAA pannus fold at least daily after the shower    LEVOTHYROXINE (SYNTHROID) 100 MCG TABLET    Take 1 tablet (100 mcg total) by mouth before breakfast.    LOSARTAN (COZAAR) 50 MG TABLET    Take 0.5 tablets (25 mg total) by mouth once  "daily.    METFORMIN (GLUCOPHAGE-XR) 500 MG ER 24HR TABLET    Take 2 tablets (1,000 mg total) by mouth daily with breakfast.    POLYETHYLENE GLYCOL (GLYCOLAX) 17 GRAM PWPK    Take 17 g by mouth once daily.    POTASSIUM CHLORIDE SA (K-DUR,KLOR-CON) 20 MEQ TABLET    Take 20 mEq by mouth once daily.    ROPINIROLE (REQUIP) 0.5 MG TABLET    Take by mouth every evening.    SERTRALINE (ZOLOFT) 50 MG TABLET    Take 1 tablet (50 mg total) by mouth once daily. For depression/anxiety    SIMVASTATIN (ZOCOR) 40 MG TABLET    Take 1 tablet (40 mg total) by mouth once daily.    TETRABENAZINE (XENAZINE) 25 MG TABLET    Take one tablet daily for 7 days and then  Take 1 tablet twice daily afterwards     Review of patient's allergies indicates:   Allergen Reactions    Penicillins Anaphylaxis    Sulfa (sulfonamide antibiotics) Anaphylaxis    Trintellix [vortioxetine] Nausea And Vomiting and Other (See Comments)     Patient has seizures and vomits       QUALITY OF LIFE: 80%- Normal Activity with Effort: Some Symptoms of Disease    Vitals:    06/12/23 1001   BP: (!) 146/84   Pulse: 78   Resp: 20   Temp: 97.8 °F (36.6 °C)   SpO2: 97%   Weight: 75.9 kg (167 lb 4.8 oz)   Height: 5' 1" (1.549 m)   PainSc:   2   PainLoc: Breast     Body mass index is 31.61 kg/m².    PHYSICAL EXAM:   GENERAL: alert; in no apparent distress.   HEAD: normocephalic, atraumatic.  EYES: pupils are equal, round, reactive to light and accommodation. Sclera anicteric. Conjunctiva not injected.   NOSE/THROAT: no nasal erythema or rhinorrhea. Oropharynx pink, without erythema, ulcerations or thrush.   NECK: no cervical motion rigidity; supple with no masses.  CHEST: Patient is speaking comfortably on room air with normal work of breathing without using accessory muscles of respiration.  CARDIOVASCULAR: regular rate and rhythm  ABDOMEN: soft, nontender, nondistended.   MUSCULOSKELETAL: no tenderness to palpation along the spine or scapulae. Normal range of " motion.  NEUROLOGIC: cranial nerves II-XII intact bilaterally. Strength 5/5 in bilateral upper and lower extremities. No sensory deficits appreciated. Normal gait.  LYMPHATIC: no left axillary adenopathy appreciated.   EXTREMITIES: no clubbing, cyanosis, edema.  SKIN: no erythema, rashes, ulcerations noted.   CW: L mastectomy incision with minimal residual scabbing, no cellulitis or fluctuance.  No nodularity or seroma.    ASSESSMENT: Maggy Tapia is a female with stage IA, pT2N0(sn)M0 g2 IDC UIQ L breast, ER+/MI+/HER2(-)  PLAN:     - Patient underwent completion simple mastectomy revealing small focus of residual high-grade DCIS with widely negative margins.  Now status post R0 simple mastectomy for pT2N0 g2 ER+ disease, I do not recommend adjuvant radiotherapy.  - Follow up with Dr. Khoobehi for expected AI; ODx RS 21.  - Follow-up with Dr. Paredes.  - Return to clinic prn.    All questions answered and contact information provided. Patient understands free to call us anytime with any questions or concerns regarding radiation therapy.    I have personally seen and evaluated this patient with a moderate to high complexity diagnosis.      Greater than 30 minutes were dedicated to reviewing/interpreting pertinent laboratory/imaging/pathology as well as follow-up with concurrent consultants; reviewing and performing history and physical; counseling patient on continuing oncologic recommendations; documentation in the electronic medical record including ordering of additional tests and/or radiation treatment protocol; and coordination of care with physicians with referrals placed as appropriate.    PHYSICIAN: Sriram Cooper Jr, MD

## 2023-06-12 NOTE — PROGRESS NOTES
Service Date:  6/12/23    Chief Complaint: Breast cancer    Maggy Tapia is a 73 y.o. female here with L breast invasive ductal carcinoma. Patient had a routine screening mammogram which led to biopsy of a concerning lesion in the upper inner quadrant. Lesion measured about 17 mm on US. [id number] ER/NC+, HER2 negative by FISH, Ki 67 40%.    Patient has now undergone a left mastectomy with results showing pT2N0 disease.  Patient is here to discuss Oncotype.  She was told she does not need radiation therapy.    Review of Systems   Constitutional: Negative.    HENT: Negative.     Eyes: Negative.    Respiratory: Negative.     Cardiovascular: Negative.    Gastrointestinal: Negative.    Endocrine: Negative.    Genitourinary: Negative.    Musculoskeletal: Negative.    Integumentary:  Negative.   Neurological: Negative.    Hematological: Negative.    Psychiatric/Behavioral: Negative.        Current Outpatient Medications   Medication Instructions    albuterol (PROVENTIL/VENTOLIN HFA) 90 mcg/actuation inhaler 1-2 puffs, Inhalation, Every 4 hours PRN, Rescue    alendronate (FOSAMAX) 70 MG tablet Take one tablet every 7 days.    aspirin (ECOTRIN) 81 mg, Oral, Daily    blood-glucose meter kit Use as instructed. Insurance preferred.    CALCIUM CARBONATE/VITAMIN D3 (CALCIUM 500 + D ORAL) 1 tablet, Oral, Daily, 10 mg daily    cyanocobalamin (VITAMIN B-12) 1,000 mcg, Oral, Daily    fluticasone furoate-vilanteroL (BREO ELLIPTA) 100-25 mcg/dose diskus inhaler 1 puff, Inhalation, Daily, Controller    gabapentin (NEURONTIN) 300 mg, Oral, Nightly, Take 1 tablet every night x 7 days. Increase to twice a day x 7 days. Increase to three times a day.    JARDIANCE 10 mg, Oral, Daily    ketoconazole (NIZORAL) 2 % cream AAA pannus fold at least daily after the shower    letrozole (FEMARA) 2.5 mg, Oral, Daily    levothyroxine (SYNTHROID) 100 mcg, Oral, Before breakfast    losartan (COZAAR) 25 mg, Oral, Daily    metFORMIN (GLUCOPHAGE-XR)  1,000 mg, Oral, With breakfast    polyethylene glycol (GLYCOLAX) 17 g, Oral, Daily    potassium chloride SA (K-DUR,KLOR-CON) 20 MEQ tablet 20 mEq, Oral, Daily    rOPINIRole (REQUIP) 0.5 MG tablet Oral, Nightly    sertraline (ZOLOFT) 50 mg, Oral, Daily, For depression/anxiety    simvastatin (ZOCOR) 40 mg, Oral, Daily    tetrabenazine (XENAZINE) 25 mg tablet Take one tablet daily for 7 days and then<BR>Take 1 tablet twice daily afterwards        Past Medical History:   Diagnosis Date    Anxiety     Arthritis     Asthma     Breast cancer     Cancer     Left Breast    Depression     Diabetes mellitus, type 2     GERD (gastroesophageal reflux disease)     Hyperlipidemia     Hypertension     Overactive bladder     Seizures     Pseudo-seizures    Sleep apnea     Stroke     Stroke 2022    pt was in the hospital for a stroke, claims she was seeing people when the stroke happened    Thyroid disease     Urinary tract infection without hematuria 2017        Past Surgical History:   Procedure Laterality Date    APPENDECTOMY      BREAST BIOPSY Left     BREAST LUMPECTOMY Left         BREAST SURGERY      CATARACT EXTRACTION Bilateral     OU done//     SECTION      CHOLECYSTECTOMY      COLONOSCOPY N/A 2020    Procedure: COLONOSCOPY;  Surgeon: Mj Fernandez MD;  Location: [facility];  Service: Endoscopy;  Laterality: N/A;    CYST REMOVAL Left 2021    DILATION AND CURETTAGE OF UTERUS      EYE SURGERY      LUMPECTOMY, BREAST Left 2023    Procedure: LUMPECTOMY, BREAST;  Surgeon: Toño Paredes MD;  Location: [facility];  Service: General;  Laterality: Left;    PERCUTANEOUS PINNING OF HIP Right 2022    Procedure: PINNING, HIP, PERCUTANEOUS;  Surgeon: Ministerio Joiner II, MD;  Location: [facility] OR;  Service: Orthopedics;  Laterality: Right;    SENTINEL LYMPH NODE BIOPSY Left 2023    Procedure: BIOPSY, LYMPH NODE, SENTINEL;  Surgeon: Toño Paredes MD;  Location: [facility];  Service:  "General;  Laterality: Left;    SIMPLE MASTECTOMY Left 5/19/2023    Procedure: MASTECTOMY, SIMPLE;  Surgeon: Toño Paredes MD;  Location: [facility];  Service: General;  Laterality: Left;        Family History   Problem Relation Age of Onset    Diabetes Mother     Hypertension Mother     Breast cancer Mother     Cataracts Mother     Melanoma Mother     Heart failure Father     Melanoma Father     Cataracts Brother     Melanoma Brother     Glaucoma Neg Hx     Retinal detachment Neg Hx     Macular degeneration Neg Hx        Social History     Tobacco Use    Smoking status: Never    Smokeless tobacco: Never   Substance Use Topics    Alcohol use: Yes     Comment: seldom    Drug use: No         Vitals:    06/12/23 1041   BP: 137/68   Pulse: 65   Resp: 16   Temp: (!) 95.6 °F (35.3 °C)        Physical Exam:  /68 (BP Location: Right arm, Patient Position: Sitting, BP Method: Medium (Automatic))   Pulse 65   Temp (!) 95.6 °F (35.3 °C) (Temporal)   Resp 16   Ht 5' 1" (1.549 m)   Wt 75.9 kg (167 lb 5.3 oz)   SpO2 98%   BMI 31.62 kg/m²     Physical Exam  Constitutional:       Appearance: Normal appearance.   HENT:      Head: Normocephalic and atraumatic.      Nose: Nose normal.      Mouth/Throat:      Mouth: Mucous membranes are moist.      Pharynx: Oropharynx is clear.   Eyes:      Conjunctiva/sclera: Conjunctivae normal.   Cardiovascular:      Rate and Rhythm: Normal rate and regular rhythm.      Heart sounds: Normal heart sounds.   Pulmonary:      Effort: Pulmonary effort is normal.      Breath sounds: Normal breath sounds.   Abdominal:      General: Abdomen is flat. Bowel sounds are normal.      Palpations: Abdomen is soft.   Musculoskeletal:         General: Normal range of motion.      Cervical back: Normal range of motion and neck supple.   Skin:     General: Skin is warm and dry.   Neurological:      General: No focal deficit present.      Mental Status: She is alert and oriented to person, place, and time. " Mental status is at baseline.   Psychiatric:         Mood and Affect: Mood normal.        Labs:  Lab Results   Component Value Date    WBC 11.28 05/19/2023    RBC 4.88 05/19/2023    HGB 14.9 05/19/2023    HCT 46.1 05/19/2023    MCV 95 05/19/2023    MCH 30.5 05/19/2023    MCHC 32.3 05/19/2023    RDW 13.3 05/19/2023     (L) 05/19/2023    MPV 10.0 05/19/2023    GRAN 10.1 (H) 05/19/2023    GRAN 89.5 (H) 05/19/2023    LYMPH 0.9 (L) 05/19/2023    LYMPH 8.2 (L) 05/19/2023    MONO 0.2 (L) 05/19/2023    MONO 1.3 (L) 05/19/2023    EOS 0.0 05/19/2023    BASO 0.02 05/19/2023    EOSINOPHIL 0.2 05/19/2023    BASOPHIL 0.2 05/19/2023     Sodium   Date Value Ref Range Status   05/19/2023 138 136 - 145 mmol/L Final     Potassium   Date Value Ref Range Status   05/19/2023 4.5 3.5 - 5.1 mmol/L Final     Chloride   Date Value Ref Range Status   05/19/2023 103 95 - 110 mmol/L Final     CO2   Date Value Ref Range Status   05/19/2023 28 23 - 29 mmol/L Final     Glucose   Date Value Ref Range Status   05/19/2023 163 (H) 70 - 110 mg/dL Final     BUN   Date Value Ref Range Status   05/19/2023 18 8 - 23 mg/dL Final     Creatinine   Date Value Ref Range Status   05/19/2023 0.9 0.5 - 1.4 mg/dL Final     Calcium   Date Value Ref Range Status   05/19/2023 9.3 8.7 - 10.5 mg/dL Final     Total Protein   Date Value Ref Range Status   05/16/2023 7.6 6.0 - 8.4 g/dL Final     Albumin   Date Value Ref Range Status   05/16/2023 3.8 3.5 - 5.2 g/dL Final     Total Bilirubin   Date Value Ref Range Status   05/16/2023 0.7 0.1 - 1.0 mg/dL Final     Comment:     For infants and newborns, interpretation of results should be based  on gestational age, weight and in agreement with clinical  observations.    Premature Infant recommended reference ranges:  Up to 24 hours.............<8.0 mg/dL  Up to 48 hours............<12.0 mg/dL  3-5 days..................<15.0 mg/dL  6-29 days.................<15.0 mg/dL       Alkaline Phosphatase   Date Value Ref Range  Status   05/16/2023 99 55 - 135 U/L Final     AST   Date Value Ref Range Status   05/16/2023 15 10 - 40 U/L Final     ALT   Date Value Ref Range Status   05/16/2023 13 10 - 44 U/L Final     Anion Gap   Date Value Ref Range Status   05/19/2023 7 (L) 8 - 16 mmol/L Final     eGFR if    Date Value Ref Range Status   03/29/2022 >60.0 >60 mL/min/1.73 m^2 Final     eGFR if non    Date Value Ref Range Status   03/29/2022 >60.0 >60 mL/min/1.73 m^2 Final     Comment:     Calculation used to obtain the estimated glomerular filtration  rate (eGFR) is the CKD-EPI equation.          A/P:    L breast invasive ductal carcinoma  -pT2N0  -ER 90%, TX 20%, HER2 -, Ki 40%  -oncotype 21 - no need for chemo  -will start on letrozole today 6/12/23  -RTC in 4 weeks    Osteopenia  -will have DEXA on 7/31  -cont vit D and Ca    COPD  DM2      Aurash Khoobehi, MD  Hematology and Oncology

## 2023-07-01 ENCOUNTER — HOSPITAL ENCOUNTER (EMERGENCY)
Facility: HOSPITAL | Age: 73
Discharge: HOME | End: 2023-07-01
Attending: EMERGENCY MEDICINE
Payer: MEDICARE

## 2023-07-01 VITALS
HEART RATE: 62 BPM | RESPIRATION RATE: 20 BRPM | BODY MASS INDEX: 32.68 KG/M2 | OXYGEN SATURATION: 97 % | SYSTOLIC BLOOD PRESSURE: 131 MMHG | DIASTOLIC BLOOD PRESSURE: 73 MMHG | TEMPERATURE: 99 F | HEIGHT: 60 IN

## 2023-07-01 DIAGNOSIS — R55 SYNCOPE, UNSPECIFIED SYNCOPE TYPE: Primary | ICD-10-CM

## 2023-07-01 DIAGNOSIS — R06.00 DYSPNEA: ICD-10-CM

## 2023-07-01 LAB
ALBUMIN SERPL BCP-MCNC: 4.3 G/DL (ref 3.5–5.2)
ALP SERPL-CCNC: 72 U/L (ref 55–135)
ALT SERPL W/O P-5'-P-CCNC: 11 U/L (ref 10–44)
ANION GAP SERPL CALC-SCNC: 10 MMOL/L (ref 8–16)
AST SERPL-CCNC: 20 U/L (ref 10–40)
BASOPHILS # BLD AUTO: 0.04 K/UL (ref 0–0.2)
BASOPHILS NFR BLD: 0.4 % (ref 0–1.9)
BILIRUB SERPL-MCNC: 0.6 MG/DL (ref 0.1–1)
BILIRUB UR QL STRIP: NEGATIVE
BNP SERPL-MCNC: 165 PG/ML (ref 0–99)
BUN SERPL-MCNC: 25 MG/DL (ref 8–23)
CALCIUM SERPL-MCNC: 9.9 MG/DL (ref 8.7–10.5)
CHLORIDE SERPL-SCNC: 105 MMOL/L (ref 95–110)
CLARITY UR: CLEAR
CO2 SERPL-SCNC: 25 MMOL/L (ref 23–29)
COLOR UR: COLORLESS
CREAT SERPL-MCNC: 0.8 MG/DL (ref 0.5–1.4)
D DIMER PPP IA.FEU-MCNC: 2.21 MG/L FEU
DIFFERENTIAL METHOD: NORMAL
EOSINOPHIL # BLD AUTO: 0.2 K/UL (ref 0–0.5)
EOSINOPHIL NFR BLD: 1.8 % (ref 0–8)
ERYTHROCYTE [DISTWIDTH] IN BLOOD BY AUTOMATED COUNT: 14.2 % (ref 11.5–14.5)
EST. GFR  (NO RACE VARIABLE): >60 ML/MIN/1.73 M^2
GLUCOSE SERPL-MCNC: 99 MG/DL (ref 70–110)
GLUCOSE UR QL STRIP: NEGATIVE
HCT VFR BLD AUTO: 48.1 % (ref 37–48.5)
HGB BLD-MCNC: 15.7 G/DL (ref 12–16)
HGB UR QL STRIP: NEGATIVE
IMM GRANULOCYTES # BLD AUTO: 0.03 K/UL (ref 0–0.04)
IMM GRANULOCYTES NFR BLD AUTO: 0.3 % (ref 0–0.5)
KETONES UR QL STRIP: NEGATIVE
LEUKOCYTE ESTERASE UR QL STRIP: NEGATIVE
LYMPHOCYTES # BLD AUTO: 2.7 K/UL (ref 1–4.8)
LYMPHOCYTES NFR BLD: 27.7 % (ref 18–48)
MCH RBC QN AUTO: 30.7 PG (ref 27–31)
MCHC RBC AUTO-ENTMCNC: 32.6 G/DL (ref 32–36)
MCV RBC AUTO: 94 FL (ref 82–98)
MONOCYTES # BLD AUTO: 0.5 K/UL (ref 0.3–1)
MONOCYTES NFR BLD: 5.2 % (ref 4–15)
NEUTROPHILS # BLD AUTO: 6.3 K/UL (ref 1.8–7.7)
NEUTROPHILS NFR BLD: 64.6 % (ref 38–73)
NITRITE UR QL STRIP: NEGATIVE
NRBC BLD-RTO: 0 /100 WBC
PH UR STRIP: 5 [PH] (ref 5–8)
PLATELET # BLD AUTO: 151 K/UL (ref 150–450)
PMV BLD AUTO: 10.4 FL (ref 9.2–12.9)
POTASSIUM SERPL-SCNC: 4.2 MMOL/L (ref 3.5–5.1)
PROT SERPL-MCNC: 8.2 G/DL (ref 6–8.4)
PROT UR QL STRIP: NEGATIVE
RBC # BLD AUTO: 5.12 M/UL (ref 4–5.4)
SARS-COV-2 RDRP RESP QL NAA+PROBE: NEGATIVE
SODIUM SERPL-SCNC: 140 MMOL/L (ref 136–145)
SP GR UR STRIP: 1.01 (ref 1–1.03)
TROPONIN I SERPL HS-MCNC: 2.5 PG/ML (ref 0–14.9)
URN SPEC COLLECT METH UR: ABNORMAL
UROBILINOGEN UR STRIP-ACNC: NEGATIVE EU/DL
WBC # BLD AUTO: 9.77 K/UL (ref 3.9–12.7)

## 2023-07-01 PROCEDURE — 99285 EMERGENCY DEPT VISIT HI MDM: CPT | Mod: 25

## 2023-07-01 PROCEDURE — 94761 N-INVAS EAR/PLS OXIMETRY MLT: CPT

## 2023-07-01 PROCEDURE — 83880 ASSAY OF NATRIURETIC PEPTIDE: CPT | Performed by: EMERGENCY MEDICINE

## 2023-07-01 PROCEDURE — 94640 AIRWAY INHALATION TREATMENT: CPT

## 2023-07-01 PROCEDURE — 93010 EKG 12-LEAD: ICD-10-PCS | Mod: ,,, | Performed by: GENERAL PRACTICE

## 2023-07-01 PROCEDURE — 85379 FIBRIN DEGRADATION QUANT: CPT | Performed by: EMERGENCY MEDICINE

## 2023-07-01 PROCEDURE — 93010 ELECTROCARDIOGRAM REPORT: CPT | Mod: ,,, | Performed by: GENERAL PRACTICE

## 2023-07-01 PROCEDURE — 93005 ELECTROCARDIOGRAM TRACING: CPT | Performed by: GENERAL PRACTICE

## 2023-07-01 PROCEDURE — 80053 COMPREHEN METABOLIC PANEL: CPT | Performed by: EMERGENCY MEDICINE

## 2023-07-01 PROCEDURE — 25500020 PHARM REV CODE 255: Performed by: EMERGENCY MEDICINE

## 2023-07-01 PROCEDURE — 84484 ASSAY OF TROPONIN QUANT: CPT | Performed by: EMERGENCY MEDICINE

## 2023-07-01 PROCEDURE — 25000242 PHARM REV CODE 250 ALT 637 W/ HCPCS: Performed by: EMERGENCY MEDICINE

## 2023-07-01 PROCEDURE — 81003 URINALYSIS AUTO W/O SCOPE: CPT | Performed by: EMERGENCY MEDICINE

## 2023-07-01 PROCEDURE — 85025 COMPLETE CBC W/AUTO DIFF WBC: CPT | Performed by: EMERGENCY MEDICINE

## 2023-07-01 PROCEDURE — U0002 COVID-19 LAB TEST NON-CDC: HCPCS | Performed by: EMERGENCY MEDICINE

## 2023-07-01 RX ORDER — IPRATROPIUM BROMIDE AND ALBUTEROL SULFATE 2.5; .5 MG/3ML; MG/3ML
3 SOLUTION RESPIRATORY (INHALATION)
Status: COMPLETED | OUTPATIENT
Start: 2023-07-01 | End: 2023-07-01

## 2023-07-01 RX ADMIN — IOHEXOL 100 ML: 350 INJECTION, SOLUTION INTRAVENOUS at 07:07

## 2023-07-01 RX ADMIN — IPRATROPIUM BROMIDE AND ALBUTEROL SULFATE 3 ML: 2.5; .5 SOLUTION RESPIRATORY (INHALATION) at 04:07

## 2023-07-01 NOTE — ED PROVIDER NOTES
Encounter Date: 2023       History     Chief Complaint   Patient presents with    Shortness of Breath     This is a 73-year-old female with history of diabetes asthma, breast cancer status post mastectomy 6 weeks ago, presenting with shortness of breath, syncope.  Patient says that she was in [employer] giving concussion when she passed out for a brief time.  She was seated, did not fall to the ground.  She complains of shortness a breath.  She has had increased shortness of breath the last few days.  She denies any fever or cough.  She has used her inhaler more frequently.  She denies any chest pain.      Review of patient's allergies indicates:   Allergen Reactions    Penicillins Anaphylaxis    Sulfa (sulfonamide antibiotics) Anaphylaxis    Trintellix [vortioxetine] Nausea And Vomiting and Other (See Comments)     Patient has seizures and vomits     Past Medical History:   Diagnosis Date    Anxiety     Arthritis     Asthma     Breast cancer     Cancer     Left Breast    Depression     Diabetes mellitus, type 2     GERD (gastroesophageal reflux disease)     Hyperlipidemia     Hypertension     Overactive bladder     Seizures     Pseudo-seizures    Sleep apnea     Stroke     Stroke 2022    pt was in the hospital for a stroke, claims she was seeing people when the stroke happened    Thyroid disease     Urinary tract infection without hematuria 2017     Past Surgical History:   Procedure Laterality Date    APPENDECTOMY      BREAST BIOPSY Left     BREAST LUMPECTOMY Left     2016    BREAST SURGERY      CATARACT EXTRACTION Bilateral     OU done//     SECTION      CHOLECYSTECTOMY      COLONOSCOPY N/A 2020    Procedure: COLONOSCOPY;  Surgeon: Mj Fernandez MD;  Location: [facility];  Service: Endoscopy;  Laterality: N/A;    CYST REMOVAL Left 2021    DILATION AND CURETTAGE OF UTERUS      EYE SURGERY      LUMPECTOMY, BREAST Left 2023    Procedure: LUMPECTOMY, BREAST;  Surgeon: Toño LIU  MD Lena;  Location: [facility] OR;  Service: General;  Laterality: Left;    PERCUTANEOUS PINNING OF HIP Right 11/11/2022    Procedure: PINNING, HIP, PERCUTANEOUS;  Surgeon: Ministerio Joiner II, MD;  Location: [facility] OR;  Service: Orthopedics;  Laterality: Right;    SENTINEL LYMPH NODE BIOPSY Left 4/26/2023    Procedure: BIOPSY, LYMPH NODE, SENTINEL;  Surgeon: Toño Paredes MD;  Location: [facility] OR;  Service: General;  Laterality: Left;    SIMPLE MASTECTOMY Left 5/19/2023    Procedure: MASTECTOMY, SIMPLE;  Surgeon: Toño Paredes MD;  Location: [facility] OR;  Service: General;  Laterality: Left;     Family History   Problem Relation Age of Onset    Diabetes Mother     Hypertension Mother     Breast cancer Mother     Cataracts Mother     Melanoma Mother     Heart failure Father     Melanoma Father     Cataracts Brother     Melanoma Brother     Glaucoma Neg Hx     Retinal detachment Neg Hx     Macular degeneration Neg Hx      Social History     Tobacco Use    Smoking status: Never    Smokeless tobacco: Never   Substance Use Topics    Alcohol use: Yes     Comment: seldom    Drug use: No     Review of Systems   Respiratory:  Positive for shortness of breath.    Neurological:  Positive for syncope.   All other systems reviewed and are negative.    Physical Exam     Initial Vitals [07/01/23 1538]   BP Pulse Resp Temp SpO2   131/73 62 (!) 21 98.9 °F (37.2 °C) 99 %      MAP       --         Physical Exam    Nursing note and vitals reviewed.  Constitutional: She appears well-developed and well-nourished. She is not diaphoretic. No distress.   HENT:   Head: Normocephalic.   Eyes: Conjunctivae are normal.   Neck: Neck supple.   Normal range of motion.  Cardiovascular:  Normal rate.           Pulmonary/Chest: No respiratory distress.   Abdominal: She exhibits no distension.   Musculoskeletal:         General: No edema.      Cervical back: Normal range of motion and neck supple.     Neurological: She is alert. She has normal strength. GCS  eye subscore is 4. GCS verbal subscore is 5. GCS motor subscore is 6.   Skin: Skin is warm and dry.   Psychiatric: She has a normal mood and affect.       ED Course   Procedures  Labs Reviewed   URINALYSIS, REFLEX TO URINE CULTURE - Abnormal; Notable for the following components:       Result Value    Color, UA Colorless (*)     All other components within normal limits    Narrative:     Specimen Source->Urine   CBC W/ AUTO DIFFERENTIAL   COMPREHENSIVE METABOLIC PANEL   B-TYPE NATRIURETIC PEPTIDE   TROPONIN I HIGH SENSITIVITY   D DIMER, QUANTITATIVE   SARS-COV-2 RNA AMPLIFICATION, QUAL     EKG Readings: (Independently Interpreted)   Sinus rhythm.  Sixty-eight beats/minute.  Left axis deviation.  No ST elevation.     Imaging Results              X-Ray Chest AP Portable (Final result)  Result time 07/01/23 17:35:50      Final result by Reggie Obrien MD (07/01/23 17:35:50)                   Narrative:      EXAM: XR CHEST AP PORTABLE    HISTORY: Dyspnea    COMPARISON:Chest x-ray dated 5/16/2023    FINDINGS: The lungs are poorly inflated but appear grossly free of infiltrates. No pleural effusions are identified. The cardiomediastinal silhouette is unremarkable. There is prominent thoracolumbar scoliosis with curvature toward the left in the chest and toward the right in the abdomen.    IMPRESSION:   Poor lung expansion. No evidence of acute disease within the chest.    Electronically signed by:  Lukasz Obrien MD  7/1/2023 5:35 PM CDT Workstation: [id number]                                     Medications   albuterol-ipratropium 2.5 mg-0.5 mg/3 mL nebulizer solution 3 mL (3 mLs Nebulization Given 7/1/23 4255)     Medical Decision Making:   Initial Assessment:   73-year-old female with dyspnea, brief syncope.  EKG shows sinus rhythm with no ischemic changes.  CXR is clear.  Blood counts are normal.  Serum chemistries, cardiac enzymes are still pending.  We will also obtain a CTA chest given her recent surgery and  dyspnea.  Workup is pending.  Care will be turned over to the oncoming physician.                 UPDATE:   CBC, CMP, trope, BNP, urinalysis, D-dimer, COVID , chest x-ray within acceptable limits except for D-dimer elevated therefore CT PE was completed and this was also within acceptable limits.  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  They report that she is seen multiple primary care physicians, emergency physicians, neurologist and psychiatrist and has never been prescribed seizure medications because she was told that there is no epileptic cause of her episodes where she has transient loss of consciousness for few seconds but never falls or hits her head or has incontinence or confusion afterwards.  Brother and patient report that the episode that happened today was exactly like the episodes that she is been having since she was 6.  They report that they are not concerned about this but as she was in public bystanders recommended ER evaluation. They report they have been told these are not absence seizures. Based on hx/pe, lower concern for high risk syncope based on pt telling me this episode is exactly like episodes she has had for the last 67 years therefore shared decision making with pt and she and her brother prefer to follow up outpatient with pcp, no admission at this time. Reports feeling hungry but no sx. Strict return precautions discussed.   Mary Ann Hernandez MD  3:38 AM          Clinical Impression:   Final diagnoses:  [R06.00] Dyspnea                             Mary Ann Hernandez MD  07/25/23 1061

## 2023-07-03 ENCOUNTER — PATIENT OUTREACH (OUTPATIENT)
Dept: EMERGENCY MEDICINE | Facility: HOSPITAL | Age: 73
End: 2023-07-03

## 2023-07-03 NOTE — PROGRESS NOTES
Patient has a follow up with PCP scheduled for 7/10/23 at 11 am. Patient will receive an appointment reminder.

## 2023-07-07 ENCOUNTER — PATIENT OUTREACH (OUTPATIENT)
Dept: EMERGENCY MEDICINE | Facility: HOSPITAL | Age: 73
End: 2023-07-07

## 2023-07-07 NOTE — PROGRESS NOTES
ED navigator reminded patient about her appointment for Monday 7/10/23 at 11 am with Dr Abbasi through voicemail, as she did not answer the call. ED navigator to close encounter at this time.

## 2023-07-10 ENCOUNTER — OFFICE VISIT (OUTPATIENT)
Dept: FAMILY MEDICINE | Facility: CLINIC | Age: 73
End: 2023-07-10
Payer: MEDICARE

## 2023-07-10 VITALS
DIASTOLIC BLOOD PRESSURE: 80 MMHG | OXYGEN SATURATION: 95 % | SYSTOLIC BLOOD PRESSURE: 122 MMHG | HEIGHT: 60 IN | BODY MASS INDEX: 33.5 KG/M2 | RESPIRATION RATE: 20 BRPM | TEMPERATURE: 98 F | WEIGHT: 170.63 LBS | HEART RATE: 84 BPM

## 2023-07-10 DIAGNOSIS — E66.9 OBESITY (BMI 30-39.9): ICD-10-CM

## 2023-07-10 DIAGNOSIS — D69.6 THROMBOCYTOPENIA: ICD-10-CM

## 2023-07-10 DIAGNOSIS — E78.2 MIXED HYPERLIPIDEMIA: ICD-10-CM

## 2023-07-10 DIAGNOSIS — E03.9 ACQUIRED HYPOTHYROIDISM: ICD-10-CM

## 2023-07-10 DIAGNOSIS — I10 ESSENTIAL HYPERTENSION: ICD-10-CM

## 2023-07-10 DIAGNOSIS — E11.51 TYPE 2 DIABETES MELLITUS WITH DIABETIC PERIPHERAL ANGIOPATHY WITHOUT GANGRENE, WITHOUT LONG-TERM CURRENT USE OF INSULIN: Primary | ICD-10-CM

## 2023-07-10 DIAGNOSIS — E16.2 HYPOGLYCEMIA: ICD-10-CM

## 2023-07-10 DIAGNOSIS — J96.10 CHRONIC NEUROMUSCULAR RESPIRATORY FAILURE: ICD-10-CM

## 2023-07-10 DIAGNOSIS — R55 SYNCOPE, UNSPECIFIED SYNCOPE TYPE: ICD-10-CM

## 2023-07-10 DIAGNOSIS — E44.1 MILD PROTEIN-CALORIE MALNUTRITION: ICD-10-CM

## 2023-07-10 PROBLEM — R94.120 ABNORMAL AUDITORY FUNCTION STUDY: Status: ACTIVE | Noted: 2023-07-10

## 2023-07-10 PROCEDURE — 3072F PR LOW RISK FOR RETINOPATHY: ICD-10-PCS | Mod: CPTII,S$GLB,, | Performed by: FAMILY MEDICINE

## 2023-07-10 PROCEDURE — 1157F ADVNC CARE PLAN IN RCRD: CPT | Mod: CPTII,S$GLB,, | Performed by: FAMILY MEDICINE

## 2023-07-10 PROCEDURE — 1126F PR PAIN SEVERITY QUANTIFIED, NO PAIN PRESENT: ICD-10-PCS | Mod: CPTII,S$GLB,, | Performed by: FAMILY MEDICINE

## 2023-07-10 PROCEDURE — 3288F PR FALLS RISK ASSESSMENT DOCUMENTED: ICD-10-PCS | Mod: CPTII,S$GLB,, | Performed by: FAMILY MEDICINE

## 2023-07-10 PROCEDURE — 3044F HG A1C LEVEL LT 7.0%: CPT | Mod: CPTII,S$GLB,, | Performed by: FAMILY MEDICINE

## 2023-07-10 PROCEDURE — 1157F PR ADVANCE CARE PLAN OR EQUIV PRESENT IN MEDICAL RECORD: ICD-10-PCS | Mod: CPTII,S$GLB,, | Performed by: FAMILY MEDICINE

## 2023-07-10 PROCEDURE — 3066F NEPHROPATHY DOC TX: CPT | Mod: CPTII,S$GLB,, | Performed by: FAMILY MEDICINE

## 2023-07-10 PROCEDURE — 3044F PR MOST RECENT HEMOGLOBIN A1C LEVEL <7.0%: ICD-10-PCS | Mod: CPTII,S$GLB,, | Performed by: FAMILY MEDICINE

## 2023-07-10 PROCEDURE — 1160F PR REVIEW ALL MEDS BY PRESCRIBER/CLIN PHARMACIST DOCUMENTED: ICD-10-PCS | Mod: CPTII,S$GLB,, | Performed by: FAMILY MEDICINE

## 2023-07-10 PROCEDURE — 1126F AMNT PAIN NOTED NONE PRSNT: CPT | Mod: CPTII,S$GLB,, | Performed by: FAMILY MEDICINE

## 2023-07-10 PROCEDURE — 1160F RVW MEDS BY RX/DR IN RCRD: CPT | Mod: CPTII,S$GLB,, | Performed by: FAMILY MEDICINE

## 2023-07-10 PROCEDURE — 1101F PT FALLS ASSESS-DOCD LE1/YR: CPT | Mod: CPTII,S$GLB,, | Performed by: FAMILY MEDICINE

## 2023-07-10 PROCEDURE — 99214 OFFICE O/P EST MOD 30 MIN: CPT | Mod: S$GLB,,, | Performed by: FAMILY MEDICINE

## 2023-07-10 PROCEDURE — 1101F PR PT FALLS ASSESS DOC 0-1 FALLS W/OUT INJ PAST YR: ICD-10-PCS | Mod: CPTII,S$GLB,, | Performed by: FAMILY MEDICINE

## 2023-07-10 PROCEDURE — 3079F PR MOST RECENT DIASTOLIC BLOOD PRESSURE 80-89 MM HG: ICD-10-PCS | Mod: CPTII,S$GLB,, | Performed by: FAMILY MEDICINE

## 2023-07-10 PROCEDURE — 3061F PR NEG MICROALBUMINURIA RESULT DOCUMENTED/REVIEW: ICD-10-PCS | Mod: CPTII,S$GLB,, | Performed by: FAMILY MEDICINE

## 2023-07-10 PROCEDURE — 99214 PR OFFICE/OUTPT VISIT, EST, LEVL IV, 30-39 MIN: ICD-10-PCS | Mod: S$GLB,,, | Performed by: FAMILY MEDICINE

## 2023-07-10 PROCEDURE — 1159F PR MEDICATION LIST DOCUMENTED IN MEDICAL RECORD: ICD-10-PCS | Mod: CPTII,S$GLB,, | Performed by: FAMILY MEDICINE

## 2023-07-10 PROCEDURE — 3079F DIAST BP 80-89 MM HG: CPT | Mod: CPTII,S$GLB,, | Performed by: FAMILY MEDICINE

## 2023-07-10 PROCEDURE — 3074F SYST BP LT 130 MM HG: CPT | Mod: CPTII,S$GLB,, | Performed by: FAMILY MEDICINE

## 2023-07-10 PROCEDURE — 3074F PR MOST RECENT SYSTOLIC BLOOD PRESSURE < 130 MM HG: ICD-10-PCS | Mod: CPTII,S$GLB,, | Performed by: FAMILY MEDICINE

## 2023-07-10 PROCEDURE — 3066F PR DOCUMENTATION OF TREATMENT FOR NEPHROPATHY: ICD-10-PCS | Mod: CPTII,S$GLB,, | Performed by: FAMILY MEDICINE

## 2023-07-10 PROCEDURE — 1159F MED LIST DOCD IN RCRD: CPT | Mod: CPTII,S$GLB,, | Performed by: FAMILY MEDICINE

## 2023-07-10 PROCEDURE — 99999 PR PBB SHADOW E&M-EST. PATIENT-LVL III: CPT | Mod: PBBFAC,,, | Performed by: FAMILY MEDICINE

## 2023-07-10 PROCEDURE — 99999 PR PBB SHADOW E&M-EST. PATIENT-LVL III: ICD-10-PCS | Mod: PBBFAC,,, | Performed by: FAMILY MEDICINE

## 2023-07-10 PROCEDURE — 3288F FALL RISK ASSESSMENT DOCD: CPT | Mod: CPTII,S$GLB,, | Performed by: FAMILY MEDICINE

## 2023-07-10 PROCEDURE — 3072F LOW RISK FOR RETINOPATHY: CPT | Mod: CPTII,S$GLB,, | Performed by: FAMILY MEDICINE

## 2023-07-10 PROCEDURE — 3061F NEG MICROALBUMINURIA REV: CPT | Mod: CPTII,S$GLB,, | Performed by: FAMILY MEDICINE

## 2023-07-10 NOTE — PROGRESS NOTES
Subjective:       Patient ID: Maggy Tapia is a 73 y.o. female.    Chief Complaint: Follow-up (6mth f/u)    HPI  Review of Systems   Constitutional:  Negative for fatigue and unexpected weight change.   Respiratory:  Negative for chest tightness and shortness of breath.    Cardiovascular:  Negative for chest pain, palpitations and leg swelling.   Gastrointestinal:  Negative for abdominal pain.   Musculoskeletal:  Negative for arthralgias.   Neurological:  Negative for dizziness, syncope, light-headedness and headaches.     Patient Active Problem List   Diagnosis    Syncope and collapse    Frequent falls (possibly related to polypharmacy)    History of left breast cancer    History of ischemic left MCA stroke    Seizures    Essential hypertension    Hyperlipidemia    Thrombocytopenia    Depression    Hypothyroidism    Valproic acid toxicity    Psychogenic nonepileptic seizure    Osteopenia    JUAN (obstructive sleep apnea)    Near syncope    Diplopia    Cervical strain    Left homonymous hemianopsia    Tardive dyskinesia    Gait instability    Hypoglycemia    History of subdural hematoma    Rectal bleeding    Shortness of breath    Vomiting    Acute cystitis with hematuria    Chronic restrictive lung disease    Paralysis of diaphragm    Chronic neuromuscular respiratory failure    Major depressive disorder, recurrent episode, moderate    Diabetic polyneuropathy    Bilateral hearing loss    Urinary incontinence    Stroke    Confusion    Schizophrenia    Type 2 diabetes mellitus with diabetic polyneuropathy, without long-term current use of insulin    Chronic obstructive pulmonary disease, unspecified COPD type    Buttock wound, right, initial encounter    Dizziness    Imbalance    Leg weakness, bilateral    Closed traumatic nondisplaced fracture of neck of right femur    Invasive ductal carcinoma of breast, female, left    Abnormal auditory function study    Mild protein-calorie malnutrition    Obesity (BMI  30-39.9)    Type 2 diabetes mellitus with diabetic peripheral angiopathy without gangrene, without long-term current use of insulin     Patient is here for a chronic conditions follow up.    Passed out in confesssional 2 weeks ago. Passed out 2 more times in pew.  No sx. Does not remember.  Went to ER 7/1/23. Sx similar to pseudoseizure episodes since she was 6    Heme/onc Dr. Khoobehi 5/19/23 breast ca s/p mastectomy  S/p lumpectomy 4/23  No need for chemo. Started letrozole. Dexa 7/31/23    Type 2 DM A1c 5.6 .BS have been low. Taking jardiance and metformin    Reviewed labs 5/23     JUAN on Bipap-uses daily . C/o still sleeping too much. Naps and falls asleep all day     Here with her brother who she lives with and is her primary caregiver    Pulm Dr. Urbano treating restrictive vent defect. C/o persistent shortness of breath, poor exercise tolerance for more than a year.  Had PFTs which showed some reversible airway disease responsive to bronchdilator and some rstrictive lung disease. Cardiac eval neg nuc stress neg 3/2021. Echo concentric hypertrophy left . Now on Breo. Very sedentary. Walks with walker. 199 to 170 lbs since 11/2020-believes it was trulicity that helped     Endocrine LEEANN Richards- Type 2 DM . On asa, ARB and zocor.     Hypothyroid-controlled.     Low vit d and osteopenia. On fosamax     Eye Dr. Dugan last eye exam 4/22     Podiatry Dr. KYLE Bean  DM neuropathy     Ortho Dr. Joiner right hip fracture s/p pinning  11/22     ENT LEEANN Ferrara SNNADEGE     Urology NP O'teddy urinary incontinenance     Derm Dr. Back treated SIC -C/o black cyst on abd that swells from time to time     Card Dr. Chin stress test neg for ischemia 3/2021 and echo nl function     Psych LEEANN Drake/Sung Das-treating anxiety and PTSD.  Has chronic depression.  has h/o pseudoseizures and tardive dyskinesia.  Was seeing Dr. goodwin but does not want to go back to see him.      History:  Neuro Aljabi treating pseudoseizures,  dystonia, tardive dyskinesia.  Since  Last visit Admitted penny RAZO 2/20  For seizure activity-staring spells. 2/11/20-2/12/20:  No evidence of epilepsy on EEG.  Had an event during photic stimulation which was nonepileptic in nature.  02/11/20-02/12/20:  Multiple episodes starting 19:52:22 where patient has head and hand shaking then stares off.  Each episode lasts a few seconds.  One episode ~ 10:27 with head/hand shaking and followed by leaning forward.  No epileptic correlate with these.  Some P3 spikes identified which seem consistent with asymmetric V wave rather than epileptiform discharge.  Neuro Dr. Loving Diagnosed with both complex partial epilepsy initially and maintained on valproate for epilepsy and mood control.  Diagnosed ultimately with pseudoseizures now followed by neuropsych.       her caregiver brother who she lives with.  She has h/o frrequent falls, dizziness, instablity.  She has h/o pseudoseizures, psychiatric illness (under care of psych Dr. Whyte previously), h/o CVA 2000 with residual right sided defects, subdural hematoma after fall with head trauma s/p craniotomy 7/16.Needs assistance with all ADLs at home and walks with walker.  Now under care of Neuro Dr. Roberts/Inder and Riaz Serrato     Had episode where APS was called to evaluate home due to complaints of neglect, unhealthy living conditions by  agency 8/17.  He had been in the hospital himself for DVTs and PEs now on blood thinner and urinary obstruction.  Was gone for days and no one was home with their dogs.  So the dogs messed all over the house.  He had not had time to clean it up or the energy to do so when home health came by for Ivana.            H/o left breast cancer. Last imaging 5/20 neg    Dexa 2021 normal spine, osteopenia left hip (endocrine managed)    Objective:      Physical Exam  Vitals and nursing note reviewed.   Constitutional:       Appearance: She is well-developed.   Cardiovascular:      Rate and  Rhythm: Normal rate and regular rhythm.      Heart sounds: Normal heart sounds.   Pulmonary:      Effort: Pulmonary effort is normal.      Breath sounds: Normal breath sounds.   Skin:     General: Skin is warm and dry.   Neurological:      Mental Status: She is alert and oriented to person, place, and time.       Assessment:       1. Type 2 diabetes mellitus with diabetic peripheral angiopathy without gangrene, without long-term current use of insulin    2. Mixed hyperlipidemia    3. Essential hypertension    4. Acquired hypothyroidism    5. Mild protein-calorie malnutrition    6. Thrombocytopenia    7. Chronic neuromuscular respiratory failure    8. Syncope, unspecified syncope type    9. Hypoglycemia    10. Obesity (BMI 30-39.9)        Plan:       1. Type 2 diabetes mellitus with diabetic peripheral angiopathy without gangrene, without long-term current use of insulin  Overcontrolled with frequent low BS. Stop jardiance. Cont metformin and endocrine monitoring  - CBC Auto Differential; Future  - Comprehensive Metabolic Panel; Future  - Hemoglobin A1C; Future  Cont mnitoring and test if sx low bs. Keep glucose tabs and/or coke or juice handy if needed.  Counseled patient and brother on low bs mgmt    2. Mixed hyperlipidemia  Stable condition.  Continue current medications.  Will adjust based on lab findings or if condition changes.    - Lipid Panel; Future    3. Essential hypertension  Controlled on current medications.  Continue current medications.      4. Acquired hypothyroidism  Stable condition.  Continue current medications.  Will adjust based on lab findings or if condition changes.    - TSH; Future  - T4, Free; Future    5. Mild protein-calorie malnutrition  Cont nutritional support    6. Thrombocytopenia  Stable and chronic.  Will continue to monitor q3-6 months and control chronic conditions as optimally as possible to preserve function.      7. Chronic neuromuscular respiratory failure  Stable and  chronic.  Will continue to monitor q3-6 months and control chronic conditions as optimally as possible to preserve function.      8. Syncope, unspecified syncope type  Likely pseudoseizures. May be related to low BS    9. Hypoglycemia  See above    10. Obesity (BMI 30-39.9)  Counseled patient on his ideal body weight, health consequences of being obese and current recommendations including weekly exercise and a heart healthy diet.  Current BMI is:Estimated body mass index is 33.33 kg/m² as calculated from the following:    Height as of this encounter: 5' (1.524 m).    Weight as of this encounter: 77.4 kg (170 lb 10.2 oz)..  Patient is aware that ideal BMI < 25 or Weight in (lb) to have BMI = 25: 127.7.          Time spent with patient: 20 minutes    Patient with be reevaluated in 3 months or sooner prn    Greater than 50% of this visit was spent counseling as described in above documentation:Yes

## 2023-07-12 ENCOUNTER — OFFICE VISIT (OUTPATIENT)
Dept: HEMATOLOGY/ONCOLOGY | Facility: CLINIC | Age: 73
End: 2023-07-12
Payer: MEDICARE

## 2023-07-12 ENCOUNTER — TELEPHONE (OUTPATIENT)
Dept: OBSTETRICS AND GYNECOLOGY | Facility: CLINIC | Age: 73
End: 2023-07-12
Payer: MEDICARE

## 2023-07-12 VITALS
DIASTOLIC BLOOD PRESSURE: 71 MMHG | BODY MASS INDEX: 31.88 KG/M2 | HEIGHT: 61 IN | RESPIRATION RATE: 18 BRPM | SYSTOLIC BLOOD PRESSURE: 158 MMHG | HEART RATE: 76 BPM | WEIGHT: 168.88 LBS | TEMPERATURE: 97 F | OXYGEN SATURATION: 97 %

## 2023-07-12 DIAGNOSIS — C50.912 INVASIVE DUCTAL CARCINOMA OF BREAST, FEMALE, LEFT: Primary | ICD-10-CM

## 2023-07-12 DIAGNOSIS — J44.9 CHRONIC OBSTRUCTIVE PULMONARY DISEASE, UNSPECIFIED COPD TYPE: ICD-10-CM

## 2023-07-12 DIAGNOSIS — N93.9 VAGINAL SPOTTING: ICD-10-CM

## 2023-07-12 DIAGNOSIS — E11.42 TYPE 2 DIABETES MELLITUS WITH DIABETIC POLYNEUROPATHY, WITHOUT LONG-TERM CURRENT USE OF INSULIN: ICD-10-CM

## 2023-07-12 PROCEDURE — 3066F PR DOCUMENTATION OF TREATMENT FOR NEPHROPATHY: ICD-10-PCS | Mod: CPTII,S$GLB,, | Performed by: INTERNAL MEDICINE

## 2023-07-12 PROCEDURE — 99214 PR OFFICE/OUTPT VISIT, EST, LEVL IV, 30-39 MIN: ICD-10-PCS | Mod: S$GLB,,, | Performed by: INTERNAL MEDICINE

## 2023-07-12 PROCEDURE — 3072F PR LOW RISK FOR RETINOPATHY: ICD-10-PCS | Mod: CPTII,S$GLB,, | Performed by: INTERNAL MEDICINE

## 2023-07-12 PROCEDURE — 1159F PR MEDICATION LIST DOCUMENTED IN MEDICAL RECORD: ICD-10-PCS | Mod: CPTII,S$GLB,, | Performed by: INTERNAL MEDICINE

## 2023-07-12 PROCEDURE — 3078F DIAST BP <80 MM HG: CPT | Mod: CPTII,S$GLB,, | Performed by: INTERNAL MEDICINE

## 2023-07-12 PROCEDURE — 3078F PR MOST RECENT DIASTOLIC BLOOD PRESSURE < 80 MM HG: ICD-10-PCS | Mod: CPTII,S$GLB,, | Performed by: INTERNAL MEDICINE

## 2023-07-12 PROCEDURE — 1157F PR ADVANCE CARE PLAN OR EQUIV PRESENT IN MEDICAL RECORD: ICD-10-PCS | Mod: CPTII,S$GLB,, | Performed by: INTERNAL MEDICINE

## 2023-07-12 PROCEDURE — 3044F HG A1C LEVEL LT 7.0%: CPT | Mod: CPTII,S$GLB,, | Performed by: INTERNAL MEDICINE

## 2023-07-12 PROCEDURE — 3061F PR NEG MICROALBUMINURIA RESULT DOCUMENTED/REVIEW: ICD-10-PCS | Mod: CPTII,S$GLB,, | Performed by: INTERNAL MEDICINE

## 2023-07-12 PROCEDURE — 1126F PR PAIN SEVERITY QUANTIFIED, NO PAIN PRESENT: ICD-10-PCS | Mod: CPTII,S$GLB,, | Performed by: INTERNAL MEDICINE

## 2023-07-12 PROCEDURE — 3288F PR FALLS RISK ASSESSMENT DOCUMENTED: ICD-10-PCS | Mod: CPTII,S$GLB,, | Performed by: INTERNAL MEDICINE

## 2023-07-12 PROCEDURE — 3061F NEG MICROALBUMINURIA REV: CPT | Mod: CPTII,S$GLB,, | Performed by: INTERNAL MEDICINE

## 2023-07-12 PROCEDURE — 99214 OFFICE O/P EST MOD 30 MIN: CPT | Mod: S$GLB,,, | Performed by: INTERNAL MEDICINE

## 2023-07-12 PROCEDURE — 3072F LOW RISK FOR RETINOPATHY: CPT | Mod: CPTII,S$GLB,, | Performed by: INTERNAL MEDICINE

## 2023-07-12 PROCEDURE — 1126F AMNT PAIN NOTED NONE PRSNT: CPT | Mod: CPTII,S$GLB,, | Performed by: INTERNAL MEDICINE

## 2023-07-12 PROCEDURE — 3044F PR MOST RECENT HEMOGLOBIN A1C LEVEL <7.0%: ICD-10-PCS | Mod: CPTII,S$GLB,, | Performed by: INTERNAL MEDICINE

## 2023-07-12 PROCEDURE — 3288F FALL RISK ASSESSMENT DOCD: CPT | Mod: CPTII,S$GLB,, | Performed by: INTERNAL MEDICINE

## 2023-07-12 PROCEDURE — 3066F NEPHROPATHY DOC TX: CPT | Mod: CPTII,S$GLB,, | Performed by: INTERNAL MEDICINE

## 2023-07-12 PROCEDURE — 1157F ADVNC CARE PLAN IN RCRD: CPT | Mod: CPTII,S$GLB,, | Performed by: INTERNAL MEDICINE

## 2023-07-12 PROCEDURE — 99999 PR PBB SHADOW E&M-EST. PATIENT-LVL V: CPT | Mod: PBBFAC,,, | Performed by: INTERNAL MEDICINE

## 2023-07-12 PROCEDURE — 1159F MED LIST DOCD IN RCRD: CPT | Mod: CPTII,S$GLB,, | Performed by: INTERNAL MEDICINE

## 2023-07-12 PROCEDURE — 3077F PR MOST RECENT SYSTOLIC BLOOD PRESSURE >= 140 MM HG: ICD-10-PCS | Mod: CPTII,S$GLB,, | Performed by: INTERNAL MEDICINE

## 2023-07-12 PROCEDURE — 99999 PR PBB SHADOW E&M-EST. PATIENT-LVL V: ICD-10-PCS | Mod: PBBFAC,,, | Performed by: INTERNAL MEDICINE

## 2023-07-12 PROCEDURE — 3077F SYST BP >= 140 MM HG: CPT | Mod: CPTII,S$GLB,, | Performed by: INTERNAL MEDICINE

## 2023-07-12 PROCEDURE — 1101F PT FALLS ASSESS-DOCD LE1/YR: CPT | Mod: CPTII,S$GLB,, | Performed by: INTERNAL MEDICINE

## 2023-07-12 PROCEDURE — 1101F PR PT FALLS ASSESS DOC 0-1 FALLS W/OUT INJ PAST YR: ICD-10-PCS | Mod: CPTII,S$GLB,, | Performed by: INTERNAL MEDICINE

## 2023-07-12 RX ORDER — LETROZOLE 2.5 MG/1
2.5 TABLET, FILM COATED ORAL DAILY
Qty: 30 TABLET | Refills: 5 | Status: SHIPPED | OUTPATIENT
Start: 2023-07-12 | End: 2023-12-22

## 2023-07-12 NOTE — TELEPHONE ENCOUNTER
Appt scheduled 7/24 at 10 am, gave pt instructions to location, and informed her to check in on first floor and come up to [address]; pt voiced understanding.

## 2023-07-12 NOTE — PROGRESS NOTES
Service Date:  7/12/23    Chief Complaint: Breast cancer    Maggy Tapia is a 73 y.o. female here with L breast invasive ductal carcinoma. Patient had a routine screening mammogram which led to biopsy of a concerning lesion in the upper inner quadrant. Lesion measured about 17 mm on US. [id number] ER/CA+, HER2 negative by FISH, Ki 67 40%. After mastectomy, found to have pT2N0 lesion. Oncotype score 21, <1% benefit from chemo.    Here for tox check after starting on Letrozole. Tolerating it well.  States that she had some vaginal spotting for 2 days after starting the medication but that has resolved..    Review of Systems   Constitutional: Negative.    HENT: Negative.     Eyes: Negative.    Respiratory: Negative.     Cardiovascular: Negative.    Gastrointestinal: Negative.    Endocrine: Negative.    Genitourinary: Negative.    Musculoskeletal: Negative.    Integumentary:  Negative.   Neurological: Negative.    Hematological: Negative.    Psychiatric/Behavioral: Negative.        Current Outpatient Medications   Medication Instructions    albuterol (PROVENTIL/VENTOLIN HFA) 90 mcg/actuation inhaler 1-2 puffs, Inhalation, Every 4 hours PRN, Rescue    alendronate (FOSAMAX) 70 MG tablet Take one tablet every 7 days.    aspirin (ECOTRIN) 81 mg, Oral, Daily    blood-glucose meter kit Use as instructed. Insurance preferred.    CALCIUM CARBONATE/VITAMIN D3 (CALCIUM 500 + D ORAL) 1 tablet, Oral, Daily, 10 mg daily    cyanocobalamin (VITAMIN B-12) 1,000 mcg, Oral, Daily    fluticasone furoate-vilanteroL (BREO ELLIPTA) 100-25 mcg/dose diskus inhaler 1 puff, Inhalation, Daily, Controller    gabapentin (NEURONTIN) 300 mg, Oral, Nightly, Take 1 tablet every night x 7 days. Increase to twice a day x 7 days. Increase to three times a day.    ketoconazole (NIZORAL) 2 % cream AAA pannus fold at least daily after the shower    letrozole (FEMARA) 2.5 mg, Oral, Daily    levothyroxine (SYNTHROID) 100 mcg, Oral, Before breakfast     losartan (COZAAR) 25 mg, Oral, Daily    metFORMIN (GLUCOPHAGE-XR) 1,000 mg, Oral, With breakfast    polyethylene glycol (GLYCOLAX) 17 g, Oral, Daily    potassium chloride SA (K-DUR,KLOR-CON) 20 MEQ tablet 20 mEq, Oral, Daily    rOPINIRole (REQUIP) 0.5 MG tablet Oral, Nightly    sertraline (ZOLOFT) 50 mg, Oral, Daily, For depression/anxiety    simvastatin (ZOCOR) 40 mg, Oral, Daily    tetrabenazine (XENAZINE) 25 mg tablet Take one tablet daily for 7 days and then<BR>Take 1 tablet twice daily afterwards        Past Medical History:   Diagnosis Date    Anxiety     Arthritis     Asthma     Breast cancer     Cancer     Left Breast    Depression     Diabetes mellitus, type 2     GERD (gastroesophageal reflux disease)     Hyperlipidemia     Hypertension     Overactive bladder     Seizures     Pseudo-seizures    Sleep apnea     Stroke     Stroke 2022    pt was in the hospital for a stroke, claims she was seeing people when the stroke happened    Thyroid disease     Urinary tract infection without hematuria 2017        Past Surgical History:   Procedure Laterality Date    APPENDECTOMY      BREAST BIOPSY Left     BREAST LUMPECTOMY Left         BREAST SURGERY      CATARACT EXTRACTION Bilateral     OU done//     SECTION      CHOLECYSTECTOMY      COLONOSCOPY N/A 2020    Procedure: COLONOSCOPY;  Surgeon: Mj Fernandez MD;  Location: [facility];  Service: Endoscopy;  Laterality: N/A;    CYST REMOVAL Left 2021    DILATION AND CURETTAGE OF UTERUS      EYE SURGERY      LUMPECTOMY, BREAST Left 2023    Procedure: LUMPECTOMY, BREAST;  Surgeon: Toño Paredes MD;  Location: [facility] OR;  Service: General;  Laterality: Left;    PERCUTANEOUS PINNING OF HIP Right 2022    Procedure: PINNING, HIP, PERCUTANEOUS;  Surgeon: Ministerio Joiner II, MD;  Location: [facility] OR;  Service: Orthopedics;  Laterality: Right;    SENTINEL LYMPH NODE BIOPSY Left 2023    Procedure: BIOPSY, LYMPH NODE, SENTINEL;   "Surgeon: Toño Paredes MD;  Location: [facility] OR;  Service: General;  Laterality: Left;    SIMPLE MASTECTOMY Left 5/19/2023    Procedure: MASTECTOMY, SIMPLE;  Surgeon: Toño Paredes MD;  Location: [facility] OR;  Service: General;  Laterality: Left;        Family History   Problem Relation Age of Onset    Diabetes Mother     Hypertension Mother     Breast cancer Mother     Cataracts Mother     Melanoma Mother     Heart failure Father     Melanoma Father     Cataracts Brother     Melanoma Brother     Glaucoma Neg Hx     Retinal detachment Neg Hx     Macular degeneration Neg Hx        Social History     Tobacco Use    Smoking status: Never    Smokeless tobacco: Never   Substance Use Topics    Alcohol use: Yes     Comment: seldom    Drug use: No         Vitals:    07/12/23 1039   BP: (!) 158/71   Pulse: 76   Resp: 18   Temp: 97.1 °F (36.2 °C)        Physical Exam:  BP (!) 158/71 (BP Location: Right arm, Patient Position: Sitting, BP Method: Large (Automatic))   Pulse 76   Temp 97.1 °F (36.2 °C) (Temporal)   Resp 18   Ht 5' 1" (1.549 m)   Wt 76.6 kg (168 lb 14 oz)   SpO2 97%   BMI 31.91 kg/m²     Physical Exam  Constitutional:       Appearance: Normal appearance.   HENT:      Head: Normocephalic and atraumatic.      Nose: Nose normal.      Mouth/Throat:      Mouth: Mucous membranes are moist.      Pharynx: Oropharynx is clear.   Eyes:      Conjunctiva/sclera: Conjunctivae normal.   Cardiovascular:      Rate and Rhythm: Normal rate and regular rhythm.      Heart sounds: Normal heart sounds.   Pulmonary:      Effort: Pulmonary effort is normal.      Breath sounds: Normal breath sounds.   Abdominal:      General: Abdomen is flat. Bowel sounds are normal.      Palpations: Abdomen is soft.   Musculoskeletal:         General: Normal range of motion.      Cervical back: Normal range of motion and neck supple.   Skin:     General: Skin is warm and dry.   Neurological:      General: No focal deficit present.      Mental " Status: She is alert and oriented to person, place, and time. Mental status is at baseline.   Psychiatric:         Mood and Affect: Mood normal.        Labs:  Lab Results   Component Value Date    WBC 9.77 07/01/2023    RBC 5.12 07/01/2023    HGB 15.7 07/01/2023    HCT 48.1 07/01/2023    MCV 94 07/01/2023    MCH 30.7 07/01/2023    MCHC 32.6 07/01/2023    RDW 14.2 07/01/2023     07/01/2023    MPV 10.4 07/01/2023    GRAN 6.3 07/01/2023    GRAN 64.6 07/01/2023    LYMPH 2.7 07/01/2023    LYMPH 27.7 07/01/2023    MONO 0.5 07/01/2023    MONO 5.2 07/01/2023    EOS 0.2 07/01/2023    BASO 0.04 07/01/2023    EOSINOPHIL 1.8 07/01/2023    BASOPHIL 0.4 07/01/2023     Sodium   Date Value Ref Range Status   07/01/2023 140 136 - 145 mmol/L Final     Potassium   Date Value Ref Range Status   07/01/2023 4.2 3.5 - 5.1 mmol/L Final     Chloride   Date Value Ref Range Status   07/01/2023 105 95 - 110 mmol/L Final     CO2   Date Value Ref Range Status   07/01/2023 25 23 - 29 mmol/L Final     Glucose   Date Value Ref Range Status   07/01/2023 99 70 - 110 mg/dL Final     BUN   Date Value Ref Range Status   07/01/2023 25 (H) 8 - 23 mg/dL Final     Creatinine   Date Value Ref Range Status   07/01/2023 0.8 0.5 - 1.4 mg/dL Final     Calcium   Date Value Ref Range Status   07/01/2023 9.9 8.7 - 10.5 mg/dL Final     Total Protein   Date Value Ref Range Status   07/01/2023 8.2 6.0 - 8.4 g/dL Final     Albumin   Date Value Ref Range Status   07/01/2023 4.3 3.5 - 5.2 g/dL Final     Total Bilirubin   Date Value Ref Range Status   07/01/2023 0.6 0.1 - 1.0 mg/dL Final     Comment:     For infants and newborns, interpretation of results should be based  on gestational age, weight and in agreement with clinical  observations.    Premature Infant recommended reference ranges:  Up to 24 hours.............<8.0 mg/dL  Up to 48 hours............<12.0 mg/dL  3-5 days..................<15.0 mg/dL  6-29 days.................<15.0 mg/dL       Alkaline  Phosphatase   Date Value Ref Range Status   07/01/2023 72 55 - 135 U/L Final     AST   Date Value Ref Range Status   07/01/2023 20 10 - 40 U/L Final     ALT   Date Value Ref Range Status   07/01/2023 11 10 - 44 U/L Final     Anion Gap   Date Value Ref Range Status   07/01/2023 10 8 - 16 mmol/L Final     eGFR if    Date Value Ref Range Status   03/29/2022 >60.0 >60 mL/min/1.73 m^2 Final     eGFR if non    Date Value Ref Range Status   03/29/2022 >60.0 >60 mL/min/1.73 m^2 Final     Comment:     Calculation used to obtain the estimated glomerular filtration  rate (eGFR) is the CKD-EPI equation.          A/P:    L breast invasive ductal carcinoma  -pT2N0  -ER 90%, CO 20%, HER2 -, Ki 40%  -oncotype 21 - no need for chemo  -started letrozole 6/12/23  -tox check did show some vaginal spotting  -RTC in 6 months    Vaginal spotting   -even though it was only for 2 days, given her age, I will refer her to Gynecology for thorough pelvic exam    Osteopenia  -will have DEXA on 7/31  -cont vit D and Ca  -on Fosamax    COPD  DM2      Aurash Khoobehi, MD  Hematology and Oncology

## 2023-07-13 ENCOUNTER — OFFICE VISIT (OUTPATIENT)
Dept: PSYCHIATRY | Facility: CLINIC | Age: 73
End: 2023-07-13
Payer: MEDICARE

## 2023-07-13 VITALS
HEIGHT: 61 IN | BODY MASS INDEX: 31.45 KG/M2 | DIASTOLIC BLOOD PRESSURE: 79 MMHG | HEART RATE: 83 BPM | SYSTOLIC BLOOD PRESSURE: 134 MMHG | WEIGHT: 166.56 LBS

## 2023-07-13 DIAGNOSIS — F43.10 PTSD (POST-TRAUMATIC STRESS DISORDER): Primary | ICD-10-CM

## 2023-07-13 DIAGNOSIS — F34.1 PERSISTENT DEPRESSIVE DISORDER: ICD-10-CM

## 2023-07-13 DIAGNOSIS — F41.9 ANXIETY DISORDER, UNSPECIFIED TYPE: ICD-10-CM

## 2023-07-13 PROCEDURE — 3075F PR MOST RECENT SYSTOLIC BLOOD PRESS GE 130-139MM HG: ICD-10-PCS | Mod: CPTII,S$GLB,, | Performed by: PHYSICIAN ASSISTANT

## 2023-07-13 PROCEDURE — 1125F AMNT PAIN NOTED PAIN PRSNT: CPT | Mod: CPTII,S$GLB,, | Performed by: PHYSICIAN ASSISTANT

## 2023-07-13 PROCEDURE — 1160F RVW MEDS BY RX/DR IN RCRD: CPT | Mod: CPTII,S$GLB,, | Performed by: PHYSICIAN ASSISTANT

## 2023-07-13 PROCEDURE — 90833 PSYTX W PT W E/M 30 MIN: CPT | Mod: S$GLB,,, | Performed by: PHYSICIAN ASSISTANT

## 2023-07-13 PROCEDURE — 3078F DIAST BP <80 MM HG: CPT | Mod: CPTII,S$GLB,, | Performed by: PHYSICIAN ASSISTANT

## 2023-07-13 PROCEDURE — 99999 PR PBB SHADOW E&M-EST. PATIENT-LVL IV: CPT | Mod: PBBFAC,,, | Performed by: PHYSICIAN ASSISTANT

## 2023-07-13 PROCEDURE — 3061F PR NEG MICROALBUMINURIA RESULT DOCUMENTED/REVIEW: ICD-10-PCS | Mod: CPTII,S$GLB,, | Performed by: PHYSICIAN ASSISTANT

## 2023-07-13 PROCEDURE — 3061F NEG MICROALBUMINURIA REV: CPT | Mod: CPTII,S$GLB,, | Performed by: PHYSICIAN ASSISTANT

## 2023-07-13 PROCEDURE — 1101F PT FALLS ASSESS-DOCD LE1/YR: CPT | Mod: CPTII,S$GLB,, | Performed by: PHYSICIAN ASSISTANT

## 2023-07-13 PROCEDURE — 1157F PR ADVANCE CARE PLAN OR EQUIV PRESENT IN MEDICAL RECORD: ICD-10-PCS | Mod: CPTII,S$GLB,, | Performed by: PHYSICIAN ASSISTANT

## 2023-07-13 PROCEDURE — 3288F PR FALLS RISK ASSESSMENT DOCUMENTED: ICD-10-PCS | Mod: CPTII,S$GLB,, | Performed by: PHYSICIAN ASSISTANT

## 2023-07-13 PROCEDURE — 3078F PR MOST RECENT DIASTOLIC BLOOD PRESSURE < 80 MM HG: ICD-10-PCS | Mod: CPTII,S$GLB,, | Performed by: PHYSICIAN ASSISTANT

## 2023-07-13 PROCEDURE — 3075F SYST BP GE 130 - 139MM HG: CPT | Mod: CPTII,S$GLB,, | Performed by: PHYSICIAN ASSISTANT

## 2023-07-13 PROCEDURE — 3072F PR LOW RISK FOR RETINOPATHY: ICD-10-PCS | Mod: CPTII,S$GLB,, | Performed by: PHYSICIAN ASSISTANT

## 2023-07-13 PROCEDURE — 3044F HG A1C LEVEL LT 7.0%: CPT | Mod: CPTII,S$GLB,, | Performed by: PHYSICIAN ASSISTANT

## 2023-07-13 PROCEDURE — 99214 OFFICE O/P EST MOD 30 MIN: CPT | Mod: S$GLB,,, | Performed by: PHYSICIAN ASSISTANT

## 2023-07-13 PROCEDURE — 3288F FALL RISK ASSESSMENT DOCD: CPT | Mod: CPTII,S$GLB,, | Performed by: PHYSICIAN ASSISTANT

## 2023-07-13 PROCEDURE — 3072F LOW RISK FOR RETINOPATHY: CPT | Mod: CPTII,S$GLB,, | Performed by: PHYSICIAN ASSISTANT

## 2023-07-13 PROCEDURE — 3066F PR DOCUMENTATION OF TREATMENT FOR NEPHROPATHY: ICD-10-PCS | Mod: CPTII,S$GLB,, | Performed by: PHYSICIAN ASSISTANT

## 2023-07-13 PROCEDURE — 1101F PR PT FALLS ASSESS DOC 0-1 FALLS W/OUT INJ PAST YR: ICD-10-PCS | Mod: CPTII,S$GLB,, | Performed by: PHYSICIAN ASSISTANT

## 2023-07-13 PROCEDURE — 3066F NEPHROPATHY DOC TX: CPT | Mod: CPTII,S$GLB,, | Performed by: PHYSICIAN ASSISTANT

## 2023-07-13 PROCEDURE — 99214 PR OFFICE/OUTPT VISIT, EST, LEVL IV, 30-39 MIN: ICD-10-PCS | Mod: S$GLB,,, | Performed by: PHYSICIAN ASSISTANT

## 2023-07-13 PROCEDURE — 1159F MED LIST DOCD IN RCRD: CPT | Mod: CPTII,S$GLB,, | Performed by: PHYSICIAN ASSISTANT

## 2023-07-13 PROCEDURE — 1157F ADVNC CARE PLAN IN RCRD: CPT | Mod: CPTII,S$GLB,, | Performed by: PHYSICIAN ASSISTANT

## 2023-07-13 PROCEDURE — 1159F PR MEDICATION LIST DOCUMENTED IN MEDICAL RECORD: ICD-10-PCS | Mod: CPTII,S$GLB,, | Performed by: PHYSICIAN ASSISTANT

## 2023-07-13 PROCEDURE — 3044F PR MOST RECENT HEMOGLOBIN A1C LEVEL <7.0%: ICD-10-PCS | Mod: CPTII,S$GLB,, | Performed by: PHYSICIAN ASSISTANT

## 2023-07-13 PROCEDURE — 99999 PR PBB SHADOW E&M-EST. PATIENT-LVL IV: ICD-10-PCS | Mod: PBBFAC,,, | Performed by: PHYSICIAN ASSISTANT

## 2023-07-13 PROCEDURE — 1125F PR PAIN SEVERITY QUANTIFIED, PAIN PRESENT: ICD-10-PCS | Mod: CPTII,S$GLB,, | Performed by: PHYSICIAN ASSISTANT

## 2023-07-13 PROCEDURE — 1160F PR REVIEW ALL MEDS BY PRESCRIBER/CLIN PHARMACIST DOCUMENTED: ICD-10-PCS | Mod: CPTII,S$GLB,, | Performed by: PHYSICIAN ASSISTANT

## 2023-07-13 PROCEDURE — 90833 PR PSYCHOTHERAPY W/PATIENT W/E&M, 30 MIN (ADD ON): ICD-10-PCS | Mod: S$GLB,,, | Performed by: PHYSICIAN ASSISTANT

## 2023-07-13 RX ORDER — SERTRALINE HYDROCHLORIDE 50 MG/1
50 TABLET, FILM COATED ORAL DAILY
Qty: 90 TABLET | Refills: 0 | Status: SHIPPED | OUTPATIENT
Start: 2023-07-13 | End: 2023-10-04 | Stop reason: SDUPTHER
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
## 2023-07-24 ENCOUNTER — HOSPITAL ENCOUNTER (OUTPATIENT)
Dept: RADIOLOGY | Facility: HOSPITAL | Age: 73
Discharge: HOME | End: 2023-07-24
Attending: OBSTETRICS & GYNECOLOGY
Payer: MEDICARE

## 2023-07-24 ENCOUNTER — OFFICE VISIT (OUTPATIENT)
Dept: OBSTETRICS AND GYNECOLOGY | Facility: CLINIC | Age: 73
End: 2023-07-24
Payer: MEDICARE

## 2023-07-24 VITALS
RESPIRATION RATE: 16 BRPM | DIASTOLIC BLOOD PRESSURE: 56 MMHG | BODY MASS INDEX: 31.67 KG/M2 | WEIGHT: 167.75 LBS | HEIGHT: 61 IN | SYSTOLIC BLOOD PRESSURE: 110 MMHG

## 2023-07-24 DIAGNOSIS — N95.0 PMB (POSTMENOPAUSAL BLEEDING): Primary | ICD-10-CM

## 2023-07-24 DIAGNOSIS — Z12.4 SCREENING FOR MALIGNANT NEOPLASM OF CERVIX: ICD-10-CM

## 2023-07-24 DIAGNOSIS — N95.0 PMB (POSTMENOPAUSAL BLEEDING): ICD-10-CM

## 2023-07-24 DIAGNOSIS — N93.9 VAGINAL SPOTTING: ICD-10-CM

## 2023-07-24 PROBLEM — N30.01 ACUTE CYSTITIS WITH HEMATURIA: Status: RESOLVED | Noted: 2021-03-22 | Resolved: 2023-07-24

## 2023-07-24 PROBLEM — S31.819A BUTTOCK WOUND, RIGHT, INITIAL ENCOUNTER: Status: RESOLVED | Noted: 2022-07-28 | Resolved: 2023-07-24

## 2023-07-24 PROCEDURE — 3078F DIAST BP <80 MM HG: CPT | Mod: CPTII,S$GLB,, | Performed by: OBSTETRICS & GYNECOLOGY

## 2023-07-24 PROCEDURE — 99999 PR PBB SHADOW E&M-EST. PATIENT-LVL IV: ICD-10-PCS | Mod: PBBFAC,,, | Performed by: OBSTETRICS & GYNECOLOGY

## 2023-07-24 PROCEDURE — 76856 US EXAM PELVIC COMPLETE: CPT | Mod: TC,PO

## 2023-07-24 PROCEDURE — 3288F FALL RISK ASSESSMENT DOCD: CPT | Mod: CPTII,S$GLB,, | Performed by: OBSTETRICS & GYNECOLOGY

## 2023-07-24 PROCEDURE — 3072F LOW RISK FOR RETINOPATHY: CPT | Mod: CPTII,S$GLB,, | Performed by: OBSTETRICS & GYNECOLOGY

## 2023-07-24 PROCEDURE — 3066F PR DOCUMENTATION OF TREATMENT FOR NEPHROPATHY: ICD-10-PCS | Mod: CPTII,S$GLB,, | Performed by: OBSTETRICS & GYNECOLOGY

## 2023-07-24 PROCEDURE — 3061F NEG MICROALBUMINURIA REV: CPT | Mod: CPTII,S$GLB,, | Performed by: OBSTETRICS & GYNECOLOGY

## 2023-07-24 PROCEDURE — 1157F PR ADVANCE CARE PLAN OR EQUIV PRESENT IN MEDICAL RECORD: ICD-10-PCS | Mod: CPTII,S$GLB,, | Performed by: OBSTETRICS & GYNECOLOGY

## 2023-07-24 PROCEDURE — 1100F PTFALLS ASSESS-DOCD GE2>/YR: CPT | Mod: CPTII,S$GLB,, | Performed by: OBSTETRICS & GYNECOLOGY

## 2023-07-24 PROCEDURE — 1126F PR PAIN SEVERITY QUANTIFIED, NO PAIN PRESENT: ICD-10-PCS | Mod: CPTII,S$GLB,, | Performed by: OBSTETRICS & GYNECOLOGY

## 2023-07-24 PROCEDURE — 3044F PR MOST RECENT HEMOGLOBIN A1C LEVEL <7.0%: ICD-10-PCS | Mod: CPTII,S$GLB,, | Performed by: OBSTETRICS & GYNECOLOGY

## 2023-07-24 PROCEDURE — 1126F AMNT PAIN NOTED NONE PRSNT: CPT | Mod: CPTII,S$GLB,, | Performed by: OBSTETRICS & GYNECOLOGY

## 2023-07-24 PROCEDURE — 87624 HPV HI-RISK TYP POOLED RSLT: CPT | Performed by: OBSTETRICS & GYNECOLOGY

## 2023-07-24 PROCEDURE — 99999 PR PBB SHADOW E&M-EST. PATIENT-LVL IV: CPT | Mod: PBBFAC,,, | Performed by: OBSTETRICS & GYNECOLOGY

## 2023-07-24 PROCEDURE — 76830 US PELVIS COMP WITH TRANSVAG NON-OB (XPD): ICD-10-PCS | Mod: 26,,, | Performed by: RADIOLOGY

## 2023-07-24 PROCEDURE — 3078F PR MOST RECENT DIASTOLIC BLOOD PRESSURE < 80 MM HG: ICD-10-PCS | Mod: CPTII,S$GLB,, | Performed by: OBSTETRICS & GYNECOLOGY

## 2023-07-24 PROCEDURE — 88175 CYTOPATH C/V AUTO FLUID REDO: CPT | Performed by: OBSTETRICS & GYNECOLOGY

## 2023-07-24 PROCEDURE — 1100F PR PT FALLS ASSESS DOC 2+ FALLS/FALL W/INJURY/YR: ICD-10-PCS | Mod: CPTII,S$GLB,, | Performed by: OBSTETRICS & GYNECOLOGY

## 2023-07-24 PROCEDURE — 3044F HG A1C LEVEL LT 7.0%: CPT | Mod: CPTII,S$GLB,, | Performed by: OBSTETRICS & GYNECOLOGY

## 2023-07-24 PROCEDURE — 3288F PR FALLS RISK ASSESSMENT DOCUMENTED: ICD-10-PCS | Mod: CPTII,S$GLB,, | Performed by: OBSTETRICS & GYNECOLOGY

## 2023-07-24 PROCEDURE — 3074F PR MOST RECENT SYSTOLIC BLOOD PRESSURE < 130 MM HG: ICD-10-PCS | Mod: CPTII,S$GLB,, | Performed by: OBSTETRICS & GYNECOLOGY

## 2023-07-24 PROCEDURE — 76856 US EXAM PELVIC COMPLETE: CPT | Mod: 26,,, | Performed by: RADIOLOGY

## 2023-07-24 PROCEDURE — 1159F PR MEDICATION LIST DOCUMENTED IN MEDICAL RECORD: ICD-10-PCS | Mod: CPTII,S$GLB,, | Performed by: OBSTETRICS & GYNECOLOGY

## 2023-07-24 PROCEDURE — 3061F PR NEG MICROALBUMINURIA RESULT DOCUMENTED/REVIEW: ICD-10-PCS | Mod: CPTII,S$GLB,, | Performed by: OBSTETRICS & GYNECOLOGY

## 2023-07-24 PROCEDURE — 1159F MED LIST DOCD IN RCRD: CPT | Mod: CPTII,S$GLB,, | Performed by: OBSTETRICS & GYNECOLOGY

## 2023-07-24 PROCEDURE — 99204 OFFICE O/P NEW MOD 45 MIN: CPT | Mod: S$GLB,,, | Performed by: OBSTETRICS & GYNECOLOGY

## 2023-07-24 PROCEDURE — 3066F NEPHROPATHY DOC TX: CPT | Mod: CPTII,S$GLB,, | Performed by: OBSTETRICS & GYNECOLOGY

## 2023-07-24 PROCEDURE — 3074F SYST BP LT 130 MM HG: CPT | Mod: CPTII,S$GLB,, | Performed by: OBSTETRICS & GYNECOLOGY

## 2023-07-24 PROCEDURE — 76856 US PELVIS COMP WITH TRANSVAG NON-OB (XPD): ICD-10-PCS | Mod: 26,,, | Performed by: RADIOLOGY

## 2023-07-24 PROCEDURE — 3072F PR LOW RISK FOR RETINOPATHY: ICD-10-PCS | Mod: CPTII,S$GLB,, | Performed by: OBSTETRICS & GYNECOLOGY

## 2023-07-24 PROCEDURE — 99204 PR OFFICE/OUTPT VISIT, NEW, LEVL IV, 45-59 MIN: ICD-10-PCS | Mod: S$GLB,,, | Performed by: OBSTETRICS & GYNECOLOGY

## 2023-07-24 PROCEDURE — 76830 TRANSVAGINAL US NON-OB: CPT | Mod: 26,,, | Performed by: RADIOLOGY

## 2023-07-24 PROCEDURE — 1157F ADVNC CARE PLAN IN RCRD: CPT | Mod: CPTII,S$GLB,, | Performed by: OBSTETRICS & GYNECOLOGY

## 2023-07-24 NOTE — PROGRESS NOTES
Subjective:   Chief Complaint:  Vaginal Bleeding       Patient ID: Maggy Tapia is a  73 y.o. female.    HRT: None / HISTORY OF BREAST CANCER CURRENTLY ON FEMARA    Date: 2023    The patient presents today due to the following:    She was referred by her hematologist oncologist due to a 3 day history of postmenopausal bleeding.  This was 3 weeks ago and was primarily spotting.  She does not recall the age of menopause.    She has not had a gynecologic evaluation for many years.    She denies any pelvic pain.    GYN & OB History  No LMP recorded. Patient is postmenopausal.     Date of Last Pap: 2023  OB History          0    Para   0    Term   0       0    AB   0    Living   0         SAB   0    IAB   0    Ectopic   0    Multiple   0    Live Births   0                 Allergies:   Review of patient's allergies indicates:   Allergen Reactions    Penicillins Anaphylaxis    Sulfa (sulfonamide antibiotics) Anaphylaxis    Trintellix [vortioxetine] Nausea And Vomiting and Other (See Comments)     Patient has seizures and vomits       Past Medical History:   Diagnosis Date    Anxiety     Arthritis     Asthma     Breast cancer     Left    Depression     Diabetes mellitus, type 2     GERD (gastroesophageal reflux disease)     Hyperlipidemia     Hypertension     Hypothyroidism, unspecified     Overactive bladder     Seizures     Pseudo-seizures    Sleep apnea     Stroke 2022    pt was in the hospital for a stroke, claims she was seeing people when the stroke happened       Past Surgical History:   Procedure Laterality Date    APPENDECTOMY      BREAST BIOPSY Left     BREAST LUMPECTOMY Left         BREAST SURGERY      CATARACT EXTRACTION Bilateral     OU done//     SECTION      CHOLECYSTECTOMY      COLONOSCOPY N/A 2020    Procedure: COLONOSCOPY;  Surgeon: Mj Fernandez MD;  Location: [facility];  Service: Endoscopy;  Laterality: N/A;    CYST REMOVAL Left 2021     DILATION AND CURETTAGE OF UTERUS      EYE SURGERY      LUMPECTOMY, BREAST Left 4/26/2023    Procedure: LUMPECTOMY, BREAST;  Surgeon: Toño Paredes MD;  Location: [facility] OR;  Service: General;  Laterality: Left;    PERCUTANEOUS PINNING OF HIP Right 11/11/2022    Procedure: PINNING, HIP, PERCUTANEOUS;  Surgeon: Ministerio Joiner II, MD;  Location: [facility] OR;  Service: Orthopedics;  Laterality: Right;    SENTINEL LYMPH NODE BIOPSY Left 4/26/2023    Procedure: BIOPSY, LYMPH NODE, SENTINEL;  Surgeon: Toño Paredes MD;  Location: [facility] OR;  Service: General;  Laterality: Left;    SIMPLE MASTECTOMY Left 5/19/2023    Procedure: MASTECTOMY, SIMPLE;  Surgeon: Toño Paredes MD;  Location: [facility] OR;  Service: General;  Laterality: Left;       Medications    Current Outpatient Medications:     albuterol (PROVENTIL/VENTOLIN HFA) 90 mcg/actuation inhaler, Inhale 1-2 puffs into the lungs every 4 (four) hours as needed for Shortness of Breath (coughing). Rescue, Disp: 20.1 g, Rfl: 11    alendronate (FOSAMAX) 70 MG tablet, Take one tablet every 7 days., Disp: 4 tablet, Rfl: 11    aspirin (ECOTRIN) 81 MG EC tablet, Take 81 mg by mouth once daily., Disp: , Rfl:     blood-glucose meter kit, Use as instructed. Insurance preferred., Disp: 1 each, Rfl: 0    CALCIUM CARBONATE/VITAMIN D3 (CALCIUM 500 + D ORAL), Take 1 tablet by mouth once daily. 10 mg daily, Disp: , Rfl:     cyanocobalamin (VITAMIN B-12) 1000 MCG tablet, Take 1 tablet (1,000 mcg total) by mouth once daily., Disp: 30 tablet, Rfl: 0    fluticasone furoate-vilanteroL (BREO ELLIPTA) 100-25 mcg/dose diskus inhaler, Inhale 1 puff into the lungs once daily. Controller, Disp: 60 each, Rfl: 11    gabapentin (NEURONTIN) 300 MG capsule, Take 1 capsule (300 mg total) by mouth every evening. Take 1 tablet every night x 7 days. Increase to twice a day x 7 days. Increase to three times a day., Disp: 90 capsule, Rfl: 0    ketoconazole (NIZORAL) 2 % cream, AAA pannus fold at least daily  after the shower, Disp: 60 g, Rfl: 3    letrozole (FEMARA) 2.5 mg Tab, Take 1 tablet (2.5 mg total) by mouth once daily., Disp: 30 tablet, Rfl: 5    levothyroxine (SYNTHROID) 100 MCG tablet, Take 1 tablet (100 mcg total) by mouth before breakfast., Disp: 90 tablet, Rfl: 3    losartan (COZAAR) 50 MG tablet, Take 0.5 tablets (25 mg total) by mouth once daily., Disp: 90 tablet, Rfl: 0    metFORMIN (GLUCOPHAGE-XR) 500 MG ER 24hr tablet, Take 2 tablets (1,000 mg total) by mouth daily with breakfast., Disp: 180 tablet, Rfl: 3    polyethylene glycol (GLYCOLAX) 17 gram PwPk, Take 17 g by mouth once daily., Disp: , Rfl: 0    potassium chloride SA (K-DUR,KLOR-CON) 20 MEQ tablet, Take 20 mEq by mouth once daily., Disp: , Rfl:     rOPINIRole (REQUIP) 0.5 MG tablet, Take by mouth every evening., Disp: , Rfl:     sertraline (ZOLOFT) 50 MG tablet, Take 1 tablet (50 mg total) by mouth once daily. For depression/anxiety, Disp: 90 tablet, Rfl: 0    simvastatin (ZOCOR) 40 MG tablet, Take 1 tablet (40 mg total) by mouth once daily., Disp: 90 tablet, Rfl: 3    tetrabenazine (XENAZINE) 25 mg tablet, Take one tablet daily for 7 days and then Take 1 tablet twice daily afterwards, Disp: 47 tablet, Rfl: 0     Social History     Socioeconomic History    Marital status: Single    Number of children: 0   Tobacco Use    Smoking status: Never    Smokeless tobacco: Never   Substance and Sexual Activity    Alcohol use: Yes     Comment: seldom    Drug use: No    Sexual activity: Not Currently   Social History Narrative    Single, no children, lives with brother Apollo who is her primary caregiver.       Family History   Problem Relation Age of Onset    Diabetes Mother     Hypertension Mother     Breast cancer Mother     Cataracts Mother     Melanoma Mother     Heart failure Father     Melanoma Father     Cataracts Brother     Melanoma Brother     Glaucoma Neg Hx     Retinal detachment Neg Hx     Macular degeneration Neg Hx        Review of Systems  (at today's evaluation)  Review of Systems   Constitutional:  Negative for fever and unexpected weight change.   Respiratory: Negative.     Cardiovascular:  Negative for chest pain and palpitations.   Gastrointestinal:  Negative for nausea and vomiting.   Genitourinary:  Negative for dysuria, hematuria and urgency.        Gyn as per HPI   Neurological:  Negative for headaches.      Objective:      Vitals:    07/24/23 0950   BP: (!) 110/56   Resp: 16     Physical Exam:   Constitutional: She appears well-developed and well-nourished.    HENT:   Head: Normocephalic.     Neck: No thyroid mass present.    Cardiovascular:  Normal rate, regular rhythm and normal heart sounds.             Pulmonary/Chest: Effort normal and breath sounds normal.        Abdominal: Soft. There is no abdominal tenderness.     Genitourinary:    Inguinal canal, uterus, right adnexa and left adnexa normal.      Pelvic exam was performed with patient supine.   The external female genitalia was normal.   No external genitalia lesions identified,Cervix is normal. Right adnexum displays no mass and no tenderness. Left adnexum displays no mass and no tenderness. There is erythema and tenderness in the vagina. No bleeding in the vagina. Vaginal atrophy noted. Uterus is not tender. Normal urethral meatus.Urethra findings: no tendernessBladder findings: no bladder tenderness          Musculoskeletal:      Right lower leg: No edema.      Left lower leg: No edema.      Lymphadenopathy:     She has no cervical adenopathy. No inguinal adenopathy noted on the right or left side.    Neurological: She is alert.    Skin: Skin is warm and dry.    Psychiatric: Mood normal.          Assessment:        1. PMB (postmenopausal bleeding)    2. Vaginal spotting    3. Screening for malignant neoplasm of cervix        Plan:      PMB (postmenopausal bleeding)  -     US Pelvis Comp with Transvag NON-OB (xpd; Future; Expected date: 07/24/2023    Vaginal spotting  -      Ambulatory referral/consult to Gynecology    Screening for malignant neoplasm of cervix  -     HPV High Risk Genotypes, PCR  -     Liquid-Based Pap Smear, Screening       Follow up for ASAP after recommended testing obtained.     The above was reviewed discussed with the patient her brother.    We reviewed the significance and association of postmenopausal bleeding and precancerous and cancerous lesions of the uterus.  In addition benign issues such as polyps and atrophic vaginitis were discussed.    At this time will proceed as follows:  Pap smear was obtained today's evaluation  A pelvic ultrasound has been ordered to evaluate the endometrial cavity.  We will base further evaluation and treatment on results of the above testing  The possibility of dilation and curettage was discussed.  Based on the difficulty with today's exam I do not think the patient will tolerate an endometrial biopsy.    The patient and her brother's questions were answered, and they are in agreement with the current plan.     Gamaliel Moran MD  Department OBGYN [facility]

## 2023-07-26 ENCOUNTER — OFFICE VISIT (OUTPATIENT)
Dept: OBSTETRICS AND GYNECOLOGY | Facility: CLINIC | Age: 73
End: 2023-07-26
Payer: MEDICARE

## 2023-07-26 VITALS
BODY MASS INDEX: 31.67 KG/M2 | DIASTOLIC BLOOD PRESSURE: 60 MMHG | SYSTOLIC BLOOD PRESSURE: 126 MMHG | HEIGHT: 61 IN | WEIGHT: 167.75 LBS | RESPIRATION RATE: 16 BRPM

## 2023-07-26 DIAGNOSIS — Z85.3 HISTORY OF BREAST CANCER: ICD-10-CM

## 2023-07-26 DIAGNOSIS — N95.0 PMB (POSTMENOPAUSAL BLEEDING): Primary | ICD-10-CM

## 2023-07-26 DIAGNOSIS — N63.22 MASS OF UPPER INNER QUADRANT OF LEFT BREAST: ICD-10-CM

## 2023-07-26 PROCEDURE — 1126F AMNT PAIN NOTED NONE PRSNT: CPT | Mod: CPTII,S$GLB,, | Performed by: OBSTETRICS & GYNECOLOGY

## 2023-07-26 PROCEDURE — 3061F PR NEG MICROALBUMINURIA RESULT DOCUMENTED/REVIEW: ICD-10-PCS | Mod: CPTII,S$GLB,, | Performed by: OBSTETRICS & GYNECOLOGY

## 2023-07-26 PROCEDURE — 1159F PR MEDICATION LIST DOCUMENTED IN MEDICAL RECORD: ICD-10-PCS | Mod: CPTII,S$GLB,, | Performed by: OBSTETRICS & GYNECOLOGY

## 2023-07-26 PROCEDURE — 3066F NEPHROPATHY DOC TX: CPT | Mod: CPTII,S$GLB,, | Performed by: OBSTETRICS & GYNECOLOGY

## 2023-07-26 PROCEDURE — 1126F PR PAIN SEVERITY QUANTIFIED, NO PAIN PRESENT: ICD-10-PCS | Mod: CPTII,S$GLB,, | Performed by: OBSTETRICS & GYNECOLOGY

## 2023-07-26 PROCEDURE — 3074F PR MOST RECENT SYSTOLIC BLOOD PRESSURE < 130 MM HG: ICD-10-PCS | Mod: CPTII,S$GLB,, | Performed by: OBSTETRICS & GYNECOLOGY

## 2023-07-26 PROCEDURE — 3072F LOW RISK FOR RETINOPATHY: CPT | Mod: CPTII,S$GLB,, | Performed by: OBSTETRICS & GYNECOLOGY

## 2023-07-26 PROCEDURE — 3061F NEG MICROALBUMINURIA REV: CPT | Mod: CPTII,S$GLB,, | Performed by: OBSTETRICS & GYNECOLOGY

## 2023-07-26 PROCEDURE — 99999 PR PBB SHADOW E&M-EST. PATIENT-LVL V: ICD-10-PCS | Mod: PBBFAC,,, | Performed by: OBSTETRICS & GYNECOLOGY

## 2023-07-26 PROCEDURE — 3078F PR MOST RECENT DIASTOLIC BLOOD PRESSURE < 80 MM HG: ICD-10-PCS | Mod: CPTII,S$GLB,, | Performed by: OBSTETRICS & GYNECOLOGY

## 2023-07-26 PROCEDURE — 3078F DIAST BP <80 MM HG: CPT | Mod: CPTII,S$GLB,, | Performed by: OBSTETRICS & GYNECOLOGY

## 2023-07-26 PROCEDURE — 99999 PR PBB SHADOW E&M-EST. PATIENT-LVL V: CPT | Mod: PBBFAC,,, | Performed by: OBSTETRICS & GYNECOLOGY

## 2023-07-26 PROCEDURE — 3072F PR LOW RISK FOR RETINOPATHY: ICD-10-PCS | Mod: CPTII,S$GLB,, | Performed by: OBSTETRICS & GYNECOLOGY

## 2023-07-26 PROCEDURE — 3074F SYST BP LT 130 MM HG: CPT | Mod: CPTII,S$GLB,, | Performed by: OBSTETRICS & GYNECOLOGY

## 2023-07-26 PROCEDURE — 99214 PR OFFICE/OUTPT VISIT, EST, LEVL IV, 30-39 MIN: ICD-10-PCS | Mod: S$GLB,,, | Performed by: OBSTETRICS & GYNECOLOGY

## 2023-07-26 PROCEDURE — 1159F MED LIST DOCD IN RCRD: CPT | Mod: CPTII,S$GLB,, | Performed by: OBSTETRICS & GYNECOLOGY

## 2023-07-26 PROCEDURE — 1157F ADVNC CARE PLAN IN RCRD: CPT | Mod: CPTII,S$GLB,, | Performed by: OBSTETRICS & GYNECOLOGY

## 2023-07-26 PROCEDURE — 3066F PR DOCUMENTATION OF TREATMENT FOR NEPHROPATHY: ICD-10-PCS | Mod: CPTII,S$GLB,, | Performed by: OBSTETRICS & GYNECOLOGY

## 2023-07-26 PROCEDURE — 3044F HG A1C LEVEL LT 7.0%: CPT | Mod: CPTII,S$GLB,, | Performed by: OBSTETRICS & GYNECOLOGY

## 2023-07-26 PROCEDURE — 3044F PR MOST RECENT HEMOGLOBIN A1C LEVEL <7.0%: ICD-10-PCS | Mod: CPTII,S$GLB,, | Performed by: OBSTETRICS & GYNECOLOGY

## 2023-07-26 PROCEDURE — 99214 OFFICE O/P EST MOD 30 MIN: CPT | Mod: S$GLB,,, | Performed by: OBSTETRICS & GYNECOLOGY

## 2023-07-26 PROCEDURE — 1157F PR ADVANCE CARE PLAN OR EQUIV PRESENT IN MEDICAL RECORD: ICD-10-PCS | Mod: CPTII,S$GLB,, | Performed by: OBSTETRICS & GYNECOLOGY

## 2023-07-26 NOTE — PROGRESS NOTES
Subjective:   Chief Complaint:  Follow-up (U/s follow up)       Patient ID: Maggy Tapia is a  73 y.o. female.    HRT: None / HISTORY OF BREAST CANCER CURRENTLY ON FEMARA    Date: 2023    The patient presents today due to the following:    She was referred by her hematologist oncologist due to a 3 day history of postmenopausal bleeding.  This was 3 weeks ago and was primarily spotting.  She does not recall the age of menopause.    She has not had a gynecologic evaluation for many years.    She denies any pelvic pain.    Date: 2023    The patient presents today for follow-up.  She was last seen as above.  In light of her postmenopausal bleeding, pelvic ultrasound was ordered.  In addition the patient reports a 1 day history of a palpable left breast issue at the site of her mammogram incision.  She reports she now feels a lump that was not present previously.    GYN & OB History  No LMP recorded. Patient is postmenopausal.     Date of Last Pap: No result found  OB History          0    Para   0    Term   0       0    AB   0    Living   0         SAB   0    IAB   0    Ectopic   0    Multiple   0    Live Births   0                 Allergies:   Review of patient's allergies indicates:   Allergen Reactions    Penicillins Anaphylaxis    Sulfa (sulfonamide antibiotics) Anaphylaxis    Trintellix [vortioxetine] Nausea And Vomiting and Other (See Comments)     Patient has seizures and vomits       Past Medical History:   Diagnosis Date    Anxiety     Arthritis     Asthma     Breast cancer     Left    Depression     Diabetes mellitus, type 2     GERD (gastroesophageal reflux disease)     Hyperlipidemia     Hypertension     Hypothyroidism, unspecified     Overactive bladder     Seizures     Pseudo-seizures    Sleep apnea     Stroke 2022    pt was in the hospital for a stroke, claims she was seeing people when the stroke happened       Past Surgical History:   Procedure Laterality Date     APPENDECTOMY      BREAST BIOPSY Left     BREAST LUMPECTOMY Left     2016    BREAST SURGERY      CATARACT EXTRACTION Bilateral     OU done//     SECTION      CHOLECYSTECTOMY      COLONOSCOPY N/A 2020    Procedure: COLONOSCOPY;  Surgeon: jM Fernandez MD;  Location: [facility];  Service: Endoscopy;  Laterality: N/A;    CYST REMOVAL Left 2021    DILATION AND CURETTAGE OF UTERUS      EYE SURGERY      LUMPECTOMY, BREAST Left 2023    Procedure: LUMPECTOMY, BREAST;  Surgeon: Toño Paredes MD;  Location: [facility] OR;  Service: General;  Laterality: Left;    PERCUTANEOUS PINNING OF HIP Right 2022    Procedure: PINNING, HIP, PERCUTANEOUS;  Surgeon: Ministerio Joiner II, MD;  Location: [facility] OR;  Service: Orthopedics;  Laterality: Right;    SENTINEL LYMPH NODE BIOPSY Left 2023    Procedure: BIOPSY, LYMPH NODE, SENTINEL;  Surgeon: Toño Paredes MD;  Location: [facility] OR;  Service: General;  Laterality: Left;    SIMPLE MASTECTOMY Left 2023    Procedure: MASTECTOMY, SIMPLE;  Surgeon: Toño Paredes MD;  Location: [facility] OR;  Service: General;  Laterality: Left;       Medications    Current Outpatient Medications:     albuterol (PROVENTIL/VENTOLIN HFA) 90 mcg/actuation inhaler, Inhale 1-2 puffs into the lungs every 4 (four) hours as needed for Shortness of Breath (coughing). Rescue, Disp: 20.1 g, Rfl: 11    alendronate (FOSAMAX) 70 MG tablet, Take one tablet every 7 days., Disp: 4 tablet, Rfl: 11    aspirin (ECOTRIN) 81 MG EC tablet, Take 81 mg by mouth once daily., Disp: , Rfl:     blood-glucose meter kit, Use as instructed. Insurance preferred., Disp: 1 each, Rfl: 0    CALCIUM CARBONATE/VITAMIN D3 (CALCIUM 500 + D ORAL), Take 1 tablet by mouth once daily. 10 mg daily, Disp: , Rfl:     cyanocobalamin (VITAMIN B-12) 1000 MCG tablet, Take 1 tablet (1,000 mcg total) by mouth once daily., Disp: 30 tablet, Rfl: 0    fluticasone furoate-vilanteroL (BREO ELLIPTA) 100-25 mcg/dose diskus inhaler,  Inhale 1 puff into the lungs once daily. Controller, Disp: 60 each, Rfl: 11    gabapentin (NEURONTIN) 300 MG capsule, Take 1 capsule (300 mg total) by mouth every evening. Take 1 tablet every night x 7 days. Increase to twice a day x 7 days. Increase to three times a day., Disp: 90 capsule, Rfl: 0    ketoconazole (NIZORAL) 2 % cream, AAA pannus fold at least daily after the shower, Disp: 60 g, Rfl: 3    letrozole (FEMARA) 2.5 mg Tab, Take 1 tablet (2.5 mg total) by mouth once daily., Disp: 30 tablet, Rfl: 5    levothyroxine (SYNTHROID) 100 MCG tablet, Take 1 tablet (100 mcg total) by mouth before breakfast., Disp: 90 tablet, Rfl: 3    losartan (COZAAR) 50 MG tablet, Take 0.5 tablets (25 mg total) by mouth once daily., Disp: 90 tablet, Rfl: 0    metFORMIN (GLUCOPHAGE-XR) 500 MG ER 24hr tablet, Take 2 tablets (1,000 mg total) by mouth daily with breakfast., Disp: 180 tablet, Rfl: 3    polyethylene glycol (GLYCOLAX) 17 gram PwPk, Take 17 g by mouth once daily., Disp: , Rfl: 0    potassium chloride SA (K-DUR,KLOR-CON) 20 MEQ tablet, Take 20 mEq by mouth once daily., Disp: , Rfl:     rOPINIRole (REQUIP) 0.5 MG tablet, Take by mouth every evening., Disp: , Rfl:     sertraline (ZOLOFT) 50 MG tablet, Take 1 tablet (50 mg total) by mouth once daily. For depression/anxiety, Disp: 90 tablet, Rfl: 0    simvastatin (ZOCOR) 40 MG tablet, Take 1 tablet (40 mg total) by mouth once daily., Disp: 90 tablet, Rfl: 3    tetrabenazine (XENAZINE) 25 mg tablet, Take one tablet daily for 7 days and then Take 1 tablet twice daily afterwards, Disp: 47 tablet, Rfl: 0     Social History     Socioeconomic History    Marital status: Single    Number of children: 0   Tobacco Use    Smoking status: Never    Smokeless tobacco: Never   Substance and Sexual Activity    Alcohol use: Yes     Comment: seldom    Drug use: No    Sexual activity: Not Currently   Social History Narrative    Single, no children, lives with brother Apollo who is her primary  caregiver.     Social Determinants of Health     Physical Activity: Inactive (1/18/2022)    Exercise Vital Sign     Days of Exercise per Week: 0 days     Minutes of Exercise per Session: 0 min   Social Connections: Socially Isolated (1/18/2022)    Social Connection and Isolation Panel [NHANES]     Frequency of Communication with Friends and Family: More than three times a week     Frequency of Social Gatherings with Friends and Family: More than three times a week     Attends Christian Services: Never     Active Member of Clubs or Organizations: No     Attends Club or Organization Meetings: Never     Marital Status: Never        Family History   Problem Relation Age of Onset    Diabetes Mother     Hypertension Mother     Breast cancer Mother     Cataracts Mother     Melanoma Mother     Heart failure Father     Melanoma Father     Cataracts Brother     Melanoma Brother     Glaucoma Neg Hx     Retinal detachment Neg Hx     Macular degeneration Neg Hx        Review of Systems (at today's evaluation)  Review of Systems   Constitutional:  Negative for fever and unexpected weight change.   Respiratory: Negative.     Cardiovascular:  Negative for chest pain and palpitations.   Gastrointestinal:  Negative for nausea and vomiting.   Genitourinary:  Negative for dysuria, hematuria and urgency.        Gyn as per HPI   Neurological:  Negative for headaches.        Objective:      Vitals:    07/26/23 1038   BP: 126/60   Resp: 16     Physical Exam:   Constitutional: She appears well-developed and well-nourished.    HENT:   Head: Normocephalic.     Neck: No thyroid mass present.    Cardiovascular:  Normal rate, regular rhythm and normal heart sounds.             Pulmonary/Chest: Effort normal and breath sounds normal.            Abdominal: Soft. There is no abdominal tenderness.     Genitourinary:    Inguinal canal, uterus, right adnexa and left adnexa normal.      Pelvic exam was performed with patient supine.   The  external female genitalia was normal.   No external genitalia lesions identified,Cervix is normal. Right adnexum displays no mass and no tenderness. Left adnexum displays no mass and no tenderness. There is erythema and tenderness in the vagina. No bleeding in the vagina. Vaginal atrophy noted. Uterus is not tender. Normal urethral meatus.Urethra findings: no tendernessBladder findings: no bladder tenderness          Musculoskeletal:      Right lower leg: No edema.      Left lower leg: No edema.      Lymphadenopathy:     She has no cervical adenopathy. No inguinal adenopathy noted on the right or left side.    Neurological: She is alert.    Skin: Skin is warm and dry.    Psychiatric: Mood normal.         Recent Radiology Results:    7/24/2023 Routine     Narrative & Impression  EXAMINATION:  US PELVIS COMP WITH TRANSVAG NON-OB (XPD)     CLINICAL HISTORY:  Postmenopausal bleeding    FINDINGS:  The uterus measures approximately 3.9 x 2.0 x 2.9 cm, noting moderately limited visualization.  Good visualization of the endometrial stripe is achieved, which is mildly thickened 8 mm in demonstrates scattered cystic foci.     The right ovary is unremarkable.  The left ovary is not seen.  No abnormal adnexal mass is demonstrated.  There is no pelvic free fluid.     Impression:     Mildly thickened endometrial stripe with cystic change.  Consider endometrial biopsy in this postmenopausal female.      Assessment:        1. PMB (postmenopausal bleeding)    2. History of left breast cancer    3. Mass of upper inner quadrant of left breast        Plan:      PMB (postmenopausal bleeding)    History of left breast cancer    Mass of upper inner quadrant of left breast  -     Ambulatory referral/consult to General Surgery; Future; Expected date: 08/05/2023      Follow up for As needed or 2 weeks following surgery.     The above was reviewed discussed with the patient her brother.  We discussed the significance of postmenopausal  bleeding as well as findings of a thickened endometrial cavity on ultrasound.  Given the difficulty with pelvic exam at the patient's last evaluation it is not felt that the patient will be able to tolerate endometrial biopsy.    Conservative, medical, and surgical interventions were discussed, and the patient would like to proceed with surgical intervention.  She will be scheduled for a HYSTEROSCOPY, DILATION AND CURETTAGE AND OTHER INDICATED PROCEDURES    The pros, cons, risks, benefits, alternatives, and indications of the surgical procedure were discussed in detail with the patient.  We discussed the possibility of allergic reactions, bleeding, infection damage to cervix, uterus, bladder, ureters, bowel, and other abdominal pelvic organs.  Issues unique to this procedure were discussed.    The patient's questions regarding above were answered and she agrees with this plan.  The patient was provided with the informed consent form and given times reviewed the form.  Questions were answered and consent was obtained    Gamaliel Moran MD  Department OBGYN [facility]

## 2023-07-28 LAB

## 2023-07-29 RX ORDER — DOXYCYCLINE HYCLATE 100 MG
100 TABLET ORAL
Status: CANCELLED | OUTPATIENT
Start: 2023-07-29 | End: 2023-07-30

## 2023-07-29 RX ORDER — SODIUM CHLORIDE 9 MG/ML
INJECTION, SOLUTION INTRAVENOUS CONTINUOUS
Status: CANCELLED | OUTPATIENT
Start: 2023-07-29

## 2023-07-29 RX ORDER — MUPIROCIN 20 MG/G
OINTMENT TOPICAL
Status: CANCELLED | OUTPATIENT
Start: 2023-07-29

## 2023-07-31 ENCOUNTER — HOSPITAL ENCOUNTER (OUTPATIENT)
Dept: RADIOLOGY | Facility: CLINIC | Age: 73
Discharge: HOME | End: 2023-07-31
Attending: PHYSICIAN ASSISTANT
Payer: MEDICARE

## 2023-07-31 DIAGNOSIS — Z78.0 POSTMENOPAUSAL: ICD-10-CM

## 2023-07-31 PROCEDURE — 77080 DXA BONE DENSITY AXIAL: CPT | Mod: 26,,, | Performed by: RADIOLOGY

## 2023-07-31 PROCEDURE — 77080 DXA BONE DENSITY AXIAL: CPT | Mod: TC,PO

## 2023-07-31 PROCEDURE — 77080 DEXA BONE DENSITY SPINE HIP: ICD-10-PCS | Mod: 26,,, | Performed by: RADIOLOGY

## 2023-08-03 ENCOUNTER — OFFICE VISIT (OUTPATIENT)
Dept: ENDOCRINOLOGY | Facility: CLINIC | Age: 73
End: 2023-08-03
Payer: MEDICARE

## 2023-08-03 VITALS
SYSTOLIC BLOOD PRESSURE: 120 MMHG | HEART RATE: 98 BPM | OXYGEN SATURATION: 96 % | HEIGHT: 61 IN | DIASTOLIC BLOOD PRESSURE: 70 MMHG | WEIGHT: 165.25 LBS | BODY MASS INDEX: 31.2 KG/M2 | TEMPERATURE: 98 F

## 2023-08-03 DIAGNOSIS — Z78.0 POSTMENOPAUSAL: ICD-10-CM

## 2023-08-03 DIAGNOSIS — G47.33 OSA (OBSTRUCTIVE SLEEP APNEA): ICD-10-CM

## 2023-08-03 DIAGNOSIS — M81.0 AGE-RELATED OSTEOPOROSIS WITHOUT CURRENT PATHOLOGICAL FRACTURE: ICD-10-CM

## 2023-08-03 DIAGNOSIS — Z79.4 TYPE 2 DIABETES MELLITUS WITH HYPERGLYCEMIA, WITH LONG-TERM CURRENT USE OF INSULIN: Primary | ICD-10-CM

## 2023-08-03 DIAGNOSIS — R06.02 SHORTNESS OF BREATH: ICD-10-CM

## 2023-08-03 DIAGNOSIS — E55.9 HYPOVITAMINOSIS D: ICD-10-CM

## 2023-08-03 DIAGNOSIS — E66.9 OBESITY (BMI 30-39.9): ICD-10-CM

## 2023-08-03 DIAGNOSIS — E11.65 TYPE 2 DIABETES MELLITUS WITH HYPERGLYCEMIA, WITH LONG-TERM CURRENT USE OF INSULIN: Primary | ICD-10-CM

## 2023-08-03 DIAGNOSIS — G62.9 NEUROPATHY: ICD-10-CM

## 2023-08-03 DIAGNOSIS — E03.9 ACQUIRED HYPOTHYROIDISM: ICD-10-CM

## 2023-08-03 PROCEDURE — 99999 PR PBB SHADOW E&M-EST. PATIENT-LVL V: ICD-10-PCS | Mod: PBBFAC,,, | Performed by: PHYSICIAN ASSISTANT

## 2023-08-03 PROCEDURE — 1101F PT FALLS ASSESS-DOCD LE1/YR: CPT | Mod: CPTII,S$GLB,, | Performed by: PHYSICIAN ASSISTANT

## 2023-08-03 PROCEDURE — 1159F PR MEDICATION LIST DOCUMENTED IN MEDICAL RECORD: ICD-10-PCS | Mod: CPTII,S$GLB,, | Performed by: PHYSICIAN ASSISTANT

## 2023-08-03 PROCEDURE — 3066F NEPHROPATHY DOC TX: CPT | Mod: CPTII,S$GLB,, | Performed by: PHYSICIAN ASSISTANT

## 2023-08-03 PROCEDURE — 3078F PR MOST RECENT DIASTOLIC BLOOD PRESSURE < 80 MM HG: ICD-10-PCS | Mod: CPTII,S$GLB,, | Performed by: PHYSICIAN ASSISTANT

## 2023-08-03 PROCEDURE — 3072F LOW RISK FOR RETINOPATHY: CPT | Mod: CPTII,S$GLB,, | Performed by: PHYSICIAN ASSISTANT

## 2023-08-03 PROCEDURE — 3066F PR DOCUMENTATION OF TREATMENT FOR NEPHROPATHY: ICD-10-PCS | Mod: CPTII,S$GLB,, | Performed by: PHYSICIAN ASSISTANT

## 2023-08-03 PROCEDURE — 1159F MED LIST DOCD IN RCRD: CPT | Mod: CPTII,S$GLB,, | Performed by: PHYSICIAN ASSISTANT

## 2023-08-03 PROCEDURE — 1160F PR REVIEW ALL MEDS BY PRESCRIBER/CLIN PHARMACIST DOCUMENTED: ICD-10-PCS | Mod: CPTII,S$GLB,, | Performed by: PHYSICIAN ASSISTANT

## 2023-08-03 PROCEDURE — 3288F PR FALLS RISK ASSESSMENT DOCUMENTED: ICD-10-PCS | Mod: CPTII,S$GLB,, | Performed by: PHYSICIAN ASSISTANT

## 2023-08-03 PROCEDURE — 1157F ADVNC CARE PLAN IN RCRD: CPT | Mod: CPTII,S$GLB,, | Performed by: PHYSICIAN ASSISTANT

## 2023-08-03 PROCEDURE — 3061F PR NEG MICROALBUMINURIA RESULT DOCUMENTED/REVIEW: ICD-10-PCS | Mod: CPTII,S$GLB,, | Performed by: PHYSICIAN ASSISTANT

## 2023-08-03 PROCEDURE — 3044F HG A1C LEVEL LT 7.0%: CPT | Mod: CPTII,S$GLB,, | Performed by: PHYSICIAN ASSISTANT

## 2023-08-03 PROCEDURE — 3078F DIAST BP <80 MM HG: CPT | Mod: CPTII,S$GLB,, | Performed by: PHYSICIAN ASSISTANT

## 2023-08-03 PROCEDURE — 1101F PR PT FALLS ASSESS DOC 0-1 FALLS W/OUT INJ PAST YR: ICD-10-PCS | Mod: CPTII,S$GLB,, | Performed by: PHYSICIAN ASSISTANT

## 2023-08-03 PROCEDURE — 1160F RVW MEDS BY RX/DR IN RCRD: CPT | Mod: CPTII,S$GLB,, | Performed by: PHYSICIAN ASSISTANT

## 2023-08-03 PROCEDURE — 3074F PR MOST RECENT SYSTOLIC BLOOD PRESSURE < 130 MM HG: ICD-10-PCS | Mod: CPTII,S$GLB,, | Performed by: PHYSICIAN ASSISTANT

## 2023-08-03 PROCEDURE — 3072F PR LOW RISK FOR RETINOPATHY: ICD-10-PCS | Mod: CPTII,S$GLB,, | Performed by: PHYSICIAN ASSISTANT

## 2023-08-03 PROCEDURE — 3061F NEG MICROALBUMINURIA REV: CPT | Mod: CPTII,S$GLB,, | Performed by: PHYSICIAN ASSISTANT

## 2023-08-03 PROCEDURE — 3288F FALL RISK ASSESSMENT DOCD: CPT | Mod: CPTII,S$GLB,, | Performed by: PHYSICIAN ASSISTANT

## 2023-08-03 PROCEDURE — 1157F PR ADVANCE CARE PLAN OR EQUIV PRESENT IN MEDICAL RECORD: ICD-10-PCS | Mod: CPTII,S$GLB,, | Performed by: PHYSICIAN ASSISTANT

## 2023-08-03 PROCEDURE — 99215 OFFICE O/P EST HI 40 MIN: CPT | Mod: PBBFAC,PO | Performed by: PHYSICIAN ASSISTANT

## 2023-08-03 PROCEDURE — 99214 OFFICE O/P EST MOD 30 MIN: CPT | Mod: S$GLB,,, | Performed by: PHYSICIAN ASSISTANT

## 2023-08-03 PROCEDURE — 1125F PR PAIN SEVERITY QUANTIFIED, PAIN PRESENT: ICD-10-PCS | Mod: CPTII,S$GLB,, | Performed by: PHYSICIAN ASSISTANT

## 2023-08-03 PROCEDURE — 3044F PR MOST RECENT HEMOGLOBIN A1C LEVEL <7.0%: ICD-10-PCS | Mod: CPTII,S$GLB,, | Performed by: PHYSICIAN ASSISTANT

## 2023-08-03 PROCEDURE — 1125F AMNT PAIN NOTED PAIN PRSNT: CPT | Mod: CPTII,S$GLB,, | Performed by: PHYSICIAN ASSISTANT

## 2023-08-03 PROCEDURE — 99999 PR PBB SHADOW E&M-EST. PATIENT-LVL V: CPT | Mod: PBBFAC,,, | Performed by: PHYSICIAN ASSISTANT

## 2023-08-03 PROCEDURE — 3074F SYST BP LT 130 MM HG: CPT | Mod: CPTII,S$GLB,, | Performed by: PHYSICIAN ASSISTANT

## 2023-08-03 PROCEDURE — 99214 PR OFFICE/OUTPT VISIT, EST, LEVL IV, 30-39 MIN: ICD-10-PCS | Mod: S$GLB,,, | Performed by: PHYSICIAN ASSISTANT

## 2023-08-03 NOTE — PROGRESS NOTES
CC: DM/Hypothyroidism    HPI: Maggy Tapia was diagnosed with Type 2 DM about 6 years ago. No hospitalizations for DM. Her mother had DM and thyroid disease. Arrives with her brother. Pt fractured her hip in . Pt is seeing sleep disorders. She was diagnosed w/ restless legs. Pt had a left mastectomy in .    CURRENT DIABETIC MEDS: metformin 1000 mg in the bid,      Previous meds:  Trulicity-diarrhea  Jardiance-     BG readings are checked 2x daily. No meter or logs to clinic.  Fastins  DN: 60s    Hypoglycemia: She sometimes has hypoglycemia at night.  Type of Glucose Meter: True metrix    Physical Activity: None.     Diet: Her diet has not changed.  3 meals daily.    Last Eye Exam:  Dr. Bone  Last Podiatry Exam:  Dr. Bean    Last DM Education Attended:     No ETOH/tobacco use.    Hypothyroidism  Taking 100 mcg daily.  Dx in   No constipation, diarrhea, sweating, changes in nails or hair.     No palpitations,  changes in nails, heat/cold intolerance.     DEXA  Osteopenia in the left hip. Taking fosamax 70 mg weekly since .Taking ca +vd. Pt fell in  and fractured her rt femoral neck.  No falls or steriod injections. No exercise.     JUAN  Wears CPAP    Wt Readings from Last 10 Encounters:   23 75 kg (165 lb 3.8 oz)   23 76.1 kg (167 lb 12.3 oz)   23 76.1 kg (167 lb 12.3 oz)   23 76.6 kg (168 lb 14 oz)   07/10/23 77.4 kg (170 lb 10.2 oz)   23 75.9 kg (167 lb 5.3 oz)   23 75.9 kg (167 lb 4.8 oz)   23 73.9 kg (163 lb)   23 77.9 kg (171 lb 11.8 oz)   23 72.6 kg (160 lb)       REVIEW OF SYSTEMS  General: no weakness or fatigue, wt loss (17 lbs since 3/22).   Eyes: no intermittent blurry vision or visual disturbances.   Cardiac: no chest pain or palpitations.   Respiratory: + sob, no cough    GI: no abdominal pain or nausea.   Skin: no rashes or itching.   Musc: + back pain, neck pain  Neuro: no numbness or tingling.    Endocrine: + polyuria, no polydipsia, polyphagia.   Remainder ROS negative    Personally reviewed labs below:  Hemoglobin A1C   Date Value Ref Range Status   07/31/2023 5.6 4.0 - 5.6 % Final     Comment:     ADA Screening Guidelines:  5.7-6.4%  Consistent with prediabetes  >or=6.5%  Consistent with diabetes    High levels of fetal hemoglobin interfere with the HbA1C  assay. Heterozygous hemoglobin variants (HbS, HgC, etc)do  not significantly interfere with this assay.   However, presence of multiple variants may affect accuracy.     05/19/2023 6.0 4.5 - 6.2 % Final     Comment:     According to ADA guidelines, hemoglobin A1C <7.0% represents  optimal control in non-pregnant diabetic patients.  Different  metrics may apply to specific populations.   Standards of Medical Care in Diabetes - 2016.    For the purpose of screening for the presence of diabetes:  <5.7%     Consistent with the absence of diabetes  5.7-6.4%  Consistent with increasing risk for diabetes   (prediabetes)  >or=6.5%  Consistent with diabetes    Currently no consensus exists for use of hemoglobin A1C  for diagnosis of diabetes for children.     01/26/2023 5.6 4.0 - 5.6 % Final     Comment:     ADA Screening Guidelines:  5.7-6.4%  Consistent with prediabetes  >or=6.5%  Consistent with diabetes    High levels of fetal hemoglobin interfere with the HbA1C  assay. Heterozygous hemoglobin variants (HbS, HgC, etc)do  not significantly interfere with this assay.   However, presence of multiple variants may affect accuracy.         Chemistry        Component Value Date/Time     07/01/2023 1704    K 4.2 07/01/2023 1704     07/01/2023 1704    CO2 25 07/01/2023 1704    BUN 25 (H) 07/01/2023 1704    CREATININE 0.8 07/01/2023 1704    GLU 99 07/01/2023 1704        Component Value Date/Time    CALCIUM 9.9 07/01/2023 1704    ALKPHOS 72 07/01/2023 1704    AST 20 07/01/2023 1704    ALT 11 07/01/2023 1704    BILITOT 0.6 07/01/2023 1704    ESTGFRAFRICA  >60.0 03/29/2022 1342    EGFRNONAA >60.0 03/29/2022 1342        Lab Results   Component Value Date    CHOL 153 01/03/2023    CHOL 169 02/23/2022    CHOL 140 10/04/2021     Lab Results   Component Value Date    HDL 43 01/03/2023    HDL 35 (L) 02/23/2022    HDL 39 (L) 10/04/2021     Lab Results   Component Value Date    LDLCALC 69.4 01/03/2023    LDLCALC 90.6 02/23/2022    LDLCALC 62.4 (L) 10/04/2021     Lab Results   Component Value Date    TRIG 203 (H) 01/03/2023    TRIG 217 (H) 02/23/2022    TRIG 193 (H) 10/04/2021     Lab Results   Component Value Date    CHOLHDL 28.1 01/03/2023    CHOLHDL 20.7 02/23/2022    CHOLHDL 27.9 10/04/2021     Lab Results   Component Value Date    TSH 6.227 (H) 07/31/2023     Lab Results   Component Value Date    MICALBCREAT Unable to calculate 01/26/2023       Vit D, 25-Hydroxy   Date Value Ref Range Status   01/26/2023 37 30 - 96 ng/mL Final     Comment:     Vitamin D deficiency.........<10 ng/mL                              Vitamin D insufficiency......10-29 ng/mL       Vitamin D sufficiency........> or equal to 30 ng/mL  Vitamin D toxicity............>100 ng/mL       PHYSICAL EXAMINATION  Constitutional: elderly female, appears well, no distress  Musc: ambulates with a walker, + FROM all ext  Psych: normal mood and affect    Assessment/Plan    1. Type 2 diabetes mellitus with hyperglycemia, with long-term current use of insulin  Lipid Panel    Comprehensive Metabolic Panel    Hemoglobin A1C    TSH    T4, Free    Microalbumin/Creatinine Ratio, Urine    TSH    T4, Free      2. Neuropathy        3. Acquired hypothyroidism        4. Age-related osteoporosis without current pathological fracture        5. Postmenopausal        6. JUAN (obstructive sleep apnea)        7. Shortness of breath        8. Obesity (BMI 30-39.9)        9. Hypovitaminosis D  Vitamin D        T2DM- A1c is low for age. Decrease Metformin to 1000 mg daily.   BG checks 2x daily. Send log in two  weeks.  Neuropathy-stable-f/u w/ neurology.  Hypothyroidism-TFTs are elevated. Increase LT4 to 112 mcg qd.  Osteoporosis-continuefosamax 70 mg weekly. Obtain bone markers. Check for secondary causes. -repeat DEXA scan 7/23. Start exercise 30 min daily.    KYU-wuepsn-yhsokokc cpap.  SOB-referral to cardiology.   Obesity-Body mass index is 31.22 kg/m². Increase exercise to 30 min daily.     TFTs in six weeks  F/u in 6 mths w/ fasting labs

## 2023-08-09 ENCOUNTER — OFFICE VISIT (OUTPATIENT)
Dept: OPTOMETRY | Facility: CLINIC | Age: 73
End: 2023-08-09
Payer: COMMERCIAL

## 2023-08-09 DIAGNOSIS — H53.40 VISUAL FIELD DEFECT: ICD-10-CM

## 2023-08-09 DIAGNOSIS — E11.9 DIABETES MELLITUS TYPE 2 WITHOUT RETINOPATHY: ICD-10-CM

## 2023-08-09 DIAGNOSIS — H52.7 REFRACTIVE ERROR: ICD-10-CM

## 2023-08-09 DIAGNOSIS — Z96.1 PSEUDOPHAKIA: ICD-10-CM

## 2023-08-09 DIAGNOSIS — Z01.00 EXAMINATION OF EYES AND VISION: Primary | ICD-10-CM

## 2023-08-09 PROCEDURE — 99999 PR PBB SHADOW E&M-EST. PATIENT-LVL III: ICD-10-PCS | Mod: PBBFAC,,, | Performed by: OPTOMETRIST

## 2023-08-09 PROCEDURE — 92014 PR EYE EXAM, EST PATIENT,COMPREHESV: ICD-10-PCS | Mod: S$GLB,,, | Performed by: OPTOMETRIST

## 2023-08-09 PROCEDURE — 92015 DETERMINE REFRACTIVE STATE: CPT | Mod: S$GLB,,, | Performed by: OPTOMETRIST

## 2023-08-09 PROCEDURE — 92014 COMPRE OPH EXAM EST PT 1/>: CPT | Mod: S$GLB,,, | Performed by: OPTOMETRIST

## 2023-08-09 PROCEDURE — 99999 PR PBB SHADOW E&M-EST. PATIENT-LVL III: CPT | Mod: PBBFAC,,, | Performed by: OPTOMETRIST

## 2023-08-09 PROCEDURE — 92015 PR REFRACTION: ICD-10-PCS | Mod: S$GLB,,, | Performed by: OPTOMETRIST

## 2023-08-09 NOTE — PROGRESS NOTES
HPI    Pt here today for annual DM exam.   States no visual changes or   complaints.   Happy with current specs.    Denies any eye pain.    Hemoglobin A1C       Date                     Value               Ref Range             Status                07/31/2023               5.6                 4.0 - 5.6 %           Final                 05/19/2023               6.0                 4.5 - 6.2 %           Final                 01/26/2023               5.6                 4.0 - 5.6 %           Final              BSL controlled with Metformin.    (-) allergies / dry eyes  (-) gtts  (+) floaters OU -- no light flashes    Last edited by Damion Dugan, OD on 8/9/2023  1:53 PM.            Assessment /Plan     For exam results, see Encounter Report.    Examination of eyes and vision    Diabetes mellitus type 2 without retinopathy    Pseudophakia    Refractive error    Visual field defect      1. Examination of eyes and vision    2. Diabetes mellitus type 2 without retinopathy  Discussed possible ocular affects of uncontrolled blood sugar with patient. Recommended continued strong blood sugar control and continued care with PCP. Monitor yearly.     3. Pseudophakia  S/p cataract extraction, no significant PCO    4. Refractive error  Dispensed updated spectacle Rx - mild change from previous    5. Visual field defect  Secondary to stroke, stable from previous

## 2023-08-14 ENCOUNTER — OFFICE VISIT (OUTPATIENT)
Dept: SURGERY | Facility: CLINIC | Age: 73
End: 2023-08-14
Payer: MEDICARE

## 2023-08-14 VITALS
SYSTOLIC BLOOD PRESSURE: 134 MMHG | WEIGHT: 171.5 LBS | HEART RATE: 89 BPM | DIASTOLIC BLOOD PRESSURE: 67 MMHG | HEIGHT: 61 IN | BODY MASS INDEX: 32.38 KG/M2 | TEMPERATURE: 98 F

## 2023-08-14 DIAGNOSIS — N63.22 MASS OF UPPER INNER QUADRANT OF LEFT BREAST: ICD-10-CM

## 2023-08-14 PROCEDURE — 3044F PR MOST RECENT HEMOGLOBIN A1C LEVEL <7.0%: ICD-10-PCS | Mod: CPTII,S$GLB,, | Performed by: STUDENT IN AN ORGANIZED HEALTH CARE EDUCATION/TRAINING PROGRAM

## 2023-08-14 PROCEDURE — 3288F PR FALLS RISK ASSESSMENT DOCUMENTED: ICD-10-PCS | Mod: CPTII,S$GLB,, | Performed by: STUDENT IN AN ORGANIZED HEALTH CARE EDUCATION/TRAINING PROGRAM

## 2023-08-14 PROCEDURE — 1125F AMNT PAIN NOTED PAIN PRSNT: CPT | Mod: CPTII,S$GLB,, | Performed by: STUDENT IN AN ORGANIZED HEALTH CARE EDUCATION/TRAINING PROGRAM

## 2023-08-14 PROCEDURE — 1157F ADVNC CARE PLAN IN RCRD: CPT | Mod: CPTII,S$GLB,, | Performed by: STUDENT IN AN ORGANIZED HEALTH CARE EDUCATION/TRAINING PROGRAM

## 2023-08-14 PROCEDURE — 99024 POSTOP FOLLOW-UP VISIT: CPT | Mod: S$GLB,,, | Performed by: STUDENT IN AN ORGANIZED HEALTH CARE EDUCATION/TRAINING PROGRAM

## 2023-08-14 PROCEDURE — 99999 PR PBB SHADOW E&M-EST. PATIENT-LVL IV: ICD-10-PCS | Mod: PBBFAC,,, | Performed by: STUDENT IN AN ORGANIZED HEALTH CARE EDUCATION/TRAINING PROGRAM

## 2023-08-14 PROCEDURE — 1159F MED LIST DOCD IN RCRD: CPT | Mod: CPTII,S$GLB,, | Performed by: STUDENT IN AN ORGANIZED HEALTH CARE EDUCATION/TRAINING PROGRAM

## 2023-08-14 PROCEDURE — 3075F PR MOST RECENT SYSTOLIC BLOOD PRESS GE 130-139MM HG: ICD-10-PCS | Mod: CPTII,S$GLB,, | Performed by: STUDENT IN AN ORGANIZED HEALTH CARE EDUCATION/TRAINING PROGRAM

## 2023-08-14 PROCEDURE — 3078F PR MOST RECENT DIASTOLIC BLOOD PRESSURE < 80 MM HG: ICD-10-PCS | Mod: CPTII,S$GLB,, | Performed by: STUDENT IN AN ORGANIZED HEALTH CARE EDUCATION/TRAINING PROGRAM

## 2023-08-14 PROCEDURE — 3066F PR DOCUMENTATION OF TREATMENT FOR NEPHROPATHY: ICD-10-PCS | Mod: CPTII,S$GLB,, | Performed by: STUDENT IN AN ORGANIZED HEALTH CARE EDUCATION/TRAINING PROGRAM

## 2023-08-14 PROCEDURE — 3066F NEPHROPATHY DOC TX: CPT | Mod: CPTII,S$GLB,, | Performed by: STUDENT IN AN ORGANIZED HEALTH CARE EDUCATION/TRAINING PROGRAM

## 2023-08-14 PROCEDURE — 1100F PR PT FALLS ASSESS DOC 2+ FALLS/FALL W/INJURY/YR: ICD-10-PCS | Mod: CPTII,S$GLB,, | Performed by: STUDENT IN AN ORGANIZED HEALTH CARE EDUCATION/TRAINING PROGRAM

## 2023-08-14 PROCEDURE — 3288F FALL RISK ASSESSMENT DOCD: CPT | Mod: CPTII,S$GLB,, | Performed by: STUDENT IN AN ORGANIZED HEALTH CARE EDUCATION/TRAINING PROGRAM

## 2023-08-14 PROCEDURE — 1125F PR PAIN SEVERITY QUANTIFIED, PAIN PRESENT: ICD-10-PCS | Mod: CPTII,S$GLB,, | Performed by: STUDENT IN AN ORGANIZED HEALTH CARE EDUCATION/TRAINING PROGRAM

## 2023-08-14 PROCEDURE — 1157F PR ADVANCE CARE PLAN OR EQUIV PRESENT IN MEDICAL RECORD: ICD-10-PCS | Mod: CPTII,S$GLB,, | Performed by: STUDENT IN AN ORGANIZED HEALTH CARE EDUCATION/TRAINING PROGRAM

## 2023-08-14 PROCEDURE — 3075F SYST BP GE 130 - 139MM HG: CPT | Mod: CPTII,S$GLB,, | Performed by: STUDENT IN AN ORGANIZED HEALTH CARE EDUCATION/TRAINING PROGRAM

## 2023-08-14 PROCEDURE — 1159F PR MEDICATION LIST DOCUMENTED IN MEDICAL RECORD: ICD-10-PCS | Mod: CPTII,S$GLB,, | Performed by: STUDENT IN AN ORGANIZED HEALTH CARE EDUCATION/TRAINING PROGRAM

## 2023-08-14 PROCEDURE — 3078F DIAST BP <80 MM HG: CPT | Mod: CPTII,S$GLB,, | Performed by: STUDENT IN AN ORGANIZED HEALTH CARE EDUCATION/TRAINING PROGRAM

## 2023-08-14 PROCEDURE — 3061F NEG MICROALBUMINURIA REV: CPT | Mod: CPTII,S$GLB,, | Performed by: STUDENT IN AN ORGANIZED HEALTH CARE EDUCATION/TRAINING PROGRAM

## 2023-08-14 PROCEDURE — 3044F HG A1C LEVEL LT 7.0%: CPT | Mod: CPTII,S$GLB,, | Performed by: STUDENT IN AN ORGANIZED HEALTH CARE EDUCATION/TRAINING PROGRAM

## 2023-08-14 PROCEDURE — 99999 PR PBB SHADOW E&M-EST. PATIENT-LVL IV: CPT | Mod: PBBFAC,,, | Performed by: STUDENT IN AN ORGANIZED HEALTH CARE EDUCATION/TRAINING PROGRAM

## 2023-08-14 PROCEDURE — 3061F PR NEG MICROALBUMINURIA RESULT DOCUMENTED/REVIEW: ICD-10-PCS | Mod: CPTII,S$GLB,, | Performed by: STUDENT IN AN ORGANIZED HEALTH CARE EDUCATION/TRAINING PROGRAM

## 2023-08-14 PROCEDURE — 99024 PR POST-OP FOLLOW-UP VISIT: ICD-10-PCS | Mod: S$GLB,,, | Performed by: STUDENT IN AN ORGANIZED HEALTH CARE EDUCATION/TRAINING PROGRAM

## 2023-08-14 PROCEDURE — 1100F PTFALLS ASSESS-DOCD GE2>/YR: CPT | Mod: CPTII,S$GLB,, | Performed by: STUDENT IN AN ORGANIZED HEALTH CARE EDUCATION/TRAINING PROGRAM

## 2023-08-14 NOTE — PROGRESS NOTES
Postop check     This is a 73-year-old female who underwent mastectomy.  She was referred back to me for a lump at the medial aspect of her incision noticed by gyn.  Patient is complaining of some mild chest wall pain on the left.      Mastectomy site is well approximated.  The at the medial portion there was a small dog-ear that does not appear concerning.      Recommend observation for now of her mastectomy site.  All margins were negative previously.  Follow up with me as needed.

## 2023-08-18 ENCOUNTER — TELEPHONE (OUTPATIENT)
Dept: OBSTETRICS AND GYNECOLOGY | Facility: CLINIC | Age: 73
End: 2023-08-18
Payer: MEDICARE

## 2023-08-18 NOTE — TELEPHONE ENCOUNTER
Contacted pt and notified procedure is scheduled 8/28/23. Post op appt scheduled 9/12/23. Questions answered and pt voiced understanding.

## 2023-08-23 ENCOUNTER — OFFICE VISIT (OUTPATIENT)
Dept: CARDIOLOGY | Facility: CLINIC | Age: 73
End: 2023-08-23
Payer: MEDICARE

## 2023-08-23 VITALS
BODY MASS INDEX: 31.95 KG/M2 | SYSTOLIC BLOOD PRESSURE: 146 MMHG | OXYGEN SATURATION: 99 % | DIASTOLIC BLOOD PRESSURE: 88 MMHG | WEIGHT: 169.06 LBS | HEART RATE: 90 BPM

## 2023-08-23 DIAGNOSIS — R55 NEAR SYNCOPE: ICD-10-CM

## 2023-08-23 DIAGNOSIS — Z01.818 PRE-OPERATIVE CLEARANCE: ICD-10-CM

## 2023-08-23 DIAGNOSIS — R29.6 FREQUENT FALLS: ICD-10-CM

## 2023-08-23 DIAGNOSIS — I10 ESSENTIAL HYPERTENSION: Primary | ICD-10-CM

## 2023-08-23 DIAGNOSIS — R55 SYNCOPE AND COLLAPSE: ICD-10-CM

## 2023-08-23 DIAGNOSIS — Z86.73 HISTORY OF ISCHEMIC LEFT MCA STROKE: ICD-10-CM

## 2023-08-23 DIAGNOSIS — E78.2 MIXED HYPERLIPIDEMIA: ICD-10-CM

## 2023-08-23 DIAGNOSIS — E11.51 TYPE 2 DIABETES MELLITUS WITH DIABETIC PERIPHERAL ANGIOPATHY WITHOUT GANGRENE, WITHOUT LONG-TERM CURRENT USE OF INSULIN: ICD-10-CM

## 2023-08-23 DIAGNOSIS — I63.9 CEREBROVASCULAR ACCIDENT (CVA), UNSPECIFIED MECHANISM: ICD-10-CM

## 2023-08-23 PROCEDURE — 99999 PR PBB SHADOW E&M-EST. PATIENT-LVL III: ICD-10-PCS | Mod: PBBFAC,,, | Performed by: GENERAL PRACTICE

## 2023-08-23 PROCEDURE — 1100F PR PT FALLS ASSESS DOC 2+ FALLS/FALL W/INJURY/YR: ICD-10-PCS | Mod: CPTII,S$GLB,, | Performed by: GENERAL PRACTICE

## 2023-08-23 PROCEDURE — 3066F PR DOCUMENTATION OF TREATMENT FOR NEPHROPATHY: ICD-10-PCS | Mod: CPTII,S$GLB,, | Performed by: GENERAL PRACTICE

## 2023-08-23 PROCEDURE — 3044F PR MOST RECENT HEMOGLOBIN A1C LEVEL <7.0%: ICD-10-PCS | Mod: CPTII,S$GLB,, | Performed by: GENERAL PRACTICE

## 2023-08-23 PROCEDURE — 3288F PR FALLS RISK ASSESSMENT DOCUMENTED: ICD-10-PCS | Mod: CPTII,S$GLB,, | Performed by: GENERAL PRACTICE

## 2023-08-23 PROCEDURE — 3044F HG A1C LEVEL LT 7.0%: CPT | Mod: CPTII,S$GLB,, | Performed by: GENERAL PRACTICE

## 2023-08-23 PROCEDURE — 1157F PR ADVANCE CARE PLAN OR EQUIV PRESENT IN MEDICAL RECORD: ICD-10-PCS | Mod: CPTII,S$GLB,, | Performed by: GENERAL PRACTICE

## 2023-08-23 PROCEDURE — 3077F SYST BP >= 140 MM HG: CPT | Mod: CPTII,S$GLB,, | Performed by: GENERAL PRACTICE

## 2023-08-23 PROCEDURE — 99999 PR PBB SHADOW E&M-EST. PATIENT-LVL III: CPT | Mod: PBBFAC,,, | Performed by: GENERAL PRACTICE

## 2023-08-23 PROCEDURE — 99214 PR OFFICE/OUTPT VISIT, EST, LEVL IV, 30-39 MIN: ICD-10-PCS | Mod: S$GLB,,, | Performed by: GENERAL PRACTICE

## 2023-08-23 PROCEDURE — 99214 OFFICE O/P EST MOD 30 MIN: CPT | Mod: S$GLB,,, | Performed by: GENERAL PRACTICE

## 2023-08-23 PROCEDURE — 1100F PTFALLS ASSESS-DOCD GE2>/YR: CPT | Mod: CPTII,S$GLB,, | Performed by: GENERAL PRACTICE

## 2023-08-23 PROCEDURE — 3079F DIAST BP 80-89 MM HG: CPT | Mod: CPTII,S$GLB,, | Performed by: GENERAL PRACTICE

## 2023-08-23 PROCEDURE — 3066F NEPHROPATHY DOC TX: CPT | Mod: CPTII,S$GLB,, | Performed by: GENERAL PRACTICE

## 2023-08-23 PROCEDURE — 3077F PR MOST RECENT SYSTOLIC BLOOD PRESSURE >= 140 MM HG: ICD-10-PCS | Mod: CPTII,S$GLB,, | Performed by: GENERAL PRACTICE

## 2023-08-23 PROCEDURE — 1159F MED LIST DOCD IN RCRD: CPT | Mod: CPTII,S$GLB,, | Performed by: GENERAL PRACTICE

## 2023-08-23 PROCEDURE — 3061F NEG MICROALBUMINURIA REV: CPT | Mod: CPTII,S$GLB,, | Performed by: GENERAL PRACTICE

## 2023-08-23 PROCEDURE — 1159F PR MEDICATION LIST DOCUMENTED IN MEDICAL RECORD: ICD-10-PCS | Mod: CPTII,S$GLB,, | Performed by: GENERAL PRACTICE

## 2023-08-23 PROCEDURE — 3288F FALL RISK ASSESSMENT DOCD: CPT | Mod: CPTII,S$GLB,, | Performed by: GENERAL PRACTICE

## 2023-08-23 PROCEDURE — 3061F PR NEG MICROALBUMINURIA RESULT DOCUMENTED/REVIEW: ICD-10-PCS | Mod: CPTII,S$GLB,, | Performed by: GENERAL PRACTICE

## 2023-08-23 PROCEDURE — 93000 ELECTROCARDIOGRAM COMPLETE: CPT | Mod: S$GLB,,, | Performed by: GENERAL PRACTICE

## 2023-08-23 PROCEDURE — 3079F PR MOST RECENT DIASTOLIC BLOOD PRESSURE 80-89 MM HG: ICD-10-PCS | Mod: CPTII,S$GLB,, | Performed by: GENERAL PRACTICE

## 2023-08-23 PROCEDURE — 93000 EKG 12-LEAD: ICD-10-PCS | Mod: S$GLB,,, | Performed by: GENERAL PRACTICE

## 2023-08-23 PROCEDURE — 1157F ADVNC CARE PLAN IN RCRD: CPT | Mod: CPTII,S$GLB,, | Performed by: GENERAL PRACTICE

## 2023-08-23 NOTE — PROGRESS NOTES
Subjective:    Patient ID:  Maggy Tapia is a 73 y.o. female who presents for follow-up of   Chief Complaint   Patient presents with    pre-op clearance       HPI:  8/23/23  He is a 73-year-old female previously saw Dr. Chin.  She has a history of diabetes, hypertension dyslipidemia and strokes with pseudoseizures in the past.  He has a long history of secondhand smoke exposure and has shortness of breath.  He had a negative stress test in 2021.  Now gets short of breath which is helped by using either hand-held inhaler or aerosol.  Denies any chest pain or heart procedures.  He is had some vaginal bleeding and is referred here to have cardiac clearance to have a D&C and possibly at a later date may need hysterectomy.  He does have a history of a left-sided mastectomy with some incisional pain and has been evaluated by surgery Dr. Lundberg.  Sugar this morning was 97.  She does have numbness in her feet secondary to neuropathy and walks with a walker          2/15/21  History of Present Illness:   Patient is a 71-year-old lady with history of complex medical problems and diabetes since age 14, and essential hypertension for many years also with dyslipidemia and history of stroke and seizures and pseudoseizures.  She has reportedly having more shortness of breath lately worse at rest and effort and now referred here for cardiac evaluation.  Patient denies having any chest discomfort no arm neck or jaw pain she did have some nausea for a while and has improved.  But there is blood in the stools periodically about once a month apparently.  Are no fevers or chills no COVID-19 symptoms  Last seizure was about 2 months ago.  No significant cough no congestion moderate obesity with the BMI about 35 noted     Normal myocardial perfusion scan. There is no evidence of myocardial ischemia or infarction.    The gated perfusion images showed an ejection fraction of 88% post stress. Normal ejection fraction is greater than  53%.    There is normal wall motion post stress.    LV cavity size is  and normal at stress.    The EKG portion of this study is negative for ischemia.    The patient reported no chest pain during the stress test.    There were no arrhythmias during stress.        Review of patient's allergies indicates:   Allergen Reactions    Penicillins Anaphylaxis    Sulfa (sulfonamide antibiotics) Anaphylaxis    Trintellix [vortioxetine] Nausea And Vomiting and Other (See Comments)     Patient has seizures and vomits       Past Medical History:   Diagnosis Date    Anxiety     Arthritis     Asthma     Breast cancer     Left    Depression     Diabetes mellitus, type 2     GERD (gastroesophageal reflux disease)     Hyperlipidemia     Hypertension     Hypothyroidism, unspecified     Overactive bladder     Seizures     Pseudo-seizures    Sleep apnea     Stroke 2022    pt was in the hospital for a stroke, claims she was seeing people when the stroke happened     Past Surgical History:   Procedure Laterality Date    APPENDECTOMY      BREAST BIOPSY Left     BREAST LUMPECTOMY Left     2016    BREAST SURGERY      CATARACT EXTRACTION Bilateral     OU done//     SECTION      CHOLECYSTECTOMY      COLONOSCOPY N/A 2020    Procedure: COLONOSCOPY;  Surgeon: Mj Fernandez MD;  Location: [facility];  Service: Endoscopy;  Laterality: N/A;    CYST REMOVAL Left 2021    DILATION AND CURETTAGE OF UTERUS      EYE SURGERY      LUMPECTOMY, BREAST Left 2023    Procedure: LUMPECTOMY, BREAST;  Surgeon: Toño Paredes MD;  Location: [facility] OR;  Service: General;  Laterality: Left;    PERCUTANEOUS PINNING OF HIP Right 2022    Procedure: PINNING, HIP, PERCUTANEOUS;  Surgeon: Ministerio Joiner II, MD;  Location: [facility] OR;  Service: Orthopedics;  Laterality: Right;    SENTINEL LYMPH NODE BIOPSY Left 2023    Procedure: BIOPSY, LYMPH NODE, SENTINEL;  Surgeon: Toño Paredes MD;  Location: [facility] OR;  Service: General;   Laterality: Left;    SIMPLE MASTECTOMY Left 5/19/2023    Procedure: MASTECTOMY, SIMPLE;  Surgeon: Toño Paredes MD;  Location: [facility];  Service: General;  Laterality: Left;     Social History     Tobacco Use    Smoking status: Never    Smokeless tobacco: Never   Substance Use Topics    Alcohol use: Yes     Comment: seldom    Drug use: No     Family History   Problem Relation Age of Onset    Diabetes Mother     Hypertension Mother     Breast cancer Mother     Cataracts Mother     Melanoma Mother     Heart failure Father     Melanoma Father     Cataracts Brother     Melanoma Brother     Glaucoma Neg Hx     Retinal detachment Neg Hx     Macular degeneration Neg Hx         Review of Systems:   Constitution: Negative for diaphoresis and fever.   HEENT: Negative for nosebleeds.    Cardiovascular: Negative for chest pain       No dyspnea on exertion       No leg swelling        No palpitations  Respiratory: Negative for shortness of breath and wheezing.    Hematologic/Lymphatic: Negative for bleeding problem. Does not bruise/bleed easily.   Skin: Negative for color change and rash.   Musculoskeletal: Negative for falls and myalgias.   Gastrointestinal: Negative for hematemesis and hematochezia.   Genitourinary: Negative for hematuria.   Neurological: Negative for dizziness and light-headedness.   Psychiatric/Behavioral: Negative for altered mental status and memory loss.          Objective:        Vitals:    08/23/23 1551   BP: (!) 146/88   Pulse: 90       Lab Results   Component Value Date    WBC 9.77 07/01/2023    HGB 15.7 07/01/2023    HCT 48.1 07/01/2023     07/01/2023    CHOL 153 01/03/2023    TRIG 203 (H) 01/03/2023    HDL 43 01/03/2023    ALT 11 07/01/2023    AST 20 07/01/2023     07/01/2023    K 4.2 07/01/2023     07/01/2023    CREATININE 0.8 07/01/2023    BUN 25 (H) 07/01/2023    CO2 25 07/01/2023    TSH 6.227 (H) 07/31/2023    INR 0.9 02/23/2022    HGBA1C 5.6 07/31/2023        ECHOCARDIOGRAM  RESULTS  Results for orders placed during the hospital encounter of 02/22/22    Echo Saline Bubble? Yes    Interpretation Summary  · There is no evidence of intracardiac shunting.  · The estimated ejection fraction is 74%.  · Normal left ventricular diastolic function.  · Normal systolic function.  · Normal right ventricular size with normal right ventricular systolic function.  · Mild aortic regurgitation.  · Normal central venous pressure (3 mmHg).        CURRENT/PREVIOUS VISIT EKG  Results for orders placed or performed during the hospital encounter of 07/01/23   EKG 12-lead    Collection Time: 07/01/23  5:00 PM    Narrative    Test Reason : R06.00,    Vent. Rate : 068 BPM     Atrial Rate : 068 BPM     P-R Int : 146 ms          QRS Dur : 080 ms      QT Int : 412 ms       P-R-T Axes : 036 -22 011 degrees     QTc Int : 438 ms    Normal sinus rhythm  Normal ECG  When compared with ECG of 16-MAY-2023 11:55,  No significant change was found  Confirmed by Yury KIM, Prasanth COOK ([id number]) on 7/2/2023 6:10:55 PM    Referred By: [email or web address]   SELF           Confirmed By:Prasanth Cotton MD     No valid procedures specified.   Results for orders placed during the hospital encounter of 03/10/21    Nuclear Stress - Cardiology Interpreted    Interpretation Summary    Normal myocardial perfusion scan. There is no evidence of myocardial ischemia or infarction.    The gated perfusion images showed an ejection fraction of 88% post stress. Normal ejection fraction is greater than 53%.    There is normal wall motion post stress.    LV cavity size is  and normal at stress.    The EKG portion of this study is negative for ischemia.    The patient reported no chest pain during the stress test.    There were no arrhythmias during stress.      Physical Exam:  CONSTITUTIONAL: No fever, no chills  HEENT: Normocephalic, atraumatic,pupils reactive to light                 NECK:  No JVD no carotid bruit  CVS: S1S2+, RRR, no murmurs,   LUNGS:  Clear  ABDOMEN: Soft, NT, BS+  EXTREMITIES: No cyanosis, edema  : No donohue catheter  NEURO: AAO X 3  PSY: Normal affect      Medication List with Changes/Refills   Current Medications    ALBUTEROL (PROVENTIL/VENTOLIN HFA) 90 MCG/ACTUATION INHALER    Inhale 1-2 puffs into the lungs every 4 (four) hours as needed for Shortness of Breath (coughing). Rescue    ALENDRONATE (FOSAMAX) 70 MG TABLET    Take one tablet every 7 days.    ASPIRIN (ECOTRIN) 81 MG EC TABLET    Take 81 mg by mouth once daily.    BLOOD-GLUCOSE METER KIT    Use as instructed. Insurance preferred.    CALCIUM CARBONATE/VITAMIN D3 (CALCIUM 500 + D ORAL)    Take 1 tablet by mouth once daily. 10 mg daily    CYANOCOBALAMIN (VITAMIN B-12) 1000 MCG TABLET    Take 1 tablet (1,000 mcg total) by mouth once daily.    FLUTICASONE FUROATE-VILANTEROL (BREO ELLIPTA) 100-25 MCG/DOSE DISKUS INHALER    Inhale 1 puff into the lungs once daily. Controller    GABAPENTIN (NEURONTIN) 300 MG CAPSULE    Take 1 capsule (300 mg total) by mouth every evening. Take 1 tablet every night x 7 days. Increase to twice a day x 7 days. Increase to three times a day.    KETOCONAZOLE (NIZORAL) 2 % CREAM    AAA pannus fold at least daily after the shower    LETROZOLE (FEMARA) 2.5 MG TAB    Take 1 tablet (2.5 mg total) by mouth once daily.    LEVOTHYROXINE (SYNTHROID) 100 MCG TABLET    Take 1 tablet (100 mcg total) by mouth before breakfast.    LOSARTAN (COZAAR) 50 MG TABLET    Take 0.5 tablets (25 mg total) by mouth once daily.    METFORMIN (GLUCOPHAGE-XR) 500 MG ER 24HR TABLET    Take 2 tablets (1,000 mg total) by mouth daily with breakfast.    POLYETHYLENE GLYCOL (GLYCOLAX) 17 GRAM PWPK    Take 17 g by mouth once daily.    POTASSIUM CHLORIDE SA (K-DUR,KLOR-CON) 20 MEQ TABLET    Take 20 mEq by mouth once daily.    ROPINIROLE (REQUIP) 0.5 MG TABLET    Take by mouth every evening.    SERTRALINE (ZOLOFT) 50 MG TABLET    Take 1 tablet (50 mg total) by mouth once daily. For  depression/anxiety    SIMVASTATIN (ZOCOR) 40 MG TABLET    Take 1 tablet (40 mg total) by mouth once daily.    TETRABENAZINE (XENAZINE) 25 MG TABLET    Take one tablet daily for 7 days and then  Take 1 tablet twice daily afterwards             Assessment:       1. Essential hypertension    2. Cerebrovascular accident (CVA), unspecified mechanism    3. Near syncope    4. Syncope and collapse    5. History of ischemic left MCA stroke    6. Type 2 diabetes mellitus with diabetic peripheral angiopathy without gangrene, without long-term current use of insulin    7. Frequent falls (possibly related to polypharmacy)    8. Mixed hyperlipidemia    9. BMI 31.0-31.9,adult    10. Pre-operative clearance         Plan:     Problem List Items Addressed This Visit          Neuro    History of ischemic left MCA stroke    Stroke       Cardiac/Vascular    Essential hypertension - Primary    Hyperlipidemia       Endocrine    Type 2 diabetes mellitus with diabetic peripheral angiopathy without gangrene, without long-term current use of insulin       Other    Syncope and collapse    Frequent falls (possibly related to polypharmacy)    Near syncope    Relevant Orders    IN OFFICE EKG 12-LEAD (to Muse)     Other Visit Diagnoses       BMI 31.0-31.9,adult        Pre-operative clearance        Relevant Orders    IN OFFICE EKG 12-LEAD (to Muse)          OK for the proposed noncardiac surgery.  Cardiac risk is class 2 which means 6% risk of death MI or cardiac arrest within 30 days within the MD calc revised cardiac risk index for preoperative risk  Follow up in about 6 months (around 2/23/2024).    The patients questions were answered, they verbalized understanding, and agreed with the treatment plan.     ASHLEE BLANCA MD  [facility]

## 2023-08-23 NOTE — LETTER
2023    Maggy Tapia  [address] [place] [address]             [facility] [place]  [address]  [place] [address]  Phone: [phone]  Fax: [phone] Patient: Maggy Tapia  : 1950  Referring Doctor: Prasanth Cotton MD  Telephone:[phone]  Date of Last Office Visit:2023  Consulting Provider: Dr. Gamaliel Moran  Procedure: Hysterectomy w/ Dilation and curettage of the uterus    Current Outpatient Medications   Medication Sig    albuterol (PROVENTIL/VENTOLIN HFA) 90 mcg/actuation inhaler Inhale 1-2 puffs into the lungs every 4 (four) hours as needed for Shortness of Breath (coughing). Rescue (Patient not taking: Reported on 2023)    alendronate (FOSAMAX) 70 MG tablet Take one tablet every 7 days.    aspirin (ECOTRIN) 81 MG EC tablet Take 81 mg by mouth once daily.    blood-glucose meter kit Use as instructed. Insurance preferred. (Patient not taking: Reported on 2023)    CALCIUM CARBONATE/VITAMIN D3 (CALCIUM 500 + D ORAL) Take 1 tablet by mouth once daily. 10 mg daily    cyanocobalamin (VITAMIN B-12) 1000 MCG tablet Take 1 tablet (1,000 mcg total) by mouth once daily.    fluticasone furoate-vilanteroL (BREO ELLIPTA) 100-25 mcg/dose diskus inhaler Inhale 1 puff into the lungs once daily. Controller    gabapentin (NEURONTIN) 300 MG capsule Take 1 capsule (300 mg total) by mouth every evening. Take 1 tablet every night x 7 days. Increase to twice a day x 7 days. Increase to three times a day.    ketoconazole (NIZORAL) 2 % cream AAA pannus fold at least daily after the shower    letrozole (FEMARA) 2.5 mg Tab Take 1 tablet (2.5 mg total) by mouth once daily.    levothyroxine (SYNTHROID) 100 MCG tablet Take 1 tablet (100 mcg total) by mouth before breakfast.    losartan (COZAAR) 50 MG tablet Take 0.5 tablets (25 mg total) by mouth once daily.    metFORMIN (GLUCOPHAGE-XR) 500 MG ER 24hr tablet Take  2 tablets (1,000 mg total) by mouth daily with breakfast.    polyethylene glycol (GLYCOLAX) 17 gram PwPk Take 17 g by mouth once daily.    potassium chloride SA (K-DUR,KLOR-CON) 20 MEQ tablet Take 20 mEq by mouth once daily.    rOPINIRole (REQUIP) 0.5 MG tablet Take by mouth every evening.    sertraline (ZOLOFT) 50 MG tablet Take 1 tablet (50 mg total) by mouth once daily. For depression/anxiety    simvastatin (ZOCOR) 40 MG tablet Take 1 tablet (40 mg total) by mouth once daily.    tetrabenazine (XENAZINE) 25 mg tablet Take one tablet daily for 7 days and then  Take 1 tablet twice daily afterwards     No current facility-administered medications for this visit.       This patient has been assessed for risk factors for clearance of surgery with the following stipulations: Moderate Risk     ___ No contraindications  __x_ Recommendations for antiplatelet/anticoagulant medications:Hold ASA for 5 days prior to procedure.   ___ Cleared for surgery with the following contraindications/precautions:  ___ Not cleared for surgery due to the following reasons:      If you have any questions regarding the above, please contact my office at [phone]    Sincerely,

## 2023-08-25 ENCOUNTER — ANESTHESIA EVENT (OUTPATIENT)
Dept: SURGERY | Facility: HOSPITAL | Age: 73
End: 2023-08-25
Payer: MEDICARE

## 2023-08-25 ENCOUNTER — OFFICE VISIT (OUTPATIENT)
Dept: OTOLARYNGOLOGY | Facility: CLINIC | Age: 73
End: 2023-08-25
Payer: MEDICARE

## 2023-08-25 VITALS — HEIGHT: 61 IN | BODY MASS INDEX: 32.13 KG/M2 | TEMPERATURE: 98 F | WEIGHT: 170.19 LBS | RESPIRATION RATE: 16 BRPM

## 2023-08-25 DIAGNOSIS — R42 DIZZINESS AND GIDDINESS: ICD-10-CM

## 2023-08-25 DIAGNOSIS — H90.3 SENSORINEURAL HEARING LOSS (SNHL), BILATERAL: ICD-10-CM

## 2023-08-25 DIAGNOSIS — H93.A9 PULSATILE TINNITUS, UNSPECIFIED EAR: Primary | ICD-10-CM

## 2023-08-25 PROCEDURE — 99214 PR OFFICE/OUTPT VISIT, EST, LEVL IV, 30-39 MIN: ICD-10-PCS | Mod: S$GLB,,, | Performed by: PHYSICIAN ASSISTANT

## 2023-08-25 PROCEDURE — 99999 PR PBB SHADOW E&M-EST. PATIENT-LVL IV: ICD-10-PCS | Mod: PBBFAC,,, | Performed by: PHYSICIAN ASSISTANT

## 2023-08-25 PROCEDURE — 3061F NEG MICROALBUMINURIA REV: CPT | Mod: CPTII,S$GLB,, | Performed by: PHYSICIAN ASSISTANT

## 2023-08-25 PROCEDURE — 3066F NEPHROPATHY DOC TX: CPT | Mod: CPTII,S$GLB,, | Performed by: PHYSICIAN ASSISTANT

## 2023-08-25 PROCEDURE — 99214 OFFICE O/P EST MOD 30 MIN: CPT | Mod: S$GLB,,, | Performed by: PHYSICIAN ASSISTANT

## 2023-08-25 PROCEDURE — 99999 PR PBB SHADOW E&M-EST. PATIENT-LVL IV: CPT | Mod: PBBFAC,,, | Performed by: PHYSICIAN ASSISTANT

## 2023-08-25 PROCEDURE — 3288F FALL RISK ASSESSMENT DOCD: CPT | Mod: CPTII,S$GLB,, | Performed by: PHYSICIAN ASSISTANT

## 2023-08-25 PROCEDURE — 1160F PR REVIEW ALL MEDS BY PRESCRIBER/CLIN PHARMACIST DOCUMENTED: ICD-10-PCS | Mod: CPTII,S$GLB,, | Performed by: PHYSICIAN ASSISTANT

## 2023-08-25 PROCEDURE — 1159F MED LIST DOCD IN RCRD: CPT | Mod: CPTII,S$GLB,, | Performed by: PHYSICIAN ASSISTANT

## 2023-08-25 PROCEDURE — 3061F PR NEG MICROALBUMINURIA RESULT DOCUMENTED/REVIEW: ICD-10-PCS | Mod: CPTII,S$GLB,, | Performed by: PHYSICIAN ASSISTANT

## 2023-08-25 PROCEDURE — 1100F PTFALLS ASSESS-DOCD GE2>/YR: CPT | Mod: CPTII,S$GLB,, | Performed by: PHYSICIAN ASSISTANT

## 2023-08-25 PROCEDURE — 1125F AMNT PAIN NOTED PAIN PRSNT: CPT | Mod: CPTII,S$GLB,, | Performed by: PHYSICIAN ASSISTANT

## 2023-08-25 PROCEDURE — 1157F ADVNC CARE PLAN IN RCRD: CPT | Mod: CPTII,S$GLB,, | Performed by: PHYSICIAN ASSISTANT

## 2023-08-25 PROCEDURE — 1159F PR MEDICATION LIST DOCUMENTED IN MEDICAL RECORD: ICD-10-PCS | Mod: CPTII,S$GLB,, | Performed by: PHYSICIAN ASSISTANT

## 2023-08-25 PROCEDURE — 3044F PR MOST RECENT HEMOGLOBIN A1C LEVEL <7.0%: ICD-10-PCS | Mod: CPTII,S$GLB,, | Performed by: PHYSICIAN ASSISTANT

## 2023-08-25 PROCEDURE — 1125F PR PAIN SEVERITY QUANTIFIED, PAIN PRESENT: ICD-10-PCS | Mod: CPTII,S$GLB,, | Performed by: PHYSICIAN ASSISTANT

## 2023-08-25 PROCEDURE — 3044F HG A1C LEVEL LT 7.0%: CPT | Mod: CPTII,S$GLB,, | Performed by: PHYSICIAN ASSISTANT

## 2023-08-25 PROCEDURE — 1160F RVW MEDS BY RX/DR IN RCRD: CPT | Mod: CPTII,S$GLB,, | Performed by: PHYSICIAN ASSISTANT

## 2023-08-25 PROCEDURE — 3066F PR DOCUMENTATION OF TREATMENT FOR NEPHROPATHY: ICD-10-PCS | Mod: CPTII,S$GLB,, | Performed by: PHYSICIAN ASSISTANT

## 2023-08-25 PROCEDURE — 3288F PR FALLS RISK ASSESSMENT DOCUMENTED: ICD-10-PCS | Mod: CPTII,S$GLB,, | Performed by: PHYSICIAN ASSISTANT

## 2023-08-25 PROCEDURE — 1157F PR ADVANCE CARE PLAN OR EQUIV PRESENT IN MEDICAL RECORD: ICD-10-PCS | Mod: CPTII,S$GLB,, | Performed by: PHYSICIAN ASSISTANT

## 2023-08-25 PROCEDURE — 1100F PR PT FALLS ASSESS DOC 2+ FALLS/FALL W/INJURY/YR: ICD-10-PCS | Mod: CPTII,S$GLB,, | Performed by: PHYSICIAN ASSISTANT

## 2023-08-25 NOTE — DISCHARGE INSTRUCTIONS
No douches, tampons, intercourse or tub baths for 1 week;     Some bleeding and pain is expected, but should be no greater than a normal period. If heavy bleeding or pain persists, please contact your doctor;     Keep followup appointment as scheduled.     Do not drive or make important business or legal decisions for 24 hours.     Call MD if severe bleeding, fever > 101, nausea/vomitting    Rx for pain:

## 2023-08-25 NOTE — PROGRESS NOTES
[facility]    Subjective:      Patient: Maggy Tapia Patient PCP: Yanna Abbasi MD         :  1950     Sex:  female      MRN:  [id number]          Date of Visit: 2023      Chief Complaint: Pulsatile Tinnitus    Patient ID: Maggy Tapia is a 73 y.o. female with PMHx of ischemic left MCA stroke 2022, JUAN-wears CPAP, seizures, tardive dyskinsia, diabetic polyneuropathy, bilateral hearing loss-wears bilateral hearing aids, HLD, HTN, breast CA, hypothyroidism-on synthroid 100mcg and followed by endocrinology and COPD who presents to clinic for evaluation of pulsatile tinnitus. Pt has history of R-sided BCa at 15y/o treated with surgery + RT as well as +FHx BCa who presents with abnormal mammogram history dating to 2016 with recent screening mammogram noting architectural distortion and left breast confirmed on diagnostic mammogram and ultrasound to be a 1.4 cm irregular hypoechoic, spiculated mass at 10:00.  Core needle biopsy returned grade 2 (6/9) invasive ductal carcinoma, ER+ 90%, AK+ 20%, HER2(-) by FISH; Ki-67 40%. Pt has oncotype 21 and is on letrozole. Pt followed by hem/onc. Pt has longstanding history of bilateral SNHL and tinnitus. Pt developed pulsatile tinnitus in both ears around 1 month ago that has started to become chronic. Pt previously seen by me for evaluation of dizziness. Pt has had dizziness in the past with VNG 2022 showing central findings with PMHx of stroke and advised to proceed with follow up with her Neurologist TANIA Farmer. VPT was also ordered by me in  and pt was discharged due to improvement in dizziness. Pt denies ear pain/discharge, true vertigo, fluctuations in hearing or aural fullness. Pt seen by her audiologist Libia Tapia and advised to follow up with ENT for pulsatile tinnitus. No associated headache or recent head trauma in association with pulsatile tinnitus.     Past Medical History  She has a past medical history of Anxiety,  Arthritis, Asthma, Breast cancer, Depression, Diabetes mellitus, type 2, GERD (gastroesophageal reflux disease), Hyperlipidemia, Hypertension, Hypothyroidism, unspecified, Overactive bladder, Seizures, Sleep apnea, and Stroke.    Family History  Her family history includes Breast cancer in her mother; Cataracts in her brother and mother; Diabetes in her mother; Heart failure in her father; Hypertension in her mother; Melanoma in her brother, father, and mother.    Past Surgical History:   Procedure Laterality Date    APPENDECTOMY      BREAST BIOPSY Left     BREAST LUMPECTOMY Left         BREAST SURGERY      CATARACT EXTRACTION Bilateral     OU done//     SECTION      CHOLECYSTECTOMY      COLONOSCOPY N/A 2020    Procedure: COLONOSCOPY;  Surgeon: Mj Fernandez MD;  Location: [facility];  Service: Endoscopy;  Laterality: N/A;    CYST REMOVAL Left 2021    DILATION AND CURETTAGE OF UTERUS      EYE SURGERY      LUMPECTOMY, BREAST Left 2023    Procedure: LUMPECTOMY, BREAST;  Surgeon: Toño Paredes MD;  Location: [facility] OR;  Service: General;  Laterality: Left;    PERCUTANEOUS PINNING OF HIP Right 2022    Procedure: PINNING, HIP, PERCUTANEOUS;  Surgeon: Ministerio Joiner II, MD;  Location: [facility] OR;  Service: Orthopedics;  Laterality: Right;    SENTINEL LYMPH NODE BIOPSY Left 2023    Procedure: BIOPSY, LYMPH NODE, SENTINEL;  Surgeon: Toño Paredes MD;  Location: [facility] OR;  Service: General;  Laterality: Left;    SIMPLE MASTECTOMY Left 2023    Procedure: MASTECTOMY, SIMPLE;  Surgeon: Toño Paredes MD;  Location: [facility] OR;  Service: General;  Laterality: Left;     Social History     Tobacco Use    Smoking status: Never     Passive exposure: Past    Smokeless tobacco: Never   Substance and Sexual Activity    Alcohol use: Yes     Comment: seldom    Drug use: No    Sexual activity: Not Currently     Medications  She has a current medication list which includes the following  "prescription(s): alendronate, aspirin, calcium carbonate/vitamin d3, cyanocobalamin, fluticasone furoate-vilanterol, gabapentin, ketoconazole, letrozole, losartan, metformin, polyethylene glycol, potassium chloride sa, ropinirole, sertraline, simvastatin, tetrabenazine, albuterol, blood-glucose meter, levothyroxine, [DISCONTINUED] nystatin, and [DISCONTINUED] solifenacin.    Review of patient's allergies indicates:   Allergen Reactions    Penicillins Anaphylaxis    Sulfa (sulfonamide antibiotics) Anaphylaxis    Trintellix [vortioxetine] Nausea And Vomiting and Other (See Comments)     Patient has seizures and vomits     All medications, allergies, and past history have been reviewed.    Objective:      Vitals:      8/22/2023     2:28 PM 8/23/2023     3:51 PM 8/25/2023     2:12 PM   Vitals - 1 value per visit   SYSTOLIC  146    DIASTOLIC  88    Pulse  90    Temp   98.1 °F (36.7 °C)   Resp   16   SPO2  99 %    Weight (lb) 167.77 169.09 170.2   Weight (kg) 76.1 76.7 77.2   Height 5' 1" (1.549 m)  5' 1" (1.549 m)   BMI (Calculated) 31.7  32.2   Pain Score   Three       Body surface area is 1.82 meters squared.  Physical Exam  Constitutional:       General: She is not in acute distress.     Appearance: Normal appearance. She is not ill-appearing.   HENT:      Head: Normocephalic and atraumatic.      Right Ear: Tympanic membrane, ear canal and external ear normal.      Left Ear: Tympanic membrane, ear canal and external ear normal.      Nose: Nose normal.      Mouth/Throat:      Lips: Pink. No lesions.      Mouth: Mucous membranes are moist. No oral lesions.      Tongue: No lesions.      Palate: No lesions.      Pharynx: Oropharynx is clear. Uvula midline. No pharyngeal swelling, oropharyngeal exudate, posterior oropharyngeal erythema or uvula swelling.   Eyes:      General:         Right eye: No discharge.         Left eye: No discharge.      Extraocular Movements: Extraocular movements intact.      Conjunctiva/sclera: " Conjunctivae normal.   Pulmonary:      Effort: Pulmonary effort is normal.   Neurological:      General: No focal deficit present.      Mental Status: She is alert and oriented to person, place, and time. Mental status is at baseline.   Psychiatric:         Mood and Affect: Mood normal.         Behavior: Behavior normal.         Thought Content: Thought content normal.         Judgment: Judgment normal.       Labs:  WBC   Date Value Ref Range Status   07/01/2023 9.77 3.90 - 12.70 K/uL Final     Eosinophil %   Date Value Ref Range Status   07/01/2023 1.8 0.0 - 8.0 % Final     Eos #   Date Value Ref Range Status   07/01/2023 0.2 0.0 - 0.5 K/uL Final     Platelets   Date Value Ref Range Status   07/01/2023 151 150 - 450 K/uL Final     Glucose   Date Value Ref Range Status   07/01/2023 99 70 - 110 mg/dL Final               VNG:  Referring provider:  Errol Ferrara PA-C      Maggy Tapia was seen 9/29/22 for Videonystagmography (VNG) testing.       Pt reported;y abstained from vestibular suppressants for the past 24-48 hours prior to her VNG.        VAT was negative bilaterally.       VNG Results (abnormal results italicized):   Oculomotor function tests:  Sinusoidal tracking - abnormal  Saccades - WNL  OPKs - WNL   Spontaneous nystagmus was absent.   Gaze nystagmus was absent.   Felicity-Hallpike Left was negative for BPPV but yielded 10 d/s UB nystagmus with fixation.  Garcia-Hallpike Right was negative for BPPV but yielded 12 d/s UB nystagmus with fixation.    Static Positionals:  Supine - WNL  Head Right - 4 d/s UB nystagmus with a 2 d/s RB slant (non-torsional) with fixation   Head Left -  6 d/s UB nystagmus with a 6 d/s RB slant (non-torsional) with fixation  Bi-thermal caloric irrigations revealed a 9% caloric weakness in the left ear, which is within normal limits, and 4% directional preponderance to the right, which is within normal limits.  Fixation suppression following caloric irrigations were normal.  RC:       25 d/s                          RW:     45 d/s    d/s                          LW:      37 d/s      Impressions:  Abnormal central VNG due to abnormal sinusoidal tracking oculomotor subtest, vertical up-beating nystagmus present during both Garcia-Hallpikes head right/left static positionals and abnormal caloric fixation suppression for all caloric irrigations.  Negative for BPPV bilaterally.       Recommendations:  1.  ENT review of results  2.  Neurology consult  All lab results, imaging results, and data have been reviewed.    Assessment:        ICD-10-CM ICD-9-CM   1. Pulsatile tinnitus H93.A9 388.30   2. Sensorineural hearing loss (SNHL), bilateral  H90.3 389.18            Plan:      Pt has new onset pulsatile tinnitus that has been progressing and has started to become chronic in both ears. Proceed with CTA head and neck and MRI IAC w/wo to further workup pulsatile tinnitus. If workup negative for vascular anomaly/mass, then this will be considered benign pulsatile tinnitus and masking techniques will be the best option to help with pulsatile tinnitus. Pt is to continue wearing hearing aids and is to continue following with Audiologist Dr. Libia Tapia for audiograms and hearing aid adjustments. Our office will reach out with results and recommendations of CTA head and neck and MRI IAC w/wo. Reviewed Pt's prior MRA brain, MRIs of brain and US carotid. Proceed with updated imaging due to new onset pulsatile tinnitus.

## 2023-08-28 ENCOUNTER — ANESTHESIA (OUTPATIENT)
Dept: SURGERY | Facility: HOSPITAL | Age: 73
End: 2023-08-28
Payer: MEDICARE

## 2023-08-28 ENCOUNTER — TELEPHONE (OUTPATIENT)
Dept: FAMILY MEDICINE | Facility: CLINIC | Age: 73
End: 2023-08-28
Payer: MEDICARE

## 2023-08-28 ENCOUNTER — HOSPITAL ENCOUNTER (OUTPATIENT)
Facility: HOSPITAL | Age: 73
Discharge: HOME | End: 2023-08-28
Attending: OBSTETRICS & GYNECOLOGY | Admitting: OBSTETRICS & GYNECOLOGY
Payer: MEDICARE

## 2023-08-28 DIAGNOSIS — R29.898 LEG WEAKNESS, BILATERAL: Primary | ICD-10-CM

## 2023-08-28 DIAGNOSIS — N95.0 PMB (POSTMENOPAUSAL BLEEDING): ICD-10-CM

## 2023-08-28 DIAGNOSIS — S31.819A BUTTOCK WOUND, RIGHT, INITIAL ENCOUNTER: ICD-10-CM

## 2023-08-28 DIAGNOSIS — R29.6 FREQUENT FALLS: ICD-10-CM

## 2023-08-28 DIAGNOSIS — L98.419 SKIN ULCER OF BUTTOCK, UNSPECIFIED ULCER STAGE: Primary | ICD-10-CM

## 2023-08-28 LAB — POCT GLUCOSE: 138 MG/DL (ref 70–110)

## 2023-08-28 PROCEDURE — D9220A PRA ANESTHESIA: Mod: CRNA,,, | Performed by: NURSE ANESTHETIST, CERTIFIED REGISTERED

## 2023-08-28 PROCEDURE — 63600175 PHARM REV CODE 636 W HCPCS: Performed by: NURSE ANESTHETIST, CERTIFIED REGISTERED

## 2023-08-28 PROCEDURE — 71000033 HC RECOVERY, INTIAL HOUR: Performed by: OBSTETRICS & GYNECOLOGY

## 2023-08-28 PROCEDURE — D9220A PRA ANESTHESIA: ICD-10-PCS | Mod: CRNA,,, | Performed by: NURSE ANESTHETIST, CERTIFIED REGISTERED

## 2023-08-28 PROCEDURE — C1782 MORCELLATOR: HCPCS | Performed by: OBSTETRICS & GYNECOLOGY

## 2023-08-28 PROCEDURE — 71000039 HC RECOVERY, EACH ADD'L HOUR: Performed by: OBSTETRICS & GYNECOLOGY

## 2023-08-28 PROCEDURE — 25000003 PHARM REV CODE 250: Performed by: OBSTETRICS & GYNECOLOGY

## 2023-08-28 PROCEDURE — 37000009 HC ANESTHESIA EA ADD 15 MINS: Performed by: OBSTETRICS & GYNECOLOGY

## 2023-08-28 PROCEDURE — 58558 PR HYSTEROSCOPY,W/ENDO BX: ICD-10-PCS | Mod: ,,, | Performed by: OBSTETRICS & GYNECOLOGY

## 2023-08-28 PROCEDURE — 36000706: Performed by: OBSTETRICS & GYNECOLOGY

## 2023-08-28 PROCEDURE — 88305 TISSUE EXAM BY PATHOLOGIST: CPT | Mod: TC | Performed by: PATHOLOGY

## 2023-08-28 PROCEDURE — D9220A PRA ANESTHESIA: Mod: ANES,,, | Performed by: ANESTHESIOLOGY

## 2023-08-28 PROCEDURE — 63600175 PHARM REV CODE 636 W HCPCS: Performed by: ANESTHESIOLOGY

## 2023-08-28 PROCEDURE — D9220A PRA ANESTHESIA: ICD-10-PCS | Mod: ANES,,, | Performed by: ANESTHESIOLOGY

## 2023-08-28 PROCEDURE — 58558 HYSTEROSCOPY BIOPSY: CPT | Mod: ,,, | Performed by: OBSTETRICS & GYNECOLOGY

## 2023-08-28 PROCEDURE — 25000003 PHARM REV CODE 250: Performed by: NURSE ANESTHETIST, CERTIFIED REGISTERED

## 2023-08-28 PROCEDURE — 37000008 HC ANESTHESIA 1ST 15 MINUTES: Performed by: OBSTETRICS & GYNECOLOGY

## 2023-08-28 PROCEDURE — 36000707: Performed by: OBSTETRICS & GYNECOLOGY

## 2023-08-28 RX ORDER — OXYCODONE HYDROCHLORIDE 5 MG/1
5 TABLET ORAL
Status: DISCONTINUED | OUTPATIENT
Start: 2023-08-28 | End: 2023-08-28 | Stop reason: HOSPADM

## 2023-08-28 RX ORDER — DEXAMETHASONE SODIUM PHOSPHATE 4 MG/ML
INJECTION, SOLUTION INTRA-ARTICULAR; INTRALESIONAL; INTRAMUSCULAR; INTRAVENOUS; SOFT TISSUE
Status: DISCONTINUED | OUTPATIENT
Start: 2023-08-28 | End: 2023-08-28

## 2023-08-28 RX ORDER — SODIUM CHLORIDE, SODIUM LACTATE, POTASSIUM CHLORIDE, CALCIUM CHLORIDE 600; 310; 30; 20 MG/100ML; MG/100ML; MG/100ML; MG/100ML
INJECTION, SOLUTION INTRAVENOUS CONTINUOUS
Status: DISCONTINUED | OUTPATIENT
Start: 2023-08-28 | End: 2023-08-28 | Stop reason: HOSPADM

## 2023-08-28 RX ORDER — SODIUM CHLORIDE 9 MG/ML
INJECTION, SOLUTION INTRAVENOUS CONTINUOUS
Status: DISCONTINUED | OUTPATIENT
Start: 2023-08-28 | End: 2023-08-28 | Stop reason: HOSPADM

## 2023-08-28 RX ORDER — FENTANYL CITRATE 50 UG/ML
INJECTION, SOLUTION INTRAMUSCULAR; INTRAVENOUS
Status: DISCONTINUED | OUTPATIENT
Start: 2023-08-28 | End: 2023-08-28

## 2023-08-28 RX ORDER — PROPOFOL 10 MG/ML
VIAL (ML) INTRAVENOUS
Status: DISCONTINUED | OUTPATIENT
Start: 2023-08-28 | End: 2023-08-28

## 2023-08-28 RX ORDER — IBUPROFEN 600 MG/1
600 TABLET ORAL EVERY 6 HOURS PRN
Qty: 30 TABLET | Refills: 0 | Status: SHIPPED | OUTPATIENT
Start: 2023-08-28

## 2023-08-28 RX ORDER — MEPERIDINE HYDROCHLORIDE 50 MG/ML
12.5 INJECTION INTRAMUSCULAR; INTRAVENOUS; SUBCUTANEOUS ONCE
Status: DISCONTINUED | OUTPATIENT
Start: 2023-08-28 | End: 2023-08-28 | Stop reason: HOSPADM

## 2023-08-28 RX ORDER — SODIUM CHLORIDE 0.9 G/100ML
IRRIGANT IRRIGATION
Status: DISCONTINUED | OUTPATIENT
Start: 2023-08-28 | End: 2023-08-28 | Stop reason: HOSPADM

## 2023-08-28 RX ORDER — ONDANSETRON HYDROCHLORIDE 2 MG/ML
INJECTION, SOLUTION INTRAMUSCULAR; INTRAVENOUS
Status: DISCONTINUED | OUTPATIENT
Start: 2023-08-28 | End: 2023-08-28

## 2023-08-28 RX ORDER — FENTANYL CITRATE 50 UG/ML
25 INJECTION, SOLUTION INTRAMUSCULAR; INTRAVENOUS EVERY 5 MIN PRN
Status: DISCONTINUED | OUTPATIENT
Start: 2023-08-28 | End: 2023-08-28 | Stop reason: HOSPADM

## 2023-08-28 RX ORDER — HYDROMORPHONE HYDROCHLORIDE 1 MG/ML
0.2 INJECTION, SOLUTION INTRAMUSCULAR; INTRAVENOUS; SUBCUTANEOUS EVERY 5 MIN PRN
Status: DISCONTINUED | OUTPATIENT
Start: 2023-08-28 | End: 2023-08-28 | Stop reason: HOSPADM

## 2023-08-28 RX ORDER — LIDOCAINE HYDROCHLORIDE 20 MG/ML
INJECTION INTRAVENOUS
Status: DISCONTINUED | OUTPATIENT
Start: 2023-08-28 | End: 2023-08-28

## 2023-08-28 RX ORDER — DIPHENHYDRAMINE HYDROCHLORIDE 50 MG/ML
25 INJECTION INTRAMUSCULAR; INTRAVENOUS EVERY 6 HOURS PRN
Status: DISCONTINUED | OUTPATIENT
Start: 2023-08-28 | End: 2023-08-28 | Stop reason: HOSPADM

## 2023-08-28 RX ORDER — MUPIROCIN 20 MG/G
OINTMENT TOPICAL
Status: DISCONTINUED | OUTPATIENT
Start: 2023-08-28 | End: 2023-08-28 | Stop reason: HOSPADM

## 2023-08-28 RX ORDER — ACETAMINOPHEN 10 MG/ML
INJECTION, SOLUTION INTRAVENOUS
Status: DISCONTINUED | OUTPATIENT
Start: 2023-08-28 | End: 2023-08-28

## 2023-08-28 RX ORDER — LIDOCAINE HYDROCHLORIDE 10 MG/ML
0.5 INJECTION, SOLUTION EPIDURAL; INFILTRATION; INTRACAUDAL; PERINEURAL ONCE
Status: DISCONTINUED | OUTPATIENT
Start: 2023-08-28 | End: 2023-08-28 | Stop reason: HOSPADM

## 2023-08-28 RX ORDER — ONDANSETRON 2 MG/ML
4 INJECTION INTRAMUSCULAR; INTRAVENOUS ONCE
Status: DISCONTINUED | OUTPATIENT
Start: 2023-08-28 | End: 2023-08-28 | Stop reason: HOSPADM

## 2023-08-28 RX ADMIN — DEXAMETHASONE SODIUM PHOSPHATE 8 MG: 4 INJECTION, SOLUTION INTRAMUSCULAR; INTRAVENOUS at 08:08

## 2023-08-28 RX ADMIN — FENTANYL CITRATE 25 MCG: 0.05 INJECTION, SOLUTION INTRAMUSCULAR; INTRAVENOUS at 08:08

## 2023-08-28 RX ADMIN — PROPOFOL 120 MG: 10 INJECTION, EMULSION INTRAVENOUS at 08:08

## 2023-08-28 RX ADMIN — ONDANSETRON 4 MG: 2 INJECTION INTRAMUSCULAR; INTRAVENOUS at 08:08

## 2023-08-28 RX ADMIN — LIDOCAINE HYDROCHLORIDE 75 MG: 20 INJECTION, SOLUTION INTRAVENOUS at 08:08

## 2023-08-28 RX ADMIN — ACETAMINOPHEN 1000 MG: 10 INJECTION, SOLUTION INTRAVENOUS at 09:08

## 2023-08-28 RX ADMIN — SODIUM CHLORIDE, POTASSIUM CHLORIDE, SODIUM LACTATE AND CALCIUM CHLORIDE: 600; 310; 30; 20 INJECTION, SOLUTION INTRAVENOUS at 07:08

## 2023-08-28 RX ADMIN — FENTANYL CITRATE 25 MCG: 0.05 INJECTION, SOLUTION INTRAMUSCULAR; INTRAVENOUS at 09:08

## 2023-08-28 NOTE — H&P
Subjective:   Chief Complaint:  Follow-up (U/s follow up)           Subjective   Patient ID: Maggy Tapia is a  73 y.o. female.     HRT: None / HISTORY OF BREAST CANCER CURRENTLY ON FEMARA     Date: 2023     The patient presents today due to the following:     She was referred by her hematologist oncologist due to a 3 day history of postmenopausal bleeding.  This was 3 weeks ago and was primarily spotting.  She does not recall the age of menopause.    She has not had a gynecologic evaluation for many years.    She denies any pelvic pain.     Date: 2023     The patient presents today for follow-up.  She was last seen as above.  In light of her postmenopausal bleeding, pelvic ultrasound was ordered.  In addition the patient reports a 1 day history of a palpable left breast issue at the site of her mammogram incision.  She reports she now feels a lump that was not present previously.     GYN & OB History  No LMP recorded. Patient is postmenopausal.      Date of Last Pap: No result found  OB History            0    Para   0    Term   0       0    AB   0    Living   0           SAB   0    IAB   0    Ectopic   0    Multiple   0    Live Births   0                     Allergies:         Review of patient's allergies indicates:   Allergen Reactions    Penicillins Anaphylaxis    Sulfa (sulfonamide antibiotics) Anaphylaxis    Trintellix [vortioxetine] Nausea And Vomiting and Other (See Comments)       Patient has seizures and vomits              Past Medical History:   Diagnosis Date    Anxiety      Arthritis      Asthma      Breast cancer      Left    Depression      Diabetes mellitus, type 2      GERD (gastroesophageal reflux disease)      Hyperlipidemia      Hypertension      Hypothyroidism, unspecified      Overactive bladder      Seizures       Pseudo-seizures    Sleep apnea      Stroke 2022     pt was in the hospital for a stroke, claims she was seeing people when the stroke  happened               Past Surgical History:   Procedure Laterality Date    APPENDECTOMY        BREAST BIOPSY Left      BREAST LUMPECTOMY Left       2016    BREAST SURGERY        CATARACT EXTRACTION Bilateral       OU done//     SECTION        CHOLECYSTECTOMY        COLONOSCOPY N/A 2020     Procedure: COLONOSCOPY;  Surgeon: Mj Fernandez MD;  Location: [facility];  Service: Endoscopy;  Laterality: N/A;    CYST REMOVAL Left 2021    DILATION AND CURETTAGE OF UTERUS        EYE SURGERY        LUMPECTOMY, BREAST Left 2023     Procedure: LUMPECTOMY, BREAST;  Surgeon: Toño Paredes MD;  Location: [facility] OR;  Service: General;  Laterality: Left;    PERCUTANEOUS PINNING OF HIP Right 2022     Procedure: PINNING, HIP, PERCUTANEOUS;  Surgeon: Ministerio Joiner II, MD;  Location: [facility] OR;  Service: Orthopedics;  Laterality: Right;    SENTINEL LYMPH NODE BIOPSY Left 2023     Procedure: BIOPSY, LYMPH NODE, SENTINEL;  Surgeon: Toño Paredes MD;  Location: [facility] OR;  Service: General;  Laterality: Left;    SIMPLE MASTECTOMY Left 2023     Procedure: MASTECTOMY, SIMPLE;  Surgeon: Toño Paredes MD;  Location: [facility] OR;  Service: General;  Laterality: Left;         Medications     Current Outpatient Medications:     albuterol (PROVENTIL/VENTOLIN HFA) 90 mcg/actuation inhaler, Inhale 1-2 puffs into the lungs every 4 (four) hours as needed for Shortness of Breath (coughing). Rescue, Disp: 20.1 g, Rfl: 11    alendronate (FOSAMAX) 70 MG tablet, Take one tablet every 7 days., Disp: 4 tablet, Rfl: 11    aspirin (ECOTRIN) 81 MG EC tablet, Take 81 mg by mouth once daily., Disp: , Rfl:     blood-glucose meter kit, Use as instructed. Insurance preferred., Disp: 1 each, Rfl: 0    CALCIUM CARBONATE/VITAMIN D3 (CALCIUM 500 + D ORAL), Take 1 tablet by mouth once daily. 10 mg daily, Disp: , Rfl:     cyanocobalamin (VITAMIN B-12) 1000 MCG tablet, Take 1 tablet (1,000 mcg total) by mouth once daily., Disp: 30  tablet, Rfl: 0    fluticasone furoate-vilanteroL (BREO ELLIPTA) 100-25 mcg/dose diskus inhaler, Inhale 1 puff into the lungs once daily. Controller, Disp: 60 each, Rfl: 11    gabapentin (NEURONTIN) 300 MG capsule, Take 1 capsule (300 mg total) by mouth every evening. Take 1 tablet every night x 7 days. Increase to twice a day x 7 days. Increase to three times a day., Disp: 90 capsule, Rfl: 0    ketoconazole (NIZORAL) 2 % cream, AAA pannus fold at least daily after the shower, Disp: 60 g, Rfl: 3    letrozole (FEMARA) 2.5 mg Tab, Take 1 tablet (2.5 mg total) by mouth once daily., Disp: 30 tablet, Rfl: 5    levothyroxine (SYNTHROID) 100 MCG tablet, Take 1 tablet (100 mcg total) by mouth before breakfast., Disp: 90 tablet, Rfl: 3    losartan (COZAAR) 50 MG tablet, Take 0.5 tablets (25 mg total) by mouth once daily., Disp: 90 tablet, Rfl: 0    metFORMIN (GLUCOPHAGE-XR) 500 MG ER 24hr tablet, Take 2 tablets (1,000 mg total) by mouth daily with breakfast., Disp: 180 tablet, Rfl: 3    polyethylene glycol (GLYCOLAX) 17 gram PwPk, Take 17 g by mouth once daily., Disp: , Rfl: 0    potassium chloride SA (K-DUR,KLOR-CON) 20 MEQ tablet, Take 20 mEq by mouth once daily., Disp: , Rfl:     rOPINIRole (REQUIP) 0.5 MG tablet, Take by mouth every evening., Disp: , Rfl:     sertraline (ZOLOFT) 50 MG tablet, Take 1 tablet (50 mg total) by mouth once daily. For depression/anxiety, Disp: 90 tablet, Rfl: 0    simvastatin (ZOCOR) 40 MG tablet, Take 1 tablet (40 mg total) by mouth once daily., Disp: 90 tablet, Rfl: 3    tetrabenazine (XENAZINE) 25 mg tablet, Take one tablet daily for 7 days and then Take 1 tablet twice daily afterwards, Disp: 47 tablet, Rfl: 0      Social History            Socioeconomic History    Marital status: Single    Number of children: 0   Tobacco Use    Smoking status: Never    Smokeless tobacco: Never   Substance and Sexual Activity    Alcohol use: Yes       Comment: seldom    Drug use: No    Sexual activity: Not  Currently   Social History Narrative     Single, no children, lives with brother Apollo who is her primary caregiver.      Social Determinants of Health           Physical Activity: Inactive (1/18/2022)     Exercise Vital Sign      Days of Exercise per Week: 0 days      Minutes of Exercise per Session: 0 min   Social Connections: Socially Isolated (1/18/2022)     Social Connection and Isolation Panel [NHANES]      Frequency of Communication with Friends and Family: More than three times a week      Frequency of Social Gatherings with Friends and Family: More than three times a week      Attends Jew Services: Never      Active Member of Clubs or Organizations: No      Attends Club or Organization Meetings: Never      Marital Status: Never                Family History   Problem Relation Age of Onset    Diabetes Mother      Hypertension Mother      Breast cancer Mother      Cataracts Mother      Melanoma Mother      Heart failure Father      Melanoma Father      Cataracts Brother      Melanoma Brother      Glaucoma Neg Hx      Retinal detachment Neg Hx      Macular degeneration Neg Hx           Review of Systems (at today's evaluation)  Review of Systems   Constitutional:  Negative for fever and unexpected weight change.   Respiratory: Negative.     Cardiovascular:  Negative for chest pain and palpitations.   Gastrointestinal:  Negative for nausea and vomiting.   Genitourinary:  Negative for dysuria, hematuria and urgency.        Gyn as per HPI   Neurological:  Negative for headaches.         Objective:         Objective       Vitals:     07/26/23 1038   BP: 126/60   Resp: 16      Physical Exam:   Constitutional: She appears well-developed and well-nourished.    HENT:   Head: Normocephalic.     Neck: No thyroid mass present.    Cardiovascular:  Normal rate, regular rhythm and normal heart sounds.             Pulmonary/Chest: Effort normal and breath sounds normal.              Abdominal: Soft. There is no  abdominal tenderness.     Genitourinary:    Inguinal canal, uterus, right adnexa and left adnexa normal.      Pelvic exam was performed with patient supine.   The external female genitalia was normal.   No external genitalia lesions identified,Cervix is normal. Right adnexum displays no mass and no tenderness. Left adnexum displays no mass and no tenderness. There is erythema and tenderness in the vagina. No bleeding in the vagina. Vaginal atrophy noted. Uterus is not tender. Normal urethral meatus.Urethra findings: no tendernessBladder findings: no bladder tenderness          Musculoskeletal:      Right lower leg: No edema.      Left lower leg: No edema.      Lymphadenopathy:     She has no cervical adenopathy. No inguinal adenopathy noted on the right or left side.    Neurological: She is alert.    Skin: Skin is warm and dry.    Psychiatric: Mood normal.            Recent Radiology Results:     7/24/2023 Routine      Narrative & Impression  EXAMINATION:  US PELVIS COMP WITH TRANSVAG NON-OB (XPD)     CLINICAL HISTORY:  Postmenopausal bleeding     FINDINGS:  The uterus measures approximately 3.9 x 2.0 x 2.9 cm, noting moderately limited visualization.  Good visualization of the endometrial stripe is achieved, which is mildly thickened 8 mm in demonstrates scattered cystic foci.     The right ovary is unremarkable.  The left ovary is not seen.  No abnormal adnexal mass is demonstrated.  There is no pelvic free fluid.     Impression:     Mildly thickened endometrial stripe with cystic change.  Consider endometrial biopsy in this postmenopausal female.        Assessment:         Assessment   1. PMB (postmenopausal bleeding)    2. History of left breast cancer    3. Mass of upper inner quadrant of left breast          Plan:         Plan   PMB (postmenopausal bleeding)     History of left breast cancer     Mass of upper inner quadrant of left breast  -     Ambulatory referral/consult to General Surgery; Future; Expected  date: 08/05/2023        Follow up for As needed or 2 weeks following surgery.      The above was reviewed discussed with the patient her brother.  We discussed the significance of postmenopausal bleeding as well as findings of a thickened endometrial cavity on ultrasound.  Given the difficulty with pelvic exam at the patient's last evaluation it is not felt that the patient will be able to tolerate endometrial biopsy.     Conservative, medical, and surgical interventions were discussed, and the patient would like to proceed with surgical intervention.  She will be scheduled for a HYSTEROSCOPY, DILATION AND CURETTAGE AND OTHER INDICATED PROCEDURES     The pros, cons, risks, benefits, alternatives, and indications of the surgical procedure were discussed in detail with the patient.  We discussed the possibility of allergic reactions, bleeding, infection damage to cervix, uterus, bladder, ureters, bowel, and other abdominal pelvic organs.  Issues unique to this procedure were discussed.     The patient's questions regarding above were answered and she agrees with this plan.  The patient was provided with the informed consent form and given times reviewed the form.  Questions were answered and consent was obtained     Gamaliel Moran MD  Department OBGYN [facility]

## 2023-08-28 NOTE — PLAN OF CARE
Patient had to urinate upon waking up. Attempted bedpan. Patient unable to go. Patient transferred to bedside commode using gait belt with another nurse assisting. Mepilex dressing applied to pressure sore on right inner but cheek. Left inner but cheek discolored but skin is intact. Yanna Kim with wound care called and secure chat message sent with patient's information.

## 2023-08-28 NOTE — ANESTHESIA POSTPROCEDURE EVALUATION
Anesthesia Post Evaluation    Patient: Maggy Tapia    Procedure(s) Performed: Procedure(s) (LRB):  HYSTEROSCOPY, WITH DILATION AND CURETTAGE OF UTERUS AND OTHER INDICATED PROCEDURES Needs Cardiac clearance (N/A)    Final Anesthesia Type: general      Patient location during evaluation: PACU  Patient participation: Yes- Able to Participate  Level of consciousness: awake and alert  Post-procedure vital signs: reviewed and stable  Pain management: adequate  Airway patency: patent    PONV status at discharge: No PONV  Anesthetic complications: no      Cardiovascular status: blood pressure returned to baseline and hypertensive  Respiratory status: unassisted  Hydration status: euvolemic  Follow-up not needed.          Vitals Value Taken Time   /103 08/28/23 0938   Temp 36.4 °C (97.5 °F) 08/28/23 0935   Pulse 78 08/28/23 0942   Resp 18 08/28/23 0935   SpO2 98 % 08/28/23 0942   Vitals shown include unvalidated device data.      No case tracking events are documented in the log.      Pain/Geraldo Score: No data recorded

## 2023-08-28 NOTE — DISCHARGE SUMMARY
[facility] - Periop Services  Discharge Note  Short Stay    Procedure(s) (LRB):  HYSTEROSCOPY, WITH DILATION AND CURETTAGE OF UTERUS AND OTHER INDICATED PROCEDURES Needs Cardiac clearance (N/A)      OUTCOME: Patient tolerated treatment/procedure well without complication and is now ready for discharge.    DISPOSITION: Home or Self Care    FINAL DIAGNOSIS:  Postmenopausal bleeding with endometrial polyp noted    FOLLOWUP: In clinic    DISCHARGE INSTRUCTIONS:    Discharge Procedure Orders   Diet general     Sponge bath only until clinic visit     Keep surgical extremity elevated     Ice to affected area     Lifting restrictions     Other restrictions (specify):   Order Comments: Discharge Instructions:  Follow-up in 2 weeks or as needed.  Call immediately for any abnormal pain bleeding fever chills nausea vomiting or other significant postoperative issues.    Pelvic rest until follow-up.  No tub baths until follow-up.  Diet as tolerated     Call MD for:  temperature >100.4     Call MD for:  persistent nausea and vomiting     Call MD for:  severe uncontrolled pain     Call MD for:  difficulty breathing, headache or visual disturbances     Call MD for:  redness, tenderness, or signs of infection (pain, swelling, redness, odor or green/yellow discharge around incision site)     Call MD for:  hives     Call MD for:  persistent dizziness or light-headedness     Call MD for:  extreme fatigue     Call MD for:   Order Comments: Discharge Instructions:  Follow-up in 2 weeks or as needed.  Call immediately for any abnormal pain bleeding fever chills nausea vomiting or other significant postoperative issues.    Pelvic rest until follow-up.  No tub baths until follow-up.  Diet as tolerated     No dressing needed     Activity as tolerated     Shower on day dressing removed (No bath)   Order Comments: No tub baths until patient seen for postoperative evaluation in the office         Clinical Reference Documents Added  to Patient Instructions         Document    DILATION AND CURETTAGE DISCHARGE INSTRUCTIONS (ENGLISH)    HYSTEROSCOPY DISCHARGE INSTRUCTIONS (ENGLISH)            TIME SPENT ON DISCHARGE: 10 minutes    Discharge medications:  Ibuprofen    The above including findings of endometrial polyp and skin breakdown were discussed with the patient and her brother.  Information regarding the skin breakdown was forwarded to the patient's primary care physician.

## 2023-08-28 NOTE — OP NOTE
[facility]  Department of Obstetrics & Gynecology  Operative Note      PATIENT NAME: Maggy Tapia    MRN: [id number]  TODAY'S DATE: 08/28/2023  ADMIT DATE: 8/28/2023                              OPERATIVE REPORT:  8/28/2023    SURGEON: Gamaliel Moran MD    ASSISTANT:  None    PREOPERATIVE DIAGNOSIS:    Postmenopausal bleeding    POSTOPERATIVE DIAGNOSIS:   Postmenopausal bleeding  2.   Endometrial polyp   3.   Right gluteal skin breakdown    OPERATION:   1.  Hysteroscopy  2.  Dilation and curettage (via MyoSure Lite device) with resection of endometrial polyp     ANESTHESIA: general anesthesia with LMA    ESTIMATED BLOOD LOSS: Minimal    FLUIDS: 800 mL of crystalloid.    URINE OUTPUT: 40 cc    PATHOLOGY:  Endometrial curettage including endometrial polyp    FINDINGS:   During prep and drape along the right gluteal area an area of skin breakdown was noted.          Atrophic, but otherwise normal-appearing vaginal vault, cervix and endocervical canal.     The uterus sounded to 8 cm.  Within the endometrial cavity a vascular polypoid lesion was noted extending from the mid fundus.  Bilateral tubal ostia visualized.           PROCEDURE IN DETAIL:   Prior to the procedure the patient was seen and evaluated in the preoperative area.  Potential risks associated with the surgery once again reviewed discussed.  The patient and her family's questions were answered.    The patient was subsequently taken to the [facility]  operating room, where general anesthesia with LMA was initiated by the anesthesia department.      The patient was prepped and draped in the usual sterile fashion in the dorsal lithotomy position in the Gato stirrups.  During this time the skin breakdown as above was noted.  The bladder was drained of approximately 40 cc of clear urine.      At this time a time-out/safety procedure was performed with all participating parties in agreement as to the preoperative and operative plan.   Sequential compression hose were on the patient.    A bivalved speculum was introduced into the vagina and the anterior lip of the cervix grasped with a the single-tooth tenaculum. The uterus was gently sounded and the cervix was dilated to allow for the hysteroscope.     Findings as mentioned above were noted.    Following visualization of all 4 quadrants of the endometrial cavity, a curettage was performed under direct visualization.  Curettage including complete resection of the vascular endometrial polypoid lesion.  Following curettage, good hemostasis with an intact appearing cavity were noted.      The hysteroscope was then removed, following which, the tenaculum was removed from the anterior cervix with good hemostasis noted.     Final fluid deficit: 255 cc.    Final count was reported as correct per nursing.  The patient tolerated the procedure well.    Gamaliel Moran M.D., FACOG

## 2023-08-28 NOTE — CONSULTS
Received call from Fanny Cordero regarding pressure wound present on admit to day surgery. Patient would benefit from an outpatient follow up with Dr. Frazier for wound care.

## 2023-08-28 NOTE — ANESTHESIA PROCEDURE NOTES
Intubation    Date/Time: 8/28/2023 8:57 AM    Performed by: Nelson Gallagher CRNA  Authorized by: Panchito Carnes MD    Intubation:     Induction:  Intravenous    Intubated:  Postinduction    Mask Ventilation:  Easy mask    Attempts:  1    Attempted By:  CRNA    Difficult Airway Encountered?: No      Complications:  None    Airway Device:  Supraglottic airway/LMA    Airway Device Size:  3.0    Style/Cuff Inflation:  Cuffed (inflated to minimal occlusive pressure)    Placement Verified By:  Capnometry    Complicating Factors:  None    Findings Post-Intubation:  BS equal bilateral

## 2023-08-28 NOTE — INTERVAL H&P NOTE
The patient has been examined and the H&P has been reviewed:    I concur with the findings and no changes have occurred since H&P was written.    Surgery risks, benefits and alternative options discussed and understood by patient/family.    Impression:     Mildly thickened endometrial stripe with cystic change.  Consider endometrial biopsy in this postmenopausal female.     Assessment:  1. PMB (postmenopausal bleeding)   2. History of left breast cancer   3. Mass of upper inner quadrant of left breast     Plan: a HYSTEROSCOPY, DILATION AND CURETTAGE AND OTHER INDICATED PROCEDURES      There are no hospital problems to display for this patient.

## 2023-08-28 NOTE — TRANSFER OF CARE
"Anesthesia Transfer of Care Note    Patient: Maggy Tapia    Procedure(s) Performed: Procedure(s) (LRB):  HYSTEROSCOPY, WITH DILATION AND CURETTAGE OF UTERUS AND OTHER INDICATED PROCEDURES Needs Cardiac clearance (N/A)    Patient location: PACU    Anesthesia Type: general    Transport from OR: Transported from OR on 2-3 L/min O2 by NC with adequate spontaneous ventilation    Post pain: adequate analgesia    Post assessment: no apparent anesthetic complications and tolerated procedure well    Post vital signs: stable    Level of consciousness: sedated    Nausea/Vomiting: no nausea/vomiting    Complications: none    Transfer of care protocol was followed      Last vitals:   Visit Vitals  BP (!) 148/67 (BP Location: Right arm, Patient Position: Sitting)   Pulse 69   Temp 36.8 °C (98.2 °F) (Skin)   Resp 20   Ht 5' 1" (1.549 m)   Wt 75.8 kg (167 lb)   SpO2 95%   Breastfeeding No   BMI 31.55 kg/m²     "

## 2023-08-28 NOTE — ANESTHESIA PREPROCEDURE EVALUATION
08/28/2023  Maggy Tapia is a 73 y.o., female.      Pre-op Assessment    I have reviewed the Patient Summary Reports.     I have reviewed the Nursing Notes. I have reviewed the NPO Status.   I have reviewed the Medications.     Review of Systems  Anesthesia Hx:  No problems with previous Anesthesia  Denies Family Hx of Anesthesia complications.   Denies Personal Hx of Anesthesia complications.   Social:  Non-Smoker    Hematology/Oncology:         -- Cancer in past history: Breast left   Cardiovascular:   Hypertension hyperlipidemia ECG has been reviewed.    Pulmonary:   COPD Asthma Shortness of breath Sleep Apnea    Hepatic/GI:   GERD    Neurological:   CVA Neuromuscular Disease, Seizures   Peripheral Neuropathy    Endocrine:   Diabetes, type 2 Hypothyroidism  Obesity / BMI > 30  Psych:   Psychiatric History depression          Physical Exam  General: Cooperative, Alert, Oriented and Well nourished    Airway:  Mallampati: II   Mouth Opening: Normal  TM Distance: Normal  Neck ROM: Normal ROM        Anesthesia Plan  Type of Anesthesia, risks & benefits discussed:    Anesthesia Type: Gen ETT  Intra-op Monitoring Plan: Standard ASA Monitors  Post Op Pain Control Plan: multimodal analgesia  Induction:  IV  Informed Consent: Informed consent signed with the Patient and all parties understand the risks and agree with anesthesia plan.  All questions answered.   ASA Score: 3    Ready For Surgery From Anesthesia Perspective.     .

## 2023-08-28 NOTE — PLAN OF CARE
Patient's walker and cpap given to patient's brother. They have no additional questions. She was brought to the car via wheelchair.

## 2023-08-29 ENCOUNTER — TELEPHONE (OUTPATIENT)
Dept: FAMILY MEDICINE | Facility: CLINIC | Age: 73
End: 2023-08-29
Payer: MEDICARE

## 2023-08-29 VITALS
SYSTOLIC BLOOD PRESSURE: 134 MMHG | TEMPERATURE: 98 F | RESPIRATION RATE: 18 BRPM | DIASTOLIC BLOOD PRESSURE: 77 MMHG | BODY MASS INDEX: 31.53 KG/M2 | OXYGEN SATURATION: 95 % | WEIGHT: 167 LBS | HEIGHT: 61 IN | HEART RATE: 73 BPM

## 2023-08-29 NOTE — TELEPHONE ENCOUNTER
----- Message from Gamaliel Moran MD sent at 8/28/2023  9:40 AM CDT -----  Regarding: Skin breakdown  Good morning.  I performed a D&C on this patient today for postmenopausal bleeding.  Found a vascular endometrial polyp and pathology is pending.      During the prep process, some initial breakdown of the skin in the right gluteal region was noted.  Photographic image is under her media.  Wanted to let you know so hopefully we can get this addressed before becomes a more significant problem.  If you have any questions please let me know.    SP

## 2023-08-29 NOTE — TELEPHONE ENCOUNTER
Dr. Moran sent me a message that she has some skin breakdown on her buttock. I recommend we order home health for some wound care consult and care.  I placed an order. Print and fax to [facility]

## 2023-08-31 DIAGNOSIS — R06.02 SHORTNESS OF BREATH: ICD-10-CM

## 2023-08-31 RX ORDER — ALBUTEROL SULFATE 90 UG/1
AEROSOL, METERED RESPIRATORY (INHALATION)
Qty: 20.1 G | Refills: 11 | Status: SHIPPED | OUTPATIENT
Start: 2023-08-31

## 2023-08-31 NOTE — TELEPHONE ENCOUNTER
Please see the attached refill request.    Called the patient because she hadn't been seen by you in over a year.  Appt scheduled next week.    Last ordered:  07/06/22  Next appt: 09/05/23

## 2023-09-02 ENCOUNTER — HOSPITAL ENCOUNTER (OUTPATIENT)
Dept: RADIOLOGY | Facility: HOSPITAL | Age: 73
Discharge: HOME | End: 2023-09-02
Attending: PHYSICIAN ASSISTANT
Payer: MEDICARE

## 2023-09-02 DIAGNOSIS — H93.A9 PULSATILE TINNITUS, UNSPECIFIED EAR: ICD-10-CM

## 2023-09-02 DIAGNOSIS — R42 DIZZINESS AND GIDDINESS: ICD-10-CM

## 2023-09-02 LAB
CREAT SERPL-MCNC: 1 MG/DL (ref 0.5–1.4)
SAMPLE: NORMAL

## 2023-09-02 PROCEDURE — 70553 MRI IAC/TEMPORAL BONES W W/O CONTRAST: ICD-10-PCS | Mod: 26,,, | Performed by: RADIOLOGY

## 2023-09-02 PROCEDURE — 70498 CTA HEAD AND NECK (XPD): ICD-10-PCS | Mod: 26,,, | Performed by: RADIOLOGY

## 2023-09-02 PROCEDURE — 70496 CT ANGIOGRAPHY HEAD: CPT | Mod: TC

## 2023-09-02 PROCEDURE — 70553 MRI BRAIN STEM W/O & W/DYE: CPT | Mod: TC

## 2023-09-02 PROCEDURE — 70553 MRI BRAIN STEM W/O & W/DYE: CPT | Mod: 26,,, | Performed by: RADIOLOGY

## 2023-09-02 PROCEDURE — 25500020 PHARM REV CODE 255

## 2023-09-02 PROCEDURE — 70498 CT ANGIOGRAPHY NECK: CPT | Mod: 26,,, | Performed by: RADIOLOGY

## 2023-09-02 PROCEDURE — 70496 CTA HEAD AND NECK (XPD): ICD-10-PCS | Mod: 26,,, | Performed by: RADIOLOGY

## 2023-09-02 PROCEDURE — 70496 CT ANGIOGRAPHY HEAD: CPT | Mod: 26,,, | Performed by: RADIOLOGY

## 2023-09-02 PROCEDURE — A9585 GADOBUTROL INJECTION: HCPCS

## 2023-09-02 RX ORDER — GADOBUTROL 604.72 MG/ML
INJECTION INTRAVENOUS
Status: COMPLETED
Start: 2023-09-02 | End: 2023-09-02

## 2023-09-02 RX ADMIN — IOHEXOL 75 ML: 350 INJECTION, SOLUTION INTRAVENOUS at 01:09

## 2023-09-02 RX ADMIN — GADOBUTROL 7 ML: 604.72 INJECTION INTRAVENOUS at 12:09

## 2023-09-05 ENCOUNTER — TELEPHONE (OUTPATIENT)
Dept: OTOLARYNGOLOGY | Facility: CLINIC | Age: 73
End: 2023-09-05
Payer: MEDICARE

## 2023-09-05 NOTE — TELEPHONE ENCOUNTER
----- Message from Errol Ferrara PA-C sent at 9/5/2023  3:19 PM CDT -----  Please tell pt that her MRI does show her prior left sided stroke. Continue ASA 81mg as in the AM. Her CTA head and neck is normal. Please tell her that pulsatile tinnitus is likely benign venous hum. There are no worrisome findings for pulsatile tinnitus on scans. If she does not have a neurologist, I would suggest neuro consult due to history of stroke. Thank you!

## 2023-09-05 NOTE — TELEPHONE ENCOUNTER
Spoke w/pt's brother George and reviewed result note per provider. Pt's brother verbalized understanding and acknowledged. Care established w/neuro. No further ENT questions/concerns at this time.

## 2023-09-09 PROCEDURE — G0180 MD CERTIFICATION HHA PATIENT: HCPCS | Mod: ,,, | Performed by: FAMILY MEDICINE

## 2023-09-09 PROCEDURE — G0180 PR HOME HEALTH MD CERTIFICATION: ICD-10-PCS | Mod: ,,, | Performed by: FAMILY MEDICINE

## 2023-09-11 ENCOUNTER — OFFICE VISIT (OUTPATIENT)
Dept: PULMONOLOGY | Facility: CLINIC | Age: 73
End: 2023-09-11
Payer: MEDICARE

## 2023-09-11 VITALS
SYSTOLIC BLOOD PRESSURE: 144 MMHG | BODY MASS INDEX: 31.62 KG/M2 | OXYGEN SATURATION: 96 % | HEART RATE: 78 BPM | DIASTOLIC BLOOD PRESSURE: 70 MMHG | WEIGHT: 167.31 LBS

## 2023-09-11 DIAGNOSIS — J96.10 CHRONIC NEUROMUSCULAR RESPIRATORY FAILURE: ICD-10-CM

## 2023-09-11 DIAGNOSIS — G47.33 OSA (OBSTRUCTIVE SLEEP APNEA): ICD-10-CM

## 2023-09-11 DIAGNOSIS — J98.6 PARALYSIS OF DIAPHRAGM: ICD-10-CM

## 2023-09-11 DIAGNOSIS — R06.00 DYSPNEA, UNSPECIFIED TYPE: ICD-10-CM

## 2023-09-11 DIAGNOSIS — R06.02 SHORTNESS OF BREATH: ICD-10-CM

## 2023-09-11 DIAGNOSIS — B37.2 CANDIDIASIS OF SKIN: ICD-10-CM

## 2023-09-11 DIAGNOSIS — R53.81 PHYSICAL DECONDITIONING: ICD-10-CM

## 2023-09-11 DIAGNOSIS — J98.4 CHRONIC RESTRICTIVE LUNG DISEASE: ICD-10-CM

## 2023-09-11 DIAGNOSIS — R06.09 DOE (DYSPNEA ON EXERTION): Primary | ICD-10-CM

## 2023-09-11 PROCEDURE — 3061F PR NEG MICROALBUMINURIA RESULT DOCUMENTED/REVIEW: ICD-10-PCS | Mod: CPTII,S$GLB,, | Performed by: INTERNAL MEDICINE

## 2023-09-11 PROCEDURE — 3066F PR DOCUMENTATION OF TREATMENT FOR NEPHROPATHY: ICD-10-PCS | Mod: CPTII,S$GLB,, | Performed by: INTERNAL MEDICINE

## 2023-09-11 PROCEDURE — 3066F NEPHROPATHY DOC TX: CPT | Mod: CPTII,S$GLB,, | Performed by: INTERNAL MEDICINE

## 2023-09-11 PROCEDURE — 3044F HG A1C LEVEL LT 7.0%: CPT | Mod: CPTII,S$GLB,, | Performed by: INTERNAL MEDICINE

## 2023-09-11 PROCEDURE — 3061F NEG MICROALBUMINURIA REV: CPT | Mod: CPTII,S$GLB,, | Performed by: INTERNAL MEDICINE

## 2023-09-11 PROCEDURE — 1159F PR MEDICATION LIST DOCUMENTED IN MEDICAL RECORD: ICD-10-PCS | Mod: CPTII,S$GLB,, | Performed by: INTERNAL MEDICINE

## 2023-09-11 PROCEDURE — 99214 PR OFFICE/OUTPT VISIT, EST, LEVL IV, 30-39 MIN: ICD-10-PCS | Mod: S$GLB,,, | Performed by: INTERNAL MEDICINE

## 2023-09-11 PROCEDURE — 3078F PR MOST RECENT DIASTOLIC BLOOD PRESSURE < 80 MM HG: ICD-10-PCS | Mod: CPTII,S$GLB,, | Performed by: INTERNAL MEDICINE

## 2023-09-11 PROCEDURE — 3044F PR MOST RECENT HEMOGLOBIN A1C LEVEL <7.0%: ICD-10-PCS | Mod: CPTII,S$GLB,, | Performed by: INTERNAL MEDICINE

## 2023-09-11 PROCEDURE — 1157F ADVNC CARE PLAN IN RCRD: CPT | Mod: CPTII,S$GLB,, | Performed by: INTERNAL MEDICINE

## 2023-09-11 PROCEDURE — 1157F PR ADVANCE CARE PLAN OR EQUIV PRESENT IN MEDICAL RECORD: ICD-10-PCS | Mod: CPTII,S$GLB,, | Performed by: INTERNAL MEDICINE

## 2023-09-11 PROCEDURE — 1101F PR PT FALLS ASSESS DOC 0-1 FALLS W/OUT INJ PAST YR: ICD-10-PCS | Mod: CPTII,S$GLB,, | Performed by: INTERNAL MEDICINE

## 2023-09-11 PROCEDURE — 99999 PR PBB SHADOW E&M-EST. PATIENT-LVL IV: CPT | Mod: PBBFAC,,, | Performed by: INTERNAL MEDICINE

## 2023-09-11 PROCEDURE — 1125F PR PAIN SEVERITY QUANTIFIED, PAIN PRESENT: ICD-10-PCS | Mod: CPTII,S$GLB,, | Performed by: INTERNAL MEDICINE

## 2023-09-11 PROCEDURE — 99999 PR PBB SHADOW E&M-EST. PATIENT-LVL IV: ICD-10-PCS | Mod: PBBFAC,,, | Performed by: INTERNAL MEDICINE

## 2023-09-11 PROCEDURE — 99214 OFFICE O/P EST MOD 30 MIN: CPT | Mod: S$GLB,,, | Performed by: INTERNAL MEDICINE

## 2023-09-11 PROCEDURE — 3077F SYST BP >= 140 MM HG: CPT | Mod: CPTII,S$GLB,, | Performed by: INTERNAL MEDICINE

## 2023-09-11 PROCEDURE — 1159F MED LIST DOCD IN RCRD: CPT | Mod: CPTII,S$GLB,, | Performed by: INTERNAL MEDICINE

## 2023-09-11 PROCEDURE — 3288F FALL RISK ASSESSMENT DOCD: CPT | Mod: CPTII,S$GLB,, | Performed by: INTERNAL MEDICINE

## 2023-09-11 PROCEDURE — 3077F PR MOST RECENT SYSTOLIC BLOOD PRESSURE >= 140 MM HG: ICD-10-PCS | Mod: CPTII,S$GLB,, | Performed by: INTERNAL MEDICINE

## 2023-09-11 PROCEDURE — 3078F DIAST BP <80 MM HG: CPT | Mod: CPTII,S$GLB,, | Performed by: INTERNAL MEDICINE

## 2023-09-11 PROCEDURE — 1125F AMNT PAIN NOTED PAIN PRSNT: CPT | Mod: CPTII,S$GLB,, | Performed by: INTERNAL MEDICINE

## 2023-09-11 PROCEDURE — 1101F PT FALLS ASSESS-DOCD LE1/YR: CPT | Mod: CPTII,S$GLB,, | Performed by: INTERNAL MEDICINE

## 2023-09-11 PROCEDURE — 3288F PR FALLS RISK ASSESSMENT DOCUMENTED: ICD-10-PCS | Mod: CPTII,S$GLB,, | Performed by: INTERNAL MEDICINE

## 2023-09-11 RX ORDER — NYSTATIN 100000 U/G
CREAM TOPICAL 2 TIMES DAILY
Qty: 15 G | Refills: 2 | Status: SHIPPED | OUTPATIENT
Start: 2023-09-11

## 2023-09-11 RX ORDER — FLUTICASONE FUROATE AND VILANTEROL TRIFENATATE 100; 25 UG/1; UG/1
1 POWDER RESPIRATORY (INHALATION) DAILY
Qty: 60 EACH | Refills: 11 | Status: SHIPPED | OUTPATIENT
Start: 2023-09-11

## 2023-09-11 NOTE — PATIENT INSTRUCTIONS
Continue ventilator use at night and as needed during the day  Continue physical therapy to build strength and may consider pulmonary rehab in the future  Get echo to check heart function  Six min walk test to see if you may need oxygen with walking  Continue inhalers  Nystatin cream to rash over face  Need to wash mask from machine with hot water and dish soap- soak 1 hour
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
## 2023-09-12 ENCOUNTER — OFFICE VISIT (OUTPATIENT)
Dept: OBSTETRICS AND GYNECOLOGY | Facility: CLINIC | Age: 73
End: 2023-09-12
Payer: MEDICARE

## 2023-09-12 VITALS
RESPIRATION RATE: 18 BRPM | DIASTOLIC BLOOD PRESSURE: 62 MMHG | SYSTOLIC BLOOD PRESSURE: 112 MMHG | WEIGHT: 166.88 LBS | BODY MASS INDEX: 31.51 KG/M2 | HEIGHT: 61 IN

## 2023-09-12 DIAGNOSIS — N95.0 PMB (POSTMENOPAUSAL BLEEDING): ICD-10-CM

## 2023-09-12 DIAGNOSIS — Z98.890 POSTOPERATIVE STATE: Primary | ICD-10-CM

## 2023-09-12 DIAGNOSIS — Z85.3 HISTORY OF BREAST CANCER: ICD-10-CM

## 2023-09-12 PROCEDURE — 3078F DIAST BP <80 MM HG: CPT | Mod: CPTII,S$GLB,, | Performed by: OBSTETRICS & GYNECOLOGY

## 2023-09-12 PROCEDURE — 1100F PTFALLS ASSESS-DOCD GE2>/YR: CPT | Mod: CPTII,S$GLB,, | Performed by: OBSTETRICS & GYNECOLOGY

## 2023-09-12 PROCEDURE — 3074F PR MOST RECENT SYSTOLIC BLOOD PRESSURE < 130 MM HG: ICD-10-PCS | Mod: CPTII,S$GLB,, | Performed by: OBSTETRICS & GYNECOLOGY

## 2023-09-12 PROCEDURE — 3061F PR NEG MICROALBUMINURIA RESULT DOCUMENTED/REVIEW: ICD-10-PCS | Mod: CPTII,S$GLB,, | Performed by: OBSTETRICS & GYNECOLOGY

## 2023-09-12 PROCEDURE — 3066F PR DOCUMENTATION OF TREATMENT FOR NEPHROPATHY: ICD-10-PCS | Mod: CPTII,S$GLB,, | Performed by: OBSTETRICS & GYNECOLOGY

## 2023-09-12 PROCEDURE — 99999 PR PBB SHADOW E&M-EST. PATIENT-LVL IV: CPT | Mod: PBBFAC,,, | Performed by: OBSTETRICS & GYNECOLOGY

## 2023-09-12 PROCEDURE — 99999 PR PBB SHADOW E&M-EST. PATIENT-LVL IV: ICD-10-PCS | Mod: PBBFAC,,, | Performed by: OBSTETRICS & GYNECOLOGY

## 2023-09-12 PROCEDURE — 1125F PR PAIN SEVERITY QUANTIFIED, PAIN PRESENT: ICD-10-PCS | Mod: CPTII,S$GLB,, | Performed by: OBSTETRICS & GYNECOLOGY

## 2023-09-12 PROCEDURE — 1125F AMNT PAIN NOTED PAIN PRSNT: CPT | Mod: CPTII,S$GLB,, | Performed by: OBSTETRICS & GYNECOLOGY

## 2023-09-12 PROCEDURE — 99024 POSTOP FOLLOW-UP VISIT: CPT | Mod: S$GLB,,, | Performed by: OBSTETRICS & GYNECOLOGY

## 2023-09-12 PROCEDURE — 99024 PR POST-OP FOLLOW-UP VISIT: ICD-10-PCS | Mod: S$GLB,,, | Performed by: OBSTETRICS & GYNECOLOGY

## 2023-09-12 PROCEDURE — 1157F PR ADVANCE CARE PLAN OR EQUIV PRESENT IN MEDICAL RECORD: ICD-10-PCS | Mod: CPTII,S$GLB,, | Performed by: OBSTETRICS & GYNECOLOGY

## 2023-09-12 PROCEDURE — 3074F SYST BP LT 130 MM HG: CPT | Mod: CPTII,S$GLB,, | Performed by: OBSTETRICS & GYNECOLOGY

## 2023-09-12 PROCEDURE — 1100F PR PT FALLS ASSESS DOC 2+ FALLS/FALL W/INJURY/YR: ICD-10-PCS | Mod: CPTII,S$GLB,, | Performed by: OBSTETRICS & GYNECOLOGY

## 2023-09-12 PROCEDURE — 3288F FALL RISK ASSESSMENT DOCD: CPT | Mod: CPTII,S$GLB,, | Performed by: OBSTETRICS & GYNECOLOGY

## 2023-09-12 PROCEDURE — 3044F HG A1C LEVEL LT 7.0%: CPT | Mod: CPTII,S$GLB,, | Performed by: OBSTETRICS & GYNECOLOGY

## 2023-09-12 PROCEDURE — 3044F PR MOST RECENT HEMOGLOBIN A1C LEVEL <7.0%: ICD-10-PCS | Mod: CPTII,S$GLB,, | Performed by: OBSTETRICS & GYNECOLOGY

## 2023-09-12 PROCEDURE — 3288F PR FALLS RISK ASSESSMENT DOCUMENTED: ICD-10-PCS | Mod: CPTII,S$GLB,, | Performed by: OBSTETRICS & GYNECOLOGY

## 2023-09-12 PROCEDURE — 3078F PR MOST RECENT DIASTOLIC BLOOD PRESSURE < 80 MM HG: ICD-10-PCS | Mod: CPTII,S$GLB,, | Performed by: OBSTETRICS & GYNECOLOGY

## 2023-09-12 PROCEDURE — 1157F ADVNC CARE PLAN IN RCRD: CPT | Mod: CPTII,S$GLB,, | Performed by: OBSTETRICS & GYNECOLOGY

## 2023-09-12 PROCEDURE — 3061F NEG MICROALBUMINURIA REV: CPT | Mod: CPTII,S$GLB,, | Performed by: OBSTETRICS & GYNECOLOGY

## 2023-09-12 PROCEDURE — 3066F NEPHROPATHY DOC TX: CPT | Mod: CPTII,S$GLB,, | Performed by: OBSTETRICS & GYNECOLOGY

## 2023-09-12 NOTE — PROGRESS NOTES
Subjective:   Chief Complaint:  Post-op Evaluation (Post D&C /Minor pain in pelvic area. )       Patient ID: Maggy Tapia is a  73 y.o. female.    Date: 2023     The patient is status post: Hysteroscopy, dilation curettage  on 2023.    From a postoperative standpoint she is currently doing well and without complaints.    She denies any postoperative fevers.   No significant postoperative GI or urologic issues.    No significant postoperative pain.   No significant postoperative bleeding.     Pathology: DIAGNOSIS:     ENDOMETRIAL CURETTAGE:   - FRAGMENTS OF BENIGN ENDOMETRIAL POLYP WITH CYSTIC GLANDULAR ATROPHY.   - NEGATIVE FOR ATYPICAL HYPERPLASIA OR MALIGNANCY.           GYN & OB History  No LMP recorded. Patient is postmenopausal.     Date of Last Pap: No result found    OB History    Para Term  AB Living   0 0 0 0 0 0   SAB IAB Ectopic Multiple Live Births   0 0 0 0 0         Past Medical History:   Diagnosis Date    Anxiety     Arthritis     Asthma     Breast cancer     Left    Depression     Diabetes mellitus, type 2     GERD (gastroesophageal reflux disease)     Hyperlipidemia     Hypertension     Hypothyroidism, unspecified     Overactive bladder     Seizures     Pseudo-seizures    Sleep apnea     Stroke 2022    pt was in the hospital for a stroke, claims she was seeing people when the stroke happened        Past Surgical History:   Procedure Laterality Date    APPENDECTOMY      BREAST BIOPSY Left     BREAST LUMPECTOMY Left     2016    BREAST SURGERY      CATARACT EXTRACTION Bilateral     OU done//     SECTION      CHOLECYSTECTOMY      COLONOSCOPY N/A 2020    Procedure: COLONOSCOPY;  Surgeon: Mj Fernandez MD;  Location: [facility];  Service: Endoscopy;  Laterality: N/A;    CYST REMOVAL Left 2021    DILATION AND CURETTAGE OF UTERUS      EYE SURGERY      HYSTEROSCOPY WITH DILATION AND CURETTAGE OF UTERUS N/A 2023    Procedure:  HYSTEROSCOPY, WITH DILATION AND CURETTAGE OF UTERUS AND OTHER INDICATED PROCEDURES Needs Cardiac clearance;  Surgeon: Gamaliel Moran MD;  Location: [facility] OR;  Service: OB/GYN;  Laterality: N/A;  Cardiac clearance needed per Dr Anderson    LUMPECTOMY, BREAST Left 4/26/2023    Procedure: LUMPECTOMY, BREAST;  Surgeon: Toño Paredes MD;  Location: [facility] OR;  Service: General;  Laterality: Left;    PERCUTANEOUS PINNING OF HIP Right 11/11/2022    Procedure: PINNING, HIP, PERCUTANEOUS;  Surgeon: Ministerio Joiner II, MD;  Location: [facility] OR;  Service: Orthopedics;  Laterality: Right;    SENTINEL LYMPH NODE BIOPSY Left 4/26/2023    Procedure: BIOPSY, LYMPH NODE, SENTINEL;  Surgeon: Toño Paredes MD;  Location: [facility] OR;  Service: General;  Laterality: Left;    SIMPLE MASTECTOMY Left 5/19/2023    Procedure: MASTECTOMY, SIMPLE;  Surgeon: Toño Paredes MD;  Location: [facility] OR;  Service: General;  Laterality: Left;        Review of Systems  Review of Systems   Constitutional:  Negative for fever.        As per HPI, otherwise no significant postoperative unexpected issues are noted   Respiratory: Negative.  Negative for shortness of breath.    Cardiovascular:  Negative for chest pain and palpitations.   Gastrointestinal:  Negative for abdominal pain, constipation, nausea and vomiting.   Genitourinary:  Negative for dysuria and hematuria.   Neurological: Negative.            Objective:      Vitals:    09/12/23 1340   BP: 112/62   Resp: 18     Physical Exam:   Constitutional: She appears well-developed and well-nourished.    HENT:   Head: Normocephalic.     Neck: No thyroid mass present.    Cardiovascular:  Normal rate.             Pulmonary/Chest: Effort normal. No respiratory distress.        Abdominal: Soft. There is no abdominal tenderness.             Musculoskeletal: Normal range of motion.      Right lower leg: No edema.      Left lower leg: No edema.       Neurological: She is alert.   No gross lesions noted.     Skin: Skin is warm and dry.    Psychiatric: She has a normal mood and affect. Her speech is normal and behavior is normal. Mood normal.          Assessment:        1. Postoperative state    2. PMB (postmenopausal bleeding)    3. History of left breast cancer         Plan:      Postoperative state    PMB (postmenopausal bleeding)    History of left breast cancer       Follow up for as needed / for any GYN related issues.     The above was reviewed discussed with the patient.  We discussed the findings that time of surgery as well as pathology.  We reviewed the photographic images.    From a postoperative standpoint the patient is currently doing well.  We will base further evaluation treatment on the patient's status over time.     The patient's questions regarding the above were answered and she is in agreement with the current plan.    Gamaliel Moran MD  Department OBGYN  [facility]

## 2023-09-13 DIAGNOSIS — E11.69 HYPERLIPIDEMIA ASSOCIATED WITH TYPE 2 DIABETES MELLITUS: ICD-10-CM

## 2023-09-13 DIAGNOSIS — E78.5 HYPERLIPIDEMIA ASSOCIATED WITH TYPE 2 DIABETES MELLITUS: ICD-10-CM

## 2023-09-13 RX ORDER — SIMVASTATIN 40 MG/1
40 TABLET, FILM COATED ORAL
Qty: 90 TABLET | Refills: 1 | Status: SHIPPED | OUTPATIENT
Start: 2023-09-13 | End: 2024-03-27

## 2023-09-13 NOTE — TELEPHONE ENCOUNTER
Refill Decision Note   Maggy Tapia  is requesting a refill authorization.  Brief Assessment and Rationale for Refill:  Approve     Medication Therapy Plan:         Comments:     Note composed:1:27 PM 09/13/2023             Appointments     Last Visit   7/10/2023 Yanna Abbasi MD   Next Visit   10/19/2023 Yanna Abbasi MD

## 2023-09-13 NOTE — TELEPHONE ENCOUNTER
No care due was identified.  [facility] Embedded Care Due Messages. Reference number: [id number].   9/13/2023 1:16:28 PM CDT

## 2023-09-14 ENCOUNTER — DOCUMENT SCAN (OUTPATIENT)
Dept: HOME HEALTH SERVICES | Facility: HOSPITAL | Age: 73
End: 2023-09-14
Payer: MEDICARE

## 2023-09-18 ENCOUNTER — LAB VISIT (OUTPATIENT)
Dept: LAB | Facility: HOSPITAL | Age: 73
End: 2023-09-18
Attending: PHYSICIAN ASSISTANT
Payer: MEDICARE

## 2023-09-18 DIAGNOSIS — Z79.4 TYPE 2 DIABETES MELLITUS WITH HYPERGLYCEMIA, WITH LONG-TERM CURRENT USE OF INSULIN: ICD-10-CM

## 2023-09-18 DIAGNOSIS — E11.65 TYPE 2 DIABETES MELLITUS WITH HYPERGLYCEMIA, WITH LONG-TERM CURRENT USE OF INSULIN: ICD-10-CM

## 2023-09-18 LAB
T4 FREE SERPL-MCNC: 0.82 NG/DL (ref 0.71–1.51)
TSH SERPL DL<=0.005 MIU/L-ACNC: 6.66 UIU/ML (ref 0.4–4)

## 2023-09-18 PROCEDURE — 36415 COLL VENOUS BLD VENIPUNCTURE: CPT | Mod: PO | Performed by: PHYSICIAN ASSISTANT

## 2023-09-18 PROCEDURE — 84443 ASSAY THYROID STIM HORMONE: CPT | Performed by: PHYSICIAN ASSISTANT

## 2023-09-18 PROCEDURE — 84439 ASSAY OF FREE THYROXINE: CPT | Performed by: PHYSICIAN ASSISTANT

## 2023-09-20 ENCOUNTER — OFFICE VISIT (OUTPATIENT)
Dept: WOUND CARE | Facility: HOSPITAL | Age: 73
End: 2023-09-20
Attending: FAMILY MEDICINE
Payer: MEDICARE

## 2023-09-20 VITALS
RESPIRATION RATE: 18 BRPM | DIASTOLIC BLOOD PRESSURE: 69 MMHG | TEMPERATURE: 99 F | SYSTOLIC BLOOD PRESSURE: 122 MMHG | HEART RATE: 81 BPM

## 2023-09-20 DIAGNOSIS — S31.819A BUTTOCK WOUND, RIGHT, INITIAL ENCOUNTER: Primary | ICD-10-CM

## 2023-09-20 PROCEDURE — 1159F MED LIST DOCD IN RCRD: CPT | Mod: CPTII,,, | Performed by: FAMILY MEDICINE

## 2023-09-20 PROCEDURE — 3061F PR NEG MICROALBUMINURIA RESULT DOCUMENTED/REVIEW: ICD-10-PCS | Mod: CPTII,,, | Performed by: FAMILY MEDICINE

## 2023-09-20 PROCEDURE — 3074F SYST BP LT 130 MM HG: CPT | Mod: CPTII,,, | Performed by: FAMILY MEDICINE

## 2023-09-20 PROCEDURE — 1160F PR REVIEW ALL MEDS BY PRESCRIBER/CLIN PHARMACIST DOCUMENTED: ICD-10-PCS | Mod: CPTII,,, | Performed by: FAMILY MEDICINE

## 2023-09-20 PROCEDURE — 1157F ADVNC CARE PLAN IN RCRD: CPT | Mod: CPTII,,, | Performed by: FAMILY MEDICINE

## 2023-09-20 PROCEDURE — 3074F PR MOST RECENT SYSTOLIC BLOOD PRESSURE < 130 MM HG: ICD-10-PCS | Mod: CPTII,,, | Performed by: FAMILY MEDICINE

## 2023-09-20 PROCEDURE — 1159F PR MEDICATION LIST DOCUMENTED IN MEDICAL RECORD: ICD-10-PCS | Mod: CPTII,,, | Performed by: FAMILY MEDICINE

## 2023-09-20 PROCEDURE — 99213 OFFICE O/P EST LOW 20 MIN: CPT | Mod: PN | Performed by: FAMILY MEDICINE

## 2023-09-20 PROCEDURE — 3066F PR DOCUMENTATION OF TREATMENT FOR NEPHROPATHY: ICD-10-PCS | Mod: CPTII,,, | Performed by: FAMILY MEDICINE

## 2023-09-20 PROCEDURE — 3078F DIAST BP <80 MM HG: CPT | Mod: CPTII,,, | Performed by: FAMILY MEDICINE

## 2023-09-20 PROCEDURE — 1126F PR PAIN SEVERITY QUANTIFIED, NO PAIN PRESENT: ICD-10-PCS | Mod: CPTII,,, | Performed by: FAMILY MEDICINE

## 2023-09-20 PROCEDURE — 1157F PR ADVANCE CARE PLAN OR EQUIV PRESENT IN MEDICAL RECORD: ICD-10-PCS | Mod: CPTII,,, | Performed by: FAMILY MEDICINE

## 2023-09-20 PROCEDURE — 3044F PR MOST RECENT HEMOGLOBIN A1C LEVEL <7.0%: ICD-10-PCS | Mod: CPTII,,, | Performed by: FAMILY MEDICINE

## 2023-09-20 PROCEDURE — 3078F PR MOST RECENT DIASTOLIC BLOOD PRESSURE < 80 MM HG: ICD-10-PCS | Mod: CPTII,,, | Performed by: FAMILY MEDICINE

## 2023-09-20 PROCEDURE — 3044F HG A1C LEVEL LT 7.0%: CPT | Mod: CPTII,,, | Performed by: FAMILY MEDICINE

## 2023-09-20 PROCEDURE — 3066F NEPHROPATHY DOC TX: CPT | Mod: CPTII,,, | Performed by: FAMILY MEDICINE

## 2023-09-20 PROCEDURE — 3061F NEG MICROALBUMINURIA REV: CPT | Mod: CPTII,,, | Performed by: FAMILY MEDICINE

## 2023-09-20 PROCEDURE — 99213 OFFICE O/P EST LOW 20 MIN: CPT | Mod: ,,, | Performed by: FAMILY MEDICINE

## 2023-09-20 PROCEDURE — 99213 PR OFFICE/OUTPT VISIT, EST, LEVL III, 20-29 MIN: ICD-10-PCS | Mod: ,,, | Performed by: FAMILY MEDICINE

## 2023-09-20 PROCEDURE — 1126F AMNT PAIN NOTED NONE PRSNT: CPT | Mod: CPTII,,, | Performed by: FAMILY MEDICINE

## 2023-09-20 PROCEDURE — 1160F RVW MEDS BY RX/DR IN RCRD: CPT | Mod: CPTII,,, | Performed by: FAMILY MEDICINE

## 2023-09-21 ENCOUNTER — DOCUMENT SCAN (OUTPATIENT)
Dept: HOME HEALTH SERVICES | Facility: HOSPITAL | Age: 73
End: 2023-09-21
Payer: MEDICARE

## 2023-09-25 ENCOUNTER — HOSPITAL ENCOUNTER (OUTPATIENT)
Dept: PULMONOLOGY | Facility: HOSPITAL | Age: 73
Discharge: HOME | End: 2023-09-25
Attending: INTERNAL MEDICINE
Payer: MEDICARE

## 2023-09-25 DIAGNOSIS — R06.02 SHORTNESS OF BREATH: ICD-10-CM

## 2023-09-25 LAB — BR6MWT: NORMAL

## 2023-09-27 ENCOUNTER — TELEPHONE (OUTPATIENT)
Dept: PULMONOLOGY | Facility: CLINIC | Age: 73
End: 2023-09-27
Payer: MEDICARE

## 2023-09-27 NOTE — TELEPHONE ENCOUNTER
Placed a call to the pt in regards to her 6 minute walk test. Pt verbalized understanding.    ----- Message from Daisy Craig sent at 9/27/2023 10:10 AM CDT -----  Contact: Patient  Type:  Patient Returning Call    Who Called:  Self  Who Left Message for Patient:  nurse  Does the patient know what this is regarding?:  her test results  Best Call Back Number:  [phone]  Additional Information:  Please call the patient back at the phone number listed above to advise. Thank you!

## 2023-09-27 NOTE — PROGRESS NOTES
Wound Care Progress Note    Subjective:       Patient ID: Maggy Tapia is a 73 y.o. female.    Chief Complaint: Wound Care    HPI  Pt seen in clinic for a FU re-admit for an open wound of the buttocks. Pt wound is stable. Pt is a past patient for  ssame problem. She has no other complaints at this visit.  Review of Systems   Skin:  Positive for wound.        Buttocks open wound   All other systems reviewed and are negative.      Objective:        Physical Exam  Vitals and nursing note reviewed. Exam conducted with a chaperone present.   Constitutional:       General: She is not in acute distress.     Appearance: Normal appearance.   Skin:     General: Skin is warm and dry.      Findings: Lesion present.      Comments: Open wounds of the buttocks, see wound care assessment documentation in chart review scanned under the media tab   Neurological:      General: No focal deficit present.      Mental Status: She is alert and oriented to person, place, and time.   Psychiatric:         Mood and Affect: Mood normal.         Thought Content: Thought content normal.         Judgment: Judgment normal.       Vitals:    09/20/23 1015   BP: 122/69   Pulse: 81   Resp: 18   Temp: 98.6 °F (37 °C)       Assessment:           ICD-10-CM ICD-9-CM   1. Buttock wound, right, initial encounter  S31.819A 877.0                Plan:                  Maggy was seen today for wound care.    Diagnoses and all orders for this visit:    Buttock wound, right, initial encounter    Much of the documentation for this visit was completed in wound docs system. Please see the attached documentation for further details about the patients care. Scanned under the media tab.

## 2023-09-29 PROBLEM — S31.819A BUTTOCK WOUND, RIGHT, INITIAL ENCOUNTER: Status: RESOLVED | Noted: 2022-07-28 | Resolved: 2023-09-29

## 2023-09-29 PROBLEM — R11.10 VOMITING: Status: RESOLVED | Noted: 2021-03-21 | Resolved: 2023-09-29

## 2023-10-04 ENCOUNTER — OFFICE VISIT (OUTPATIENT)
Dept: WOUND CARE | Facility: HOSPITAL | Age: 73
End: 2023-10-04
Attending: FAMILY MEDICINE
Payer: MEDICARE

## 2023-10-04 ENCOUNTER — OFFICE VISIT (OUTPATIENT)
Dept: PSYCHIATRY | Facility: CLINIC | Age: 73
End: 2023-10-04
Payer: COMMERCIAL

## 2023-10-04 VITALS
WEIGHT: 176.06 LBS | HEIGHT: 61 IN | SYSTOLIC BLOOD PRESSURE: 140 MMHG | DIASTOLIC BLOOD PRESSURE: 86 MMHG | BODY MASS INDEX: 33.24 KG/M2 | HEART RATE: 73 BPM

## 2023-10-04 VITALS
HEART RATE: 76 BPM | DIASTOLIC BLOOD PRESSURE: 93 MMHG | SYSTOLIC BLOOD PRESSURE: 161 MMHG | TEMPERATURE: 98 F | RESPIRATION RATE: 19 BRPM

## 2023-10-04 DIAGNOSIS — S31.819D BUTTOCK WOUND, RIGHT, SUBSEQUENT ENCOUNTER: Primary | ICD-10-CM

## 2023-10-04 DIAGNOSIS — F39 MOOD DISORDER: ICD-10-CM

## 2023-10-04 DIAGNOSIS — F41.9 ANXIETY DISORDER, UNSPECIFIED TYPE: ICD-10-CM

## 2023-10-04 DIAGNOSIS — F43.10 PTSD (POST-TRAUMATIC STRESS DISORDER): ICD-10-CM

## 2023-10-04 DIAGNOSIS — F34.1 PERSISTENT DEPRESSIVE DISORDER: Primary | ICD-10-CM

## 2023-10-04 DIAGNOSIS — E03.9 ACQUIRED HYPOTHYROIDISM: Primary | ICD-10-CM

## 2023-10-04 PROCEDURE — 3079F PR MOST RECENT DIASTOLIC BLOOD PRESSURE 80-89 MM HG: ICD-10-PCS | Mod: CPTII,S$GLB,, | Performed by: PHYSICIAN ASSISTANT

## 2023-10-04 PROCEDURE — 90833 PR PSYCHOTHERAPY W/PATIENT W/E&M, 30 MIN (ADD ON): ICD-10-PCS | Mod: S$GLB,,, | Performed by: PHYSICIAN ASSISTANT

## 2023-10-04 PROCEDURE — 1126F PR PAIN SEVERITY QUANTIFIED, NO PAIN PRESENT: ICD-10-PCS | Mod: CPTII,S$GLB,, | Performed by: PHYSICIAN ASSISTANT

## 2023-10-04 PROCEDURE — 3077F PR MOST RECENT SYSTOLIC BLOOD PRESSURE >= 140 MM HG: ICD-10-PCS | Mod: CPTII,S$GLB,, | Performed by: PHYSICIAN ASSISTANT

## 2023-10-04 PROCEDURE — 11042 DBRDMT SUBQ TIS 1ST 20SQCM/<: CPT | Mod: PN | Performed by: FAMILY MEDICINE

## 2023-10-04 PROCEDURE — 99499 NO LOS: ICD-10-PCS | Mod: ,,, | Performed by: FAMILY MEDICINE

## 2023-10-04 PROCEDURE — 99499 UNLISTED E&M SERVICE: CPT | Mod: ,,, | Performed by: FAMILY MEDICINE

## 2023-10-04 PROCEDURE — 99214 OFFICE O/P EST MOD 30 MIN: CPT | Mod: S$GLB,,, | Performed by: PHYSICIAN ASSISTANT

## 2023-10-04 PROCEDURE — 3288F FALL RISK ASSESSMENT DOCD: CPT | Mod: CPTII,S$GLB,, | Performed by: PHYSICIAN ASSISTANT

## 2023-10-04 PROCEDURE — 1101F PT FALLS ASSESS-DOCD LE1/YR: CPT | Mod: CPTII,S$GLB,, | Performed by: PHYSICIAN ASSISTANT

## 2023-10-04 PROCEDURE — 3079F DIAST BP 80-89 MM HG: CPT | Mod: CPTII,S$GLB,, | Performed by: PHYSICIAN ASSISTANT

## 2023-10-04 PROCEDURE — 90833 PSYTX W PT W E/M 30 MIN: CPT | Mod: S$GLB,,, | Performed by: PHYSICIAN ASSISTANT

## 2023-10-04 PROCEDURE — 3061F NEG MICROALBUMINURIA REV: CPT | Mod: CPTII,S$GLB,, | Performed by: PHYSICIAN ASSISTANT

## 2023-10-04 PROCEDURE — 1159F MED LIST DOCD IN RCRD: CPT | Mod: CPTII,S$GLB,, | Performed by: PHYSICIAN ASSISTANT

## 2023-10-04 PROCEDURE — 3288F PR FALLS RISK ASSESSMENT DOCUMENTED: ICD-10-PCS | Mod: CPTII,S$GLB,, | Performed by: PHYSICIAN ASSISTANT

## 2023-10-04 PROCEDURE — 1126F AMNT PAIN NOTED NONE PRSNT: CPT | Mod: CPTII,S$GLB,, | Performed by: PHYSICIAN ASSISTANT

## 2023-10-04 PROCEDURE — 3061F PR NEG MICROALBUMINURIA RESULT DOCUMENTED/REVIEW: ICD-10-PCS | Mod: CPTII,S$GLB,, | Performed by: PHYSICIAN ASSISTANT

## 2023-10-04 PROCEDURE — 99999 PR PBB SHADOW E&M-EST. PATIENT-LVL IV: ICD-10-PCS | Mod: PBBFAC,,, | Performed by: PHYSICIAN ASSISTANT

## 2023-10-04 PROCEDURE — 1157F ADVNC CARE PLAN IN RCRD: CPT | Mod: CPTII,S$GLB,, | Performed by: PHYSICIAN ASSISTANT

## 2023-10-04 PROCEDURE — 99999 PR PBB SHADOW E&M-EST. PATIENT-LVL IV: CPT | Mod: PBBFAC,,, | Performed by: PHYSICIAN ASSISTANT

## 2023-10-04 PROCEDURE — 11042 PR DEBRIDEMENT, SKIN, SUB-Q TISSUE,=<20 SQ CM: ICD-10-PCS | Mod: ,,, | Performed by: FAMILY MEDICINE

## 2023-10-04 PROCEDURE — 3066F PR DOCUMENTATION OF TREATMENT FOR NEPHROPATHY: ICD-10-PCS | Mod: CPTII,S$GLB,, | Performed by: PHYSICIAN ASSISTANT

## 2023-10-04 PROCEDURE — 1160F PR REVIEW ALL MEDS BY PRESCRIBER/CLIN PHARMACIST DOCUMENTED: ICD-10-PCS | Mod: CPTII,S$GLB,, | Performed by: PHYSICIAN ASSISTANT

## 2023-10-04 PROCEDURE — 1160F RVW MEDS BY RX/DR IN RCRD: CPT | Mod: CPTII,S$GLB,, | Performed by: PHYSICIAN ASSISTANT

## 2023-10-04 PROCEDURE — 1157F PR ADVANCE CARE PLAN OR EQUIV PRESENT IN MEDICAL RECORD: ICD-10-PCS | Mod: CPTII,S$GLB,, | Performed by: PHYSICIAN ASSISTANT

## 2023-10-04 PROCEDURE — 3066F NEPHROPATHY DOC TX: CPT | Mod: CPTII,S$GLB,, | Performed by: PHYSICIAN ASSISTANT

## 2023-10-04 PROCEDURE — 3044F HG A1C LEVEL LT 7.0%: CPT | Mod: CPTII,S$GLB,, | Performed by: PHYSICIAN ASSISTANT

## 2023-10-04 PROCEDURE — 3077F SYST BP >= 140 MM HG: CPT | Mod: CPTII,S$GLB,, | Performed by: PHYSICIAN ASSISTANT

## 2023-10-04 PROCEDURE — 1159F PR MEDICATION LIST DOCUMENTED IN MEDICAL RECORD: ICD-10-PCS | Mod: CPTII,S$GLB,, | Performed by: PHYSICIAN ASSISTANT

## 2023-10-04 PROCEDURE — 1101F PR PT FALLS ASSESS DOC 0-1 FALLS W/OUT INJ PAST YR: ICD-10-PCS | Mod: CPTII,S$GLB,, | Performed by: PHYSICIAN ASSISTANT

## 2023-10-04 PROCEDURE — 3044F PR MOST RECENT HEMOGLOBIN A1C LEVEL <7.0%: ICD-10-PCS | Mod: CPTII,S$GLB,, | Performed by: PHYSICIAN ASSISTANT

## 2023-10-04 PROCEDURE — 99214 PR OFFICE/OUTPT VISIT, EST, LEVL IV, 30-39 MIN: ICD-10-PCS | Mod: S$GLB,,, | Performed by: PHYSICIAN ASSISTANT

## 2023-10-04 PROCEDURE — 11042 DBRDMT SUBQ TIS 1ST 20SQCM/<: CPT | Mod: ,,, | Performed by: FAMILY MEDICINE

## 2023-10-04 RX ORDER — SERTRALINE HYDROCHLORIDE 50 MG/1
50 TABLET, FILM COATED ORAL DAILY
Qty: 90 TABLET | Refills: 0 | Status: SHIPPED | OUTPATIENT
Start: 2023-10-04 | End: 2024-01-05 | Stop reason: SDUPTHER

## 2023-10-04 RX ORDER — LEVOTHYROXINE SODIUM 112 UG/1
112 TABLET ORAL
Qty: 30 TABLET | Refills: 11 | Status: SHIPPED | OUTPATIENT
Start: 2023-10-04 | End: 2024-02-23
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
## 2023-10-06 ENCOUNTER — EXTERNAL HOME HEALTH (OUTPATIENT)
Dept: HOME HEALTH SERVICES | Facility: HOSPITAL | Age: 73
End: 2023-10-06
Payer: MEDICARE

## 2023-10-06 PROBLEM — S31.819D BUTTOCK WOUND, RIGHT, SUBSEQUENT ENCOUNTER: Status: ACTIVE | Noted: 2022-07-28

## 2023-10-06 NOTE — PROGRESS NOTES
Wound Care Progress Note    Subjective:       Patient ID: Maggy Tapia is a 73 y.o. female.    Chief Complaint: Wound Care and Non-healing Wound Follow Up    HPI  Pt seen in clinic for a FU visit for a right buttock open wound. Wound is covered with slough, she has no new complaints at this visit.  Review of Systems   Skin:  Positive for wound.        Open wound right buttock   All other systems reviewed and are negative.      Objective:        Physical Exam  Vitals and nursing note reviewed. Exam conducted with a chaperone present.   Constitutional:       Appearance: Normal appearance.   Skin:     General: Skin is warm and dry.      Findings: Lesion present.      Comments: Open wound of the right buttock, see wound care assessment documentation in  chart review scanned under the media tab   Neurological:      General: No focal deficit present.      Mental Status: She is alert and oriented to person, place, and time.   Psychiatric:         Mood and Affect: Mood normal.         Thought Content: Thought content normal.         Judgment: Judgment normal.       Vitals:    10/04/23 1041   BP: (!) 161/93   Pulse: 76   Resp: 19   Temp: 98.4 °F (36.9 °C)       Assessment:           ICD-10-CM ICD-9-CM   1. Buttock wound, right, subsequent encounter  S31.819D V58.89     877.0                Plan:                  Maggy was seen today for wound care and non-healing wound follow up.    Diagnoses and all orders for this visit:    Buttock wound, right, subsequent encounter      Much of the documentation for this visit was completed in the Wound Docs system.  Please see the attached documentation for further details about the patient's care. Scanned under the Media tab.

## 2023-10-09 ENCOUNTER — LAB VISIT (OUTPATIENT)
Dept: LAB | Facility: HOSPITAL | Age: 73
End: 2023-10-09
Attending: FAMILY MEDICINE
Payer: MEDICARE

## 2023-10-09 DIAGNOSIS — E03.9 ACQUIRED HYPOTHYROIDISM: ICD-10-CM

## 2023-10-09 DIAGNOSIS — E78.2 MIXED HYPERLIPIDEMIA: ICD-10-CM

## 2023-10-09 DIAGNOSIS — E11.51 TYPE 2 DIABETES MELLITUS WITH DIABETIC PERIPHERAL ANGIOPATHY WITHOUT GANGRENE, WITHOUT LONG-TERM CURRENT USE OF INSULIN: ICD-10-CM

## 2023-10-09 LAB
ALBUMIN SERPL BCP-MCNC: 3.4 G/DL (ref 3.5–5.2)
ALP SERPL-CCNC: 90 U/L (ref 55–135)
ALT SERPL W/O P-5'-P-CCNC: 12 U/L (ref 10–44)
ANION GAP SERPL CALC-SCNC: 10 MMOL/L (ref 8–16)
AST SERPL-CCNC: 11 U/L (ref 10–40)
BASOPHILS # BLD AUTO: 0.04 K/UL (ref 0–0.2)
BASOPHILS NFR BLD: 0.4 % (ref 0–1.9)
BILIRUB SERPL-MCNC: 0.3 MG/DL (ref 0.1–1)
BUN SERPL-MCNC: 16 MG/DL (ref 8–23)
CALCIUM SERPL-MCNC: 9.8 MG/DL (ref 8.7–10.5)
CHLORIDE SERPL-SCNC: 105 MMOL/L (ref 95–110)
CHOLEST SERPL-MCNC: 165 MG/DL (ref 120–199)
CHOLEST/HDLC SERPL: 3.7 {RATIO} (ref 2–5)
CO2 SERPL-SCNC: 25 MMOL/L (ref 23–29)
CREAT SERPL-MCNC: 0.8 MG/DL (ref 0.5–1.4)
DIFFERENTIAL METHOD: ABNORMAL
EOSINOPHIL # BLD AUTO: 0.2 K/UL (ref 0–0.5)
EOSINOPHIL NFR BLD: 2.5 % (ref 0–8)
ERYTHROCYTE [DISTWIDTH] IN BLOOD BY AUTOMATED COUNT: 14 % (ref 11.5–14.5)
EST. GFR  (NO RACE VARIABLE): >60 ML/MIN/1.73 M^2
ESTIMATED AVG GLUCOSE: 128 MG/DL (ref 68–131)
GLUCOSE SERPL-MCNC: 125 MG/DL (ref 70–110)
HBA1C MFR BLD: 6.1 % (ref 4–5.6)
HCT VFR BLD AUTO: 46.7 % (ref 37–48.5)
HDLC SERPL-MCNC: 45 MG/DL (ref 40–75)
HDLC SERPL: 27.3 % (ref 20–50)
HGB BLD-MCNC: 14.8 G/DL (ref 12–16)
IMM GRANULOCYTES # BLD AUTO: 0.02 K/UL (ref 0–0.04)
IMM GRANULOCYTES NFR BLD AUTO: 0.2 % (ref 0–0.5)
LDLC SERPL CALC-MCNC: 80.8 MG/DL (ref 63–159)
LYMPHOCYTES # BLD AUTO: 2.4 K/UL (ref 1–4.8)
LYMPHOCYTES NFR BLD: 25 % (ref 18–48)
MCH RBC QN AUTO: 30.1 PG (ref 27–31)
MCHC RBC AUTO-ENTMCNC: 31.7 G/DL (ref 32–36)
MCV RBC AUTO: 95 FL (ref 82–98)
MONOCYTES # BLD AUTO: 0.7 K/UL (ref 0.3–1)
MONOCYTES NFR BLD: 6.8 % (ref 4–15)
NEUTROPHILS # BLD AUTO: 6.3 K/UL (ref 1.8–7.7)
NEUTROPHILS NFR BLD: 65.1 % (ref 38–73)
NONHDLC SERPL-MCNC: 120 MG/DL
NRBC BLD-RTO: 0 /100 WBC
PLATELET # BLD AUTO: 186 K/UL (ref 150–450)
PMV BLD AUTO: 11.5 FL (ref 9.2–12.9)
POTASSIUM SERPL-SCNC: 4 MMOL/L (ref 3.5–5.1)
PROT SERPL-MCNC: 7.1 G/DL (ref 6–8.4)
RBC # BLD AUTO: 4.92 M/UL (ref 4–5.4)
SODIUM SERPL-SCNC: 140 MMOL/L (ref 136–145)
T4 FREE SERPL-MCNC: 0.86 NG/DL (ref 0.71–1.51)
TRIGL SERPL-MCNC: 196 MG/DL (ref 30–150)
TSH SERPL DL<=0.005 MIU/L-ACNC: 2.91 UIU/ML (ref 0.4–4)
WBC # BLD AUTO: 9.68 K/UL (ref 3.9–12.7)

## 2023-10-09 PROCEDURE — 80053 COMPREHEN METABOLIC PANEL: CPT | Performed by: FAMILY MEDICINE

## 2023-10-09 PROCEDURE — 84443 ASSAY THYROID STIM HORMONE: CPT | Performed by: FAMILY MEDICINE

## 2023-10-09 PROCEDURE — 85025 COMPLETE CBC W/AUTO DIFF WBC: CPT | Performed by: FAMILY MEDICINE

## 2023-10-09 PROCEDURE — 83036 HEMOGLOBIN GLYCOSYLATED A1C: CPT | Performed by: FAMILY MEDICINE

## 2023-10-09 PROCEDURE — 36415 COLL VENOUS BLD VENIPUNCTURE: CPT | Mod: PO | Performed by: FAMILY MEDICINE

## 2023-10-09 PROCEDURE — 80061 LIPID PANEL: CPT | Performed by: FAMILY MEDICINE

## 2023-10-09 PROCEDURE — 84439 ASSAY OF FREE THYROXINE: CPT | Performed by: FAMILY MEDICINE

## 2023-10-11 ENCOUNTER — HOSPITAL ENCOUNTER (OUTPATIENT)
Dept: CARDIOLOGY | Facility: HOSPITAL | Age: 73
Discharge: HOME | End: 2023-10-11
Attending: INTERNAL MEDICINE
Payer: MEDICARE

## 2023-10-11 VITALS — BODY MASS INDEX: 33.25 KG/M2 | HEIGHT: 61 IN | WEIGHT: 176.13 LBS

## 2023-10-11 DIAGNOSIS — R06.02 SHORTNESS OF BREATH: ICD-10-CM

## 2023-10-11 PROCEDURE — 93306 TTE W/DOPPLER COMPLETE: CPT

## 2023-10-11 PROCEDURE — 93306 TTE W/DOPPLER COMPLETE: CPT | Mod: 26,,, | Performed by: GENERAL PRACTICE

## 2023-10-11 PROCEDURE — 93306 ECHO (CUPID ONLY): ICD-10-PCS | Mod: 26,,, | Performed by: GENERAL PRACTICE

## 2023-10-12 LAB
AORTIC ROOT ANNULUS: 3.05 CM
AORTIC VALVE CUSP SEPERATION: 1.92 CM
AV INDEX (PROSTH): 0.93
AV MEAN GRADIENT: 6 MMHG
AV PEAK GRADIENT: 10 MMHG
AV REGURGITATION PRESSURE HALF TIME: 445.89 MS
AV VALVE AREA BY VELOCITY RATIO: 2.48 CM²
AV VALVE AREA: 2.89 CM²
AV VELOCITY RATIO: 0.8
BSA FOR ECHO PROCEDURE: 1.85 M2
CV ECHO LV RWT: 0.36 CM
DOP CALC AO PEAK VEL: 1.57 M/S
DOP CALC AO VTI: 32 CM
DOP CALC LVOT AREA: 3.1 CM2
DOP CALC LVOT DIAMETER: 1.99 CM
DOP CALC LVOT PEAK VEL: 1.25 M/S
DOP CALC LVOT STROKE VOLUME: 92.33 CM3
DOP CALC MV VTI: 30.6 CM
DOP CALCLVOT PEAK VEL VTI: 29.7 CM
E WAVE DECELERATION TIME: 238.47 MSEC
E/A RATIO: 0.64
E/E' RATIO: 11.38 M/S
ECHO LV POSTERIOR WALL: 0.8 CM (ref 0.6–1.1)
FRACTIONAL SHORTENING: 36 % (ref 28–44)
INTERVENTRICULAR SEPTUM: 0.84 CM (ref 0.6–1.1)
IVC DIAMETER: 2.06 CM
IVRT: 62.8 MSEC
LA MAJOR: 4.5 CM
LA MINOR: 4.93 CM
LEFT ATRIUM SIZE: 3.58 CM
LEFT INTERNAL DIMENSION IN SYSTOLE: 2.87 CM (ref 2.1–4)
LEFT VENTRICLE DIASTOLIC VOLUME INDEX: 51.56 ML/M2
LEFT VENTRICLE DIASTOLIC VOLUME: 92.3 ML
LEFT VENTRICLE MASS INDEX: 66 G/M2
LEFT VENTRICLE SYSTOLIC VOLUME INDEX: 17.6 ML/M2
LEFT VENTRICLE SYSTOLIC VOLUME: 31.46 ML
LEFT VENTRICULAR INTERNAL DIMENSION IN DIASTOLE: 4.5 CM (ref 3.5–6)
LEFT VENTRICULAR MASS: 117.37 G
LV LATERAL E/E' RATIO: 10.11 M/S
LV SEPTAL E/E' RATIO: 13 M/S
LVOT MG: 3.48 MMHG
LVOT MV: 0.89 CM/S
MV A" WAVE DURATION": 97.05 MSEC
MV MEAN GRADIENT: 3 MMHG
MV PEAK A VEL: 1.42 M/S
MV PEAK E VEL: 0.91 M/S
MV PEAK GRADIENT: 11 MMHG
MV STENOSIS PRESSURE HALF TIME: 69.52 MS
MV VALVE AREA BY CONTINUITY EQUATION: 3.02 CM2
MV VALVE AREA P 1/2 METHOD: 3.16 CM2
PISA AR MAX VEL: 2.47 M/S
PISA TR MAX VEL: 2.34 M/S
PV MV: 0.71 M/S
PV PEAK GRADIENT: 4 MMHG
PV PEAK VELOCITY: 0.98 M/S
RA MAJOR: 3.63 CM
RA PRESSURE ESTIMATED: 3 MMHG
RIGHT VENTRICULAR END-DIASTOLIC DIMENSION: 1.72 CM
RV TB RVSP: 5 MMHG
RV TISSUE DOPPLER FREE WALL SYSTOLIC VELOCITY 1 (APICAL 4 CHAMBER VIEW): 12.75 CM/S
TDI LATERAL: 0.09 M/S
TDI SEPTAL: 0.07 M/S
TDI: 0.08 M/S
TR MAX PG: 22 MMHG
TRICUSPID ANNULAR PLANE SYSTOLIC EXCURSION: 1.52 CM
TV REST PULMONARY ARTERY PRESSURE: 25 MMHG
Z-SCORE OF LEFT VENTRICULAR DIMENSION IN END DIASTOLE: -0.95
Z-SCORE OF LEFT VENTRICULAR DIMENSION IN END SYSTOLE: -0.5

## 2023-10-18 ENCOUNTER — OFFICE VISIT (OUTPATIENT)
Dept: WOUND CARE | Facility: HOSPITAL | Age: 73
End: 2023-10-18
Attending: FAMILY MEDICINE
Payer: MEDICARE

## 2023-10-18 VITALS
HEART RATE: 85 BPM | RESPIRATION RATE: 20 BRPM | TEMPERATURE: 99 F | DIASTOLIC BLOOD PRESSURE: 83 MMHG | SYSTOLIC BLOOD PRESSURE: 184 MMHG

## 2023-10-18 DIAGNOSIS — S31.819D BUTTOCK WOUND, RIGHT, SUBSEQUENT ENCOUNTER: Primary | ICD-10-CM

## 2023-10-18 PROCEDURE — 3079F PR MOST RECENT DIASTOLIC BLOOD PRESSURE 80-89 MM HG: ICD-10-PCS | Mod: CPTII,,, | Performed by: FAMILY MEDICINE

## 2023-10-18 PROCEDURE — 1157F ADVNC CARE PLAN IN RCRD: CPT | Mod: CPTII,,, | Performed by: FAMILY MEDICINE

## 2023-10-18 PROCEDURE — 3061F PR NEG MICROALBUMINURIA RESULT DOCUMENTED/REVIEW: ICD-10-PCS | Mod: CPTII,,, | Performed by: FAMILY MEDICINE

## 2023-10-18 PROCEDURE — 3288F FALL RISK ASSESSMENT DOCD: CPT | Mod: CPTII,,, | Performed by: FAMILY MEDICINE

## 2023-10-18 PROCEDURE — 1157F PR ADVANCE CARE PLAN OR EQUIV PRESENT IN MEDICAL RECORD: ICD-10-PCS | Mod: CPTII,,, | Performed by: FAMILY MEDICINE

## 2023-10-18 PROCEDURE — 1159F MED LIST DOCD IN RCRD: CPT | Mod: CPTII,,, | Performed by: FAMILY MEDICINE

## 2023-10-18 PROCEDURE — 3061F NEG MICROALBUMINURIA REV: CPT | Mod: CPTII,,, | Performed by: FAMILY MEDICINE

## 2023-10-18 PROCEDURE — 3077F PR MOST RECENT SYSTOLIC BLOOD PRESSURE >= 140 MM HG: ICD-10-PCS | Mod: CPTII,,, | Performed by: FAMILY MEDICINE

## 2023-10-18 PROCEDURE — 1101F PT FALLS ASSESS-DOCD LE1/YR: CPT | Mod: CPTII,,, | Performed by: FAMILY MEDICINE

## 2023-10-18 PROCEDURE — 99213 OFFICE O/P EST LOW 20 MIN: CPT | Mod: ,,, | Performed by: FAMILY MEDICINE

## 2023-10-18 PROCEDURE — 1125F PR PAIN SEVERITY QUANTIFIED, PAIN PRESENT: ICD-10-PCS | Mod: CPTII,,, | Performed by: FAMILY MEDICINE

## 2023-10-18 PROCEDURE — 1125F AMNT PAIN NOTED PAIN PRSNT: CPT | Mod: CPTII,,, | Performed by: FAMILY MEDICINE

## 2023-10-18 PROCEDURE — 3066F NEPHROPATHY DOC TX: CPT | Mod: CPTII,,, | Performed by: FAMILY MEDICINE

## 2023-10-18 PROCEDURE — 3079F DIAST BP 80-89 MM HG: CPT | Mod: CPTII,,, | Performed by: FAMILY MEDICINE

## 2023-10-18 PROCEDURE — 1160F RVW MEDS BY RX/DR IN RCRD: CPT | Mod: CPTII,,, | Performed by: FAMILY MEDICINE

## 2023-10-18 PROCEDURE — 3044F PR MOST RECENT HEMOGLOBIN A1C LEVEL <7.0%: ICD-10-PCS | Mod: CPTII,,, | Performed by: FAMILY MEDICINE

## 2023-10-18 PROCEDURE — 1159F PR MEDICATION LIST DOCUMENTED IN MEDICAL RECORD: ICD-10-PCS | Mod: CPTII,,, | Performed by: FAMILY MEDICINE

## 2023-10-18 PROCEDURE — 3288F PR FALLS RISK ASSESSMENT DOCUMENTED: ICD-10-PCS | Mod: CPTII,,, | Performed by: FAMILY MEDICINE

## 2023-10-18 PROCEDURE — 1101F PR PT FALLS ASSESS DOC 0-1 FALLS W/OUT INJ PAST YR: ICD-10-PCS | Mod: CPTII,,, | Performed by: FAMILY MEDICINE

## 2023-10-18 PROCEDURE — 1160F PR REVIEW ALL MEDS BY PRESCRIBER/CLIN PHARMACIST DOCUMENTED: ICD-10-PCS | Mod: CPTII,,, | Performed by: FAMILY MEDICINE

## 2023-10-18 PROCEDURE — 99214 OFFICE O/P EST MOD 30 MIN: CPT | Mod: PN | Performed by: FAMILY MEDICINE

## 2023-10-18 PROCEDURE — 3044F HG A1C LEVEL LT 7.0%: CPT | Mod: CPTII,,, | Performed by: FAMILY MEDICINE

## 2023-10-18 PROCEDURE — 99213 PR OFFICE/OUTPT VISIT, EST, LEVL III, 20-29 MIN: ICD-10-PCS | Mod: ,,, | Performed by: FAMILY MEDICINE

## 2023-10-18 PROCEDURE — 3066F PR DOCUMENTATION OF TREATMENT FOR NEPHROPATHY: ICD-10-PCS | Mod: CPTII,,, | Performed by: FAMILY MEDICINE

## 2023-10-18 PROCEDURE — 3077F SYST BP >= 140 MM HG: CPT | Mod: CPTII,,, | Performed by: FAMILY MEDICINE

## 2023-10-19 ENCOUNTER — OFFICE VISIT (OUTPATIENT)
Dept: FAMILY MEDICINE | Facility: CLINIC | Age: 73
End: 2023-10-19
Payer: MEDICARE

## 2023-10-19 VITALS
HEART RATE: 87 BPM | WEIGHT: 171.75 LBS | SYSTOLIC BLOOD PRESSURE: 110 MMHG | OXYGEN SATURATION: 95 % | TEMPERATURE: 98 F | RESPIRATION RATE: 17 BRPM | HEIGHT: 61 IN | DIASTOLIC BLOOD PRESSURE: 60 MMHG | BODY MASS INDEX: 32.42 KG/M2

## 2023-10-19 DIAGNOSIS — Z86.010 HISTORY OF COLON POLYPS: ICD-10-CM

## 2023-10-19 DIAGNOSIS — J44.9 CHRONIC OBSTRUCTIVE PULMONARY DISEASE, UNSPECIFIED COPD TYPE: ICD-10-CM

## 2023-10-19 DIAGNOSIS — E55.9 VITAMIN D DEFICIENCY: ICD-10-CM

## 2023-10-19 DIAGNOSIS — C50.912 INVASIVE DUCTAL CARCINOMA OF BREAST, FEMALE, LEFT: ICD-10-CM

## 2023-10-19 DIAGNOSIS — E11.42 TYPE 2 DIABETES MELLITUS WITH DIABETIC POLYNEUROPATHY, WITHOUT LONG-TERM CURRENT USE OF INSULIN: ICD-10-CM

## 2023-10-19 DIAGNOSIS — E78.2 MIXED HYPERLIPIDEMIA: ICD-10-CM

## 2023-10-19 DIAGNOSIS — I69.351 HEMIPLEGIA AND HEMIPARESIS FOLLOWING CEREBRAL INFARCTION AFFECTING RIGHT DOMINANT SIDE: ICD-10-CM

## 2023-10-19 DIAGNOSIS — E03.9 ACQUIRED HYPOTHYROIDISM: Chronic | ICD-10-CM

## 2023-10-19 DIAGNOSIS — Z23 FLU VACCINE NEED: ICD-10-CM

## 2023-10-19 DIAGNOSIS — I10 ESSENTIAL HYPERTENSION: Primary | ICD-10-CM

## 2023-10-19 DIAGNOSIS — E66.9 OBESITY (BMI 30-39.9): ICD-10-CM

## 2023-10-19 DIAGNOSIS — S31.819A BUTTOCK WOUND, RIGHT, INITIAL ENCOUNTER: ICD-10-CM

## 2023-10-19 PROBLEM — Z86.0100 HISTORY OF COLON POLYPS: Status: ACTIVE | Noted: 2023-10-19

## 2023-10-19 PROCEDURE — 1159F MED LIST DOCD IN RCRD: CPT | Mod: CPTII,S$GLB,, | Performed by: FAMILY MEDICINE

## 2023-10-19 PROCEDURE — 1160F RVW MEDS BY RX/DR IN RCRD: CPT | Mod: CPTII,S$GLB,, | Performed by: FAMILY MEDICINE

## 2023-10-19 PROCEDURE — 1125F PR PAIN SEVERITY QUANTIFIED, PAIN PRESENT: ICD-10-PCS | Mod: CPTII,S$GLB,, | Performed by: FAMILY MEDICINE

## 2023-10-19 PROCEDURE — 1157F PR ADVANCE CARE PLAN OR EQUIV PRESENT IN MEDICAL RECORD: ICD-10-PCS | Mod: CPTII,S$GLB,, | Performed by: FAMILY MEDICINE

## 2023-10-19 PROCEDURE — 1125F AMNT PAIN NOTED PAIN PRSNT: CPT | Mod: CPTII,S$GLB,, | Performed by: FAMILY MEDICINE

## 2023-10-19 PROCEDURE — 3066F PR DOCUMENTATION OF TREATMENT FOR NEPHROPATHY: ICD-10-PCS | Mod: CPTII,S$GLB,, | Performed by: FAMILY MEDICINE

## 2023-10-19 PROCEDURE — 1159F PR MEDICATION LIST DOCUMENTED IN MEDICAL RECORD: ICD-10-PCS | Mod: CPTII,S$GLB,, | Performed by: FAMILY MEDICINE

## 2023-10-19 PROCEDURE — 3074F SYST BP LT 130 MM HG: CPT | Mod: CPTII,S$GLB,, | Performed by: FAMILY MEDICINE

## 2023-10-19 PROCEDURE — 3288F FALL RISK ASSESSMENT DOCD: CPT | Mod: CPTII,S$GLB,, | Performed by: FAMILY MEDICINE

## 2023-10-19 PROCEDURE — 99999 PR PBB SHADOW E&M-EST. PATIENT-LVL III: ICD-10-PCS | Mod: PBBFAC,,, | Performed by: FAMILY MEDICINE

## 2023-10-19 PROCEDURE — 3044F PR MOST RECENT HEMOGLOBIN A1C LEVEL <7.0%: ICD-10-PCS | Mod: CPTII,S$GLB,, | Performed by: FAMILY MEDICINE

## 2023-10-19 PROCEDURE — 1157F ADVNC CARE PLAN IN RCRD: CPT | Mod: CPTII,S$GLB,, | Performed by: FAMILY MEDICINE

## 2023-10-19 PROCEDURE — 3044F HG A1C LEVEL LT 7.0%: CPT | Mod: CPTII,S$GLB,, | Performed by: FAMILY MEDICINE

## 2023-10-19 PROCEDURE — 99999 PR PBB SHADOW E&M-EST. PATIENT-LVL III: CPT | Mod: PBBFAC,,, | Performed by: FAMILY MEDICINE

## 2023-10-19 PROCEDURE — 99214 OFFICE O/P EST MOD 30 MIN: CPT | Mod: S$GLB,,, | Performed by: FAMILY MEDICINE

## 2023-10-19 PROCEDURE — 3078F DIAST BP <80 MM HG: CPT | Mod: CPTII,S$GLB,, | Performed by: FAMILY MEDICINE

## 2023-10-19 PROCEDURE — 1101F PT FALLS ASSESS-DOCD LE1/YR: CPT | Mod: CPTII,S$GLB,, | Performed by: FAMILY MEDICINE

## 2023-10-19 PROCEDURE — 1160F PR REVIEW ALL MEDS BY PRESCRIBER/CLIN PHARMACIST DOCUMENTED: ICD-10-PCS | Mod: CPTII,S$GLB,, | Performed by: FAMILY MEDICINE

## 2023-10-19 PROCEDURE — 3061F PR NEG MICROALBUMINURIA RESULT DOCUMENTED/REVIEW: ICD-10-PCS | Mod: CPTII,S$GLB,, | Performed by: FAMILY MEDICINE

## 2023-10-19 PROCEDURE — 3066F NEPHROPATHY DOC TX: CPT | Mod: CPTII,S$GLB,, | Performed by: FAMILY MEDICINE

## 2023-10-19 PROCEDURE — 3074F PR MOST RECENT SYSTOLIC BLOOD PRESSURE < 130 MM HG: ICD-10-PCS | Mod: CPTII,S$GLB,, | Performed by: FAMILY MEDICINE

## 2023-10-19 PROCEDURE — 3078F PR MOST RECENT DIASTOLIC BLOOD PRESSURE < 80 MM HG: ICD-10-PCS | Mod: CPTII,S$GLB,, | Performed by: FAMILY MEDICINE

## 2023-10-19 PROCEDURE — 99214 PR OFFICE/OUTPT VISIT, EST, LEVL IV, 30-39 MIN: ICD-10-PCS | Mod: S$GLB,,, | Performed by: FAMILY MEDICINE

## 2023-10-19 PROCEDURE — 1101F PR PT FALLS ASSESS DOC 0-1 FALLS W/OUT INJ PAST YR: ICD-10-PCS | Mod: CPTII,S$GLB,, | Performed by: FAMILY MEDICINE

## 2023-10-19 PROCEDURE — 3288F PR FALLS RISK ASSESSMENT DOCUMENTED: ICD-10-PCS | Mod: CPTII,S$GLB,, | Performed by: FAMILY MEDICINE

## 2023-10-19 PROCEDURE — 3061F NEG MICROALBUMINURIA REV: CPT | Mod: CPTII,S$GLB,, | Performed by: FAMILY MEDICINE
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
## 2023-10-19 NOTE — PATIENT INSTRUCTIONS
Your Diabetes Foot Care Program    Every day you depend on your feet to keep you moving. But when you have diabetes, your feet need special care. Even a small foot problem can become very serious. So dont take your feet for granted. By working with your diabetes healthcare team, you can learn how to protect your feet and keep them healthy.  Evaluating your feet  An evaluation helps your healthcare provider check the condition of your feet. The evaluation includes a review of your diabetes history and overall health. It may also include a foot exam, X-rays, or other tests. These can help show problems beneath the skin that you cant see or feel.  Medical history  You will be asked about your overall health and any history of foot problems. Youll also discuss your diabetes history, such as whether your blood sugar level has changed over time. It also includes questions about sensations of pain, tingling, pins and needles, or numbness. Your healthcare provider will also want to know if you have high blood pressure and heart disease, or if you smoke. Be sure to mention any medicines (including over-the-counter), supplements, or herbal remedies you take.  Foot exam  A foot exam checks the condition of different parts of your foot. First, your skin and nails are examined for any signs of infection. Blood flow is checked by feeling for the pulses in each foot. You may also have tests to study the nerves in the foot. These include using a small filament (wire) to see how sensitive your feet are. In certain cases, you will be asked to walk a short distance to check for bone, joint, and muscle problems.  Diagnostic tests  If needed, your healthcare provider will suggest certain tests to learn more about your feet. These include:  Doppler tests to measure blood flow in the feet and lower leg.  X-rays, which can show bone or joint problems.  Other imaging tests, such as an MRI (magnetic resonance imaging), bone scan, and CT  (computed tomography) scan. These can help show bone infections.  Other tests, such as vascular tests, which study the blood flow in your feet and legs. You may also have nerve studies to learn how sensitive your feet are.  Creating a foot care program  Based on the evaluation, your healthcare provider will create a foot care program for you. Your program may be as simple as starting a daily self-care routine and changing the types of shoes your wear. It may also involve treating minor foot problems, such as a corn or blister. In some cases, surgery will be needed to treat an infection or mechanical problems, such as hammer toes.  Preventing problems  When you have diabetes, its easier to prevent problems than to treat them later on. So see your healthcare team for regular checkups and foot care. Your healthcare team can also help you learn more about caring for your feet at home. For example, you may be told to avoid walking barefoot. Or you may be told that special footwear is needed to protect your feet.  Have regular checkups  Foot problems can develop quickly. So be sure to follow your healthcare teams schedule for regular checkups. During office visits, take off your shoes and socks as soon as you get in the exam room. Ask your healthcare provider to examine your feet for problems. This will make it easier to find and treat small skin irritations before they get worse. Regular checkups can also help keep track of the blood flow and feeling in your feet. If you have neuropathy (lack of feeling in your feet), you will need to have checkups more often.  Learn about self-care  The more you know about diabetes and your feet, the easier it will be to prevent problems. Members of your healthcare team can teach you how to inspect your feet and teach you to look for warning signs. They can also give you other foot care tips. During office visits, be sure to ask any questions you have.  Date Last Reviewed: 7/1/2016  ©  7977-9203 [employer]. [address] [place] [address], [place] [address]. All rights reserved. This information is not intended as a substitute for professional medical care. Always follow your healthcare professional's instructions.

## 2023-10-22 NOTE — PROGRESS NOTES
Wound Care Progress Note    Subjective:       Patient ID: Maggy Tapia is a 73 y.o. female.    Chief Complaint: Wound Care    HPI  Pt seen in clinic for a FU visit for a right buttock open wound. Pt wound is showing improvement. Pt has no new complaints today.  Review of Systems   Skin:  Positive for wound.        Right buttock open wound   All other systems reviewed and are negative.      Objective:        Physical Exam  Vitals and nursing note reviewed. Exam conducted with a chaperone present.   Constitutional:       General: She is not in acute distress.     Appearance: Normal appearance.   Skin:     General: Skin is warm and dry.      Findings: Lesion present.      Comments: Open wound of the right buttock, see wound care assessment documentation in chart review scanned under the media tab   Neurological:      General: No focal deficit present.      Mental Status: She is alert and oriented to person, place, and time.   Psychiatric:         Mood and Affect: Mood normal.         Thought Content: Thought content normal.         Judgment: Judgment normal.       Vitals:    10/18/23 0949   BP: (!) 184/83   Pulse: 85   Resp: 20   Temp: 98.7 °F (37.1 °C)       Assessment:           ICD-10-CM ICD-9-CM   1. Buttock wound, right, subsequent encounter  S31.819D V58.89     877.0                Plan:                  Maggy was seen today for wound care.    Diagnoses and all orders for this visit:    Buttock wound, right, subsequent encounter      See wound care instructions which have been provided separately.

## 2023-10-25 ENCOUNTER — OFFICE VISIT (OUTPATIENT)
Dept: WOUND CARE | Facility: HOSPITAL | Age: 73
End: 2023-10-25
Attending: FAMILY MEDICINE
Payer: MEDICARE

## 2023-10-25 VITALS
DIASTOLIC BLOOD PRESSURE: 89 MMHG | HEART RATE: 77 BPM | SYSTOLIC BLOOD PRESSURE: 175 MMHG | RESPIRATION RATE: 18 BRPM | TEMPERATURE: 99 F

## 2023-10-25 DIAGNOSIS — S31.819D BUTTOCK WOUND, RIGHT, SUBSEQUENT ENCOUNTER: Primary | ICD-10-CM

## 2023-10-25 DIAGNOSIS — L89.312 PRESSURE INJURY OF RIGHT BUTTOCK, STAGE 2: ICD-10-CM

## 2023-10-25 PROCEDURE — 3066F PR DOCUMENTATION OF TREATMENT FOR NEPHROPATHY: ICD-10-PCS | Mod: CPTII,,, | Performed by: FAMILY MEDICINE

## 2023-10-25 PROCEDURE — 3077F SYST BP >= 140 MM HG: CPT | Mod: CPTII,,, | Performed by: FAMILY MEDICINE

## 2023-10-25 PROCEDURE — 3077F PR MOST RECENT SYSTOLIC BLOOD PRESSURE >= 140 MM HG: ICD-10-PCS | Mod: CPTII,,, | Performed by: FAMILY MEDICINE

## 2023-10-25 PROCEDURE — 3079F DIAST BP 80-89 MM HG: CPT | Mod: CPTII,,, | Performed by: FAMILY MEDICINE

## 2023-10-25 PROCEDURE — 3066F NEPHROPATHY DOC TX: CPT | Mod: CPTII,,, | Performed by: FAMILY MEDICINE

## 2023-10-25 PROCEDURE — 1157F ADVNC CARE PLAN IN RCRD: CPT | Mod: CPTII,,, | Performed by: FAMILY MEDICINE

## 2023-10-25 PROCEDURE — 1159F MED LIST DOCD IN RCRD: CPT | Mod: CPTII,,, | Performed by: FAMILY MEDICINE

## 2023-10-25 PROCEDURE — 99213 OFFICE O/P EST LOW 20 MIN: CPT | Mod: ,,, | Performed by: FAMILY MEDICINE

## 2023-10-25 PROCEDURE — 3079F PR MOST RECENT DIASTOLIC BLOOD PRESSURE 80-89 MM HG: ICD-10-PCS | Mod: CPTII,,, | Performed by: FAMILY MEDICINE

## 2023-10-25 PROCEDURE — 99213 OFFICE O/P EST LOW 20 MIN: CPT | Mod: PN | Performed by: FAMILY MEDICINE

## 2023-10-25 PROCEDURE — 1160F PR REVIEW ALL MEDS BY PRESCRIBER/CLIN PHARMACIST DOCUMENTED: ICD-10-PCS | Mod: CPTII,,, | Performed by: FAMILY MEDICINE

## 2023-10-25 PROCEDURE — 3061F NEG MICROALBUMINURIA REV: CPT | Mod: CPTII,,, | Performed by: FAMILY MEDICINE

## 2023-10-25 PROCEDURE — 1126F AMNT PAIN NOTED NONE PRSNT: CPT | Mod: CPTII,,, | Performed by: FAMILY MEDICINE

## 2023-10-25 PROCEDURE — 1159F PR MEDICATION LIST DOCUMENTED IN MEDICAL RECORD: ICD-10-PCS | Mod: CPTII,,, | Performed by: FAMILY MEDICINE

## 2023-10-25 PROCEDURE — 3044F HG A1C LEVEL LT 7.0%: CPT | Mod: CPTII,,, | Performed by: FAMILY MEDICINE

## 2023-10-25 PROCEDURE — 1160F RVW MEDS BY RX/DR IN RCRD: CPT | Mod: CPTII,,, | Performed by: FAMILY MEDICINE

## 2023-10-25 PROCEDURE — 1126F PR PAIN SEVERITY QUANTIFIED, NO PAIN PRESENT: ICD-10-PCS | Mod: CPTII,,, | Performed by: FAMILY MEDICINE

## 2023-10-25 PROCEDURE — 1157F PR ADVANCE CARE PLAN OR EQUIV PRESENT IN MEDICAL RECORD: ICD-10-PCS | Mod: CPTII,,, | Performed by: FAMILY MEDICINE

## 2023-10-25 PROCEDURE — 99213 PR OFFICE/OUTPT VISIT, EST, LEVL III, 20-29 MIN: ICD-10-PCS | Mod: ,,, | Performed by: FAMILY MEDICINE

## 2023-10-25 PROCEDURE — 3061F PR NEG MICROALBUMINURIA RESULT DOCUMENTED/REVIEW: ICD-10-PCS | Mod: CPTII,,, | Performed by: FAMILY MEDICINE

## 2023-10-25 PROCEDURE — 3044F PR MOST RECENT HEMOGLOBIN A1C LEVEL <7.0%: ICD-10-PCS | Mod: CPTII,,, | Performed by: FAMILY MEDICINE

## 2023-10-25 NOTE — PROGRESS NOTES
Wound Care Progress Note    Subjective:       Patient ID: Maggy Tapia is a 73 y.o. female.    Chief Complaint: Wound Care    HPI  Pt seen in clinic for a FU visit for a right buttock pressure ulcr stage 2. Pt wound is clean today, slightly smaller. Pt has no new complaints today.  Review of Systems   Skin:  Positive for wound.        Right buttock pressure ulcer   All other systems reviewed and are negative.      Objective:        Physical Exam  Vitals and nursing note reviewed.   Constitutional:       General: She is not in acute distress.     Appearance: Normal appearance.   Skin:     General: Skin is warm and dry.      Findings: Lesion present.      Comments: Stage 2 pressure ulcer right buttock, see wound care assessment documentation in chart review scanned under the media tab   Neurological:      General: No focal deficit present.      Mental Status: She is alert.   Psychiatric:         Mood and Affect: Mood normal.         Thought Content: Thought content normal.         Judgment: Judgment normal.       Vitals:    10/25/23 0826   BP: (!) 175/89   Pulse: 77   Resp: 18   Temp: 98.7 °F (37.1 °C)       Assessment:           ICD-10-CM ICD-9-CM   1. Buttock wound, right, subsequent encounter  S31.819D V58.89     877.0   2. Pressure injury of right buttock, stage 2  L89.312 707.05     707.22                Plan:                  Maggy was seen today for wound care.    Diagnoses and all orders for this visit:    Buttock wound, right, subsequent encounter    Pressure injury of right buttock, stage 2      Much of the documentation for this visit was completed in wound docs system. Please see the attached documentation for further details about the patients care. Scanned under the media tab.

## 2023-11-06 ENCOUNTER — OFFICE VISIT (OUTPATIENT)
Dept: PODIATRY | Facility: CLINIC | Age: 73
End: 2023-11-06
Payer: MEDICARE

## 2023-11-06 VITALS — WEIGHT: 171 LBS | BODY MASS INDEX: 32.28 KG/M2 | HEIGHT: 61 IN

## 2023-11-06 DIAGNOSIS — I87.2 VENOUS INSUFFICIENCY OF BOTH LOWER EXTREMITIES: ICD-10-CM

## 2023-11-06 DIAGNOSIS — M20.5X1 ACQUIRED HALLUX LIMITUS OF BOTH FEET: ICD-10-CM

## 2023-11-06 DIAGNOSIS — M20.42 HAMMER TOES OF BOTH FEET: ICD-10-CM

## 2023-11-06 DIAGNOSIS — E11.9 ENCOUNTER FOR DIABETIC FOOT EXAM: ICD-10-CM

## 2023-11-06 DIAGNOSIS — M79.672 PAIN IN BOTH FEET: ICD-10-CM

## 2023-11-06 DIAGNOSIS — E11.42 DIABETIC POLYNEUROPATHY ASSOCIATED WITH TYPE 2 DIABETES MELLITUS: Primary | ICD-10-CM

## 2023-11-06 DIAGNOSIS — M20.41 HAMMER TOES OF BOTH FEET: ICD-10-CM

## 2023-11-06 DIAGNOSIS — M20.5X2 ACQUIRED HALLUX LIMITUS OF BOTH FEET: ICD-10-CM

## 2023-11-06 DIAGNOSIS — R26.2 DIFFICULTY IN WALKING, NOT ELSEWHERE CLASSIFIED: ICD-10-CM

## 2023-11-06 DIAGNOSIS — L60.2 ONYCHOGRYPOSIS: ICD-10-CM

## 2023-11-06 DIAGNOSIS — M79.671 PAIN IN BOTH FEET: ICD-10-CM

## 2023-11-06 PROCEDURE — 1157F PR ADVANCE CARE PLAN OR EQUIV PRESENT IN MEDICAL RECORD: ICD-10-PCS | Mod: CPTII,S$GLB,, | Performed by: PODIATRIST

## 2023-11-06 PROCEDURE — 99214 OFFICE O/P EST MOD 30 MIN: CPT | Mod: 25,S$GLB,, | Performed by: PODIATRIST

## 2023-11-06 PROCEDURE — 1126F AMNT PAIN NOTED NONE PRSNT: CPT | Mod: CPTII,S$GLB,, | Performed by: PODIATRIST

## 2023-11-06 PROCEDURE — 1101F PR PT FALLS ASSESS DOC 0-1 FALLS W/OUT INJ PAST YR: ICD-10-PCS | Mod: CPTII,S$GLB,, | Performed by: PODIATRIST

## 2023-11-06 PROCEDURE — 3044F PR MOST RECENT HEMOGLOBIN A1C LEVEL <7.0%: ICD-10-PCS | Mod: CPTII,S$GLB,, | Performed by: PODIATRIST

## 2023-11-06 PROCEDURE — 1157F ADVNC CARE PLAN IN RCRD: CPT | Mod: CPTII,S$GLB,, | Performed by: PODIATRIST

## 2023-11-06 PROCEDURE — 3061F PR NEG MICROALBUMINURIA RESULT DOCUMENTED/REVIEW: ICD-10-PCS | Mod: CPTII,S$GLB,, | Performed by: PODIATRIST

## 2023-11-06 PROCEDURE — 99999 PR PBB SHADOW E&M-EST. PATIENT-LVL IV: ICD-10-PCS | Mod: PBBFAC,,, | Performed by: PODIATRIST

## 2023-11-06 PROCEDURE — 1160F RVW MEDS BY RX/DR IN RCRD: CPT | Mod: CPTII,S$GLB,, | Performed by: PODIATRIST

## 2023-11-06 PROCEDURE — 11721 ROUTINE FOOT CARE: ICD-10-PCS | Mod: Q9,S$GLB,, | Performed by: PODIATRIST

## 2023-11-06 PROCEDURE — 1126F PR PAIN SEVERITY QUANTIFIED, NO PAIN PRESENT: ICD-10-PCS | Mod: CPTII,S$GLB,, | Performed by: PODIATRIST

## 2023-11-06 PROCEDURE — 3044F HG A1C LEVEL LT 7.0%: CPT | Mod: CPTII,S$GLB,, | Performed by: PODIATRIST

## 2023-11-06 PROCEDURE — 3061F NEG MICROALBUMINURIA REV: CPT | Mod: CPTII,S$GLB,, | Performed by: PODIATRIST

## 2023-11-06 PROCEDURE — 3288F PR FALLS RISK ASSESSMENT DOCUMENTED: ICD-10-PCS | Mod: CPTII,S$GLB,, | Performed by: PODIATRIST

## 2023-11-06 PROCEDURE — 1101F PT FALLS ASSESS-DOCD LE1/YR: CPT | Mod: CPTII,S$GLB,, | Performed by: PODIATRIST

## 2023-11-06 PROCEDURE — 1159F MED LIST DOCD IN RCRD: CPT | Mod: CPTII,S$GLB,, | Performed by: PODIATRIST

## 2023-11-06 PROCEDURE — 3066F NEPHROPATHY DOC TX: CPT | Mod: CPTII,S$GLB,, | Performed by: PODIATRIST

## 2023-11-06 PROCEDURE — 1160F PR REVIEW ALL MEDS BY PRESCRIBER/CLIN PHARMACIST DOCUMENTED: ICD-10-PCS | Mod: CPTII,S$GLB,, | Performed by: PODIATRIST

## 2023-11-06 PROCEDURE — 3066F PR DOCUMENTATION OF TREATMENT FOR NEPHROPATHY: ICD-10-PCS | Mod: CPTII,S$GLB,, | Performed by: PODIATRIST

## 2023-11-06 PROCEDURE — 11721 DEBRIDE NAIL 6 OR MORE: CPT | Mod: Q9,S$GLB,, | Performed by: PODIATRIST

## 2023-11-06 PROCEDURE — 3288F FALL RISK ASSESSMENT DOCD: CPT | Mod: CPTII,S$GLB,, | Performed by: PODIATRIST

## 2023-11-06 PROCEDURE — 1159F PR MEDICATION LIST DOCUMENTED IN MEDICAL RECORD: ICD-10-PCS | Mod: CPTII,S$GLB,, | Performed by: PODIATRIST

## 2023-11-06 PROCEDURE — 99999 PR PBB SHADOW E&M-EST. PATIENT-LVL IV: CPT | Mod: PBBFAC,,, | Performed by: PODIATRIST

## 2023-11-06 PROCEDURE — 99214 PR OFFICE/OUTPT VISIT, EST, LEVL IV, 30-39 MIN: ICD-10-PCS | Mod: 25,S$GLB,, | Performed by: PODIATRIST

## 2023-11-06 NOTE — PROGRESS NOTES
"    [address] [facility] [address]  [place] [address]  Phone: [phone]   [phone]    Patient's PCP:Yanna Abbasi MD  Referring Provider: No ref. provider found    Subjective:      Chief Complaint:: Diabetic Foot Exam and Diabetes Mellitus    HPI  Maggy Tapia is a 73 y.o. female who presents today for a diabetic foot exam.  Pt has seen MIRACLE Lauren on 8/3/23 who treats them for their diabetes.  Pt has been a diabetic for about 20 years.  Taking meformin to treat diabetes.    Blood sugar: not taken  Hemoglobin A1C: 6.1        Vitals:    23 1355   Weight: 77.6 kg (171 lb)   Height: 5' 1" (1.549 m)   PainSc: 0-No pain      Shoe Size: 9    Past Surgical History:   Procedure Laterality Date    APPENDECTOMY      BREAST BIOPSY Left     BREAST LUMPECTOMY Left         BREAST SURGERY      CATARACT EXTRACTION Bilateral     OU done//     SECTION      CHOLECYSTECTOMY      COLONOSCOPY N/A 2020    Procedure: COLONOSCOPY;  Surgeon: Mj Fernandez MD;  Location: [facility];  Service: Endoscopy;  Laterality: N/A;    CYST REMOVAL Left 2021    DILATION AND CURETTAGE OF UTERUS      EYE SURGERY      HYSTEROSCOPY WITH DILATION AND CURETTAGE OF UTERUS N/A 2023    Procedure: HYSTEROSCOPY, WITH DILATION AND CURETTAGE OF UTERUS AND OTHER INDICATED PROCEDURES Needs Cardiac clearance;  Surgeon: Gamaliel Moran MD;  Location: [facility] OR;  Service: OB/GYN;  Laterality: N/A;  Cardiac clearance needed per Dr Anderson    LUMPECTOMY, BREAST Left 2023    Procedure: LUMPECTOMY, BREAST;  Surgeon: Toño Paredes MD;  Location: [facility];  Service: General;  Laterality: Left;    PERCUTANEOUS PINNING OF HIP Right 2022    Procedure: PINNING, HIP, PERCUTANEOUS;  Surgeon: Ministerio Joiner II, MD;  Location: [facility] OR;  Service: Orthopedics;  Laterality: Right;    SENTINEL LYMPH NODE BIOPSY Left 2023    Procedure: BIOPSY, LYMPH NODE, SENTINEL;  Surgeon: Toño Paredes MD;  Location: " [facility] OR;  Service: General;  Laterality: Left;    SIMPLE MASTECTOMY Left 5/19/2023    Procedure: MASTECTOMY, SIMPLE;  Surgeon: Toño Paredes MD;  Location: [facility] OR;  Service: General;  Laterality: Left;     Past Medical History:   Diagnosis Date    Anxiety     Arthritis     Asthma     Breast cancer 2000    Left    Depression     Diabetes mellitus, type 2     GERD (gastroesophageal reflux disease)     Hyperlipidemia     Hypertension     Hypothyroidism, unspecified     Overactive bladder     Seizures     Pseudo-seizures    Sleep apnea     Stroke 03/2022    pt was in the hospital for a stroke, claims she was seeing people when the stroke happened     Family History   Problem Relation Age of Onset    Diabetes Mother     Hypertension Mother     Breast cancer Mother     Cataracts Mother     Melanoma Mother     Heart failure Father     Melanoma Father     Cataracts Brother     Melanoma Brother     Glaucoma Neg Hx     Retinal detachment Neg Hx     Macular degeneration Neg Hx         Social History:   Marital Status: Single  Alcohol History:  reports current alcohol use.  Tobacco History:  reports that she has never smoked. She has been exposed to tobacco smoke. She has never used smokeless tobacco.  Drug History:  reports no history of drug use.    Review of patient's allergies indicates:   Allergen Reactions    Penicillins Anaphylaxis    Sulfa (sulfonamide antibiotics) Anaphylaxis    Trintellix [vortioxetine] Nausea And Vomiting and Other (See Comments)     Patient has seizures and vomits       Current Outpatient Medications   Medication Sig Dispense Refill    albuterol (PROVENTIL/VENTOLIN HFA) 90 mcg/actuation inhaler INHALE 1 TO 2 PUFFS BY MOUTH INTO THE LUNGS EVERY 4 HOURS AS NEEDED FOR SHORTNESS OF BREATH( COUGHING) 20.1 g 11    alendronate (FOSAMAX) 70 MG tablet Take one tablet every 7 days. 4 tablet 11    aspirin (ECOTRIN) 81 MG EC tablet Take 81 mg by mouth once daily.      blood-glucose meter kit Use as  instructed. Insurance preferred. 1 each 0    CALCIUM CARBONATE/VITAMIN D3 (CALCIUM 500 + D ORAL) Take 1 tablet by mouth once daily. 10 mg daily      cyanocobalamin (VITAMIN B-12) 1000 MCG tablet Take 1 tablet (1,000 mcg total) by mouth once daily. 30 tablet 0    fluticasone furoate-vilanteroL (BREO ELLIPTA) 100-25 mcg/dose diskus inhaler Inhale 1 puff into the lungs once daily. Controller 60 each 11    gabapentin (NEURONTIN) 300 MG capsule Take 1 capsule (300 mg total) by mouth every evening. Take 1 tablet every night x 7 days. Increase to twice a day x 7 days. Increase to three times a day. 90 capsule 0    ibuprofen (ADVIL,MOTRIN) 600 MG tablet Take 1 tablet (600 mg total) by mouth every 6 (six) hours as needed for Pain. 30 tablet 0    ketoconazole (NIZORAL) 2 % cream AAA pannus fold at least daily after the shower 60 g 3    letrozole (FEMARA) 2.5 mg Tab Take 1 tablet (2.5 mg total) by mouth once daily. 30 tablet 5    levothyroxine (SYNTHROID) 112 MCG tablet Take 1 tablet (112 mcg total) by mouth before breakfast. 30 tablet 11    losartan (COZAAR) 50 MG tablet Take 0.5 tablets (25 mg total) by mouth once daily. 90 tablet 0    metFORMIN (GLUCOPHAGE-XR) 500 MG ER 24hr tablet Take 2 tablets (1,000 mg total) by mouth daily with breakfast. 180 tablet 3    nystatin (MYCOSTATIN) cream Apply topically 2 (two) times daily. Apply to rash on face 15 g 2    polyethylene glycol (GLYCOLAX) 17 gram PwPk Take 17 g by mouth once daily.  0    potassium chloride SA (K-DUR,KLOR-CON) 20 MEQ tablet Take 20 mEq by mouth once daily.      rOPINIRole (REQUIP) 0.5 MG tablet Take by mouth every evening.      sertraline (ZOLOFT) 50 MG tablet Take 1 tablet (50 mg total) by mouth once daily. For depression/anxiety 90 tablet 0    simvastatin (ZOCOR) 40 MG tablet TAKE 1 TABLET(40 MG) BY MOUTH EVERY DAY 90 tablet 1    tetrabenazine (XENAZINE) 25 mg tablet Take one tablet daily for 7 days and then  Take 1 tablet twice daily afterwards 47 tablet 0      No current facility-administered medications for this visit.       Review of Systems   Constitutional:  Negative for chills, fatigue, fever and unexpected weight change.   HENT:  Positive for hearing loss. Negative for trouble swallowing.    Eyes:  Negative for photophobia and visual disturbance.   Respiratory:  Negative for cough, shortness of breath and wheezing.    Cardiovascular:  Negative for chest pain, palpitations and leg swelling.   Gastrointestinal:  Negative for abdominal pain and nausea.   Genitourinary:  Negative for dysuria and frequency.   Musculoskeletal:  Positive for arthralgias, gait problem, joint swelling and myalgias. Negative for back pain.   Skin:  Negative for rash and wound.   Neurological:  Positive for seizures and weakness. Negative for tremors, numbness and headaches.   Hematological:  Bruises/bleeds easily.         Objective:        Physical Exam:   Foot Exam    General  General Appearance: appears stated age and healthy   Orientation: alert and oriented to person, place, and time   Affect: appropriate   Gait: antalgic   Assistance: walker use       Right Foot/Ankle     Inspection and Palpation  Ecchymosis: second toe, third toe, fourth toe, dorsum and metatarsal joint  Tenderness: (Second and 3rd toes)  Swelling: (Mild edema lower extremity)  Arch: normal  Hammertoes: second toe, third toe, fourth toe and fifth toe  Skin Exam: abnormal color; no ulcer and no erythema   Fungus Toenails: present    Neurovascular  Dorsalis pedis: 1+  Posterior tibial: 1+  Capillary Refill: 2+  Varicose veins: present  Saphenous nerve sensation: diminished  Tibial nerve sensation: diminished  Superficial peroneal nerve sensation: diminished  Deep peroneal nerve sensation: diminished  Sural nerve sensation: diminished    Edema  Type of edema: non-pitting    Muscle Strength  Ankle dorsiflexion: 4+  Ankle plantar flexion: 4+  Ankle inversion: 4+  Ankle eversion: 4+  Great toe extension: 4+  Great toe  flexion: 4+    Range of Motion    Normal right ankle ROM    Tests  PT Tinel's sign: negative    Paresthesia: positive  Comments  Nails 1 through 5 are thickened, discolored, dystrophic, and elongated with subungual debris    Skin thin shiny and atrophic with diminished pedal hair growth       Left Foot/Ankle      Inspection and Palpation  Ecchymosis: first toe  Tenderness: (Great toe)  Swelling: (Mild edema lower extremity)  Arch: normal  Hammertoes: second toe, third toe, fourth toe and fifth toe  Skin Exam: abnormal color; no ulcer and no erythema   Fungus Toenails: present    Neurovascular  Dorsalis pedis: 1+  Posterior tibial: 1+  Capillary refill: 2+  Varicose veins: present  Saphenous nerve sensation: diminished  Tibial nerve sensation: diminished  Superficial peroneal nerve sensation: diminished  Deep peroneal nerve sensation: diminished  Sural nerve sensation: diminished    Edema  Type of edema: non-pitting    Muscle Strength  Ankle dorsiflexion: 4+  Ankle plantar flexion: 4+  Ankle inversion: 4+  Ankle eversion: 4+  Great toe extension: 4+  Great toe flexion: 4+    Range of Motion    Normal left ankle ROM    Tests  PT Tinel's sign: negative  Paresthesia: positive  Comments  Nails 1 through 5 are thickened, discolored, dystrophic, and elongated with subungual debris    Skin thin shiny and atrophic with diminished pedal hair growth     Physical Exam  Cardiovascular:      Pulses:           Dorsalis pedis pulses are 1+ on the right side and 1+ on the left side.        Posterior tibial pulses are 1+ on the right side and 1+ on the left side.   Feet:      Right foot:      Skin integrity: No ulcer or erythema.      Toenail Condition: Fungal disease present.     Left foot:      Skin integrity: No ulcer or erythema.      Toenail Condition: Fungal disease present.        Imaging: none            Assessment:       1. Diabetic polyneuropathy associated with type 2 diabetes mellitus    2. Venous insufficiency of both  "lower extremities    3. Acquired hallux limitus of both feet    4. Hammer toes of both feet    5. Difficulty in walking, not elsewhere classified    6. Pain in both feet    7. Encounter for diabetic foot exam    8. Onychogryposis      Plan:   Diabetic polyneuropathy associated with type 2 diabetes mellitus  -     DIABETIC SHOES FOR HOME USE  -      DIABETES FOOT EXAM  -     Routine Foot Care    Venous insufficiency of both lower extremities  -     DIABETIC SHOES FOR HOME USE  -     Routine Foot Care    Acquired hallux limitus of both feet  -     DIABETIC SHOES FOR HOME USE    Hammer toes of both feet  -     DIABETIC SHOES FOR HOME USE    Difficulty in walking, not elsewhere classified  -     Routine Foot Care    Pain in both feet    Encounter for diabetic foot exam  -      DIABETES FOOT EXAM    Onychogryposis  -     Routine Foot Care      Follow up if symptoms worsen or fail to improve.    Routine Foot Care    Date/Time: 11/6/2023 1:50 PM    Performed by: Felix Bean DPM  Authorized by: Felix Bean DPM    Time out: Immediately prior to procedure a "time out" was called to verify the correct patient, procedure, equipment, support staff and site/side marked as required.    Consent Done?:  Not Needed  Hyperkeratotic Skin Lesions?: No      Nail Care Type:  Debride  Location(s): All  (Left 1st Toe, Left 3rd Toe, Left 2nd Toe, Left 4th Toe, Left 5th Toe, Right 1st Toe, Right 2nd Toe, Right 3rd Toe, Right 4th Toe and Right 5th Toe)  Patient tolerance:  Patient tolerated the procedure well with no immediate complications     Instruments Used: Nail Nipper   Manually reduced with electric .             Counseled patient on the aspects of diabetes and how it pertains to the feet.  I explained the importance of proper diabetic foot care and how it is essential for the health of their feet.    I discussed the importance of knowing their HGA1c and that the level needs to be as close to 6 as possible.  I discussed " the increase complications of high blood sugar including stroke, blindness, heart attack, kidney failure and loss of limb secondary to neuropathy and PVD.    Patient  was made aware of inspecting their feet.  Patient was told to be aware of any breaks in the skin or redness.  With neuropathy, these areas are not recognized early due to the numbness.  I discussed different treatments available to control the symptoms but whcih may not cure the problem.      Shoe inspection. Patient instructed on proper foot hygeine. We discussed wearing proper shoe gear, daily foot inspections, never walking without protective shoe gear, never putting sharp instruments to feet.      Counseling:     I provided patient education verbally regarding:   Patient diagnosis, treatment options, as well as alternatives, risks, and benefits.     This note was created using Dragon voice recognition software that occasionally misinterpreted phrases or words.

## 2023-11-08 ENCOUNTER — OFFICE VISIT (OUTPATIENT)
Dept: WOUND CARE | Facility: HOSPITAL | Age: 73
End: 2023-11-08
Attending: FAMILY MEDICINE
Payer: MEDICARE

## 2023-11-08 VITALS
SYSTOLIC BLOOD PRESSURE: 148 MMHG | DIASTOLIC BLOOD PRESSURE: 79 MMHG | HEART RATE: 89 BPM | RESPIRATION RATE: 18 BRPM | TEMPERATURE: 98 F

## 2023-11-08 DIAGNOSIS — L89.312 PRESSURE INJURY OF RIGHT BUTTOCK, STAGE 2: ICD-10-CM

## 2023-11-08 DIAGNOSIS — S31.819D BUTTOCK WOUND, RIGHT, SUBSEQUENT ENCOUNTER: Primary | ICD-10-CM

## 2023-11-08 PROCEDURE — 3066F NEPHROPATHY DOC TX: CPT | Mod: CPTII,,, | Performed by: FAMILY MEDICINE

## 2023-11-08 PROCEDURE — G0179 PR HOME HEALTH MD RECERTIFICATION: ICD-10-PCS | Mod: ,,, | Performed by: FAMILY MEDICINE

## 2023-11-08 PROCEDURE — 3078F PR MOST RECENT DIASTOLIC BLOOD PRESSURE < 80 MM HG: ICD-10-PCS | Mod: CPTII,,, | Performed by: FAMILY MEDICINE

## 2023-11-08 PROCEDURE — 1160F RVW MEDS BY RX/DR IN RCRD: CPT | Mod: CPTII,,, | Performed by: FAMILY MEDICINE

## 2023-11-08 PROCEDURE — 1126F AMNT PAIN NOTED NONE PRSNT: CPT | Mod: CPTII,,, | Performed by: FAMILY MEDICINE

## 2023-11-08 PROCEDURE — 3078F DIAST BP <80 MM HG: CPT | Mod: CPTII,,, | Performed by: FAMILY MEDICINE

## 2023-11-08 PROCEDURE — 3061F PR NEG MICROALBUMINURIA RESULT DOCUMENTED/REVIEW: ICD-10-PCS | Mod: CPTII,,, | Performed by: FAMILY MEDICINE

## 2023-11-08 PROCEDURE — 3077F PR MOST RECENT SYSTOLIC BLOOD PRESSURE >= 140 MM HG: ICD-10-PCS | Mod: CPTII,,, | Performed by: FAMILY MEDICINE

## 2023-11-08 PROCEDURE — 3077F SYST BP >= 140 MM HG: CPT | Mod: CPTII,,, | Performed by: FAMILY MEDICINE

## 2023-11-08 PROCEDURE — 1157F ADVNC CARE PLAN IN RCRD: CPT | Mod: CPTII,,, | Performed by: FAMILY MEDICINE

## 2023-11-08 PROCEDURE — G0179 MD RECERTIFICATION HHA PT: HCPCS | Mod: ,,, | Performed by: FAMILY MEDICINE

## 2023-11-08 PROCEDURE — 3066F PR DOCUMENTATION OF TREATMENT FOR NEPHROPATHY: ICD-10-PCS | Mod: CPTII,,, | Performed by: FAMILY MEDICINE

## 2023-11-08 PROCEDURE — 3044F HG A1C LEVEL LT 7.0%: CPT | Mod: CPTII,,, | Performed by: FAMILY MEDICINE

## 2023-11-08 PROCEDURE — 3061F NEG MICROALBUMINURIA REV: CPT | Mod: CPTII,,, | Performed by: FAMILY MEDICINE

## 2023-11-08 PROCEDURE — 3044F PR MOST RECENT HEMOGLOBIN A1C LEVEL <7.0%: ICD-10-PCS | Mod: CPTII,,, | Performed by: FAMILY MEDICINE

## 2023-11-08 PROCEDURE — 1160F PR REVIEW ALL MEDS BY PRESCRIBER/CLIN PHARMACIST DOCUMENTED: ICD-10-PCS | Mod: CPTII,,, | Performed by: FAMILY MEDICINE

## 2023-11-08 PROCEDURE — 1126F PR PAIN SEVERITY QUANTIFIED, NO PAIN PRESENT: ICD-10-PCS | Mod: CPTII,,, | Performed by: FAMILY MEDICINE

## 2023-11-08 PROCEDURE — 99213 OFFICE O/P EST LOW 20 MIN: CPT | Mod: ,,, | Performed by: FAMILY MEDICINE

## 2023-11-08 PROCEDURE — 99213 OFFICE O/P EST LOW 20 MIN: CPT | Mod: PN | Performed by: FAMILY MEDICINE

## 2023-11-08 PROCEDURE — 99213 PR OFFICE/OUTPT VISIT, EST, LEVL III, 20-29 MIN: ICD-10-PCS | Mod: ,,, | Performed by: FAMILY MEDICINE

## 2023-11-08 PROCEDURE — 1159F PR MEDICATION LIST DOCUMENTED IN MEDICAL RECORD: ICD-10-PCS | Mod: CPTII,,, | Performed by: FAMILY MEDICINE

## 2023-11-08 PROCEDURE — 1159F MED LIST DOCD IN RCRD: CPT | Mod: CPTII,,, | Performed by: FAMILY MEDICINE

## 2023-11-08 PROCEDURE — 1157F PR ADVANCE CARE PLAN OR EQUIV PRESENT IN MEDICAL RECORD: ICD-10-PCS | Mod: CPTII,,, | Performed by: FAMILY MEDICINE

## 2023-11-15 ENCOUNTER — OFFICE VISIT (OUTPATIENT)
Dept: WOUND CARE | Facility: HOSPITAL | Age: 73
End: 2023-11-15
Attending: FAMILY MEDICINE
Payer: MEDICARE

## 2023-11-15 VITALS
SYSTOLIC BLOOD PRESSURE: 148 MMHG | TEMPERATURE: 98 F | RESPIRATION RATE: 16 BRPM | DIASTOLIC BLOOD PRESSURE: 79 MMHG | HEART RATE: 89 BPM

## 2023-11-15 DIAGNOSIS — S31.819D BUTTOCK WOUND, RIGHT, SUBSEQUENT ENCOUNTER: Primary | ICD-10-CM

## 2023-11-15 DIAGNOSIS — L89.312 PRESSURE INJURY OF RIGHT BUTTOCK, STAGE 2: ICD-10-CM

## 2023-11-15 PROCEDURE — 99213 OFFICE O/P EST LOW 20 MIN: CPT | Mod: ,,, | Performed by: FAMILY MEDICINE

## 2023-11-15 PROCEDURE — 3061F NEG MICROALBUMINURIA REV: CPT | Mod: CPTII,,, | Performed by: FAMILY MEDICINE

## 2023-11-15 PROCEDURE — 1126F PR PAIN SEVERITY QUANTIFIED, NO PAIN PRESENT: ICD-10-PCS | Mod: CPTII,,, | Performed by: FAMILY MEDICINE

## 2023-11-15 PROCEDURE — 1160F PR REVIEW ALL MEDS BY PRESCRIBER/CLIN PHARMACIST DOCUMENTED: ICD-10-PCS | Mod: CPTII,,, | Performed by: FAMILY MEDICINE

## 2023-11-15 PROCEDURE — 99214 OFFICE O/P EST MOD 30 MIN: CPT | Mod: PN | Performed by: FAMILY MEDICINE

## 2023-11-15 PROCEDURE — 3077F PR MOST RECENT SYSTOLIC BLOOD PRESSURE >= 140 MM HG: ICD-10-PCS | Mod: CPTII,,, | Performed by: FAMILY MEDICINE

## 2023-11-15 PROCEDURE — 1159F PR MEDICATION LIST DOCUMENTED IN MEDICAL RECORD: ICD-10-PCS | Mod: CPTII,,, | Performed by: FAMILY MEDICINE

## 2023-11-15 PROCEDURE — 1160F RVW MEDS BY RX/DR IN RCRD: CPT | Mod: CPTII,,, | Performed by: FAMILY MEDICINE

## 2023-11-15 PROCEDURE — 1157F PR ADVANCE CARE PLAN OR EQUIV PRESENT IN MEDICAL RECORD: ICD-10-PCS | Mod: CPTII,,, | Performed by: FAMILY MEDICINE

## 2023-11-15 PROCEDURE — 3078F DIAST BP <80 MM HG: CPT | Mod: CPTII,,, | Performed by: FAMILY MEDICINE

## 2023-11-15 PROCEDURE — 3066F NEPHROPATHY DOC TX: CPT | Mod: CPTII,,, | Performed by: FAMILY MEDICINE

## 2023-11-15 PROCEDURE — 3077F SYST BP >= 140 MM HG: CPT | Mod: CPTII,,, | Performed by: FAMILY MEDICINE

## 2023-11-15 PROCEDURE — 99213 PR OFFICE/OUTPT VISIT, EST, LEVL III, 20-29 MIN: ICD-10-PCS | Mod: ,,, | Performed by: FAMILY MEDICINE

## 2023-11-15 PROCEDURE — 1157F ADVNC CARE PLAN IN RCRD: CPT | Mod: CPTII,,, | Performed by: FAMILY MEDICINE

## 2023-11-15 PROCEDURE — 3078F PR MOST RECENT DIASTOLIC BLOOD PRESSURE < 80 MM HG: ICD-10-PCS | Mod: CPTII,,, | Performed by: FAMILY MEDICINE

## 2023-11-15 PROCEDURE — 3044F PR MOST RECENT HEMOGLOBIN A1C LEVEL <7.0%: ICD-10-PCS | Mod: CPTII,,, | Performed by: FAMILY MEDICINE

## 2023-11-15 PROCEDURE — 3061F PR NEG MICROALBUMINURIA RESULT DOCUMENTED/REVIEW: ICD-10-PCS | Mod: CPTII,,, | Performed by: FAMILY MEDICINE

## 2023-11-15 PROCEDURE — 3044F HG A1C LEVEL LT 7.0%: CPT | Mod: CPTII,,, | Performed by: FAMILY MEDICINE

## 2023-11-15 PROCEDURE — 3066F PR DOCUMENTATION OF TREATMENT FOR NEPHROPATHY: ICD-10-PCS | Mod: CPTII,,, | Performed by: FAMILY MEDICINE

## 2023-11-15 PROCEDURE — 1126F AMNT PAIN NOTED NONE PRSNT: CPT | Mod: CPTII,,, | Performed by: FAMILY MEDICINE

## 2023-11-15 PROCEDURE — 1159F MED LIST DOCD IN RCRD: CPT | Mod: CPTII,,, | Performed by: FAMILY MEDICINE

## 2023-11-15 NOTE — PROGRESS NOTES
Wound Care Progress Note    Subjective:       Patient ID: Maggy Tapia is a 73 y.o. female.    Chief Complaint: Wound Care and Wound Consult    HPI  Pt seen in clinic for a FU visit for a right buttock stage 2 pressure ulcer. Wound is improved, nearly healed. Pt sits on her buttock wound, and it is making difficult to resolve. No new complaints today  Review of Systems   Skin:  Positive for wound.        Right buttock open wound   All other systems reviewed and are negative.      Objective:        Physical Exam  Vitals and nursing note reviewed.   Constitutional:       General: She is not in acute distress.     Appearance: Normal appearance.   Skin:     General: Skin is warm and dry.      Findings: Lesion present.      Comments: Right buttock stage 2 pressure ulcer, see wound care assessment documentation in chart review scanned under the media tab   Neurological:      General: No focal deficit present.      Mental Status: She is alert.   Psychiatric:         Mood and Affect: Mood normal.         Judgment: Judgment normal.       Vitals:    11/15/23 1021   BP: (!) 148/79   Pulse: 89   Resp: 16   Temp: 98.2 °F (36.8 °C)       Assessment:           ICD-10-CM ICD-9-CM   1. Buttock wound, right, subsequent encounter  S31.819D V58.89     877.0   2. Pressure injury of right buttock, stage 2  L89.312 707.05     707.22                Plan:                  Maggy was seen today for wound care and wound consult.    Diagnoses and all orders for this visit:    Buttock wound, right, subsequent encounter    Pressure injury of right buttock, stage 2      Please see wound care instructions which have been provided separately

## 2023-11-15 NOTE — PROGRESS NOTES
Wound Care Progress Note    Subjective:       Patient ID: Maggy Tapia is a 73 y.o. female.    Chief Complaint: Wound Care    HPI  Pt seen in clinic for a FU visit for a right buttock stage 2 presssure ulcer. Wound continues to improve. Pt has no new complaints today.  Review of Systems   Skin:  Positive for wound.        Right buttock open wound   All other systems reviewed and are negative.      Objective:        Physical Exam  Vitals and nursing note reviewed. Exam conducted with a chaperone present.   Constitutional:       General: She is not in acute distress.     Appearance: Normal appearance.   Skin:     General: Skin is warm and dry.      Findings: Lesion present.      Comments: Right buttock stage 2 pressure ulcer, see wound care assessment documentation in chart review scanned under the media tab   Neurological:      General: No focal deficit present.      Mental Status: She is alert and oriented to person, place, and time.   Psychiatric:         Thought Content: Thought content normal.         Judgment: Judgment normal.       Vitals:    11/08/23 1036   BP: (!) 148/79   Pulse: 89   Resp: 18   Temp: 98.4 °F (36.9 °C)       Assessment:           ICD-10-CM ICD-9-CM   1. Buttock wound, right, subsequent encounter  S31.819D V58.89     877.0   2. Pressure injury of right buttock, stage 2  L89.312 707.05     707.22                Plan:                  Maggy was seen today for wound care.    Diagnoses and all orders for this visit:    Buttock wound, right, subsequent encounter    Pressure injury of right buttock, stage 2      Much of the documentation for this visit was completed in wound docs system. Please see the attached documentation for further details about the patients care. Scanned under the media tab.

## 2023-11-17 ENCOUNTER — DOCUMENT SCAN (OUTPATIENT)
Dept: HOME HEALTH SERVICES | Facility: HOSPITAL | Age: 73
End: 2023-11-17
Payer: MEDICARE

## 2023-11-20 ENCOUNTER — DOCUMENT SCAN (OUTPATIENT)
Dept: HOME HEALTH SERVICES | Facility: HOSPITAL | Age: 73
End: 2023-11-20
Payer: MEDICARE

## 2023-11-29 ENCOUNTER — OFFICE VISIT (OUTPATIENT)
Dept: WOUND CARE | Facility: HOSPITAL | Age: 73
End: 2023-11-29
Attending: FAMILY MEDICINE
Payer: MEDICARE

## 2023-11-29 VITALS
RESPIRATION RATE: 18 BRPM | HEART RATE: 87 BPM | DIASTOLIC BLOOD PRESSURE: 86 MMHG | SYSTOLIC BLOOD PRESSURE: 157 MMHG | TEMPERATURE: 99 F

## 2023-11-29 DIAGNOSIS — L89.312 PRESSURE INJURY OF RIGHT BUTTOCK, STAGE 2: ICD-10-CM

## 2023-11-29 DIAGNOSIS — S31.819D BUTTOCK WOUND, RIGHT, SUBSEQUENT ENCOUNTER: Primary | ICD-10-CM

## 2023-11-29 PROCEDURE — 3061F PR NEG MICROALBUMINURIA RESULT DOCUMENTED/REVIEW: ICD-10-PCS | Mod: CPTII,,, | Performed by: FAMILY MEDICINE

## 2023-11-29 PROCEDURE — 1160F RVW MEDS BY RX/DR IN RCRD: CPT | Mod: CPTII,,, | Performed by: FAMILY MEDICINE

## 2023-11-29 PROCEDURE — 1160F PR REVIEW ALL MEDS BY PRESCRIBER/CLIN PHARMACIST DOCUMENTED: ICD-10-PCS | Mod: CPTII,,, | Performed by: FAMILY MEDICINE

## 2023-11-29 PROCEDURE — 3044F PR MOST RECENT HEMOGLOBIN A1C LEVEL <7.0%: ICD-10-PCS | Mod: CPTII,,, | Performed by: FAMILY MEDICINE

## 2023-11-29 PROCEDURE — 1159F PR MEDICATION LIST DOCUMENTED IN MEDICAL RECORD: ICD-10-PCS | Mod: CPTII,,, | Performed by: FAMILY MEDICINE

## 2023-11-29 PROCEDURE — 1126F PR PAIN SEVERITY QUANTIFIED, NO PAIN PRESENT: ICD-10-PCS | Mod: CPTII,,, | Performed by: FAMILY MEDICINE

## 2023-11-29 PROCEDURE — 3066F NEPHROPATHY DOC TX: CPT | Mod: CPTII,,, | Performed by: FAMILY MEDICINE

## 2023-11-29 PROCEDURE — 1126F AMNT PAIN NOTED NONE PRSNT: CPT | Mod: CPTII,,, | Performed by: FAMILY MEDICINE

## 2023-11-29 PROCEDURE — 3044F HG A1C LEVEL LT 7.0%: CPT | Mod: CPTII,,, | Performed by: FAMILY MEDICINE

## 2023-11-29 PROCEDURE — 99213 OFFICE O/P EST LOW 20 MIN: CPT | Mod: PN | Performed by: FAMILY MEDICINE

## 2023-11-29 PROCEDURE — 3077F PR MOST RECENT SYSTOLIC BLOOD PRESSURE >= 140 MM HG: ICD-10-PCS | Mod: CPTII,,, | Performed by: FAMILY MEDICINE

## 2023-11-29 PROCEDURE — 3061F NEG MICROALBUMINURIA REV: CPT | Mod: CPTII,,, | Performed by: FAMILY MEDICINE

## 2023-11-29 PROCEDURE — 99213 OFFICE O/P EST LOW 20 MIN: CPT | Mod: ,,, | Performed by: FAMILY MEDICINE

## 2023-11-29 PROCEDURE — 1157F ADVNC CARE PLAN IN RCRD: CPT | Mod: CPTII,,, | Performed by: FAMILY MEDICINE

## 2023-11-29 PROCEDURE — 3077F SYST BP >= 140 MM HG: CPT | Mod: CPTII,,, | Performed by: FAMILY MEDICINE

## 2023-11-29 PROCEDURE — 1159F MED LIST DOCD IN RCRD: CPT | Mod: CPTII,,, | Performed by: FAMILY MEDICINE

## 2023-11-29 PROCEDURE — 3079F PR MOST RECENT DIASTOLIC BLOOD PRESSURE 80-89 MM HG: ICD-10-PCS | Mod: CPTII,,, | Performed by: FAMILY MEDICINE

## 2023-11-29 PROCEDURE — 3066F PR DOCUMENTATION OF TREATMENT FOR NEPHROPATHY: ICD-10-PCS | Mod: CPTII,,, | Performed by: FAMILY MEDICINE

## 2023-11-29 PROCEDURE — 99213 PR OFFICE/OUTPT VISIT, EST, LEVL III, 20-29 MIN: ICD-10-PCS | Mod: ,,, | Performed by: FAMILY MEDICINE

## 2023-11-29 PROCEDURE — 3079F DIAST BP 80-89 MM HG: CPT | Mod: CPTII,,, | Performed by: FAMILY MEDICINE

## 2023-11-29 PROCEDURE — 1157F PR ADVANCE CARE PLAN OR EQUIV PRESENT IN MEDICAL RECORD: ICD-10-PCS | Mod: CPTII,,, | Performed by: FAMILY MEDICINE

## 2023-11-29 NOTE — PROGRESS NOTES
Wound Care Progress Note    Subjective:       Patient ID: Maggy Tapia is a 73 y.o. female.    Chief Complaint: Wound Care    HPI  Pt seen in clinic for a FU visit for a right buttock stage 2 pressure ulcer. Wound is stable but she sits all day on the wound. Pt has no new complaints today.  Review of Systems   Skin:  Positive for wound.        Open wound right buttock   All other systems reviewed and are negative.      Objective:        Physical Exam  Vitals and nursing note reviewed. Exam conducted with a chaperone present.   Constitutional:       General: She is not in acute distress.     Appearance: Normal appearance.   Skin:     General: Skin is warm and dry.      Findings: Lesion present.      Comments: Open wound of the right buttock, stage 2 pressure ulcer, see wound care assesment documentation in chart review scanned under the media tab   Neurological:      General: No focal deficit present.      Mental Status: She is alert.   Psychiatric:         Mood and Affect: Mood normal.         Thought Content: Thought content normal.         Judgment: Judgment normal.       Vitals:    11/29/23 0841   BP: (!) 157/86   Pulse: 87   Resp: 18   Temp: 98.6 °F (37 °C)       Assessment:           ICD-10-CM ICD-9-CM   1. Buttock wound, right, subsequent encounter  S31.819D V58.89     877.0   2. Pressure injury of right buttock, stage 2  L89.312 707.05     707.22                Plan:                  Maggy was seen today for wound care.    Diagnoses and all orders for this visit:    Buttock wound, right, subsequent encounter    Pressure injury of right buttock, stage 2        Please see wound care instructions which have been provided separately.

## 2023-11-30 ENCOUNTER — DOCUMENT SCAN (OUTPATIENT)
Dept: HOME HEALTH SERVICES | Facility: HOSPITAL | Age: 73
End: 2023-11-30
Payer: MEDICARE

## 2023-12-02 ENCOUNTER — EXTERNAL HOME HEALTH (OUTPATIENT)
Dept: HOME HEALTH SERVICES | Facility: HOSPITAL | Age: 73
End: 2023-12-02
Payer: MEDICARE

## 2023-12-06 ENCOUNTER — DOCUMENT SCAN (OUTPATIENT)
Dept: HOME HEALTH SERVICES | Facility: HOSPITAL | Age: 73
End: 2023-12-06
Payer: MEDICARE

## 2023-12-11 ENCOUNTER — OFFICE VISIT (OUTPATIENT)
Dept: PULMONOLOGY | Facility: CLINIC | Age: 73
End: 2023-12-11
Payer: MEDICARE

## 2023-12-11 VITALS
SYSTOLIC BLOOD PRESSURE: 130 MMHG | OXYGEN SATURATION: 93 % | HEIGHT: 61 IN | BODY MASS INDEX: 32.3 KG/M2 | HEART RATE: 89 BPM | WEIGHT: 171.06 LBS | DIASTOLIC BLOOD PRESSURE: 73 MMHG

## 2023-12-11 DIAGNOSIS — J96.10 CHRONIC NEUROMUSCULAR RESPIRATORY FAILURE: ICD-10-CM

## 2023-12-11 DIAGNOSIS — G47.33 OSA (OBSTRUCTIVE SLEEP APNEA): ICD-10-CM

## 2023-12-11 DIAGNOSIS — J98.4 CHRONIC RESTRICTIVE LUNG DISEASE: Primary | ICD-10-CM

## 2023-12-11 PROBLEM — J44.9 CHRONIC OBSTRUCTIVE PULMONARY DISEASE, UNSPECIFIED COPD TYPE: Status: RESOLVED | Noted: 2022-07-14 | Resolved: 2023-12-11

## 2023-12-11 PROCEDURE — 3066F NEPHROPATHY DOC TX: CPT | Mod: CPTII,S$GLB,, | Performed by: INTERNAL MEDICINE

## 2023-12-11 PROCEDURE — 1159F MED LIST DOCD IN RCRD: CPT | Mod: CPTII,S$GLB,, | Performed by: INTERNAL MEDICINE

## 2023-12-11 PROCEDURE — 99999 PR PBB SHADOW E&M-EST. PATIENT-LVL III: ICD-10-PCS | Mod: PBBFAC,,, | Performed by: INTERNAL MEDICINE

## 2023-12-11 PROCEDURE — 3288F PR FALLS RISK ASSESSMENT DOCUMENTED: ICD-10-PCS | Mod: CPTII,S$GLB,, | Performed by: INTERNAL MEDICINE

## 2023-12-11 PROCEDURE — 99214 PR OFFICE/OUTPT VISIT, EST, LEVL IV, 30-39 MIN: ICD-10-PCS | Mod: S$GLB,,, | Performed by: INTERNAL MEDICINE

## 2023-12-11 PROCEDURE — 3061F NEG MICROALBUMINURIA REV: CPT | Mod: CPTII,S$GLB,, | Performed by: INTERNAL MEDICINE

## 2023-12-11 PROCEDURE — 1157F ADVNC CARE PLAN IN RCRD: CPT | Mod: CPTII,S$GLB,, | Performed by: INTERNAL MEDICINE

## 2023-12-11 PROCEDURE — 3075F PR MOST RECENT SYSTOLIC BLOOD PRESS GE 130-139MM HG: ICD-10-PCS | Mod: CPTII,S$GLB,, | Performed by: INTERNAL MEDICINE

## 2023-12-11 PROCEDURE — 99214 OFFICE O/P EST MOD 30 MIN: CPT | Mod: S$GLB,,, | Performed by: INTERNAL MEDICINE

## 2023-12-11 PROCEDURE — 1125F AMNT PAIN NOTED PAIN PRSNT: CPT | Mod: CPTII,S$GLB,, | Performed by: INTERNAL MEDICINE

## 2023-12-11 PROCEDURE — 3066F PR DOCUMENTATION OF TREATMENT FOR NEPHROPATHY: ICD-10-PCS | Mod: CPTII,S$GLB,, | Performed by: INTERNAL MEDICINE

## 2023-12-11 PROCEDURE — 3288F FALL RISK ASSESSMENT DOCD: CPT | Mod: CPTII,S$GLB,, | Performed by: INTERNAL MEDICINE

## 2023-12-11 PROCEDURE — 1157F PR ADVANCE CARE PLAN OR EQUIV PRESENT IN MEDICAL RECORD: ICD-10-PCS | Mod: CPTII,S$GLB,, | Performed by: INTERNAL MEDICINE

## 2023-12-11 PROCEDURE — 1159F PR MEDICATION LIST DOCUMENTED IN MEDICAL RECORD: ICD-10-PCS | Mod: CPTII,S$GLB,, | Performed by: INTERNAL MEDICINE

## 2023-12-11 PROCEDURE — 1101F PR PT FALLS ASSESS DOC 0-1 FALLS W/OUT INJ PAST YR: ICD-10-PCS | Mod: CPTII,S$GLB,, | Performed by: INTERNAL MEDICINE

## 2023-12-11 PROCEDURE — 3075F SYST BP GE 130 - 139MM HG: CPT | Mod: CPTII,S$GLB,, | Performed by: INTERNAL MEDICINE

## 2023-12-11 PROCEDURE — 99999 PR PBB SHADOW E&M-EST. PATIENT-LVL III: CPT | Mod: PBBFAC,,, | Performed by: INTERNAL MEDICINE

## 2023-12-11 PROCEDURE — 3078F PR MOST RECENT DIASTOLIC BLOOD PRESSURE < 80 MM HG: ICD-10-PCS | Mod: CPTII,S$GLB,, | Performed by: INTERNAL MEDICINE

## 2023-12-11 PROCEDURE — 1125F PR PAIN SEVERITY QUANTIFIED, PAIN PRESENT: ICD-10-PCS | Mod: CPTII,S$GLB,, | Performed by: INTERNAL MEDICINE

## 2023-12-11 PROCEDURE — 3044F HG A1C LEVEL LT 7.0%: CPT | Mod: CPTII,S$GLB,, | Performed by: INTERNAL MEDICINE

## 2023-12-11 PROCEDURE — 1101F PT FALLS ASSESS-DOCD LE1/YR: CPT | Mod: CPTII,S$GLB,, | Performed by: INTERNAL MEDICINE

## 2023-12-11 PROCEDURE — 3044F PR MOST RECENT HEMOGLOBIN A1C LEVEL <7.0%: ICD-10-PCS | Mod: CPTII,S$GLB,, | Performed by: INTERNAL MEDICINE

## 2023-12-11 PROCEDURE — 3061F PR NEG MICROALBUMINURIA RESULT DOCUMENTED/REVIEW: ICD-10-PCS | Mod: CPTII,S$GLB,, | Performed by: INTERNAL MEDICINE

## 2023-12-11 PROCEDURE — 3078F DIAST BP <80 MM HG: CPT | Mod: CPTII,S$GLB,, | Performed by: INTERNAL MEDICINE
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
## 2023-12-11 NOTE — PATIENT INSTRUCTIONS
Continue NIV nightly use  If chest pain returns or associated with dizziness, shortness of breath or not resolving you need to go to the ER

## 2023-12-13 ENCOUNTER — OFFICE VISIT (OUTPATIENT)
Dept: WOUND CARE | Facility: HOSPITAL | Age: 73
End: 2023-12-13
Attending: FAMILY MEDICINE
Payer: MEDICARE

## 2023-12-13 VITALS
TEMPERATURE: 98 F | SYSTOLIC BLOOD PRESSURE: 132 MMHG | HEIGHT: 61 IN | RESPIRATION RATE: 18 BRPM | HEART RATE: 72 BPM | WEIGHT: 198 LBS | DIASTOLIC BLOOD PRESSURE: 66 MMHG | BODY MASS INDEX: 37.38 KG/M2

## 2023-12-13 DIAGNOSIS — S31.819D BUTTOCK WOUND, RIGHT, SUBSEQUENT ENCOUNTER: ICD-10-CM

## 2023-12-13 DIAGNOSIS — L89.312 PRESSURE INJURY OF RIGHT BUTTOCK, STAGE 2: Primary | ICD-10-CM

## 2023-12-13 PROCEDURE — 1126F AMNT PAIN NOTED NONE PRSNT: CPT | Mod: CPTII,,, | Performed by: FAMILY MEDICINE

## 2023-12-13 PROCEDURE — 1101F PT FALLS ASSESS-DOCD LE1/YR: CPT | Mod: CPTII,,, | Performed by: FAMILY MEDICINE

## 2023-12-13 PROCEDURE — 3061F PR NEG MICROALBUMINURIA RESULT DOCUMENTED/REVIEW: ICD-10-PCS | Mod: CPTII,,, | Performed by: FAMILY MEDICINE

## 2023-12-13 PROCEDURE — 1126F PR PAIN SEVERITY QUANTIFIED, NO PAIN PRESENT: ICD-10-PCS | Mod: CPTII,,, | Performed by: FAMILY MEDICINE

## 2023-12-13 PROCEDURE — 99213 PR OFFICE/OUTPT VISIT, EST, LEVL III, 20-29 MIN: ICD-10-PCS | Mod: ,,, | Performed by: FAMILY MEDICINE

## 2023-12-13 PROCEDURE — 1101F PR PT FALLS ASSESS DOC 0-1 FALLS W/OUT INJ PAST YR: ICD-10-PCS | Mod: CPTII,,, | Performed by: FAMILY MEDICINE

## 2023-12-13 PROCEDURE — 3075F PR MOST RECENT SYSTOLIC BLOOD PRESS GE 130-139MM HG: ICD-10-PCS | Mod: CPTII,,, | Performed by: FAMILY MEDICINE

## 2023-12-13 PROCEDURE — 3078F DIAST BP <80 MM HG: CPT | Mod: CPTII,,, | Performed by: FAMILY MEDICINE

## 2023-12-13 PROCEDURE — 1160F RVW MEDS BY RX/DR IN RCRD: CPT | Mod: CPTII,,, | Performed by: FAMILY MEDICINE

## 2023-12-13 PROCEDURE — 3061F NEG MICROALBUMINURIA REV: CPT | Mod: CPTII,,, | Performed by: FAMILY MEDICINE

## 2023-12-13 PROCEDURE — 3066F NEPHROPATHY DOC TX: CPT | Mod: CPTII,,, | Performed by: FAMILY MEDICINE

## 2023-12-13 PROCEDURE — 3044F HG A1C LEVEL LT 7.0%: CPT | Mod: CPTII,,, | Performed by: FAMILY MEDICINE

## 2023-12-13 PROCEDURE — 3066F PR DOCUMENTATION OF TREATMENT FOR NEPHROPATHY: ICD-10-PCS | Mod: CPTII,,, | Performed by: FAMILY MEDICINE

## 2023-12-13 PROCEDURE — 99213 OFFICE O/P EST LOW 20 MIN: CPT | Mod: ,,, | Performed by: FAMILY MEDICINE

## 2023-12-13 PROCEDURE — 3288F FALL RISK ASSESSMENT DOCD: CPT | Mod: CPTII,,, | Performed by: FAMILY MEDICINE

## 2023-12-13 PROCEDURE — 3075F SYST BP GE 130 - 139MM HG: CPT | Mod: CPTII,,, | Performed by: FAMILY MEDICINE

## 2023-12-13 PROCEDURE — 1160F PR REVIEW ALL MEDS BY PRESCRIBER/CLIN PHARMACIST DOCUMENTED: ICD-10-PCS | Mod: CPTII,,, | Performed by: FAMILY MEDICINE

## 2023-12-13 PROCEDURE — 3288F PR FALLS RISK ASSESSMENT DOCUMENTED: ICD-10-PCS | Mod: CPTII,,, | Performed by: FAMILY MEDICINE

## 2023-12-13 PROCEDURE — 3078F PR MOST RECENT DIASTOLIC BLOOD PRESSURE < 80 MM HG: ICD-10-PCS | Mod: CPTII,,, | Performed by: FAMILY MEDICINE

## 2023-12-13 PROCEDURE — 97602 WOUND(S) CARE NON-SELECTIVE: CPT | Mod: PN

## 2023-12-13 PROCEDURE — 3044F PR MOST RECENT HEMOGLOBIN A1C LEVEL <7.0%: ICD-10-PCS | Mod: CPTII,,, | Performed by: FAMILY MEDICINE

## 2023-12-13 PROCEDURE — 1157F ADVNC CARE PLAN IN RCRD: CPT | Mod: CPTII,,, | Performed by: FAMILY MEDICINE

## 2023-12-13 PROCEDURE — 1159F PR MEDICATION LIST DOCUMENTED IN MEDICAL RECORD: ICD-10-PCS | Mod: CPTII,,, | Performed by: FAMILY MEDICINE

## 2023-12-13 PROCEDURE — 1157F PR ADVANCE CARE PLAN OR EQUIV PRESENT IN MEDICAL RECORD: ICD-10-PCS | Mod: CPTII,,, | Performed by: FAMILY MEDICINE

## 2023-12-13 PROCEDURE — 1159F MED LIST DOCD IN RCRD: CPT | Mod: CPTII,,, | Performed by: FAMILY MEDICINE

## 2023-12-13 NOTE — PROGRESS NOTES
Wound Care Progress Note    Subjective:       Patient ID: Maggy Tapia is a 73 y.o. female.    Chief Complaint: Wound Care    HPI  Pt seen in clinic for a FU visit for a right buttock stage 2 pressure ulcer. Wound is stable, slightly smaller. Pt has no new complaints today  Review of Systems   Skin:  Positive for wound.        Open wound right buttock   All other systems reviewed and are negative.      Objective:        Physical Exam  Vitals and nursing note reviewed. Exam conducted with a chaperone present.   Constitutional:       Appearance: Normal appearance. She is not toxic-appearing.   Skin:     General: Skin is warm and dry.      Findings: Lesion present.      Comments: Right buttock pressure ulcer, see wound care assessment documentation in chart review scanned under the media tab   Neurological:      General: No focal deficit present.      Mental Status: She is alert and oriented to person, place, and time.   Psychiatric:         Mood and Affect: Mood normal.         Judgment: Judgment normal.       Vitals:    12/13/23 0847   BP: 132/66   Pulse: 72   Resp: 18   Temp: 97.7 °F (36.5 °C)       Assessment:           ICD-10-CM ICD-9-CM   1. Pressure injury of right buttock, stage 2  L89.312 707.05     707.22   2. Buttock wound, right, subsequent encounter  S31.819D V58.89     877.0                Plan:                  Maggy was seen today for wound care.    Diagnoses and all orders for this visit:    Pressure injury of right buttock, stage 2    Buttock wound, right, subsequent encounter        See attached wound care documentation

## 2023-12-20 ENCOUNTER — OFFICE VISIT (OUTPATIENT)
Dept: WOUND CARE | Facility: HOSPITAL | Age: 73
End: 2023-12-20
Attending: FAMILY MEDICINE
Payer: MEDICARE

## 2023-12-20 VITALS
RESPIRATION RATE: 18 BRPM | HEART RATE: 82 BPM | DIASTOLIC BLOOD PRESSURE: 82 MMHG | SYSTOLIC BLOOD PRESSURE: 138 MMHG | TEMPERATURE: 98 F

## 2023-12-20 DIAGNOSIS — L89.312 PRESSURE INJURY OF RIGHT BUTTOCK, STAGE 2: Primary | ICD-10-CM

## 2023-12-20 DIAGNOSIS — S31.819D BUTTOCK WOUND, RIGHT, SUBSEQUENT ENCOUNTER: ICD-10-CM

## 2023-12-20 PROCEDURE — 3066F NEPHROPATHY DOC TX: CPT | Mod: CPTII,,, | Performed by: FAMILY MEDICINE

## 2023-12-20 PROCEDURE — 99213 OFFICE O/P EST LOW 20 MIN: CPT | Mod: PN | Performed by: FAMILY MEDICINE

## 2023-12-20 PROCEDURE — 1159F PR MEDICATION LIST DOCUMENTED IN MEDICAL RECORD: ICD-10-PCS | Mod: CPTII,,, | Performed by: FAMILY MEDICINE

## 2023-12-20 PROCEDURE — 3061F NEG MICROALBUMINURIA REV: CPT | Mod: CPTII,,, | Performed by: FAMILY MEDICINE

## 2023-12-20 PROCEDURE — 3044F PR MOST RECENT HEMOGLOBIN A1C LEVEL <7.0%: ICD-10-PCS | Mod: CPTII,,, | Performed by: FAMILY MEDICINE

## 2023-12-20 PROCEDURE — 1160F PR REVIEW ALL MEDS BY PRESCRIBER/CLIN PHARMACIST DOCUMENTED: ICD-10-PCS | Mod: CPTII,,, | Performed by: FAMILY MEDICINE

## 2023-12-20 PROCEDURE — 3288F FALL RISK ASSESSMENT DOCD: CPT | Mod: CPTII,,, | Performed by: FAMILY MEDICINE

## 2023-12-20 PROCEDURE — 1157F PR ADVANCE CARE PLAN OR EQUIV PRESENT IN MEDICAL RECORD: ICD-10-PCS | Mod: CPTII,,, | Performed by: FAMILY MEDICINE

## 2023-12-20 PROCEDURE — 1126F PR PAIN SEVERITY QUANTIFIED, NO PAIN PRESENT: ICD-10-PCS | Mod: CPTII,,, | Performed by: FAMILY MEDICINE

## 2023-12-20 PROCEDURE — 3075F SYST BP GE 130 - 139MM HG: CPT | Mod: CPTII,,, | Performed by: FAMILY MEDICINE

## 2023-12-20 PROCEDURE — 1159F MED LIST DOCD IN RCRD: CPT | Mod: CPTII,,, | Performed by: FAMILY MEDICINE

## 2023-12-20 PROCEDURE — 1160F RVW MEDS BY RX/DR IN RCRD: CPT | Mod: CPTII,,, | Performed by: FAMILY MEDICINE

## 2023-12-20 PROCEDURE — 99213 OFFICE O/P EST LOW 20 MIN: CPT | Mod: ,,, | Performed by: FAMILY MEDICINE

## 2023-12-20 PROCEDURE — 3066F PR DOCUMENTATION OF TREATMENT FOR NEPHROPATHY: ICD-10-PCS | Mod: CPTII,,, | Performed by: FAMILY MEDICINE

## 2023-12-20 PROCEDURE — 3044F HG A1C LEVEL LT 7.0%: CPT | Mod: CPTII,,, | Performed by: FAMILY MEDICINE

## 2023-12-20 PROCEDURE — 3075F PR MOST RECENT SYSTOLIC BLOOD PRESS GE 130-139MM HG: ICD-10-PCS | Mod: CPTII,,, | Performed by: FAMILY MEDICINE

## 2023-12-20 PROCEDURE — 99213 PR OFFICE/OUTPT VISIT, EST, LEVL III, 20-29 MIN: ICD-10-PCS | Mod: ,,, | Performed by: FAMILY MEDICINE

## 2023-12-20 PROCEDURE — 3288F PR FALLS RISK ASSESSMENT DOCUMENTED: ICD-10-PCS | Mod: CPTII,,, | Performed by: FAMILY MEDICINE

## 2023-12-20 PROCEDURE — 1126F AMNT PAIN NOTED NONE PRSNT: CPT | Mod: CPTII,,, | Performed by: FAMILY MEDICINE

## 2023-12-20 PROCEDURE — 1101F PR PT FALLS ASSESS DOC 0-1 FALLS W/OUT INJ PAST YR: ICD-10-PCS | Mod: CPTII,,, | Performed by: FAMILY MEDICINE

## 2023-12-20 PROCEDURE — 1101F PT FALLS ASSESS-DOCD LE1/YR: CPT | Mod: CPTII,,, | Performed by: FAMILY MEDICINE

## 2023-12-20 PROCEDURE — 3079F DIAST BP 80-89 MM HG: CPT | Mod: CPTII,,, | Performed by: FAMILY MEDICINE

## 2023-12-20 PROCEDURE — 3079F PR MOST RECENT DIASTOLIC BLOOD PRESSURE 80-89 MM HG: ICD-10-PCS | Mod: CPTII,,, | Performed by: FAMILY MEDICINE

## 2023-12-20 PROCEDURE — 1157F ADVNC CARE PLAN IN RCRD: CPT | Mod: CPTII,,, | Performed by: FAMILY MEDICINE

## 2023-12-20 PROCEDURE — 3061F PR NEG MICROALBUMINURIA RESULT DOCUMENTED/REVIEW: ICD-10-PCS | Mod: CPTII,,, | Performed by: FAMILY MEDICINE

## 2023-12-20 NOTE — PROGRESS NOTES
Wound Care Progress Note    Subjective:       Patient ID: Maggy Tapia is a 73 y.o. female.    Chief Complaint: Wound Care    HPI  Pt seen in clinic for a FU visit for right buttock pressure ulcer, Wound is smaller, pt is doing well. Pt has no new complaints today.   Review of Systems   Skin:  Positive for wound.        Right buttock open wound   All other systems reviewed and are negative.      Objective:        Physical Exam  Vitals and nursing note reviewed. Exam conducted with a chaperone present.   Constitutional:       General: She is not in acute distress.     Appearance: Normal appearance.   Skin:     General: Skin is warm and dry.      Findings: Lesion present.      Comments: Right buttock pressure ulcer, see wound care assessment documentation in chart review scanned under the medai tab   Neurological:      General: No focal deficit present.      Mental Status: She is alert and oriented to person, place, and time.   Psychiatric:         Mood and Affect: Mood normal.         Thought Content: Thought content normal.         Judgment: Judgment normal.       Vitals:    12/20/23 0823   BP: 138/82   Pulse: 82   Resp: 18   Temp: 97.8 °F (36.6 °C)       Assessment:           ICD-10-CM ICD-9-CM   1. Pressure injury of right buttock, stage 2  L89.312 707.05     707.22   2. Buttock wound, right, subsequent encounter  S31.819D V58.89     877.0                Plan:                  Maggy was seen today for wound care.    Diagnoses and all orders for this visit:    Pressure injury of right buttock, stage 2    Buttock wound, right, subsequent encounter        Much of the documentation for this visit was completed in wound docs system. Please see the attached documentation for further details about the patients care. Scanned under the media tab.

## 2023-12-22 DIAGNOSIS — C50.912 INVASIVE DUCTAL CARCINOMA OF BREAST, FEMALE, LEFT: ICD-10-CM

## 2023-12-22 RX ORDER — LETROZOLE 2.5 MG/1
2.5 TABLET, FILM COATED ORAL
Qty: 30 TABLET | Refills: 5 | Status: SHIPPED | OUTPATIENT
Start: 2023-12-22

## 2023-12-27 ENCOUNTER — DOCUMENT SCAN (OUTPATIENT)
Dept: HOME HEALTH SERVICES | Facility: HOSPITAL | Age: 73
End: 2023-12-27
Payer: MEDICARE

## 2023-12-28 ENCOUNTER — DOCUMENT SCAN (OUTPATIENT)
Dept: HOME HEALTH SERVICES | Facility: HOSPITAL | Age: 73
End: 2023-12-28
Payer: MEDICARE

## 2024-01-03 ENCOUNTER — OFFICE VISIT (OUTPATIENT)
Dept: WOUND CARE | Facility: HOSPITAL | Age: 74
End: 2024-01-03
Attending: FAMILY MEDICINE
Payer: MEDICARE

## 2024-01-03 VITALS
DIASTOLIC BLOOD PRESSURE: 84 MMHG | SYSTOLIC BLOOD PRESSURE: 127 MMHG | HEART RATE: 80 BPM | TEMPERATURE: 98 F | RESPIRATION RATE: 20 BRPM

## 2024-01-03 DIAGNOSIS — S31.819D BUTTOCK WOUND, RIGHT, SUBSEQUENT ENCOUNTER: ICD-10-CM

## 2024-01-03 DIAGNOSIS — L89.312 PRESSURE INJURY OF RIGHT BUTTOCK, STAGE 2: Primary | ICD-10-CM

## 2024-01-03 PROCEDURE — 99213 OFFICE O/P EST LOW 20 MIN: CPT | Mod: ,,, | Performed by: FAMILY MEDICINE

## 2024-01-03 PROCEDURE — 99213 OFFICE O/P EST LOW 20 MIN: CPT | Mod: PN | Performed by: FAMILY MEDICINE

## 2024-01-03 NOTE — PROGRESS NOTES
Wound Care Progress Note    Subjective:       Patient ID: Maggy Tapia is a 74 y.o. female.    Chief Complaint: No chief complaint on file.    HPI  Pt seen in clinic for a FU visit for a right buttock pressure ulcer. Wound is nearly healed. Pt has no new complaints today.  Review of Systems   Skin:  Positive for wound.        Right buttock pressure ulcer   All other systems reviewed and are negative.      Objective:        Physical Exam  Vitals and nursing note reviewed. Exam conducted with a chaperone present.   Constitutional:       General: She is not in acute distress.     Appearance: Normal appearance.   Skin:     General: Skin is warm and dry.      Findings: Lesion present.      Comments: Right buttock pressure ulcer, improved, see wound care assessment documentation in chart review scanned under the media tab   Neurological:      General: No focal deficit present.      Mental Status: She is alert.   Psychiatric:         Mood and Affect: Mood normal.         Thought Content: Thought content normal.         Judgment: Judgment normal.       There were no vitals filed for this visit.    Assessment:           ICD-10-CM ICD-9-CM   1. Pressure injury of right buttock, stage 2  L89.312 707.05     707.22   2. Buttock wound, right, subsequent encounter  S31.819D V58.89     877.0                Plan:                  Diagnoses and all orders for this visit:    Pressure injury of right buttock, stage 2    Buttock wound, right, subsequent encounter      See wound care instructions provided separately

## 2024-01-05 ENCOUNTER — DOCUMENT SCAN (OUTPATIENT)
Dept: HOME HEALTH SERVICES | Facility: HOSPITAL | Age: 74
End: 2024-01-05
Payer: MEDICARE

## 2024-01-05 ENCOUNTER — OFFICE VISIT (OUTPATIENT)
Dept: PSYCHIATRY | Facility: CLINIC | Age: 74
End: 2024-01-05
Payer: MEDICARE

## 2024-01-05 VITALS
BODY MASS INDEX: 32.98 KG/M2 | DIASTOLIC BLOOD PRESSURE: 75 MMHG | WEIGHT: 174.69 LBS | SYSTOLIC BLOOD PRESSURE: 137 MMHG | HEIGHT: 61 IN | HEART RATE: 69 BPM

## 2024-01-05 DIAGNOSIS — F39 MOOD DISORDER: ICD-10-CM

## 2024-01-05 DIAGNOSIS — F43.10 PTSD (POST-TRAUMATIC STRESS DISORDER): ICD-10-CM

## 2024-01-05 DIAGNOSIS — F34.1 PERSISTENT DEPRESSIVE DISORDER: Primary | ICD-10-CM

## 2024-01-05 PROCEDURE — 99214 OFFICE O/P EST MOD 30 MIN: CPT | Mod: S$GLB,,, | Performed by: PHYSICIAN ASSISTANT

## 2024-01-05 PROCEDURE — 90833 PSYTX W PT W E/M 30 MIN: CPT | Mod: S$GLB,,, | Performed by: PHYSICIAN ASSISTANT

## 2024-01-05 PROCEDURE — 99999 PR PBB SHADOW E&M-EST. PATIENT-LVL IV: CPT | Mod: PBBFAC,,, | Performed by: PHYSICIAN ASSISTANT

## 2024-01-05 RX ORDER — SERTRALINE HYDROCHLORIDE 50 MG/1
50 TABLET, FILM COATED ORAL DAILY
Qty: 90 TABLET | Refills: 0 | Status: SHIPPED | OUTPATIENT
Start: 2024-01-05 | End: 2024-04-04
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
## 2024-01-07 PROCEDURE — G0179 MD RECERTIFICATION HHA PT: HCPCS | Mod: ,,, | Performed by: FAMILY MEDICINE

## 2024-01-10 ENCOUNTER — LAB VISIT (OUTPATIENT)
Dept: LAB | Facility: HOSPITAL | Age: 74
End: 2024-01-10
Attending: INTERNAL MEDICINE
Payer: MEDICARE

## 2024-01-10 DIAGNOSIS — C50.912 INVASIVE DUCTAL CARCINOMA OF BREAST, FEMALE, LEFT: ICD-10-CM

## 2024-01-10 LAB
ALBUMIN SERPL BCP-MCNC: 3.4 G/DL (ref 3.5–5.2)
ALP SERPL-CCNC: 86 U/L (ref 55–135)
ALT SERPL W/O P-5'-P-CCNC: 11 U/L (ref 10–44)
ANION GAP SERPL CALC-SCNC: 10 MMOL/L (ref 8–16)
AST SERPL-CCNC: 15 U/L (ref 10–40)
BASOPHILS # BLD AUTO: 0.06 K/UL (ref 0–0.2)
BASOPHILS NFR BLD: 0.6 % (ref 0–1.9)
BILIRUB SERPL-MCNC: 0.3 MG/DL (ref 0.1–1)
BUN SERPL-MCNC: 20 MG/DL (ref 8–23)
CALCIUM SERPL-MCNC: 9.5 MG/DL (ref 8.7–10.5)
CHLORIDE SERPL-SCNC: 110 MMOL/L (ref 95–110)
CO2 SERPL-SCNC: 23 MMOL/L (ref 23–29)
CREAT SERPL-MCNC: 0.8 MG/DL (ref 0.5–1.4)
DIFFERENTIAL METHOD BLD: ABNORMAL
EOSINOPHIL # BLD AUTO: 0.2 K/UL (ref 0–0.5)
EOSINOPHIL NFR BLD: 1.5 % (ref 0–8)
ERYTHROCYTE [DISTWIDTH] IN BLOOD BY AUTOMATED COUNT: 13.5 % (ref 11.5–14.5)
EST. GFR  (NO RACE VARIABLE): >60 ML/MIN/1.73 M^2
GLUCOSE SERPL-MCNC: 240 MG/DL (ref 70–110)
HCT VFR BLD AUTO: 47.6 % (ref 37–48.5)
HGB BLD-MCNC: 14.5 G/DL (ref 12–16)
IMM GRANULOCYTES # BLD AUTO: 0.03 K/UL (ref 0–0.04)
IMM GRANULOCYTES NFR BLD AUTO: 0.3 % (ref 0–0.5)
LYMPHOCYTES # BLD AUTO: 1.5 K/UL (ref 1–4.8)
LYMPHOCYTES NFR BLD: 14.1 % (ref 18–48)
MCH RBC QN AUTO: 30.4 PG (ref 27–31)
MCHC RBC AUTO-ENTMCNC: 30.5 G/DL (ref 32–36)
MCV RBC AUTO: 100 FL (ref 82–98)
MONOCYTES # BLD AUTO: 0.5 K/UL (ref 0.3–1)
MONOCYTES NFR BLD: 4.3 % (ref 4–15)
NEUTROPHILS # BLD AUTO: 8.5 K/UL (ref 1.8–7.7)
NEUTROPHILS NFR BLD: 79.2 % (ref 38–73)
NRBC BLD-RTO: 0 /100 WBC
PLATELET # BLD AUTO: 180 K/UL (ref 150–450)
PMV BLD AUTO: 11.1 FL (ref 9.2–12.9)
POTASSIUM SERPL-SCNC: 4.4 MMOL/L (ref 3.5–5.1)
PROT SERPL-MCNC: 7.1 G/DL (ref 6–8.4)
RBC # BLD AUTO: 4.77 M/UL (ref 4–5.4)
SODIUM SERPL-SCNC: 143 MMOL/L (ref 136–145)
WBC # BLD AUTO: 10.71 K/UL (ref 3.9–12.7)

## 2024-01-10 PROCEDURE — 80053 COMPREHEN METABOLIC PANEL: CPT | Performed by: INTERNAL MEDICINE

## 2024-01-10 PROCEDURE — 36415 COLL VENOUS BLD VENIPUNCTURE: CPT | Mod: PO | Performed by: INTERNAL MEDICINE

## 2024-01-10 PROCEDURE — 85025 COMPLETE CBC W/AUTO DIFF WBC: CPT | Performed by: INTERNAL MEDICINE

## 2024-01-12 ENCOUNTER — OFFICE VISIT (OUTPATIENT)
Dept: HEMATOLOGY/ONCOLOGY | Facility: CLINIC | Age: 74
End: 2024-01-12
Payer: MEDICARE

## 2024-01-12 VITALS
HEIGHT: 61 IN | HEART RATE: 79 BPM | OXYGEN SATURATION: 97 % | TEMPERATURE: 99 F | BODY MASS INDEX: 32.84 KG/M2 | DIASTOLIC BLOOD PRESSURE: 79 MMHG | SYSTOLIC BLOOD PRESSURE: 146 MMHG | WEIGHT: 173.94 LBS | RESPIRATION RATE: 16 BRPM

## 2024-01-12 DIAGNOSIS — H91.93 BILATERAL HEARING LOSS, UNSPECIFIED HEARING LOSS TYPE: ICD-10-CM

## 2024-01-12 DIAGNOSIS — E11.42 TYPE 2 DIABETES MELLITUS WITH DIABETIC POLYNEUROPATHY, WITHOUT LONG-TERM CURRENT USE OF INSULIN: ICD-10-CM

## 2024-01-12 DIAGNOSIS — Z12.31 ENCOUNTER FOR SCREENING MAMMOGRAM FOR BREAST CANCER: ICD-10-CM

## 2024-01-12 DIAGNOSIS — C50.912 INVASIVE DUCTAL CARCINOMA OF BREAST, FEMALE, LEFT: Primary | ICD-10-CM

## 2024-01-12 DIAGNOSIS — J44.9 CHRONIC OBSTRUCTIVE PULMONARY DISEASE, UNSPECIFIED COPD TYPE: ICD-10-CM

## 2024-01-12 PROCEDURE — 99214 OFFICE O/P EST MOD 30 MIN: CPT | Mod: S$GLB,,, | Performed by: INTERNAL MEDICINE

## 2024-01-12 PROCEDURE — 99999 PR PBB SHADOW E&M-EST. PATIENT-LVL V: CPT | Mod: PBBFAC,,, | Performed by: INTERNAL MEDICINE

## 2024-01-12 NOTE — PROGRESS NOTES
Service Date:  1/12/24    Chief Complaint: Breast cancer    Maggy Tapia is a 74 y.o. female here with L breast invasive ductal carcinoma. Patient had a routine screening mammogram which led to biopsy of a concerning lesion in the upper inner quadrant. Lesion measured about 17 mm on US. [id number] ER/CT+, HER2 negative by FISH, Ki 67 40%. After mastectomy, found to have pT2N0 lesion. Oncotype score 21, <1% benefit from chemo.    Now on Letrozole. Tolerating it well.  Vaginal spotting has resolved.  She did see Gynecology and ended up having an endometrial biopsy with dilation and curettage..    Review of Systems   Constitutional: Negative.    HENT: Negative.     Eyes: Negative.    Respiratory: Negative.     Cardiovascular: Negative.    Gastrointestinal: Negative.    Endocrine: Negative.    Genitourinary: Negative.    Musculoskeletal: Negative.    Integumentary:  Negative.   Neurological: Negative.    Hematological: Negative.    Psychiatric/Behavioral: Negative.          Current Outpatient Medications   Medication Instructions    albuterol (PROVENTIL/VENTOLIN HFA) 90 mcg/actuation inhaler INHALE 1 TO 2 PUFFS BY MOUTH INTO THE LUNGS EVERY 4 HOURS AS NEEDED FOR SHORTNESS OF BREATH( COUGHING)    alendronate (FOSAMAX) 70 MG tablet Take one tablet every 7 days.    aspirin (ECOTRIN) 81 mg, Oral, Daily    blood-glucose meter kit Use as instructed. Insurance preferred.    CALCIUM CARBONATE/VITAMIN D3 (CALCIUM 500 + D ORAL) 1 tablet, Oral, Daily, 10 mg daily    cyanocobalamin (VITAMIN B-12) 1,000 mcg, Oral, Daily    fluticasone furoate-vilanteroL (BREO ELLIPTA) 100-25 mcg/dose diskus inhaler 1 puff, Inhalation, Daily, Controller    gabapentin (NEURONTIN) 300 mg, Oral, Nightly, Take 1 tablet every night x 7 days. Increase to twice a day x 7 days. Increase to three times a day.    ibuprofen (ADVIL,MOTRIN) 600 mg, Oral, Every 6 hours PRN    ketoconazole (NIZORAL) 2 % cream AAA pannus fold at least daily after the shower     letrozole (FEMARA) 2.5 mg, Oral    levothyroxine (SYNTHROID) 112 mcg, Oral, Before breakfast    losartan (COZAAR) 25 mg, Oral, Daily    metFORMIN (GLUCOPHAGE-XR) 1,000 mg, Oral, With breakfast    nystatin (MYCOSTATIN) cream Topical (Top), 2 times daily, Apply to rash on face    polyethylene glycol (GLYCOLAX) 17 g, Oral, Daily    potassium chloride SA (K-DUR,KLOR-CON) 20 MEQ tablet 20 mEq, Oral, Daily    rOPINIRole (REQUIP) 0.5 MG tablet Oral, Nightly    sertraline (ZOLOFT) 50 mg, Oral, Daily, For depression/anxiety    simvastatin (ZOCOR) 40 mg, Oral    tetrabenazine (XENAZINE) 25 mg tablet Take one tablet daily for 7 days and then<BR>Take 1 tablet twice daily afterwards        Past Medical History:   Diagnosis Date    Anxiety     Arthritis     Asthma     Breast cancer     Left    Depression     Diabetes mellitus, type 2     GERD (gastroesophageal reflux disease)     Hyperlipidemia     Hypertension     Hypothyroidism, unspecified     Overactive bladder     Seizures     Pseudo-seizures    Sleep apnea     Stroke 2022    pt was in the hospital for a stroke, claims she was seeing people when the stroke happened        Past Surgical History:   Procedure Laterality Date    APPENDECTOMY      BREAST BIOPSY Left     BREAST LUMPECTOMY Left     2016    BREAST SURGERY      CATARACT EXTRACTION Bilateral     OU done//     SECTION      CHOLECYSTECTOMY      COLONOSCOPY N/A 2020    Procedure: COLONOSCOPY;  Surgeon: Mj Fernandez MD;  Location: [facility];  Service: Endoscopy;  Laterality: N/A;    CYST REMOVAL Left 2021    DILATION AND CURETTAGE OF UTERUS      EYE SURGERY      HYSTEROSCOPY WITH DILATION AND CURETTAGE OF UTERUS N/A 2023    Procedure: HYSTEROSCOPY, WITH DILATION AND CURETTAGE OF UTERUS AND OTHER INDICATED PROCEDURES Needs Cardiac clearance;  Surgeon: Gamaliel Moran MD;  Location: [facility];  Service: OB/GYN;  Laterality: N/A;  Cardiac clearance needed per Dr Justin WEST,  "BREAST Left 4/26/2023    Procedure: LUMPECTOMY, BREAST;  Surgeon: Toño Paredes MD;  Location: [facility] OR;  Service: General;  Laterality: Left;    PERCUTANEOUS PINNING OF HIP Right 11/11/2022    Procedure: PINNING, HIP, PERCUTANEOUS;  Surgeon: Ministerio Joiner II, MD;  Location: [facility] OR;  Service: Orthopedics;  Laterality: Right;    SENTINEL LYMPH NODE BIOPSY Left 4/26/2023    Procedure: BIOPSY, LYMPH NODE, SENTINEL;  Surgeon: Toño Paredes MD;  Location: [facility] OR;  Service: General;  Laterality: Left;    SIMPLE MASTECTOMY Left 5/19/2023    Procedure: MASTECTOMY, SIMPLE;  Surgeon: Toño Paredes MD;  Location: [facility] OR;  Service: General;  Laterality: Left;        Family History   Problem Relation Age of Onset    Diabetes Mother     Hypertension Mother     Breast cancer Mother     Cataracts Mother     Melanoma Mother     Heart failure Father     Melanoma Father     Cataracts Brother     Melanoma Brother     Glaucoma Neg Hx     Retinal detachment Neg Hx     Macular degeneration Neg Hx        Social History     Tobacco Use    Smoking status: Never     Passive exposure: Past    Smokeless tobacco: Never   Substance Use Topics    Alcohol use: Yes     Comment: seldom    Drug use: No         Vitals:    01/12/24 1050   BP: (!) 146/79   Pulse: 79   Resp: 16   Temp: 98.6 °F (37 °C)        Physical Exam:  BP (!) 146/79 (BP Location: Right arm, Patient Position: Sitting, BP Method: Large (Automatic))   Pulse 79   Temp 98.6 °F (37 °C) (Temporal)   Resp 16   Ht 5' 1" (1.549 m)   Wt 78.9 kg (173 lb 15.1 oz)   SpO2 97%   BMI 32.87 kg/m²     Physical Exam  Constitutional:       Appearance: Normal appearance.   HENT:      Head: Normocephalic and atraumatic.      Nose: Nose normal.      Mouth/Throat:      Mouth: Mucous membranes are moist.      Pharynx: Oropharynx is clear.   Eyes:      Conjunctiva/sclera: Conjunctivae normal.   Cardiovascular:      Rate and Rhythm: Normal rate and regular rhythm.      Heart sounds: Normal " heart sounds.   Pulmonary:      Effort: Pulmonary effort is normal.      Breath sounds: Normal breath sounds.   Abdominal:      General: Abdomen is flat. Bowel sounds are normal.      Palpations: Abdomen is soft.   Musculoskeletal:         General: Normal range of motion.      Cervical back: Normal range of motion and neck supple.   Skin:     General: Skin is warm and dry.   Neurological:      General: No focal deficit present.      Mental Status: She is alert and oriented to person, place, and time. Mental status is at baseline.   Psychiatric:         Mood and Affect: Mood normal.          Labs:  Lab Results   Component Value Date    WBC 10.71 01/10/2024    RBC 4.77 01/10/2024    HGB 14.5 01/10/2024    HCT 47.6 01/10/2024     (H) 01/10/2024    MCH 30.4 01/10/2024    MCHC 30.5 (L) 01/10/2024    RDW 13.5 01/10/2024     01/10/2024    MPV 11.1 01/10/2024    GRAN 8.5 (H) 01/10/2024    GRAN 79.2 (H) 01/10/2024    LYMPH 1.5 01/10/2024    LYMPH 14.1 (L) 01/10/2024    MONO 0.5 01/10/2024    MONO 4.3 01/10/2024    EOS 0.2 01/10/2024    BASO 0.06 01/10/2024    EOSINOPHIL 1.5 01/10/2024    BASOPHIL 0.6 01/10/2024     Sodium   Date Value Ref Range Status   01/10/2024 143 136 - 145 mmol/L Final     Potassium   Date Value Ref Range Status   01/10/2024 4.4 3.5 - 5.1 mmol/L Final     Chloride   Date Value Ref Range Status   01/10/2024 110 95 - 110 mmol/L Final     CO2   Date Value Ref Range Status   01/10/2024 23 23 - 29 mmol/L Final     Glucose   Date Value Ref Range Status   01/10/2024 240 (H) 70 - 110 mg/dL Final     BUN   Date Value Ref Range Status   01/10/2024 20 8 - 23 mg/dL Final     Creatinine   Date Value Ref Range Status   01/10/2024 0.8 0.5 - 1.4 mg/dL Final     Calcium   Date Value Ref Range Status   01/10/2024 9.5 8.7 - 10.5 mg/dL Final     Total Protein   Date Value Ref Range Status   01/10/2024 7.1 6.0 - 8.4 g/dL Final     Albumin   Date Value Ref Range Status   01/10/2024 3.4 (L) 3.5 - 5.2 g/dL Final      Total Bilirubin   Date Value Ref Range Status   01/10/2024 0.3 0.1 - 1.0 mg/dL Final     Comment:     For infants and newborns, interpretation of results should be based  on gestational age, weight and in agreement with clinical  observations.    Premature Infant recommended reference ranges:  Up to 24 hours.............<8.0 mg/dL  Up to 48 hours............<12.0 mg/dL  3-5 days..................<15.0 mg/dL  6-29 days.................<15.0 mg/dL       Alkaline Phosphatase   Date Value Ref Range Status   01/10/2024 86 55 - 135 U/L Final     AST   Date Value Ref Range Status   01/10/2024 15 10 - 40 U/L Final     ALT   Date Value Ref Range Status   01/10/2024 11 10 - 44 U/L Final     Anion Gap   Date Value Ref Range Status   01/10/2024 10 8 - 16 mmol/L Final     eGFR if    Date Value Ref Range Status   03/29/2022 >60.0 >60 mL/min/1.73 m^2 Final     eGFR if non    Date Value Ref Range Status   03/29/2022 >60.0 >60 mL/min/1.73 m^2 Final     Comment:     Calculation used to obtain the estimated glomerular filtration  rate (eGFR) is the CKD-EPI equation.          A/P:    L breast invasive ductal carcinoma  -pT2N0, s/p left mastectomy  -ER 90%, NV 20%, HER2 -, Ki 40%  -oncotype 21 - no need for chemo  -started letrozole 6/12/23  -screening mammogram for right breast due in March, will order that today.  -RTC in 6 months    Vaginal spotting   -now resolved    Osteopenia  -bone density scan 7/31/23 showed improvement in osteopenia  -cont vit D and Ca  -on Fosamax    COPD  DM2      Aurash Khoobehi, MD  Hematology and Oncology

## 2024-01-16 ENCOUNTER — DOCUMENT SCAN (OUTPATIENT)
Dept: HOME HEALTH SERVICES | Facility: HOSPITAL | Age: 74
End: 2024-01-16
Payer: MEDICARE

## 2024-01-17 ENCOUNTER — OFFICE VISIT (OUTPATIENT)
Dept: WOUND CARE | Facility: HOSPITAL | Age: 74
End: 2024-01-17
Attending: FAMILY MEDICINE
Payer: MEDICARE

## 2024-01-17 VITALS
RESPIRATION RATE: 18 BRPM | HEART RATE: 80 BPM | SYSTOLIC BLOOD PRESSURE: 126 MMHG | TEMPERATURE: 99 F | DIASTOLIC BLOOD PRESSURE: 65 MMHG

## 2024-01-17 DIAGNOSIS — S31.819D BUTTOCK WOUND, RIGHT, SUBSEQUENT ENCOUNTER: Primary | ICD-10-CM

## 2024-01-17 DIAGNOSIS — L89.312 PRESSURE INJURY OF RIGHT BUTTOCK, STAGE 2: ICD-10-CM

## 2024-01-17 PROCEDURE — 99212 OFFICE O/P EST SF 10 MIN: CPT | Mod: PN | Performed by: FAMILY MEDICINE

## 2024-01-17 PROCEDURE — 99212 OFFICE O/P EST SF 10 MIN: CPT | Mod: ,,, | Performed by: FAMILY MEDICINE

## 2024-01-17 NOTE — PROGRESS NOTES
Wound Care Progress Note    Subjective:       Patient ID: Maggy Tapia is a 74 y.o. female.    Chief Complaint: No chief complaint on file.    HPI  Pt seen in clinic for a FU visit for a right buttock pressure ulcer. Wound is healed. Pt has no new complaints today.  Review of Systems   Skin:  Positive for wound.        Healed right buttock wound   All other systems reviewed and are negative.      Objective:        Physical Exam  Vitals and nursing note reviewed. Exam conducted with a chaperone present.   Constitutional:       General: She is not in acute distress.     Appearance: Normal appearance.   Skin:     General: Skin is warm and dry.      Findings: Lesion present.      Comments: Right buttock healed wound, see wound care assessment documentation in chart review scanned under the media tab   Neurological:      General: No focal deficit present.      Mental Status: She is alert and oriented to person, place, and time.   Psychiatric:         Mood and Affect: Mood normal.         Thought Content: Thought content normal.         Judgment: Judgment normal.       There were no vitals filed for this visit.    Assessment:           ICD-10-CM ICD-9-CM   1. Buttock wound, right, subsequent encounter  S31.819D V58.89     877.0   2. Pressure injury of right buttock, stage 2  L89.312 707.05     707.22                Plan:                  Diagnoses and all orders for this visit:    Buttock wound, right, subsequent encounter    Pressure injury of right buttock, stage 2        See wound care instructions provided separately

## 2024-01-20 ENCOUNTER — EXTERNAL HOME HEALTH (OUTPATIENT)
Dept: HOME HEALTH SERVICES | Facility: HOSPITAL | Age: 74
End: 2024-01-20
Payer: MEDICARE

## 2024-02-07 ENCOUNTER — OFFICE VISIT (OUTPATIENT)
Dept: UROLOGY | Facility: CLINIC | Age: 74
End: 2024-02-07
Payer: MEDICARE

## 2024-02-07 DIAGNOSIS — Z87.440 HISTORY OF RECURRENT UTIS: ICD-10-CM

## 2024-02-07 DIAGNOSIS — R32 URINARY INCONTINENCE, UNSPECIFIED TYPE: Primary | ICD-10-CM

## 2024-02-07 LAB
BACTERIA #/AREA URNS AUTO: ABNORMAL /HPF
BILIRUBIN, UA POC OHS: ABNORMAL
BLOOD, UA POC OHS: ABNORMAL
CLARITY, UA POC OHS: CLEAR
COLOR, UA POC OHS: YELLOW
GLUCOSE, UA POC OHS: NEGATIVE
HYALINE CASTS UR QL AUTO: 5 /LPF
KETONES, UA POC OHS: NEGATIVE
LEUKOCYTES, UA POC OHS: ABNORMAL
MICROSCOPIC COMMENT: ABNORMAL
NITRITE, UA POC OHS: NEGATIVE
NON-SQ EPI CELLS #/AREA URNS AUTO: 3 /HPF
PH, UA POC OHS: 5.5
POC RESIDUAL URINE VOLUME: 0 ML (ref 0–100)
PROTEIN, UA POC OHS: 30
RBC #/AREA URNS AUTO: 3 /HPF (ref 0–4)
SPECIFIC GRAVITY, UA POC OHS: >=1.03
SQUAMOUS #/AREA URNS AUTO: 1 /HPF
UROBILINOGEN, UA POC OHS: 0.2
WBC #/AREA URNS AUTO: >100 /HPF (ref 0–5)
WBC CLUMPS UR QL AUTO: ABNORMAL

## 2024-02-07 PROCEDURE — 99214 OFFICE O/P EST MOD 30 MIN: CPT | Mod: S$GLB,,, | Performed by: NURSE PRACTITIONER

## 2024-02-07 PROCEDURE — 99999 PR PBB SHADOW E&M-EST. PATIENT-LVL III: CPT | Mod: PBBFAC,,, | Performed by: NURSE PRACTITIONER

## 2024-02-07 PROCEDURE — 51798 US URINE CAPACITY MEASURE: CPT | Mod: S$GLB,,, | Performed by: NURSE PRACTITIONER

## 2024-02-07 PROCEDURE — 81001 URINALYSIS AUTO W/SCOPE: CPT | Performed by: NURSE PRACTITIONER

## 2024-02-07 PROCEDURE — 87086 URINE CULTURE/COLONY COUNT: CPT | Performed by: NURSE PRACTITIONER

## 2024-02-07 PROCEDURE — 81003 URINALYSIS AUTO W/O SCOPE: CPT | Mod: QW,S$GLB,, | Performed by: NURSE PRACTITIONER

## 2024-02-07 RX ORDER — CIPROFLOXACIN 250 MG/1
250 TABLET, FILM COATED ORAL 2 TIMES DAILY
Qty: 20 TABLET | Refills: 0 | Status: SHIPPED | OUTPATIENT
Start: 2024-02-07 | End: 2024-02-17

## 2024-02-07 RX ORDER — SOLIFENACIN SUCCINATE 10 MG/1
10 TABLET, FILM COATED ORAL DAILY
Qty: 90 TABLET | Refills: 4 | Status: SHIPPED | OUTPATIENT
Start: 2024-02-07 | End: 2024-06-08
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
## 2024-02-08 LAB
BACTERIA UR CULT: NORMAL
BACTERIA UR CULT: NORMAL

## 2024-02-16 ENCOUNTER — LAB VISIT (OUTPATIENT)
Dept: LAB | Facility: HOSPITAL | Age: 74
End: 2024-02-16
Attending: PHYSICIAN ASSISTANT
Payer: MEDICARE

## 2024-02-16 DIAGNOSIS — E11.65 TYPE 2 DIABETES MELLITUS WITH HYPERGLYCEMIA, WITH LONG-TERM CURRENT USE OF INSULIN: ICD-10-CM

## 2024-02-16 DIAGNOSIS — E55.9 HYPOVITAMINOSIS D: ICD-10-CM

## 2024-02-16 DIAGNOSIS — Z79.4 TYPE 2 DIABETES MELLITUS WITH HYPERGLYCEMIA, WITH LONG-TERM CURRENT USE OF INSULIN: ICD-10-CM

## 2024-02-16 LAB
25(OH)D3+25(OH)D2 SERPL-MCNC: 58 NG/ML (ref 30–96)
ALBUMIN SERPL BCP-MCNC: 3.9 G/DL (ref 3.5–5.2)
ALP SERPL-CCNC: 91 U/L (ref 55–135)
ALT SERPL W/O P-5'-P-CCNC: 11 U/L (ref 10–44)
ANION GAP SERPL CALC-SCNC: 13 MMOL/L (ref 8–16)
AST SERPL-CCNC: 16 U/L (ref 10–40)
BILIRUB SERPL-MCNC: 0.6 MG/DL (ref 0.1–1)
BUN SERPL-MCNC: 19 MG/DL (ref 8–23)
CALCIUM SERPL-MCNC: 11 MG/DL (ref 8.7–10.5)
CHLORIDE SERPL-SCNC: 104 MMOL/L (ref 95–110)
CHOLEST SERPL-MCNC: 177 MG/DL (ref 120–199)
CHOLEST/HDLC SERPL: 4 {RATIO} (ref 2–5)
CO2 SERPL-SCNC: 24 MMOL/L (ref 23–29)
CREAT SERPL-MCNC: 1.2 MG/DL (ref 0.5–1.4)
EST. GFR  (NO RACE VARIABLE): 46.9 ML/MIN/1.73 M^2
ESTIMATED AVG GLUCOSE: 134 MG/DL (ref 68–131)
GLUCOSE SERPL-MCNC: 169 MG/DL (ref 70–110)
HBA1C MFR BLD: 6.3 % (ref 4–5.6)
HDLC SERPL-MCNC: 44 MG/DL (ref 40–75)
HDLC SERPL: 24.9 % (ref 20–50)
LDLC SERPL CALC-MCNC: 96.2 MG/DL (ref 63–159)
NONHDLC SERPL-MCNC: 133 MG/DL
POTASSIUM SERPL-SCNC: 4.3 MMOL/L (ref 3.5–5.1)
PROT SERPL-MCNC: 8 G/DL (ref 6–8.4)
SODIUM SERPL-SCNC: 141 MMOL/L (ref 136–145)
T4 FREE SERPL-MCNC: 1 NG/DL (ref 0.71–1.51)
TRIGL SERPL-MCNC: 184 MG/DL (ref 30–150)
TSH SERPL DL<=0.005 MIU/L-ACNC: 3.31 UIU/ML (ref 0.4–4)

## 2024-02-16 PROCEDURE — 80061 LIPID PANEL: CPT | Performed by: PHYSICIAN ASSISTANT

## 2024-02-16 PROCEDURE — 36415 COLL VENOUS BLD VENIPUNCTURE: CPT | Mod: PO | Performed by: PHYSICIAN ASSISTANT

## 2024-02-16 PROCEDURE — 82306 VITAMIN D 25 HYDROXY: CPT | Performed by: PHYSICIAN ASSISTANT

## 2024-02-16 PROCEDURE — 84443 ASSAY THYROID STIM HORMONE: CPT | Performed by: PHYSICIAN ASSISTANT

## 2024-02-16 PROCEDURE — 84439 ASSAY OF FREE THYROXINE: CPT | Performed by: PHYSICIAN ASSISTANT

## 2024-02-16 PROCEDURE — 83036 HEMOGLOBIN GLYCOSYLATED A1C: CPT | Performed by: PHYSICIAN ASSISTANT

## 2024-02-16 PROCEDURE — 80053 COMPREHEN METABOLIC PANEL: CPT | Performed by: PHYSICIAN ASSISTANT

## 2024-02-19 ENCOUNTER — OFFICE VISIT (OUTPATIENT)
Dept: CARDIOLOGY | Facility: CLINIC | Age: 74
End: 2024-02-19
Payer: MEDICARE

## 2024-02-19 VITALS
HEART RATE: 77 BPM | OXYGEN SATURATION: 98 % | SYSTOLIC BLOOD PRESSURE: 141 MMHG | BODY MASS INDEX: 32.35 KG/M2 | DIASTOLIC BLOOD PRESSURE: 82 MMHG | WEIGHT: 171.19 LBS

## 2024-02-19 DIAGNOSIS — R29.6 FREQUENT FALLS: ICD-10-CM

## 2024-02-19 DIAGNOSIS — I10 ESSENTIAL HYPERTENSION: Primary | ICD-10-CM

## 2024-02-19 DIAGNOSIS — E11.51 TYPE 2 DIABETES MELLITUS WITH DIABETIC PERIPHERAL ANGIOPATHY WITHOUT GANGRENE, WITHOUT LONG-TERM CURRENT USE OF INSULIN: ICD-10-CM

## 2024-02-19 DIAGNOSIS — E78.2 MIXED HYPERLIPIDEMIA: ICD-10-CM

## 2024-02-19 DIAGNOSIS — Z86.73 HISTORY OF ISCHEMIC LEFT MCA STROKE: ICD-10-CM

## 2024-02-19 DIAGNOSIS — R55 SYNCOPE AND COLLAPSE: ICD-10-CM

## 2024-02-19 PROCEDURE — 99213 OFFICE O/P EST LOW 20 MIN: CPT | Mod: S$GLB,,, | Performed by: GENERAL PRACTICE

## 2024-02-19 PROCEDURE — 99999 PR PBB SHADOW E&M-EST. PATIENT-LVL IV: CPT | Mod: PBBFAC,,, | Performed by: GENERAL PRACTICE

## 2024-02-19 NOTE — PROGRESS NOTES
Subjective:    Patient ID:  Maggy Tapia is a 74 y.o. female who presents for follow-up of   Chief Complaint   Patient presents with    Hypertension    Hyperlipidemia     Leg pain        HPI:  She is here today for routine follow up visit.  She did have a D&C in his had a fibromatosis tumor removed.  She has not had anymore bleeding.  And she is possibly not going to need of total hysterectomy.  Blood pressures been well controlled.  She does not have any chest pain or shortness of breath.  Her blood sugar is running 100-120.  Her last blood work showed her calcium was 11  Her cholesterol is controlled    He is looking forward to spring time when she can get out side more she does walk around the block in spite of being on walker frequently 3 or 4 times week.     Latest Reference Range & Units Most Recent   Cholesterol Total 120 - 199 mg/dL 177  2/16/24 08:55   HDL 40 - 75 mg/dL 44  2/16/24 08:55   HDL/Cholesterol Ratio 20.0 - 50.0 % 24.9  2/16/24 08:55   Non-HDL Cholesterol mg/dL 133  2/16/24 08:55   Total Cholesterol/HDL Ratio 2.0 - 5.0  4.0  2/16/24 08:55   Triglycerides 30 - 150 mg/dL 184 (H)  2/16/24 08:55   LDL Cholesterol 63.0 - 159.0 mg/dL 96.2  2/16/24 08:55   (H): Data is abnormally high  8/23/23  He is a 73-year-old female previously saw Dr. Chin.  She has a history of diabetes, hypertension dyslipidemia and strokes with pseudoseizures in the past.  He has a long history of secondhand smoke exposure and has shortness of breath.  He had a negative stress test in 2021.  Now gets short of breath which is helped by using either hand-held inhaler or aerosol.  Denies any chest pain or heart procedures.  He is had some vaginal bleeding and is referred here to have cardiac clearance to have a D&C and possibly at a later date may need hysterectomy.  He does have a history of a left-sided mastectomy with some incisional pain and has been evaluated by surgery Dr. Lundberg.  Sugar this morning was 97.  She does  have numbness in her feet secondary to neuropathy and walks with a walker             2/15/21  History of Present Illness:   Patient is a 71-year-old lady with history of complex medical problems and diabetes since age 14, and essential hypertension for many years also with dyslipidemia and history of stroke and seizures and pseudoseizures.  She has reportedly having more shortness of breath lately worse at rest and effort and now referred here for cardiac evaluation.  Patient denies having any chest discomfort no arm neck or jaw pain she did have some nausea for a while and has improved.  But there is blood in the stools periodically about once a month apparently.  Are no fevers or chills no COVID-19 symptoms  Last seizure was about 2 months ago.  No significant cough no congestion moderate obesity with the BMI about 35 noted     Normal myocardial perfusion scan. There is no evidence of myocardial ischemia or infarction.    The gated perfusion images showed an ejection fraction of 88% post stress. Normal ejection fraction is greater than 53%.    There is normal wall motion post stress.    LV cavity size is  and normal at stress.    The EKG portion of this study is negative for ischemia.    The patient reported no chest pain during the stress test.    There were no arrhythmias during stress.       Review of patient's allergies indicates:   Allergen Reactions    Penicillins Anaphylaxis    Sulfa (sulfonamide antibiotics) Anaphylaxis    Trintellix [vortioxetine] Nausea And Vomiting and Other (See Comments)     Patient has seizures and vomits       Past Medical History:   Diagnosis Date    Anxiety     Arthritis     Asthma     Breast cancer 2000    Left    Depression     Diabetes mellitus, type 2     GERD (gastroesophageal reflux disease)     Hyperlipidemia     Hypertension     Hypothyroidism, unspecified     Overactive bladder     Seizures     Pseudo-seizures    Sleep apnea     Stroke 03/2022    pt was in the hospital  for a stroke, claims she was seeing people when the stroke happened     Past Surgical History:   Procedure Laterality Date    APPENDECTOMY      BREAST BIOPSY Left     BREAST LUMPECTOMY Left     2016    BREAST SURGERY      CATARACT EXTRACTION Bilateral     OU done//     SECTION      CHOLECYSTECTOMY      COLONOSCOPY N/A 2020    Procedure: COLONOSCOPY;  Surgeon: Mj Fernandez MD;  Location: [facility] ENDO;  Service: Endoscopy;  Laterality: N/A;    CYST REMOVAL Left 2021    DILATION AND CURETTAGE OF UTERUS      EYE SURGERY      HYSTEROSCOPY WITH DILATION AND CURETTAGE OF UTERUS N/A 2023    Procedure: HYSTEROSCOPY, WITH DILATION AND CURETTAGE OF UTERUS AND OTHER INDICATED PROCEDURES Needs Cardiac clearance;  Surgeon: Gamaliel Moran MD;  Location: [facility] OR;  Service: OB/GYN;  Laterality: N/A;  Cardiac clearance needed per Dr Anderson    LUMPECTOMY, BREAST Left 2023    Procedure: LUMPECTOMY, BREAST;  Surgeon: Toño Paredes MD;  Location: [facility] OR;  Service: General;  Laterality: Left;    PERCUTANEOUS PINNING OF HIP Right 2022    Procedure: PINNING, HIP, PERCUTANEOUS;  Surgeon: Ministerio Joiner II, MD;  Location: [facility] OR;  Service: Orthopedics;  Laterality: Right;    SENTINEL LYMPH NODE BIOPSY Left 2023    Procedure: BIOPSY, LYMPH NODE, SENTINEL;  Surgeon: Toño Paredes MD;  Location: [facility] OR;  Service: General;  Laterality: Left;    SIMPLE MASTECTOMY Left 2023    Procedure: MASTECTOMY, SIMPLE;  Surgeon: Toño Paredes MD;  Location: [facility] OR;  Service: General;  Laterality: Left;     Social History     Tobacco Use    Smoking status: Never     Passive exposure: Past    Smokeless tobacco: Never   Substance Use Topics    Alcohol use: Yes     Comment: seldom    Drug use: No     Family History   Problem Relation Age of Onset    Diabetes Mother     Hypertension Mother     Breast cancer Mother     Cataracts Mother     Melanoma Mother     Heart failure Father     Melanoma Father      Cataracts Brother     Melanoma Brother     Glaucoma Neg Hx     Retinal detachment Neg Hx     Macular degeneration Neg Hx         Review of Systems:   Constitution: Negative for diaphoresis and fever.   HEENT: Negative for nosebleeds.    Cardiovascular: Negative for chest pain       No dyspnea on exertion       No leg swelling        No palpitations  Respiratory: Negative for shortness of breath and wheezing.    Hematologic/Lymphatic: Negative for bleeding problem. Does not bruise/bleed easily.   Skin: Negative for color change and rash.   Musculoskeletal: Negative for falls and myalgias.   Gastrointestinal: Negative for hematemesis and hematochezia.   Genitourinary: Negative for hematuria.   Neurological: Negative for dizziness and light-headedness.   Psychiatric/Behavioral: Negative for altered mental status and memory loss.          Objective:        Vitals:    02/19/24 1512   BP: (!) 141/82   Pulse: 77       Lab Results   Component Value Date    WBC 10.71 01/10/2024    HGB 14.5 01/10/2024    HCT 47.6 01/10/2024     01/10/2024    CHOL 177 02/16/2024    TRIG 184 (H) 02/16/2024    HDL 44 02/16/2024    ALT 11 02/16/2024    AST 16 02/16/2024     02/16/2024    K 4.3 02/16/2024     02/16/2024    CREATININE 1.2 02/16/2024    BUN 19 02/16/2024    CO2 24 02/16/2024    TSH 3.307 02/16/2024    INR 0.9 02/23/2022    HGBA1C 6.3 (H) 02/16/2024        ECHOCARDIOGRAM RESULTS  Results for orders placed during the hospital encounter of 10/11/23    Echo    Interpretation Summary    Left Ventricle: The left ventricle is normal in size. Normal wall thickness. Normal wall motion. There is normal systolic function with a visually estimated ejection fraction of 55 - 60%. There is normal diastolic function.    Left Atrium: Left atrium is mildly dilated.    Right Ventricle: Normal right ventricular cavity size. Wall thickness is normal. Right ventricle wall motion  is normal. Systolic function is normal.    Tricuspid  Valve: There is mild regurgitation.    IVC/SVC: Normal venous pressure at 3 mmHg.        CURRENT/PREVIOUS VISIT EKG  Results for orders placed or performed in visit on 08/23/23   IN OFFICE EKG 12-LEAD (to [place])    Collection Time: 08/23/23  4:02 PM    Narrative    Test Reason : R55,Z01.818,    Vent. Rate : 082 BPM     Atrial Rate : 082 BPM     P-R Int : 134 ms          QRS Dur : 068 ms      QT Int : 360 ms       P-R-T Axes : 057 -31 039 degrees     QTc Int : 420 ms    Normal sinus rhythm  Left axis deviation  Minimal voltage criteria for LVH, may be normal variant  Anterior infarct ,age undetermined  Abnormal ECG  When compared with ECG of 01-JUL-2023 17:00,  No significant change was found  Confirmed by Yury KIM, Prasanth COOK ([id number]) on 9/19/2023 8:12:31 PM    Referred By:             Confirmed By:Prasanth Cotton MD     No valid procedures specified.   Results for orders placed during the hospital encounter of 03/10/21    Nuclear Stress - Cardiology Interpreted    Interpretation Summary    Normal myocardial perfusion scan. There is no evidence of myocardial ischemia or infarction.    The gated perfusion images showed an ejection fraction of 88% post stress. Normal ejection fraction is greater than 53%.    There is normal wall motion post stress.    LV cavity size is  and normal at stress.    The EKG portion of this study is negative for ischemia.    The patient reported no chest pain during the stress test.    There were no arrhythmias during stress.      Physical Exam:  CONSTITUTIONAL: No fever, no chills  HEENT: Normocephalic, atraumatic,pupils reactive to light                 NECK:  No JVD no carotid bruit  CVS: S1S2+, RRR, no murmurs,   LUNGS: Clear  ABDOMEN: Soft, NT, BS+  EXTREMITIES: No cyanosis, edema  : No donohue catheter  NEURO: AAO X 3  PSY: Normal affect      Medication List with Changes/Refills   Current Medications    ALBUTEROL (PROVENTIL/VENTOLIN HFA) 90 MCG/ACTUATION INHALER    INHALE 1 TO 2 PUFFS  BY MOUTH INTO THE LUNGS EVERY 4 HOURS AS NEEDED FOR SHORTNESS OF BREATH( COUGHING)    ALENDRONATE (FOSAMAX) 70 MG TABLET    Take one tablet every 7 days.    ASPIRIN (ECOTRIN) 81 MG EC TABLET    Take 81 mg by mouth once daily.    BLOOD-GLUCOSE METER KIT    Use as instructed. Insurance preferred.    CALCIUM CARBONATE/VITAMIN D3 (CALCIUM 500 + D ORAL)    Take 1 tablet by mouth once daily. 10 mg daily    CYANOCOBALAMIN (VITAMIN B-12) 1000 MCG TABLET    Take 1 tablet (1,000 mcg total) by mouth once daily.    FLUTICASONE FUROATE-VILANTEROL (BREO ELLIPTA) 100-25 MCG/DOSE DISKUS INHALER    Inhale 1 puff into the lungs once daily. Controller    GABAPENTIN (NEURONTIN) 300 MG CAPSULE    Take 1 capsule (300 mg total) by mouth every evening. Take 1 tablet every night x 7 days. Increase to twice a day x 7 days. Increase to three times a day.    IBUPROFEN (ADVIL,MOTRIN) 600 MG TABLET    Take 1 tablet (600 mg total) by mouth every 6 (six) hours as needed for Pain.    KETOCONAZOLE (NIZORAL) 2 % CREAM    AAA pannus fold at least daily after the shower    LETROZOLE (FEMARA) 2.5 MG TAB    TAKE 1 TABLET(2.5 MG) BY MOUTH EVERY DAY    LEVOTHYROXINE (SYNTHROID) 112 MCG TABLET    Take 1 tablet (112 mcg total) by mouth before breakfast.    LOSARTAN (COZAAR) 50 MG TABLET    Take 0.5 tablets (25 mg total) by mouth once daily.    METFORMIN (GLUCOPHAGE-XR) 500 MG ER 24HR TABLET    Take 2 tablets (1,000 mg total) by mouth daily with breakfast.    NYSTATIN (MYCOSTATIN) CREAM    Apply topically 2 (two) times daily. Apply to rash on face    POLYETHYLENE GLYCOL (GLYCOLAX) 17 GRAM PWPK    Take 17 g by mouth once daily.    POTASSIUM CHLORIDE SA (K-DUR,KLOR-CON) 20 MEQ TABLET    Take 20 mEq by mouth once daily.    ROPINIROLE (REQUIP) 0.5 MG TABLET    Take by mouth every evening.    SERTRALINE (ZOLOFT) 50 MG TABLET    Take 1 tablet (50 mg total) by mouth once daily. For depression/anxiety    SIMVASTATIN (ZOCOR) 40 MG TABLET    TAKE 1 TABLET(40 MG) BY  MOUTH EVERY DAY    SOLIFENACIN (VESICARE) 10 MG TABLET    Take 1 tablet (10 mg total) by mouth once daily.    TETRABENAZINE (XENAZINE) 25 MG TABLET    Take one tablet daily for 7 days and then  Take 1 tablet twice daily afterwards             Assessment:       1. Essential hypertension    2. Syncope and collapse    3. History of ischemic left MCA stroke    4. Type 2 diabetes mellitus with diabetic peripheral angiopathy without gangrene, without long-term current use of insulin    5. Mixed hyperlipidemia    6. BMI 31.0-31.9,adult    7. Frequent falls (possibly related to polypharmacy)         Plan:     Problem List Items Addressed This Visit          Neuro    History of ischemic left MCA stroke       Cardiac/Vascular    Essential hypertension - Primary    Hyperlipidemia       Endocrine    Type 2 diabetes mellitus with diabetic peripheral angiopathy without gangrene, without long-term current use of insulin       Other    Syncope and collapse    Frequent falls (possibly related to polypharmacy)     Other Visit Diagnoses       BMI 31.0-31.9,adult            Overall she is doing well she does not need any medications refilled her blood pressure lipids and glucose are adequately controlled.  She does not need any cardiovascular clearance this time she should be clear if she needs a hysterectomy in the near future.      Follow up in about 1 year (around 2/19/2025).    The patients questions were answered, they verbalized understanding, and agreed with the treatment plan.     ASHLEE BLANCA MD  [facility]

## 2024-02-23 ENCOUNTER — OFFICE VISIT (OUTPATIENT)
Dept: ENDOCRINOLOGY | Facility: CLINIC | Age: 74
End: 2024-02-23
Payer: MEDICARE

## 2024-02-23 VITALS
DIASTOLIC BLOOD PRESSURE: 80 MMHG | HEART RATE: 92 BPM | TEMPERATURE: 98 F | SYSTOLIC BLOOD PRESSURE: 130 MMHG | HEIGHT: 61 IN | BODY MASS INDEX: 32.26 KG/M2 | WEIGHT: 170.88 LBS | OXYGEN SATURATION: 95 %

## 2024-02-23 DIAGNOSIS — M81.0 AGE-RELATED OSTEOPOROSIS WITHOUT CURRENT PATHOLOGICAL FRACTURE: ICD-10-CM

## 2024-02-23 DIAGNOSIS — R06.02 SHORTNESS OF BREATH: ICD-10-CM

## 2024-02-23 DIAGNOSIS — Z78.0 POSTMENOPAUSAL: ICD-10-CM

## 2024-02-23 DIAGNOSIS — G47.33 OSA (OBSTRUCTIVE SLEEP APNEA): ICD-10-CM

## 2024-02-23 DIAGNOSIS — G62.9 NEUROPATHY: ICD-10-CM

## 2024-02-23 DIAGNOSIS — E66.9 OBESITY (BMI 30-39.9): ICD-10-CM

## 2024-02-23 DIAGNOSIS — E11.65 TYPE 2 DIABETES MELLITUS WITH HYPERGLYCEMIA, WITH LONG-TERM CURRENT USE OF INSULIN: Primary | ICD-10-CM

## 2024-02-23 DIAGNOSIS — Z79.4 TYPE 2 DIABETES MELLITUS WITH HYPERGLYCEMIA, WITH LONG-TERM CURRENT USE OF INSULIN: Primary | ICD-10-CM

## 2024-02-23 PROCEDURE — 99999 PR PBB SHADOW E&M-EST. PATIENT-LVL IV: CPT | Mod: PBBFAC,,, | Performed by: PHYSICIAN ASSISTANT

## 2024-02-23 PROCEDURE — 99214 OFFICE O/P EST MOD 30 MIN: CPT | Mod: S$GLB,,, | Performed by: PHYSICIAN ASSISTANT

## 2024-02-23 RX ORDER — LEVOTHYROXINE SODIUM 125 UG/1
125 TABLET ORAL
Qty: 90 TABLET | Refills: 3 | Status: SHIPPED | OUTPATIENT
Start: 2024-02-23

## 2024-02-23 NOTE — PROGRESS NOTES
CC: DM/Hypothyroidism    HPI: Maggy Tapia was diagnosed with Type 2 DM about 6 years ago. No hospitalizations for DM. Her mother had DM and thyroid disease. Arrives with her brother. Pt fractured her hip in 11/22. Pt is seeing sleep disorders. She was diagnosed w/ restless legs. Pt had a left mastectomy in 4/23.    CURRENT DIABETIC MEDS: metformin 1000 mg qd      Previous meds:  Trulicity-diarrhea  Jardiance-     BG readings are checked 2x daily. No meter or logs to clinic.  Not checking    Hypoglycemia: She sometimes has hypoglycemia at night.  Type of Glucose Meter: True metrix    Physical Activity: None.     Diet: Her diet has not changed.  3 meals daily.    Last Eye Exam: 4/22 Dr. Bone  Last Podiatry Exam: 12/22 Dr. Bean    Last DM Education Attended: 1/19    No ETOH/tobacco use.    Hypothyroidism  Taking 112 mcg daily.  Dx in 2016  + fatigue, heat/cold intolerance,   No constipation, diarrhea, sweating, changes in nails or hair.     DEXA 7/23 Osteopenia in the left hip. Taking fosamax 70 mg weekly since 2/23.Taking ca +vd. Pt fell in 1/2 and fractured her rt femoral neck.  No falls or steriod injections. No exercise.     JUAN  Wears CPAP    Wt Readings from Last 10 Encounters:   02/23/24 77.5 kg (170 lb 13.7 oz)   02/19/24 77.7 kg (171 lb 3 oz)   01/12/24 78.9 kg (173 lb 15.1 oz)   12/13/23 89.8 kg (198 lb)   12/11/23 77.6 kg (171 lb 1.2 oz)   11/06/23 77.6 kg (171 lb)   10/19/23 77.9 kg (171 lb 11.8 oz)   10/11/23 79.9 kg (176 lb 2.4 oz)   09/12/23 75.7 kg (166 lb 14.2 oz)   09/11/23 75.9 kg (167 lb 5.3 oz)       REVIEW OF SYSTEMS  General: no weakness or fatigue, wt stable.   Eyes: no intermittent blurry vision or visual disturbances.   Cardiac: no chest pain or palpitations.   Respiratory: + sob, no cough    GI: no abdominal pain or nausea.   Skin: no rashes or itching.   Musc: + back pain, neck pain  Neuro: no numbness or tingling.   Endocrine: + polyuria, no polydipsia, polyphagia.   Remainder ROS  negative    Personally reviewed labs below:  Hemoglobin A1C   Date Value Ref Range Status   02/16/2024 6.3 (H) 4.0 - 5.6 % Final     Comment:     ADA Screening Guidelines:  5.7-6.4%  Consistent with prediabetes  >or=6.5%  Consistent with diabetes    High levels of fetal hemoglobin interfere with the HbA1C  assay. Heterozygous hemoglobin variants (HbS, HgC, etc)do  not significantly interfere with this assay.   However, presence of multiple variants may affect accuracy.     10/09/2023 6.1 (H) 4.0 - 5.6 % Final     Comment:     ADA Screening Guidelines:  5.7-6.4%  Consistent with prediabetes  >or=6.5%  Consistent with diabetes    High levels of fetal hemoglobin interfere with the HbA1C  assay. Heterozygous hemoglobin variants (HbS, HgC, etc)do  not significantly interfere with this assay.   However, presence of multiple variants may affect accuracy.     07/31/2023 5.6 4.0 - 5.6 % Final     Comment:     ADA Screening Guidelines:  5.7-6.4%  Consistent with prediabetes  >or=6.5%  Consistent with diabetes    High levels of fetal hemoglobin interfere with the HbA1C  assay. Heterozygous hemoglobin variants (HbS, HgC, etc)do  not significantly interfere with this assay.   However, presence of multiple variants may affect accuracy.         Chemistry        Component Value Date/Time     02/16/2024 0855    K 4.3 02/16/2024 0855     02/16/2024 0855    CO2 24 02/16/2024 0855    BUN 19 02/16/2024 0855    CREATININE 1.2 02/16/2024 0855     (H) 02/16/2024 0855        Component Value Date/Time    CALCIUM 11.0 (H) 02/16/2024 0855    ALKPHOS 91 02/16/2024 0855    AST 16 02/16/2024 0855    ALT 11 02/16/2024 0855    BILITOT 0.6 02/16/2024 0855    ESTGFRAFRICA >60.0 03/29/2022 1342    EGFRNONAA >60.0 03/29/2022 1342        Lab Results   Component Value Date    CHOL 177 02/16/2024    CHOL 165 10/09/2023    CHOL 153 01/03/2023     Lab Results   Component Value Date    HDL 44 02/16/2024    HDL 45 10/09/2023    HDL 43  01/03/2023     Lab Results   Component Value Date    LDLCALC 96.2 02/16/2024    LDLCALC 80.8 10/09/2023    LDLCALC 69.4 01/03/2023     Lab Results   Component Value Date    TRIG 184 (H) 02/16/2024    TRIG 196 (H) 10/09/2023    TRIG 203 (H) 01/03/2023     Lab Results   Component Value Date    CHOLHDL 24.9 02/16/2024    CHOLHDL 27.3 10/09/2023    CHOLHDL 28.1 01/03/2023     Lab Results   Component Value Date    TSH 3.307 02/16/2024     Lab Results   Component Value Date    MICALBCREAT 26.5 02/16/2024       Vit D, 25-Hydroxy   Date Value Ref Range Status   02/16/2024 58 30 - 96 ng/mL Final     Comment:     Vitamin D deficiency.........<10 ng/mL                              Vitamin D insufficiency......10-29 ng/mL       Vitamin D sufficiency........> or equal to 30 ng/mL  Vitamin D toxicity............>100 ng/mL       PHYSICAL EXAMINATION  Constitutional: elderly female, appears well, no distress  Musc: ambulates with a walker, + FROM all ext  Psych: normal mood and affect    Assessment/Plan    1. Type 2 diabetes mellitus with hyperglycemia, with long-term current use of insulin  Hemoglobin A1C    Comprehensive Metabolic Panel    T4, Free    TSH    TSH    T4, Free    PTH, Intact    Calcium, Ionized    Renal Function Panel      2. Neuropathy        3. Age-related osteoporosis without current pathological fracture        4. Postmenopausal        5. JUAN (obstructive sleep apnea)        6. Shortness of breath        7. Obesity (BMI 30-39.9)          T2DM- A1c is low for age. Decrease Metformin to 500 mg daily. BG checks 1x daily. Send log in two weeks.  Neuropathy-stable-f/u w/ neurology.  Hypothyroidism-TFTs are elevated. Increase LT4 to 125 mcg qd.  Osteoporosis-continuefosamax 70 mg weekly. Obtain bone markers. Check for secondary causes. -repeat DEXA scan 7/23. Start exercise 30 min daily.    HTJ-stsojv-qabzmaem cpap.  SOB-referral to cardiology.   Obesity-Body mass index is 32.28 kg/m². Increase exercise to 30 min  daily.   Hypercalcemia-recheck w/ PTH      F/u in 6 mths w/ labs

## 2024-02-28 ENCOUNTER — HOSPITAL ENCOUNTER (EMERGENCY)
Facility: HOSPITAL | Age: 74
Discharge: HOME | End: 2024-02-28
Attending: EMERGENCY MEDICINE
Payer: MEDICARE

## 2024-02-28 ENCOUNTER — TELEPHONE (OUTPATIENT)
Dept: FAMILY MEDICINE | Facility: CLINIC | Age: 74
End: 2024-02-28
Payer: MEDICARE

## 2024-02-28 VITALS
OXYGEN SATURATION: 98 % | DIASTOLIC BLOOD PRESSURE: 74 MMHG | BODY MASS INDEX: 32.1 KG/M2 | RESPIRATION RATE: 17 BRPM | SYSTOLIC BLOOD PRESSURE: 172 MMHG | WEIGHT: 170 LBS | HEART RATE: 72 BPM | TEMPERATURE: 98 F | HEIGHT: 61 IN

## 2024-02-28 DIAGNOSIS — R73.9 HYPERGLYCEMIA: Primary | ICD-10-CM

## 2024-02-28 DIAGNOSIS — R19.7 DIARRHEA, UNSPECIFIED TYPE: ICD-10-CM

## 2024-02-28 LAB
ALBUMIN SERPL BCP-MCNC: 4 G/DL (ref 3.5–5.2)
ALP SERPL-CCNC: 91 U/L (ref 55–135)
ALT SERPL W/O P-5'-P-CCNC: 14 U/L (ref 10–44)
ANION GAP SERPL CALC-SCNC: 10 MMOL/L (ref 8–16)
AST SERPL-CCNC: 17 U/L (ref 10–40)
BASOPHILS # BLD AUTO: 0.03 K/UL (ref 0–0.2)
BASOPHILS NFR BLD: 0.3 % (ref 0–1.9)
BILIRUB SERPL-MCNC: 0.4 MG/DL (ref 0.1–1)
BUN SERPL-MCNC: 26 MG/DL (ref 8–23)
CALCIUM SERPL-MCNC: 10 MG/DL (ref 8.7–10.5)
CHLORIDE SERPL-SCNC: 105 MMOL/L (ref 95–110)
CO2 SERPL-SCNC: 26 MMOL/L (ref 23–29)
CREAT SERPL-MCNC: 1 MG/DL (ref 0.5–1.4)
DIFFERENTIAL METHOD BLD: NORMAL
EOSINOPHIL # BLD AUTO: 0.1 K/UL (ref 0–0.5)
EOSINOPHIL NFR BLD: 1.2 % (ref 0–8)
ERYTHROCYTE [DISTWIDTH] IN BLOOD BY AUTOMATED COUNT: 13.7 % (ref 11.5–14.5)
EST. GFR  (NO RACE VARIABLE): 58 ML/MIN/1.73 M^2
GLUCOSE SERPL-MCNC: 77 MG/DL (ref 70–110)
HCT VFR BLD AUTO: 45.8 % (ref 37–48.5)
HGB BLD-MCNC: 14.8 G/DL (ref 12–16)
IMM GRANULOCYTES # BLD AUTO: 0.04 K/UL (ref 0–0.04)
IMM GRANULOCYTES NFR BLD AUTO: 0.4 % (ref 0–0.5)
LYMPHOCYTES # BLD AUTO: 2.1 K/UL (ref 1–4.8)
LYMPHOCYTES NFR BLD: 21.8 % (ref 18–48)
MCH RBC QN AUTO: 30.6 PG (ref 27–31)
MCHC RBC AUTO-ENTMCNC: 32.3 G/DL (ref 32–36)
MCV RBC AUTO: 95 FL (ref 82–98)
MONOCYTES # BLD AUTO: 0.6 K/UL (ref 0.3–1)
MONOCYTES NFR BLD: 6.7 % (ref 4–15)
NEUTROPHILS # BLD AUTO: 6.7 K/UL (ref 1.8–7.7)
NEUTROPHILS NFR BLD: 69.6 % (ref 38–73)
NRBC BLD-RTO: 0 /100 WBC
PLATELET # BLD AUTO: 183 K/UL (ref 150–450)
PMV BLD AUTO: 10.2 FL (ref 9.2–12.9)
POCT GLUCOSE: 90 MG/DL (ref 70–110)
POTASSIUM SERPL-SCNC: 4.3 MMOL/L (ref 3.5–5.1)
PROT SERPL-MCNC: 7.4 G/DL (ref 6–8.4)
RBC # BLD AUTO: 4.84 M/UL (ref 4–5.4)
SODIUM SERPL-SCNC: 141 MMOL/L (ref 136–145)
WBC # BLD AUTO: 9.62 K/UL (ref 3.9–12.7)

## 2024-02-28 PROCEDURE — 36415 COLL VENOUS BLD VENIPUNCTURE: CPT | Performed by: NURSE PRACTITIONER

## 2024-02-28 PROCEDURE — 80053 COMPREHEN METABOLIC PANEL: CPT | Performed by: NURSE PRACTITIONER

## 2024-02-28 PROCEDURE — 99283 EMERGENCY DEPT VISIT LOW MDM: CPT

## 2024-02-28 PROCEDURE — 85025 COMPLETE CBC W/AUTO DIFF WBC: CPT | Performed by: NURSE PRACTITIONER

## 2024-02-28 PROCEDURE — 82962 GLUCOSE BLOOD TEST: CPT

## 2024-02-28 RX ORDER — LOPERAMIDE HYDROCHLORIDE 2 MG/1
2 CAPSULE ORAL 3 TIMES DAILY PRN
Qty: 12 CAPSULE | Refills: 0 | Status: SHIPPED | OUTPATIENT
Start: 2024-02-28 | End: 2024-03-09

## 2024-02-28 NOTE — FIRST PROVIDER EVALUATION
" Emergency Department TeleTriage Encounter Note      CHIEF COMPLAINT    Chief Complaint   Patient presents with    Diarrhea     "Loose bowels" x 1 wk -- on Cipro for UTI    Hyperglycemia     Reports glucose of 220 this AM-- on metformin       VITAL SIGNS   Initial Vitals [02/28/24 1223]   BP Pulse Resp Temp SpO2   (!) 172/74 72 17 98.2 °F (36.8 °C) 98 %      MAP       --            ALLERGIES    Review of patient's allergies indicates:   Allergen Reactions    Penicillins Anaphylaxis    Sulfa (sulfonamide antibiotics) Anaphylaxis    Trintellix [vortioxetine] Nausea And Vomiting and Other (See Comments)     Patient has seizures and vomits       PROVIDER TRIAGE NOTE  This is a teletriage evaluation of a 74 y.o. female presenting to the ED complaining of fatigue. Reports "loose bowels" for a week. On cipro for UTI. Denies abd pain, fever, and vomiting. Also reports glucose elevated on awakening this morning.     Alert, no distress.     Initial orders will be placed and care will be transferred to an alternate provider when patient is roomed for a full evaluation. Any additional orders and the final disposition will be determined by that provider.         ORDERS  Labs Reviewed   CBC W/ AUTO DIFFERENTIAL   COMPREHENSIVE METABOLIC PANEL   URINALYSIS, REFLEX TO URINE CULTURE   POCT GLUCOSE MONITORING CONTINUOUS       ED Orders (720h ago, onward)      Start Ordered     Status Ordering Provider    02/28/24 1254 02/28/24 1253  Urinalysis, Reflex to Urine Culture Urine, Clean Catch  STAT         Ordered MONY CRAWFORD N.    02/28/24 1253 02/28/24 1253  CBC auto differential  STAT         Ordered MONY CRAWFORD N.    02/28/24 1253 02/28/24 1253  Comprehensive metabolic panel  STAT         Ordered MONY CRAWFORD N.    02/28/24 1253 02/28/24 1253  Insert Saline lock IV  Once         Ordered MONY CRAWFORD NDylan    02/28/24 1253 02/28/24 1253  POCT glucose  Once         Ordered MONY CRAWFORD. "              Virtual Visit Note: The provider triage portion of this emergency department evaluation and documentation was performed via [employer], a HIPAA-compliant telemedicine application, in concert with a tele-presenter in the room. A face to face patient evaluation with one of my colleagues will occur once the patient is placed in an emergency department room.      DISCLAIMER: This note was prepared with [employer] voice recognition transcription software. Garbled syntax, mangled pronouns, and other bizarre constructions may be attributed to that software system.

## 2024-02-28 NOTE — ED PROVIDER NOTES
"Encounter Date: 2024       History     Chief Complaint   Patient presents with    Diarrhea     "Loose bowels" x 1 wk -- on Cipro for UTI    Hyperglycemia     Reports glucose of 220 this AM-- on metformin     74-year-old female with a past medical history of diabetes mellitus, depression anxiety, hypertension, and hyperlipidemia presents for multiple complaints.  The patient reports that she had diarrhea daily for the last 2 months.  She denies any associated hematochezia, melena, nausea, vomiting abdominal pain, cough, or congestion.  She has not taken anything for relief of her diarrhea.  Additionally the patient checked her blood glucose this morning and noted that it was 220 mg/dL at home.  She reports that this was prior to her taking her home metformin.  The patient's brother reports that the patient does not routinely check her blood glucose at home, and has not checked it in 2-3 months.  She denies any recent medication changes as well.  There are no alleviating or aggravating factors.      Review of patient's allergies indicates:   Allergen Reactions    Penicillins Anaphylaxis    Sulfa (sulfonamide antibiotics) Anaphylaxis    Trintellix [vortioxetine] Nausea And Vomiting and Other (See Comments)     Patient has seizures and vomits     Past Medical History:   Diagnosis Date    Anxiety     Arthritis     Asthma     Breast cancer     Left    Depression     Diabetes mellitus, type 2     GERD (gastroesophageal reflux disease)     Hyperlipidemia     Hypertension     Hypothyroidism, unspecified     Overactive bladder     Seizures     Pseudo-seizures    Sleep apnea     Stroke 2022    pt was in the hospital for a stroke, claims she was seeing people when the stroke happened     Past Surgical History:   Procedure Laterality Date    APPENDECTOMY      BREAST BIOPSY Left     BREAST LUMPECTOMY Left     2016    BREAST SURGERY      CATARACT EXTRACTION Bilateral     OU done//     SECTION      " CHOLECYSTECTOMY      COLONOSCOPY N/A 6/24/2020    Procedure: COLONOSCOPY;  Surgeon: Mj Fernandez MD;  Location: [facility] ENDO;  Service: Endoscopy;  Laterality: N/A;    CYST REMOVAL Left 04/01/2021    DILATION AND CURETTAGE OF UTERUS      EYE SURGERY      HYSTEROSCOPY WITH DILATION AND CURETTAGE OF UTERUS N/A 8/28/2023    Procedure: HYSTEROSCOPY, WITH DILATION AND CURETTAGE OF UTERUS AND OTHER INDICATED PROCEDURES Needs Cardiac clearance;  Surgeon: Gamaliel Moran MD;  Location: [facility] OR;  Service: OB/GYN;  Laterality: N/A;  Cardiac clearance needed per Dr Anderson    LUMPECTOMY, BREAST Left 4/26/2023    Procedure: LUMPECTOMY, BREAST;  Surgeon: Toño Paredes MD;  Location: [facility] OR;  Service: General;  Laterality: Left;    PERCUTANEOUS PINNING OF HIP Right 11/11/2022    Procedure: PINNING, HIP, PERCUTANEOUS;  Surgeon: Ministerio Joiner II, MD;  Location: [facility] OR;  Service: Orthopedics;  Laterality: Right;    SENTINEL LYMPH NODE BIOPSY Left 4/26/2023    Procedure: BIOPSY, LYMPH NODE, SENTINEL;  Surgeon: Toño Paredes MD;  Location: [facility] OR;  Service: General;  Laterality: Left;    SIMPLE MASTECTOMY Left 5/19/2023    Procedure: MASTECTOMY, SIMPLE;  Surgeon: Toño Paredes MD;  Location: [facility] OR;  Service: General;  Laterality: Left;     Family History   Problem Relation Age of Onset    Diabetes Mother     Hypertension Mother     Breast cancer Mother     Cataracts Mother     Melanoma Mother     Heart failure Father     Melanoma Father     Cataracts Brother     Melanoma Brother     Glaucoma Neg Hx     Retinal detachment Neg Hx     Macular degeneration Neg Hx      Social History     Tobacco Use    Smoking status: Never     Passive exposure: Past    Smokeless tobacco: Never   Substance Use Topics    Alcohol use: Yes     Comment: seldom    Drug use: No     Review of Systems   Constitutional:  Negative for chills and fever.        Hyperglycemia   HENT:  Negative for congestion.    Respiratory:  Negative for cough  and shortness of breath.    Cardiovascular:  Negative for chest pain.   Gastrointestinal:  Positive for diarrhea. Negative for abdominal pain, nausea and vomiting.   Genitourinary:  Negative for dysuria.   Musculoskeletal:  Negative for gait problem.   Skin:  Negative for color change.   Neurological:  Negative for dizziness and numbness.   Psychiatric/Behavioral:  Negative for agitation.        Physical Exam     Initial Vitals [02/28/24 1223]   BP Pulse Resp Temp SpO2   (!) 172/74 72 17 98.2 °F (36.8 °C) 98 %      MAP       --         Physical Exam    Nursing note and vitals reviewed.  Constitutional: She appears well-developed and well-nourished.   HENT:   Head: Normocephalic and atraumatic.   Eyes: EOM are normal. Pupils are equal, round, and reactive to light.   Neck:   Normal range of motion.  Cardiovascular:  Normal rate and regular rhythm.           Pulmonary/Chest: Breath sounds normal.   Abdominal: Abdomen is soft. Bowel sounds are normal. She exhibits no distension. There is no abdominal tenderness. There is no rebound and no guarding.   Musculoskeletal:         General: Normal range of motion.      Right shoulder: Normal.      Left shoulder: Normal.      Cervical back: Normal range of motion.     Neurological: She is alert and oriented to person, place, and time.   Skin: Skin is warm and dry.   Psychiatric: She has a normal mood and affect.         ED Course   Procedures  Labs Reviewed   COMPREHENSIVE METABOLIC PANEL - Abnormal; Notable for the following components:       Result Value    BUN 26 (*)     eGFR 58 (*)     All other components within normal limits   CBC W/ AUTO DIFFERENTIAL   POCT GLUCOSE   POCT GLUCOSE MONITORING CONTINUOUS   POCT GLUCOSE MONITORING CONTINUOUS          Imaging Results    None          Medications - No data to display  Medical Decision Making  74-year-old female presented with diarrhea and hyperglycemia.    Initial differential diagnosis included but not limited to medication  reaction, DKA, and hyperglycemia without metabolic acidosis.    Amount and/or Complexity of Data Reviewed  Labs: ordered.    Risk  Prescription drug management.  Risk Details: The patient was emergently evaluated in the emergency department, her evaluation was significant for an elderly female with a benign abdominal exam.  The patient's labs were noted to have a normal glucose and no signs of metabolic acidosis here in the emergency department.  Additionally, she has no signs or symptoms of dehydration.  I doubt an infectious cause with the patient's diarrhea, as it has been going on for 2 months without other symptoms.  The patient's blood glucose did come down to normal after taking her home blood pressure medication prior to arrival.  There is no need for further workup or treatment at this time.  She is stable for discharge to home.  She will be discharged home with p.o. Imodium for her chronic diarrhea.  Additionally she is referred to primary care for follow-up and further workup of her diarrhea as an outpatient.                                      Clinical Impression:  Final diagnoses:  [R73.9] Hyperglycemia (Primary)  [R19.7] Diarrhea, unspecified type          ED Disposition Condition    Discharge Stable          ED Prescriptions       Medication Sig Dispense Start Date End Date Auth. Provider    loperamide (IMODIUM) 2 mg capsule Take 1 capsule (2 mg total) by mouth 3 (three) times daily as needed for Diarrhea. 12 capsule 2/28/2024 3/9/2024 Shad Campbell MD          Follow-up Information       Follow up With Specialties Details Why Contact Info    Yanna Abbasi MD Family Medicine   [address] [facility] [address]  [phone]               Shad Campbell MD  02/28/24 7901

## 2024-02-28 NOTE — TELEPHONE ENCOUNTER
Phoned patient regarding scheduled OV today. Advised that with symptoms she is experiencing she should be evaluated in the ED. Verbalized understanding.

## 2024-03-26 ENCOUNTER — HOSPITAL ENCOUNTER (OUTPATIENT)
Dept: RADIOLOGY | Facility: HOSPITAL | Age: 74
Discharge: HOME | End: 2024-03-26
Attending: INTERNAL MEDICINE
Payer: MEDICARE

## 2024-03-26 DIAGNOSIS — Z12.31 ENCOUNTER FOR SCREENING MAMMOGRAM FOR BREAST CANCER: ICD-10-CM

## 2024-03-26 PROCEDURE — 77067 SCR MAMMO BI INCL CAD: CPT | Mod: TC,52

## 2024-03-26 PROCEDURE — 77067 SCR MAMMO BI INCL CAD: CPT | Mod: 26,52,, | Performed by: RADIOLOGY

## 2024-03-26 PROCEDURE — 77063 BREAST TOMOSYNTHESIS BI: CPT | Mod: 26,52,, | Performed by: RADIOLOGY

## 2024-04-05 ENCOUNTER — OFFICE VISIT (OUTPATIENT)
Dept: PSYCHIATRY | Facility: CLINIC | Age: 74
End: 2024-04-05
Payer: MEDICARE

## 2024-04-05 ENCOUNTER — TELEPHONE (OUTPATIENT)
Dept: PSYCHIATRY | Facility: CLINIC | Age: 74
End: 2024-04-05
Payer: MEDICARE

## 2024-04-05 VITALS
WEIGHT: 154.63 LBS | BODY MASS INDEX: 29.19 KG/M2 | HEIGHT: 61 IN | DIASTOLIC BLOOD PRESSURE: 71 MMHG | HEART RATE: 91 BPM | SYSTOLIC BLOOD PRESSURE: 108 MMHG

## 2024-04-05 DIAGNOSIS — F41.9 ANXIETY DISORDER, UNSPECIFIED TYPE: ICD-10-CM

## 2024-04-05 DIAGNOSIS — F39 MOOD DISORDER: ICD-10-CM

## 2024-04-05 DIAGNOSIS — F43.10 PTSD (POST-TRAUMATIC STRESS DISORDER): Primary | ICD-10-CM

## 2024-04-05 DIAGNOSIS — F34.1 PERSISTENT DEPRESSIVE DISORDER: ICD-10-CM

## 2024-04-05 PROCEDURE — G2211 COMPLEX E/M VISIT ADD ON: HCPCS | Mod: S$GLB,,, | Performed by: PHYSICIAN ASSISTANT

## 2024-04-05 PROCEDURE — 99214 OFFICE O/P EST MOD 30 MIN: CPT | Mod: S$GLB,,, | Performed by: PHYSICIAN ASSISTANT

## 2024-04-05 PROCEDURE — 3078F DIAST BP <80 MM HG: CPT | Mod: CPTII,S$GLB,, | Performed by: PHYSICIAN ASSISTANT

## 2024-04-05 PROCEDURE — 3074F SYST BP LT 130 MM HG: CPT | Mod: CPTII,S$GLB,, | Performed by: PHYSICIAN ASSISTANT

## 2024-04-05 PROCEDURE — 90833 PSYTX W PT W E/M 30 MIN: CPT | Mod: S$GLB,,, | Performed by: PHYSICIAN ASSISTANT

## 2024-04-05 PROCEDURE — 3288F FALL RISK ASSESSMENT DOCD: CPT | Mod: CPTII,S$GLB,, | Performed by: PHYSICIAN ASSISTANT

## 2024-04-05 PROCEDURE — 1160F RVW MEDS BY RX/DR IN RCRD: CPT | Mod: CPTII,S$GLB,, | Performed by: PHYSICIAN ASSISTANT

## 2024-04-05 PROCEDURE — 1101F PT FALLS ASSESS-DOCD LE1/YR: CPT | Mod: CPTII,S$GLB,, | Performed by: PHYSICIAN ASSISTANT

## 2024-04-05 PROCEDURE — 99999 PR PBB SHADOW E&M-EST. PATIENT-LVL III: CPT | Mod: PBBFAC,,, | Performed by: PHYSICIAN ASSISTANT

## 2024-04-05 PROCEDURE — 1159F MED LIST DOCD IN RCRD: CPT | Mod: CPTII,S$GLB,, | Performed by: PHYSICIAN ASSISTANT

## 2024-04-05 PROCEDURE — 1157F ADVNC CARE PLAN IN RCRD: CPT | Mod: CPTII,S$GLB,, | Performed by: PHYSICIAN ASSISTANT

## 2024-04-05 PROCEDURE — 1126F AMNT PAIN NOTED NONE PRSNT: CPT | Mod: CPTII,S$GLB,, | Performed by: PHYSICIAN ASSISTANT

## 2024-04-05 PROCEDURE — 3072F LOW RISK FOR RETINOPATHY: CPT | Mod: CPTII,S$GLB,, | Performed by: PHYSICIAN ASSISTANT

## 2024-04-05 RX ORDER — SERTRALINE HYDROCHLORIDE 50 MG/1
50 TABLET, FILM COATED ORAL DAILY
Qty: 90 TABLET | Refills: 0 | Status: SHIPPED | OUTPATIENT
Start: 2024-04-05 | End: 2024-07-04
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
## 2024-04-15 NOTE — TELEPHONE ENCOUNTER
Close reason: Prior Authorization not required for patient/medication   Note from payer: Based on [place] [employer] eligibility information, this Beneficiary has primary insurance coverage. The dispensing pharmacy needs to bill the primary insurance before [place] [employer]. Provided that SERTRALINE HCL 50 MG TABLET is not excluded by [place] [employer] and [place] [employer] is the secondary payer on the claim, no authorization will be necessary. Please submit your prescription to the patient's pharmacy.

## 2024-04-18 ENCOUNTER — LAB VISIT (OUTPATIENT)
Dept: LAB | Facility: HOSPITAL | Age: 74
End: 2024-04-18
Attending: FAMILY MEDICINE
Payer: MEDICARE

## 2024-04-18 DIAGNOSIS — E11.42 TYPE 2 DIABETES MELLITUS WITH DIABETIC POLYNEUROPATHY, WITHOUT LONG-TERM CURRENT USE OF INSULIN: ICD-10-CM

## 2024-04-18 DIAGNOSIS — Z79.4 TYPE 2 DIABETES MELLITUS WITH HYPERGLYCEMIA, WITH LONG-TERM CURRENT USE OF INSULIN: ICD-10-CM

## 2024-04-18 DIAGNOSIS — E55.9 VITAMIN D DEFICIENCY: ICD-10-CM

## 2024-04-18 DIAGNOSIS — E11.65 TYPE 2 DIABETES MELLITUS WITH HYPERGLYCEMIA, WITH LONG-TERM CURRENT USE OF INSULIN: ICD-10-CM

## 2024-04-18 DIAGNOSIS — E78.2 MIXED HYPERLIPIDEMIA: ICD-10-CM

## 2024-04-18 DIAGNOSIS — E03.9 ACQUIRED HYPOTHYROIDISM: Chronic | ICD-10-CM

## 2024-04-18 LAB
25(OH)D3+25(OH)D2 SERPL-MCNC: 58 NG/ML (ref 30–96)
ALBUMIN SERPL BCP-MCNC: 3.5 G/DL (ref 3.5–5.2)
ALP SERPL-CCNC: 71 U/L (ref 55–135)
ALT SERPL W/O P-5'-P-CCNC: 10 U/L (ref 10–44)
ANION GAP SERPL CALC-SCNC: 12 MMOL/L (ref 8–16)
AST SERPL-CCNC: 13 U/L (ref 10–40)
BASOPHILS # BLD AUTO: 0.05 K/UL (ref 0–0.2)
BASOPHILS NFR BLD: 0.5 % (ref 0–1.9)
BILIRUB SERPL-MCNC: 0.5 MG/DL (ref 0.1–1)
BUN SERPL-MCNC: 26 MG/DL (ref 8–23)
CA-I BLDV-SCNC: 1.25 MMOL/L (ref 1.06–1.42)
CALCIUM SERPL-MCNC: 10.1 MG/DL (ref 8.7–10.5)
CHLORIDE SERPL-SCNC: 107 MMOL/L (ref 95–110)
CHOLEST SERPL-MCNC: 151 MG/DL (ref 120–199)
CHOLEST/HDLC SERPL: 3.5 {RATIO} (ref 2–5)
CO2 SERPL-SCNC: 21 MMOL/L (ref 23–29)
CREAT SERPL-MCNC: 1 MG/DL (ref 0.5–1.4)
DIFFERENTIAL METHOD BLD: ABNORMAL
EOSINOPHIL # BLD AUTO: 0.2 K/UL (ref 0–0.5)
EOSINOPHIL NFR BLD: 1.5 % (ref 0–8)
ERYTHROCYTE [DISTWIDTH] IN BLOOD BY AUTOMATED COUNT: 13.6 % (ref 11.5–14.5)
EST. GFR  (NO RACE VARIABLE): 58.4 ML/MIN/1.73 M^2
ESTIMATED AVG GLUCOSE: 128 MG/DL (ref 68–131)
GLUCOSE SERPL-MCNC: 158 MG/DL (ref 70–110)
HBA1C MFR BLD: 6.1 % (ref 4–5.6)
HCT VFR BLD AUTO: 47.7 % (ref 37–48.5)
HDLC SERPL-MCNC: 43 MG/DL (ref 40–75)
HDLC SERPL: 28.5 % (ref 20–50)
HGB BLD-MCNC: 15.2 G/DL (ref 12–16)
IMM GRANULOCYTES # BLD AUTO: 0.05 K/UL (ref 0–0.04)
IMM GRANULOCYTES NFR BLD AUTO: 0.5 % (ref 0–0.5)
LDLC SERPL CALC-MCNC: 81.8 MG/DL (ref 63–159)
LYMPHOCYTES # BLD AUTO: 2.3 K/UL (ref 1–4.8)
LYMPHOCYTES NFR BLD: 22.6 % (ref 18–48)
MCH RBC QN AUTO: 30.7 PG (ref 27–31)
MCHC RBC AUTO-ENTMCNC: 31.9 G/DL (ref 32–36)
MCV RBC AUTO: 96 FL (ref 82–98)
MONOCYTES # BLD AUTO: 0.5 K/UL (ref 0.3–1)
MONOCYTES NFR BLD: 5.2 % (ref 4–15)
NEUTROPHILS # BLD AUTO: 7.1 K/UL (ref 1.8–7.7)
NEUTROPHILS NFR BLD: 69.7 % (ref 38–73)
NONHDLC SERPL-MCNC: 108 MG/DL
NRBC BLD-RTO: 0 /100 WBC
PLATELET # BLD AUTO: 208 K/UL (ref 150–450)
PMV BLD AUTO: 12.2 FL (ref 9.2–12.9)
POTASSIUM SERPL-SCNC: 4 MMOL/L (ref 3.5–5.1)
PROT SERPL-MCNC: 7.2 G/DL (ref 6–8.4)
PTH-INTACT SERPL-MCNC: 58.9 PG/ML (ref 9–77)
RBC # BLD AUTO: 4.95 M/UL (ref 4–5.4)
SODIUM SERPL-SCNC: 140 MMOL/L (ref 136–145)
T4 FREE SERPL-MCNC: 0.98 NG/DL (ref 0.71–1.51)
TRIGL SERPL-MCNC: 131 MG/DL (ref 30–150)
TSH SERPL DL<=0.005 MIU/L-ACNC: 4.72 UIU/ML (ref 0.4–4)
WBC # BLD AUTO: 10.2 K/UL (ref 3.9–12.7)

## 2024-04-18 PROCEDURE — 85025 COMPLETE CBC W/AUTO DIFF WBC: CPT | Performed by: FAMILY MEDICINE

## 2024-04-18 PROCEDURE — 36415 COLL VENOUS BLD VENIPUNCTURE: CPT | Mod: PO | Performed by: FAMILY MEDICINE

## 2024-04-18 PROCEDURE — 80061 LIPID PANEL: CPT | Performed by: FAMILY MEDICINE

## 2024-04-18 PROCEDURE — 82330 ASSAY OF CALCIUM: CPT | Performed by: PHYSICIAN ASSISTANT

## 2024-04-18 PROCEDURE — 84443 ASSAY THYROID STIM HORMONE: CPT | Performed by: FAMILY MEDICINE

## 2024-04-18 PROCEDURE — 83970 ASSAY OF PARATHORMONE: CPT | Performed by: PHYSICIAN ASSISTANT

## 2024-04-18 PROCEDURE — 84439 ASSAY OF FREE THYROXINE: CPT | Performed by: FAMILY MEDICINE

## 2024-04-18 PROCEDURE — 83036 HEMOGLOBIN GLYCOSYLATED A1C: CPT | Performed by: FAMILY MEDICINE

## 2024-04-18 PROCEDURE — 82306 VITAMIN D 25 HYDROXY: CPT | Performed by: FAMILY MEDICINE

## 2024-04-18 PROCEDURE — 80053 COMPREHEN METABOLIC PANEL: CPT | Performed by: FAMILY MEDICINE

## 2024-04-19 NOTE — PATIENT INSTRUCTIONS

## 2024-04-29 DIAGNOSIS — E11.42 TYPE 2 DIABETES MELLITUS WITH DIABETIC POLYNEUROPATHY, WITHOUT LONG-TERM CURRENT USE OF INSULIN: Primary | ICD-10-CM

## 2024-04-29 DIAGNOSIS — E03.9 ACQUIRED HYPOTHYROIDISM: ICD-10-CM

## 2024-04-29 DIAGNOSIS — E78.2 MIXED HYPERLIPIDEMIA: ICD-10-CM

## 2024-05-07 ENCOUNTER — OFFICE VISIT (OUTPATIENT)
Dept: PODIATRY | Facility: CLINIC | Age: 74
End: 2024-05-07
Payer: MEDICARE

## 2024-05-07 VITALS — HEIGHT: 61 IN | BODY MASS INDEX: 29.18 KG/M2 | WEIGHT: 154.56 LBS

## 2024-05-07 DIAGNOSIS — L60.0 INGROWN NAIL: ICD-10-CM

## 2024-05-07 DIAGNOSIS — R29.898 LEG WEAKNESS, BILATERAL: ICD-10-CM

## 2024-05-07 DIAGNOSIS — I63.9 CEREBROVASCULAR ACCIDENT (CVA), UNSPECIFIED MECHANISM: ICD-10-CM

## 2024-05-07 DIAGNOSIS — E11.42 TYPE 2 DIABETES MELLITUS WITH DIABETIC POLYNEUROPATHY, WITHOUT LONG-TERM CURRENT USE OF INSULIN: ICD-10-CM

## 2024-05-07 DIAGNOSIS — B35.1 ONYCHOMYCOSIS DUE TO DERMATOPHYTE: Primary | ICD-10-CM

## 2024-05-07 DIAGNOSIS — E11.51 TYPE 2 DIABETES MELLITUS WITH DIABETIC PERIPHERAL ANGIOPATHY WITHOUT GANGRENE, WITHOUT LONG-TERM CURRENT USE OF INSULIN: ICD-10-CM

## 2024-05-07 PROCEDURE — 3072F LOW RISK FOR RETINOPATHY: CPT | Mod: CPTII,S$GLB,, | Performed by: PODIATRIST

## 2024-05-07 PROCEDURE — 3288F FALL RISK ASSESSMENT DOCD: CPT | Mod: CPTII,S$GLB,, | Performed by: PODIATRIST

## 2024-05-07 PROCEDURE — 1101F PT FALLS ASSESS-DOCD LE1/YR: CPT | Mod: CPTII,S$GLB,, | Performed by: PODIATRIST

## 2024-05-07 PROCEDURE — 1125F AMNT PAIN NOTED PAIN PRSNT: CPT | Mod: CPTII,S$GLB,, | Performed by: PODIATRIST

## 2024-05-07 PROCEDURE — 1157F ADVNC CARE PLAN IN RCRD: CPT | Mod: CPTII,S$GLB,, | Performed by: PODIATRIST

## 2024-05-07 PROCEDURE — 1160F RVW MEDS BY RX/DR IN RCRD: CPT | Mod: CPTII,S$GLB,, | Performed by: PODIATRIST

## 2024-05-07 PROCEDURE — 99999 PR PBB SHADOW E&M-EST. PATIENT-LVL IV: CPT | Mod: PBBFAC,,, | Performed by: PODIATRIST

## 2024-05-07 PROCEDURE — 99212 OFFICE O/P EST SF 10 MIN: CPT | Mod: S$GLB,,, | Performed by: PODIATRIST

## 2024-05-07 PROCEDURE — 1159F MED LIST DOCD IN RCRD: CPT | Mod: CPTII,S$GLB,, | Performed by: PODIATRIST

## 2024-05-07 RX ORDER — ECONAZOLE NITRATE 10 MG/G
CREAM TOPICAL DAILY
Qty: 45 G | Refills: 10 | Status: SHIPPED | OUTPATIENT
Start: 2024-05-07 | End: 2024-06-08

## 2024-05-07 NOTE — PROGRESS NOTES
"  [address]  [place] [address]  Phone: [phone]   [phone]    Patient's PCP:Yanna Abbasi MD  Referring Provider: No ref. provider found    Subjective:      Chief Complaint:: Ingrown Toenail (Left big toe )    Ingrown Toenail  Pertinent negatives include no abdominal pain, arthralgias, chest pain, chills, coughing, fatigue, fever, headaches, joint swelling, myalgias, nausea, neck pain, numbness, rash or weakness.     Maggy Tapia is a 74 y.o. female who presents today with a complaint of possible ingrown left foot big toe. The current episode started few weeks.  The symptoms include throbbing pain. Probable cause of complaint unknown.  The symptoms are aggravated by closed toed shoes . The problem has stayed the same. Treatment to date have included none which provided some relief.     Systemic Doctor: TANIA   Date Last Seen: 24  Blood Sugar: 135  Hemoglobin A1c: 6.1    Vitals:    24 1108   Weight: 70.1 kg (154 lb 8.7 oz)   Height: 5' 1" (1.549 m)   PainSc:   4      Shoe Size: 9    Past Surgical History:   Procedure Laterality Date    APPENDECTOMY      BREAST BIOPSY Left     BREAST LUMPECTOMY Left         BREAST SURGERY      CATARACT EXTRACTION Bilateral     OU done//     SECTION      CHOLECYSTECTOMY      COLONOSCOPY N/A 2020    Procedure: COLONOSCOPY;  Surgeon: Mj Fernandez MD;  Location: [facility];  Service: Endoscopy;  Laterality: N/A;    CYST REMOVAL Left 2021    DILATION AND CURETTAGE OF UTERUS      EYE SURGERY      HYSTEROSCOPY WITH DILATION AND CURETTAGE OF UTERUS N/A 2023    Procedure: HYSTEROSCOPY, WITH DILATION AND CURETTAGE OF UTERUS AND OTHER INDICATED PROCEDURES Needs Cardiac clearance;  Surgeon: Gamaliel Moran MD;  Location: [facility] OR;  Service: OB/GYN;  Laterality: N/A;  Cardiac clearance needed per Dr Anderson    LUMPECTOMY, BREAST Left 2023    Procedure: LUMPECTOMY, BREAST;  Surgeon: Toño Paredes MD;  " Location: [facility] OR;  Service: General;  Laterality: Left;    PERCUTANEOUS PINNING OF HIP Right 11/11/2022    Procedure: PINNING, HIP, PERCUTANEOUS;  Surgeon: Ministerio Joiner II, MD;  Location: [facility] OR;  Service: Orthopedics;  Laterality: Right;    SENTINEL LYMPH NODE BIOPSY Left 4/26/2023    Procedure: BIOPSY, LYMPH NODE, SENTINEL;  Surgeon: Toño Paredes MD;  Location: [facility] OR;  Service: General;  Laterality: Left;    SIMPLE MASTECTOMY Left 5/19/2023    Procedure: MASTECTOMY, SIMPLE;  Surgeon: Toño Paredes MD;  Location: [facility] OR;  Service: General;  Laterality: Left;     Past Medical History:   Diagnosis Date    Anxiety     Arthritis     Asthma     Breast cancer 2000    Left    Depression     Diabetes mellitus, type 2     GERD (gastroesophageal reflux disease)     Hyperlipidemia     Hypertension     Hypothyroidism, unspecified     Overactive bladder     Seizures     Pseudo-seizures    Sleep apnea     Stroke 03/2022    pt was in the hospital for a stroke, claims she was seeing people when the stroke happened     Family History   Problem Relation Name Age of Onset    Diabetes Mother      Hypertension Mother      Breast cancer Mother      Cataracts Mother      Melanoma Mother      Heart failure Father      Melanoma Father      Cataracts Brother      Melanoma Brother      Glaucoma Neg Hx      Retinal detachment Neg Hx      Macular degeneration Neg Hx          Social History:   Marital Status: Single  Alcohol History:  reports current alcohol use.  Tobacco History:  reports that she has never smoked. She has been exposed to tobacco smoke. She has never used smokeless tobacco.  Drug History:  reports no history of drug use.    Review of patient's allergies indicates:   Allergen Reactions    Penicillins Anaphylaxis    Sulfa (sulfonamide antibiotics) Anaphylaxis    Trintellix [vortioxetine] Nausea And Vomiting and Other (See Comments)     Patient has seizures and vomits       Current Outpatient Medications    Medication Sig Dispense Refill    albuterol (PROVENTIL/VENTOLIN HFA) 90 mcg/actuation inhaler INHALE 1 TO 2 PUFFS BY MOUTH INTO THE LUNGS EVERY 4 HOURS AS NEEDED FOR SHORTNESS OF BREATH( COUGHING) 20.1 g 11    alendronate (FOSAMAX) 70 MG tablet Take one tablet every 7 days. 4 tablet 11    aspirin (ECOTRIN) 81 MG EC tablet Take 81 mg by mouth once daily.      blood-glucose meter kit Use as instructed. Insurance preferred. 1 each 0    CALCIUM CARBONATE/VITAMIN D3 (CALCIUM 500 + D ORAL) Take 1 tablet by mouth once daily. 10 mg daily      cyanocobalamin (VITAMIN B-12) 1000 MCG tablet Take 1 tablet (1,000 mcg total) by mouth once daily. 30 tablet 0    econazole nitrate 1 % cream Apply topically once daily. 45 g 10    fluticasone furoate-vilanteroL (BREO ELLIPTA) 100-25 mcg/dose diskus inhaler Inhale 1 puff into the lungs once daily. Controller 60 each 11    gabapentin (NEURONTIN) 300 MG capsule Take 1 capsule (300 mg total) by mouth every evening. Take 1 tablet every night x 7 days. Increase to twice a day x 7 days. Increase to three times a day. 90 capsule 0    ibuprofen (ADVIL,MOTRIN) 600 MG tablet Take 1 tablet (600 mg total) by mouth every 6 (six) hours as needed for Pain. 30 tablet 0    ketoconazole (NIZORAL) 2 % cream AAA pannus fold at least daily after the shower 60 g 3    letrozole (FEMARA) 2.5 mg Tab TAKE 1 TABLET(2.5 MG) BY MOUTH EVERY DAY 30 tablet 5    levothyroxine (SYNTHROID) 125 MCG tablet Take 1 tablet (125 mcg total) by mouth before breakfast. 90 tablet 3    losartan (COZAAR) 50 MG tablet Take 0.5 tablets (25 mg total) by mouth once daily. 90 tablet 0    metFORMIN (GLUCOPHAGE-XR) 500 MG ER 24hr tablet Take 2 tablets (1,000 mg total) by mouth daily with breakfast. 180 tablet 3    nystatin (MYCOSTATIN) cream Apply topically 2 (two) times daily. Apply to rash on face 15 g 2    polyethylene glycol (GLYCOLAX) 17 gram PwPk Take 17 g by mouth once daily.  0    potassium chloride SA (K-DUR,KLOR-CON) 20 MEQ  tablet Take 20 mEq by mouth once daily.      rOPINIRole (REQUIP) 0.5 MG tablet Take by mouth every evening.      sertraline (ZOLOFT) 50 MG tablet Take 1 tablet (50 mg total) by mouth once daily. For depression/anxiety 90 tablet 0    simvastatin (ZOCOR) 40 MG tablet TAKE 1 TABLET(40 MG) BY MOUTH EVERY DAY 90 tablet 3    solifenacin (VESICARE) 10 MG tablet Take 1 tablet (10 mg total) by mouth once daily. 90 tablet 4    tetrabenazine (XENAZINE) 25 mg tablet Take one tablet daily for 7 days and then  Take 1 tablet twice daily afterwards 47 tablet 0     No current facility-administered medications for this visit.       Review of Systems   Constitutional:  Negative for chills, fatigue, fever and unexpected weight change.   HENT:  Negative for hearing loss and trouble swallowing.    Eyes:  Negative for photophobia and visual disturbance.   Respiratory:  Negative for cough, shortness of breath and wheezing.    Cardiovascular:  Negative for chest pain, palpitations and leg swelling.   Gastrointestinal:  Negative for abdominal pain and nausea.   Genitourinary:  Negative for dysuria and frequency.   Musculoskeletal:  Negative for arthralgias, back pain, gait problem, joint swelling, myalgias and neck pain.   Skin:  Negative for rash and wound.   Neurological:  Negative for tremors, seizures, weakness, numbness and headaches.   Hematological:  Does not bruise/bleed easily.         Objective:        Physical Exam:   Foot Exam    General  General Appearance: appears stated age and healthy   Orientation: alert and oriented to person, place, and time   Affect: appropriate   Gait: antalgic   Assistance: wheelchair use       Right Foot/Ankle     Inspection and Palpation  Skin Exam: skin intact;   Fungus Toenails: present    Neurovascular  Dorsalis pedis: 1+  Posterior tibial: 1+  Capillary Refill: 2+  Varicose veins: present  Saphenous nerve sensation: diminished  Tibial nerve sensation: diminished  Superficial peroneal nerve  sensation: diminished  Deep peroneal nerve sensation: diminished  Sural nerve sensation: diminished      Left Foot/Ankle      Inspection and Palpation  Skin Exam: skin intact;   Fungus Toenails: present    Neurovascular  Dorsalis pedis: 1+  Posterior tibial: 1+  Capillary refill: 2+  Varicose veins: present  Saphenous nerve sensation: diminished  Tibial nerve sensation: diminished  Superficial peroneal nerve sensation: diminished  Deep peroneal nerve sensation: diminished  Sural nerve sensation: diminished        Physical Exam  Cardiovascular:      Pulses:           Dorsalis pedis pulses are 1+ on the right side and 1+ on the left side.        Posterior tibial pulses are 1+ on the right side and 1+ on the left side.   Feet:      Right foot:      Toenail Condition: Fungal disease present.     Left foot:      Toenail Condition: Fungal disease present.                  Vascular Exam     Right Pulses  Dorsalis Pedis:      1+  Posterior Tibial:      1+        Left Pulses  Dorsalis Pedis:      1+  Posterior Tibial:      1+           Imaging:            Assessment:       1. Onychomycosis due to dermatophyte    2. Ingrown nail    3. Type 2 diabetes mellitus with diabetic polyneuropathy, without long-term current use of insulin    4. Type 2 diabetes mellitus with diabetic peripheral angiopathy without gangrene, without long-term current use of insulin    5. Cerebrovascular accident (CVA), unspecified mechanism    6. Leg weakness, bilateral      Plan:   Onychomycosis due to dermatophyte    Ingrown nail    Type 2 diabetes mellitus with diabetic polyneuropathy, without long-term current use of insulin    Type 2 diabetes mellitus with diabetic peripheral angiopathy without gangrene, without long-term current use of insulin    Cerebrovascular accident (CVA), unspecified mechanism    Leg weakness, bilateral    Other orders  -     econazole nitrate 1 % cream; Apply topically once daily.  Dispense: 45 g; Refill: 10    I am giving  her a prescription for econazole nitrate cream to use on the nails to soften them and she will go to the pedicures we would refer them to.  She will return as needed.  No procedures performed today.      Procedures          Counseling:   Fungal infection of toenails explained. Treatment options including no treatment, periodic debridement, topical medications, oral medications, and removal of the nail were discussed, as well as success rates and risks of recurrence. We agreed on periodic debridement , We agreed on topical medication   I provided patient education verbally regarding:   Patient diagnosis, treatment options, as well as alternatives, risks, and benefits.     This note was created using Dragon voice recognition software that occasionally misinterpreted phrases or words.

## 2024-05-10 ENCOUNTER — OFFICE VISIT (OUTPATIENT)
Dept: FAMILY MEDICINE | Facility: CLINIC | Age: 74
End: 2024-05-10
Payer: MEDICARE

## 2024-05-10 VITALS
TEMPERATURE: 98 F | BODY MASS INDEX: 31.3 KG/M2 | HEART RATE: 82 BPM | HEIGHT: 61 IN | WEIGHT: 165.81 LBS | RESPIRATION RATE: 18 BRPM | OXYGEN SATURATION: 96 % | DIASTOLIC BLOOD PRESSURE: 70 MMHG | SYSTOLIC BLOOD PRESSURE: 117 MMHG

## 2024-05-10 DIAGNOSIS — E03.9 ACQUIRED HYPOTHYROIDISM: Chronic | ICD-10-CM

## 2024-05-10 DIAGNOSIS — C50.912 INVASIVE DUCTAL CARCINOMA OF BREAST, FEMALE, LEFT: ICD-10-CM

## 2024-05-10 DIAGNOSIS — E11.42 TYPE 2 DIABETES MELLITUS WITH DIABETIC POLYNEUROPATHY, WITHOUT LONG-TERM CURRENT USE OF INSULIN: ICD-10-CM

## 2024-05-10 DIAGNOSIS — I10 ESSENTIAL HYPERTENSION: ICD-10-CM

## 2024-05-10 DIAGNOSIS — E66.9 OBESITY (BMI 30-39.9): ICD-10-CM

## 2024-05-10 DIAGNOSIS — R26.81 GAIT INSTABILITY: ICD-10-CM

## 2024-05-10 DIAGNOSIS — R26.89 BALANCE PROBLEM: Primary | ICD-10-CM

## 2024-05-10 DIAGNOSIS — R29.898 WEAKNESS OF BOTH UPPER EXTREMITIES: ICD-10-CM

## 2024-05-10 PROCEDURE — 3072F LOW RISK FOR RETINOPATHY: CPT | Mod: CPTII,S$GLB,, | Performed by: FAMILY MEDICINE

## 2024-05-10 PROCEDURE — 99214 OFFICE O/P EST MOD 30 MIN: CPT | Mod: S$GLB,,, | Performed by: FAMILY MEDICINE

## 2024-05-10 PROCEDURE — 3288F FALL RISK ASSESSMENT DOCD: CPT | Mod: CPTII,S$GLB,, | Performed by: FAMILY MEDICINE

## 2024-05-10 PROCEDURE — 1160F RVW MEDS BY RX/DR IN RCRD: CPT | Mod: CPTII,S$GLB,, | Performed by: FAMILY MEDICINE

## 2024-05-10 PROCEDURE — 1126F AMNT PAIN NOTED NONE PRSNT: CPT | Mod: CPTII,S$GLB,, | Performed by: FAMILY MEDICINE

## 2024-05-10 PROCEDURE — 1157F ADVNC CARE PLAN IN RCRD: CPT | Mod: CPTII,S$GLB,, | Performed by: FAMILY MEDICINE

## 2024-05-10 PROCEDURE — 3074F SYST BP LT 130 MM HG: CPT | Mod: CPTII,S$GLB,, | Performed by: FAMILY MEDICINE

## 2024-05-10 PROCEDURE — 1159F MED LIST DOCD IN RCRD: CPT | Mod: CPTII,S$GLB,, | Performed by: FAMILY MEDICINE

## 2024-05-10 PROCEDURE — 3078F DIAST BP <80 MM HG: CPT | Mod: CPTII,S$GLB,, | Performed by: FAMILY MEDICINE

## 2024-05-10 PROCEDURE — 1101F PT FALLS ASSESS-DOCD LE1/YR: CPT | Mod: CPTII,S$GLB,, | Performed by: FAMILY MEDICINE

## 2024-05-10 PROCEDURE — 99999 PR PBB SHADOW E&M-EST. PATIENT-LVL IV: CPT | Mod: PBBFAC,,, | Performed by: FAMILY MEDICINE

## 2024-05-10 NOTE — PROGRESS NOTES
Subjective:       Patient ID: Maggy Tapia is a 74 y.o. female.    Chief Complaint: Follow-up    HPI  Review of Systems   Constitutional:  Negative for fatigue and unexpected weight change.   Respiratory:  Negative for chest tightness and shortness of breath.    Cardiovascular:  Negative for chest pain, palpitations and leg swelling.   Gastrointestinal:  Negative for abdominal pain.   Musculoskeletal:  Negative for arthralgias.   Neurological:  Negative for dizziness, syncope, light-headedness and headaches.       Patient Active Problem List   Diagnosis    Syncope and collapse    Frequent falls (possibly related to polypharmacy)    History of left breast cancer    History of ischemic left MCA stroke    Seizures    Essential hypertension    Hyperlipidemia    Thrombocytopenia    Depression    Hypothyroidism    Valproic acid toxicity    Psychogenic nonepileptic seizure    Osteopenia    JUAN (obstructive sleep apnea)    Near syncope    Diplopia    Cervical strain    Left homonymous hemianopsia    Tardive dyskinesia    Gait instability    Hypoglycemia    History of subdural hematoma    Rectal bleeding    Shortness of breath    Chronic restrictive lung disease    Paralysis of diaphragm    Chronic neuromuscular respiratory failure    Major depressive disorder, recurrent episode, moderate    Diabetic polyneuropathy    Bilateral hearing loss    Urinary incontinence    Stroke    Confusion    Schizophrenia    Type 2 diabetes mellitus with diabetic polyneuropathy, without long-term current use of insulin    Buttock wound, right, subsequent encounter    Dizziness    Imbalance    Leg weakness, bilateral    Closed traumatic nondisplaced fracture of neck of right femur    Invasive ductal carcinoma of breast, female, left    Abnormal auditory function study    Mild protein-calorie malnutrition    Obesity (BMI 30-39.9)    Type 2 diabetes mellitus with diabetic peripheral angiopathy without gangrene, without long-term current use  of insulin    History of colon polyps    Hemiplegia and hemiparesis following cerebral infarction affecting right dominant side    Pressure injury of right buttock, stage 2     Patient is here for a chronic conditions follow up.    Reviewed labs 4/24     GI Dr. Fernandez 2 polyps colonoscopy 2020, 1 tubular adenoma 3 year surveillance recommended. Over 73     GYN Dr. Moran treating PMB -s/p hysteroscopy 8/28/23. Path neg, PAP neg 2023     Wound Dr. Fuchs right buttock wound     Heme/onc Dr. Khoobehi 5/19/23 breast ca s/p mastectomy  S/p lumpectomy 4/23  No need for chemo. Started letrozole. Dexa 7/31/23     Type 2 DM A1c 6.1 .BS have been low. Taking jardiance and metformin.         JUAN on Bipap-uses daily . C/o still sleeping too much. Naps and falls asleep all day     Here with her brother who she lives with and is her primary caregiver     Pulm Dr. Urbano treating restrictive vent defect. C/o persistent shortness of breath, poor exercise tolerance for more than a year.  Had PFTs which showed some reversible airway disease responsive to bronchdilator and some rstrictive lung disease. Cardiac eval neg nuc stress neg 3/2021. Echo concentric hypertrophy left . Now on Breo. Very sedentary. Walks with walker. 199 to 170 lbs since 11/2020-believes it was trulicity that helped     Endocrine LEEANN Richards- Type 2 DM . On asa, ARB and zocor.      Hypothyroid-controlled.      Low vit d and osteopenia. On fosamax     Eye Dr. Dugan 8/23     Podiatry Dr. KLYE Bean  DM neuropathy     Ortho Dr. Joiner right hip fracture s/p pinning  11/22. Uses rollator      ENT LEEANN JARVIS     Urology NP [place]dy urinary incontinenance     Derm Dr. Back treated SIC -C/o black cyst on abd that swells from time to time     Card Dr. Chin stress test neg for ischemia 3/2021 and echo nl function     Psych LEEANN Drake/Sung Das-treating anxiety and PTSD.  Has chronic depression.  has h/o pseudoseizures and tardive dyskinesia.  Was  seeing Dr. goodwin but does not want to go back to see him.      History:  Neuro Alpaul treating pseudoseizures, dystonia, tardive dyskinesia.  Since  Last visit Admitted penny RAZO 2/20  For seizure activity-staring spells. 2/11/20-2/12/20:  No evidence of epilepsy on EEG.  Had an event during photic stimulation which was nonepileptic in nature.  02/11/20-02/12/20:  Multiple episodes starting 19:52:22 where patient has head and hand shaking then stares off.  Each episode lasts a few seconds.  One episode ~ 10:27 with head/hand shaking and followed by leaning forward.  No epileptic correlate with these.  Some P3 spikes identified which seem consistent with asymmetric V wave rather than epileptiform discharge.  Neuro Dr. Loving Diagnosed with both complex partial epilepsy initially and maintained on valproate for epilepsy and mood control.  Diagnosed ultimately with pseudoseizures now followed by neuropsych.       her caregiver brother who she lives with.  She has h/o frrequent falls, dizziness, instablity.  She has h/o pseudoseizures, psychiatric illness (under care of psych Dr. Goodwin previously), h/o CVA 2000 with residual right sided defects, subdural hematoma after fall with head trauma s/p craniotomy 7/16.Needs assistance with all ADLs at home and walks with walker.  Now under care of Neuro Dr. Roberts/Inder and Riaz Serrato     Had episode where APS was called to evaluate home due to complaints of neglect, unhealthy living conditions by  agency 8/17.  He had been in the hospital himself for DVTs and PEs now on blood thinner and urinary obstruction.  Was gone for days and no one was home with their dogs.  So the dogs messed all over the house.  He had not had time to clean it up or the energy to do so when home health came by for Ivana.      Objective:      Physical Exam  Vitals and nursing note reviewed.   Constitutional:       Appearance: She is well-developed.   Cardiovascular:      Rate and Rhythm: Normal  "rate and regular rhythm.      Heart sounds: Normal heart sounds.   Pulmonary:      Effort: Pulmonary effort is normal.      Breath sounds: Normal breath sounds.   Skin:     General: Skin is warm and dry.   Neurological:      Mental Status: She is alert and oriented to person, place, and time.         Assessment:       1. Balance problem    2. Weakness of both upper extremities    3. Essential hypertension    4. Invasive ductal carcinoma of breast, female, left    5. Type 2 diabetes mellitus with diabetic polyneuropathy, without long-term current use of insulin    6. Obesity (BMI 30-39.9)    7. Acquired hypothyroidism    8. Gait instability        Plan:         1. Balance problem  Refer for PT  - Ambulatory referral/consult to Physical/Occupational Therapy; Future    2. Weakness of both upper extremities  Refer for pt  - Ambulatory referral/consult to Physical/Occupational Therapy; Future    3. Essential hypertension  Controlled on current medications.  Continue current medications.      4. Invasive ductal carcinoma of breast, female, left  S/p mastectomy. Cont onc monitoring    5. Type 2 diabetes mellitus with diabetic polyneuropathy, without long-term current use of insulin  Controlled on current medications.  Continue current medications.      6. Obesity (BMI 30-39.9)  Counseled patient on his ideal body weight, health consequences of being obese and current recommendations including weekly exercise and a heart healthy diet.  Current BMI is:Estimated body mass index is 31.32 kg/m² as calculated from the following:    Height as of this encounter: 5' 1" (1.549 m).    Weight as of this encounter: 75.2 kg (165 lb 12.6 oz)..  Patient is aware that ideal BMI < 25 or Weight in (lb) to have BMI = 25: 132.      7. Acquired hypothyroidism  Controlled on current medications.  Continue current medications.      8. Gait instability  Cont fall precautions and use of rollator      Time spent with patient: 20 minutes    Patient " with be reevaluated in 6 months or sooner prn    Greater than 50% of this visit was spent counseling as described in above documentation:Yes

## 2024-05-27 NOTE — PLAN OF CARE
POC/Meds reviewed, pt verbalized understanding. Vitals stable. Tele In place-[id number].  Up with x1 assist. 2l nc on. Repositions self. Hourly/Q2hr rounding performed, safety maintained. Bed in lowest position, wheels locked, SR up x2, call light in easy reach. No  complaints at this time.   No

## 2024-05-29 ENCOUNTER — CLINICAL SUPPORT (OUTPATIENT)
Dept: REHABILITATION | Facility: HOSPITAL | Age: 74
End: 2024-05-29
Attending: FAMILY MEDICINE
Payer: MEDICARE

## 2024-05-29 DIAGNOSIS — R53.81 PHYSICAL DECONDITIONING: Primary | ICD-10-CM

## 2024-05-29 DIAGNOSIS — R26.89 BALANCE PROBLEM: ICD-10-CM

## 2024-05-29 DIAGNOSIS — R29.898 WEAKNESS OF BOTH UPPER EXTREMITIES: ICD-10-CM

## 2024-05-29 PROCEDURE — 97161 PT EVAL LOW COMPLEX 20 MIN: CPT | Mod: PO

## 2024-05-29 PROCEDURE — 97110 THERAPEUTIC EXERCISES: CPT | Mod: PO

## 2024-05-29 NOTE — PATIENT INSTRUCTIONS
Bridging        Lying supine with knees bent, tighten stomach muscles. Raise hips off floor, tighten buttocks. Hold ___ seconds.  Repeat ___ times. Do ___ times per day.    Copyright © [employer]. All rights reserved.      HIP: Flexion / KNEE: Extension, Straight Leg Raise        Raise leg, keeping knee straight. Perform slowly. ___ reps per set, ___ sets per day, ___ days per week      Copyright © [employer]. All rights reserved.      Chair Push-Up        Lift buttocks off seat of chair by pushing down with arms.  Repeat ____ times. Do ____ sessions per day.

## 2024-05-29 NOTE — PLAN OF CARE
"[facility]  Physical Therapy  Initial Evaluation     Name: Maggy Tapia  Clinic Number: [id number]    Therapy Diagnosis:   Encounter Diagnoses   Name Primary?    Balance problem     Weakness of both upper extremities     Physical deconditioning Yes     Physician: Yanna Abbasi MD    Physician Orders: PT Eval and Treat   Medical Diagnosis from Referral:   R26.89 (ICD-10-CM) - Balance problem   R29.898 (ICD-10-CM) - Weakness of both upper extremities     Evaluation Date: 5/29/2024  Authorization Period Expiration: 12/31/24  Plan of Care Expiration: 8/2/24    Visit # / Visits authorized: 1/ 1  FOTO: 42/100    Precautions: Standard and Fall, Hx of seizure    Time In: 1430  Time Out: 1515  Total Billable Time: 45 minutes    Subjective      Date of onset: 5/10/24    (referral)    History of current condition - Ivana reports: difficulty walking since 3 months ago and function is declining. Also c/o hands being weak with hold/ and  difficulty lifting. Had bought a lift chair to ease in getting up.     Imaging, none  recent    Prior Therapy: yes  Social History: lives with brother   Falls: a year ago. None recent   DME: 2 rolling walkers , rollator, shower  chair;   Home Environment: handicap toilet with handles/grab bars   Exercise Routine / History: none   Family Present at time of Eval: [place]- brother in [employer] [place]   Occupation: used to be a   Prior Level of Function: Independent using RW from 2 years now.  Current Level of Function: needs occasional help from brother from sitting on low chair. Difficulty getting in and out of the shower    Pain: NO    Patient's goals: " to get stronger and move better"    Medical History:   Past Medical History:   Diagnosis Date    Anxiety     Arthritis     Asthma     Breast cancer 2000    Left    Depression     Diabetes mellitus, type 2     GERD (gastroesophageal reflux disease)     Hyperlipidemia     Hypertension     Hypothyroidism, " unspecified     Overactive bladder     Seizures     Pseudo-seizures    Sleep apnea     Stroke 2022    pt was in the hospital for a stroke, claims she was seeing people when the stroke happened       Surgical History:   Maggy Tapia  has a past surgical history that includes Appendectomy; Breast surgery;  section; Cholecystectomy; Dilation and curettage of uterus; Eye surgery; Breast biopsy (Left); Breast lumpectomy (Left); Colonoscopy (N/A, 2020); Cataract extraction (Bilateral); Cyst Removal (Left, 2021); Percutaneous pinning of hip (Right, 2022); lumpectomy, breast (Left, 2023); Keller lymph node biopsy (Left, 2023); Simple mastectomy (Left, 2023); and Hysteroscopy with dilation and curettage of uterus (N/A, 2023).    Medications:   Maggy has a current medication list which includes the following prescription(s): albuterol, alendronate, aspirin, blood-glucose meter, calcium carbonate/vitamin d3, cyanocobalamin, econazole nitrate, breo ellipta, gabapentin, ibuprofen, ketoconazole, letrozole, levothyroxine, losartan, metformin, nystatin, polyethylene glycol, potassium chloride sa, ropinirole, sertraline, simvastatin, solifenacin, and tetrabenazine.    Allergies:   Review of patient's allergies indicates:   Allergen Reactions    Penicillins Anaphylaxis    Sulfa (sulfonamide antibiotics) Anaphylaxis    Trintellix [vortioxetine] Nausea And Vomiting and Other (See Comments)     Patient has seizures and vomits        Objective      Posture: severe kyphosis  Palpation: mild edema to left lower leg/ankle.  Sensation: reports of decreased sensation to both feet.  Range of Motion/Strength: BUE's WFL  gross  3+/5   Handgrip R 35 #s   L 32 #s  with hand dynamometer@ #3 setting  Pinch  R 13#s     L 9#s thumb opposition against index finger  LOWER Extremities Right  Left  Pain/Dysfunction with Movement         HIP PROM MMT PROM MMT    flexion 95   3   120   3    extension 15    3    5   3    Abduction  30   3    30   3    Adduction  NT   3+    Nt   3+          KNEE        Flexion  100   3   110   3   Rigid end range   Extension   15   3+     5   3+  Rigid end range         ANKLE        Dorsiflexion    5   3+      0    3+   Tight heelcords   Plantarflexion    40   3+      40   3+      Flexibility: SLR  80 deg bilat  Gait: With AD.  Device Used -  Rolling walker  Analysis: severe kyphosis and lean on the walker with assymetric step length and slow yasmin.  Bed Mobility: modified indpendent  Transfers: needs multiple attempt sit to stand. Use of RW for step turn transfers.  Special Tests: 30 second sit to stand: 3  Time up and Go (1 attempt on Rw)   107.68 sec  Other: Balance: sitting-good;    Standing -Poor     Intake Outcome Measure for FOTO neuromuuscular Survey    Therapist reviewed FOTO scores for Maggy Tapia on 5/29/2024.   FOTO report - see Media section or FOTO account episode details.    Intake Score: 42%       Treatment     Total Treatment time separate from Evaluation: 15 minutes    Ivana received the treatments listed below:      therapeutic exercises to develop strength, posture, and establish home program for 15 minutes including:  Sit to stand\  Heel raises  Bridging  Straight leg raising    Patient Education and Home Exercises     Education provided:   - Discussed Plan of Care; Home Exercise Instructions    Written Home Exercises Provided: yes.  Exercises were reviewed and Ivana was able to demonstrate them prior to the end of the session.  Ivana demonstrated fair  understanding of the education provided.     See EMR under Patient Instructions for exercises provided 5/29/2024.    Assessment     Maggy is a 74 y.o. female referred to outpatient Physical Therapy with a medical diagnosis of balance and Weakness. Patient presents with c/o weak handgrip, poor lifting ability, severe kyphosis, joint stiffness of the lower extremities, generalized deconditioning, balance  problem, risk for fall, decreased function, low endurance.    Patient prognosis is Good.   Patient will benefit from skilled outpatient Physical Therapy to address the deficits stated above and in the chart below, provide patient /family education, and to maximize patient's level of independence.     Plan of care discussed with patient: Yes  Patient's spiritual, cultural and educational needs considered and patient is agreeable to the plan of care and goals as stated below:     Anticipated Barriers for therapy: transportation, sedentary lifestyle    Medical Necessity is demonstrated by the following  History  Co-morbidities and personal factors that may impact the plan of care [x] LOW: no personal factors / co-morbidities  [] MODERATE: 1-2 personal factors / co-morbidities  [] HIGH: 3+ personal factors / co-morbidities    Moderate / High Support Documentation:   Co-morbidities affecting plan of care: arthritis, Hx of CA/stroke    Personal Factors:   age  coping style  lifestyle     Examination  Body Structures and Functions, activity limitations and participation restrictions that may impact the plan of care [x] LOW: addressing 1-2 elements  [] MODERATE: 3+ elements  [] HIGH: 4+ elements (please support below)    Moderate / High Support Documentation: joint stiffness, poor posture     Clinical Presentation [x] LOW: stable  [] MODERATE: Evolving  [] HIGH: Unstable     Decision Making/ Complexity Score: low       Goals:  Short Term Goals: 2 weeks   Patient will report compliance to home exercise given.  Patient will improve knee extension with supine  position with knee flat on the bed on stretch.  Patient will tolerate 10' continuous movements to all 4's on Nustep.    Long Term Goals: 8 weeks   Patient will improve 30 second sit to stand 6 or better.  Patient will improve Timed Up and Go 50 seconds or less.  Patient will improve knee ROM 90% full extension.  Patient will improve strength 3#s with handgrip or  kodi.  Patient will improve FOTO score 49/100 or better    Plan     Plan of care Certification: 5/29/2024 to 8/2/24.    Outpatient Physical Therapy 2 times weekly for 8 weeks to include the following interventions: Gait Training, Manual Therapy, Neuromuscular Re-ed, Therapeutic Activities, and Therapeutic Exercise.     Sukh Perdomo, PATRICIA        Physician's Signature: _________________________________________ Date: ________________

## 2024-06-08 ENCOUNTER — HOSPITAL ENCOUNTER (EMERGENCY)
Facility: HOSPITAL | Age: 74
Discharge: HOME | End: 2024-06-08
Attending: EMERGENCY MEDICINE
Payer: MEDICARE

## 2024-06-08 VITALS
HEIGHT: 61 IN | BODY MASS INDEX: 32.1 KG/M2 | RESPIRATION RATE: 18 BRPM | SYSTOLIC BLOOD PRESSURE: 148 MMHG | WEIGHT: 170 LBS | DIASTOLIC BLOOD PRESSURE: 66 MMHG | OXYGEN SATURATION: 98 % | HEART RATE: 62 BPM
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
## 2024-06-08 DIAGNOSIS — S09.90XA INJURY OF HEAD, INITIAL ENCOUNTER: ICD-10-CM

## 2024-06-08 DIAGNOSIS — S01.01XA SCALP LACERATION, INITIAL ENCOUNTER: ICD-10-CM

## 2024-06-08 LAB
ALBUMIN SERPL BCP-MCNC: 3.7 G/DL (ref 3.5–5.2)
ALP SERPL-CCNC: 70 U/L (ref 55–135)
ALT SERPL W/O P-5'-P-CCNC: 7 U/L (ref 10–44)
ANION GAP SERPL CALC-SCNC: 8 MMOL/L (ref 8–16)
AST SERPL-CCNC: 14 U/L (ref 10–40)
BASOPHILS # BLD AUTO: 0.03 K/UL (ref 0–0.2)
BASOPHILS NFR BLD: 0.3 % (ref 0–1.9)
BILIRUB SERPL-MCNC: 0.4 MG/DL (ref 0.1–1)
BILIRUB UR QL STRIP: NEGATIVE
BNP SERPL-MCNC: 94 PG/ML (ref 0–99)
BUN SERPL-MCNC: 26 MG/DL (ref 8–23)
CALCIUM SERPL-MCNC: 9.8 MG/DL (ref 8.7–10.5)
CHLORIDE SERPL-SCNC: 104 MMOL/L (ref 95–110)
CLARITY UR: CLEAR
CO2 SERPL-SCNC: 27 MMOL/L (ref 23–29)
COLOR UR: COLORLESS
CREAT SERPL-MCNC: 0.9 MG/DL (ref 0.5–1.4)
DIFFERENTIAL METHOD BLD: ABNORMAL
EOSINOPHIL # BLD AUTO: 0.1 K/UL (ref 0–0.5)
EOSINOPHIL NFR BLD: 0.8 % (ref 0–8)
ERYTHROCYTE [DISTWIDTH] IN BLOOD BY AUTOMATED COUNT: 13.3 % (ref 11.5–14.5)
EST. GFR  (NO RACE VARIABLE): >60 ML/MIN/1.73 M^2
GLUCOSE SERPL-MCNC: 96 MG/DL (ref 70–110)
GLUCOSE UR QL STRIP: NEGATIVE
HCT VFR BLD AUTO: 44.1 % (ref 37–48.5)
HGB BLD-MCNC: 14.6 G/DL (ref 12–16)
HGB UR QL STRIP: NEGATIVE
IMM GRANULOCYTES # BLD AUTO: 0.06 K/UL (ref 0–0.04)
IMM GRANULOCYTES NFR BLD AUTO: 0.6 % (ref 0–0.5)
KETONES UR QL STRIP: NEGATIVE
LEUKOCYTE ESTERASE UR QL STRIP: ABNORMAL
LYMPHOCYTES # BLD AUTO: 1.4 K/UL (ref 1–4.8)
LYMPHOCYTES NFR BLD: 13.7 % (ref 18–48)
MAGNESIUM SERPL-MCNC: 1.7 MG/DL (ref 1.6–2.6)
MCH RBC QN AUTO: 30.3 PG (ref 27–31)
MCHC RBC AUTO-ENTMCNC: 33.1 G/DL (ref 32–36)
MCV RBC AUTO: 92 FL (ref 82–98)
MICROSCOPIC COMMENT: NORMAL
MONOCYTES # BLD AUTO: 0.6 K/UL (ref 0.3–1)
MONOCYTES NFR BLD: 5.8 % (ref 4–15)
NEUTROPHILS # BLD AUTO: 8.1 K/UL (ref 1.8–7.7)
NEUTROPHILS NFR BLD: 78.8 % (ref 38–73)
NITRITE UR QL STRIP: NEGATIVE
NRBC BLD-RTO: 0 /100 WBC
PH UR STRIP: 5 [PH] (ref 5–8)
PLATELET # BLD AUTO: 136 K/UL (ref 150–450)
PMV BLD AUTO: 10.3 FL (ref 9.2–12.9)
POTASSIUM SERPL-SCNC: 4.4 MMOL/L (ref 3.5–5.1)
PROT SERPL-MCNC: 7.1 G/DL (ref 6–8.4)
PROT UR QL STRIP: NEGATIVE
RBC # BLD AUTO: 4.82 M/UL (ref 4–5.4)
RBC #/AREA URNS HPF: 0 /HPF (ref 0–4)
SODIUM SERPL-SCNC: 139 MMOL/L (ref 136–145)
SP GR UR STRIP: 1.01 (ref 1–1.03)
TROPONIN I SERPL HS-MCNC: 6.6 PG/ML (ref 0–14.9)
URN SPEC COLLECT METH UR: ABNORMAL
UROBILINOGEN UR STRIP-ACNC: NEGATIVE EU/DL
WBC # BLD AUTO: 10.27 K/UL (ref 3.9–12.7)
WBC #/AREA URNS HPF: 1 /HPF (ref 0–5)

## 2024-06-08 PROCEDURE — 84484 ASSAY OF TROPONIN QUANT: CPT

## 2024-06-08 PROCEDURE — 83735 ASSAY OF MAGNESIUM: CPT

## 2024-06-08 PROCEDURE — 93010 ELECTROCARDIOGRAM REPORT: CPT | Mod: ,,, | Performed by: GENERAL PRACTICE

## 2024-06-08 PROCEDURE — 12001 RPR S/N/AX/GEN/TRNK 2.5CM/<: CPT

## 2024-06-08 PROCEDURE — 99285 EMERGENCY DEPT VISIT HI MDM: CPT | Mod: 25

## 2024-06-08 PROCEDURE — 93005 ELECTROCARDIOGRAM TRACING: CPT | Performed by: GENERAL PRACTICE

## 2024-06-08 PROCEDURE — 80053 COMPREHEN METABOLIC PANEL: CPT

## 2024-06-08 PROCEDURE — 81001 URINALYSIS AUTO W/SCOPE: CPT

## 2024-06-08 PROCEDURE — 83880 ASSAY OF NATRIURETIC PEPTIDE: CPT

## 2024-06-08 PROCEDURE — 25000003 PHARM REV CODE 250

## 2024-06-08 PROCEDURE — 85025 COMPLETE CBC W/AUTO DIFF WBC: CPT

## 2024-06-08 RX ORDER — ACETAMINOPHEN 325 MG/1
650 TABLET ORAL
Status: COMPLETED | OUTPATIENT
Start: 2024-06-08 | End: 2024-06-08

## 2024-06-08 RX ORDER — BACITRACIN 500 [USP'U]/G
OINTMENT TOPICAL
Status: COMPLETED | OUTPATIENT
Start: 2024-06-08 | End: 2024-06-08

## 2024-06-08 RX ADMIN — BACITRACIN: 500 OINTMENT TOPICAL at 10:06

## 2024-06-08 RX ADMIN — Medication: at 09:06

## 2024-06-08 RX ADMIN — ACETAMINOPHEN 650 MG: 325 TABLET ORAL at 09:06

## 2024-06-09 NOTE — DISCHARGE INSTRUCTIONS
Please follow up with your primary care provider as soon as possible further evaluation rechecked.  The staples in your head when he would to come out in 7-10 days.  Your primary care provider can remove these.    Please return to the ED for additional falls, severe headaches, lightheaded dizziness, passing out, chest pain, shortness of breath, weakness, or any new or worsening concerns

## 2024-06-09 NOTE — ED PROVIDER NOTES
Encounter Date: 6/8/2024       History     Chief Complaint   Patient presents with    Head Laceration     Pt had a witness slip/trip fall approx 25 minutes ago sustaining a laceration to the right basilar area of her head. The witness stated  that the pt had no LOC but the pt doesn't falling.     Patient is a 74 y.o. female with past medical history of hypertension, hypothyroidism, hyperlipidemia, diabetes, GERD, COPD, and stroke in 2022 who presents to ED via EMS transportation for concern for fall which began this afternoon.  Patient reports she was walking holding onto the table without her walker when she lost her balance and fell backwards on to her head.  Patient denies loss of consciousness or vomiting.  Patient reports she had nausea but EMS gave her Zofran on the drive and that has not improved.  Patient denies chest pain.  Patient reports shortness of breath for the last 3-4 months.  Patient denies fever.  Patient denies abdominal pain.  Patient reports headache to her posterior scalp.  Patient denies lightheaded dizziness or changes in vision.  Patient complaining of pain to bilateral knees.  Patient is awake and alert and oriented x3.        Review of patient's allergies indicates:   Allergen Reactions    Penicillins Anaphylaxis    Sulfa (sulfonamide antibiotics) Anaphylaxis    Trintellix [vortioxetine] Nausea And Vomiting and Other (See Comments)     Patient has seizures and vomits     Past Medical History:   Diagnosis Date    Anxiety     Arthritis     Asthma     Breast cancer 2000    Left    Depression     Diabetes mellitus, type 2     GERD (gastroesophageal reflux disease)     Hyperlipidemia     Hypertension     Hypothyroidism, unspecified     Overactive bladder     Seizures     Pseudo-seizures    Sleep apnea     Stroke 03/2022    pt was in the hospital for a stroke, claims she was seeing people when the stroke happened     Past Surgical History:   Procedure Laterality Date    APPENDECTOMY      BREAST  BIOPSY Left     BREAST LUMPECTOMY Left     2016    BREAST SURGERY      CATARACT EXTRACTION Bilateral     OU done//     SECTION      CHOLECYSTECTOMY      COLONOSCOPY N/A 2020    Procedure: COLONOSCOPY;  Surgeon: Mj Fernandez MD;  Location: [facility];  Service: Endoscopy;  Laterality: N/A;    CYST REMOVAL Left 2021    DILATION AND CURETTAGE OF UTERUS      EYE SURGERY      HYSTEROSCOPY WITH DILATION AND CURETTAGE OF UTERUS N/A 2023    Procedure: HYSTEROSCOPY, WITH DILATION AND CURETTAGE OF UTERUS AND OTHER INDICATED PROCEDURES Needs Cardiac clearance;  Surgeon: Gamaliel Moran MD;  Location: [facility];  Service: OB/GYN;  Laterality: N/A;  Cardiac clearance needed per Dr Anderson    LUMPECTOMY, BREAST Left 2023    Procedure: LUMPECTOMY, BREAST;  Surgeon: Toño Paredes MD;  Location: [facility];  Service: General;  Laterality: Left;    PERCUTANEOUS PINNING OF HIP Right 2022    Procedure: PINNING, HIP, PERCUTANEOUS;  Surgeon: Ministerio Joiner II, MD;  Location: [facility] OR;  Service: Orthopedics;  Laterality: Right;    SENTINEL LYMPH NODE BIOPSY Left 2023    Procedure: BIOPSY, LYMPH NODE, SENTINEL;  Surgeon: Toño Paredes MD;  Location: [facility] OR;  Service: General;  Laterality: Left;    SIMPLE MASTECTOMY Left 2023    Procedure: MASTECTOMY, SIMPLE;  Surgeon: Toño Paredes MD;  Location: [facility] OR;  Service: General;  Laterality: Left;     Family History   Problem Relation Name Age of Onset    Diabetes Mother      Hypertension Mother      Breast cancer Mother      Cataracts Mother      Melanoma Mother      Heart failure Father      Melanoma Father      Cataracts Brother      Melanoma Brother      Glaucoma Neg Hx      Retinal detachment Neg Hx      Macular degeneration Neg Hx       Social History     Tobacco Use    Smoking status: Never     Passive exposure: Past    Smokeless tobacco: Never   Substance Use Topics    Alcohol use: Yes     Comment: seldom    Drug use: No      Review of Systems   Constitutional: Negative.  Negative for fever.   HENT: Negative.  Negative for sore throat, trouble swallowing and voice change.    Respiratory:  Positive for shortness of breath. Negative for apnea, cough, choking, chest tightness, wheezing and stridor.    Cardiovascular: Negative.  Negative for chest pain and leg swelling.   Gastrointestinal:  Positive for nausea. Negative for abdominal distention, abdominal pain, diarrhea and vomiting.   Genitourinary: Negative.  Negative for dysuria.   Musculoskeletal:  Negative for back pain, neck pain and neck stiffness.        Bilateral knee pain   Skin:  Negative for color change, pallor and rash.   Neurological:  Positive for headaches. Negative for dizziness, tremors, seizures, syncope, facial asymmetry, speech difficulty, weakness, light-headedness and numbness.   Hematological:  Does not bruise/bleed easily.   Psychiatric/Behavioral: Negative.         Physical Exam     Initial Vitals [06/08/24 1730]   BP Pulse Resp Temp SpO2   (!) 166/77 82 18 -- 98 %      MAP       --         Physical Exam    Nursing note and vitals reviewed.  Constitutional: She appears well-developed and well-nourished. She is not diaphoretic. No distress.   HENT:   Head: Normocephalic. Head is with contusion and with laceration. Head is without raccoon's eyes, without Cerna's sign, without right periorbital erythema and without left periorbital erythema.       Right Ear: External ear normal.   Left Ear: External ear normal.   Nose: Nose normal.   Eyes: Conjunctivae and EOM are normal. Pupils are equal, round, and reactive to light.   Neck: Neck supple.   Normal range of motion.   Full passive range of motion without pain.     Cardiovascular:  Normal rate, regular rhythm, normal heart sounds and intact distal pulses.     Exam reveals no gallop and no friction rub.       No murmur heard.  Pulmonary/Chest: Breath sounds normal. No respiratory distress. She has no wheezes. She  has no rhonchi. She has no rales. She exhibits no tenderness.   Abdominal: Abdomen is soft. Bowel sounds are normal. She exhibits no distension and no mass. There is no abdominal tenderness. There is no rebound and no guarding.   Musculoskeletal:      Cervical back: Normal, full passive range of motion without pain, normal range of motion and neck supple. No edema, erythema or rigidity. No spinous process tenderness or muscular tenderness. Normal range of motion.      Thoracic back: Normal.      Lumbar back: Normal.      Right knee: Swelling and bony tenderness present. No deformity, erythema or crepitus. Decreased range of motion. Tenderness present. Normal alignment.      Left knee: Bony tenderness present. No swelling, deformity, erythema or crepitus. Normal range of motion. Tenderness present. Normal alignment.      Right lower leg: Normal.      Left lower leg: Normal.      Right ankle: Normal.      Left ankle: Normal.      Right foot: Normal.      Left foot: Normal.     Neurological: She is alert and oriented to person, place, and time. She has normal strength. GCS score is 15. GCS eye subscore is 4. GCS verbal subscore is 5. GCS motor subscore is 6.   Skin: Skin is warm and dry. Capillary refill takes less than 2 seconds.   Psychiatric: She has a normal mood and affect. Her behavior is normal. Judgment and thought content normal.         ED Course   Lac Repair    Date/Time: 6/8/2024 9:30 PM    Performed by: Renee Vazquez NP  Authorized by: Aracelis Vance MD    Consent:     Consent obtained:  Verbal    Consent given by:  Patient    Risks, benefits, and alternatives were discussed: yes      Risks discussed:  Infection, pain and poor cosmetic result  Universal protocol:     Procedure explained and questions answered to patient or proxy's satisfaction: yes      Patient identity confirmed:  Verbally with patient  Anesthesia:     Anesthesia method:  Topical application    Topical anesthetic:  LET  Laceration  details:     Location:  Scalp    Scalp location:  Occipital    Length (cm):  1.5  Pre-procedure details:     Preparation:  Patient was prepped and draped in usual sterile fashion and imaging obtained to evaluate for foreign bodies  Exploration:     Hemostasis achieved with:  Direct pressure    Imaging obtained comment:  CT    Wound exploration: wound explored through full range of motion and entire depth of wound visualized      Contaminated: no    Treatment:     Area cleansed with:  Povidone-iodine and saline    Amount of cleaning:  Standard    Irrigation solution:  Sterile saline    Irrigation method:  Syringe  Skin repair:     Repair method:  Staples    Number of staples:  2  Approximation:     Approximation:  Close  Repair type:     Repair type:  Simple  Post-procedure details:     Dressing:  Antibiotic ointment    Procedure completion:  Tolerated well, no immediate complications    Labs Reviewed   CBC W/ AUTO DIFFERENTIAL - Abnormal; Notable for the following components:       Result Value    Platelets 136 (*)     Immature Granulocytes 0.6 (*)     Gran # (ANC) 8.1 (*)     Immature Grans (Abs) 0.06 (*)     Gran % 78.8 (*)     Lymph % 13.7 (*)     All other components within normal limits   COMPREHENSIVE METABOLIC PANEL - Abnormal; Notable for the following components:    BUN 26 (*)     ALT 7 (*)     All other components within normal limits   URINALYSIS, REFLEX TO URINE CULTURE - Abnormal; Notable for the following components:    Color, UA Colorless (*)     Leukocytes, UA 1+ (*)     All other components within normal limits    Narrative:     Specimen Source->Urine   MAGNESIUM   TROPONIN I HIGH SENSITIVITY   B-TYPE NATRIURETIC PEPTIDE   URINALYSIS MICROSCOPIC    Narrative:     Specimen Source->Urine          Imaging Results              X-Ray Knee Complete 4 or more Views Bilat (Final result)  Result time 06/08/24 20:10:33      Final result by Katerina Martinez MD (06/08/24 20:10:33)                    Impression:      No acute abnormality.  Degenerative changes bilaterally.      Electronically signed by: Katerina Martinez  Date:    06/08/2024  Time:    20:10               Narrative:    EXAMINATION:  XR KNEE COMP 4 OR MORE VIEWS BILAT    CLINICAL HISTORY:  fall;    TECHNIQUE:  Four views of the right and left knees were obtained    COMPARISON:  11/15/2022 right knee views    FINDINGS:  Degenerative changes are noted involving the right and left knee with osteophyte formation along the articular surfaces. Knee joint space narrowing noted bilaterally.  No acute fracture or dislocation. No effusion bilaterally.                                       X-Ray Chest AP Portable (Final result)  Result time 06/08/24 18:58:16      Final result by Carey White MD (06/08/24 18:58:16)                   Impression:      As above.      Electronically signed by: Carey White  Date:    06/08/2024  Time:    18:58               Narrative:    EXAMINATION:  XR CHEST AP PORTABLE    CLINICAL HISTORY:  weakness;    TECHNIQUE:  Single frontal view of the chest was performed.    COMPARISON:  07/01/2023    FINDINGS:  Low lung volumes.  No focal consolidation.  Normal heart size.    Partially imaged left proximal humeral chondroid lesion.                                       CT Cervical Spine Without Contrast (Final result)  Result time 06/08/24 19:20:09      Final result by Kaiden Mejia MD (06/08/24 19:20:09)                   Impression:      No evidence of acute fracture or listhesis of the cervical spine.    Multilevel degenerative changes in the cervical spine.      Electronically signed by: Kaiden Mejia MD  Date:    06/08/2024  Time:    19:20               Narrative:    EXAMINATION:  CT CERVICAL SPINE WITHOUT CONTRAST    CLINICAL HISTORY:  Neck trauma (Age >= 65y);    TECHNIQUE:  Low dose axial images, sagittal and coronal reformations were performed though the cervical spine.  Contrast was not  administered.    COMPARISON:  CT scan of the cervical spine dated 10/27/2021.    FINDINGS:  The craniocervical junction is intact.  The predental space is maintained.  No prevertebral soft tissue swelling is identified.    The cervical alignment is stable.  No evidence of listhesis.  The vertebral body heights are maintained.  The bone mineralization is within normal limits.  There is hypertrophy of the posterior elements.  The C1 ring is intact.  The occipital condyles are within normal limits.    There is intervertebral disc space narrowing at multiple levels.  No significant spinal canal or neural foraminal narrowing is identified.    There is no evidence acute fracture or listhesis of the cervical spine.    The soft tissue the neck are within normal limits.  There is no evidence of lymphadenopathy in the neck.  The visualized lung apices are unremarkable.                                       CT Head Without Contrast (Final result)  Result time 06/08/24 18:42:24      Final result by Carey White MD (06/08/24 18:42:24)                   Impression:      No acute abnormality.      Electronically signed by: Carey White  Date:    06/08/2024  Time:    18:42               Narrative:    EXAMINATION:  CT HEAD WITHOUT CONTRAST    CLINICAL HISTORY:  Head trauma, minor (Age >= 65y);    TECHNIQUE:  Low dose axial CT images obtained throughout the head without intravenous contrast. Sagittal and coronal reconstructions were performed.    COMPARISON:  None.    FINDINGS:  Intracranial compartment:    Ventricles and sulci are normal in size for age without evidence of hydrocephalus. No extra-axial blood or fluid collections.    Mild chronic microvascular ischemia.  Gliosis/encephalomalacia in the left parietal lobe.  Probable small calcified meningioma right temporal bone inner table.  No parenchymal mass, hemorrhage, edema or major vascular distribution infarct.    Skull/extracranial contents (limited  evaluation): Status post left temporoparietal craniotomy.  No acute fracture.  Mastoid air cells and paranasal sinuses are essentially clear.                                       Medications   LETS (LIDOcaine-TETRAcaine-EPINEPHrine) gel solution ( Topical (Top) Given 6/8/24 2109)   acetaminophen tablet 650 mg (650 mg Oral Given 6/8/24 2108)   bacitracin ointment ( Topical (Top) Given 6/8/24 2215)     Medical Decision Making  [facility]    Patient presents for emergent evaluation of acute fall that poses a possible threat to life and/or bodily function.    Differential diagnosis included but not limited to skull fracture, intracranial bleed, laceration, abrasion, fracture, dislocation, cardiac equivalent, electrolyte abnormality.  In the ED patient found to have acute laceration to the posterior occipital scalp with small underlying hematoma.  He was no active bleeding or drainage from the wound.  Galea is intact.  Patient was awake and alert and oriented x3.  Patient was normal strength in bilateral upper and lower extremities.  Patient was symmetrical facial expressions.  Patient was full range of motion of the upper extremities without difficulty or pain.  Patient complaining of pain to bilateral knees with palpation.  Patient noted to have some swelling to the right knee versus the left knee.  Patient was strong pulses cap refill and sensation distally in all 4 extremities.  Patient was no pain to palpation of cervical spine, thoracic spine, or lumbar spine.  No obvious signs of injury noted to patient's back.  Patient has no chest pain or abdominal pain.  Patient has a soft nontender abdomen.    Patient denies weakness, lightheaded dizziness, or chest pain.  Patient reports she lost her balance when she fell and denies any syncope.  Patient's family members denies patient having any increased falls, confusion or weakness lately.    Discharge MDM  I discussed the patient presentation labs, ekg, X-rays, CT findings with  my attending Dr. Vance.    Patient was managed in the ED with oral Tylenol, laceration repair.    The response to treatment was good.  Patient able to get up with a walker and ambulate in the room and in the hallway without lightheaded dizziness, or weakness.  Patient reports she feels well and feels comfortable being discharged home.  No acute findings found on patient's workup today to suggest patient needs to be admitted at this time.  Discussed with patient and patient's brother to have close follow up with primary care for staple removal.    Patient was discharged in stable condition with close follow up.  Detailed return precautions discussed to return to the ED for worsening headaches, lightheaded dizziness, passing out, changes in vision, chest pain, shortness breath, weakness, confusion, fever, pus drainage from the wound, or any new or worsening concerns.  Patient and patient's brother verbalized understanding.    Amount and/or Complexity of Data Reviewed  Independent Historian: caregiver     Details: Brother  Labs: ordered. Decision-making details documented in ED Course.  Radiology: ordered. Decision-making details documented in ED Course.  ECG/medicine tests: ordered and independent interpretation performed.     Details: EKG reviewed with my attending.  EKG significant for sinus bradycardia, no signs of occlusive MI, ventricular rate 57 beats per minute    Risk  OTC drugs.               ED Course as of 06/09/24 0109   Sat Jun 08, 2024 1855 CT Head Without Contrast  Impression:     No acute abnormality.   [MP]   1914 X-Ray Chest AP Portable  FINDINGS:  Low lung volumes.  No focal consolidation.  Normal heart size.  Partially imaged left proximal humeral chondroid lesion. [MP]   1923 CT Cervical Spine Without Contrast  Impression:  No evidence of acute fracture or listhesis of the cervical spine.  Multilevel degenerative changes in the cervical spine. [MP]   2027 X-Ray Knee Complete 4 or more Views  Bilat  Impression:  No acute abnormality.  Degenerative changes bilaterally. [MP]   2133 Leukocyte Esterase, UA(!): 1+ [MP]   2133 Color, UA(!): Colorless [MP]   2133 BUN(!): 26 [MP]   2133 ALT(!): 7 [MP]   2133 Platelet Count(!): 136 [MP]   2133 Immature Granulocytes(!): 0.6 [MP]   2133 Gran # (ANC)(!): 8.1 [MP]   2133 Immature Grans (Abs)(!): 0.06 [MP]   2133 Gran %(!): 78.8 [MP]   2133 Lymph %(!): 13.7 [MP]   2133 Gran %(!): 78.8 [MP]   2133 Lymph %(!): 13.7 [MP]      ED Course User Index  [MP] Renee Vazquez NP                           Clinical Impression:  Final diagnoses:  [W19.XXXA] Fall, initial encounter (Primary)  [S09.90XA] Injury of head, initial encounter  [S01.01XA] Scalp laceration, initial encounter          ED Disposition Condition    Discharge Stable          ED Prescriptions    None       Follow-up Information       Follow up With Specialties Details Why Contact Info Additional Information    Yanna Abbasi MD Family Medicine Schedule an appointment as soon as possible for a visit  For recheck/continuing care [address] [facility] [address]  [phone]       [facility] - Emergency Dept Emergency Medicine  If symptoms worsen [address] [facility] [id number] [phone] 1st floor             Renee Vazquez NP  06/09/24 0119

## 2024-06-10 ENCOUNTER — TELEPHONE (OUTPATIENT)
Dept: FAMILY MEDICINE | Facility: CLINIC | Age: 74
End: 2024-06-10
Payer: MEDICARE

## 2024-06-10 ENCOUNTER — CLINICAL SUPPORT (OUTPATIENT)
Dept: REHABILITATION | Facility: HOSPITAL | Age: 74
End: 2024-06-10
Payer: MEDICARE

## 2024-06-10 ENCOUNTER — PATIENT OUTREACH (OUTPATIENT)
Dept: EMERGENCY MEDICINE | Facility: OTHER | Age: 74
End: 2024-06-10
Payer: MEDICARE

## 2024-06-10 DIAGNOSIS — R53.81 PHYSICAL DECONDITIONING: ICD-10-CM

## 2024-06-10 DIAGNOSIS — R26.89 BALANCE PROBLEM: Primary | ICD-10-CM

## 2024-06-10 PROCEDURE — 97110 THERAPEUTIC EXERCISES: CPT | Mod: PO

## 2024-06-10 PROCEDURE — 97112 NEUROMUSCULAR REEDUCATION: CPT | Mod: PO

## 2024-06-10 NOTE — TELEPHONE ENCOUNTER
----- Message from Shanika Purdy sent at 6/10/2024 11:52 AM CDT -----  Contact: Patient  Type:  Sooner Apoointment Request    Caller is requesting a sooner appointment.  Caller declined first available appointment listed below.  Caller will not accept being placed on the waitlist and is requesting a message be sent to doctor.  Name of Caller:Patient     When is the first available appointment?June 26th    Symptoms:Hospital FU/ Fall    Would the patient rather a call back or a response via [facility]? Call    Best Call Back Number:[phone] (home)     Additional Information: Please call to advise

## 2024-06-10 NOTE — TELEPHONE ENCOUNTER
----- Message from Shena Wells LPN sent at 6/10/2024 12:40 PM CDT -----  Regarding: Hospital F/U  Good Afternoon,    This pt was discharged from the ED on 6/8/24 and has orders to follow up with Dr. Abbasi. In compliance with Medicare 2+ Chronic Condition patient measure mandates, this patient requires a follow up appt within 7 days of ED visit d/c date. I am requesting scheduling assistance as you are booked, and I am unable to schedule pt an appt within 7 days. Can clinic staff please reach out to patient's brother regarding scheduling?    Thank you for your assistance.

## 2024-06-10 NOTE — TELEPHONE ENCOUNTER
Spoke to pt scheduled follow up from recent fall. Pt has 2 staples and should be removed on 6/14/24. Pt wants to be evaluated by Dr. Abbasi prior

## 2024-06-11 NOTE — PROGRESS NOTES
[facility]   Physical Therapy Treatment Note      Name: Maggy Tapia  Clinic Number: [id number]    Therapy Diagnosis:   Encounter Diagnoses   Name Primary?    Balance problem Yes    Physical deconditioning      Physician: Yanna Abbasi MD    Visit Date: 6/10/2024  Physician Orders: PT Eval and Treat   Medical Diagnosis from Referral:   R26.89 (ICD-10-CM) - Balance problem   R29.898 (ICD-10-CM) - Weakness of both upper extremities      Evaluation Date: 5/29/2024  Authorization Period Expiration: 12/31/24  Plan of Care Expiration: 8/2/24     Visit # / Visits authorized: 1/20    (2)  FOTO: 42/100     Precautions: Standard and Fall, Hx of seizure     Time In: 1645  Time Out: 1730  Total Billable Time: 45 minutes    Subjective     Patient reports: having a bad fall with 2 staples to the back of the head 3 days ago. C/o unable to lie on her back to do stretches. Sleeps on recliner with pillows under neck..  She was not compliant with home exercise program.  Response to previous treatment: on initial visit.  Functional change: unable to lie on the treatment table.    Pain: 3/10  Location: headache      Objective      Sit to  one attempt    Treatment     Ivana received the treatments listed below:      therapeutic exercises to develop strength, posture, and function for 30 minutes including:  Nustep L4 all 4's 10'  Standing gastroc stretches 3'  Heel raises 20  Mini squats 20  Seated knee extension/hamstring stretch  Hamstring curls manual resist 20/20  neuromuscular re-education activities to improve: Balance, Proprioception, and Posture for 15 minutes. The following activities were included:  GT with RW emphasis on trunk extension erect posture 100 ft x 2  High marching in //  bars 20/20    Patient Education and Home Exercises       Education provided:   - Self stretch/positioning at home.    Written Home Exercises Provided: Patient instructed to cont prior HEP. Exercises were  reviewed and Ivana was able to demonstrate them prior to the end of the session.  Ivana demonstrated good  understanding of the education provided. See Electronic Medical Record under Patient Instructions for exercises provided during therapy sessions    Assessment     Patient is slow with gait and stooped posture. Demonstrated sit to stand activity with success on initial attempt.  Tolerate Rx well.    Ivana Is progressing well towards her goals.   Patient prognosis is Good2.     Patient will continue to benefit from skilled outpatient physical therapy to address the deficits listed in the problem list box on initial evaluation, provide pt/family education and to maximize pt's level of independence in the home and community environment.     Patient's spiritual, cultural and educational needs considered and pt agreeable to plan of care and goals.     Anticipated barriers to physical therapy: no follow through with progress at home.    Goals: Short Term Goals: 2 weeks   Patient will report compliance to home exercise given. (Ongoing)  Patient will improve knee extension with supine  position with knee flat on the bed on stretch. (Ongoing)  Patient will tolerate 10' continuous movements to all 4's on Nustep.  (ongoing     Long Term Goals: 8 weeks   Patient will improve 30 second sit to stand 6 or better.  (Ongoing)  Patient will improve Timed Up and Go 50 seconds or less.  (Ongoing)  Patient will improve knee ROM 90% full extension.  (Ongoing)  Patient will improve strength 3#s with handgrip or pinch. (Ongoing)  Patient will improve FOTO score 49/100 or better   (ongoing)    Plan     Stretching/ROM  Strength/endurance training  Functional mobility  Continue Plan of care.    Sukh Perdomo, PT

## 2024-06-12 ENCOUNTER — CLINICAL SUPPORT (OUTPATIENT)
Dept: REHABILITATION | Facility: HOSPITAL | Age: 74
End: 2024-06-12
Payer: MEDICARE

## 2024-06-12 DIAGNOSIS — R26.89 BALANCE PROBLEM: Primary | ICD-10-CM

## 2024-06-12 DIAGNOSIS — R53.81 PHYSICAL DECONDITIONING: ICD-10-CM

## 2024-06-12 LAB
OHS QRS DURATION: 70 MS
OHS QTC CALCULATION: 428 MS

## 2024-06-12 PROCEDURE — 97112 NEUROMUSCULAR REEDUCATION: CPT | Mod: PO

## 2024-06-12 PROCEDURE — 97110 THERAPEUTIC EXERCISES: CPT | Mod: PO

## 2024-06-12 NOTE — PROGRESS NOTES
[facility]   Physical Therapy Treatment Note      Name: Maggy Tapia  Clinic Number: [id number]    Therapy Diagnosis:   Encounter Diagnoses   Name Primary?    Balance problem Yes    Physical deconditioning      Physician: Yanna Abbasi MD    Visit Date: 6/12/2024  Physician Orders: PT Eval and Treat   Medical Diagnosis from Referral:   R26.89 (ICD-10-CM) - Balance problem   R29.898 (ICD-10-CM) - Weakness of both upper extremities      Evaluation Date: 5/29/2024  Authorization Period Expiration: 12/31/24  Plan of Care Expiration: 8/2/24     Visit # / Visits authorized: 2/20    (3)     Precautions: Standard and Fall, Hx of seizure     Time In: 1415  Time Out: 1500  Total Billable Time: 45 minutes    Subjective     Patient reports: no pain  She was not compliant with home exercise program.  Response to previous treatment: on initial visit.  Functional change: unable to lie on the treatment table.    Pain: 0/10  Location: none indicated    Objective      Stooped posture.    Treatment     Ivana received the treatments listed below:      therapeutic exercises to develop strength, posture, and function for 30 minutes including:  Nustep L4 all 4's 10'  Standing gastroc stretches 3'  Heel raises 20  Mini squats 20  Seated knee extension/hamstring stretch  Hamstring curls manual resist 20/20  neuromuscular re-education activities to improve: Balance, Proprioception, and Posture for 15 minutes. The following activities were included:  GT with RW emphasis on trunk extension erect posture 100 ft x 2  High marching in //  bars 20/20    Patient Education and Home Exercises       Education provided:   - Postural awareness    Written Home Exercises Provided: Patient instructed to cont prior HEP. Exercises were reviewed and Ivana was able to demonstrate them prior to the end of the session.  Ivana demonstrated good  understanding of the education provided. See Electronic Medical Record under  Patient Instructions for exercises provided during therapy sessions    Assessment     Patient appears to be more active and quicker to move. Participated session with  good spirit and motivation.  Tolerate Rx well.    Ivana Is progressing well towards her goals.   Patient prognosis is Good2.     Patient will continue to benefit from skilled outpatient physical therapy to address the deficits listed in the problem list box on initial evaluation, provide pt/family education and to maximize pt's level of independence in the home and community environment.     Patient's spiritual, cultural and educational needs considered and pt agreeable to plan of care and goals.     Anticipated barriers to physical therapy: no follow through with progress at home.    Goals: Short Term Goals: 2 weeks   Patient will report compliance to home exercise given. (Ongoing)  Patient will improve knee extension with supine  position with knee flat on the bed on stretch. (Ongoing)  Patient will tolerate 10' continuous movements to all 4's on Nustep.  (ongoing     Long Term Goals: 8 weeks   Patient will improve 30 second sit to stand 6 or better.  (Ongoing)  Patient will improve Timed Up and Go 50 seconds or less.  (Ongoing)  Patient will improve knee ROM 90% full extension.  (Ongoing)  Patient will improve strength 3#s with handgrip or pinch. (Ongoing)  Patient will improve FOTO score 49/100 or better   (ongoing)    Plan     Stretching/ROM  Strength/endurance training  Functional mobility  Continue Plan of care.    Sukh Perdomo, PT

## 2024-06-14 ENCOUNTER — OFFICE VISIT (OUTPATIENT)
Dept: FAMILY MEDICINE | Facility: CLINIC | Age: 74
End: 2024-06-14
Payer: MEDICARE

## 2024-06-14 VITALS
RESPIRATION RATE: 14 BRPM | SYSTOLIC BLOOD PRESSURE: 122 MMHG | HEIGHT: 61 IN | HEART RATE: 90 BPM | BODY MASS INDEX: 30.22 KG/M2 | DIASTOLIC BLOOD PRESSURE: 70 MMHG | OXYGEN SATURATION: 96 % | WEIGHT: 160.06 LBS | TEMPERATURE: 98 F

## 2024-06-14 DIAGNOSIS — N18.31 CHRONIC KIDNEY DISEASE, STAGE 3A: ICD-10-CM

## 2024-06-14 DIAGNOSIS — D69.6 THROMBOCYTOPENIA: ICD-10-CM

## 2024-06-14 DIAGNOSIS — R29.6 FREQUENT FALLS: ICD-10-CM

## 2024-06-14 DIAGNOSIS — S01.01XD LACERATION OF SCALP, SUBSEQUENT ENCOUNTER: Primary | ICD-10-CM

## 2024-06-14 DIAGNOSIS — R26.89 BALANCE PROBLEM: ICD-10-CM

## 2024-06-14 PROBLEM — I69.351 HEMIPLEGIA AND HEMIPARESIS FOLLOWING CEREBRAL INFARCTION AFFECTING RIGHT DOMINANT SIDE: Status: RESOLVED | Noted: 2023-10-19 | Resolved: 2024-06-14

## 2024-06-14 PROBLEM — F20.9 SCHIZOPHRENIA: Status: RESOLVED | Noted: 2022-02-23 | Resolved: 2024-06-14

## 2024-06-14 PROBLEM — J96.10 CHRONIC NEUROMUSCULAR RESPIRATORY FAILURE: Status: RESOLVED | Noted: 2022-01-06 | Resolved: 2024-06-14

## 2024-06-14 PROBLEM — E44.1 MILD PROTEIN-CALORIE MALNUTRITION: Status: RESOLVED | Noted: 2023-07-10 | Resolved: 2024-06-14

## 2024-06-14 PROCEDURE — 1157F ADVNC CARE PLAN IN RCRD: CPT | Mod: CPTII,S$GLB,, | Performed by: FAMILY MEDICINE

## 2024-06-14 PROCEDURE — 3074F SYST BP LT 130 MM HG: CPT | Mod: CPTII,S$GLB,, | Performed by: FAMILY MEDICINE

## 2024-06-14 PROCEDURE — 3078F DIAST BP <80 MM HG: CPT | Mod: CPTII,S$GLB,, | Performed by: FAMILY MEDICINE

## 2024-06-14 PROCEDURE — 1160F RVW MEDS BY RX/DR IN RCRD: CPT | Mod: CPTII,S$GLB,, | Performed by: FAMILY MEDICINE

## 2024-06-14 PROCEDURE — 1159F MED LIST DOCD IN RCRD: CPT | Mod: CPTII,S$GLB,, | Performed by: FAMILY MEDICINE

## 2024-06-14 PROCEDURE — 1100F PTFALLS ASSESS-DOCD GE2>/YR: CPT | Mod: CPTII,S$GLB,, | Performed by: FAMILY MEDICINE

## 2024-06-14 PROCEDURE — 99999 PR PBB SHADOW E&M-EST. PATIENT-LVL III: CPT | Mod: PBBFAC,,, | Performed by: FAMILY MEDICINE

## 2024-06-14 PROCEDURE — 3072F LOW RISK FOR RETINOPATHY: CPT | Mod: CPTII,S$GLB,, | Performed by: FAMILY MEDICINE

## 2024-06-14 PROCEDURE — 99213 OFFICE O/P EST LOW 20 MIN: CPT | Mod: S$GLB,,, | Performed by: FAMILY MEDICINE

## 2024-06-14 PROCEDURE — G2211 COMPLEX E/M VISIT ADD ON: HCPCS | Mod: S$GLB,,, | Performed by: FAMILY MEDICINE

## 2024-06-14 PROCEDURE — 1126F AMNT PAIN NOTED NONE PRSNT: CPT | Mod: CPTII,S$GLB,, | Performed by: FAMILY MEDICINE

## 2024-06-14 PROCEDURE — 3288F FALL RISK ASSESSMENT DOCD: CPT | Mod: CPTII,S$GLB,, | Performed by: FAMILY MEDICINE

## 2024-06-14 NOTE — PROGRESS NOTES
Subjective:       Patient ID: Maggy Tapia is a 74 y.o. female.    Chief Complaint: Follow-up (ED F/U)    HPI  Review of Systems   Constitutional:  Negative for fatigue and unexpected weight change.   Respiratory:  Negative for chest tightness and shortness of breath.    Cardiovascular:  Negative for chest pain, palpitations and leg swelling.   Gastrointestinal:  Negative for abdominal pain.   Musculoskeletal:  Negative for arthralgias.   Neurological:  Negative for dizziness, syncope, light-headedness and headaches.       Patient Active Problem List   Diagnosis    Syncope and collapse    Frequent falls (possibly related to polypharmacy)    History of left breast cancer    History of ischemic left MCA stroke    Essential hypertension    Hyperlipidemia    Thrombocytopenia    Depression    Hypothyroidism    Valproic acid toxicity    Psychogenic nonepileptic seizure    Osteopenia    JUAN (obstructive sleep apnea)    Near syncope    Diplopia    Cervical strain    Left homonymous hemianopsia    Tardive dyskinesia    Gait instability    Hypoglycemia    History of subdural hematoma    Rectal bleeding    Shortness of breath    Chronic restrictive lung disease    Paralysis of diaphragm    Major depressive disorder, recurrent episode, moderate    Diabetic polyneuropathy    Bilateral hearing loss    Urinary incontinence    Stroke    Confusion    Type 2 diabetes mellitus with diabetic polyneuropathy, without long-term current use of insulin    Buttock wound, right, subsequent encounter    Dizziness    Balance problem    Weakness of both upper extremities    Closed traumatic nondisplaced fracture of neck of right femur    Invasive ductal carcinoma of breast, female, left    Abnormal auditory function study    Obesity (BMI 30-39.9)    Type 2 diabetes mellitus with diabetic peripheral angiopathy without gangrene, without long-term current use of insulin    History of colon polyps    Pressure injury of right buttock, stage 2     Physical deconditioning    Chronic kidney disease, stage 3a     Patient is here for a ED follow up.    Reviewed labs 4/24. Next appt with me with previsit labs 11/24.  Here with her brother who she lives with and is her primary caregiver. .  She has h/o frequent falls, dizziness, instablity.  She has h/o pseudoseizures, psychiatric illness (under care of psych Dr. Whyte previously), h/o CVA 2000 with residual right sided defects, subdural hematoma after fall with head trauma s/p craniotomy 7/16. Needs assistance with all ADLs at home and walks with walker.     Seen in [facility] ED for fall 6/8/24 after fall. atient reports she was walking holding onto the table without her walker when she lost her balance and fell backwards on to her head. Patient denies loss of consciousness or vomiting. In the ED patient found to have acute laceration to the posterior occipital scalp with small underlying hematoma.  Had lac repair.  CT head  ct spine, chest x ray and knee x-rays showed no acute findings. Already doing PT for balance improvement.  Here  for staple removal       History:  GI Dr. Fernandez 2 polyps colonoscopy 2020, 1 tubular adenoma 3 year surveillance recommended. Over 73     GYN Dr. Moran treating PMB -s/p hysteroscopy 8/28/23. Path neg, PAP neg 2023     Wound Dr. Fuchs right buttock wound     Heme/onc Dr. Khoobehi 5/19/23 breast ca s/p mastectomy  S/p lumpectomy 4/23  No need for chemo. Started letrozole. Dexa 7/31/23     Endo Type 2 DM A1c 6.1 .BS have been low. Taking jardiance and metformin. .On asa, ARB and zocor.    Hypothyroid-controlled.   ow vit d and osteopenia. On fosamax     Pulm JUAN on Bipap-uses daily . C/o still sleeping too much. Naps and falls asleep all day    Pulm Dr. Urbano treating restrictive vent defect. C/o persistent shortness of breath, poor exercise tolerance for more than a year.  Had PFTs which showed some reversible airway disease responsive to bronchdilator and some rstrictive lung  disease. Cardiac eval neg nuc stress neg 3/2021. Echo concentric hypertrophy left . Now on Breo. Very sedentary. Walks with walker. 199 to 170 lbs since 11/2020-believes it was trulicity that helped        Eye Dr. Dugan      Podiatry Dr. KYLE Bean  DM neuropathy     Ortho Dr. Joiner right hip fracture s/p pinning  11/22. Uses rollator      ENT LEEANN Ferrara SNNADEGE     Urology NP [place] urinary incontinenance     Derm Dr. Back treated SIC -C/o black cyst on abd that swells from time to time     Card Dr. Chin stress test neg for ischemia 3/2021 and echo nl function     Psych PA Vidal/Sung Das-treating anxiety and PTSD.  Has chronic depression.  has h/o pseudoseizures and tardive dyskinesia.  Was seeing Dr. goodwin but does not want to go back to see him.        Neuro Aljabi treating pseudoseizures, dystonia, tardive dyskinesia. Has staring spells and/or episodes of hand/head shaking   No evidence of epilepsy on EEG.  Had an event during photic stimulation which was nonepileptic in nature.       Objective:      Physical Exam  Vitals and nursing note reviewed.   Constitutional:       Appearance: She is well-developed.   Cardiovascular:      Rate and Rhythm: Normal rate.   Pulmonary:      Effort: Pulmonary effort is normal.   Skin:     General: Skin is warm and dry.          Neurological:      Mental Status: She is alert and oriented to person, place, and time.         Assessment:       1. Laceration of scalp, subsequent encounter    2. Chronic kidney disease, stage 3a    3. Thrombocytopenia    4. Balance problem    5. Frequent falls (possibly related to polypharmacy)        Plan:       1. Laceration of scalp, subsequent encounter  Would recommend leaving scalp staples in 10 days.  Will see back in clinic Tuesday for removal.  Keep wound clean and dry.  Avoid soaking head    2. Chronic kidney disease, stage 3a  Stable and chronic.  Will continue to monitor q3-6 months and control chronic conditions as optimally as  possible to preserve function.      3. Thrombocytopenia  Stable and chronic.  Will continue to monitor q3-6 months and control chronic conditions as optimally as possible to preserve function.      4. Balance problem  Cont pt    5. Frequent falls (possibly related to polypharmacy)  Cont use of walker and practice fall precautions      Time spent with patient: 20 minutes    Patient with be reevaluated in  5 days for staple removal  or sooner prn    Greater than 50% of this visit was spent counseling as described in above documentation:Yes

## 2024-06-17 ENCOUNTER — CLINICAL SUPPORT (OUTPATIENT)
Dept: REHABILITATION | Facility: HOSPITAL | Age: 74
End: 2024-06-17
Payer: MEDICARE

## 2024-06-17 DIAGNOSIS — R26.89 BALANCE PROBLEM: Primary | ICD-10-CM

## 2024-06-17 DIAGNOSIS — R53.81 PHYSICAL DECONDITIONING: ICD-10-CM

## 2024-06-17 PROCEDURE — 97110 THERAPEUTIC EXERCISES: CPT | Mod: PO

## 2024-06-17 PROCEDURE — 97112 NEUROMUSCULAR REEDUCATION: CPT | Mod: PO

## 2024-06-17 NOTE — PROGRESS NOTES
[facility]   Physical Therapy Treatment Note      Name: Maggy Tapia  Clinic Number: [id number]    Therapy Diagnosis:   Encounter Diagnoses   Name Primary?    Balance problem Yes    Physical deconditioning      Physician: Yanna Abbasi MD    Visit Date: 6/17/2024  Physician Orders: PT Eval and Treat   Medical Diagnosis from Referral:   R26.89 (ICD-10-CM) - Balance problem   R29.898 (ICD-10-CM) - Weakness of both upper extremities      Evaluation Date: 5/29/2024  Authorization Period Expiration: 12/31/24  Plan of Care Expiration: 8/2/24     Visit # / Visits authorized: 3/20    (4)     Precautions: Standard and Fall, Hx of seizure     Time In: 1300  Time Out: 1345  Total Billable Time: 45 minutes    Subjective     Patient reports: knee pains worse blaming the weather.   She was not compliant with home exercise program.  Response to previous treatment: on initial visit.  Functional change: none    Pain: a. 8/10    b 5/10  Location: a. Right knee   b.Left knee    Objective      Stooped posture.    Treatment     Ivana received the treatments listed below:      therapeutic exercises to develop strength, posture, and function for 30 minutes including:  Nustep L4 all 4's 10'  Standing gastroc stretches 3'  Heel raises 20  Mini squats 20  Hamstring curls maroon theraband 20/20  Ankle dorsiflexion maroon theraband 20/20    neuromuscular re-education activities to improve: Balance, Proprioception, and Posture for 15 minutes. The following activities were included:  Single leg stance hip abduction unilateral 20/20; alternate 10/10  High marching in //  bars 20/20  Sit to stand 5  Standing trunk extension against // bar 20    Patient Education and Home Exercises       Education provided:   -Active Postural correction    Written Home Exercises Provided: Patient instructed to cont prior HEP. Exercises were reviewed and Ivana was able to demonstrate them prior to the end of the session.  Cookie  demonstrated good  understanding of the education provided. See Electronic Medical Record under Patient Instructions for exercises provided during therapy sessions    Assessment     Patient has a very high numeric value but voicing minimal pain. Participated with weight bearing and resistive exercises with no report of pain aggravations.  Tolerate Rx well.    Ivana Is progressing well towards her goals.   Patient prognosis is Good.     Patient will continue to benefit from skilled outpatient physical therapy to address the deficits listed in the problem list box on initial evaluation, provide pt/family education and to maximize pt's level of independence in the home and community environment.     Patient's spiritual, cultural and educational needs considered and pt agreeable to plan of care and goals.     Anticipated barriers to physical therapy: no follow through with progress at home.    Goals: Short Term Goals:    Patient will report compliance to home exercise given. (Ongoing)  Patient will improve knee extension with supine  position with knee flat on the bed on stretch. (Ongoing)  Patient will tolerate 10' continuous movements to all 4's on Nustep.  (Met)     Long Term Goals:   Patient will improve 30 second sit to stand 6 or better.  (Ongoing)  Patient will improve Timed Up and Go 50 seconds or less.  (Ongoing)  Patient will improve knee ROM 90% full extension.  (Ongoing)  Patient will improve strength 3#s with handgrip or pinch. (Ongoing)  Patient will improve FOTO score 49/100 or better   (ongoing)    Plan     FOTO Update  Stretching/ROM  Strength/endurance training  Functional mobility  Continue Plan of care.    Sukh Perdomo, PT

## 2024-06-18 ENCOUNTER — OFFICE VISIT (OUTPATIENT)
Dept: FAMILY MEDICINE | Facility: CLINIC | Age: 74
End: 2024-06-18
Payer: MEDICARE

## 2024-06-18 VITALS
HEIGHT: 61 IN | OXYGEN SATURATION: 96 % | RESPIRATION RATE: 20 BRPM | SYSTOLIC BLOOD PRESSURE: 132 MMHG | BODY MASS INDEX: 30.24 KG/M2 | HEART RATE: 88 BPM | TEMPERATURE: 98 F | DIASTOLIC BLOOD PRESSURE: 68 MMHG

## 2024-06-18 DIAGNOSIS — S01.01XD LACERATION OF SCALP, SUBSEQUENT ENCOUNTER: Primary | ICD-10-CM

## 2024-06-18 DIAGNOSIS — Z48.02 ENCOUNTER FOR STAPLE REMOVAL: ICD-10-CM

## 2024-06-18 PROCEDURE — 1159F MED LIST DOCD IN RCRD: CPT | Mod: CPTII,S$GLB,, | Performed by: FAMILY MEDICINE

## 2024-06-18 PROCEDURE — 1157F ADVNC CARE PLAN IN RCRD: CPT | Mod: CPTII,S$GLB,, | Performed by: FAMILY MEDICINE

## 2024-06-18 PROCEDURE — 3072F LOW RISK FOR RETINOPATHY: CPT | Mod: CPTII,S$GLB,, | Performed by: FAMILY MEDICINE

## 2024-06-18 PROCEDURE — 99213 OFFICE O/P EST LOW 20 MIN: CPT | Mod: S$GLB,,, | Performed by: FAMILY MEDICINE

## 2024-06-18 PROCEDURE — 1160F RVW MEDS BY RX/DR IN RCRD: CPT | Mod: CPTII,S$GLB,, | Performed by: FAMILY MEDICINE

## 2024-06-18 PROCEDURE — 3078F DIAST BP <80 MM HG: CPT | Mod: CPTII,S$GLB,, | Performed by: FAMILY MEDICINE

## 2024-06-18 PROCEDURE — 99999 PR PBB SHADOW E&M-EST. PATIENT-LVL III: CPT | Mod: PBBFAC,,, | Performed by: FAMILY MEDICINE

## 2024-06-18 PROCEDURE — 3288F FALL RISK ASSESSMENT DOCD: CPT | Mod: CPTII,S$GLB,, | Performed by: FAMILY MEDICINE

## 2024-06-18 PROCEDURE — 1126F AMNT PAIN NOTED NONE PRSNT: CPT | Mod: CPTII,S$GLB,, | Performed by: FAMILY MEDICINE

## 2024-06-18 PROCEDURE — 1101F PT FALLS ASSESS-DOCD LE1/YR: CPT | Mod: CPTII,S$GLB,, | Performed by: FAMILY MEDICINE

## 2024-06-18 PROCEDURE — 3075F SYST BP GE 130 - 139MM HG: CPT | Mod: CPTII,S$GLB,, | Performed by: FAMILY MEDICINE

## 2024-06-18 NOTE — PROGRESS NOTES
Subjective:       Patient ID: Maggy Tapia is a 74 y.o. female.    Chief Complaint: Suture / Staple Removal    HPI  Review of Systems   Constitutional:  Negative for fatigue and unexpected weight change.   Respiratory:  Negative for chest tightness and shortness of breath.    Cardiovascular:  Negative for chest pain, palpitations and leg swelling.   Gastrointestinal:  Negative for abdominal pain.   Musculoskeletal:  Negative for arthralgias.   Neurological:  Negative for dizziness, syncope, light-headedness and headaches.       Patient Active Problem List   Diagnosis    Syncope and collapse    Frequent falls (possibly related to polypharmacy)    History of left breast cancer    History of ischemic left MCA stroke    Essential hypertension    Hyperlipidemia    Thrombocytopenia    Depression    Hypothyroidism    Valproic acid toxicity    Psychogenic nonepileptic seizure    Osteopenia    JUAN (obstructive sleep apnea)    Near syncope    Diplopia    Cervical strain    Left homonymous hemianopsia    Tardive dyskinesia    Gait instability    Hypoglycemia    History of subdural hematoma    Rectal bleeding    Shortness of breath    Chronic restrictive lung disease    Paralysis of diaphragm    Major depressive disorder, recurrent episode, moderate    Diabetic polyneuropathy    Bilateral hearing loss    Urinary incontinence    Stroke    Confusion    Type 2 diabetes mellitus with diabetic polyneuropathy, without long-term current use of insulin    Buttock wound, right, subsequent encounter    Dizziness    Balance problem    Weakness of both upper extremities    Closed traumatic nondisplaced fracture of neck of right femur    Invasive ductal carcinoma of breast, female, left    Abnormal auditory function study    Obesity (BMI 30-39.9)    Type 2 diabetes mellitus with diabetic peripheral angiopathy without gangrene, without long-term current use of insulin    History of colon polyps    Pressure injury of right buttock, stage  2    Physical deconditioning    Chronic kidney disease, stage 3a     Patient is here for a problem visit. Needs 2 staples removed from post head lac after fall and ed visit   6/8/24    Reviewed labs 4/24. Next appt with me with previsit labs 11/24.  Usually Here with her brother who she lives with and is her primary caregiver. .  She has h/o frequent falls, dizziness, instablity.  She has h/o pseudoseizures, psychiatric illness (under care of psych Dr. Whyte previously), h/o CVA 2000 with residual right sided defects, subdural hematoma after fall with head trauma s/p craniotomy 7/16. Needs assistance with all ADLs at home and walks with walker.      Seen in [facility] ED for fall 6/8/24 after fall. atient reports she was walking holding onto the table without her walker when she lost her balance and fell backwards on to her head. Patient denies loss of consciousness or vomiting. In the ED patient found to have acute laceration to the posterior occipital scalp with small underlying hematoma.  Had lac repair.  CT head  ct spine, chest x ray and knee x-rays showed no acute findings. Already doing PT for balance improvement.  Here  for staple removal        History:  GI Dr. Fernandez 2 polyps colonoscopy 2020, 1 tubular adenoma 3 year surveillance recommended. Over 73     GYN Dr. Moran treating PMB -s/p hysteroscopy 8/28/23. Path neg, PAP neg 2023     Wound Dr. Fuchs right buttock wound     Heme/onc Dr. Khoobehi 5/19/23 breast ca s/p mastectomy  S/p lumpectomy 4/23  No need for chemo. Started letrozole. Dexa 7/31/23     Endo Type 2 DM A1c 6.1 .BS have been low. Taking jardiance and metformin. .On asa, ARB and zocor.    Hypothyroid-controlled.   ow vit d and osteopenia. On fosamax     Pulm JUAN on Bipap-uses daily . C/o still sleeping too much. Naps and falls asleep all day    Pulm Dr. Urbano treating restrictive vent defect. C/o persistent shortness of breath, poor exercise tolerance for more than a year.  Had PFTs which  showed some reversible airway disease responsive to bronchdilator and some rstrictive lung disease. Cardiac eval neg nuc stress neg 3/2021. Echo concentric hypertrophy left . Now on Breo. Very sedentary. Walks with walker. 199 to 170 lbs since 11/2020-believes it was trulicity that helped        Eye Dr. Dugan       Podiatry Dr. KYLE Bean  DM neuropathy     Ortho Dr. Joiner right hip fracture s/p pinning  11/22. Uses rollator      ENT LEEANN JARVIS     Urology NP [place] urinary incontinenance     Derm Dr. Back treated SIC -C/o black cyst on abd that swells from time to time     Card Dr. Chin stress test neg for ischemia 3/2021 and echo nl function     Psych LEEANN Drake/Sung Das-treating anxiety and PTSD.  Has chronic depression.  has h/o pseudoseizures and tardive dyskinesia.  Was seeing Dr. goodwin but does not want to go back to see him.         Neuro Aljabi treating pseudoseizures, dystonia, tardive dyskinesia. Has staring spells and/or episodes of hand/head shaking   No evidence of epilepsy on EEG.  Had an event during photic stimulation which was nonepileptic in nature.       Objective:      Physical Exam  Vitals and nursing note reviewed.   Constitutional:       Appearance: She is well-developed.   Pulmonary:      Effort: Pulmonary effort is normal.   Skin:     General: Skin is warm and dry.          Neurological:      Mental Status: She is alert and oriented to person, place, and time.         Assessment:       1. Laceration of scalp, subsequent encounter    2. Encounter for staple removal        Plan:       1. Laceration of scalp, subsequent encounter  Cleanse wound twice daily.  Apply antibiotic ointment and sterile bandage twice daily and as needed.  Monitor for increased pain, pus, redness and swelling.  If occurs then return to clinic or go to the emergency room if clinic is closed for a recheck.        2. Encounter for staple removal  removed        Time spent with patient: 20  minutes    Patient with be reevaluated in  as scheduled  or sooner prn    Greater than 50% of this visit was spent counseling as described in above documentation:Yes

## 2024-06-19 ENCOUNTER — CLINICAL SUPPORT (OUTPATIENT)
Dept: REHABILITATION | Facility: HOSPITAL | Age: 74
End: 2024-06-19
Payer: MEDICARE

## 2024-06-19 DIAGNOSIS — R26.89 BALANCE PROBLEM: Primary | ICD-10-CM

## 2024-06-19 DIAGNOSIS — R53.81 PHYSICAL DECONDITIONING: ICD-10-CM

## 2024-06-19 PROCEDURE — 97110 THERAPEUTIC EXERCISES: CPT | Mod: PO

## 2024-06-20 NOTE — PROGRESS NOTES
[facility]   Physical Therapy Treatment Note      Name: Maggy Tapia  Clinic Number: [id number]    Therapy Diagnosis:   Encounter Diagnoses   Name Primary?    Balance problem Yes    Physical deconditioning      Physician: Yanna Abbasi MD    Visit Date: 6/19/2024  Physician Orders: PT Eval and Treat   Medical Diagnosis from Referral:   R26.89 (ICD-10-CM) - Balance problem   R29.898 (ICD-10-CM) - Weakness of both upper extremities      Evaluation Date: 5/29/2024  Authorization Period Expiration: 12/31/24  Plan of Care Expiration: 8/2/24     Visit # / Visits authorized: 3/20    (4)     Precautions: Standard and Fall, Hx of seizure     Time In: 1300  Time Out: 1345  Total Billable Time: 45 minutes    Subjective     Patient reports:staples to head were removed and wound is healed..   She was not compliant with home exercise program.  Response to previous treatment: tolerate Rx  Functional change: none    Pain:  8/10     Location:  Right knee       Objective      +Knee extension lag    Treatment     Ivana received the treatments listed below:      therapeutic exercises to develop strength, posture, and function for 45 minutes including:  Supine Gastroc/Hamstring stretches 5'  Quad sets 20/20  Short Arc 8#s 20/20  Bridging knee straight 20  Straight leg raises 20/20   Crooklying hip abduction/adduction 20/20  Ice cold packs after session 10'    Patient Education and Home Exercises       Education provided:   Encourage self stretching and positioning trunk and knee extension    Written Home Exercises Provided: Patient instructed to cont prior HEP. Exercises were reviewed and Ivana was able to demonstrate them prior to the end of the session.  Ivana demonstrated good  understanding of the education provided. See Electronic Medical Record under Patient Instructions for exercises provided during therapy sessions    Assessment     Patient is more to Right knee with difficulty active SLR  against gravity.  Tolerate Rx..    Ivana Is progressing well towards her goals.   Patient prognosis is Good.     Patient will continue to benefit from skilled outpatient physical therapy to address the deficits listed in the problem list box on initial evaluation, provide pt/family education and to maximize pt's level of independence in the home and community environment.     Patient's spiritual, cultural and educational needs considered and pt agreeable to plan of care and goals.     Anticipated barriers to physical therapy: no follow through with progress at home.    Goals: Short Term Goals:    Patient will report compliance to home exercise given. (Ongoing)  Patient will improve knee extension with supine  position with knee flat on the bed on stretch. (Ongoing)  Patient will tolerate 10' continuous movements to all 4's on Nustep.  (Met)     Long Term Goals:   Patient will improve 30 second sit to stand 6 or better.  (Ongoing)  Patient will improve Timed Up and Go 50 seconds or less.  (Ongoing)  Patient will improve knee ROM 90% full extension.  (Ongoing)  Patient will improve strength 3#s with handgrip or pinch. (Ongoing)  Patient will improve FOTO score 49/100 or better   (ongoing)    Plan     FOTO Update  Stretching/ROM  Strength/endurance training  Functional mobility  Continue Plan of care.    Sukh Perdomo, PT

## 2024-06-24 ENCOUNTER — OFFICE VISIT (OUTPATIENT)
Dept: PULMONOLOGY | Facility: CLINIC | Age: 74
End: 2024-06-24
Payer: MEDICARE

## 2024-06-24 VITALS
WEIGHT: 160.25 LBS | OXYGEN SATURATION: 94 % | DIASTOLIC BLOOD PRESSURE: 75 MMHG | BODY MASS INDEX: 30.26 KG/M2 | HEIGHT: 61 IN | HEART RATE: 96 BPM | SYSTOLIC BLOOD PRESSURE: 119 MMHG

## 2024-06-24 DIAGNOSIS — J98.4 CHRONIC RESTRICTIVE LUNG DISEASE: Primary | ICD-10-CM

## 2024-06-24 DIAGNOSIS — R06.00 DYSPNEA, UNSPECIFIED TYPE: ICD-10-CM

## 2024-06-24 DIAGNOSIS — J44.9 CHRONIC OBSTRUCTIVE PULMONARY DISEASE, UNSPECIFIED COPD TYPE: ICD-10-CM

## 2024-06-24 DIAGNOSIS — R06.02 SHORTNESS OF BREATH: ICD-10-CM

## 2024-06-24 DIAGNOSIS — J98.6 PARALYSIS OF DIAPHRAGM: ICD-10-CM

## 2024-06-24 PROCEDURE — 3074F SYST BP LT 130 MM HG: CPT | Mod: CPTII,S$GLB,, | Performed by: INTERNAL MEDICINE

## 2024-06-24 PROCEDURE — 1159F MED LIST DOCD IN RCRD: CPT | Mod: CPTII,S$GLB,, | Performed by: INTERNAL MEDICINE

## 2024-06-24 PROCEDURE — 1157F ADVNC CARE PLAN IN RCRD: CPT | Mod: CPTII,S$GLB,, | Performed by: INTERNAL MEDICINE

## 2024-06-24 PROCEDURE — 99213 OFFICE O/P EST LOW 20 MIN: CPT | Mod: S$GLB,,, | Performed by: INTERNAL MEDICINE

## 2024-06-24 PROCEDURE — 3072F LOW RISK FOR RETINOPATHY: CPT | Mod: CPTII,S$GLB,, | Performed by: INTERNAL MEDICINE

## 2024-06-24 PROCEDURE — 1100F PTFALLS ASSESS-DOCD GE2>/YR: CPT | Mod: CPTII,S$GLB,, | Performed by: INTERNAL MEDICINE

## 2024-06-24 PROCEDURE — 99999 PR PBB SHADOW E&M-EST. PATIENT-LVL IV: CPT | Mod: PBBFAC,,, | Performed by: INTERNAL MEDICINE

## 2024-06-24 PROCEDURE — 3288F FALL RISK ASSESSMENT DOCD: CPT | Mod: CPTII,S$GLB,, | Performed by: INTERNAL MEDICINE

## 2024-06-24 PROCEDURE — 3078F DIAST BP <80 MM HG: CPT | Mod: CPTII,S$GLB,, | Performed by: INTERNAL MEDICINE

## 2024-06-24 RX ORDER — FLUTICASONE FUROATE AND VILANTEROL 100; 25 UG/1; UG/1
1 POWDER RESPIRATORY (INHALATION) DAILY
Qty: 60 EACH | Refills: 11 | Status: SHIPPED | OUTPATIENT
Start: 2024-06-24

## 2024-06-24 RX ORDER — ALBUTEROL SULFATE 90 UG/1
1-2 AEROSOL, METERED RESPIRATORY (INHALATION) EVERY 4 HOURS PRN
Qty: 20.1 G | Refills: 11 | Status: SHIPPED | OUTPATIENT
Start: 2024-06-24
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
## 2024-06-24 NOTE — PATIENT INSTRUCTIONS
Carbon dioxide levels in blood were good.    Diaphragem disorder and copd requires patient to sleep with  head of bed elevated at least 30 degrees.   Would recommend hospital bed.\    Copd treatment needed, breo daily, albuterol as needed..    Restriction measured on breathing test.     Chest xray viewed 6/2024.

## 2024-06-25 ENCOUNTER — CLINICAL SUPPORT (OUTPATIENT)
Dept: REHABILITATION | Facility: HOSPITAL | Age: 74
End: 2024-06-25
Payer: MEDICARE

## 2024-06-25 DIAGNOSIS — R53.81 PHYSICAL DECONDITIONING: Primary | ICD-10-CM

## 2024-06-25 DIAGNOSIS — R26.89 BALANCE PROBLEM: ICD-10-CM

## 2024-06-25 PROCEDURE — 97110 THERAPEUTIC EXERCISES: CPT | Mod: KX,PO

## 2024-06-25 NOTE — PROGRESS NOTES
[facility] OUTPATIENT THERAPY AND WELLNESS   Physical Therapy Treatment Note      Name: Maggy Tapia  Clinic Number: [id number]    Therapy Diagnosis:   Encounter Diagnoses   Name Primary?    Balance problem     Physical deconditioning Yes     Physician: Yanna Abbasi MD    Visit Date: 6/25/2024  Physician Orders: PT Eval and Treat   Medical Diagnosis from Referral:   R26.89 (ICD-10-CM) - Balance problem   R29.898 (ICD-10-CM) - Weakness of both upper extremities      Evaluation Date: 5/29/2024  Authorization Period Expiration: 12/31/24  Plan of Care Expiration: 8/2/24     Visit # / Visits authorized: 3/20    (4)     Precautions: Standard and Fall, Hx of seizure     Time In: 1300  Time Out: 1345  Total Billable Time: 45 minutes    Subjective     Patient reports:persistent knee pain. States she is getting a hospital bed so she does not have to sleep in the recliner.  She was not compliant with home exercise program.  Response to previous treatment: tolerate Rx  Functional change: improve straight leg raising R LE.    Pain:  8/10     Location:  Right knee       Objective      Full left knee extension on stretch.    Treatment     Ivana received the treatments listed below:      therapeutic exercises to develop strength, posture, and function for 45 minutes including:  Nustep L3 10'  Gastroc standing stretches 2'  Heel raises 20  Mini squat 20  Supine /Hamstring stretches 5'  Quad sets 20/20  Straight leg raises 20/20   Bridging 20  Ice cold packs after session 10'    Patient Education and Home Exercises       Education provided:   Encourage self stretching and positioning trunk and knee extension    Written Home Exercises Provided: Patient instructed to cont prior HEP. Exercises were reviewed and Ivana was able to demonstrate them prior to the end of the session.  Ivana demonstrated good  understanding of the education provided. See Electronic Medical Record under Patient Instructions for exercises provided during  therapy sessions    Assessment     Patient performs better today. Approximating full extension to both knees on stretch.  Tolerate Rx..    Ivana Is progressing well towards her goals.   Patient prognosis is Good.     Patient will continue to benefit from skilled outpatient physical therapy to address the deficits listed in the problem list box on initial evaluation, provide pt/family education and to maximize pt's level of independence in the home and community environment.     Patient's spiritual, cultural and educational needs considered and pt agreeable to plan of care and goals.     Anticipated barriers to physical therapy: no follow through with progress at home.    Goals: Short Term Goals:    Patient will report compliance to home exercise given. (Ongoing)  Patient will improve knee extension with supine  position with knee flat on the bed on stretch. (ongoing)  Patient will tolerate 10' continuous movements to all 4's on Nustep.  (Met)     Long Term Goals:   Patient will improve 30 second sit to stand 6 or better.  (Ongoing)  Patient will improve Timed Up and Go 50 seconds or less.  (Ongoing)  Patient will improve knee ROM 90% full extension.  (Ongoing)  Patient will improve strength 3#s with handgrip or pinch. (Ongoing)  Patient will improve FOTO score 49/100 or better   (ongoing)    Plan     FOTO Update  Stretching/ROM  Strength/endurance training  Functional mobility  Continue Plan of care.    Sukh Perdomo, PT

## 2024-06-27 ENCOUNTER — CLINICAL SUPPORT (OUTPATIENT)
Dept: REHABILITATION | Facility: HOSPITAL | Age: 74
End: 2024-06-27
Payer: MEDICARE

## 2024-06-27 DIAGNOSIS — R26.89 BALANCE PROBLEM: Primary | ICD-10-CM

## 2024-06-27 DIAGNOSIS — R53.81 PHYSICAL DECONDITIONING: ICD-10-CM

## 2024-06-27 PROCEDURE — 97110 THERAPEUTIC EXERCISES: CPT | Mod: KX,PO

## 2024-06-27 NOTE — PROGRESS NOTES
[facility]   Physical Therapy Treatment Note      Name: Maggy Tapia  Clinic Number: [id number]    Therapy Diagnosis:   Encounter Diagnoses   Name Primary?    Balance problem Yes    Physical deconditioning      Physician: Yanna Abbasi MD    Visit Date: 6/27/2024  Physician Orders: PT Eval and Treat   Medical Diagnosis from Referral:   R26.89 (ICD-10-CM) - Balance problem   R29.898 (ICD-10-CM) - Weakness of both upper extremities      Evaluation Date: 5/29/2024  Authorization Period Expiration: 12/31/24  Plan of Care Expiration: 8/2/24     Visit # / Visits authorized: 6/20    (7)     Precautions: Standard and Fall, Hx of seizure     Time In: 1430  Time Out: 1520  Total Billable Time: 45 minutes    Subjective     Patient reports:feeling better but pain discomforts persistent..  She was not compliant with home exercise program.  Response to previous treatment: tolerate Rx  Functional change: improve straight leg raising R LE.    Pain:  3/10     Location:  Right knee       Objective      Extension lag R 15; L 10     Treatment     Ivana received the treatments listed below:      therapeutic exercises to develop strength, posture, and function for 40 minutes including:  Nustep L3 10'  Gastroc standing stretches 2'  Heel raises 20  Mini squat 20  Supine /Hamstring stretches 5'  Quad sets 20/20  Straight leg raises 20/20   Bridging 20  Ice cold packs after session 10'    Patient Education and Home Exercises       Education provided:   Flatlying in bed.    Written Home Exercises Provided: Patient instructed to cont prior HEP. Exercises were reviewed and Ivana was able to demonstrate them prior to the end of the session.  Ivana demonstrated good  understanding of the education provided. See Electronic Medical Record under Patient Instructions for exercises provided during therapy sessions    Assessment     Patient has bouncy end feel on knee extension stretch. Preferred ice packs after  exercises   Tolerate Rx..    Ivana Is progressing well towards her goals.   Patient prognosis is Good.     Patient will continue to benefit from skilled outpatient physical therapy to address the deficits listed in the problem list box on initial evaluation, provide pt/family education and to maximize pt's level of independence in the home and community environment.     Patient's spiritual, cultural and educational needs considered and pt agreeable to plan of care and goals.     Anticipated barriers to physical therapy: no follow through with progress at home.    Goals: Short Term Goals:    Patient will report compliance to home exercise given. (Ongoing)  Patient will improve knee extension with supine  position with knee flat on the bed on stretch. (ongoing)  Patient will tolerate 10' continuous movements to all 4's on Nustep.  (Met)     Long Term Goals:   Patient will improve 30 second sit to stand 6 or better.  (Ongoing)  Patient will improve Timed Up and Go 50 seconds or less.  (Ongoing)  Patient will improve knee ROM 90% full extension.  (Ongoing)  Patient will improve strength 3#s with handgrip or pinch. (Ongoing)  Patient will improve FOTO score 49/100 or better   (ongoing)    Plan     FOTO Update  Stretching/ROM  Strength/endurance training  Functional mobility  Continue Plan of care.    Sukh Perdomo, PT

## 2024-07-01 ENCOUNTER — CLINICAL SUPPORT (OUTPATIENT)
Dept: REHABILITATION | Facility: HOSPITAL | Age: 74
End: 2024-07-01
Payer: MEDICARE

## 2024-07-01 DIAGNOSIS — R26.89 BALANCE PROBLEM: ICD-10-CM

## 2024-07-01 DIAGNOSIS — R53.81 PHYSICAL DECONDITIONING: Primary | ICD-10-CM

## 2024-07-01 PROCEDURE — 97110 THERAPEUTIC EXERCISES: CPT | Mod: PO

## 2024-07-01 NOTE — PROGRESS NOTES
[facility]   Physical Therapy Treatment Note      Name: Maggy Tapia  Clinic Number: [id number]    Therapy Diagnosis:   Encounter Diagnoses   Name Primary?    Balance problem     Physical deconditioning Yes     Physician: Yanna Abbasi MD    Visit Date: 7/1/2024  Physician Orders: PT Eval and Treat   Medical Diagnosis from Referral:   R26.89 (ICD-10-CM) - Balance problem   R29.898 (ICD-10-CM) - Weakness of both upper extremities      Evaluation Date: 5/29/2024  Authorization Period Expiration: 12/31/24  Plan of Care Expiration: 8/2/24     Visit # / Visits authorized: 7/20    (8)     Precautions: Standard and Fall, Hx of seizure     Time In: 1430  Time Out: 1515  Total Billable Time: 45 minutes    FOTO: 48/100     Subjective     Patient reports: no pain.  She was compliant with home exercise program.  Response to previous treatment: tolerate Rx  Functional change: less guarding to right knee resistive movements.    Pain:  0/10     Location:  none indicated.    Objective      BLE's edematous L>R    Treatment     Ivana received the treatments listed below:      therapeutic exercises to develop strength, posture, and function for 45 minutes including:  Nustep L4 10'  Gastroc standing stretches 2'  Heel raises 20  Mini squat 20  Trunk extension leaning against // bar.  Supine /Hamstring stretches 5'  Quad sets 20/20  Straight leg raises 20/20   Bridging 20    Patient Education and Home Exercises       Education provided:   Use bed instead of recliner to sleep at night.    Written Home Exercises Provided: Patient instructed to cont prior HEP. Exercises were reviewed and Ivana was able to demonstrate them prior to the end of the session.  Ivana demonstrated good  understanding of the education provided. See Electronic Medical Record under Patient Instructions for exercises provided during therapy sessions    Assessment     Patient has a new hospital bed but apparently does not use it at  night to sleep but for exercises and stretch during the day. Less concerns of pain today  Tolerate Rx..    Ivana Is progressing well towards her goals.   Patient prognosis is Good.     Patient will continue to benefit from skilled outpatient physical therapy to address the deficits listed in the problem list box on initial evaluation, provide pt/family education and to maximize pt's level of independence in the home and community environment.     Patient's spiritual, cultural and educational needs considered and pt agreeable to plan of care and goals.     Anticipated barriers to physical therapy: no follow through with progress at home.    Goals: Short Term Goals:    Patient will report compliance to home exercise given. (Ongoing)  Patient will improve knee extension with supine  position with knee flat on the bed on stretch. (ongoing)  Patient will tolerate 10' continuous movements to all 4's on Nustep.  (Met)     Long Term Goals:   Patient will improve 30 second sit to stand 6 or better.  (Ongoing)  Patient will improve Timed Up and Go 50 seconds or less.  (Ongoing)  Patient will improve knee ROM 90% full extension.  (Ongoing)  Patient will improve strength 3#s with handgrip or pinch. (Ongoing)  Patient will improve FOTO score 49/100 or better   (ongoing)    Plan       Stretching/ROM  Strength/endurance training  Functional mobility  Continue Plan of care.    Sukh Perdomo, PT

## 2024-07-03 ENCOUNTER — CLINICAL SUPPORT (OUTPATIENT)
Dept: REHABILITATION | Facility: HOSPITAL | Age: 74
End: 2024-07-03
Payer: MEDICARE

## 2024-07-03 DIAGNOSIS — R53.81 PHYSICAL DECONDITIONING: ICD-10-CM

## 2024-07-03 DIAGNOSIS — R26.89 BALANCE PROBLEM: Primary | ICD-10-CM

## 2024-07-03 PROCEDURE — 97110 THERAPEUTIC EXERCISES: CPT | Mod: KX,PO

## 2024-07-03 NOTE — PROGRESS NOTES
[facility]   Physical Therapy Treatment Note      Name: Maggy Tapia  Clinic Number: [id number]    Therapy Diagnosis:   Encounter Diagnoses   Name Primary?    Balance problem Yes    Physical deconditioning      Physician: Yanna Abbasi MD    Visit Date: 7/3/2024  Physician Orders: PT Eval and Treat   Medical Diagnosis from Referral:   R26.89 (ICD-10-CM) - Balance problem   R29.898 (ICD-10-CM) - Weakness of both upper extremities      Evaluation Date: 5/29/2024  Authorization Period Expiration: 12/31/24  Plan of Care Expiration: 8/2/24     Visit # / Visits authorized: 8/20    (9)     Precautions: Standard and Fall, Hx of seizure     Time In: 1430  Time Out: 1515  Total Billable Time: 45 minutes       Subjective     Patient reports: no pain.  She was compliant with home exercise program.  Response to previous treatment: tolerate Rx  Functional change: less guarding to right knee resistive movements.    Pain:  0/10     Location:  none indicated.    Objective      Knee extension lag 5 deg    Treatment     Ivana received the treatments listed below:      therapeutic exercises to develop strength, posture, and function for 45 minutes including:  Nustep L4 10'  Gastroc standing stretches 2'  Heel raises 20  Mini squat 20  Trunk extension leaning against // bar.  Supine /Hamstring stretches 5'  Quad sets 20/20  Straight leg raises 20/20   Bridging 20    Patient Education and Home Exercises       Education provided:   Flatlying to stretch    Written Home Exercises Provided: Patient instructed to cont prior HEP. Exercises were reviewed and Ivana was able to demonstrate them prior to the end of the session.  Ivana demonstrated good  understanding of the education provided. See Electronic Medical Record under Patient Instructions for exercises provided during therapy sessions    Assessment     Patient approximating full knee extension on stretch.  Tolerate Rx..    Ivana Is progressing well  towards her goals.   Patient prognosis is Good.     Patient will continue to benefit from skilled outpatient physical therapy to address the deficits listed in the problem list box on initial evaluation, provide pt/family education and to maximize pt's level of independence in the home and community environment.     Patient's spiritual, cultural and educational needs considered and pt agreeable to plan of care and goals.     Anticipated barriers to physical therapy: no follow through with progress at home.    Goals: Short Term Goals:    Patient will report compliance to home exercise given. (Ongoing)  Patient will improve knee extension with supine  position with knee flat on the bed on stretch. (ongoing)  Patient will tolerate 10' continuous movements to all 4's on Nustep.  (Met)     Long Term Goals:   Patient will improve 30 second sit to stand 6 or better.  (Ongoing)  Patient will improve Timed Up and Go 50 seconds or less.  (Ongoing)  Patient will improve knee ROM 90% full extension.  (Ongoing)  Patient will improve strength 3#s with handgrip or pinch. (Ongoing)  Patient will improve FOTO score 49/100 or better   (ongoing)    Plan       Stretching/ROM  Strength/endurance training  Functional mobility  Continue Plan of care.    Sukh Perdomo, PT

## 2024-07-05 ENCOUNTER — TELEPHONE (OUTPATIENT)
Facility: CLINIC | Age: 74
End: 2024-07-05
Payer: MEDICARE

## 2024-07-08 ENCOUNTER — OFFICE VISIT (OUTPATIENT)
Dept: PSYCHIATRY | Facility: CLINIC | Age: 74
End: 2024-07-08
Payer: MEDICARE

## 2024-07-08 VITALS
DIASTOLIC BLOOD PRESSURE: 82 MMHG | SYSTOLIC BLOOD PRESSURE: 186 MMHG | BODY MASS INDEX: 31.43 KG/M2 | HEART RATE: 83 BPM | WEIGHT: 166.31 LBS

## 2024-07-08 DIAGNOSIS — F39 MOOD DISORDER: ICD-10-CM

## 2024-07-08 DIAGNOSIS — F34.1 PERSISTENT DEPRESSIVE DISORDER: ICD-10-CM

## 2024-07-08 DIAGNOSIS — F41.9 ANXIETY DISORDER, UNSPECIFIED TYPE: ICD-10-CM

## 2024-07-08 DIAGNOSIS — F43.10 PTSD (POST-TRAUMATIC STRESS DISORDER): Primary | ICD-10-CM

## 2024-07-08 PROCEDURE — 1159F MED LIST DOCD IN RCRD: CPT | Mod: CPTII,S$GLB,, | Performed by: PHYSICIAN ASSISTANT

## 2024-07-08 PROCEDURE — 99214 OFFICE O/P EST MOD 30 MIN: CPT | Mod: S$GLB,,, | Performed by: PHYSICIAN ASSISTANT

## 2024-07-08 PROCEDURE — 3072F LOW RISK FOR RETINOPATHY: CPT | Mod: CPTII,S$GLB,, | Performed by: PHYSICIAN ASSISTANT

## 2024-07-08 PROCEDURE — 1101F PT FALLS ASSESS-DOCD LE1/YR: CPT | Mod: CPTII,S$GLB,, | Performed by: PHYSICIAN ASSISTANT

## 2024-07-08 PROCEDURE — 1157F ADVNC CARE PLAN IN RCRD: CPT | Mod: CPTII,S$GLB,, | Performed by: PHYSICIAN ASSISTANT

## 2024-07-08 PROCEDURE — 99999 PR PBB SHADOW E&M-EST. PATIENT-LVL IV: CPT | Mod: PBBFAC,,, | Performed by: PHYSICIAN ASSISTANT

## 2024-07-08 PROCEDURE — 3077F SYST BP >= 140 MM HG: CPT | Mod: CPTII,S$GLB,, | Performed by: PHYSICIAN ASSISTANT

## 2024-07-08 PROCEDURE — 1160F RVW MEDS BY RX/DR IN RCRD: CPT | Mod: CPTII,S$GLB,, | Performed by: PHYSICIAN ASSISTANT

## 2024-07-08 PROCEDURE — 3288F FALL RISK ASSESSMENT DOCD: CPT | Mod: CPTII,S$GLB,, | Performed by: PHYSICIAN ASSISTANT

## 2024-07-08 PROCEDURE — G2211 COMPLEX E/M VISIT ADD ON: HCPCS | Mod: S$GLB,,, | Performed by: PHYSICIAN ASSISTANT

## 2024-07-08 PROCEDURE — 3079F DIAST BP 80-89 MM HG: CPT | Mod: CPTII,S$GLB,, | Performed by: PHYSICIAN ASSISTANT

## 2024-07-08 RX ORDER — SERTRALINE HYDROCHLORIDE 50 MG/1
50 TABLET, FILM COATED ORAL DAILY
Qty: 90 TABLET | Refills: 0 | Status: SHIPPED | OUTPATIENT
Start: 2024-07-08 | End: 2024-10-06

## 2024-07-09 ENCOUNTER — CLINICAL SUPPORT (OUTPATIENT)
Dept: REHABILITATION | Facility: HOSPITAL | Age: 74
End: 2024-07-09
Payer: MEDICARE

## 2024-07-09 ENCOUNTER — LAB VISIT (OUTPATIENT)
Dept: LAB | Facility: HOSPITAL | Age: 74
End: 2024-07-09
Attending: INTERNAL MEDICINE
Payer: MEDICARE

## 2024-07-09 DIAGNOSIS — C50.912 INVASIVE DUCTAL CARCINOMA OF BREAST, FEMALE, LEFT: ICD-10-CM

## 2024-07-09 DIAGNOSIS — R53.81 PHYSICAL DECONDITIONING: ICD-10-CM

## 2024-07-09 DIAGNOSIS — R26.89 BALANCE PROBLEM: Primary | ICD-10-CM

## 2024-07-09 LAB
ALBUMIN SERPL BCP-MCNC: 3.3 G/DL (ref 3.5–5.2)
ALP SERPL-CCNC: 85 U/L (ref 55–135)
ALT SERPL W/O P-5'-P-CCNC: 7 U/L (ref 10–44)
ANION GAP SERPL CALC-SCNC: 10 MMOL/L (ref 8–16)
AST SERPL-CCNC: 14 U/L (ref 10–40)
BASOPHILS # BLD AUTO: 0.03 K/UL (ref 0–0.2)
BASOPHILS NFR BLD: 0.4 % (ref 0–1.9)
BILIRUB SERPL-MCNC: 0.4 MG/DL (ref 0.1–1)
BUN SERPL-MCNC: 16 MG/DL (ref 8–23)
CALCIUM SERPL-MCNC: 9.4 MG/DL (ref 8.7–10.5)
CHLORIDE SERPL-SCNC: 110 MMOL/L (ref 95–110)
CO2 SERPL-SCNC: 26 MMOL/L (ref 23–29)
CREAT SERPL-MCNC: 0.8 MG/DL (ref 0.5–1.4)
DIFFERENTIAL METHOD BLD: ABNORMAL
EOSINOPHIL # BLD AUTO: 0.2 K/UL (ref 0–0.5)
EOSINOPHIL NFR BLD: 2.2 % (ref 0–8)
ERYTHROCYTE [DISTWIDTH] IN BLOOD BY AUTOMATED COUNT: 13.5 % (ref 11.5–14.5)
EST. GFR  (NO RACE VARIABLE): >60 ML/MIN/1.73 M^2
GLUCOSE SERPL-MCNC: 132 MG/DL (ref 70–110)
HCT VFR BLD AUTO: 45.4 % (ref 37–48.5)
HGB BLD-MCNC: 14.2 G/DL (ref 12–16)
IMM GRANULOCYTES # BLD AUTO: 0.04 K/UL (ref 0–0.04)
IMM GRANULOCYTES NFR BLD AUTO: 0.5 % (ref 0–0.5)
LYMPHOCYTES # BLD AUTO: 1.5 K/UL (ref 1–4.8)
LYMPHOCYTES NFR BLD: 18.9 % (ref 18–48)
MCH RBC QN AUTO: 30.1 PG (ref 27–31)
MCHC RBC AUTO-ENTMCNC: 31.3 G/DL (ref 32–36)
MCV RBC AUTO: 96 FL (ref 82–98)
MONOCYTES # BLD AUTO: 0.5 K/UL (ref 0.3–1)
MONOCYTES NFR BLD: 6 % (ref 4–15)
NEUTROPHILS # BLD AUTO: 5.7 K/UL (ref 1.8–7.7)
NEUTROPHILS NFR BLD: 72 % (ref 38–73)
NRBC BLD-RTO: 0 /100 WBC
PLATELET # BLD AUTO: 170 K/UL (ref 150–450)
PMV BLD AUTO: 11.6 FL (ref 9.2–12.9)
POTASSIUM SERPL-SCNC: 4.2 MMOL/L (ref 3.5–5.1)
PROT SERPL-MCNC: 6.9 G/DL (ref 6–8.4)
RBC # BLD AUTO: 4.72 M/UL (ref 4–5.4)
SODIUM SERPL-SCNC: 146 MMOL/L (ref 136–145)
WBC # BLD AUTO: 7.88 K/UL (ref 3.9–12.7)

## 2024-07-09 PROCEDURE — 97110 THERAPEUTIC EXERCISES: CPT | Mod: PO

## 2024-07-09 PROCEDURE — 85025 COMPLETE CBC W/AUTO DIFF WBC: CPT | Performed by: INTERNAL MEDICINE

## 2024-07-09 PROCEDURE — 36415 COLL VENOUS BLD VENIPUNCTURE: CPT | Mod: PO | Performed by: INTERNAL MEDICINE

## 2024-07-09 PROCEDURE — 80053 COMPREHEN METABOLIC PANEL: CPT | Performed by: INTERNAL MEDICINE

## 2024-07-09 NOTE — PROGRESS NOTES
[facility]   Physical Therapy Treatment Note      Name: Maggy Tapia  Clinic Number: [id number]    Therapy Diagnosis:   Encounter Diagnoses   Name Primary?    Balance problem Yes    Physical deconditioning      Physician: Yanna Abbasi MD    Visit Date: 7/9/2024  Physician Orders: PT Eval and Treat   Medical Diagnosis from Referral:   R26.89 (ICD-10-CM) - Balance problem   R29.898 (ICD-10-CM) - Weakness of both upper extremities      Evaluation Date: 5/29/2024  Authorization Period Expiration: 12/31/24  Plan of Care Expiration: 8/2/24     Visit # / Visits authorized: 9/20    (10)     Precautions: Standard and Fall, Hx of seizure     Time In: 1345  Time Out: 1435  Total Billable Time: 40 minutes       Subjective     Patient reports: not doin well. Pain to right knee  She was compliant with home exercise program.  Response to previous treatment: tolerate Rx  Functional change: increased kyphosis    Pain:  5/10     Location:  R knee    Objective      Knee extension lag R 10 deg  L 5 deg    Treatment     Ivana received the treatments listed below:      therapeutic exercises to develop strength, posture, and function for 40 minutes including:  Nustep L4 10'  Gastroc standing stretches 2'  Heel raises 20  Mini squat 20  Trunk extension leaning against // bar.  Supine /Hamstring stretches 5'  Quad sets manual resist 20/20  Crooklying hip abduction/adduction manual resist 20/20   Bridging 20  Ce cold packs R knee 10' after session  Patient Education and Home Exercises       Education provided:   Postural awareness. Trunk extension    Written Home Exercises Provided: Patient instructed to cont prior HEP. Exercises were reviewed and Ivana was able to demonstrate them prior to the end of the session.  Ivana demonstrated good  understanding of the education provided. See Electronic Medical Record under Patient Instructions for exercises provided during therapy sessions    Assessment      Patient elicits increased pain on stretch to right knee. Decreased postural erectness. Pain alleviated with ice packs.  Tolerate Rx..    Ivana Is progressing well towards her goals.   Patient prognosis is Good.     Patient will continue to benefit from skilled outpatient physical therapy to address the deficits listed in the problem list box on initial evaluation, provide pt/family education and to maximize pt's level of independence in the home and community environment.     Patient's spiritual, cultural and educational needs considered and pt agreeable to plan of care and goals.     Anticipated barriers to physical therapy: no follow through with progress at home.    Goals: Short Term Goals:    Patient will report compliance to home exercise given. (Ongoing)  Patient will improve knee extension with supine  position with knee flat on the bed on stretch. (ongoing)  Patient will tolerate 10' continuous movements to all 4's on Nustep.  (Met)     Long Term Goals:   Patient will improve 30 second sit to stand 6 or better.  (Ongoing)  Patient will improve Timed Up and Go 50 seconds or less.  (Ongoing)  Patient will improve knee ROM 90% full extension.  (Ongoing)  Patient will improve strength 3#s with handgrip or pinch. (Ongoing)  Patient will improve FOTO score 49/100 or better   (ongoing)    Plan       Stretching/ROM  Strength/endurance training  Functional mobility  Continue Plan of care.    Sukh Perdomo, PT
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
## 2024-07-11 ENCOUNTER — CLINICAL SUPPORT (OUTPATIENT)
Dept: REHABILITATION | Facility: HOSPITAL | Age: 74
End: 2024-07-11
Payer: MEDICARE

## 2024-07-11 DIAGNOSIS — R26.89 BALANCE PROBLEM: Primary | ICD-10-CM

## 2024-07-11 DIAGNOSIS — R53.81 PHYSICAL DECONDITIONING: ICD-10-CM

## 2024-07-11 DIAGNOSIS — R29.898 LEG WEAKNESS, BILATERAL: ICD-10-CM

## 2024-07-11 PROCEDURE — 97110 THERAPEUTIC EXERCISES: CPT | Mod: PO

## 2024-07-11 NOTE — PROGRESS NOTES
[facility]   Physical Therapy Treatment Note      Name: Maggy Tapia  Clinic Number: [id number]    Therapy Diagnosis:   Encounter Diagnoses   Name Primary?    Balance problem Yes    Physical deconditioning     Leg weakness, bilateral      Physician: Yanna Abbasi MD    Visit Date: 7/11/2024  Physician Orders: PT Eval and Treat   Medical Diagnosis from Referral:   R26.89 (ICD-10-CM) - Balance problem   R29.898 (ICD-10-CM) - Weakness of both upper extremities      Evaluation Date: 5/29/2024  Authorization Period Expiration: 12/31/24  Plan of Care Expiration: 8/2/24     Visit # / Visits authorized: 10/20    (11)     Precautions: Standard and Fall, Hx of seizure     Time In: 1345  Time Out: 1435  Total Billable Time: 40 minutes       Subjective     Patient reports: severe pain to both knees.  She was compliant with home exercise program.  Response to previous treatment: tolerate Rx  Functional change: decreased gait speed.    Pain:  10/10     Location:  B knees  R>L    Objective      Presented with poor hygiene from maldorous garb.    Treatment     Ivana received the treatments listed below:      therapeutic exercises to develop strength, posture, and function for 40 minutes including:  Seated hamstring curls 20/20  Gastroc  stretches 2'  Hamstring stretces 2'  Quad sets 20/20  Crooklying hip abduction/adduction manual resist 20/20   Bridging crooklying 20; knee straight 20  SLR 10x 2/10x 2  SAQ's 2#s 20/20  ICe cold packs B knees 10' after session  Patient Education and Home Exercises       Education provided:   Instructed to stay home and rest when pain is severe.    Written Home Exercises Provided: Patient instructed to cont prior HEP. Exercises were reviewed and Ivana was able to demonstrate them prior to the end of the session.  Ivana demonstrated good  understanding of the education provided. See Electronic Medical Record under Patient Instructions for exercises provided during  therapy sessions    Assessment     Patient endorsed 10/10 pain scale but performed gait and exercises. Conservative treatment with ROM and supine exercises. Poor hygiene with smell of urine.  Tolerate Rx..    Ivana Is progressing well towards her goals.   Patient prognosis is Good.     Patient will continue to benefit from skilled outpatient physical therapy to address the deficits listed in the problem list box on initial evaluation, provide pt/family education and to maximize pt's level of independence in the home and community environment.     Patient's spiritual, cultural and educational needs considered and pt agreeable to plan of care and goals.     Anticipated barriers to physical therapy: no follow through with progress at home.    Goals: Short Term Goals:    Patient will report compliance to home exercise given. (Ongoing)  Patient will improve knee extension with supine  position with knee flat on the bed on stretch. (ongoing)  Patient will tolerate 10' continuous movements to all 4's on Nustep.  (Met)     Long Term Goals:   Patient will improve 30 second sit to stand 6 or better.  (Ongoing)  Patient will improve Timed Up and Go 50 seconds or less.  (Ongoing)  Patient will improve knee ROM 90% full extension.  (Ongoing)  Patient will improve strength 3#s with handgrip or pinch. (Ongoing)  Patient will improve FOTO score 49/100 or better   (ongoing)    Plan     Pain modalities.  Stretching/ROM  Strength/endurance training  Functional mobility  Continue Plan of care.    Sukh Perdomo, PT

## 2024-07-12 ENCOUNTER — OFFICE VISIT (OUTPATIENT)
Dept: HEMATOLOGY/ONCOLOGY | Facility: CLINIC | Age: 74
End: 2024-07-12
Payer: MEDICARE

## 2024-07-12 VITALS
RESPIRATION RATE: 16 BRPM | TEMPERATURE: 97 F | HEART RATE: 75 BPM | HEIGHT: 61 IN | WEIGHT: 170 LBS | OXYGEN SATURATION: 96 % | DIASTOLIC BLOOD PRESSURE: 63 MMHG | BODY MASS INDEX: 32.1 KG/M2 | SYSTOLIC BLOOD PRESSURE: 130 MMHG

## 2024-07-12 DIAGNOSIS — C50.912 INVASIVE DUCTAL CARCINOMA OF BREAST, FEMALE, LEFT: Primary | ICD-10-CM

## 2024-07-12 PROCEDURE — 99999 PR PBB SHADOW E&M-EST. PATIENT-LVL IV: CPT | Mod: PBBFAC,,, | Performed by: INTERNAL MEDICINE

## 2024-07-12 NOTE — PROGRESS NOTES
Service Date:  7/12/24    Chief Complaint: Breast cancer    Maggy Tapia is a 74 y.o. female here with L breast invasive ductal carcinoma. Patient had a routine screening mammogram which led to biopsy of a concerning lesion in the upper inner quadrant. Lesion measured about 17 mm on US. [id number] ER/DE+, HER2 negative by FISH, Ki 67 40%. After mastectomy, found to have pT2N0 lesion. Oncotype score 21, <1% benefit from chemo.    Now on Letrozole. Tolerating it well.  Takes Fosamax weekly on Fridays.  Doing well with that.    Review of Systems   Constitutional: Negative.    HENT: Negative.     Eyes: Negative.    Respiratory: Negative.     Cardiovascular: Negative.    Gastrointestinal: Negative.    Endocrine: Negative.    Genitourinary: Negative.    Musculoskeletal: Negative.    Integumentary:  Negative.   Neurological: Negative.    Hematological: Negative.    Psychiatric/Behavioral: Negative.          Current Outpatient Medications   Medication Instructions    albuterol (PROVENTIL/VENTOLIN HFA) 90 mcg/actuation inhaler 1-2 puffs, Inhalation, Every 4 hours PRN, INHALE 1 TO 2 PUFFS BY MOUTH INTO THE LUNGS EVERY 4 HOURS AS NEEDED FOR SHORTNESS OF BREATH( COUGHING)<BR>Strength: 90 mcg/actuation    alendronate (FOSAMAX) 70 MG tablet Take one tablet every 7 days.    aspirin (ECOTRIN) 81 mg, Oral, Daily    blood-glucose meter kit Use as instructed. Insurance preferred.    CALCIUM CARBONATE/VITAMIN D3 (CALCIUM 500 + D ORAL) 1 tablet, Oral, Daily, 10 mg daily    cyanocobalamin (VITAMIN B-12) 1,000 mcg, Oral, Daily    econazole nitrate 1 % cream Topical (Top), Daily    fluticasone furoate-vilanteroL (BREO ELLIPTA) 100-25 mcg/dose diskus inhaler 1 puff, Inhalation, Daily, Controller    ketoconazole (NIZORAL) 2 % cream AAA pannus fold at least daily after the shower    letrozole (FEMARA) 2.5 mg, Oral    levothyroxine (SYNTHROID) 125 mcg, Oral, Before breakfast    losartan (COZAAR) 25 mg, Oral, Daily    metFORMIN  (GLUCOPHAGE-XR) 1,000 mg, Oral, With breakfast    nystatin (MYCOSTATIN) cream Topical (Top), 2 times daily, Apply to rash on face    polyethylene glycol (GLYCOLAX) 17 g, Oral, Daily    potassium chloride SA (K-DUR,KLOR-CON) 20 MEQ tablet 20 mEq, Oral, Daily    rOPINIRole (REQUIP) 1 mg, Oral, Nightly    sertraline (ZOLOFT) 50 mg, Oral, Daily, For depression/anxiety    simvastatin (ZOCOR) 40 mg, Oral    solifenacin (VESICARE) 10 mg, Oral, Daily    tetrabenazine (XENAZINE) 25 mg tablet Take one tablet daily for 7 days and then<BR>Take 1 tablet twice daily afterwards        Past Medical History:   Diagnosis Date    Anxiety     Arthritis     Asthma     Breast cancer     Left    Depression     Diabetes mellitus, type 2     GERD (gastroesophageal reflux disease)     Hyperlipidemia     Hypertension     Hypothyroidism, unspecified     Overactive bladder     Seizures     Pseudo-seizures    Sleep apnea     Stroke 2022    pt was in the hospital for a stroke, claims she was seeing people when the stroke happened        Past Surgical History:   Procedure Laterality Date    APPENDECTOMY      BREAST BIOPSY Left     BREAST LUMPECTOMY Left     2016    BREAST SURGERY      CATARACT EXTRACTION Bilateral     OU done//     SECTION      CHOLECYSTECTOMY      COLONOSCOPY N/A 2020    Procedure: COLONOSCOPY;  Surgeon: Mj Fernandez MD;  Location: [facility];  Service: Endoscopy;  Laterality: N/A;    CYST REMOVAL Left 2021    DILATION AND CURETTAGE OF UTERUS      EYE SURGERY      HYSTEROSCOPY WITH DILATION AND CURETTAGE OF UTERUS N/A 2023    Procedure: HYSTEROSCOPY, WITH DILATION AND CURETTAGE OF UTERUS AND OTHER INDICATED PROCEDURES Needs Cardiac clearance;  Surgeon: Gamaliel Moran MD;  Location: [facility] OR;  Service: OB/GYN;  Laterality: N/A;  Cardiac clearance needed per Dr Anderson    LUMPECTOMY, BREAST Left 2023    Procedure: LUMPECTOMY, BREAST;  Surgeon: Toño Paredes MD;  Location: [facility];   "Service: General;  Laterality: Left;    PERCUTANEOUS PINNING OF HIP Right 11/11/2022    Procedure: PINNING, HIP, PERCUTANEOUS;  Surgeon: Ministerio Joiner II, MD;  Location: [facility] OR;  Service: Orthopedics;  Laterality: Right;    SENTINEL LYMPH NODE BIOPSY Left 4/26/2023    Procedure: BIOPSY, LYMPH NODE, SENTINEL;  Surgeon: Toño Paredes MD;  Location: [facility] OR;  Service: General;  Laterality: Left;    SIMPLE MASTECTOMY Left 5/19/2023    Procedure: MASTECTOMY, SIMPLE;  Surgeon: Toño Paredes MD;  Location: [facility] OR;  Service: General;  Laterality: Left;        Family History   Problem Relation Name Age of Onset    Diabetes Mother      Hypertension Mother      Breast cancer Mother      Cataracts Mother      Melanoma Mother      Heart failure Father      Melanoma Father      Cataracts Brother      Melanoma Brother      Glaucoma Neg Hx      Retinal detachment Neg Hx      Macular degeneration Neg Hx         Social History     Tobacco Use    Smoking status: Never     Passive exposure: Past    Smokeless tobacco: Never   Substance Use Topics    Alcohol use: Yes     Comment: seldom    Drug use: No         Vitals:    07/12/24 1050   BP: 130/63   Pulse: 75   Resp: 16   Temp: 97 °F (36.1 °C)        Physical Exam:  /63 (BP Location: Right arm, Patient Position: Sitting, BP Method: Large (Automatic))   Pulse 75   Temp 97 °F (36.1 °C) (Temporal)   Resp 16   Ht 5' 1" (1.549 m)   Wt 77.1 kg (169 lb 15.6 oz)   SpO2 96%   BMI 32.12 kg/m²     Physical Exam  Constitutional:       Appearance: Normal appearance.   HENT:      Head: Normocephalic and atraumatic.      Nose: Nose normal.      Mouth/Throat:      Mouth: Mucous membranes are moist.      Pharynx: Oropharynx is clear.   Eyes:      Conjunctiva/sclera: Conjunctivae normal.   Cardiovascular:      Rate and Rhythm: Normal rate and regular rhythm.      Heart sounds: Normal heart sounds.   Pulmonary:      Effort: Pulmonary effort is normal.      Breath sounds: Normal breath " sounds.   Abdominal:      General: Abdomen is flat. Bowel sounds are normal.      Palpations: Abdomen is soft.   Musculoskeletal:         General: Normal range of motion.      Cervical back: Normal range of motion and neck supple.   Skin:     General: Skin is warm and dry.   Neurological:      General: No focal deficit present.      Mental Status: She is alert and oriented to person, place, and time. Mental status is at baseline.   Psychiatric:         Mood and Affect: Mood normal.          Labs:  Lab Results   Component Value Date    WBC 7.88 07/09/2024    RBC 4.72 07/09/2024    HGB 14.2 07/09/2024    HCT 45.4 07/09/2024    MCV 96 07/09/2024    MCH 30.1 07/09/2024    MCHC 31.3 (L) 07/09/2024    RDW 13.5 07/09/2024     07/09/2024    MPV 11.6 07/09/2024    GRAN 5.7 07/09/2024    GRAN 72.0 07/09/2024    LYMPH 1.5 07/09/2024    LYMPH 18.9 07/09/2024    MONO 0.5 07/09/2024    MONO 6.0 07/09/2024    EOS 0.2 07/09/2024    BASO 0.03 07/09/2024    EOSINOPHIL 2.2 07/09/2024    BASOPHIL 0.4 07/09/2024     Sodium   Date Value Ref Range Status   07/09/2024 146 (H) 136 - 145 mmol/L Final     Potassium   Date Value Ref Range Status   07/09/2024 4.2 3.5 - 5.1 mmol/L Final     Chloride   Date Value Ref Range Status   07/09/2024 110 95 - 110 mmol/L Final     CO2   Date Value Ref Range Status   07/09/2024 26 23 - 29 mmol/L Final     Glucose   Date Value Ref Range Status   07/09/2024 132 (H) 70 - 110 mg/dL Final     BUN   Date Value Ref Range Status   07/09/2024 16 8 - 23 mg/dL Final     Creatinine   Date Value Ref Range Status   07/09/2024 0.8 0.5 - 1.4 mg/dL Final     Calcium   Date Value Ref Range Status   07/09/2024 9.4 8.7 - 10.5 mg/dL Final     Total Protein   Date Value Ref Range Status   07/09/2024 6.9 6.0 - 8.4 g/dL Final     Albumin   Date Value Ref Range Status   07/09/2024 3.3 (L) 3.5 - 5.2 g/dL Final     Total Bilirubin   Date Value Ref Range Status   07/09/2024 0.4 0.1 - 1.0 mg/dL Final     Comment:     For  infants and newborns, interpretation of results should be based  on gestational age, weight and in agreement with clinical  observations.    Premature Infant recommended reference ranges:  Up to 24 hours.............<8.0 mg/dL  Up to 48 hours............<12.0 mg/dL  3-5 days..................<15.0 mg/dL  6-29 days.................<15.0 mg/dL       Alkaline Phosphatase   Date Value Ref Range Status   07/09/2024 85 55 - 135 U/L Final     AST   Date Value Ref Range Status   07/09/2024 14 10 - 40 U/L Final     ALT   Date Value Ref Range Status   07/09/2024 7 (L) 10 - 44 U/L Final     Anion Gap   Date Value Ref Range Status   07/09/2024 10 8 - 16 mmol/L Final     eGFR if    Date Value Ref Range Status   03/29/2022 >60.0 >60 mL/min/1.73 m^2 Final     eGFR if non    Date Value Ref Range Status   03/29/2022 >60.0 >60 mL/min/1.73 m^2 Final     Comment:     Calculation used to obtain the estimated glomerular filtration  rate (eGFR) is the CKD-EPI equation.          A/P:    L breast invasive ductal carcinoma  -pT2N0, s/p left mastectomy  -ER 90%, OH 20%, HER2 -, Ki 40%  -oncotype 21 - no need for chemo  -started letrozole 6/12/23  -screening mammogram 3/20/24, repeat in 1 year  -RTC in 6 months with labs    Osteopenia  -bone density scan 7/31/23 showed improvement in osteopenia  -cont vit D and Ca  -on Fosamax, continue for now    COPD  DM2      Aurash Khoobehi, MD  Hematology and Oncology

## 2024-07-15 ENCOUNTER — TELEPHONE (OUTPATIENT)
Dept: FAMILY MEDICINE | Facility: CLINIC | Age: 74
End: 2024-07-15
Payer: MEDICARE

## 2024-07-15 NOTE — TELEPHONE ENCOUNTER
"----- Message from Krysten Casas sent at 7/15/2024  4:04 PM CDT -----  Contact: Patient  Type:  RX Refill Request    Who Called:   Patient  Refill or New Rx:  New    RX Name and Strength:  A Diuretic - retaining fluid and swelling    Preferred Pharmacy with phone number:      [id number] - [address] & [address] [address]  Phone: [phone] Fax: [phone]    Local or Mail Order:  Local  Ordering Provider:  Dr Abbasi    Would the patient rather a call back or a response via [facility]?   Call back  Best Call Back Number:  [phone]    Additional Information:  States she would like a prescription sent to [facility] (above) - states she needs something to "make her pee" - please call - thank you  "

## 2024-07-15 NOTE — TELEPHONE ENCOUNTER
Called patient to schedule an office visit to discuss a medication to help her urinate due to swelling. No answer, left voicemail to return call.

## 2024-07-24 ENCOUNTER — CLINICAL SUPPORT (OUTPATIENT)
Dept: REHABILITATION | Facility: HOSPITAL | Age: 74
End: 2024-07-24
Payer: MEDICARE

## 2024-07-24 DIAGNOSIS — R26.89 BALANCE PROBLEM: Primary | ICD-10-CM

## 2024-07-24 DIAGNOSIS — R53.81 PHYSICAL DECONDITIONING: ICD-10-CM

## 2024-07-24 PROCEDURE — 97110 THERAPEUTIC EXERCISES: CPT | Mod: KX,PO

## 2024-07-24 NOTE — PROGRESS NOTES
[facility]   Physical Therapy Treatment Note      Name: Maggy Tapia  Clinic Number: [id number]    Therapy Diagnosis:   Encounter Diagnoses   Name Primary?    Balance problem Yes    Physical deconditioning      Physician: Yanna Abbasi MD    Visit Date: 7/24/2024  Physician Orders: PT Eval and Treat   Medical Diagnosis from Referral:   R26.89 (ICD-10-CM) - Balance problem   R29.898 (ICD-10-CM) - Weakness of both upper extremities      Evaluation Date: 5/29/2024  Authorization Period Expiration: 12/31/24  Plan of Care Expiration: 8/2/24     Visit # / Visits authorized: 11/20    (12)     Precautions: Standard and Fall, Hx of seizure     Time In: 1300  Time Out: 1345  Total Billable Time: 45 minutes       Subjective     Patient reports: had episode of severe edema to both legs that shoes don't fit.   She was compliant with home exercise program.  Response to previous treatment: Missed appointment from lymphedema.  Functional change: none    Pain:  0/10     Location:  none indicated    Objective      No significant edema to both LE's noted.    Treatment     Ivana received the treatments listed below:      therapeutic exercises to develop strength, posture, and function for 45 minutes including:  Nustep L4 all 4's 10'  Standing Gastroc  stretches 2'  Trunk extension leaning against // bars 20  Supine Hamstring stretches 2'  Quad sets 20/20  Crooklying hip abduction/adduction manual resist 20/20   Bridging crooklying 20; knee straight 20  SLR 10x 2/10x 2  SAQ's 2#s 20/20  Seated hamstring curls 20/20    Patient Education and Home Exercises       Education provided:   Discussed DC plans.  Written Home Exercises Provided: Patient instructed to cont prior HEP. Exercises were reviewed and Ivana was able to demonstrate them prior to the end of the session.  Ivana demonstrated good  understanding of the education provided. See Electronic Medical Record under Patient Instructions for exercises  provided during therapy sessions    Assessment     Patient denies pain but extension lag causes discomforts on stretch both knees. Appears to get SOB with standing activity.  Tolerate Rx..    Ivana Is progressing well towards her goals.   Patient prognosis is Good.     Patient will continue to benefit from skilled outpatient physical therapy to address the deficits listed in the problem list box on initial evaluation, provide pt/family education and to maximize pt's level of independence in the home and community environment.     Patient's spiritual, cultural and educational needs considered and pt agreeable to plan of care and goals.     Anticipated barriers to physical therapy: no follow through with progress at home.    Goals: Short Term Goals:    Patient will report compliance to home exercise given. (Ongoing)  Patient will improve knee extension with supine  position with knee flat on the bed on stretch. (ongoing)  Patient will tolerate 10' continuous movements to all 4's on Nustep.  (Met)     Long Term Goals:   Patient will improve 30 second sit to stand 6 or better.  (To be assessed)  Patient will improve Timed Up and Go 50 seconds or less.  (To be assessed)  Patient will improve knee ROM 90% full extension.  (Ongoing)  Patient will improve strength 3#s with handgrip or pinch. (To be assessed)  Patient will improve FOTO score 49/100 or better   (to be assessed)    Plan     DC plans.    Sukh Perdomo, PT

## 2024-07-25 ENCOUNTER — LAB VISIT (OUTPATIENT)
Dept: LAB | Facility: HOSPITAL | Age: 74
End: 2024-07-25
Attending: FAMILY MEDICINE
Payer: MEDICARE

## 2024-07-25 DIAGNOSIS — E78.2 MIXED HYPERLIPIDEMIA: ICD-10-CM

## 2024-07-25 DIAGNOSIS — E03.9 ACQUIRED HYPOTHYROIDISM: ICD-10-CM

## 2024-07-25 DIAGNOSIS — E11.42 TYPE 2 DIABETES MELLITUS WITH DIABETIC POLYNEUROPATHY, WITHOUT LONG-TERM CURRENT USE OF INSULIN: ICD-10-CM

## 2024-07-25 LAB
ALBUMIN SERPL BCP-MCNC: 3.3 G/DL (ref 3.5–5.2)
ALP SERPL-CCNC: 130 U/L (ref 55–135)
ALT SERPL W/O P-5'-P-CCNC: 9 U/L (ref 10–44)
ANION GAP SERPL CALC-SCNC: 7 MMOL/L (ref 8–16)
AST SERPL-CCNC: 14 U/L (ref 10–40)
BASOPHILS # BLD AUTO: 0.06 K/UL (ref 0–0.2)
BASOPHILS NFR BLD: 0.6 % (ref 0–1.9)
BILIRUB SERPL-MCNC: 0.3 MG/DL (ref 0.1–1)
BUN SERPL-MCNC: 26 MG/DL (ref 8–23)
CALCIUM SERPL-MCNC: 10.1 MG/DL (ref 8.7–10.5)
CHLORIDE SERPL-SCNC: 105 MMOL/L (ref 95–110)
CHOLEST SERPL-MCNC: 138 MG/DL (ref 120–199)
CHOLEST/HDLC SERPL: 2.9 {RATIO} (ref 2–5)
CO2 SERPL-SCNC: 29 MMOL/L (ref 23–29)
CREAT SERPL-MCNC: 1.1 MG/DL (ref 0.5–1.4)
DIFFERENTIAL METHOD BLD: ABNORMAL
EOSINOPHIL # BLD AUTO: 0.4 K/UL (ref 0–0.5)
EOSINOPHIL NFR BLD: 3.5 % (ref 0–8)
ERYTHROCYTE [DISTWIDTH] IN BLOOD BY AUTOMATED COUNT: 13.7 % (ref 11.5–14.5)
EST. GFR  (NO RACE VARIABLE): 52.1 ML/MIN/1.73 M^2
ESTIMATED AVG GLUCOSE: 120 MG/DL (ref 68–131)
GLUCOSE SERPL-MCNC: 144 MG/DL (ref 70–110)
HBA1C MFR BLD: 5.8 % (ref 4–5.6)
HCT VFR BLD AUTO: 43.8 % (ref 37–48.5)
HDLC SERPL-MCNC: 47 MG/DL (ref 40–75)
HDLC SERPL: 34.1 % (ref 20–50)
HGB BLD-MCNC: 14.3 G/DL (ref 12–16)
IMM GRANULOCYTES # BLD AUTO: 0.04 K/UL (ref 0–0.04)
IMM GRANULOCYTES NFR BLD AUTO: 0.4 % (ref 0–0.5)
LDLC SERPL CALC-MCNC: 70.8 MG/DL (ref 63–159)
LYMPHOCYTES # BLD AUTO: 1.8 K/UL (ref 1–4.8)
LYMPHOCYTES NFR BLD: 18.1 % (ref 18–48)
MCH RBC QN AUTO: 31.3 PG (ref 27–31)
MCHC RBC AUTO-ENTMCNC: 32.6 G/DL (ref 32–36)
MCV RBC AUTO: 96 FL (ref 82–98)
MONOCYTES # BLD AUTO: 0.7 K/UL (ref 0.3–1)
MONOCYTES NFR BLD: 6.8 % (ref 4–15)
NEUTROPHILS # BLD AUTO: 7 K/UL (ref 1.8–7.7)
NEUTROPHILS NFR BLD: 70.6 % (ref 38–73)
NONHDLC SERPL-MCNC: 91 MG/DL
NRBC BLD-RTO: 0 /100 WBC
PLATELET # BLD AUTO: 184 K/UL (ref 150–450)
PMV BLD AUTO: 12 FL (ref 9.2–12.9)
POTASSIUM SERPL-SCNC: 5 MMOL/L (ref 3.5–5.1)
PROT SERPL-MCNC: 6.9 G/DL (ref 6–8.4)
RBC # BLD AUTO: 4.57 M/UL (ref 4–5.4)
SODIUM SERPL-SCNC: 141 MMOL/L (ref 136–145)
T4 FREE SERPL-MCNC: 1.07 NG/DL (ref 0.71–1.51)
TRIGL SERPL-MCNC: 101 MG/DL (ref 30–150)
TSH SERPL DL<=0.005 MIU/L-ACNC: 2.68 UIU/ML (ref 0.4–4)
WBC # BLD AUTO: 9.97 K/UL (ref 3.9–12.7)

## 2024-07-25 PROCEDURE — 85025 COMPLETE CBC W/AUTO DIFF WBC: CPT | Performed by: FAMILY MEDICINE

## 2024-07-25 PROCEDURE — 84443 ASSAY THYROID STIM HORMONE: CPT | Performed by: FAMILY MEDICINE

## 2024-07-25 PROCEDURE — 80061 LIPID PANEL: CPT | Performed by: FAMILY MEDICINE

## 2024-07-25 PROCEDURE — 80053 COMPREHEN METABOLIC PANEL: CPT | Performed by: FAMILY MEDICINE

## 2024-07-25 PROCEDURE — 84439 ASSAY OF FREE THYROXINE: CPT | Performed by: FAMILY MEDICINE

## 2024-07-25 PROCEDURE — 36415 COLL VENOUS BLD VENIPUNCTURE: CPT | Mod: PO | Performed by: FAMILY MEDICINE

## 2024-07-25 PROCEDURE — 83036 HEMOGLOBIN GLYCOSYLATED A1C: CPT | Performed by: FAMILY MEDICINE

## 2024-07-31 ENCOUNTER — CLINICAL SUPPORT (OUTPATIENT)
Dept: REHABILITATION | Facility: HOSPITAL | Age: 74
End: 2024-07-31
Payer: MEDICARE

## 2024-07-31 DIAGNOSIS — R29.898 LEG WEAKNESS, BILATERAL: ICD-10-CM

## 2024-07-31 DIAGNOSIS — R26.89 BALANCE PROBLEM: Primary | ICD-10-CM

## 2024-07-31 DIAGNOSIS — R53.81 PHYSICAL DECONDITIONING: ICD-10-CM

## 2024-07-31 PROCEDURE — 97110 THERAPEUTIC EXERCISES: CPT | Mod: KX,PO

## 2024-07-31 NOTE — PROGRESS NOTES
[facility]  Physical Therapy Discharge Note    Name: Maggy Tapia  Clinic Number: [id number]    Therapy Diagnosis:   Encounter Diagnoses   Name Primary?    Balance problem Yes    Physical deconditioning     Leg weakness, bilateral      Physician: Yanna Abbasi MD    Physician Orders: Pt Eval and   Medical Diagnosis:   R26.89 (ICD-10-CM) - Balance problem   R29.898 (ICD-10-CM) - Weakness of both upper extremities     Evaluation Date: 5/29/24    Date of Last visit: 7/3124  Total Visits Received: 13    Today's Visit:   Time IN: 1300  Time Out: 1345  Total time in minutes. 45    Treatment: Thera Ex 45  Includes:  Nustep L4 all 4's 10'  Gastroc stretch  Heel raises  Mini squats  Standing trunk extension against // bar  Supine hamstring stretches  Quad sets  Bridging  Straight leg raising  Sit to stand  Timed Up and Go  Reviewed Home exercise program.    ASSESSMENT      Patient seen x 6 weeks of therapy. Making good progress. Achieving set goals. Pain level 0/10  Knee extension R 10 deg  L 10 deg .   B knee ext 5 deg on stretch.  Pinch L 4.25#s  R 4.5 #s      Handgrip   L 30#s    R 40#s  30 seconds sit to stand : 8  Timed Up and Go with RW: 1. 31.40 sec    2 30.82 sec    3. 27.38 sec       MEAN: 29.87 sec    Discharge reason: Patient has met all of his/her goals    Discharge FOTO Score: 83/100    Goals: Short Term Goals:    Patient will report compliance to home exercise given. (Ongoing)  Patient will improve knee extension with supine  position with knee flat on the bed on stretch. (ongoing)  Patient will tolerate 10' continuous movements to all 4's on Nustep.  (Met)     Long Term Goals:   Patient will improve 30 second sit to stand 6 or better.  (Met  8)  Patient will improve Timed Up and Go 50 seconds or less.  (Met  29.87 sec)  Patient will improve knee ROM 90% full extension.  (Met)  Patient will improve strength 3#s with handgrip or pinch. (Met)  Patient will improve FOTO score  49/100 or better   (met 83/100)    PLAN   This patient is discharged from Physical Therapy.      Sukh Perdomo, PT

## 2024-08-01 ENCOUNTER — OFFICE VISIT (OUTPATIENT)
Dept: FAMILY MEDICINE | Facility: CLINIC | Age: 74
End: 2024-08-01
Payer: MEDICARE

## 2024-08-01 VITALS
BODY MASS INDEX: 30.96 KG/M2 | HEIGHT: 61 IN | TEMPERATURE: 98 F | SYSTOLIC BLOOD PRESSURE: 130 MMHG | OXYGEN SATURATION: 96 % | WEIGHT: 164 LBS | HEART RATE: 84 BPM | DIASTOLIC BLOOD PRESSURE: 58 MMHG

## 2024-08-01 DIAGNOSIS — N18.31 CHRONIC KIDNEY DISEASE, STAGE 3A: ICD-10-CM

## 2024-08-01 DIAGNOSIS — R26.89 BALANCE PROBLEM: ICD-10-CM

## 2024-08-01 DIAGNOSIS — R10.31 RIGHT GROIN PAIN: Primary | ICD-10-CM

## 2024-08-01 DIAGNOSIS — E03.9 ACQUIRED HYPOTHYROIDISM: ICD-10-CM

## 2024-08-01 DIAGNOSIS — F33.1 MAJOR DEPRESSIVE DISORDER, RECURRENT EPISODE, MODERATE: ICD-10-CM

## 2024-08-01 DIAGNOSIS — E11.42 TYPE 2 DIABETES MELLITUS WITH DIABETIC POLYNEUROPATHY, WITHOUT LONG-TERM CURRENT USE OF INSULIN: ICD-10-CM

## 2024-08-01 DIAGNOSIS — I10 ESSENTIAL HYPERTENSION: ICD-10-CM

## 2024-08-01 DIAGNOSIS — C50.912 INVASIVE DUCTAL CARCINOMA OF BREAST, FEMALE, LEFT: ICD-10-CM

## 2024-08-01 DIAGNOSIS — E11.69 HYPERLIPIDEMIA ASSOCIATED WITH TYPE 2 DIABETES MELLITUS: ICD-10-CM

## 2024-08-01 DIAGNOSIS — E78.5 HYPERLIPIDEMIA ASSOCIATED WITH TYPE 2 DIABETES MELLITUS: ICD-10-CM

## 2024-08-01 LAB
BILIRUB UR QL STRIP: NEGATIVE
CLARITY UR REFRACT.AUTO: CLEAR
COLOR UR AUTO: YELLOW
GLUCOSE UR QL STRIP: NEGATIVE
HGB UR QL STRIP: NEGATIVE
KETONES UR QL STRIP: NEGATIVE
LEUKOCYTE ESTERASE UR QL STRIP: NEGATIVE
NITRITE UR QL STRIP: NEGATIVE
PH UR STRIP: 5 [PH] (ref 5–8)
PROT UR QL STRIP: NEGATIVE
SP GR UR STRIP: 1.02 (ref 1–1.03)
URN SPEC COLLECT METH UR: NORMAL

## 2024-08-01 PROCEDURE — 99214 OFFICE O/P EST MOD 30 MIN: CPT | Mod: S$GLB,,, | Performed by: NURSE PRACTITIONER

## 2024-08-01 PROCEDURE — 1159F MED LIST DOCD IN RCRD: CPT | Mod: CPTII,S$GLB,, | Performed by: NURSE PRACTITIONER

## 2024-08-01 PROCEDURE — 87086 URINE CULTURE/COLONY COUNT: CPT | Performed by: NURSE PRACTITIONER

## 2024-08-01 PROCEDURE — 3288F FALL RISK ASSESSMENT DOCD: CPT | Mod: CPTII,S$GLB,, | Performed by: NURSE PRACTITIONER

## 2024-08-01 PROCEDURE — 1160F RVW MEDS BY RX/DR IN RCRD: CPT | Mod: CPTII,S$GLB,, | Performed by: NURSE PRACTITIONER

## 2024-08-01 PROCEDURE — 3078F DIAST BP <80 MM HG: CPT | Mod: CPTII,S$GLB,, | Performed by: NURSE PRACTITIONER

## 2024-08-01 PROCEDURE — 1101F PT FALLS ASSESS-DOCD LE1/YR: CPT | Mod: CPTII,S$GLB,, | Performed by: NURSE PRACTITIONER

## 2024-08-01 PROCEDURE — 3075F SYST BP GE 130 - 139MM HG: CPT | Mod: CPTII,S$GLB,, | Performed by: NURSE PRACTITIONER

## 2024-08-01 PROCEDURE — 99999 PR PBB SHADOW E&M-EST. PATIENT-LVL V: CPT | Mod: PBBFAC,,, | Performed by: NURSE PRACTITIONER

## 2024-08-01 PROCEDURE — 81003 URINALYSIS AUTO W/O SCOPE: CPT | Performed by: NURSE PRACTITIONER

## 2024-08-01 PROCEDURE — 3072F LOW RISK FOR RETINOPATHY: CPT | Mod: CPTII,S$GLB,, | Performed by: NURSE PRACTITIONER

## 2024-08-01 PROCEDURE — 1157F ADVNC CARE PLAN IN RCRD: CPT | Mod: CPTII,S$GLB,, | Performed by: NURSE PRACTITIONER

## 2024-08-01 PROCEDURE — 1125F AMNT PAIN NOTED PAIN PRSNT: CPT | Mod: CPTII,S$GLB,, | Performed by: NURSE PRACTITIONER

## 2024-08-01 RX ORDER — METHYLPREDNISOLONE 4 MG/1
TABLET ORAL
Qty: 21 EACH | Refills: 0 | Status: SHIPPED | OUTPATIENT
Start: 2024-08-01 | End: 2024-08-22

## 2024-08-01 NOTE — PROGRESS NOTES
"Subjective:       Patient ID: Maggy Tapia is a 74 y.o. female.    Chief Complaint: Follow-up (3m)     HPI   73 y/o female patient with medical problems listed below presents for 3 months follow up. Patient is here alone but states her brother will pick her up. Patient walks with walker. Patient states completed 6 weeks of PT to gain strength in the lower extremities which she states helped. She states has had right groin pain and right knee pain off and on for a year. Denies recent trauma to the affected area. Denies nausea, vomiting, abdominal pain, urinary or bowel symptoms, fever, chills but states feels "cold" on urination. She states used ice & hot cream for the knee with minimal relief.     Labs reviewed from 7/2024- Cr 1.2/GFR 52.1, A1c 5.8    Hematology Dr. Khoobehi for breast ca s/p mastectomy, S/p lumpectomy 4/23  No need for chemo. Started letrozole. Dexa 7/31/23    Psych LEEANN Drake     Pulmonary Dr. Bello for COPD     Knee x-ray bilateral 6/8/2024  FINDINGS:  Degenerative changes are noted involving the right and left knee with osteophyte formation along the articular surfaces. Knee joint space narrowing noted bilaterally.  No acute fracture or dislocation. No effusion bilaterally.     Impression:   No acute abnormality.  Degenerative changes bilaterally.    Patient Active Problem List   Diagnosis    Syncope and collapse    Frequent falls (possibly related to polypharmacy)    History of left breast cancer    History of ischemic left MCA stroke    Essential hypertension    Hyperlipidemia    Thrombocytopenia    Depression    Hypothyroidism    Valproic acid toxicity    Psychogenic nonepileptic seizure    Osteopenia    JUAN (obstructive sleep apnea)    Near syncope    Diplopia    Cervical strain    Left homonymous hemianopsia    Tardive dyskinesia    Gait instability    Hypoglycemia    History of subdural hematoma    Rectal bleeding    Shortness of breath    Chronic restrictive lung disease    Paralysis of " diaphragm    Major depressive disorder, recurrent episode, moderate    Diabetic polyneuropathy    Bilateral hearing loss    Urinary incontinence    Stroke    Confusion    Type 2 diabetes mellitus with diabetic polyneuropathy, without long-term current use of insulin    Buttock wound, right, subsequent encounter    Dizziness    Balance problem    Leg weakness, bilateral    Closed traumatic nondisplaced fracture of neck of right femur    Invasive ductal carcinoma of breast, female, left    Abnormal auditory function study    Obesity (BMI 30-39.9)    Type 2 diabetes mellitus with diabetic peripheral angiopathy without gangrene, without long-term current use of insulin    History of colon polyps    Pressure injury of right buttock, stage 2    Physical deconditioning    Chronic kidney disease, stage 3a      Review of patient's allergies indicates:   Allergen Reactions    Penicillins Anaphylaxis    Sulfa (sulfonamide antibiotics) Anaphylaxis    Trintellix [vortioxetine] Nausea And Vomiting and Other (See Comments)     Patient has seizures and vomits     Past Surgical History:   Procedure Laterality Date    APPENDECTOMY      BREAST BIOPSY Left     BREAST LUMPECTOMY Left     2016    BREAST SURGERY      CATARACT EXTRACTION Bilateral     OU done//     SECTION      CHOLECYSTECTOMY      COLONOSCOPY N/A 2020    Procedure: COLONOSCOPY;  Surgeon: Mj Fernandez MD;  Location: [facility];  Service: Endoscopy;  Laterality: N/A;    CYST REMOVAL Left 2021    DILATION AND CURETTAGE OF UTERUS      EYE SURGERY      HYSTEROSCOPY WITH DILATION AND CURETTAGE OF UTERUS N/A 2023    Procedure: HYSTEROSCOPY, WITH DILATION AND CURETTAGE OF UTERUS AND OTHER INDICATED PROCEDURES Needs Cardiac clearance;  Surgeon: Gamaliel Moran MD;  Location: [facility] OR;  Service: OB/GYN;  Laterality: N/A;  Cardiac clearance needed per Dr Anderson    LUMPECTOMY, BREAST Left 2023    Procedure: LUMPECTOMY, BREAST;  Surgeon: Toño LIU  MD Lena;  Location: [facility] OR;  Service: General;  Laterality: Left;    PERCUTANEOUS PINNING OF HIP Right 11/11/2022    Procedure: PINNING, HIP, PERCUTANEOUS;  Surgeon: Ministerio Joiner II, MD;  Location: [facility] OR;  Service: Orthopedics;  Laterality: Right;    SENTINEL LYMPH NODE BIOPSY Left 4/26/2023    Procedure: BIOPSY, LYMPH NODE, SENTINEL;  Surgeon: Toño Paredes MD;  Location: [facility] OR;  Service: General;  Laterality: Left;    SIMPLE MASTECTOMY Left 5/19/2023    Procedure: MASTECTOMY, SIMPLE;  Surgeon: Toño Paredes MD;  Location: [facility] OR;  Service: General;  Laterality: Left;        Current Outpatient Medications:     albuterol (PROVENTIL/VENTOLIN HFA) 90 mcg/actuation inhaler, Inhale 1-2 puffs into the lungs every 4 (four) hours as needed for Wheezing or Shortness of Breath. INHALE 1 TO 2 PUFFS BY MOUTH INTO THE LUNGS EVERY 4 HOURS AS NEEDED FOR SHORTNESS OF BREATH( COUGHING) Strength: 90 mcg/actuation, Disp: 20.1 g, Rfl: 11    alendronate (FOSAMAX) 70 MG tablet, Take one tablet every 7 days. (Patient taking differently: Take 70 mg by mouth every 7 days. Take one tablet every 7 days.), Disp: 4 tablet, Rfl: 11    aspirin (ECOTRIN) 81 MG EC tablet, Take 81 mg by mouth once daily., Disp: , Rfl:     blood-glucose meter kit, Use as instructed. Insurance preferred., Disp: 1 each, Rfl: 0    CALCIUM CARBONATE/VITAMIN D3 (CALCIUM 500 + D ORAL), Take 1 tablet by mouth once daily. 10 mg daily, Disp: , Rfl:     cyanocobalamin (VITAMIN B-12) 1000 MCG tablet, Take 1 tablet (1,000 mcg total) by mouth once daily., Disp: 30 tablet, Rfl: 0    fluticasone furoate-vilanteroL (BREO ELLIPTA) 100-25 mcg/dose diskus inhaler, Inhale 1 puff into the lungs once daily. Controller, Disp: 60 each, Rfl: 11    ketoconazole (NIZORAL) 2 % cream, AAA pannus fold at least daily after the shower, Disp: 60 g, Rfl: 3    letrozole (FEMARA) 2.5 mg Tab, TAKE 1 TABLET(2.5 MG) BY MOUTH EVERY DAY (Patient taking differently: Take 2.5 mg by mouth  once daily.), Disp: 30 tablet, Rfl: 5    levothyroxine (SYNTHROID) 125 MCG tablet, Take 1 tablet (125 mcg total) by mouth before breakfast., Disp: 90 tablet, Rfl: 3    losartan (COZAAR) 50 MG tablet, Take 0.5 tablets (25 mg total) by mouth once daily., Disp: 90 tablet, Rfl: 0    metFORMIN (GLUCOPHAGE-XR) 500 MG ER 24hr tablet, Take 2 tablets (1,000 mg total) by mouth daily with breakfast., Disp: 180 tablet, Rfl: 3    nystatin (MYCOSTATIN) cream, Apply topically 2 (two) times daily. Apply to rash on face (Patient taking differently: Apply 1 g topically 2 (two) times daily. Apply to rash on face), Disp: 15 g, Rfl: 2    polyethylene glycol (GLYCOLAX) 17 gram PwPk, Take 17 g by mouth once daily., Disp: , Rfl: 0    potassium chloride SA (K-DUR,KLOR-CON) 20 MEQ tablet, Take 20 mEq by mouth once daily., Disp: , Rfl:     rOPINIRole (REQUIP) 0.5 MG tablet, Take 1 mg by mouth every evening., Disp: , Rfl:     sertraline (ZOLOFT) 50 MG tablet, Take 1 tablet (50 mg total) by mouth once daily. For depression/anxiety, Disp: 90 tablet, Rfl: 0    simvastatin (ZOCOR) 40 MG tablet, TAKE 1 TABLET(40 MG) BY MOUTH EVERY DAY (Patient taking differently: Take 40 mg by mouth once daily.), Disp: 90 tablet, Rfl: 3    tetrabenazine (XENAZINE) 25 mg tablet, Take one tablet daily for 7 days and then Take 1 tablet twice daily afterwards, Disp: 47 tablet, Rfl: 0    econazole nitrate 1 % cream, Apply topically once daily. (Patient taking differently: Apply 1 application  topically once daily.), Disp: 45 g, Rfl: 10    methylPREDNISolone (MEDROL DOSEPACK) 4 mg tablet, use as directed, Disp: 21 each, Rfl: 0    solifenacin (VESICARE) 10 MG tablet, Take 1 tablet (10 mg total) by mouth once daily., Disp: 90 tablet, Rfl: 4    Review of Systems   Constitutional:  Negative for chills and fever.   Respiratory:  Negative for cough and shortness of breath.    Cardiovascular:  Negative for chest pain and palpitations.   Gastrointestinal:  Negative for abdominal  "pain.   Musculoskeletal:  Positive for gait problem.        Right groin pain  Right knee pain    Neurological:  Negative for dizziness and headaches.       Objective:   BP (!) 130/58 (BP Location: Right arm, Patient Position: Sitting, BP Method: Medium (Manual))   Pulse 84   Temp 98.1 °F (36.7 °C) (Oral)   Ht 5' 1" (1.549 m)   Wt 74.4 kg (164 lb 0.4 oz)   SpO2 96%   BMI 30.99 kg/m²         Physical Exam  Constitutional:       General: She is not in acute distress.     Appearance: Normal appearance.   HENT:      Head: Atraumatic.   Cardiovascular:      Rate and Rhythm: Normal rate and regular rhythm.      Pulses: Normal pulses.      Heart sounds: Normal heart sounds.   Pulmonary:      Effort: Pulmonary effort is normal.      Breath sounds: Normal breath sounds.   Abdominal:      General: Abdomen is flat. Bowel sounds are normal.      Palpations: Abdomen is soft.      Tenderness: There is no abdominal tenderness.      Hernia: There is no hernia in the right inguinal area.   Musculoskeletal:      Comments: +tenderness in right inguinal region    Neurological:      Mental Status: She is oriented to person, place, and time.         Assessment:       1. Right groin pain    2. Essential hypertension    3. Type 2 diabetes mellitus with diabetic polyneuropathy, without long-term current use of insulin    4. Acquired hypothyroidism    5. Hyperlipidemia associated with type 2 diabetes mellitus    6. Invasive ductal carcinoma of breast, female, left    7. Chronic kidney disease, stage 3a    8. Balance problem    9. Major depressive disorder, recurrent episode, moderate        Plan:       1. Essential hypertension  - Controlled and continue with current regimen   - CBC Auto Differential; Future  - Comprehensive Metabolic Panel; Future    2. Type 2 diabetes mellitus with diabetic polyneuropathy, without long-term current use of insulin  - Recent A1C 5.8  - Will decrease metformin to 500 mg daily from 1000 mg daily   - " Hemoglobin A1C; Future  - Ambulatory referral/consult to Optometry; Future    3. Acquired hypothyroidism  - TFT euthyroid and continue with current LT4  - T4, Free; Future  - TSH; Future    4. Hyperlipidemia associated with type 2 diabetes mellitus  - On statin   - Lipid Panel; Future    5. Right groin pain  Groin strain vs other pathology   - US Soft Tissue, Groin Right; Future  - methylPREDNISolone (MEDROL DOSEPACK) 4 mg tablet; use as directed  Dispense: 21 each; Refill: 0  - Urine culture  - Urinalysis    6. Invasive ductal carcinoma of breast, female, left  - Continue with oncology monitoring     7. Chronic kidney disease, stage 3a  - Stable and continue to avoid nephrotoxic agent     8. Balance problem  - Fall precautions discussed     9. Major depressive disorder, recurrent episode, moderate  - Appeared stable and continue to follow up with psych     Patient with be reevaluated in  as scheduled  or sooner dawit Perry NP

## 2024-08-02 LAB
BACTERIA UR CULT: NORMAL
BACTERIA UR CULT: NORMAL

## 2024-08-06 ENCOUNTER — OUTPATIENT CASE MANAGEMENT (OUTPATIENT)
Dept: ADMINISTRATIVE | Facility: OTHER | Age: 74
End: 2024-08-06
Payer: MEDICARE

## 2024-08-08 ENCOUNTER — HOSPITAL ENCOUNTER (OUTPATIENT)
Dept: RADIOLOGY | Facility: HOSPITAL | Age: 74
Discharge: HOME | End: 2024-08-08
Attending: NURSE PRACTITIONER
Payer: MEDICARE

## 2024-08-08 DIAGNOSIS — R10.31 RIGHT GROIN PAIN: ICD-10-CM

## 2024-08-08 PROCEDURE — 76882 US LMTD JT/FCL EVL NVASC XTR: CPT | Mod: TC,PO,RT

## 2024-08-08 PROCEDURE — 76882 US LMTD JT/FCL EVL NVASC XTR: CPT | Mod: 26,RT,, | Performed by: RADIOLOGY

## 2024-08-12 ENCOUNTER — LAB VISIT (OUTPATIENT)
Dept: LAB | Facility: HOSPITAL | Age: 74
End: 2024-08-12
Attending: PHYSICIAN ASSISTANT
Payer: MEDICARE

## 2024-08-12 DIAGNOSIS — E11.65 TYPE 2 DIABETES MELLITUS WITH HYPERGLYCEMIA, WITH LONG-TERM CURRENT USE OF INSULIN: ICD-10-CM

## 2024-08-12 DIAGNOSIS — Z79.4 TYPE 2 DIABETES MELLITUS WITH HYPERGLYCEMIA, WITH LONG-TERM CURRENT USE OF INSULIN: ICD-10-CM

## 2024-08-12 LAB
ALBUMIN SERPL BCP-MCNC: 3.6 G/DL (ref 3.5–5.2)
ANION GAP SERPL CALC-SCNC: 10 MMOL/L (ref 8–16)
BUN SERPL-MCNC: 31 MG/DL (ref 8–23)
CALCIUM SERPL-MCNC: 10 MG/DL (ref 8.7–10.5)
CHLORIDE SERPL-SCNC: 106 MMOL/L (ref 95–110)
CO2 SERPL-SCNC: 28 MMOL/L (ref 23–29)
CREAT SERPL-MCNC: 1 MG/DL (ref 0.5–1.4)
EST. GFR  (NO RACE VARIABLE): 58.4 ML/MIN/1.73 M^2
ESTIMATED AVG GLUCOSE: 126 MG/DL (ref 68–131)
GLUCOSE SERPL-MCNC: 165 MG/DL (ref 70–110)
HBA1C MFR BLD: 6 % (ref 4–5.6)
PHOSPHATE SERPL-MCNC: 3.6 MG/DL (ref 2.7–4.5)
POTASSIUM SERPL-SCNC: 4.9 MMOL/L (ref 3.5–5.1)
SODIUM SERPL-SCNC: 144 MMOL/L (ref 136–145)
T4 FREE SERPL-MCNC: 1.05 NG/DL (ref 0.71–1.51)
TSH SERPL DL<=0.005 MIU/L-ACNC: 3.69 UIU/ML (ref 0.4–4)

## 2024-08-12 PROCEDURE — 84439 ASSAY OF FREE THYROXINE: CPT | Performed by: PHYSICIAN ASSISTANT

## 2024-08-12 PROCEDURE — 83036 HEMOGLOBIN GLYCOSYLATED A1C: CPT | Performed by: PHYSICIAN ASSISTANT

## 2024-08-12 PROCEDURE — 80069 RENAL FUNCTION PANEL: CPT | Performed by: PHYSICIAN ASSISTANT

## 2024-08-12 PROCEDURE — 84443 ASSAY THYROID STIM HORMONE: CPT | Performed by: PHYSICIAN ASSISTANT

## 2024-08-12 PROCEDURE — 36415 COLL VENOUS BLD VENIPUNCTURE: CPT | Mod: PO | Performed by: PHYSICIAN ASSISTANT

## 2024-08-13 ENCOUNTER — OUTPATIENT CASE MANAGEMENT (OUTPATIENT)
Dept: ADMINISTRATIVE | Facility: OTHER | Age: 74
End: 2024-08-13
Payer: MEDICARE

## 2024-08-13 NOTE — PROGRESS NOTES
Outpatient Care Management   - Care Plan Follow Up    Patient: Maggy Tapia  MRN:  [id number]  Date of Service:  8/13/2024  Completed by:  Marlena Holm LCSW  Referral Date: 07/31/2024    Reason for Visit   Patient presents with    OPCM SW Follow Up Call       Brief Summary:  spoke with Pt to check in. Pt has not yet received the mailed items. 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 Pt agreeable to this.     Future Appointments   Date Time Provider Department Center   8/19/2024 11:30 AM MIRACLE Richards PA-C [facility] ENDOCRN [place]   10/9/2024  2:30 PM Yadi Drake PA-C [facility]PSYCH O at [facility]   11/7/2024  2:30 PM Damion Dugan, MARIA ALEJANDRA [facility] OPTOMTY [place]   11/8/2024  9:10 AM LAB, [facility] LAB [place]   11/15/2024  3:30 PM Yanna Abbasi MD [facility] FAM MED [place]   1/10/2025 10:00 AM LAB, [facility] [place]   1/13/2025 10:20 AM Khoobehi, Aurash, MD [facility] HEMON O at [facility]     Marlena Holm LCSW  Neuro Therapy   [facility]  [phone]

## 2024-08-14 ENCOUNTER — TELEPHONE (OUTPATIENT)
Dept: FAMILY MEDICINE | Facility: CLINIC | Age: 74
End: 2024-08-14
Payer: MEDICARE

## 2024-08-14 NOTE — TELEPHONE ENCOUNTER
----- Message from Elisabeth Townsend NP sent at 8/12/2024  2:06 PM CDT -----  Right US groin shows no hernia. It shows a benign swollen lymphnode. Patient can take   acetaminophen or ibuprofen for the pain, apply a warm, moist compress to the area to alleviate discomfort, rest, and stay hydrated. I like to follow up in one month and please assist scheduling.

## 2024-08-14 NOTE — TELEPHONE ENCOUNTER
Patient returned call to office. Call transferred directly to writer per patient access representative Devin Osborne.  Reviewed attached results with patient who verbally indicated understanding.  1 month follow up appointment scheduled for the date of 9-13-24. Patient agreed to appointment date, time, and location.

## 2024-08-19 ENCOUNTER — OFFICE VISIT (OUTPATIENT)
Dept: ENDOCRINOLOGY | Facility: CLINIC | Age: 74
End: 2024-08-19
Payer: MEDICARE

## 2024-08-19 VITALS
HEIGHT: 61 IN | TEMPERATURE: 98 F | BODY MASS INDEX: 30.55 KG/M2 | OXYGEN SATURATION: 98 % | WEIGHT: 161.81 LBS | DIASTOLIC BLOOD PRESSURE: 82 MMHG | SYSTOLIC BLOOD PRESSURE: 120 MMHG | HEART RATE: 70 BPM

## 2024-08-19 DIAGNOSIS — Z79.4 TYPE 2 DIABETES MELLITUS WITH HYPERGLYCEMIA, WITH LONG-TERM CURRENT USE OF INSULIN: Primary | ICD-10-CM

## 2024-08-19 DIAGNOSIS — Z78.0 POSTMENOPAUSAL: ICD-10-CM

## 2024-08-19 DIAGNOSIS — E11.9 TYPE 2 DIABETES MELLITUS WITHOUT COMPLICATION, WITH LONG-TERM CURRENT USE OF INSULIN: ICD-10-CM

## 2024-08-19 DIAGNOSIS — G62.9 NEUROPATHY: ICD-10-CM

## 2024-08-19 DIAGNOSIS — E66.9 OBESITY (BMI 30-39.9): ICD-10-CM

## 2024-08-19 DIAGNOSIS — G47.33 OSA (OBSTRUCTIVE SLEEP APNEA): ICD-10-CM

## 2024-08-19 DIAGNOSIS — R10.31 GROIN PAIN, CHRONIC, RIGHT: ICD-10-CM

## 2024-08-19 DIAGNOSIS — E11.65 TYPE 2 DIABETES MELLITUS WITH HYPERGLYCEMIA, WITH LONG-TERM CURRENT USE OF INSULIN: Primary | ICD-10-CM

## 2024-08-19 DIAGNOSIS — E55.9 HYPOVITAMINOSIS D: ICD-10-CM

## 2024-08-19 DIAGNOSIS — G89.29 GROIN PAIN, CHRONIC, RIGHT: ICD-10-CM

## 2024-08-19 DIAGNOSIS — Z79.4 TYPE 2 DIABETES MELLITUS WITHOUT COMPLICATION, WITH LONG-TERM CURRENT USE OF INSULIN: ICD-10-CM

## 2024-08-19 DIAGNOSIS — E83.52 HYPERCALCEMIA: ICD-10-CM

## 2024-08-19 DIAGNOSIS — M81.0 AGE-RELATED OSTEOPOROSIS WITHOUT CURRENT PATHOLOGICAL FRACTURE: ICD-10-CM

## 2024-08-19 PROCEDURE — 1159F MED LIST DOCD IN RCRD: CPT | Mod: CPTII,S$GLB,, | Performed by: PHYSICIAN ASSISTANT

## 2024-08-19 PROCEDURE — 1160F RVW MEDS BY RX/DR IN RCRD: CPT | Mod: CPTII,S$GLB,, | Performed by: PHYSICIAN ASSISTANT

## 2024-08-19 PROCEDURE — 1125F AMNT PAIN NOTED PAIN PRSNT: CPT | Mod: CPTII,S$GLB,, | Performed by: PHYSICIAN ASSISTANT

## 2024-08-19 PROCEDURE — 1157F ADVNC CARE PLAN IN RCRD: CPT | Mod: CPTII,S$GLB,, | Performed by: PHYSICIAN ASSISTANT

## 2024-08-19 PROCEDURE — 3074F SYST BP LT 130 MM HG: CPT | Mod: CPTII,S$GLB,, | Performed by: PHYSICIAN ASSISTANT

## 2024-08-19 PROCEDURE — 3072F LOW RISK FOR RETINOPATHY: CPT | Mod: CPTII,S$GLB,, | Performed by: PHYSICIAN ASSISTANT

## 2024-08-19 PROCEDURE — 1101F PT FALLS ASSESS-DOCD LE1/YR: CPT | Mod: CPTII,S$GLB,, | Performed by: PHYSICIAN ASSISTANT

## 2024-08-19 PROCEDURE — 99999 PR PBB SHADOW E&M-EST. PATIENT-LVL V: CPT | Mod: PBBFAC,,, | Performed by: PHYSICIAN ASSISTANT

## 2024-08-19 PROCEDURE — 99213 OFFICE O/P EST LOW 20 MIN: CPT | Mod: S$GLB,,, | Performed by: PHYSICIAN ASSISTANT

## 2024-08-19 PROCEDURE — 3079F DIAST BP 80-89 MM HG: CPT | Mod: CPTII,S$GLB,, | Performed by: PHYSICIAN ASSISTANT

## 2024-08-19 PROCEDURE — 3288F FALL RISK ASSESSMENT DOCD: CPT | Mod: CPTII,S$GLB,, | Performed by: PHYSICIAN ASSISTANT

## 2024-08-19 RX ORDER — METFORMIN HYDROCHLORIDE 500 MG/1
1000 TABLET, EXTENDED RELEASE ORAL
Qty: 180 TABLET | Refills: 3 | Status: SHIPPED | OUTPATIENT
Start: 2024-08-19

## 2024-08-19 NOTE — PROGRESS NOTES
CC: DM/Hypothyroidism    HPI: Maggy Tapia was diagnosed with Type 2 DM in 2015. No hospitalizations for DM. Her mother had DM and thyroid disease. Arrives with her brother. Pt fractured her hip in 11/22. Pt is seeing sleep disorders. She was diagnosed w/ restless legs. Pt had a left mastectomy in 4/23.    CURRENT DIABETIC MEDS: metformin 1000 mg qd      Previous meds:  Trulicity-diarrhea  Jardiance-     BG readings are checked 2x daily. No meter or logs to clinic.            Hypoglycemia: Rare  Type of Glucose Meter: True metrix    Physical Activity: None.     Diet: Her diet has not changed.  3 meals daily.    Last Eye Exam: 8/23 Dr. Bone  Last Podiatry Exam: 5/24 Dr. Bean    Last DM Education Attended: 1/19    No ETOH/tobacco use.    Hypothyroidism  Taking 125 mcg daily.  Dx in 2016  + fatigue, cold intolerance, tremors, hair loss.   No constipation, diarrhea, sweating, changes in nails.     DEXA 7/23 Osteopenia in the left hip. Taking fosamax 70 mg weekly since 2/23.Taking ca +vd. Pt fell in 11/22 and fractured her rt femoral neck.  No recent falls or steriod injections. No exercise.     JUAN  Wears CPAP    Wt Readings from Last 10 Encounters:   08/19/24 73.4 kg (161 lb 13.1 oz)   08/01/24 74.4 kg (164 lb 0.4 oz)   07/12/24 77.1 kg (169 lb 15.6 oz)   06/24/24 72.7 kg (160 lb 4.4 oz)   06/14/24 72.6 kg (160 lb 0.9 oz)   06/08/24 77.1 kg (170 lb)   05/10/24 75.2 kg (165 lb 12.6 oz)   05/07/24 70.1 kg (154 lb 8.7 oz)   02/28/24 77.1 kg (170 lb)   02/23/24 77.5 kg (170 lb 13.7 oz)       REVIEW OF SYSTEMS  General: no weakness or fatigue, wt stable.   Eyes: no intermittent blurry vision or visual disturbances.   Cardiac: no chest pain or palpitations.   Respiratory: + sob, no cough    GI: no abdominal pain or nausea.   Skin: no rashes or itching.   Musc: + back pain, neck pain  Neuro: no numbness or tingling.   Endocrine: + polyuria, no polydipsia, polyphagia.   Remainder ROS negative    Personally reviewed  labs below:  Hemoglobin A1C   Date Value Ref Range Status   08/12/2024 6.0 (H) 4.0 - 5.6 % Final     Comment:     ADA Screening Guidelines:  5.7-6.4%  Consistent with prediabetes  >or=6.5%  Consistent with diabetes    High levels of fetal hemoglobin interfere with the HbA1C  assay. Heterozygous hemoglobin variants (HbS, HgC, etc)do  not significantly interfere with this assay.   However, presence of multiple variants may affect accuracy.     07/25/2024 5.8 (H) 4.0 - 5.6 % Final     Comment:     ADA Screening Guidelines:  5.7-6.4%  Consistent with prediabetes  >or=6.5%  Consistent with diabetes    High levels of fetal hemoglobin interfere with the HbA1C  assay. Heterozygous hemoglobin variants (HbS, HgC, etc)do  not significantly interfere with this assay.   However, presence of multiple variants may affect accuracy.     04/18/2024 6.1 (H) 4.0 - 5.6 % Final     Comment:     ADA Screening Guidelines:  5.7-6.4%  Consistent with prediabetes  >or=6.5%  Consistent with diabetes    High levels of fetal hemoglobin interfere with the HbA1C  assay. Heterozygous hemoglobin variants (HbS, HgC, etc)do  not significantly interfere with this assay.   However, presence of multiple variants may affect accuracy.         Chemistry        Component Value Date/Time     08/12/2024 0846    K 4.9 08/12/2024 0846     08/12/2024 0846    CO2 28 08/12/2024 0846    BUN 31 (H) 08/12/2024 0846    CREATININE 1.0 08/12/2024 0846     (H) 08/12/2024 0846        Component Value Date/Time    CALCIUM 10.0 08/12/2024 0846    ALKPHOS 130 07/25/2024 0752    AST 14 07/25/2024 0752    ALT 9 (L) 07/25/2024 0752    BILITOT 0.3 07/25/2024 0752    ESTGFRAFRICA >60.0 03/29/2022 1342    EGFRNONAA >60.0 03/29/2022 1342        Lab Results   Component Value Date    CHOL 138 07/25/2024    CHOL 151 04/18/2024    CHOL 177 02/16/2024     Lab Results   Component Value Date    HDL 47 07/25/2024    HDL 43 04/18/2024    HDL 44 02/16/2024     Lab Results    Component Value Date    LDLCALC 70.8 07/25/2024    LDLCALC 81.8 04/18/2024    LDLCALC 96.2 02/16/2024     Lab Results   Component Value Date    TRIG 101 07/25/2024    TRIG 131 04/18/2024    TRIG 184 (H) 02/16/2024     Lab Results   Component Value Date    CHOLHDL 34.1 07/25/2024    CHOLHDL 28.5 04/18/2024    CHOLHDL 24.9 02/16/2024     Lab Results   Component Value Date    TSH 3.693 08/12/2024     Lab Results   Component Value Date    MICALBCREAT 11.3 04/18/2024       Vit D, 25-Hydroxy   Date Value Ref Range Status   04/18/2024 58 30 - 96 ng/mL Final     Comment:     Vitamin D deficiency.........<10 ng/mL                              Vitamin D insufficiency......10-29 ng/mL       Vitamin D sufficiency........> or equal to 30 ng/mL  Vitamin D toxicity............>100 ng/mL       PHYSICAL EXAMINATION  Constitutional: elderly female, appears well, no distress  Musc: ambulates with a walker, + FROM all ext  Psych: normal mood and affect    Assessment/Plan    1. Type 2 diabetes mellitus with hyperglycemia, with long-term current use of insulin  T4, Free    TSH    Hemoglobin A1C    Comprehensive Metabolic Panel      2. Neuropathy        3. Age-related osteoporosis without current pathological fracture        4. Postmenopausal  DXA Bone Density Axial Skeleton 1 or more sites      5. JUAN (obstructive sleep apnea)        6. Shortness of breath        7. Obesity (BMI 30-39.9)        8. Hypercalcemia        9. Groin pain, chronic, right  Ambulatory referral/consult to Orthopedics      10. Hypovitaminosis D  Vitamin D          T2DM  - A1c is below goal. Readings are in the desired range. Continue metformin 1000 mg qd.  BG checks 1x daily. Send log in two weeks.    Neuropathy  -stable-f/u w/ neurology.    Hypothyroidism  -TFTs are wnl. Continue LT4 to 125 mcg qd.    Osteoporosis  -continue fosamax 70 mg weekly.  Check for secondary causes.   -repeat DEXA scan 7/25.   Start exercise 30 min daily.      [id number]  cpap.    Obesity  -Body mass index is 30.58 kg/m².   Increase exercise to 30 min daily.     Hypercalcemia-ca and pth wnl.    Groin pain  Referral to ortho    FOLLOWUP  Referral to ortho  1 year w/ labs and dexa-A1c, tfts, cmp, vd & dexa

## 2024-08-21 ENCOUNTER — OUTPATIENT CASE MANAGEMENT (OUTPATIENT)
Dept: ADMINISTRATIVE | Facility: OTHER | Age: 74
End: 2024-08-21
Payer: MEDICARE

## 2024-08-21 NOTE — PROGRESS NOTES
Outpatient Care Management   - Care Plan Follow Up    Patient: Maggy Tapia  MRN:  [id number]  Date of Service:  8/21/2024  Completed by:  Marlena Holm LCSW  Referral Date: 07/31/2024    Reason for Visit   Patient presents with    OPCM SW Follow Up Call    Case Closure     8/21/24       Brief Summary:  spoke with Pt to check in. Pt received the resources in the mail and has started review. She has no questions at this time. She has no further needs and is agreeable to closing.     Future Appointments   Date Time Provider Department Center   9/9/2024  1:30 PM Gomez Graham MD [facility] ORTHO [place] [facility]   9/13/2024  2:00 PM Elisabeth Townsend NP [facility] FAM MED [place]   10/9/2024  2:30 PM Yadi Drake PA-C [facility] OPSYCH O at [facility]   11/7/2024  2:30 PM Damion Dugan, OD [facility] OPTOMTY [place]   11/8/2024  9:10 AM LAB, [facility] LAB [place]   11/15/2024  3:30 PM Yanna Abbasi MD [facility] FAM MED [place]   1/10/2025 10:00 AM LAB, [facility] LAB [place]   1/13/2025 10:20 AM Khoobehi, Aurash, MD [facility] HEMON O at [facility]   8/11/2025 11:00 AM LAB, [facility] LAB [place]   8/11/2025 11:20 AM [facility] DEXA1 [facility] BMD [place]   8/25/2025 11:00 AM MIRACLE Richards PA-C [facility] ENDOCRN [place]     Marlena Holm LCSW  Neuro Therapy   [facility]  [phone]

## 2024-09-09 DIAGNOSIS — M25.551 RIGHT HIP PAIN: Primary | ICD-10-CM

## 2024-09-09 DIAGNOSIS — S72.011D CLOSED SUBCAPITAL FRACTURE OF RIGHT FEMUR WITH ROUTINE HEALING, SUBSEQUENT ENCOUNTER: ICD-10-CM

## 2024-09-10 ENCOUNTER — HOSPITAL ENCOUNTER (OUTPATIENT)
Dept: RADIOLOGY | Facility: HOSPITAL | Age: 74
Discharge: HOME | End: 2024-09-10
Attending: ORTHOPAEDIC SURGERY
Payer: MEDICARE

## 2024-09-10 ENCOUNTER — TELEPHONE (OUTPATIENT)
Dept: ORTHOPEDICS | Facility: CLINIC | Age: 74
End: 2024-09-10

## 2024-09-10 ENCOUNTER — OFFICE VISIT (OUTPATIENT)
Dept: ORTHOPEDICS | Facility: CLINIC | Age: 74
End: 2024-09-10
Payer: MEDICARE

## 2024-09-10 ENCOUNTER — TELEPHONE (OUTPATIENT)
Dept: FAMILY MEDICINE | Facility: CLINIC | Age: 74
End: 2024-09-10
Payer: MEDICARE

## 2024-09-10 VITALS — BODY MASS INDEX: 30.55 KG/M2 | HEIGHT: 61 IN | WEIGHT: 161.81 LBS

## 2024-09-10 DIAGNOSIS — M16.11 ARTHRITIS OF RIGHT HIP: ICD-10-CM

## 2024-09-10 DIAGNOSIS — M25.551 RIGHT HIP PAIN: ICD-10-CM

## 2024-09-10 DIAGNOSIS — S72.011D CLOSED SUBCAPITAL FRACTURE OF RIGHT FEMUR WITH ROUTINE HEALING, SUBSEQUENT ENCOUNTER: ICD-10-CM

## 2024-09-10 DIAGNOSIS — G89.29 GROIN PAIN, CHRONIC, RIGHT: ICD-10-CM

## 2024-09-10 DIAGNOSIS — S72.011D CLOSED SUBCAPITAL FRACTURE OF RIGHT FEMUR WITH ROUTINE HEALING, SUBSEQUENT ENCOUNTER: Primary | ICD-10-CM

## 2024-09-10 DIAGNOSIS — R10.31 GROIN PAIN, CHRONIC, RIGHT: ICD-10-CM

## 2024-09-10 PROCEDURE — 1157F ADVNC CARE PLAN IN RCRD: CPT | Mod: CPTII,S$GLB,, | Performed by: ORTHOPAEDIC SURGERY

## 2024-09-10 PROCEDURE — 73502 X-RAY EXAM HIP UNI 2-3 VIEWS: CPT | Mod: 26,RT,, | Performed by: RADIOLOGY

## 2024-09-10 PROCEDURE — 99214 OFFICE O/P EST MOD 30 MIN: CPT | Mod: S$GLB,,, | Performed by: ORTHOPAEDIC SURGERY

## 2024-09-10 PROCEDURE — 3072F LOW RISK FOR RETINOPATHY: CPT | Mod: CPTII,S$GLB,, | Performed by: ORTHOPAEDIC SURGERY

## 2024-09-10 PROCEDURE — 73502 X-RAY EXAM HIP UNI 2-3 VIEWS: CPT | Mod: TC,PO,RT

## 2024-09-10 PROCEDURE — 1125F AMNT PAIN NOTED PAIN PRSNT: CPT | Mod: CPTII,S$GLB,, | Performed by: ORTHOPAEDIC SURGERY

## 2024-09-10 PROCEDURE — 99999 PR PBB SHADOW E&M-EST. PATIENT-LVL II: CPT | Mod: PBBFAC,,, | Performed by: ORTHOPAEDIC SURGERY

## 2024-09-10 NOTE — TELEPHONE ENCOUNTER
Spoke to pt who states she was seen today by Dr. Bonilla and will need a hip surgery. Scheduled pt for clearance

## 2024-09-10 NOTE — TELEPHONE ENCOUNTER
----- Message from Emeka Quintero sent at 9/10/2024  9:48 AM CDT -----  Type: Needs Medical Advice    Who Called:  Pt    Symptoms (please be specific):  hip and thigh pain     How long has patient had these symptoms:  a year- 6 months      Best Call Back Number: [phone]    Additional Information: Pt wants to let viviana know that she needs a hip replacement  Please call back to advise, Thanks!

## 2024-09-10 NOTE — PROGRESS NOTES
Patient ID: Maggy Tapia is a 74 y.o. female    Chief Complaint:   Chief Complaint   Patient presents with    Right Hip - Pain       History of Present Illness:    Pleasant 74-year-old female status post percutaneous pinning of the right hip about 2 years ago.  Has increased pain of the right thigh and going over the past year.  She walks with a walker.  History of stroke x2 as well as a heart attack.  She has a well-controlled diabetic.  Also has history of a decubitus pressure ulcer of the buttock region which was treated by wound care.    PAST MEDICAL HISTORY:   Past Medical History:   Diagnosis Date    Anxiety     Arthritis     Asthma     Breast cancer     Left    Depression     Diabetes mellitus, type 2     GERD (gastroesophageal reflux disease)     Hyperlipidemia     Hypertension     Hypothyroidism, unspecified     Overactive bladder     Seizures     Pseudo-seizures    Sleep apnea     Stroke 2022    pt was in the hospital for a stroke, claims she was seeing people when the stroke happened     PAST SURGICAL HISTORY:   Past Surgical History:   Procedure Laterality Date    APPENDECTOMY      BREAST BIOPSY Left     BREAST LUMPECTOMY Left     2016    BREAST SURGERY      CATARACT EXTRACTION Bilateral     OU done//     SECTION      CHOLECYSTECTOMY      COLONOSCOPY N/A 2020    Procedure: COLONOSCOPY;  Surgeon: Mj Fernandez MD;  Location: [facility];  Service: Endoscopy;  Laterality: N/A;    CYST REMOVAL Left 2021    DILATION AND CURETTAGE OF UTERUS      EYE SURGERY      HYSTEROSCOPY WITH DILATION AND CURETTAGE OF UTERUS N/A 2023    Procedure: HYSTEROSCOPY, WITH DILATION AND CURETTAGE OF UTERUS AND OTHER INDICATED PROCEDURES Needs Cardiac clearance;  Surgeon: Gamaliel Moran MD;  Location: [facility] OR;  Service: OB/GYN;  Laterality: N/A;  Cardiac clearance needed per Dr Anderson    LUMPECTOMY, BREAST Left 2023    Procedure: LUMPECTOMY, BREAST;  Surgeon: Toño Paredes MD;   Location: [facility] OR;  Service: General;  Laterality: Left;    PERCUTANEOUS PINNING OF HIP Right 11/11/2022    Procedure: PINNING, HIP, PERCUTANEOUS;  Surgeon: Ministerio Joiner II, MD;  Location: [facility] OR;  Service: Orthopedics;  Laterality: Right;    SENTINEL LYMPH NODE BIOPSY Left 4/26/2023    Procedure: BIOPSY, LYMPH NODE, SENTINEL;  Surgeon: Toño Paredes MD;  Location: [facility] OR;  Service: General;  Laterality: Left;    SIMPLE MASTECTOMY Left 5/19/2023    Procedure: MASTECTOMY, SIMPLE;  Surgeon: Toño Paredes MD;  Location: [facility] OR;  Service: General;  Laterality: Left;     FAMILY HISTORY:   Family History   Problem Relation Name Age of Onset    Diabetes Mother      Hypertension Mother      Breast cancer Mother      Cataracts Mother      Melanoma Mother      Heart failure Father      Melanoma Father      Cataracts Brother      Melanoma Brother      Glaucoma Neg Hx      Retinal detachment Neg Hx      Macular degeneration Neg Hx       SOCIAL HISTORY:   Social History     Occupational History    Not on file   Tobacco Use    Smoking status: Never     Passive exposure: Past    Smokeless tobacco: Never   Substance and Sexual Activity    Alcohol use: Yes     Comment: seldom    Drug use: No    Sexual activity: Not Currently        MEDICATIONS:   Current Outpatient Medications:     albuterol (PROVENTIL/VENTOLIN HFA) 90 mcg/actuation inhaler, Inhale 1-2 puffs into the lungs every 4 (four) hours as needed for Wheezing or Shortness of Breath. INHALE 1 TO 2 PUFFS BY MOUTH INTO THE LUNGS EVERY 4 HOURS AS NEEDED FOR SHORTNESS OF BREATH( COUGHING) Strength: 90 mcg/actuation, Disp: 20.1 g, Rfl: 11    alendronate (FOSAMAX) 70 MG tablet, Take one tablet every 7 days. (Patient taking differently: Take 70 mg by mouth every 7 days. Take one tablet every 7 days.), Disp: 4 tablet, Rfl: 11    aspirin (ECOTRIN) 81 MG EC tablet, Take 81 mg by mouth once daily., Disp: , Rfl:     blood-glucose meter kit, Use as instructed. Insurance  preferred., Disp: 1 each, Rfl: 0    CALCIUM CARBONATE/VITAMIN D3 (CALCIUM 500 + D ORAL), Take 1 tablet by mouth once daily. 10 mg daily, Disp: , Rfl:     cyanocobalamin (VITAMIN B-12) 1000 MCG tablet, Take 1 tablet (1,000 mcg total) by mouth once daily., Disp: 30 tablet, Rfl: 0    econazole nitrate 1 % cream, Apply topically once daily. (Patient taking differently: Apply 1 application  topically once daily.), Disp: 45 g, Rfl: 10    fluticasone furoate-vilanteroL (BREO ELLIPTA) 100-25 mcg/dose diskus inhaler, Inhale 1 puff into the lungs once daily. Controller, Disp: 60 each, Rfl: 11    ketoconazole (NIZORAL) 2 % cream, AAA pannus fold at least daily after the shower, Disp: 60 g, Rfl: 3    letrozole (FEMARA) 2.5 mg Tab, TAKE 1 TABLET(2.5 MG) BY MOUTH EVERY DAY (Patient taking differently: Take 2.5 mg by mouth once daily.), Disp: 30 tablet, Rfl: 5    levothyroxine (SYNTHROID) 125 MCG tablet, Take 1 tablet (125 mcg total) by mouth before breakfast., Disp: 90 tablet, Rfl: 3    losartan (COZAAR) 50 MG tablet, Take 0.5 tablets (25 mg total) by mouth once daily., Disp: 90 tablet, Rfl: 0    metFORMIN (GLUCOPHAGE-XR) 500 MG ER 24hr tablet, Take 2 tablets (1,000 mg total) by mouth daily with breakfast., Disp: 180 tablet, Rfl: 3    nystatin (MYCOSTATIN) cream, Apply topically 2 (two) times daily. Apply to rash on face (Patient taking differently: Apply 1 g topically 2 (two) times daily. Apply to rash on face), Disp: 15 g, Rfl: 2    polyethylene glycol (GLYCOLAX) 17 gram PwPk, Take 17 g by mouth once daily., Disp: , Rfl: 0    potassium chloride SA (K-DUR,KLOR-CON) 20 MEQ tablet, Take 20 mEq by mouth once daily., Disp: , Rfl:     rOPINIRole (REQUIP) 0.5 MG tablet, Take 1 mg by mouth every evening., Disp: , Rfl:     sertraline (ZOLOFT) 50 MG tablet, Take 1 tablet (50 mg total) by mouth once daily. For depression/anxiety, Disp: 90 tablet, Rfl: 0    simvastatin (ZOCOR) 40 MG tablet, TAKE 1 TABLET(40 MG) BY MOUTH EVERY DAY (Patient  taking differently: Take 40 mg by mouth once daily.), Disp: 90 tablet, Rfl: 3    solifenacin (VESICARE) 10 MG tablet, Take 1 tablet (10 mg total) by mouth once daily., Disp: 90 tablet, Rfl: 4    tetrabenazine (XENAZINE) 25 mg tablet, Take one tablet daily for 7 days and then Take 1 tablet twice daily afterwards (Patient not taking: Reported on 8/19/2024), Disp: 47 tablet, Rfl: 0  ALLERGIES:   Review of patient's allergies indicates:   Allergen Reactions    Penicillins Anaphylaxis    Sulfa (sulfonamide antibiotics) Anaphylaxis    Trintellix [vortioxetine] Nausea And Vomiting and Other (See Comments)     Patient has seizures and vomits         Physical Exam     There were no vitals filed for this visit.  Alert and oriented to person, place and time. No acute distress. Well-groomed, not ill appearing. Pupils round and reactive, normal respiratory effort, no audible wheezing.     On exam she ambulates with a walker.  She has pain with internal rotation of the right hip.  Well-healed lateral incision.  Positive Stinchfield.  Ambulates with a antalgic mildly Trendelenburg gait using a walker.  No signs or symptoms of infection        Imaging:       X-Ray: I have reviewed all pertinent results/findings and my personal findings are:  Status post percutaneous pinning of the right hip with protrusion of the screw into the acetabulum and DJD of the right hip      Assessment & Plan    Closed subcapital fracture of right femur with routine healing, subsequent encounter    Groin pain, chronic, right  -     Ambulatory referral/consult to Orthopedics    Arthritis of right hip         Treatment options were discussed with her in detail.  Based on her x-rays and clinical exam I think her only option is a total hip replacement.  She would need medical clearance prior to surgery.  We did discuss also the need for cardiac clearance.  We discussed her risk of infection, dislocation, DVT PE stroke.  She will return to clinic once cleared  and we can get this scheduled for her if she wishes.

## 2024-09-13 ENCOUNTER — HOSPITAL ENCOUNTER (OUTPATIENT)
Dept: RADIOLOGY | Facility: CLINIC | Age: 74
Discharge: HOME | End: 2024-09-13
Attending: NURSE PRACTITIONER
Payer: MEDICARE

## 2024-09-13 ENCOUNTER — OFFICE VISIT (OUTPATIENT)
Dept: FAMILY MEDICINE | Facility: CLINIC | Age: 74
End: 2024-09-13
Payer: MEDICARE

## 2024-09-13 VITALS
HEIGHT: 61 IN | OXYGEN SATURATION: 99 % | DIASTOLIC BLOOD PRESSURE: 56 MMHG | BODY MASS INDEX: 29.97 KG/M2 | WEIGHT: 158.75 LBS | TEMPERATURE: 98 F | SYSTOLIC BLOOD PRESSURE: 104 MMHG | HEART RATE: 82 BPM

## 2024-09-13 DIAGNOSIS — Z01.818 PRE-OP EVALUATION: ICD-10-CM

## 2024-09-13 DIAGNOSIS — Z01.818 PRE-OP EVALUATION: Primary | ICD-10-CM

## 2024-09-13 DIAGNOSIS — M25.551 RIGHT HIP PAIN: ICD-10-CM

## 2024-09-13 DIAGNOSIS — R10.31 RIGHT GROIN PAIN: ICD-10-CM

## 2024-09-13 LAB
OHS QRS DURATION: 82 MS
OHS QTC CALCULATION: 437 MS

## 2024-09-13 PROCEDURE — 71046 X-RAY EXAM CHEST 2 VIEWS: CPT | Mod: TC,FY,PO

## 2024-09-13 PROCEDURE — 99999 PR PBB SHADOW E&M-EST. PATIENT-LVL V: CPT | Mod: PBBFAC,,, | Performed by: NURSE PRACTITIONER

## 2024-09-13 PROCEDURE — 71046 X-RAY EXAM CHEST 2 VIEWS: CPT | Mod: 26,,, | Performed by: RADIOLOGY

## 2024-09-13 NOTE — PROGRESS NOTES
Patient ID: Maggy Tapia is a 74 y.o. female.    Chief Complaint: Follow-up    Maggy Tapia is in the office     HPI  75 y/o female patient with medical problems listed below presents for pre op of right hip surgery by Dr. Bonilla which is not scheduled yet. Patient walks with walker.   [facility] has appt of cardiology for pre op on 9/23/2024.   Patient has hx of ischemic left MCA stroke.   Denies smoking.  Denies hx of anesthetic complications.     Past Medical History:   Diagnosis Date    Anxiety     Arthritis     Asthma     Breast cancer 2000    Left    Depression     Diabetes mellitus, type 2     GERD (gastroesophageal reflux disease)     Hyperlipidemia     Hypertension     Hypothyroidism, unspecified     Overactive bladder     Seizures     Pseudo-seizures    Sleep apnea     Stroke 03/2022    pt was in the hospital for a stroke, claims she was seeing people when the stroke happened          Current Outpatient Medications:     albuterol (PROVENTIL/VENTOLIN HFA) 90 mcg/actuation inhaler, Inhale 1-2 puffs into the lungs every 4 (four) hours as needed for Wheezing or Shortness of Breath. INHALE 1 TO 2 PUFFS BY MOUTH INTO THE LUNGS EVERY 4 HOURS AS NEEDED FOR SHORTNESS OF BREATH( COUGHING) Strength: 90 mcg/actuation, Disp: 20.1 g, Rfl: 11    alendronate (FOSAMAX) 70 MG tablet, Take one tablet every 7 days. (Patient taking differently: Take 70 mg by mouth every 7 days. Take one tablet every 7 days.), Disp: 4 tablet, Rfl: 11    aspirin (ECOTRIN) 81 MG EC tablet, Take 81 mg by mouth once daily., Disp: , Rfl:     blood-glucose meter kit, Use as instructed. Insurance preferred., Disp: 1 each, Rfl: 0    CALCIUM CARBONATE/VITAMIN D3 (CALCIUM 500 + D ORAL), Take 1 tablet by mouth once daily. 10 mg daily, Disp: , Rfl:     cyanocobalamin (VITAMIN B-12) 1000 MCG tablet, Take 1 tablet (1,000 mcg total) by mouth once daily., Disp: 30 tablet, Rfl: 0    fluticasone furoate-vilanteroL (BREO ELLIPTA) 100-25 mcg/dose diskus  inhaler, Inhale 1 puff into the lungs once daily. Controller, Disp: 60 each, Rfl: 11    ketoconazole (NIZORAL) 2 % cream, AAA pannus fold at least daily after the shower, Disp: 60 g, Rfl: 3    letrozole (FEMARA) 2.5 mg Tab, TAKE 1 TABLET(2.5 MG) BY MOUTH EVERY DAY (Patient taking differently: Take 2.5 mg by mouth once daily.), Disp: 30 tablet, Rfl: 5    levothyroxine (SYNTHROID) 125 MCG tablet, Take 1 tablet (125 mcg total) by mouth before breakfast., Disp: 90 tablet, Rfl: 3    losartan (COZAAR) 50 MG tablet, Take 0.5 tablets (25 mg total) by mouth once daily., Disp: 90 tablet, Rfl: 0    metFORMIN (GLUCOPHAGE-XR) 500 MG ER 24hr tablet, Take 2 tablets (1,000 mg total) by mouth daily with breakfast., Disp: 180 tablet, Rfl: 3    nystatin (MYCOSTATIN) cream, Apply topically 2 (two) times daily. Apply to rash on face (Patient taking differently: Apply 1 g topically 2 (two) times daily. Apply to rash on face), Disp: 15 g, Rfl: 2    polyethylene glycol (GLYCOLAX) 17 gram PwPk, Take 17 g by mouth once daily., Disp: , Rfl: 0    potassium chloride SA (K-DUR,KLOR-CON) 20 MEQ tablet, Take 20 mEq by mouth once daily., Disp: , Rfl:     rOPINIRole (REQUIP) 0.5 MG tablet, Take 1 mg by mouth every evening., Disp: , Rfl:     sertraline (ZOLOFT) 50 MG tablet, Take 1 tablet (50 mg total) by mouth once daily. For depression/anxiety, Disp: 90 tablet, Rfl: 0    simvastatin (ZOCOR) 40 MG tablet, TAKE 1 TABLET(40 MG) BY MOUTH EVERY DAY (Patient taking differently: Take 40 mg by mouth once daily.), Disp: 90 tablet, Rfl: 3    tetrabenazine (XENAZINE) 25 mg tablet, Take one tablet daily for 7 days and then Take 1 tablet twice daily afterwards, Disp: 47 tablet, Rfl: 0    econazole nitrate 1 % cream, Apply topically once daily. (Patient taking differently: Apply 1 application  topically once daily.), Disp: 45 g, Rfl: 10    solifenacin (VESICARE) 10 MG tablet, Take 1 tablet (10 mg total) by mouth once daily., Disp: 90 tablet, Rfl: 4    The ASCVD  Risk score (Mj VILLEGAS, et al., 2019) failed to calculate for the following reasons:    The patient has a prior MI or stroke diagnosis     Wt Readings from Last 3 Encounters:   09/13/24 72 kg (158 lb 11.7 oz)   09/10/24 73.4 kg (161 lb 13.1 oz)   08/19/24 73.4 kg (161 lb 13.1 oz)     Temp Readings from Last 3 Encounters:   09/13/24 97.8 °F (36.6 °C) (Oral)   08/19/24 97.9 °F (36.6 °C) (Oral)   08/01/24 98.1 °F (36.7 °C) (Oral)     BP Readings from Last 3 Encounters:   09/13/24 (!) 104/56   08/19/24 120/82   08/01/24 (!) 130/58     Pulse Readings from Last 3 Encounters:   09/13/24 82   08/19/24 70   08/01/24 84     Resp Readings from Last 3 Encounters:   07/12/24 16   06/18/24 20   06/14/24 14     PF Readings from Last 3 Encounters:   No data found for PF     SpO2 Readings from Last 3 Encounters:   09/13/24 99%   08/19/24 98%   08/01/24 96%        Lab Results   Component Value Date    HGBA1C 6.0 (H) 08/12/2024    HGBA1C 5.8 (H) 07/25/2024    HGBA1C 6.1 (H) 04/18/2024     Lab Results   Component Value Date    LDLCALC 70.8 07/25/2024    CREATININE 1.0 08/12/2024     Review of Systems   Constitutional:  Negative for chills and fever.   Respiratory:  Negative for cough and shortness of breath.    Cardiovascular:  Negative for chest pain and palpitations.   Gastrointestinal:  Negative for abdominal pain.   Musculoskeletal:  Positive for gait problem.        Right hip pain    Neurological:  Negative for dizziness and headaches.         Objective:      Physical Exam  Constitutional:       General: She is not in acute distress.     Appearance: Normal appearance.   HENT:      Head: Atraumatic.   Cardiovascular:      Rate and Rhythm: Normal rate and regular rhythm.      Pulses: Normal pulses.      Heart sounds: Normal heart sounds.   Pulmonary:      Effort: Pulmonary effort is normal.      Breath sounds: Normal breath sounds.   Abdominal:      General: Abdomen is flat. Bowel sounds are normal.      Palpations: Abdomen is soft.    Neurological:      Mental Status: She is oriented to person, place, and time.         Screening recommendations appropriate to age and health status were reviewed.    Pre-op evaluation  -     CBC Auto Differential; Future; Expected date: 09/13/2024  -     Comprehensive Metabolic Panel; Future; Expected date: 09/13/2024  -     X-Ray Chest PA And Lateral; Future; Expected date: 09/13/2024  -     EKG 12-lead    Right groin pain    Right hip pain    RCRI risk factors include: history of cerebrovascular disease. As such, per RCRI the risk of cardiac death, nonfatal myocardial infarction, or nonfatal cardiac arrest is 1.0% and the risk of myocardial infarction, pulmonary edema, ventricular fibrillation, primary cardiac arrest, or complete heart block is 1.3%.  Overall this patient can be considered intermediate risk for this intermediate risk procedure. No further cardiac testing is recommended at this time.     Patient is a non-smoker. We discussed the benefits of early mobilization and deep breathing after surgery.      Screened patient for alcohol misuse, use of illicit drugs, and personal or family history of anesthetic complications or bleeding diathesis and no substantial concerns were identified.     Advised to hold aspirin for one week prior to the surgery and metformin on the morning of the surgery     I recommend use of standard pre-op and post-op precautions for this patient. In my opinion, she is medically optimized for this procedure, and can proceed without further evaluation.     EKG: NSR with rate of 69, No ST-T changes  Pending labs, chest x-ray  Pending cardiology clearance

## 2024-09-16 DIAGNOSIS — R93.89 ABNORMAL CHEST X-RAY: Primary | ICD-10-CM

## 2024-09-20 ENCOUNTER — HOSPITAL ENCOUNTER (OUTPATIENT)
Dept: RADIOLOGY | Facility: HOSPITAL | Age: 74
Discharge: HOME | End: 2024-09-20
Attending: NURSE PRACTITIONER
Payer: MEDICARE

## 2024-09-20 DIAGNOSIS — R93.89 ABNORMAL CHEST X-RAY: ICD-10-CM

## 2024-09-20 PROCEDURE — 71250 CT THORAX DX C-: CPT | Mod: TC

## 2024-09-20 PROCEDURE — 71250 CT THORAX DX C-: CPT | Mod: 26,,, | Performed by: RADIOLOGY

## 2024-09-22 NOTE — PROGRESS NOTES
Subjective:    Patient ID:  Maggy Tapia is a 74 y.o. female who presents for follow-up of   Chief Complaint   Patient presents with    Hypertension    Pre-op Exam       HPI:    He comes in today requesting clearance for right hip surgery.  She has been walking with a walker for ears and has a previous history of right hip in currently she needs total replacement for she becomes completely debilitated. She has a history of diabetes, hypertension dyslipidemia and strokes with pseudoseizures in the past.  He has a long history of secondhand smoke exposure and has shortness of breath.  He had a negative stress test in 2021. she had a D&C 2023 without difficulty    EKG September 13 normal sinus rhythm normal EKG    2/19/24    She is here today for routine follow up visit.  She did have a D&C in his had a fibromatosis tumor removed.  She has not had anymore bleeding.  And she is possibly not going to need of total hysterectomy.  Blood pressures been well controlled.  She does not have any chest pain or shortness of breath.  Her blood sugar is running 100-120.  Her last blood work showed her calcium was 11  Her cholesterol is controlled     He is looking forward to spring time when she can get out side more she does walk around the block in spite of being on walker frequently 3 or 4 times week.       Latest Reference Range & Units Most Recent   Cholesterol Total 120 - 199 mg/dL 177  2/16/24 08:55   HDL 40 - 75 mg/dL 44  2/16/24 08:55   HDL/Cholesterol Ratio 20.0 - 50.0 % 24.9  2/16/24 08:55   Non-HDL Cholesterol mg/dL 133  2/16/24 08:55   Total Cholesterol/HDL Ratio 2.0 - 5.0  4.0  2/16/24 08:55   Triglycerides 30 - 150 mg/dL 184 (H)  2/16/24 08:55   LDL Cholesterol 63.0 - 159.0 mg/dL 96.2  2/16/24 08:55   (H): Data is abnormally high  8/23/23  He is a 73-year-old female previously saw Dr. Chin.  She has a history of diabetes, hypertension dyslipidemia and strokes with pseudoseizures in the past.  He has a long  history of secondhand smoke exposure and has shortness of breath.  He had a negative stress test in 2021.  Now gets short of breath which is helped by using either hand-held inhaler or aerosol.  Denies any chest pain or heart procedures.  He is had some vaginal bleeding and is referred here to have cardiac clearance to have a D&C and possibly at a later date may need hysterectomy.  He does have a history of a left-sided mastectomy with some incisional pain and has been evaluated by surgery Dr. Lundberg.  Sugar this morning was 97.  She does have numbness in her feet secondary to neuropathy and walks with a walker             2/15/21  History of Present Illness:   Patient is a 71-year-old lady with history of complex medical problems and diabetes since age 14, and essential hypertension for many years also with dyslipidemia and history of stroke and seizures and pseudoseizures.  She has reportedly having more shortness of breath lately worse at rest and effort and now referred here for cardiac evaluation.  Patient denies having any chest discomfort no arm neck or jaw pain she did have some nausea for a while and has improved.  But there is blood in the stools periodically about once a month apparently.  Are no fevers or chills no COVID-19 symptoms  Last seizure was about 2 months ago.  No significant cough no congestion moderate obesity with the BMI about 35 noted       7/8/21  Normal myocardial perfusion scan. There is no evidence of myocardial ischemia or infarction.    The gated perfusion images showed an ejection fraction of 88% post stress. Normal ejection fraction is greater than 53%.    There is normal wall motion post stress.    LV cavity size is  and normal at stress.    The EKG portion of this study is negative for ischemia.    The patient reported no chest pain during the stress test.    There were no arrhythmias during stress.    Review of patient's allergies indicates:   Allergen Reactions    Penicillins  Anaphylaxis    Sulfa (sulfonamide antibiotics) Anaphylaxis    Trintellix [vortioxetine] Nausea And Vomiting and Other (See Comments)     Patient has seizures and vomits       Past Medical History:   Diagnosis Date    Anxiety     Arthritis     Asthma     Breast cancer     Left    Depression     Diabetes mellitus, type 2     GERD (gastroesophageal reflux disease)     Hyperlipidemia     Hypertension     Hypothyroidism, unspecified     Overactive bladder     Seizures     Pseudo-seizures    Sleep apnea     Stroke 2022    pt was in the hospital for a stroke, claims she was seeing people when the stroke happened     Past Surgical History:   Procedure Laterality Date    APPENDECTOMY      BREAST BIOPSY Left     BREAST LUMPECTOMY Left     2016    BREAST SURGERY      CATARACT EXTRACTION Bilateral     OU done//     SECTION      CHOLECYSTECTOMY      COLONOSCOPY N/A 2020    Procedure: COLONOSCOPY;  Surgeon: Mj Fernandez MD;  Location: [facility];  Service: Endoscopy;  Laterality: N/A;    CYST REMOVAL Left 2021    DILATION AND CURETTAGE OF UTERUS      EYE SURGERY      HYSTEROSCOPY WITH DILATION AND CURETTAGE OF UTERUS N/A 2023    Procedure: HYSTEROSCOPY, WITH DILATION AND CURETTAGE OF UTERUS AND OTHER INDICATED PROCEDURES Needs Cardiac clearance;  Surgeon: Gamaliel Moran MD;  Location[facility];  Service: OB/GYN;  Laterality: N/A;  Cardiac clearance needed per Dr Anderson    LUMPECTOMY, BREAST Left 2023    Procedure: LUMPECTOMY, BREAST;  Surgeon: Toño Paredes MD;  [facility];  Service: General;  Laterality: Left;    PERCUTANEOUS PINNING OF HIP Right 2022    Procedure: PINNING, HIP, PERCUTANEOUS;  Surgeon: Ministerio Joiner II, MD;  Location: [facility] OR;  Service: Orthopedics;  Laterality: Right;    SENTINEL LYMPH NODE BIOPSY Left 2023    Procedure: BIOPSY, LYMPH NODE, SENTINEL;  Surgeon: Toño Paredes MD;  Location: [facility] OR;  Service: General;  Laterality: Left;     SIMPLE MASTECTOMY Left 5/19/2023    Procedure: MASTECTOMY, SIMPLE;  Surgeon: Toño Paredes MD;  Location: [facility] OR;  Service: General;  Laterality: Left;     Social History     Tobacco Use    Smoking status: Never     Passive exposure: Past    Smokeless tobacco: Never   Substance Use Topics    Alcohol use: Yes     Comment: seldom    Drug use: No     Family History   Problem Relation Name Age of Onset    Diabetes Mother      Hypertension Mother      Breast cancer Mother      Cataracts Mother      Melanoma Mother      Heart failure Father      Melanoma Father      Cataracts Brother      Melanoma Brother      Glaucoma Neg Hx      Retinal detachment Neg Hx      Macular degeneration Neg Hx          Review of Systems:   Constitution: Negative for diaphoresis and fever.   HEENT: Negative for nosebleeds.    Cardiovascular: Negative for chest pain       No dyspnea on exertion       No leg swelling        No palpitations  Respiratory: Negative for shortness of breath and wheezing.    Hematologic/Lymphatic: Negative for bleeding problem. Does not bruise/bleed easily.   Skin: Negative for color change and rash.   Musculoskeletal: Negative for falls and myalgias.   Gastrointestinal: Negative for hematemesis and hematochezia.   Genitourinary: Negative for hematuria.   Neurological: Negative for dizziness and light-headedness.   Psychiatric/Behavioral: Negative for altered mental status and memory loss.          Objective:        Vitals:    09/23/24 1337   BP: 111/70   Pulse: 79       Lab Results   Component Value Date    WBC 10.82 09/13/2024    HGB 16.0 09/13/2024    HCT 49.5 (H) 09/13/2024     09/13/2024    CHOL 138 07/25/2024    TRIG 101 07/25/2024    HDL 47 07/25/2024    ALT 8 (L) 09/13/2024    AST 15 09/13/2024     09/13/2024    K 4.6 09/13/2024     09/13/2024    CREATININE 1.1 09/13/2024    BUN 25 (H) 09/13/2024    CO2 25 09/13/2024    TSH 3.693 08/12/2024    INR 0.9 02/23/2022    HGBA1C 6.0 (H)  08/12/2024        ECHOCARDIOGRAM RESULTS  Results for orders placed during the hospital encounter of 10/11/23    Echo    Interpretation Summary    Left Ventricle: The left ventricle is normal in size. Normal wall thickness. Normal wall motion. There is normal systolic function with a visually estimated ejection fraction of 55 - 60%. There is normal diastolic function.    Left Atrium: Left atrium is mildly dilated.    Right Ventricle: Normal right ventricular cavity size. Wall thickness is normal. Right ventricle wall motion  is normal. Systolic function is normal.    Tricuspid Valve: There is mild regurgitation.    IVC/SVC: Normal venous pressure at 3 mmHg.        CURRENT/PREVIOUS VISIT EKG  Results for orders placed or performed in visit on 09/13/24   EKG 12-lead    Collection Time: 09/13/24  2:22 PM   Result Value Ref Range    QRS Duration 82 ms    [facility] QTC Calculation 437 ms    Narrative    Test Reason : Z01.818,    Vent. Rate : 069 BPM     Atrial Rate : 069 BPM     P-R Int : 148 ms          QRS Dur : 082 ms      QT Int : 408 ms       P-R-T Axes : 037 -24 030 degrees     QTc Int : 437 ms    Normal sinus rhythm  Normal ECG  When compared with ECG of 08-JUN-2024 17:56,  No significant change was found  Confirmed by Gm Mack MD ([id number]) on 9/13/2024 3:41:46 PM    Referred By: elsa spring           Confirmed By:Gm Mack MD     No valid procedures specified.   Results for orders placed during the hospital encounter of 03/10/21    Nuclear Stress - Cardiology Interpreted    Interpretation Summary    Normal myocardial perfusion scan. There is no evidence of myocardial ischemia or infarction.    The gated perfusion images showed an ejection fraction of 88% post stress. Normal ejection fraction is greater than 53%.    There is normal wall motion post stress.    LV cavity size is  and normal at stress.    The EKG portion of this study is negative for ischemia.    The patient reported no chest pain during the stress test.     There were no arrhythmias during stress.      Physical Exam:  CONSTITUTIONAL: No fever, no chills  HEENT: Normocephalic, atraumatic,pupils reactive to light                 NECK:  No JVD no carotid bruit  CVS: S1S2+, RRR, no murmurs,   LUNGS: Clear  ABDOMEN: Soft, NT, BS+  EXTREMITIES: No cyanosis, edema  : No donohue catheter  NEURO: AAO X 3  PSY: Normal affect      Medication List with Changes/Refills   Current Medications    ALBUTEROL (PROVENTIL/VENTOLIN HFA) 90 MCG/ACTUATION INHALER    Inhale 1-2 puffs into the lungs every 4 (four) hours as needed for Wheezing or Shortness of Breath. INHALE 1 TO 2 PUFFS BY MOUTH INTO THE LUNGS EVERY 4 HOURS AS NEEDED FOR SHORTNESS OF BREATH( COUGHING)  Strength: 90 mcg/actuation    ALENDRONATE (FOSAMAX) 70 MG TABLET    Take one tablet every 7 days.    ASPIRIN (ECOTRIN) 81 MG EC TABLET    Take 81 mg by mouth once daily.    BLOOD-GLUCOSE METER KIT    Use as instructed. Insurance preferred.    CALCIUM CARBONATE/VITAMIN D3 (CALCIUM 500 + D ORAL)    Take 1 tablet by mouth once daily. 10 mg daily    CYANOCOBALAMIN (VITAMIN B-12) 1000 MCG TABLET    Take 1 tablet (1,000 mcg total) by mouth once daily.    ECONAZOLE NITRATE 1 % CREAM    Apply topically once daily.    FLUTICASONE FUROATE-VILANTEROL (BREO ELLIPTA) 100-25 MCG/DOSE DISKUS INHALER    Inhale 1 puff into the lungs once daily. Controller    KETOCONAZOLE (NIZORAL) 2 % CREAM    AAA pannus fold at least daily after the shower    LETROZOLE (FEMARA) 2.5 MG TAB    TAKE 1 TABLET(2.5 MG) BY MOUTH EVERY DAY    LEVOTHYROXINE (SYNTHROID) 125 MCG TABLET    Take 1 tablet (125 mcg total) by mouth before breakfast.    LOSARTAN (COZAAR) 50 MG TABLET    Take 0.5 tablets (25 mg total) by mouth once daily.    METFORMIN (GLUCOPHAGE-XR) 500 MG ER 24HR TABLET    Take 2 tablets (1,000 mg total) by mouth daily with breakfast.    NYSTATIN (MYCOSTATIN) CREAM    Apply topically 2 (two) times daily. Apply to rash on face    POLYETHYLENE GLYCOL (GLYCOLAX) 17  GRAM PWPK    Take 17 g by mouth once daily.    POTASSIUM CHLORIDE SA (K-DUR,KLOR-CON) 20 MEQ TABLET    Take 20 mEq by mouth once daily.    ROPINIROLE (REQUIP) 0.5 MG TABLET    Take 1 mg by mouth every evening.    SERTRALINE (ZOLOFT) 50 MG TABLET    Take 1 tablet (50 mg total) by mouth once daily. For depression/anxiety    SIMVASTATIN (ZOCOR) 40 MG TABLET    TAKE 1 TABLET(40 MG) BY MOUTH EVERY DAY    SOLIFENACIN (VESICARE) 10 MG TABLET    Take 1 tablet (10 mg total) by mouth once daily.    TETRABENAZINE (XENAZINE) 25 MG TABLET    Take one tablet daily for 7 days and then  Take 1 tablet twice daily afterwards             Assessment:       1. Pre-operative clearance    2. Essential hypertension    3. Syncope and collapse    4. History of ischemic left MCA stroke    5. Type 2 diabetes mellitus with diabetic peripheral angiopathy without gangrene, without long-term current use of insulin    6. Mixed hyperlipidemia    7. BMI 31.0-31.9,adult    8. Frequent falls (possibly related to polypharmacy)         Plan:     Problem List Items Addressed This Visit          Neuro    History of ischemic left MCA stroke       Cardiac/Vascular    Essential hypertension    Hyperlipidemia       Endocrine    Type 2 diabetes mellitus with diabetic peripheral angiopathy without gangrene, without long-term current use of insulin       Other    Syncope and collapse    Frequent falls (possibly related to polypharmacy)     Other Visit Diagnoses       Pre-operative clearance    -  Primary    BMI 31.0-31.9,adult              Preoperative clearance.  This is a noncardiac surgery.  She does have a history of heart disease and stroke in the past and last stress test was 2021 it would be ideal to have a repeat stress test before the surgery.  Her cardiovascular risk is 10.1% of death MI or cardiac arrest within 30 days    History of CVA blood pressure is okay currently    Diabetes sugars controlled  No follow-ups on file.    The patients questions were  answered, they verbalized understanding, and agreed with the treatment plan.     ASHLEE BLANCA MD  [facility]

## 2024-09-23 ENCOUNTER — OFFICE VISIT (OUTPATIENT)
Dept: CARDIOLOGY | Facility: CLINIC | Age: 74
End: 2024-09-23
Payer: MEDICARE

## 2024-09-23 VITALS
DIASTOLIC BLOOD PRESSURE: 70 MMHG | WEIGHT: 157.75 LBS | BODY MASS INDEX: 29.8 KG/M2 | OXYGEN SATURATION: 95 % | HEART RATE: 79 BPM | SYSTOLIC BLOOD PRESSURE: 111 MMHG

## 2024-09-23 DIAGNOSIS — Z01.818 ENCOUNTER FOR OTHER PREPROCEDURAL EXAMINATION: ICD-10-CM

## 2024-09-23 DIAGNOSIS — R55 SYNCOPE AND COLLAPSE: ICD-10-CM

## 2024-09-23 DIAGNOSIS — R29.6 FREQUENT FALLS: ICD-10-CM

## 2024-09-23 DIAGNOSIS — I10 ESSENTIAL HYPERTENSION: ICD-10-CM

## 2024-09-23 DIAGNOSIS — E78.2 MIXED HYPERLIPIDEMIA: ICD-10-CM

## 2024-09-23 DIAGNOSIS — E11.51 TYPE 2 DIABETES MELLITUS WITH DIABETIC PERIPHERAL ANGIOPATHY WITHOUT GANGRENE, WITHOUT LONG-TERM CURRENT USE OF INSULIN: ICD-10-CM

## 2024-09-23 DIAGNOSIS — Z86.73 HISTORY OF ISCHEMIC LEFT MCA STROKE: ICD-10-CM

## 2024-09-23 DIAGNOSIS — R94.31 ABNORMAL ELECTROCARDIOGRAM (ECG) (EKG): ICD-10-CM

## 2024-09-23 DIAGNOSIS — Z01.818 PRE-OPERATIVE CLEARANCE: Primary | ICD-10-CM

## 2024-09-23 PROCEDURE — 99214 OFFICE O/P EST MOD 30 MIN: CPT | Mod: S$GLB,,, | Performed by: GENERAL PRACTICE

## 2024-09-23 PROCEDURE — 3072F LOW RISK FOR RETINOPATHY: CPT | Mod: CPTII,S$GLB,, | Performed by: GENERAL PRACTICE

## 2024-09-23 PROCEDURE — 3288F FALL RISK ASSESSMENT DOCD: CPT | Mod: CPTII,S$GLB,, | Performed by: GENERAL PRACTICE

## 2024-09-23 PROCEDURE — 1157F ADVNC CARE PLAN IN RCRD: CPT | Mod: CPTII,S$GLB,, | Performed by: GENERAL PRACTICE

## 2024-09-23 PROCEDURE — 99999 PR PBB SHADOW E&M-EST. PATIENT-LVL II: CPT | Mod: PBBFAC,,, | Performed by: GENERAL PRACTICE

## 2024-09-23 PROCEDURE — 1101F PT FALLS ASSESS-DOCD LE1/YR: CPT | Mod: CPTII,S$GLB,, | Performed by: GENERAL PRACTICE

## 2024-09-23 PROCEDURE — 3078F DIAST BP <80 MM HG: CPT | Mod: CPTII,S$GLB,, | Performed by: GENERAL PRACTICE

## 2024-09-23 PROCEDURE — 1160F RVW MEDS BY RX/DR IN RCRD: CPT | Mod: CPTII,S$GLB,, | Performed by: GENERAL PRACTICE

## 2024-09-23 PROCEDURE — 3074F SYST BP LT 130 MM HG: CPT | Mod: CPTII,S$GLB,, | Performed by: GENERAL PRACTICE

## 2024-09-23 PROCEDURE — 1159F MED LIST DOCD IN RCRD: CPT | Mod: CPTII,S$GLB,, | Performed by: GENERAL PRACTICE

## 2024-09-23 NOTE — LETTER
2024    Maggy Tapia  [address]  [place] [address]             [facility] [place]  [address]  [address]  [place] [address]  Phone: [phone]  Fax: [phone] Patient: Maggy Tapia  : 1950  Referring Doctor: Prasanth Badillo  Telephone:[phone]  Date of Last Office Visit:2024  Consulting Provider: Dr. Simone Bonilla MD  Procedure: Right hip pin removal/ Total Hip replacement   Procedure Date : TBD    Current Outpatient Medications   Medication Sig    albuterol (PROVENTIL/VENTOLIN HFA) 90 mcg/actuation inhaler Inhale 1-2 puffs into the lungs every 4 (four) hours as needed for Wheezing or Shortness of Breath. INHALE 1 TO 2 PUFFS BY MOUTH INTO THE LUNGS EVERY 4 HOURS AS NEEDED FOR SHORTNESS OF BREATH( COUGHING)  Strength: 90 mcg/actuation    alendronate (FOSAMAX) 70 MG tablet Take one tablet every 7 days. (Patient taking differently: Take 70 mg by mouth every 7 days. Take one tablet every 7 days.)    aspirin (ECOTRIN) 81 MG EC tablet Take 81 mg by mouth once daily.    blood-glucose meter kit Use as instructed. Insurance preferred.    CALCIUM CARBONATE/VITAMIN D3 (CALCIUM 500 + D ORAL) Take 1 tablet by mouth once daily. 10 mg daily    cyanocobalamin (VITAMIN B-12) 1000 MCG tablet Take 1 tablet (1,000 mcg total) by mouth once daily.    econazole nitrate 1 % cream Apply topically once daily. (Patient taking differently: Apply 1 application  topically once daily.)    fluticasone furoate-vilanteroL (BREO ELLIPTA) 100-25 mcg/dose diskus inhaler Inhale 1 puff into the lungs once daily. Controller    ketoconazole (NIZORAL) 2 % cream AAA pannus fold at least daily after the shower    letrozole (FEMARA) 2.5 mg Tab TAKE 1 TABLET(2.5 MG) BY MOUTH EVERY DAY (Patient taking differently: Take 2.5 mg by mouth once daily.)    levothyroxine (SYNTHROID) 125 MCG tablet Take 1 tablet (125 mcg total) by mouth  before breakfast.    losartan (COZAAR) 50 MG tablet Take 0.5 tablets (25 mg total) by mouth once daily.    metFORMIN (GLUCOPHAGE-XR) 500 MG ER 24hr tablet Take 2 tablets (1,000 mg total) by mouth daily with breakfast.    nystatin (MYCOSTATIN) cream Apply topically 2 (two) times daily. Apply to rash on face (Patient taking differently: Apply 1 g topically 2 (two) times daily. Apply to rash on face)    polyethylene glycol (GLYCOLAX) 17 gram PwPk Take 17 g by mouth once daily.    potassium chloride SA (K-DUR,KLOR-CON) 20 MEQ tablet Take 20 mEq by mouth once daily.    rOPINIRole (REQUIP) 0.5 MG tablet Take 1 mg by mouth every evening.    sertraline (ZOLOFT) 50 MG tablet Take 1 tablet (50 mg total) by mouth once daily. For depression/anxiety    simvastatin (ZOCOR) 40 MG tablet TAKE 1 TABLET(40 MG) BY MOUTH EVERY DAY (Patient taking differently: Take 40 mg by mouth once daily.)    solifenacin (VESICARE) 10 MG tablet Take 1 tablet (10 mg total) by mouth once daily.    tetrabenazine (XENAZINE) 25 mg tablet Take one tablet daily for 7 days and then  Take 1 tablet twice daily afterwards     No current facility-administered medications for this visit.       This patient has been assessed for risk factors for clearance of surgery with the following stipulations:    ___ No contraindications  __X_ Recommendations for antiplatelet/anticoagulant medications: Hold Aspirin for 7 days prior to procedure.   High Risk _____ Moderate Risk _X____ Low Risk _____  _X__ Cleared for surgery with the following contraindications/precautions:  ___ Not cleared for surgery due to the following reasons:      If you have any questions regarding the above, please contact my office at [phone]    Sincerely,

## 2024-09-30 ENCOUNTER — TELEPHONE (OUTPATIENT)
Dept: CARDIOLOGY | Facility: HOSPITAL | Age: 74
End: 2024-09-30

## 2024-10-01 ENCOUNTER — CLINICAL SUPPORT (OUTPATIENT)
Dept: CARDIOLOGY | Facility: HOSPITAL | Age: 74
End: 2024-10-01
Attending: GENERAL PRACTICE
Payer: MEDICARE

## 2024-10-01 ENCOUNTER — HOSPITAL ENCOUNTER (OUTPATIENT)
Dept: RADIOLOGY | Facility: HOSPITAL | Age: 74
Discharge: HOME | End: 2024-10-01
Attending: GENERAL PRACTICE
Payer: MEDICARE

## 2024-10-01 VITALS — BODY MASS INDEX: 29.81 KG/M2 | WEIGHT: 157.88 LBS | HEIGHT: 61 IN

## 2024-10-01 DIAGNOSIS — R94.31 ABNORMAL ELECTROCARDIOGRAM (ECG) (EKG): ICD-10-CM

## 2024-10-01 DIAGNOSIS — I10 ESSENTIAL HYPERTENSION: ICD-10-CM

## 2024-10-01 DIAGNOSIS — E11.51 TYPE 2 DIABETES MELLITUS WITH DIABETIC PERIPHERAL ANGIOPATHY WITHOUT GANGRENE, WITHOUT LONG-TERM CURRENT USE OF INSULIN: ICD-10-CM

## 2024-10-01 DIAGNOSIS — Z01.818 ENCOUNTER FOR OTHER PREPROCEDURAL EXAMINATION: ICD-10-CM

## 2024-10-01 DIAGNOSIS — Z01.818 PRE-OPERATIVE CLEARANCE: ICD-10-CM

## 2024-10-01 LAB
AORTIC ROOT ANNULUS: 2.6 CM
AORTIC VALVE CUSP SEPERATION: 1.8 CM
APICAL FOUR CHAMBER EJECTION FRACTION: 73 %
ASCENDING AORTA: 2.9 CM
AV INDEX (PROSTH): 0.73
AV MEAN GRADIENT: 3 MMHG
AV PEAK GRADIENT: 5.8 MMHG
AV VALVE AREA BY VELOCITY RATIO: 2.4 CM²
AV VALVE AREA: 2.3 CM²
AV VELOCITY RATIO: 0.75
BSA FOR ECHO PROCEDURE: 1.76 M2
CV ECHO LV RWT: 0.41 CM
CV PHARM DOSE: 0.4 MG
CV STRESS BASE HR: 65 BPM
DIASTOLIC BLOOD PRESSURE: 68 MMHG
DOP CALC AO PEAK VEL: 1.2 M/S
DOP CALC AO VTI: 27.3 CM
DOP CALC LVOT AREA: 3.1 CM2
DOP CALC LVOT DIAMETER: 2 CM
DOP CALC LVOT PEAK VEL: 0.9 M/S
DOP CALC LVOT STROKE VOLUME: 62.8 CM3
DOP CALC MV VTI: 33.8 CM
DOP CALCLVOT PEAK VEL VTI: 20 CM
E WAVE DECELERATION TIME: 232 MSEC
E/A RATIO: 0.65
E/E' RATIO: 10.35 M/S
ECHO LV POSTERIOR WALL: 0.7 CM (ref 0.6–1.1)
FRACTIONAL SHORTENING: 44.1 % (ref 28–44)
INTERVENTRICULAR SEPTUM: 1.1 CM (ref 0.6–1.1)
LEFT ATRIUM AREA SYSTOLIC (APICAL 4 CHAMBER): 12.1 CM2
LEFT ATRIUM SIZE: 3 CM
LEFT INTERNAL DIMENSION IN SYSTOLE: 1.9 CM (ref 2.1–4)
LEFT VENTRICLE DIASTOLIC VOLUME INDEX: 27.72 ML/M2
LEFT VENTRICLE DIASTOLIC VOLUME: 47.4 ML
LEFT VENTRICLE END DIASTOLIC VOLUME APICAL 4 CHAMBER: 42.6 ML
LEFT VENTRICLE END SYSTOLIC VOLUME APICAL 4 CHAMBER: 24.8 ML
LEFT VENTRICLE MASS INDEX: 49.6 G/M2
LEFT VENTRICLE SYSTOLIC VOLUME INDEX: 6.5 ML/M2
LEFT VENTRICLE SYSTOLIC VOLUME: 11.2 ML
LEFT VENTRICULAR INTERNAL DIMENSION IN DIASTOLE: 3.4 CM (ref 3.5–6)
LEFT VENTRICULAR MASS: 84.9 G
LV LATERAL E/E' RATIO: 8.8 M/S
LV SEPTAL E/E' RATIO: 12.57 M/S
LVED V (TEICH): 47.4 ML
LVES V (TEICH): 11.2 ML
LVOT MG: 2 MMHG
LVOT MV: 0.59 CM/S
MV MEAN GRADIENT: 2 MMHG
MV PEAK A VEL: 1.36 M/S
MV PEAK E VEL: 0.88 M/S
MV PEAK GRADIENT: 7 MMHG
MV STENOSIS PRESSURE HALF TIME: 106 MS
MV VALVE AREA BY CONTINUITY EQUATION: 1.86 CM2
MV VALVE AREA P 1/2 METHOD: 2.08 CM2
OHS CV CPX 1 MINUTE RECOVERY HEART RATE: 94 BPM
OHS CV CPX 85 PERCENT MAX PREDICTED HEART RATE MALE: 122
OHS CV CPX MAX PREDICTED HEART RATE: 144
OHS CV CPX PATIENT IS FEMALE: 1
OHS CV CPX PATIENT IS MALE: 0
OHS CV CPX PEAK DIASTOLIC BLOOD PRESSURE: 77 MMHG
OHS CV CPX PEAK HEAR RATE: 101 BPM
OHS CV CPX PEAK RATE PRESSURE PRODUCT: NORMAL
OHS CV CPX PEAK SYSTOLIC BLOOD PRESSURE: 167 MMHG
OHS CV CPX PERCENT MAX PREDICTED HEART RATE ACHIEVED: 73
OHS CV CPX RATE PRESSURE PRODUCT PRESENTING: 9230
PISA TR MAX VEL: 1.68 M/S
PV MV: 0.54 M/S
PV PEAK GRADIENT: 2 MMHG
PV PEAK VELOCITY: 0.78 M/S
RA PRESSURE ESTIMATED: 3 MMHG
RIGHT ATRIUM VOLUME AREA LENGTH APICAL 4 CHAMBER: 18.5 ML
RIGHT VENTRICULAR END-DIASTOLIC DIMENSION: 2 CM
RV TB RVSP: 5 MMHG
RV TISSUE DOPPLER FREE WALL SYSTOLIC VELOCITY 1 (APICAL 4 CHAMBER VIEW): 14.6 CM/S
SYSTOLIC BLOOD PRESSURE: 142 MMHG
TDI LATERAL: 0.1 M/S
TDI SEPTAL: 0.07 M/S
TDI: 0.09 M/S
TR MAX PG: 11 MMHG
TRICUSPID ANNULAR PLANE SYSTOLIC EXCURSION: 1.19 CM
TV REST PULMONARY ARTERY PRESSURE: 14 MMHG
Z-SCORE OF LEFT VENTRICULAR DIMENSION IN END DIASTOLE: -3.34
Z-SCORE OF LEFT VENTRICULAR DIMENSION IN END SYSTOLE: -3.45

## 2024-10-01 PROCEDURE — 78452 HT MUSCLE IMAGE SPECT MULT: CPT | Mod: TC

## 2024-10-01 PROCEDURE — 93306 TTE W/DOPPLER COMPLETE: CPT | Mod: 26,,, | Performed by: GENERAL PRACTICE

## 2024-10-01 PROCEDURE — 93017 CV STRESS TEST TRACING ONLY: CPT

## 2024-10-01 PROCEDURE — A9502 TC99M TETROFOSMIN: HCPCS | Performed by: GENERAL PRACTICE

## 2024-10-01 PROCEDURE — 63600175 PHARM REV CODE 636 W HCPCS: Performed by: GENERAL PRACTICE

## 2024-10-01 PROCEDURE — 93306 TTE W/DOPPLER COMPLETE: CPT

## 2024-10-01 RX ORDER — REGADENOSON 0.08 MG/ML
0.4 INJECTION, SOLUTION INTRAVENOUS
Status: COMPLETED | OUTPATIENT
Start: 2024-10-01 | End: 2024-10-01

## 2024-10-01 RX ADMIN — TETROFOSMIN 27.2 MILLICURIE: 0.23 INJECTION, POWDER, LYOPHILIZED, FOR SOLUTION INTRAVENOUS at 09:10

## 2024-10-01 RX ADMIN — TETROFOSMIN 11.2 MILLICURIE: 0.23 INJECTION, POWDER, LYOPHILIZED, FOR SOLUTION INTRAVENOUS at 08:10

## 2024-10-01 RX ADMIN — REGADENOSON 0.4 MG: 0.08 INJECTION, SOLUTION INTRAVENOUS at 09:10

## 2024-10-09 ENCOUNTER — OFFICE VISIT (OUTPATIENT)
Dept: PSYCHIATRY | Facility: CLINIC | Age: 74
End: 2024-10-09
Payer: COMMERCIAL

## 2024-10-09 VITALS
HEART RATE: 107 BPM | WEIGHT: 157.5 LBS | BODY MASS INDEX: 29.74 KG/M2 | HEIGHT: 61 IN | SYSTOLIC BLOOD PRESSURE: 147 MMHG | DIASTOLIC BLOOD PRESSURE: 85 MMHG

## 2024-10-09 DIAGNOSIS — F41.9 ANXIETY DISORDER, UNSPECIFIED TYPE: ICD-10-CM

## 2024-10-09 DIAGNOSIS — F43.10 PTSD (POST-TRAUMATIC STRESS DISORDER): Primary | ICD-10-CM

## 2024-10-09 DIAGNOSIS — F34.1 PERSISTENT DEPRESSIVE DISORDER: ICD-10-CM

## 2024-10-09 PROCEDURE — 1101F PT FALLS ASSESS-DOCD LE1/YR: CPT | Mod: CPTII,S$GLB,, | Performed by: PHYSICIAN ASSISTANT

## 2024-10-09 PROCEDURE — 99214 OFFICE O/P EST MOD 30 MIN: CPT | Mod: S$GLB,,, | Performed by: PHYSICIAN ASSISTANT

## 2024-10-09 PROCEDURE — G2211 COMPLEX E/M VISIT ADD ON: HCPCS | Mod: S$GLB,,, | Performed by: PHYSICIAN ASSISTANT

## 2024-10-09 PROCEDURE — 99999 PR PBB SHADOW E&M-EST. PATIENT-LVL III: CPT | Mod: PBBFAC,,, | Performed by: PHYSICIAN ASSISTANT

## 2024-10-09 PROCEDURE — 3079F DIAST BP 80-89 MM HG: CPT | Mod: CPTII,S$GLB,, | Performed by: PHYSICIAN ASSISTANT

## 2024-10-09 PROCEDURE — 3288F FALL RISK ASSESSMENT DOCD: CPT | Mod: CPTII,S$GLB,, | Performed by: PHYSICIAN ASSISTANT

## 2024-10-09 PROCEDURE — 1160F RVW MEDS BY RX/DR IN RCRD: CPT | Mod: CPTII,S$GLB,, | Performed by: PHYSICIAN ASSISTANT

## 2024-10-09 PROCEDURE — 3077F SYST BP >= 140 MM HG: CPT | Mod: CPTII,S$GLB,, | Performed by: PHYSICIAN ASSISTANT

## 2024-10-09 PROCEDURE — 1125F AMNT PAIN NOTED PAIN PRSNT: CPT | Mod: CPTII,S$GLB,, | Performed by: PHYSICIAN ASSISTANT

## 2024-10-09 PROCEDURE — 1159F MED LIST DOCD IN RCRD: CPT | Mod: CPTII,S$GLB,, | Performed by: PHYSICIAN ASSISTANT

## 2024-10-09 PROCEDURE — 1157F ADVNC CARE PLAN IN RCRD: CPT | Mod: CPTII,S$GLB,, | Performed by: PHYSICIAN ASSISTANT

## 2024-10-09 PROCEDURE — 3072F LOW RISK FOR RETINOPATHY: CPT | Mod: CPTII,S$GLB,, | Performed by: PHYSICIAN ASSISTANT

## 2024-10-09 RX ORDER — SERTRALINE HYDROCHLORIDE 50 MG/1
50 TABLET, FILM COATED ORAL DAILY
Qty: 90 TABLET | Refills: 0 | Status: SHIPPED | OUTPATIENT
Start: 2024-10-09 | End: 2025-01-07
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
## 2024-10-16 ENCOUNTER — TELEPHONE (OUTPATIENT)
Dept: CARDIOLOGY | Facility: CLINIC | Age: 74
End: 2024-10-16
Payer: MEDICARE

## 2024-10-17 ENCOUNTER — TELEPHONE (OUTPATIENT)
Dept: CARDIOLOGY | Facility: CLINIC | Age: 74
End: 2024-10-17
Payer: MEDICARE

## 2024-10-17 NOTE — TELEPHONE ENCOUNTER
----- Message from Devin sent at 10/17/2024  9:40 AM CDT -----  Regarding: results  Type:  Needs Medical Advice    Who Called: pt    Best Call Back Number: [phone]      Additional Information: pt needs a call back to go over her results.  please call to discuss.

## 2024-10-24 ENCOUNTER — TELEPHONE (OUTPATIENT)
Dept: ORTHOPEDICS | Facility: CLINIC | Age: 74
End: 2024-10-24
Payer: MEDICARE

## 2024-10-24 NOTE — TELEPHONE ENCOUNTER
----- Message from Med Assistant Morris sent at 10/24/2024  2:05 PM CDT -----  Contact: patient  Patient wanted to know when her surgery was going to be scheduled?    Call back number is [phone]

## 2024-10-25 DIAGNOSIS — Z01.818 PRE-OP TESTING: ICD-10-CM

## 2024-10-25 DIAGNOSIS — M16.11 PRIMARY OSTEOARTHRITIS OF RIGHT HIP: ICD-10-CM

## 2024-10-25 DIAGNOSIS — M16.11 ARTHRITIS OF RIGHT HIP: Primary | ICD-10-CM

## 2024-10-25 RX ORDER — MUPIROCIN 20 MG/G
OINTMENT TOPICAL
OUTPATIENT
Start: 2024-10-25

## 2024-10-25 RX ORDER — SODIUM CHLORIDE 9 MG/ML
INJECTION, SOLUTION INTRAVENOUS CONTINUOUS
OUTPATIENT
Start: 2024-10-25

## 2024-10-29 DIAGNOSIS — Z96.641 S/P HIP REPLACEMENT, RIGHT: Primary | ICD-10-CM

## 2024-10-30 ENCOUNTER — HOSPITAL ENCOUNTER (OUTPATIENT)
Dept: PREADMISSION TESTING | Facility: HOSPITAL | Age: 74
Discharge: HOME | End: 2024-10-30
Attending: ORTHOPAEDIC SURGERY
Payer: MEDICARE

## 2024-10-30 DIAGNOSIS — Z01.818 PREOP TESTING: Primary | ICD-10-CM

## 2024-10-30 DIAGNOSIS — N18.31 CHRONIC KIDNEY DISEASE, STAGE 3A: ICD-10-CM

## 2024-10-30 DIAGNOSIS — M16.11 ARTHRITIS OF RIGHT HIP: ICD-10-CM

## 2024-10-30 DIAGNOSIS — Z01.818 PRE-OP TESTING: ICD-10-CM

## 2024-10-30 LAB
ABO + RH BLD: NORMAL
BLD GP AB SCN CELLS X3 SERPL QL: NORMAL
MRSA SCREEN BY PCR: NEGATIVE
SPECIMEN OUTDATE: NORMAL

## 2024-10-30 PROCEDURE — 86901 BLOOD TYPING SEROLOGIC RH(D): CPT | Performed by: ORTHOPAEDIC SURGERY

## 2024-10-30 PROCEDURE — 87641 MR-STAPH DNA AMP PROBE: CPT | Performed by: ORTHOPAEDIC SURGERY

## 2024-10-30 PROCEDURE — 36415 COLL VENOUS BLD VENIPUNCTURE: CPT | Performed by: ORTHOPAEDIC SURGERY

## 2024-10-30 PROCEDURE — 86850 RBC ANTIBODY SCREEN: CPT | Performed by: ORTHOPAEDIC SURGERY

## 2024-10-30 PROCEDURE — 86900 BLOOD TYPING SEROLOGIC ABO: CPT | Performed by: ORTHOPAEDIC SURGERY

## 2024-11-05 DIAGNOSIS — M16.11 PRIMARY OSTEOARTHRITIS OF RIGHT HIP: Primary | ICD-10-CM

## 2024-11-05 RX ORDER — ONDANSETRON 4 MG/1
4 TABLET, ORALLY DISINTEGRATING ORAL EVERY 8 HOURS PRN
Qty: 30 TABLET | Refills: 0 | Status: SHIPPED | OUTPATIENT
Start: 2024-11-05

## 2024-11-05 RX ORDER — OXYCODONE AND ACETAMINOPHEN 5; 325 MG/1; MG/1
1 TABLET ORAL EVERY 6 HOURS PRN
Qty: 28 TABLET | Refills: 0 | Status: SHIPPED | OUTPATIENT
Start: 2024-11-05

## 2024-11-05 RX ORDER — CYCLOBENZAPRINE HCL 10 MG
10 TABLET ORAL 3 TIMES DAILY PRN
Qty: 30 TABLET | Refills: 0 | Status: SHIPPED | OUTPATIENT
Start: 2024-11-05 | End: 2024-11-16

## 2024-11-05 RX ORDER — IBUPROFEN 600 MG/1
600 TABLET ORAL 3 TIMES DAILY
Qty: 90 TABLET | Refills: 1 | Status: SHIPPED | OUTPATIENT
Start: 2024-11-05

## 2024-11-05 RX ORDER — ASPIRIN 81 MG/1
81 TABLET ORAL 2 TIMES DAILY
Qty: 60 TABLET | Refills: 0 | Status: SHIPPED | OUTPATIENT
Start: 2024-11-05 | End: 2025-11-05

## 2024-11-06 ENCOUNTER — ANESTHESIA EVENT (OUTPATIENT)
Dept: SURGERY | Facility: HOSPITAL | Age: 74
End: 2024-11-06
Payer: MEDICARE

## 2024-11-07 ENCOUNTER — HOSPITAL ENCOUNTER (OUTPATIENT)
Facility: HOSPITAL | Age: 74
Discharge: HOME | End: 2024-11-07
Attending: ORTHOPAEDIC SURGERY | Admitting: ORTHOPAEDIC SURGERY
Payer: MEDICARE

## 2024-11-07 ENCOUNTER — ANESTHESIA (OUTPATIENT)
Dept: SURGERY | Facility: HOSPITAL | Age: 74
End: 2024-11-07
Payer: MEDICARE

## 2024-11-07 DIAGNOSIS — M16.11 ARTHRITIS OF RIGHT HIP: ICD-10-CM

## 2024-11-07 DIAGNOSIS — Z01.818 PRE-OP TESTING: ICD-10-CM

## 2024-11-07 DIAGNOSIS — B37.2 CANDIDIASIS OF SKIN: ICD-10-CM

## 2024-11-07 DIAGNOSIS — M16.11 PRIMARY OSTEOARTHRITIS OF RIGHT HIP: ICD-10-CM

## 2024-11-07 PROCEDURE — 37000009 HC ANESTHESIA EA ADD 15 MINS: Performed by: ORTHOPAEDIC SURGERY

## 2024-11-07 PROCEDURE — 27200688 HC TRAY, SPINAL-HYPER/ ISOBARIC: Performed by: ANESTHESIOLOGY

## 2024-11-07 PROCEDURE — 27200750 HC INSULATED NEEDLE/ STIMUPLEX: Performed by: ANESTHESIOLOGY

## 2024-11-07 PROCEDURE — 71000015 HC POSTOP RECOV 1ST HR: Performed by: ORTHOPAEDIC SURGERY

## 2024-11-07 PROCEDURE — 64450 NJX AA&/STRD OTHER PN/BRANCH: CPT | Mod: 59,RT,, | Performed by: ANESTHESIOLOGY

## 2024-11-07 PROCEDURE — 97165 OT EVAL LOW COMPLEX 30 MIN: CPT

## 2024-11-07 PROCEDURE — 27201423 OPTIME MED/SURG SUP & DEVICES STERILE SUPPLY: Performed by: ORTHOPAEDIC SURGERY

## 2024-11-07 PROCEDURE — 99900035 HC TECH TIME PER 15 MIN (STAT)

## 2024-11-07 PROCEDURE — 25000003 PHARM REV CODE 250: Performed by: ANESTHESIOLOGY

## 2024-11-07 PROCEDURE — 76942 ECHO GUIDE FOR BIOPSY: CPT | Mod: 26,,, | Performed by: ANESTHESIOLOGY

## 2024-11-07 PROCEDURE — 25000003 PHARM REV CODE 250: Performed by: ORTHOPAEDIC SURGERY

## 2024-11-07 PROCEDURE — 37000008 HC ANESTHESIA 1ST 15 MINUTES: Performed by: ORTHOPAEDIC SURGERY

## 2024-11-07 PROCEDURE — 71000033 HC RECOVERY, INTIAL HOUR: Performed by: ORTHOPAEDIC SURGERY

## 2024-11-07 PROCEDURE — 36000711: Performed by: ORTHOPAEDIC SURGERY

## 2024-11-07 PROCEDURE — 97535 SELF CARE MNGMENT TRAINING: CPT

## 2024-11-07 PROCEDURE — 36000710: Performed by: ORTHOPAEDIC SURGERY

## 2024-11-07 PROCEDURE — 63600175 PHARM REV CODE 636 W HCPCS: Performed by: NURSE ANESTHETIST, CERTIFIED REGISTERED

## 2024-11-07 PROCEDURE — 63600175 PHARM REV CODE 636 W HCPCS

## 2024-11-07 PROCEDURE — C1713 ANCHOR/SCREW BN/BN,TIS/BN: HCPCS | Performed by: ORTHOPAEDIC SURGERY

## 2024-11-07 PROCEDURE — 25000003 PHARM REV CODE 250: Performed by: NURSE ANESTHETIST, CERTIFIED REGISTERED

## 2024-11-07 PROCEDURE — 97530 THERAPEUTIC ACTIVITIES: CPT

## 2024-11-07 PROCEDURE — 63600175 PHARM REV CODE 636 W HCPCS: Mod: JZ,JG | Performed by: ANESTHESIOLOGY

## 2024-11-07 PROCEDURE — 97161 PT EVAL LOW COMPLEX 20 MIN: CPT

## 2024-11-07 PROCEDURE — 97116 GAIT TRAINING THERAPY: CPT

## 2024-11-07 PROCEDURE — 27132 TOTAL HIP ARTHROPLASTY: CPT | Mod: RT,,, | Performed by: ORTHOPAEDIC SURGERY

## 2024-11-07 PROCEDURE — 71000039 HC RECOVERY, EACH ADD'L HOUR: Performed by: ORTHOPAEDIC SURGERY

## 2024-11-07 PROCEDURE — 63600175 PHARM REV CODE 636 W HCPCS: Performed by: ORTHOPAEDIC SURGERY

## 2024-11-07 PROCEDURE — 76942 ECHO GUIDE FOR BIOPSY: CPT | Performed by: ANESTHESIOLOGY

## 2024-11-07 PROCEDURE — C1776 JOINT DEVICE (IMPLANTABLE): HCPCS | Performed by: ORTHOPAEDIC SURGERY

## 2024-11-07 PROCEDURE — 71000016 HC POSTOP RECOV ADDL HR: Performed by: ORTHOPAEDIC SURGERY

## 2024-11-07 PROCEDURE — 63600175 PHARM REV CODE 636 W HCPCS: Mod: JZ,JG | Performed by: NURSE ANESTHETIST, CERTIFIED REGISTERED

## 2024-11-07 DEVICE — INSERT AMD/MDM X3 48MM: Type: IMPLANTABLE DEVICE | Site: HIP | Status: FUNCTIONAL

## 2024-11-07 DEVICE — TRIDENT II TRITANIUM CLUSTER 54E
Type: IMPLANTABLE DEVICE | Site: HIP | Status: FUNCTIONAL
Brand: TRIDENT II

## 2024-11-07 DEVICE — CABLE W/CRIMP STRL 1.7MM: Type: IMPLANTABLE DEVICE | Site: FEMUR | Status: FUNCTIONAL

## 2024-11-07 DEVICE — LINER- CEMENTLESS
Type: IMPLANTABLE DEVICE | Site: HIP | Status: FUNCTIONAL
Brand: MDM

## 2024-11-07 DEVICE — HEAD FEMORAL V40 +0X28MM COCR: Type: IMPLANTABLE DEVICE | Site: HIP | Status: FUNCTIONAL

## 2024-11-07 DEVICE — IMPLANTABLE DEVICE: Type: IMPLANTABLE DEVICE | Site: HIP | Status: FUNCTIONAL

## 2024-11-07 RX ORDER — LIDOCAINE HYDROCHLORIDE 20 MG/ML
INJECTION INTRAVENOUS
Status: DISCONTINUED | OUTPATIENT
Start: 2024-11-07 | End: 2024-11-07

## 2024-11-07 RX ORDER — OXYCODONE HYDROCHLORIDE 10 MG/1
10 TABLET ORAL EVERY 4 HOURS PRN
Status: CANCELLED | OUTPATIENT
Start: 2024-11-07

## 2024-11-07 RX ORDER — FENTANYL CITRATE 50 UG/ML
25 INJECTION, SOLUTION INTRAMUSCULAR; INTRAVENOUS EVERY 5 MIN PRN
Status: DISCONTINUED | OUTPATIENT
Start: 2024-11-07 | End: 2024-11-07 | Stop reason: HOSPADM

## 2024-11-07 RX ORDER — SODIUM CHLORIDE 9 MG/ML
INJECTION, SOLUTION INTRAVENOUS CONTINUOUS
Status: DISCONTINUED | OUTPATIENT
Start: 2024-11-07 | End: 2024-11-07 | Stop reason: HOSPADM

## 2024-11-07 RX ORDER — ACETAMINOPHEN 10 MG/ML
INJECTION, SOLUTION INTRAVENOUS
Status: DISCONTINUED | OUTPATIENT
Start: 2024-11-07 | End: 2024-11-07

## 2024-11-07 RX ORDER — MUPIROCIN 20 MG/G
OINTMENT TOPICAL
Status: DISCONTINUED | OUTPATIENT
Start: 2024-11-07 | End: 2024-11-07 | Stop reason: HOSPADM

## 2024-11-07 RX ORDER — VANCOMYCIN HYDROCHLORIDE 1 G/20ML
INJECTION, POWDER, LYOPHILIZED, FOR SOLUTION INTRAVENOUS
Status: DISCONTINUED | OUTPATIENT
Start: 2024-11-07 | End: 2024-11-07 | Stop reason: HOSPADM

## 2024-11-07 RX ORDER — LIDOCAINE HYDROCHLORIDE 10 MG/ML
1 INJECTION, SOLUTION EPIDURAL; INFILTRATION; INTRACAUDAL; PERINEURAL ONCE
Status: DISCONTINUED | OUTPATIENT
Start: 2024-11-07 | End: 2024-11-07 | Stop reason: HOSPADM

## 2024-11-07 RX ORDER — ACETAMINOPHEN 325 MG/1
650 TABLET ORAL EVERY 4 HOURS PRN
Status: CANCELLED | OUTPATIENT
Start: 2024-11-07

## 2024-11-07 RX ORDER — ONDANSETRON HYDROCHLORIDE 2 MG/ML
INJECTION, SOLUTION INTRAMUSCULAR; INTRAVENOUS
Status: DISCONTINUED | OUTPATIENT
Start: 2024-11-07 | End: 2024-11-07

## 2024-11-07 RX ORDER — HYDROMORPHONE HYDROCHLORIDE 2 MG/ML
0.2 INJECTION, SOLUTION INTRAMUSCULAR; INTRAVENOUS; SUBCUTANEOUS EVERY 5 MIN PRN
Status: DISCONTINUED | OUTPATIENT
Start: 2024-11-07 | End: 2024-11-07 | Stop reason: HOSPADM

## 2024-11-07 RX ORDER — PROPOFOL 10 MG/ML
VIAL (ML) INTRAVENOUS CONTINUOUS PRN
Status: DISCONTINUED | OUTPATIENT
Start: 2024-11-07 | End: 2024-11-07

## 2024-11-07 RX ORDER — PROPOFOL 10 MG/ML
VIAL (ML) INTRAVENOUS
Status: DISCONTINUED | OUTPATIENT
Start: 2024-11-07 | End: 2024-11-07

## 2024-11-07 RX ORDER — SODIUM CHLORIDE 0.9 % (FLUSH) 0.9 %
10 SYRINGE (ML) INJECTION
Status: DISCONTINUED | OUTPATIENT
Start: 2024-11-07 | End: 2024-11-07 | Stop reason: HOSPADM

## 2024-11-07 RX ORDER — MIDAZOLAM HYDROCHLORIDE 1 MG/ML
INJECTION INTRAMUSCULAR; INTRAVENOUS
Status: DISCONTINUED | OUTPATIENT
Start: 2024-11-07 | End: 2024-11-07

## 2024-11-07 RX ORDER — OXYCODONE HYDROCHLORIDE 5 MG/1
5 TABLET ORAL EVERY 4 HOURS PRN
Status: CANCELLED | OUTPATIENT
Start: 2024-11-07

## 2024-11-07 RX ORDER — DEXAMETHASONE SODIUM PHOSPHATE 4 MG/ML
INJECTION, SOLUTION INTRA-ARTICULAR; INTRALESIONAL; INTRAMUSCULAR; INTRAVENOUS; SOFT TISSUE
Status: DISCONTINUED | OUTPATIENT
Start: 2024-11-07 | End: 2024-11-07

## 2024-11-07 RX ORDER — ONDANSETRON 4 MG/1
8 TABLET, ORALLY DISINTEGRATING ORAL EVERY 8 HOURS PRN
Status: CANCELLED | OUTPATIENT
Start: 2024-11-07

## 2024-11-07 RX ORDER — OXYCODONE HYDROCHLORIDE 5 MG/1
5 TABLET ORAL
Status: DISCONTINUED | OUTPATIENT
Start: 2024-11-07 | End: 2024-11-07 | Stop reason: HOSPADM

## 2024-11-07 RX ORDER — NAPROXEN SODIUM 220 MG/1
81 TABLET, FILM COATED ORAL 2 TIMES DAILY
Status: DISCONTINUED | OUTPATIENT
Start: 2024-11-07 | End: 2024-11-07 | Stop reason: HOSPADM

## 2024-11-07 RX ORDER — EPHEDRINE SULFATE 50 MG/ML
INJECTION, SOLUTION INTRAVENOUS
Status: DISCONTINUED | OUTPATIENT
Start: 2024-11-07 | End: 2024-11-07

## 2024-11-07 RX ORDER — FENTANYL CITRATE 50 UG/ML
INJECTION, SOLUTION INTRAMUSCULAR; INTRAVENOUS
Status: DISCONTINUED | OUTPATIENT
Start: 2024-11-07 | End: 2024-11-07

## 2024-11-07 RX ORDER — MUPIROCIN 20 MG/G
1 OINTMENT TOPICAL 2 TIMES DAILY
Status: CANCELLED | OUTPATIENT
Start: 2024-11-07 | End: 2024-11-12

## 2024-11-07 RX ORDER — BUPIVACAINE HYDROCHLORIDE 2.5 MG/ML
INJECTION, SOLUTION EPIDURAL; INFILTRATION; INTRACAUDAL
Status: COMPLETED | OUTPATIENT
Start: 2024-11-07 | End: 2024-11-07

## 2024-11-07 RX ORDER — BUPIVACAINE HYDROCHLORIDE 7.5 MG/ML
INJECTION, SOLUTION INTRASPINAL
Status: DISCONTINUED | OUTPATIENT
Start: 2024-11-07 | End: 2024-11-07

## 2024-11-07 RX ORDER — TRANEXAMIC ACID 100 MG/ML
INJECTION, SOLUTION INTRAVENOUS
Status: DISCONTINUED | OUTPATIENT
Start: 2024-11-07 | End: 2024-11-07

## 2024-11-07 RX ORDER — CEFAZOLIN 2 G/1
2 INJECTION, POWDER, FOR SOLUTION INTRAMUSCULAR; INTRAVENOUS
Status: COMPLETED | OUTPATIENT
Start: 2024-11-07 | End: 2024-11-07

## 2024-11-07 RX ORDER — GLUCAGON 1 MG
1 KIT INJECTION
Status: DISCONTINUED | OUTPATIENT
Start: 2024-11-07 | End: 2024-11-07 | Stop reason: HOSPADM

## 2024-11-07 RX ADMIN — PHENYLEPHRINE HYDROCHLORIDE 20 MCG/MIN: 10 INJECTION INTRAVENOUS at 10:11

## 2024-11-07 RX ADMIN — MUPIROCIN 1 G: 20 OINTMENT TOPICAL at 09:11

## 2024-11-07 RX ADMIN — ACETAMINOPHEN 1000 MG: 10 INJECTION INTRAVENOUS at 12:11

## 2024-11-07 RX ADMIN — MIDAZOLAM HYDROCHLORIDE 1 MG: 1 INJECTION, SOLUTION INTRAMUSCULAR; INTRAVENOUS at 09:11

## 2024-11-07 RX ADMIN — CEFAZOLIN 2 G: 2 INJECTION, POWDER, FOR SOLUTION INTRAMUSCULAR; INTRAVENOUS at 09:11

## 2024-11-07 RX ADMIN — TRANEXAMIC ACID 700 MG: 100 INJECTION, SOLUTION INTRAVENOUS at 09:11

## 2024-11-07 RX ADMIN — PROPOFOL 20 MG: 10 INJECTION, EMULSION INTRAVENOUS at 10:11

## 2024-11-07 RX ADMIN — ONDANSETRON 4 MG: 2 INJECTION INTRAMUSCULAR; INTRAVENOUS at 09:11

## 2024-11-07 RX ADMIN — EPHEDRINE SULFATE 10 MG: 50 INJECTION, SOLUTION INTRAMUSCULAR; INTRAVENOUS; SUBCUTANEOUS at 10:11

## 2024-11-07 RX ADMIN — PROPOFOL 40 MCG/KG/MIN: 10 INJECTION, EMULSION INTRAVENOUS at 10:11

## 2024-11-07 RX ADMIN — LIDOCAINE HYDROCHLORIDE 20 MG: 20 INJECTION, SOLUTION INTRAVENOUS at 10:11

## 2024-11-07 RX ADMIN — DEXAMETHASONE SODIUM PHOSPHATE 8 MG: 4 INJECTION, SOLUTION INTRA-ARTICULAR; INTRALESIONAL; INTRAMUSCULAR; INTRAVENOUS; SOFT TISSUE at 10:11

## 2024-11-07 RX ADMIN — ONDANSETRON 4 MG: 2 INJECTION INTRAMUSCULAR; INTRAVENOUS at 12:11

## 2024-11-07 RX ADMIN — FENTANYL CITRATE 25 MCG: 0.05 INJECTION, SOLUTION INTRAMUSCULAR; INTRAVENOUS at 10:11

## 2024-11-07 RX ADMIN — BUPIVACAINE HYDROCHLORIDE IN DEXTROSE 2 ML: 7.5 INJECTION, SOLUTION SUBARACHNOID at 10:11

## 2024-11-07 RX ADMIN — BUPIVACAINE HYDROCHLORIDE 20 ML: 2.5 INJECTION, SOLUTION EPIDURAL; INFILTRATION; INTRACAUDAL; PERINEURAL at 09:11

## 2024-11-07 RX ADMIN — FENTANYL CITRATE 50 MCG: 0.05 INJECTION, SOLUTION INTRAMUSCULAR; INTRAVENOUS at 09:11

## 2024-11-07 RX ADMIN — PROPOFOL 20 MG: 10 INJECTION, EMULSION INTRAVENOUS at 11:11

## 2024-11-07 RX ADMIN — SODIUM CHLORIDE, SODIUM GLUCONATE, SODIUM ACETATE, POTASSIUM CHLORIDE AND MAGNESIUM CHLORIDE: 526; 502; 368; 37; 30 INJECTION, SOLUTION INTRAVENOUS at 09:11

## 2024-11-07 RX ADMIN — SODIUM CHLORIDE: 0.9 INJECTION, SOLUTION INTRAVENOUS at 12:11

## 2024-11-07 NOTE — TRANSFER OF CARE
"Anesthesia Transfer of Care Note    Patient: Maggy Tapia    Procedure(s) Performed: Procedure(s) (LRB):  ARTHROPLASTY, HIP REPLACEMENT (Right)    Patient location: PACU    Anesthesia Type: spinal    Transport from OR: Transported from OR on 2-3 L/min O2 by NC with adequate spontaneous ventilation    Post pain: adequate analgesia    Post assessment: no apparent anesthetic complications    Post vital signs: stable    Level of consciousness: awake    Nausea/Vomiting: no nausea/vomiting    Complications: none    Transfer of care protocol was followed      Last vitals: Visit Vitals  BP (!) 99/54 (BP Location: Right arm, Patient Position: Lying)   Pulse 79   Temp 36.7 °C (98.1 °F) (Temporal)   Resp 12   Ht 5' 1" (1.549 m)   Wt 71.2 kg (157 lb)   SpO2 96%   Breastfeeding No   BMI 29.66 kg/m²     "

## 2024-11-07 NOTE — PT/OT/SLP EVAL
Physical Therapy Evaluation and Discharge Note    Patient Name:  Maggy Tapia   MRN:  [id number]    Recommendations:     Discharge Recommendations: Low Intensity Therapy  Discharge Equipment Recommendations: none   Barriers to discharge: None    Assessment:     Maggy Tapia is a 74 y.o. female admitted with a medical diagnosis of right SMITH. Patient found supine in bed, agreeable to PT this afternoon. She transfers supine to sitting to standing with Sunil->CGA using RW. She is able to ambulate x 125 ft with a RW and CGA WBAT RLE. Transfer training included chair to/from WC, with patient requiring CGA and RW WBAT RLE. She is scheduled for discharge from hospital today, at this time appears will be safe with mobility in the home.    Recent Surgery: Procedure(s) (LRB):  ARTHROPLASTY, HIP REPLACEMENT (Right) Day of Surgery    Plan:     During this hospitalization, patient does not require further acute PT services.  Please re-consult if situation changes.      Subjective     Chief Complaint: pain  Patient/Family Comments/goals: go home  Pain/Comfort:  Pain Rating 1: 3/10  Location - Side 1: Right  Location 1: hip  Pain Addressed 1: Cessation of Activity, Reposition    Patients cultural, spiritual, Mormonism conflicts given the current situation:      Living Environment:  Currently lives with her brother in one story home,  accessible.  Prior to admission, patients level of function was modified independent, used RW/rollator/WC for all mobility.  Equipment used at home: walker, rolling, wheelchair, bedside commode.  DME owned (not currently used): none.  Upon discharge, patient will have assistance from brother.    Objective:     Communicated with RN prior to session.  Patient found supine with cryotherapy upon PT entry to room.    General Precautions: Standard, fall    Orthopedic Precautions:RLE posterior precautions, RLE weight bearing as tolerated   Braces: N/A  Respiratory Status: Room air    Exams:  RLE ROM:  knee ROM WFL, hip ROM not formally assessed 2/2 pain  RLE Strength: not formally assessed 2/2 pain; functionally 3+/5  LLE ROM: WFL  LLE Strength: WNL    Functional Mobility:  Bed Mobility:     Supine to Sit: contact guard assistance  Transfers:     Sit to Stand:  contact guard assistance and minimum assistance with rolling walker  Bed to Chair: contact guard assistance and minimum assistance with  rolling walker  using  Stand Pivot  Gait: x 125 ft RW CGA WBAT LLE    AM-PAC 6 CLICK MOBILITY  Total Score:18       Treatment and Education:  Exercises: ankle pumps, quad sets, hip abds, heel slides x 10 AAROM RLE  Gait training: x 125 ft RW CGA WBAT LLE  Transfer training as indicated above    AM-PAC 6 CLICK MOBILITY  Total Score:18     Patient left up in chair with call button in reach, nurse notified, and brother present.    GOALS:   Multidisciplinary Problems       Physical Therapy Goals       Not on file                    History:     Past Medical History:   Diagnosis Date    Anxiety     Arthritis     Asthma     Breast cancer     Left    Depression     Diabetes mellitus, type 2     GERD (gastroesophageal reflux disease)     Hyperlipidemia     Hypertension     Hypothyroidism, unspecified     Overactive bladder     Seizures     Pseudo-seizures    Sleep apnea     Stroke 2022    pt was in the hospital for a stroke, claims she was seeing people when the stroke happened       Past Surgical History:   Procedure Laterality Date    APPENDECTOMY      BREAST BIOPSY Left     BREAST LUMPECTOMY Left     2016    BREAST SURGERY      CATARACT EXTRACTION Bilateral     OU done//     SECTION      CHOLECYSTECTOMY      COLONOSCOPY N/A 2020    Procedure: COLONOSCOPY;  Surgeon: Mj Fernandez MD;  Location: [facility];  Service: Endoscopy;  Laterality: N/A;    CYST REMOVAL Left 2021    DILATION AND CURETTAGE OF UTERUS      EYE SURGERY      HYSTEROSCOPY WITH DILATION AND CURETTAGE OF UTERUS N/A 2023     Procedure: HYSTEROSCOPY, WITH DILATION AND CURETTAGE OF UTERUS AND OTHER INDICATED PROCEDURES Needs Cardiac clearance;  Surgeon: Gamaliel Moran MD;  Location: [facility] OR;  Service: OB/GYN;  Laterality: N/A;  Cardiac clearance needed per Dr Anderson    LUMPECTOMY, BREAST Left 4/26/2023    Procedure: LUMPECTOMY, BREAST;  Surgeon: Toño Paredes MD;  Location: [facility];  Service: General;  Laterality: Left;    PERCUTANEOUS PINNING OF HIP Right 11/11/2022    Procedure: PINNING, HIP, PERCUTANEOUS;  Surgeon: Ministerio Joiner II, MD;  Location: [facility];  Service: Orthopedics;  Laterality: Right;    SENTINEL LYMPH NODE BIOPSY Left 4/26/2023    Procedure: BIOPSY, LYMPH NODE, SENTINEL;  Surgeon: Toño Paredes MD;  Location: [facility];  Service: General;  Laterality: Left;    SIMPLE MASTECTOMY Left 5/19/2023    Procedure: MASTECTOMY, SIMPLE;  Surgeon: Toño Paredes MD;  Location: [facility];  Service: General;  Laterality: Left;       Time Tracking:     PT Received On: 11/07/24  PT Start Time: 1400     PT Stop Time: 1440  PT Total Time (min): 40 min     Billable Minutes: Evaluation 15, Gait Training 10, and Therapeutic Activity 15      11/07/2024

## 2024-11-07 NOTE — PLAN OF CARE
Patient received from recovery at this time.  AAOX3.  NAD noted.  Dressing to right hip remains clean, dry and intact. Tolerating po intake well with no complaints of nausea/vomiting.  Patient able to move BLE with no problems noted.  Due to void.  Medications delivered to bedside.

## 2024-11-07 NOTE — ANESTHESIA PROCEDURE NOTES
Spinal    Diagnosis: right hip  Patient location during procedure: OR  Start time: 11/7/2024 10:06 AM  Timeout: 11/7/2024 10:06 AM  End time: 11/7/2024 10:13 AM    Staffing  Authorizing Provider: Richie Casas MD  Performing Provider: Richie Casas MD    Staffing  Performed by: Richie Casas MD  Authorized by: Richie Casas MD    Spinal Block  Patient position: sitting  Prep: ChloraPrep  Patient monitoring: heart rate, cardiac monitor, continuous pulse ox and frequent blood pressure checks  Approach: left paramedian  Location: L2-3  Injection technique: single shot  CSF Fluid: clear free-flowing CSF  Needle  Needle type: Quincke   Needle gauge: 22 G  Needle length: 3.5 in  Additional Documentation: incremental injection, negative aspiration for heme and no paresthesia on injection  Needle localization: anatomical landmarks  Assessment  Sensory level: T8   Dermatomal levels determined by pinch or prick  Ease of block: moderate  Patient's tolerance of the procedure: comfortable throughout block and no complaints  Additional Notes  scoliosis

## 2024-11-07 NOTE — PROGRESS NOTES
Wound care consult for Pt in Post-op going home for bilateral buttocks.  Present on Admit--Bilateral buttocks noted with multiple areas of non-blanchable and Blanchable redness and excoriation.  Bilateral ischium noted with red.maroon.purple discolorations.  Buttocks cleaned with bath wipes and applied Triad cream to entire area.  Area noted to right abdominal fold with redness and excoriation.  Cleaned with bath wipe and applied Triad cream to area.  Instructed Pt and Family member present Pressure Injury prevention at home.  Encouraged Pt to shift frequently when sitting in recliner.  Keep buttocks and haley-area clean and dry.  Apply Triad cream BID and PRN soilage.  Family Member verbalized understanding.  Pt states she has issues with buttocks and uses creams.  Pt has been seen in Outpatient wound clinic with Dr. Frazier.  Instructed Pt and Family member to return to Wound clinic after her post op recovery period is complete per Ortho. MD.     Bilateral Buttocks        Right Ischium with excoriation      Right abdominal fold with excoriation         11/07/24 1615   WOCN Assessment   WOCN Total Time (mins) 30   Visit Date 11/07/24   Visit Time 1615   Consult Type New   WOCN Speciality Wound   Wound pressure;deep tissue injury;moisture   Intervention assessed;changed;applied;chart review   Teaching on-going

## 2024-11-07 NOTE — PLAN OF CARE
Reason for Disposition  • [1] MODERATE leg swelling (e.g., swelling extends up to knees) AND [2] new-onset or worsening    Protocols used: LEG SWELLING AND EDEMA-A-AH    Patient had called office and left a voicemail message stating that for the past 2 weeks she has had bilateral lower leg swelling.    Writer called patient to further discuss.    Patient stated that for 2 weeks now she has had lower leg swelling from her ankles to below her knees in both legs.  Patient stated that she is on her feet all day long and by the afternoon, she notices the swelling; patient stated that in the afternoon her legs are \"hard\" and she can leave a thumbprint.  Patient stated that she is currently taking hydrochlorothiazide 25 mg daily.    Patient denied recent injuries, denied shortness of breath or difficulty breathing, denied chest pain, denied redness in skin.  Patient stated that she is able to walk without issues.    Triage protocol stated patient should be seen for appointment within 24 hours.      Last office visit was with Dr. Todd on 05/27/22.    Please advise.   Pt prepped for surgery. Consents at bedside. Incentive spirometry taught by respiratory. Pt belongings placed in postop area including wheelchair. Brother set up with text alerts.

## 2024-11-07 NOTE — ANESTHESIA PROCEDURE NOTES
ELENA BLAND    Patient location during procedure: pre-op   Block not for primary anesthetic.  Reason for block: at surgeon's request and post-op pain management   Post-op Pain Location: right hip   Start time: 11/7/2024 9:22 AM  Timeout: 11/7/2024 9:21 AM   End time: 11/7/2024 9:25 AM    Staffing  Authorizing Provider: Maxx Anderson MD  Performing Provider: Maxx Anderson MD    Staffing  Performed by: Maxx Anderson MD  Authorized by: Maxx Anderson MD    Preanesthetic Checklist  Completed: patient identified, IV checked, site marked, risks and benefits discussed, surgical consent, monitors and equipment checked, pre-op evaluation and timeout performed  Peripheral Block  Patient position: supine  Prep: ChloraPrep  Patient monitoring: heart rate, continuous pulse ox and cardiac monitor  Block type: PENG  Laterality: right  Injection technique: single shot  Needle  Needle type: Stimuplex   Needle gauge: 21 G  Needle length: 4 in  Needle localization: ultrasound guidance   -ultrasound image captured on disc.  Assessment  Injection assessment: negative aspiration and negative parasthesia  Paresthesia pain: none  Heart rate change: no  Slow fractionated injection: yes  Pain Tolerance: comfortable throughout block and no complaints  Medications:    Medications: bupivacaine (pf) (MARCAINE) injection 0.25% - Perineural   20 mL - 11/7/2024 9:25:00 AM    Additional Notes  VSS. See RN record  No complications

## 2024-11-07 NOTE — PLAN OF CARE
Patient cleared by PT/OT to discharge to home.  Patient seen by Jo Hastings wound care nurse and recommended cream sent home with patient and instructed patient to follow up with wound care clinic. Patient and brother both verbalized understanding.    Patient able to void with no problems noted.  No complaints of pain at this time.  Tolerating po intake well with no complaints of nausea/ vomiting.  Dressing to right/hip remains clean dry and intact.  Medications delivered to bedside and reviewed with patient and sibling.  Discharge instructions given to pt and family/friend, verbalized understanding and questions answered. Handouts provided. Belongings given back to pt. IV removed- catheter intact. Discharge via wheelchair.

## 2024-11-07 NOTE — H&P
Patient ID: Maggy Tapia is a 74 y.o. female     Chief Complaint:       Chief Complaint   Patient presents with    Right Hip - Pain         History of Present Illness:     Pleasant 74-year-old female status post percutaneous pinning of the right hip about 2 years ago.  Has increased pain of the right thigh and going over the past year.  She walks with a walker.  History of stroke x2 as well as a heart attack.  She has a well-controlled diabetic.  Also has history of a decubitus pressure ulcer of the buttock region which was treated by wound care.     PAST MEDICAL HISTORY:        Past Medical History:   Diagnosis Date    Anxiety      Arthritis      Asthma      Breast cancer      Left    Depression      Diabetes mellitus, type 2      GERD (gastroesophageal reflux disease)      Hyperlipidemia      Hypertension      Hypothyroidism, unspecified      Overactive bladder      Seizures       Pseudo-seizures    Sleep apnea      Stroke 2022     pt was in the hospital for a stroke, claims she was seeing people when the stroke happened      PAST SURGICAL HISTORY:         Past Surgical History:   Procedure Laterality Date    APPENDECTOMY        BREAST BIOPSY Left      BREAST LUMPECTOMY Left       2016    BREAST SURGERY        CATARACT EXTRACTION Bilateral       OU done//     SECTION        CHOLECYSTECTOMY        COLONOSCOPY N/A 2020     Procedure: COLONOSCOPY;  Surgeon: Mj Fernandez MD;  Location: [facility];  Service: Endoscopy;  Laterality: N/A;    CYST REMOVAL Left 2021    DILATION AND CURETTAGE OF UTERUS        EYE SURGERY        HYSTEROSCOPY WITH DILATION AND CURETTAGE OF UTERUS N/A 2023     Procedure: HYSTEROSCOPY, WITH DILATION AND CURETTAGE OF UTERUS AND OTHER INDICATED PROCEDURES Needs Cardiac clearance;  Surgeon: Gamaliel Moran MD;  Location: [facility];  Service: OB/GYN;  Laterality: N/A;  Cardiac clearance needed per Dr Anderson    LUMPECTOMY, BREAST Left 2023      Procedure: LUMPECTOMY, BREAST;  Surgeon: Toño Paredes MD;  Location: [facility] OR;  Service: General;  Laterality: Left;    PERCUTANEOUS PINNING OF HIP Right 11/11/2022     Procedure: PINNING, HIP, PERCUTANEOUS;  Surgeon: Ministerio Joiner II, MD;  Location: [facility] OR;  Service: Orthopedics;  Laterality: Right;    SENTINEL LYMPH NODE BIOPSY Left 4/26/2023     Procedure: BIOPSY, LYMPH NODE, SENTINEL;  Surgeon: Toño Paredes MD;  Location: [facility] OR;  Service: General;  Laterality: Left;    SIMPLE MASTECTOMY Left 5/19/2023     Procedure: MASTECTOMY, SIMPLE;  Surgeon: Toño Paredes MD;  Location: [facility] OR;  Service: General;  Laterality: Left;      FAMILY HISTORY:          Family History   Problem Relation Name Age of Onset    Diabetes Mother        Hypertension Mother        Breast cancer Mother        Cataracts Mother        Melanoma Mother        Heart failure Father        Melanoma Father        Cataracts Brother        Melanoma Brother        Glaucoma Neg Hx        Retinal detachment Neg Hx        Macular degeneration Neg Hx          SOCIAL HISTORY:   Social History            Occupational History    Not on file   Tobacco Use    Smoking status: Never       Passive exposure: Past    Smokeless tobacco: Never   Substance and Sexual Activity    Alcohol use: Yes       Comment: seldom    Drug use: No    Sexual activity: Not Currently         MEDICATIONS:   Current Medications   Current Outpatient Medications:     albuterol (PROVENTIL/VENTOLIN HFA) 90 mcg/actuation inhaler, Inhale 1-2 puffs into the lungs every 4 (four) hours as needed for Wheezing or Shortness of Breath. INHALE 1 TO 2 PUFFS BY MOUTH INTO THE LUNGS EVERY 4 HOURS AS NEEDED FOR SHORTNESS OF BREATH( COUGHING) Strength: 90 mcg/actuation, Disp: 20.1 g, Rfl: 11    alendronate (FOSAMAX) 70 MG tablet, Take one tablet every 7 days. (Patient taking differently: Take 70 mg by mouth every 7 days. Take one tablet every 7 days.), Disp: 4 tablet, Rfl: 11    aspirin  (ECOTRIN) 81 MG EC tablet, Take 81 mg by mouth once daily., Disp: , Rfl:     blood-glucose meter kit, Use as instructed. Insurance preferred., Disp: 1 each, Rfl: 0    CALCIUM CARBONATE/VITAMIN D3 (CALCIUM 500 + D ORAL), Take 1 tablet by mouth once daily. 10 mg daily, Disp: , Rfl:     cyanocobalamin (VITAMIN B-12) 1000 MCG tablet, Take 1 tablet (1,000 mcg total) by mouth once daily., Disp: 30 tablet, Rfl: 0    econazole nitrate 1 % cream, Apply topically once daily. (Patient taking differently: Apply 1 application  topically once daily.), Disp: 45 g, Rfl: 10    fluticasone furoate-vilanteroL (BREO ELLIPTA) 100-25 mcg/dose diskus inhaler, Inhale 1 puff into the lungs once daily. Controller, Disp: 60 each, Rfl: 11    ketoconazole (NIZORAL) 2 % cream, AAA pannus fold at least daily after the shower, Disp: 60 g, Rfl: 3    letrozole (FEMARA) 2.5 mg Tab, TAKE 1 TABLET(2.5 MG) BY MOUTH EVERY DAY (Patient taking differently: Take 2.5 mg by mouth once daily.), Disp: 30 tablet, Rfl: 5    levothyroxine (SYNTHROID) 125 MCG tablet, Take 1 tablet (125 mcg total) by mouth before breakfast., Disp: 90 tablet, Rfl: 3    losartan (COZAAR) 50 MG tablet, Take 0.5 tablets (25 mg total) by mouth once daily., Disp: 90 tablet, Rfl: 0    metFORMIN (GLUCOPHAGE-XR) 500 MG ER 24hr tablet, Take 2 tablets (1,000 mg total) by mouth daily with breakfast., Disp: 180 tablet, Rfl: 3    nystatin (MYCOSTATIN) cream, Apply topically 2 (two) times daily. Apply to rash on face (Patient taking differently: Apply 1 g topically 2 (two) times daily. Apply to rash on face), Disp: 15 g, Rfl: 2    polyethylene glycol (GLYCOLAX) 17 gram PwPk, Take 17 g by mouth once daily., Disp: , Rfl: 0    potassium chloride SA (K-DUR,KLOR-CON) 20 MEQ tablet, Take 20 mEq by mouth once daily., Disp: , Rfl:     rOPINIRole (REQUIP) 0.5 MG tablet, Take 1 mg by mouth every evening., Disp: , Rfl:     sertraline (ZOLOFT) 50 MG tablet, Take 1 tablet (50 mg total) by mouth once daily. For  depression/anxiety, Disp: 90 tablet, Rfl: 0    simvastatin (ZOCOR) 40 MG tablet, TAKE 1 TABLET(40 MG) BY MOUTH EVERY DAY (Patient taking differently: Take 40 mg by mouth once daily.), Disp: 90 tablet, Rfl: 3    solifenacin (VESICARE) 10 MG tablet, Take 1 tablet (10 mg total) by mouth once daily., Disp: 90 tablet, Rfl: 4    tetrabenazine (XENAZINE) 25 mg tablet, Take one tablet daily for 7 days and then Take 1 tablet twice daily afterwards (Patient not taking: Reported on 8/19/2024), Disp: 47 tablet, Rfl: 0     ALLERGIES:         Review of patient's allergies indicates:   Allergen Reactions    Penicillins Anaphylaxis    Sulfa (sulfonamide antibiotics) Anaphylaxis    Trintellix [vortioxetine] Nausea And Vomiting and Other (See Comments)       Patient has seizures and vomits            Physical Exam      There were no vitals filed for this visit.  Alert and oriented to person, place and time. No acute distress. Well-groomed, not ill appearing. Pupils round and reactive, normal respiratory effort, no audible wheezing.      On exam she ambulates with a walker.  She has pain with internal rotation of the right hip.  Well-healed lateral incision.  Positive Stinchfield.  Ambulates with a antalgic mildly Trendelenburg gait using a walker.  No signs or symptoms of infection          Imaging:         X-Ray: I have reviewed all pertinent results/findings and my personal findings are:  Status post percutaneous pinning of the right hip with protrusion of the screw into the acetabulum and DJD of the right hip        Assessment & Plan     Closed subcapital fracture of right femur with routine healing, subsequent encounter     Groin pain, chronic, right  -     Ambulatory referral/consult to Orthopedics     Arthritis of right hip           Maggy Tapia is a 74 y.o. female who has radiographic and clinical evidence of right hip failed perc pinning, DJD right hip.      We discussed multiple options including non-operative and  operative treatments. Non-operatively we discussed injections, HEP and formal PT.  Operatively we discussed conversion to SMITH. We discussed the pros and cons of surgery in detail as well as the risks and benefits of surgery, time required for recovery, need for physical therapy post-operatively and expectations long term. We discussed specifically the risks of damage to surrounding neurovascular structures, arthrofibrosis and stiffness, need for revision surgery or manipulation as well as chronic pain and dysfunction. We also discussed risk of leg length discrepancy, sciatic nerve injury in detail. Despite the risks of surgery, they do elect to proceed to improve function and pain.     _________________________________________________________________      Maggy Tapia will be scheduled for the following procedure:     Right Total Hip Arthroplasty      Post-Op Medications to be prescribed:   Percocet 5/325mg Take 1-2 tablets every 4-6 hours PRN pain #28  Zofran 4mg oral disintegrating tablets every 8 hours PRN nausea/vomiting   Motrin 600 mg TID PRN   Flexeril 10mg BID PRN muscle spasms    Patient will need post-operative formal physical therapy to improve function, pain, and range of motion.     Medical Clearance: completed  Hx of DVT,PE, anesthetic complications: No      Additional notes/concerns: LLD, hx of CVA, DM, hx of decub ulcer

## 2024-11-07 NOTE — PT/OT/SLP EVAL
Occupational Therapy   Evaluation and Discharge Note    Name: Maggy Tapia  MRN: [id number]  Admitting Diagnosis: <principal problem not specified>  Recent Surgery: Procedure(s) (LRB):  ARTHROPLASTY, HIP REPLACEMENT (Right) Day of Surgery    Recommendations:     Discharge Recommendations: Low Intensity Therapy  Discharge Equipment Recommendations: none  Barriers to discharge:       Assessment:     Maggy Tapia is a 74 y.o. female with a medical diagnosis of <principal problem not specified>. At this time, patient requires assistance with ADL's and must observe R hip precautions. OT provided instruction in home safety and R hip precautions with ADL's/IADL's including review of home set up and DME/AE. Pt/brother verbalized understanding. OT provided review of use of AE for lower body ADL's. Pt owns the AE, but does not use. Brother states she will not need to worry about any of that and that he will assist with all ADL's. Pt declined toileting with OT as well. No further OT indicated at this time. OT to defer to PT. Nurse Kaela advised.    Plan:     During this hospitalization, patient does not require further acute OT services.  Please re-consult if situation changes.    Plan of Care Reviewed with: patient, other (see comments) (brother is caregiver)    Subjective     Chief Complaint: no complaints  Patient/Family Comments/goals: To go home    Occupational Profile:  Living Environment: Pt lives with brother/caregiver in 1 story home with no QUIQUE, but ramp up to laundry room entry. Pt has a walk in shower with grab bars and shower chair. Pt also has a tub/shower. OT provided education in use of a tub transfer bench to increase safety and ease with transfers. Pt/brother verbalized understanding and will obtain on own if desired.  Previous level of function: Assistance with lower body ADL's.  Roles and Routines: Sister  Equipment Used at home: hip kit, rollator, bedside commode, walker, rolling, other (see  comments) (lift chair)  Assistance upon Discharge: Brother    Pain/Comfort:  Pain Rating 1: 0/10    Patients cultural, spiritual, Taoism conflicts given the current situation:      Objective:     Communicated with: Nurse Sherwood prior to session.  Patient found up in chair with   upon OT entry to room.    General Precautions: Standard, fall  Orthopedic Precautions: RLE posterior precautions  Braces: N/A  Respiratory Status: Room air     Occupational Performance:      Activities of Daily Living:  Feeding:  independence    Grooming: stand by assistance set up in sitting  Bathing: moderate assistance    Upper Body Dressing: stand by assistance set up in sitting  Lower Body Dressing: maximal assistance    Toileting: Pt declined toileting with OT    Cognitive/Visual Perceptual:  Pt alert and oriented    Physical Exam:  Upper Extremity Strength:    -       Right Upper Extremity: WFL  -       Left Upper Extremity: WFL    AMPAC 6 Click ADL:  AMPAC Total Score: 15    Treatment & Education:  OT provided education in role of OT. Patient verbalized understanding and participated in OT.  OT provided instruction in home safety and R hip precautions with ADL/IADL including review of home set up and DME/AE. Patient/brother verbalized understanding.        Patient left up in chair with  Nurse Sherwood notified and brother/caregiver  present    GOALS:   Multidisciplinary Problems       Occupational Therapy Goals       Not on file                    History:     Past Medical History:   Diagnosis Date    Anxiety     Arthritis     Asthma     Breast cancer 2000    Left    Depression     Diabetes mellitus, type 2     GERD (gastroesophageal reflux disease)     Hyperlipidemia     Hypertension     Hypothyroidism, unspecified     Overactive bladder     Seizures     Pseudo-seizures    Sleep apnea     Stroke 03/2022    pt was in the hospital for a stroke, claims she was seeing people when the stroke happened         Past Surgical History:    Procedure Laterality Date    APPENDECTOMY      BREAST BIOPSY Left     BREAST LUMPECTOMY Left     2016    BREAST SURGERY      CATARACT EXTRACTION Bilateral     OU done//     SECTION      CHOLECYSTECTOMY      COLONOSCOPY N/A 2020    Procedure: COLONOSCOPY;  Surgeon: Mj Fernandez MD;  Location: [facility];  Service: Endoscopy;  Laterality: N/A;    CYST REMOVAL Left 2021    DILATION AND CURETTAGE OF UTERUS      EYE SURGERY      HYSTEROSCOPY WITH DILATION AND CURETTAGE OF UTERUS N/A 2023    Procedure: HYSTEROSCOPY, WITH DILATION AND CURETTAGE OF UTERUS AND OTHER INDICATED PROCEDURES Needs Cardiac clearance;  Surgeon: Gamaliel Moran MD;  Location: [facility];  Service: OB/GYN;  Laterality: N/A;  Cardiac clearance needed per Dr Anderson    LUMPECTOMY, BREAST Left 2023    Procedure: LUMPECTOMY, BREAST;  Surgeon: Toño Paredes MD;  Location: [facility] OR;  Service: General;  Laterality: Left;    PERCUTANEOUS PINNING OF HIP Right 2022    Procedure: PINNING, HIP, PERCUTANEOUS;  Surgeon: Ministerio Joiner II, MD;  Location: [facility] OR;  Service: Orthopedics;  Laterality: Right;    SENTINEL LYMPH NODE BIOPSY Left 2023    Procedure: BIOPSY, LYMPH NODE, SENTINEL;  Surgeon: Toño Paredes MD;  Location: [facility];  Service: General;  Laterality: Left;    SIMPLE MASTECTOMY Left 2023    Procedure: MASTECTOMY, SIMPLE;  Surgeon: Tooñ Paredes MD;  Location: [facility] OR;  Service: General;  Laterality: Left;       Time Tracking:     OT Date of Treatment: 24  OT Start Time: 1509  OT Stop Time: 1526  OT Total Time (min): 17 min    Billable Minutes:Evaluation 8  Self Care/Home Management 9    2024

## 2024-11-07 NOTE — DISCHARGE INSTRUCTIONS
"From wound care: Use Triad cream and she needs to offload her buttocks with pillows when she is at home     Follow up with Wound Care clinic with Dr. Frazier--[phone] [address] [facility]  [address]    Discharge Instructions: After Your Surgery/Procedure  Youve just had surgery. During surgery you were given medicine called anesthesia to keep you relaxed and free of pain. After surgery you may have some pain or nausea. This is common. Here are some tips for feeling better and getting well after surgery.     Stay on schedule with your medication.   Going home  Your doctor or nurse will show you how to take care of yourself when you go home. He or she will also answer your questions. Have an adult family member or friend drive you home.      For your safety we recommend these precaution for the first 24 hours after your procedure:  Do not drive or use heavy equipment.  Do not make important decisions or sign legal papers.  Do not drink alcohol.  Have someone stay with you, if needed. He or she can watch for problems and help keep you safe.  Your concentration, balance, coordination, and judgement may be impaired for many hours after anesthesia.  Use caution when ambulating or standing up.     You may feel weak and "washed out" after anesthesia and surgery.      Subtle residual effects of general anesthesia or sedation with regional / local anesthesia can last more than 24 hours.  Rest for the remainder of the day or longer if your Doctor/Surgeon has advised you to do so.  Although you may feel normal within the first 24 hours, your reflexes and mental ability may be impaired without you realizing it.  You may feel dizzy, lightheaded or sleepy for 24 hours or longer.      Be sure to go to all follow-up visits with your doctor. And rest after your surgery for as long as your doctor tells you to.  Coping with pain  If you have pain after surgery, pain medicine will help you feel better. Take it as told, before pain " becomes severe. Also, ask your doctor or pharmacist about other ways to control pain. This might be with heat, ice, or relaxation. And follow any other instructions your surgeon or nurse gives you.  Tips for taking pain medicine  To get the best relief possible, remember these points:  Pain medicines can upset your stomach. Taking them with a little food may help.  Most pain relievers taken by mouth need at least 20 to 30 minutes to start to work.  Taking medicine on a schedule can help you remember to take it. Try to time your medicine so that you can take it before starting an activity. This might be before you get dressed, go for a walk, or sit down for dinner.  Constipation is a common side effect of pain medicines. Call your doctor before taking any medicines such as laxatives or stool softeners to help ease constipation. Also ask if you should skip any foods. Drinking lots of fluids and eating foods such as fruits and vegetables that are high in fiber can also help. Remember, do not take laxatives unless your surgeon has prescribed them.  Drinking alcohol and taking pain medicine can cause dizziness and slow your breathing. It can even be deadly. Do not drink alcohol while taking pain medicine.  Pain medicine can make you react more slowly to things. Do not drive or run machinery while taking pain medicine.  Your health care provider may tell you to take acetaminophen to help ease your pain. Ask him or her how much you are supposed to take each day. Acetaminophen or other pain relievers may interact with your prescription medicines or other over-the-counter (OTC) drugs. Some prescription medicines have acetaminophen and other ingredients. Using both prescription and OTC acetaminophen for pain can cause you to overdose. Read the labels on your OTC medicines with care. This will help you to clearly know the list of ingredients, how much to take, and any warnings. It may also help you not take too much  acetaminophen. If you have questions or do not understand the information, ask your pharmacist or health care provider to explain it to you before you take the OTC medicine.  Managing nausea  Some people have an upset stomach after surgery. This is often because of anesthesia, pain, or pain medicine, or the stress of surgery. These tips will help you handle nausea and eat healthy foods as you get better. If you were on a special food plan before surgery, ask your doctor if you should follow it while you get better. These tips may help:  Do not push yourself to eat. Your body will tell you when to eat and how much.  Start off with clear liquids and soup. They are easier to digest.  Next try semi-solid foods, such as mashed potatoes, applesauce, and gelatin, as you feel ready.  Slowly move to solid foods. Dont eat fatty, rich, or spicy foods at first.  Do not force yourself to have 3 large meals a day. Instead eat smaller amounts more often.  Take pain medicines with a small amount of solid food, such as crackers or toast, to avoid nausea.     Call your surgeon if  You still have pain an hour after taking medicine. The medicine may not be strong enough.  You feel too sleepy, dizzy, or groggy. The medicine may be too strong.  You have side effects like nausea, vomiting, or skin changes, such as rash, itching, or hives.       If you have obstructive sleep apnea  You were given anesthesia medicine during surgery to keep you comfortable and free of pain. After surgery, you may have more apnea spells because of this medicine and other medicines you were given. The spells may last longer than usual.   At home:  Keep using the continuous positive airway pressure (CPAP) device when you sleep. Unless your health care provider tells you not to, use it when you sleep, day or night. CPAP is a common device used to treat obstructive sleep apnea.  Talk with your provider before taking any pain medicine, muscle relaxants, or  sedatives. Your provider will tell you about the possible dangers of taking these medicines.  © 0451-5619 [employer]. [address] [place] [address], [place] [address]. All rights reserved. This information is not intended as a substitute for professional medical care. Always follow your healthcare professional's instructions.      Using an Incentive Spirometer    An incentive spirometer is a device that helps you do deep breathing exercises. These exercises expand your lungs, aid in circulation, and help prevent pneumonia. Deep breathing exercises also help you breathe better and improve the function of your lungs by:  Keeping your lungs clear  Strengthening your breathing muscles  Helping prevent respiratory complications or problems  The incentive spirometer gives you a way to take an active part in recover. A nurse or therapist will teach you breathing exercises. To do these exercises, you will breathe in through your mouth and not your nose. The incentive spirometer only works correctly if you breathe in through your mouth.  Steps to clear lungs  Step 1. Exhale normally. Then, inhale normally.  Relax and breathe out.  Step 2. Place your lips tightly around the mouthpiece.  Make sure the device is upright and not tilted.  Step 3. Inhale as much air as you can through the mouthpiece (don't breath through your nose).  Inhale slowly and deeply.  Hold your breath long enough to keep the balls or disk raised for at least 3 to 5 seconds, or as instructed by your healthcare provider.  Some spirometers have an indicator to let you know that you are breathing in too fast. If the indicator goes off, breathe in more slowly.  Step 4. Repeat the exercise regularly.  Do this exercise every hour while you're awake, or as instructed by your healthcare provider.  If you were taught deep breathing and coughing exercises, do them regularly as instructed by your healthcare provider.               Post op instructions for  prevention of DVT  What is deep vein thrombosis?  Deep vein thrombosis (DVT) is the medical term for blood clots in the deep veins of the leg.  These blood clots can be dangerous.  A DVT can block a blood vessel and keep blood from getting where it needs to go.  Another problem is that the clot can travel to other parts of the body such as the lungs.  A clot that travels to the lungs is called a pulmonary embolus (PE) and can cause serious problems with breathing which can lead to death.  Am I at risk for DVT/PE?  If you are not very active, you are at risk of DVT.  Anyone confined to bed, sitting for long periods of time, recovering from surgery, etc. increases the risk of DVT.  Other risk factors are cancer diagnosis, certain medications, estrogen replacement in any form,older age, obesity, pregnancy, smoking, history of clotting disorders, and dehydration.  How will I know if I have a DVT?  Swelling in the lower leg  Pain  Warmth, redness, hardness or bulging of the vein  If you have any of these symptoms, call your doctors office right away.  Some people will not have any symptoms until the clot moves to the lungs.  What are the symptoms of a PE?  Panting, shortness of breath, or trouble breathing  Sharp, knife-like chest pain when you breathe  Coughing or coughing up blood  Rapid heartbeat  If you have any of these symptoms or get worse quickly, call 911 for emergency treatment.  How can I prevent a DVT?  Avoid long periods of inactivity and dont cross your legs--get up and walk around every hour or so.  Stay active--walking after surgery is highly encouraged.  This means you should get out of the house and walk in the neighborhood.  Going up and down stairs will not impair healing (unless advised against such activity by your doctor).    Drink plenty of noncaffeinated, nonalcoholic fluids each day to prevent dehydration.  Wear special support stockings, if they have been advised by your doctor.  If you  travel, stop at least once an hour and walk around.  Avoid smoking (assistance with stopping is available through your healthcare provider)  Always notify your doctor if you are not able to follow the post operative instructions that are given to you at the time of discharge.  It may be necessary to prescribe one of the medications available to prevent DVT.                We hope your stay was comfortable as you heal now, mend and rest.    For we have enjoyed taking care of you by giving your our best.    And as you get better, by regaining your health and strength;   We count it as a privilege to have served you and hope your time at [facility] was well spent.      Thank  You!!!

## 2024-11-07 NOTE — BRIEF OP NOTE
[facility]  Brief Operative Note    Surgery Date: 11/7/2024     Surgeons and Role:     * Simone Bonilla MD - Primary    Assisting Surgeon: None    Pre-op Diagnosis:  Arthritis of right hip [M16.11]  Pre-op testing [Z01.818]    Post-op Diagnosis:  Post-Op Diagnosis Codes:     * Arthritis of right hip [M16.11]     * Pre-op testing [Z01.818]    Procedure(s) (LRB):  ARTHROPLASTY, HIP REPLACEMENT (Right)    Anesthesia: Spinal    Operative Findings: severe right hip DJD, prominent previous hip screws in acetabulum    Estimated Blood Loss: 150 mL         Specimens:   Specimen (24h ago, onward)      None              Discharge Note    OUTCOME: Patient tolerated treatment/procedure well without complication and is now ready for discharge.    DISPOSITION: Home or Self Care    FINAL DIAGNOSIS:  <principal problem not specified>    FOLLOWUP: In clinic    DISCHARGE INSTRUCTIONS:  No discharge procedures on file.

## 2024-11-07 NOTE — OP NOTE
11/07/2024    PREOPERATIVE DIAGNOSIS:      Severe right hip arthritis  Status post percutaneous pinning right hip  Avascular necrosis right hip    POSTOPERATIVE DIAGNOSIS: Same    PROCEDURE:      Conversion of previous right hip surgery to total hip replacement  Removal hardware right hip  Prophylactic cable right femur    SURGEON: Simone Bonilla MD    ASSISTANT: Karlos PARRA    He was present and scrubbed for the entirety of the procedure. They were required for patient positioning, retraction, insertion of implants and closure. Without him I would have been unable to safely perform the case due to only one scrub tech available.     ANESTHESIA:  General/regional     ESTIMATED BLOOD LOSS:  200 mL    INDICATIONS:  Maggy Tapia is a 74 y.o. year-old female who presented to the clinic with a chief complaint of right hip pain. X-rays revealed a severely arthritic right hip with previous percutaneous screws protruding into the acetabulum.  She is 2 years status post right hip percutaneous pinning.  She was minimally ambulatory at baseline.  We discussed removal of hardware and conversion to a right hip replacement for a palliative procedure for pain only.    Risks and complications were discussed including, but not limited to risks of anesthetic complications, infections, wound healing complications, instability, DVT, PE. The patient elected to proceed.     COMPONENTS USED:      Implant Name Type Inv. Item Serial No.  Lot No. LRB No. Used Action   SHELL TRIDENT II ACET E 54MM - [id number]  SHELL TRIDENT II ACET E 54MM  [employer]. [id number] Right 1 Implanted   LINER ACET SZ E 42MM COCR - [id number]  LINER ACET SZ E 42MM COCR  [employer]. [id number] Right 1 Implanted   CABLE W/CRIMP STRL 1.7MM - [id number]  CABLE W/CRIMP STRL 1.7MM  [facility] [id number] Right 1 Implanted   INSERT AMD/MDM X3 48MM - [id number]  INSERT AMD/MDM X3 48MM  [employer]. [id number] Right 1 Implanted   STEM  INSIGNIA HIGH OFFSET SZ 5 - [id number]  STEM INSIGNIA HIGH OFFSET SZ 5  [employer]. [id number] Right 1 Implanted   HEAD FEMORAL V40 +0X28MM COCR - [id number]  HEAD FEMORAL V40 +0X28MM COCR  [employer]. [id number] Right 1 Implanted         DESCRIPTION OF PROCEDURE:  The patient was taken to the Operating Room where anesthesia was administered by the Anesthesia Department. She was then placed in the lateral decubitus  position and all superficial neurovascular structures were well padded.  The right lower extremity  was then sterilely prepped and draped in the normal fashion.  Preoperative antibiotics were administered.  A time-out was performed verifying the procedure and laterality, all agreed and elected to proceed as planned.    Posterolateral incision was made over the right hip.  Dissection was carried down to the gluteal fascia and ITB band.  The ITB band and gluteal fascia was then incised in line with the incision.  This was retracted anterior and posterior.  The trochanteric bursa was then teased back posteriorly.  We then identified the previously placed percutaneous screws at the vastus ridge.  Were removed fibrous scar and bony overgrowth from head of the screws.  We are able to remove these 6.5 mm partially-threaded screws without difficulty.  We then retracted the abductors out of harm's way.  We identified the piriformis.  This was tagged with a 1. Ethibond suture.  The short external rotators were also tagged with Ethibond suture.  These were stored for later repair using a hemostat.  The capsule was then incised in a trapezoidal fashion.  This was released off the neck.  We tagged this with a 1. Vicryl suture for later repair.  We then slowly internally rotated the hip and the hip dislocated.  It was severely arthritic.  We went ahead and performed a neck cut using a sagittal saw roughly 10 mm proximal to the lesser trochanter.  We then retracted the femur anteriorly.  Appropriate  retractors were placed and we inspected our acetabulum.  There was no significant bony defect from the previous screws.  It was however severely arthritic.  We began medializing using a 44 Reamer.  We reamed up to a size 54 with good bleeding bone.  This was reamed in appropriate abduction and anteversion.  We then opened the mouth of the acetabulum with a 55 Reamer.  A 54 trial was then used it had excellent fixation.  We elected to impact a 54 mm Patricia cup in appropriate position.  A screw was not deemed necessary.  We had a excellent Press-Fit.  We then inserted the MDM liner due to previous hip surgery I felt that she was at risk for instability.  We then turned our attention back to the femur.  We used a box osteotome and then a canal finer to gain access to the femoral canal.  We then sequentially broached to a size 5 insignia stem.  We used a calcar planer.  We then trialed using a high offset neutral bipolar head.  This was reduced and we were satisfied with our overall leg length and stability.  She did have previous limb length discrepancy of the right hip.  We then removed our trial implants.  Prior to removal of the femoral broach however we did place a prophylactic 2 mm cable just distal to the lesser trochanter to prevent iatrogenic or postoperative fracture due to her osteopenia as well as previous femoral neck fracture.  After the cable was appropriately tensioned this was then crimped and cut.  We removed our previous size 5 trials and inserted a permanent size 5 high offset implant.  The inner and outer diameter head was then engaged on the back table and placed onto the neck trunion.  The hip was then successfully reduced.  Copious irrigation was performed as well as Betadine irrigation.  We also placed 2 g of vancomycin powder within the wound.  The ITB band and gluteal fascia was closed with a #1 Barbed running suture.  The deep fascial layer was closed 0 Vicryl.  The subcutaneous layer was  closed with 2-0 Vicryl and the skin with Monocryl and Dermabond.  She overall tolerated the procedure well.  She was extubated taken to the PACU in stable condition.    Disposition:      Follow up in 2 weeks for wound check and x-rays of the right hip.  Patient not ambulatory at baseline and this was a purely pain alleviating procedure.  I do not think she will require advanced skilled placement    Simone Bonilla MD

## 2024-11-07 NOTE — ANESTHESIA POSTPROCEDURE EVALUATION
Anesthesia Post Evaluation    Patient: Maggy Tapia    Procedure(s) Performed: Procedure(s) (LRB):  ARTHROPLASTY, HIP REPLACEMENT (Right)    Final Anesthesia Type: spinal      Patient location during evaluation: PACU  Patient participation: Yes- Able to Participate  Level of consciousness: sedated and awake  Post-procedure vital signs: reviewed and stable  Pain management: adequate  Airway patency: patent    PONV status at discharge: No PONV  Anesthetic complications: no      Cardiovascular status: blood pressure returned to baseline and hemodynamically stable  Respiratory status: spontaneous ventilation  Hydration status: euvolemic  Follow-up not needed.              Vitals Value Taken Time   /54 11/07/24 1500   Temp 36.7 °C (98.1 °F) 11/07/24 1230   Pulse 78 11/07/24 1500   Resp 18 11/07/24 1500   SpO2 100 % 11/07/24 1500         Event Time   Out of Recovery 13:52:00         Pain/Geraldo Score: Geraldo Score: 10 (11/7/2024  1:45 PM)  Modified Geraldo Score: 19 (11/7/2024  3:00 PM)

## 2024-11-07 NOTE — ANESTHESIA PREPROCEDURE EVALUATION
11/07/2024  Maggy Tapia is a 74 y.o., female.      Pre-op Assessment    I have reviewed the NPO Status.   I have reviewed the Medications.     Review of Systems  Anesthesia Hx:  No problems with previous Anesthesia                Cardiovascular:     Hypertension              ECG has been reviewed.         Shortness of Breath                    Hypertension         Pulmonary:    Asthma  Shortness of breath  Sleep Apnea   Asthma:    Obstructive Sleep Apnea (JUAN).      Education provided regarding risk of obstructive sleep apnea            Renal/:  Chronic Renal Disease, CKD        Kidney Function/Disease             Hepatic/GI:     GERD         Gerd          Neurological:   CVA Neuromuscular Disease,   Seizures    Tardive dyskinesia       Seizure Disorder             CVA - Cerebrovasular Accident               Neuromuscular Disease   Endocrine:  Diabetes, type 2 Hypothyroidism   Diabetes                   Hypothyroidism          Psych:  Psychiatric History                  Physical Exam  General: Well nourished    Airway:  Mallampati: II   Mouth Opening: Normal  TM Distance: Normal  Tongue: Normal  Neck ROM: Normal ROM    Dental:  Intact    Chest/Lungs:  Clear to auscultation, Normal Respiratory Rate        Anesthesia Plan  Type of Anesthesia, risks & benefits discussed:    Anesthesia Type: Spinal  Intra-op Monitoring Plan: Standard ASA Monitors  Post Op Pain Control Plan: multimodal analgesia, IV/PO Opioids PRN and peripheral nerve block  Induction:  IV  Informed Consent: Patient consented to blood products? Yes  ASA Score: 3  Day of Surgery Review of History & Physical: H&P Update referred to the surgeon/provider.    Ready For Surgery From Anesthesia Perspective.     .

## 2024-11-08 VITALS
OXYGEN SATURATION: 100 % | SYSTOLIC BLOOD PRESSURE: 111 MMHG | DIASTOLIC BLOOD PRESSURE: 54 MMHG | WEIGHT: 157 LBS | HEART RATE: 78 BPM | RESPIRATION RATE: 18 BRPM | HEIGHT: 61 IN | TEMPERATURE: 98 F | BODY MASS INDEX: 29.64 KG/M2

## 2024-11-08 PROCEDURE — G0180 MD CERTIFICATION HHA PATIENT: HCPCS | Mod: ,,, | Performed by: ORTHOPAEDIC SURGERY

## 2024-11-12 ENCOUNTER — OFFICE VISIT (OUTPATIENT)
Dept: FAMILY MEDICINE | Facility: CLINIC | Age: 74
End: 2024-11-12
Payer: MEDICARE

## 2024-11-12 VITALS
DIASTOLIC BLOOD PRESSURE: 50 MMHG | OXYGEN SATURATION: 95 % | HEART RATE: 92 BPM | BODY MASS INDEX: 29.66 KG/M2 | HEIGHT: 61 IN | SYSTOLIC BLOOD PRESSURE: 110 MMHG | TEMPERATURE: 98 F

## 2024-11-12 DIAGNOSIS — S31.809D WOUND OF BUTTOCK, UNSPECIFIED LATERALITY, SUBSEQUENT ENCOUNTER: Primary | ICD-10-CM

## 2024-11-12 DIAGNOSIS — R26.81 GAIT INSTABILITY: ICD-10-CM

## 2024-11-12 DIAGNOSIS — Z96.641 S/P TOTAL RIGHT HIP ARTHROPLASTY: ICD-10-CM

## 2024-11-12 PROCEDURE — 1160F RVW MEDS BY RX/DR IN RCRD: CPT | Mod: CPTII,S$GLB,, | Performed by: NURSE PRACTITIONER

## 2024-11-12 PROCEDURE — 3288F FALL RISK ASSESSMENT DOCD: CPT | Mod: CPTII,S$GLB,, | Performed by: NURSE PRACTITIONER

## 2024-11-12 PROCEDURE — 3072F LOW RISK FOR RETINOPATHY: CPT | Mod: CPTII,S$GLB,, | Performed by: NURSE PRACTITIONER

## 2024-11-12 PROCEDURE — 99214 OFFICE O/P EST MOD 30 MIN: CPT | Mod: S$GLB,,, | Performed by: NURSE PRACTITIONER

## 2024-11-12 PROCEDURE — 3074F SYST BP LT 130 MM HG: CPT | Mod: CPTII,S$GLB,, | Performed by: NURSE PRACTITIONER

## 2024-11-12 PROCEDURE — 1157F ADVNC CARE PLAN IN RCRD: CPT | Mod: CPTII,S$GLB,, | Performed by: NURSE PRACTITIONER

## 2024-11-12 PROCEDURE — 99999 PR PBB SHADOW E&M-EST. PATIENT-LVL V: CPT | Mod: PBBFAC,,, | Performed by: NURSE PRACTITIONER

## 2024-11-12 PROCEDURE — 1126F AMNT PAIN NOTED NONE PRSNT: CPT | Mod: CPTII,S$GLB,, | Performed by: NURSE PRACTITIONER

## 2024-11-12 PROCEDURE — 1159F MED LIST DOCD IN RCRD: CPT | Mod: CPTII,S$GLB,, | Performed by: NURSE PRACTITIONER

## 2024-11-12 PROCEDURE — 3078F DIAST BP <80 MM HG: CPT | Mod: CPTII,S$GLB,, | Performed by: NURSE PRACTITIONER

## 2024-11-12 PROCEDURE — 1101F PT FALLS ASSESS-DOCD LE1/YR: CPT | Mod: CPTII,S$GLB,, | Performed by: NURSE PRACTITIONER

## 2024-11-12 NOTE — PROGRESS NOTES
Subjective:       Patient ID: Maggy Tapia is a 74 y.o. female.    Chief Complaint: follow up wound on buttocks     HPI   73 y/o female patient with medical problems listed below presents for follow up wound on buttocks.  uses the cream provided in the hospital which helps and the wound is getting better. Patient is s/p right hip arthroplasty on 11/7/2024. Has follow up appointment of orthopedic on 11/19/2024. Patient currently does home PT. Patient states was instructed to take aspirin 81 mg 1 tab bid daily and percocet 5-325 mg as needed which she takes tid daily as needed.   Last BM was last Friday.  does not feel constipated.  will eat tomato which she states helps with BM.     Labs reviewed from 9/2024    Patient Active Problem List   Diagnosis    Syncope and collapse    Frequent falls (possibly related to polypharmacy)    History of left breast cancer    History of ischemic left MCA stroke    Essential hypertension    Hyperlipidemia    Thrombocytopenia    Depression    Hypothyroidism    Valproic acid toxicity    Psychogenic nonepileptic seizure    Osteopenia    JUAN (obstructive sleep apnea)    Near syncope    Diplopia    Cervical strain    Left homonymous hemianopsia    Tardive dyskinesia    Gait instability    Hypoglycemia    History of subdural hematoma    Rectal bleeding    Shortness of breath    Chronic restrictive lung disease    Paralysis of diaphragm    Major depressive disorder, recurrent episode, moderate    Diabetic polyneuropathy    Bilateral hearing loss    Urinary incontinence    Stroke    Confusion    Type 2 diabetes mellitus with diabetic polyneuropathy, without long-term current use of insulin    Buttock wound, right, subsequent encounter    Dizziness    Balance problem    Leg weakness, bilateral    Closed traumatic nondisplaced fracture of neck of right femur    Invasive ductal carcinoma of breast, female, left    Abnormal auditory function study    Obesity (BMI 30-39.9)     Type 2 diabetes mellitus with diabetic peripheral angiopathy without gangrene, without long-term current use of insulin    History of colon polyps    Pressure injury of right buttock, stage 2    Physical deconditioning    Chronic kidney disease, stage 3a      Review of patient's allergies indicates:   Allergen Reactions    Penicillins Anaphylaxis     NOT ALLERGIC TO ANCEF    Sulfa (sulfonamide antibiotics) Anaphylaxis    Trintellix [vortioxetine] Nausea And Vomiting and Other (See Comments)     Patient has seizures and vomits     Past Surgical History:   Procedure Laterality Date    APPENDECTOMY      BREAST BIOPSY Left     BREAST LUMPECTOMY Left         BREAST SURGERY      CATARACT EXTRACTION Bilateral     OU done//     SECTION      CHOLECYSTECTOMY      COLONOSCOPY N/A 2020    Procedure: COLONOSCOPY;  Surgeon: Mj Fernandez MD;  Location: [facility];  Service: Endoscopy;  Laterality: N/A;    CYST REMOVAL Left 2021    DILATION AND CURETTAGE OF UTERUS      EYE SURGERY      HIP REPLACEMENT ARTHROPLASTY Right 2024    Procedure: ARTHROPLASTY, HIP REPLACEMENT;  Surgeon: Simone Bonilla MD;  Location: [facility] OR;  Service: Orthopedics;  Laterality: Right;  kev    HYSTEROSCOPY WITH DILATION AND CURETTAGE OF UTERUS N/A 2023    Procedure: HYSTEROSCOPY, WITH DILATION AND CURETTAGE OF UTERUS AND OTHER INDICATED PROCEDURES Needs Cardiac clearance;  Surgeon: Gamaliel Moran MD;  Location: [facility] OR;  Service: OB/GYN;  Laterality: N/A;  Cardiac clearance needed per Dr Anderson    LUMPECTOMY, BREAST Left 2023    Procedure: LUMPECTOMY, BREAST;  Surgeon: Toño Paredes MD;  Location: [facility] OR;  Service: General;  Laterality: Left;    PERCUTANEOUS PINNING OF HIP Right 2022    Procedure: PINNING, HIP, PERCUTANEOUS;  Surgeon: Ministerio Joiner II, MD;  Location: [facility] OR;  Service: Orthopedics;  Laterality: Right;    SENTINEL LYMPH NODE BIOPSY Left 2023    Procedure: BIOPSY,  LYMPH NODE, SENTINEL;  Surgeon: Toño Paredes MD;  Location: [facility] OR;  Service: General;  Laterality: Left;    SIMPLE MASTECTOMY Left 5/19/2023    Procedure: MASTECTOMY, SIMPLE;  Surgeon: Toño Paredes MD;  Location: [facility] OR;  Service: General;  Laterality: Left;        Current Outpatient Medications:     albuterol (PROVENTIL/VENTOLIN HFA) 90 mcg/actuation inhaler, Inhale 1-2 puffs into the lungs every 4 (four) hours as needed for Wheezing or Shortness of Breath. INHALE 1 TO 2 PUFFS BY MOUTH INTO THE LUNGS EVERY 4 HOURS AS NEEDED FOR SHORTNESS OF BREATH( COUGHING) Strength: 90 mcg/actuation, Disp: 20.1 g, Rfl: 11    alendronate (FOSAMAX) 70 MG tablet, Take one tablet every 7 days. (Patient taking differently: Take 70 mg by mouth every 7 days. Take one tablet every 7 days.), Disp: 4 tablet, Rfl: 11    aspirin (ECOTRIN) 81 MG EC tablet, Take 1 tablet (81 mg total) by mouth 2 (two) times a day., Disp: 60 tablet, Rfl: 0    blood-glucose meter kit, Use as instructed. Insurance preferred., Disp: 1 each, Rfl: 0    CALCIUM CARBONATE/VITAMIN D3 (CALCIUM 500 + D ORAL), Take 1 tablet by mouth once daily. 10 mg daily, Disp: , Rfl:     cyanocobalamin (VITAMIN B-12) 1000 MCG tablet, Take 1 tablet (1,000 mcg total) by mouth once daily., Disp: 30 tablet, Rfl: 0    cyclobenzaprine (FLEXERIL) 10 MG tablet, Take 1 tablet (10 mg total) by mouth 3 (three) times daily as needed for Muscle spasms., Disp: 30 tablet, Rfl: 0    fluticasone furoate-vilanteroL (BREO ELLIPTA) 100-25 mcg/dose diskus inhaler, Inhale 1 puff into the lungs once daily. Controller, Disp: 60 each, Rfl: 11    ibuprofen (ADVIL,MOTRIN) 600 MG tablet, Take 1 tablet (600 mg total) by mouth 3 (three) times daily., Disp: 90 tablet, Rfl: 1    ketoconazole (NIZORAL) 2 % cream, AAA pannus fold at least daily after the shower, Disp: 60 g, Rfl: 3    letrozole (FEMARA) 2.5 mg Tab, TAKE 1 TABLET(2.5 MG) BY MOUTH EVERY DAY (Patient taking differently: Take 2.5 mg by mouth once  daily.), Disp: 30 tablet, Rfl: 5    levothyroxine (SYNTHROID) 125 MCG tablet, Take 1 tablet (125 mcg total) by mouth before breakfast., Disp: 90 tablet, Rfl: 3    losartan (COZAAR) 50 MG tablet, Take 0.5 tablets (25 mg total) by mouth once daily., Disp: 90 tablet, Rfl: 0    metFORMIN (GLUCOPHAGE-XR) 500 MG ER 24hr tablet, Take 2 tablets (1,000 mg total) by mouth daily with breakfast., Disp: 180 tablet, Rfl: 3    nystatin (MYCOSTATIN) cream, Apply topically 2 (two) times daily. Apply to rash on face (Patient taking differently: Apply 1 g topically 2 (two) times daily. Apply to rash on face), Disp: 15 g, Rfl: 2    ondansetron (ZOFRAN-ODT) 4 MG TbDL, Take 1 tablet (4 mg total) by mouth every 8 (eight) hours as needed., Disp: 30 tablet, Rfl: 0    oxyCODONE-acetaminophen (PERCOCET) 5-325 mg per tablet, Take 1 tablet by mouth every 6 (six) hours as needed for Pain., Disp: 28 tablet, Rfl: 0    polyethylene glycol (GLYCOLAX) 17 gram PwPk, Take 17 g by mouth once daily., Disp: , Rfl: 0    potassium chloride SA (K-DUR,KLOR-CON) 20 MEQ tablet, Take 20 mEq by mouth once daily., Disp: , Rfl:     rOPINIRole (REQUIP) 0.5 MG tablet, Take 1 mg by mouth every evening., Disp: , Rfl:     sertraline (ZOLOFT) 50 MG tablet, Take 1 tablet (50 mg total) by mouth once daily. For depression/anxiety, Disp: 90 tablet, Rfl: 0    simvastatin (ZOCOR) 40 MG tablet, TAKE 1 TABLET(40 MG) BY MOUTH EVERY DAY (Patient taking differently: Take 40 mg by mouth once daily.), Disp: 90 tablet, Rfl: 3    econazole nitrate 1 % cream, Apply topically once daily. (Patient taking differently: Apply 1 application  topically once daily.), Disp: 45 g, Rfl: 10    solifenacin (VESICARE) 10 MG tablet, Take 1 tablet (10 mg total) by mouth once daily., Disp: 90 tablet, Rfl: 4    Review of Systems   Constitutional:  Negative for chills and fever.   Respiratory:  Negative for cough and shortness of breath.    Cardiovascular:  Negative for chest pain and palpitations.  "  Gastrointestinal:  Negative for abdominal pain.   Skin:  Positive for color change and wound.   Neurological:  Negative for dizziness and headaches.       Objective:   BP (!) 110/50 (BP Location: Left arm, Patient Position: Sitting)   Pulse 92   Temp 98.2 °F (36.8 °C) (Oral)   Ht 5' 1" (1.549 m)   SpO2 95%   BMI 29.66 kg/m²         Physical Exam  Constitutional:       General: She is not in acute distress.     Appearance: Normal appearance.      Comments: Patient is sitting on wheelchair   HENT:      Head: Atraumatic.   Cardiovascular:      Rate and Rhythm: Normal rate and regular rhythm.      Pulses: Normal pulses.      Heart sounds: Normal heart sounds.   Pulmonary:      Effort: Pulmonary effort is normal.      Breath sounds: Normal breath sounds.   Abdominal:      General: Abdomen is flat. Bowel sounds are normal.      Palpations: Abdomen is soft.   Neurological:      Mental Status: She is oriented to person, place, and time.             Assessment:       1. Wound of buttock, unspecified laterality, subsequent encounter    2. S/P total right hip arthroplasty    3. Gait instability        Plan:       1. S/P total right hip arthroplasty  - Ambulatory referral/consult to Home Health; Future  - Continue to follow up with orthopedic as scheduled     2. Wound of buttock, unspecified laterality, subsequent encounter (Primary)  - Appeared improving  - Advised regarding frequent repositioning, using supportive surfaces, regular skin inspections and keeping the area clean and dry   - Ambulatory referral/consult to Home Health; Future  - Ambulatory referral/consult to Wound Clinic; Future    3. Gait instability  - Fall precautions discussed     Patient with be reevaluated in  as scheduled  or sooner dawit Perry NP  "

## 2024-11-14 DIAGNOSIS — Z96.641 S/P HIP REPLACEMENT, RIGHT: Primary | ICD-10-CM

## 2024-11-18 ENCOUNTER — EXTERNAL HOME HEALTH (OUTPATIENT)
Dept: HOME HEALTH SERVICES | Facility: HOSPITAL | Age: 74
End: 2024-11-18
Payer: MEDICARE

## 2024-11-19 ENCOUNTER — OFFICE VISIT (OUTPATIENT)
Dept: ORTHOPEDICS | Facility: CLINIC | Age: 74
End: 2024-11-19
Payer: MEDICARE

## 2024-11-19 ENCOUNTER — HOSPITAL ENCOUNTER (OUTPATIENT)
Dept: RADIOLOGY | Facility: HOSPITAL | Age: 74
Discharge: HOME | End: 2024-11-19
Attending: ORTHOPAEDIC SURGERY
Payer: MEDICARE

## 2024-11-19 VITALS — HEIGHT: 61 IN | WEIGHT: 156.94 LBS | BODY MASS INDEX: 29.63 KG/M2

## 2024-11-19 DIAGNOSIS — Z96.641 S/P HIP REPLACEMENT, RIGHT: ICD-10-CM

## 2024-11-19 DIAGNOSIS — Z96.641 S/P HIP REPLACEMENT, RIGHT: Primary | ICD-10-CM

## 2024-11-19 PROCEDURE — 3072F LOW RISK FOR RETINOPATHY: CPT | Mod: CPTII,S$GLB,, | Performed by: ORTHOPAEDIC SURGERY

## 2024-11-19 PROCEDURE — 73502 X-RAY EXAM HIP UNI 2-3 VIEWS: CPT | Mod: TC,PO,RT

## 2024-11-19 PROCEDURE — 1126F AMNT PAIN NOTED NONE PRSNT: CPT | Mod: CPTII,S$GLB,, | Performed by: ORTHOPAEDIC SURGERY

## 2024-11-19 PROCEDURE — 99024 POSTOP FOLLOW-UP VISIT: CPT | Mod: S$GLB,,, | Performed by: ORTHOPAEDIC SURGERY

## 2024-11-19 PROCEDURE — 73502 X-RAY EXAM HIP UNI 2-3 VIEWS: CPT | Mod: 26,RT,, | Performed by: RADIOLOGY

## 2024-11-19 PROCEDURE — 1157F ADVNC CARE PLAN IN RCRD: CPT | Mod: CPTII,S$GLB,, | Performed by: ORTHOPAEDIC SURGERY

## 2024-11-19 PROCEDURE — 99999 PR PBB SHADOW E&M-EST. PATIENT-LVL II: CPT | Mod: PBBFAC,,, | Performed by: ORTHOPAEDIC SURGERY

## 2024-11-19 NOTE — PROGRESS NOTES
Post-op Note    HPI    Maggy Tapia is here 2 weeks s/p the following procedure:     Right total hip replacement, conversion from previous perc screws to SMITH    Overall doing well.  No pain.  Doing home health PT .  Ambulating with a walker today.  On DVT prophylaxis.    Physical Exam:     Patient is alert and oriented no acute distress.   Assistive Device:  Walker    Right posterolateral hip Incision(s) are well healed.  There is no evidence of dehiscence.  There is no induration erythema or signs of infection.  Appropriate soft tissue swelling.  Compartments are soft and compressible.  Warm well-perfused extremity.  Mild swelling of the ankle.  No calf tenderness.    Imaging:     I have personally reviewed the following imaging and these are an interpretation of my findings:     X-Ray: I have reviewed all pertinent results/findings and my personal findings are:  Status post right SMITH.  No evidence of complication.  MDM liner.  Prophylactic cerclage cable    Assessment    Maggy Tapia is 2 weeks Post-op     Plan:    Overall doing as expected.  We discussed expectations of surgery and postoperative course.     Pain: Continued postoperative pain regimen -- Tylenol/ibuprofen  DVT prophylaxis:  30 days postop  PT/OT: Continue/Initiate physical therapy (weight bearing status:  As tolerated), posterior hip precautions 6 weeks postop  Outpatient PT referral  Keep incision clean and dry.  Do not submerge for 2 more weeks     Follow-up: 4 weeks   X-rays next visit:  Right hip

## 2024-11-20 ENCOUNTER — CLINICAL SUPPORT (OUTPATIENT)
Dept: REHABILITATION | Facility: HOSPITAL | Age: 74
End: 2024-11-20
Attending: ORTHOPAEDIC SURGERY
Payer: MEDICARE

## 2024-11-20 DIAGNOSIS — Z96.641 S/P HIP REPLACEMENT, RIGHT: ICD-10-CM

## 2024-11-20 DIAGNOSIS — R26.9 GAIT ABNORMALITY: ICD-10-CM

## 2024-11-20 DIAGNOSIS — R29.898 DECREASED STRENGTH OF LOWER EXTREMITY: Primary | ICD-10-CM

## 2024-11-20 PROCEDURE — 97161 PT EVAL LOW COMPLEX 20 MIN: CPT | Mod: KX,PN | Performed by: PHYSICAL THERAPIST

## 2024-11-20 PROCEDURE — 97530 THERAPEUTIC ACTIVITIES: CPT | Mod: KX,PN | Performed by: PHYSICAL THERAPIST

## 2024-11-20 NOTE — PLAN OF CARE
"[facility]   Physical Therapy Initial Evaluation     Name: Maggy Tapia  Clinic Number: [id number]    Therapy Diagnosis:   Encounter Diagnosis   Name Primary?    S/P hip replacement, right         Physician: Simone Bonilla MD    Physician Orders: PT Eval and Treat & SMITH precautions  Medical Diagnosis from Referral: S/P hip replacement, right   Evaluation Date: 11/20/2024  Authorization Period Expiration: 11/19/2025  Plan of Care Expiration: 2/28/2025  Progress Note Due: 12/20/2024  Visit # / Visits authorized: 1/ 1  (POC: 1/24)   FOTO: 1/[id number]    Precautions: Standard and SMITH precautions   Insurance: Payor: [employer] [facility] / Plan: [employer] [facility] SNP / Product Type: Medicare Advantage /     Time In: 1500  Time Out: 1600  Total Appointment Time (timed & untimed codes): 60 minutes      SUBJECTIVE   Date of onset: Date of surgery: 11/7/2024    History of current condition - Ivana reports: she fell and fractured her hip in 2022. She underwent a right ORIF following the fall. Recently she began having right hip pain and underwent a right SMITH on 11/7/2024. She was discharged the same day and received HHPT up until yesterday. She reports she lives with her brother in a single story home that is built on a slab.    Falls: 1    Imaging, X-rays : "The patient has undergone a right hip arthroplasty with metallic acetabular and femoral components in good position. Fracture or dislocation is not seen. Lucency around the prosthesis consistent with osteomyelitis or loosening is not seen. The bones of the pelvis and left hip are intact. "    Prior Therapy: PT  Social History: Single lives with their family (brother)  Occupation: not working  Prior Level of Function: Independent  Current Level of Function: Decreased ability to perform ADL and limited tolerance to standing and walking.    Pain:  Current 0/10, worst 8/10, best 0/10   Location: right hip    Description: " Throbbing  Aggravating Factors: Standing, Walking, and Getting out of bed/chair  Easing Factors: rest    Patients goals: Improve tolerance to ambulation     Medical History:   Past Medical History:   Diagnosis Date    Anxiety     Arthritis     Asthma     Breast cancer     Left    Depression     Diabetes mellitus, type 2     GERD (gastroesophageal reflux disease)     Hyperlipidemia     Hypertension     Hypothyroidism, unspecified     Overactive bladder     Seizures     Pseudo-seizures    Sleep apnea     Stroke 2022    pt was in the hospital for a stroke, claims she was seeing people when the stroke happened       Surgical History:   Maggy Tapia  has a past surgical history that includes Appendectomy; Breast surgery;  section; Cholecystectomy; Dilation and curettage of uterus; Eye surgery; Breast biopsy (Left); Breast lumpectomy (Left); Colonoscopy (N/A, 2020); Cataract extraction (Bilateral); Cyst Removal (Left, 2021); Percutaneous pinning of hip (Right, 2022); lumpectomy, breast (Left, 2023); [place] lymph node biopsy (Left, 2023); Simple mastectomy (Left, 2023); Hysteroscopy with dilation and curettage of uterus (N/A, 2023); and Hip replacement arthroplasty (Right, 2024).    Medications:   Maggy has a current medication list which includes the following prescription(s): albuterol, alendronate, aspirin, blood-glucose meter, calcium carbonate/vitamin d3, cyanocobalamin, econazole nitrate, fluticasone furoate-vilanterol, ibuprofen, ketoconazole, letrozole, levothyroxine, losartan, metformin, nystatin, ondansetron, oxycodone-acetaminophen, polyethylene glycol, potassium chloride sa, ropinirole, sertraline, simvastatin, and solifenacin.    Allergies:   Review of patient's allergies indicates:   Allergen Reactions    Penicillins Anaphylaxis     NOT ALLERGIC TO ANCEF    Sulfa (sulfonamide antibiotics) Anaphylaxis    Trintellix [vortioxetine] Nausea And  Vomiting and Other (See Comments)     Patient has seizures and vomits          OBJECTIVE     Posture: Decreased lumbar lordosis and forward head in standing   Palpation: Moderate point tenderness noted with palpation of the right hip   Sensation: Decreased along the incision, otherwise intact  Range of Motion/Strength:     LE ROM Left Right   Hip flexion 110 degrees 86 degrees   Hip abduction 32 degrees 18 degrees   Hip external rotation 24 degrees 10 degrees   Hip internal rotation 18 degrees NT degrees       LE MMT Left Right   Hip flexion 5/5 4-/5   Hip abduction 5/5 4-/5   Hip adduction 5/5 4-/5   Hip external rotation 5/5 4-/5   Hip Internal rotation 5/5 4-/5       Special Tests:  Left Right   Jean's negative. negative.       Gait With AD.  Device Used -  Front - wheeled walker   Analysis The patient ambulates WBAT with FWW. She displays decreased stance time on the right.       Limitation/Restriction for FOTO  Survey    Therapist reviewed FOTO scores for Maggy Tapia on 11/20/2024.   FOTO documents entered into [employer] - see Media section.    Limitation Score: 14%         TREATMENT     Total Treatment time (time-based codes) separate from Evaluation: 30 minutes      Ivana received the treatments listed below:      THERAPEUTIC ACTIVITIES to improve dynamic and functional performance for 30 minutes including :.     Bridging 3 x 10  Seated hip abduction 3 x 10 Red theraband   Seated hip adductor squeeze 3 x 10  Standing hip extension 3 x 10  Standing hip abduction 3 x 10  Standing marching 3 x 10  Heel raises 3 x 10    PATIENT EDUCATION AND HOME EXERCISES     Education provided:   - Hip precautions    Written Home Exercises Provided: yes. Exercises were reviewed and Ivana was able to demonstrate them prior to the end of the session.  Ivana demonstrated good  understanding of the education provided. See EMR under Patient Instructions for exercises provided during therapy sessions.    ASSESSMENT     Maggy kaiser a  74 y.o. female referred to outpatient Physical Therapy with a medical diagnosis of S/P hip replacement, right . Patient presents with :    Right hip pain   Decreased right hip strength  Decreased ability to perform ADL and limited tolerance to standing and walking.    Patient prognosis is Good.   Patientt will benefit from skilled outpatient Physical Therapy to address the deficits stated above and in the chart below, provide patient /family education, and to maximize patientt's level of independence.     Plan of care discussed with patient: Yes  Patient's spiritual, cultural and educational needs considered and patient is agreeable to the plan of care and goals as stated below:     Anticipated Barriers for therapy: none    Medical Necessity is demonstrated by the following  History  Co-morbidities and personal factors that may impact the plan of care [x] LOW: no personal factors / co-morbidities  [] MODERATE: 1-2 personal factors / co-morbidities  [] HIGH: 3+ personal factors / co-morbidities    Moderate / High Support Documentation:      Examination  Body Structures and Functions, activity limitations and participation restrictions that may impact the plan of care [x] LOW: addressing 1-2 elements  [] MODERATE: 3+ elements  [] HIGH: 4+ elements (please support below)    Moderate / High Support Documentation:      Clinical Presentation [x] LOW: stable  [] MODERATE: Evolving  [] HIGH: Unstable     Decision Making/ Complexity Score: low       Goals:  SHORT TERM GOALS:  6 weeks  Progress Date met   The patient will begin a written HEP [] Met  [] Not Met  [] Progressing     Decrease soft tissue tenderness to mild [] Met  [] Not Met  [] Progressing     Increase hip flexion to 4/5 [] Met  [] Not Met  [] Progressing     The patient will be able to ascend/descend 10 step/stairs without onset of symptoms.   [] Met  [] Not Met  [] Progressing        LONG TERM GOALS: 12 weeks  Progress Date met   The patient will be  independent with a HEP for maintenance [] Met  [] Not Met  [] Progressing     Increase hip ROM to WFL [] Met  [] Not Met  [] Progressing     Increase left hip strength to 4+/5 [] Met  [] Not Met  [] Progressing     Decrease FOTO score to 45 % [] Met  [] Not Met  [] Progressing     The patient will be able to ascend/descend 20 step/stairs without onset of symptoms.   [] Met  [] Not Met  [] Progressing         PLAN   Plan of care Certification: 11/20/2024 to 2/28/2025.    Outpatient Physical Therapy 2 times weekly for 12 weeks to include the following interventions: Gait Training, Manual Therapy, Neuromuscular Re-ed, Patient Education, Therapeutic Activities, and Therapeutic Exercise.     Farhat Cuevas, PT      I CERTIFY THE NEED FOR THESE SERVICES FURNISHED UNDER THIS PLAN OF TREATMENT AND WHILE UNDER MY CARE   Physician's comments:     Physician's Signature: ___________________________________________________

## 2024-11-26 ENCOUNTER — CLINICAL SUPPORT (OUTPATIENT)
Dept: REHABILITATION | Facility: HOSPITAL | Age: 74
End: 2024-11-26
Payer: MEDICARE

## 2024-11-26 ENCOUNTER — DOCUMENTATION ONLY (OUTPATIENT)
Dept: REHABILITATION | Facility: HOSPITAL | Age: 74
End: 2024-11-26

## 2024-11-26 DIAGNOSIS — R26.9 GAIT ABNORMALITY: ICD-10-CM

## 2024-11-26 DIAGNOSIS — R29.898 DECREASED STRENGTH OF LOWER EXTREMITY: ICD-10-CM

## 2024-11-26 DIAGNOSIS — Z96.641 S/P HIP REPLACEMENT, RIGHT: Primary | ICD-10-CM

## 2024-11-26 PROCEDURE — 97112 NEUROMUSCULAR REEDUCATION: CPT | Mod: KX,PN,CQ

## 2024-11-26 PROCEDURE — 97530 THERAPEUTIC ACTIVITIES: CPT | Mod: KX,PN,CQ

## 2024-11-26 NOTE — PROGRESS NOTES
[facility]   Physical Therapy Treatment Note     Name: Maggy Tapia  Clinic Number: [id number]    Therapy Diagnosis:   Encounter Diagnoses   Name Primary?    S/P hip replacement, right Yes    Decreased strength of lower extremity     Gait abnormality      Physician: Simone Bonilla MD    Visit Date: 11/26/2024    Physician Orders: PT Eval and Treat & SMITH precautions  Medical Diagnosis from Referral: S/P hip replacement, right   Evaluation Date: 11/20/2024  Authorization Period Expiration: 11/19/2025  Plan of Care Expiration: 2/28/2025  Progress Note Due: 12/20/2024  Visit # / Visits authorized: 2/ 10  (POC: 1/24)   FOTO: 1/3     Limitation/Restriction for FOTO  Survey     Therapist reviewed FOTO scores for Maggy Tapia on 11/20/2024.   FOTO documents entered into [employer] - see Media section.     Limitation Score: 14%         Precautions:  Standard and SMITH precautions, Cancer, Fall    Time In: 0800  Time Out: 0847  Total Billable Time: 47 minutes      SUBJECTIVE     Pt reports: no complaints of her hip today.  She was compliant with home exercise program.  Response to previous treatment: first visit after eval  Functional change: ongoing    Pain: 0/10  Location: right hip      OBJECTIVE     Objective Measures updated at progress report unless specified.   Date of surgery: 11/7/2024  Treatment     Ivana received the treatments listed below:      Neuromuscular re-education activities to improve: Balance, Kinesthetic, Proprioception, and Posture for 32 minutes:     Bridging 3 x 10  Hip abduction 3 x 10 Red theraband - Right Lower Extremity only  Hip adductor squeeze 3 x 10  Straight Leg Raise 3 x 10  Short Arc Quads 3 x 10  Long Arc Quad 3 x 10      Therapeutic activities to improve functional performance for 15  minutes, including:    Standing hip extension 3 x 10  Standing hip abduction 3 x 10  Standing marching 3 x 10  Heel raises 3 x 10    Sit<>stand from edge of raised plinth x  10    Patient Education and Home Exercises     Home Exercises Provided and Patient Education Provided     Education provided:   - cont HEP    Written Home Exercises Provided: Patient instructed to cont prior HEP. Exercises were reviewed and Ivana was able to demonstrate them prior to the end of the session.  Ivana demonstrated good  understanding of the education provided. See EMR under Patient Instructions for exercises provided during therapy sessions    ASSESSMENT     Ivana presents with decreased hip and LE strength.  She ambulates with a flexed posture, forward head.  Had pt move only the Right Lower Extremity during clamshells to encourage R LE movement.  SOB with all activity and ambulation, rest breaks needed.    Ivana Is progressing well towards her goals.   Pt prognosis is Good.     Pt will continue to benefit from skilled outpatient physical therapy to address the deficits listed in the problem list box on initial evaluation, provide pt/family education and to maximize pt's level of independence in the home and community environment.     Pt's spiritual, cultural and educational needs considered and pt agreeable to plan of care and goals.     Anticipated barriers to physical therapy: none    Goals:  SHORT TERM GOALS:  6 weeks  Progress  11/26/2024 Date met   The patient will begin a written HEP [x] Met  [] Not Met  [] Progressing  11/26/2024   Decrease soft tissue tenderness to mild [] Met  [] Not Met  [x] Progressing     Increase hip flexion to 4/5 [] Met  [] Not Met  [x] Progressing     The patient will be able to ascend/descend 10 step/stairs without onset of symptoms.    [] Met  [] Not Met  [x] Progressing        LONG TERM GOALS: 12 weeks  Progress  11/26/2024 Date met   The patient will be independent with a HEP for maintenance [] Met  [] Not Met  [x] Progressing     Increase hip ROM to WFL [] Met  [] Not Met  [x] Progressing     Increase left hip strength to 4+/5 [] Met  [] Not Met  [x]  Progressing     Decrease FOTO score to 45 % [] Met  [] Not Met  [x] Progressing     The patient will be able to ascend/descend 20 step/stairs without onset of symptoms.    [] Met  [] Not Met  [x] Progressing         PLAN     Cont per Plan of Care, posture, strength    Yudelka Barnes, PTA

## 2024-11-26 NOTE — PROGRESS NOTES
PT/PTA met face to face to discuss pt's treatment plan and progress towards established goals. Pt will be seen by a physical therapist minimally every 6th visit or every 30 days.    Yudelka Barnes PTA

## 2024-12-02 ENCOUNTER — DOCUMENTATION ONLY (OUTPATIENT)
Dept: REHABILITATION | Facility: HOSPITAL | Age: 74
End: 2024-12-02

## 2024-12-02 ENCOUNTER — CLINICAL SUPPORT (OUTPATIENT)
Dept: REHABILITATION | Facility: HOSPITAL | Age: 74
End: 2024-12-02
Payer: MEDICARE

## 2024-12-02 DIAGNOSIS — R29.898 DECREASED STRENGTH OF LOWER EXTREMITY: ICD-10-CM

## 2024-12-02 DIAGNOSIS — R26.9 GAIT ABNORMALITY: ICD-10-CM

## 2024-12-02 DIAGNOSIS — Z96.641 S/P HIP REPLACEMENT, RIGHT: Primary | ICD-10-CM

## 2024-12-02 PROCEDURE — 97112 NEUROMUSCULAR REEDUCATION: CPT | Mod: KX,PN,CQ

## 2024-12-02 PROCEDURE — 97530 THERAPEUTIC ACTIVITIES: CPT | Mod: KX,PN,CQ

## 2024-12-02 RX ORDER — ASPIRIN 81 MG/1
81 TABLET ORAL 2 TIMES DAILY
Qty: 60 TABLET | Refills: 0 | OUTPATIENT
Start: 2024-12-02 | End: 2025-12-02

## 2024-12-02 NOTE — PROGRESS NOTES
[facility]   Physical Therapy Treatment Note     Name: Maggy Tapia  Clinic Number: [id number]    Therapy Diagnosis:   Encounter Diagnoses   Name Primary?    S/P hip replacement, right Yes    Decreased strength of lower extremity     Gait abnormality      Physician: Simone Bonilla MD    Visit Date: 12/2/2024    Physician Orders: PT Eval and Treat & SMITH precautions  Medical Diagnosis from Referral: S/P hip replacement, right   Evaluation Date: 11/20/2024  Authorization Period Expiration: 11/19/2025  Plan of Care Expiration: 2/28/2025  Progress Note Due: 12/20/2024  Visit # / Visits authorized: 3/ 10  (POC: 2/24)   FOTO: 1/3     Limitation/Restriction for FOTO  Survey     Therapist reviewed FOTO scores for Maggy Tapia on 11/20/2024.   FOTO documents entered into [employer] - see Media section.     Limitation Score: 14%         Precautions:  Standard and SMITH precautions, Cancer, Fall    Time In: 157p  Time Out: 251p  Total Billable Time: 54 minutes      SUBJECTIVE     Pt reports: she is dizzy today  She was compliant with home exercise program.  Response to previous treatment: good  Functional change: ongoing    Pain: 0/10  Location: right hip      OBJECTIVE     Objective Measures updated at progress report unless specified.   Date of surgery: 11/7/2024  Treatment     Ivana received the treatments listed below:      Neuromuscular re-education activities to improve: Balance, Kinesthetic, Proprioception, and Posture for 39 minutes:     **OOZING WOUND R MEDIAL UPPER GASTROC REGION**    Bridging 3 x 10  Hip abduction 3 x 10 Red theraband   Hip adductor squeeze 3 x 10  Heel slides 3 x 10  Straight Leg Raise 3 x 10  Short Arc Quads 2# 3 x 10  Long Arc Quad 2# 3 x 10      Therapeutic activities to improve functional performance for 15  minutes, including:    Nustep x 10 minutes  Standing marching 3 x 10    Standing hip extension 3 x 10  NP  Standing hip abduction 3 x 10  NP  Heel raises 3 x  10  NOT PERFORMED     Sit<>stand from edge of raised plinth 2 x 10    Patient Education and Home Exercises     Home Exercises Provided and Patient Education Provided     Education provided:   - cont HEP    Written Home Exercises Provided: Patient instructed to cont prior HEP. Exercises were reviewed and Ivana was able to demonstrate them prior to the end of the session.  Ivana demonstrated good  understanding of the education provided. See EMR under Patient Instructions for exercises provided during therapy sessions    ASSESSMENT     Ivana demonstrated improvement in gait pattern, with increased step length Left Lower Extremity.  She continues to ambulate with a flexed posture, Verbal cues to stand erect.  Withheld many standing exercises today due to reported dizziness.      Ivana Is progressing well towards her goals.   Pt prognosis is Good.     Pt will continue to benefit from skilled outpatient physical therapy to address the deficits listed in the problem list box on initial evaluation, provide pt/family education and to maximize pt's level of independence in the home and community environment.     Pt's spiritual, cultural and educational needs considered and pt agreeable to plan of care and goals.     Anticipated barriers to physical therapy: none    Goals:  SHORT TERM GOALS:  6 weeks  Progress  12/2/2024 Date met   The patient will begin a written HEP [x] Met  [] Not Met  [] Progressing  11/26/2024   Decrease soft tissue tenderness to mild [] Met  [] Not Met  [x] Progressing     Increase hip flexion to 4/5 [] Met  [] Not Met  [x] Progressing     The patient will be able to ascend/descend 10 step/stairs without onset of symptoms.    [] Met  [] Not Met  [x] Progressing        LONG TERM GOALS: 12 weeks  Progress  12/2/2024 Date met   The patient will be independent with a HEP for maintenance [] Met  [] Not Met  [x] Progressing     Increase hip ROM to WFL [] Met  [] Not Met  [x] Progressing     Increase left  hip strength to 4+/5 [] Met  [] Not Met  [x] Progressing     Decrease FOTO score to 45 % [] Met  [] Not Met  [x] Progressing     The patient will be able to ascend/descend 20 step/stairs without onset of symptoms.    [] Met  [] Not Met  [x] Progressing         PLAN     Cont per Plan of Care, posture, strength    Yudelka Barnes, PTA

## 2024-12-11 ENCOUNTER — CLINICAL SUPPORT (OUTPATIENT)
Dept: REHABILITATION | Facility: HOSPITAL | Age: 74
End: 2024-12-11
Payer: MEDICARE

## 2024-12-11 DIAGNOSIS — R26.9 GAIT ABNORMALITY: ICD-10-CM

## 2024-12-11 DIAGNOSIS — Z96.641 S/P HIP REPLACEMENT, RIGHT: Primary | ICD-10-CM

## 2024-12-11 DIAGNOSIS — R29.898 DECREASED STRENGTH OF LOWER EXTREMITY: ICD-10-CM

## 2024-12-11 PROCEDURE — 97112 NEUROMUSCULAR REEDUCATION: CPT | Mod: PN

## 2024-12-11 PROCEDURE — 97530 THERAPEUTIC ACTIVITIES: CPT | Mod: PN

## 2024-12-11 NOTE — PROGRESS NOTES
[facility]   Physical Therapy Treatment Note     Name: Maggy Tapia  Clinic Number: [id number]    Therapy Diagnosis:   Encounter Diagnoses   Name Primary?    S/P hip replacement, right Yes    Decreased strength of lower extremity     Gait abnormality      Physician: Simone Bonilla MD    Visit Date: 12/11/2024    Physician Orders: PT Eval and Treat & SMITH precautions  Medical Diagnosis from Referral: S/P hip replacement, right   Evaluation Date: 11/20/2024  Authorization Period Expiration: 11/19/2025  Plan of Care Expiration: 2/28/2025  Progress Note Due: 12/20/2024  Visit # / Visits authorized: 3/ 10  (POC: 2/24)   FOTO: 1/3     Limitation/Restriction for FOTO  Survey     Therapist reviewed FOTO scores for Maggy Tapia on 11/20/2024.   FOTO documents entered into [employer] - see Media section.     Limitation Score: 14%         Precautions:  Standard and SMITH precautions, Cancer, Fall    Time In: 1040  (Pnt arrived early)  Time Out: 1134  Total Billable Time: 30 minutes      SUBJECTIVE     Pt reports: that she feels pretty good today.  She was compliant with home exercise program.  Response to previous treatment: good  Functional change: ongoing    Pain: 3/10  Location: right hip      OBJECTIVE     Objective Measures updated at progress report unless specified.   Date of surgery: 11/7/2024  Treatment     Ivana received the treatments listed below:      Neuromuscular re-education activities to improve: Balance, Kinesthetic, Proprioception, and Posture for 34 minutes:     **OOZING WOUND R MEDIAL UPPER GASTROC REGION**    Bridging 3 x 10  Hip abduction 3 x 10 Red theraband   Hip adductor squeeze 3 x 10  Heel slides 3 x 10  Straight Leg Raise 3 x 10  Short Arc Quads 2# 3 x 10  Long Arc Quad 2# 3 x 10      Therapeutic activities to improve functional performance for 20  minutes, including:    Nustep x 10 minutes  Standing marching 3 x 10    Standing hip extension 3 x 10  NP  Standing hip  abduction 3 x 10  NP  Heel raises 3 x 10  NOT PERFORMED     Sit<>stand from edge of raised plinth 2 x 10    Patient Education and Home Exercises     Home Exercises Provided and Patient Education Provided     Education provided:   - cont HEP    Written Home Exercises Provided: Patient instructed to cont prior HEP. Exercises were reviewed and Ivana was able to demonstrate them prior to the end of the session.  Ivana demonstrated good  understanding of the education provided. See EMR under Patient Instructions for exercises provided during therapy sessions    ASSESSMENT     Ivana demonstrated improvement in gait pattern, with increased step length Left Lower Extremity.  She continues to ambulate with a flexed posture, Verbal cues to stand erect.  Able to perform all therex without any issues.  Will continue to progress with therapy.    Ivana Is progressing well towards her goals.   Pt prognosis is Good.     Pt will continue to benefit from skilled outpatient physical therapy to address the deficits listed in the problem list box on initial evaluation, provide pt/family education and to maximize pt's level of independence in the home and community environment.     Pt's spiritual, cultural and educational needs considered and pt agreeable to plan of care and goals.     Anticipated barriers to physical therapy: none    Goals:  SHORT TERM GOALS:  6 weeks  Progress  12/11/2024 Date met   The patient will begin a written HEP [x] Met  [] Not Met  [] Progressing  11/26/2024   Decrease soft tissue tenderness to mild [] Met  [] Not Met  [x] Progressing     Increase hip flexion to 4/5 [] Met  [] Not Met  [x] Progressing     The patient will be able to ascend/descend 10 step/stairs without onset of symptoms.    [] Met  [] Not Met  [x] Progressing        LONG TERM GOALS: 12 weeks  Progress  12/11/2024 Date met   The patient will be independent with a HEP for maintenance [] Met  [] Not Met  [x] Progressing     Increase hip ROM  to WFL [] Met  [] Not Met  [x] Progressing     Increase left hip strength to 4+/5 [] Met  [] Not Met  [x] Progressing     Decrease FOTO score to 45 % [] Met  [] Not Met  [x] Progressing     The patient will be able to ascend/descend 20 step/stairs without onset of symptoms.    [] Met  [] Not Met  [x] Progressing         PLAN     Cont per Plan of Care, posture, strength    Denver Crook, PT

## 2024-12-12 ENCOUNTER — OFFICE VISIT (OUTPATIENT)
Dept: WOUND CARE | Facility: HOSPITAL | Age: 74
End: 2024-12-12
Attending: FAMILY MEDICINE
Payer: MEDICARE

## 2024-12-12 VITALS
SYSTOLIC BLOOD PRESSURE: 173 MMHG | DIASTOLIC BLOOD PRESSURE: 41 MMHG | HEART RATE: 85 BPM | RESPIRATION RATE: 19 BRPM | TEMPERATURE: 99 F

## 2024-12-12 DIAGNOSIS — L89.890 PRESSURE ULCER OF RIGHT LEG, UNSTAGEABLE: ICD-10-CM

## 2024-12-12 DIAGNOSIS — S31.809D WOUND OF BUTTOCK, UNSPECIFIED LATERALITY, SUBSEQUENT ENCOUNTER: Primary | ICD-10-CM

## 2024-12-12 PROCEDURE — 99214 OFFICE O/P EST MOD 30 MIN: CPT | Mod: 25,PN | Performed by: FAMILY MEDICINE

## 2024-12-12 PROCEDURE — 87070 CULTURE OTHR SPECIMN AEROBIC: CPT | Performed by: FAMILY MEDICINE

## 2024-12-12 PROCEDURE — 11042 DBRDMT SUBQ TIS 1ST 20SQCM/<: CPT | Mod: ,,, | Performed by: FAMILY MEDICINE

## 2024-12-12 PROCEDURE — 87186 SC STD MICRODIL/AGAR DIL: CPT | Performed by: FAMILY MEDICINE

## 2024-12-12 PROCEDURE — 99499 UNLISTED E&M SERVICE: CPT | Mod: ,,, | Performed by: FAMILY MEDICINE

## 2024-12-12 PROCEDURE — 11042 DBRDMT SUBQ TIS 1ST 20SQCM/<: CPT | Mod: PN | Performed by: FAMILY MEDICINE

## 2024-12-12 NOTE — PROGRESS NOTES
Wound Care Progress Note    Subjective:       Patient ID: Maggy Tapia is a 74 y.o. female.    Chief Complaint: No chief complaint on file.    HPI  Pt seen in clinic for a FU visit for a right leg pressure ulcer from compression hose. Pt was seen inpatient and is here for a FU visit. No other complaints today  Review of Systems   Skin:  Positive for wound.        Open wound right leg   All other systems reviewed and are negative.      Objective:        Physical Exam  Vitals and nursing note reviewed.   Constitutional:       General: She is not in acute distress.     Appearance: Normal appearance.   Skin:     General: Skin is warm.      Findings: Lesion present.      Comments: Right leg pressure ulcer, unstagable, see wound care assessment documerntation in chart review scanned under the media tab   Neurological:      General: No focal deficit present.      Mental Status: She is alert.   Psychiatric:         Judgment: Judgment normal.       There were no vitals filed for this visit.    Assessment:           ICD-10-CM ICD-9-CM   1. Wound of buttock, unspecified laterality, subsequent encounter  S31.809D V58.89     877.0   2. Pressure ulcer of right leg, unstageable  L89.890 707.09     707.25                Plan:                  Diagnoses and all orders for this visit:    Wound of buttock, unspecified laterality, subsequent encounter  -     Ambulatory referral/consult to Wound Clinic    Pressure ulcer of right leg, unstageable  -     Aerobic culture      See wound care instructions provided separately

## 2024-12-14 LAB — BACTERIA SPEC AEROBE CULT: ABNORMAL

## 2024-12-16 ENCOUNTER — CLINICAL SUPPORT (OUTPATIENT)
Dept: REHABILITATION | Facility: HOSPITAL | Age: 74
End: 2024-12-16
Payer: MEDICARE

## 2024-12-16 DIAGNOSIS — Z96.641 S/P HIP REPLACEMENT, RIGHT: Primary | ICD-10-CM

## 2024-12-16 DIAGNOSIS — R29.898 DECREASED STRENGTH OF LOWER EXTREMITY: ICD-10-CM

## 2024-12-16 DIAGNOSIS — R26.9 GAIT ABNORMALITY: ICD-10-CM

## 2024-12-16 PROCEDURE — 97112 NEUROMUSCULAR REEDUCATION: CPT | Mod: KX,PN,CQ

## 2024-12-16 PROCEDURE — 97530 THERAPEUTIC ACTIVITIES: CPT | Mod: KX,PN,CQ

## 2024-12-16 RX ORDER — CIPROFLOXACIN 500 MG/1
500 TABLET ORAL 2 TIMES DAILY
Qty: 20 TABLET | Refills: 0 | Status: SHIPPED | OUTPATIENT
Start: 2024-12-16 | End: 2024-12-26

## 2024-12-16 NOTE — PROGRESS NOTES
[facility]   Physical Therapy Treatment Note     Name: Maggy Tapia  Clinic Number: [id number]    Therapy Diagnosis:   Encounter Diagnoses   Name Primary?    S/P hip replacement, right Yes    Decreased strength of lower extremity     Gait abnormality      Physician: Simone oBnilla MD    Visit Date: 12/16/2024    Physician Orders: PT Eval and Treat & SMITH precautions  Medical Diagnosis from Referral: S/P hip replacement, right   Evaluation Date: 11/20/2024  Authorization Period Expiration: 11/19/2025  Plan of Care Expiration: 2/28/2025  Progress Note Due: 12/20/2024  Visit # / Visits authorized: 4/ 10  (POC: 5/24)   FOTO: 1/3     Limitation/Restriction for FOTO  Survey     Therapist reviewed FOTO scores for Maggy Tapia on 11/20/2024.   FOTO documents entered into [employer] - see Media section.     Limitation Score: 14%         Precautions:  Standard and SMITH precautions, Cancer, Fall    Time In: 1057  FOTO next visit  Time Out: 1156  Total Billable Time: 59 minutes      SUBJECTIVE     Pt reports: no complaints today  She was compliant with home exercise program.  Response to previous treatment: good  Functional change: ongoing    Pain: 0/10  Location: right hip      OBJECTIVE     Objective Measures updated at progress report unless specified.   Date of surgery: 11/7/2024  Treatment     Ivana received the treatments listed below:      Neuromuscular re-education activities to improve: Balance, Kinesthetic, Proprioception, and Posture for 34 minutes:     **OOZING WOUND R MEDIAL UPPER GASTROC REGION**    Bridging Red Theraband x 20  Bridge with ball squeeze x 20  Hip abduction 2 x 10 Red theraband   Hip adductor squeeze 2 x 10  Heel slides 3 x 10  Straight Leg Raise 3 x 10  Short Arc Quads 2# 3 x 10  Long Arc Quad 2# 3 x 10      Therapeutic activities to improve functional performance for 25  minutes, including:    Nustep x 10 minutes  Standing marching 3 x 10    Standing hip extension 3  x 10  NP  Standing hip abduction 3 x 10    Heel raises 3 x 10  NOT PERFORMED     Sit<>stand from edge of raised plinth 2 x 10    Patient Education and Home Exercises     Home Exercises Provided and Patient Education Provided     Education provided:   - cont HEP    Written Home Exercises Provided: Patient instructed to cont prior HEP. Exercises were reviewed and Ivana was able to demonstrate them prior to the end of the session.  Ivana demonstrated good  understanding of the education provided. See EMR under Patient Instructions for exercises provided during therapy sessions    ASSESSMENT     Ivana continues to ambulate with extended elbows, forward lean with RW too far away.  Verbal cues during ambulation for correct placement, unable to carry over more than 1-2 steps.  Some of this is due to fatigue in postural musculature, and some is due to habit forming patterns.  Decreased LE strength remains, pt letting Right Lower Extremity fall to side with bridges and other supine exercises.  She was able to correct after Verbal cues.      Ivana Is progressing well towards her goals.   Pt prognosis is Good.     Pt will continue to benefit from skilled outpatient physical therapy to address the deficits listed in the problem list box on initial evaluation, provide pt/family education and to maximize pt's level of independence in the home and community environment.     Pt's spiritual, cultural and educational needs considered and pt agreeable to plan of care and goals.     Anticipated barriers to physical therapy: none    Goals:  SHORT TERM GOALS:  6 weeks  Progress  12/16/2024 Date met   The patient will begin a written HEP [x] Met  [] Not Met  [] Progressing  11/26/2024   Decrease soft tissue tenderness to mild [] Met  [] Not Met  [x] Progressing     Increase hip flexion to 4/5 [] Met  [] Not Met  [x] Progressing     The patient will be able to ascend/descend 10 step/stairs without onset of symptoms.    [] Met  [] Not  Met  [x] Progressing        LONG TERM GOALS: 12 weeks  Progress  12/16/2024 Date met   The patient will be independent with a HEP for maintenance [] Met  [] Not Met  [x] Progressing     Increase hip ROM to WFL [] Met  [] Not Met  [x] Progressing     Increase left hip strength to 4+/5 [] Met  [] Not Met  [x] Progressing     Decrease FOTO score to 45 % [] Met  [] Not Met  [x] Progressing     The patient will be able to ascend/descend 20 step/stairs without onset of symptoms.    [] Met  [] Not Met  [x] Progressing         PLAN     Cont per Plan of Care, posture, strength    Yudelka Barnes, PTA

## 2024-12-19 ENCOUNTER — OFFICE VISIT (OUTPATIENT)
Dept: WOUND CARE | Facility: HOSPITAL | Age: 74
End: 2024-12-19
Attending: FAMILY MEDICINE
Payer: MEDICARE

## 2024-12-19 ENCOUNTER — CLINICAL SUPPORT (OUTPATIENT)
Dept: REHABILITATION | Facility: HOSPITAL | Age: 74
End: 2024-12-19
Payer: MEDICARE

## 2024-12-19 VITALS
HEART RATE: 84 BPM | RESPIRATION RATE: 17 BRPM | SYSTOLIC BLOOD PRESSURE: 199 MMHG | TEMPERATURE: 98 F | DIASTOLIC BLOOD PRESSURE: 84 MMHG

## 2024-12-19 DIAGNOSIS — R26.9 GAIT ABNORMALITY: ICD-10-CM

## 2024-12-19 DIAGNOSIS — S31.809D WOUND OF BUTTOCK, UNSPECIFIED LATERALITY, SUBSEQUENT ENCOUNTER: Primary | ICD-10-CM

## 2024-12-19 DIAGNOSIS — L89.890 PRESSURE ULCER OF RIGHT LEG, UNSTAGEABLE: ICD-10-CM

## 2024-12-19 DIAGNOSIS — R29.898 DECREASED STRENGTH OF LOWER EXTREMITY: ICD-10-CM

## 2024-12-19 DIAGNOSIS — S80.821A BLISTER OF RIGHT LOWER EXTREMITY, INITIAL ENCOUNTER: ICD-10-CM

## 2024-12-19 DIAGNOSIS — Z96.641 S/P HIP REPLACEMENT, RIGHT: Primary | ICD-10-CM

## 2024-12-19 PROBLEM — S31.809A WOUND OF BUTTOCK: Status: ACTIVE | Noted: 2022-07-28

## 2024-12-19 PROCEDURE — 11042 DBRDMT SUBQ TIS 1ST 20SQCM/<: CPT | Mod: ,,, | Performed by: FAMILY MEDICINE

## 2024-12-19 PROCEDURE — 97112 NEUROMUSCULAR REEDUCATION: CPT | Mod: KX,PN,CQ

## 2024-12-19 PROCEDURE — 99499 UNLISTED E&M SERVICE: CPT | Mod: ,,, | Performed by: FAMILY MEDICINE

## 2024-12-19 PROCEDURE — 97530 THERAPEUTIC ACTIVITIES: CPT | Mod: KX,PN,CQ

## 2024-12-19 PROCEDURE — 11042 DBRDMT SUBQ TIS 1ST 20SQCM/<: CPT | Mod: PN | Performed by: FAMILY MEDICINE

## 2024-12-19 NOTE — PROGRESS NOTES
"[facility]   Physical Therapy Treatment Note     Name: Maggy Tapia  Clinic Number: [id number]    Therapy Diagnosis:   Encounter Diagnoses   Name Primary?    S/P hip replacement, right Yes    Decreased strength of lower extremity     Gait abnormality      Physician: Simone Bonilla MD    Visit Date: 12/19/2024    Physician Orders: PT Eval and Treat & SMITH precautions  Medical Diagnosis from Referral: S/P hip replacement, right   Evaluation Date: 11/20/2024  Authorization Period Expiration: 11/19/2025  Plan of Care Expiration: 2/28/2025  Progress Note Due: 12/20/2024  Visit # / Visits authorized: 5/ 10  (POC: 6/24)   FOTO: 2/3  FOTO 2nd: 12/10/2024     Limitation/Restriction for FOTO  Survey     Therapist reviewed FOTO scores for Maggy Tapia on 11/20/2024.   FOTO documents entered into [employer] - see Media section.     Limitation Score: 14%         Precautions:  Standard and SMITH precautions, Cancer, Fall    Time In: 1047   Time Out: 1145  Total Billable Time: 58 minutes      SUBJECTIVE     Pt reports: pain today, had wound care earlier this morning   She was compliant with home exercise program.  Response to previous treatment: good  Functional change: ongoing    Pain: 0/10  Location: right hip      OBJECTIVE     Objective Measures updated at progress report unless specified.   Date of surgery: 11/7/2024  Treatment     Ivana received the treatments listed below:      Neuromuscular re-education activities to improve: Balance, Kinesthetic, Proprioception, and Posture for 33 minutes:     **OOZING WOUND R MEDIAL UPPER GASTROC REGION**    Bridging Red Theraband x 30  Bridge with ball squeeze x 30  Hip abduction 3 x 10 Red theraband   Hip adductor squeeze 3 x 10  Heel slides 3 x 10   NP  Straight Leg Raise 1# 3 x 10  Short Arc Quads 2# 3 x 10 4" bolster  Short Arc Quads 2# 3 x 10 2" bolster  Long Arc Quad 2# 3 x 10      Therapeutic activities to improve functional performance for 25  " minutes, including:    Nustep x 10 minutes    Seated marching 3 x 10  Seated HR/TR x 30    Standing hip extension 3 x 10  NP  Standing hip abduction 3 x 10  NP    Sit<>stand from edge of raised plinth x 10    Patient Education and Home Exercises     Home Exercises Provided and Patient Education Provided     Education provided:   - cont HEP    Written Home Exercises Provided: Patient instructed to cont prior HEP. Exercises were reviewed and Ivana was able to demonstrate them prior to the end of the session.  Ivana demonstrated good  understanding of the education provided. See EMR under Patient Instructions for exercises provided during therapy sessions    ASSESSMENT     Ivana continues to remain a high fall risk, and ambulates with rollator too far forward, elbows extended, trunk flexed.  She was able to increase her resistance with Straight Leg Raise today.  Improvement in FOTO scores today.    Ivana Is progressing well towards her goals.   Pt prognosis is Good.     Pt will continue to benefit from skilled outpatient physical therapy to address the deficits listed in the problem list box on initial evaluation, provide pt/family education and to maximize pt's level of independence in the home and community environment.     Pt's spiritual, cultural and educational needs considered and pt agreeable to plan of care and goals.     Anticipated barriers to physical therapy: none    Goals:  SHORT TERM GOALS:  6 weeks  Progress  12/19/2024 Date met   The patient will begin a written HEP [x] Met  [] Not Met  [] Progressing  11/26/2024   Decrease soft tissue tenderness to mild [] Met  [] Not Met  [x] Progressing     Increase hip flexion to 4/5 [] Met  [] Not Met  [x] Progressing     The patient will be able to ascend/descend 10 step/stairs without onset of symptoms.    [] Met  [] Not Met  [x] Progressing        LONG TERM GOALS: 12 weeks  Progress  12/19/2024 Date met   The patient will be independent with a HEP for  maintenance [] Met  [] Not Met  [x] Progressing     Increase hip ROM to WFL [] Met  [] Not Met  [x] Progressing     Increase left hip strength to 4+/5 [] Met  [] Not Met  [x] Progressing     Decrease FOTO score to 45 % [] Met  [] Not Met  [x] Progressing     The patient will be able to ascend/descend 20 step/stairs without onset of symptoms.    [] Met  [] Not Met  [x] Progressing         PLAN     Cont per Plan of Care, posture, strength    Yudelka Barnes, PTA

## 2024-12-19 NOTE — PROGRESS NOTES
Wound Care Progress Note    Subjective:       Patient ID: Maggy Tapia is a 74 y.o. female.    Chief Complaint: No chief complaint on file.    HPI  Pt seen in clinic for a FU visit for right buttock surgical wound, now healed, right leg pressure ulcer and a new blister on the right leg. Leg wounds are doing well, blister erupted. No other complaints today  Review of Systems   Skin:  Positive for wound.        Right leg open wound x 2   All other systems reviewed and are negative.      Objective:        Physical Exam  Vitals and nursing note reviewed.   Constitutional:       General: She is not in acute distress.     Appearance: Normal appearance.   Skin:     General: Skin is warm and dry.      Findings: Lesion present.      Comments: Right leg open wound x 2, see wound care assessment documentation in chart review scanned under the media tab   Neurological:      General: No focal deficit present.      Mental Status: She is alert.   Psychiatric:         Mood and Affect: Mood normal.         Judgment: Judgment normal.       There were no vitals filed for this visit.    Assessment:           ICD-10-CM ICD-9-CM   1. Wound of buttock, unspecified laterality, subsequent encounter  S31.809D V58.89     877.0   2. Pressure ulcer of right leg, unstageable  L89.890 707.09     707.25   3. Blister of right lower extremity, initial encounter  S80.821A 916.2                Plan:                  Diagnoses and all orders for this visit:    Wound of buttock, unspecified laterality, subsequent encounter    Pressure ulcer of right leg, unstageable    Blister of right lower extremity, initial encounter      See wound care instructions provided separately

## 2024-12-23 ENCOUNTER — CLINICAL SUPPORT (OUTPATIENT)
Dept: REHABILITATION | Facility: HOSPITAL | Age: 74
End: 2024-12-23
Payer: MEDICARE

## 2024-12-23 DIAGNOSIS — R29.898 DECREASED STRENGTH OF LOWER EXTREMITY: ICD-10-CM

## 2024-12-23 DIAGNOSIS — Z96.641 S/P HIP REPLACEMENT, RIGHT: Primary | ICD-10-CM

## 2024-12-23 DIAGNOSIS — R26.9 GAIT ABNORMALITY: ICD-10-CM

## 2024-12-23 PROCEDURE — 97112 NEUROMUSCULAR REEDUCATION: CPT | Mod: KX,PN,CQ

## 2024-12-23 PROCEDURE — 97530 THERAPEUTIC ACTIVITIES: CPT | Mod: KX,PN,CQ

## 2024-12-23 NOTE — PROGRESS NOTES
[facility]   Physical Therapy Treatment Note     Name: Maggy Tapia  Clinic Number: [id number]    Therapy Diagnosis:   Encounter Diagnoses   Name Primary?    S/P hip replacement, right Yes    Decreased strength of lower extremity     Gait abnormality      Physician: Simone Bonilla MD    Visit Date: 12/23/2024    Physician Orders: PT Eval and Treat & SMITH precautions  Medical Diagnosis from Referral: S/P hip replacement, right   Evaluation Date: 11/20/2024  Authorization Period Expiration: 11/19/2025  Plan of Care Expiration: 2/28/2025  Progress Note Due: 12/20/2024  Visit # / Visits authorized: 6/ 10  (POC: 7/24)   FOTO: 2/3  FOTO 2nd: 12/10/2024     Limitation/Restriction for FOTO  Survey     Therapist reviewed FOTO scores for Maggy Tapia on 11/20/2024.   FOTO documents entered into EPIC - see Media section.     Limitation Score: 14%         Precautions:  Standard and SMITH precautions, Cancer, Fall    Time In: 1055  Time Out: 1151  Total Billable Time: 56 minutes      SUBJECTIVE     Pt reports: pain today, had wound care earlier this morning   She was compliant with home exercise program.  Response to previous treatment: good  Functional change: ongoing    Pain: 0/10  Location: right hip      OBJECTIVE     Objective Measures updated at progress report unless specified.   Date of surgery: 11/7/2024  Treatment     Ivana received the treatments listed below:      Neuromuscular re-education activities to improve: Balance, Kinesthetic, Proprioception, and Posture for 23 minutes:     **OOZING WOUND R MEDIAL UPPER GASTROC REGION**    Bridging x 30  Hip abduction 3 x 10 GREEN theraband   Hip adductor squeeze 3 x 10  Heel slides 3 x 10   NP  Straight Leg Raise 1# 3 x 10  Short Arc Quads 2# 3 x 10   Long Arc Quad 2# 3 x 10      Therapeutic activities to improve functional performance for 33  minutes, including:    Nustep x 10 minutes    Seated marching 2# 3 x 10  Seated HR/TR x  30    +Standing trunk extension x 10  Standing hip extension 3 x 10  NP  Standing hip abduction 3 x 10  NP    Sit<>stand from edge of raised plinth x 10    Patient Education and Home Exercises     Home Exercises Provided and Patient Education Provided     Education provided:   - cont HEP    Written Home Exercises Provided: Patient instructed to cont prior HEP. Exercises were reviewed and Ivana was able to demonstrate them prior to the end of the session.  Ivana demonstrated good  understanding of the education provided. See EMR under Patient Instructions for exercises provided during therapy sessions    ASSESSMENT     Ivana continues to ambulate with a flexed trunk, elbows extended.  Worked on standing with erect posture, pt fatigued, rest breaks needed.  Gait pattern is better, with increased step through pattern.    Ivana Is progressing well towards her goals.   Pt prognosis is Good.     Pt will continue to benefit from skilled outpatient physical therapy to address the deficits listed in the problem list box on initial evaluation, provide pt/family education and to maximize pt's level of independence in the home and community environment.     Pt's spiritual, cultural and educational needs considered and pt agreeable to plan of care and goals.     Anticipated barriers to physical therapy: none    Goals:  SHORT TERM GOALS:  6 weeks  Progress  12/23/2024 Date met   The patient will begin a written HEP [x] Met  [] Not Met  [] Progressing  11/26/2024   Decrease soft tissue tenderness to mild [] Met  [] Not Met  [x] Progressing     Increase hip flexion to 4/5 [] Met  [] Not Met  [x] Progressing     The patient will be able to ascend/descend 10 step/stairs without onset of symptoms.    [] Met  [] Not Met  [x] Progressing        LONG TERM GOALS: 12 weeks  Progress  12/23/2024 Date met   The patient will be independent with a HEP for maintenance [] Met  [] Not Met  [x] Progressing     Increase hip ROM to WFL []  Met  [] Not Met  [x] Progressing     Increase left hip strength to 4+/5 [] Met  [] Not Met  [x] Progressing     Decrease FOTO score to 45 % [] Met  [] Not Met  [x] Progressing     The patient will be able to ascend/descend 20 step/stairs without onset of symptoms.    [] Met  [] Not Met  [x] Progressing         PLAN     Cont per Plan of Care, posture, strength    Yudelka Barnes, PTA

## 2024-12-27 ENCOUNTER — CLINICAL SUPPORT (OUTPATIENT)
Dept: REHABILITATION | Facility: HOSPITAL | Age: 74
End: 2024-12-27
Payer: MEDICARE

## 2024-12-27 DIAGNOSIS — R26.9 GAIT ABNORMALITY: ICD-10-CM

## 2024-12-27 DIAGNOSIS — Z96.641 S/P HIP REPLACEMENT, RIGHT: Primary | ICD-10-CM

## 2024-12-27 DIAGNOSIS — R29.898 DECREASED STRENGTH OF LOWER EXTREMITY: ICD-10-CM

## 2024-12-27 PROCEDURE — 97530 THERAPEUTIC ACTIVITIES: CPT | Mod: KX,PN,CQ

## 2024-12-27 PROCEDURE — 97112 NEUROMUSCULAR REEDUCATION: CPT | Mod: KX,PN,CQ

## 2024-12-27 NOTE — PROGRESS NOTES
[facility]   Physical Therapy Treatment Note     Name: Maggy Tapia  Clinic Number: [id number]    Therapy Diagnosis:   Encounter Diagnoses   Name Primary?    S/P hip replacement, right Yes    Decreased strength of lower extremity     Gait abnormality      Physician: Simone Bonilla MD    Visit Date: 12/27/2024    Physician Orders: PT Eval and Treat & SMITH precautions  Medical Diagnosis from Referral: S/P hip replacement, right   Evaluation Date: 11/20/2024  Authorization Period Expiration: 11/19/2025  Plan of Care Expiration: 2/28/2025  Progress Note Due: 12/20/2024  Visit # / Visits authorized: 7/ 10  (POC: 8/24)   FOTO: 2/3  FOTO 2nd: 12/10/2024     Limitation/Restriction for FOTO  Survey     Therapist reviewed FOTO scores for Maggy Tapia on 11/20/2024.   FOTO documents entered into EPIC - see Media section.     Limitation Score: 14%         Precautions:  Standard and SMITH precautions, Cancer, Fall, SEIZURE    Time In: 1055  Time Out: 1156  Total Billable Time: 60 minutes      SUBJECTIVE     Pt reports: feet hurt today   She was compliant with home exercise program.  Response to previous treatment: good  Functional change: ongoing    Pain: 0/10  Location: right hip      OBJECTIVE     Objective Measures updated at progress report unless specified.   Date of surgery: 11/7/2024  Treatment     Ivana received the treatments listed below:      Neuromuscular re-education activities to improve: Balance, Kinesthetic, Proprioception, and Posture for 23 minutes:     **OOZING WOUND R MEDIAL UPPER GASTROC REGION**    Bridging x 30  Hip abduction 3 x 10 GREEN theraband   Hip adductor squeeze 3 x 10  Heel slides 3 x 10   NP  Straight Leg Raise 1# 3 x 10  Short Arc Quads 2# 3 x 10   Long Arc Quad 2# 3 x 10      Therapeutic activities to improve functional performance for 37  minutes, including:    Nustep x 10 minutes    Seated marching 2# 3 x 10  Seated HR/TR x 30    +Standing trunk extension  x 10  Standing hip extension 3 x 10  NP  Standing hip abduction 3 x 10 B    Sit<>stand from edge of raised plinth 2 x 10    Patient Education and Home Exercises     Home Exercises Provided and Patient Education Provided     Education provided:   - cont HEP    Written Home Exercises Provided: Patient instructed to cont prior HEP. Exercises were reviewed and Ivana was able to demonstrate them prior to the end of the session.  Ivana demonstrated good  understanding of the education provided. See EMR under Patient Instructions for exercises provided during therapy sessions    ASSESSMENT     Ivana still required Verbal cues to correct flexed posture with ambulation, but was able to correct on her own a couple of times.   Edema noted Left Lower Extremity.  Decreased endurance and strength remains, but pt has made slight improvements.      Pt was sitting eom, she leaned her trunk forward.  Sat pt upright, non responsive.  Layed pt supine, BP: 176/154, 114, 178/90, 101 retrieved brother from waiting room.  He stated she will be ok and not to call an ambulance, she has had seizures since she wad 6 years old.  After a few minutes, pt became alert and oriented and was able to walk out to the car with therapist, brother was driving.      Ivana Is progressing well towards her goals.   Pt prognosis is Good.     Pt will continue to benefit from skilled outpatient physical therapy to address the deficits listed in the problem list box on initial evaluation, provide pt/family education and to maximize pt's level of independence in the home and community environment.     Pt's spiritual, cultural and educational needs considered and pt agreeable to plan of care and goals.     Anticipated barriers to physical therapy: none    Goals:  SHORT TERM GOALS:  6 weeks  Progress  12/27/2024 Date met   The patient will begin a written HEP [x] Met  [] Not Met  [] Progressing  11/26/2024   Decrease soft tissue tenderness to mild [] Met  [] Not  Met  [x] Progressing     Increase hip flexion to 4/5 [] Met  [] Not Met  [x] Progressing     The patient will be able to ascend/descend 10 step/stairs without onset of symptoms.    [] Met  [] Not Met  [x] Progressing        LONG TERM GOALS: 12 weeks  Progress  12/27/2024 Date met   The patient will be independent with a HEP for maintenance [] Met  [] Not Met  [x] Progressing     Increase hip ROM to WFL [] Met  [] Not Met  [x] Progressing     Increase left hip strength to 4+/5 [] Met  [] Not Met  [x] Progressing     Decrease FOTO score to 45 % [] Met  [] Not Met  [x] Progressing     The patient will be able to ascend/descend 20 step/stairs without onset of symptoms.    [] Met  [] Not Met  [x] Progressing         PLAN     Cont per Plan of Care, posture, strength    Yudekla Barnes, PTA

## 2024-12-30 ENCOUNTER — CLINICAL SUPPORT (OUTPATIENT)
Dept: REHABILITATION | Facility: HOSPITAL | Age: 74
End: 2024-12-30
Payer: MEDICARE

## 2024-12-30 DIAGNOSIS — R26.9 GAIT ABNORMALITY: ICD-10-CM

## 2024-12-30 DIAGNOSIS — Z96.641 S/P HIP REPLACEMENT, RIGHT: Primary | ICD-10-CM

## 2024-12-30 DIAGNOSIS — R29.898 DECREASED STRENGTH OF LOWER EXTREMITY: ICD-10-CM

## 2024-12-30 PROCEDURE — 97112 NEUROMUSCULAR REEDUCATION: CPT | Mod: KX,PN

## 2024-12-30 PROCEDURE — 97530 THERAPEUTIC ACTIVITIES: CPT | Mod: KX,PN

## 2024-12-30 NOTE — PROGRESS NOTES
[facility]   Physical Therapy Treatment Note     Name: Maggy Tapia  Clinic Number: [id number]    Therapy Diagnosis:   Encounter Diagnoses   Name Primary?    S/P hip replacement, right Yes    Decreased strength of lower extremity     Gait abnormality      Physician: Simone Bonilla MD    Visit Date: 12/30/2024    Physician Orders: PT Eval and Treat & SMITH precautions  Medical Diagnosis from Referral: S/P hip replacement, right   Evaluation Date: 11/20/2024  Authorization Period Expiration: 11/19/2025  Plan of Care Expiration: 2/28/2025  Progress Note Due: 12/20/2024  Visit # / Visits authorized: 8/ 10  (POC: 8/24)   FOTO: 2/3  FOTO 2nd: 12/10/2024     Limitation/Restriction for FOTO  Survey     Therapist reviewed FOTO scores for Maggy Tapia on 11/20/2024.   FOTO documents entered into EPIC - see Media section.     Limitation Score: 14%         Precautions:  Standard and SMITH precautions, Cancer, Fall, SEIZURE    Time In: 1054  (Pnt arrived early)  Time Out: 1148  Total Billable Time: 54 minutes      SUBJECTIVE     Pt reports: that she feels ok today, no complaints.     She was compliant with home exercise program.  Response to previous treatment: good  Functional change: ongoing    Pain: 0/10  Location: right hip      OBJECTIVE     Objective Measures updated at progress report unless specified.   Date of surgery: 11/7/2024  Treatment     Ivana received the treatments listed below:      Neuromuscular re-education activities to improve: Balance, Kinesthetic, Proprioception, and Posture for 35 minutes:     **OOZING WOUND R MEDIAL UPPER GASTROC REGION**    Nustep x 15 minutes    Seated marching  3 x 10  Seated HR/TR x 30  Standing trunk extension x 10    Bridging x 30  Hip abduction 3 x 10 GREEN theraband   Hip adductor squeeze 3 x 10  Heel slides 3 x 10   NP  Straight Leg Raise 1# 3 x 10  Short Arc Quads 2# 3 x 10   Long Arc Quad 2# 3 x 10    Therapeutic activities to improve  functional performance for 20  minutes, including:    Standing hip extension 3 x 10    Standing hip abduction 3 x 10 B    Sit<>stand from edge of raised plinth 2 x 10    Patient Education and Home Exercises     Home Exercises Provided and Patient Education Provided     Education provided:   - cont HEP    Written Home Exercises Provided: Patient instructed to cont prior HEP. Exercises were reviewed and Ivana was able to demonstrate them prior to the end of the session.  Ivana demonstrated good  understanding of the education provided. See EMR under Patient Instructions for exercises provided during therapy sessions    ASSESSMENT     Ivana still required Verbal cues to correct flexed posture with ambulation, but was able to correct on her own a couple of times.   Edema noted Left Lower Extremity.  Decreased endurance and strength remains, but pt has made slight improvements.      Ivana Is progressing well towards her goals.   Pt prognosis is Good.     Pt will continue to benefit from skilled outpatient physical therapy to address the deficits listed in the problem list box on initial evaluation, provide pt/family education and to maximize pt's level of independence in the home and community environment.     Pt's spiritual, cultural and educational needs considered and pt agreeable to plan of care and goals.     Anticipated barriers to physical therapy: none    Goals:  SHORT TERM GOALS:  6 weeks  Progress  12/30/2024 Date met   The patient will begin a written HEP [x] Met  [] Not Met  [] Progressing  11/26/2024   Decrease soft tissue tenderness to mild [] Met  [] Not Met  [x] Progressing     Increase hip flexion to 4/5 [] Met  [] Not Met  [x] Progressing     The patient will be able to ascend/descend 10 step/stairs without onset of symptoms.    [] Met  [] Not Met  [x] Progressing        LONG TERM GOALS: 12 weeks  Progress  12/30/2024 Date met   The patient will be independent with a HEP for maintenance [] Met  []  Not Met  [x] Progressing     Increase hip ROM to WFL [] Met  [] Not Met  [x] Progressing     Increase left hip strength to 4+/5 [] Met  [] Not Met  [x] Progressing     Decrease FOTO score to 45 % [] Met  [] Not Met  [x] Progressing     The patient will be able to ascend/descend 20 step/stairs without onset of symptoms.    [] Met  [] Not Met  [x] Progressing         PLAN     Cont per Plan of Care, posture, strength    Denver Crook, PT

## 2025-01-02 ENCOUNTER — CLINICAL SUPPORT (OUTPATIENT)
Dept: REHABILITATION | Facility: HOSPITAL | Age: 75
End: 2025-01-02
Payer: MEDICARE

## 2025-01-02 DIAGNOSIS — R26.9 GAIT ABNORMALITY: ICD-10-CM

## 2025-01-02 DIAGNOSIS — R29.898 DECREASED STRENGTH OF LOWER EXTREMITY: ICD-10-CM

## 2025-01-02 DIAGNOSIS — Z96.641 S/P HIP REPLACEMENT, RIGHT: Primary | ICD-10-CM

## 2025-01-02 PROCEDURE — 97112 NEUROMUSCULAR REEDUCATION: CPT | Mod: KX,PN,CQ

## 2025-01-02 PROCEDURE — 97530 THERAPEUTIC ACTIVITIES: CPT | Mod: KX,PN,CQ

## 2025-01-02 NOTE — PROGRESS NOTES
[facility]   Physical Therapy Treatment Note     Name: Maggy Tapia  Clinic Number: [id number]    Therapy Diagnosis:   Encounter Diagnoses   Name Primary?    S/P hip replacement, right Yes    Decreased strength of lower extremity     Gait abnormality      Physician: Simone Bonilla MD    Visit Date: 1/2/2025    Physician Orders: PT Eval and Treat & SMITH precautions  Medical Diagnosis from Referral: S/P hip replacement, right   Evaluation Date: 11/20/2024  Authorization Period Expiration: 11/19/2025  Plan of Care Expiration: 2/28/2025  Progress Note Due: 12/20/2024  Visit # / Visits authorized: 10 / 10 (additional auth in 2025) (POC: 9/24)   FOTO: 2/3  FOTO 2nd: 12/10/2024     Limitation/Restriction for FOTO  Survey     Therapist reviewed FOTO scores for Maggy Tapia on 11/20/2024.   FOTO documents entered into [employer] - see Media section.     Limitation Score: 14%         Precautions:  Standard and SMITH precautions, Cancer, Fall, SEIZURE    Time In: 1056  Time Out: 1152  Total Billable Time: 56 minutes      SUBJECTIVE     Pt reports: no complaints today     She was compliant with home exercise program.  Response to previous treatment: good  Functional change: ongoing    Pain: 0/10  Location: right hip      OBJECTIVE     Objective Measures updated at progress report unless specified.   Date of surgery: 11/7/2024  Treatment     Ivana received the treatments listed below:      Neuromuscular re-education activities to improve: Balance, Kinesthetic, Proprioception, and Posture for 31 minutes:     **OOZING WOUND R MEDIAL UPPER GASTROC REGION**    Nustep x 15 minutes    Seated marching  3 x 10  Seated HR/TR x 30  Seated rows Red Theraband x 30    Standing trunk extension 3 x 10    Bridging x 30  Hip abduction 3 x 10 GREEN theraband   Hip adductor squeeze 3 x 10  Heel slides 3 x 10   NP  Straight Leg Raise 1# 3 x 10  Short Arc Quads 2# 3 x 10   Long Arc Quad 2# 3 x 10    Therapeutic  activities to improve functional performance for 23  minutes, including:    Standing hip extension 2 x 10  B  Standing hip abduction 2 x 10 B    Sit<>stand from edge of raised plinth 2 x 10    Patient Education and Home Exercises     Home Exercises Provided and Patient Education Provided     Education provided:   - cont HEP    Written Home Exercises Provided: Patient instructed to cont prior HEP. Exercises were reviewed and Ivana was able to demonstrate them prior to the end of the session.  Ivana demonstrated good  understanding of the education provided. See EMR under Patient Instructions for exercises provided during therapy sessions    ASSESSMENT     Ivana sits with a backward C posture, forward jutted head.  She displays a little better posture with ambulation, but continues to maintain a flexed trunk during ambulation. Overall strength and endurance have improved.    Ivana Is progressing well towards her goals.   Pt prognosis is Good.     Pt will continue to benefit from skilled outpatient physical therapy to address the deficits listed in the problem list box on initial evaluation, provide pt/family education and to maximize pt's level of independence in the home and community environment.     Pt's spiritual, cultural and educational needs considered and pt agreeable to plan of care and goals.     Anticipated barriers to physical therapy: none    Goals:  SHORT TERM GOALS:  6 weeks  Progress  1/2/2025 Date met   The patient will begin a written HEP [x] Met  [] Not Met  [] Progressing  11/26/2024   Decrease soft tissue tenderness to mild [] Met  [] Not Met  [x] Progressing     Increase hip flexion to 4/5 [] Met  [] Not Met  [x] Progressing     The patient will be able to ascend/descend 10 step/stairs without onset of symptoms.    [] Met  [] Not Met  [x] Progressing        LONG TERM GOALS: 12 weeks  Progress  1/2/2025 Date met   The patient will be independent with a HEP for maintenance [] Met  [] Not  Met  [x] Progressing     Increase hip ROM to WFL [] Met  [] Not Met  [x] Progressing     Increase left hip strength to 4+/5 [] Met  [] Not Met  [x] Progressing     Decrease FOTO score to 45 % [] Met  [] Not Met  [x] Progressing     The patient will be able to ascend/descend 20 step/stairs without onset of symptoms.    [] Met  [] Not Met  [x] Progressing         PLAN     Cont per Plan of Care, posture, strength    Yudelka Barnes, PTA

## 2025-01-03 ENCOUNTER — DOCUMENTATION ONLY (OUTPATIENT)
Dept: REHABILITATION | Facility: HOSPITAL | Age: 75
End: 2025-01-03
Payer: MEDICARE

## 2025-01-06 ENCOUNTER — OFFICE VISIT (OUTPATIENT)
Dept: WOUND CARE | Facility: HOSPITAL | Age: 75
End: 2025-01-06
Attending: FAMILY MEDICINE
Payer: MEDICARE

## 2025-01-06 ENCOUNTER — TELEPHONE (OUTPATIENT)
Facility: CLINIC | Age: 75
End: 2025-01-06
Payer: MEDICARE

## 2025-01-06 ENCOUNTER — CLINICAL SUPPORT (OUTPATIENT)
Dept: REHABILITATION | Facility: HOSPITAL | Age: 75
End: 2025-01-06
Payer: MEDICARE

## 2025-01-06 VITALS
TEMPERATURE: 98 F | DIASTOLIC BLOOD PRESSURE: 90 MMHG | RESPIRATION RATE: 18 BRPM | HEART RATE: 81 BPM | SYSTOLIC BLOOD PRESSURE: 162 MMHG

## 2025-01-06 DIAGNOSIS — R29.898 DECREASED STRENGTH OF LOWER EXTREMITY: ICD-10-CM

## 2025-01-06 DIAGNOSIS — L89.890 PRESSURE ULCER OF RIGHT LEG, UNSTAGEABLE: Primary | ICD-10-CM

## 2025-01-06 DIAGNOSIS — Z96.641 S/P HIP REPLACEMENT, RIGHT: Primary | ICD-10-CM

## 2025-01-06 DIAGNOSIS — R26.9 GAIT ABNORMALITY: ICD-10-CM

## 2025-01-06 PROCEDURE — 97530 THERAPEUTIC ACTIVITIES: CPT | Mod: KX,PN,CQ

## 2025-01-06 PROCEDURE — 1157F ADVNC CARE PLAN IN RCRD: CPT | Mod: CPTII,,, | Performed by: FAMILY MEDICINE

## 2025-01-06 PROCEDURE — 1159F MED LIST DOCD IN RCRD: CPT | Mod: CPTII,,, | Performed by: FAMILY MEDICINE

## 2025-01-06 PROCEDURE — 99212 OFFICE O/P EST SF 10 MIN: CPT | Mod: ,,, | Performed by: FAMILY MEDICINE

## 2025-01-06 PROCEDURE — 1160F RVW MEDS BY RX/DR IN RCRD: CPT | Mod: CPTII,,, | Performed by: FAMILY MEDICINE

## 2025-01-06 PROCEDURE — 99213 OFFICE O/P EST LOW 20 MIN: CPT | Mod: PN | Performed by: FAMILY MEDICINE

## 2025-01-06 PROCEDURE — 97112 NEUROMUSCULAR REEDUCATION: CPT | Mod: KX,PN,CQ

## 2025-01-06 NOTE — PROGRESS NOTES
Wound Care Progress Note    Subjective:       Patient ID: Maggy Tapia is a 75 y.o. female.    Chief Complaint: No chief complaint on file.    HPI  Pt seen in clinic for a FU visit for a right leg pressure ulcer. Wound is healed. No other complaints today  Review of Systems   Skin:  Positive for wound.        Healed right leg wound   All other systems reviewed and are negative.      Objective:        Physical Exam  Vitals and nursing note reviewed.   Constitutional:       General: She is not in acute distress.     Appearance: Normal appearance.   Skin:     General: Skin is warm and dry.      Findings: Lesion present.      Comments: Healed right leg open wound, see wound care assessment documentation in chart review scanned under the media tab   Neurological:      General: No focal deficit present.      Mental Status: She is alert.   Psychiatric:         Judgment: Judgment normal.       There were no vitals filed for this visit.    Assessment:           ICD-10-CM ICD-9-CM   1. Pressure ulcer of right leg, unstageable  L89.890 707.09     707.25                Plan:                  Diagnoses and all orders for this visit:    Pressure ulcer of right leg, unstageable      See wound care instructions provided separately

## 2025-01-06 NOTE — PROGRESS NOTES
[facility]   Physical Therapy Treatment Note     Name: Maggy Tapia  Clinic Number: [id number]    Therapy Diagnosis:   Encounter Diagnoses   Name Primary?    S/P hip replacement, right Yes    Decreased strength of lower extremity     Gait abnormality      Physician: Simone Bonilla MD    Visit Date: 1/6/2025    Physician Orders: PT Eval and Treat & SMITH precautions  Medical Diagnosis from Referral: S/P hip replacement, right   Evaluation Date: 11/20/2024  Authorization Period Expiration: 11/19/2025  Plan of Care Expiration: 2/28/2025  Progress Note Due: 12/20/2024  Visit # / Visits authorized: 11 / 20 total (10 auth in 2024, 10 additional auth in 2025) (POC: 10/24)   FOTO: 2/3  FOTO 2nd: 12/10/2024     Limitation/Restriction for FOTO  Survey     Therapist reviewed FOTO scores for Maggy Tapia on 11/20/2024.   FOTO documents entered into EPIC - see Media section.     Limitation Score: 14%         Precautions:  Standard and SMITH precautions, Cancer, Fall, SEIZURE    Time In: 1053  Time Out: 1150  Total Billable Time: 57 minutes      SUBJECTIVE     Pt reports: doing well today     She was compliant with home exercise program.  Response to previous treatment: good  Functional change: ongoing    Pain: 0/10  Location: right hip      OBJECTIVE     Objective Measures updated at progress report unless specified.   Date of surgery: 11/7/2024  Treatment     Ivana received the treatments listed below:      Neuromuscular re-education activities to improve: Balance, Kinesthetic, Proprioception, and Posture for 40 minutes:     **OOZING WOUND R MEDIAL UPPER GASTROC REGION**    Nustep x 15 minutes, level 1.5    Seated marching  3 x 10  Seated HR/TR x 30  Seated rows Red Theraband x 30    Standing trunk extension 3 x 10    Bridging x 30  Hip abduction 3 x 10 GREEN theraband   Hip adductor squeeze 3 x 10  Heel slides 3 x 10   NP  Straight Leg Raise 1# 3 x 10  Short Arc Quads 2# 3 x 10   Long Arc  Quad 2# 3 x 10    Therapeutic activities to improve functional performance for 17  minutes, including:    Standing hip extension 2 x 10  B  Standing hip abduction 2 x 10 B    Sit<>stand from edge of raised plinth x 10    Patient Education and Home Exercises     Home Exercises Provided and Patient Education Provided     Education provided:   - cont HEP    Written Home Exercises Provided: Patient instructed to cont prior HEP. Exercises were reviewed and Ivana was able to demonstrate them prior to the end of the session.  Ivana demonstrated good  understanding of the education provided. See EMR under Patient Instructions for exercises provided during therapy sessions    ASSESSMENT     Ivana has improved with LE strength, but continues to ambulate with a flexed posture, sits with a backwards C pattern.  Continued strengthening and balance to decrease fall risk.    Ivana Is progressing well towards her goals.   Pt prognosis is Good.     Pt will continue to benefit from skilled outpatient physical therapy to address the deficits listed in the problem list box on initial evaluation, provide pt/family education and to maximize pt's level of independence in the home and community environment.     Pt's spiritual, cultural and educational needs considered and pt agreeable to plan of care and goals.     Anticipated barriers to physical therapy: none    Goals:  SHORT TERM GOALS:  6 weeks  Progress  1/6/2025 Date met   The patient will begin a written HEP [x] Met  [] Not Met  [] Progressing  11/26/2024   Decrease soft tissue tenderness to mild [] Met  [] Not Met  [x] Progressing     Increase hip flexion to 4/5 [] Met  [] Not Met  [x] Progressing     The patient will be able to ascend/descend 10 step/stairs without onset of symptoms.    [] Met  [] Not Met  [x] Progressing        LONG TERM GOALS: 12 weeks  Progress  1/6/2025 Date met   The patient will be independent with a HEP for maintenance [] Met  [] Not Met  [x]  Progressing     Increase hip ROM to WFL [] Met  [] Not Met  [x] Progressing     Increase left hip strength to 4+/5 [] Met  [] Not Met  [x] Progressing     Decrease FOTO score to 45 % [] Met  [] Not Met  [x] Progressing     The patient will be able to ascend/descend 20 step/stairs without onset of symptoms.    [] Met  [] Not Met  [x] Progressing         PLAN     Cont per Plan of Care, posture, strength    Yudelka Barnes, PTA

## 2025-01-09 ENCOUNTER — CLINICAL SUPPORT (OUTPATIENT)
Dept: REHABILITATION | Facility: HOSPITAL | Age: 75
End: 2025-01-09
Payer: MEDICARE

## 2025-01-09 DIAGNOSIS — Z96.641 S/P HIP REPLACEMENT, RIGHT: Primary | ICD-10-CM

## 2025-01-09 DIAGNOSIS — R26.9 GAIT ABNORMALITY: ICD-10-CM

## 2025-01-09 DIAGNOSIS — R29.898 DECREASED STRENGTH OF LOWER EXTREMITY: ICD-10-CM

## 2025-01-09 PROCEDURE — 97112 NEUROMUSCULAR REEDUCATION: CPT | Mod: KX,PN,CQ

## 2025-01-09 PROCEDURE — 97530 THERAPEUTIC ACTIVITIES: CPT | Mod: KX,PN,CQ

## 2025-01-09 NOTE — PROGRESS NOTES
"[facility]   Physical Therapy Treatment Note     Name: Maggy Tapia  Clinic Number: [id number]    Therapy Diagnosis:   Encounter Diagnoses   Name Primary?    S/P hip replacement, right Yes    Decreased strength of lower extremity     Gait abnormality      Physician: Simone Bonilla MD    Visit Date: 1/9/2025    Physician Orders: PT Eval and Treat & SMITH precautions  Medical Diagnosis from Referral: S/P hip replacement, right   Evaluation Date: 11/20/2024  Authorization Period Expiration: 11/19/2025  Plan of Care Expiration: 2/28/2025  Progress Note Due: 12/20/2024  Visit # / Visits authorized: 12 / 20 total (10 auth in 2024, 10 additional auth in 2025)  FOTO: 2/3  FOTO 2nd: 12/10/2024     Limitation/Restriction for FOTO  Survey     Therapist reviewed FOTO scores for Maggy Tapia on 11/20/2024.   FOTO documents entered into [employer] - see Media section.     Limitation Score: 14%         Precautions:  Standard and SMITH precautions, Cancer, Fall, SEIZURE    Time In: 1055  FOTO next visit  Time Out: 1149  Total Billable Time: 54 minutes      SUBJECTIVE     Pt reports: "I wasn't feeling too good last night.  I had a mini stroke."  Pt reports her brother noticed it, no issues from it, did not go to MD    She was compliant with home exercise program.  Response to previous treatment: good  Functional change: ongoing    Pain: 0/10  Location: right hip      OBJECTIVE     Objective Measures updated at progress report unless specified.   Date of surgery: 11/7/2024  Treatment     Ivana received the treatments listed below:      Neuromuscular re-education activities to improve: Balance, Kinesthetic, Proprioception, and Posture for 31 minutes:     **OOZING WOUND R MEDIAL UPPER GASTROC REGION**    Nustep x 15 minutes    Seated marching  2# 3 x 10  Seated HR/TR x 30  Seated rows Red Theraband x 30        Bridging x 30  Hip abduction 3 x 10 GREEN theraband   Hip adductor squeeze 3 x 10  Straight Leg " Raise 1# 3 x 10  Short Arc Quads 2# 3 x 10   Long Arc Quad 2# 3 x 10    Therapeutic activities to improve functional performance for 23  minutes, including:    Standing hip extension 3 x 10  B  Standing hip abduction 3 x 10 B    Standing trunk extension 2 x 10    Sit<>stand from edge of raised plinth 2 x 10    Patient Education and Home Exercises     Home Exercises Provided and Patient Education Provided     Education provided:   - cont HEP    Written Home Exercises Provided: Patient instructed to cont prior HEP. Exercises were reviewed and Ivana was able to demonstrate them prior to the end of the session.  Ivana demonstrated good  understanding of the education provided. See EMR under Patient Instructions for exercises provided during therapy sessions    ASSESSMENT     Ivana ambulates with a slightly improved trunk posture.  She maintains a rounded trunk posture with Posterior Pelvic Tilt.  No effects noted from reported TIA.    Ivana Is progressing well towards her goals.   Pt prognosis is Good.     Pt will continue to benefit from skilled outpatient physical therapy to address the deficits listed in the problem list box on initial evaluation, provide pt/family education and to maximize pt's level of independence in the home and community environment.     Pt's spiritual, cultural and educational needs considered and pt agreeable to plan of care and goals.     Anticipated barriers to physical therapy: none    Goals:  SHORT TERM GOALS:  6 weeks  Progress  1/9/2025 Date met   The patient will begin a written HEP [x] Met  [] Not Met  [] Progressing  11/26/2024   Decrease soft tissue tenderness to mild [] Met  [] Not Met  [x] Progressing     Increase hip flexion to 4/5 [] Met  [] Not Met  [x] Progressing     The patient will be able to ascend/descend 10 step/stairs without onset of symptoms.    [] Met  [] Not Met  [x] Progressing        LONG TERM GOALS: 12 weeks  Progress  1/9/2025 Date met   The patient will be  independent with a HEP for maintenance [] Met  [] Not Met  [x] Progressing     Increase hip ROM to WFL [] Met  [] Not Met  [x] Progressing     Increase left hip strength to 4+/5 [] Met  [] Not Met  [x] Progressing     Decrease FOTO score to 45 % [] Met  [] Not Met  [x] Progressing     The patient will be able to ascend/descend 20 step/stairs without onset of symptoms.    [] Met  [] Not Met  [x] Progressing         PLAN     Cont per Plan of Care, posture, strength    Yudelka Barnes, PTA

## 2025-01-10 ENCOUNTER — LAB VISIT (OUTPATIENT)
Dept: LAB | Facility: HOSPITAL | Age: 75
End: 2025-01-10
Attending: INTERNAL MEDICINE
Payer: MEDICARE

## 2025-01-10 DIAGNOSIS — C50.912 INVASIVE DUCTAL CARCINOMA OF BREAST, FEMALE, LEFT: ICD-10-CM

## 2025-01-10 LAB
ALBUMIN SERPL BCP-MCNC: 3.6 G/DL (ref 3.5–5.2)
ALP SERPL-CCNC: 101 U/L (ref 40–150)
ALT SERPL W/O P-5'-P-CCNC: 10 U/L (ref 10–44)
ANION GAP SERPL CALC-SCNC: 14 MMOL/L (ref 8–16)
AST SERPL-CCNC: 16 U/L (ref 10–40)
BASOPHILS # BLD AUTO: 0.05 K/UL (ref 0–0.2)
BASOPHILS NFR BLD: 0.5 % (ref 0–1.9)
BILIRUB SERPL-MCNC: 0.5 MG/DL (ref 0.1–1)
BUN SERPL-MCNC: 29 MG/DL (ref 8–23)
CALCIUM SERPL-MCNC: 9.5 MG/DL (ref 8.7–10.5)
CHLORIDE SERPL-SCNC: 106 MMOL/L (ref 95–110)
CO2 SERPL-SCNC: 20 MMOL/L (ref 23–29)
CREAT SERPL-MCNC: 0.9 MG/DL (ref 0.5–1.4)
DIFFERENTIAL METHOD BLD: ABNORMAL
EOSINOPHIL # BLD AUTO: 0.3 K/UL (ref 0–0.5)
EOSINOPHIL NFR BLD: 2.9 % (ref 0–8)
ERYTHROCYTE [DISTWIDTH] IN BLOOD BY AUTOMATED COUNT: 13.4 % (ref 11.5–14.5)
EST. GFR  (NO RACE VARIABLE): >60 ML/MIN/1.73 M^2
GLUCOSE SERPL-MCNC: 176 MG/DL (ref 70–110)
HCT VFR BLD AUTO: 45.4 % (ref 37–48.5)
HGB BLD-MCNC: 13.9 G/DL (ref 12–16)
IMM GRANULOCYTES # BLD AUTO: 0.05 K/UL (ref 0–0.04)
IMM GRANULOCYTES NFR BLD AUTO: 0.5 % (ref 0–0.5)
LYMPHOCYTES # BLD AUTO: 1.7 K/UL (ref 1–4.8)
LYMPHOCYTES NFR BLD: 15.6 % (ref 18–48)
MCH RBC QN AUTO: 29.3 PG (ref 27–31)
MCHC RBC AUTO-ENTMCNC: 30.6 G/DL (ref 32–36)
MCV RBC AUTO: 96 FL (ref 82–98)
MONOCYTES # BLD AUTO: 0.5 K/UL (ref 0.3–1)
MONOCYTES NFR BLD: 5 % (ref 4–15)
NEUTROPHILS # BLD AUTO: 8.1 K/UL (ref 1.8–7.7)
NEUTROPHILS NFR BLD: 75.5 % (ref 38–73)
NRBC BLD-RTO: 0 /100 WBC
PLATELET # BLD AUTO: 192 K/UL (ref 150–450)
PMV BLD AUTO: 11.8 FL (ref 9.2–12.9)
POTASSIUM SERPL-SCNC: 5.1 MMOL/L (ref 3.5–5.1)
PROT SERPL-MCNC: 7.8 G/DL (ref 6–8.4)
RBC # BLD AUTO: 4.75 M/UL (ref 4–5.4)
SODIUM SERPL-SCNC: 140 MMOL/L (ref 136–145)
WBC # BLD AUTO: 10.72 K/UL (ref 3.9–12.7)

## 2025-01-10 PROCEDURE — 85025 COMPLETE CBC W/AUTO DIFF WBC: CPT | Performed by: INTERNAL MEDICINE

## 2025-01-10 PROCEDURE — 36415 COLL VENOUS BLD VENIPUNCTURE: CPT | Mod: PO | Performed by: INTERNAL MEDICINE

## 2025-01-10 PROCEDURE — 80053 COMPREHEN METABOLIC PANEL: CPT | Performed by: INTERNAL MEDICINE

## 2025-01-13 ENCOUNTER — OFFICE VISIT (OUTPATIENT)
Dept: HEMATOLOGY/ONCOLOGY | Facility: CLINIC | Age: 75
End: 2025-01-13
Payer: MEDICARE

## 2025-01-13 ENCOUNTER — CLINICAL SUPPORT (OUTPATIENT)
Dept: REHABILITATION | Facility: HOSPITAL | Age: 75
End: 2025-01-13
Payer: MEDICARE

## 2025-01-13 VITALS
HEART RATE: 88 BPM | HEIGHT: 61 IN | DIASTOLIC BLOOD PRESSURE: 66 MMHG | BODY MASS INDEX: 31.8 KG/M2 | TEMPERATURE: 98 F | RESPIRATION RATE: 18 BRPM | SYSTOLIC BLOOD PRESSURE: 136 MMHG | OXYGEN SATURATION: 98 % | WEIGHT: 168.44 LBS

## 2025-01-13 DIAGNOSIS — Z17.0 MALIGNANT NEOPLASM OF UPPER-OUTER QUADRANT OF LEFT BREAST IN FEMALE, ESTROGEN RECEPTOR POSITIVE: Primary | ICD-10-CM

## 2025-01-13 DIAGNOSIS — R29.898 DECREASED STRENGTH OF LOWER EXTREMITY: ICD-10-CM

## 2025-01-13 DIAGNOSIS — C50.412 MALIGNANT NEOPLASM OF UPPER-OUTER QUADRANT OF LEFT BREAST IN FEMALE, ESTROGEN RECEPTOR POSITIVE: Primary | ICD-10-CM

## 2025-01-13 DIAGNOSIS — R26.9 GAIT ABNORMALITY: ICD-10-CM

## 2025-01-13 DIAGNOSIS — M85.89 OSTEOPENIA OF MULTIPLE SITES: ICD-10-CM

## 2025-01-13 DIAGNOSIS — Z96.641 S/P HIP REPLACEMENT, RIGHT: Primary | ICD-10-CM

## 2025-01-13 PROCEDURE — 97112 NEUROMUSCULAR REEDUCATION: CPT | Mod: PN

## 2025-01-13 PROCEDURE — 99999 PR PBB SHADOW E&M-EST. PATIENT-LVL V: CPT | Mod: PBBFAC,,, | Performed by: INTERNAL MEDICINE

## 2025-01-13 PROCEDURE — 97530 THERAPEUTIC ACTIVITIES: CPT | Mod: PN

## 2025-01-13 PROCEDURE — 1159F MED LIST DOCD IN RCRD: CPT | Mod: CPTII,S$GLB,, | Performed by: INTERNAL MEDICINE

## 2025-01-13 PROCEDURE — 1126F AMNT PAIN NOTED NONE PRSNT: CPT | Mod: CPTII,S$GLB,, | Performed by: INTERNAL MEDICINE

## 2025-01-13 PROCEDURE — 3078F DIAST BP <80 MM HG: CPT | Mod: CPTII,S$GLB,, | Performed by: INTERNAL MEDICINE

## 2025-01-13 PROCEDURE — 3288F FALL RISK ASSESSMENT DOCD: CPT | Mod: CPTII,S$GLB,, | Performed by: INTERNAL MEDICINE

## 2025-01-13 PROCEDURE — 99214 OFFICE O/P EST MOD 30 MIN: CPT | Mod: S$GLB,,, | Performed by: INTERNAL MEDICINE

## 2025-01-13 PROCEDURE — 1101F PT FALLS ASSESS-DOCD LE1/YR: CPT | Mod: CPTII,S$GLB,, | Performed by: INTERNAL MEDICINE

## 2025-01-13 PROCEDURE — 3075F SYST BP GE 130 - 139MM HG: CPT | Mod: CPTII,S$GLB,, | Performed by: INTERNAL MEDICINE

## 2025-01-13 PROCEDURE — 1157F ADVNC CARE PLAN IN RCRD: CPT | Mod: CPTII,S$GLB,, | Performed by: INTERNAL MEDICINE

## 2025-01-13 RX ORDER — LETROZOLE 2.5 MG/1
2.5 TABLET, FILM COATED ORAL DAILY
Qty: 90 TABLET | Refills: 3 | Status: SHIPPED | OUTPATIENT
Start: 2025-01-13

## 2025-01-13 NOTE — PROGRESS NOTES
[facility]   Physical Therapy Treatment Note     Name: Maggy Tapia  Clinic Number: [id number]    Therapy Diagnosis:   Encounter Diagnoses   Name Primary?    S/P hip replacement, right Yes    Decreased strength of lower extremity     Gait abnormality      Physician: Simone Bonilla MD    Visit Date: 1/13/2025    Physician Orders: PT Eval and Treat & SMITH precautions  Medical Diagnosis from Referral: S/P hip replacement, right   Evaluation Date: 11/20/2024  Authorization Period Expiration: 11/19/2025  Plan of Care Expiration: 2/28/2025  Progress Note Due: 12/20/2024  Visit # / Visits authorized: 12 / 20 total (10 auth in 2024, 10 additional auth in 2025)  FOTO: 2/3  FOTO 2nd: 12/10/2024     Limitation/Restriction for FOTO  Survey     Therapist reviewed FOTO scores for Maggy Tapia on 11/20/2024.   FOTO documents entered into EPIC - see Media section.     Limitation Score: 14%         Precautions:  Standard and SMITH precautions, Cancer, Fall, SEIZURE    Time In: 1255  (Pnt arrived early)   Time Out: 1149  Total Billable Time: 54 minutes      SUBJECTIVE     Pt reports: that she feels ok today, no complaints.  She was compliant with home exercise program.  Response to previous treatment: good  Functional change: ongoing    Pain: 0/10  Location: right hip      OBJECTIVE     Objective Measures updated at progress report unless specified.   Date of surgery: 11/7/2024  Treatment     Ivana received the treatments listed below:      Neuromuscular re-education activities to improve: Balance, Kinesthetic, Proprioception, and Posture for 34 minutes:     **OOZING WOUND R MEDIAL UPPER GASTROC REGION**    Nustep x 15 minutes    Seated marching  2# 3 x 10  Seated HR/TR x 30  Seated rows Red Theraband x 30    Bridging x 30  Hip abduction 3 x 10 GREEN theraband   Hip adductor squeeze 3 x 10  Straight Leg Raise 1# 3 x 10  Short Arc Quads 2# 3 x 10   Long Arc Quad 2# 3 x 10    Therapeutic activities  to improve functional performance for 20  minutes, including:    Standing hip extension 3 x 10  B  Standing hip abduction 3 x 10 B    Standing trunk extension 2 x 10    Sit<>stand from edge of raised plinth 2 x 10    Patient Education and Home Exercises     Home Exercises Provided and Patient Education Provided     Education provided:   - cont HEP    Written Home Exercises Provided: Patient instructed to cont prior HEP. Exercises were reviewed and Ivana was able to demonstrate them prior to the end of the session.  Ivana demonstrated good  understanding of the education provided. See EMR under Patient Instructions for exercises provided during therapy sessions    ASSESSMENT     Ivana ambulates with a slightly improved trunk posture.  She maintains a rounded trunk posture with Posterior Pelvic Tilt.  No effects noted from reported TIA.    Ivana Is progressing well towards her goals.   Pt prognosis is Good.     Pt will continue to benefit from skilled outpatient physical therapy to address the deficits listed in the problem list box on initial evaluation, provide pt/family education and to maximize pt's level of independence in the home and community environment.     Pt's spiritual, cultural and educational needs considered and pt agreeable to plan of care and goals.     Anticipated barriers to physical therapy: none    Goals:  SHORT TERM GOALS:  6 weeks  Progress  1/13/2025 Date met   The patient will begin a written HEP [x] Met  [] Not Met  [] Progressing  11/26/2024   Decrease soft tissue tenderness to mild [] Met  [] Not Met  [x] Progressing     Increase hip flexion to 4/5 [] Met  [] Not Met  [x] Progressing     The patient will be able to ascend/descend 10 step/stairs without onset of symptoms.    [] Met  [] Not Met  [x] Progressing        LONG TERM GOALS: 12 weeks  Progress  1/13/2025 Date met   The patient will be independent with a HEP for maintenance [] Met  [] Not Met  [x] Progressing     Increase hip  ROM to WFL [] Met  [] Not Met  [x] Progressing     Increase left hip strength to 4+/5 [] Met  [] Not Met  [x] Progressing     Decrease FOTO score to 45 % [] Met  [] Not Met  [x] Progressing     The patient will be able to ascend/descend 20 step/stairs without onset of symptoms.    [] Met  [] Not Met  [x] Progressing         PLAN     Cont per Plan of Care, posture, strength    Denver Crook, PT

## 2025-01-13 NOTE — PROGRESS NOTES
Service Date:  1/13/25    Chief Complaint: Breast cancer    Maggy Tapia is a 75 y.o. female here with L breast invasive ductal carcinoma. Patient had a routine screening mammogram which led to biopsy of a concerning lesion in the upper inner quadrant. Lesion measured about 17 mm on US. [id number] ER/KS+, HER2 negative by FISH, Ki 67 40%. After mastectomy, found to have pT2N0 lesion. Oncotype score 21, <1% benefit from chemo.    Now on Letrozole. Tolerating it well.  Takes Fosamax weekly on Fridays.  Doing well with that. No complaints to me. Asking if she needs to lose weight.    Review of Systems   Constitutional: Negative.    HENT: Negative.     Eyes: Negative.    Respiratory: Negative.     Cardiovascular: Negative.    Gastrointestinal: Negative.    Endocrine: Negative.    Genitourinary: Negative.    Musculoskeletal: Negative.    Integumentary:  Negative.   Neurological: Negative.    Hematological: Negative.    Psychiatric/Behavioral: Negative.          Current Outpatient Medications   Medication Instructions    albuterol (PROVENTIL/VENTOLIN HFA) 90 mcg/actuation inhaler 1-2 puffs, Inhalation, Every 4 hours PRN, INHALE 1 TO 2 PUFFS BY MOUTH INTO THE LUNGS EVERY 4 HOURS AS NEEDED FOR SHORTNESS OF BREATH( COUGHING)  Strength: 90 mcg/actuation    alendronate (FOSAMAX) 70 MG tablet Take one tablet every 7 days.    aspirin (ECOTRIN) 81 mg, Oral, 2 times daily    blood-glucose meter kit Use as instructed. Insurance preferred.    CALCIUM CARBONATE/VITAMIN D3 (CALCIUM 500 + D ORAL) 1 tablet, Daily    cyanocobalamin (VITAMIN B-12) 1,000 mcg, Oral, Daily    econazole nitrate 1 % cream Topical (Top), Daily    fluticasone furoate-vilanteroL (BREO ELLIPTA) 100-25 mcg/dose diskus inhaler 1 puff, Inhalation, Daily, Controller    ibuprofen (ADVIL,MOTRIN) 600 mg, Oral, 3 times daily    ketoconazole (NIZORAL) 2 % cream AAA pannus fold at least daily after the shower    letrozole (FEMARA) 2.5 mg, Oral, Daily    levothyroxine  (SYNTHROID) 125 mcg, Oral, Before breakfast    losartan (COZAAR) 25 mg, Oral, Daily    metFORMIN (GLUCOPHAGE-XR) 1,000 mg, Oral, With breakfast    nystatin (MYCOSTATIN) cream Topical (Top), 2 times daily, Apply to rash on face    ondansetron (ZOFRAN-ODT) 4 mg, Oral, Every 8 hours PRN    oxyCODONE-acetaminophen (PERCOCET) 5-325 mg per tablet 1 tablet, Oral, Every 6 hours PRN    polyethylene glycol (GLYCOLAX) 17 g, Oral, Daily    potassium chloride SA (K-DUR,KLOR-CON) 20 MEQ tablet 20 mEq, Daily    rOPINIRole (REQUIP) 1 mg, Nightly    sertraline (ZOLOFT) 50 mg, Oral, Daily, For depression/anxiety    simvastatin (ZOCOR) 40 mg, Oral    solifenacin (VESICARE) 10 mg, Oral, Daily        Past Medical History:   Diagnosis Date    Anxiety     Arthritis     Asthma     Breast cancer     Left    Depression     Diabetes mellitus, type 2     GERD (gastroesophageal reflux disease)     Hyperlipidemia     Hypertension     Hypothyroidism, unspecified     Overactive bladder     Seizures     Pseudo-seizures    Sleep apnea     Stroke 2022    pt was in the hospital for a stroke, claims she was seeing people when the stroke happened        Past Surgical History:   Procedure Laterality Date    APPENDECTOMY      BREAST BIOPSY Left     BREAST LUMPECTOMY Left     2016    BREAST SURGERY      CATARACT EXTRACTION Bilateral     OU done//     SECTION      CHOLECYSTECTOMY      COLONOSCOPY N/A 2020    Procedure: COLONOSCOPY;  Surgeon: Mj Fernandez MD;  Location: [facility];  Service: Endoscopy;  Laterality: N/A;    CYST REMOVAL Left 2021    DILATION AND CURETTAGE OF UTERUS      EYE SURGERY      HIP REPLACEMENT ARTHROPLASTY Right 2024    Procedure: ARTHROPLASTY, HIP REPLACEMENT;  Surgeon: Simone Bonilla MD;  Location: [facility] OR;  Service: Orthopedics;  Laterality: Right;  kev    HYSTEROSCOPY WITH DILATION AND CURETTAGE OF UTERUS N/A 2023    Procedure: HYSTEROSCOPY, WITH DILATION AND CURETTAGE OF UTERUS  "AND OTHER INDICATED PROCEDURES Needs Cardiac clearance;  Surgeon: Gamaliel Moran MD;  Location: [facility];  Service: OB/GYN;  Laterality: N/A;  Cardiac clearance needed per Dr Anderson    LUMPECTOMY, BREAST Left 4/26/2023    Procedure: LUMPECTOMY, BREAST;  Surgeon: Toño Paredes MD;  Location: [facility] OR;  Service: General;  Laterality: Left;    PERCUTANEOUS PINNING OF HIP Right 11/11/2022    Procedure: PINNING, HIP, PERCUTANEOUS;  Surgeon: Ministerio Joiner II, MD;  Location: [facility] OR;  Service: Orthopedics;  Laterality: Right;    SENTINEL LYMPH NODE BIOPSY Left 4/26/2023    Procedure: BIOPSY, LYMPH NODE, SENTINEL;  Surgeon: Toño Paredes MD;  Location: [facility] OR;  Service: General;  Laterality: Left;    SIMPLE MASTECTOMY Left 5/19/2023    Procedure: MASTECTOMY, SIMPLE;  Surgeon: Toño Paredes MD;  Location: [facility] OR;  Service: General;  Laterality: Left;        Family History   Problem Relation Name Age of Onset    Diabetes Mother      Hypertension Mother      Breast cancer Mother      Cataracts Mother      Melanoma Mother      Heart failure Father      Melanoma Father      Cataracts Brother      Melanoma Brother      Glaucoma Neg Hx      Retinal detachment Neg Hx      Macular degeneration Neg Hx         Social History     Tobacco Use    Smoking status: Never     Passive exposure: Past    Smokeless tobacco: Never   Substance Use Topics    Alcohol use: Yes     Comment: seldom    Drug use: No         Vitals:    01/13/25 1007   BP: 136/66   Pulse: 88   Resp: 18   Temp: 98 °F (36.7 °C)        Physical Exam:  /66 (BP Location: Right arm, Patient Position: Sitting)   Pulse 88   Temp 98 °F (36.7 °C) (Temporal)   Resp 18   Ht 5' 1" (1.549 m)   Wt 76.4 kg (168 lb 6.9 oz)   SpO2 98%   BMI 31.82 kg/m²     Physical Exam  Constitutional:       Appearance: Normal appearance.   HENT:      Head: Normocephalic and atraumatic.      Nose: Nose normal.      Mouth/Throat:      Mouth: Mucous membranes are moist.      " Pharynx: Oropharynx is clear.   Eyes:      Conjunctiva/sclera: Conjunctivae normal.   Cardiovascular:      Rate and Rhythm: Normal rate and regular rhythm.      Heart sounds: Normal heart sounds.   Pulmonary:      Effort: Pulmonary effort is normal.      Breath sounds: Normal breath sounds.   Abdominal:      General: Abdomen is flat. Bowel sounds are normal.      Palpations: Abdomen is soft.   Musculoskeletal:         General: Normal range of motion.      Cervical back: Normal range of motion and neck supple.   Skin:     General: Skin is warm and dry.   Neurological:      General: No focal deficit present.      Mental Status: She is alert and oriented to person, place, and time. Mental status is at baseline.   Psychiatric:         Mood and Affect: Mood normal.          Labs:  Lab Results   Component Value Date    WBC 10.72 01/10/2025    RBC 4.75 01/10/2025    HGB 13.9 01/10/2025    HCT 45.4 01/10/2025    MCV 96 01/10/2025    MCH 29.3 01/10/2025    MCHC 30.6 (L) 01/10/2025    RDW 13.4 01/10/2025     01/10/2025    MPV 11.8 01/10/2025    GRAN 8.1 (H) 01/10/2025    GRAN 75.5 (H) 01/10/2025    LYMPH 1.7 01/10/2025    LYMPH 15.6 (L) 01/10/2025    MONO 0.5 01/10/2025    MONO 5.0 01/10/2025    EOS 0.3 01/10/2025    BASO 0.05 01/10/2025    EOSINOPHIL 2.9 01/10/2025    BASOPHIL 0.5 01/10/2025     Sodium   Date Value Ref Range Status   01/10/2025 140 136 - 145 mmol/L Final     Potassium   Date Value Ref Range Status   01/10/2025 5.1 3.5 - 5.1 mmol/L Final     Chloride   Date Value Ref Range Status   01/10/2025 106 95 - 110 mmol/L Final     CO2   Date Value Ref Range Status   01/10/2025 20 (L) 23 - 29 mmol/L Final     Glucose   Date Value Ref Range Status   01/10/2025 176 (H) 70 - 110 mg/dL Final     BUN   Date Value Ref Range Status   01/10/2025 29 (H) 8 - 23 mg/dL Final     Creatinine   Date Value Ref Range Status   01/10/2025 0.9 0.5 - 1.4 mg/dL Final     Calcium   Date Value Ref Range Status   01/10/2025 9.5 8.7 -  10.5 mg/dL Final     Total Protein   Date Value Ref Range Status   01/10/2025 7.8 6.0 - 8.4 g/dL Final     Albumin   Date Value Ref Range Status   01/10/2025 3.6 3.5 - 5.2 g/dL Final     Total Bilirubin   Date Value Ref Range Status   01/10/2025 0.5 0.1 - 1.0 mg/dL Final     Comment:     For infants and newborns, interpretation of results should be based  on gestational age, weight and in agreement with clinical  observations.    Premature Infant recommended reference ranges:  Up to 24 hours.............<8.0 mg/dL  Up to 48 hours............<12.0 mg/dL  3-5 days..................<15.0 mg/dL  6-29 days.................<15.0 mg/dL       Alkaline Phosphatase   Date Value Ref Range Status   01/10/2025 101 40 - 150 U/L Final     AST   Date Value Ref Range Status   01/10/2025 16 10 - 40 U/L Final     ALT   Date Value Ref Range Status   01/10/2025 10 10 - 44 U/L Final     Anion Gap   Date Value Ref Range Status   01/10/2025 14 8 - 16 mmol/L Final     eGFR if    Date Value Ref Range Status   03/29/2022 >60.0 >60 mL/min/1.73 m^2 Final     eGFR if non    Date Value Ref Range Status   03/29/2022 >60.0 >60 mL/min/1.73 m^2 Final     Comment:     Calculation used to obtain the estimated glomerular filtration  rate (eGFR) is the CKD-EPI equation.          A/P:    L breast invasive ductal carcinoma  -pT2N0, s/p left mastectomy  -ER 90%, UT 20%, HER2 -, Ki 40%; oncotype 21 - no need for chemo  -started letrozole 6/12/23  -labs reviewed  -screening mammogram 3/20/24, repeat in 1 year, ordered today  -RTC in 6 months with labs    Osteopenia  -bone density scan 7/31/23 showed improvement in osteopenia  -cont vit D and Ca  -on Fosamax, continue for now    COPD  DM2      Aurash Khoobehi, MD  Hematology and Oncology

## 2025-01-15 ENCOUNTER — OFFICE VISIT (OUTPATIENT)
Dept: PSYCHIATRY | Facility: CLINIC | Age: 75
End: 2025-01-15
Payer: MEDICARE

## 2025-01-15 VITALS
SYSTOLIC BLOOD PRESSURE: 173 MMHG | BODY MASS INDEX: 31.88 KG/M2 | HEIGHT: 61 IN | HEART RATE: 75 BPM | DIASTOLIC BLOOD PRESSURE: 94 MMHG | WEIGHT: 168.88 LBS

## 2025-01-15 DIAGNOSIS — F43.10 PTSD (POST-TRAUMATIC STRESS DISORDER): Primary | ICD-10-CM

## 2025-01-15 DIAGNOSIS — F34.1 PERSISTENT DEPRESSIVE DISORDER: ICD-10-CM

## 2025-01-15 DIAGNOSIS — F41.9 ANXIETY DISORDER, UNSPECIFIED TYPE: ICD-10-CM

## 2025-01-15 PROCEDURE — G2211 COMPLEX E/M VISIT ADD ON: HCPCS | Mod: S$GLB,,, | Performed by: PHYSICIAN ASSISTANT

## 2025-01-15 PROCEDURE — 3077F SYST BP >= 140 MM HG: CPT | Mod: CPTII,S$GLB,, | Performed by: PHYSICIAN ASSISTANT

## 2025-01-15 PROCEDURE — 3288F FALL RISK ASSESSMENT DOCD: CPT | Mod: CPTII,S$GLB,, | Performed by: PHYSICIAN ASSISTANT

## 2025-01-15 PROCEDURE — 1159F MED LIST DOCD IN RCRD: CPT | Mod: CPTII,S$GLB,, | Performed by: PHYSICIAN ASSISTANT

## 2025-01-15 PROCEDURE — 1160F RVW MEDS BY RX/DR IN RCRD: CPT | Mod: CPTII,S$GLB,, | Performed by: PHYSICIAN ASSISTANT

## 2025-01-15 PROCEDURE — 1157F ADVNC CARE PLAN IN RCRD: CPT | Mod: CPTII,S$GLB,, | Performed by: PHYSICIAN ASSISTANT

## 2025-01-15 PROCEDURE — 99214 OFFICE O/P EST MOD 30 MIN: CPT | Mod: S$GLB,,, | Performed by: PHYSICIAN ASSISTANT

## 2025-01-15 PROCEDURE — 3080F DIAST BP >= 90 MM HG: CPT | Mod: CPTII,S$GLB,, | Performed by: PHYSICIAN ASSISTANT

## 2025-01-15 PROCEDURE — 1126F AMNT PAIN NOTED NONE PRSNT: CPT | Mod: CPTII,S$GLB,, | Performed by: PHYSICIAN ASSISTANT

## 2025-01-15 PROCEDURE — 99999 PR PBB SHADOW E&M-EST. PATIENT-LVL IV: CPT | Mod: PBBFAC,,, | Performed by: PHYSICIAN ASSISTANT

## 2025-01-15 PROCEDURE — 1101F PT FALLS ASSESS-DOCD LE1/YR: CPT | Mod: CPTII,S$GLB,, | Performed by: PHYSICIAN ASSISTANT

## 2025-01-15 RX ORDER — SERTRALINE HYDROCHLORIDE 50 MG/1
50 TABLET, FILM COATED ORAL DAILY
Qty: 90 TABLET | Refills: 0 | Status: SHIPPED | OUTPATIENT
Start: 2025-01-15 | End: 2025-04-15
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
## 2025-01-16 ENCOUNTER — CLINICAL SUPPORT (OUTPATIENT)
Dept: REHABILITATION | Facility: HOSPITAL | Age: 75
End: 2025-01-16
Payer: MEDICARE

## 2025-01-16 DIAGNOSIS — R26.9 GAIT ABNORMALITY: ICD-10-CM

## 2025-01-16 DIAGNOSIS — R29.898 DECREASED STRENGTH OF LOWER EXTREMITY: ICD-10-CM

## 2025-01-16 DIAGNOSIS — Z96.641 S/P HIP REPLACEMENT, RIGHT: Primary | ICD-10-CM

## 2025-01-16 PROCEDURE — 97112 NEUROMUSCULAR REEDUCATION: CPT | Mod: KX,PN,CQ

## 2025-01-16 PROCEDURE — 97530 THERAPEUTIC ACTIVITIES: CPT | Mod: KX,PN,CQ

## 2025-01-16 NOTE — PROGRESS NOTES
[facility]   Physical Therapy Treatment Note     Name: Maggy Tapia  Clinic Number: [id number]    Therapy Diagnosis:   Encounter Diagnoses   Name Primary?    S/P hip replacement, right Yes    Decreased strength of lower extremity     Gait abnormality      Physician: iSmone Bonilla MD    Visit Date: 1/16/2025    Physician Orders: PT Eval and Treat & SMITH precautions  Medical Diagnosis from Referral: S/P hip replacement, right   Evaluation Date: 11/20/2024  Authorization Period Expiration: 11/19/2025  Plan of Care Expiration: 2/28/2025  Progress Note Due: 12/20/2024  Visit # / Visits authorized: 14 / 20 total (10 auth in 2024, 10 additional auth in 2025)  FOTO: 2/3  FOTO 2nd: 12/10/2024     Limitation/Restriction for FOTO  Survey     Therapist reviewed FOTO scores for Maggy Tapia on 11/20/2024.   FOTO documents entered into EPIC - see Media section.     Limitation Score: 14%         Precautions:  Standard and SMITH precautions, Cancer, Fall, SEIZURE    Time In: 1054  Time Out: 1147  Total Billable Time: 53 minutes      SUBJECTIVE     Pt reports: no complaints  She was compliant with home exercise program.  Response to previous treatment: good  Functional change: ongoing    Pain: 0/10  Location: right hip      OBJECTIVE     Objective Measures updated at progress report unless specified.   Date of surgery: 11/7/2024  Treatment     Ivana received the treatments listed below:      Neuromuscular re-education activities to improve: Balance, Kinesthetic, Proprioception, and Posture for 30 minutes:     Nustep x 15 minutes    Seated marching  2# 3 x 10  Seated HR/TR x 30  Seated rows Red Theraband x 30    Bridging x 30  Hip abduction 3 x 10 GREEN theraband   Hip adductor squeeze 3 x 10  Straight Leg Raise 2# 3 x 10  Short Arc Quads 2# 3 x 10   Long Arc Quad 2# 3 x 10    Therapeutic activities to improve functional performance for 23  minutes, including:    Standing hip extension 3 x 10   B  Standing hip abduction 3 x 10 B    Standing trunk extension 2 x 10    Sit<>stand from edge of raised plinth 2 x 10    Patient Education and Home Exercises     Home Exercises Provided and Patient Education Provided     Education provided:   - cont HEP    Written Home Exercises Provided: Patient instructed to cont prior HEP. Exercises were reviewed and Ivana was able to demonstrate them prior to the end of the session.  Ivana demonstrated good  understanding of the education provided. See EMR under Patient Instructions for exercises provided during therapy sessions    ASSESSMENT     Ivana continues to ambulate with a flexed posture, but is able to correct w/o Verbal cues.  Difficulty getting trunk to neutral with standing trunk extensions.      Ivana Is progressing well towards her goals.   Pt prognosis is Good.     Pt will continue to benefit from skilled outpatient physical therapy to address the deficits listed in the problem list box on initial evaluation, provide pt/family education and to maximize pt's level of independence in the home and community environment.     Pt's spiritual, cultural and educational needs considered and pt agreeable to plan of care and goals.     Anticipated barriers to physical therapy: none    Goals:  SHORT TERM GOALS:  6 weeks  Progress  1/16/2025 Date met   The patient will begin a written HEP [x] Met  [] Not Met  [] Progressing  11/26/2024   Decrease soft tissue tenderness to mild [] Met  [] Not Met  [x] Progressing     Increase hip flexion to 4/5 [] Met  [] Not Met  [x] Progressing     The patient will be able to ascend/descend 10 step/stairs without onset of symptoms.    [] Met  [] Not Met  [x] Progressing        LONG TERM GOALS: 12 weeks  Progress  1/16/2025 Date met   The patient will be independent with a HEP for maintenance [] Met  [] Not Met  [x] Progressing     Increase hip ROM to WFL [] Met  [] Not Met  [x] Progressing     Increase left hip strength to 4+/5 [] Met  []  Not Met  [x] Progressing     Decrease FOTO score to 45 % [] Met  [] Not Met  [x] Progressing     The patient will be able to ascend/descend 20 step/stairs without onset of symptoms.    [] Met  [] Not Met  [x] Progressing         PLAN     Cont per Plan of Care, posture, strength    Yudelka Barnes, PTA

## 2025-01-27 ENCOUNTER — CLINICAL SUPPORT (OUTPATIENT)
Dept: REHABILITATION | Facility: HOSPITAL | Age: 75
End: 2025-01-27
Payer: MEDICARE

## 2025-01-27 DIAGNOSIS — R26.89 BALANCE PROBLEM: Primary | ICD-10-CM

## 2025-01-27 DIAGNOSIS — R53.81 PHYSICAL DECONDITIONING: ICD-10-CM

## 2025-01-27 DIAGNOSIS — R26.9 GAIT ABNORMALITY: ICD-10-CM

## 2025-01-27 PROCEDURE — 97112 NEUROMUSCULAR REEDUCATION: CPT | Mod: PN

## 2025-01-27 PROCEDURE — 97530 THERAPEUTIC ACTIVITIES: CPT | Mod: PN

## 2025-01-27 NOTE — PROGRESS NOTES
[facility]   Physical Therapy Treatment Note     Name: Maggy Tapia  Clinic Number: [id number]    Therapy Diagnosis:   Encounter Diagnoses   Name Primary?    Balance problem Yes    Gait abnormality     Physical deconditioning      Physician: Simone Bonilla MD    Visit Date: 1/27/2025    Physician Orders: PT Eval and Treat & SMITH precautions  Medical Diagnosis from Referral: S/P hip replacement, right   Evaluation Date: 11/20/2024  Authorization Period Expiration: 11/19/2025  Plan of Care Expiration: 2/28/2025  Progress Note Due: 12/20/2024  Visit # / Visits authorized: 14 / 20 total (10 auth in 2024, 10 additional auth in 2025)  FOTO: 2/3  FOTO 2nd: 12/10/2024     Limitation/Restriction for FOTO  Survey     Therapist reviewed FOTO scores for Maggy Tapia on 11/20/2024.   FOTO documents entered into [employer] - see Media section.     Limitation Score: 14%         Precautions:  Standard and SMITH precautions, Cancer, Fall, SEIZURE    Time In: 1045  (Pnt arrived early)  Time Out: 1140  Total Billable Time: 55 minutes      SUBJECTIVE     Pt reports: no complaints, doing well.  She was compliant with home exercise program.  Response to previous treatment: good  Functional change: ongoing    Pain: 0/10  Location: right hip      OBJECTIVE     Objective Measures updated at progress report unless specified.   Date of surgery: 11/7/2024  Treatment     Ivana received the treatments listed below:      Neuromuscular re-education activities to improve: Balance, Kinesthetic, Proprioception, and Posture for 30 minutes:     Nustep x 15 minutes    Seated marching  2# 3 x 10  Seated HR/TR x 30  Seated rows Red Theraband x 30    Bridging x 30  Hip abduction 3 x 10 GREEN theraband   Hip adductor squeeze 3 x 10  Straight Leg Raise 2# 3 x 10  Short Arc Quads 2# 3 x 10   Long Arc Quad 2# 3 x 10    Therapeutic activities to improve functional performance for 23  minutes, including:    Standing hip extension 3  x 10  B  Standing hip abduction 3 x 10 B    Standing trunk extension 2 x 10    Sit<>stand from edge of raised plinth 2 x 10    Patient Education and Home Exercises     Home Exercises Provided and Patient Education Provided     Education provided:   - cont HEP    Written Home Exercises Provided: Patient instructed to cont prior HEP. Exercises were reviewed and Ivana was able to demonstrate them prior to the end of the session.  Ivana demonstrated good  understanding of the education provided. See EMR under Patient Instructions for exercises provided during therapy sessions    ASSESSMENT     Ivana continues to ambulate with a flexed posture, but is able to correct w/o Verbal cues.  Difficulty getting trunk to neutral with standing trunk extensions.  Will continue to progress with therapy.    Ivana Is progressing well towards her goals.   Pt prognosis is Good.     Pt will continue to benefit from skilled outpatient physical therapy to address the deficits listed in the problem list box on initial evaluation, provide pt/family education and to maximize pt's level of independence in the home and community environment.     Pt's spiritual, cultural and educational needs considered and pt agreeable to plan of care and goals.     Anticipated barriers to physical therapy: none    Goals:  SHORT TERM GOALS:  6 weeks  Progress  1/27/2025 Date met   The patient will begin a written HEP [x] Met  [] Not Met  [] Progressing  11/26/2024   Decrease soft tissue tenderness to mild [] Met  [] Not Met  [x] Progressing     Increase hip flexion to 4/5 [] Met  [] Not Met  [x] Progressing     The patient will be able to ascend/descend 10 step/stairs without onset of symptoms.    [] Met  [] Not Met  [x] Progressing        LONG TERM GOALS: 12 weeks  Progress  1/27/2025 Date met   The patient will be independent with a HEP for maintenance [] Met  [] Not Met  [x] Progressing     Increase hip ROM to WFL [] Met  [] Not Met  [x] Progressing      Increase left hip strength to 4+/5 [] Met  [] Not Met  [x] Progressing     Decrease FOTO score to 45 % [] Met  [] Not Met  [x] Progressing     The patient will be able to ascend/descend 20 step/stairs without onset of symptoms.    [] Met  [] Not Met  [x] Progressing         PLAN     Cont per Plan of Care, posture, strength    Denver Crook, PT

## 2025-01-30 ENCOUNTER — CLINICAL SUPPORT (OUTPATIENT)
Dept: REHABILITATION | Facility: HOSPITAL | Age: 75
End: 2025-01-30
Payer: MEDICARE

## 2025-01-30 DIAGNOSIS — R29.898 DECREASED STRENGTH OF LOWER EXTREMITY: ICD-10-CM

## 2025-01-30 DIAGNOSIS — Z96.641 S/P HIP REPLACEMENT, RIGHT: Primary | ICD-10-CM

## 2025-01-30 DIAGNOSIS — R26.9 GAIT ABNORMALITY: ICD-10-CM

## 2025-01-30 PROCEDURE — 97112 NEUROMUSCULAR REEDUCATION: CPT | Mod: PN,CQ

## 2025-01-30 PROCEDURE — 97530 THERAPEUTIC ACTIVITIES: CPT | Mod: PN,CQ

## 2025-01-30 NOTE — PROGRESS NOTES
[facility]   Physical Therapy Treatment Note     Name: Maggy Tapia  Clinic Number: [id number]    Therapy Diagnosis:   Encounter Diagnoses   Name Primary?    S/P hip replacement, right Yes    Decreased strength of lower extremity     Gait abnormality      Physician: Simone Bonilla MD    Visit Date: 1/30/2025    Physician Orders: PT Eval and Treat & SMITH precautions  Medical Diagnosis from Referral: S/P hip replacement, right   Evaluation Date: 11/20/2024  Authorization Period Expiration: 11/19/2025  Plan of Care Expiration: 2/28/2025  Progress Note Due: 12/20/2024  Visit # / Visits authorized: 15 / 20 total (10 auth in 2024, 10 additional auth in 2025)  FOTO: 2/3  FOTO 2nd: 12/10/2024     Limitation/Restriction for FOTO  Survey     Therapist reviewed FOTO scores for Maggy Tapia on 11/20/2024.   FOTO documents entered into EPIC - see Media section.     Limitation Score: 14%         Precautions:  Standard and SMITH precautions, Cancer, Fall, SEIZURE    Time In: 1100  Time Out: 1155  Total Billable Time: 55 minutes      SUBJECTIVE     Pt reports: no complaints, doing well. Today is her last day.   She was compliant with home exercise program.  Response to previous treatment: good  Functional change: ongoing    Pain: 0/10  Location: right hip      OBJECTIVE     Objective Measures updated at progress report unless specified.     Date of surgery: 11/7/2024    Treatment     Ivana received the treatments listed below:      Neuromuscular re-education activities to improve: Balance, Kinesthetic, Proprioception, and Posture for 30 minutes:     Nustep x 15 minutes    Seated marching  2# 3 x 10  Seated HR/TR x 30  Seated rows Red Theraband x 30    Bridging x 30  Hip abduction 3 x 10 GREEN theraband   Hip adductor squeeze 3 x 10  Straight Leg Raise 2# 3 x 10  Short Arc Quads 2# 3 x 10   Long Arc Quad 2# 3 x 10    Therapeutic activities to improve functional performance for 23  minutes,  including:    Standing hip extension 3 x 10  B  Standing hip abduction 3 x 10 B    Standing trunk extension 2 x 10    Sit<>stand from edge of raised plinth 2 x 10    Patient Education and Home Exercises     Home Exercises Provided and Patient Education Provided     Education provided:   - cont HEP    Written Home Exercises Provided: Patient instructed to cont prior HEP. Exercises were reviewed and Ivana was able to demonstrate them prior to the end of the session.  Ivana demonstrated good  understanding of the education provided. See EMR under Patient Instructions for exercises provided during therapy sessions    ASSESSMENT     Ivana continues to ambulate with a flexed posture, but is able to correct w/o Verbal cues. Patient doing well overall and has hit a plateau with physical therapy. Encouraged patient to remain compliant and active at home to reduce weakness and future falls.     Ivana Is progressing well towards her goals.   Pt prognosis is Good.     Pt will continue to benefit from skilled outpatient physical therapy to address the deficits listed in the problem list box on initial evaluation, provide pt/family education and to maximize pt's level of independence in the home and community environment.     Pt's spiritual, cultural and educational needs considered and pt agreeable to plan of care and goals.     Anticipated barriers to physical therapy: none    Goals:  SHORT TERM GOALS:  6 weeks  Progress  1/30/2025 Date met   The patient will begin a written HEP [x] Met  [] Not Met  [] Progressing  11/26/2024   Decrease soft tissue tenderness to mild [] Met  [] Not Met  [x] Progressing     Increase hip flexion to 4/5 [] Met  [] Not Met  [x] Progressing     The patient will be able to ascend/descend 10 step/stairs without onset of symptoms.    [] Met  [] Not Met  [x] Progressing        LONG TERM GOALS: 12 weeks  Progress  1/30/2025 Date met   The patient will be independent with a HEP for maintenance []  Met  [] Not Met  [x] Progressing     Increase hip ROM to WFL [] Met  [] Not Met  [x] Progressing     Increase left hip strength to 4+/5 [] Met  [] Not Met  [x] Progressing     Decrease FOTO score to 45 % [] Met  [] Not Met  [x] Progressing     The patient will be able to ascend/descend 20 step/stairs without onset of symptoms.    [] Met  [] Not Met  [x] Progressing         PLAN     Cont per Plan of Care, posture, strength    Leonie Ackerman, PTA

## 2025-02-08 NOTE — PROGRESS NOTES
Subjective:    Patient ID:  Maggy Tapia is a 75 y.o. female who presents for follow-up of   Chief Complaint   Patient presents with    Hypertension    Hyperlipidemia       HPI:    Routine followup.  HAD A FALL RECENTLY in parking lot AND A MINISTROKE ON [place]. Did not go to hospital. Had all signs lasting one day, talking slow, crying, didn't remember anything, not in pan confused.   Went to sleep and when woke up was alright. WALKING WITH WALKER.  Just finished therapy for L HIP SURGERY.  Did not have or need hysterectomy.  Breast surgery last year or 2.        9/23/24  He comes in today requesting clearance for right hip surgery.  She has been walking with a walker for ears and has a previous history of right hip in currently she needs total replacement for she becomes completely debilitated. She has a history of diabetes, hypertension dyslipidemia and strokes with pseudoseizures in the past.  He has a long history of secondhand smoke exposure and has shortness of breath.  He had a negative stress test in 2021. she had a D&C 2023 without difficulty     EKG September 13 normal sinus rhythm normal EKG     2/19/24     She is here today for routine follow up visit.  She did have a D&C in his had a fibromatosis tumor removed.  She has not had anymore bleeding.  And she is possibly not going to need of total hysterectomy.  Blood pressures been well controlled.  She does not have any chest pain or shortness of breath.  Her blood sugar is running 100-120.  Her last blood work showed her calcium was 11  Her cholesterol is controlled     He is looking forward to spring time when she can get out side more she does walk around the block in spite of being on walker frequently 3 or 4 times week.       Latest Reference Range & Units Most Recent   Cholesterol Total 120 - 199 mg/dL 177  2/16/24 08:55   HDL 40 - 75 mg/dL 44  2/16/24 08:55   HDL/Cholesterol Ratio 20.0 - 50.0 % 24.9  2/16/24 08:55   Non-HDL Cholesterol mg/dL  133  2/16/24 08:55   Total Cholesterol/HDL Ratio 2.0 - 5.0  4.0  2/16/24 08:55   Triglycerides 30 - 150 mg/dL 184 (H)  2/16/24 08:55   LDL Cholesterol 63.0 - 159.0 mg/dL 96.2  2/16/24 08:55   (H): Data is abnormally high  8/23/23  He is a 73-year-old female previously saw Dr. Chin.  She has a history of diabetes, hypertension dyslipidemia and strokes with pseudoseizures in the past.  He has a long history of secondhand smoke exposure and has shortness of breath.  He had a negative stress test in 2021.  Now gets short of breath which is helped by using either hand-held inhaler or aerosol.  Denies any chest pain or heart procedures.  He is had some vaginal bleeding and is referred here to have cardiac clearance to have a D&C and possibly at a later date may need hysterectomy.  He does have a history of a left-sided mastectomy with some incisional pain and has been evaluated by surgery Dr. Lundberg.  Sugar this morning was 97.  She does have numbness in her feet secondary to neuropathy and walks with a walker             2/15/21  History of Present Illness:   Patient is a 71-year-old lady with history of complex medical problems and diabetes since age 14, and essential hypertension for many years also with dyslipidemia and history of stroke and seizures and pseudoseizures.  She has reportedly having more shortness of breath lately worse at rest and effort and now referred here for cardiac evaluation.  Patient denies having any chest discomfort no arm neck or jaw pain she did have some nausea for a while and has improved.  But there is blood in the stools periodically about once a month apparently.  Are no fevers or chills no COVID-19 symptoms  Last seizure was about 2 months ago.  No significant cough no congestion moderate obesity with the BMI about 35 noted        7/8/21  Normal myocardial perfusion scan. There is no evidence of myocardial ischemia or infarction.    The gated perfusion images showed an ejection  fraction of 88% post stress. Normal ejection fraction is greater than 53%.    There is normal wall motion post stress.    LV cavity size is  and normal at stress.    The EKG portion of this study is negative for ischemia.    The patient reported no chest pain during the stress test.    There were no arrhythmias during str    Impression:     1. MATCHED DISTRIBUTION OF TRACER ACTIVITY WITHIN THE LEFT VENTRICULAR MYOCARDIUM.  THERE ARE NO FINDINGS OF PHARMACOLOGICALLY INDUCED REVERSIBILITY.  2. MATCHED DECREASE IN TRACER ACCUMULATION WITHIN THE INFEROSEPTAL WALL.  3. ESTIMATED EJECTION FRACTION OF 89%.           Review of patient's allergies indicates:   Allergen Reactions    Penicillins Anaphylaxis     NOT ALLERGIC TO ANCEF    Sulfa (sulfonamide antibiotics) Anaphylaxis    Trintellix [vortioxetine] Nausea And Vomiting and Other (See Comments)     Patient has seizures and vomits       Past Medical History:   Diagnosis Date    Anxiety     Arthritis     Asthma     Breast cancer     Left    Depression     Diabetes mellitus, type 2     GERD (gastroesophageal reflux disease)     Hyperlipidemia     Hypertension     Hypothyroidism, unspecified     Overactive bladder     Seizures     Pseudo-seizures    Sleep apnea     Stroke 2022    pt was in the hospital for a stroke, claims she was seeing people when the stroke happened     Past Surgical History:   Procedure Laterality Date    APPENDECTOMY      BREAST BIOPSY Left     BREAST LUMPECTOMY Left     2016    BREAST SURGERY      CATARACT EXTRACTION Bilateral     OU done//     SECTION      CHOLECYSTECTOMY      COLONOSCOPY N/A 2020    Procedure: COLONOSCOPY;  Surgeon: Mj Fernandez MD;  Location: [facility];  Service: Endoscopy;  Laterality: N/A;    CYST REMOVAL Left 2021    DILATION AND CURETTAGE OF UTERUS      EYE SURGERY      HIP REPLACEMENT ARTHROPLASTY Right 2024    Procedure: ARTHROPLASTY, HIP REPLACEMENT;  Surgeon: Simone Bonilla MD;   Location: [facility] OR;  Service: Orthopedics;  Laterality: Right;  kev    HYSTEROSCOPY WITH DILATION AND CURETTAGE OF UTERUS N/A 8/28/2023    Procedure: HYSTEROSCOPY, WITH DILATION AND CURETTAGE OF UTERUS AND OTHER INDICATED PROCEDURES Needs Cardiac clearance;  Surgeon: Gamaliel Moran MD;  Location: [facility];  Service: OB/GYN;  Laterality: N/A;  Cardiac clearance needed per Dr Anderson    LUMPECTOMY, BREAST Left 4/26/2023    Procedure: LUMPECTOMY, BREAST;  Surgeon: Toño Paredes MD;  Location: [facility] OR;  Service: General;  Laterality: Left;    PERCUTANEOUS PINNING OF HIP Right 11/11/2022    Procedure: PINNING, HIP, PERCUTANEOUS;  Surgeon: Ministerio Joiner II, MD;  Location: [facility] OR;  Service: Orthopedics;  Laterality: Right;    SENTINEL LYMPH NODE BIOPSY Left 4/26/2023    Procedure: BIOPSY, LYMPH NODE, SENTINEL;  Surgeon: Toño Paredes MD;  Location: [facility] OR;  Service: General;  Laterality: Left;    SIMPLE MASTECTOMY Left 5/19/2023    Procedure: MASTECTOMY, SIMPLE;  Surgeon: Toño Paredes MD;  Location: [facility] OR;  Service: General;  Laterality: Left;     Social History     Tobacco Use    Smoking status: Never     Passive exposure: Past    Smokeless tobacco: Never   Substance Use Topics    Alcohol use: Yes     Comment: seldom    Drug use: No     Family History   Problem Relation Name Age of Onset    Diabetes Mother      Hypertension Mother      Breast cancer Mother      Cataracts Mother      Melanoma Mother      Heart failure Father      Melanoma Father      Cataracts Brother      Melanoma Brother      Glaucoma Neg Hx      Retinal detachment Neg Hx      Macular degeneration Neg Hx          Review of Systems:   Constitution: Negative for diaphoresis and fever.   HEENT: Negative for nosebleeds.    Cardiovascular: Negative for chest pain       No dyspnea on exertion       No leg swelling        No palpitations  Respiratory: Negative for shortness of breath and wheezing.    Hematologic/Lymphatic: Negative for  bleeding problem. Does not bruise/bleed easily.   Skin: Negative for color change and rash.   Musculoskeletal: Negative for falls and myalgias.   Gastrointestinal: Negative for hematemesis and hematochezia.   Genitourinary: Negative for hematuria.   Neurological: Negative for dizziness and light-headedness.   Psychiatric/Behavioral: Negative for altered mental status and memory loss.          Objective:        Vitals:    02/10/25 1441   BP: (!) 147/85   Pulse: 74       Lab Results   Component Value Date    WBC 10.72 01/10/2025    HGB 13.9 01/10/2025    HCT 45.4 01/10/2025     01/10/2025    CHOL 138 07/25/2024    TRIG 101 07/25/2024    HDL 47 07/25/2024    ALT 10 01/10/2025    AST 16 01/10/2025     01/10/2025    K 5.1 01/10/2025     01/10/2025    CREATININE 0.9 01/10/2025    BUN 29 (H) 01/10/2025    CO2 20 (L) 01/10/2025    TSH 3.693 08/12/2024    INR 0.9 02/23/2022    HGBA1C 6.0 (H) 08/12/2024        ECHOCARDIOGRAM RESULTS  Results for orders placed in visit on 10/01/24    Echo Saline Bubble? No    Interpretation Summary    Left Ventricle: The left ventricle is normal in size. Normal wall thickness. There is normal systolic function with a visually estimated ejection fraction of 65 - 70%. Grade I diastolic dysfunction. E/A ratio is 0.65.    Right Ventricle: Normal right ventricular cavity size. Wall thickness is normal. Systolic function is normal.    Aortic Valve: The aortic valve is a trileaflet valve. There is mild aortic valve sclerosis. There is mild aortic regurgitation.    IVC/SVC: Normal venous pressure at 3 mmHg.        CURRENT/PREVIOUS VISIT EKG  Results for orders placed or performed in visit on 09/13/24   EKG 12-lead    Collection Time: 09/13/24  2:22 PM   Result Value Ref Range    QRS Duration 82 ms    [facility] QTC Calculation 437 ms    Narrative    Test Reason : Z01.818,    Vent. Rate : 069 BPM     Atrial Rate : 069 BPM     P-R Int : 148 ms          QRS Dur : 082 ms      QT Int : 408 ms        P-R-T Axes : 037 -24 030 degrees     QTc Int : 437 ms    Normal sinus rhythm  Normal ECG  When compared with ECG of 08-JUN-2024 17:56,  No significant change was found  Confirmed by Gm Mack MD ([id number]) on 9/13/2024 3:41:46 PM    Referred By: elsa spring           Confirmed By:Gm Mack MD     No valid procedures specified.   Results for orders placed in visit on 10/01/24    Nuclear Stress Test    Interpretation Summary    The ECG portion of the study is negative for ischemia.    The patient reported no chest pain during the stress test.    There were no arrhythmias during stress.    The nuclear portion of this study will be reported separately.      Physical Exam:  CONSTITUTIONAL: No fever, no chills  HEENT: Normocephalic, atraumatic,pupils reactive to light                 NECK:  No JVD no carotid bruit  CVS: S1S2+, RRR, no murmurs,   LUNGS: Clear  ABDOMEN: Soft, NT, BS+  EXTREMITIES: No cyanosis, edema  : No donohue catheter  NEURO: AAO X 3  PSY: Normal affect      Medication List with Changes/Refills   Current Medications    ALBUTEROL (PROVENTIL/VENTOLIN HFA) 90 MCG/ACTUATION INHALER    Inhale 1-2 puffs into the lungs every 4 (four) hours as needed for Wheezing or Shortness of Breath. INHALE 1 TO 2 PUFFS BY MOUTH INTO THE LUNGS EVERY 4 HOURS AS NEEDED FOR SHORTNESS OF BREATH( COUGHING)  Strength: 90 mcg/actuation    ALENDRONATE (FOSAMAX) 70 MG TABLET    Take one tablet every 7 days.    ASPIRIN (ECOTRIN) 81 MG EC TABLET    Take 1 tablet (81 mg total) by mouth 2 (two) times a day.    BLOOD-GLUCOSE METER KIT    Use as instructed. Insurance preferred.    CALCIUM CARBONATE/VITAMIN D3 (CALCIUM 500 + D ORAL)    Take 1 tablet by mouth once daily. 10 mg daily    CYANOCOBALAMIN (VITAMIN B-12) 1000 MCG TABLET    Take 1 tablet (1,000 mcg total) by mouth once daily.    ECONAZOLE NITRATE 1 % CREAM    Apply topically once daily.    FLUTICASONE FUROATE-VILANTEROL (BREO ELLIPTA) 100-25 MCG/DOSE DISKUS INHALER    Inhale 1 puff  into the lungs once daily. Controller    IBUPROFEN (ADVIL,MOTRIN) 600 MG TABLET    Take 1 tablet (600 mg total) by mouth 3 (three) times daily.    KETOCONAZOLE (NIZORAL) 2 % CREAM    AAA pannus fold at least daily after the shower    LETROZOLE (FEMARA) 2.5 MG TAB    Take 1 tablet (2.5 mg total) by mouth once daily.    LEVOTHYROXINE (SYNTHROID) 125 MCG TABLET    Take 1 tablet (125 mcg total) by mouth before breakfast.    LOSARTAN (COZAAR) 50 MG TABLET    Take 0.5 tablets (25 mg total) by mouth once daily.    METFORMIN (GLUCOPHAGE-XR) 500 MG ER 24HR TABLET    Take 2 tablets (1,000 mg total) by mouth daily with breakfast.    NYSTATIN (MYCOSTATIN) CREAM    Apply topically 2 (two) times daily. Apply to rash on face    ONDANSETRON (ZOFRAN-ODT) 4 MG TBDL    Take 1 tablet (4 mg total) by mouth every 8 (eight) hours as needed.    OXYCODONE-ACETAMINOPHEN (PERCOCET) 5-325 MG PER TABLET    Take 1 tablet by mouth every 6 (six) hours as needed for Pain.    POLYETHYLENE GLYCOL (GLYCOLAX) 17 GRAM PWPK    Take 17 g by mouth once daily.    POTASSIUM CHLORIDE SA (K-DUR,KLOR-CON) 20 MEQ TABLET    Take 20 mEq by mouth once daily.    ROPINIROLE (REQUIP) 0.5 MG TABLET    Take 1 mg by mouth every evening.    SERTRALINE (ZOLOFT) 50 MG TABLET    Take 1 tablet (50 mg total) by mouth once daily. For depression/anxiety    SIMVASTATIN (ZOCOR) 40 MG TABLET    TAKE 1 TABLET(40 MG) BY MOUTH EVERY DAY    SOLIFENACIN (VESICARE) 10 MG TABLET    Take 1 tablet (10 mg total) by mouth once daily.             Assessment:       1. Syncope and collapse    2. Type 2 diabetes mellitus with diabetic peripheral angiopathy without gangrene, without long-term current use of insulin    3. History of ischemic left MCA stroke    4. Shortness of breath    5. Left homonymous hemianopsia    6. History of subdural hematoma    7. Diplopia    8. Diabetic polyneuropathy associated with type 2 diabetes mellitus         Plan:     Problem List Items Addressed This Visit           Neuro    History of ischemic left MCA stroke    History of subdural hematoma    Diabetic polyneuropathy       Ophtho    Diplopia    Left homonymous hemianopsia       Pulmonary    Shortness of breath       Endocrine    Type 2 diabetes mellitus with diabetic peripheral angiopathy without gangrene, without long-term current use of insulin       Other    Syncope and collapse - Primary     Syncope and collapse.  The patient has right side is heavy heart races and needs to continue to walk with a walker to prevent falling    Hypertension recommend home blood pressure monitoring    History of stroke with left homonymous hemianopsia and right-sided hemiparesis continue same    Polyneuropathy fall precautions continue to walk with walker she does with her brother    No follow-ups on file.    The patients questions were answered, they verbalized understanding, and agreed with the treatment plan.     ASHLEE BLANCA MD  [facility]

## 2025-02-10 ENCOUNTER — OFFICE VISIT (OUTPATIENT)
Dept: CARDIOLOGY | Facility: CLINIC | Age: 75
End: 2025-02-10
Payer: MEDICARE

## 2025-02-10 VITALS
BODY MASS INDEX: 32.12 KG/M2 | SYSTOLIC BLOOD PRESSURE: 147 MMHG | WEIGHT: 170 LBS | DIASTOLIC BLOOD PRESSURE: 85 MMHG | HEART RATE: 74 BPM | OXYGEN SATURATION: 98 %

## 2025-02-10 DIAGNOSIS — Z86.79 HISTORY OF SUBDURAL HEMATOMA: ICD-10-CM

## 2025-02-10 DIAGNOSIS — R55 SYNCOPE AND COLLAPSE: Primary | ICD-10-CM

## 2025-02-10 DIAGNOSIS — E11.51 TYPE 2 DIABETES MELLITUS WITH DIABETIC PERIPHERAL ANGIOPATHY WITHOUT GANGRENE, WITHOUT LONG-TERM CURRENT USE OF INSULIN: ICD-10-CM

## 2025-02-10 DIAGNOSIS — H53.462 LEFT HOMONYMOUS HEMIANOPSIA: ICD-10-CM

## 2025-02-10 DIAGNOSIS — Z86.73 HISTORY OF ISCHEMIC LEFT MCA STROKE: ICD-10-CM

## 2025-02-10 DIAGNOSIS — E11.42 DIABETIC POLYNEUROPATHY ASSOCIATED WITH TYPE 2 DIABETES MELLITUS: ICD-10-CM

## 2025-02-10 DIAGNOSIS — H53.2 DIPLOPIA: ICD-10-CM

## 2025-02-10 DIAGNOSIS — R06.02 SHORTNESS OF BREATH: ICD-10-CM

## 2025-02-10 PROCEDURE — 99999 PR PBB SHADOW E&M-EST. PATIENT-LVL III: CPT | Mod: PBBFAC,,, | Performed by: GENERAL PRACTICE

## 2025-02-10 PROCEDURE — 1157F ADVNC CARE PLAN IN RCRD: CPT | Mod: CPTII,S$GLB,, | Performed by: GENERAL PRACTICE

## 2025-02-10 PROCEDURE — 1159F MED LIST DOCD IN RCRD: CPT | Mod: CPTII,S$GLB,, | Performed by: GENERAL PRACTICE

## 2025-02-10 PROCEDURE — 1101F PT FALLS ASSESS-DOCD LE1/YR: CPT | Mod: CPTII,S$GLB,, | Performed by: GENERAL PRACTICE

## 2025-02-10 PROCEDURE — 3288F FALL RISK ASSESSMENT DOCD: CPT | Mod: CPTII,S$GLB,, | Performed by: GENERAL PRACTICE

## 2025-02-10 PROCEDURE — 99214 OFFICE O/P EST MOD 30 MIN: CPT | Mod: S$GLB,,, | Performed by: GENERAL PRACTICE

## 2025-02-10 PROCEDURE — 1160F RVW MEDS BY RX/DR IN RCRD: CPT | Mod: CPTII,S$GLB,, | Performed by: GENERAL PRACTICE

## 2025-02-10 PROCEDURE — 3079F DIAST BP 80-89 MM HG: CPT | Mod: CPTII,S$GLB,, | Performed by: GENERAL PRACTICE

## 2025-02-10 PROCEDURE — 3077F SYST BP >= 140 MM HG: CPT | Mod: CPTII,S$GLB,, | Performed by: GENERAL PRACTICE

## 2025-02-19 ENCOUNTER — TELEPHONE (OUTPATIENT)
Dept: OPTOMETRY | Facility: CLINIC | Age: 75
End: 2025-02-19
Payer: MEDICARE

## 2025-02-19 NOTE — TELEPHONE ENCOUNTER
----- Message from Jo sent at 2/19/2025  3:22 PM CST -----  Regarding: appt request same time as brother  Contact: pts' brother lev  Type: Appointment RequestCaller is requesting an appointment.  Name of Caller:pts' brother Lina is the first available appointment? 6/3 at 1:45Symptoms:annual examWould the patient rather a call back or a response via [facility]? Call [facility] Call Back Number:[phone] Information: pt requesting to be seen at the same time as her brother on 6/3 at 1:45

## 2025-02-20 NOTE — PATIENT INSTRUCTIONS

## 2025-02-26 ENCOUNTER — OFFICE VISIT (OUTPATIENT)
Dept: PODIATRY | Facility: CLINIC | Age: 75
End: 2025-02-26
Payer: MEDICARE

## 2025-02-26 VITALS — HEIGHT: 61 IN | BODY MASS INDEX: 32.1 KG/M2 | WEIGHT: 170 LBS

## 2025-02-26 DIAGNOSIS — M79.675 PAIN OF TOE OF LEFT FOOT: ICD-10-CM

## 2025-02-26 DIAGNOSIS — L60.0 INGROWN NAIL: Primary | ICD-10-CM

## 2025-02-26 DIAGNOSIS — B35.1 ONYCHOMYCOSIS DUE TO DERMATOPHYTE: ICD-10-CM

## 2025-02-26 PROCEDURE — 1101F PT FALLS ASSESS-DOCD LE1/YR: CPT | Mod: CPTII,S$GLB,, | Performed by: PODIATRIST

## 2025-02-26 PROCEDURE — 3288F FALL RISK ASSESSMENT DOCD: CPT | Mod: CPTII,S$GLB,, | Performed by: PODIATRIST

## 2025-02-26 PROCEDURE — 1159F MED LIST DOCD IN RCRD: CPT | Mod: CPTII,S$GLB,, | Performed by: PODIATRIST

## 2025-02-26 PROCEDURE — 1125F AMNT PAIN NOTED PAIN PRSNT: CPT | Mod: CPTII,S$GLB,, | Performed by: PODIATRIST

## 2025-02-26 PROCEDURE — 99213 OFFICE O/P EST LOW 20 MIN: CPT | Mod: S$GLB,,, | Performed by: PODIATRIST

## 2025-02-26 PROCEDURE — 1160F RVW MEDS BY RX/DR IN RCRD: CPT | Mod: CPTII,S$GLB,, | Performed by: PODIATRIST

## 2025-02-26 PROCEDURE — 99999 PR PBB SHADOW E&M-EST. PATIENT-LVL III: CPT | Mod: PBBFAC,,, | Performed by: PODIATRIST

## 2025-02-26 PROCEDURE — 1157F ADVNC CARE PLAN IN RCRD: CPT | Mod: CPTII,S$GLB,, | Performed by: PODIATRIST

## 2025-02-26 NOTE — PROGRESS NOTES
"  [address] [facility] [address]  [place] [address]  Phone: [phone]   [phone]    Patient's PCP:Yanna Abbasi MD  Referring Provider: No ref. provider found    Subjective:      Chief Complaint:: Nail Problem (Nail pain from deformed nail)    Nail Problem  Associated symptoms include arthralgias, joint swelling, myalgias and weakness. Pertinent negatives include no abdominal pain, chest pain, chills, coughing, fatigue, fever, headaches, nausea, numbness or rash.     Maggy Tapia is a 75 y.o. female who presents today with a complaint of nail pain in the left foot big toe. The current episode started a few weeks.  The symptoms include pain with pressure on the nail. Probable cause of complaint deformed possibly fungal nail.  The symptoms are aggravated by pressure. The problem has stayed the same. Treatment to date have included salons which provided some relief.     Systemic Doctor:   Date Last Seen: 24  Blood Sugar: not taken   Hemoglobin A1c: 6.0    Vitals:    25 0921   Weight: 77.1 kg (169 lb 15.6 oz)   Height: 5' 1" (1.549 m)   PainSc:   8      Shoe Size: 9    Past Surgical History:   Procedure Laterality Date    APPENDECTOMY      BREAST BIOPSY Left     BREAST LUMPECTOMY Left     2016    BREAST SURGERY      CATARACT EXTRACTION Bilateral     OU done//     SECTION      CHOLECYSTECTOMY      COLONOSCOPY N/A 2020    Procedure: COLONOSCOPY;  Surgeon: Mj Fernandez MD;  Location: [facility];  Service: Endoscopy;  Laterality: N/A;    CYST REMOVAL Left 2021    DILATION AND CURETTAGE OF UTERUS      EYE SURGERY      HIP REPLACEMENT ARTHROPLASTY Right 2024    Procedure: ARTHROPLASTY, HIP REPLACEMENT;  Surgeon: Simone Bonilla MD;  Location: [facility] OR;  Service: Orthopedics;  Laterality: Right;  kev    HYSTEROSCOPY WITH DILATION AND CURETTAGE OF UTERUS N/A 2023    Procedure: HYSTEROSCOPY, WITH DILATION AND CURETTAGE OF UTERUS AND OTHER INDICATED " PROCEDURES Needs Cardiac clearance;  Surgeon: Gamaliel Moran MD;  Location: [facility] OR;  Service: OB/GYN;  Laterality: N/A;  Cardiac clearance needed per Dr Anderson    LUMPECTOMY, BREAST Left 4/26/2023    Procedure: LUMPECTOMY, BREAST;  Surgeon: Toño Paredes MD;  Location: [facility] OR;  Service: General;  Laterality: Left;    PERCUTANEOUS PINNING OF HIP Right 11/11/2022    Procedure: PINNING, HIP, PERCUTANEOUS;  Surgeon: Ministerio Joiner II, MD;  Location: [facility] OR;  Service: Orthopedics;  Laterality: Right;    SENTINEL LYMPH NODE BIOPSY Left 4/26/2023    Procedure: BIOPSY, LYMPH NODE, SENTINEL;  Surgeon: Toño Paredes MD;  Location: [facility] OR;  Service: General;  Laterality: Left;    SIMPLE MASTECTOMY Left 5/19/2023    Procedure: MASTECTOMY, SIMPLE;  Surgeon: Toño Paredes MD;  Location: [facility] OR;  Service: General;  Laterality: Left;     Past Medical History:   Diagnosis Date    Anxiety     Arthritis     Asthma     Breast cancer 2000    Left    Depression     Diabetes mellitus, type 2     GERD (gastroesophageal reflux disease)     Hyperlipidemia     Hypertension     Hypothyroidism, unspecified     Overactive bladder     Seizures     Pseudo-seizures    Sleep apnea     Stroke 03/2022    pt was in the hospital for a stroke, claims she was seeing people when the stroke happened     Family History   Problem Relation Name Age of Onset    Diabetes Mother      Hypertension Mother      Breast cancer Mother      Cataracts Mother      Melanoma Mother      Heart failure Father      Melanoma Father      Cataracts Brother      Melanoma Brother      Glaucoma Neg Hx      Retinal detachment Neg Hx      Macular degeneration Neg Hx          Social History:   Marital Status: Single  Alcohol History:  reports current alcohol use.  Tobacco History:  reports that she has never smoked. She has been exposed to tobacco smoke. She has never used smokeless tobacco.  Drug History:  reports no history of drug use.    Review of patient's  allergies indicates:   Allergen Reactions    Penicillins Anaphylaxis     NOT ALLERGIC TO ANCEF    Sulfa (sulfonamide antibiotics) Anaphylaxis    Trintellix [vortioxetine] Nausea And Vomiting and Other (See Comments)     Patient has seizures and vomits       Current Medications[1]    Review of Systems   Constitutional:  Negative for chills, fatigue, fever and unexpected weight change.   HENT:  Positive for hearing loss. Negative for trouble swallowing.    Eyes:  Negative for photophobia and visual disturbance.   Respiratory:  Negative for cough, shortness of breath and wheezing.    Cardiovascular:  Negative for chest pain, palpitations and leg swelling.   Gastrointestinal:  Negative for abdominal pain and nausea.   Genitourinary:  Negative for dysuria and frequency.   Musculoskeletal:  Positive for arthralgias, gait problem, joint swelling and myalgias. Negative for back pain.   Skin:  Negative for rash and wound.   Neurological:  Positive for seizures and weakness. Negative for tremors, numbness and headaches.   Hematological:  Bruises/bleeds easily.         Objective:        Physical Exam:   Foot Exam    General  Orientation: alert and oriented to person, place, and time   Affect: appropriate   Gait: antalgic   Assistance: walker use       Left Foot/Ankle      Inspection and Palpation  Tenderness: (Lateral border great toenail)  Skin Exam: skin intact;   Fungus Toenails: present    Neurovascular  Dorsalis pedis: 1+  Posterior tibial: 1+  Capillary refill: 2+  Varicose veins: present  Saphenous nerve sensation: diminished  Tibial nerve sensation: diminished  Superficial peroneal nerve sensation: diminished  Deep peroneal nerve sensation: diminished  Sural nerve sensation: diminished    Comments  Great toenail is thickened discolored and dystrophic.  There is slight ingrown along the lateral border.  No edema erythema or drainage    Physical Exam  Cardiovascular:      Pulses:           Dorsalis pedis pulses are 1+ on  the left side.        Posterior tibial pulses are 1+ on the left side.   Feet:      Left foot:      Toenail Condition: Fungal disease present.              Left Ankle/Foot Exam     Comments:  Great toenail is thickened discolored and dystrophic.  There is slight ingrown along the lateral border.  No edema erythema or drainage      Vascular Exam       Left Pulses  Dorsalis Pedis:      1+  Posterior Tibial:      1+           Imaging:            Assessment:       1. Ingrown nail    2. Onychomycosis due to dermatophyte    3. Pain of toe of left foot      Plan:   Ingrown nail    Onychomycosis due to dermatophyte    Pain of toe of left foot      Follow up if symptoms worsen or fail to improve.    Procedures        I trimmed out the lateral border of the great toenail as a courtesy.  Patient tolerated well.  I discussed her there was some incurvation the skin.  Recommend she dress the area with a Band-Aid Neosporin for the next several days.  Otherwise she can continue with her regular pedicures for continued management.    Counseling:     I provided patient education verbally regarding:   Patient diagnosis, treatment options, as well as alternatives, risks, and benefits.     This note was created using Dragon voice recognition software that occasionally misinterpreted phrases or words.                      [1]   Current Outpatient Medications   Medication Sig Dispense Refill    albuterol (PROVENTIL/VENTOLIN HFA) 90 mcg/actuation inhaler Inhale 1-2 puffs into the lungs every 4 (four) hours as needed for Wheezing or Shortness of Breath. INHALE 1 TO 2 PUFFS BY MOUTH INTO THE LUNGS EVERY 4 HOURS AS NEEDED FOR SHORTNESS OF BREATH( COUGHING)  Strength: 90 mcg/actuation 20.1 g 11    alendronate (FOSAMAX) 70 MG tablet Take one tablet every 7 days. 4 tablet 11    aspirin (ECOTRIN) 81 MG EC tablet Take 1 tablet (81 mg total) by mouth 2 (two) times a day. 60 tablet 0    blood-glucose meter kit Use as instructed. Insurance preferred.  1 each 0    CALCIUM CARBONATE/VITAMIN D3 (CALCIUM 500 + D ORAL) Take 1 tablet by mouth once daily. 10 mg daily      cyanocobalamin (VITAMIN B-12) 1000 MCG tablet Take 1 tablet (1,000 mcg total) by mouth once daily. 30 tablet 0    fluticasone furoate-vilanteroL (BREO ELLIPTA) 100-25 mcg/dose diskus inhaler Inhale 1 puff into the lungs once daily. Controller 60 each 11    letrozole (FEMARA) 2.5 mg Tab Take 1 tablet (2.5 mg total) by mouth once daily. 90 tablet 3    levothyroxine (SYNTHROID) 125 MCG tablet Take 1 tablet (125 mcg total) by mouth before breakfast. 90 tablet 3    losartan (COZAAR) 50 MG tablet Take 0.5 tablets (25 mg total) by mouth once daily. 90 tablet 0    metFORMIN (GLUCOPHAGE-XR) 500 MG ER 24hr tablet Take 2 tablets (1,000 mg total) by mouth daily with breakfast. 180 tablet 3    polyethylene glycol (GLYCOLAX) 17 gram PwPk Take 17 g by mouth once daily.  0    potassium chloride SA (K-DUR,KLOR-CON) 20 MEQ tablet Take 20 mEq by mouth once daily.      rOPINIRole (REQUIP) 0.5 MG tablet Take 1 mg by mouth every evening.      sertraline (ZOLOFT) 50 MG tablet Take 1 tablet (50 mg total) by mouth once daily. For depression/anxiety 90 tablet 0    simvastatin (ZOCOR) 40 MG tablet TAKE 1 TABLET(40 MG) BY MOUTH EVERY DAY (Patient taking differently: Take 40 mg by mouth once daily.) 90 tablet 3    econazole nitrate 1 % cream Apply topically once daily. (Patient not taking: Reported on 2/10/2025) 45 g 10    ibuprofen (ADVIL,MOTRIN) 600 MG tablet Take 1 tablet (600 mg total) by mouth 3 (three) times daily. 90 tablet 1    ketoconazole (NIZORAL) 2 % cream AAA pannus fold at least daily after the shower 60 g 3    nystatin (MYCOSTATIN) cream Apply topically 2 (two) times daily. Apply to rash on face 15 g 2    ondansetron (ZOFRAN-ODT) 4 MG TbDL Take 1 tablet (4 mg total) by mouth every 8 (eight) hours as needed. 30 tablet 0    oxyCODONE-acetaminophen (PERCOCET) 5-325 mg per tablet Take 1 tablet by mouth every 6 (six)  hours as needed for Pain. (Patient not taking: Reported on 2/10/2025) 28 tablet 0    solifenacin (VESICARE) 10 MG tablet Take 1 tablet (10 mg total) by mouth once daily. 90 tablet 4     No current facility-administered medications for this visit.

## 2025-03-26 ENCOUNTER — HOSPITAL ENCOUNTER (OUTPATIENT)
Dept: RADIOLOGY | Facility: HOSPITAL | Age: 75
Discharge: HOME | End: 2025-03-26
Attending: INTERNAL MEDICINE
Payer: MEDICARE

## 2025-03-26 DIAGNOSIS — C50.412 MALIGNANT NEOPLASM OF UPPER-OUTER QUADRANT OF LEFT BREAST IN FEMALE, ESTROGEN RECEPTOR POSITIVE: ICD-10-CM

## 2025-03-26 DIAGNOSIS — Z17.0 MALIGNANT NEOPLASM OF UPPER-OUTER QUADRANT OF LEFT BREAST IN FEMALE, ESTROGEN RECEPTOR POSITIVE: ICD-10-CM

## 2025-03-26 PROCEDURE — 77061 BREAST TOMOSYNTHESIS UNI: CPT | Mod: 26,RT,, | Performed by: RADIOLOGY

## 2025-03-26 PROCEDURE — 77065 DX MAMMO INCL CAD UNI: CPT | Mod: 26,,, | Performed by: RADIOLOGY

## 2025-03-26 PROCEDURE — 77061 BREAST TOMOSYNTHESIS UNI: CPT | Mod: TC,PO,RT

## 2025-04-09 ENCOUNTER — OFFICE VISIT (OUTPATIENT)
Dept: FAMILY MEDICINE | Facility: CLINIC | Age: 75
End: 2025-04-09
Payer: MEDICARE

## 2025-04-09 VITALS
BODY MASS INDEX: 30.8 KG/M2 | OXYGEN SATURATION: 7 % | SYSTOLIC BLOOD PRESSURE: 120 MMHG | HEART RATE: 61 BPM | TEMPERATURE: 98 F | WEIGHT: 163.13 LBS | DIASTOLIC BLOOD PRESSURE: 72 MMHG | HEIGHT: 61 IN

## 2025-04-09 DIAGNOSIS — G47.33 OSA (OBSTRUCTIVE SLEEP APNEA): ICD-10-CM

## 2025-04-09 DIAGNOSIS — Z86.73 HISTORY OF ISCHEMIC LEFT MCA STROKE: ICD-10-CM

## 2025-04-09 DIAGNOSIS — Z23 NEED FOR INFLUENZA VACCINATION: ICD-10-CM

## 2025-04-09 DIAGNOSIS — E03.9 ACQUIRED HYPOTHYROIDISM: Chronic | ICD-10-CM

## 2025-04-09 DIAGNOSIS — N18.31 CHRONIC KIDNEY DISEASE, STAGE 3A: ICD-10-CM

## 2025-04-09 DIAGNOSIS — E78.2 MIXED HYPERLIPIDEMIA: ICD-10-CM

## 2025-04-09 DIAGNOSIS — E11.42 TYPE 2 DIABETES MELLITUS WITH DIABETIC POLYNEUROPATHY, WITHOUT LONG-TERM CURRENT USE OF INSULIN: Primary | ICD-10-CM

## 2025-04-09 DIAGNOSIS — F33.1 MAJOR DEPRESSIVE DISORDER, RECURRENT EPISODE, MODERATE: ICD-10-CM

## 2025-04-09 DIAGNOSIS — I10 ESSENTIAL HYPERTENSION: ICD-10-CM

## 2025-04-09 DIAGNOSIS — M85.89 OSTEOPENIA OF MULTIPLE SITES: ICD-10-CM

## 2025-04-09 DIAGNOSIS — J98.4 CHRONIC RESTRICTIVE LUNG DISEASE: ICD-10-CM

## 2025-04-09 PROCEDURE — 99999 PR PBB SHADOW E&M-EST. PATIENT-LVL IV: CPT | Mod: PBBFAC,,,

## 2025-04-09 NOTE — PROGRESS NOTES
[facility]     Subjective:       Patient ID:  [id number]     Chief Complaint: Annual Exam    Maggy Tapia is a 75 y.o. female with a past medical history significant for breast cancer, HTN, HLD, T2DM, history of CVA, hypothyroidism, osteopenia, JUAN, nonepileptic seizures, and MDD who presents to the clinic for an annual examination. She has no acute complaints and states that chronic conditions are stable and followed by specialist. She denies any chest pain, abdominal pain, nausea, or vomiting, however endorses chronic SOB which she attributes to her history of restrictive lung disease. She currently wears BiPAP for JUAN and uses intermittently when she is short of breath. She has no complaints at this time. Care gaps discussed. Patient due for vaccines. Patient amendable for flu vaccine today. Patient scheduled for upcoming eye exam.     Per Dr. Abbasi Note:     GI Dr. Fernandez 2 polyps colonoscopy 2020, 1 tubular adenoma 3 year surveillance recommended. Over 73     GYN Dr. Moran treating PMB -s/p hysteroscopy 8/28/23. Path neg, PAP neg 2023     Wound Dr. Fuchs right buttock wound     Heme/onc Dr. Khoobehi 5/19/23 breast ca s/p mastectomy  S/p lumpectomy 4/23  No need for chemo. Started letrozole. Dexa 7/31/23     Endo Type 2 DM A1c 6.1 .BS have been low. Taking jardiance and metformin. .On asa, ARB and zocor.    Hypothyroid-controlled.   ow vit d and osteopenia. On fosamax     Pulm JUAN on Bipap-uses daily . C/o still sleeping too much. Naps and falls asleep all day    Pulm Dr. Urbano treating restrictive vent defect. C/o persistent shortness of breath, poor exercise tolerance for more than a year.  Had PFTs which showed some reversible airway disease responsive to bronchdilator and some rstrictive lung disease. Cardiac eval neg nuc stress neg 3/2021. Echo concentric hypertrophy left . Now on Breo. Very sedentary. Walks with walker. 199 to 170 lbs since 11/2020-believes it was trulicity that  helped     Eye Dr. Dugan       Podiatry Dr. KYLE Bean  DM neuropathy     Ortho Dr. Joiner right hip fracture s/p pinning  11/22. Uses rollator      ENT LEEANN JARVIS     Urology NP [place] urinary incontinenance     Derm Dr. Back treated SIC -C/o black cyst on abd that swells from time to time     Card Dr. Chin stress test neg for ischemia 3/2021 and echo nl function     Psych LEEANN Drake/Sung Das-treating anxiety and PTSD.  Has chronic depression.  has h/o pseudoseizures and tardive dyskinesia.  Was seeing Dr. goodwin but does not want to go back to see him.      Neuro Aljabi treating pseudoseizures, dystonia, tardive dyskinesia. Has staring spells and/or episodes of hand/head shaking   No evidence of epilepsy on EEG.  Had an event during photic stimulation which was nonepileptic in nature.    Past Medical History:   Diagnosis Date    Anxiety     Arthritis     Asthma     Breast cancer 2000    Left    Depression     Diabetes mellitus, type 2     GERD (gastroesophageal reflux disease)     Hyperlipidemia     Hypertension     Hypothyroidism, unspecified     Overactive bladder     Seizures     Pseudo-seizures    Sleep apnea     Stroke 03/2022    pt was in the hospital for a stroke, claims she was seeing people when the stroke happened      Review of patient's allergies indicates:   Allergen Reactions    Penicillins Anaphylaxis     NOT ALLERGIC TO ANCEF    Sulfa (sulfonamide antibiotics) Anaphylaxis    Trintellix [vortioxetine] Nausea And Vomiting and Other (See Comments)     Patient has seizures and vomits       Lab Results   Component Value Date    WBC 10.72 01/10/2025    HGB 13.9 01/10/2025    HCT 45.4 01/10/2025     01/10/2025    CHOL 138 07/25/2024    TRIG 101 07/25/2024    HDL 47 07/25/2024    ALT 10 01/10/2025    AST 16 01/10/2025     01/10/2025    K 5.1 01/10/2025     01/10/2025    CREATININE 0.9 01/10/2025    BUN 29 (H) 01/10/2025    CO2 20 (L) 01/10/2025    TSH 3.693 08/12/2024     "INR 0.9 02/23/2022    HGBA1C 6.0 (H) 08/12/2024       Review of Systems   Constitutional:  Negative for chills and fever.   Respiratory:  Positive for shortness of breath (chronic, stable.).    Cardiovascular:  Negative for chest pain.   Gastrointestinal:  Negative for abdominal pain, diarrhea, nausea and vomiting.         Objective:      Physical Exam  Constitutional:       General: She is not in acute distress.     Appearance: Normal appearance. She is not toxic-appearing.   HENT:      Head: Normocephalic and atraumatic.      Nose: Nose normal.   Cardiovascular:      Rate and Rhythm: Normal rate and regular rhythm.      Pulses: Normal pulses.      Heart sounds: No murmur heard.     No friction rub.   Pulmonary:      Effort: Pulmonary effort is normal. No respiratory distress.      Breath sounds: Normal breath sounds. No wheezing.      Comments: Coarse breath sounds with inspiration improved with coughing.   Musculoskeletal:      Cervical back: Normal range of motion.      Right lower leg: Edema present.      Left lower leg: Edema present.      Comments: Mild lower extremity edema.    Skin:     General: Skin is warm and dry.   Neurological:      General: No focal deficit present.      Mental Status: She is alert and oriented to person, place, and time.   Psychiatric:         Mood and Affect: Mood normal.           Assessment:       1. Type 2 diabetes mellitus with diabetic polyneuropathy, without long-term current use of insulin    2. Essential hypertension    3. Chronic kidney disease, stage 3a    4. Mixed hyperlipidemia    5. Acquired hypothyroidism    6. JUAN (obstructive sleep apnea)    7. Chronic restrictive lung disease    8. History of ischemic left MCA stroke    9. Osteopenia of multiple sites    10. Major depressive disorder, recurrent episode, moderate    11. Need for influenza vaccination          Plan:       Maggy Olivas" was seen today for annual exam.    Diagnoses and all orders for this " visit:    Type 2 diabetes mellitus with diabetic polyneuropathy, without long-term current use of insulin  Comments:  Below goal. Followed by endocrinology.  Orders:  -     Microalbumin/Creatinine Ratio, Urine; Future    Essential hypertension  Comments:  Well-controlled. Continue medication as prescribed.    Chronic kidney disease, stage 3a  Comments:  Stable. Will continue to monitor.  Orders:  -     Microalbumin/Creatinine Ratio, Urine; Future    Mixed hyperlipidemia  Comments:  Well-controlled. Continue medication as prescribed.    Acquired hypothyroidism  Comments:  Stable. Followed by endocrinology.    JUAN (obstructive sleep apnea)  Comments:  Stable. On BiPAP.    Chronic restrictive lung disease  Comments:  Stable. Continue inhalers as prescribed.    History of ischemic left MCA stroke  Comments:  Continue aspirin and simvastatin as prescribed    Osteopenia of multiple sites  Comments:  Stable. Followed by endocrinology.    Major depressive disorder, recurrent episode, moderate  Comments:  Stable. Continue zoloft as prescribed.    Need for influenza vaccination  -     influenza (adjuvanted) (Fluad) 45 mcg/0.5 mL IM vaccine (> or = 64 yo) 0.5 mL        Follow up in about 6 months (around 10/9/2025).    Libia Schmidt PA-C  Family Medicine Physician Assistant     Future Appointments       Date Provider Specialty Appt Notes    4/16/2025 Yadi Drake PA-C Psychiatry follow up    4/22/2025 Mark Bello MD Pulmonology f/u copd/juan    6/3/2025 Ranjith Bone MD Ophthalmology peripheral fields defect OU    7/11/2025  Lab hemonc    7/14/2025 Khoobehi, Aurash, MD Hematology and Oncology Invasive ductal carcinoma of breast/6mfu/labs/mammo    8/11/2025  Lab lab    8/11/2025  Radiology dexa    8/25/2025 MIRACLE Richards PA-C Endocrinology year f/u    12/4/2025 Yanna Abbasi MD Family Medicine 6 months             All of your core healthy metrics are met.       I spent a total of 20 minutes on the day of  the visit.This includes face to face time and non-face to face time preparing to see the patient (eg, review of tests), obtaining and/or reviewing separately obtained history, documenting clinical information in the electronic or other health record, independently interpreting results and communicating results to the patient/family/caregiver, or care coordinator.

## 2025-04-16 ENCOUNTER — OFFICE VISIT (OUTPATIENT)
Dept: PSYCHIATRY | Facility: CLINIC | Age: 75
End: 2025-04-16
Payer: MEDICARE

## 2025-04-16 VITALS
DIASTOLIC BLOOD PRESSURE: 77 MMHG | HEART RATE: 82 BPM | HEIGHT: 61 IN | WEIGHT: 160.5 LBS | BODY MASS INDEX: 30.3 KG/M2 | SYSTOLIC BLOOD PRESSURE: 170 MMHG

## 2025-04-16 DIAGNOSIS — F34.1 PERSISTENT DEPRESSIVE DISORDER: ICD-10-CM

## 2025-04-16 DIAGNOSIS — F41.9 ANXIETY DISORDER, UNSPECIFIED TYPE: ICD-10-CM

## 2025-04-16 DIAGNOSIS — F43.10 PTSD (POST-TRAUMATIC STRESS DISORDER): Primary | ICD-10-CM

## 2025-04-16 PROCEDURE — 3077F SYST BP >= 140 MM HG: CPT | Mod: CPTII,S$GLB,, | Performed by: PHYSICIAN ASSISTANT

## 2025-04-16 PROCEDURE — 1157F ADVNC CARE PLAN IN RCRD: CPT | Mod: CPTII,S$GLB,, | Performed by: PHYSICIAN ASSISTANT

## 2025-04-16 PROCEDURE — 1160F RVW MEDS BY RX/DR IN RCRD: CPT | Mod: CPTII,S$GLB,, | Performed by: PHYSICIAN ASSISTANT

## 2025-04-16 PROCEDURE — 1159F MED LIST DOCD IN RCRD: CPT | Mod: CPTII,S$GLB,, | Performed by: PHYSICIAN ASSISTANT

## 2025-04-16 PROCEDURE — 90833 PSYTX W PT W E/M 30 MIN: CPT | Mod: S$GLB,,, | Performed by: PHYSICIAN ASSISTANT

## 2025-04-16 PROCEDURE — 3288F FALL RISK ASSESSMENT DOCD: CPT | Mod: CPTII,S$GLB,, | Performed by: PHYSICIAN ASSISTANT

## 2025-04-16 PROCEDURE — 99999 PR PBB SHADOW E&M-EST. PATIENT-LVL V: CPT | Mod: PBBFAC,,, | Performed by: PHYSICIAN ASSISTANT

## 2025-04-16 PROCEDURE — 1125F AMNT PAIN NOTED PAIN PRSNT: CPT | Mod: CPTII,S$GLB,, | Performed by: PHYSICIAN ASSISTANT

## 2025-04-16 PROCEDURE — 99214 OFFICE O/P EST MOD 30 MIN: CPT | Mod: S$GLB,,, | Performed by: PHYSICIAN ASSISTANT

## 2025-04-16 PROCEDURE — G2211 COMPLEX E/M VISIT ADD ON: HCPCS | Mod: S$GLB,,, | Performed by: PHYSICIAN ASSISTANT

## 2025-04-16 PROCEDURE — 1101F PT FALLS ASSESS-DOCD LE1/YR: CPT | Mod: CPTII,S$GLB,, | Performed by: PHYSICIAN ASSISTANT

## 2025-04-16 PROCEDURE — 3078F DIAST BP <80 MM HG: CPT | Mod: CPTII,S$GLB,, | Performed by: PHYSICIAN ASSISTANT

## 2025-04-16 RX ORDER — TRAZODONE HYDROCHLORIDE 50 MG/1
50 TABLET ORAL NIGHTLY
Qty: 30 TABLET | Refills: 1 | Status: SHIPPED | OUTPATIENT
Start: 2025-04-16 | End: 2025-06-15

## 2025-04-16 RX ORDER — SERTRALINE HYDROCHLORIDE 50 MG/1
50 TABLET, FILM COATED ORAL DAILY
Qty: 90 TABLET | Refills: 0 | Status: SHIPPED | OUTPATIENT
Start: 2025-04-16 | End: 2025-07-15

## 2025-04-16 NOTE — PATIENT INSTRUCTIONS
Resume trazodone 25 mg nightly for sleep.    Continue sertraline 50 mg once daily for mood/anxiety.    Monitor blood pressure at home.    Please go to emergency department if feeling as though you are a harm to yourself or others or if you are in crisis. Please call the clinic to report any worsening of symptoms or problems associated with medication.

## 2025-04-16 NOTE — Clinical Note
Good afternoon Dr. Abbasi, I am seeing Ms. Tapia today for mental health follow up. Her blood pressure is elevated today at 170/77. She does not have physical concerns so I just let her know I would forward info to you.  Thanks, Yadi Pt's here for OB follow-up  Reports + fetal movement  No VB, LOF or UC's.  Good Phone #:[phone]  Pharmacy verified.   Pt states still having mild to heavy cramps consistently. Pt states went to L&D on 3/27/2021 due to cramping and was told she had UTI.  Pt states no complaints for today.
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
## 2025-04-22 ENCOUNTER — OFFICE VISIT (OUTPATIENT)
Dept: PULMONOLOGY | Facility: CLINIC | Age: 75
End: 2025-04-22
Payer: MEDICARE

## 2025-04-22 VITALS
SYSTOLIC BLOOD PRESSURE: 142 MMHG | OXYGEN SATURATION: 95 % | WEIGHT: 163.94 LBS | BODY MASS INDEX: 30.95 KG/M2 | HEART RATE: 92 BPM | DIASTOLIC BLOOD PRESSURE: 84 MMHG | HEIGHT: 61 IN

## 2025-04-22 DIAGNOSIS — J96.10 CHRONIC RESPIRATORY FAILURE, UNSPECIFIED WHETHER WITH HYPOXIA OR HYPERCAPNIA: Primary | ICD-10-CM

## 2025-04-22 DIAGNOSIS — J47.0 BRONCHIECTASIS WITH ACUTE LOWER RESPIRATORY INFECTION: ICD-10-CM

## 2025-04-22 DIAGNOSIS — R06.00 DYSPNEA, UNSPECIFIED TYPE: ICD-10-CM

## 2025-04-22 DIAGNOSIS — J44.9 CHRONIC OBSTRUCTIVE PULMONARY DISEASE, UNSPECIFIED COPD TYPE: ICD-10-CM

## 2025-04-22 DIAGNOSIS — R06.02 SHORTNESS OF BREATH: ICD-10-CM

## 2025-04-22 PROCEDURE — 99999 PR PBB SHADOW E&M-EST. PATIENT-LVL III: CPT | Mod: PBBFAC,,, | Performed by: INTERNAL MEDICINE

## 2025-04-22 RX ORDER — ALBUTEROL SULFATE 90 UG/1
1-2 INHALANT RESPIRATORY (INHALATION) EVERY 4 HOURS PRN
Qty: 20.1 G | Refills: 11 | Status: SHIPPED | OUTPATIENT
Start: 2025-04-22

## 2025-04-22 RX ORDER — CIPROFLOXACIN 500 MG/1
500 TABLET ORAL 2 TIMES DAILY
Qty: 20 TABLET | Refills: 2 | Status: SHIPPED | OUTPATIENT
Start: 2025-04-22

## 2025-04-22 RX ORDER — FLUTICASONE FUROATE AND VILANTEROL 100; 25 UG/1; UG/1
1 POWDER RESPIRATORY (INHALATION) DAILY
Qty: 60 EACH | Refills: 11 | Status: SHIPPED | OUTPATIENT
Start: 2025-04-22

## 2025-04-22 RX ORDER — TETRABENAZINE 25 MG/1
50 TABLET, COATED ORAL 2 TIMES DAILY
COMMUNITY
Start: 2025-04-21

## 2025-04-22 NOTE — PATIENT INSTRUCTIONS
You are using and benefiting from you non  invasive ventilator.    Astral 150 ventilator will not turn off and seems to run hot.  Will ask Rosio to evaluate..      You had bronchiectasis seen on ct chest viewed from September of 2024 -- you have green mucous now.  Will order cipro.  If cipro doesn't clear mucous please do sputum culture    Use breo daily and albuterol as needed...

## 2025-04-22 NOTE — PROGRESS NOTES
4/22/2025    Maggy Tapia  Follow up    Chief Complaint   Patient presents with    Follow-up    COPD    Apnea       HPI:     4/22/2025 pt uses astral 150 niv, machine in office, will not turn off and is hot to touch.    Pt did well last yr. Uses niv for sleep for 4 hrs , and use niv 3-4 times daily for 1/2 hr or so for short breath.  Sleeps 5-6 hrs in day    Having green mucous  Had hip surg last yr    Had hh and rehab but tends to be chair bound--declines therapy      June 24, 2024-the patient has kyphoscoliosis and diaphragm disorder.  She has chronic respiratory failure needing noninvasive ventilator.  Pt has copd per pt report    Pt has had falls - last 2 wks ago.  Needed sutures.  Going to [facility].  Fell as not using walker.       Uses inhalers freq and has great benefit. Breo helps a lot.    Sleeps in chair as breaths better.     Patient Instructions   Carbon dioxide levels in blood were good.    Diaphragem disorder and copd requires patient to sleep with  head of bed elevated at least 30 degrees.   Would recommend hospital bed.\    Copd treatment needed, breo daily, albuterol as needed..    Restriction measured on breathing test.     Chest xray viewed 6/2024.         Maggy Tapia requires the head of the bed to be elevated more than 30 degrees most of the time due to  Chromic pulmonary disease. The positioning of the body cannot be sufficiently resolved by the use of pillows and wedges.  Below from Dr. Urbano-  09/11/2023- pt more SOB and requiring increased use of NIV for the past 1 month. She has new dx of breast cancer on left side, s/p mastectomy and plan for letrozole 5 yrs. She had recent d&C of uterus- path benign. She denies respiratory infections, colds or flu. She showers, dresses herself but otherwise is very limited in activity. She notes weakness which is worse than 1 yr ago.  She uses breo inhaler daily, albuterol 2x/day and needs refills.  She had episode of dizziness related to vertigo  which has passed. Denies syncope.  She had a stomach virus 2 days ago with nausea, emesis, and diarrhea. She denies coughing, denies aspiration. Bowels are back to normal and normal PO intake now. Her brother assists her at home and drove her to appt today.   She states she has lost weight intentionally due to dieting- this was in the remote past, used to be 270#.  She has negative CTA chest recently, slightly elevated BNP and elevated TSH with recent labs.   Patient Instructions   Continue ventilator use at night and as needed during the day  Continue physical therapy to build strength and may consider pulmonary rehab in the future  Get echo to check heart function  Six min walk test to see if you may need oxygen with walking  Continue inhalers  Nystatin cream to rash over face  Need to wash mask from machine with hot water and dish soap- soak 1 hour  07/06/2022-   Diet and weight loss would benefit your breathing.  Continue ventilator machine at night and as needed during the day  Continue breo inhaler- one puff daily- rinse mouth after use  Albuterol inhaler sent to pharmacy for as needed use- carry in purse    pt feels breathing is stable. Uses NIV during night and sometimes during day. Uses breo inhaler once daily- ordered by NP. She feels inhaler helps her and sometimes uses 2x/day.  She reports she has been put on a strict diet- fish, chicken, fresh veggies and fruits. No fried or salty foods    01/06/2022-   Continue ventilator use, would use more in the bedroom at night and as needed during the day  Goal is to stay mobile as much as you can, avoid decline in function over time  Continue inhalers and nebulizer treatments  Will try again to get pulmonary rehab approved with insurance  she still feels SOB. Has been using NIV during day in the living room but not at night. Gets sob walking w/ walker. [employer] [employer] denied pulm rehab referral. Uses nebs bid, breo inhaler daily. She broke her toes in a car  "accident on Halloween. Not too much trouble with cough/mucous. 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 Possibly congenital or since birth. This may have more pronounced effect on breathing as you get older. Options discussed. Will avoid surgery as you wish.  Will refer for rehab to strengthen muscles for breathing.   Noninvasive ventilator to use at night- replaces cpap machine. Can also use if napping or having a hard time breathing during the day.  Continue inhaler and nebulizer regimen- treating for mild asthma.  has gradual progressive SOB worsening. Worse w/ activity and lying flat. Has been sleeping in recliner due to orthopnea. She denies ever having surgery or trauma to the chest. Denies cough/phlegm. Sleeps w/ cpap.   Uses breo inhaler. Rescue inhaler about 2x/day.  She denies hx of polio. She did have traumatic delivery as an infant with "squished head" and forceps delivery. Had slow development requiring leg braces and special shoes as a child.    7/7/21-   Breathing problem may be due to chest wall restriction from scoliosis  Will have you do supine/erect spirometry to check breathing function lying down and sitting up  Diaphragm "sniff test" to check diaphragm muscle motion  Continue cpap machine for now  May need noninvasive ventilator depending on test results  Start breo inhaler one puff every day- rinse mouth after use  Continue albuterol breathing treatments as needed- 2 to 3 times a day is ok  Stop budesonide nebulizer treatments  Pt is a 72 yo female with asthma, anxiety, depression, DM2, HTN, HLD, JUAN, recurrent UTIs non-epileptic spells, breast ca s/p L lumpectomy and XRT, subdural hematoma w/ brain injury, thyroid disease and h/o strokes x2 (residual weakness in hands) presenting for new evaluation. She has JUAN dx about 5 yrs ago- sometimes sleeps w/ cpap but doesn't like the " noise it makes.  Reports trouble breathing x 1 year gradually worsening. PCP dx asthma and gave her inhaler and nebulizer tx which help breathing. Dyspnea is constant, not particularly worse w/ exertion. No assoc coughing or wheezing. No phlegm production- just runny nose.  Denies h/o lung disease in past. No recurrent infections in lungs or asthma as a child.  Father had lung disease- doesn't remember what kind.  Denies smoking. Says has mold around tub at home- makes breathing worse. Has difficulty breathing around pollen, dust- nasal allergies which is new for her in the past year.  Walks w/ walker- can go 1/2 block then has to sit and rest due to breathing.  Lives w/ brother who helps give her meds and helps with ADLs.    The chief complaint problem is stable    PFSH:  Past Medical History:   Diagnosis Date    Anxiety     Arthritis     Asthma     Breast cancer     Left    Depression     Diabetes mellitus, type 2     GERD (gastroesophageal reflux disease)     Hyperlipidemia     Hypertension     Hypothyroidism, unspecified     Overactive bladder     Seizures     Pseudo-seizures    Sleep apnea     Stroke 2022    pt was in the hospital for a stroke, claims she was seeing people when the stroke happened         Past Surgical History:   Procedure Laterality Date    APPENDECTOMY      BREAST BIOPSY Left     BREAST LUMPECTOMY Left     2016    BREAST SURGERY      CATARACT EXTRACTION Bilateral     OU done//     SECTION      CHOLECYSTECTOMY      COLONOSCOPY N/A 2020    Procedure: COLONOSCOPY;  Surgeon: Mj Fernandez MD;  [facility];  Service: Endoscopy;  Laterality: N/A;    CYST REMOVAL Left 2021    DILATION AND CURETTAGE OF UTERUS      EYE SURGERY      HIP REPLACEMENT ARTHROPLASTY Right 2024    Procedure: ARTHROPLASTY, HIP REPLACEMENT;  Surgeon: Simone Bonilla MD;  Location: [facility];  Service: Orthopedics;  Laterality: Right;  kev    HYSTEROSCOPY WITH DILATION AND  CURETTAGE OF UTERUS N/A 8/28/2023    Procedure: HYSTEROSCOPY, WITH DILATION AND CURETTAGE OF UTERUS AND OTHER INDICATED PROCEDURES Needs Cardiac clearance;  Surgeon: Gamaliel Moran MD;  [facility];  Service: OB/GYN;  Laterality: N/A;  Cardiac clearance needed per Dr Anderson    LUMPECTOMY, BREAST Left 4/26/2023    Procedure: LUMPECTOMY, BREAST;  Surgeon: Toño Paredes MD;  Location: [facility] OR;  Service: General;  Laterality: Left;    PERCUTANEOUS PINNING OF HIP Right 11/11/2022    Procedure: PINNING, HIP, PERCUTANEOUS;  Surgeon: Ministerio Joiner II, MD;  Location: [facility] OR;  Service: Orthopedics;  Laterality: Right;    SENTINEL LYMPH NODE BIOPSY Left 4/26/2023    Procedure: BIOPSY, LYMPH NODE, SENTINEL;  Surgeon: Toño Paredes MD;  Location: [facility] OR;  Service: General;  Laterality: Left;    SIMPLE MASTECTOMY Left 5/19/2023    Procedure: MASTECTOMY, SIMPLE;  Surgeon: Toño Paredes MD;  Location: [facility] OR;  Service: General;  Laterality: Left;     Social History     Tobacco Use    Smoking status: Never     Passive exposure: Past    Smokeless tobacco: Never   Substance Use Topics    Alcohol use: Yes     Comment: seldom    Drug use: No     Family History   Problem Relation Name Age of Onset    Diabetes Mother      Hypertension Mother      Breast cancer Mother      Cataracts Mother      Melanoma Mother      Heart failure Father      Melanoma Father      Cataracts Brother      Melanoma Brother      Glaucoma Neg Hx      Retinal detachment Neg Hx      Macular degeneration Neg Hx       Review of patient's allergies indicates:   Allergen Reactions    Penicillins Anaphylaxis     NOT ALLERGIC TO ANCEF    Sulfa (sulfonamide antibiotics) Anaphylaxis    Trintellix [vortioxetine] Nausea And Vomiting and Other (See Comments)     Patient has seizures and vomits       Performance Status:The patient's activity level is mobility with asist devices.  Ambulates w/ walker- increasingly limited due to breathing    Review of  "Systems:  a review of eleven systems covering constitutional, Eye, HEENT, Psych, Respiratory, Cardiac, GI, , Musculoskeletal, Endocrine, Dermatologic was negative except for pertinent findings as listed ABOVE and below:  Itchy rash on face      Exam:Comprehensive exam done. BP (!) 142/84 (BP Location: Right arm, Patient Position: Sitting)   Pulse 92   Ht 5' 1" (1.549 m)   Wt 74.3 kg (163 lb 14.6 oz)   SpO2 95% Comment: on room air at rest  BMI 30.97 kg/m²   Exam included Vitals as listed, and patient's appearance and affect and alertness and mood, oral exam for yeast and hygiene and pharynx lesions and Mallapatti (M) score, neck with inspection for jvd and masses and thyroid abnormalities and lymph nodes (supraclavicular and infraclavicular nodes and axillary also examined and noted if abn), chest exam included symmetry and effort and fremitus and percussion and auscultation, cardiac exam included rhythm and gallops and murmur and rubs and jvd and edema, abdominal exam for mass and hepatosplenomegaly and tenderness and hernias and bowel sounds, Musculoskeletal exam with muscle tone and posture and mobility/gait and  strength, and skin for rashes and cyanosis and pallor and turgor, extremity for clubbing.  Findings were normal except for pertinent findings listed below:  M1  Erythematous patches around cheeks, nose and forehead with scaling  Posture hunched forward  Severe scoliosis thoracic spine  Lungs clear to auscultation  No edema/clubbing    Radiographs (ct chest and cxr) reviewed: view by direct vision   CTA chest 7/1/23- scant subsegmental atelectasis above R hemidiaphragm, lungs otherwise clear, no PE  Sniff test 8/2/21-   Right hemidiaphragm dysfunction.  CT chest 6/4/21- 2 mm nodule right upper lobe series 4, image 186.  3 mm nodule left lower lobe series 4, image 196.  2 mm nodule left lower lobe series 4, image 184.  There is elevation right hemidiaphragm of uncertain etiology.  Mild dependent " or compressive atelectasis right lower lobe.  Mild peripheral bronchiectasis in the right lower lobe.  No filling defect in the central airways.  No pleural effusion or pneumothorax.  Heart size normal.  Coronary artery disease.  No mediastinal adenopathy.    TTE 2/23/22-   There is no evidence of intracardiac shunting.  The estimated ejection fraction is 74%.  Normal left ventricular diastolic function.  Normal systolic function.  Normal right ventricular size with normal right ventricular systolic function.  Mild aortic regurgitation.  Normal central venous pressure (3 mmHg).  Labs reviewed    Lab Results   Component Value Date    WBC 10.72 01/10/2025    HGB 13.9 01/10/2025    HCT 45.4 01/10/2025    MCV 96 01/10/2025     01/10/2025       CMP  Sodium   Date Value Ref Range Status   01/10/2025 140 136 - 145 mmol/L Final     Potassium   Date Value Ref Range Status   01/10/2025 5.1 3.5 - 5.1 mmol/L Final     Chloride   Date Value Ref Range Status   01/10/2025 106 95 - 110 mmol/L Final     CO2   Date Value Ref Range Status   01/10/2025 20 (L) 23 - 29 mmol/L Final     Glucose   Date Value Ref Range Status   01/10/2025 176 (H) 70 - 110 mg/dL Final     BUN   Date Value Ref Range Status   01/10/2025 29 (H) 8 - 23 mg/dL Final     Creatinine   Date Value Ref Range Status   01/10/2025 0.9 0.5 - 1.4 mg/dL Final     Calcium   Date Value Ref Range Status   01/10/2025 9.5 8.7 - 10.5 mg/dL Final     Total Protein   Date Value Ref Range Status   01/10/2025 7.8 6.0 - 8.4 g/dL Final     Albumin   Date Value Ref Range Status   01/10/2025 3.6 3.5 - 5.2 g/dL Final     Total Bilirubin   Date Value Ref Range Status   01/10/2025 0.5 0.1 - 1.0 mg/dL Final     Comment:     For infants and newborns, interpretation of results should be based  on gestational age, weight and in agreement with clinical  observations.    Premature Infant recommended reference ranges:  Up to 24 hours.............<8.0 mg/dL  Up to 48 hours............<12.0  "mg/dL  3-5 days..................<15.0 mg/dL  6-29 days.................<15.0 mg/dL       Alkaline Phosphatase   Date Value Ref Range Status   01/10/2025 101 40 - 150 U/L Final     AST   Date Value Ref Range Status   01/10/2025 16 10 - 40 U/L Final     ALT   Date Value Ref Range Status   01/10/2025 10 10 - 44 U/L Final     Anion Gap   Date Value Ref Range Status   01/10/2025 14 8 - 16 mmol/L Final     eGFR   Date Value Ref Range Status   01/10/2025 >60.0 >60 mL/min/1.73 m^2 Final         PFT results reviewed  12/17/20- restriction, no obstruction, reduced DLCO    7/26/21-       Plan:  Clinical impression is resonably certain and repeated evaluation prn +/- follow up will be needed as below. Shortness of breath, progressive- suspect due to chest wall restriction from scoliosis and generalized weakness/posture issues. Benefits from and continues to use NIV  She has general deconditioning this year, progressive, and more shortness of breath with this- doing PT currently    Maggy Tapia requires the head of the bed to be elevated more than 30 degrees most of the time due to Chromic pulmonary disease. The positioning of the body cannot be sufficiently resolved by the use of pillows and wedges.     Maggy Olivas" was seen today for follow-up, copd and apnea.    Diagnoses and all orders for this visit:    Chronic respiratory failure, unspecified whether with hypoxia or hypercapnia  -     HME - OTHER    Chronic obstructive pulmonary disease, unspecified COPD type    Shortness of breath  -     albuterol (PROVENTIL/VENTOLIN HFA) 90 mcg/actuation inhaler; Inhale 1-2 puffs into the lungs every 4 (four) hours as needed for Wheezing or Shortness of Breath. INHALE 1 TO 2 PUFFS BY MOUTH INTO THE LUNGS EVERY 4 HOURS AS NEEDED FOR SHORTNESS OF BREATH( COUGHING)  Strength: 90 mcg/actuation    Dyspnea, unspecified type  -     fluticasone furoate-vilanteroL (BREO ELLIPTA) 100-25 mcg/dose diskus inhaler; Inhale 1 puff into the lungs once " daily. Controller    Bronchiectasis with acute lower respiratory infection  -     ciprofloxacin HCl (CIPRO) 500 MG tablet; Take 1 tablet (500 mg total) by mouth 2 (two) times daily.  -     Culture, Respiratory with Gram Stain; Standing        Maggy Tapia requires the head of the bed to be elevated more than 30 degrees most of the time due to Chromic pulmonary disease. The positioning of the body cannot be sufficiently resolved by the use of pillows and wedges.       No follow-ups on file.    Discussed with patient above for education the following:      Patient Instructions   You are using and benefiting from you non  invasive ventilator.    Astral 150 ventilator will not turn off and seems to run hot.  [facility]      You had bronchiectasis seen on ct chest viewed from September of 2024 -- you have green mucous now.  Will order cipro.  If cipro doesn't clear mucous please do sputum culture    Use breo daily and albuterol as needed...

## 2025-05-21 ENCOUNTER — OFFICE VISIT (OUTPATIENT)
Dept: PSYCHIATRY | Facility: CLINIC | Age: 75
End: 2025-05-21
Payer: MEDICARE

## 2025-05-21 VITALS
HEIGHT: 61 IN | BODY MASS INDEX: 31.2 KG/M2 | DIASTOLIC BLOOD PRESSURE: 70 MMHG | HEART RATE: 72 BPM | WEIGHT: 165.25 LBS | SYSTOLIC BLOOD PRESSURE: 118 MMHG

## 2025-05-21 DIAGNOSIS — F34.1 PERSISTENT DEPRESSIVE DISORDER: ICD-10-CM

## 2025-05-21 DIAGNOSIS — F43.10 PTSD (POST-TRAUMATIC STRESS DISORDER): Primary | ICD-10-CM

## 2025-05-21 DIAGNOSIS — F41.9 ANXIETY DISORDER, UNSPECIFIED TYPE: ICD-10-CM

## 2025-05-21 PROCEDURE — 1160F RVW MEDS BY RX/DR IN RCRD: CPT | Mod: CPTII,S$GLB,, | Performed by: PHYSICIAN ASSISTANT

## 2025-05-21 PROCEDURE — 3078F DIAST BP <80 MM HG: CPT | Mod: CPTII,S$GLB,, | Performed by: PHYSICIAN ASSISTANT

## 2025-05-21 PROCEDURE — 99214 OFFICE O/P EST MOD 30 MIN: CPT | Mod: S$GLB,,, | Performed by: PHYSICIAN ASSISTANT

## 2025-05-21 PROCEDURE — G2211 COMPLEX E/M VISIT ADD ON: HCPCS | Mod: S$GLB,,, | Performed by: PHYSICIAN ASSISTANT

## 2025-05-21 PROCEDURE — 1126F AMNT PAIN NOTED NONE PRSNT: CPT | Mod: CPTII,S$GLB,, | Performed by: PHYSICIAN ASSISTANT

## 2025-05-21 PROCEDURE — 99999 PR PBB SHADOW E&M-EST. PATIENT-LVL IV: CPT | Mod: PBBFAC,,, | Performed by: PHYSICIAN ASSISTANT

## 2025-05-21 PROCEDURE — 3074F SYST BP LT 130 MM HG: CPT | Mod: CPTII,S$GLB,, | Performed by: PHYSICIAN ASSISTANT

## 2025-05-21 PROCEDURE — 1157F ADVNC CARE PLAN IN RCRD: CPT | Mod: CPTII,S$GLB,, | Performed by: PHYSICIAN ASSISTANT

## 2025-05-21 PROCEDURE — 1159F MED LIST DOCD IN RCRD: CPT | Mod: CPTII,S$GLB,, | Performed by: PHYSICIAN ASSISTANT

## 2025-05-21 PROCEDURE — 1101F PT FALLS ASSESS-DOCD LE1/YR: CPT | Mod: CPTII,S$GLB,, | Performed by: PHYSICIAN ASSISTANT

## 2025-05-21 PROCEDURE — 3288F FALL RISK ASSESSMENT DOCD: CPT | Mod: CPTII,S$GLB,, | Performed by: PHYSICIAN ASSISTANT

## 2025-05-21 RX ORDER — MIRTAZAPINE 7.5 MG/1
7.5 TABLET, FILM COATED ORAL NIGHTLY
Qty: 30 TABLET | Refills: 1 | Status: SHIPPED | OUTPATIENT
Start: 2025-05-21 | End: 2025-07-20

## 2025-05-21 NOTE — PATIENT INSTRUCTIONS
Stop trazodone    Trial mirtazapine (remeron) 7.5 mg nightly for sleep.    Please go to emergency department if feeling as though you are a harm to yourself or others or if you are in crisis. Please call the clinic to report any worsening of symptoms or problems associated with medication.
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
## 2025-05-22 ENCOUNTER — CLINICAL SUPPORT (OUTPATIENT)
Dept: PSYCHIATRY | Facility: CLINIC | Age: 75
End: 2025-05-22
Payer: MEDICARE

## 2025-05-22 DIAGNOSIS — F39 MOOD DISORDER: Primary | ICD-10-CM

## 2025-05-22 DIAGNOSIS — F43.10 PTSD (POST-TRAUMATIC STRESS DISORDER): ICD-10-CM

## 2025-05-22 PROCEDURE — 99999 PR PBB SHADOW E&M-EST. PATIENT-LVL I: CPT | Mod: PBBFAC,,, | Performed by: COUNSELOR
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
## 2025-06-03 ENCOUNTER — OFFICE VISIT (OUTPATIENT)
Dept: OPHTHALMOLOGY | Facility: CLINIC | Age: 75
End: 2025-06-03
Payer: MEDICARE

## 2025-06-03 DIAGNOSIS — E11.9 DIABETES MELLITUS TYPE 2 WITHOUT RETINOPATHY: Primary | ICD-10-CM

## 2025-06-03 DIAGNOSIS — Z86.73 H/O: CVA (CEREBROVASCULAR ACCIDENT): ICD-10-CM

## 2025-06-03 DIAGNOSIS — Z86.69 H/O DIPLOPIA: ICD-10-CM

## 2025-06-03 DIAGNOSIS — Z96.1 PSEUDOPHAKIA, BOTH EYES: ICD-10-CM

## 2025-06-03 PROCEDURE — 92015 DETERMINE REFRACTIVE STATE: CPT | Mod: S$GLB,,, | Performed by: OPHTHALMOLOGY

## 2025-06-03 PROCEDURE — 1159F MED LIST DOCD IN RCRD: CPT | Mod: CPTII,S$GLB,, | Performed by: OPHTHALMOLOGY

## 2025-06-03 PROCEDURE — 2023F DILAT RTA XM W/O RTNOPTHY: CPT | Mod: CPTII,S$GLB,, | Performed by: OPHTHALMOLOGY

## 2025-06-03 PROCEDURE — 1126F AMNT PAIN NOTED NONE PRSNT: CPT | Mod: CPTII,S$GLB,, | Performed by: OPHTHALMOLOGY

## 2025-06-03 PROCEDURE — 99204 OFFICE O/P NEW MOD 45 MIN: CPT | Mod: S$GLB,,, | Performed by: OPHTHALMOLOGY

## 2025-06-03 PROCEDURE — 3288F FALL RISK ASSESSMENT DOCD: CPT | Mod: CPTII,S$GLB,, | Performed by: OPHTHALMOLOGY

## 2025-06-03 PROCEDURE — 1160F RVW MEDS BY RX/DR IN RCRD: CPT | Mod: CPTII,S$GLB,, | Performed by: OPHTHALMOLOGY

## 2025-06-03 PROCEDURE — 99999 PR PBB SHADOW E&M-EST. PATIENT-LVL III: CPT | Mod: PBBFAC,,, | Performed by: OPHTHALMOLOGY

## 2025-06-03 PROCEDURE — 1157F ADVNC CARE PLAN IN RCRD: CPT | Mod: CPTII,S$GLB,, | Performed by: OPHTHALMOLOGY

## 2025-06-03 PROCEDURE — 1100F PTFALLS ASSESS-DOCD GE2>/YR: CPT | Mod: CPTII,S$GLB,, | Performed by: OPHTHALMOLOGY

## 2025-06-23 ENCOUNTER — TELEPHONE (OUTPATIENT)
Dept: PSYCHIATRY | Facility: CLINIC | Age: 75
End: 2025-06-23
Payer: MEDICARE

## 2025-06-23 NOTE — TELEPHONE ENCOUNTER
Patient called and left a voicemail, she has the flu and will not be able to make her appt this afternoon.

## 2025-07-07 ENCOUNTER — TELEPHONE (OUTPATIENT)
Dept: HEMATOLOGY/ONCOLOGY | Facility: CLINIC | Age: 75
End: 2025-07-07
Payer: MEDICARE

## 2025-07-11 ENCOUNTER — LAB VISIT (OUTPATIENT)
Dept: LAB | Facility: HOSPITAL | Age: 75
End: 2025-07-11
Attending: INTERNAL MEDICINE
Payer: MEDICARE

## 2025-07-11 DIAGNOSIS — Z17.0 MALIGNANT NEOPLASM OF UPPER-OUTER QUADRANT OF LEFT BREAST IN FEMALE, ESTROGEN RECEPTOR POSITIVE: ICD-10-CM

## 2025-07-11 DIAGNOSIS — C50.412 MALIGNANT NEOPLASM OF UPPER-OUTER QUADRANT OF LEFT BREAST IN FEMALE, ESTROGEN RECEPTOR POSITIVE: ICD-10-CM

## 2025-07-11 LAB
ABSOLUTE EOSINOPHIL (OHS): 0.21 K/UL
ABSOLUTE MONOCYTE (OHS): 0.77 K/UL (ref 0.3–1)
ABSOLUTE NEUTROPHIL COUNT (OHS): 8.57 K/UL (ref 1.8–7.7)
ALBUMIN SERPL BCP-MCNC: 3.6 G/DL (ref 3.5–5.2)
ALP SERPL-CCNC: 83 UNIT/L (ref 40–150)
ALT SERPL W/O P-5'-P-CCNC: 7 UNIT/L (ref 10–44)
ANION GAP (OHS): 9 MMOL/L (ref 8–16)
AST SERPL-CCNC: 10 UNIT/L (ref 11–45)
BASOPHILS # BLD AUTO: 0.06 K/UL
BASOPHILS NFR BLD AUTO: 0.5 %
BILIRUB SERPL-MCNC: 0.5 MG/DL (ref 0.1–1)
BUN SERPL-MCNC: 25 MG/DL (ref 8–23)
CALCIUM SERPL-MCNC: 9.4 MG/DL (ref 8.7–10.5)
CHLORIDE SERPL-SCNC: 109 MMOL/L (ref 95–110)
CO2 SERPL-SCNC: 25 MMOL/L (ref 23–29)
CREAT SERPL-MCNC: 1.1 MG/DL (ref 0.5–1.4)
ERYTHROCYTE [DISTWIDTH] IN BLOOD BY AUTOMATED COUNT: 14.5 % (ref 11.5–14.5)
GFR SERPLBLD CREATININE-BSD FMLA CKD-EPI: 52 ML/MIN/1.73/M2
GLUCOSE SERPL-MCNC: 127 MG/DL (ref 70–110)
HCT VFR BLD AUTO: 46.3 % (ref 37–48.5)
HGB BLD-MCNC: 14.5 GM/DL (ref 12–16)
IMM GRANULOCYTES # BLD AUTO: 0.06 K/UL (ref 0–0.04)
IMM GRANULOCYTES NFR BLD AUTO: 0.5 % (ref 0–0.5)
LYMPHOCYTES # BLD AUTO: 2.36 K/UL (ref 1–4.8)
MCH RBC QN AUTO: 29.8 PG (ref 27–31)
MCHC RBC AUTO-ENTMCNC: 31.3 G/DL (ref 32–36)
MCV RBC AUTO: 95 FL (ref 82–98)
NUCLEATED RBC (/100WBC) (OHS): 0 /100 WBC
PLATELET # BLD AUTO: 165 K/UL (ref 150–450)
PMV BLD AUTO: 10.9 FL (ref 9.2–12.9)
POTASSIUM SERPL-SCNC: 5 MMOL/L (ref 3.5–5.1)
PROT SERPL-MCNC: 7.1 GM/DL (ref 6–8.4)
RBC # BLD AUTO: 4.86 M/UL (ref 4–5.4)
RELATIVE EOSINOPHIL (OHS): 1.7 %
RELATIVE LYMPHOCYTE (OHS): 19.6 % (ref 18–48)
RELATIVE MONOCYTE (OHS): 6.4 % (ref 4–15)
RELATIVE NEUTROPHIL (OHS): 71.3 % (ref 38–73)
SODIUM SERPL-SCNC: 143 MMOL/L (ref 136–145)
WBC # BLD AUTO: 12.03 K/UL (ref 3.9–12.7)

## 2025-07-11 PROCEDURE — 36415 COLL VENOUS BLD VENIPUNCTURE: CPT | Mod: PO

## 2025-07-11 PROCEDURE — 85025 COMPLETE CBC W/AUTO DIFF WBC: CPT

## 2025-07-11 PROCEDURE — 80053 COMPREHEN METABOLIC PANEL: CPT

## 2025-07-14 ENCOUNTER — OFFICE VISIT (OUTPATIENT)
Dept: HEMATOLOGY/ONCOLOGY | Facility: CLINIC | Age: 75
End: 2025-07-14
Payer: MEDICARE

## 2025-07-14 VITALS
RESPIRATION RATE: 18 BRPM | HEIGHT: 61 IN | TEMPERATURE: 98 F | BODY MASS INDEX: 29.59 KG/M2 | SYSTOLIC BLOOD PRESSURE: 109 MMHG | WEIGHT: 156.75 LBS | DIASTOLIC BLOOD PRESSURE: 55 MMHG | OXYGEN SATURATION: 97 % | HEART RATE: 92 BPM

## 2025-07-14 DIAGNOSIS — E11.42 TYPE 2 DIABETES MELLITUS WITH DIABETIC POLYNEUROPATHY, WITHOUT LONG-TERM CURRENT USE OF INSULIN: ICD-10-CM

## 2025-07-14 DIAGNOSIS — Z17.0 MALIGNANT NEOPLASM OF UPPER-OUTER QUADRANT OF LEFT BREAST IN FEMALE, ESTROGEN RECEPTOR POSITIVE: Primary | ICD-10-CM

## 2025-07-14 DIAGNOSIS — J44.9 CHRONIC OBSTRUCTIVE PULMONARY DISEASE, UNSPECIFIED COPD TYPE: ICD-10-CM

## 2025-07-14 DIAGNOSIS — M85.89 OSTEOPENIA OF MULTIPLE SITES: ICD-10-CM

## 2025-07-14 DIAGNOSIS — C50.412 MALIGNANT NEOPLASM OF UPPER-OUTER QUADRANT OF LEFT BREAST IN FEMALE, ESTROGEN RECEPTOR POSITIVE: Primary | ICD-10-CM

## 2025-07-14 PROCEDURE — 1126F AMNT PAIN NOTED NONE PRSNT: CPT | Mod: CPTII,S$GLB,, | Performed by: INTERNAL MEDICINE

## 2025-07-14 PROCEDURE — 3288F FALL RISK ASSESSMENT DOCD: CPT | Mod: CPTII,S$GLB,, | Performed by: INTERNAL MEDICINE

## 2025-07-14 PROCEDURE — 1159F MED LIST DOCD IN RCRD: CPT | Mod: CPTII,S$GLB,, | Performed by: INTERNAL MEDICINE

## 2025-07-14 PROCEDURE — 3078F DIAST BP <80 MM HG: CPT | Mod: CPTII,S$GLB,, | Performed by: INTERNAL MEDICINE

## 2025-07-14 PROCEDURE — 1157F ADVNC CARE PLAN IN RCRD: CPT | Mod: CPTII,S$GLB,, | Performed by: INTERNAL MEDICINE

## 2025-07-14 PROCEDURE — 3074F SYST BP LT 130 MM HG: CPT | Mod: CPTII,S$GLB,, | Performed by: INTERNAL MEDICINE

## 2025-07-14 PROCEDURE — 99214 OFFICE O/P EST MOD 30 MIN: CPT | Mod: S$GLB,,, | Performed by: INTERNAL MEDICINE

## 2025-07-14 PROCEDURE — 1101F PT FALLS ASSESS-DOCD LE1/YR: CPT | Mod: CPTII,S$GLB,, | Performed by: INTERNAL MEDICINE

## 2025-07-14 PROCEDURE — 99999 PR PBB SHADOW E&M-EST. PATIENT-LVL IV: CPT | Mod: PBBFAC,,, | Performed by: INTERNAL MEDICINE

## 2025-07-14 NOTE — PROGRESS NOTES
Service Date:  7/14/25    Chief Complaint: Breast cancer    Maggy Tapia is a 75 y.o. female here with L breast invasive ductal carcinoma. Patient had a routine screening mammogram which led to biopsy of a concerning lesion in the upper inner quadrant. Lesion measured about 17 mm on US. [id number] ER/WY+, HER2 negative by FISH, Ki 67 40%. After mastectomy, found to have pT2N0 lesion. Oncotype score 21, <1% benefit from chemo.    Now on Letrozole. Tolerating it well.  Takes Fosamax weekly on Fridays.  Doing well with that.    Review of Systems   Constitutional: Negative.    HENT: Negative.     Eyes: Negative.    Respiratory: Negative.     Cardiovascular: Negative.    Gastrointestinal: Negative.    Endocrine: Negative.    Genitourinary: Negative.    Musculoskeletal: Negative.    Integumentary:  Negative.   Neurological: Negative.    Hematological: Negative.    Psychiatric/Behavioral: Negative.          Current Outpatient Medications   Medication Instructions    albuterol (PROVENTIL/VENTOLIN HFA) 90 mcg/actuation inhaler 1-2 puffs, Inhalation, Every 4 hours PRN, INHALE 1 TO 2 PUFFS BY MOUTH INTO THE LUNGS EVERY 4 HOURS AS NEEDED FOR SHORTNESS OF BREATH( COUGHING)  Strength: 90 mcg/actuation    alendronate (FOSAMAX) 70 MG tablet Take one tablet every 7 days.    aspirin (ECOTRIN) 81 mg, Oral, 2 times daily    blood-glucose meter kit Use as instructed. Insurance preferred.    CALCIUM CARBONATE/VITAMIN D3 (CALCIUM 500 + D ORAL) 1 tablet, Daily    ciprofloxacin HCl (CIPRO) 500 mg, Oral, 2 times daily    cyanocobalamin (VITAMIN B-12) 1,000 mcg, Oral, Daily    fluticasone furoate-vilanteroL (BREO ELLIPTA) 100-25 mcg/dose diskus inhaler 1 puff, Inhalation, Daily, Controller    letrozole (FEMARA) 2.5 mg, Oral, Daily    levothyroxine (SYNTHROID) 125 mcg, Oral, Before breakfast    losartan (COZAAR) 25 mg, Oral, Daily    metFORMIN (GLUCOPHAGE-XR) 1,000 mg, Oral, With breakfast    mirtazapine (REMERON) 7.5 mg, Oral, Nightly     polyethylene glycol (GLYCOLAX) 17 g, Oral, Daily    potassium chloride SA (K-DUR,KLOR-CON) 20 MEQ tablet 20 mEq, Daily    rOPINIRole (REQUIP) 1 mg, Nightly    sertraline (ZOLOFT) 50 mg, Oral, Daily, For depression/anxiety    simvastatin (ZOCOR) 40 mg, Oral    solifenacin (VESICARE) 10 mg, Oral, Daily    tetrabenazine (XENAZINE) 50 mg, 2 times daily        Past Medical History:   Diagnosis Date    Anxiety     Arthritis     Asthma     Breast cancer     Left    Depression     Diabetes mellitus, type 2     GERD (gastroesophageal reflux disease)     Hyperlipidemia     Hypertension     Hypothyroidism, unspecified     Overactive bladder     Seizures     Pseudo-seizures    Sleep apnea     Stroke 2022    pt was in the hospital for a stroke, claims she was seeing people when the stroke happened        Past Surgical History:   Procedure Laterality Date    APPENDECTOMY      BREAST BIOPSY Left     BREAST LUMPECTOMY Left     2016    BREAST SURGERY      CATARACT EXTRACTION Bilateral     OU done//     SECTION      CHOLECYSTECTOMY      COLONOSCOPY N/A 2020    Procedure: COLONOSCOPY;  Surgeon: Mj Fernandez MD;  Location: [facility];  Service: Endoscopy;  Laterality: N/A;    CYST REMOVAL Left 2021    DILATION AND CURETTAGE OF UTERUS      EYE SURGERY      HIP REPLACEMENT ARTHROPLASTY Right 2024    Procedure: ARTHROPLASTY, HIP REPLACEMENT;  Surgeon: Simone Bonilla MD;  Location: [facility] OR;  Service: Orthopedics;  Laterality: Right;  kev    HYSTEROSCOPY WITH DILATION AND CURETTAGE OF UTERUS N/A 2023    Procedure: HYSTEROSCOPY, WITH DILATION AND CURETTAGE OF UTERUS AND OTHER INDICATED PROCEDURES Needs Cardiac clearance;  Surgeon: Gamaliel Moran MD;  Location: [facility] OR;  Service: OB/GYN;  Laterality: N/A;  Cardiac clearance needed per Dr Anderson    LUMPECTOMY, BREAST Left 2023    Procedure: LUMPECTOMY, BREAST;  Surgeon: Toño Paredes MD;  Location: [facility] OR;  Service: General;   "Laterality: Left;    PERCUTANEOUS PINNING OF HIP Right 11/11/2022    Procedure: PINNING, HIP, PERCUTANEOUS;  Surgeon: Ministerio Joiner II, MD;  Location: [facility] OR;  Service: Orthopedics;  Laterality: Right;    SENTINEL LYMPH NODE BIOPSY Left 4/26/2023    Procedure: BIOPSY, LYMPH NODE, SENTINEL;  Surgeon: Toño Paredes MD;  Location: [facility] OR;  Service: General;  Laterality: Left;    SIMPLE MASTECTOMY Left 5/19/2023    Procedure: MASTECTOMY, SIMPLE;  Surgeon: Toño Paredes MD;  Location: [facility] OR;  Service: General;  Laterality: Left;        Family History   Problem Relation Name Age of Onset    Diabetes Mother      Hypertension Mother      Breast cancer Mother      Cataracts Mother      Melanoma Mother      Heart failure Father      Melanoma Father      Cataracts Brother      Melanoma Brother      Glaucoma Neg Hx      Retinal detachment Neg Hx      Macular degeneration Neg Hx         Social History     Tobacco Use    Smoking status: Never     Passive exposure: Past    Smokeless tobacco: Never   Substance Use Topics    Alcohol use: Yes     Comment: seldom    Drug use: No         Vitals:    07/14/25 0956   BP: (!) 109/55   Pulse: 92   Resp: 18   Temp: 97.7 °F (36.5 °C)        Physical Exam:  BP (!) 109/55 (BP Location: Right arm, Patient Position: Sitting)   Pulse 92   Temp 97.7 °F (36.5 °C) (Temporal)   Resp 18   Ht 5' 1" (1.549 m)   Wt 71.1 kg (156 lb 12 oz)   SpO2 97%   BMI 29.62 kg/m²     Physical Exam  Constitutional:       Appearance: Normal appearance.   HENT:      Head: Normocephalic and atraumatic.      Nose: Nose normal.      Mouth/Throat:      Mouth: Mucous membranes are moist.      Pharynx: Oropharynx is clear.   Eyes:      Conjunctiva/sclera: Conjunctivae normal.   Cardiovascular:      Rate and Rhythm: Normal rate and regular rhythm.      Heart sounds: Normal heart sounds.   Pulmonary:      Effort: Pulmonary effort is normal.      Breath sounds: Normal breath sounds.   Abdominal:      General: " Abdomen is flat. Bowel sounds are normal.      Palpations: Abdomen is soft.   Musculoskeletal:         General: Normal range of motion.      Cervical back: Normal range of motion and neck supple.   Skin:     General: Skin is warm and dry.   Neurological:      General: No focal deficit present.      Mental Status: She is alert and oriented to person, place, and time. Mental status is at baseline.   Psychiatric:         Mood and Affect: Mood normal.          Labs:  Lab Results   Component Value Date    WBC 12.03 07/11/2025    RBC 4.86 07/11/2025    HGB 14.5 07/11/2025    HCT 46.3 07/11/2025    MCV 95 07/11/2025    MCH 29.8 07/11/2025    MCHC 31.3 (L) 07/11/2025    RDW 14.5 07/11/2025     07/11/2025    MPV 10.9 07/11/2025    GRAN 8.1 (H) 01/10/2025    GRAN 75.5 (H) 01/10/2025    LYMPH 19.6 07/11/2025    LYMPH 2.36 07/11/2025    MONO 6.4 07/11/2025    MONO 0.77 07/11/2025    EOS 1.7 07/11/2025    EOS 0.21 07/11/2025    BASO 0.05 01/10/2025    EOSINOPHIL 2.9 01/10/2025    BASOPHIL 0.5 07/11/2025    BASOPHIL 0.06 07/11/2025     Sodium   Date Value Ref Range Status   07/11/2025 143 136 - 145 mmol/L Final   01/10/2025 140 136 - 145 mmol/L Final     Potassium   Date Value Ref Range Status   07/11/2025 5.0 3.5 - 5.1 mmol/L Final   01/10/2025 5.1 3.5 - 5.1 mmol/L Final     Chloride   Date Value Ref Range Status   07/11/2025 109 95 - 110 mmol/L Final   01/10/2025 106 95 - 110 mmol/L Final     CO2   Date Value Ref Range Status   07/11/2025 25 23 - 29 mmol/L Final   01/10/2025 20 (L) 23 - 29 mmol/L Final     Glucose   Date Value Ref Range Status   07/11/2025 127 (H) 70 - 110 mg/dL Final   01/10/2025 176 (H) 70 - 110 mg/dL Final     BUN   Date Value Ref Range Status   07/11/2025 25 (H) 8 - 23 mg/dL Final     Creatinine   Date Value Ref Range Status   07/11/2025 1.1 0.5 - 1.4 mg/dL Final     Calcium   Date Value Ref Range Status   07/11/2025 9.4 8.7 - 10.5 mg/dL Final   01/10/2025 9.5 8.7 - 10.5 mg/dL Final     Protein Total    Date Value Ref Range Status   07/11/2025 7.1 6.0 - 8.4 gm/dL Final     Total Protein   Date Value Ref Range Status   01/10/2025 7.8 6.0 - 8.4 g/dL Final     Albumin   Date Value Ref Range Status   07/11/2025 3.6 3.5 - 5.2 g/dL Final   01/10/2025 3.6 3.5 - 5.2 g/dL Final     Total Bilirubin   Date Value Ref Range Status   01/10/2025 0.5 0.1 - 1.0 mg/dL Final     Comment:     For infants and newborns, interpretation of results should be based  on gestational age, weight and in agreement with clinical  observations.    Premature Infant recommended reference ranges:  Up to 24 hours.............<8.0 mg/dL  Up to 48 hours............<12.0 mg/dL  3-5 days..................<15.0 mg/dL  6-29 days.................<15.0 mg/dL       Bilirubin Total   Date Value Ref Range Status   07/11/2025 0.5 0.1 - 1.0 mg/dL Final     Comment:     For infants and newborns, interpretation of results should be based   on gestational age, weight and in agreement with clinical   observations.    Premature Infant recommended reference ranges:   0-24 hours:  <8.0 mg/dL   24-48 hours: <12.0 mg/dL   3-5 days:    <15.0 mg/dL   6-29 days:   <15.0 mg/dL     Alkaline Phosphatase   Date Value Ref Range Status   01/10/2025 101 40 - 150 U/L Final     ALP   Date Value Ref Range Status   07/11/2025 83 40 - 150 unit/L Final     AST   Date Value Ref Range Status   07/11/2025 10 (L) 11 - 45 unit/L Final   01/10/2025 16 10 - 40 U/L Final     ALT   Date Value Ref Range Status   07/11/2025 7 (L) 10 - 44 unit/L Final   01/10/2025 10 10 - 44 U/L Final     Anion Gap   Date Value Ref Range Status   07/11/2025 9 8 - 16 mmol/L Final     eGFR if    Date Value Ref Range Status   03/29/2022 >60.0 >60 mL/min/1.73 m^2 Final     eGFR if non    Date Value Ref Range Status   03/29/2022 >60.0 >60 mL/min/1.73 m^2 Final     Comment:     Calculation used to obtain the estimated glomerular filtration  rate (eGFR) is the CKD-EPI equation.           A/P:    L breast invasive ductal carcinoma  -pT2N0, s/p left mastectomy  -ER 90%, TN 20%, HER2 -, Ki 40%; oncotype 21 - no need for chemo  -started letrozole 6/12/23  -labs reviewed  -screening mammogram 3/26/25, BI-RADS 1  -RTC in 6 months with labs    Osteopenia  -bone density scan 7/31/23 showed improvement in osteopenia  -cont vit D and Ca  -on Fosamax, continue for now  -repeat bone density ordered for August    COPD  DM2      Aurash Khoobehi, MD  Hematology and Oncology

## 2025-07-16 ENCOUNTER — NURSE TRIAGE (OUTPATIENT)
Dept: ADMINISTRATIVE | Facility: CLINIC | Age: 75
End: 2025-07-16
Payer: MEDICARE

## 2025-07-16 ENCOUNTER — HOSPITAL ENCOUNTER (OUTPATIENT)
Dept: RADIOLOGY | Facility: CLINIC | Age: 75
Discharge: HOME | End: 2025-07-16
Payer: MEDICARE

## 2025-07-16 ENCOUNTER — OFFICE VISIT (OUTPATIENT)
Dept: FAMILY MEDICINE | Facility: CLINIC | Age: 75
End: 2025-07-16
Payer: MEDICARE

## 2025-07-16 VITALS
OXYGEN SATURATION: 96 % | HEART RATE: 94 BPM | BODY MASS INDEX: 30.09 KG/M2 | HEIGHT: 61 IN | DIASTOLIC BLOOD PRESSURE: 60 MMHG | WEIGHT: 159.38 LBS | TEMPERATURE: 98 F | SYSTOLIC BLOOD PRESSURE: 108 MMHG

## 2025-07-16 DIAGNOSIS — F43.10 PTSD (POST-TRAUMATIC STRESS DISORDER): ICD-10-CM

## 2025-07-16 DIAGNOSIS — I10 ESSENTIAL HYPERTENSION: ICD-10-CM

## 2025-07-16 DIAGNOSIS — L60.9 NAIL PROBLEM: ICD-10-CM

## 2025-07-16 DIAGNOSIS — S91.209A TOENAIL AVULSION, INITIAL ENCOUNTER: Primary | ICD-10-CM

## 2025-07-16 DIAGNOSIS — S91.209A TOENAIL AVULSION, INITIAL ENCOUNTER: ICD-10-CM

## 2025-07-16 PROCEDURE — 1101F PT FALLS ASSESS-DOCD LE1/YR: CPT | Mod: CPTII,S$GLB,,

## 2025-07-16 PROCEDURE — 1125F AMNT PAIN NOTED PAIN PRSNT: CPT | Mod: CPTII,S$GLB,,

## 2025-07-16 PROCEDURE — 1157F ADVNC CARE PLAN IN RCRD: CPT | Mod: CPTII,S$GLB,,

## 2025-07-16 PROCEDURE — 1160F RVW MEDS BY RX/DR IN RCRD: CPT | Mod: CPTII,S$GLB,,

## 2025-07-16 PROCEDURE — 3288F FALL RISK ASSESSMENT DOCD: CPT | Mod: CPTII,S$GLB,,

## 2025-07-16 PROCEDURE — 73660 X-RAY EXAM OF TOE(S): CPT | Mod: TC,FY,PO,LT

## 2025-07-16 PROCEDURE — 99999 PR PBB SHADOW E&M-EST. PATIENT-LVL V: CPT | Mod: PBBFAC,,,

## 2025-07-16 PROCEDURE — 3074F SYST BP LT 130 MM HG: CPT | Mod: CPTII,S$GLB,,

## 2025-07-16 PROCEDURE — 99214 OFFICE O/P EST MOD 30 MIN: CPT | Mod: S$GLB,,,

## 2025-07-16 PROCEDURE — 3078F DIAST BP <80 MM HG: CPT | Mod: CPTII,S$GLB,,

## 2025-07-16 PROCEDURE — 73660 X-RAY EXAM OF TOE(S): CPT | Mod: 26,LT,, | Performed by: RADIOLOGY

## 2025-07-16 PROCEDURE — 1159F MED LIST DOCD IN RCRD: CPT | Mod: CPTII,S$GLB,,

## 2025-07-16 RX ORDER — SERTRALINE HYDROCHLORIDE 50 MG/1
50 TABLET, FILM COATED ORAL DAILY
Qty: 90 TABLET | Refills: 0 | Status: SHIPPED | OUTPATIENT
Start: 2025-07-16 | End: 2025-10-14

## 2025-07-16 RX ORDER — MUPIROCIN 20 MG/G
OINTMENT TOPICAL 3 TIMES DAILY
Qty: 1 G | Refills: 0 | Status: SHIPPED | OUTPATIENT
Start: 2025-07-16

## 2025-07-16 NOTE — PROGRESS NOTES
[facility]     Subjective:       Patient ID:  [id number]     Chief Complaint: Nail Problem    Maggy Tapia is a 75 y.o. female with a past medical history significant for HTN, HLD, T2DM, history of CVA, hypothyroidism, osteopenia, JUAN, nonepileptic seizures, and MDD who presents to the clinic for toe nail trauma.     CHIEF COMPLAINT:  Ms. Tapia presents today for left great toenail that fell off    TOENAIL CONCERNS:  She experienced spontaneous left great toenail avulsion this morning without prior injury or trauma. She reports active bleeding at the nail site and significant pain, particularly with toe movement. Pain is present at rest and markedly increases with attempted toe flexion. She notes the adjacent toe appears to be experiencing similar nail changes.    RECENT MEDICAL HISTORY:  She had influenza approximately two weeks prior to this appointment.    MENTAL HEALTH:  She reports feeling depressed due to her critically ill friend currently in intensive care with a potentially terminal liver condition. She continues Zoloft 50 mg daily which helps manage her mood. She is open to additional mental health resources if needed.    COPD:  She reports chronic shortness of breath related to COPD which remains at baseline. She denies personal smoking history but reports significant historical secondhand smoke exposure from spouse who was a heavy tobacco user throughout the day.         Review of Systems   Constitutional:  Negative for chills and fever.   Respiratory:  Positive for shortness of breath (chronic).    Cardiovascular:  Negative for chest pain.        Past Medical History:   Diagnosis Date    Anxiety     Arthritis     Asthma     Breast cancer 2000    Left    Depression     Diabetes mellitus, type 2     GERD (gastroesophageal reflux disease)     Hyperlipidemia     Hypertension     Hypothyroidism, unspecified     Overactive bladder     Seizures     Pseudo-seizures    Sleep apnea     Stroke  03/2022    pt was in the hospital for a stroke, claims she was seeing people when the stroke happened        Active Problem List with Overview Notes    Diagnosis Date Noted    Chronic respiratory failure, unspecified whether with hypoxia or hypercapnia 04/22/2025    Blister of right leg 12/19/2024    Pressure ulcer of right leg, unstageable 12/12/2024    S/P hip replacement, right 11/20/2024    Decreased strength of lower extremity 11/20/2024    Gait abnormality 11/20/2024    Chronic kidney disease, stage 3a 06/14/2024    Physical deconditioning 05/29/2024    Pressure injury of right buttock, stage 2 10/25/2023    History of colon polyps 10/19/2023    Abnormal auditory function study 07/10/2023    Obesity (BMI 30-39.9) 07/10/2023    Type 2 diabetes mellitus with diabetic peripheral angiopathy without gangrene, without long-term current use of insulin 07/10/2023    Malignant neoplasm of upper-outer quadrant of left breast in female, estrogen receptor positive 04/10/2023    Closed traumatic nondisplaced fracture of neck of right femur 11/10/2022    Dizziness 08/02/2022    Balance problem 08/02/2022    Leg weakness, bilateral 08/02/2022    Wound of buttock 07/28/2022    Type 2 diabetes mellitus with diabetic polyneuropathy, without long-term current use of insulin 07/14/2022    Chronic obstructive pulmonary disease, unspecified COPD type 07/14/2022    Confusion 02/23/2022    Stroke 02/22/2022    Bilateral hearing loss 01/18/2022    Diabetic polyneuropathy 12/08/2021    Paralysis of diaphragm 10/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    Major depressive disorder, recurrent episode, moderate 09/30/2021    Chronic restrictive lung disease 08/25/2021    Urinary incontinence 07/23/2021    Shortness of breath 02/15/2021    Rectal bleeding 06/24/2020    History of subdural hematoma 01/30/2020    Hypoglycemia 09/03/2019    Gait instability 05/03/2019    Left homonymous hemianopsia 01/07/2019    Tardive dyskinesia 01/07/2019    Cervical strain 06/27/2018     Diplopia 06/07/2018    Near syncope 08/04/2017    JUAN (obstructive sleep apnea) 04/18/2017    Osteopenia 12/07/2016    Valproic acid toxicity 08/02/2016    Psychogenic nonepileptic seizure 08/02/2016    Syncope and collapse 07/01/2016    Frequent falls (possibly related to polypharmacy) 07/01/2016    History of left breast cancer 07/01/2016    History of ischemic left MCA stroke 07/01/2016    Essential hypertension 07/01/2016    Hyperlipidemia 07/01/2016    Thrombocytopenia 07/01/2016    Depression 07/01/2016    Hypothyroidism 07/01/2016        Review of patient's allergies indicates:   Allergen Reactions    Penicillins Anaphylaxis     NOT ALLERGIC TO ANCEF    Sulfa (sulfonamide antibiotics) Anaphylaxis    Trintellix [vortioxetine] Nausea And Vomiting and Other (See Comments)     Patient has seizures and vomits       Current Medications[1]    Lab Results   Component Value Date    WBC 12.03 07/11/2025    HGB 14.5 07/11/2025    HCT 46.3 07/11/2025     07/11/2025    CHOL 138 07/25/2024    TRIG 101 07/25/2024    HDL 47 07/25/2024    ALT 7 (L) 07/11/2025    AST 10 (L) 07/11/2025     07/11/2025    K 5.0 07/11/2025     07/11/2025    CREATININE 1.1 07/11/2025    BUN 25 (H) 07/11/2025    CO2 25 07/11/2025    TSH 3.693 08/12/2024    INR 0.9 02/23/2022    HGBA1C 6.0 (H) 08/12/2024           Objective:      Physical Exam  Constitutional:       General: She is not in acute distress.     Appearance: Normal appearance. She is not toxic-appearing.   HENT:      Head: Normocephalic and atraumatic.      Nose: Nose normal.   Cardiovascular:      Rate and Rhythm: Normal rate and regular rhythm.      Pulses: Normal pulses.      Heart sounds: No murmur heard.     No friction rub.   Pulmonary:      Effort: Pulmonary effort is normal. No respiratory distress.      Breath sounds: Normal breath sounds. No wheezing.   Musculoskeletal:      Cervical back: Normal range of motion.   Feet:      Comments: The left great toe shows  "nail loss with bleeding, and the second toe has a fragile, friable nail.   Mild tenderness at the base of the first toe.   ROM slightly limited     Skin:     General: Skin is warm and dry.   Neurological:      General: No focal deficit present.      Mental Status: She is alert and oriented to person, place, and time.   Psychiatric:         Mood and Affect: Mood normal.           Assessment:       1. Toenail avulsion, initial encounter    2. PTSD (post-traumatic stress disorder)    3. Essential hypertension    4. Nail problem          Plan:         Assessment & Plan    - Assessed left great toe with detached toenail, likely due to fungal infection, however will refer to podiatry for further evaluation.   - Considered possibility of trauma, but patient denies any injury.  - Will clean and wrap the affected toe to manage pain and prevent infection.  - Evaluated mood in light of friend's critical condition. Patient reports stable mood on Zoloft.        Maggy Olivas" was seen today for nail problem.    Diagnoses and all orders for this visit:    Toenail avulsion, initial encounter.   -     mupirocin (BACTROBAN) 2 % ointment; Apply topically 3 (three) times daily.  -     X-Ray Toe 2 or More Views Left; Future  -     Ambulatory referral/consult to Podiatry; Future  -     Patient reports no trauma proceeding nail falling off. Considered possible fungal infection.   -     Wound care provided in clinic.     Nail problem  -     Ambulatory referral/consult to Podiatry; Future    PTSD (post-traumatic stress disorder)  -     sertraline (ZOLOFT) 50 MG tablet; Take 1 tablet (50 mg total) by mouth once daily. For depression/anxiety  -      Stable on zoloft.     Essential hypertension         -    Well-controlled. Continue antihypertensive regimen as prescribed.     Follow up for if symptoms are not improved.    Future Appointments       Date Provider Specialty Appt Notes    7/16/2025  Radiology xray    7/17/2025 Nikita Blackburn DPM " Podiatry Contusion of left great toe with damage to nail, initial encounter [S90.212A]    8/11/2025  Lab lab    8/11/2025  Radiology dexa    8/25/2025 MIRACLE Richards PA-C Endocrinology year f/u    12/4/2025 Yanna Abbasi MD Family Medicine 6 months    1/14/2026  Lab hemonc    1/15/2026 Khoobehi, Aurash, MD Hematology and Oncology Invasive ductal carcinoma of breast/labs/[id number]             All of your core healthy metrics are met.       I spent a total of 30 minutes on the day of the visit.This includes face to face time and non-face to face time preparing to see the patient (eg, review of tests), obtaining and/or reviewing separately obtained history, documenting clinical information in the electronic or other health record, independently interpreting results and communicating results to the patient/family/caregiver, or care coordinator.    This note was generated with the assistance of ambient listening technology. Verbal consent was obtained by the patient and accompanying visitor(s) for the recording of patient appointment to facilitate this note. I attest to having reviewed and edited the generated note for accuracy, though some syntax or spelling errors may persist. Please contact the author of this note for any clarification.      Libia Schmidt PA-C  Family Medicine Physician Assistant            [1]   Current Outpatient Medications:     albuterol (PROVENTIL/VENTOLIN HFA) 90 mcg/actuation inhaler, Inhale 1-2 puffs into the lungs every 4 (four) hours as needed for Wheezing or Shortness of Breath. INHALE 1 TO 2 PUFFS BY MOUTH INTO THE LUNGS EVERY 4 HOURS AS NEEDED FOR SHORTNESS OF BREATH( COUGHING) Strength: 90 mcg/actuation, Disp: 20.1 g, Rfl: 11    alendronate (FOSAMAX) 70 MG tablet, Take one tablet every 7 days., Disp: 4 tablet, Rfl: 11    aspirin (ECOTRIN) 81 MG EC tablet, Take 1 tablet (81 mg total) by mouth 2 (two) times a day., Disp: 60 tablet, Rfl: 0    blood-glucose meter kit, Use as  instructed. Insurance preferred., Disp: 1 each, Rfl: 0    CALCIUM CARBONATE/VITAMIN D3 (CALCIUM 500 + D ORAL), Take 1 tablet by mouth once daily. 10 mg daily, Disp: , Rfl:     cyanocobalamin (VITAMIN B-12) 1000 MCG tablet, Take 1 tablet (1,000 mcg total) by mouth once daily., Disp: 30 tablet, Rfl: 0    fluticasone furoate-vilanteroL (BREO ELLIPTA) 100-25 mcg/dose diskus inhaler, Inhale 1 puff into the lungs once daily. Controller, Disp: 60 each, Rfl: 11    letrozole (FEMARA) 2.5 mg Tab, Take 1 tablet (2.5 mg total) by mouth once daily., Disp: 90 tablet, Rfl: 3    levothyroxine (SYNTHROID) 125 MCG tablet, Take 1 tablet (125 mcg total) by mouth before breakfast., Disp: 90 tablet, Rfl: 3    losartan (COZAAR) 50 MG tablet, Take 0.5 tablets (25 mg total) by mouth once daily., Disp: 90 tablet, Rfl: 0    metFORMIN (GLUCOPHAGE-XR) 500 MG ER 24hr tablet, Take 2 tablets (1,000 mg total) by mouth daily with breakfast., Disp: 180 tablet, Rfl: 3    mirtazapine (REMERON) 7.5 MG Tab, Take 1 tablet (7.5 mg total) by mouth every evening., Disp: 30 tablet, Rfl: 1    polyethylene glycol (GLYCOLAX) 17 gram PwPk, Take 17 g by mouth once daily., Disp: , Rfl: 0    potassium chloride SA (K-DUR,KLOR-CON) 20 MEQ tablet, Take 20 mEq by mouth once daily., Disp: , Rfl:     rOPINIRole (REQUIP) 0.5 MG tablet, Take 1 mg by mouth every evening., Disp: , Rfl:     simvastatin (ZOCOR) 40 MG tablet, TAKE 1 TABLET(40 MG) BY MOUTH EVERY DAY, Disp: 90 tablet, Rfl: 3    solifenacin (VESICARE) 10 MG tablet, Take 1 tablet (10 mg total) by mouth once daily., Disp: 90 tablet, Rfl: 4    tetrabenazine (XENAZINE) 25 mg tablet, Take 50 mg by mouth 2 (two) times daily., Disp: , Rfl:     ciprofloxacin HCl (CIPRO) 500 MG tablet, Take 1 tablet (500 mg total) by mouth 2 (two) times daily. (Patient not taking: Reported on 7/16/2025), Disp: 20 tablet, Rfl: 2    mupirocin (BACTROBAN) 2 % ointment, Apply topically 3 (three) times daily., Disp: 1 g, Rfl: 0    sertraline  (ZOLOFT) 50 MG tablet, Take 1 tablet (50 mg total) by mouth once daily. For depression/anxiety, Disp: 90 tablet, Rfl: 0

## 2025-07-16 NOTE — TELEPHONE ENCOUNTER
Pt reports she was sleeping and when she woke up she noticed one of her toenails has completely torn off. She was able to stop the bleeding after 5 minutes of direct pressure. She is not at home alone. Recommended dispo is to see in office or VV today. Pt does not have access to pt portal for VV. Appt scheduled for 3 pm at PCP clinic. Advised pt to call back or go to ER if bleeding reoccurs and she is unable to stop it with direct pressure for 10 minutes. Pt VU.               Reason for Disposition   Toenail is completely torn off    Additional Information   Negative: Major bleeding (actively dripping or spurting) that can't be stopped   Negative: Amputation of toe   Negative: Sounds like a life-threatening emergency to the triager   Negative: High pressure injection injury (e.g., from paint gun, usually work-related   Negative: Skin is split open or gaping (length > 1/2 inch or 12 mm)   Negative: Bleeding won't stop after 10 minutes of direct pressure (using correct technique)   Negative: Dirt in the wound and not removed after 15 minutes of scrubbing   Negative: Sounds like a serious injury to the triager   Negative: Looks infected (e.g., spreading redness, red streak, pus)   Negative: Base of toenail has popped out from under skin fold   Negative: SEVERE pain (e.g., excruciating)   Negative: MODERATE-SEVERE pain and blood present under the nail (usually > 50% of nail bed)   Negative: Looks like a broken bone or dislocated joint (e.g., crooked or deformed)    Protocols used: Toe Injury-A-OH

## 2025-07-17 ENCOUNTER — RESULTS FOLLOW-UP (OUTPATIENT)
Dept: FAMILY MEDICINE | Facility: CLINIC | Age: 75
End: 2025-07-17
Payer: MEDICARE

## 2025-07-17 ENCOUNTER — TELEPHONE (OUTPATIENT)
Dept: FAMILY MEDICINE | Facility: CLINIC | Age: 75
End: 2025-07-17
Payer: MEDICARE

## 2025-07-17 ENCOUNTER — OFFICE VISIT (OUTPATIENT)
Dept: PODIATRY | Facility: CLINIC | Age: 75
End: 2025-07-17
Payer: MEDICARE

## 2025-07-17 VITALS — HEIGHT: 61 IN | WEIGHT: 159.38 LBS | BODY MASS INDEX: 30.09 KG/M2 | RESPIRATION RATE: 18 BRPM

## 2025-07-17 DIAGNOSIS — S91.209A TRAUMATIC AVULSION OF NAIL PLATE OF TOE, INITIAL ENCOUNTER: Primary | ICD-10-CM

## 2025-07-17 DIAGNOSIS — E11.42 TYPE 2 DIABETES MELLITUS WITH DIABETIC POLYNEUROPATHY, WITHOUT LONG-TERM CURRENT USE OF INSULIN: ICD-10-CM

## 2025-07-17 DIAGNOSIS — E11.51 TYPE 2 DIABETES MELLITUS WITH DIABETIC PERIPHERAL ANGIOPATHY WITHOUT GANGRENE, WITHOUT LONG-TERM CURRENT USE OF INSULIN: ICD-10-CM

## 2025-07-17 DIAGNOSIS — B35.1 ONYCHOMYCOSIS DUE TO DERMATOPHYTE: ICD-10-CM

## 2025-07-17 DIAGNOSIS — L60.9 NAIL PROBLEM: ICD-10-CM

## 2025-07-17 DIAGNOSIS — M79.675 PAIN OF TOE OF LEFT FOOT: ICD-10-CM

## 2025-07-17 DIAGNOSIS — L60.2 ONYCHOGRYPOSIS: ICD-10-CM

## 2025-07-17 PROCEDURE — 1160F RVW MEDS BY RX/DR IN RCRD: CPT | Mod: CPTII,S$GLB,,

## 2025-07-17 PROCEDURE — 3288F FALL RISK ASSESSMENT DOCD: CPT | Mod: CPTII,S$GLB,,

## 2025-07-17 PROCEDURE — 99214 OFFICE O/P EST MOD 30 MIN: CPT | Mod: S$GLB,,,

## 2025-07-17 PROCEDURE — 1159F MED LIST DOCD IN RCRD: CPT | Mod: CPTII,S$GLB,,

## 2025-07-17 PROCEDURE — 99999 PR PBB SHADOW E&M-EST. PATIENT-LVL IV: CPT | Mod: PBBFAC,,,

## 2025-07-17 PROCEDURE — 1100F PTFALLS ASSESS-DOCD GE2>/YR: CPT | Mod: CPTII,S$GLB,,

## 2025-07-17 PROCEDURE — 1157F ADVNC CARE PLAN IN RCRD: CPT | Mod: CPTII,S$GLB,,

## 2025-07-17 PROCEDURE — 1125F AMNT PAIN NOTED PAIN PRSNT: CPT | Mod: CPTII,S$GLB,,

## 2025-07-17 NOTE — TELEPHONE ENCOUNTER
----- Message from Libia Schmidt PA-C sent at 7/17/2025  7:54 AM CDT -----  I reviewed your XR which shows no fractures. There are degenerative changes seen, however not acutely concerning. I recommend following up with podiatry as scheduled. No further recommendations to   treatment plan.     Libia Schmidt PA-C'  ----- Message -----  From: Interface, Rad Results In  Sent: 7/16/2025   5:29 PM CDT  To: Libia Schmidt PA-C

## 2025-07-17 NOTE — TELEPHONE ENCOUNTER
"Primary Information    Source   Maggy Tapia "Ivana" (Patient)    Subject   Maggy Tapia "Ivana" (Patient)    Topic   General Inquiry - Return Call      Summary   Return Call   Communication   Type:  Patient Returning Call            Who Called:pt            Who Left Message for Patient:Santa Rodríguez JOSIAH            Does the patient know what this is regarding?:yes            Best Call Back Number:[phone]            Additional Information:       Routing History     From To [facility]   07/17/2025 09:53 AM Devin Osborne STAFF Routine     "

## 2025-08-11 ENCOUNTER — HOSPITAL ENCOUNTER (OUTPATIENT)
Dept: RADIOLOGY | Facility: CLINIC | Age: 75
Discharge: HOME | End: 2025-08-11
Attending: PHYSICIAN ASSISTANT
Payer: MEDICARE

## 2025-08-11 DIAGNOSIS — Z78.0 POSTMENOPAUSAL: ICD-10-CM

## 2025-08-11 PROCEDURE — 77080 DXA BONE DENSITY AXIAL: CPT | Mod: 26,,, | Performed by: RADIOLOGY

## 2025-08-11 PROCEDURE — 77080 DXA BONE DENSITY AXIAL: CPT | Mod: TC,PO

## 2025-08-12 ENCOUNTER — TELEPHONE (OUTPATIENT)
Dept: FAMILY MEDICINE | Facility: CLINIC | Age: 75
End: 2025-08-12
Payer: MEDICARE

## 2025-08-12 ENCOUNTER — RESULTS FOLLOW-UP (OUTPATIENT)
Dept: FAMILY MEDICINE | Facility: CLINIC | Age: 75
End: 2025-08-12
Payer: MEDICARE

## 2025-08-25 ENCOUNTER — OFFICE VISIT (OUTPATIENT)
Dept: ENDOCRINOLOGY | Facility: CLINIC | Age: 75
End: 2025-08-25
Payer: MEDICARE

## 2025-08-25 VITALS — BODY MASS INDEX: 30.62 KG/M2 | WEIGHT: 162.06 LBS

## 2025-08-25 DIAGNOSIS — E11.65 TYPE 2 DIABETES MELLITUS WITH HYPERGLYCEMIA, WITH LONG-TERM CURRENT USE OF INSULIN: Primary | ICD-10-CM

## 2025-08-25 DIAGNOSIS — E83.52 HYPERCALCEMIA: ICD-10-CM

## 2025-08-25 DIAGNOSIS — Z91.89 HIGH RISK FOR HIP FRACTURE: ICD-10-CM

## 2025-08-25 DIAGNOSIS — Z86.39 HISTORY OF HYPOTHYROIDISM: ICD-10-CM

## 2025-08-25 DIAGNOSIS — G47.33 OSA (OBSTRUCTIVE SLEEP APNEA): ICD-10-CM

## 2025-08-25 DIAGNOSIS — Z79.4 TYPE 2 DIABETES MELLITUS WITH HYPERGLYCEMIA, WITH LONG-TERM CURRENT USE OF INSULIN: Primary | ICD-10-CM

## 2025-08-25 DIAGNOSIS — M80.00XS OSTEOPOROSIS WITH CURRENT PATHOLOGICAL FRACTURE, UNSPECIFIED OSTEOPOROSIS TYPE, SEQUELA: ICD-10-CM

## 2025-08-25 DIAGNOSIS — E55.9 HYPOVITAMINOSIS D: ICD-10-CM

## 2025-08-25 DIAGNOSIS — E66.9 OBESITY (BMI 30-39.9): ICD-10-CM

## 2025-08-25 PROBLEM — M80.00XA OSTEOPOROSIS WITH CURRENT PATHOLOGICAL FRACTURE: Status: ACTIVE | Noted: 2025-08-25

## 2025-08-25 PROCEDURE — 99999 PR PBB SHADOW E&M-EST. PATIENT-LVL III: CPT | Mod: PBBFAC,,, | Performed by: PHYSICIAN ASSISTANT

## 2025-08-25 PROCEDURE — 99214 OFFICE O/P EST MOD 30 MIN: CPT | Mod: S$GLB,,, | Performed by: PHYSICIAN ASSISTANT

## 2025-08-25 PROCEDURE — 3288F FALL RISK ASSESSMENT DOCD: CPT | Mod: CPTII,S$GLB,, | Performed by: PHYSICIAN ASSISTANT

## 2025-08-25 PROCEDURE — 1159F MED LIST DOCD IN RCRD: CPT | Mod: CPTII,S$GLB,, | Performed by: PHYSICIAN ASSISTANT

## 2025-08-25 PROCEDURE — 1157F ADVNC CARE PLAN IN RCRD: CPT | Mod: CPTII,S$GLB,, | Performed by: PHYSICIAN ASSISTANT

## 2025-08-25 PROCEDURE — 1160F RVW MEDS BY RX/DR IN RCRD: CPT | Mod: CPTII,S$GLB,, | Performed by: PHYSICIAN ASSISTANT

## 2025-08-25 PROCEDURE — G2211 COMPLEX E/M VISIT ADD ON: HCPCS | Mod: S$GLB,,, | Performed by: PHYSICIAN ASSISTANT

## 2025-08-25 PROCEDURE — 1101F PT FALLS ASSESS-DOCD LE1/YR: CPT | Mod: CPTII,S$GLB,, | Performed by: PHYSICIAN ASSISTANT

## 2025-08-25 RX ORDER — METFORMIN HYDROCHLORIDE 500 MG/1
1000 TABLET, EXTENDED RELEASE ORAL
Qty: 180 TABLET | Refills: 3 | Status: SHIPPED | OUTPATIENT
Start: 2025-08-25

## (undated) DEVICE — COVER TABLE 44X90 STERILE

## (undated) DEVICE — SHEATH GUIDE SCOUT SURG RADAR

## (undated) DEVICE — ADHESIVE DERMABOND ADVANCED

## (undated) DEVICE — GLOVE SENSICARE PI GRN 8

## (undated) DEVICE — GLOVE SURGEONS ULTRA TOUCH 6.5

## (undated) DEVICE — MANIFOLD 4 PORT

## (undated) DEVICE — TUBING SUC UNIV W/CONN 12FT

## (undated) DEVICE — PACK CUSTOM UNIV BASIN SLI

## (undated) DEVICE — Device

## (undated) DEVICE — SOL 9P NACL IRR PIC IL

## (undated) DEVICE — NDL BLUNT W/O FILTER 18GX1IN

## (undated) DEVICE — SKIN MARKER STER DUAL TIP

## (undated) DEVICE — UNDERGLOVES BIOGEL PI SIZE 8

## (undated) DEVICE — RETRIEVER SUTURE HEWSON DISP

## (undated) DEVICE — GLOVE SURG ULTRA TOUCH 8

## (undated) DEVICE — SCRUB HIBICLENS 4% CHG 4OZ

## (undated) DEVICE — TOWEL OR DISP STRL BLUE 4/PK

## (undated) DEVICE — SUT 3-0 12-18IN SILK

## (undated) DEVICE — PACK SIRUS BASIC V SURG STRL

## (undated) DEVICE — SUT 3-0 VICRYL / SH (J416)

## (undated) DEVICE — COVER SURG LIGHT HANDLE

## (undated) DEVICE — SEE L#120831

## (undated) DEVICE — SPONGE GAUZE 16PLY 4X4

## (undated) DEVICE — GOWN POLY REINF BRTH SLV 2XL

## (undated) DEVICE — STAPLER SKIN ROTATING HEAD

## (undated) DEVICE — DRAPE STERI U-SHAPED 47X51IN

## (undated) DEVICE — DRAPE HIP PCH 112X137X89IN

## (undated) DEVICE — DRAIN 19FR TROCAR  072231

## (undated) DEVICE — SUT 2-0 12-18IN SILK

## (undated) DEVICE — SYR ONLY LUER LOCK 20CC

## (undated) DEVICE — WRAP SHLDR HIP ACCU THRM PACK

## (undated) DEVICE — CATH RED RUBBER LATEX 14F 16IN

## (undated) DEVICE — SPONGE KERLIX SUPER   NON25854

## (undated) DEVICE — BRA MARENA B 42-44 XL BEIGE  B-4244-H

## (undated) DEVICE — SOL POVIDONE PREP IODINE 4 OZ

## (undated) DEVICE — SEE MEDLINE ITEM 157116

## (undated) DEVICE — PAD PERI POST REPLACEMNT

## (undated) DEVICE — SPONGE BULKEE II ABSRB 6X6.75

## (undated) DEVICE — ELECTRODE REM PLYHSV RETURN 9

## (undated) DEVICE — INTERPULSE SET

## (undated) DEVICE — GLOVE SURG ULTRA TOUCH 7.5

## (undated) DEVICE — SOL NACL IRR 1000ML BTL

## (undated) DEVICE — APPLICATOR CHLORAPREP ORN 26ML

## (undated) DEVICE — CONTAINER SPECIMEN OR STER 4OZ

## (undated) DEVICE — ADHESIVE TISSUE EXOFIN

## (undated) DEVICE — SPONGE SUPER KERLIX 6X6.75IN

## (undated) DEVICE — COVER PROXIMA MAYO STAND

## (undated) DEVICE — SPONGE LAP 18X18 PREWASHED

## (undated) DEVICE — DRESSING AQUACEL AG 3.5X10IN

## (undated) DEVICE — DRESSING AQUACEL AG SILVER 4X4

## (undated) DEVICE — DRESSING MEPILEX 4X10IN

## (undated) DEVICE — TAPE SILK 3IN

## (undated) DEVICE — SYS CLSR DERMABOND PRINEO 22CM

## (undated) DEVICE — LINER SUCTION 3000CC

## (undated) DEVICE — DRAIN WND 15FRX3/16X4.7MM TRCR

## (undated) DEVICE — TOGA FLYTE PEEL AWAY XLARGE

## (undated) DEVICE — GLOVE BIOGEL PIMICRO INDIC 6.5

## (undated) DEVICE — SPONGE IV DRAIN 4X4 STERILE

## (undated) DEVICE — SUTURE SILK 2-0 SH 30 K833H

## (undated) DEVICE — BLADE SURG CARBON STEEL #10

## (undated) DEVICE — GLOVE SENSICARE PI ALOE 7

## (undated) DEVICE — GLOVE BIOGEL PI ORTHO PRO 7.5

## (undated) DEVICE — GOWN POLY REINF BRTH SLV LG

## (undated) DEVICE — DRAPE MINOR FEN 98X77X121IN

## (undated) DEVICE — TRAY SKIN SCRUB DRY PREMIUM

## (undated) DEVICE — DRAPE THREE-QTR REINF 53X77IN

## (undated) DEVICE — SUT 2-0 ETHILON 18 FS

## (undated) DEVICE — BNDG COFLEX FOAM LF2 ST 6X5YD

## (undated) DEVICE — STRAP OR TABLE 5IN X 72IN

## (undated) DEVICE — BLADE RECIP SINGLE SIDED

## (undated) DEVICE — SUT ETHILON 3-0 PS2 18 BLK

## (undated) DEVICE — BANDAGE MATRIX HK LOOP 6IN 5YD

## (undated) DEVICE — UNDERGLOVES BIOGEL PI SZ 7 LF

## (undated) DEVICE — GOWN POLY REINF BRTH SLV XL

## (undated) DEVICE — DRAPE ORTH SPLIT 77X108IN

## (undated) DEVICE — DRAPE LAP TIBURON 77X122IN

## (undated) DEVICE — DRAPE UINDERBUT GRAD PCH

## (undated) DEVICE — SUT CTD VICRYL CT-1 27

## (undated) DEVICE — SEE MEDLINE ITEM 157117

## (undated) DEVICE — GLOVE SURG ULTRA TOUCH 8.5

## (undated) DEVICE — SUT MONOCRYL 4-0 PS-2

## (undated) DEVICE — SEE MEDLINE ITEM 146292

## (undated) DEVICE — SUT 2/0 30IN SILK BLK BRAI

## (undated) DEVICE — SUT VICRYL PLUS 0 CT1 18IN

## (undated) DEVICE — SLEEVE SCD EXPRESS KNEE MEDIUM

## (undated) DEVICE — GLOVE SENSICARE PI GRN 7

## (undated) DEVICE — SUT ETHIBOND EXCEL 2 V37 30

## (undated) DEVICE — BULB DRAIN 100CC 70740

## (undated) DEVICE — SUT SILK 2.0 BLK 18

## (undated) DEVICE — DRAPE INCISE IOBAN 2 23X17IN

## (undated) DEVICE — ELECTRODE BLADE INSULATED 1 IN

## (undated) DEVICE — SUTURE VICRYL 3-0 SH 27 VCP416H

## (undated) DEVICE — GLOVE SENSICARE PI ALOE 8

## (undated) DEVICE — SOL NACL IRR 3000ML

## (undated) DEVICE — NDL SAFETY 21G X 1 1/2 ECLPSE

## (undated) DEVICE — DRAPE STERI INSTRUMENT 1018

## (undated) DEVICE — GLOVE SENSICARE PI ALOE 6.5

## (undated) DEVICE — SYR 30CC LUER LOCK

## (undated) DEVICE — DRAPE INVISISHIELD TOWEL SMALL

## (undated) DEVICE — SPONGE GAUZE 10S 4X4  442214

## (undated) DEVICE — ALCOHOL RUBBING 70% ISO 4OZ

## (undated) DEVICE — YANKAUER FLEX NO VENT HI CAP

## (undated) DEVICE — TAPE MEDIPORE 3 X 10YD

## (undated) DEVICE — SUT VICRYL PLUS 2-0 CT1 18

## (undated) DEVICE — PAD SANITARY OB STERILE

## (undated) DEVICE — SEE MEDLINE ITEM 152622

## (undated) DEVICE — GOWN X-LG STERILE BACK

## (undated) DEVICE — NEOGUARD COVER 4X30CM STERILE

## (undated) DEVICE — GOWN POLY REINF SET SLV XL

## (undated) DEVICE — DRAPE U SPLIT SHEET 54X76IN

## (undated) DEVICE — DRAPE INCISE IOBAN 2 23X33IN

## (undated) DEVICE — SEAL LENS SCOPE MYOSURE

## (undated) DEVICE — BRA MAMMARY SUPPORT XLARGE

## (undated) DEVICE — UNDERGLOVES BIOGEL PI SIZE 7.5

## (undated) DEVICE — SUT MONOCRYL 3-0 PS-1

## (undated) DEVICE — DRAPE C ARM 42 X 120 10/BX

## (undated) DEVICE — SUT STRATAFIX PDS 1 CTX 18IN

## (undated) DEVICE — DEVICE MYOSURE LITE TISS REM

## (undated) DEVICE — ELECTRODE BLADE TEFLON 6

## (undated) DEVICE — SOCKINETTE IMPERVIOUS 12X48IN

## (undated) DEVICE — GLOVE SURG ULTRA TOUCH 7